# Patient Record
Sex: FEMALE | Race: WHITE | NOT HISPANIC OR LATINO | Employment: OTHER | URBAN - METROPOLITAN AREA
[De-identification: names, ages, dates, MRNs, and addresses within clinical notes are randomized per-mention and may not be internally consistent; named-entity substitution may affect disease eponyms.]

---

## 2017-01-03 ENCOUNTER — OFFICE VISIT (OUTPATIENT)
Dept: MEDICAL GROUP | Facility: PHYSICIAN GROUP | Age: 72
End: 2017-01-03
Payer: MEDICARE

## 2017-01-03 VITALS
WEIGHT: 193 LBS | RESPIRATION RATE: 16 BRPM | HEIGHT: 67 IN | SYSTOLIC BLOOD PRESSURE: 132 MMHG | HEART RATE: 88 BPM | OXYGEN SATURATION: 94 % | TEMPERATURE: 97.6 F | BODY MASS INDEX: 30.29 KG/M2 | DIASTOLIC BLOOD PRESSURE: 80 MMHG

## 2017-01-03 DIAGNOSIS — F41.9 ANXIETY: ICD-10-CM

## 2017-01-03 DIAGNOSIS — I10 ESSENTIAL HYPERTENSION: ICD-10-CM

## 2017-01-03 DIAGNOSIS — Z13.228 SCREENING FOR ENDOCRINE, METABOLIC AND IMMUNITY DISORDER: ICD-10-CM

## 2017-01-03 DIAGNOSIS — Z13.29 SCREENING FOR ENDOCRINE, METABOLIC AND IMMUNITY DISORDER: ICD-10-CM

## 2017-01-03 DIAGNOSIS — M79.89 SWELLING OF RIGHT LOWER EXTREMITY: ICD-10-CM

## 2017-01-03 DIAGNOSIS — M15.9 GENERALIZED OSTEOARTHRITIS OF MULTIPLE SITES: ICD-10-CM

## 2017-01-03 DIAGNOSIS — Z13.0 SCREENING FOR ENDOCRINE, METABOLIC AND IMMUNITY DISORDER: ICD-10-CM

## 2017-01-03 DIAGNOSIS — Z23 NEED FOR VACCINATION: ICD-10-CM

## 2017-01-03 DIAGNOSIS — Z13.1 SCREENING FOR DIABETES MELLITUS (DM): ICD-10-CM

## 2017-01-03 DIAGNOSIS — M81.0 OSTEOPOROSIS: ICD-10-CM

## 2017-01-03 DIAGNOSIS — E78.5 DYSLIPIDEMIA: ICD-10-CM

## 2017-01-03 PROCEDURE — 99214 OFFICE O/P EST MOD 30 MIN: CPT | Mod: 25 | Performed by: INTERNAL MEDICINE

## 2017-01-03 PROCEDURE — G0009 ADMIN PNEUMOCOCCAL VACCINE: HCPCS | Performed by: INTERNAL MEDICINE

## 2017-01-03 PROCEDURE — 90732 PPSV23 VACC 2 YRS+ SUBQ/IM: CPT | Performed by: INTERNAL MEDICINE

## 2017-01-03 RX ORDER — HYDROCODONE BITARTRATE AND ACETAMINOPHEN 10; 325 MG/1; MG/1
1 TABLET ORAL
Qty: 25 TAB | Refills: 0 | Status: SHIPPED | OUTPATIENT
Start: 2017-01-03 | End: 2017-06-06

## 2017-01-03 ASSESSMENT — PATIENT HEALTH QUESTIONNAIRE - PHQ9: CLINICAL INTERPRETATION OF PHQ2 SCORE: 0

## 2017-01-03 NOTE — ASSESSMENT & PLAN NOTE
Pt is on vitamin D and calcium supplementation. She is on fosamax which she will complete a 5 year course in April, 2017.

## 2017-01-03 NOTE — ASSESSMENT & PLAN NOTE
ACC ASCVD risk score 18% on labs. Pt was seen by previous PCP and it was decided to try conservative therapy first.

## 2017-01-03 NOTE — ASSESSMENT & PLAN NOTE
Pt is on metoprolol  mg daily. Her BP is at goal per JNC 8 criteria. Denies chest pain, shortness of breath, headache, blurry vision

## 2017-01-03 NOTE — PROGRESS NOTES
Subjective:   Nadege Mae is a 71 y.o. female here today for RLE swelling, HTN, osteoporoiss, DLD, anxiety, osteoarthritis    Swelling of right lower extremity  Pt has had right lower extremity swelling that has been present for the past two years. She's had multiple ultrasounds that were negative for DVT and has been treated with bactrim and lasix multiple times. She is currently on lasix. She continues to get edema of the lower extremities. She stands a lot at work. She uses compression stockings at work but not at home. She does try to elevated her legs at home occasionally.     Pt gets occasional pain in the other leg when she walks but not consistently.     Essential hypertension  Pt is on metoprolol  mg daily. Her BP is at goal per JNC 8 criteria. Denies chest pain, shortness of breath, headache, blurry vision    Osteoporosis  Pt is on vitamin D and calcium supplementation. She is on fosamax which she will complete a 5 year course in April, 2017.    Dyslipidemia  ACC ASCVD risk score 18% on labs. Pt was seen by previous PCP and it was decided to try conservative therapy first.     Anxiety  Pt takes xanax for anxiety. She takes on average one per day. Denies suicidal or homicidal ideation.     Generalized osteoarthritis of multiple sites  Pt takes norco for pain as needed. She tolerates medication well with no constipation or mental slowness.        Current medicines (including changes today)  Current Outpatient Prescriptions   Medication Sig Dispense Refill   • hydrocodone/acetaminophen (NORCO)  MG Tab Take 1 Tab by mouth 1 time daily as needed. 25 Tab 0   • alprazolam (XANAX) 0.25 MG Tab TAKE 1 TABLET BY MOUTH AT BEDTIME AS NEEDED FOR SLEEP AND ANXIETY 30 Tab 0   • alendronate (FOSAMAX) 70 MG Tab TAKE 1 TAB BY MOUTH EVERY 7 DAYS. 4 Tab 5   • metoprolol SR (TOPROL XL) 100 MG TABLET SR 24 HR TAKE 1 TABLET BY MOUTH EVERY DAY 30 Tab 5   • Cholecalciferol (VITAMIN D) 2000 UNIT Tab Take  by mouth every  "day.     • furosemide (LASIX) 40 MG TABS Take 1 Tab by mouth every day. 30 Tab 3   • potassium Chloride ER (K-TAB) 20 MEQ TBCR tablet Take 1 Tab by mouth 2 times a day. 60 Tab 2   • Omega-3 Fatty Acids (FISH OIL PO) Take  by mouth every day.     • therapeutic multivitamin-minerals (THERAGRAN-M) TABS Take 1 Tab by mouth every day.     • GARLIC PO Take  by mouth every day.       No current facility-administered medications for this visit.     She  has a past medical history of Hypertension; Dental disorder; Heart valve disease; Anxiety; Osteoporosis; Hyperlipidemia; and Generalized osteoarthritis of multiple sites (10/20/2015). She also has no past medical history of Muscle disorder, OSTEOPOROSIS, Allergy, or Diabetes.    ROS   No chest pain, no headache, no blurry vision, no constipation       Objective:     Blood pressure 132/80, pulse 88, temperature 36.4 °C (97.6 °F), resp. rate 16, height 1.702 m (5' 7\"), weight 87.544 kg (193 lb), SpO2 94 %. Body mass index is 30.22 kg/(m^2).   Physical Exam:  Constitutional: Alert & oriented, no acute distress  Eye: Conjunctiva clear, lids normal, no discharge  ENMT: Lips without lesions, normal external nose and ears  Neck: Trachea midline, no masses, no thyromegaly. No cervical or supraclavicular lymphadenopathy  Respiratory: Unlabored respiratory effort, lungs clear to auscultation, no wheezes, no ronchi  Cardiovascular: 2/6 systolic murmur  Ext: Edema in bilateral lower extremities  Skin: Warm, dry, good turgor, no rashes in visible areas  Neuro: No overt focal neurologic deficits, normal gait  Psych: Normal mood and affect      Assessment and Plan:   The following treatment plan was discussed    1. Swelling of right lower extremity  Bilateral edema seen on exam along with cardiac murmur. Will order echocardiogram for further evaluation. Previous ultrasounds have been negative for DVT.  - ECHOCARDIOGRAM COMP W/O CONT; Future    2. Essential hypertension  Well controlled on " metoprolol  mg daily. Continue current medication  - CBC WITH DIFFERENTIAL; Future    3. Osteoporosis  Continue calcium, vitamin D, and fosamax. Pt will complete 5 year course of fosamax in April of 2017  - VITAMIN D,25 HYDROXY; Future    4. Dyslipidemia  Pt counseled on diet and exercise for conservative therapy for DLD. Will recheck prior to follow up   - LIPID PROFILE; Future    5. Anxiety  Will continue xanax but given patient is co-commitment on norco will refer to psychiatry given risk of respiratory suppression when on combination of these medications. Denies suicidal ideation or homicidal ideation.   - REFERRAL TO PSYCHIATRY    6. Generalized osteoarthritis of multiple sites  Will refill norco but given patient is on xanax will refer to pain clinic as above  - REFERRAL TO PAIN CLINIC  - hydrocodone/acetaminophen (NORCO)  MG Tab; Take 1 Tab by mouth 1 time daily as needed.  Dispense: 25 Tab; Refill: 0    7. Need for vaccination  - PneumoVax PPV23 =>3yo    8. Screening for diabetes mellitus (DM)  - COMP METABOLIC PANEL; Future    9. Screening for endocrine, metabolic and immunity disorder  - TSH WITH REFLEX TO FT4; Future    Followup: Return in about 4 months (around 5/3/2017).    Please note that this dictation was created using voice recognition software. I have made every reasonable attempt to correct obvious errors, but I expect that there are errors of grammar and possibly content that I did not discover before finalizing the note.

## 2017-01-03 NOTE — ASSESSMENT & PLAN NOTE
Pt takes norco for pain as needed. She tolerates medication well with no constipation or mental slowness.

## 2017-01-03 NOTE — ASSESSMENT & PLAN NOTE
Pt has had right lower extremity swelling that has been present for the past two years. She's had multiple ultrasounds that were negative for DVT and has been treated with bactrim and lasix multiple times. She is currently on lasix. She continues to get edema of the lower extremities. She stands a lot at work. She uses compression stockings at work but not at home. She does try to elevated her legs at home occasionally.     Pt gets occasional pain in the other leg when she walks but not consistently.

## 2017-01-03 NOTE — ASSESSMENT & PLAN NOTE
Pt takes xanax for anxiety. She takes on average one per day. Denies suicidal or homicidal ideation.

## 2017-02-07 ENCOUNTER — OFFICE VISIT (OUTPATIENT)
Dept: URGENT CARE | Facility: PHYSICIAN GROUP | Age: 72
End: 2017-02-07
Payer: MEDICARE

## 2017-02-07 VITALS
SYSTOLIC BLOOD PRESSURE: 140 MMHG | RESPIRATION RATE: 16 BRPM | DIASTOLIC BLOOD PRESSURE: 86 MMHG | OXYGEN SATURATION: 93 % | TEMPERATURE: 97.8 F | HEART RATE: 100 BPM | BODY MASS INDEX: 29.12 KG/M2 | WEIGHT: 186 LBS

## 2017-02-07 DIAGNOSIS — J06.9 VIRAL URI WITH COUGH: ICD-10-CM

## 2017-02-07 DIAGNOSIS — H61.23 BILATERAL IMPACTED CERUMEN: ICD-10-CM

## 2017-02-07 PROCEDURE — G8482 FLU IMMUNIZE ORDER/ADMIN: HCPCS | Performed by: PHYSICIAN ASSISTANT

## 2017-02-07 PROCEDURE — 3014F SCREEN MAMMO DOC REV: CPT | Performed by: PHYSICIAN ASSISTANT

## 2017-02-07 PROCEDURE — G8432 DEP SCR NOT DOC, RNG: HCPCS | Performed by: PHYSICIAN ASSISTANT

## 2017-02-07 PROCEDURE — 1101F PT FALLS ASSESS-DOCD LE1/YR: CPT | Performed by: PHYSICIAN ASSISTANT

## 2017-02-07 PROCEDURE — G8420 CALC BMI NORM PARAMETERS: HCPCS | Performed by: PHYSICIAN ASSISTANT

## 2017-02-07 PROCEDURE — 3017F COLORECTAL CA SCREEN DOC REV: CPT | Mod: 1P | Performed by: PHYSICIAN ASSISTANT

## 2017-02-07 PROCEDURE — 99214 OFFICE O/P EST MOD 30 MIN: CPT | Performed by: PHYSICIAN ASSISTANT

## 2017-02-07 PROCEDURE — 4040F PNEUMOC VAC/ADMIN/RCVD: CPT | Performed by: PHYSICIAN ASSISTANT

## 2017-02-07 PROCEDURE — 1036F TOBACCO NON-USER: CPT | Performed by: PHYSICIAN ASSISTANT

## 2017-02-07 RX ORDER — CODEINE PHOSPHATE AND GUAIFENESIN 10; 100 MG/5ML; MG/5ML
5 SOLUTION ORAL EVERY 4 HOURS PRN
Qty: 100 ML | Refills: 0 | Status: SHIPPED | OUTPATIENT
Start: 2017-02-07 | End: 2017-10-25

## 2017-02-07 ASSESSMENT — ENCOUNTER SYMPTOMS
FEVER: 0
HEMOPTYSIS: 0
SHORTNESS OF BREATH: 0
CHILLS: 0
SPUTUM PRODUCTION: 1
COUGH: 1
PALPITATIONS: 0
WHEEZING: 0

## 2017-02-07 ASSESSMENT — COPD QUESTIONNAIRES: COPD: 0

## 2017-02-07 NOTE — MR AVS SNAPSHOT
Nadege Mae   2017 8:50 AM   Office Visit   MRN: [id number]    Department:  [facility]   Dept Phone:  [phone]    Description:  Female : 1945   Provider:  Pedro Haynes PA-C           Reason for Visit     Cough uri x3days. ears clogged x2days      Allergies as of 2017     No Known Allergies      You were diagnosed with     Viral URI with cough   [142927]       Bilateral impacted cerumen   [[id number]]         Vital Signs     Blood Pressure Pulse Temperature Respirations Weight Oxygen Saturation    140/86 mmHg 100 36.6 °C (97.8 °F) 16 84.369 kg (186 lb) 93%    Smoking Status                   Former Smoker           Basic Information     Date Of Birth Sex Race Ethnicity Preferred Language    1945 Female White Non- English      Your appointments     2017  8:15 AM   E[facility] with [facility]   [facility]    [address]  [place] [address]   [phone]           No prep              Problem List              ICD-10-CM Priority Class Noted - Resolved    Other (abnormal) findings on radiological examination of breast R92.8   2014 - Present    Osteoporosis M81.0   Unknown - Present    Essential hypertension I10   10/6/2015 - Present    Dyslipidemia E78.5   10/6/2015 - Present    Anxiety F41.9   10/6/2015 - Present    Generalized osteoarthritis of multiple sites M15.9   10/20/2015 - Present    Swelling of right lower extremity M79.89   1/3/2017 - Present      Health Maintenance        Date Due Completion Dates    IMM ZOSTER VACCINE 10/13/2003 ---    COLONOSCOPY 2016 (Declined)    Override on 2015: Patient Declined    MAMMOGRAM 3/29/2017 3/29/2016, 2014, 2014    BONE DENSITY 2019, 2012    IMM DTaP/Tdap/Td Vaccine (2 - Td) 2022            Current Immunizations     13-VALENT PCV PREVNAR 10/20/2015    Influenza Vaccine Adult HD 10/11/2016, 10/20/2015    Pneumococcal  polysaccharide vaccine (PPSV-23) 1/3/2017  9:32 AM    Tdap Vaccine 12/9/2012      Below and/or attached are the medications your provider expects you to take. Review all of your home medications and newly ordered medications with your provider and/or pharmacist. Follow medication instructions as directed by your provider and/or pharmacist. Please keep your medication list with you and share with your provider. Update the information when medications are discontinued, doses are changed, or new medications (including over-the-counter products) are added; and carry medication information at all times in the event of emergency situations     Allergies:  No Known Allergies          Medications  Valid as of: February 07, 2017 -  7:14 PM    Generic Name Brand Name Tablet Size Instructions for use    Alendronate Sodium (Tab) FOSAMAX 70 MG TAKE 1 TAB BY MOUTH EVERY 7 DAYS.        ALPRAZolam (Tab) XANAX 0.25 MG TAKE 1 TABLET BY MOUTH AT BEDTIME AS NEEDED FOR SLEEP AND ANXIETY        Cholecalciferol (Tab) vitamin D 2000 UNIT Take  by mouth every day.        Furosemide (Tab) LASIX 40 MG Take 1 Tab by mouth every day.        Garlic   Take  by mouth every day.        Guaifenesin-Codeine (Solution) ROBITUSSIN -10 mg/5mL Take 5 mL by mouth every four hours as needed for Cough.        Hydrocodone-Acetaminophen (Tab) NORCO  MG Take 1 Tab by mouth 1 time daily as needed.        Metoprolol Succinate (TABLET SR 24 HR) TOPROL  MG TAKE 1 TABLET BY MOUTH EVERY DAY        Multiple Vitamins-Minerals (Tab) THERAGRAN-M  Take 1 Tab by mouth every day.        Omega-3 Fatty Acids   Take  by mouth every day.        Potassium Chloride (Tab CR) K-TAB 20 MEQ Take 1 Tab by mouth 2 times a day.        .                 Medicines prescribed today were sent to:     [id number] - [place] - [address]    [address] [address]    Phone: [phone] Fax: [phone]    Open 24 Hours?: No      Medication refill  instructions:       If your prescription bottle indicates you have medication refills left, it is not necessary to call your provider’s office. Please contact your pharmacy and they will refill your medication.    If your prescription bottle indicates you do not have any refills left, you may request refills at any time through one of the following ways: The online GOkey system (except Urgent Care), by calling your provider’s office, or by asking your pharmacy to contact your provider’s office with a refill request. Medication refills are processed only during regular business hours and may not be available until the next business day. Your provider may request additional information or to have a follow-up visit with you prior to refilling your medication.   *Please Note: Medication refills are assigned a new Rx number when refilled electronically. Your pharmacy may indicate that no refills were authorized even though a new prescription for the same medication is available at the pharmacy. Please request the medicine by name with the pharmacy before contacting your provider for a refill.        Other Notes About Your Plan     Please Assess and Status Chronic Disease from Current and Prior Visits.     Query: Please Assess the following Diagnosis    1.Suggested Code 304.01 Opioid type Dependence; Continuous Use. Patient has been prescribed Norco since 01/18/11 with RX being filled near monthly; last RX filled 04/01/2015 per pharmacy Records. In your Medical Opinion would you status if this patient has Opioid type Dependence on continuous use.            GOkey Access Code: [email or web address]  Expires: 3/9/2017  7:14 PM    GOkey  A secure, online tool to manage your health information     [employer]’s GOkey® is a secure, online tool that connects you to your personalized health information from the privacy of your home -- day or night - making it very easy for you to manage your healthcare. Once the activation  process is completed, you can even access your medical information using the Post-i ned, which is available for free in the Apple Ned store or Google Play store.     Post-i provides the following levels of access (as shown below):   My Chart Features   [facility] Doctor [facility]  Specialists [facility]  Urgent  Care Non-[facility]  Primary Care  Doctor   Email your healthcare team securely and privately 24/7 X X X    Manage appointments: schedule your next appointment; view details of past/upcoming appointments X      Request prescription refills. X      View recent personal medical records, including lab and immunizations X X X X   View health record, including health history, allergies, medications X X X X   Read reports about your outpatient visits, procedures, consult and ER notes X X X X   See your discharge summary, which is a recap of your hospital and/or ER visit that includes your diagnosis, lab results, and care plan. X X       How to register for Post-i:  1. Go to  [email or web address].  2. Click on the Sign Up Now box, which takes you to the New Member Sign Up page. You will need to provide the following information:  a. Enter your Post-i Access Code exactly as it appears at the top of this page. (You will not need to use this code after you’ve completed the sign-up process. If you do not sign up before the expiration date, you must request a new code.)   b. Enter your date of birth.   c. Enter your home email address.   d. Click Submit, and follow the next screen’s instructions.  3. Create a Post-i ID. This will be your Post-i login ID and cannot be changed, so think of one that is secure and easy to remember.  4. Create a Post-i password. You can change your password at any time.  5. Enter your Password Reset Question and Answer. This can be used at a later time if you forget your password.   6. Enter your e-mail address. This allows you to receive e-mail notifications when new information  is available in "[employer].  7. Click Sign Up. You can now view your health information.    For assistance activating your "[employer] account, call [phone]

## 2017-02-07 NOTE — PROGRESS NOTES
Subjective:      Nadege Mae is a 71 y.o. female who presents with Cough            Cough  Episode onset: 3 days ago. The problem has been gradually worsening. The cough is productive of sputum. Associated symptoms include ear congestion. Pertinent negatives include no chest pain, chills, ear pain, fever, hemoptysis, shortness of breath or wheezing. Nothing aggravates the symptoms. She has tried OTC cough suppressant for the symptoms. The treatment provided no relief. There is no history of asthma or COPD.       Review of Systems   Constitutional: Positive for malaise/fatigue. Negative for fever and chills.   HENT: Positive for congestion and hearing loss. Negative for ear pain.    Respiratory: Positive for cough and sputum production. Negative for hemoptysis, shortness of breath and wheezing.    Cardiovascular: Negative for chest pain and palpitations.     All other systems reviewed and are negative.  PMH:  has a past medical history of Hypertension; Dental disorder; Heart valve disease; Anxiety; Osteoporosis; Hyperlipidemia; and Generalized osteoarthritis of multiple sites (10/20/2015). She also has no past medical history of Muscle disorder, OSTEOPOROSIS, Allergy, or Diabetes.  MEDS:   Current outpatient prescriptions:   •  alprazolam (XANAX) 0.25 MG Tab, TAKE 1 TABLET BY MOUTH AT BEDTIME AS NEEDED FOR SLEEP AND ANXIETY, Disp: 30 Tab, Rfl: 0  •  hydrocodone/acetaminophen (NORCO)  MG Tab, Take 1 Tab by mouth 1 time daily as needed., Disp: 25 Tab, Rfl: 0  •  alendronate (FOSAMAX) 70 MG Tab, TAKE 1 TAB BY MOUTH EVERY 7 DAYS., Disp: 4 Tab, Rfl: 5  •  metoprolol SR (TOPROL XL) 100 MG TABLET SR 24 HR, TAKE 1 TABLET BY MOUTH EVERY DAY, Disp: 30 Tab, Rfl: 5  •  Cholecalciferol (VITAMIN D) 2000 UNIT Tab, Take  by mouth every day., Disp: , Rfl:   •  furosemide (LASIX) 40 MG TABS, Take 1 Tab by mouth every day., Disp: 30 Tab, Rfl: 3  •  potassium Chloride ER (K-TAB) 20 MEQ TBCR tablet, Take 1 Tab by mouth 2 times a  day., Disp: 60 Tab, Rfl: 2  •  Omega-3 Fatty Acids (FISH OIL PO), Take  by mouth every day., Disp: , Rfl:   •  therapeutic multivitamin-minerals (THERAGRAN-M) TABS, Take 1 Tab by mouth every day., Disp: , Rfl:   •  GARLIC PO, Take  by mouth every day., Disp: , Rfl:   ALLERGIES: No Known Allergies  SURGHX:   Past Surgical History   Procedure Laterality Date   • Gyn surgery  1973     complete hysterectomy   • Breast biopsy  8/28/2014     Performed by Magdalena Londono M.D. at SURGERY [facility] ORS   • Abdominal hysterectomy total       w/BSO due to uterine cyst and endometriosis   • Breast biopsy Right 8/14     benign     SOCHX:  reports that she quit smoking about 10 years ago. Her smoking use included Cigarettes. She has a 12.5 pack-year smoking history. She has never used smokeless tobacco. She reports that she drinks about 3.0 oz of alcohol per week. She reports that she does not use illicit drugs.  FH: Family history was reviewed, no pertinent findings to report  Medications, Allergies, and current problem list reviewed today in Epic       Objective:     /86 mmHg  Pulse 100  Temp(Src) 36.6 °C (97.8 °F)  Resp 16  Wt 84.369 kg (186 lb)  SpO2 93%     Physical Exam   Constitutional: She is oriented to person, place, and time. She appears well-developed and well-nourished.   HENT:   Head: Normocephalic and atraumatic.   Right Ear: Hearing, tympanic membrane, external ear and ear canal normal.   Left Ear: Hearing, tympanic membrane, external ear and ear canal normal.   Mouth/Throat: Uvula is midline, oropharynx is clear and moist and mucous membranes are normal.   Neck: Normal range of motion. Neck supple.   Cardiovascular: Normal rate, regular rhythm, normal heart sounds and intact distal pulses.  Exam reveals no gallop and no friction rub.    No murmur heard.  Pulmonary/Chest: Effort normal and breath sounds normal. No accessory muscle usage. No apnea, no tachypnea and no bradypnea. No respiratory  distress. She has no decreased breath sounds. She has no wheezes. She has no rhonchi. She has no rales. She exhibits no tenderness.   Neurological: She is alert and oriented to person, place, and time.   Skin: Skin is warm and dry.   Psychiatric: She has a normal mood and affect. Her behavior is normal. Judgment and thought content normal.   Vitals reviewed.              Assessment/Plan:   Patient is a 71 year old female who presents with worsening cough for 3 days.  No PMH of COPD, asthma.  Pt reports productive cough with yellow/green/brown sputum.  There is some chest tightness and occasional SOB.  Patient has tried over the counter cough/cold products with no relief. No fever, tachycardia, or tachypnea present.  Lungs are clear to ausculation.  Ears are impacted with cerumen bilaterally.  They were irrigated and showed normal TM's.      1. Viral URI with cough  guaifenesin-codeine (CHERATUSSIN AC) Solution oral solution   2. Bilateral impacted cerumen       Differential diagnosis, natural history, supportive care, and indications for immediate follow-up discussed at length.   Follow-up with primary care provider within 4-5 days, emergency room precautions discussed.  Patient and/or family appears understanding of information.

## 2017-02-16 DIAGNOSIS — I10 ESSENTIAL HYPERTENSION: ICD-10-CM

## 2017-02-16 RX ORDER — METOPROLOL SUCCINATE 100 MG/1
100 TABLET, EXTENDED RELEASE ORAL
Qty: 30 TAB | Refills: 11 | Status: SHIPPED | OUTPATIENT
Start: 2017-02-16 | End: 2018-03-12

## 2017-02-16 NOTE — TELEPHONE ENCOUNTER
Received refill request for metoprolol.  Pt was seen in clinic recently and is taking this medication for HTN. Refill sent to pharmacy    Jorge Amador M.D.

## 2017-03-16 RX ORDER — METOPROLOL SUCCINATE 100 MG/1
TABLET, EXTENDED RELEASE ORAL
Qty: 30 TAB | Refills: 11 | Status: SHIPPED | OUTPATIENT
Start: 2017-03-16 | End: 2017-10-25

## 2017-03-16 NOTE — TELEPHONE ENCOUNTER
Received refill request for metoprolol.  Pt was seen in clinic recently and is taking this medication for hypertension. Refill sent to pharmacy    Jorge Amador M.D.

## 2017-03-23 RX ORDER — ALENDRONATE SODIUM 70 MG/1
TABLET ORAL
Qty: 4 TAB | Refills: 0 | Status: SHIPPED | OUTPATIENT
Start: 2017-03-23 | End: 2017-05-20 | Stop reason: SDUPTHER

## 2017-05-16 ENCOUNTER — HOSPITAL ENCOUNTER (OUTPATIENT)
Dept: LAB | Facility: MEDICAL CENTER | Age: 72
End: 2017-05-16
Attending: INTERNAL MEDICINE
Payer: MEDICARE

## 2017-05-16 DIAGNOSIS — I10 ESSENTIAL HYPERTENSION: ICD-10-CM

## 2017-05-16 DIAGNOSIS — Z13.0 SCREENING FOR ENDOCRINE, METABOLIC AND IMMUNITY DISORDER: ICD-10-CM

## 2017-05-16 DIAGNOSIS — E78.5 DYSLIPIDEMIA: ICD-10-CM

## 2017-05-16 DIAGNOSIS — M81.0 OSTEOPOROSIS: ICD-10-CM

## 2017-05-16 DIAGNOSIS — Z13.1 SCREENING FOR DIABETES MELLITUS (DM): ICD-10-CM

## 2017-05-16 DIAGNOSIS — Z13.228 SCREENING FOR ENDOCRINE, METABOLIC AND IMMUNITY DISORDER: ICD-10-CM

## 2017-05-16 DIAGNOSIS — Z13.29 SCREENING FOR ENDOCRINE, METABOLIC AND IMMUNITY DISORDER: ICD-10-CM

## 2017-05-16 LAB
25(OH)D3 SERPL-MCNC: 43 NG/ML (ref 30–100)
ALBUMIN SERPL BCP-MCNC: 4.4 G/DL (ref 3.2–4.9)
ALBUMIN/GLOB SERPL: 1.1 G/DL
ALP SERPL-CCNC: 71 U/L (ref 30–99)
ALT SERPL-CCNC: 26 U/L (ref 2–50)
ANION GAP SERPL CALC-SCNC: 8 MMOL/L (ref 0–11.9)
AST SERPL-CCNC: 32 U/L (ref 12–45)
BASOPHILS # BLD AUTO: 1.3 % (ref 0–1.8)
BASOPHILS # BLD: 0.08 K/UL (ref 0–0.12)
BILIRUB SERPL-MCNC: 0.6 MG/DL (ref 0.1–1.5)
BUN SERPL-MCNC: 14 MG/DL (ref 8–22)
CALCIUM SERPL-MCNC: 9.8 MG/DL (ref 8.5–10.5)
CHLORIDE SERPL-SCNC: 100 MMOL/L (ref 96–112)
CHOLEST SERPL-MCNC: 259 MG/DL (ref 100–199)
CO2 SERPL-SCNC: 28 MMOL/L (ref 20–33)
CREAT SERPL-MCNC: 0.73 MG/DL (ref 0.5–1.4)
EOSINOPHIL # BLD AUTO: 0.12 K/UL (ref 0–0.51)
EOSINOPHIL NFR BLD: 2 % (ref 0–6.9)
ERYTHROCYTE [DISTWIDTH] IN BLOOD BY AUTOMATED COUNT: 46 FL (ref 35.9–50)
GFR SERPL CREATININE-BSD FRML MDRD: >60 ML/MIN/1.73 M 2
GLOBULIN SER CALC-MCNC: 4.1 G/DL (ref 1.9–3.5)
GLUCOSE SERPL-MCNC: 101 MG/DL (ref 65–99)
HCT VFR BLD AUTO: 43.8 % (ref 37–47)
HDLC SERPL-MCNC: 85 MG/DL
HGB BLD-MCNC: 14 G/DL (ref 12–16)
IMM GRANULOCYTES # BLD AUTO: 0.02 K/UL (ref 0–0.11)
IMM GRANULOCYTES NFR BLD AUTO: 0.3 % (ref 0–0.9)
LDLC SERPL CALC-MCNC: 149 MG/DL
LYMPHOCYTES # BLD AUTO: 2.27 K/UL (ref 1–4.8)
LYMPHOCYTES NFR BLD: 37 % (ref 22–41)
MCH RBC QN AUTO: 32.5 PG (ref 27–33)
MCHC RBC AUTO-ENTMCNC: 32 G/DL (ref 33.6–35)
MCV RBC AUTO: 101.6 FL (ref 81.4–97.8)
MONOCYTES # BLD AUTO: 0.79 K/UL (ref 0–0.85)
MONOCYTES NFR BLD AUTO: 12.9 % (ref 0–13.4)
NEUTROPHILS # BLD AUTO: 2.85 K/UL (ref 2–7.15)
NEUTROPHILS NFR BLD: 46.5 % (ref 44–72)
NRBC # BLD AUTO: 0 K/UL
NRBC BLD AUTO-RTO: 0 /100 WBC
PLATELET # BLD AUTO: 229 K/UL (ref 164–446)
PMV BLD AUTO: 10.8 FL (ref 9–12.9)
POTASSIUM SERPL-SCNC: 4.8 MMOL/L (ref 3.6–5.5)
PROT SERPL-MCNC: 8.5 G/DL (ref 6–8.2)
RBC # BLD AUTO: 4.31 M/UL (ref 4.2–5.4)
SODIUM SERPL-SCNC: 136 MMOL/L (ref 135–145)
TRIGL SERPL-MCNC: 125 MG/DL (ref 0–149)
TSH SERPL DL<=0.005 MIU/L-ACNC: 1.44 UIU/ML (ref 0.3–3.7)
WBC # BLD AUTO: 6.1 K/UL (ref 4.8–10.8)

## 2017-05-16 PROCEDURE — 85025 COMPLETE CBC W/AUTO DIFF WBC: CPT

## 2017-05-16 PROCEDURE — 80061 LIPID PANEL: CPT

## 2017-05-16 PROCEDURE — 82306 VITAMIN D 25 HYDROXY: CPT

## 2017-05-16 PROCEDURE — 80053 COMPREHEN METABOLIC PANEL: CPT

## 2017-05-16 PROCEDURE — 36415 COLL VENOUS BLD VENIPUNCTURE: CPT

## 2017-05-16 PROCEDURE — 84443 ASSAY THYROID STIM HORMONE: CPT

## 2017-05-22 RX ORDER — ALENDRONATE SODIUM 70 MG/1
TABLET ORAL
Qty: 4 TAB | Refills: 11 | Status: SHIPPED | OUTPATIENT
Start: 2017-05-22 | End: 2017-10-25

## 2017-05-22 NOTE — TELEPHONE ENCOUNTER
Received refill request for alendronate.  Pt was seen in clinic recently and is taking this medication for osteoporosis. Refill sent to pharmacy    Jorge Amador M.D.

## 2017-05-22 NOTE — TELEPHONE ENCOUNTER
Was the patient seen in the last year in this department? Yes   Last appt with  10/11/16    Does patient have an active prescription for medications requested? No     Received Request Via: Pharmacy

## 2017-06-06 ENCOUNTER — OFFICE VISIT (OUTPATIENT)
Dept: MEDICAL GROUP | Facility: PHYSICIAN GROUP | Age: 72
End: 2017-06-06
Payer: MEDICARE

## 2017-06-06 VITALS
OXYGEN SATURATION: 95 % | WEIGHT: 179 LBS | SYSTOLIC BLOOD PRESSURE: 150 MMHG | HEIGHT: 67 IN | TEMPERATURE: 97.7 F | HEART RATE: 88 BPM | DIASTOLIC BLOOD PRESSURE: 90 MMHG | RESPIRATION RATE: 16 BRPM | BODY MASS INDEX: 28.09 KG/M2

## 2017-06-06 DIAGNOSIS — F41.9 ANXIETY: ICD-10-CM

## 2017-06-06 DIAGNOSIS — Z12.11 SCREENING FOR COLON CANCER: ICD-10-CM

## 2017-06-06 DIAGNOSIS — M81.0 OSTEOPOROSIS, UNSPECIFIED OSTEOPOROSIS TYPE, UNSPECIFIED PATHOLOGICAL FRACTURE PRESENCE: ICD-10-CM

## 2017-06-06 DIAGNOSIS — E78.5 DYSLIPIDEMIA: ICD-10-CM

## 2017-06-06 DIAGNOSIS — Z12.31 ENCOUNTER FOR SCREENING MAMMOGRAM FOR BREAST CANCER: ICD-10-CM

## 2017-06-06 PROCEDURE — G8419 CALC BMI OUT NRM PARAM NOF/U: HCPCS | Performed by: INTERNAL MEDICINE

## 2017-06-06 PROCEDURE — G8432 DEP SCR NOT DOC, RNG: HCPCS | Performed by: INTERNAL MEDICINE

## 2017-06-06 PROCEDURE — 1101F PT FALLS ASSESS-DOCD LE1/YR: CPT | Performed by: INTERNAL MEDICINE

## 2017-06-06 PROCEDURE — 1036F TOBACCO NON-USER: CPT | Performed by: INTERNAL MEDICINE

## 2017-06-06 PROCEDURE — 4040F PNEUMOC VAC/ADMIN/RCVD: CPT | Performed by: INTERNAL MEDICINE

## 2017-06-06 PROCEDURE — 99214 OFFICE O/P EST MOD 30 MIN: CPT | Performed by: INTERNAL MEDICINE

## 2017-06-06 PROCEDURE — 3014F SCREEN MAMMO DOC REV: CPT | Performed by: INTERNAL MEDICINE

## 2017-06-06 NOTE — MR AVS SNAPSHOT
"        Nadege Mae   2017 9:00 AM   Office Visit   MRN: [id number]    Department:  [facility]   Dept Phone:  [phone]    Description:  Female : 1945   Provider:  Jorge Amador M.D.           Reason for Visit     Follow-Up labs      Allergies as of 2017     No Known Allergies      You were diagnosed with     Anxiety   [824341]       Osteoporosis, unspecified osteoporosis type, unspecified pathological fracture presence   [7784775]       Dyslipidemia   [554337]       Screening for colon cancer   [440596]       Encounter for screening mammogram for breast cancer   [[id number]]         Vital Signs     Blood Pressure Pulse Temperature Respirations Height Weight    150/90 mmHg 88 36.5 °C (97.7 °F) 16 1.702 m (5' 7.01\") 81.194 kg (179 lb)    Body Mass Index Oxygen Saturation Smoking Status             28.03 kg/m2 95% Former Smoker         Basic Information     Date Of Birth Sex Race Ethnicity Preferred Language    1945 Female White Non- English      Your appointments     Aug 08, 2017  9:20 AM   Established Patient with Jorge Amador M.D.   [facility] ([place]    [address] [place] [id number] [phone]           You will be receiving a confirmation call a few days before your appointment from our automated call confirmation system.              Problem List              ICD-10-CM Priority Class Noted - Resolved    Other (abnormal) findings on radiological examination of breast R92.8   2014 - Present    Osteoporosis M81.0   Unknown - Present    Essential hypertension I10   10/6/2015 - Present    Dyslipidemia E78.5   10/6/2015 - Present    Anxiety F41.9   10/6/2015 - Present    Generalized osteoarthritis of multiple sites M15.9   10/20/2015 - Present    Swelling of right lower extremity M79.89   1/3/2017 - Present      Health Maintenance        Date Due Completion Dates    IMM ZOSTER VACCINE 10/13/2003 ---    COLONOSCOPY 2016 (Declined)   " Override on 6/2/2015: Patient Declined    MAMMOGRAM 3/29/2017 3/29/2016, 7/22/2014, 6/17/2014    BONE DENSITY 12/30/2019 12/30/2014, 12/11/2012    IMM DTaP/Tdap/Td Vaccine (2 - Td) 12/9/2022 12/9/2012            Current Immunizations     13-VALENT PCV PREVNAR 10/20/2015    Influenza Vaccine Adult HD 10/11/2016, 10/20/2015    Pneumococcal polysaccharide vaccine (PPSV-23) 1/3/2017  9:32 AM    Tdap Vaccine 12/9/2012      Below and/or attached are the medications your provider expects you to take. Review all of your home medications and newly ordered medications with your provider and/or pharmacist. Follow medication instructions as directed by your provider and/or pharmacist. Please keep your medication list with you and share with your provider. Update the information when medications are discontinued, doses are changed, or new medications (including over-the-counter products) are added; and carry medication information at all times in the event of emergency situations     Allergies:  No Known Allergies          Medications  Valid as of: June 06, 2017 -  9:15 AM    Generic Name Brand Name Tablet Size Instructions for use    Alendronate Sodium (Tab) FOSAMAX 70 MG TAKE 1 TAB BY MOUTH EVERY 7 DAYS.        Cholecalciferol (Tab) vitamin D 2000 UNIT Take  by mouth every day.        Furosemide (Tab) LASIX 40 MG Take 1 Tab by mouth every day.        Garlic   Take  by mouth every day.        Guaifenesin-Codeine (Solution) ROBITUSSIN -10 mg/5mL Take 5 mL by mouth every four hours as needed for Cough.        Metoprolol Succinate (TABLET SR 24 HR) TOPROL  MG Take 1 Tab by mouth every day.        Metoprolol Succinate (TABLET SR 24 HR) TOPROL  MG TAKE 1 TABLET BY MOUTH EVERY DAY        Multiple Vitamins-Minerals (Tab) THERAGRAN-M  Take 1 Tab by mouth every day.        Omega-3 Fatty Acids   Take  by mouth every day.        Potassium Chloride (Tab CR) K-TAB 20 MEQ Take 1 Tab by mouth 2 times a day.        Sertraline  HCl (Tab) ZOLOFT 50 MG Take 1 Tab by mouth every day.        .                 Medicines prescribed today were sent to:     [facility]/PHARMACY #[id number] - [place], [place] - [address]    [address] [place] [address]    Phone: [phone] Fax: [phone]    Open 24 Hours?: No      Medication refill instructions:       If your prescription bottle indicates you have medication refills left, it is not necessary to call your provider’s office. Please contact your pharmacy and they will refill your medication.    If your prescription bottle indicates you do not have any refills left, you may request refills at any time through one of the following ways: The online [employer] system (except Urgent Care), by calling your provider’s office, or by asking your pharmacy to contact your provider’s office with a refill request. Medication refills are processed only during regular business hours and may not be available until the next business day. Your provider may request additional information or to have a follow-up visit with you prior to refilling your medication.   *Please Note: Medication refills are assigned a new Rx number when refilled electronically. Your pharmacy may indicate that no refills were authorized even though a new prescription for the same medication is available at the pharmacy. Please request the medicine by name with the pharmacy before contacting your provider for a refill.        Your To Do List     Future Labs/Procedures Complete By Expires    COMP METABOLIC PANEL  As directed 6/6/2018    LIPID PROFILE  As directed 6/6/2018    [id number]  As directed 6/6/2018    OCCULT BLOOD FECES IMMUNOASSAY  As directed 6/6/2018      Other Notes About Your Plan     Please Assess and Status Chronic Disease from Current and Prior Visits.     Query: Please Assess the following Diagnosis    1.Suggested Code 304.01 Opioid type Dependence; Continuous Use. Patient has been prescribed Norco since 01/18/11 with RX being  filled near monthly; last RX filled 04/01/2015 per pharmacy Records. In your Medical Opinion would you status if this patient has Opioid type Dependence on continuous use.            MyChart Status: Patient Declined

## 2017-06-06 NOTE — ASSESSMENT & PLAN NOTE
Pt has unchanged cholesterol levels (ASCVD risk score 18% on previous visit). She does not want to start statin medication at this time. She is working on improving her diet. She has had weight loss of about 14 pounds in the past 5 months.

## 2017-06-06 NOTE — ASSESSMENT & PLAN NOTE
Pt was on xanax for anxiety. She stopped taking this medication and is doing ok off of it. She has been off for 2 months. She continues to get anxiety occasionally. She denies suicidal ideation. She has not tried a daily SSIR medication for this in the past.

## 2017-06-06 NOTE — ASSESSMENT & PLAN NOTE
Pt has now completed 5 years of Fosamax and has quit medication now. She remains on calcium and vitamin D.

## 2017-06-06 NOTE — PROGRESS NOTES
Subjective:   Nadege Mae is a 71 y.o. female here today for anxiety, DLD, osteoporosis    Anxiety  Pt was on xanax for anxiety. She stopped taking this medication and is doing ok off of it. She has been off for 2 months. She continues to get anxiety occasionally. She denies suicidal ideation. She has not tried a daily SSIR medication for this in the past.     Osteoporosis  Pt has now completed 5 years of Fosamax and has quit medication now. She remains on calcium and vitamin D.     Dyslipidemia  Pt has unchanged cholesterol levels (ASCVD risk score 18% on previous visit). She does not want to start statin medication at this time. She is working on improving her diet. She has had weight loss of about 14 pounds in the past 5 months.        Current medicines (including changes today)  Current Outpatient Prescriptions   Medication Sig Dispense Refill   • sertraline (ZOLOFT) 50 MG Tab Take 1 Tab by mouth every day. 30 Tab 2   • metoprolol SR (TOPROL XL) 100 MG TABLET SR 24 HR TAKE 1 TABLET BY MOUTH EVERY DAY 30 Tab 11   • metoprolol SR (TOPROL XL) 100 MG TABLET SR 24 HR Take 1 Tab by mouth every day. 30 Tab 11   • guaifenesin-codeine (CHERATUSSIN AC) Solution oral solution Take 5 mL by mouth every four hours as needed for Cough. 100 mL 0   • Cholecalciferol (VITAMIN D) 2000 UNIT Tab Take  by mouth every day.     • furosemide (LASIX) 40 MG TABS Take 1 Tab by mouth every day. 30 Tab 3   • potassium Chloride ER (K-TAB) 20 MEQ TBCR tablet Take 1 Tab by mouth 2 times a day. 60 Tab 2   • Omega-3 Fatty Acids (FISH OIL PO) Take  by mouth every day.     • therapeutic multivitamin-minerals (THERAGRAN-M) TABS Take 1 Tab by mouth every day.     • GARLIC PO Take  by mouth every day.     • alendronate (FOSAMAX) 70 MG Tab TAKE 1 TAB BY MOUTH EVERY 7 DAYS. 4 Tab 11     No current facility-administered medications for this visit.     She  has a past medical history of Hypertension; Dental disorder; Heart valve disease; Anxiety;  "Osteoporosis; Hyperlipidemia; and Generalized osteoarthritis of multiple sites (10/20/2015). She also has no past medical history of Muscle disorder, OSTEOPOROSIS, Allergy, or Diabetes.    ROS   No chest pain, no shortness of breath, no suicidal ideation       Objective:     Blood pressure 150/90, pulse 88, temperature 36.5 °C (97.7 °F), resp. rate 16, height 1.702 m (5' 7.01\"), weight 81.194 kg (179 lb), SpO2 95 %. Body mass index is 28.03 kg/(m^2).   Physical Exam:  Constitutional: Alert & oriented, no acute distress  Eye: Conjunctiva clear, lids normal, no discharge  ENMT: Lips without lesions, normal external nose and ears  Neck: Trachea midline, no masses, no thyromegaly. No cervical or supraclavicular lymphadenopathy  Respiratory: Unlabored respiratory effort, lungs clear to auscultation, no wheezes, no ronchi  Cardiovascular: Normal S1, S2, no murmur, no edema  Skin: Warm, dry, good turgor, no rashes in visible areas  Neuro: No overt focal neurologic deficits, normal gait  Psych: Normal mood and affect      Assessment and Plan:   The following treatment plan was discussed    1. Anxiety  No suicidal ideation. Will start patient on sertraline and follow up in 2 months to assess response.   - sertraline (ZOLOFT) 50 MG Tab; Take 1 Tab by mouth every day.  Dispense: 30 Tab; Refill: 2    2. Osteoporosis, unspecified osteoporosis type, unspecified pathological fracture presence  Pt is now off Fosamax after 5 years of treatment. Continue calcium and vitamin D supplementation  - COMP METABOLIC PANEL; Future    3. Dyslipidemia  Pt counseled about possibly starting medication but she prefers to continue weight loss through diet and exercise. Will continue conservative lifestyle treatment and recheck lab in 6 months  - LIPID PROFILE; Future    4. Screening for colon cancer  - OCCULT BLOOD FECES IMMUNOASSAY; Future    5. Encounter for screening mammogram for breast cancer  - [id number]; Future      Followup: " Return in about 2 months (around 8/6/2017).    Please note that this dictation was created using voice recognition software. I have made every reasonable attempt to correct obvious errors, but I expect that there are errors of grammar and possibly content that I did not discover before finalizing the note.

## 2017-08-07 ENCOUNTER — TELEPHONE (OUTPATIENT)
Dept: MEDICAL GROUP | Facility: PHYSICIAN GROUP | Age: 72
End: 2017-08-07

## 2017-08-07 DIAGNOSIS — F41.9 ANXIETY: ICD-10-CM

## 2017-08-07 NOTE — TELEPHONE ENCOUNTER
ESTABLISHED PATIENT PRE-VISIT PLANNING     Note: Patient will not be contacted if there is no indication to call.     1.  Reviewed notes from the last few office visits within the medical group: Yes    2.  If any orders were placed at last visit or intended to be done for this visit (i.e. 6 mos follow-up), do we have Results/Consult Notes?        •  Labs - Labs ordered, NOT completed. Patient advised to complete prior to next appointment. Pt wants to discuss with Dr. Amador       •  Imaging - Imaging ordered, NOT completed. Patient advised to complete prior to next appointment.       •  Referrals - No referrals were ordered at last office visit.    3. Is this appointment scheduled as a Hospital Follow-Up? No    4.  Immunizations were updated in Epic using WebIZ?: Epic matches WebIZ       •  Web Iz Recommendations: ZOSTAVAX (Shingles)    5.  Patient is due for the following Health Maintenance Topics:   Health Maintenance Due   Topic Date Due   • IMM ZOSTER VACCINE  10/13/2003   • COLONOSCOPY  06/02/2016   • Annual Wellness Visit  10/20/2016   • MAMMOGRAM  03/29/2017           6.  Patient was informed to arrive 15 min prior to their scheduled appointment and bring in their medication bottles.

## 2017-08-07 NOTE — TELEPHONE ENCOUNTER
Received refill request for sertraline.  Pt was seen in clinic recently and was started on medication for anxiety. She will be seen tomorrow in clinic to assess response to medication.  Refill sent to pharmacy    Jorge Amador M.D.

## 2017-08-08 ENCOUNTER — OFFICE VISIT (OUTPATIENT)
Dept: MEDICAL GROUP | Facility: PHYSICIAN GROUP | Age: 72
End: 2017-08-08
Payer: MEDICARE

## 2017-08-08 VITALS
HEIGHT: 67 IN | BODY MASS INDEX: 27.94 KG/M2 | SYSTOLIC BLOOD PRESSURE: 130 MMHG | RESPIRATION RATE: 16 BRPM | OXYGEN SATURATION: 96 % | DIASTOLIC BLOOD PRESSURE: 78 MMHG | TEMPERATURE: 98.1 F | HEART RATE: 82 BPM | WEIGHT: 178 LBS

## 2017-08-08 DIAGNOSIS — F41.9 ANXIETY: ICD-10-CM

## 2017-08-08 DIAGNOSIS — I10 ESSENTIAL HYPERTENSION: ICD-10-CM

## 2017-08-08 DIAGNOSIS — E78.5 DYSLIPIDEMIA: ICD-10-CM

## 2017-08-08 PROCEDURE — 99214 OFFICE O/P EST MOD 30 MIN: CPT | Performed by: INTERNAL MEDICINE

## 2017-08-08 RX ORDER — SERTRALINE HYDROCHLORIDE 100 MG/1
100 TABLET, FILM COATED ORAL DAILY
Qty: 30 TAB | Refills: 3 | Status: ON HOLD | OUTPATIENT
Start: 2017-08-08 | End: 2017-12-21 | Stop reason: SDUPTHER

## 2017-08-08 NOTE — ASSESSMENT & PLAN NOTE
Pt was started on sertaline 50 mg daily at last visit for anxiety.   She feels that the medication is working well. She had a lot of stressors and feels the medication helped with this quite a bit. She does not have manic symptoms such as excessive energy, dangerous activities, decreased need for sleep. She denies suicidal or homicidal ideation

## 2017-08-08 NOTE — MR AVS SNAPSHOT
"        Nadege Roddyemily   2017 9:20 AM   Office Visit   MRN: [id number]    Department:  [facility]   Dept Phone:  [phone]    Description:  Female : 1945   Provider:  Jorge Amador M.D.           Reason for Visit     Follow-Up           Allergies as of 2017     No Known Allergies      You were diagnosed with     Anxiety   [829225]       Dyslipidemia   [350126]       Essential hypertension   [0960241]         Vital Signs     Blood Pressure Pulse Temperature Respirations Height Weight    130/78 mmHg 82 36.7 °C (98.1 °F) 16 1.702 m (5' 7\") 80.74 kg (178 lb)    Body Mass Index Oxygen Saturation Smoking Status             27.87 kg/m2 96% Former Smoker         Basic Information     Date Of Birth Sex Race Ethnicity Preferred Language    1945 Female White Non- English      Your appointments     Oct 10, 2017  7:00 AM   Established Patient with Jorge Amador M.D.   [facility] ([address] [id number] [phone]           You will be receiving a confirmation call a few days before your appointment from our automated call confirmation system.              Problem List              ICD-10-CM Priority Class Noted - Resolved    Other (abnormal) findings on radiological examination of breast R92.8   2014 - Present    Osteoporosis M81.0   Unknown - Present    Essential hypertension I10   10/6/2015 - Present    Dyslipidemia E78.5   10/6/2015 - Present    Anxiety F41.9   10/6/2015 - Present    Generalized osteoarthritis of multiple sites M15.9   10/20/2015 - Present    Swelling of right lower extremity M79.89   1/3/2017 - Present      Health Maintenance        Date Due Completion Dates    COLON CANCER SCREENING ANNUAL FIT 10/13/1993 ---    IMM ZOSTER VACCINE 10/13/2003 ---    MAMMOGRAM 3/29/2017 3/29/2016, 2014, 2014    IMM INFLUENZA (1) 2017 10/11/2016, 10/20/2015, 10/6/2015 (Declined)    Override on 10/6/2015: Patient Declined    BONE " DENSITY 12/30/2019 12/30/2014, 12/11/2012    IMM DTaP/Tdap/Td Vaccine (2 - Td) 12/9/2022 12/9/2012            Current Immunizations     13-VALENT PCV PREVNAR 10/20/2015    Influenza Vaccine Adult HD 10/11/2016, 10/20/2015    Pneumococcal polysaccharide vaccine (PPSV-23) 1/3/2017  9:32 AM    Tdap Vaccine 12/9/2012      Below and/or attached are the medications your provider expects you to take. Review all of your home medications and newly ordered medications with your provider and/or pharmacist. Follow medication instructions as directed by your provider and/or pharmacist. Please keep your medication list with you and share with your provider. Update the information when medications are discontinued, doses are changed, or new medications (including over-the-counter products) are added; and carry medication information at all times in the event of emergency situations     Allergies:  No Known Allergies          Medications  Valid as of: August 08, 2017 - 10:00 AM    Generic Name Brand Name Tablet Size Instructions for use    Alendronate Sodium (Tab) FOSAMAX 70 MG TAKE 1 TAB BY MOUTH EVERY 7 DAYS.        Cholecalciferol (Tab) vitamin D 2000 UNIT Take  by mouth every day.        Furosemide (Tab) LASIX 40 MG Take 1 Tab by mouth every day.        Garlic   Take  by mouth every day.        Guaifenesin-Codeine (Solution) ROBITUSSIN -10 mg/5mL Take 5 mL by mouth every four hours as needed for Cough.        Metoprolol Succinate (TABLET SR 24 HR) TOPROL  MG Take 1 Tab by mouth every day.        Metoprolol Succinate (TABLET SR 24 HR) TOPROL  MG TAKE 1 TABLET BY MOUTH EVERY DAY        Multiple Vitamins-Minerals (Tab) THERAGRAN-M  Take 1 Tab by mouth every day.        Omega-3 Fatty Acids   Take  by mouth every day.        Potassium Chloride (Tab CR) K-TAB 20 MEQ Take 1 Tab by mouth 2 times a day.        Sertraline HCl (Tab) ZOLOFT 100 MG Take 1 Tab by mouth every day.        .                 Medicines  prescribed today were sent to:     [facility]/PHARMACY #[id number] - [place], [place] - [address]    [address] [place] [address]    Phone: [phone] Fax: [phone]    Open 24 Hours?: No      Medication refill instructions:       If your prescription bottle indicates you have medication refills left, it is not necessary to call your provider’s office. Please contact your pharmacy and they will refill your medication.    If your prescription bottle indicates you do not have any refills left, you may request refills at any time through one of the following ways: The online [employer] system (except Urgent Care), by calling your provider’s office, or by asking your pharmacy to contact your provider’s office with a refill request. Medication refills are processed only during regular business hours and may not be available until the next business day. Your provider may request additional information or to have a follow-up visit with you prior to refilling your medication.   *Please Note: Medication refills are assigned a new Rx number when refilled electronically. Your pharmacy may indicate that no refills were authorized even though a new prescription for the same medication is available at the pharmacy. Please request the medicine by name with the pharmacy before contacting your provider for a refill.        Other Notes About Your Plan     Please Assess and Status Chronic Disease from Current and Prior Visits.     Query: Please Assess the following Diagnosis    1.Suggested Code 304.01 Opioid type Dependence; Continuous Use. Patient has been prescribed Norco since 01/18/11 with RX being filled near monthly; last RX filled 04/01/2015 per pharmacy Records. In your Medical Opinion would you status if this patient has Opioid type Dependence on continuous use.            [employer] Status: Patient Declined

## 2017-08-08 NOTE — ASSESSMENT & PLAN NOTE
Pt in on metoprolol  mg daily for HTN. Pt reports compliance with medication. BP is at goal per JNC 8 critieria today.     Denies chest pain, shortness of breath, headache

## 2017-08-08 NOTE — PROGRESS NOTES
Subjective:   Nadege Mae is a 71 y.o. female here today for anxiety, DLD, HTN    Anxiety  Pt was started on sertaline 50 mg daily at last visit for anxiety.   She feels that the medication is working well. She had a lot of stressors and feels the medication helped with this quite a bit. She does not have manic symptoms such as excessive energy, dangerous activities, decreased need for sleep. She denies suicidal or homicidal ideation    Dyslipidemia  Pt was found to have DLD with ASCVD risk score of 18% seen at last visit. She did not want to start a statin medication but instead wanted to focus on lifestyle modification.     Essential hypertension  Pt in on metoprolol  mg daily for HTN. Pt reports compliance with medication. BP is at goal per JNC 8 critieria today.     Denies chest pain, shortness of breath, headache         Current medicines (including changes today)  Current Outpatient Prescriptions   Medication Sig Dispense Refill   • sertraline (ZOLOFT) 100 MG Tab Take 1 Tab by mouth every day. 30 Tab 3   • alendronate (FOSAMAX) 70 MG Tab TAKE 1 TAB BY MOUTH EVERY 7 DAYS. 4 Tab 11   • metoprolol SR (TOPROL XL) 100 MG TABLET SR 24 HR TAKE 1 TABLET BY MOUTH EVERY DAY 30 Tab 11   • metoprolol SR (TOPROL XL) 100 MG TABLET SR 24 HR Take 1 Tab by mouth every day. 30 Tab 11   • Cholecalciferol (VITAMIN D) 2000 UNIT Tab Take  by mouth every day.     • furosemide (LASIX) 40 MG TABS Take 1 Tab by mouth every day. 30 Tab 3   • potassium Chloride ER (K-TAB) 20 MEQ TBCR tablet Take 1 Tab by mouth 2 times a day. 60 Tab 2   • Omega-3 Fatty Acids (FISH OIL PO) Take  by mouth every day.     • therapeutic multivitamin-minerals (THERAGRAN-M) TABS Take 1 Tab by mouth every day.     • GARLIC PO Take  by mouth every day.     • guaifenesin-codeine (CHERATUSSIN AC) Solution oral solution Take 5 mL by mouth every four hours as needed for Cough. 100 mL 0     No current facility-administered medications for this visit.     She  has  "a past medical history of Hypertension; Dental disorder; Heart valve disease; Anxiety; Osteoporosis; Hyperlipidemia; and Generalized osteoarthritis of multiple sites (10/20/2015). She also has no past medical history of Muscle disorder, OSTEOPOROSIS, Allergy, or Diabetes.    ROS   No chest pain, no shortness of breath, no suicidal ideation     Objective:     Blood pressure 130/78, pulse 82, temperature 36.7 °C (98.1 °F), resp. rate 16, height 1.702 m (5' 7\"), weight 80.74 kg (178 lb), SpO2 96 %. Body mass index is 27.87 kg/(m^2).   Physical Exam:  Constitutional: Alert & oriented, no acute distress  Eye: Conjunctiva clear, lids normal, no discharge  Neck: Trachea midline, no masses, no thyromegaly. No cervical or supraclavicular lymphadenopathy  Respiratory: Unlabored respiratory effort, lungs clear to auscultation, no wheezes, no ronchi  Cardiovascular: Normal S1, S2, no murmur  Skin: Warm, dry, good turgor, no rashes in visible areas  Neuro: No overt focal neurologic deficits  Psych: Normal mood and affect    Assessment and Plan:   The following treatment plan was discussed    1. Anxiety  Much improved on sertraline. Pt feels that a higher dose would improve her symptoms further. Will increase dose to 100 mg daily and follow up in 2 months to assess response. Denies suicidal or homicidal ideation  - sertraline (ZOLOFT) 100 MG Tab; Take 1 Tab by mouth every day.  Dispense: 30 Tab; Refill: 3    2. Dyslipidemia  Pt will get labs done prior to follow up to further evaluate    3. Essential hypertension  Well controlled, continue metoprolol. Pt has been on this medication chronically for many years and therefore I don't want to change it despite metoprolol not being a first line agent.       Followup: Return in about 2 months (around 10/8/2017).    Please note that this dictation was created using voice recognition software. I have made every reasonable attempt to correct obvious errors, but I expect that there are " errors of grammar and possibly content that I did not discover before finalizing the note.

## 2017-08-08 NOTE — ASSESSMENT & PLAN NOTE
Pt was found to have DLD with ASCVD risk score of 18% seen at last visit. She did not want to start a statin medication but instead wanted to focus on lifestyle modification.

## 2017-09-25 ENCOUNTER — PATIENT OUTREACH (OUTPATIENT)
Dept: HEALTH INFORMATION MANAGEMENT | Facility: OTHER | Age: 72
End: 2017-09-25

## 2017-09-25 NOTE — PROGRESS NOTES
Outcome: Left Message    Please transfer to Patient Outreach Team at [phone] when patient returns call.    Web[employer] Checked & Epic Updated:  yes    [employer] Verified: yes    Attempt # 1

## 2017-10-02 NOTE — PROGRESS NOTES
Outcome: Left Message    Please transfer to Patient Outreach Team at [phone] when patient returns call.    Attempt # 2

## 2017-10-03 ENCOUNTER — HOSPITAL ENCOUNTER (OUTPATIENT)
Dept: LAB | Facility: MEDICAL CENTER | Age: 72
End: 2017-10-03
Attending: INTERNAL MEDICINE
Payer: MEDICARE

## 2017-10-03 ENCOUNTER — OFFICE VISIT (OUTPATIENT)
Dept: URGENT CARE | Facility: PHYSICIAN GROUP | Age: 72
End: 2017-10-03
Payer: MEDICARE

## 2017-10-03 VITALS
BODY MASS INDEX: 27.88 KG/M2 | TEMPERATURE: 98.6 F | HEART RATE: 86 BPM | SYSTOLIC BLOOD PRESSURE: 144 MMHG | RESPIRATION RATE: 16 BRPM | DIASTOLIC BLOOD PRESSURE: 90 MMHG | WEIGHT: 178 LBS | OXYGEN SATURATION: 95 %

## 2017-10-03 DIAGNOSIS — L03.115 CELLULITIS OF RIGHT LOWER LEG: ICD-10-CM

## 2017-10-03 DIAGNOSIS — M81.0 OSTEOPOROSIS, UNSPECIFIED OSTEOPOROSIS TYPE, UNSPECIFIED PATHOLOGICAL FRACTURE PRESENCE: ICD-10-CM

## 2017-10-03 DIAGNOSIS — M79.661 PAIN AND SWELLING OF RIGHT LOWER LEG: ICD-10-CM

## 2017-10-03 DIAGNOSIS — M79.89 PAIN AND SWELLING OF RIGHT LOWER LEG: ICD-10-CM

## 2017-10-03 DIAGNOSIS — E78.5 DYSLIPIDEMIA: ICD-10-CM

## 2017-10-03 LAB
ALBUMIN SERPL BCP-MCNC: 4 G/DL (ref 3.2–4.9)
ALBUMIN/GLOB SERPL: 1.1 G/DL
ALP SERPL-CCNC: 78 U/L (ref 30–99)
ALT SERPL-CCNC: 33 U/L (ref 2–50)
ANION GAP SERPL CALC-SCNC: 9 MMOL/L (ref 0–11.9)
AST SERPL-CCNC: 38 U/L (ref 12–45)
BILIRUB SERPL-MCNC: 0.6 MG/DL (ref 0.1–1.5)
BUN SERPL-MCNC: 15 MG/DL (ref 8–22)
CALCIUM SERPL-MCNC: 9.8 MG/DL (ref 8.5–10.5)
CHLORIDE SERPL-SCNC: 99 MMOL/L (ref 96–112)
CHOLEST SERPL-MCNC: 238 MG/DL (ref 100–199)
CO2 SERPL-SCNC: 27 MMOL/L (ref 20–33)
CREAT SERPL-MCNC: 0.71 MG/DL (ref 0.5–1.4)
GFR SERPL CREATININE-BSD FRML MDRD: >60 ML/MIN/1.73 M 2
GLOBULIN SER CALC-MCNC: 3.8 G/DL (ref 1.9–3.5)
GLUCOSE SERPL-MCNC: 109 MG/DL (ref 65–99)
HDLC SERPL-MCNC: 78 MG/DL
LDLC SERPL CALC-MCNC: 131 MG/DL
POTASSIUM SERPL-SCNC: 4.5 MMOL/L (ref 3.6–5.5)
PROT SERPL-MCNC: 7.8 G/DL (ref 6–8.2)
SODIUM SERPL-SCNC: 135 MMOL/L (ref 135–145)
TRIGL SERPL-MCNC: 146 MG/DL (ref 0–149)

## 2017-10-03 PROCEDURE — 80061 LIPID PANEL: CPT

## 2017-10-03 PROCEDURE — 99214 OFFICE O/P EST MOD 30 MIN: CPT | Performed by: NURSE PRACTITIONER

## 2017-10-03 PROCEDURE — 36415 COLL VENOUS BLD VENIPUNCTURE: CPT

## 2017-10-03 PROCEDURE — 80053 COMPREHEN METABOLIC PANEL: CPT

## 2017-10-03 RX ORDER — SULFAMETHOXAZOLE AND TRIMETHOPRIM 800; 160 MG/1; MG/1
1 TABLET ORAL 2 TIMES DAILY
Qty: 14 QUANTITY SUFFICIENT | Refills: 0 | Status: SHIPPED | OUTPATIENT
Start: 2017-10-03 | End: 2017-10-25

## 2017-10-03 RX ORDER — FUROSEMIDE 20 MG/1
20 TABLET ORAL DAILY
Qty: 7 TAB | Refills: 0 | Status: SHIPPED | OUTPATIENT
Start: 2017-10-03 | End: 2017-10-25

## 2017-10-03 NOTE — PROGRESS NOTES
Subjective:      Nadege Mae is a 71 y.o. female who presents with Leg Swelling (x1wk R leg swelling painful)            HPI New problem. 71 year old female with lower right leg erythema and swelling for several days. She has moderate pain with standing. This condition is a chronic issue with her. She has been seen previous for the same thing. History of 4 negative ultrasounds that were negative. She denies fever, chills, myalgia, chest pain or shortness of breath. She denies cough. She uses compression hose for this swelling issue and has previously history of being on lasix.  Allergies, medications and history reviewed by me today      ROS  Please see HPI       Objective:     /90   Pulse 86   Temp 37 °C (98.6 °F)   Resp 16   Wt 80.7 kg (178 lb)   SpO2 95%   BMI 27.88 kg/m²      Physical Exam   Constitutional: She is oriented to person, place, and time. She appears well-developed and well-nourished. No distress.   HENT:   Head: Normocephalic.   Right Ear: External ear normal.   Left Ear: External ear normal.   Nose: No mucosal edema or rhinorrhea.   Mouth/Throat: Oropharynx is clear and moist.   Eyes: Conjunctivae are normal. Right eye exhibits no discharge. Left eye exhibits no discharge.   Neck: Normal range of motion. Neck supple.   Cardiovascular: Normal rate, regular rhythm and normal heart sounds.    Musculoskeletal: Normal range of motion.        Right lower leg: She exhibits tenderness, swelling and edema.   2+ pitting edema to right lower leg.   Lymphadenopathy:     She has no cervical adenopathy.   Neurological: She is alert and oriented to person, place, and time.   Skin: Skin is warm and dry. There is erythema.        Psychiatric: She has a normal mood and affect. Her behavior is normal. Thought content normal.   Nursing note and vitals reviewed.              Assessment/Plan:     1. Cellulitis of right lower leg  sulfamethoxazole-trimethoprim (BACTRIM DS) 800-160 MG tablet    furosemide  (LASIX) 20 MG Tab   2. Pain and swelling of right lower leg       Trial of bactrim and lasix.   Advised on banana a day or V-8 vegetable juice for K+ supplement.  Elevation/compression.   She has follow up next week with PCP.

## 2017-10-03 NOTE — LETTER
October 3, 2017        Nadege Mae  [address] [facility] [address]        Nadege was seen in our clinic today and she needs to have a chair accessible to her at work for the next week. Thank you.    If you have any questions or concerns, please don't hesitate to call.        Sincerely,        IKER Okeefe.    Electronically Signed

## 2017-10-04 NOTE — PROGRESS NOTES
Outcome: Left Message    Please transfer to Patient Outreach Team at [phone] when patient returns call.      Attempt # 3

## 2017-10-05 NOTE — PROGRESS NOTES
Outcome: Left Message    Please transfer to Patient Outreach Team at [phone] when patient returns call.    Attempt # 4

## 2017-10-10 ENCOUNTER — OFFICE VISIT (OUTPATIENT)
Dept: MEDICAL GROUP | Facility: PHYSICIAN GROUP | Age: 72
End: 2017-10-10
Payer: MEDICARE

## 2017-10-10 VITALS
OXYGEN SATURATION: 93 % | TEMPERATURE: 97.5 F | RESPIRATION RATE: 12 BRPM | SYSTOLIC BLOOD PRESSURE: 140 MMHG | WEIGHT: 175 LBS | HEIGHT: 67 IN | HEART RATE: 77 BPM | BODY MASS INDEX: 27.47 KG/M2 | DIASTOLIC BLOOD PRESSURE: 80 MMHG

## 2017-10-10 DIAGNOSIS — L03.115 CELLULITIS OF RIGHT LOWER EXTREMITY: ICD-10-CM

## 2017-10-10 DIAGNOSIS — E78.5 DYSLIPIDEMIA: ICD-10-CM

## 2017-10-10 DIAGNOSIS — F41.9 ANXIETY: ICD-10-CM

## 2017-10-10 DIAGNOSIS — Z23 NEED FOR VACCINATION: ICD-10-CM

## 2017-10-10 PROCEDURE — 99214 OFFICE O/P EST MOD 30 MIN: CPT | Mod: 25 | Performed by: INTERNAL MEDICINE

## 2017-10-10 PROCEDURE — 90662 IIV NO PRSV INCREASED AG IM: CPT | Performed by: INTERNAL MEDICINE

## 2017-10-10 PROCEDURE — G0008 ADMIN INFLUENZA VIRUS VAC: HCPCS | Performed by: INTERNAL MEDICINE

## 2017-10-10 RX ORDER — AMOXICILLIN AND CLAVULANATE POTASSIUM 875; 125 MG/1; MG/1
1 TABLET, FILM COATED ORAL 2 TIMES DAILY
Qty: 20 TAB | Refills: 0 | Status: SHIPPED | OUTPATIENT
Start: 2017-10-10 | End: 2017-10-20

## 2017-10-10 NOTE — ASSESSMENT & PLAN NOTE
Pt was seen last week in the urgent care for cellulitis and edema. She reported that she has had multiple ultrasounds in the past with no DVT seen. She was prescribed Bactrim and lasix. She states the bactrim has not helped much. She continues to have minor pain in the area, warmth in the area. She denies discharge or drainage.

## 2017-10-10 NOTE — ASSESSMENT & PLAN NOTE
Pt has known DLD with elevated ASCVD risk score (18% at last visit). She opted to not start a medication or get further testing but rather to implement improved diet and exercise habits. Her cholesterol levels have improved mildly since last labs.

## 2017-10-10 NOTE — ASSESSMENT & PLAN NOTE
At last visit 2 months ago patient had sertraline increased from 50 mg daily to 100 mg daily. Today she reports that her anxiety is significantly improved. She denies suicidal or homicidal ideation.

## 2017-10-10 NOTE — PROGRESS NOTES
Subjective:   Nadege Mae is a 71 y.o. female here today for anxiety, cellulitis, DLD    Anxiety  At last visit 2 months ago patient had sertraline increased from 50 mg daily to 100 mg daily. Today she reports that her anxiety is significantly improved. She denies suicidal or homicidal ideation.     Cellulitis of right lower extremity  Pt was seen last week in the urgent care for cellulitis and edema. She reported that she has had multiple ultrasounds in the past with no DVT seen. She was prescribed Bactrim and lasix. She states the bactrim has not helped much. She continues to have minor pain in the area, warmth in the area. She denies discharge or drainage.     Dyslipidemia  Pt has known DLD with elevated ASCVD risk score (18% at last visit). She opted to not start a medication or get further testing but rather to implement improved diet and exercise habits. Her cholesterol levels have improved mildly since last labs.        Current medicines (including changes today)  Current Outpatient Prescriptions   Medication Sig Dispense Refill   • amoxicillin-clavulanate (AUGMENTIN) 875-125 MG Tab Take 1 Tab by mouth 2 times a day for 10 days. 20 Tab 0   • sertraline (ZOLOFT) 100 MG Tab Take 1 Tab by mouth every day. 30 Tab 3   • metoprolol SR (TOPROL XL) 100 MG TABLET SR 24 HR Take 1 Tab by mouth every day. 30 Tab 11   • Cholecalciferol (VITAMIN D) 2000 UNIT Tab Take  by mouth every day.     • Omega-3 Fatty Acids (FISH OIL PO) Take  by mouth every day.     • therapeutic multivitamin-minerals (THERAGRAN-M) TABS Take 1 Tab by mouth every day.     • GARLIC PO Take  by mouth every day.     • sulfamethoxazole-trimethoprim (BACTRIM DS) 800-160 MG tablet Take 1 Tab by mouth 2 times a day. 14 Quantity Sufficient 0   • furosemide (LASIX) 20 MG Tab Take 1 Tab by mouth every day. 7 Tab 0   • alendronate (FOSAMAX) 70 MG Tab TAKE 1 TAB BY MOUTH EVERY 7 DAYS. 4 Tab 11   • metoprolol SR (TOPROL XL) 100 MG TABLET SR 24 HR TAKE 1 TABLET BY  "MOUTH EVERY DAY 30 Tab 11   • guaifenesin-codeine (CHERATUSSIN AC) Solution oral solution Take 5 mL by mouth every four hours as needed for Cough. 100 mL 0   • furosemide (LASIX) 40 MG TABS Take 1 Tab by mouth every day. 30 Tab 3   • potassium Chloride ER (K-TAB) 20 MEQ TBCR tablet Take 1 Tab by mouth 2 times a day. 60 Tab 2     No current facility-administered medications for this visit.      She  has a past medical history of Anxiety; Dental disorder; Generalized osteoarthritis of multiple sites (10/20/2015); Heart valve disease; Hyperlipidemia; Hypertension; and Osteoporosis. She also has no past medical history of Allergy; Diabetes; Muscle disorder; or OSTEOPOROSIS.    ROS   No fevers/chills, no discharge from RLE skin area     Objective:     Blood pressure 140/80, pulse 77, temperature 36.4 °C (97.5 °F), resp. rate 12, height 1.702 m (5' 7\"), weight 79.4 kg (175 lb), SpO2 93 %. Body mass index is 27.41 kg/m².   Physical Exam:  Constitutional: Alert & oriented, no acute distress  Eye: Conjunctiva clear, lids normal, no discharge  ENMT: Lips without lesions, normal external nose and ears  Skin: + redness and warmth of RLE shin on the anterior side, no discharge, no purulence, + mild edema  Neuro: No overt focal neurologic deficits  Psych: Normal mood and affect      Assessment and Plan:   The following treatment plan was discussed    1. Anxiety  Well controlled, continue sertraline. No SI/HI    2. Cellulitis of right lower extremity  Etiology most likely strep induced, therefore will d/c Bactrim in favor of Augmentin. Pt counseled to let us know if the infection does not improve with antibiotic  - amoxicillin-clavulanate (AUGMENTIN) 875-125 MG Tab; Take 1 Tab by mouth 2 times a day for 10 days.  Dispense: 20 Tab; Refill: 0    3. Dyslipidemia  Pt treating with diet and exercise as she does not want to be on a statin. Will therefore recheck lab in 4-6 months to assess response to lifestyle modification.   - LIPID " PROFILE; Future  - COMP METABOLIC PANEL; Future      Followup: Return in about 6 months (around 4/10/2018).    Please note that this dictation was created using voice recognition software. I have made every reasonable attempt to correct obvious errors, but I expect that there are errors of grammar and possibly content that I did not discover before finalizing the note.

## 2017-10-25 ENCOUNTER — HOSPITAL ENCOUNTER (OUTPATIENT)
Dept: LAB | Facility: MEDICAL CENTER | Age: 72
End: 2017-10-25
Attending: NURSE PRACTITIONER
Payer: MEDICARE

## 2017-10-25 ENCOUNTER — OFFICE VISIT (OUTPATIENT)
Dept: MEDICAL GROUP | Facility: PHYSICIAN GROUP | Age: 72
End: 2017-10-25
Payer: MEDICARE

## 2017-10-25 VITALS
HEART RATE: 89 BPM | BODY MASS INDEX: 28.88 KG/M2 | OXYGEN SATURATION: 91 % | SYSTOLIC BLOOD PRESSURE: 110 MMHG | DIASTOLIC BLOOD PRESSURE: 72 MMHG | HEIGHT: 67 IN | WEIGHT: 184 LBS | RESPIRATION RATE: 16 BRPM | TEMPERATURE: 97.2 F

## 2017-10-25 DIAGNOSIS — L03.115 CELLULITIS OF RIGHT LOWER EXTREMITY: ICD-10-CM

## 2017-10-25 DIAGNOSIS — M79.89 SWELLING OF RIGHT LOWER EXTREMITY: ICD-10-CM

## 2017-10-25 LAB
BASOPHILS # BLD AUTO: 1.4 % (ref 0–1.8)
BASOPHILS # BLD: 0.12 K/UL (ref 0–0.12)
CRP SERPL HS-MCNC: 1.52 MG/DL (ref 0–0.75)
EOSINOPHIL # BLD AUTO: 0.1 K/UL (ref 0–0.51)
EOSINOPHIL NFR BLD: 1.2 % (ref 0–6.9)
ERYTHROCYTE [DISTWIDTH] IN BLOOD BY AUTOMATED COUNT: 47.8 FL (ref 35.9–50)
ERYTHROCYTE [SEDIMENTATION RATE] IN BLOOD BY WESTERGREN METHOD: 62 MM/HOUR (ref 0–30)
HCT VFR BLD AUTO: 39.6 % (ref 37–47)
HGB BLD-MCNC: 12.9 G/DL (ref 12–16)
IMM GRANULOCYTES # BLD AUTO: 0.04 K/UL (ref 0–0.11)
IMM GRANULOCYTES NFR BLD AUTO: 0.5 % (ref 0–0.9)
LYMPHOCYTES # BLD AUTO: 1.82 K/UL (ref 1–4.8)
LYMPHOCYTES NFR BLD: 21.7 % (ref 22–41)
MCH RBC QN AUTO: 33.4 PG (ref 27–33)
MCHC RBC AUTO-ENTMCNC: 32.6 G/DL (ref 33.6–35)
MCV RBC AUTO: 102.6 FL (ref 81.4–97.8)
MONOCYTES # BLD AUTO: 0.87 K/UL (ref 0–0.85)
MONOCYTES NFR BLD AUTO: 10.4 % (ref 0–13.4)
NEUTROPHILS # BLD AUTO: 5.44 K/UL (ref 2–7.15)
NEUTROPHILS NFR BLD: 64.8 % (ref 44–72)
NRBC # BLD AUTO: 0 K/UL
NRBC BLD AUTO-RTO: 0 /100 WBC
PLATELET # BLD AUTO: 237 K/UL (ref 164–446)
PMV BLD AUTO: 10.1 FL (ref 9–12.9)
RBC # BLD AUTO: 3.86 M/UL (ref 4.2–5.4)
WBC # BLD AUTO: 8.4 K/UL (ref 4.8–10.8)

## 2017-10-25 PROCEDURE — 85025 COMPLETE CBC W/AUTO DIFF WBC: CPT

## 2017-10-25 PROCEDURE — 99213 OFFICE O/P EST LOW 20 MIN: CPT | Performed by: NURSE PRACTITIONER

## 2017-10-25 PROCEDURE — 86140 C-REACTIVE PROTEIN: CPT

## 2017-10-25 PROCEDURE — 85652 RBC SED RATE AUTOMATED: CPT

## 2017-10-25 PROCEDURE — 36415 COLL VENOUS BLD VENIPUNCTURE: CPT

## 2017-10-25 NOTE — LETTER
October 25, 2017         Patient: Nadege Mae   YOB: 1945   Date of Visit: 10/25/2017           To Whom it May Concern:    Nadege Mae was seen in my clinic on 10/25/2017. Nadege is okay to be at work, but with restriction. I need her to be able to sit while at work to allow her to elevate leg for most of the day. She is able to walk, but should not be on her feet for >30 minutes at a time. I am working her up for cause of her symptoms, and if any restrictions change, we will notify you.     If you have any questions or concerns, please don't hesitate to call.        Sincerely,           JACLYN Buckley.  Electronically Signed

## 2017-10-25 NOTE — PROGRESS NOTES
Subjective:     Chief Complaint   Patient presents with   • Follow-Up     problem with right lower leg       HPI  Nadege Mae is a 72 y.o. female here today for ongoing RLE complaints. New problem to me. This has occurred in the past and was resolved with lasix and antibiotics. Symptoms started 3-4 months ago. It went away, and then returned this past month. Was seen in  10/3 diagnosed with cellulitis, prescribed bactrim for 10 days and furosemide. This offered no improvement. Re-evaluated with Dr. Amador and thought to be strep bacteria. He added Augmentin, which she took for 10 days. No improvement with this. It is actually worsening; the erythema is becoming higher. There is burning in RLE. It is edematous, warm and red. It goes into foot into toes up to knee. Has had multiple US during episodes like this with no blood clot. Reports rare chills. No known fevers. No fatigue or malaise. There has been no change in her baseline tingling.     Diagnoses of Swelling of right lower extremity and Cellulitis of right lower extremity were pertinent to this visit.    Allergies: Review of patient's allergies indicates no known allergies.  Current medicines (including changes today)  Current Outpatient Prescriptions   Medication Sig Dispense Refill   • doxycycline (VIBRAMYCIN) 100 MG Tab Take 1 Tab by mouth 2 times a day. 20 Tab 0   • cephALEXin (KEFLEX) 250 MG Cap Take 1 Cap by mouth 4 times a day. 40 Cap 0   • furosemide (LASIX) 20 MG Tab Take 1 Tab by mouth every day. 10 Tab 0   • potassium chloride SA (KDUR) 20 MEQ Tab CR Take 1 Tab by mouth every day. 10 Tab 0   • sertraline (ZOLOFT) 100 MG Tab Take 1 Tab by mouth every day. 30 Tab 3   • metoprolol SR (TOPROL XL) 100 MG TABLET SR 24 HR Take 1 Tab by mouth every day. 30 Tab 11   • Cholecalciferol (VITAMIN D) 2000 UNIT Tab Take  by mouth every day.     • Omega-3 Fatty Acids (FISH OIL PO) Take  by mouth every day.     • therapeutic multivitamin-minerals (THERAGRAN-M) TABS  "Take 1 Tab by mouth every day.     • GARLIC PO Take  by mouth every day.       No current facility-administered medications for this visit.        She  has a past medical history of Anxiety; Dental disorder; Generalized osteoarthritis of multiple sites (10/20/2015); Heart valve disease; Hyperlipidemia; Hypertension; and Osteoporosis. She also has no past medical history of Allergy; Diabetes; Muscle disorder; or OSTEOPOROSIS.    Health Maintenance: deferred    ROS  As stated in HPI      Objective:     Blood pressure 110/72, pulse 89, temperature 36.2 °C (97.2 °F), resp. rate 16, height 1.702 m (5' 7\"), weight 83.5 kg (184 lb), SpO2 91 %. Body mass index is 28.82 kg/m².  Physical Exam:  General: Alert, oriented, in no acute distress.  Eye contact is good, speech goal directed, affect calm  CNs grossly intact.  Gross hearing intact.  RLE: warm, erythematous rash of leg with skin taut. No demarcation or lesions. Erythema up to 2 inches distal to knee.   45 cm at largest circumference of calf  LLE: 36 cm at largest circumference of calf  DP pulses 2+ bilaterally  Gait steady.     Assessment and Plan:   Assessment/Plan:  1. Swelling of right lower extremity  Low suspicion for DVT, but cannot be ruled out. Has had this happen in past with normal US. We will obtain labs, if normal, obtain US. Start furosemide. Elevate.   - CBC WITH DIFFERENTIAL; Future  - WESTERGREN SED RATE; Future  - CRP QUANTITIVE (NON-CARDIAC); Future  - furosemide (LASIX) 20 MG Tab; Take 1 Tab by mouth every day.  Dispense: 10 Tab; Refill: 0  - potassium chloride SA (KDUR) 20 MEQ Tab CR; Take 1 Tab by mouth every day.  Dispense: 10 Tab; Refill: 0    2. Cellulitis of right lower extremity  Ongoing. Failing treatment. Check labs. If elevated, will start the following antibiotics. If not improving within 48 hours, she will go to ER.   - CBC WITH DIFFERENTIAL; Future  - WESTERGREN SED RATE; Future  - CRP QUANTITIVE (NON-CARDIAC); Future  - doxycycline " (VIBRAMYCIN) 100 MG Tab; Take 1 Tab by mouth 2 times a day.  Dispense: 20 Tab; Refill: 0  - cephALEXin (KEFLEX) 250 MG Cap; Take 1 Cap by mouth 4 times a day.  Dispense: 40 Cap; Refill: 0       Follow up:  Return in about 1 week (around 11/1/2017), or by phone and 1 week in clinic.    Educated in proper administration of medication(s) ordered today including safety, possible SE, risks, benefits, rationale and alternatives to therapy.   Supportive care, differential diagnoses, and indications for immediate follow-up discussed with patient.    Pathogenesis of diagnosis discussed including typical length and natural progression.    Instructed to return to clinic or nearest emergency department for any change in condition, further concerns, or worsening of symptoms.  Patient states understanding of the plan of care and discharge instructions.      Please note that this dictation was created using voice recognition software. I have made every reasonable attempt to correct obvious errors, but I expect that there are errors of grammar and possibly content that I did not discover before finalizing the note.    Followup: Return in about 1 week (around 11/1/2017), or by phone and 1 week in clinic. sooner should new symptoms or problems arise.

## 2017-10-26 ENCOUNTER — TELEPHONE (OUTPATIENT)
Dept: MEDICAL GROUP | Facility: PHYSICIAN GROUP | Age: 72
End: 2017-10-26

## 2017-10-26 RX ORDER — FUROSEMIDE 20 MG/1
20 TABLET ORAL DAILY
Qty: 10 TAB | Refills: 0 | Status: SHIPPED | OUTPATIENT
Start: 2017-10-26 | End: 2017-11-14 | Stop reason: SDUPTHER

## 2017-10-26 RX ORDER — POTASSIUM CHLORIDE 20 MEQ/1
20 TABLET, EXTENDED RELEASE ORAL DAILY
Qty: 10 TAB | Refills: 0 | Status: SHIPPED | OUTPATIENT
Start: 2017-10-26 | End: 2017-11-14 | Stop reason: SDUPTHER

## 2017-10-26 RX ORDER — DOXYCYCLINE HYCLATE 100 MG
100 TABLET ORAL 2 TIMES DAILY
Qty: 20 TAB | Refills: 0 | Status: SHIPPED | OUTPATIENT
Start: 2017-10-26 | End: 2017-11-14

## 2017-10-26 RX ORDER — CEPHALEXIN 250 MG/1
250 CAPSULE ORAL 4 TIMES DAILY
Qty: 40 CAP | Refills: 0 | Status: ON HOLD | OUTPATIENT
Start: 2017-10-26 | End: 2017-11-05

## 2017-10-26 NOTE — TELEPHONE ENCOUNTER
----- Message from RYANN Buckley sent at 10/26/2017 11:53 AM PDT -----  Please advise patient that blood test does show infection/inflammation likely cellulitis of leg not clot. I will call her today on my lunch between 2-3 to discuss this    RYANN Buckley

## 2017-10-31 ENCOUNTER — TELEPHONE (OUTPATIENT)
Dept: MEDICAL GROUP | Facility: PHYSICIAN GROUP | Age: 72
End: 2017-10-31

## 2017-11-01 ENCOUNTER — TELEPHONE (OUTPATIENT)
Dept: MEDICAL GROUP | Facility: PHYSICIAN GROUP | Age: 72
End: 2017-11-01

## 2017-11-01 NOTE — TELEPHONE ENCOUNTER
Finally got in touch with patient by phone. She reports that the erythema has worsened and is extended to above her knee. The antibiotics I have given her are not helping. I discussed with her that she needs to go straight to emergency department couple ultrasounds to rule out blood clot and potentially IV antibiotics if there is no clot present. Patient agrees to go to ER today.    JACLYN Buckley.

## 2017-11-01 NOTE — TELEPHONE ENCOUNTER
Called patient today twice once at 2 pm and once at 730 pm. I have been informed by her friend that the cellulitis is worsening of her right lower extremity and is now up into her upper leg. I have told her friend to contact patient and inform her to go to emergency Department. I have left to voice meals on patient's bone instructing her to return R voicemails and have given her 3 numbers to call us back. I have also instructed her that if his symptoms are worsening then she needs to go to ER.    JACLYN Buckley.

## 2017-11-03 ENCOUNTER — RESOLUTE PROFESSIONAL BILLING HOSPITAL PROF FEE (OUTPATIENT)
Dept: HOSPITALIST | Facility: MEDICAL CENTER | Age: 72
End: 2017-11-03
Payer: MEDICARE

## 2017-11-03 ENCOUNTER — HOSPITAL ENCOUNTER (INPATIENT)
Facility: MEDICAL CENTER | Age: 72
LOS: 2 days | DRG: 603 | End: 2017-11-05
Attending: EMERGENCY MEDICINE | Admitting: HOSPITALIST
Payer: MEDICARE

## 2017-11-03 DIAGNOSIS — L03.115 CELLULITIS OF RIGHT LOWER EXTREMITY: Primary | ICD-10-CM

## 2017-11-03 PROBLEM — L03.90 CELLULITIS: Status: ACTIVE | Noted: 2017-11-03

## 2017-11-03 LAB
ALBUMIN SERPL BCP-MCNC: 3.6 G/DL (ref 3.2–4.9)
ALBUMIN/GLOB SERPL: 0.9 G/DL
ALP SERPL-CCNC: 83 U/L (ref 30–99)
ALT SERPL-CCNC: 31 U/L (ref 2–50)
ANION GAP SERPL CALC-SCNC: 8 MMOL/L (ref 0–11.9)
APTT PPP: 26.4 SEC (ref 24.7–36)
AST SERPL-CCNC: 44 U/L (ref 12–45)
BASOPHILS # BLD AUTO: 1.2 % (ref 0–1.8)
BASOPHILS # BLD: 0.1 K/UL (ref 0–0.12)
BILIRUB SERPL-MCNC: 0.6 MG/DL (ref 0.1–1.5)
BNP SERPL-MCNC: 93 PG/ML (ref 0–100)
BUN SERPL-MCNC: 14 MG/DL (ref 8–22)
CALCIUM SERPL-MCNC: 10 MG/DL (ref 8.5–10.5)
CHLORIDE SERPL-SCNC: 99 MMOL/L (ref 96–112)
CO2 SERPL-SCNC: 28 MMOL/L (ref 20–33)
CREAT SERPL-MCNC: 0.86 MG/DL (ref 0.5–1.4)
CRP SERPL HS-MCNC: 3.23 MG/DL (ref 0–0.75)
EOSINOPHIL # BLD AUTO: 0.05 K/UL (ref 0–0.51)
EOSINOPHIL NFR BLD: 0.6 % (ref 0–6.9)
ERYTHROCYTE [DISTWIDTH] IN BLOOD BY AUTOMATED COUNT: 47.6 FL (ref 35.9–50)
ERYTHROCYTE [SEDIMENTATION RATE] IN BLOOD BY WESTERGREN METHOD: 70 MM/HOUR (ref 0–30)
EST. AVERAGE GLUCOSE BLD GHB EST-MCNC: 120 MG/DL
GFR SERPL CREATININE-BSD FRML MDRD: >60 ML/MIN/1.73 M 2
GLOBULIN SER CALC-MCNC: 4.1 G/DL (ref 1.9–3.5)
GLUCOSE SERPL-MCNC: 137 MG/DL (ref 65–99)
HBA1C MFR BLD: 5.8 % (ref 0–5.6)
HCT VFR BLD AUTO: 39.2 % (ref 37–47)
HGB BLD-MCNC: 13 G/DL (ref 12–16)
IMM GRANULOCYTES # BLD AUTO: 0.04 K/UL (ref 0–0.11)
IMM GRANULOCYTES NFR BLD AUTO: 0.5 % (ref 0–0.9)
INR PPP: 1.06 (ref 0.87–1.13)
LYMPHOCYTES # BLD AUTO: 1.86 K/UL (ref 1–4.8)
LYMPHOCYTES NFR BLD: 23 % (ref 22–41)
MCH RBC QN AUTO: 33.9 PG (ref 27–33)
MCHC RBC AUTO-ENTMCNC: 33.2 G/DL (ref 33.6–35)
MCV RBC AUTO: 102.1 FL (ref 81.4–97.8)
MONOCYTES # BLD AUTO: 0.86 K/UL (ref 0–0.85)
MONOCYTES NFR BLD AUTO: 10.6 % (ref 0–13.4)
NEUTROPHILS # BLD AUTO: 5.17 K/UL (ref 2–7.15)
NEUTROPHILS NFR BLD: 64.1 % (ref 44–72)
NRBC # BLD AUTO: 0 K/UL
NRBC BLD AUTO-RTO: 0 /100 WBC
PLATELET # BLD AUTO: 254 K/UL (ref 164–446)
PMV BLD AUTO: 9.7 FL (ref 9–12.9)
POTASSIUM SERPL-SCNC: 5.1 MMOL/L (ref 3.6–5.5)
PROT SERPL-MCNC: 7.7 G/DL (ref 6–8.2)
PROTHROMBIN TIME: 13.5 SEC (ref 12–14.6)
RBC # BLD AUTO: 3.84 M/UL (ref 4.2–5.4)
SODIUM SERPL-SCNC: 135 MMOL/L (ref 135–145)
TSH SERPL DL<=0.005 MIU/L-ACNC: 1.2 UIU/ML (ref 0.3–3.7)
WBC # BLD AUTO: 8.1 K/UL (ref 4.8–10.8)

## 2017-11-03 PROCEDURE — 700111 HCHG RX REV CODE 636 W/ 250 OVERRIDE (IP): Performed by: EMERGENCY MEDICINE

## 2017-11-03 PROCEDURE — 85610 PROTHROMBIN TIME: CPT

## 2017-11-03 PROCEDURE — 80053 COMPREHEN METABOLIC PANEL: CPT

## 2017-11-03 PROCEDURE — 93971 EXTREMITY STUDY: CPT | Mod: 26 | Performed by: SURGERY

## 2017-11-03 PROCEDURE — 85025 COMPLETE CBC W/AUTO DIFF WBC: CPT

## 2017-11-03 PROCEDURE — 99285 EMERGENCY DEPT VISIT HI MDM: CPT

## 2017-11-03 PROCEDURE — 86140 C-REACTIVE PROTEIN: CPT

## 2017-11-03 PROCEDURE — 83036 HEMOGLOBIN GLYCOSYLATED A1C: CPT

## 2017-11-03 PROCEDURE — 84443 ASSAY THYROID STIM HORMONE: CPT

## 2017-11-03 PROCEDURE — 700111 HCHG RX REV CODE 636 W/ 250 OVERRIDE (IP): Performed by: HOSPITALIST

## 2017-11-03 PROCEDURE — 83880 ASSAY OF NATRIURETIC PEPTIDE: CPT

## 2017-11-03 PROCEDURE — 700102 HCHG RX REV CODE 250 W/ 637 OVERRIDE(OP): Performed by: EMERGENCY MEDICINE

## 2017-11-03 PROCEDURE — 700102 HCHG RX REV CODE 250 W/ 637 OVERRIDE(OP): Performed by: HOSPITALIST

## 2017-11-03 PROCEDURE — 770006 HCHG ROOM/CARE - MED/SURG/GYN SEMI*

## 2017-11-03 PROCEDURE — 87040 BLOOD CULTURE FOR BACTERIA: CPT

## 2017-11-03 PROCEDURE — 93971 EXTREMITY STUDY: CPT

## 2017-11-03 PROCEDURE — A9270 NON-COVERED ITEM OR SERVICE: HCPCS | Performed by: EMERGENCY MEDICINE

## 2017-11-03 PROCEDURE — 700105 HCHG RX REV CODE 258

## 2017-11-03 PROCEDURE — 99223 1ST HOSP IP/OBS HIGH 75: CPT | Performed by: HOSPITALIST

## 2017-11-03 PROCEDURE — 85730 THROMBOPLASTIN TIME PARTIAL: CPT

## 2017-11-03 PROCEDURE — A9270 NON-COVERED ITEM OR SERVICE: HCPCS | Performed by: HOSPITALIST

## 2017-11-03 PROCEDURE — 96365 THER/PROPH/DIAG IV INF INIT: CPT

## 2017-11-03 PROCEDURE — 85652 RBC SED RATE AUTOMATED: CPT

## 2017-11-03 PROCEDURE — 700105 HCHG RX REV CODE 258: Performed by: HOSPITALIST

## 2017-11-03 RX ORDER — AMOXICILLIN 250 MG
2 CAPSULE ORAL 2 TIMES DAILY
Status: DISCONTINUED | OUTPATIENT
Start: 2017-11-03 | End: 2017-11-05 | Stop reason: HOSPADM

## 2017-11-03 RX ORDER — HEPARIN SODIUM 5000 [USP'U]/ML
5000 INJECTION, SOLUTION INTRAVENOUS; SUBCUTANEOUS EVERY 8 HOURS
Status: DISCONTINUED | OUTPATIENT
Start: 2017-11-03 | End: 2017-11-05 | Stop reason: HOSPADM

## 2017-11-03 RX ORDER — M-VIT,TX,IRON,MINS/CALC/FOLIC 27MG-0.4MG
1 TABLET ORAL DAILY
Status: DISCONTINUED | OUTPATIENT
Start: 2017-11-04 | End: 2017-11-05 | Stop reason: HOSPADM

## 2017-11-03 RX ORDER — AMPICILLIN AND SULBACTAM 2; 1 G/1; G/1
3 INJECTION, POWDER, FOR SOLUTION INTRAMUSCULAR; INTRAVENOUS ONCE
Status: COMPLETED | OUTPATIENT
Start: 2017-11-03 | End: 2017-11-03

## 2017-11-03 RX ORDER — SODIUM CHLORIDE 9 MG/ML
INJECTION, SOLUTION INTRAVENOUS
Status: COMPLETED
Start: 2017-11-03 | End: 2017-11-03

## 2017-11-03 RX ORDER — NAPROXEN 250 MG/1
250 TABLET ORAL 2 TIMES DAILY WITH MEALS
Status: DISCONTINUED | OUTPATIENT
Start: 2017-11-03 | End: 2017-11-05 | Stop reason: HOSPADM

## 2017-11-03 RX ORDER — METOPROLOL SUCCINATE 100 MG/1
100 TABLET, EXTENDED RELEASE ORAL DAILY
Status: DISCONTINUED | OUTPATIENT
Start: 2017-11-04 | End: 2017-11-05 | Stop reason: HOSPADM

## 2017-11-03 RX ORDER — SERTRALINE HYDROCHLORIDE 100 MG/1
100 TABLET, FILM COATED ORAL DAILY
Status: DISCONTINUED | OUTPATIENT
Start: 2017-11-04 | End: 2017-11-05 | Stop reason: HOSPADM

## 2017-11-03 RX ORDER — ACETAMINOPHEN 325 MG/1
650 TABLET ORAL ONCE
Status: COMPLETED | OUTPATIENT
Start: 2017-11-03 | End: 2017-11-03

## 2017-11-03 RX ORDER — NAPROXEN SODIUM 220 MG
220 TABLET ORAL 2 TIMES DAILY WITH MEALS
Status: ON HOLD | COMMUNITY
End: 2017-12-27

## 2017-11-03 RX ORDER — HYDRALAZINE HYDROCHLORIDE 10 MG/1
10 TABLET, FILM COATED ORAL EVERY 8 HOURS
Status: DISCONTINUED | OUTPATIENT
Start: 2017-11-03 | End: 2017-11-05 | Stop reason: HOSPADM

## 2017-11-03 RX ORDER — ONDANSETRON 4 MG/1
4 TABLET, ORALLY DISINTEGRATING ORAL EVERY 4 HOURS PRN
Status: DISCONTINUED | OUTPATIENT
Start: 2017-11-03 | End: 2017-11-05 | Stop reason: HOSPADM

## 2017-11-03 RX ORDER — POLYETHYLENE GLYCOL 3350 17 G/17G
1 POWDER, FOR SOLUTION ORAL
Status: DISCONTINUED | OUTPATIENT
Start: 2017-11-03 | End: 2017-11-05 | Stop reason: HOSPADM

## 2017-11-03 RX ORDER — BISACODYL 10 MG
10 SUPPOSITORY, RECTAL RECTAL
Status: DISCONTINUED | OUTPATIENT
Start: 2017-11-03 | End: 2017-11-05 | Stop reason: HOSPADM

## 2017-11-03 RX ORDER — FUROSEMIDE 20 MG/1
20 TABLET ORAL DAILY
Status: DISCONTINUED | OUTPATIENT
Start: 2017-11-04 | End: 2017-11-05 | Stop reason: HOSPADM

## 2017-11-03 RX ORDER — ONDANSETRON 2 MG/ML
4 INJECTION INTRAMUSCULAR; INTRAVENOUS EVERY 4 HOURS PRN
Status: DISCONTINUED | OUTPATIENT
Start: 2017-11-03 | End: 2017-11-05 | Stop reason: HOSPADM

## 2017-11-03 RX ORDER — ACETAMINOPHEN 325 MG/1
650 TABLET ORAL EVERY 6 HOURS PRN
Status: DISCONTINUED | OUTPATIENT
Start: 2017-11-03 | End: 2017-11-05 | Stop reason: HOSPADM

## 2017-11-03 RX ORDER — NAPROXEN 250 MG/1
220 TABLET ORAL 2 TIMES DAILY WITH MEALS
Status: DISCONTINUED | OUTPATIENT
Start: 2017-11-03 | End: 2017-11-03

## 2017-11-03 RX ADMIN — ACETAMINOPHEN 650 MG: 325 TABLET, FILM COATED ORAL at 14:56

## 2017-11-03 RX ADMIN — HEPARIN SODIUM 5000 UNITS: 5000 INJECTION, SOLUTION INTRAVENOUS; SUBCUTANEOUS at 21:15

## 2017-11-03 RX ADMIN — AMPICILLIN SODIUM AND SULBACTAM SODIUM 3 G: 2; 1 INJECTION, POWDER, FOR SOLUTION INTRAMUSCULAR; INTRAVENOUS at 23:56

## 2017-11-03 RX ADMIN — SODIUM CHLORIDE 100 ML: 9 INJECTION, SOLUTION INTRAVENOUS at 14:56

## 2017-11-03 RX ADMIN — NAPROXEN 250 MG: 250 TABLET ORAL at 19:48

## 2017-11-03 RX ADMIN — AMPICILLIN SODIUM AND SULBACTAM SODIUM 3 G: 2; 1 INJECTION, POWDER, FOR SOLUTION INTRAMUSCULAR; INTRAVENOUS at 14:56

## 2017-11-03 RX ADMIN — HYDRALAZINE HYDROCHLORIDE 10 MG: 10 TABLET, FILM COATED ORAL at 21:15

## 2017-11-03 ASSESSMENT — LIFESTYLE VARIABLES
DO YOU DRINK ALCOHOL: NO
EVER_SMOKED: YES
ALCOHOL_USE: NO

## 2017-11-03 ASSESSMENT — ENCOUNTER SYMPTOMS
HEARTBURN: 0
DIZZINESS: 0
HEMOPTYSIS: 0
DOUBLE VISION: 0
ORTHOPNEA: 0
MYALGIAS: 0
BRUISES/BLEEDS EASILY: 0
DEPRESSION: 0
WEIGHT LOSS: 0
PND: 0
PHOTOPHOBIA: 0
SPUTUM PRODUCTION: 0

## 2017-11-03 ASSESSMENT — PATIENT HEALTH QUESTIONNAIRE - PHQ9
1. LITTLE INTEREST OR PLEASURE IN DOING THINGS: NOT AT ALL
SUM OF ALL RESPONSES TO PHQ9 QUESTIONS 1 AND 2: 0
2. FEELING DOWN, DEPRESSED, IRRITABLE, OR HOPELESS: NOT AT ALL
SUM OF ALL RESPONSES TO PHQ QUESTIONS 1-9: 0

## 2017-11-03 ASSESSMENT — PAIN SCALES - GENERAL: PAINLEVEL_OUTOF10: 6

## 2017-11-03 NOTE — ED PROVIDER NOTES
ED Provider Note    Scribed for Tyler Lopez M.D. by Shai Coulter. 11/3/2017  12:38 PM    Primary Care Provider: Jorge Amador M.D.  Means of arrival: Walk in  History limited by: None    CHIEF COMPLAINT  Chief Complaint   Patient presents with   • Sent by MD     right leg swelling/redness     HPI  Nadege Mae is a 72 y.o. female who presents to the ED complaining of right leg swelling and redness, onset three months. Patient notes that her symptoms have worsened over the past month. She reports that she has been being treated for this with Potassium, Keflex, and Doxycycline without relief of her symptoms. Patient reports that over this past week she had severe knee swelling over the past week along with pain. She reports severe pain flexing her knee secondary to her pain. Per patient she spoke to her primary care provider who recommend that she come into the ED. She has a history of cellulitis but reports that this is not similar to this history. Associated symptoms include chills, difficulty ambulating, and intermittent leg numbness.     REVIEW OF SYSTEMS  CONSTITUTIONAL:  Denies fever, weigh loss, or weakness. Positive weight gain and chills.   EYES:  Denies photophobia or discharge.   ENT:  Denies sore throat, nose, or ear pain.  CARDIOVASCULAR:  Denies chest pain, palpitations, or swelling.  RESPIRATORY:  Denies cough, shortness of breath, difficulty breathing.  GI:  Denies abdominal pain, nausea, vomiting Positive vomiting.  MUSCULOSKELETAL: Positive leg pain, swelling, knee pain. Positive difficulty ambulating.   SKIN:  Positive white papules diffusely and skin redness. No other rash.   NEUROLOGIC: Denies headache, focal weakness. Positive intermittent leg numbness.   PSYCHIATRIC:  Denies depression.  C.     PAST MEDICAL HISTORY  Past Medical History:   Diagnosis Date   • Anxiety    • Dental disorder     full dentures   • Generalized osteoarthritis of multiple sites 10/20/2015   • Heart valve disease      pt not sure    • Hyperlipidemia    • Hypertension    • Osteoporosis      FAMILY HISTORY  Family History   Problem Relation Age of Onset   • GI Mother      colostomy   • Heart Attack Father    • Diabetes Father    • Hypertension Father    • Psychiatry Brother      bipolar disorder     SOCIAL HISTORY   reports that she quit smoking about 10 years ago. Her smoking use included Cigarettes. She has a 12.50 pack-year smoking history. She has never used smokeless tobacco. She reports that she drinks about 3.0 oz of alcohol per week . She reports that she does not use drugs.    SURGICAL HISTORY  Past Surgical History:   Procedure Laterality Date   • BREAST BIOPSY  8/28/2014    Performed by Magdalena Londono M.D. at SURGERY [facility]   • BREAST BIOPSY Right 8/14    benign   • GYN SURGERY  1973    complete hysterectomy   • ABDOMINAL HYSTERECTOMY TOTAL      w/BSO due to uterine cyst and endometriosis     CURRENT MEDICATIONS  No current facility-administered medications on file prior to encounter.      Current Outpatient Prescriptions on File Prior to Encounter   Medication Sig Dispense Refill   • doxycycline (VIBRAMYCIN) 100 MG Tab Take 1 Tab by mouth 2 times a day. 20 Tab 0   • cephALEXin (KEFLEX) 250 MG Cap Take 1 Cap by mouth 4 times a day. 40 Cap 0   • furosemide (LASIX) 20 MG Tab Take 1 Tab by mouth every day. 10 Tab 0   • potassium chloride SA (KDUR) 20 MEQ Tab CR Take 1 Tab by mouth every day. 10 Tab 0   • sertraline (ZOLOFT) 100 MG Tab Take 1 Tab by mouth every day. 30 Tab 3   • metoprolol SR (TOPROL XL) 100 MG TABLET SR 24 HR Take 1 Tab by mouth every day. 30 Tab 11   • Cholecalciferol (VITAMIN D) 2000 UNIT Tab Take  by mouth every day.     • Omega-3 Fatty Acids (FISH OIL PO) Take  by mouth every day.     • therapeutic multivitamin-minerals (THERAGRAN-M) TABS Take 1 Tab by mouth every day.     • GARLIC PO Take  by mouth every day.       ALLERGIES  No Known Allergies    PHYSICAL EXAM  VITAL SIGNS: /69    "Pulse 97   Temp 36.2 °C (97.2 °F) (Temporal)   Resp 18   Ht 1.702 m (5' 7\")   Wt 84 kg (185 lb 3 oz)   SpO2 95%   BMI 29.00 kg/m²      Constitutional: Patient is awake and alert. Cooperative. No acute respiratory distress. Well developed, Well nourished, Non-toxic appearance.  HENT: Normocephalic, Atraumatic, Bilateral external ears normal, Oropharynx pink moist with no exudates, Nose patent.  Eyes: Sclera and conjunctiva clear  Neck:  Supple no nuchal rigidity, no thyromegaly or mass. Non-tender  Lymphatic: No supraclavicular lymph nodes. Right inguinal lymph nodes noted.   Cardiovascular: Heart is regular rate and rhythm no murmur, rub or thrill.   Thorax & Lungs: Chest is symmetrical, with good breath sounds. No wheezing or crackles. No respiratory distress, No chest tenderness.   Abdomen: Soft, No tenderness no hepatosplenomegaly there is no guarding or reboun   Skin: Warm, Dry, skin is red, dry and crusting over the right leg. Which is also swollen and tender up to her knee. Back: Non tender with palpation, No CVA tenderness.   Extremities: Left leg with trace edema, right leg is markedly swollen from the ankle into the right mid thigh.   Musculoskeletal: Good range of motion to wrists, elbows, shoulders, hips,, and ankles. Able to move right knee joint without pain. Pulses 2+ radially and femorally.  Neurologic: Alert & oriented Sensory is intact to light touch to face, arms, and legs. DTRs are symmetrical in biceps brachioradialis, patella and Achilles.   Psychiatric: Normal affect, cooperative, and friendly.     .    LABS  Results for orders placed or performed during the hospital encounter of 11/03/17   CBC WITH DIFFERENTIAL   Result Value Ref Range    WBC 8.1 4.8 - 10.8 K/uL    RBC 3.84 (L) 4.20 - 5.40 M/uL    Hemoglobin 13.0 12.0 - 16.0 g/dL    Hematocrit 39.2 37.0 - 47.0 %    .1 (H) 81.4 - 97.8 fL    MCH 33.9 (H) 27.0 - 33.0 pg    MCHC 33.2 (L) 33.6 - 35.0 g/dL    RDW 47.6 35.9 - 50.0 fL    " Platelet Count 254 164 - 446 K/uL    MPV 9.7 9.0 - 12.9 fL    Neutrophils-Polys 64.10 44.00 - 72.00 %    Lymphocytes 23.00 22.00 - 41.00 %    Monocytes 10.60 0.00 - 13.40 %    Eosinophils 0.60 0.00 - 6.90 %    Basophils 1.20 0.00 - 1.80 %    Immature Granulocytes 0.50 0.00 - 0.90 %    Nucleated RBC 0.00 /100 WBC    Neutrophils (Absolute) 5.17 2.00 - 7.15 K/uL    Lymphs (Absolute) 1.86 1.00 - 4.80 K/uL    Monos (Absolute) 0.86 (H) 0.00 - 0.85 K/uL    Eos (Absolute) 0.05 0.00 - 0.51 K/uL    Baso (Absolute) 0.10 0.00 - 0.12 K/uL    Immature Granulocytes (abs) 0.04 0.00 - 0.11 K/uL    NRBC (Absolute) 0.00 K/uL   COMP METABOLIC PANEL   Result Value Ref Range    Sodium 135 135 - 145 mmol/L    Potassium 5.1 3.6 - 5.5 mmol/L    Chloride 99 96 - 112 mmol/L    Co2 28 20 - 33 mmol/L    Anion Gap 8.0 0.0 - 11.9    Glucose 137 (H) 65 - 99 mg/dL    Bun 14 8 - 22 mg/dL    Creatinine 0.86 0.50 - 1.40 mg/dL    Calcium 10.0 8.5 - 10.5 mg/dL    AST(SGOT) 44 12 - 45 U/L    ALT(SGPT) 31 2 - 50 U/L    Alkaline Phosphatase 83 30 - 99 U/L    Total Bilirubin 0.6 0.1 - 1.5 mg/dL    Albumin 3.6 3.2 - 4.9 g/dL    Total Protein 7.7 6.0 - 8.2 g/dL    Globulin 4.1 (H) 1.9 - 3.5 g/dL    A-G Ratio 0.9 g/dL   PROTHROMBIN TIME   Result Value Ref Range    PT 13.5 12.0 - 14.6 sec    INR 1.06 0.87 - 1.13   APTT   Result Value Ref Range    APTT 26.4 24.7 - 36.0 sec   BTYPE NATRIURETIC PEPTIDE   Result Value Ref Range    B Natriuretic Peptide 93 0 - 100 pg/mL   ESTIMATED GFR   Result Value Ref Range    GFR If African American >60 >60 mL/min/1.73 m 2    GFR If Non African American >60 >60 mL/min/1.73 m 2   TSH (Thyroid Stimulating Hormone)   Result Value Ref Range    TSH 1.200 0.300 - 3.700 uIU/mL   Hemoglobin A1c   Result Value Ref Range    Glycohemoglobin 5.8 (H) 0.0 - 5.6 %    Est Avg Glucose 120 mg/dL   WESTERGREN SED RATE   Result Value Ref Range    Sed Rate Westergren 70 (H) 0 - 30 mm/hour   CRP QUANTITIVE (NON-CARDIAC)   Result Value Ref Range     Stat C-Reactive Protein 3.23 (H) 0.00 - 0.75 mg/dL     All labs reviewed by me.    RADIOLOGY/PROCEDURES  LE VENOUS DUPLEX (Specify in Comments Left, Right Or Bilateral)   Final Result        The radiologist's interpretations of all radiological studies have been reviewed by me.     COURSE & MEDICAL DECISION MAKING  Pertinent Labs & Imaging studies reviewed. (See chart for details)    Differential diagnoses include but are not limited to DVT, cellulitis, chronic venous stasis changes, vascular insufficiency.     12:38 PM - Patient seen and examined at bedside. Ordered BNP, blood culture, CBC, CMP, prothrombin time, APTT, LE venous duplex, and estimated GFR.      2:01 PM - I discussed the case with Dr. Tray Brown Hospitalist. He is aware of the patient and agrees to admit him into his care.     2:51 PM - Recheck with the patient. I discussed with her the treatment plan which includes admission. She understands the plan and is agreeable.    Decision Making  The patient has had increasing swelling to her right leg for a month patient on 2 different antibiotics including doxycycline and Keflex and not getting better. She presents now to the emergency department with appears to be clinically a cellulitis. Ultrasound showed no DVT. At this time we will admit her to the hospital for IV antibiotics and further evaluation care of her right leg cellulitis.    DISPOSITION:  Patient will be admitted to Dr. Tray Nathan in guarded condition.    FINAL IMPRESSION  1. Cellulitis of right lower extremity      PLAN  1.Admission [facility]  2. Continue treatment of cellulitis of the right leg failed outpatient therapy      Shai PRINCE (Reza), am scribing for, and in the presence of, Tyler Lopez M.D.    Electronically signed by: Shai Coulter (Reza), 11/3/2017    Tyler PRINCE M.D. personally performed the services described in this documentation, as scribed by Shai Coulter in my presence,  and it is both accurate and complete.    The note accurately reflects work and decisions made by me.  Tyler Lopez  11/3/2017  7:23 PM

## 2017-11-03 NOTE — ED NOTES
Med Rec completed per patient at bedside.  Allergies reviewed  Patient started 10 day course of Doxycycline and Cephalexin on 10/26/17.   Cephalexin stopped 11/1/17 per patients MD, continued on Doxycycline.

## 2017-11-03 NOTE — ED NOTES
Pt ambulates to triage, sent to ER by MD for right leg swelling & redness x1 month.  A&ox4.  +CMS.  Redness & swelling noted.  Pt to lobby & advised to inform RN of any changes.

## 2017-11-03 NOTE — H&P
Hospital Medicine History and Physical    Date of Service  11/3/2017    Chief Complaint  Chief Complaint   Patient presents with   • Sent by MD     right leg swelling/redness       History of Presenting Illness  72 y.o. female who presented 11/3/2017 with pmh of HTN, is coming today due to redness and swelling of her right leg as per patient she is had redness for couple of weeks now had at least 4 US leg negative for DVT, she was on po ABX doxy and keflex she report some improvement but in the last couple of day redness and swelling got worse and decided to come to ER, patient denies any trauma, no insect bite, no discharge, no chills  No diaphoresis, patient denies any sob, chest pain , palpitation, N/V/D/C, no vision problems.     Primary Care Physician  Jorge Amador M.D.    Consultants  none    Code Status  Full code.    Review of Systems  Review of Systems   Constitutional: Negative for malaise/fatigue and weight loss.   HENT: Negative for hearing loss and tinnitus.    Eyes: Negative for double vision and photophobia.   Respiratory: Negative for hemoptysis and sputum production.    Cardiovascular: Negative for orthopnea and PND.   Gastrointestinal: Negative for heartburn.   Genitourinary: Negative for frequency and urgency.   Musculoskeletal: Negative for myalgias.   Skin: Negative for itching.   Neurological: Negative for dizziness.   Endo/Heme/Allergies: Does not bruise/bleed easily.   Psychiatric/Behavioral: Negative for depression.          Past Medical History  Past Medical History:   Diagnosis Date   • Generalized osteoarthritis of multiple sites 10/20/2015   • Anxiety    • Dental disorder     full dentures   • Heart valve disease     pt not sure    • Hyperlipidemia    • Hypertension    • Osteoporosis        Surgical History  Past Surgical History:   Procedure Laterality Date   • BREAST BIOPSY  8/28/2014    Performed by Magdalena Londono M.D. at SURGERY [facility]   • BREAST BIOPSY Right 8/14     benign   • GYN SURGERY  1973    complete hysterectomy   • ABDOMINAL HYSTERECTOMY TOTAL      w/BSO due to uterine cyst and endometriosis       Medications  No current facility-administered medications on file prior to encounter.      Current Outpatient Prescriptions on File Prior to Encounter   Medication Sig Dispense Refill   • doxycycline (VIBRAMYCIN) 100 MG Tab Take 1 Tab by mouth 2 times a day. 20 Tab 0   • cephALEXin (KEFLEX) 250 MG Cap Take 1 Cap by mouth 4 times a day. 40 Cap 0   • furosemide (LASIX) 20 MG Tab Take 1 Tab by mouth every day. 10 Tab 0   • potassium chloride SA (KDUR) 20 MEQ Tab CR Take 1 Tab by mouth every day. 10 Tab 0   • sertraline (ZOLOFT) 100 MG Tab Take 1 Tab by mouth every day. 30 Tab 3   • metoprolol SR (TOPROL XL) 100 MG TABLET SR 24 HR Take 1 Tab by mouth every day. 30 Tab 11   • Cholecalciferol (VITAMIN D) 2000 UNIT Tab Take 2,000 Units by mouth every day.     • Omega-3 Fatty Acids (FISH OIL PO) Take 1 Tab by mouth every day.     • therapeutic multivitamin-minerals (THERAGRAN-M) TABS Take 1 Tab by mouth every day.         Family History  Family History   Problem Relation Age of Onset   • GI Mother      colostomy   • Heart Attack Father    • Diabetes Father    • Hypertension Father    • Psychiatry Brother      bipolar disorder       Social History  Social History   Substance Use Topics   • Smoking status: Former Smoker     Packs/day: 0.50     Years: 25.00     Types: Cigarettes     Quit date: 2007   • Smokeless tobacco: Never Used   • Alcohol use 3.0 oz/week     5 Glasses of wine per week      Comment: occasional - previous heavy drinker       Allergies  No Known Allergies     Physical Exam  Laboratory   Hemodynamics  Temp (24hrs), Av.2 °C (97.2 °F), Min:36.2 °C (97.2 °F), Max:36.2 °C (97.2 °F)   Temperature: 36.2 °C (97.2 °F)  Pulse  Av.3  Min: 83  Max: 104 Heart Rate (Monitored): 85  Blood Pressure : 144/69, NIBP: (!) 172/85      Respiratory      Respiration: (!) 21,  Pulse Oximetry: 94 %             Physical Exam   Constitutional: She is oriented to person, place, and time. She appears well-developed. No distress.   HENT:   Head: Normocephalic.   Mouth/Throat: No oropharyngeal exudate.   Eyes: Conjunctivae are normal. Pupils are equal, round, and reactive to light. No scleral icterus.   Neck: Normal range of motion. Neck supple. No JVD present.   Cardiovascular: Normal rate, regular rhythm and normal heart sounds.    Pulmonary/Chest: Effort normal and breath sounds normal. No respiratory distress. She has no wheezes. She has no rales.   Abdominal: Soft. Bowel sounds are normal. She exhibits no distension. There is no tenderness. There is no rebound.   Musculoskeletal: Normal range of motion. She exhibits edema and tenderness.   No crepitus, redness involving right leg 1/3 of her thigh down to mid shin, no discharge.     Lymphadenopathy:     She has no cervical adenopathy.   Neurological: She is alert and oriented to person, place, and time. She exhibits normal muscle tone.   Skin: No erythema.   Psychiatric: She has a normal mood and affect.   Nursing note and vitals reviewed.      Recent Labs      11/03/17   1110   WBC  8.1   RBC  3.84*   HEMOGLOBIN  13.0   HEMATOCRIT  39.2   MCV  102.1*   MCH  33.9*   MCHC  33.2*   RDW  47.6   PLATELETCT  254   MPV  9.7     Recent Labs      11/03/17   1110   SODIUM  135   POTASSIUM  5.1   CHLORIDE  99   CO2  28   GLUCOSE  137*   BUN  14   CREATININE  0.86   CALCIUM  10.0     Recent Labs      11/03/17   1110   ALTSGPT  31   ASTSGOT  44   ALKPHOSPHAT  83   TBILIRUBIN  0.6   GLUCOSE  137*     Recent Labs      11/03/17   1110   APTT  26.4   INR  1.06     Recent Labs      11/03/17   1110   BNPBTYPENAT  93         No results found for: TROPONINI    Imaging  US reviewed  Xray pending.    Assessment/Plan     I anticipate this patient will require at least two midnights for appropriate medical management, necessitating inpatient  admission.    Cellulitis- (present on admission)   Assessment & Plan    Right leg cellulitis, patient is been on po abx but redness and swelling is getting worse, she is getting admitted for iv atb, after discussion with pharmacist will continue on unasyn only, f/u cultures, crp/esr Xray of knee. Consider ID consult if not improving.         Essential hypertension- (present on admission)   Assessment & Plan    On metoprolol, will add hydralazine po.             VTE prophylaxis: heparin

## 2017-11-04 ENCOUNTER — APPOINTMENT (OUTPATIENT)
Dept: RADIOLOGY | Facility: MEDICAL CENTER | Age: 72
DRG: 603 | End: 2017-11-04
Attending: HOSPITALIST
Payer: MEDICARE

## 2017-11-04 PROBLEM — R73.9 HYPERGLYCEMIA: Status: ACTIVE | Noted: 2017-11-04

## 2017-11-04 PROBLEM — D64.9 ANEMIA: Status: ACTIVE | Noted: 2017-11-04

## 2017-11-04 LAB
ANION GAP SERPL CALC-SCNC: 9 MMOL/L (ref 0–11.9)
BUN SERPL-MCNC: 15 MG/DL (ref 8–22)
CALCIUM SERPL-MCNC: 8.9 MG/DL (ref 8.5–10.5)
CHLORIDE SERPL-SCNC: 100 MMOL/L (ref 96–112)
CO2 SERPL-SCNC: 28 MMOL/L (ref 20–33)
CREAT SERPL-MCNC: 0.85 MG/DL (ref 0.5–1.4)
ERYTHROCYTE [DISTWIDTH] IN BLOOD BY AUTOMATED COUNT: 47.7 FL (ref 35.9–50)
FOLATE SERPL-MCNC: 16.6 NG/ML
GFR SERPL CREATININE-BSD FRML MDRD: >60 ML/MIN/1.73 M 2
GLUCOSE BLD-MCNC: 124 MG/DL (ref 65–99)
GLUCOSE SERPL-MCNC: 101 MG/DL (ref 65–99)
HCT VFR BLD AUTO: 35.3 % (ref 37–47)
HGB BLD-MCNC: 11.7 G/DL (ref 12–16)
MCH RBC QN AUTO: 33.9 PG (ref 27–33)
MCHC RBC AUTO-ENTMCNC: 33.1 G/DL (ref 33.6–35)
MCV RBC AUTO: 102.3 FL (ref 81.4–97.8)
MRSA DNA SPEC QL NAA+PROBE: NORMAL
PLATELET # BLD AUTO: 206 K/UL (ref 164–446)
PMV BLD AUTO: 10 FL (ref 9–12.9)
POTASSIUM SERPL-SCNC: 4 MMOL/L (ref 3.6–5.5)
RBC # BLD AUTO: 3.45 M/UL (ref 4.2–5.4)
SIGNIFICANT IND 70042: NORMAL
SITE SITE: NORMAL
SODIUM SERPL-SCNC: 137 MMOL/L (ref 135–145)
SOURCE SOURCE: NORMAL
VIT B12 SERPL-MCNC: 165 PG/ML (ref 211–911)
WBC # BLD AUTO: 5.8 K/UL (ref 4.8–10.8)

## 2017-11-04 PROCEDURE — 73562 X-RAY EXAM OF KNEE 3: CPT | Mod: RT

## 2017-11-04 PROCEDURE — 85027 COMPLETE CBC AUTOMATED: CPT

## 2017-11-04 PROCEDURE — 80048 BASIC METABOLIC PNL TOTAL CA: CPT

## 2017-11-04 PROCEDURE — 82962 GLUCOSE BLOOD TEST: CPT

## 2017-11-04 PROCEDURE — 82746 ASSAY OF FOLIC ACID SERUM: CPT

## 2017-11-04 PROCEDURE — 36415 COLL VENOUS BLD VENIPUNCTURE: CPT

## 2017-11-04 PROCEDURE — 99232 SBSQ HOSP IP/OBS MODERATE 35: CPT | Performed by: INTERNAL MEDICINE

## 2017-11-04 PROCEDURE — 770006 HCHG ROOM/CARE - MED/SURG/GYN SEMI*

## 2017-11-04 PROCEDURE — 87641 MR-STAPH DNA AMP PROBE: CPT

## 2017-11-04 PROCEDURE — 700105 HCHG RX REV CODE 258: Performed by: HOSPITALIST

## 2017-11-04 PROCEDURE — 700102 HCHG RX REV CODE 250 W/ 637 OVERRIDE(OP): Performed by: HOSPITALIST

## 2017-11-04 PROCEDURE — A9270 NON-COVERED ITEM OR SERVICE: HCPCS | Performed by: HOSPITALIST

## 2017-11-04 PROCEDURE — 700102 HCHG RX REV CODE 250 W/ 637 OVERRIDE(OP): Performed by: INTERNAL MEDICINE

## 2017-11-04 PROCEDURE — 82607 VITAMIN B-12: CPT

## 2017-11-04 PROCEDURE — 700111 HCHG RX REV CODE 636 W/ 250 OVERRIDE (IP): Performed by: HOSPITALIST

## 2017-11-04 PROCEDURE — A9270 NON-COVERED ITEM OR SERVICE: HCPCS | Performed by: INTERNAL MEDICINE

## 2017-11-04 RX ORDER — CHOLECALCIFEROL (VITAMIN D3) 125 MCG
1000 CAPSULE ORAL DAILY
Status: DISCONTINUED | OUTPATIENT
Start: 2017-11-04 | End: 2017-11-05 | Stop reason: HOSPADM

## 2017-11-04 RX ADMIN — FUROSEMIDE 20 MG: 20 TABLET ORAL at 09:38

## 2017-11-04 RX ADMIN — HYDRALAZINE HYDROCHLORIDE 10 MG: 10 TABLET, FILM COATED ORAL at 13:07

## 2017-11-04 RX ADMIN — AMPICILLIN SODIUM AND SULBACTAM SODIUM 3 G: 2; 1 INJECTION, POWDER, FOR SOLUTION INTRAMUSCULAR; INTRAVENOUS at 19:37

## 2017-11-04 RX ADMIN — MULTIPLE VITAMINS W/ MINERALS TAB 1 TABLET: TAB at 09:38

## 2017-11-04 RX ADMIN — SERTRALINE 100 MG: 100 TABLET, FILM COATED ORAL at 09:37

## 2017-11-04 RX ADMIN — HEPARIN SODIUM 5000 UNITS: 5000 INJECTION, SOLUTION INTRAVENOUS; SUBCUTANEOUS at 20:24

## 2017-11-04 RX ADMIN — NAPROXEN 250 MG: 250 TABLET ORAL at 18:07

## 2017-11-04 RX ADMIN — CYANOCOBALAMIN TAB 500 MCG 1000 MCG: 500 TAB at 18:07

## 2017-11-04 RX ADMIN — HEPARIN SODIUM 5000 UNITS: 5000 INJECTION, SOLUTION INTRAVENOUS; SUBCUTANEOUS at 13:08

## 2017-11-04 RX ADMIN — NAPROXEN 250 MG: 250 TABLET ORAL at 09:37

## 2017-11-04 RX ADMIN — HYDRALAZINE HYDROCHLORIDE 10 MG: 10 TABLET, FILM COATED ORAL at 20:24

## 2017-11-04 RX ADMIN — AMPICILLIN SODIUM AND SULBACTAM SODIUM 3 G: 2; 1 INJECTION, POWDER, FOR SOLUTION INTRAMUSCULAR; INTRAVENOUS at 13:07

## 2017-11-04 RX ADMIN — VITAMIN D, TAB 1000IU (100/BT) 2000 UNITS: 25 TAB at 09:37

## 2017-11-04 RX ADMIN — AMPICILLIN SODIUM AND SULBACTAM SODIUM 3 G: 2; 1 INJECTION, POWDER, FOR SOLUTION INTRAMUSCULAR; INTRAVENOUS at 05:48

## 2017-11-04 RX ADMIN — METOPROLOL SUCCINATE 100 MG: 100 TABLET, EXTENDED RELEASE ORAL at 09:38

## 2017-11-04 RX ADMIN — HYDRALAZINE HYDROCHLORIDE 10 MG: 10 TABLET, FILM COATED ORAL at 05:48

## 2017-11-04 RX ADMIN — HEPARIN SODIUM 5000 UNITS: 5000 INJECTION, SOLUTION INTRAVENOUS; SUBCUTANEOUS at 05:48

## 2017-11-04 ASSESSMENT — ENCOUNTER SYMPTOMS
MEMORY LOSS: 0
COUGH: 0
BACK PAIN: 0
DOUBLE VISION: 0
FLANK PAIN: 0
SHORTNESS OF BREATH: 0
HEADACHES: 0
PALPITATIONS: 0
FOCAL WEAKNESS: 0
BLURRED VISION: 0
TREMORS: 0
DIZZINESS: 0
ABDOMINAL PAIN: 0
NAUSEA: 0
FEVER: 0
SENSORY CHANGE: 0
DIAPHORESIS: 0
CHILLS: 0
WEAKNESS: 1
NERVOUS/ANXIOUS: 0
SPEECH CHANGE: 0
MYALGIAS: 1

## 2017-11-04 ASSESSMENT — PAIN SCALES - GENERAL
PAINLEVEL_OUTOF10: 0
PAINLEVEL_OUTOF10: 4
PAINLEVEL_OUTOF10: 0

## 2017-11-04 NOTE — CARE PLAN
Problem: Safety  Goal: Will remain free from injury    Intervention: Provide assistance with mobility  Pt unsteady due to pain and swelling in RLE, pt instructed to call for assistance with ambulation       Problem: Knowledge Deficit  Goal: Knowledge of disease process/condition, treatment plan, diagnostic tests, and medications will improve    Intervention: Assess knowledge level of disease process/condition, treatment plan, diagnostic tests, and medications  Pt updated on POC, all questions answered

## 2017-11-04 NOTE — PROGRESS NOTES
[facility] Hospitalist Progress Note    Date of Service: 2017    Chief Complaint  72 y.o. female admitted 11/3/2017 with h/o htn, with RLE cellulitis, failed outpatient abx. Admitted for right lower extremity cellulitis.    Interval Problem Update  Right lower extremity swelling and erythema improved  Reports minimal pain  Denies shortness of breath    Consultants/Specialty  None    Disposition  To home in 1-2 days with clinical improvement        Review of Systems   Constitutional: Negative for chills, diaphoresis, fever and malaise/fatigue.   HENT: Negative for congestion and hearing loss.    Eyes: Negative for blurred vision and double vision.   Respiratory: Negative for cough and shortness of breath.    Cardiovascular: Negative for palpitations and leg swelling.   Gastrointestinal: Negative for abdominal pain and nausea.   Genitourinary: Negative for dysuria, flank pain and urgency.   Musculoskeletal: Positive for myalgias. Negative for back pain and joint pain.   Neurological: Positive for weakness. Negative for dizziness, tremors, sensory change, speech change, focal weakness and headaches.   Psychiatric/Behavioral: Negative for memory loss. The patient is not nervous/anxious.       Physical Exam  Laboratory/Imaging   Hemodynamics  Temp (24hrs), Av.4 °C (97.5 °F), Min:36.2 °C (97.1 °F), Max:36.6 °C (97.8 °F)   Temperature: 36.6 °C (97.8 °F)  Pulse  Av.3  Min: 68  Max: 104 Heart Rate (Monitored): 80  Blood Pressure : 129/59, NIBP: 159/83      Respiratory      Respiration: 18, Pulse Oximetry: 92 %             Fluids    Intake/Output Summary (Last 24 hours) at 17 0904  Last data filed at 17 0600   Gross per 24 hour   Intake              200 ml   Output                0 ml   Net              200 ml       Nutrition  Orders Placed This Encounter   Procedures   • Diet Order     Standing Status:   Standing     Number of Occurrences:   1     Order Specific Question:   Diet:     Answer:   Regular  [1]     Physical Exam   Constitutional: She is oriented to person, place, and time. She appears well-nourished. No distress.   HENT:   Head: Normocephalic and atraumatic.   Nose: Nose normal.   Eyes: EOM are normal. Pupils are equal, round, and reactive to light.   Neck: Neck supple. No thyromegaly present.   Cardiovascular: Normal rate and intact distal pulses.    No murmur heard.  Pulmonary/Chest: Effort normal and breath sounds normal. No respiratory distress. She has no wheezes.   Abdominal: Soft. Bowel sounds are normal. She exhibits no distension. There is no tenderness.   Musculoskeletal: She exhibits no edema or tenderness.   Neurological: She is alert and oriented to person, place, and time. No cranial nerve deficit.   Skin: Skin is warm and dry. No rash noted. She is not diaphoretic. There is erythema.   Psychiatric: She has a normal mood and affect. Her behavior is normal. Judgment and thought content normal.   Nursing note and vitals reviewed.      Recent Labs      11/03/17   1110  11/04/17   0237   WBC  8.1  5.8   RBC  3.84*  3.45*   HEMOGLOBIN  13.0  11.7*   HEMATOCRIT  39.2  35.3*   MCV  102.1*  102.3*   MCH  33.9*  33.9*   MCHC  33.2*  33.1*   RDW  47.6  47.7   PLATELETCT  254  206   MPV  9.7  10.0     Recent Labs      11/03/17   1110  11/04/17   0237   SODIUM  135  137   POTASSIUM  5.1  4.0   CHLORIDE  99  100   CO2  28  28   GLUCOSE  137*  101*   BUN  14  15   CREATININE  0.86  0.85   CALCIUM  10.0  8.9     Recent Labs      11/03/17   1110   APTT  26.4   INR  1.06     Recent Labs      11/03/17   1110   BNPBTYPENAT  93              Assessment/Plan     Anemia   Assessment & Plan    Macrocytic  Will follow up B12 folate  Monitor        Hyperglycemia   Assessment & Plan    A1c 5.8  Continue to monitor  May benefit with addition of metformin, as an outpatient        Cellulitis- (present on admission)   Assessment & Plan    Right leg cellulitis,   -Failed outpatient antibiotics  - unasyn only,  -Blood  cultures- pending  - crp/esr- elevated  -Follow up Xray of knee.  - Consider ID consult if not improving.   - Lower extremity Dopplers negative  - Follow up MRSA culture from nares, currently not on vancomycin        Essential hypertension- (present on admission)   Assessment & Plan    On metoprolol, will add hydralazine po.             Reviewed items::  Labs reviewed, Medications reviewed and Radiology images reviewed  DVT prophylaxis pharmacological::  Heparin  Ulcer Prophylaxis::  Not indicated  Antibiotics:  Treating active infection/contamination beyond 24 hours perioperative coverage

## 2017-11-04 NOTE — PROGRESS NOTES
Report received. Pt to unit from ER. Assumed care, assessment complete. Pt is A & O x 4.  Pt reports pain at 5/10. Fall precautions and appropriate signs in place. Pt oriented to unit routine, call light/phone system and RN extension number provided. Pt educated regarding fall precautions. Bed alarm refused with education. Pt denies any additional needs at this time. Call light within reach.

## 2017-11-04 NOTE — ASSESSMENT & PLAN NOTE
Right leg cellulitis,   -Failed outpatient antibiotics  - unasyn only,  -Blood cultures- pending  - crp/esr- elevated  -Follow up Xray of knee.  - Consider ID consult if not improving.   - Lower extremity Dopplers negative  - Follow up MRSA culture from nares, currently not on vancomycin

## 2017-11-04 NOTE — PROGRESS NOTES
Amelia Gallardo Fall Risk Assessment:     Last Known Fall: No falls  Mobility: Dizziness/generalized weakness  Medications: Cardiovascular or central nervous system meds  Mental Status/LOC/Awareness: Awake, alert, and oriented to date, place, and person  Toileting Needs: No needs  Volume/Electrolyte Status: No problems  Communication/Sensory: Visual (Glasses)/hearing deficit  Behavior: Appropriate behavior  Amelia Gallardo Fall Risk Total: 6  Fall Risk Level: NO RISK    Universal Fall Precautions:  call light/belongings in reach, bed in low position and locked, siderails up x 2, use non-slip footwear, adequate lighting, clutter free and spill free environment, educate on level of risk, educate to call for assistance    Fall Risk Level Interventions:          Patient Specific Interventions:     Medication: review medications with patient and family  Mental Status/LOC/Awareness: not applicable  Toileting: provide frquent toileting  Volume/Electrolyte Status: not applicable  Communication/Sensory: update plan of care on whiteboard  Behavioral: not applicable  Mobility: ensure bed is locked and in lowest position

## 2017-11-04 NOTE — CARE PLAN
Problem: Safety  Goal: Will remain free from injury  Outcome: PROGRESSING AS EXPECTED  Amelia Gallardo Fall Risk Assessment:     Last Known Fall: No falls  Mobility: Dizziness/generalized weakness  Medications: Cardiovascular or central nervous system meds  Mental Status/LOC/Awareness: Awake, alert, and oriented to date, place, and person  Toileting Needs: No needs  Volume/Electrolyte Status: No problems  Communication/Sensory: Visual (Glasses)/hearing deficit  Behavior: Appropriate behavior  Amelia Gallardo Fall Risk Total: 6  Fall Risk Level: NO RISK    Universal Fall Precautions:  call light/belongings in reach, bed in low position and locked, wheelchairs and assistive devices out of sight, siderails up x 2, clutter free and spill free environment, adequate lighting, use non-slip footwear, educate on level of risk, educate to call for assistance    Fall Risk Level Interventions:          Patient Specific Interventions:     Medication: review medications with patient and family and limit combination of prn medications  Mental Status/LOC/Awareness: reinforce falls education, check on patient hourly and reinforce the use of call light  Toileting: toilet prior to giving pain medications  Volume/Electrolyte Status: ensure patient remains hydrated and monitor abnormal lab values  Communication/Sensory: update plan of care on whiteboard, ensure proper positioning when transferrng/ambulating and ensure patient has glasses/contacts and hearing aids/dentures  Behavioral: encourage patient to voice feelings, engage patient in daily activities, administer medication as ordered and instruct/reinforce fall program rationale  Mobility: schedule physical activity throughout the day and ensure bed is locked and in lowest position    Problem: Venous Thromboembolism (VTW)/Deep Vein Thrombosis (DVT) Prevention:  Goal: Patient will participate in Venous Thrombosis (VTE)/Deep Vein Thrombosis (DVT)Prevention Measures  Outcome: PROGRESSING AS  EXPECTED      Problem: Bowel/Gastric:  Goal: Normal bowel function is maintained or improved  Outcome: PROGRESSING AS EXPECTED  Voiding without difficulty. Good PO intake. Pt declines stool softeners. States abx will often give her loose stools. None reported at this time.     Problem: Pain Management  Goal: Pain level will decrease to patient's comfort goal  Outcome: PROGRESSING AS EXPECTED  No c/o pain. Pt report LE feeling much better. Declines any pain medication or pain intervention.

## 2017-11-04 NOTE — PROGRESS NOTES
Received bedside report. Assumed patient care. Patient is resting in bed at this time. No c/o pain or discomfort. No signs of distress. All needs met at this time. Call light and belongings within reach. Gait assesed, pt steady, no assistance required. Updated in POC. All questions answered. Will continue to monitor.

## 2017-11-05 VITALS
HEIGHT: 67 IN | OXYGEN SATURATION: 93 % | WEIGHT: 156.53 LBS | HEART RATE: 70 BPM | RESPIRATION RATE: 17 BRPM | BODY MASS INDEX: 24.57 KG/M2 | DIASTOLIC BLOOD PRESSURE: 77 MMHG | SYSTOLIC BLOOD PRESSURE: 158 MMHG | TEMPERATURE: 97 F

## 2017-11-05 LAB
ANION GAP SERPL CALC-SCNC: 9 MMOL/L (ref 0–11.9)
BASOPHILS # BLD AUTO: 1.3 % (ref 0–1.8)
BASOPHILS # BLD: 0.08 K/UL (ref 0–0.12)
BUN SERPL-MCNC: 15 MG/DL (ref 8–22)
CALCIUM SERPL-MCNC: 8.8 MG/DL (ref 8.5–10.5)
CHLORIDE SERPL-SCNC: 99 MMOL/L (ref 96–112)
CO2 SERPL-SCNC: 26 MMOL/L (ref 20–33)
CREAT SERPL-MCNC: 0.83 MG/DL (ref 0.5–1.4)
EOSINOPHIL # BLD AUTO: 0.19 K/UL (ref 0–0.51)
EOSINOPHIL NFR BLD: 3.2 % (ref 0–6.9)
ERYTHROCYTE [DISTWIDTH] IN BLOOD BY AUTOMATED COUNT: 46.7 FL (ref 35.9–50)
GFR SERPL CREATININE-BSD FRML MDRD: >60 ML/MIN/1.73 M 2
GLUCOSE SERPL-MCNC: 145 MG/DL (ref 65–99)
HCT VFR BLD AUTO: 36.9 % (ref 37–47)
HGB BLD-MCNC: 12.1 G/DL (ref 12–16)
IMM GRANULOCYTES # BLD AUTO: 0.03 K/UL (ref 0–0.11)
IMM GRANULOCYTES NFR BLD AUTO: 0.5 % (ref 0–0.9)
LYMPHOCYTES # BLD AUTO: 1.69 K/UL (ref 1–4.8)
LYMPHOCYTES NFR BLD: 28.3 % (ref 22–41)
MCH RBC QN AUTO: 33.2 PG (ref 27–33)
MCHC RBC AUTO-ENTMCNC: 32.8 G/DL (ref 33.6–35)
MCV RBC AUTO: 101.1 FL (ref 81.4–97.8)
MONOCYTES # BLD AUTO: 0.65 K/UL (ref 0–0.85)
MONOCYTES NFR BLD AUTO: 10.9 % (ref 0–13.4)
NEUTROPHILS # BLD AUTO: 3.33 K/UL (ref 2–7.15)
NEUTROPHILS NFR BLD: 55.8 % (ref 44–72)
NRBC # BLD AUTO: 0 K/UL
NRBC BLD AUTO-RTO: 0 /100 WBC
PLATELET # BLD AUTO: 228 K/UL (ref 164–446)
PMV BLD AUTO: 9.9 FL (ref 9–12.9)
POTASSIUM SERPL-SCNC: 3.9 MMOL/L (ref 3.6–5.5)
RBC # BLD AUTO: 3.65 M/UL (ref 4.2–5.4)
SODIUM SERPL-SCNC: 134 MMOL/L (ref 135–145)
WBC # BLD AUTO: 6 K/UL (ref 4.8–10.8)

## 2017-11-05 PROCEDURE — 85025 COMPLETE CBC W/AUTO DIFF WBC: CPT

## 2017-11-05 PROCEDURE — 99239 HOSP IP/OBS DSCHRG MGMT >30: CPT | Performed by: INTERNAL MEDICINE

## 2017-11-05 PROCEDURE — 36415 COLL VENOUS BLD VENIPUNCTURE: CPT

## 2017-11-05 PROCEDURE — 700111 HCHG RX REV CODE 636 W/ 250 OVERRIDE (IP): Performed by: HOSPITALIST

## 2017-11-05 PROCEDURE — 700105 HCHG RX REV CODE 258: Performed by: HOSPITALIST

## 2017-11-05 PROCEDURE — 700102 HCHG RX REV CODE 250 W/ 637 OVERRIDE(OP): Performed by: HOSPITALIST

## 2017-11-05 PROCEDURE — A9270 NON-COVERED ITEM OR SERVICE: HCPCS | Performed by: HOSPITALIST

## 2017-11-05 PROCEDURE — 80048 BASIC METABOLIC PNL TOTAL CA: CPT

## 2017-11-05 PROCEDURE — 700102 HCHG RX REV CODE 250 W/ 637 OVERRIDE(OP): Performed by: INTERNAL MEDICINE

## 2017-11-05 PROCEDURE — A9270 NON-COVERED ITEM OR SERVICE: HCPCS | Performed by: INTERNAL MEDICINE

## 2017-11-05 RX ORDER — CLINDAMYCIN HYDROCHLORIDE 300 MG/1
300 CAPSULE ORAL 3 TIMES DAILY
Qty: 30 CAP | Refills: 0 | Status: SHIPPED | OUTPATIENT
Start: 2017-11-05 | End: 2017-12-05 | Stop reason: SDUPTHER

## 2017-11-05 RX ORDER — DOXYCYCLINE 100 MG/1
100 CAPSULE ORAL 2 TIMES DAILY
Qty: 20 CAP | Refills: 0 | Status: SHIPPED | OUTPATIENT
Start: 2017-11-05 | End: 2017-11-05

## 2017-11-05 RX ORDER — HYDRALAZINE HYDROCHLORIDE 10 MG/1
10 TABLET, FILM COATED ORAL EVERY 8 HOURS
Qty: 90 TAB | Refills: 0 | Status: SHIPPED | OUTPATIENT
Start: 2017-11-05 | End: 2018-02-10

## 2017-11-05 RX ADMIN — HYDRALAZINE HYDROCHLORIDE 10 MG: 10 TABLET, FILM COATED ORAL at 06:29

## 2017-11-05 RX ADMIN — NAPROXEN 250 MG: 250 TABLET ORAL at 08:00

## 2017-11-05 RX ADMIN — VITAMIN D, TAB 1000IU (100/BT) 2000 UNITS: 25 TAB at 08:01

## 2017-11-05 RX ADMIN — METOPROLOL SUCCINATE 100 MG: 100 TABLET, EXTENDED RELEASE ORAL at 08:02

## 2017-11-05 RX ADMIN — SERTRALINE 100 MG: 100 TABLET, FILM COATED ORAL at 08:02

## 2017-11-05 RX ADMIN — MULTIPLE VITAMINS W/ MINERALS TAB 1 TABLET: TAB at 08:00

## 2017-11-05 RX ADMIN — CYANOCOBALAMIN TAB 500 MCG 1000 MCG: 500 TAB at 08:00

## 2017-11-05 RX ADMIN — HEPARIN SODIUM 5000 UNITS: 5000 INJECTION, SOLUTION INTRAVENOUS; SUBCUTANEOUS at 06:29

## 2017-11-05 RX ADMIN — FUROSEMIDE 20 MG: 20 TABLET ORAL at 08:01

## 2017-11-05 RX ADMIN — AMPICILLIN SODIUM AND SULBACTAM SODIUM 3 G: 2; 1 INJECTION, POWDER, FOR SOLUTION INTRAMUSCULAR; INTRAVENOUS at 06:29

## 2017-11-05 RX ADMIN — AMPICILLIN SODIUM AND SULBACTAM SODIUM 3 G: 2; 1 INJECTION, POWDER, FOR SOLUTION INTRAMUSCULAR; INTRAVENOUS at 01:02

## 2017-11-05 ASSESSMENT — PAIN SCALES - GENERAL: PAINLEVEL_OUTOF10: 0

## 2017-11-05 NOTE — PROGRESS NOTES
Received report from [facility] nurse. Assessment complete. Patient is A&Ox4, PIV patent and SL, denies pain at this time, pt on RA, up self with steady gait. Pt educated on POC and verbalized understanding. Patient is resting now. Call light within reach, bed in lowest position, treaded socks in place.

## 2017-11-05 NOTE — PROGRESS NOTES
Pt discharged home via taxi. [facility]ed. Prescriptions given. Discharge instructions given specifically involving cellulitis, patient verbalized understanding of instructions, 2nd copy of AVS signed and placed on chart.

## 2017-11-05 NOTE — CARE PLAN
Problem: Venous Thromboembolism (VTW)/Deep Vein Thrombosis (DVT) Prevention:  Goal: Patient will participate in Venous Thrombosis (VTE)/Deep Vein Thrombosis (DVT)Prevention Measures    Intervention: Assess and monitor for anticoagulation complications  Subcutaneous heparin administered per MAR.      Problem: Discharge Barriers/Planning  Goal: Patient's continuum of care needs will be met    Intervention: Involve patient and significant other/support system in setting and prioritizing goals for hospital stay and discharge  Pt to DC home after completing IV antibiotics.

## 2017-11-05 NOTE — PROGRESS NOTES
Amelia Gallardo Fall Risk Assessment:     Last Known Fall: No falls  Mobility: Dizziness/generalized weakness  Medications: Cardiovascular or central nervous system meds  Mental Status/LOC/Awareness: Awake, alert, and oriented to date, place, and person  Toileting Needs: No needs  Volume/Electrolyte Status: No problems  Communication/Sensory: Visual (Glasses)/hearing deficit  Behavior: Appropriate behavior  Amelia Gallardo Fall Risk Total: 6  Fall Risk Level: NO RISK    Universal Fall Precautions:  call light/belongings in reach, bed in low position and locked, use non-slip footwear, adequate lighting, clutter free and spill free environment, educate on level of risk, educate to call for assistance    Fall Risk Level Interventions:          Patient Specific Interventions:     Medication: review medications with patient and family  Mental Status/LOC/Awareness: check on patient hourly  Toileting: not applicable  Volume/Electrolyte Status: ensure patient remains hydrated  Communication/Sensory: update plan of care on whiteboard  Behavioral: administer medication as ordered  Mobility: ensure bed is locked and in lowest position

## 2017-11-05 NOTE — DISCHARGE INSTRUCTIONS
Discharge Instructions    Discharged to home by car with self. Discharged via wheelchair, hospital escort: Yes.  Special equipment needed: Not Applicable    Be sure to schedule a follow-up appointment with your primary care doctor or any specialists as instructed.     Discharge Plan:   Influenza Vaccine Indication: Not indicated: Previously immunized this influenza season and > 8 years of age    I understand that a diet low in cholesterol, fat, and sodium is recommended for good health. Unless I have been given specific instructions below for another diet, I accept this instruction as my diet prescription.   Other diet: regular    Special Instructions:   Cellulitis  Cellulitis is an infection of the skin and the tissue under the skin. The infected area is usually red and tender. This happens most often in the arms and lower legs.  HOME CARE   · Take your antibiotic medicine as told. Finish the medicine even if you start to feel better.  · Keep the infected arm or leg raised (elevated).  · Put a warm cloth on the area up to 4 times per day.  · Only take medicines as told by your doctor.  · Keep all doctor visits as told.  GET HELP IF:  · You see red streaks on the skin coming from the infected area.  · Your red area gets bigger or turns a dark color.  · Your bone or joint under the infected area is painful after the skin heals.  · Your infection comes back in the same area or different area.  · You have a puffy (swollen) bump in the infected area.  · You have new symptoms.  · You have a fever.  GET HELP RIGHT AWAY IF:   · You feel very sleepy.  · You throw up (vomit) or have watery poop (diarrhea).  · You feel sick and have muscle aches and pains.  MAKE SURE YOU:   · Understand these instructions.  · Will watch your condition.  · Will get help right away if you are not doing well or get worse.     This information is not intended to replace advice given to you by your health care provider. Make sure you discuss any  questions you have with your health care provider.    Antibiotic Medication  Antibiotic medicine helps fight germs. Germs cause infections. This type of medicine will not work for colds, flu, or other viral infections. Tell your doctor if you:  · Are allergic to any medicines.  · Are pregnant or are trying to get pregnant.  · Are taking other medicines.  · Have other medical problems.  HOME CARE  · Take your medicine with a glass of water or food as told by your doctor.  · Take the medicine as told. Finish them even if you start to feel better.  · Do not give your medicine to other people.  · Do not use your medicine in the future for a different infection.  · Ask your doctor about which side effects to watch for.  · Try not to miss any doses. If you miss a dose, take it as soon as possible. If it is almost time for your next dose, and your dosing schedule is:  ¨ Two doses a day, take the missed dose and the next dose 5 to 6 hours later.  ¨ Three or more doses a day, take the missed dose and the next dose 2 to 4 hours later, or double your next dose.  ¨ Then go back to your normal schedule.  GET HELP RIGHT AWAY IF:   · You get worse or do not get better within a few days.  · The medicine makes you sick.  · You develop a rash or any other side effects.  · You have questions or concerns.  MAKE SURE YOU:  · Understand these instructions.  · Will watch your condition.  · Will get help right away if you are not doing well or get worse.     This information is not intended to replace advice given to you by your health care provider. Make sure you discuss any questions you have with your health care provider.    · Is patient discharged on Warfarin / Coumadin?   No     · Is patient Post Blood Transfusion?  No    Depression / Suicide Risk    As you are discharged from this [facility] facility, it is important to learn how to keep safe from harming yourself.    Recognize the warning signs:  · Abrupt changes in personality,  positive or negative- including increase in energy   · Giving away possessions  · Change in eating patterns- significant weight changes-  positive or negative  · Change in sleeping patterns- unable to sleep or sleeping all the time   · Unwillingness or inability to communicate  · Depression  · Unusual sadness, discouragement and loneliness  · Talk of wanting to die  · Neglect of personal appearance   · Rebelliousness- reckless behavior  · Withdrawal from people/activities they love  · Confusion- inability to concentrate     If you or a loved one observes any of these behaviors or has concerns about self-harm, here's what you can do:  · Talk about it- your feelings and reasons for harming yourself  · Remove any means that you might use to hurt yourself (examples: pills, rope, extension cords, firearm)  · Get professional help from the community (Mental Health, Substance Abuse, psychological counseling)  · Do not be alone:Call your Safe Contact- someone whom you trust who will be there for you.  · Call your local CRISIS HOTLINE 532-3388 or 399-616-3078  · Call your local Children's Mobile Crisis Response Team [place] (709) 496-3072 or www.Nutmeg  · Call the toll free National Suicide Prevention Hotlines   · National Suicide Prevention Lifeline 045-227-XOUJ (5904)  · National Hope Line Network 800-SUICIDE (540-4716)

## 2017-11-05 NOTE — DISCHARGE SUMMARY
CHIEF COMPLAINT ON ADMISSION  Chief Complaint   Patient presents with   • Sent by MD     right leg swelling/redness       CODE STATUS  Full Code    HPI & HOSPITAL COURSE  This is a 72 y.o. female here with htn, with RLE cellulitis, failed outpatient abx. Admitted for right lower extremity cellulitis.  Patient has been started on vancomycin and Unasyn during this admission. Patient's cellulitis has improved. Her extremity Dopplers are negative for DVT. X-ray of the knee is negative for fracture, positive moderate osteoarthritis noted. Patient's swelling and pain has improved. She'll be discharged home to complete her course of oral antibiotics. Blood cultures remain negative.    Therefore, she is discharged in good and stable condition with close outpatient follow-up.    SPECIFIC OUTPATIENT FOLLOW-UP  Primary care provider/discharge clinic in one week  Clindamycin ×10 days    DISCHARGE PROBLEM LIST  Active Problems:    Essential hypertension POA: Yes    Cellulitis POA: Yes    Hyperglycemia POA: Unknown    Anemia POA: Unknown  Resolved Problems:    * No resolved hospital problems. *      FOLLOW UP  Future Appointments  Date Time Provider Department Center   11/7/2017 4:00 PM CARE MANAGER [facility]   11/10/2017 1:20 PM RYANN Degroot [facility]   3/13/2018 8:00 AM Jorge Amador M.D. [facility]     No follow-up provider specified.    MEDICATIONS ON DISCHARGE   Nadege Mae Medication Instructions YOSSI:[id number]    Printed on:11/05/17 1310   Medication Information                      Cholecalciferol (VITAMIN D) 2000 UNIT Tab  Take 2,000 Units by mouth every day.             clindamycin (CLEOCIN) 300 MG Cap  Take 1 Cap by mouth 3 times a day for 10 days.             cyanocobalamin (VITAMIN B12) 1000 MCG Tab  Take 1 Tab by mouth every day.             doxycycline (VIBRAMYCIN) 100 MG Tab  Take 1 Tab by mouth 2 times a day.             furosemide (LASIX) 20 MG Tab  Take 1 Tab by mouth every day.              hydrALAZINE (APRESOLINE) 10 MG Tab  Take 1 Tab by mouth every 8 hours.             metoprolol SR (TOPROL XL) 100 MG TABLET SR 24 HR  Take 1 Tab by mouth every day.             naproxen (ALEVE) 220 MG tablet  Take 220 mg by mouth 2 times a day, with meals.             Omega-3 Fatty Acids (FISH OIL PO)  Take 1 Tab by mouth every day.             potassium chloride SA (KDUR) 20 MEQ Tab CR  Take 1 Tab by mouth every day.             sertraline (ZOLOFT) 100 MG Tab  Take 1 Tab by mouth every day.             therapeutic multivitamin-minerals (THERAGRAN-M) TABS  Take 1 Tab by mouth every day.                 DIET  Orders Placed This Encounter   Procedures   • Diet Order     Standing Status:   Standing     Number of Occurrences:   1     Order Specific Question:   Diet:     Answer:   Regular [1]       ACTIVITY  As tolerated.  Weight bearing as tolerated      CONSULTATIONS  None    PROCEDURES  None    LABORATORY  Lab Results   Component Value Date/Time    SODIUM 134 (L) 11/05/2017 01:13 AM    POTASSIUM 3.9 11/05/2017 01:13 AM    CHLORIDE 99 11/05/2017 01:13 AM    CO2 26 11/05/2017 01:13 AM    GLUCOSE 145 (H) 11/05/2017 01:13 AM    BUN 15 11/05/2017 01:13 AM    CREATININE 0.83 11/05/2017 01:13 AM        Lab Results   Component Value Date/Time    WBC 6.0 11/05/2017 01:13 AM    HEMOGLOBIN 12.1 11/05/2017 01:13 AM    HEMATOCRIT 36.9 (L) 11/05/2017 01:13 AM    PLATELETCT 228 11/05/2017 01:13 AM        Total time of the discharge process exceeds 45 minutes

## 2017-11-05 NOTE — CARE PLAN
Problem: Safety  Goal: Will remain free from injury  Outcome: PROGRESSING AS EXPECTED    Intervention: Provide assistance with mobility  Bed locked in lowest position ,call bell inhand, non-skid socks in use.      Problem: Pain Management  Goal: Pain level will decrease to patient's comfort goal  Outcome: PROGRESSING AS EXPECTED    Intervention: Follow pain managment plan developed in collaboration with patient and Interdisciplinary Team  No c/o pain or discomfort at this time.

## 2017-11-07 ENCOUNTER — PATIENT OUTREACH (OUTPATIENT)
Dept: HEALTH INFORMATION MANAGEMENT | Facility: OTHER | Age: 72
End: 2017-11-07

## 2017-11-08 LAB
BACTERIA BLD CULT: NORMAL
BACTERIA BLD CULT: NORMAL
SIGNIFICANT IND 70042: NORMAL
SIGNIFICANT IND 70042: NORMAL
SITE SITE: NORMAL
SITE SITE: NORMAL
SOURCE SOURCE: NORMAL
SOURCE SOURCE: NORMAL

## 2017-11-14 ENCOUNTER — OFFICE VISIT (OUTPATIENT)
Dept: MEDICAL GROUP | Facility: CLINIC | Age: 72
End: 2017-11-14
Payer: MEDICARE

## 2017-11-14 VITALS
DIASTOLIC BLOOD PRESSURE: 76 MMHG | WEIGHT: 183.9 LBS | TEMPERATURE: 98.6 F | SYSTOLIC BLOOD PRESSURE: 128 MMHG | HEIGHT: 67 IN | HEART RATE: 72 BPM | RESPIRATION RATE: 14 BRPM | OXYGEN SATURATION: 95 % | BODY MASS INDEX: 28.87 KG/M2

## 2017-11-14 DIAGNOSIS — M79.89 SWELLING OF RIGHT LOWER EXTREMITY: ICD-10-CM

## 2017-11-14 DIAGNOSIS — Z09 HOSPITAL DISCHARGE FOLLOW-UP: ICD-10-CM

## 2017-11-14 DIAGNOSIS — R60.0 LOCALIZED EDEMA: ICD-10-CM

## 2017-11-14 DIAGNOSIS — L03.115 CELLULITIS OF RIGHT LOWER EXTREMITY: ICD-10-CM

## 2017-11-14 PROCEDURE — 99496 TRANSJ CARE MGMT HIGH F2F 7D: CPT | Performed by: NURSE PRACTITIONER

## 2017-11-14 RX ORDER — POTASSIUM CHLORIDE 20 MEQ/1
40 TABLET, EXTENDED RELEASE ORAL DAILY
Qty: 6 TAB | Refills: 0 | Status: SHIPPED | OUTPATIENT
Start: 2017-11-14 | End: 2017-11-17

## 2017-11-14 RX ORDER — FUROSEMIDE 40 MG/1
40 TABLET ORAL DAILY
Qty: 3 TAB | Refills: 0 | Status: SHIPPED | OUTPATIENT
Start: 2017-11-14 | End: 2017-11-17

## 2017-11-14 ASSESSMENT — ENCOUNTER SYMPTOMS
ABDOMINAL PAIN: 0
FEVER: 0
WEAKNESS: 0
HEADACHES: 0
SPUTUM PRODUCTION: 0
CHILLS: 0
FOCAL WEAKNESS: 0
DEPRESSION: 0
NERVOUS/ANXIOUS: 0
EYE PAIN: 0
VOMITING: 0
NAUSEA: 0
FALLS: 0
BLURRED VISION: 0
HEARTBURN: 0
DIZZINESS: 0
COUGH: 0
SHORTNESS OF BREATH: 0
PALPITATIONS: 0
MYALGIAS: 0
WHEEZING: 0

## 2017-11-14 NOTE — PROGRESS NOTES
POST DISCHARGE CALL MADE BY Roro Beauchamp.  Discharge Date:11/5/2017   Date of Outreach Call: 11/7/2017  3:57 PM  Now that you're home, how are you doing? Fair  Comment:Reports leg is sore and still slightly swollen  Do you have questions about your medications? No    Did you fill your medications? Yes    Do you have a follow-up appointment scheduled?Yes  Comment:Patient requested to reschedule discharge clinic  appointment until Tuesday. CM rescheduled appointment.    Discharging Department: [place] [id number]    Number of Attempts: 1  Current or previous attempts completed within two business days of discharge? Yes  Provided education regarding treatment plan, medication, self-management, ADLs? Yes  Has patient completed Advance Directive? If yes, advise them to bring to appointment. No  Care Manager phone number provided? Yes  Is there anything else I can help you with? No

## 2017-11-14 NOTE — PATIENT INSTRUCTIONS
If you need further assistance, or have any questions; concerns or lingering symptoms before seeing your Primary Care Provider, do not hesitate to contact us.     Please contact us at the Post-Hospital Follow Up Program at [phone].   Our offices hours are Monday-Friday 8 am-5 pm.

## 2017-11-14 NOTE — ASSESSMENT & PLAN NOTE
Since returning home, patient reports increased right leg swelling after discharge, no weeping or open wound yet. She reports pressure/tenderss from increased swelling. She denied fever, chill, no sob. She has lasix on med profile but she reports that was from previous PCP order. She has no more left.    The patient denied weakness; no difficulty taking care of self at home.    No DM. a1c 5.8.   Unilateral swelling, BNP 93

## 2017-11-14 NOTE — PROGRESS NOTES
Subjective:     Nadege Mae is a 72 y.o. female who presents for Hospital Follow-up.  Chart reviewed. Discharge summary available for review: Yes   Date of discharge 11/5/2017.  48- hour post discharge RN call completed on 11/7/2017 and documented in the medical record by  Roro Beauchamp..    HPI: Recently hospitalized for right leg swelling and redness, found to have cellulitis. Negative DVT on doppler. Discharged with clindamycin x 10 days through 11/15.    Cellulitis   Since returning home, patient reports increased right leg swelling after discharge, no weeping or open wound yet. She reports pressure/tenderss from increased swelling. She denied fever, chill, no sob. She has lasix on med profile but she reports that was from previous PCP order. She has no more left.    The patient denied weakness; no difficulty taking care of self at home.    No DM. a1c 5.8.   Unilateral swelling, BNP 93    PCP follow up appointment on 3/13/2018. Pt declined sooner appointment at this time.     Patient Active Problem List    Diagnosis Date Noted   • Hyperglycemia 11/04/2017   • Anemia 11/04/2017   • Cellulitis 11/03/2017   • Cellulitis of right lower extremity 10/10/2017   • Swelling of right lower extremity 01/03/2017   • Generalized osteoarthritis of multiple sites 10/20/2015   • Essential hypertension 10/06/2015   • Dyslipidemia 10/06/2015   • Anxiety 10/06/2015   • Osteoporosis    • Other (abnormal) findings on radiological examination of breast 08/28/2014         Allergies:   Review of patient's allergies indicates no known allergies.    Social History:  Social History   Substance Use Topics   • Smoking status: Former Smoker     Packs/day: 0.50     Years: 25.00     Types: Cigarettes     Quit date: 1/1/2007   • Smokeless tobacco: Never Used   • Alcohol use 3.0 oz/week     5 Glasses of wine per week      Comment: occasional - previous heavy drinker        ROS:  Review of Systems   Constitutional: Negative for chills, fever and  "malaise/fatigue.   HENT: Negative for hearing loss and tinnitus.    Eyes: Negative for blurred vision and pain.   Respiratory: Negative for cough, sputum production, shortness of breath and wheezing.    Cardiovascular: Positive for leg swelling. Negative for chest pain and palpitations.   Gastrointestinal: Negative for abdominal pain, heartburn, nausea and vomiting.   Genitourinary: Negative for dysuria, frequency and urgency.   Musculoskeletal: Negative for falls and myalgias.   Skin: Negative for rash.   Neurological: Negative for dizziness, focal weakness, weakness and headaches.   Psychiatric/Behavioral: Negative for depression. The patient is not nervous/anxious.         Objective:     Blood pressure 128/76, pulse 72, temperature 37 °C (98.6 °F), resp. rate 14, height 1.702 m (5' 7\"), weight 83.4 kg (183 lb 14.4 oz), SpO2 95 %, not currently breastfeeding.     Physical Exam:  Physical Exam   Constitutional: She is oriented to person, place, and time and well-developed, well-nourished, and in no distress.   HENT:   Head: Normocephalic and atraumatic.   Eyes: Conjunctivae are normal.   Neck: Neck supple. No JVD present. No thyromegaly present.   Cardiovascular: Normal rate and regular rhythm.    No murmur heard.  Pulses:       Dorsalis pedis pulses are 1+ on the right side, and 2+ on the left side.        Posterior tibial pulses are 1+ on the right side, and 2+ on the left side.   Pulmonary/Chest: Effort normal and breath sounds normal. No respiratory distress. She has no wheezes.   Abdominal: Soft. Bowel sounds are normal. She exhibits no distension. There is no tenderness.   Musculoskeletal: Normal range of motion. She exhibits edema.   Neurological: She is alert and oriented to person, place, and time.   Skin: Skin is warm. No erythema.   Nursing note and vitals reviewed.        Assessment and Plan:     1. Hospital discharge follow-up  Hospitalization and results reviewed with patient. High risk conditions " requiring teaching or care coordination were identified and addressed.The patient demonstrate understanding of admission and underlying conditions. The patient understands discharge instructions and when to seek medical attention. Medications reviewed including instructions regarding high risk medications, dosing and side effects.    The patient is able to safely adhere to ADL/IADL, treatment and medication regimen, self-manage of high-risk conditions? Yes   The patient requires physical therapy/home health/DME referral? No   The patient requires referral to care coordination/behavioral health/social work?  No   Patient requires referral for pharmacy consult? No   Advance directive/POLST on file?  No   Required counseled on advance directive?  No Pt reports she has copy at home. Will bring to PCP next time.    PCP follow up appointment on 3/13/2018. Pt declined sooner appointment at this time. Pt would like to see if swelling improved with lasix first.     2. Swelling of right lower extremity  - furosemide (LASIX) 40 MG Tab; Take 1 Tab by mouth every day for 3 days.  Dispense: 3 Tab; Refill: 0  - potassium chloride SA (KDUR) 20 MEQ Tab CR; Take 2 Tabs by mouth every day for 3 days.  Dispense: 6 Tab; Refill: 0  - US-EXTREMITY VENOUS UNILATERAL-LOWER RIGHT; Future --> if still not get better with lasix, do another doppler since the increased swelling is new after discharge.   - if open wound or weeping noted, call back, will place order to wound care and RTC if needed to check if need further abx treatment or ID referral. Now finishing up clindamycin.    3. Localized edema   - US-EXTREMITY VENOUS UNILATERAL-LOWER RIGHT; Future    4. Cellulitis of right lower extremity  See #2      Medication Reconciliation  Medication list at end of encounter:   Current Outpatient Prescriptions   Medication Sig Dispense Refill   • furosemide (LASIX) 40 MG Tab Take 1 Tab by mouth every day for 3 days. 3 Tab 0   • potassium chloride SA  (KDUR) 20 MEQ Tab CR Take 2 Tabs by mouth every day for 3 days. 6 Tab 0   • cyanocobalamin (VITAMIN B12) 1000 MCG Tab Take 1 Tab by mouth every day. 30 Tab 3   • hydrALAZINE (APRESOLINE) 10 MG Tab Take 1 Tab by mouth every 8 hours. 90 Tab 0   • clindamycin (CLEOCIN) 300 MG Cap Take 1 Cap by mouth 3 times a day for 10 days. 30 Cap 0   • sertraline (ZOLOFT) 100 MG Tab Take 1 Tab by mouth every day. 30 Tab 3   • metoprolol SR (TOPROL XL) 100 MG TABLET SR 24 HR Take 1 Tab by mouth every day. 30 Tab 11   • Cholecalciferol (VITAMIN D) 2000 UNIT Tab Take 2,000 Units by mouth every day.     • naproxen (ALEVE) 220 MG tablet Take 220 mg by mouth 2 times a day, with meals.     • Omega-3 Fatty Acids (FISH OIL PO) Take 1 Tab by mouth every day.     • therapeutic multivitamin-minerals (THERAGRAN-M) TABS Take 1 Tab by mouth every day.       No current facility-administered medications for this visit.        Primary care follow-up:  New health conditions identified during hospitalization? Yes   Labs/pathology/imaging requires future PCP follow-up?  No   Changes to medications during hospitalization or today? Yes today    Recommended followup: No Follow-up on file. with Jorge Amador M.D.   Future Appointments       Provider Department Center    3/13/2018 8:00 AM Jorge Amador M.D. [facility] [place] [facility]          Patient Instruction  Patient offered educational material on discharge diagnosis and management of symptoms/red flags. Patient instructed to keep follow-up appointments and to bring written questions and and actual medications to each office visit. Patient instructed to call PCP/specialist with any problems/questions/concerns. Patient verbalizes understanding and has no further questions at this time.    Face-to-face transitional care management services with moderate complexity medical decision making.

## 2017-12-05 ENCOUNTER — OFFICE VISIT (OUTPATIENT)
Dept: MEDICAL GROUP | Facility: PHYSICIAN GROUP | Age: 72
End: 2017-12-05
Payer: MEDICARE

## 2017-12-05 VITALS
BODY MASS INDEX: 29.82 KG/M2 | HEIGHT: 67 IN | HEART RATE: 93 BPM | SYSTOLIC BLOOD PRESSURE: 162 MMHG | WEIGHT: 190 LBS | OXYGEN SATURATION: 92 % | DIASTOLIC BLOOD PRESSURE: 82 MMHG | TEMPERATURE: 97.3 F

## 2017-12-05 DIAGNOSIS — L03.115 CELLULITIS OF RIGHT LOWER EXTREMITY: ICD-10-CM

## 2017-12-05 DIAGNOSIS — Z09 HOSPITAL DISCHARGE FOLLOW-UP: ICD-10-CM

## 2017-12-05 DIAGNOSIS — M79.89 SWELLING OF RIGHT LOWER EXTREMITY: ICD-10-CM

## 2017-12-05 PROCEDURE — 99213 OFFICE O/P EST LOW 20 MIN: CPT | Performed by: INTERNAL MEDICINE

## 2017-12-05 RX ORDER — CLINDAMYCIN HYDROCHLORIDE 300 MG/1
300 CAPSULE ORAL 3 TIMES DAILY
Qty: 30 CAP | Refills: 0 | Status: SHIPPED | OUTPATIENT
Start: 2017-12-05 | End: 2017-12-15

## 2017-12-05 ASSESSMENT — PATIENT HEALTH QUESTIONNAIRE - PHQ9: CLINICAL INTERPRETATION OF PHQ2 SCORE: 0

## 2017-12-05 ASSESSMENT — PAIN SCALES - GENERAL: PAINLEVEL: 10=SEVERE PAIN

## 2017-12-05 NOTE — PROGRESS NOTES
Subjective:   Nadege Mae is a 72 y.o. female here today for hospital discharge follow up, cellulitis    Hospital discharge follow-up  Pt was recently discharged from the hospital. She was admitted for celluliits that failed outpatient therapy. She was treated with IV antibiotics with Unasyn and Vancomycin. She was discharged home on clindamycin. At post discharge clinic patient was found to have worsening swelling of the right lower extremity is placed on a three-day course of Lasix. She had some improvement in swelling with this. She was given an order for an ultrasound to rule out DVT but has not gotten this done. She reports she had one in the hospital which was negative.     Pt reports that she continues to have pain and swelling of the area. She has noticed some seepage of one part of skin thickening but this has resolved and there has been no purulence. The skin is red and flaky. The pain is severe to the degree where the pain causes her to have poor sleep. She denies fevers but reports occasional chills. In the hospital her blood cultures were negative. She denies nausea.        Current medicines (including changes today)  Current Outpatient Prescriptions   Medication Sig Dispense Refill   • clindamycin (CLEOCIN) 300 MG Cap Take 1 Cap by mouth 3 times a day for 10 days. 30 Cap 0   • cyanocobalamin (VITAMIN B12) 1000 MCG Tab Take 1 Tab by mouth every day. 30 Tab 3   • hydrALAZINE (APRESOLINE) 10 MG Tab Take 1 Tab by mouth every 8 hours. 90 Tab 0   • naproxen (ALEVE) 220 MG tablet Take 220 mg by mouth 2 times a day, with meals.     • sertraline (ZOLOFT) 100 MG Tab Take 1 Tab by mouth every day. 30 Tab 3   • metoprolol SR (TOPROL XL) 100 MG TABLET SR 24 HR Take 1 Tab by mouth every day. 30 Tab 11   • Cholecalciferol (VITAMIN D) 2000 UNIT Tab Take 2,000 Units by mouth every day.     • Omega-3 Fatty Acids (FISH OIL PO) Take 1 Tab by mouth every day.     • therapeutic multivitamin-minerals (THERAGRAN-M) TABS Take  "1 Tab by mouth every day.       No current facility-administered medications for this visit.      She  has a past medical history of Anxiety; Dental disorder; Generalized osteoarthritis of multiple sites (10/20/2015); Heart valve disease; Hyperlipidemia; Hypertension; and Osteoporosis. She also has no past medical history of Allergy; Diabetes; Muscle disorder; or OSTEOPOROSIS.    ROS   No fevers, no nausea     Objective:     Blood pressure (!) 162/82, pulse 93, temperature 36.3 °C (97.3 °F), height 1.702 m (5' 7\"), weight 86.2 kg (190 lb), SpO2 92 %. Body mass index is 29.76 kg/m².   Physical Exam:  Constitutional: Alert & oriented, no acute distress  Eye: Conjunctiva clear, lids normal, no discharge  ENMT: Lips without lesions, normal external nose and ears  Skin: RLE with redness, warmth, skin flaking that is smaller than area of previously drawn line, there is an area of skin thickening medially, no discharge or purulence appreciated  Neuro: No overt focal neurologic deficits  Psych: Normal mood and affect      Assessment and Plan:   The following treatment plan was discussed    1. Hospital discharge follow-up  It appears the patient still has cellulitis based on clinical exam findings. We'll refill clindamycin as patient believes she had some improvement with that medication will also place referral to infectious disease given patient may need continued IV antibiotics. Patient's swelling has improved and I don't feel she needs to continue the course of Lasix but a work note was given to her so that she can elevate her legs at work to try and further alleviate this. We'll follow-up in 2 weeks to assess response and to ensure patient has been seen by infectious disease. Of note her elevated blood pressure today is likely from infection given she had better blood pressure readings at last 2 visits    2. Swelling of right lower extremity  As above    3. Cellulitis of right lower extremity  As above  - REFERRAL TO " INFECTIOUS DISEASE  - clindamycin (CLEOCIN) 300 MG Cap; Take 1 Cap by mouth 3 times a day for 10 days.  Dispense: 30 Cap; Refill: 0      Followup: Return in about 2 weeks (around 12/19/2017).    Please note that this dictation was created using voice recognition software. I have made every reasonable attempt to correct obvious errors, but I expect that there are errors of grammar and possibly content that I did not discover before finalizing the note.

## 2017-12-05 NOTE — LETTER
[facility]  [address] [address]     December 5, 2017    Patient: Nadege Mae   YOB: 1945   Date of Visit: 12/5/2017       To Whom It May Concern:    Nadege Mae was seen and treated in our department on 12/5/2017. Due to continued medical complications she would still benefit from being able to sit and elevate her leg during the work day. This will be temporary but at this time the end date for these restrictions is not yet known. Thank you for your consideration in this matter.     Sincerely,         Jorge Amador M.D.

## 2017-12-05 NOTE — ASSESSMENT & PLAN NOTE
Pt was recently discharged from the hospital. She was admitted for celluliits that failed outpatient therapy. She was treated with IV antibiotics with Unasyn and Vancomycin. She was discharged home on clindamycin. At post discharge clinic patient was found to have worsening swelling of the right lower extremity is placed on a three-day course of Lasix. She had some improvement in swelling with this. She was given an order for an ultrasound to rule out DVT but has not gotten this done. She reports she had one in the hospital which was negative.     Pt reports that she continues to have pain and swelling of the area. She has noticed some seepage of one part of skin thickening but this has resolved and there has been no purulence. The skin is red and flaky. The pain is severe to the degree where the pain causes her to have poor sleep. She denies fevers but reports occasional chills. In the hospital her blood cultures were negative. She denies nausea.

## 2017-12-07 ENCOUNTER — TELEPHONE (OUTPATIENT)
Dept: MEDICAL GROUP | Facility: PHYSICIAN GROUP | Age: 72
End: 2017-12-07

## 2017-12-07 NOTE — TELEPHONE ENCOUNTER
1. Caller Name: Nadege Mae                                         Call Back Number: [phone] (home)       Patient approves a detailed voicemail message: N\A    L/M for patient to call back to schedule a 10 day follow up and to make sure she got  The antibiotics, asked her to call back to schedule and let us know if she has started the medication.

## 2017-12-08 ENCOUNTER — TELEPHONE (OUTPATIENT)
Dept: INFECTIOUS DISEASES | Facility: MEDICAL CENTER | Age: 72
End: 2017-12-08

## 2017-12-12 ENCOUNTER — TELEPHONE (OUTPATIENT)
Dept: INFECTIOUS DISEASES | Facility: MEDICAL CENTER | Age: 72
End: 2017-12-12

## 2017-12-19 ENCOUNTER — OFFICE VISIT (OUTPATIENT)
Dept: MEDICAL GROUP | Facility: PHYSICIAN GROUP | Age: 72
End: 2017-12-19
Payer: MEDICARE

## 2017-12-19 VITALS
SYSTOLIC BLOOD PRESSURE: 110 MMHG | BODY MASS INDEX: 30.07 KG/M2 | WEIGHT: 192 LBS | HEART RATE: 88 BPM | OXYGEN SATURATION: 96 % | DIASTOLIC BLOOD PRESSURE: 64 MMHG | TEMPERATURE: 97.5 F

## 2017-12-19 DIAGNOSIS — M79.89 SWELLING OF LOWER EXTREMITY: ICD-10-CM

## 2017-12-19 DIAGNOSIS — L03.119 CELLULITIS OF LOWER EXTREMITY, UNSPECIFIED LATERALITY: ICD-10-CM

## 2017-12-19 PROBLEM — L03.115 CELLULITIS OF RIGHT LOWER EXTREMITY: Status: RESOLVED | Noted: 2017-10-10 | Resolved: 2017-12-19

## 2017-12-19 PROCEDURE — 99214 OFFICE O/P EST MOD 30 MIN: CPT | Performed by: INTERNAL MEDICINE

## 2017-12-19 RX ORDER — AMOXICILLIN AND CLAVULANATE POTASSIUM 875; 125 MG/1; MG/1
1 TABLET, FILM COATED ORAL 2 TIMES DAILY
Qty: 20 TAB | Refills: 0 | Status: SHIPPED | OUTPATIENT
Start: 2017-12-19 | End: 2017-12-20

## 2017-12-19 NOTE — PROGRESS NOTES
Subjective:   Nadege Mae is a 72 y.o. female here today for cellulitis, LLE swelling    Cellulitis  Pt was seen about 2 weeks ago for hospital discharge follow up. She was treated with IV antibiotics for cellulitis. She had improvement but at last clinic visit she was deemed to still have cellulitis. She was restarted on clindamycin and referred to infectious disease to be evaluated to see if she would need IV antibiotics.     Per review of chart patient has not scheduled appointment with ID to date.     Today patient reports that the redness has improved but it is still present. She continue to have warmth and pain in the areas. She feels that it has improved with the antibiotic. She now also has redness of the left lower extremity that started recently. She also has swelling of the left leg. She denies fevers/chills, nausea/vomiting.     She reports she has the phone number for ID and will call them today to schedule the appointment.     Swelling of lower extremity  Pt had swelling of the lower extremities after hospital discharge. She was given a 3 day course of lasix at the post hospital follow up clinic visit. At last visit her edema had improved and she was deemed to not need to continue lasix. In the hospital she had a DVT ultrasound that was negative for DVT on the right side.     Recently her LLE has had swelling and symptoms as described above. She has pain in the LLE but this may be due to cellulitis.     Denies chest pain, SOB, hemoptysis.        Current medicines (including changes today)  Current Outpatient Prescriptions   Medication Sig Dispense Refill   • amoxicillin-clavulanate (AUGMENTIN) 875-125 MG Tab Take 1 Tab by mouth 2 times a day for 10 days. 20 Tab 0   • cyanocobalamin (VITAMIN B12) 1000 MCG Tab Take 1 Tab by mouth every day. 30 Tab 3   • hydrALAZINE (APRESOLINE) 10 MG Tab Take 1 Tab by mouth every 8 hours. 90 Tab 0   • naproxen (ALEVE) 220 MG tablet Take 220 mg by mouth 2 times a day,  with meals.     • sertraline (ZOLOFT) 100 MG Tab Take 1 Tab by mouth every day. 30 Tab 3   • metoprolol SR (TOPROL XL) 100 MG TABLET SR 24 HR Take 1 Tab by mouth every day. 30 Tab 11   • Cholecalciferol (VITAMIN D) 2000 UNIT Tab Take 2,000 Units by mouth every day.     • Omega-3 Fatty Acids (FISH OIL PO) Take 1 Tab by mouth every day.     • therapeutic multivitamin-minerals (THERAGRAN-M) TABS Take 1 Tab by mouth every day.       No current facility-administered medications for this visit.      She  has a past medical history of Anxiety; Dental disorder; Generalized osteoarthritis of multiple sites (10/20/2015); Heart valve disease; Hyperlipidemia; Hypertension; and Osteoporosis. She also has no past medical history of Allergy; Diabetes; Muscle disorder; or OSTEOPOROSIS.    ROS   No chest pain, no shortness of breath, no hemoptysis     Objective:     Blood pressure 110/64, pulse 88, temperature 36.4 °C (97.5 °F), weight 87.1 kg (192 lb), SpO2 96 %. Body mass index is 30.07 kg/m².   Physical Exam:  Constitutional: Alert & oriented, no acute distress  Skin: + Redness, warmth, and TTP of bilateral lower extremities, RLE with skin thickening on the medial side.   Neuro: No overt focal neurologic deficits  Psych: Normal mood and affect    Assessment and Plan:   The following treatment plan was discussed    1. Cellulitis of lower extremity, unspecified laterality  Pt appears to still have cellulitis that is now in the bilateral lower extremities. She feels it has improved somewhat in the RLE with the clindamycin. Given patient responded well to IV Unasyn in the hospital we'll prescribe Augmentin for this. Patient counseled to call the infectious disease clinic with regards to her previous referral today to set up appointment. She states she will do this. She can follow-up in one month to assess response and ensure she has been set up with specialist  - amoxicillin-clavulanate (AUGMENTIN) 875-125 MG Tab; Take 1 Tab by mouth  2 times a day for 10 days.  Dispense: 20 Tab; Refill: 0    2. Swelling of lower extremity  4. Ultrasound of the left lower extremity given new onset edema and asymmetry between lower extremities. She has history a DVT in the past and therefore I don't want to wait on imaging.   - US-EXTREMITY VENOUS UNILATERAL-LOWER LEFT; Future    Please note that this dictation was created using voice recognition software. I have made every reasonable attempt to correct obvious errors, but I expect that there are errors of grammar and possibly content that I did not discover before finalizing the note.

## 2017-12-20 ENCOUNTER — APPOINTMENT (OUTPATIENT)
Dept: RADIOLOGY | Facility: MEDICAL CENTER | Age: 72
DRG: 481 | End: 2017-12-20
Attending: ORTHOPAEDIC SURGERY
Payer: MEDICARE

## 2017-12-20 ENCOUNTER — APPOINTMENT (OUTPATIENT)
Dept: RADIOLOGY | Facility: MEDICAL CENTER | Age: 72
DRG: 481 | End: 2017-12-20
Attending: EMERGENCY MEDICINE
Payer: MEDICARE

## 2017-12-20 ENCOUNTER — RESOLUTE PROFESSIONAL BILLING HOSPITAL PROF FEE (OUTPATIENT)
Dept: HOSPITALIST | Facility: MEDICAL CENTER | Age: 72
End: 2017-12-20
Payer: MEDICARE

## 2017-12-20 ENCOUNTER — HOSPITAL ENCOUNTER (INPATIENT)
Facility: MEDICAL CENTER | Age: 72
LOS: 7 days | DRG: 481 | End: 2017-12-27
Attending: EMERGENCY MEDICINE | Admitting: HOSPITALIST
Payer: MEDICARE

## 2017-12-20 DIAGNOSIS — S72.009A CLOSED FRACTURE OF HIP, UNSPECIFIED LATERALITY, INITIAL ENCOUNTER (HCC): ICD-10-CM
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
## 2017-12-20 DIAGNOSIS — L03.116 BILATERAL LOWER LEG CELLULITIS: ICD-10-CM

## 2017-12-20 DIAGNOSIS — S72.002A HIP FRACTURE, LEFT, CLOSED, INITIAL ENCOUNTER (HCC): ICD-10-CM

## 2017-12-20 DIAGNOSIS — L03.115 BILATERAL LOWER LEG CELLULITIS: ICD-10-CM

## 2017-12-20 PROBLEM — E53.8 B12 DEFICIENCY: Status: ACTIVE | Noted: 2017-12-20

## 2017-12-20 LAB
ANION GAP SERPL CALC-SCNC: 15 MMOL/L (ref 0–11.9)
BASOPHILS # BLD AUTO: 0.9 % (ref 0–1.8)
BASOPHILS # BLD: 0.07 K/UL (ref 0–0.12)
BUN SERPL-MCNC: 12 MG/DL (ref 8–22)
CALCIUM SERPL-MCNC: 9 MG/DL (ref 8.5–10.5)
CHLORIDE SERPL-SCNC: 99 MMOL/L (ref 96–112)
CO2 SERPL-SCNC: 21 MMOL/L (ref 20–33)
CREAT SERPL-MCNC: 0.67 MG/DL (ref 0.5–1.4)
EOSINOPHIL # BLD AUTO: 0 K/UL (ref 0–0.51)
EOSINOPHIL NFR BLD: 0 % (ref 0–6.9)
ERYTHROCYTE [DISTWIDTH] IN BLOOD BY AUTOMATED COUNT: 53.5 FL (ref 35.9–50)
GFR SERPL CREATININE-BSD FRML MDRD: >60 ML/MIN/1.73 M 2
GLUCOSE SERPL-MCNC: 120 MG/DL (ref 65–99)
HCT VFR BLD AUTO: 38.1 % (ref 37–47)
HGB BLD-MCNC: 12.3 G/DL (ref 12–16)
IMM GRANULOCYTES # BLD AUTO: 0.03 K/UL (ref 0–0.11)
IMM GRANULOCYTES NFR BLD AUTO: 0.4 % (ref 0–0.9)
INR PPP: 1.09 (ref 0.87–1.13)
LYMPHOCYTES # BLD AUTO: 0.99 K/UL (ref 1–4.8)
LYMPHOCYTES NFR BLD: 12.7 % (ref 22–41)
MCH RBC QN AUTO: 33.2 PG (ref 27–33)
MCHC RBC AUTO-ENTMCNC: 32.3 G/DL (ref 33.6–35)
MCV RBC AUTO: 102.7 FL (ref 81.4–97.8)
MONOCYTES # BLD AUTO: 1.07 K/UL (ref 0–0.85)
MONOCYTES NFR BLD AUTO: 13.8 % (ref 0–13.4)
NEUTROPHILS # BLD AUTO: 5.61 K/UL (ref 2–7.15)
NEUTROPHILS NFR BLD: 72.2 % (ref 44–72)
NRBC # BLD AUTO: 0 K/UL
NRBC BLD-RTO: 0 /100 WBC
PLATELET # BLD AUTO: 261 K/UL (ref 164–446)
PMV BLD AUTO: 9.9 FL (ref 9–12.9)
POTASSIUM SERPL-SCNC: 4.7 MMOL/L (ref 3.6–5.5)
PROTHROMBIN TIME: 13.8 SEC (ref 12–14.6)
RBC # BLD AUTO: 3.71 M/UL (ref 4.2–5.4)
SODIUM SERPL-SCNC: 135 MMOL/L (ref 135–145)
WBC # BLD AUTO: 7.8 K/UL (ref 4.8–10.8)

## 2017-12-20 PROCEDURE — C1713 ANCHOR/SCREW BN/BN,TIS/BN: HCPCS | Performed by: ORTHOPAEDIC SURGERY

## 2017-12-20 PROCEDURE — A9270 NON-COVERED ITEM OR SERVICE: HCPCS

## 2017-12-20 PROCEDURE — 160029 HCHG SURGERY MINUTES - 1ST 30 MINS LEVEL 4: Performed by: ORTHOPAEDIC SURGERY

## 2017-12-20 PROCEDURE — 160009 HCHG ANES TIME/MIN: Performed by: ORTHOPAEDIC SURGERY

## 2017-12-20 PROCEDURE — 160035 HCHG PACU - 1ST 60 MINS PHASE I: Performed by: ORTHOPAEDIC SURGERY

## 2017-12-20 PROCEDURE — 36415 COLL VENOUS BLD VENIPUNCTURE: CPT

## 2017-12-20 PROCEDURE — 96376 TX/PRO/DX INJ SAME DRUG ADON: CPT

## 2017-12-20 PROCEDURE — 700102 HCHG RX REV CODE 250 W/ 637 OVERRIDE(OP)

## 2017-12-20 PROCEDURE — 85610 PROTHROMBIN TIME: CPT

## 2017-12-20 PROCEDURE — 73552 X-RAY EXAM OF FEMUR 2/>: CPT | Mod: LT

## 2017-12-20 PROCEDURE — 160002 HCHG RECOVERY MINUTES (STAT): Performed by: ORTHOPAEDIC SURGERY

## 2017-12-20 PROCEDURE — 96374 THER/PROPH/DIAG INJ IV PUSH: CPT

## 2017-12-20 PROCEDURE — 71010 DX-CHEST-PORTABLE (1 VIEW): CPT

## 2017-12-20 PROCEDURE — 85025 COMPLETE CBC W/AUTO DIFF WBC: CPT

## 2017-12-20 PROCEDURE — C1769 GUIDE WIRE: HCPCS | Performed by: ORTHOPAEDIC SURGERY

## 2017-12-20 PROCEDURE — 700105 HCHG RX REV CODE 258: Performed by: EMERGENCY MEDICINE

## 2017-12-20 PROCEDURE — 304561 HCHG STAT O2

## 2017-12-20 PROCEDURE — 160036 HCHG PACU - EA ADDL 30 MINS PHASE I: Performed by: ORTHOPAEDIC SURGERY

## 2017-12-20 PROCEDURE — 700102 HCHG RX REV CODE 250 W/ 637 OVERRIDE(OP): Performed by: HOSPITALIST

## 2017-12-20 PROCEDURE — A9270 NON-COVERED ITEM OR SERVICE: HCPCS | Performed by: HOSPITALIST

## 2017-12-20 PROCEDURE — 500891 HCHG PACK, ORTHO MAJOR: Performed by: ORTHOPAEDIC SURGERY

## 2017-12-20 PROCEDURE — 160048 HCHG OR STATISTICAL LEVEL 1-5: Performed by: ORTHOPAEDIC SURGERY

## 2017-12-20 PROCEDURE — 700111 HCHG RX REV CODE 636 W/ 250 OVERRIDE (IP)

## 2017-12-20 PROCEDURE — 73502 X-RAY EXAM HIP UNI 2-3 VIEWS: CPT | Mod: LT

## 2017-12-20 PROCEDURE — 96375 TX/PRO/DX INJ NEW DRUG ADDON: CPT

## 2017-12-20 PROCEDURE — 501838 HCHG SUTURE GENERAL: Performed by: ORTHOPAEDIC SURGERY

## 2017-12-20 PROCEDURE — 700111 HCHG RX REV CODE 636 W/ 250 OVERRIDE (IP): Performed by: HOSPITALIST

## 2017-12-20 PROCEDURE — 80048 BASIC METABOLIC PNL TOTAL CA: CPT

## 2017-12-20 PROCEDURE — 99291 CRITICAL CARE FIRST HOUR: CPT

## 2017-12-20 PROCEDURE — 770006 HCHG ROOM/CARE - MED/SURG/GYN SEMI*

## 2017-12-20 PROCEDURE — 72170 X-RAY EXAM OF PELVIS: CPT

## 2017-12-20 PROCEDURE — 700101 HCHG RX REV CODE 250

## 2017-12-20 PROCEDURE — 700105 HCHG RX REV CODE 258: Performed by: HOSPITALIST

## 2017-12-20 PROCEDURE — 0QS706Z REPOSITION LEFT UPPER FEMUR WITH INTRAMEDULLARY INTERNAL FIXATION DEVICE, OPEN APPROACH: ICD-10-PCS | Performed by: ORTHOPAEDIC SURGERY

## 2017-12-20 PROCEDURE — 110371 HCHG SHELL REV 272: Performed by: ORTHOPAEDIC SURGERY

## 2017-12-20 PROCEDURE — 160041 HCHG SURGERY MINUTES - EA ADDL 1 MIN LEVEL 4: Performed by: ORTHOPAEDIC SURGERY

## 2017-12-20 PROCEDURE — 99223 1ST HOSP IP/OBS HIGH 75: CPT | Mod: AI | Performed by: HOSPITALIST

## 2017-12-20 PROCEDURE — 700111 HCHG RX REV CODE 636 W/ 250 OVERRIDE (IP): Performed by: EMERGENCY MEDICINE

## 2017-12-20 DEVICE — NAIL INTERTAN 11.5X18 125D: Type: IMPLANTABLE DEVICE | Status: FUNCTIONAL

## 2017-12-20 DEVICE — IMPLANTABLE DEVICE: Type: IMPLANTABLE DEVICE | Status: FUNCTIONAL

## 2017-12-20 DEVICE — SCREW TRIGEN LOW PROFILE 5.0MM X 32.5MM: Type: IMPLANTABLE DEVICE | Status: FUNCTIONAL

## 2017-12-20 RX ORDER — MORPHINE SULFATE 4 MG/ML
4 INJECTION, SOLUTION INTRAMUSCULAR; INTRAVENOUS ONCE
Status: COMPLETED | OUTPATIENT
Start: 2017-12-20 | End: 2017-12-20

## 2017-12-20 RX ORDER — LABETALOL HYDROCHLORIDE 5 MG/ML
INJECTION, SOLUTION INTRAVENOUS
Status: COMPLETED
Start: 2017-12-20 | End: 2017-12-20

## 2017-12-20 RX ORDER — AMOXICILLIN 250 MG
2 CAPSULE ORAL 2 TIMES DAILY
Status: DISCONTINUED | OUTPATIENT
Start: 2017-12-20 | End: 2017-12-27 | Stop reason: HOSPADM

## 2017-12-20 RX ORDER — LABETALOL HYDROCHLORIDE 5 MG/ML
10 INJECTION, SOLUTION INTRAVENOUS EVERY 4 HOURS PRN
Status: DISCONTINUED | OUTPATIENT
Start: 2017-12-20 | End: 2017-12-27 | Stop reason: HOSPADM

## 2017-12-20 RX ORDER — HYDROCODONE BITARTRATE AND ACETAMINOPHEN 5; 325 MG/1; MG/1
1-2 TABLET ORAL EVERY 6 HOURS PRN
Status: DISCONTINUED | OUTPATIENT
Start: 2017-12-20 | End: 2017-12-27 | Stop reason: HOSPADM

## 2017-12-20 RX ORDER — SERTRALINE HYDROCHLORIDE 100 MG/1
100 TABLET, FILM COATED ORAL DAILY
Status: DISCONTINUED | OUTPATIENT
Start: 2017-12-21 | End: 2017-12-27 | Stop reason: HOSPADM

## 2017-12-20 RX ORDER — BISACODYL 10 MG
10 SUPPOSITORY, RECTAL RECTAL
Status: DISCONTINUED | OUTPATIENT
Start: 2017-12-20 | End: 2017-12-27 | Stop reason: HOSPADM

## 2017-12-20 RX ORDER — DEXTROSE AND SODIUM CHLORIDE 5; .45 G/100ML; G/100ML
INJECTION, SOLUTION INTRAVENOUS ONCE
Status: COMPLETED | OUTPATIENT
Start: 2017-12-20 | End: 2017-12-20

## 2017-12-20 RX ORDER — ONDANSETRON 2 MG/ML
4 INJECTION INTRAMUSCULAR; INTRAVENOUS EVERY 4 HOURS PRN
Status: DISCONTINUED | OUTPATIENT
Start: 2017-12-20 | End: 2017-12-27 | Stop reason: HOSPADM

## 2017-12-20 RX ORDER — DEXAMETHASONE SODIUM PHOSPHATE 4 MG/ML
10 INJECTION, SOLUTION INTRA-ARTICULAR; INTRALESIONAL; INTRAMUSCULAR; INTRAVENOUS; SOFT TISSUE ONCE
Status: COMPLETED | OUTPATIENT
Start: 2017-12-21 | End: 2017-12-21

## 2017-12-20 RX ORDER — MORPHINE SULFATE 4 MG/ML
1-2 INJECTION, SOLUTION INTRAMUSCULAR; INTRAVENOUS EVERY 4 HOURS PRN
Status: DISCONTINUED | OUTPATIENT
Start: 2017-12-20 | End: 2017-12-27 | Stop reason: HOSPADM

## 2017-12-20 RX ORDER — ENALAPRILAT 1.25 MG/ML
1.25 INJECTION INTRAVENOUS EVERY 6 HOURS PRN
Status: DISCONTINUED | OUTPATIENT
Start: 2017-12-20 | End: 2017-12-27 | Stop reason: HOSPADM

## 2017-12-20 RX ORDER — CLINDAMYCIN HYDROCHLORIDE 300 MG/1
300 CAPSULE ORAL 3 TIMES DAILY
Status: ON HOLD | COMMUNITY
Start: 2017-12-05 | End: 2017-12-27

## 2017-12-20 RX ORDER — MEPERIDINE HYDROCHLORIDE 25 MG/ML
INJECTION INTRAMUSCULAR; INTRAVENOUS; SUBCUTANEOUS
Status: COMPLETED
Start: 2017-12-20 | End: 2017-12-20

## 2017-12-20 RX ORDER — SODIUM CHLORIDE 9 MG/ML
INJECTION, SOLUTION INTRAVENOUS CONTINUOUS
Status: DISCONTINUED | OUTPATIENT
Start: 2017-12-20 | End: 2017-12-22

## 2017-12-20 RX ORDER — HYDRALAZINE HYDROCHLORIDE 10 MG/1
10 TABLET, FILM COATED ORAL EVERY 8 HOURS
Status: DISCONTINUED | OUTPATIENT
Start: 2017-12-20 | End: 2017-12-22

## 2017-12-20 RX ORDER — ONDANSETRON 4 MG/1
4 TABLET, ORALLY DISINTEGRATING ORAL EVERY 4 HOURS PRN
Status: DISCONTINUED | OUTPATIENT
Start: 2017-12-20 | End: 2017-12-27 | Stop reason: HOSPADM

## 2017-12-20 RX ORDER — CHOLECALCIFEROL (VITAMIN D3) 125 MCG
1000 CAPSULE ORAL DAILY
Status: DISCONTINUED | OUTPATIENT
Start: 2017-12-21 | End: 2017-12-27 | Stop reason: HOSPADM

## 2017-12-20 RX ORDER — TRAMADOL HYDROCHLORIDE 50 MG/1
50 TABLET ORAL EVERY 6 HOURS PRN
Status: DISCONTINUED | OUTPATIENT
Start: 2017-12-20 | End: 2017-12-27 | Stop reason: HOSPADM

## 2017-12-20 RX ORDER — ONDANSETRON 2 MG/ML
4 INJECTION INTRAMUSCULAR; INTRAVENOUS ONCE
Status: COMPLETED | OUTPATIENT
Start: 2017-12-20 | End: 2017-12-20

## 2017-12-20 RX ORDER — POLYETHYLENE GLYCOL 3350 17 G/17G
1 POWDER, FOR SOLUTION ORAL
Status: DISCONTINUED | OUTPATIENT
Start: 2017-12-20 | End: 2017-12-27 | Stop reason: HOSPADM

## 2017-12-20 RX ORDER — ACETAMINOPHEN 325 MG/1
650 TABLET ORAL EVERY 6 HOURS PRN
Status: DISCONTINUED | OUTPATIENT
Start: 2017-12-20 | End: 2017-12-27 | Stop reason: HOSPADM

## 2017-12-20 RX ORDER — MAGNESIUM HYDROXIDE 1200 MG/15ML
LIQUID ORAL
Status: DISCONTINUED | OUTPATIENT
Start: 2017-12-20 | End: 2017-12-20 | Stop reason: HOSPADM

## 2017-12-20 RX ORDER — OXYCODONE HCL 5 MG/5 ML
SOLUTION, ORAL ORAL
Status: COMPLETED
Start: 2017-12-20 | End: 2017-12-20

## 2017-12-20 RX ORDER — METOPROLOL SUCCINATE 50 MG/1
100 TABLET, EXTENDED RELEASE ORAL
Status: DISCONTINUED | OUTPATIENT
Start: 2017-12-20 | End: 2017-12-24

## 2017-12-20 RX ADMIN — DEXTROSE AND SODIUM CHLORIDE: 5; 450 INJECTION, SOLUTION INTRAVENOUS at 14:33

## 2017-12-20 RX ADMIN — LABETALOL HYDROCHLORIDE 5 MG: 5 INJECTION, SOLUTION INTRAVENOUS at 19:24

## 2017-12-20 RX ADMIN — ONDANSETRON 4 MG: 2 INJECTION INTRAMUSCULAR; INTRAVENOUS at 12:18

## 2017-12-20 RX ADMIN — MEPERIDINE HYDROCHLORIDE 12.5 MG: 25 INJECTION INTRAMUSCULAR; INTRAVENOUS; SUBCUTANEOUS at 19:25

## 2017-12-20 RX ADMIN — MORPHINE SULFATE 4 MG: 4 INJECTION INTRAVENOUS at 14:33

## 2017-12-20 RX ADMIN — HYDRALAZINE HYDROCHLORIDE 10 MG: 10 TABLET, FILM COATED ORAL at 23:07

## 2017-12-20 RX ADMIN — OXYCODONE HYDROCHLORIDE: 5 SOLUTION ORAL at 19:00

## 2017-12-20 RX ADMIN — AMPICILLIN SODIUM AND SULBACTAM SODIUM: 100; 50 INJECTION, POWDER, FOR SOLUTION INTRAVENOUS at 16:30

## 2017-12-20 RX ADMIN — MORPHINE SULFATE 4 MG: 4 INJECTION INTRAVENOUS at 12:18

## 2017-12-20 RX ADMIN — HYDROCODONE BITARTRATE AND ACETAMINOPHEN 1 TABLET: 5; 325 TABLET ORAL at 23:07

## 2017-12-20 RX ADMIN — VANCOMYCIN HYDROCHLORIDE 2300 MG: 100 INJECTION, POWDER, LYOPHILIZED, FOR SOLUTION INTRAVENOUS at 19:01

## 2017-12-20 RX ADMIN — AMPICILLIN SODIUM AND SULBACTAM SODIUM 3 G: 2; 1 INJECTION, POWDER, FOR SOLUTION INTRAMUSCULAR; INTRAVENOUS at 17:30

## 2017-12-20 RX ADMIN — SODIUM CHLORIDE: 9 INJECTION, SOLUTION INTRAVENOUS at 20:29

## 2017-12-20 RX ADMIN — MEPERIDINE HYDROCHLORIDE 12.5 MG: 25 INJECTION INTRAMUSCULAR; INTRAVENOUS; SUBCUTANEOUS at 19:12

## 2017-12-20 RX ADMIN — STANDARDIZED SENNA CONCENTRATE AND DOCUSATE SODIUM 2 TABLET: 8.6; 5 TABLET, FILM COATED ORAL at 23:07

## 2017-12-20 ASSESSMENT — ENCOUNTER SYMPTOMS
VOMITING: 0
CHILLS: 0
HALLUCINATIONS: 0
WEAKNESS: 1
EYE DISCHARGE: 0
STRIDOR: 0
DIAPHORESIS: 0
FOCAL WEAKNESS: 1
FLANK PAIN: 0
ABDOMINAL PAIN: 0
EYE REDNESS: 0
NERVOUS/ANXIOUS: 1
SHORTNESS OF BREATH: 0
DIARRHEA: 0
MYALGIAS: 1
SPEECH CHANGE: 0
NECK PAIN: 0
TINGLING: 1
BACK PAIN: 0
COUGH: 0
WHEEZING: 0
PALPITATIONS: 0
TREMORS: 0
FEVER: 0

## 2017-12-20 ASSESSMENT — PATIENT HEALTH QUESTIONNAIRE - PHQ9
1. LITTLE INTEREST OR PLEASURE IN DOING THINGS: NOT AT ALL
SUM OF ALL RESPONSES TO PHQ QUESTIONS 1-9: 0
2. FEELING DOWN, DEPRESSED, IRRITABLE, OR HOPELESS: NOT AT ALL
1. LITTLE INTEREST OR PLEASURE IN DOING THINGS: NOT AT ALL
2. FEELING DOWN, DEPRESSED, IRRITABLE, OR HOPELESS: NOT AT ALL
SUM OF ALL RESPONSES TO PHQ9 QUESTIONS 1 AND 2: 0
SUM OF ALL RESPONSES TO PHQ9 QUESTIONS 1 AND 2: 0
SUM OF ALL RESPONSES TO PHQ QUESTIONS 1-9: 0

## 2017-12-20 ASSESSMENT — PAIN SCALES - GENERAL
PAINLEVEL_OUTOF10: ASSUMED PAIN PRESENT
PAINLEVEL_OUTOF10: 6
PAINLEVEL_OUTOF10: 4
PAINLEVEL_OUTOF10: 5
PAINLEVEL_OUTOF10: 8
PAINLEVEL_OUTOF10: 0
PAINLEVEL_OUTOF10: 0
PAINLEVEL_OUTOF10: ASSUMED PAIN PRESENT
PAINLEVEL_OUTOF10: ASSUMED PAIN PRESENT

## 2017-12-20 ASSESSMENT — LIFESTYLE VARIABLES
SUBSTANCE_ABUSE: 0
DO YOU DRINK ALCOHOL: NO

## 2017-12-20 NOTE — ED NOTES
Med rec complete per pt with family at bedside  Allergies reviewed - NKDA  Pt started a 10 day course of Clindamycin on 12/5/17. Pt reports that she still has one dose left and that she last took one yesterday morning

## 2017-12-20 NOTE — ED NOTES
SARY CRONIN from home  Chief Complaint   Patient presents with   • T-5000 GLF     slipped and fell on the stairs last night, hit her L leg, cannot move L leg     Pt said she was down for 10  Minutes before her roommate came home and found her.  EMS was called last night and helped pt get on the couch where she slept, had increased pain today and could not  her L leg, called again today.  C/o of pain in her L mid thigh, no change with palpation, +pedal pulse.  Pt has bilateral LE cellulitis and Is currently on abx for this.

## 2017-12-20 NOTE — H&P
Hospital Medicine History and Physical    Date of Service  12/20/2017    Chief Complaint  Chief Complaint   Patient presents with   • T-5000 GLF     slipped and fell on the stairs last night, hit her L leg, cannot move L leg       History of Presenting Illness  72 y.o. Female hx of lower extremity cellulitis, hypertension who presented 12/20/2017 with left hip pain after fall. Patient reportedly last night was going up her stairs when her slippers fell off and she lost balance following.  Reports of pain left leg, groin region. Reports of no nausea vomiting no chest pain or shortness of breath. She has been battling bilateral lower extremity cellulitis , right side greater then left over the past 6 weeks and reportedly had been on antibiotics since early November.  Her leg swelling has improved but has persistent redness and warmth with excoriated skin with formation of calf, shin region wound.  She reports no fevers chills. No nausea vomiting or abdominal pain.  Has had difficulty moving her left leg due to severe pain.     Primary Care Physician  Jorge Amador M.D.    Consultants  Dr. [facility]    Code Status  Code: Full code    Review of Systems  Review of Systems   Constitutional: Negative for chills, diaphoresis, fever and malaise/fatigue.   HENT: Negative for congestion.    Eyes: Negative for discharge and redness.   Respiratory: Negative for cough, shortness of breath, wheezing and stridor.    Cardiovascular: Negative for chest pain, palpitations and leg swelling.   Gastrointestinal: Negative for abdominal pain, diarrhea and vomiting.   Genitourinary: Negative for flank pain and hematuria.   Musculoskeletal: Positive for joint pain and myalgias. Negative for back pain and neck pain.   Neurological: Positive for tingling, focal weakness and weakness. Negative for tremors and speech change.   Psychiatric/Behavioral: Negative for hallucinations and substance abuse. The patient is nervous/anxious.            Past Medical History    Hypertension, vitamin D deficiency, vitamin B12 deficiency, depression, right lower extremity cellulitis, anemia    Surgical History  Past Surgical History:   Procedure Laterality Date   • BREAST BIOPSY  2014    Performed by Magdalena Londono M.D. at SURGERY [facility]   • BREAST BIOPSY Right     benign   • GYN SURGERY  1973    complete hysterectomy   • ABDOMINAL HYSTERECTOMY TOTAL      w/BSO due to uterine cyst and endometriosis       Medications  No current facility-administered medications on file prior to encounter.      Current Outpatient Prescriptions on File Prior to Encounter   Medication Sig Dispense Refill   • cyanocobalamin (VITAMIN B12) 1000 MCG Tab Take 1 Tab by mouth every day. 30 Tab 3   • hydrALAZINE (APRESOLINE) 10 MG Tab Take 1 Tab by mouth every 8 hours. 90 Tab 0   • naproxen (ALEVE) 220 MG tablet Take 220 mg by mouth 2 times a day, with meals.     • sertraline (ZOLOFT) 100 MG Tab Take 1 Tab by mouth every day. 30 Tab 3   • metoprolol SR (TOPROL XL) 100 MG TABLET SR 24 HR Take 1 Tab by mouth every day. 30 Tab 11   • Cholecalciferol (VITAMIN D) 2000 UNIT Tab Take 2,000 Units by mouth every day.     • Omega-3 Fatty Acids (FISH OIL PO) Take 1 Tab by mouth every day.     • therapeutic multivitamin-minerals (THERAGRAN-M) TABS Take 1 Tab by mouth every day.         Family History  Family History   Problem Relation Age of Onset   • GI Mother      colostomy   • Heart Attack Father    • Diabetes Father    • Hypertension Father    • Psychiatry Brother      bipolar disorder       Social History  Social History   Substance Use Topics   • Smoking status: Former Smoker     Packs/day: 0.50     Years: 25.00     Types: Cigarettes     Quit date: 2007   • Smokeless tobacco: Never Used   • Alcohol use No   She currently lives with a friend    Allergies  No Known Allergies     Physical Exam  Laboratory   Hemodynamics  Temp (24hrs), Av.5 °C (97.7 °F), Min:36.5 °C (97.7 °F),  Max:36.5 °C (97.7 °F)   Temperature: 36.5 °C (97.7 °F)  Pulse  Av.9  Min: 87  Max: 92 Heart Rate (Monitored): 90  Blood Pressure : 116/75, NIBP: 116/75      Respiratory      Respiration: 18, Pulse Oximetry: 95 %             Physical Exam   Constitutional: She is oriented to person, place, and time.   Anxious, moderate  distress due to left leg pain   HENT:   Head: Normocephalic and atraumatic.   Right Ear: External ear normal.   Left Ear: External ear normal.   Nose: Nose normal.   Eyes: EOM are normal. Right eye exhibits no discharge. Left eye exhibits no discharge. No scleral icterus.   Neck: Neck supple. No JVD present.   Cardiovascular: Normal rate and regular rhythm.    No murmur heard.  Pulmonary/Chest: Effort normal. No stridor. She has no wheezes. She has no rales.   Abdominal: Soft. Bowel sounds are normal. She exhibits no distension. There is no tenderness.   Musculoskeletal: She exhibits edema, tenderness and deformity.   Left lower extremity externally rotated, shortened. Pain with Attempt at movement   Neurological: She is alert and oriented to person, place, and time. No cranial nerve deficit.   Skin: Skin is warm and dry. She is not diaphoretic. No pallor.   Bilateral lower extremity erythema with swelling, excoriated, peeling skin with  right greater than left. There is a right medial shin to posterior calf wound with dried necrotic skin. Left medial shin small healing wound   Psychiatric: Her mood appears anxious. Her affect is labile.   Vitals reviewed.        Assessment/Plan  * Hip fracture (CMS-[facility])   Assessment & Plan    Acute Left comminuted , displaced intertrochanteric fracture.   We will keep nothing by mouth, provide IV fluids. IV morphine as needed for pain.  SCDs to lower extremities.  Dr. Peters orthopedics to plan left hip nail this afternoon.            Cellulitis- (present on admission)   Assessment & Plan    Bilateral lower extremity right shin to posterior calf necrotic wound.   Failed outpatient antibiotics.   Patient started on IV vancomycin, Unasyn, wound care with culture if can obtain sample.        Essential hypertension- (present on admission)   Assessment & Plan    Controlled.  While nothing by mouth we will provide when necessary IV hydralazine, Vasotec  Resume oral Antihypertensives post op.        B12 deficiency   Assessment & Plan    To be continued on B12 supplements        Anxiety- (present on admission)   Assessment & Plan    With history of depression.  Resume Zoloft post op.            I anticipate this patient will require at least two midnights for appropriate medical management, necessitating inpatient admission.    Prophylaxis: SCDs    Recent Labs      12/20/17   1056   WBC  7.8   RBC  3.71*   HEMOGLOBIN  12.3   HEMATOCRIT  38.1   MCV  102.7*   MCH  33.2*   MCHC  32.3*   RDW  53.5*   PLATELETCT  261   MPV  9.9     Recent Labs      12/20/17   1056   SODIUM  135   POTASSIUM  4.7   CHLORIDE  99   CO2  21   GLUCOSE  120*   BUN  12   CREATININE  0.67   CALCIUM  9.0     Recent Labs      12/20/17   1056   GLUCOSE  120*     Recent Labs      12/20/17   1056   INR  1.09             No results found for: TROPONINI    Imaging  DX-FEMUR-2+ LEFT   Final Result            Acute comminuted, impacted, significantly displaced fracture of the intertrochanteric region left femur.      DX-PELVIS-1 OR 2 VIEWS   Final Result      Comminuted left proximal femoral fracture which involves the intertrochanteric area and extends into the femoral neck. There is impaction and angulation.      DX-CHEST-PORTABLE (1 VIEW)   Final Result      Cardiomegaly.

## 2017-12-20 NOTE — ED PROVIDER NOTES
ED Provider Note    CHIEF COMPLAINT  Chief Complaint   Patient presents with   • T-5000 GLF     slipped and fell on the stairs last night, hit her L leg, cannot move L leg       HPI  Nadege Mae is a 72 y.o. female who presents complaining of Left hip pain. She states she fell on her stairs last night. Her roommate assisted her to the couch where she spent the night resting but not sleeping well. Her pain was present and constant all night, increased with movement. She was unable to move her leg today secondary to the pain. It is sharp, nonradiating, and moderate. Patient denies head trauma, loss of consciousness, neck pain, back pain. Patient denies weakness and numbness. She states she was on the floor for less than one hour at the time of the fall.      ALLERGIES  No Known Allergies    CURRENT MEDICATIONS  Home Medications     Reviewed by Mindy Cao (Pharmacy Tech) on 12/20/17 at 1526  Med List Status: Complete   Medication Last Dose Status   Cholecalciferol (VITAMIN D) 2000 UNIT Tab 12/19/2017 Active   clindamycin (CLEOCIN) 300 MG Cap 12/19/2017 Active   cyanocobalamin (VITAMIN B12) 1000 MCG Tab 12/19/2017 Active   hydrALAZINE (APRESOLINE) 10 MG Tab 12/19/2017 Active   metoprolol SR (TOPROL XL) 100 MG TABLET SR 24 HR 12/19/2017 Active   naproxen (ALEVE) 220 MG tablet >3 days Active   Omega-3 Fatty Acids (FISH OIL PO) 12/19/2017 Active   sertraline (ZOLOFT) 100 MG Tab 12/19/2017 Active   therapeutic multivitamin-minerals (THERAGRAN-M) TABS 12/19/2017 Active                PAST MEDICAL HISTORY   has a past medical history of Anxiety; Dental disorder; Generalized osteoarthritis of multiple sites (10/20/2015); Heart valve disease; Hyperlipidemia; Hypertension; and Osteoporosis.    SURGICAL HISTORY   has a past surgical history that includes gyn surgery (1973); breast biopsy (8/28/2014); abdominal hysterectomy total; and breast biopsy (Right, 8/14).    SOCIAL HISTORY  Social History     Social History  Main Topics   • Smoking status: Former Smoker     Packs/day: 0.50     Years: 25.00     Types: Cigarettes     Quit date: 1/1/2007   • Smokeless tobacco: Never Used   • Alcohol use No   • Drug use: No   • Sexual activity: Not Currently     Partners: Male       REVIEW OF SYSTEMS  See HPI for further details.  All other systems are negative except as above in HPI.    PHYSICAL EXAM  VITAL SIGNS: /64   Pulse 87   Temp 36.5 °C (97.7 °F)   Resp 16   Wt 91 kg (200 lb 9.9 oz)   SpO2 92%   BMI 31.42 kg/m²     General:  WDWN, nontoxic appearing in NAD; A+Ox3; V/S as above   Skin: warm and dry; good color; no rash  HEENT: NCAT; EOMs intact; PERRL; no scleral icterus   Neck: FROM; nontender  Cardiovascular: Regular heart rate and rhythm.  No murmurs, rubs, or gallops; pulses 2+ bilaterally radially and DP areas  Lungs: Clear to auscultation with good air movement bilaterally.  No wheezes, rhonchi, or rales.   Abdomen: BS present; soft; NTND; no rebound, guarding, or rigidity.  No organomegaly or pulsatile mass  Extremities: LIU x 3; left lower extremity is shortened and rotated; DP pulse 1+ bilaterally; bilateral lower legs are erythematous and warm  Neurologic: CNs III-XII grossly intact; speech clear; distal sensation intact; strength 5/5 UE/LEs  Psychiatric: Appropriate affect, normal mood    LABS  Results for orders placed or performed during the hospital encounter of 12/20/17   CBC WITH DIFFERENTIAL   Result Value Ref Range    WBC 7.8 4.8 - 10.8 K/uL    RBC 3.71 (L) 4.20 - 5.40 M/uL    Hemoglobin 12.3 12.0 - 16.0 g/dL    Hematocrit 38.1 37.0 - 47.0 %    .7 (H) 81.4 - 97.8 fL    MCH 33.2 (H) 27.0 - 33.0 pg    MCHC 32.3 (L) 33.6 - 35.0 g/dL    RDW 53.5 (H) 35.9 - 50.0 fL    Platelet Count 261 164 - 446 K/uL    MPV 9.9 9.0 - 12.9 fL    Neutrophils-Polys 72.20 (H) 44.00 - 72.00 %    Lymphocytes 12.70 (L) 22.00 - 41.00 %    Monocytes 13.80 (H) 0.00 - 13.40 %    Eosinophils 0.00 0.00 - 6.90 %    Basophils 0.90  0.00 - 1.80 %    Immature Granulocytes 0.40 0.00 - 0.90 %    Nucleated RBC 0.00 /100 WBC    Neutrophils (Absolute) 5.61 2.00 - 7.15 K/uL    Lymphs (Absolute) 0.99 (L) 1.00 - 4.80 K/uL    Monos (Absolute) 1.07 (H) 0.00 - 0.85 K/uL    Eos (Absolute) 0.00 0.00 - 0.51 K/uL    Baso (Absolute) 0.07 0.00 - 0.12 K/uL    Immature Granulocytes (abs) 0.03 0.00 - 0.11 K/uL    NRBC (Absolute) 0.00 K/uL   BASIC METABOLIC PANEL   Result Value Ref Range    Sodium 135 135 - 145 mmol/L    Potassium 4.7 3.6 - 5.5 mmol/L    Chloride 99 96 - 112 mmol/L    Co2 21 20 - 33 mmol/L    Glucose 120 (H) 65 - 99 mg/dL    Bun 12 8 - 22 mg/dL    Creatinine 0.67 0.50 - 1.40 mg/dL    Calcium 9.0 8.5 - 10.5 mg/dL    Anion Gap 15.0 (H) 0.0 - 11.9   PROTHROMBIN TIME   Result Value Ref Range    PT 13.8 12.0 - 14.6 sec    INR 1.09 0.87 - 1.13   ESTIMATED GFR   Result Value Ref Range    GFR If African American >60 >60 mL/min/1.73 m 2    GFR If Non African American >60 >60 mL/min/1.73 m 2         IMAGING  DX-FEMUR-2+ LEFT   Final Result            Acute comminuted, impacted, significantly displaced fracture of the intertrochanteric region left femur.      DX-PELVIS-1 OR 2 VIEWS   Final Result      Comminuted left proximal femoral fracture which involves the intertrochanteric area and extends into the femoral neck. There is impaction and angulation.      DX-CHEST-PORTABLE (1 VIEW)   Final Result      Cardiomegaly.          MEDICAL RECORD  I have reviewed patient's medical record and pertinent results are listed below.      COURSE & MEDICAL DECISION MAKING  I have reviewed any medical record information, laboratory studies and radiographic results as noted.    Nadege Mae is a 72 y.o. female who presents complaining of Left hip pain status post fall last evening at home.Patient is neurovascularly intact.    Patient denies other injuries including head trauma and neck pain.    X-ray of the left hip and chest x-ray were ordered along with morphine and  Zofran.      2:10 PM  Paging orthopedics  Paging hospitalist    2:13 PM  Dr. Delgado aware    2:16 PM  I discussed the case with Dr. Peters from orthopedics who will see the patient and take her to the OR later today if cleared by the hospitalist service.      FINAL IMPRESSION  1. Hip fracture, left, closed, initial encounter (CMS-[facility])    2. Fall, initial encounter    3. Bilateral lower leg cellulitis           Electronically signed by: Jackie Perez, 12/20/2017 11:23 AM

## 2017-12-20 NOTE — PROGRESS NOTES
Left intertrochanteric femur fracture.  Currently NPO.    Plan:  - To OR for L hip nail  - NPO  - Consult to follow  - Admit to medicine

## 2017-12-21 DIAGNOSIS — F41.9 ANXIETY: ICD-10-CM

## 2017-12-21 LAB
ANION GAP SERPL CALC-SCNC: 10 MMOL/L (ref 0–11.9)
BUN SERPL-MCNC: 13 MG/DL (ref 8–22)
CALCIUM SERPL-MCNC: 8.6 MG/DL (ref 8.5–10.5)
CHLORIDE SERPL-SCNC: 100 MMOL/L (ref 96–112)
CO2 SERPL-SCNC: 24 MMOL/L (ref 20–33)
CREAT SERPL-MCNC: 0.8 MG/DL (ref 0.5–1.4)
ERYTHROCYTE [DISTWIDTH] IN BLOOD BY AUTOMATED COUNT: 54.2 FL (ref 35.9–50)
GFR SERPL CREATININE-BSD FRML MDRD: >60 ML/MIN/1.73 M 2
GLUCOSE SERPL-MCNC: 166 MG/DL (ref 65–99)
HCT VFR BLD AUTO: 32.1 % (ref 37–47)
HGB BLD-MCNC: 10.3 G/DL (ref 12–16)
MCH RBC QN AUTO: 33.3 PG (ref 27–33)
MCHC RBC AUTO-ENTMCNC: 32.1 G/DL (ref 33.6–35)
MCV RBC AUTO: 103.9 FL (ref 81.4–97.8)
PLATELET # BLD AUTO: 186 K/UL (ref 164–446)
PMV BLD AUTO: 9.8 FL (ref 9–12.9)
POTASSIUM SERPL-SCNC: 4.6 MMOL/L (ref 3.6–5.5)
RBC # BLD AUTO: 3.09 M/UL (ref 4.2–5.4)
SODIUM SERPL-SCNC: 134 MMOL/L (ref 135–145)
WBC # BLD AUTO: 5.2 K/UL (ref 4.8–10.8)

## 2017-12-21 PROCEDURE — 99232 SBSQ HOSP IP/OBS MODERATE 35: CPT | Performed by: HOSPITALIST

## 2017-12-21 PROCEDURE — 97162 PT EVAL MOD COMPLEX 30 MIN: CPT

## 2017-12-21 PROCEDURE — 85027 COMPLETE CBC AUTOMATED: CPT

## 2017-12-21 PROCEDURE — 93971 EXTREMITY STUDY: CPT | Mod: 26 | Performed by: SURGERY

## 2017-12-21 PROCEDURE — 700105 HCHG RX REV CODE 258: Performed by: HOSPITALIST

## 2017-12-21 PROCEDURE — A9270 NON-COVERED ITEM OR SERVICE: HCPCS | Performed by: HOSPITALIST

## 2017-12-21 PROCEDURE — 93971 EXTREMITY STUDY: CPT

## 2017-12-21 PROCEDURE — 36415 COLL VENOUS BLD VENIPUNCTURE: CPT

## 2017-12-21 PROCEDURE — G8988 SELF CARE GOAL STATUS: HCPCS | Mod: CI

## 2017-12-21 PROCEDURE — 700111 HCHG RX REV CODE 636 W/ 250 OVERRIDE (IP): Performed by: ORTHOPAEDIC SURGERY

## 2017-12-21 PROCEDURE — 97166 OT EVAL MOD COMPLEX 45 MIN: CPT

## 2017-12-21 PROCEDURE — 770006 HCHG ROOM/CARE - MED/SURG/GYN SEMI*

## 2017-12-21 PROCEDURE — G8987 SELF CARE CURRENT STATUS: HCPCS | Mod: CK

## 2017-12-21 PROCEDURE — 97161 PT EVAL LOW COMPLEX 20 MIN: CPT

## 2017-12-21 PROCEDURE — G8978 MOBILITY CURRENT STATUS: HCPCS | Mod: CK

## 2017-12-21 PROCEDURE — G8979 MOBILITY GOAL STATUS: HCPCS | Mod: CI

## 2017-12-21 PROCEDURE — 94760 N-INVAS EAR/PLS OXIMETRY 1: CPT

## 2017-12-21 PROCEDURE — 700102 HCHG RX REV CODE 250 W/ 637 OVERRIDE(OP): Performed by: HOSPITALIST

## 2017-12-21 PROCEDURE — 700111 HCHG RX REV CODE 636 W/ 250 OVERRIDE (IP): Performed by: HOSPITALIST

## 2017-12-21 PROCEDURE — 80048 BASIC METABOLIC PNL TOTAL CA: CPT

## 2017-12-21 RX ORDER — SERTRALINE HYDROCHLORIDE 100 MG/1
100 TABLET, FILM COATED ORAL DAILY
Qty: 30 TAB | Refills: 5 | Status: SHIPPED | OUTPATIENT
Start: 2017-12-21 | End: 2019-03-08 | Stop reason: CLARIF

## 2017-12-21 RX ADMIN — HYDRALAZINE HYDROCHLORIDE 10 MG: 10 TABLET, FILM COATED ORAL at 21:21

## 2017-12-21 RX ADMIN — VITAMIN D, TAB 1000IU (100/BT) 2000 UNITS: 25 TAB at 07:52

## 2017-12-21 RX ADMIN — HYDRALAZINE HYDROCHLORIDE 10 MG: 10 TABLET, FILM COATED ORAL at 14:00

## 2017-12-21 RX ADMIN — METOPROLOL SUCCINATE 100 MG: 50 TABLET, EXTENDED RELEASE ORAL at 16:01

## 2017-12-21 RX ADMIN — ENOXAPARIN SODIUM 40 MG: 100 INJECTION SUBCUTANEOUS at 11:23

## 2017-12-21 RX ADMIN — CYANOCOBALAMIN TAB 500 MCG 1000 MCG: 500 TAB at 07:52

## 2017-12-21 RX ADMIN — VANCOMYCIN HYDROCHLORIDE 1400 MG: 100 INJECTION, POWDER, LYOPHILIZED, FOR SOLUTION INTRAVENOUS at 17:48

## 2017-12-21 RX ADMIN — HYDRALAZINE HYDROCHLORIDE 10 MG: 10 TABLET, FILM COATED ORAL at 06:20

## 2017-12-21 RX ADMIN — AMPICILLIN SODIUM AND SULBACTAM SODIUM: 100; 50 INJECTION, POWDER, FOR SOLUTION INTRAVENOUS at 16:03

## 2017-12-21 RX ADMIN — SODIUM CHLORIDE: 9 INJECTION, SOLUTION INTRAVENOUS at 11:24

## 2017-12-21 RX ADMIN — SERTRALINE 100 MG: 100 TABLET, FILM COATED ORAL at 07:52

## 2017-12-21 RX ADMIN — DEXAMETHASONE SODIUM PHOSPHATE 10 MG: 4 INJECTION, SOLUTION INTRAMUSCULAR; INTRAVENOUS at 06:20

## 2017-12-21 ASSESSMENT — COGNITIVE AND FUNCTIONAL STATUS - GENERAL
MOVING FROM LYING ON BACK TO SITTING ON SIDE OF FLAT BED: UNABLE
TOILETING: A LITTLE
MOVING TO AND FROM BED TO CHAIR: UNABLE
DRESSING REGULAR UPPER BODY CLOTHING: A LITTLE
WALKING IN HOSPITAL ROOM: A LITTLE
SUGGESTED CMS G CODE MODIFIER DAILY ACTIVITY: CK
TURNING FROM BACK TO SIDE WHILE IN FLAT BAD: UNABLE
EATING MEALS: A LITTLE
HELP NEEDED FOR BATHING: A LOT
MOBILITY SCORE: 11
DAILY ACTIVITIY SCORE: 16
PERSONAL GROOMING: A LITTLE
STANDING UP FROM CHAIR USING ARMS: A LITTLE
CLIMB 3 TO 5 STEPS WITH RAILING: A LOT
DRESSING REGULAR LOWER BODY CLOTHING: A LOT
SUGGESTED CMS G CODE MODIFIER MOBILITY: CL

## 2017-12-21 ASSESSMENT — COPD QUESTIONNAIRES
HAVE YOU SMOKED AT LEAST 100 CIGARETTES IN YOUR ENTIRE LIFE: NO/DON'T KNOW
COPD SCREENING SCORE: 2
DO YOU EVER COUGH UP ANY MUCUS OR PHLEGM?: NO/ONLY WITH OCCASIONAL COLDS OR INFECTIONS
DURING THE PAST 4 WEEKS HOW MUCH DID YOU FEEL SHORT OF BREATH: NONE/LITTLE OF THE TIME

## 2017-12-21 ASSESSMENT — GAIT ASSESSMENTS
DISTANCE (FEET): 17
GAIT LEVEL OF ASSIST: CONTACT GUARD ASSIST
DEVIATION: ANTALGIC;DECREASED HEEL STRIKE;DECREASED TOE OFF
ASSISTIVE DEVICE: FRONT WHEEL WALKER

## 2017-12-21 ASSESSMENT — ENCOUNTER SYMPTOMS
RESPIRATORY NEGATIVE: 1
NEUROLOGICAL NEGATIVE: 1
GASTROINTESTINAL NEGATIVE: 1
EYES NEGATIVE: 1
MUSCULOSKELETAL NEGATIVE: 1

## 2017-12-21 ASSESSMENT — PAIN SCALES - GENERAL
PAINLEVEL_OUTOF10: 4
PAINLEVEL_OUTOF10: 6

## 2017-12-21 ASSESSMENT — PATIENT HEALTH QUESTIONNAIRE - PHQ9
SUM OF ALL RESPONSES TO PHQ9 QUESTIONS 1 AND 2: 0
1. LITTLE INTEREST OR PLEASURE IN DOING THINGS: NOT AT ALL
2. FEELING DOWN, DEPRESSED, IRRITABLE, OR HOPELESS: NOT AT ALL
SUM OF ALL RESPONSES TO PHQ QUESTIONS 1-9: 0

## 2017-12-21 ASSESSMENT — ACTIVITIES OF DAILY LIVING (ADL): TOILETING: INDEPENDENT

## 2017-12-21 ASSESSMENT — LIFESTYLE VARIABLES: EVER_SMOKED: NEVER

## 2017-12-21 NOTE — PROGRESS NOTES
[facility] Hospitalist Progress Note    Date of Service: 2017    Chief Complaint  72 y.o. female admitted 2017 with hip fracture    Interval Problem Update  Pod #1 hip nailing    Denies pain to me    Afebrile    She has chronic leg edema in both legs    Left leg is swollen, red, warm      Consultants/Specialty  ortho    Disposition  pending        Review of Systems   Constitutional: Positive for malaise/fatigue.   Eyes: Negative.    Respiratory: Negative.    Cardiovascular: Positive for leg swelling.   Gastrointestinal: Negative.    Genitourinary: Negative.    Musculoskeletal: Negative.    Neurological: Negative.    All other systems reviewed and are negative.     Physical Exam  Laboratory/Imaging   Hemodynamics  Temp (24hrs), Av.7 °C (98 °F), Min:36 °C (96.8 °F), Max:37.3 °C (99.2 °F)   Temperature: 36 °C (96.8 °F)  Pulse  Av.3  Min: 73  Max: 101 Heart Rate (Monitored): 82  Blood Pressure : 145/63, NIBP: 117/80      Respiratory      Respiration: 17, Pulse Oximetry: 99 %             Fluids    Intake/Output Summary (Last 24 hours) at 17 1401  Last data filed at 17 2300   Gross per 24 hour   Intake              300 ml   Output              100 ml   Net              200 ml       Nutrition  Orders Placed This Encounter   Procedures   • DIET ORDER     Standing Status:   Standing     Number of Occurrences:   1     Order Specific Question:   Diet:     Answer:   Regular [1]     Physical Exam   Constitutional: No distress.   Neck: No JVD present. No tracheal deviation present. No thyromegaly present.   Cardiovascular: Normal rate.  Exam reveals no gallop and no friction rub.    No murmur heard.  Pulmonary/Chest: Effort normal and breath sounds normal. No respiratory distress. She has no wheezes.   Abdominal: Soft. She exhibits distension. There is no tenderness. There is no rebound and no guarding.   Musculoskeletal: She exhibits edema.   rle chronic venous stasis changes    lle is warm, red+ 2  pitting edema   Neurological: She is alert. No cranial nerve deficit.   Skin: She is not diaphoretic.       Recent Labs      12/20/17   1056  12/21/17   0209   WBC  7.8  5.2   RBC  3.71*  3.09*   HEMOGLOBIN  12.3  10.3*   HEMATOCRIT  38.1  32.1*   MCV  102.7*  103.9*   MCH  33.2*  33.3*   MCHC  32.3*  32.1*   RDW  53.5*  54.2*   PLATELETCT  261  186   MPV  9.9  9.8     Recent Labs      12/20/17   1056  12/21/17   0209   SODIUM  135  134*   POTASSIUM  4.7  4.6   CHLORIDE  99  100   CO2  21  24   GLUCOSE  120*  166*   BUN  12  13   CREATININE  0.67  0.80   CALCIUM  9.0  8.6     Recent Labs      12/20/17   1056   INR  1.09                  Assessment/Plan     * Hip fracture (CMS-[facility])   Assessment & Plan    Acute Left comminuted , displaced intertrochanteric fracture.       Pod #1 hip nailing          Cellulitis- (present on admission)   Assessment & Plan        on IV vancomycin, Unasyn,   \  Check doppler of left leg        Essential hypertension- (present on admission)   Assessment & Plan    Controlled.          B12 deficiency   Assessment & Plan    To be continued on B12 supplements        Anxiety- (present on admission)   Assessment & Plan    With history of depression.  zoloft            Crowley catheter::  No Crowley  DVT prophylaxis pharmacological::  Enoxaparin (Lovenox)      Pt and ot    Dc planning

## 2017-12-21 NOTE — THERAPY
"Physical Therapy Evaluation completed.   Bed Mobility:  Supine to Sit: Moderate Assist  Transfers: Sit to Stand: Minimal Assist  Gait: Level Of Assist: Contact Guard Assist with Front-Wheel Walker       Plan of Care: Will benefit from Physical Therapy 5 times per week  Discharge Recommendations: Equipment: No Equipment Needed. Post-acute therapy Discharge to a transitional care facility for continued skilled therapy services or Discharge to home with outpatient or home health for additional skilled therapy services.    Ms. Mae is a 73 y/o female who presents to acute s/p F with L intertrochanteric femur fracture. She is s/p intramedullary nailing and is WBAT. She presents with lower extremity weakness, impaired dynamic balance, and gross motor coordination deficits. These impairments negatively impact her ability to perform gait, transfers, bed mobility, and stairs at her prior level of function and prevent her safe discharge home. Cues for proper gait pattern and FWW use as pt tends to leave L LE behind. Pt very motivated to participate with therapies and discharge home. At this point stairs will be her largest barrier. Based on today's eval recommend post acute rehabiliation prior to discharge home, however pt may improve to a functional level adquate for home health physical therapy services to manage. She would benefit from physical therapy services during acute stay to improve her mobility and decrease risk for falls.     See \"Rehab Therapy-Acute\" Patient Summary Report for complete documentation.     "

## 2017-12-21 NOTE — PROGRESS NOTES
"   Orthopaedic PA Progress Note    Interval changes:D/W Rosemary Ferguson and Bryan, pt feels well POD 1    ROS - Patient denies any new issues. No chest pain, dyspnea, or fever.  Pain well controlled.    Blood pressure 116/57, pulse 86, temperature 36.3 °C (97.4 °F), resp. rate 17, height 1.702 m (5' 7.01\"), weight 91 kg (200 lb 9.9 oz), SpO2 96 %.    Patient seen and examined  No acute distress  Breathing non labored  RRR  Small resting tremor  Stasis dermatitis Bilat ankles, R worse than L  L hip surgical dressings clean, dry, and intact. Patient clearly fires tibialis anterior, EHL, and gastrocnemius/soleus. Sensation is intact to light touch throughout superficial peroneal, deep peroneal, tibial, saphenous, and sural nerve distributions. Strong and palpable 2+ dorsalis pedis and posterior tibial pulses with capillary refill less than 2 seconds. No lower leg tenderness or discomfort.    Recent Labs      12/20/17   1056  12/21/17   0209   WBC  7.8  5.2   RBC  3.71*  3.09*   HEMOGLOBIN  12.3  10.3*   HEMATOCRIT  38.1  32.1*   MCV  102.7*  103.9*   MCH  33.2*  33.3*   MCHC  32.3*  32.1*   RDW  53.5*  54.2*   PLATELETCT  261  186   MPV  9.9  9.8     Active Hospital Problems    Diagnosis   • Hip fracture (CMS-[facility]) [S72.009A]     Priority: High   • Cellulitis [L03.90]     Priority: High   • Essential hypertension [I10]     Priority: Medium   • B12 deficiency [E53.8]     Priority: Low   • Anxiety [F41.9]     Priority: Low     Assessment/Plan:  POD#1 S/P L hip nail  Soft bone noted at surgery - Vitamin D and Ca++ labs ordered for tomorrow AM draw  Wt bearing status - AT  PT/OT-initiated  Wound care:Med v ID consult for bilat stasis dermatitis  Drains - none  Crowley-no  Sutures/Staples out- 10-14 days post operatively  Antibiotics: Vanc/Unasyn  DVT Prophylaxis- TEDS/SCDs/Foot pumps.   Lovenox: Start 40 mgnot anticipated  Case Coordination for Discharge Planning - Disposition home vs SNF      "

## 2017-12-21 NOTE — DISCHARGE PLANNING
Care Transition Team Assessment    IHD met with pt at bedside. Pt currently lives at home with a roommate, but states that she will have her son provide transportation upon d/c. Pt does not have home O2 or HHC at this time. She states that she has a walker available at home if needed.     Information Source  Orientation : Oriented x 4  Information Given By: Patient  Informant's Name: Nadege  Who is responsible for making decisions for patient? : Patient         Elopement Risk  Legal Hold: No  Ambulatory or Self Mobile in Wheelchair: No-Not an Elopement Risk  Elopement Risk: Not at Risk for Elopement    Interdisciplinary Discharge Planning  Does Admitting Nurse Feel This Could be a Complex Discharge?: No  Lives with - Patient's Self Care Capacity: Spouse  Support Systems: Children, Friends / Neighbors  Do You Take your Prescribed Medications Regularly: Yes  Able to Return to Previous ADL's: Future Time w/Therapy  Mobility Issues: Yes  Assistance Needed: Unknown at this Time    Discharge Preparedness  What is your plan after discharge?: Uncertain - pending medical team collaboration  What are your discharge supports?: Child, Other (comment) (friend/roommate)  Prior Functional Level: Ambulatory, Drives Self, Independent with Activities of Daily Living, Independent with Medication Management  Difficulity with ADLs: None  Difficulity with IADLs: None    Functional Assesment  Prior Functional Level: Ambulatory, Drives Self, Independent with Activities of Daily Living, Independent with Medication Management    Finances  Financial Barriers to Discharge: No  Prescription Coverage: Yes ([facility] [address])    Vision / Hearing Impairment  Vision Impairment : No  Hearing Impairment : No    Values / Beliefs / Concerns  Values / Beliefs Concerns : No         Domestic Abuse  Have you ever been the victim of abuse or violence?: No  Physical Abuse or Sexual Abuse: No  Verbal Abuse or Emotional Abuse: No  Possible Abuse Reported to:: Not  Applicable    Psychological Assessment  History of Substance Abuse: None  History of Psychiatric Problems: No  Non-compliant with Treatment: No    Discharge Risks or Barriers  Discharge risks or barriers?: No    Anticipated Discharge Information  Anticipated discharge disposition: Discharge needs currently unknown  Discharge Address: unknown at this time  Discharge Contact Phone Number: [phone]

## 2017-12-21 NOTE — CONSULTS
12/20/2017    Nadege Mae is a 72 y.o. female who presents with a left displaced femoral neck fracture due to fall down a few stairs today.  The patient noted immediate pain and inability to move the affected extremity due to pain.  They were evaluated in the ER, and Orthopedics was consulted. Patient denies numbness, paresthesias, loss of consciousness or other symptoms.  She denies antecedent pain or gait aids.  She does have ongoing problems with cellulitis, for which she has been treated with antibiotics.    Past Medical History:   Diagnosis Date   • Anxiety    • Dental disorder     full dentures   • Generalized osteoarthritis of multiple sites 10/20/2015   • Heart valve disease     pt not sure    • Hyperlipidemia    • Hypertension    • Osteoporosis        Past Surgical History:   Procedure Laterality Date   • BREAST BIOPSY  8/28/2014    Performed by Magdalena Londono M.D. at SURGERY [facility]   • BREAST BIOPSY Right 8/14    benign   • GYN SURGERY  1973    complete hysterectomy   • ABDOMINAL HYSTERECTOMY TOTAL      w/BSO due to uterine cyst and endometriosis       Medications  No current facility-administered medications on file prior to encounter.      Current Outpatient Prescriptions on File Prior to Encounter   Medication Sig Dispense Refill   • cyanocobalamin (VITAMIN B12) 1000 MCG Tab Take 1 Tab by mouth every day. 30 Tab 3   • hydrALAZINE (APRESOLINE) 10 MG Tab Take 1 Tab by mouth every 8 hours. 90 Tab 0   • naproxen (ALEVE) 220 MG tablet Take 220 mg by mouth 2 times a day, with meals.     • sertraline (ZOLOFT) 100 MG Tab Take 1 Tab by mouth every day. 30 Tab 3   • metoprolol SR (TOPROL XL) 100 MG TABLET SR 24 HR Take 1 Tab by mouth every day. 30 Tab 11   • Cholecalciferol (VITAMIN D) 2000 UNIT Tab Take 2,000 Units by mouth every day.     • Omega-3 Fatty Acids (FISH OIL PO) Take 1 Tab by mouth every day.     • therapeutic multivitamin-minerals (THERAGRAN-M) TABS Take 1 Tab by mouth every day.    "      Allergies  Patient has no known allergies.    ROS  Per HPI. 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 rate 18, height 1.702 m (5' 7\"), weight 91 kg (200 lb 9.9 oz), SpO2 94 %.  General: Well Developed, Well Nourished, no acute distress  Psychiatric: Alert and oriented x3, appropriate responses to questions, pleasant mood and affect.  HEENT: Normocephalic, atraumatic  Eyes: Anicteric, PERRLA, EOMI  Neck: Supple, nontender, no masses  Chest: Symmetric expansion of the chest wall, non-tender to palpation, no distress.  Heart: RRR, palpable peripheral pulses  Abdomen: Soft, NT, ND  Skin: Intact, no open wounds  Extremities: Pain with movement of left leg  Neuro: Intact sensation in left foot, intact motors TA/GS/EHL/P  Vascular: Palpable DP on left, Capillary refill <2 seconds    Radiographs:  DX-FEMUR-2+ LEFT   Final Result            Acute comminuted, impacted, significantly displaced fracture of the intertrochanteric region left femur.      DX-PELVIS-1 OR 2 VIEWS   Final Result      Comminuted left proximal femoral fracture which " involves the intertrochanteric area and extends into the femoral neck. There is impaction and angulation.      DX-CHEST-PORTABLE (1 VIEW)   Final Result      Cardiomegaly.      DX-PORTABLE FLUORO > 1 HOUR    (Results Pending)   DX-HIP-COMPLETE - UNILATERAL 2+ LEFT    (Results Pending)       Laboratory Values  Recent Labs      12/20/17   1056   WBC  7.8   RBC  3.71*   HEMOGLOBIN  12.3   HEMATOCRIT  38.1   MCV  102.7*   MCH  33.2*   MCHC  32.3*   RDW  53.5*   PLATELETCT  261   MPV  9.9     Recent Labs      12/20/17   1056   SODIUM  135   POTASSIUM  4.7   CHLORIDE  99   CO2  21   GLUCOSE  120*   BUN  12     Recent Labs      12/20/17   1056   INR  1.09         Impression:    #1 Left intertrochanteric femur fracture    Plan:    I recommended operative treatment of her fracture by cephalomedullary nail. Risks and benefits of surgery were discussed which include, but are not limited to bleeding, infection, neurovascular damage, malunion, nonunion, symptomatic hardware, DVT, PE, MI, Stroke and death.  Benefits of surgery discussed included improved chance of union in acceptable alignment and reduction in the medical risks of hip fractures.  We also discussed therapeutic alternatives to surgery, including non-operative management, which I did not recommend.      They understand these risks and benefits and wish to proceed.      Please keep NPO pending surgery.  Non-weightbearing affected extremity pending surgery.    Please see operative note for detailed post-operative plan, including post-op weightbearing status.

## 2017-12-21 NOTE — PROGRESS NOTES
Seen & examined.  Doing well.  Pain controlled    LLE:  Dressing c/d/i  Improved appearance of cellulitis distal to knee  F/e ankle, toes  Foot w/w/p    Hct 32.1    POD#1 s/p L hip nailing    - WBAT, OOB today  - PT/OT  - Lovenox  - Continue IV antibiotics for cellulitis, recommend ID consult given new hip nail  - Check vitamin D level, replete with 50,000 IU qweekly x6 weeks if <30   - Dispo plan

## 2017-12-21 NOTE — PROGRESS NOTES
"Pharmacy Kinetics 72 y.o. female on vancomycin day # 2 2017    Currently Dose: Vancomycin 1400 mg iv q24hr (~15 mg/kg)  Received Load Dose: Yes    Indication for Treatment: SSTI/cellulitis  ID Service Following: No    Pertinent history per medical record: Admitted on 2017 for hip fracture. Noted history of cellulitis prior (noted since November). Edema improved but with erythema now. Admitted to orthopedics.    Other antibiotics: ampicillin/sulbactam 12 gm CIVI    Allergies: Patient has no known allergies.     List concerns for accumulation of vancomycin: age ~72, electrolyte derangement, immobility, BMI ~31    Pertinent cultures to date:   Results     Procedure Component Value Units Date/Time    CULTURE WOUND W/ GRAM STAIN [[id number]]     Order Status:  Sent Specimen:  Wound from Right Leg         Recent Labs      17   1056  17   0209   WBC  7.8  5.2   NEUTSPOLYS  72.20*   --      Recent Labs      17   1056  17   0209   BUN  12  13   CREATININE  0.67  0.80     Intake/Output Summary (Last 24 hours) at 17 0955  Last data filed at 17 2300   Gross per 24 hour   Intake              300 ml   Output              100 ml   Net              200 ml      Blood pressure 116/57, pulse 86, temperature 36.3 °C (97.4 °F), resp. rate 17, height 1.702 m (5' 7.01\"), weight 91 kg (200 lb 9.9 oz), SpO2 96 %. Temp (24hrs), Av.7 °C (98.1 °F), Min:36.3 °C (97.4 °F), Max:37.3 °C (99.2 °F)    Estimated Creatinine Clearance: 71.5 mL/min (by C-G formula based on SCr of 0.8 mg/dL).    A/P   1. Vancomycin dose change: not indicated   2. Next vancomycin level: 17 @1830  3. Goal trough: 12-16 mcg/mL  4. Comments: VS sable. Afebrile. WBC stable. CrCl ~72 mL/min and inaccurate due to age/body habitus/immobility. Repeat BMP 17 given increase in SCr on admission. Microbiology pending. Factors for accumulation noted. Given factors trough placed 17 to ensure continued clearance. " Pharmacy will follow and continue to adjust as appropriate.    Sincere Boudreaux, PharmD

## 2017-12-21 NOTE — TELEPHONE ENCOUNTER
Received refill request for sertraline.  Pt was seen in clinic recently and is taking this medication for anxiety. Refill sent to pharmacy    Jorge Amador M.D.

## 2017-12-21 NOTE — THERAPY
"Occupational Therapy Evaluation completed.   Functional Status:  Pt seen today for OT evaluation. Pt very pleasant, cooperative, and motivated. Pt currently requiring CGA-Ravi for sit<>stand with FWW. CGA toilet transfers with raised seat/arms. Pt needing modA to don underwear and socks. Pt ambulates with CGA and FWW. Pt very motivated to return home, but limited by weakness, fatigue, and impaired balance which impacts independence in ADLs and functional mobility.  Plan of Care: Will benefit from Occupational Therapy 5 times per week  Discharge Recommendations:  Equipment: Will Continue to Assess for Equipment Needs. Post-acute therapy undetermined, depends on progress made in next 24-48 hrs.    See \"Rehab Therapy-Acute\" Patient Summary Report for complete documentation.    "

## 2017-12-21 NOTE — PROGRESS NOTES
"S:  Seen and examined.  POD #1 s/p L hip nail.  Doing well this morning.  No new complaints, pain controlled.    O: Blood pressure 116/57, pulse 86, temperature 36.3 °C (97.4 °F), resp. rate 17, height 1.702 m (5' 7.01\"), weight 91 kg (200 lb 9.9 oz), SpO2 96 %..    Intake/Output Summary (Last 24 hours) at 12/21/17 0936  Last data filed at 12/20/17 2300   Gross per 24 hour   Intake              300 ml   Output              100 ml   Net              200 ml   .    Operative/injured extremity examined.  Compartments soft, distal light touch sensation intact, firing EHL/TA/GS/P.  Toes warm, well-perfused.  Wound dressing c/d/i.  Cellulitis improved today    Recent Labs      12/20/17   1056  12/21/17   0209   WBC  7.8  5.2   RBC  3.71*  3.09*   HEMOGLOBIN  12.3  10.3*   HEMATOCRIT  38.1  32.1*   MCV  102.7*  103.9*   MCH  33.2*  33.3*   MCHC  32.3*  32.1*   RDW  53.5*  54.2*   PLATELETCT  261  186   MPV  9.9  9.8       A/P:    POD #1 s/p L hip nail    Antibiotics: Continue prior ABX, ID consult  Activity: WBAT operative extremity.  PT today.  Diet: General  DVT: Mechanical (SCDs) + Pharmacologic (Lovenox)  Dispo: D/C planning    "

## 2017-12-21 NOTE — OP REPORT
DATE OF OPERATION: 12/20/2017     PREOPERATIVE DIAGNOSIS: left intertrochanteric femur fracture    POSTOPERATIVE DIAGNOSIS: Same    PROCEDURE PERFORMED: Surgical treatment of left intertrochanteric femur fracture with intramedullary device    SURGEON: Jorge Peters M.D.     ANESTHESIOLOGIST: Dr. Chin    ANESTHESIA: General    ASA CLASSIFICATION: III    INDICATIONS: The patient is a 72 y.o. female with a left intertrochanteric femur fracture resulting from a ground level fall.  The patient denies antecedent pain, and was found to have a normal neurovascular exam and skin envelope.  Radiographs demonstrated the intertrochanteric femur fracture.  Given these findings, the patient is an appropriately indicated candidate for surgical treatment of the intertrochanteric fracture with an intramedullary device.  I discussed the risks and benefits of the procedure, including the risks of infection, wound healing complication, neurovascular injury, persistent hip pain, malunion, non-union, malrotation, and the medical risks of anesthesia including MI, stroke, and death.  Benefits include early mobilization, improved chance of union, and reduction in the medical risks of hip fractures.  Alternatives to surgery were also discussed, including non-operative management, which I did not recommend.  The patient was in agreement with the plan to proceed, and the informed consent was signed and documented.  I met with the patient pre-operatively and marked the operative extremity with their agreement.  We proceeded to the operating room.   She understands her cellulitic changes in her leg put her at higher risk for infection.    WOUND CLASSIFICATION: Class I    SPECIMEN: None    ESTIMATED BLOOD LOSS: 75 mL    PROCEDURE:  The patient underwent anesthesia, and was positioned supine on the fracture table with all bony prominences were well padded.  Sequential compression devices were employed.  Preoperative imaging was obtained,  demonstrated partial reduction of the fracture using the table. The correct operative site was prepped and draped into a sterile field, and the surgical team scrubbed in.  A procedural pause was conducted to verify correct patient, correct extremity, presence of the surgeons initials on the operative extremity, and administration of IV antibiotics, in this case, Ancef.    A lateral thigh incision was localized and made, and we dissected through the fascia sharply.  We then reduced the fracture with the Synthes colinear reduction clamp.  The starting guide pin for the Smith and Nephew hip nail was advanced down to a start point on the greater trochanter, and its position was confirmed on fluoroscopy.  This was advanced into the femur on power.  An incision was made around the pin, and the entry reamer was then used to gain access to the femoral canal.  The guide pin was then removed.    A long ball-tip guide wire was advanced down the femur to the knee, its position confirmed on fluoroscopy.  We then measured for, and selected a short 125 degree x 11.5 mm Intertan hip nail.  We then sequentially reamed to a size 13 mm reamer, and advanced the nail over the guide pin to an appropriate depth, confirmed by fluoroscopy.    The guide for the lag screw was then advanced down to bone through a lateral thigh incision.  The starting guide pin was advanced to a central position within the femoral neck/head, confirmed by fluoroscopy.  We then drilled for and placed the anti-rotation device.  We measured our lag screw, and reamed for our lag screw.  We then placed the lag screw by hand.  The anti-rotation device was removed, and the compression screw placed.  The fracture was compressed, and the set screw was locked proximally.  All instruments were removed from the proximal femur, and final fluoroscopic images obtained.    We then placed one statically interlocked distal screw through the guide, obtaining good bicortical  purchase.    We then irrigated all wounds with normal saline, and closed in layers, with 2-0 Vicryl in the subcutaneous tissue, and staples in the skin.  Sterile dressings were applied.       The patient tolerated the procedure well. There were no apparent complications. All sponge, needle, and instrument counts were correct on two separate occasions. She was awakened, extubated, and transferred to the recovery room in satisfactory condition.     Post-Operative Plan:    1.  The patient should bear weight as tolerated on their operative extremity.  Gait aids (crutch or crutches, cane, walker) may be used as needed, and may be discontinued when no longer required.  2.  IV antibiotics - may be continued for 24 hours, but are not required.  3.  DVT prophylaxis - SCD's and Lovenox 40 mg SQ daily while inpatient.  The patient may transition to Aspirin 325 mg PO BID as an outpatient  4.  Discharge planning  ____________________________________   Jorge Peters M.D.   DD: 12/20/2017  6:16 PM

## 2017-12-21 NOTE — PROGRESS NOTES
"Pharmacy Kinetics 72 y.o. female on vancomycin day #1 2017    Received vancomycin loading dose.    Indication for Treatment: Cellulitis     Pertinent history per medical record: Admitted on 2017. hx of lower extremity cellulitis, presented with left hip pain after fall.  She has been battling bilateral lower extremity cellulitis, right side greater then left over the past 6 weeks and reportedly had been on antibiotics since early November.  Her leg swelling has improved but has persistent redness and warmth with excoriated skin with formation of calf, shin region wound.  Has had difficulty moving her left leg due to severe pain. Bilateral lower extremity right shin to posterior calf necrotic wound.     Other antibiotics: Unasyn    Allergies: Patient has no known allergies.     List concerns for renal function: age, recent surgery, obesity     Pertinent cultures to date:   17 wound cultures - needs to be collected    Recent Labs      17   1056   WBC  7.8   NEUTSPOLYS  72.20*     Recent Labs      17   1056   BUN  12   CREATININE  0.67       Intake/Output Summary (Last 24 hours) at 17 2316  Last data filed at 17 2058   Gross per 24 hour   Intake              200 ml   Output                0 ml   Net              200 ml      Blood pressure 111/64, pulse 77, temperature 36.6 °C (97.8 °F), resp. rate 18, height 1.702 m (5' 7\"), weight 91 kg (200 lb 9.9 oz), SpO2 97 %. Temp (24hrs), Av.8 °C (98.3 °F), Min:36.5 °C (97.7 °F), Max:37.3 °C (99.2 °F)      A/P   1. Vancomycin dose change: Start vancomycin 15mg/kg q24 hr (1400mg)  2. Next vancomycin level: Prior to the 3rd or 4th dose if remains on vancomycin  3. Goal trough: 12-16 mcg/ml  4. Comments: Renal appears baseline. Dosing as above per protocol. At risk of accumulation. Consider Doxycyline. Pharmacy will follow.    Kolby Solo, TraceeD, BCPS    "

## 2017-12-22 LAB
25(OH)D3 SERPL-MCNC: 41 NG/ML (ref 30–100)
ALBUMIN SERPL BCP-MCNC: 2.8 G/DL (ref 3.2–4.9)
ALBUMIN/GLOB SERPL: 0.8 G/DL
ALP SERPL-CCNC: 52 U/L (ref 30–99)
ALT SERPL-CCNC: 23 U/L (ref 2–50)
ANION GAP SERPL CALC-SCNC: 6 MMOL/L (ref 0–11.9)
AST SERPL-CCNC: 32 U/L (ref 12–45)
BASOPHILS # BLD AUTO: 0.2 % (ref 0–1.8)
BASOPHILS # BLD: 0.01 K/UL (ref 0–0.12)
BILIRUB SERPL-MCNC: 0.7 MG/DL (ref 0.1–1.5)
BUN SERPL-MCNC: 17 MG/DL (ref 8–22)
CALCIUM SERPL-MCNC: 8.4 MG/DL (ref 8.5–10.5)
CHLORIDE SERPL-SCNC: 102 MMOL/L (ref 96–112)
CO2 SERPL-SCNC: 28 MMOL/L (ref 20–33)
CREAT SERPL-MCNC: 0.71 MG/DL (ref 0.5–1.4)
EOSINOPHIL # BLD AUTO: 0 K/UL (ref 0–0.51)
EOSINOPHIL NFR BLD: 0 % (ref 0–6.9)
ERYTHROCYTE [DISTWIDTH] IN BLOOD BY AUTOMATED COUNT: 53.7 FL (ref 35.9–50)
GFR SERPL CREATININE-BSD FRML MDRD: >60 ML/MIN/1.73 M 2
GLOBULIN SER CALC-MCNC: 3.3 G/DL (ref 1.9–3.5)
GLUCOSE SERPL-MCNC: 167 MG/DL (ref 65–99)
HCT VFR BLD AUTO: 30.6 % (ref 37–47)
HGB BLD-MCNC: 9.9 G/DL (ref 12–16)
IMM GRANULOCYTES # BLD AUTO: 0.04 K/UL (ref 0–0.11)
IMM GRANULOCYTES NFR BLD AUTO: 0.7 % (ref 0–0.9)
LYMPHOCYTES # BLD AUTO: 0.89 K/UL (ref 1–4.8)
LYMPHOCYTES NFR BLD: 14.8 % (ref 22–41)
MCH RBC QN AUTO: 33.8 PG (ref 27–33)
MCHC RBC AUTO-ENTMCNC: 32.4 G/DL (ref 33.6–35)
MCV RBC AUTO: 104.4 FL (ref 81.4–97.8)
MONOCYTES # BLD AUTO: 1.05 K/UL (ref 0–0.85)
MONOCYTES NFR BLD AUTO: 17.5 % (ref 0–13.4)
NEUTROPHILS # BLD AUTO: 4.02 K/UL (ref 2–7.15)
NEUTROPHILS NFR BLD: 66.8 % (ref 44–72)
NRBC # BLD AUTO: 0 K/UL
NRBC BLD-RTO: 0 /100 WBC
PLATELET # BLD AUTO: 165 K/UL (ref 164–446)
PMV BLD AUTO: 9.9 FL (ref 9–12.9)
POTASSIUM SERPL-SCNC: 4.3 MMOL/L (ref 3.6–5.5)
PROT SERPL-MCNC: 6.1 G/DL (ref 6–8.2)
RBC # BLD AUTO: 2.93 M/UL (ref 4.2–5.4)
SODIUM SERPL-SCNC: 136 MMOL/L (ref 135–145)
VANCOMYCIN TROUGH SERPL-MCNC: 10.6 UG/ML (ref 10–20)
WBC # BLD AUTO: 6 K/UL (ref 4.8–10.8)

## 2017-12-22 PROCEDURE — 80053 COMPREHEN METABOLIC PANEL: CPT

## 2017-12-22 PROCEDURE — 700111 HCHG RX REV CODE 636 W/ 250 OVERRIDE (IP): Performed by: HOSPITALIST

## 2017-12-22 PROCEDURE — 700105 HCHG RX REV CODE 258: Performed by: HOSPITALIST

## 2017-12-22 PROCEDURE — 97110 THERAPEUTIC EXERCISES: CPT

## 2017-12-22 PROCEDURE — 700102 HCHG RX REV CODE 250 W/ 637 OVERRIDE(OP): Performed by: PHYSICIAN ASSISTANT

## 2017-12-22 PROCEDURE — 36415 COLL VENOUS BLD VENIPUNCTURE: CPT

## 2017-12-22 PROCEDURE — 700111 HCHG RX REV CODE 636 W/ 250 OVERRIDE (IP): Performed by: ORTHOPAEDIC SURGERY

## 2017-12-22 PROCEDURE — A9270 NON-COVERED ITEM OR SERVICE: HCPCS | Performed by: PHYSICIAN ASSISTANT

## 2017-12-22 PROCEDURE — 770006 HCHG ROOM/CARE - MED/SURG/GYN SEMI*

## 2017-12-22 PROCEDURE — 80202 ASSAY OF VANCOMYCIN: CPT

## 2017-12-22 PROCEDURE — A9270 NON-COVERED ITEM OR SERVICE: HCPCS | Performed by: HOSPITALIST

## 2017-12-22 PROCEDURE — 700102 HCHG RX REV CODE 250 W/ 637 OVERRIDE(OP): Performed by: HOSPITALIST

## 2017-12-22 PROCEDURE — 82306 VITAMIN D 25 HYDROXY: CPT

## 2017-12-22 PROCEDURE — 85025 COMPLETE CBC W/AUTO DIFF WBC: CPT

## 2017-12-22 PROCEDURE — 97116 GAIT TRAINING THERAPY: CPT

## 2017-12-22 PROCEDURE — 97535 SELF CARE MNGMENT TRAINING: CPT

## 2017-12-22 PROCEDURE — 97530 THERAPEUTIC ACTIVITIES: CPT

## 2017-12-22 PROCEDURE — 99233 SBSQ HOSP IP/OBS HIGH 50: CPT | Performed by: HOSPITALIST

## 2017-12-22 RX ORDER — ERGOCALCIFEROL 1.25 MG/1
50000 CAPSULE ORAL
Status: DISCONTINUED | OUTPATIENT
Start: 2017-12-22 | End: 2017-12-27 | Stop reason: HOSPADM

## 2017-12-22 RX ORDER — FUROSEMIDE 10 MG/ML
20 INJECTION INTRAMUSCULAR; INTRAVENOUS
Status: DISCONTINUED | OUTPATIENT
Start: 2017-12-22 | End: 2017-12-23

## 2017-12-22 RX ORDER — POTASSIUM CHLORIDE 20 MEQ/1
20 TABLET, EXTENDED RELEASE ORAL DAILY
Status: DISCONTINUED | OUTPATIENT
Start: 2017-12-22 | End: 2017-12-27 | Stop reason: HOSPADM

## 2017-12-22 RX ADMIN — ERGOCALCIFEROL 50000 UNITS: 1.25 CAPSULE ORAL at 16:13

## 2017-12-22 RX ADMIN — METOPROLOL SUCCINATE 100 MG: 50 TABLET, EXTENDED RELEASE ORAL at 16:13

## 2017-12-22 RX ADMIN — FUROSEMIDE 20 MG: 10 INJECTION, SOLUTION INTRAVENOUS at 13:30

## 2017-12-22 RX ADMIN — CYANOCOBALAMIN TAB 500 MCG 1000 MCG: 500 TAB at 08:42

## 2017-12-22 RX ADMIN — VITAMIN D, TAB 1000IU (100/BT) 2000 UNITS: 25 TAB at 08:42

## 2017-12-22 RX ADMIN — AMPICILLIN SODIUM AND SULBACTAM SODIUM: 100; 50 INJECTION, POWDER, FOR SOLUTION INTRAVENOUS at 16:15

## 2017-12-22 RX ADMIN — HYDRALAZINE HYDROCHLORIDE 10 MG: 10 TABLET, FILM COATED ORAL at 06:31

## 2017-12-22 RX ADMIN — SERTRALINE 100 MG: 100 TABLET, FILM COATED ORAL at 08:42

## 2017-12-22 RX ADMIN — ENOXAPARIN SODIUM 40 MG: 100 INJECTION SUBCUTANEOUS at 08:41

## 2017-12-22 RX ADMIN — HYDROCODONE BITARTRATE AND ACETAMINOPHEN 1 TABLET: 5; 325 TABLET ORAL at 19:44

## 2017-12-22 RX ADMIN — POTASSIUM CHLORIDE 20 MEQ: 1500 TABLET, EXTENDED RELEASE ORAL at 13:30

## 2017-12-22 RX ADMIN — VANCOMYCIN HYDROCHLORIDE 1400 MG: 100 INJECTION, POWDER, LYOPHILIZED, FOR SOLUTION INTRAVENOUS at 19:17

## 2017-12-22 ASSESSMENT — ENCOUNTER SYMPTOMS
MUSCULOSKELETAL NEGATIVE: 1
NEUROLOGICAL NEGATIVE: 1
EYES NEGATIVE: 1
RESPIRATORY NEGATIVE: 1
GASTROINTESTINAL NEGATIVE: 1

## 2017-12-22 ASSESSMENT — COGNITIVE AND FUNCTIONAL STATUS - GENERAL
DAILY ACTIVITIY SCORE: 21
CLIMB 3 TO 5 STEPS WITH RAILING: A LITTLE
HELP NEEDED FOR BATHING: A LITTLE
TOILETING: A LITTLE
MOVING FROM LYING ON BACK TO SITTING ON SIDE OF FLAT BED: A LITTLE
SUGGESTED CMS G CODE MODIFIER MOBILITY: CK
TURNING FROM BACK TO SIDE WHILE IN FLAT BAD: A LITTLE
SUGGESTED CMS G CODE MODIFIER DAILY ACTIVITY: CJ
MOBILITY SCORE: 18
STANDING UP FROM CHAIR USING ARMS: A LITTLE
DRESSING REGULAR LOWER BODY CLOTHING: A LITTLE
WALKING IN HOSPITAL ROOM: A LITTLE
MOVING TO AND FROM BED TO CHAIR: A LITTLE

## 2017-12-22 ASSESSMENT — GAIT ASSESSMENTS
ASSISTIVE DEVICE: FRONT WHEEL WALKER
DEVIATION: ANTALGIC;BRADYKINETIC;SHUFFLED GAIT
DISTANCE (FEET): 200
GAIT LEVEL OF ASSIST: STAND BY ASSIST

## 2017-12-22 ASSESSMENT — PAIN SCALES - GENERAL
PAINLEVEL_OUTOF10: 4
PAINLEVEL_OUTOF10: 5

## 2017-12-22 ASSESSMENT — LIFESTYLE VARIABLES: DO YOU DRINK ALCOHOL: NO

## 2017-12-22 NOTE — PROGRESS NOTES
"Pharmacy Kinetics 72 y.o. female on vancomycin day # 3 2017    Currently on Vancomycin 1400 mg iv q24hr    Indication for Treatment: cellulitis    Pertinent history per medical record: Admitted on 2017 for left hip pain after fall. Has been on antibiotics since November for bilateral lower extremity cellulitis with no improvement, including a ten day course of clindamycin. S/p fracture repair (). PMH: lower extremity cellulitis.     Other antibiotics: Unasyn 12 gm CIVI    Allergies: Patient has no known allergies.     List concerns for renal function: BMI 31 kg/m2, malnutrition/low albumin, 72 years old    Pertinent cultures to date:   Wound cultures - in process    Recent Labs      17   1056  17   0209  17   0342   WBC  7.8  5.2  6.0   NEUTSPOLYS  72.20*   --   66.80     Recent Labs      17   1056  17   0209  17   0342   BUN  12     CREATININE  0.67  0.80  0.71   ALBUMIN   --    --   2.8*     Last data filed at 17 0600   Gross per 24 hour   Intake              100 ml   Output                0 ml   Net              100 ml      Blood pressure 139/81, pulse 81, temperature 37.1 °C (98.7 °F), resp. rate 18, height 1.702 m (5' 7.01\"), weight 91 kg (200 lb 9.9 oz), SpO2 95 %. Temp (24hrs), Av.4 °C (97.6 °F), Min:36 °C (96.8 °F), Max:37.1 °C (98.7 °F)      A/P   1. Vancomycin dose change: continue 1400 mg Q24H  2. Next vancomycin level:  @1830  3. Goal trough: 12-16 mcg/mL  4. Comments: Renal function remains stable, afebrile, no leukocytosis. Concerns for accumulation mentioned above. Trough ordered for tonight, will continue to monitor for appropriate adjustment.    Isadora Rosenthal, PharmD      "

## 2017-12-22 NOTE — PROGRESS NOTES
"   Orthopaedic PA Progress Note    Interval changes:did well overnight    ROS - Patient denies any new issues. No chest pain, dyspnea, or fever.  Pain well controlled.    Blood pressure 139/81, pulse 81, temperature 37.1 °C (98.7 °F), resp. rate 18, height 1.702 m (5' 7.01\"), weight 91 kg (200 lb 9.9 oz), SpO2 95 %.    Patient seen and examined  No acute distress  Breathing non labored  RRR  Surgical dressing is clean, dry, and intact. Patient clearly fires tibialis anterior, EHL, and gastrocnemius/soleus. Sensation is intact to light touch throughout superficial peroneal, deep peroneal, tibial, saphenous, and sural nerve distributions. Strong and palpable 2+ dorsalis pedis and posterior tibial pulses with capillary refill less than 2 seconds. No lower leg tenderness or discomfort.    Recent Labs      12/20/17   1056  12/21/17   0209  12/22/17   0342   WBC  7.8  5.2  6.0   RBC  3.71*  3.09*  2.93*   HEMOGLOBIN  12.3  10.3*  9.9*   HEMATOCRIT  38.1  32.1*  30.6*   MCV  102.7*  103.9*  104.4*   MCH  33.2*  33.3*  33.8*   MCHC  32.3*  32.1*  32.4*   RDW  53.5*  54.2*  53.7*   PLATELETCT  261  186  165   MPV  9.9  9.8  9.9       Active Hospital Problems    Diagnosis   • Hip fracture ([facility]) [S72.009A]     Priority: High   • Cellulitis [L03.90]     Priority: High   • Essential hypertension [I10]     Priority: Medium   • B12 deficiency [E53.8]     Priority: Low   • Anxiety [F41.9]     Priority: Low       Assessment/Plan:  POD#2 S/P L hip nail    Soft bone noted at surgery - Albumin 2.8 Calcium 8.4   25-Hydroxy   Vitamin D 25 41 30 - 100 ng/mL     Wt bearing status - AT  PT/OT-initiated  Wound care:Med v ID consult for bilat stasis dermatitis  Drains - none  Crowley-no  Sutures/Staples out- 10-14 days post operatively  Antibiotics: Vanc/Unasyn per Med team  DVT Prophylaxis- TEDS/SCDs/Foot pumps.   Lovenox: Start 40 mgnot anticipated  Case Coordination for Discharge Planning - Disposition home vs SNF         "

## 2017-12-22 NOTE — WOUND TEAM
"[facility]  Inpatient Services  Initial Wound & Skin Care Evaluation    Admission Date:   12/20/17  Consult Date:    12/21/17  HPI, PMH, SH: Reviewed  Unit where seen by Wound Team: [id number]    WOUND CONSULT RELATED TO:  Right leg wound    SUBJECTIVE:  'I work at [employer].  I was told not to wear compression stockings\"    Self Report / Pain Level: 'slight flinching with debridement'    OBJECTIVE: Pt supine in bed, right leg wrapped in kerlex  WOUND TYPE, LOCATION, CHARACTERISTICS (Pressure ulcers: location, stage, POA or date identified)    Wound Type/Location:  Right medial leg wound, cellulitis     Periwound:    Red, flakey, brawny edema  Drainage:     Min serosanguinous  Tissue Type and %:    80% dry yellow  Wound Edges:    Flat, irregular  Odor:     none  Exposed structure(s):   none  S&S of Infection:    minimal  Measurements:   12/21/17  Length:    7.5cm   Width:     4.5cm   Depth:     ua   Tracts/undermining:         INTERVENTIONS BY WOUND TEAM: Pt in with US technician for venous study and DVT was ruled out.  Doppler done bilateral  dorsalis pedis and post tibial arteries on the right were biphasic, on the left were mono/bi phasic.  Sharp debrided right leg medial wound with tweezers and scissors to remove 3 cm2 dry yellow slough.  Cleaned area with normal saline, placed honey colloid on wound bed, covered with ABD and secured with tubi E.    Dressing types: honey colloid, abd, tubi E  Patient/family educated on rationale for plan of care_X___  Interdisciplinary Collaboration: RN, pt, [facility]    EVALUATION: Pt has leg wound related to bout of cellulitis,   Factors affecting wound healing: chronic edema  Goals:    NURSING PLAN OF CARE: (X)  Dressing changes: See Dressing Maintenance orders: X  Skin care: See Skin Care orders:   Rectal tube care: See Rectal Tube Care orders:   Other orders:    RSKIN: CURRENT (X) ORDERED (O)  Q shift Lusi:  X  Q shift pressure point assessments:  " X  Atmosair        SHORTY      Bariatric SHORTY      Bariatric foam        Heel float boots       Heels floated on pillows      Barrier wipes      Barrier Cream      Barrier paste      Sacral silicone dressing      Silicone O2 tubing      Anchorfast      Trach with Optifoam split foam       Waffle cushion      Rectal tube or BMS      Antifungal tx    Turn q 2 hours X  Up to chair    Ambulate   PT/OT     Dietician      PO     TF   TPN     PVN    NPO   # days   Other       WOUND TEAM PLAN OF CARE (X):   NPWT change 3 x week:        Dressing changes by wound team:       Follow up as needed:       Other (explain):    Anticipated discharge plans (X):  SNF:           Home Care:           Outpatient Wound Center:            Self Care:            Other:

## 2017-12-22 NOTE — THERAPY
"Physical Therapy Treatment completed.   Bed Mobility:  Supine to Sit: Contact Guard Assist  Transfers: Sit to Stand: Contact Guard Assist  Gait: Level Of Assist: Stand by Assist with Front-Wheel Walker       Plan of Care: Will benefit from Physical Therapy 5 times per week  Discharge Recommendations: Equipment: Front-Wheel Walker.     See \"Rehab Therapy-Acute\" Patient Summary Report for complete documentation.     Pt presenting w/ improved functional mobility and activity tolerance. Pt pain more under control helping pt w/ ease of functional mobility. Pt able to follow sequencing of bed mobility from a flat HOB and no HR. Pt did require a lot of exertion to move L LE. Pt given HEP to ease strain w/ mobility. Pt able to increase ambulation distances today and increase WB through L LE. Pt able to demonstrate stairs w/ one HR using a sideways step to technique that pt normally uses at home. As per initial eval, pt will benefit from HH upon DC from acute care.  "

## 2017-12-22 NOTE — PROGRESS NOTES
[facility]ist Progress Note    Date of Service: 2017    Chief Complaint  72 y.o. female admitted 2017 with hip fracture    Interval Problem Update  Pod #2 hip nailing    Afebrile    She has chronic leg edema in both legs    Left leg is swollen, red, warm    Doppler left leg negative for DVT      Consultants/Specialty  ortho    Disposition  pending        Review of Systems   Constitutional: Positive for malaise/fatigue.   Eyes: Negative.    Respiratory: Negative.    Cardiovascular: Positive for leg swelling.   Gastrointestinal: Negative.    Genitourinary: Negative.    Musculoskeletal: Negative.    Neurological: Negative.    All other systems reviewed and are negative.     Physical Exam  Laboratory/Imaging   Hemodynamics  Temp (24hrs), Av.6 °C (97.8 °F), Min:36.2 °C (97.2 °F), Max:37.1 °C (98.7 °F)   Temperature: 37.1 °C (98.7 °F)  Pulse  Av.4  Min: 73  Max: 101    Blood Pressure : 139/81      Respiratory      Respiration: 18, Pulse Oximetry: 95 %, O2 Daily Delivery Respiratory : Silicone Nasal Cannula             Fluids    Intake/Output Summary (Last 24 hours) at 17 1227  Last data filed at 17 0600   Gross per 24 hour   Intake              100 ml   Output                0 ml   Net              100 ml       Nutrition  Orders Placed This Encounter   Procedures   • DIET ORDER     Standing Status:   Standing     Number of Occurrences:   1     Order Specific Question:   Diet:     Answer:   Regular [1]     Physical Exam   Constitutional: No distress.   Neck: No JVD present. No tracheal deviation present. No thyromegaly present.   Cardiovascular: Normal rate.  Exam reveals no gallop and no friction rub.    No murmur heard.  Pulmonary/Chest: Effort normal and breath sounds normal. No respiratory distress. She has no wheezes.   Abdominal: Soft. She exhibits distension. There is no tenderness. There is no rebound and no guarding.   Musculoskeletal: She exhibits edema.   rle chronic venous  stasis changes    lle is warm, red+ 2 pitting edema   Neurological: She is alert. No cranial nerve deficit.   Skin: She is not diaphoretic.       Recent Labs      12/20/17   1056  12/21/17   0209  12/22/17   0342   WBC  7.8  5.2  6.0   RBC  3.71*  3.09*  2.93*   HEMOGLOBIN  12.3  10.3*  9.9*   HEMATOCRIT  38.1  32.1*  30.6*   MCV  102.7*  103.9*  104.4*   MCH  33.2*  33.3*  33.8*   MCHC  32.3*  32.1*  32.4*   RDW  53.5*  54.2*  53.7*   PLATELETCT  261  186  165   MPV  9.9  9.8  9.9     Recent Labs      12/20/17   1056  12/21/17   0209  12/22/17   0342   SODIUM  135  134*  136   POTASSIUM  4.7  4.6  4.3   CHLORIDE  99  100  102   CO2  21  24  28   GLUCOSE  120*  166*  167*   BUN  12  13  17   CREATININE  0.67  0.80  0.71   CALCIUM  9.0  8.6  8.4*     Recent Labs      12/20/17   1056   INR  1.09                  Assessment/Plan     * Hip fracture (CMS-HCC)- (present on admission)   Assessment & Plan    Acute Left comminuted , displaced intertrochanteric fracture.       Pod #2 hip nailing          Cellulitis- (present on admission)   Assessment & Plan        on IV vancomycin, Unasyn,     diuresis        Essential hypertension- (present on admission)   Assessment & Plan    Controlled.          B12 deficiency   Assessment & Plan    To be continued on B12 supplements        Anxiety- (present on admission)   Assessment & Plan    With history of depression.  zoloft            Crowley catheter::  No Crowley  DVT prophylaxis pharmacological::  Enoxaparin (Lovenox)      Pt and ot    Dc planning

## 2017-12-22 NOTE — THERAPY
"Occupational Therapy Treatment completed with focus on ADLs, ADL transfers and patient education.  Functional Status:  Pt seen for OT tx. Supine > sit w/ CGA for LLE to get initiated off the bed. Once EOB pt able to manage LLE and complete sit to stand to FWW w/ SBA. Pt tolerated standing at the sink for light grooming activity w/ SBA. Toilet transfer and pericare w/ SBA. Pt also given education about tub transfer. Pt pleasant and cooperative during session. Pt highly motivated to regain strength and endurance for ADLs and ADL transfers.    Plan of Care: Will benefit from Occupational Therapy 5 times per week  Discharge Recommendations:  Equipment Will Continue to Assess for Equipment Needs.     See \"Rehab Therapy-Acute\" Patient Summary Report for complete documentation.   "

## 2017-12-23 LAB
ANION GAP SERPL CALC-SCNC: 9 MMOL/L (ref 0–11.9)
BASOPHILS # BLD AUTO: 0.8 % (ref 0–1.8)
BASOPHILS # BLD: 0.05 K/UL (ref 0–0.12)
BUN SERPL-MCNC: 20 MG/DL (ref 8–22)
CALCIUM SERPL-MCNC: 8.5 MG/DL (ref 8.5–10.5)
CHLORIDE SERPL-SCNC: 102 MMOL/L (ref 96–112)
CO2 SERPL-SCNC: 27 MMOL/L (ref 20–33)
CREAT SERPL-MCNC: 0.81 MG/DL (ref 0.5–1.4)
EOSINOPHIL # BLD AUTO: 0.06 K/UL (ref 0–0.51)
EOSINOPHIL NFR BLD: 0.9 % (ref 0–6.9)
ERYTHROCYTE [DISTWIDTH] IN BLOOD BY AUTOMATED COUNT: 53.1 FL (ref 35.9–50)
GFR SERPL CREATININE-BSD FRML MDRD: >60 ML/MIN/1.73 M 2
GLUCOSE SERPL-MCNC: 106 MG/DL (ref 65–99)
HCT VFR BLD AUTO: 29 % (ref 37–47)
HGB BLD-MCNC: 9.3 G/DL (ref 12–16)
IMM GRANULOCYTES # BLD AUTO: 0.05 K/UL (ref 0–0.11)
IMM GRANULOCYTES NFR BLD AUTO: 0.8 % (ref 0–0.9)
LYMPHOCYTES # BLD AUTO: 1.38 K/UL (ref 1–4.8)
LYMPHOCYTES NFR BLD: 21.5 % (ref 22–41)
MCH RBC QN AUTO: 34.3 PG (ref 27–33)
MCHC RBC AUTO-ENTMCNC: 32.1 G/DL (ref 33.6–35)
MCV RBC AUTO: 107 FL (ref 81.4–97.8)
MONOCYTES # BLD AUTO: 0.81 K/UL (ref 0–0.85)
MONOCYTES NFR BLD AUTO: 12.6 % (ref 0–13.4)
NEUTROPHILS # BLD AUTO: 4.06 K/UL (ref 2–7.15)
NEUTROPHILS NFR BLD: 63.4 % (ref 44–72)
NRBC # BLD AUTO: 0 K/UL
NRBC BLD-RTO: 0 /100 WBC
PLATELET # BLD AUTO: 170 K/UL (ref 164–446)
PMV BLD AUTO: 10 FL (ref 9–12.9)
POTASSIUM SERPL-SCNC: 4 MMOL/L (ref 3.6–5.5)
RBC # BLD AUTO: 2.71 M/UL (ref 4.2–5.4)
SODIUM SERPL-SCNC: 138 MMOL/L (ref 135–145)
WBC # BLD AUTO: 6.4 K/UL (ref 4.8–10.8)

## 2017-12-23 PROCEDURE — 700111 HCHG RX REV CODE 636 W/ 250 OVERRIDE (IP): Performed by: HOSPITALIST

## 2017-12-23 PROCEDURE — 700111 HCHG RX REV CODE 636 W/ 250 OVERRIDE (IP): Performed by: ORTHOPAEDIC SURGERY

## 2017-12-23 PROCEDURE — 700102 HCHG RX REV CODE 250 W/ 637 OVERRIDE(OP): Performed by: HOSPITALIST

## 2017-12-23 PROCEDURE — 700105 HCHG RX REV CODE 258: Performed by: HOSPITALIST

## 2017-12-23 PROCEDURE — 770006 HCHG ROOM/CARE - MED/SURG/GYN SEMI*

## 2017-12-23 PROCEDURE — A9270 NON-COVERED ITEM OR SERVICE: HCPCS | Performed by: INTERNAL MEDICINE

## 2017-12-23 PROCEDURE — 80048 BASIC METABOLIC PNL TOTAL CA: CPT

## 2017-12-23 PROCEDURE — 97530 THERAPEUTIC ACTIVITIES: CPT

## 2017-12-23 PROCEDURE — 36415 COLL VENOUS BLD VENIPUNCTURE: CPT

## 2017-12-23 PROCEDURE — 99232 SBSQ HOSP IP/OBS MODERATE 35: CPT | Performed by: HOSPITALIST

## 2017-12-23 PROCEDURE — A9270 NON-COVERED ITEM OR SERVICE: HCPCS | Performed by: HOSPITALIST

## 2017-12-23 PROCEDURE — 700102 HCHG RX REV CODE 250 W/ 637 OVERRIDE(OP): Performed by: INTERNAL MEDICINE

## 2017-12-23 PROCEDURE — 97116 GAIT TRAINING THERAPY: CPT

## 2017-12-23 PROCEDURE — 85025 COMPLETE CBC W/AUTO DIFF WBC: CPT

## 2017-12-23 PROCEDURE — 700105 HCHG RX REV CODE 258

## 2017-12-23 RX ORDER — SODIUM CHLORIDE 9 MG/ML
INJECTION, SOLUTION INTRAVENOUS
Status: COMPLETED
Start: 2017-12-23 | End: 2017-12-23

## 2017-12-23 RX ORDER — FUROSEMIDE 10 MG/ML
40 INJECTION INTRAMUSCULAR; INTRAVENOUS
Status: DISCONTINUED | OUTPATIENT
Start: 2017-12-23 | End: 2017-12-25

## 2017-12-23 RX ORDER — DOXYCYCLINE 100 MG/1
100 TABLET ORAL EVERY 12 HOURS
Status: DISCONTINUED | OUTPATIENT
Start: 2017-12-23 | End: 2017-12-27 | Stop reason: HOSPADM

## 2017-12-23 RX ADMIN — DOXYCYCLINE 100 MG: 100 TABLET ORAL at 21:49

## 2017-12-23 RX ADMIN — HYDROCODONE BITARTRATE AND ACETAMINOPHEN 1 TABLET: 5; 325 TABLET ORAL at 06:17

## 2017-12-23 RX ADMIN — METOPROLOL SUCCINATE 100 MG: 50 TABLET, EXTENDED RELEASE ORAL at 16:46

## 2017-12-23 RX ADMIN — SERTRALINE 100 MG: 100 TABLET, FILM COATED ORAL at 09:20

## 2017-12-23 RX ADMIN — ENOXAPARIN SODIUM 40 MG: 100 INJECTION SUBCUTANEOUS at 09:20

## 2017-12-23 RX ADMIN — TRAMADOL HYDROCHLORIDE 50 MG: 50 TABLET, COATED ORAL at 09:19

## 2017-12-23 RX ADMIN — POTASSIUM CHLORIDE 20 MEQ: 1500 TABLET, EXTENDED RELEASE ORAL at 09:20

## 2017-12-23 RX ADMIN — SODIUM CHLORIDE: 9 INJECTION, SOLUTION INTRAVENOUS at 07:00

## 2017-12-23 RX ADMIN — AMPICILLIN SODIUM AND SULBACTAM SODIUM: 100; 50 INJECTION, POWDER, FOR SOLUTION INTRAVENOUS at 09:18

## 2017-12-23 RX ADMIN — FUROSEMIDE 40 MG: 10 INJECTION, SOLUTION INTRAMUSCULAR; INTRAVENOUS at 16:46

## 2017-12-23 RX ADMIN — CYANOCOBALAMIN TAB 500 MCG 1000 MCG: 500 TAB at 09:20

## 2017-12-23 RX ADMIN — FUROSEMIDE 20 MG: 10 INJECTION, SOLUTION INTRAVENOUS at 06:09

## 2017-12-23 ASSESSMENT — GAIT ASSESSMENTS
DISTANCE (FEET): 150
GAIT LEVEL OF ASSIST: STAND BY ASSIST
DEVIATION: ANTALGIC;STEP TO;BRADYKINETIC
ASSISTIVE DEVICE: FRONT WHEEL WALKER

## 2017-12-23 ASSESSMENT — COGNITIVE AND FUNCTIONAL STATUS - GENERAL
WALKING IN HOSPITAL ROOM: A LITTLE
TURNING FROM BACK TO SIDE WHILE IN FLAT BAD: A LITTLE
MOBILITY SCORE: 14
STANDING UP FROM CHAIR USING ARMS: A LITTLE
CLIMB 3 TO 5 STEPS WITH RAILING: A LITTLE
SUGGESTED CMS G CODE MODIFIER MOBILITY: CL
MOVING FROM LYING ON BACK TO SITTING ON SIDE OF FLAT BED: UNABLE
MOVING TO AND FROM BED TO CHAIR: UNABLE

## 2017-12-23 ASSESSMENT — PAIN SCALES - GENERAL
PAINLEVEL_OUTOF10: 4
PAINLEVEL_OUTOF10: 4

## 2017-12-23 ASSESSMENT — PATIENT HEALTH QUESTIONNAIRE - PHQ9
SUM OF ALL RESPONSES TO PHQ QUESTIONS 1-9: 0
SUM OF ALL RESPONSES TO PHQ9 QUESTIONS 1 AND 2: 0
1. LITTLE INTEREST OR PLEASURE IN DOING THINGS: NOT AT ALL

## 2017-12-23 ASSESSMENT — LIFESTYLE VARIABLES
DO YOU DRINK ALCOHOL: NO
DO YOU DRINK ALCOHOL: NO

## 2017-12-23 NOTE — CARE PLAN
Problem: Venous Thromboembolism (VTW)/Deep Vein Thrombosis (DVT) Prevention:  Goal: Patient will participate in Venous Thrombosis (VTE)/Deep Vein Thrombosis (DVT)Prevention Measures  Outcome: PROGRESSING AS EXPECTED  SCDs on.    Problem: Mobility  Goal: Risk for activity intolerance will decrease  Outcome: PROGRESSING AS EXPECTED  Ambulates to the bathroom with  1 assist. Walks around the room with a walker.

## 2017-12-23 NOTE — PROGRESS NOTES
"Pharmacy Kinetics 72 y.o. female on vancomycin day # 4 2017    Currently on Vancomycin 1400 mg iv q24hr (1900)    Indication for Treatment: cellulitis     Pertinent history per medical record: Admitted on 2017 for left hip pain after fall. Has been on antibiotics since November for bilateral lower extremity cellulitis with no improvement, including a ten day course of clindamycin. S/p fracture repair (). PMH: lower extremity cellulitis.      Other antibiotics: Unasyn 12 gm CIVI     Allergies: Patient has no known allergies.      List concerns for renal function: BMI 31 kg/m2, malnutrition/low albumin, 72 years old    Pertinent cultures to date:   None reporting in house.    Recent Labs      17   1056  17   0209  17   0342  17   0423   WBC  7.8  5.2  6.0  6.4   NEUTSPOLYS  72.20*   --   66.80  63.40     Recent Labs      17   1056  17   0209  17   0342  17   0423   BUN     CREATININE  0.67  0.80  0.71  0.81   ALBUMIN   --    --   2.8*   --      Recent Labs      17   1844   VANCOTROUGH  10.6     Intake/Output Summary (Last 24 hours) at 17 0912  Last data filed at 17 1650   Gross per 24 hour   Intake              300 ml   Output                0 ml   Net              300 ml      Blood pressure 154/75, pulse 87, temperature 36.8 °C (98.3 °F), resp. rate 17, height 1.702 m (5' 7.01\"), weight 91 kg (200 lb 9.9 oz), SpO2 94 %. Temp (24hrs), Av.8 °C (98.3 °F), Min:36.5 °C (97.7 °F), Max:37.1 °C (98.8 °F)      A/P   1. Vancomycin dose change: increase to 1500 mg IV q 24 hours.  Give an hour earlier.  2. Next vancomycin level: ~ 4 days, will order later  3. Goal trough: 12-16 mcg/mL  4. Comments: I/O data incomplete.  Renal ok.  Afebrile.  Trough a little low, dose increased.  No cultures reporting yet.  [place]ta.    NESTRO Rodriguez, PharmD, BCPS  "

## 2017-12-23 NOTE — PROGRESS NOTES
[facility]ist Progress Note    Date of Service: 2017    Chief Complaint  72 y.o. female admitted 2017 with hip fracture    Interval Problem Update  Pod #3 hip nailing    Afebrile    She has chronic leg edema in both legs    Left leg is swollen    Redness and warmth has improved in lle    Doppler left leg negative for DVT      Consultants/Specialty  ortho    Disposition  pending        Review of Systems   Constitutional: Positive for malaise/fatigue.   Eyes: Negative.    Respiratory: Negative.    Cardiovascular: Positive for leg swelling.   Gastrointestinal: Negative.    Genitourinary: Negative.    Musculoskeletal: Negative.    Neurological: Negative.    All other systems reviewed and are negative.     Physical Exam  Laboratory/Imaging   Hemodynamics  Temp (24hrs), Av.8 °C (98.3 °F), Min:36.5 °C (97.7 °F), Max:37.1 °C (98.8 °F)   Temperature: 36.8 °C (98.3 °F)  Pulse  Av.3  Min: 73  Max: 101    Blood Pressure : 154/75      Respiratory      Respiration: 17, Pulse Oximetry: 94 %             Fluids    Intake/Output Summary (Last 24 hours) at 17 1118  Last data filed at 17 1650   Gross per 24 hour   Intake              300 ml   Output                0 ml   Net              300 ml       Nutrition  Orders Placed This Encounter   Procedures   • DIET ORDER     Standing Status:   Standing     Number of Occurrences:   1     Order Specific Question:   Diet:     Answer:   Regular [1]     Physical Exam   Constitutional: No distress.   Neck: No JVD present. No tracheal deviation present. No thyromegaly present.   Cardiovascular: Normal rate.  Exam reveals no gallop and no friction rub.    No murmur heard.  Pulmonary/Chest: Effort normal and breath sounds normal. No respiratory distress. She has no wheezes.   Abdominal: Soft. She exhibits distension. There is no tenderness. There is no rebound and no guarding.   Musculoskeletal: She exhibits edema.   rle chronic venous stasis changes    lle is  warm, red+ 2 pitting edema   Neurological: She is alert. No cranial nerve deficit.   Skin: She is not diaphoretic.       Recent Labs      12/21/17   0209  12/22/17 0342 12/23/17 0423   WBC  5.2  6.0  6.4   RBC  3.09*  2.93*  2.71*   HEMOGLOBIN  10.3*  9.9*  9.3*   HEMATOCRIT  32.1*  30.6*  29.0*   MCV  103.9*  104.4*  107.0*   MCH  33.3*  33.8*  34.3*   MCHC  32.1*  32.4*  32.1*   RDW  54.2*  53.7*  53.1*   PLATELETCT  186  165  170   MPV  9.8  9.9  10.0     Recent Labs      12/21/17 0209 12/22/17 0342 12/23/17 0423   SODIUM  134*  136  138   POTASSIUM  4.6  4.3  4.0   CHLORIDE  100  102  102   CO2  24  28  27   GLUCOSE  166*  167*  106*   BUN  13  17  20   CREATININE  0.80  0.71  0.81   CALCIUM  8.6  8.4*  8.5                      Assessment/Plan     * Hip fracture (CMS-HCC)- (present on admission)   Assessment & Plan    Acute Left comminuted , displaced intertrochanteric fracture.       Pod #3 hip nailing          Cellulitis- (present on admission)   Assessment & Plan        on IV vancomycin, Unasyn,     diuresis        Essential hypertension- (present on admission)   Assessment & Plan    Controlled.          B12 deficiency- (present on admission)   Assessment & Plan    To be continued on B12 supplements        Anxiety- (present on admission)   Assessment & Plan    With history of depression.  zoloft            Crowley catheter::  No Crowley  DVT prophylaxis pharmacological::  Enoxaparin (Lovenox)      Pt and ot    Dc planning      Lasix increased to 40mg bid    Check am bmp

## 2017-12-23 NOTE — PROGRESS NOTES
AOx4. VS stable. Currently on 2L nasal cannula at 95%. Desats when off O2. Pain on left hip controlled medicated once with norco 1 tab. Denies nausea. Denies SOB. Dressing to left hip clean, dry, and intact. Dressing to RLE- clean, dry, and intact. Ambulates to the bathroom with 1 assist using walker. SCDs on. Sleeping.

## 2017-12-23 NOTE — PROGRESS NOTES
"S:  Seen and examined.  POD #3 s/p L hip nail.  Doing well this morning.  No new complaints, pain controlled.    O: Blood pressure 154/75, pulse 87, temperature 36.8 °C (98.3 °F), resp. rate 17, height 1.702 m (5' 7.01\"), weight 91 kg (200 lb 9.9 oz), SpO2 94 %..      Intake/Output Summary (Last 24 hours) at 12/23/17 1111  Last data filed at 12/22/17 1650   Gross per 24 hour   Intake              300 ml   Output                0 ml   Net              300 ml   .    Operative/injured extremity examined.  Compartments soft, distal light touch sensation intact, firing EHL/TA/GS/P.  Toes warm, well-perfused.  Wounds c/d/i.  Cellulitis improved today    Recent Labs      12/21/17   0209  12/22/17   0342  12/23/17   0423   WBC  5.2  6.0  6.4   RBC  3.09*  2.93*  2.71*   HEMOGLOBIN  10.3*  9.9*  9.3*   HEMATOCRIT  32.1*  30.6*  29.0*   MCV  103.9*  104.4*  107.0*   MCH  33.3*  33.8*  34.3*   MCHC  32.1*  32.4*  32.1*   RDW  54.2*  53.7*  53.1*   PLATELETCT  186  165  170   MPV  9.8  9.9  10.0       A/P:    POD #3 s/p L hip nail    Antibiotics: Per ID  Activity: WBAT operative extremity.  PT today.  Diet: General  DVT: Mechanical (SCDs) + Pharmacologic (Lovenox)  Dispo: D/C planning    "

## 2017-12-23 NOTE — CARE PLAN
Problem: Pain Management  Goal: Pain level will decrease to patient's comfort goal    Intervention: Follow pain managment plan developed in collaboration with patient and Interdisciplinary Team  Pain meds given as needed per orders.      Problem: Safety  Goal: Will remain free from injury    Intervention: Provide assistance with mobility  Call light within reach, pt calls for assistance at all times.  Bed in low position and locked, upper bedside rails, proper mobility signs placed, personal possessions within reach, treaded socks on.  Hourly rounding in place.

## 2017-12-23 NOTE — PROGRESS NOTES
Bedside report received from Krista SWANN.  Patient a/a/o sitting upright in bed with no c/o at this time.  Oxygen by NC, IV infusing as ordered, dressings intact, fall precautions in place, pt calls for assist.  POC discharge to rehab or home if possible.

## 2017-12-23 NOTE — THERAPY
"Physical Therapy Treatment completed.   Bed Mobility:  Supine to Sit: Contact Guard Assist  Transfers: Sit to Stand: Contact Guard Assist  Gait: Level Of Assist: Stand by Assist with Front-Wheel Walker       Plan of Care: Will benefit from Physical Therapy 5 times per week  Discharge Recommendations: Equipment: Will Continue to Assess for Equipment Needs. Post-acute therapy Discharge to a transitional care facility for continued skilled therapy services.     See \"Rehab Therapy-Acute\" Patient Summary Report for complete documentation.       "

## 2017-12-24 LAB
ANION GAP SERPL CALC-SCNC: 7 MMOL/L (ref 0–11.9)
BUN SERPL-MCNC: 20 MG/DL (ref 8–22)
CALCIUM SERPL-MCNC: 9.3 MG/DL (ref 8.5–10.5)
CHLORIDE SERPL-SCNC: 97 MMOL/L (ref 96–112)
CO2 SERPL-SCNC: 29 MMOL/L (ref 20–33)
CREAT SERPL-MCNC: 0.83 MG/DL (ref 0.5–1.4)
GFR SERPL CREATININE-BSD FRML MDRD: >60 ML/MIN/1.73 M 2
GLUCOSE SERPL-MCNC: 103 MG/DL (ref 65–99)
POTASSIUM SERPL-SCNC: 3.7 MMOL/L (ref 3.6–5.5)
SODIUM SERPL-SCNC: 133 MMOL/L (ref 135–145)

## 2017-12-24 PROCEDURE — 700111 HCHG RX REV CODE 636 W/ 250 OVERRIDE (IP): Performed by: HOSPITALIST

## 2017-12-24 PROCEDURE — 700102 HCHG RX REV CODE 250 W/ 637 OVERRIDE(OP): Performed by: INTERNAL MEDICINE

## 2017-12-24 PROCEDURE — A9270 NON-COVERED ITEM OR SERVICE: HCPCS | Performed by: INTERNAL MEDICINE

## 2017-12-24 PROCEDURE — 770006 HCHG ROOM/CARE - MED/SURG/GYN SEMI*

## 2017-12-24 PROCEDURE — 700102 HCHG RX REV CODE 250 W/ 637 OVERRIDE(OP): Performed by: HOSPITALIST

## 2017-12-24 PROCEDURE — 700111 HCHG RX REV CODE 636 W/ 250 OVERRIDE (IP): Performed by: ORTHOPAEDIC SURGERY

## 2017-12-24 PROCEDURE — A9270 NON-COVERED ITEM OR SERVICE: HCPCS | Performed by: HOSPITALIST

## 2017-12-24 PROCEDURE — 80048 BASIC METABOLIC PNL TOTAL CA: CPT

## 2017-12-24 PROCEDURE — 99232 SBSQ HOSP IP/OBS MODERATE 35: CPT | Performed by: HOSPITALIST

## 2017-12-24 PROCEDURE — 36415 COLL VENOUS BLD VENIPUNCTURE: CPT

## 2017-12-24 RX ADMIN — CYANOCOBALAMIN TAB 500 MCG 1000 MCG: 500 TAB at 10:50

## 2017-12-24 RX ADMIN — HYDROCODONE BITARTRATE AND ACETAMINOPHEN 1 TABLET: 5; 325 TABLET ORAL at 06:26

## 2017-12-24 RX ADMIN — FUROSEMIDE 40 MG: 10 INJECTION, SOLUTION INTRAMUSCULAR; INTRAVENOUS at 15:44

## 2017-12-24 RX ADMIN — DOXYCYCLINE 100 MG: 100 TABLET ORAL at 20:56

## 2017-12-24 RX ADMIN — POTASSIUM CHLORIDE 20 MEQ: 1500 TABLET, EXTENDED RELEASE ORAL at 10:50

## 2017-12-24 RX ADMIN — SERTRALINE 100 MG: 100 TABLET, FILM COATED ORAL at 10:49

## 2017-12-24 RX ADMIN — HYDROCODONE BITARTRATE AND ACETAMINOPHEN 1 TABLET: 5; 325 TABLET ORAL at 22:22

## 2017-12-24 RX ADMIN — FUROSEMIDE 40 MG: 10 INJECTION, SOLUTION INTRAMUSCULAR; INTRAVENOUS at 06:20

## 2017-12-24 RX ADMIN — HYDROCODONE BITARTRATE AND ACETAMINOPHEN 2 TABLET: 5; 325 TABLET ORAL at 15:44

## 2017-12-24 RX ADMIN — DOXYCYCLINE 100 MG: 100 TABLET ORAL at 10:49

## 2017-12-24 RX ADMIN — ENOXAPARIN SODIUM 40 MG: 100 INJECTION SUBCUTANEOUS at 10:49

## 2017-12-24 ASSESSMENT — PAIN SCALES - GENERAL
PAINLEVEL_OUTOF10: 9
PAINLEVEL_OUTOF10: 3
PAINLEVEL_OUTOF10: 5
PAINLEVEL_OUTOF10: 5
PAINLEVEL_OUTOF10: 7
PAINLEVEL_OUTOF10: 7

## 2017-12-24 ASSESSMENT — ENCOUNTER SYMPTOMS
NEUROLOGICAL NEGATIVE: 1
MUSCULOSKELETAL NEGATIVE: 1
RESPIRATORY NEGATIVE: 1
VOMITING: 0
NAUSEA: 0
GASTROINTESTINAL NEGATIVE: 1
EYES NEGATIVE: 1
FEVER: 0
CHILLS: 0
ABDOMINAL PAIN: 0

## 2017-12-24 ASSESSMENT — LIFESTYLE VARIABLES: DO YOU DRINK ALCOHOL: NO

## 2017-12-24 NOTE — CONSULTS
"DATE OF SERVICE:  12/23/2017    REASON FOR CONSULTATION:  Left lower extremity cellulitis.    REASON FOR CONSULTATION:  Cellulitis.    CONSULTING PHYSICIAN:  Dr. Peters.    HISTORY OF PRESENT ILLNESS:  This is a very pleasant 72-year-old white female   who was originally admitted to the hospital on 12/20/2017 after a ground level   fall.  She has a longstanding history of chronic bilateral lower extremity   swelling, stasis dermatitis with ulcerations that had been improving with   compression therapy.  She had been planning on being seen and evaluated for   more definitive treatment of her ulcerations when she slipped and fell on her   stairs, struck her left leg and broke her hip.  She has already undergone a   left intertrochanteric fracture fixation with pin placement, but since   admission they have noted increasing left lower extremity pain, swelling, and   erythema.  Ultrasound was negative.  She was started on Unasyn and infectious   disease is consulted for antibiotic recommendations and management.  Of note,   she has had the right lower extremity ulcerations for over 6 weeks and has   been on multiple antibiotics, she does not remember exactly what and those   have actually improved.  She has no associated fever, chills, nausea,   vomiting, or abdominal pain.  She does have some pain in her left hip   postoperatively.    REVIEW OF SYSTEMS:  All other systems are reviewed and are negative.    ALLERGIES:  She has no known antibiotic allergies.    PAST MEDICAL HISTORY:  Hysterectomy, hypertension, chronic bilateral lower   extremity edema with chronic stasis ulcerations.    FAMILY HISTORY:  Coronary artery disease, diabetes, hypertension, psychiatric   disorder.    SOCIAL HISTORY:  She is a former smoker, but she quit in 2007.  She does not   drink or use illicit drugs.  She states she is \"multiple animal exposures, but   did not specify.\"    PHYSICAL EXAMINATION:  GENERAL:  She is a well-nourished, " well-developed female in no acute distress,   pleasant and cooperative.  VITAL SIGNS:  She has been afebrile since admission, current temperature is   98.3, blood pressure 154/75, pulse of 87, respiratory rate 17, oxygen   saturation 94% on 1 liter nasal cannula and she weighs 91 kilos.  HEENT:  Normocephalic, atraumatic.  Pupils are equal, round, reactive to   light.  Extraocular movements are intact.  Oropharynx is clear.  NECK:  Supple.  CARDIOVASCULAR:  Regular rate and rhythm with occasional ectopic beats.  CHEST:  Clear to auscultation bilaterally, unlabored.  ABDOMEN:  Obese, soft, nontender.  EXTREMITIES:  Show bilateral lower extremity edema.  Right lower extremity   shows minimal edema with chronic stasis changes, shallow ulcerations.  Left   lower extremity has approximately 2-3+ pitting edema.  Faint erythema with   some skin wrinkling suggesting of decreased swelling.  NEUROLOGIC:  She is awake, alert, oriented.  Speech is without dysarthria.    Cranial nerves are intact.    LABORATORY DATA AND IMAGING:  Current labs, white blood cell count is 6.4, H   and H of 9.3 and 29, platelets of 170.  Sodium 138, potassium 4, chloride 102,   bicarbonate 27, glucose 106, BUN 20, creatinine 0.81.  Urinalysis was not   done.  No cultures have been done.  X-ray of her hip on admission showed   intramedullary nail with 2 screws for her intertrochanteric femur fracture.    Ultrasound showed no DVT of the left lower extremity.    ASSESSMENT AND PLAN:  A 72-year-old female with chronic stasis ulcerations   primarily affecting the right lower extremity which are currently clinically   improved and do not appear to be acutely infected.  Admitted to the hospital   after a fall with left femur fracture.  She is status post stabilization and   then developed a left lower extremity pain, swelling, and erythema with a   concern for cellulitis.  She has been started already on vancomycin and Unasyn   with significant  improvement.  She has minimal erythema at this time.  Pain   is much improved and she has decreasing edema, so we will discontinue the IV   antibiotics and switched to oral doxycycline and monitor.  If she fails to   improve or condition deteriorated, she will proceed with MRI of the left lower   extremity to look for underlying pathology and further recommendations per   culture results and clinical course.    Thank you and we will follow with you.       ____________________________________     MD GREGG TOM / LARRY    DD:  12/23/2017 15:25:32  DT:  12/23/2017 16:52:21    D#:  [id number]  Job#:  [id number]

## 2017-12-24 NOTE — PROGRESS NOTES
Currently on 1L of O2. Unable to wean pt completely off O2. Desats to 88 on room air. Incentive spirometer is being used. Able to pull up to 1500. Pain very minimal. Declines pain meds at this time. Left hip dressing clean, dry, and intact. RLE dressing clean, dry, and intact. Pt reports swelling on RLE has gotten better. Ambulates to the bathroom standby assist using front wheel walker. Voiding frequently from lasix. BM today. SCDs on. No other concerns at this time.

## 2017-12-24 NOTE — PROGRESS NOTES
Up in chair for breakfast, tolerating well.  Left hip dressing, CDI, Changed by MD this am.  RLE dressing is CDI, will be changed today with nursing.  IV patent, SL.  O2 in place via NC.  Medicated for pain per mar as needed.  Ambulates to BR with SBA and FWW.  POC discussed, pt agrees and verbalizes understanding.  Using IS frequently at bedside.  Hourly rounding in place, call light is within reach.

## 2017-12-24 NOTE — CARE PLAN
Problem: Pain Management  Goal: Pain level will decrease to patient's comfort goal  Outcome: PROGRESSING AS EXPECTED  Pain controlled with ambulation. Declines pain meds at this time.    Problem: Venous Thromboembolism (VTW)/Deep Vein Thrombosis (DVT) Prevention:  Goal: Patient will participate in Venous Thrombosis (VTE)/Deep Vein Thrombosis (DVT)Prevention Measures  Outcome: PROGRESSING AS EXPECTED  SCDs on.    Problem: Mobility  Goal: Risk for activity intolerance will decrease  Outcome: PROGRESSING AS EXPECTED  Ambulates to the bathroom frequently standby assist.

## 2017-12-24 NOTE — PROGRESS NOTES
[facility]    Date of Service: 2017    Chief Complaint  72 y.o. female admitted 2017 with hip fracture    Interval Problem Update  Pod #4 hip nailing    Afebrile    She has chronic leg edema in both legs    Left leg is swollen    Redness and warmth has improved in lle    ID MD has changd abx to po doxy    Doppler left leg negative for DVT      Consultants/Specialty  ortho    Disposition  pending        Review of Systems   Constitutional: Positive for malaise/fatigue.   Eyes: Negative.    Respiratory: Negative.    Cardiovascular: Positive for leg swelling.   Gastrointestinal: Negative.    Genitourinary: Negative.    Musculoskeletal: Negative.    Neurological: Negative.    All other systems reviewed and are negative.     Physical Exam  Laboratory/Imaging   Hemodynamics  Temp (24hrs), Av.7 °C (98.1 °F), Min:36.5 °C (97.7 °F), Max:36.8 °C (98.3 °F)   Temperature: 36.6 °C (97.9 °F)  Pulse  Av.2  Min: 73  Max: 101    Blood Pressure : 154/66      Respiratory      Respiration: 16, Pulse Oximetry: 96 %     Work Of Breathing / Effort: Mild  RUL Breath Sounds: Clear, RML Breath Sounds: Diminished, RLL Breath Sounds: Diminished, BHAVYA Breath Sounds: Clear, LLL Breath Sounds: Diminished    Fluids  No intake or output data in the 24 hours ending 17 1249    Nutrition  Orders Placed This Encounter   Procedures   • DIET ORDER     Standing Status:   Standing     Number of Occurrences:   1     Order Specific Question:   Diet:     Answer:   Regular [1]     Physical Exam   Constitutional: No distress.   Neck: No JVD present. No tracheal deviation present. No thyromegaly present.   Cardiovascular: Normal rate.  Exam reveals no gallop and no friction rub.    No murmur heard.  Pulmonary/Chest: Effort normal and breath sounds normal. No respiratory distress. She has no wheezes.   Abdominal: Soft. She exhibits distension. There is no tenderness. There is no rebound and no guarding.    Musculoskeletal: She exhibits edema.   rle chronic venous stasis changes    lle is warm, red+ 2 pitting edema   Neurological: She is alert. No cranial nerve deficit.   Skin: She is not diaphoretic.       Recent Labs      12/22/17 0342 12/23/17 0423   WBC  6.0  6.4   RBC  2.93*  2.71*   HEMOGLOBIN  9.9*  9.3*   HEMATOCRIT  30.6*  29.0*   MCV  104.4*  107.0*   MCH  33.8*  34.3*   MCHC  32.4*  32.1*   RDW  53.7*  53.1*   PLATELETCT  165  170   MPV  9.9  10.0     Recent Labs      12/22/17 0342 12/23/17 0423 12/24/17   0329   SODIUM  136  138  133*   POTASSIUM  4.3  4.0  3.7   CHLORIDE  102  102  97   CO2  28  27  29   GLUCOSE  167*  106*  103*   BUN  17  20  20   CREATININE  0.71  0.81  0.83   CALCIUM  8.4*  8.5  9.3                      Assessment/Plan     * Hip fracture (CMS-HCC)- (present on admission)   Assessment & Plan    Acute Left comminuted , displaced intertrochanteric fracture.       Pod #4 hip nailing          Cellulitis- (present on admission)   Assessment & Plan       Iv abx to po    diuresis        Essential hypertension- (present on admission)   Assessment & Plan    Controlled.          B12 deficiency- (present on admission)   Assessment & Plan    To be continued on B12 supplements        Anxiety- (present on admission)   Assessment & Plan    With history of depression.  zoloft            Crowley catheter::  No Crowley  DVT prophylaxis pharmacological::  Enoxaparin (Lovenox)      Pt and ot    Dc planning      Lasix  40mg bid    Check am bmp

## 2017-12-24 NOTE — PROGRESS NOTES
"   Orthopaedic PA Progress Note    Interval changes:ID consult read and appreciated. Now on oral Abx. Satifactory for DC to home vs SNF from Orthopaedic perspective    ROS - Patient denies any new issues. No chest pain, dyspnea, or fever.  Pain well controlled.    Blood pressure 154/66, pulse 85, temperature 36.6 °C (97.9 °F), resp. rate 16, height 1.702 m (5' 7.01\"), weight 91 kg (200 lb 9.9 oz), SpO2 96 %.    Patient seen and examined  No acute distress  Breathing non labored  RRR  Surgical dressing is clean, dry, and intact. Patient clearly fires tibialis anterior, EHL, and gastrocnemius/soleus. Sensation is intact to light touch throughout superficial peroneal, deep peroneal, tibial, saphenous, and sural nerve distributions. Strong and palpable 2+ dorsalis pedis and posterior tibial pulses with capillary refill less than 2 seconds. No lower leg tenderness or discomfort.      Recent Labs      12/22/17   0342  12/23/17   0423   WBC  6.0  6.4   RBC  2.93*  2.71*   HEMOGLOBIN  9.9*  9.3*   HEMATOCRIT  30.6*  29.0*   MCV  104.4*  107.0*   MCH  33.8*  34.3*   MCHC  32.4*  32.1*   RDW  53.7*  53.1*   PLATELETCT  165  170   MPV  9.9  10.0     Active Hospital Problems    Diagnosis   • Hip fracture (CMS-[facility]) [S72.009A]     Priority: High   • Cellulitis [L03.90]     Priority: High   • Essential hypertension [I10]     Priority: Medium   • B12 deficiency [E53.8]     Priority: Low   • Anxiety [F41.9]     Priority: Low     Assessment/Plan:  POD#4 S/P L Hip nail  Wt bearing status - AT with assist  PT/OT-initiated  Wound care:dressing change today and QOD + PRN if soaked  Drains - no  Crowley-no  Sutures/Staples out- 10-14 days post operatively  Antibiotics: oral ADOXA  DVT Prophylaxis- TEDS/SCDs/Foot pumps.   Lovenox: 40 mg daily while in hospital, then change to  PO BID at discharge x 1 month  Future Procedures - none  Case Coordination for Discharge Planning - Disposition home vs SNF per med, follow-up 10-14 days " post-op with DR Peters at [facility]

## 2017-12-25 LAB
ANION GAP SERPL CALC-SCNC: 7 MMOL/L (ref 0–11.9)
BUN SERPL-MCNC: 21 MG/DL (ref 8–22)
CALCIUM SERPL-MCNC: 9.8 MG/DL (ref 8.5–10.5)
CHLORIDE SERPL-SCNC: 97 MMOL/L (ref 96–112)
CO2 SERPL-SCNC: 29 MMOL/L (ref 20–33)
CREAT SERPL-MCNC: 0.79 MG/DL (ref 0.5–1.4)
GFR SERPL CREATININE-BSD FRML MDRD: >60 ML/MIN/1.73 M 2
GLUCOSE SERPL-MCNC: 113 MG/DL (ref 65–99)
POTASSIUM SERPL-SCNC: 3.6 MMOL/L (ref 3.6–5.5)
SODIUM SERPL-SCNC: 133 MMOL/L (ref 135–145)

## 2017-12-25 PROCEDURE — 770006 HCHG ROOM/CARE - MED/SURG/GYN SEMI*

## 2017-12-25 PROCEDURE — A9270 NON-COVERED ITEM OR SERVICE: HCPCS | Performed by: HOSPITALIST

## 2017-12-25 PROCEDURE — 700102 HCHG RX REV CODE 250 W/ 637 OVERRIDE(OP): Performed by: INTERNAL MEDICINE

## 2017-12-25 PROCEDURE — 700111 HCHG RX REV CODE 636 W/ 250 OVERRIDE (IP): Performed by: HOSPITALIST

## 2017-12-25 PROCEDURE — 99232 SBSQ HOSP IP/OBS MODERATE 35: CPT | Performed by: HOSPITALIST

## 2017-12-25 PROCEDURE — 36415 COLL VENOUS BLD VENIPUNCTURE: CPT

## 2017-12-25 PROCEDURE — 80048 BASIC METABOLIC PNL TOTAL CA: CPT

## 2017-12-25 PROCEDURE — A9270 NON-COVERED ITEM OR SERVICE: HCPCS | Performed by: INTERNAL MEDICINE

## 2017-12-25 PROCEDURE — 700102 HCHG RX REV CODE 250 W/ 637 OVERRIDE(OP): Performed by: HOSPITALIST

## 2017-12-25 PROCEDURE — 700111 HCHG RX REV CODE 636 W/ 250 OVERRIDE (IP): Performed by: ORTHOPAEDIC SURGERY

## 2017-12-25 RX ORDER — FUROSEMIDE 10 MG/ML
60 INJECTION INTRAMUSCULAR; INTRAVENOUS
Status: DISCONTINUED | OUTPATIENT
Start: 2017-12-25 | End: 2017-12-27 | Stop reason: HOSPADM

## 2017-12-25 RX ADMIN — ENOXAPARIN SODIUM 40 MG: 100 INJECTION SUBCUTANEOUS at 10:17

## 2017-12-25 RX ADMIN — FUROSEMIDE 40 MG: 10 INJECTION, SOLUTION INTRAMUSCULAR; INTRAVENOUS at 06:18

## 2017-12-25 RX ADMIN — POTASSIUM CHLORIDE 20 MEQ: 1500 TABLET, EXTENDED RELEASE ORAL at 10:16

## 2017-12-25 RX ADMIN — SERTRALINE 100 MG: 100 TABLET, FILM COATED ORAL at 10:16

## 2017-12-25 RX ADMIN — CYANOCOBALAMIN TAB 500 MCG 1000 MCG: 500 TAB at 10:15

## 2017-12-25 RX ADMIN — DOXYCYCLINE 100 MG: 100 TABLET ORAL at 10:16

## 2017-12-25 RX ADMIN — HYDROCODONE BITARTRATE AND ACETAMINOPHEN 1 TABLET: 5; 325 TABLET ORAL at 20:41

## 2017-12-25 RX ADMIN — HYDROCODONE BITARTRATE AND ACETAMINOPHEN 1 TABLET: 5; 325 TABLET ORAL at 06:17

## 2017-12-25 RX ADMIN — FUROSEMIDE 60 MG: 10 INJECTION, SOLUTION INTRAMUSCULAR; INTRAVENOUS at 17:50

## 2017-12-25 RX ADMIN — DOXYCYCLINE 100 MG: 100 TABLET ORAL at 20:34

## 2017-12-25 ASSESSMENT — ENCOUNTER SYMPTOMS
NAUSEA: 0
GASTROINTESTINAL NEGATIVE: 1
RESPIRATORY NEGATIVE: 1
MUSCULOSKELETAL NEGATIVE: 1
ABDOMINAL PAIN: 0
EYES NEGATIVE: 1
FEVER: 0
VOMITING: 0
CHILLS: 0
NEUROLOGICAL NEGATIVE: 1

## 2017-12-25 ASSESSMENT — PAIN SCALES - GENERAL
PAINLEVEL_OUTOF10: 6
PAINLEVEL_OUTOF10: 5
PAINLEVEL_OUTOF10: 5
PAINLEVEL_OUTOF10: 4
PAINLEVEL_OUTOF10: 4

## 2017-12-25 NOTE — PROGRESS NOTES
Infectious Disease Progress Note    Author: Manisha Ferrari M.D. Date & Time of service: 2017  4:10 PM    Chief Complaint:  Left lower extremity cellulitis      Interval History:  72 y.o. female admitted 2017 with hip fracture then developed cellulitis   AF WBC 6.4 leg continues to improve-denies SE abx    Labs Reviewed, Medications Reviewed, Radiology Reviewed and Wound Reviewed.    Review of Systems:  Review of Systems   Constitutional: Negative for chills and fever.   Cardiovascular: Positive for leg swelling.   Gastrointestinal: Negative for abdominal pain, nausea and vomiting.   Musculoskeletal: Positive for joint pain.   All other systems reviewed and are negative.      Hemodynamics:  Temp (24hrs), Av.7 °C (98 °F), Min:36.5 °C (97.7 °F), Max:36.8 °C (98.2 °F)  Temperature: 36.6 °C (97.9 °F)  Pulse  Av.2  Min: 73  Max: 101   Blood Pressure : 154/66       Physical Exam:  Physical Exam   Constitutional: She is oriented to person, place, and time. She appears well-developed. No distress.   HENT:   Head: Normocephalic and atraumatic.   Eyes: EOM are normal. Pupils are equal, round, and reactive to light.   Neck: Neck supple.   Cardiovascular: Normal rate.    Pulmonary/Chest: Effort normal. No respiratory distress.   Abdominal: Soft. She exhibits no distension.   Musculoskeletal: She exhibits edema.   Left hip dressed   Neurological: She is alert and oriented to person, place, and time.   Skin:   Minimal erythema LLE  Decreased edema  RLE shallow ulcerstion, stasis changes   Nursing note and vitals reviewed.      Meds:    Current Facility-Administered Medications:   •  furosemide  •  doxycycline monohydrate  •  potassium chloride SA  •  vitamin D (Ergocalciferol)  •  cyanocobalamin  •  sertraline  •  senna-docusate **AND** polyethylene glycol/lytes **AND** magnesium hydroxide **AND** bisacodyl  •  Respiratory Care per Protocol  •  enalaprilat  •  labetalol  •  ondansetron  •   ondansetron  •  acetaminophen  •  morphine injection  •  tramadol  •  hydrocodone-acetaminophen  •  enoxaparin (LOVENOX) injection  •  Pharmacy Consult Request  •  benzocaine-menthol    Labs:  Recent Labs      12/22/17 0342 12/23/17 0423   WBC  6.0  6.4   RBC  2.93*  2.71*   HEMOGLOBIN  9.9*  9.3*   HEMATOCRIT  30.6*  29.0*   MCV  104.4*  107.0*   MCH  33.8*  34.3*   RDW  53.7*  53.1*   PLATELETCT  165  170   MPV  9.9  10.0   NEUTSPOLYS  66.80  63.40   LYMPHOCYTES  14.80*  21.50*   MONOCYTES  17.50*  12.60   EOSINOPHILS  0.00  0.90   BASOPHILS  0.20  0.80     Recent Labs      12/22/17 0342 12/23/17 0423 12/24/17 0329   SODIUM  136  138  133*   POTASSIUM  4.3  4.0  3.7   CHLORIDE  102  102  97   CO2  28  27  29   GLUCOSE  167*  106*  103*   BUN  17  20 20     Recent Labs      12/22/17 0342 12/23/17 0423 12/24/17   0329   ALBUMIN  2.8*   --    --    TBILIRUBIN  0.7   --    --    ALKPHOSPHAT  52   --    --    TOTPROTEIN  6.1   --    --    ALTSGPT  23   --    --    ASTSGOT  32   --    --    CREATININE  0.71  0.81  0.83       Imaging:  Dx-chest-portable (1 View)    Result Date: 12/20/2017 12/20/2017 12:42 PM HISTORY/REASON FOR EXAM: Ground-level fall with chest pain. TECHNIQUE/EXAM DESCRIPTION AND NUMBER OF VIEWS: Single portable view of the chest. COMPARISON: None FINDINGS: There is no evidence of focal consolidation or evidence of pulmonary edema. There is no pleural effusion. The heart is enlarged.     Cardiomegaly.    Dx-pelvis-1 Or 2 Views    Result Date: 12/20/2017 12/20/2017 12:42 PM HISTORY/REASON FOR EXAM:  Ground-level fall. Fell down stairs. Left hip pain.. TECHNIQUE/EXAM DESCRIPTION AND NUMBER OF VIEWS:  1 view(s) of the pelvis. COMPARISON:  None. FINDINGS: There is a comminuted left proximal femoral intervening trochanteric fracture which extends into the femoral neck. There is impaction and angulation. Osteopenia is present.     Comminuted left proximal femoral fracture which involves  the intertrochanteric area and extends into the femoral neck. There is impaction and angulation.    Dx-portable Fluoro > 1 Hour    Result Date: 2017 5:05 PM HISTORY/REASON FOR EXAM:  Left hip fracture, ORIF. TECHNIQUE/EXAM DESCRIPTION AND NUMBER OF VIEWS:  2 views of the LEFT hip. COMPARISON: None FINDINGS: There is an intramedullary nail with 2 screws projecting through the left proximal femur stabilizing the intratrochanteric fracture. Alignment is anatomic. Fluoroscopy time of 1 minute 30 seconds  was utilized by STEVIE BARR for this procedure.     Intraoperative fluoroscopic spot images as described above.    Le Venous Duplex - Dvt ([facility] Only)    Result Date: 2017   Vascular Laboratory  CONCLUSIONS  Compared to the prior study of 11/3/17.  Normal left lower extremity superficial and deep venous examination.  NANCY SANCHEZ  Exam Date:     2017 14:04  Room #:     Inpatient  Priority:     Routine  Ht (in):             Wt (lb):  Ordering Physician:        BERNA IRIZARRY  Referring Physician:       EDIE Hooks  Sonographer:               Jermain Dahl  Study Type:                Complete Unilateral  Technical Quality:         Fair  Age:    72    Gender:     F  MRN:    [id number]  :    1945      BSA:  Indications:     Edema  CPT Codes:       32169  ICD Codes:       782.3  History:         Left lower extremity swelling with erythema.  Limitations:  PROCEDURES:  Left lower extremity venous duplex imaging.  The following venous structures were evaluated: common femoral, profunda  femoral, greater saphenous, femoral, popliteal , peroneal and posterior  tibial veins.  Serial compression, augmentation maneuvers,  color and spectral Doppler  flow evaluations were performed.  FINDINGS:  Left lower extremity:  Complete color filling and compressibility with normal venous flow dynamics  including spontaneous flow, response to augmentation maneuvers, and   respiratory phasicity.  No superficial or deep venous thrombosis.  Flow was evaluated in the contralateral common femoral vein and normal  venous flow dynamics including spontaneous flow, respiratory phasic  variation and augmentation were demonstrated.  Shailesh Maradiaga M.D.  (Electronically Signed)  Final Date:      21 December 2017                   15:53    Dx-hip-complete - Unilateral 2+ Left    Result Date: 12/20/2017 12/20/2017 5:05 PM HISTORY/REASON FOR EXAM:  Left hip fracture, ORIF. TECHNIQUE/EXAM DESCRIPTION AND NUMBER OF VIEWS:  2 views of the LEFT hip. COMPARISON: None FINDINGS: There is an intramedullary nail with 2 screws projecting through the left proximal femur stabilizing the intratrochanteric fracture. Alignment is anatomic. Fluoroscopy time of 1 minute 30 seconds  was utilized by STEVIE BARR for this procedure.     Intraoperative fluoroscopic spot images as described above.    Dx-femur-2+ Left    Result Date: 12/20/2017 12/20/2017 1:01 PM HISTORY/REASON FOR EXAM:  Pain/Deformity Following Trauma GLF down some stairs. ?Left hip pain, turned out laterally, and leg shortening. TECHNIQUE/EXAM DESCRIPTION AND NUMBER OF VIEWS:  2 views of the LEFT femur. COMPARISON: None FINDINGS: Acute comminuted significantly displaced fracture of the intertrochanteric region left femur. Diffuse osseous demineralization. Moderate osteoarthritis of the left hip joint.     Acute comminuted, impacted, significantly displaced fracture of the intertrochanteric region left femur.      Micro:  Results     Procedure Component Value Units Date/Time    CULTURE WOUND W/ GRAM STAIN [[id number]] Collected:  12/20/17 0000    Order Status:  Canceled Specimen:  Other from Right Leg           Assessment:  Active Hospital Problems    Diagnosis   • *Hip fracture (CMS-HCC) [S72.009A]   • Cellulitis [L03.90]   • Essential hypertension [I10]   • B12 deficiency [E53.8]   • Anxiety [F41.9]       Plan:  Hip fracture    Acute Left  comminuted , displaced intertrochanteric fracture  POD #4 hip nailing   No new drainage     Cellulitis, LLE    Afebrile  No leukocytosis  Continue oral doxy for another 10-14 days  SE discussed     Chronic stasis ulcers, RLE  Continue wound care  Compression as tolerated  Abx as above       Discussed with internal medicine.

## 2017-12-25 NOTE — PROGRESS NOTES
Infectious Disease Progress Note    Author: Sharon Rao M.D. Date & Time of service: 2017  1:58 PM    Chief Complaint:  Left lower extremity cellulitis      Interval History:  72 y.o. female admitted 2017 with hip fracture then developed cellulitis   AF WBC 6.4 leg continues to improve-denies SE abx  17-patient says she feels better. The erythema is much better. The swelling of the left leg persists. MAXIMUM TEMPERATURE 98.7  Labs Reviewed, Medications Reviewed, Radiology Reviewed and Wound Reviewed.    Review of Systems:  Review of Systems   Constitutional: Negative for chills and fever.   Cardiovascular: Positive for leg swelling.   Gastrointestinal: Negative for abdominal pain, nausea and vomiting.   Musculoskeletal: Positive for joint pain.   All other systems reviewed and are negative.      Hemodynamics:  Temp (24hrs), Av.5 °C (97.7 °F), Min:36.1 °C (97 °F), Max:37.1 °C (98.7 °F)  Temperature: 37.1 °C (98.7 °F)  Pulse  Av.4  Min: 73  Max: 101   Blood Pressure : 134/78       Physical Exam:  Physical Exam   Constitutional: She is oriented to person, place, and time. She appears well-developed. No distress.   HENT:   Head: Normocephalic and atraumatic.   Eyes: EOM are normal. Pupils are equal, round, and reactive to light.   Neck: Neck supple.   Cardiovascular: Normal rate.    Pulmonary/Chest: Effort normal. No respiratory distress.   Abdominal: Soft. She exhibits no distension.   Musculoskeletal: She exhibits edema.   Left hip dressed   Neurological: She is alert and oriented to person, place, and time.   Skin:   Minimal erythema LLE  Decreased edema  RLE shallow ulcerstion, stasis changes   Nursing note and vitals reviewed.      Meds:    Current Facility-Administered Medications:   •  furosemide  •  doxycycline monohydrate  •  potassium chloride SA  •  vitamin D (Ergocalciferol)  •  cyanocobalamin  •  sertraline  •  senna-docusate **AND** polyethylene glycol/lytes **AND**  magnesium hydroxide **AND** bisacodyl  •  Respiratory Care per Protocol  •  enalaprilat  •  labetalol  •  ondansetron  •  ondansetron  •  acetaminophen  •  morphine injection  •  tramadol  •  hydrocodone-acetaminophen  •  enoxaparin (LOVENOX) injection  •  Pharmacy Consult Request  •  benzocaine-menthol    Labs:  Recent Labs      12/23/17 0423   WBC  6.4   RBC  2.71*   HEMOGLOBIN  9.3*   HEMATOCRIT  29.0*   MCV  107.0*   MCH  34.3*   RDW  53.1*   PLATELETCT  170   MPV  10.0   NEUTSPOLYS  63.40   LYMPHOCYTES  21.50*   MONOCYTES  12.60   EOSINOPHILS  0.90   BASOPHILS  0.80     Recent Labs      12/23/17 0423 12/24/17 0329  12/25/17   0208   SODIUM  138  133*  133*   POTASSIUM  4.0  3.7  3.6   CHLORIDE  102  97  97   CO2  27  29  29   GLUCOSE  106*  103*  113*   BUN  20  20  21     Recent Labs      12/23/17 0423 12/24/17 0329 12/25/17   0208   CREATININE  0.81  0.83  0.79       Imaging:  Dx-chest-portable (1 View)    Result Date: 12/20/2017 12/20/2017 12:42 PM HISTORY/REASON FOR EXAM: Ground-level fall with chest pain. TECHNIQUE/EXAM DESCRIPTION AND NUMBER OF VIEWS: Single portable view of the chest. COMPARISON: None FINDINGS: There is no evidence of focal consolidation or evidence of pulmonary edema. There is no pleural effusion. The heart is enlarged.     Cardiomegaly.    Dx-pelvis-1 Or 2 Views    Result Date: 12/20/2017 12/20/2017 12:42 PM HISTORY/REASON FOR EXAM:  Ground-level fall. Fell down stairs. Left hip pain.. TECHNIQUE/EXAM DESCRIPTION AND NUMBER OF VIEWS:  1 view(s) of the pelvis. COMPARISON:  None. FINDINGS: There is a comminuted left proximal femoral intervening trochanteric fracture which extends into the femoral neck. There is impaction and angulation. Osteopenia is present.     Comminuted left proximal femoral fracture which involves the intertrochanteric area and extends into the femoral neck. There is impaction and angulation.    Dx-portable Fluoro > 1 Hour    Result Date:  2017 5:05 PM HISTORY/REASON FOR EXAM:  Left hip fracture, ORIF. TECHNIQUE/EXAM DESCRIPTION AND NUMBER OF VIEWS:  2 views of the LEFT hip. COMPARISON: None FINDINGS: There is an intramedullary nail with 2 screws projecting through the left proximal femur stabilizing the intratrochanteric fracture. Alignment is anatomic. Fluoroscopy time of 1 minute 30 seconds  was utilized by STEVIE BARR for this procedure.     Intraoperative fluoroscopic spot images as described above.    Le Venous Duplex - Dvt ([facility] Only)    Result Date: 2017   Vascular Laboratory  CONCLUSIONS  Compared to the prior study of 11/3/17.  Normal left lower extremity superficial and deep venous examination.  NANCY SANCHEZ  Exam Date:     2017 14:04  Room #:     Inpatient  Priority:     Routine  Ht (in):             Wt (lb):  Ordering Physician:        BERNA IRIZARRY  Referring Physician:       [id number] Ivory  Sonographer:               Jermain Dahl  Study Type:                Complete Unilateral  Technical Quality:         Fair  Age:    72    Gender:     F  MRN:    [id number]  :    1945      BSA:  Indications:     Edema  CPT Codes:       23272  ICD Codes:       782.3  History:         Left lower extremity swelling with erythema.  Limitations:  PROCEDURES:  Left lower extremity venous duplex imaging.  The following venous structures were evaluated: common femoral, profunda  femoral, greater saphenous, femoral, popliteal , peroneal and posterior  tibial veins.  Serial compression, augmentation maneuvers,  color and spectral Doppler  flow evaluations were performed.  FINDINGS:  Left lower extremity:  Complete color filling and compressibility with normal venous flow dynamics  including spontaneous flow, response to augmentation maneuvers, and  respiratory phasicity.  No superficial or deep venous thrombosis.  Flow was evaluated in the contralateral common femoral vein and normal  venous flow  dynamics including spontaneous flow, respiratory phasic  variation and augmentation were demonstrated.  Shailesh Maradiaga M.D.  (Electronically Signed)  Final Date:      21 December 2017                   15:53    Dx-hip-complete - Unilateral 2+ Left    Result Date: 12/20/2017 12/20/2017 5:05 PM HISTORY/REASON FOR EXAM:  Left hip fracture, ORIF. TECHNIQUE/EXAM DESCRIPTION AND NUMBER OF VIEWS:  2 views of the LEFT hip. COMPARISON: None FINDINGS: There is an intramedullary nail with 2 screws projecting through the left proximal femur stabilizing the intratrochanteric fracture. Alignment is anatomic. Fluoroscopy time of 1 minute 30 seconds  was utilized by STEVIE BARR for this procedure.     Intraoperative fluoroscopic spot images as described above.    Dx-femur-2+ Left    Result Date: 12/20/2017 12/20/2017 1:01 PM HISTORY/REASON FOR EXAM:  Pain/Deformity Following Trauma GLF down some stairs. ?Left hip pain, turned out laterally, and leg shortening. TECHNIQUE/EXAM DESCRIPTION AND NUMBER OF VIEWS:  2 views of the LEFT femur. COMPARISON: None FINDINGS: Acute comminuted significantly displaced fracture of the intertrochanteric region left femur. Diffuse osseous demineralization. Moderate osteoarthritis of the left hip joint.     Acute comminuted, impacted, significantly displaced fracture of the intertrochanteric region left femur.      Micro:  Results     Procedure Component Value Units Date/Time    CULTURE WOUND W/ GRAM STAIN [[id number]] Collected:  12/20/17 0000    Order Status:  Canceled Specimen:  Other from Right Leg           Assessment:  Active Hospital Problems    Diagnosis   • *Hip fracture (CMS-[facility]) [S72.009A]   • Cellulitis [L03.90]   • Essential hypertension [I10]   • B12 deficiency [E53.8]   • Anxiety [F41.9]       Plan:  Hip fracture    Acute Left comminuted , displaced intertrochanteric fracture  POD #4 hip nailing   No new drainage     Cellulitis, LLE    Afebrile  No leukocytosis  Continue oral  doxy for 10 days  SE discussed     Chronic stasis ulcers, RLE  Continue wound care  Compression as tolerated  Abx as above       Discussed with internal medicine.

## 2017-12-25 NOTE — PROGRESS NOTES
"   Orthopaedic PA Progress Note    Interval changes: Doing well. No overnight events. Lasix for LE edema per med service    ROS - Patient denies any new issues. No chest pain, dyspnea, or fever.  Pain well controlled.    Blood pressure 134/78, pulse 87, temperature 37.1 °C (98.7 °F), resp. rate 18, height 1.702 m (5' 7.01\"), weight 91 kg (200 lb 9.9 oz), SpO2 90 %.    Patient seen and examined  OOB in chair with breakfast  No acute distress  Breathing non labored  RRR  Surgical dressing is clean, dry, and intact. Patient clearly fires tibialis anterior, EHL, and gastrocnemius/soleus. Sensation is intact to light touch throughout superficial peroneal, deep peroneal, tibial, saphenous, and sural nerve distributions. Strong and palpable 2+ dorsalis pedis and posterior tibial pulses with capillary refill less than 2 seconds. No lower leg tenderness or discomfort.    Recent Labs      12/23/17   0423   WBC  6.4   RBC  2.71*   HEMOGLOBIN  9.3*   HEMATOCRIT  29.0*   MCV  107.0*   MCH  34.3*   MCHC  32.1*   RDW  53.1*   PLATELETCT  170   MPV  10.0       Active Hospital Problems    Diagnosis   • Hip fracture (CMS-[facility]) [S72.009A]     Priority: High   • Cellulitis [L03.90]     Priority: High   • Essential hypertension [I10]     Priority: Medium   • B12 deficiency [E53.8]     Priority: Low   • Anxiety [F41.9]     Priority: Low       Assessment/Plan:  POD#5 S/P L Hip nail  Wt bearing status - AT with assist  PT/OT-initiated  Wound care:dressing change today and QOD + PRN if soaked  Drains - no  Crowley-no  Sutures/Staples out- 10-14 days post operatively  Antibiotics: oral ADOXA  DVT Prophylaxis- TEDS/SCDs/Foot pumps.   Lovenox: 40 mg daily while in hospital, then change to  PO BID at discharge x 1 month  Future Procedures - none  Case Coordination for Discharge Planning - Disposition home vs SNF per med, follow-up 10-14 days post-op with DR Peters at [facility]         "

## 2017-12-25 NOTE — PROGRESS NOTES
DATE OF SERVICE:  12/24/2017    HISTORY:  The patient is doing well this morning.  She had no new complaints   and her pain is well controlled.  She is now postop day #4 from the left hip   nail.    PHYSICAL EXAMINATION:  She has no gross sensory, motor or vascular deficits.    Dressing is clean, dry, and intact.    ASSESSMENT AND PLAN:  Postop day 3 status post left hip nail.  Activity,   weightbearing as tolerated left lower extremity.  DVT prophylaxis, mechanical   with sequential compression devices as well as pharmacologic DVT prophylaxis   with Lovenox.  Disposition is still pending at this point.  We will discuss   with the patient, physical therapy, and the family whether or not disposition   to a skilled nursing facility versus home is most appropriate.  If there are   any questions regarding discharge, please give me a call and we can arrange   for that.       ____________________________________     MD KELLEN Eduardo / LARRY    DD:  12/24/2017 11:10:46  DT:  12/24/2017 15:44:42    D#:  [id number]  Job#:  [id number]

## 2017-12-25 NOTE — PROGRESS NOTES
[facility]ist Progress Note    Date of Service: 2017    Chief Complaint  72 y.o. female admitted 2017 with hip fracture    Interval Problem Update  Pod #5 hip nailing    Afebrile    She has chronic leg edema in both legs    Left leg is swollen    Redness and warmth has improved in lle    ID MD has changd abx to po doxy    Doppler left leg negative for DVT      Consultants/Specialty  ortho    Disposition  pending        Review of Systems   Constitutional: Positive for malaise/fatigue.   Eyes: Negative.    Respiratory: Negative.    Cardiovascular: Positive for leg swelling.   Gastrointestinal: Negative.    Genitourinary: Negative.    Musculoskeletal: Negative.    Neurological: Negative.    All other systems reviewed and are negative.     Physical Exam  Laboratory/Imaging   Hemodynamics  Temp (24hrs), Av.5 °C (97.7 °F), Min:36.1 °C (97 °F), Max:37.1 °C (98.7 °F)   Temperature: 37.1 °C (98.7 °F)  Pulse  Av.4  Min: 73  Max: 101    Blood Pressure : 134/78      Respiratory      Respiration: 18, Pulse Oximetry: 90 %     Work Of Breathing / Effort: Mild  RUL Breath Sounds: Clear, RML Breath Sounds: Diminished, RLL Breath Sounds: Diminished, BHAVYA Breath Sounds: Clear, LLL Breath Sounds: Diminished    Fluids  No intake or output data in the 24 hours ending 17 0920    Nutrition  Orders Placed This Encounter   Procedures   • DIET ORDER     Standing Status:   Standing     Number of Occurrences:   1     Order Specific Question:   Diet:     Answer:   Regular [1]     Physical Exam   Constitutional: No distress.   Neck: No JVD present. No tracheal deviation present. No thyromegaly present.   Cardiovascular: Normal rate.  Exam reveals no gallop and no friction rub.    No murmur heard.  Pulmonary/Chest: Effort normal and breath sounds normal. No respiratory distress. She has no wheezes.   Abdominal: Soft. She exhibits distension. There is no tenderness. There is no rebound and no guarding.    Musculoskeletal: She exhibits edema.   rle chronic venous stasis changes    lle is warm, red+ 2 pitting edema   Neurological: She is alert. No cranial nerve deficit.   Skin: She is not diaphoretic.       Recent Labs      12/23/17   0423   WBC  6.4   RBC  2.71*   HEMOGLOBIN  9.3*   HEMATOCRIT  29.0*   MCV  107.0*   MCH  34.3*   MCHC  32.1*   RDW  53.1*   PLATELETCT  170   MPV  10.0     Recent Labs      12/23/17   0423  12/24/17   0329  12/25/17   0208   SODIUM  138  133*  133*   POTASSIUM  4.0  3.7  3.6   CHLORIDE  102  97  97   CO2  27  29  29   GLUCOSE  106*  103*  113*   BUN  20  20  21   CREATININE  0.81  0.83  0.79   CALCIUM  8.5  9.3  9.8                      Assessment/Plan     * Hip fracture (CMS-HCC)- (present on admission)   Assessment & Plan    Acute Left comminuted , displaced intertrochanteric fracture.       Pod #4 hip nailing          Cellulitis- (present on admission)   Assessment & Plan       Iv abx to po    diuresis        Essential hypertension- (present on admission)   Assessment & Plan    Controlled.          B12 deficiency- (present on admission)   Assessment & Plan    To be continued on B12 supplements        Anxiety- (present on admission)   Assessment & Plan    With history of depression.  zoloft            Crowley catheter::  No Crowley  DVT prophylaxis pharmacological::  Enoxaparin (Lovenox)      Pt and ot    Dc planning      Lasix  IV 60mg bid    Check am bmp

## 2017-12-25 NOTE — CARE PLAN
Problem: Safety  Goal: Will remain free from falls  Fall precautions in place.    Problem: Bowel/Gastric:  Goal: Will not experience complications related to bowel motility    Intervention: Assess baseline bowel pattern  +BM today      Problem: Mobility  Goal: Risk for activity intolerance will decrease    Intervention: Assess and monitor signs of activity intolerance  Pt up to chair for meals.

## 2017-12-25 NOTE — CARE PLAN
Problem: Pain Management  Goal: Pain level will decrease to patient's comfort goal  Outcome: PROGRESSING AS EXPECTED  Pain on left hip controlled with with norco    Problem: Venous Thromboembolism (VTW)/Deep Vein Thrombosis (DVT) Prevention:  Goal: Patient will participate in Venous Thrombosis (VTE)/Deep Vein Thrombosis (DVT)Prevention Measures  Outcome: PROGRESSING SLOWER THAN EXPECTED  SCDs on. Ambulates to the bathroom.

## 2017-12-26 LAB
ANION GAP SERPL CALC-SCNC: 8 MMOL/L (ref 0–11.9)
BUN SERPL-MCNC: 24 MG/DL (ref 8–22)
CALCIUM SERPL-MCNC: 10 MG/DL (ref 8.5–10.5)
CHLORIDE SERPL-SCNC: 96 MMOL/L (ref 96–112)
CO2 SERPL-SCNC: 31 MMOL/L (ref 20–33)
CREAT SERPL-MCNC: 0.82 MG/DL (ref 0.5–1.4)
GFR SERPL CREATININE-BSD FRML MDRD: >60 ML/MIN/1.73 M 2
GLUCOSE SERPL-MCNC: 114 MG/DL (ref 65–99)
POTASSIUM SERPL-SCNC: 3.4 MMOL/L (ref 3.6–5.5)
SODIUM SERPL-SCNC: 135 MMOL/L (ref 135–145)

## 2017-12-26 PROCEDURE — A9270 NON-COVERED ITEM OR SERVICE: HCPCS | Performed by: HOSPITALIST

## 2017-12-26 PROCEDURE — A9270 NON-COVERED ITEM OR SERVICE: HCPCS | Performed by: INTERNAL MEDICINE

## 2017-12-26 PROCEDURE — 700102 HCHG RX REV CODE 250 W/ 637 OVERRIDE(OP): Performed by: HOSPITALIST

## 2017-12-26 PROCEDURE — 97535 SELF CARE MNGMENT TRAINING: CPT

## 2017-12-26 PROCEDURE — 700111 HCHG RX REV CODE 636 W/ 250 OVERRIDE (IP): Performed by: ORTHOPAEDIC SURGERY

## 2017-12-26 PROCEDURE — 80048 BASIC METABOLIC PNL TOTAL CA: CPT

## 2017-12-26 PROCEDURE — 700111 HCHG RX REV CODE 636 W/ 250 OVERRIDE (IP): Performed by: HOSPITALIST

## 2017-12-26 PROCEDURE — 97530 THERAPEUTIC ACTIVITIES: CPT

## 2017-12-26 PROCEDURE — 770006 HCHG ROOM/CARE - MED/SURG/GYN SEMI*

## 2017-12-26 PROCEDURE — 36415 COLL VENOUS BLD VENIPUNCTURE: CPT

## 2017-12-26 PROCEDURE — 700102 HCHG RX REV CODE 250 W/ 637 OVERRIDE(OP): Performed by: INTERNAL MEDICINE

## 2017-12-26 PROCEDURE — 99232 SBSQ HOSP IP/OBS MODERATE 35: CPT | Performed by: INTERNAL MEDICINE

## 2017-12-26 PROCEDURE — 97116 GAIT TRAINING THERAPY: CPT

## 2017-12-26 RX ADMIN — FUROSEMIDE 60 MG: 10 INJECTION, SOLUTION INTRAMUSCULAR; INTRAVENOUS at 20:25

## 2017-12-26 RX ADMIN — HYDROCODONE BITARTRATE AND ACETAMINOPHEN 1 TABLET: 5; 325 TABLET ORAL at 17:28

## 2017-12-26 RX ADMIN — CYANOCOBALAMIN TAB 500 MCG 1000 MCG: 500 TAB at 08:46

## 2017-12-26 RX ADMIN — SERTRALINE 100 MG: 100 TABLET, FILM COATED ORAL at 08:46

## 2017-12-26 RX ADMIN — POTASSIUM CHLORIDE 20 MEQ: 1500 TABLET, EXTENDED RELEASE ORAL at 08:45

## 2017-12-26 RX ADMIN — ENALAPRILAT 1.25 MG: 1.25 INJECTION INTRAVENOUS at 04:10

## 2017-12-26 RX ADMIN — ENOXAPARIN SODIUM 40 MG: 100 INJECTION SUBCUTANEOUS at 08:46

## 2017-12-26 RX ADMIN — DOXYCYCLINE 100 MG: 100 TABLET ORAL at 20:24

## 2017-12-26 RX ADMIN — DOXYCYCLINE 100 MG: 100 TABLET ORAL at 08:46

## 2017-12-26 RX ADMIN — FUROSEMIDE 60 MG: 10 INJECTION, SOLUTION INTRAMUSCULAR; INTRAVENOUS at 05:23

## 2017-12-26 ASSESSMENT — COGNITIVE AND FUNCTIONAL STATUS - GENERAL
HELP NEEDED FOR BATHING: A LITTLE
STANDING UP FROM CHAIR USING ARMS: A LITTLE
MOVING TO AND FROM BED TO CHAIR: A LITTLE
MOBILITY SCORE: 20
DAILY ACTIVITIY SCORE: 22
SUGGESTED CMS G CODE MODIFIER DAILY ACTIVITY: CJ
SUGGESTED CMS G CODE MODIFIER MOBILITY: CJ
WALKING IN HOSPITAL ROOM: A LITTLE
CLIMB 3 TO 5 STEPS WITH RAILING: A LITTLE
DRESSING REGULAR LOWER BODY CLOTHING: A LITTLE

## 2017-12-26 ASSESSMENT — ENCOUNTER SYMPTOMS
ABDOMINAL PAIN: 0
FEVER: 0
NAUSEA: 0
RESPIRATORY NEGATIVE: 1
CHILLS: 0
NEUROLOGICAL NEGATIVE: 1
EYES NEGATIVE: 1
GASTROINTESTINAL NEGATIVE: 1
VOMITING: 0
MUSCULOSKELETAL NEGATIVE: 1

## 2017-12-26 ASSESSMENT — GAIT ASSESSMENTS
DEVIATION: BRADYKINETIC
GAIT LEVEL OF ASSIST: SUPERVISED
ASSISTIVE DEVICE: FRONT WHEEL WALKER
DISTANCE (FEET): 150

## 2017-12-26 ASSESSMENT — PAIN SCALES - GENERAL
PAINLEVEL_OUTOF10: 4
PAINLEVEL_OUTOF10: 0

## 2017-12-26 NOTE — CARE PLAN
Problem: Pain Management  Goal: Pain level will decrease to patient's comfort goal  Outcome: PROGRESSING AS EXPECTED  Patient has been able to sleep most of shift without the use of prn pain medications and has been able to ambulate as well    Problem: Communication  Goal: The ability to communicate needs accurately and effectively will improve  Outcome: PROGRESSING AS EXPECTED  Patient uses call light appropriately to communicate needs and concerns with staff    Problem: Safety  Goal: Will remain free from injury  Outcome: PROGRESSING AS EXPECTED  Patient has remained free from further injury this shift    Problem: Mobility  Goal: Risk for activity intolerance will decrease  Outcome: PROGRESSING AS EXPECTED  Deshawn has ambulated to the bathroom 2 times this shift

## 2017-12-26 NOTE — THERAPY
"Physical Therapy Treatment completed.   Bed Mobility:  Supine to Sit: Supervised  Transfers: Sit to Stand: Supervised  Gait: Level Of Assist: Supervised with Front-Wheel Walker       Plan of Care: Will benefit from Physical Therapy 5 times per week  Discharge Recommendations: Equipment: Front-Wheel Walker.     See \"Rehab Therapy-Acute\" Patient Summary Report for complete documentation.     Pt is presenting w/ improved funtional mobility. Pt is also able to demonstrate compensatory strategies to assist w/ functional mobility when unable w/ weakness of L LE. Pt is able to WB completely on the L and sequence proper gait for increased endurance and safety w/ ambulation. Pt also demonstrating improved strength w/ stairs using one HR and step thru pattern. As per initial eval, pt will benefit from HH once DC'd from acute care.  "

## 2017-12-26 NOTE — DISCHARGE PLANNING
TCN met with patient at bedside to discuss the care teams recommendation for SNF. Patient and TCN discussed SNF vs HH as patient states therapy told her they are recommending HH. Patient requested HH Choice be sent to [facility]. Patient states she lives in a town home with a flight of stairs inside. Patient lives with a room mate who can assist her most of the time. Patient was given SNF choice form in case after DC she determines she needs a higher level of care. Patient is aware of SNF admission process and options from home. SW aware patient would prefer to DC home with HH, SW to request order. TCN to follow as needed.

## 2017-12-26 NOTE — CARE PLAN
Problem: Mobility  Goal: Risk for activity intolerance will decrease    Intervention: Assess and monitor signs of activity intolerance  Pt up to chair for meals.

## 2017-12-26 NOTE — PROGRESS NOTES
[facility]ist Progress Note    Date of Service: 2017    Chief Complaint  72 y.o. female admitted 2017 with hip fracture    Interval Problem Update  Pt seen and examined Pod #4 hip nailing. Afebrile no acute events overnight    has chronic leg edema in both legs cont on lasix   Now on PO doxy as ID rec.            Consultants/Specialty  ortho  Infection disease     Disposition  pending        Review of Systems   Constitutional: Positive for malaise/fatigue.   Eyes: Negative.    Respiratory: Negative.    Cardiovascular: Positive for leg swelling.   Gastrointestinal: Negative.    Genitourinary: Negative.    Musculoskeletal: Negative.    Neurological: Negative.    All other systems reviewed and are negative.     Physical Exam  Laboratory/Imaging   Hemodynamics  Temp (24hrs), Av.4 °C (97.6 °F), Min:36.2 °C (97.2 °F), Max:36.7 °C (98.1 °F)   Temperature:  (Q8 vitals, per RN)  Pulse  Av.4  Min: 73  Max: 111    Blood Pressure : 121/55      Respiratory      Respiration: 16, Pulse Oximetry: 95 %     Work Of Breathing / Effort: Mild  RUL Breath Sounds: Clear, RML Breath Sounds: Diminished, RLL Breath Sounds: Diminished, BHAVYA Breath Sounds: Clear, LLL Breath Sounds: Diminished    Fluids  No intake or output data in the 24 hours ending 17 1346    Nutrition  Orders Placed This Encounter   Procedures   • DIET ORDER     Standing Status:   Standing     Number of Occurrences:   1     Order Specific Question:   Diet:     Answer:   Regular [1]     Physical Exam   Constitutional: No distress.   Neck: No JVD present. No tracheal deviation present. No thyromegaly present.   Cardiovascular: Normal rate.  Exam reveals no gallop and no friction rub.    No murmur heard.  Pulmonary/Chest: Effort normal and breath sounds normal. No respiratory distress. She has no wheezes.   Abdominal: Soft. She exhibits distension. There is no tenderness. There is no rebound and no guarding.   Musculoskeletal: She exhibits edema.    rle chronic venous stasis changes    lle is warm, red+ 2 pitting edema   Neurological: She is alert. No cranial nerve deficit.   Skin: She is not diaphoretic.           Recent Labs      12/24/17   0329  12/25/17   0208  12/26/17   0035   SODIUM  133*  133*  135   POTASSIUM  3.7  3.6  3.4*   CHLORIDE  97  97  96   CO2  29  29  31   GLUCOSE  103*  113*  114*   BUN  20  21  24*   CREATININE  0.83  0.79  0.82   CALCIUM  9.3  9.8  10.0                      Assessment/Plan     * Hip fracture (CMS-HCC)- (present on admission)   Assessment & Plan    Acute Left comminuted , displaced intertrochanteric fracture.   Pod #5 hip nailing          Cellulitis- (present on admission)   Assessment & Plan     Po doxy  Diuresis increased to lasix IV 60mg q12        Essential hypertension- (present on admission)   Assessment & Plan    Controlled.          B12 deficiency- (present on admission)   Assessment & Plan    To be continued on B12 supplements        Anxiety- (present on admission)   Assessment & Plan    With history of depression.  zoloft            Crowley catheter::  No Crowley  DVT prophylaxis pharmacological::  Enoxaparin (Lovenox)

## 2017-12-26 NOTE — PROGRESS NOTES
Infectious Disease Progress Note    Author: Sharon Rao M.D. Date & Time of service: 2017  2:07 PM    Chief Complaint:  Left lower extremity cellulitis      Interval History:  72 y.o. female admitted 2017 with hip fracture then developed cellulitis   AF WBC 6.4 leg continues to improve-denies SE abx  17-patient says she feels better. The erythema is much better. The swelling of the left leg persists. MAXIMUM TEMPERATURE 98.7  17-MAXIMUM TEMPERATURE 98.1. The leg pain is better  Labs Reviewed, Medications Reviewed, Radiology Reviewed and Wound Reviewed.    Review of Systems:  Review of Systems   Constitutional: Negative for chills and fever.   Cardiovascular: Positive for leg swelling.   Gastrointestinal: Negative for abdominal pain, nausea and vomiting.   Musculoskeletal: Positive for joint pain.   All other systems reviewed and are negative.      Hemodynamics:  Temp (24hrs), Av.4 °C (97.6 °F), Min:36.2 °C (97.2 °F), Max:36.7 °C (98.1 °F)  Temperature:  (Q8 vitals, per RN)  Pulse  Av.4  Min: 73  Max: 111   Blood Pressure : 121/55       Physical Exam:  Physical Exam   Constitutional: She is oriented to person, place, and time. She appears well-developed. No distress.   HENT:   Head: Normocephalic and atraumatic.   Eyes: EOM are normal. Pupils are equal, round, and reactive to light.   Neck: Neck supple.   Cardiovascular: Normal rate.    Pulmonary/Chest: Effort normal. No respiratory distress.   Abdominal: Soft. She exhibits no distension.   Musculoskeletal: She exhibits edema.   Left hip dressed   Neurological: She is alert and oriented to person, place, and time.   Skin:   Minimal erythema LLE  Decreased edema  RLE shallow ulcerstion, stasis changes   Nursing note and vitals reviewed.      Meds:    Current Facility-Administered Medications:   •  furosemide  •  doxycycline monohydrate  •  potassium chloride SA  •  vitamin D (Ergocalciferol)  •  cyanocobalamin  •   sertraline  •  senna-docusate **AND** polyethylene glycol/lytes **AND** magnesium hydroxide **AND** bisacodyl  •  Respiratory Care per Protocol  •  enalaprilat  •  labetalol  •  ondansetron  •  ondansetron  •  acetaminophen  •  morphine injection  •  tramadol  •  hydrocodone-acetaminophen  •  enoxaparin (LOVENOX) injection  •  Pharmacy Consult Request  •  benzocaine-menthol    Labs:  No results for input(s): WBC, RBC, HEMOGLOBIN, HEMATOCRIT, MCV, MCH, RDW, PLATELETCT, MPV, NEUTSPOLYS, LYMPHOCYTES, MONOCYTES, EOSINOPHILS, BASOPHILS, RBCMORPHOLO in the last 72 hours.  Recent Labs      12/24/17   0329  12/25/17   0208  12/26/17   0035   SODIUM  133*  133*  135   POTASSIUM  3.7  3.6  3.4*   CHLORIDE  97  97  96   CO2  29  29  31   GLUCOSE  103*  113*  114*   BUN  20  21  24*     Recent Labs      12/24/17   0329  12/25/17   0208  12/26/17   0035   CREATININE  0.83  0.79  0.82       Imaging:  Dx-chest-portable (1 View)    Result Date: 12/20/2017 12/20/2017 12:42 PM HISTORY/REASON FOR EXAM: Ground-level fall with chest pain. TECHNIQUE/EXAM DESCRIPTION AND NUMBER OF VIEWS: Single portable view of the chest. COMPARISON: None FINDINGS: There is no evidence of focal consolidation or evidence of pulmonary edema. There is no pleural effusion. The heart is enlarged.     Cardiomegaly.    Dx-pelvis-1 Or 2 Views    Result Date: 12/20/2017 12/20/2017 12:42 PM HISTORY/REASON FOR EXAM:  Ground-level fall. Fell down stairs. Left hip pain.. TECHNIQUE/EXAM DESCRIPTION AND NUMBER OF VIEWS:  1 view(s) of the pelvis. COMPARISON:  None. FINDINGS: There is a comminuted left proximal femoral intervening trochanteric fracture which extends into the femoral neck. There is impaction and angulation. Osteopenia is present.     Comminuted left proximal femoral fracture which involves the intertrochanteric area and extends into the femoral neck. There is impaction and angulation.    Dx-portable Fluoro > 1 Hour    Result Date: 12/20/2017 12/20/2017  5:05 PM HISTORY/REASON FOR EXAM:  Left hip fracture, ORIF. TECHNIQUE/EXAM DESCRIPTION AND NUMBER OF VIEWS:  2 views of the LEFT hip. COMPARISON: None FINDINGS: There is an intramedullary nail with 2 screws projecting through the left proximal femur stabilizing the intratrochanteric fracture. Alignment is anatomic. Fluoroscopy time of 1 minute 30 seconds  was utilized by STEVIE BARR for this procedure.     Intraoperative fluoroscopic spot images as described above.    Le Venous Duplex - Dvt ([facility] Only)    Result Date: 2017   Vascular Laboratory  CONCLUSIONS  Compared to the prior study of 11/3/17.  Normal left lower extremity superficial and deep venous examination.  NANCY SANCHEZ  Exam Date:     2017 14:04  Room #:     Inpatient  Priority:     Routine  Ht (in):             Wt (lb):  Ordering Physician:        BERNA IRIZARRY  Referring Physician:       EDIE Hooks  Sonographer:               Jermain Dahl  Study Type:                Complete Unilateral  Technical Quality:         Fair  Age:    72    Gender:     F  MRN:    [id number]  :    1945      BSA:  Indications:     Edema  CPT Codes:       79841  ICD Codes:       782.3  History:         Left lower extremity swelling with erythema.  Limitations:  PROCEDURES:  Left lower extremity venous duplex imaging.  The following venous structures were evaluated: common femoral, profunda  femoral, greater saphenous, femoral, popliteal , peroneal and posterior  tibial veins.  Serial compression, augmentation maneuvers,  color and spectral Doppler  flow evaluations were performed.  FINDINGS:  Left lower extremity:  Complete color filling and compressibility with normal venous flow dynamics  including spontaneous flow, response to augmentation maneuvers, and  respiratory phasicity.  No superficial or deep venous thrombosis.  Flow was evaluated in the contralateral common femoral vein and normal  venous flow dynamics including  spontaneous flow, respiratory phasic  variation and augmentation were demonstrated.  Shailesh Maradiaga M.D.  (Electronically Signed)  Final Date:      21 December 2017                   15:53    Dx-hip-complete - Unilateral 2+ Left    Result Date: 12/20/2017 12/20/2017 5:05 PM HISTORY/REASON FOR EXAM:  Left hip fracture, ORIF. TECHNIQUE/EXAM DESCRIPTION AND NUMBER OF VIEWS:  2 views of the LEFT hip. COMPARISON: None FINDINGS: There is an intramedullary nail with 2 screws projecting through the left proximal femur stabilizing the intratrochanteric fracture. Alignment is anatomic. Fluoroscopy time of 1 minute 30 seconds  was utilized by STEVIE BARR for this procedure.     Intraoperative fluoroscopic spot images as described above.    Dx-femur-2+ Left    Result Date: 12/20/2017 12/20/2017 1:01 PM HISTORY/REASON FOR EXAM:  Pain/Deformity Following Trauma GLF down some stairs. ?Left hip pain, turned out laterally, and leg shortening. TECHNIQUE/EXAM DESCRIPTION AND NUMBER OF VIEWS:  2 views of the LEFT femur. COMPARISON: None FINDINGS: Acute comminuted significantly displaced fracture of the intertrochanteric region left femur. Diffuse osseous demineralization. Moderate osteoarthritis of the left hip joint.     Acute comminuted, impacted, significantly displaced fracture of the intertrochanteric region left femur.      Micro:  Results     Procedure Component Value Units Date/Time    CULTURE WOUND W/ GRAM STAIN [[id number]] Collected:  12/20/17 0000    Order Status:  Canceled Specimen:  Other from Right Leg           Assessment:  Active Hospital Problems    Diagnosis   • *Hip fracture (CMS-[facility]) [S72.009A]   • Cellulitis [L03.90]   • Essential hypertension [I10]   • B12 deficiency [E53.8]   • Anxiety [F41.9]       Plan:  Hip fracture    Acute Left comminuted , displaced intertrochanteric fracture  POD #4 hip nailing   No new drainage     Cellulitis, LLE    Afebrile  No leukocytosis  Continue oral doxy for 10 days  SE  discussed     Chronic stasis ulcers, RLE  Continue wound care  Compression as tolerated  Abx as above     ID will sign off. Please call as needed  Discussed with internal medicine.

## 2017-12-26 NOTE — DISCHARGE PLANNING
Received choice form form [facility] for HH. We will need HH order and F2F before the referral can be sent.

## 2017-12-27 ENCOUNTER — HOME HEALTH ADMISSION (OUTPATIENT)
Dept: HOME HEALTH SERVICES | Facility: HOME HEALTHCARE | Age: 72
End: 2017-12-27
Payer: MEDICARE

## 2017-12-27 VITALS
HEART RATE: 97 BPM | WEIGHT: 200.62 LBS | TEMPERATURE: 97.2 F | DIASTOLIC BLOOD PRESSURE: 50 MMHG | SYSTOLIC BLOOD PRESSURE: 125 MMHG | OXYGEN SATURATION: 92 % | HEIGHT: 67 IN | RESPIRATION RATE: 16 BRPM | BODY MASS INDEX: 31.49 KG/M2

## 2017-12-27 LAB
ANION GAP SERPL CALC-SCNC: 7 MMOL/L (ref 0–11.9)
BUN SERPL-MCNC: 26 MG/DL (ref 8–22)
CALCIUM SERPL-MCNC: 9.6 MG/DL (ref 8.5–10.5)
CHLORIDE SERPL-SCNC: 96 MMOL/L (ref 96–112)
CO2 SERPL-SCNC: 31 MMOL/L (ref 20–33)
CREAT SERPL-MCNC: 0.75 MG/DL (ref 0.5–1.4)
ERYTHROCYTE [DISTWIDTH] IN BLOOD BY AUTOMATED COUNT: 49.1 FL (ref 35.9–50)
GFR SERPL CREATININE-BSD FRML MDRD: >60 ML/MIN/1.73 M 2
GLUCOSE SERPL-MCNC: 118 MG/DL (ref 65–99)
HCT VFR BLD AUTO: 28.8 % (ref 37–47)
HGB BLD-MCNC: 9.4 G/DL (ref 12–16)
MCH RBC QN AUTO: 33 PG (ref 27–33)
MCHC RBC AUTO-ENTMCNC: 32.6 G/DL (ref 33.6–35)
MCV RBC AUTO: 101.1 FL (ref 81.4–97.8)
PLATELET # BLD AUTO: 217 K/UL (ref 164–446)
PMV BLD AUTO: 9.6 FL (ref 9–12.9)
POTASSIUM SERPL-SCNC: 3.5 MMOL/L (ref 3.6–5.5)
RBC # BLD AUTO: 2.85 M/UL (ref 4.2–5.4)
SODIUM SERPL-SCNC: 134 MMOL/L (ref 135–145)
WBC # BLD AUTO: 8.4 K/UL (ref 4.8–10.8)

## 2017-12-27 PROCEDURE — 700102 HCHG RX REV CODE 250 W/ 637 OVERRIDE(OP): Performed by: HOSPITALIST

## 2017-12-27 PROCEDURE — 36415 COLL VENOUS BLD VENIPUNCTURE: CPT

## 2017-12-27 PROCEDURE — A9270 NON-COVERED ITEM OR SERVICE: HCPCS | Performed by: HOSPITALIST

## 2017-12-27 PROCEDURE — 80048 BASIC METABOLIC PNL TOTAL CA: CPT

## 2017-12-27 PROCEDURE — 700102 HCHG RX REV CODE 250 W/ 637 OVERRIDE(OP): Performed by: INTERNAL MEDICINE

## 2017-12-27 PROCEDURE — A9270 NON-COVERED ITEM OR SERVICE: HCPCS | Performed by: INTERNAL MEDICINE

## 2017-12-27 PROCEDURE — 85027 COMPLETE CBC AUTOMATED: CPT

## 2017-12-27 PROCEDURE — 700111 HCHG RX REV CODE 636 W/ 250 OVERRIDE (IP): Performed by: HOSPITALIST

## 2017-12-27 PROCEDURE — 99239 HOSP IP/OBS DSCHRG MGMT >30: CPT | Performed by: INTERNAL MEDICINE

## 2017-12-27 PROCEDURE — 700111 HCHG RX REV CODE 636 W/ 250 OVERRIDE (IP): Performed by: ORTHOPAEDIC SURGERY

## 2017-12-27 RX ORDER — DOXYCYCLINE 100 MG/1
100 TABLET ORAL EVERY 12 HOURS
Qty: 8 TAB | Refills: 0 | Status: SHIPPED | OUTPATIENT
Start: 2017-12-27 | End: 2018-02-10

## 2017-12-27 RX ORDER — POTASSIUM CHLORIDE 20 MEQ/1
20 TABLET, EXTENDED RELEASE ORAL DAILY
Qty: 60 TAB | Refills: 11 | Status: SHIPPED | OUTPATIENT
Start: 2017-12-28 | End: 2018-01-18 | Stop reason: SDUPTHER

## 2017-12-27 RX ORDER — FUROSEMIDE 20 MG/1
20 TABLET ORAL DAILY
Qty: 30 TAB | Refills: 0 | Status: SHIPPED | OUTPATIENT
Start: 2017-12-27 | End: 2018-01-18 | Stop reason: SDUPTHER

## 2017-12-27 RX ORDER — HYDROCODONE BITARTRATE AND ACETAMINOPHEN 5; 325 MG/1; MG/1
1-2 TABLET ORAL EVERY 6 HOURS PRN
Qty: 20 TAB | Refills: 0 | Status: SHIPPED | OUTPATIENT
Start: 2017-12-27 | End: 2018-01-06

## 2017-12-27 RX ADMIN — SERTRALINE 100 MG: 100 TABLET, FILM COATED ORAL at 08:40

## 2017-12-27 RX ADMIN — CYANOCOBALAMIN TAB 500 MCG 1000 MCG: 500 TAB at 08:41

## 2017-12-27 RX ADMIN — POTASSIUM CHLORIDE 20 MEQ: 1500 TABLET, EXTENDED RELEASE ORAL at 08:41

## 2017-12-27 RX ADMIN — ENOXAPARIN SODIUM 40 MG: 100 INJECTION SUBCUTANEOUS at 08:41

## 2017-12-27 RX ADMIN — FUROSEMIDE 60 MG: 10 INJECTION, SOLUTION INTRAMUSCULAR; INTRAVENOUS at 05:13

## 2017-12-27 RX ADMIN — DOXYCYCLINE 100 MG: 100 TABLET ORAL at 08:40

## 2017-12-27 ASSESSMENT — PAIN SCALES - GENERAL: PAINLEVEL_OUTOF10: 0

## 2017-12-27 NOTE — DISCHARGE PLANNING
SW met with pt at bedside to discuss HH order and IMM. Pt was provided copy of IMM and copy to chart.     Pt signed choice for [facility]. Faxed to [facility]. Pt's PCP will change after this month to Dr. Kwon at [facility].     Pt confirmed her address is [address] [place] [address] [place] [id number].

## 2017-12-27 NOTE — CARE PLAN
Problem: Mobility  Goal: Risk for activity intolerance will decrease    Intervention: Assess and monitor signs of activity intolerance  Pt up to chair

## 2017-12-27 NOTE — FACE TO FACE
Face to Face Note  -  Durable Medical Equipment    Dylan Reid M.D. - NPI: [id number]  I certify that this patient is under my care and that they have had a durable medical equipment(DME)face to face encounter by myself that meets the physician DME face-to-face encounter requirements with this patient on:    Date of encounter:   Patient:                    MRN:                       YOB: 2019  Nadege Mae  [id number]  1945     The encounter with the patient was in whole, or in part, for the following medical condition, which is the primary reason for durable medical equipment:  Post-Op Surgery    I certify that, based on my findings, the following durable medical equipment is medically necessary:  Walkers.    HOME O2 Saturation Measurements:(Values must be present for Home Oxygen orders)         ,     ,         My Clinical findings support the need for the above equipment due to:  Abnormal Gait        ------------------------------------------------------------------------------------------------------------------    Face to Face Supporting Documentation - Home Health    The encounter with this patient was in whole or in part the primary reason for home health admission.    Date of encounter:   Patient:                    MRN:                       YOB: 2019  Nadege Mae  [id number]  1945     Home health to see patient for:  Physical Therapy evaluation and treatment and Occupational therapy evaluation and treatment    Homebound evidenced status by:  Need the aid of supportive devices such as crutches, canes, wheelchairs or walkers. Leaving home must require a considerable and taxing effort. There must exist a normal inability to leave the home.    Community Physician to provide follow up care: Jorge Amador M.D.     Optional Interventions    Wound information & treatment:    Home Infusion Therapy orders:    Line/Drain/Airway:    I certify the face to face  encounter for this home care referral meets the CMS requirements and the encounter/clinical assessment with the patient was, in whole, or in part, for the medical condition(s) listed above, which is the primary reason for home health care. Based on my clinical findings: the service(s) are medically necessary, support the need for home health care, and the homebound criteria are met.  I certify that this patient has had a face to face encounter by myself.  Dylan Reid M.D. - NPI: [id number]    *Debility, frailty and advanced age in the absence of an acute deterioration or exacerbation of a condition do not qualify a patient for home health.

## 2017-12-27 NOTE — PROGRESS NOTES
Pt A&O x 4. Declines pain. Pt on RA with no shortness of breath. RLE dressing DCI; LLE dressing DCI. OOB up in chair during NOC shift. Pt uses call light appropriately. Personal belongings and call light within reach. Discussed POC, safety, and mobility; pt has no questions at this time. Bed in lowest position with treaded socks on pt. Hourly rounding in place.

## 2017-12-27 NOTE — DISCHARGE SUMMARY
CHIEF COMPLAINT ON ADMISSION  Chief Complaint   Patient presents with   • T-5000 GLF     slipped and fell on the stairs last night, hit her L leg, cannot move L leg       CODE STATUS  Full Code    HPI & HOSPITAL COURSE  This is a 72 y.o. Female who was admitted on 122/20/17 after presenting to Er after a ground level fall and found to have acute Left comminuted , displaced intertrochanteric fracture. Ortho was consulted and pt had a surgical repair  of left intertrochanteric femur fracture with intramedullary device. Pt tolerated procedure well. She was seen by physical therapy and initially  skilled was recommended. Pt strength improved on reevaluation from physical theray home health has been recommended. Pt has been hemodynamically stable she will be discharged home today with home health       DISCHARGE PROBLEM LIST  Principal Problem:    Hip fracture (CMS-HCC) POA: Yes    Cellulitis POA: Yes    Essential hypertension POA: Yes    Anxiety POA: Yes    B12 deficiency POA: Yes        FOLLOW UP  Future Appointments  Date Time Provider Department Center   2/13/2018 11:00 AM RYANN Buckley [facility] [place] HARLAN Peters M.D.  [address] [facility] [address]  [phone]    Schedule an appointment as soon as possible for a visit        MEDICATIONS ON DISCHARGE   Nadege Mae   Home Medication Instructions YOSSI:[id number]    Printed on:12/27/17 2016   Medication Information                      aspirin EC (ECOTRIN) 325 MG Tablet Delayed Response  Take 1 Tab by mouth 2 Times a Day for 31 days.             Cholecalciferol (VITAMIN D) 2000 UNIT Tab  Take 2,000 Units by mouth every day.             cyanocobalamin (VITAMIN B12) 1000 MCG Tab  Take 1 Tab by mouth every day.             doxycycline monohydrate (ADOXA) 100 MG tablet  Take 1 Tab by mouth every 12 hours.             furosemide (LASIX) 20 MG Tab  Take 1 Tab by mouth every day.             hydrALAZINE (APRESOLINE) 10 MG Tab  Take 1 Tab by mouth every 8  hours.             hydrocodone-acetaminophen (NORCO) 5-325 MG Tab per tablet  Take 1-2 Tabs by mouth every 6 hours as needed for up to 10 days.             metoprolol SR (TOPROL XL) 100 MG TABLET SR 24 HR  Take 1 Tab by mouth every day.             Omega-3 Fatty Acids (FISH OIL PO)  Take 1 Tab by mouth every day.             potassium chloride SA (KDUR) 20 MEQ Tab CR  Take 1 Tab by mouth every day.             sertraline (ZOLOFT) 100 MG Tab  TAKE 1 TAB BY MOUTH EVERY DAY.             therapeutic multivitamin-minerals (THERAGRAN-M) TABS  Take 1 Tab by mouth every day.                 DIET  Orders Placed This Encounter   Procedures   • DIET ORDER     Standing Status:   Standing     Number of Occurrences:   1     Order Specific Question:   Diet:     Answer:   Regular [1]       ACTIVITY  As tolerated.  Weight bearing as tolerated      CONSULTATIONS  Ortho; Dr. Peters     PROCEDURES  Surgical treatment of left intertrochanteric femur fracture with intramedullary device    LABORATORY  Lab Results   Component Value Date/Time    SODIUM 134 (L) 12/27/2017 02:41 AM    POTASSIUM 3.5 (L) 12/27/2017 02:41 AM    CHLORIDE 96 12/27/2017 02:41 AM    CO2 31 12/27/2017 02:41 AM    GLUCOSE 118 (H) 12/27/2017 02:41 AM    BUN 26 (H) 12/27/2017 02:41 AM    CREATININE 0.75 12/27/2017 02:41 AM        Lab Results   Component Value Date/Time    WBC 8.4 12/27/2017 02:41 AM    HEMOGLOBIN 9.4 (L) 12/27/2017 02:41 AM    HEMATOCRIT 28.8 (L) 12/27/2017 02:41 AM    PLATELETCT 217 12/27/2017 02:41 AM        Total time of the discharge process exceeds 40 minutes

## 2017-12-27 NOTE — DISCHARGE INSTRUCTIONS
Discharge Instructions      : RIGHT LEG WOUND - remove and save stocking if it is not soiled, remove abd and peel off honey colloid.  Clean with wound cleanser or normal saline.  Cut small honey colloid adhesive in half, remove backing and place this side down on wound bed, dressing can be slightly larger then size of wound.  Use moisture cream on remainder of leg.  Place ABD over wound and hold in place with tubi  E from base of toes to below knee.      Discharged to home by car with relative. Discharged via wheelchair, hospital escort: Yes.  Special equipment needed: Walker    Be sure to schedule a follow-up appointment with your primary care doctor or any specialists as instructed.     Discharge Plan:   Influenza Vaccine Indication: Patient Refuses    I understand that a diet low in cholesterol, fat, and sodium is recommended for good health. Unless I have been given specific instructions below for another diet, I accept this instruction as my diet prescription.   Other diet: regular    Special Instructions: Discharge instructions for the Orthopedic Patient    Follow up with Primary Care Physician within 2 weeks of discharge to home, regarding:  Review of medications and diagnostic testing.  Surveillance for medical complications.  Workup and treatment of osteoporosis, if appropriate.     -Is this a Joint Replacement patient? No    -Is this patient being discharged with medication to prevent blood clots?  Yes, Aspirin Aspirin, ASA oral tablets  What is this medicine?  ASPIRIN (AS pir in) is a pain reliever. It is used to treat mild pain and fever. This medicine is also used as directed by a doctor to prevent and to treat heart attacks, to prevent strokes, and to treat arthritis or inflammation.  This medicine may be used for other purposes; ask your health care provider or pharmacist if you have questions.  COMMON BRAND NAME(S): Aspir-Low, Aspir-Amber , Aspirtab , Gilbert Advanced Aspirin, Prepmatic Aspirin Extra  Strength, Gilbert Aspirin Plus, Gilbert Aspirin, Gilbert Genuine Aspirin, Gilbert Womens Aspirin , Bufferin Extra Strength, Bufferin Low Dose, Bufferin  What should I tell my health care provider before I take this medicine?  They need to know if you have any of these conditions:  -anemia  -asthma  -bleeding problems  -child with chickenpox, the flu, or other viral infection  -diabetes  -gout  -if you frequently drink alcohol containing drinks  -kidney disease  -liver disease  -low level of vitamin K  -lupus  -smoke tobacco  -stomach ulcers or other problems  -an unusual or allergic reaction to aspirin, tartrazine dye, other medicines, dyes, or preservatives  -pregnant or trying to get pregnant  -breast-feeding  How should I use this medicine?  Take this medicine by mouth with a glass of water. Follow the directions on the package or prescription label. You can take this medicine with or without food. If it upsets your stomach, take it with food. Do not take your medicine more often than directed.  Talk to your pediatrician regarding the use of this medicine in children. While this drug may be prescribed for children as young as 12 years of age for selected conditions, precautions do apply. Children and teenagers should not use this medicine to treat chicken pox or flu symptoms unless directed by a doctor.  Patients over 65 years old may have a stronger reaction and need a smaller dose.  Overdosage: If you think you have taken too much of this medicine contact a poison control center or emergency room at once.  NOTE: This medicine is only for you. Do not share this medicine with others.  What if I miss a dose?  If you are taking this medicine on a regular schedule and miss a dose, take it as soon as you can. If it is almost time for your next dose, take only that dose. Do not take double or extra doses.  What may interact with this medicine?  Do not take this medicine with any of the following  medications:  -cidofovir  -ketorolac  -probenecid  This medicine may also interact with the following medications:  -alcohol  -alendronate  -bismuth subsalicylate  -flavocoxid  -herbal supplements like feverfew, garlic, sadiq, ginkgo biloba, horse chestnut  -medicines for diabetes or glaucoma like acetazolamide, methazolamide  -medicines for gout  -medicines that treat or prevent blood clots like enoxaparin, heparin, ticlopidine, warfarin  -other aspirin and aspirin-like medicines  -NSAIDs, medicines for pain and inflammation, like ibuprofen or naproxen  -pemetrexed  -sulfinpyrazone  -varicella live vaccine  This list may not describe all possible interactions. Give your health care provider a list of all the medicines, herbs, non-prescription drugs, or dietary supplements you use. Also tell them if you smoke, drink alcohol, or use illegal drugs. Some items may interact with your medicine.  What should I watch for while using this medicine?  If you are treating yourself for pain, tell your doctor or health care professional if the pain lasts more than 10 days, if it gets worse, or if there is a new or different kind of pain. Tell your doctor if you see redness or swelling. Also, check with your doctor if you have a fever that lasts for more than 3 days. Only take this medicine to prevent heart attacks or blood clotting if prescribed by your doctor or health care professional.  Do not take aspirin or aspirin-like medicines with this medicine. Too much aspirin can be dangerous. Always read the labels carefully.  This medicine can irritate your stomach or cause bleeding problems. Do not smoke cigarettes or drink alcohol while taking this medicine. Do not lie down for 30 minutes after taking this medicine to prevent irritation to your throat.  If you are scheduled for any medical or dental procedure, tell your healthcare provider that you are taking this medicine. You may need to stop taking this medicine before the  procedure.  What side effects may I notice from receiving this medicine?  Side effects that you should report to your doctor or health care professional as soon as possible:  -allergic reactions like skin rash, itching or hives, swelling of the face, lips, or tongue  -black, tarry stools  -bloody, coffee ground-like vomit  -breathing problems  -changes in hearing, ringing in the ears  -confusion  -general ill feeling or flu-like symptoms  -pain on swallowing  -redness, blistering, peeling or loosening of the skin, including inside the mouth or nose  -trouble passing urine or change in the amount of urine  -unusual bleeding or bruising  -unusually weak or tired  -yellowing of the eyes or skin  Side effects that usually do not require medical attention (report to your doctor or health care professional if they continue or are bothersome):  -diarrhea or constipation  -nausea, vomiting  -stomach gas, heartburn  This list may not describe all possible side effects. Call your doctor for medical advice about side effects. You may report side effects to FDA at 1-023-FDA-7232.  Where should I keep my medicine?  Keep out of the reach of children.  Store at room temperature between 15 and 30 degrees C (59 and 86 degrees F). Protect from heat and moisture. Do not use this medicine if it has a strong vinegar smell. Throw away any unused medicine after the expiration date.  NOTE: This sheet is a summary. It may not cover all possible information. If you have questions about this medicine, talk to your doctor, pharmacist, or health care provider.  © 2014, Elsevier/Gold Standard. (3/10/2009 10:44:17 AM)      · Is patient discharged on Warfarin / Coumadin?   No     · Is patient Post Blood Transfusion?  No    Depression / Suicide Risk    As you are discharged from this [facility] facility, it is important to learn how to keep safe from harming yourself.    Recognize the warning signs:  · Abrupt changes in personality, positive or  negative- including increase in energy   · Giving away possessions  · Change in eating patterns- significant weight changes-  positive or negative  · Change in sleeping patterns- unable to sleep or sleeping all the time   · Unwillingness or inability to communicate  · Depression  · Unusual sadness, discouragement and loneliness  · Talk of wanting to die  · Neglect of personal appearance   · Rebelliousness- reckless behavior  · Withdrawal from people/activities they love  · Confusion- inability to concentrate     If you or a loved one observes any of these behaviors or has concerns about self-harm, here's what you can do:  · Talk about it- your feelings and reasons for harming yourself  · Remove any means that you might use to hurt yourself (examples: pills, rope, extension cords, firearm)  · Get professional help from the community (Mental Health, Substance Abuse, psychological counseling)  · Do not be alone:Call your Safe Contact- someone whom you trust who will be there for you.  · Call your local CRISIS HOTLINE 511-2873 or 285-987-7246  · Call your local Children's Mobile Crisis Response Team [place] (069) 162-1188 or www.Flashnotes  · Call the toll free National Suicide Prevention Hotlines   · National Suicide Prevention Lifeline 960-599-LYML (0184)  · National Hope Line Network 800-SUICIDE (726-5969)

## 2017-12-27 NOTE — CARE PLAN
Problem: Communication  Goal: The ability to communicate needs accurately and effectively will improve  Outcome: PROGRESSING AS EXPECTED  Reoriented pt to environment as needed; white board updated with [facility] staff and return time. Pt notified of hourly rounding and unit routine.    Appropriate signs in place at doorway for pt. Pt allowed time to ask questions; no questions at this time.    Problem: Infection  Goal: Will remain free from infection  Outcome: PROGRESSING AS EXPECTED  Standard precautions in place. Performed hand washing before and after pt contact. Educated pt on standard precautions to help prevent spread of infection/germs.

## 2017-12-27 NOTE — PROGRESS NOTES
"S:  Seen and examined.  S/P hip nail.  Doing well this morning.  No new complaints, pain controlled.    O: Blood pressure 125/50, pulse 97, temperature 36.2 °C (97.2 °F), resp. rate 16, height 1.702 m (5' 7.01\"), weight 91 kg (200 lb 9.9 oz), SpO2 92 %..    Intake/Output Summary (Last 24 hours) at 12/27/17 1441  Last data filed at 12/26/17 1920   Gross per 24 hour   Intake               40 ml   Output                0 ml   Net               40 ml   .    Operative/injured extremity examined.  Compartments soft, distal light touch sensation intact, firing EHL/TA/GS/P.  Toes warm, well-perfused.  Wound c/d/i    Recent Labs      12/27/17   0241   WBC  8.4   RBC  2.85*   HEMOGLOBIN  9.4*   HEMATOCRIT  28.8*   MCV  101.1*   MCH  33.0   MCHC  32.6*   RDW  49.1   PLATELETCT  217   MPV  9.6       A/P:    #1 s/p L hip nail  #2 BLE celluliti    Antibiotics: None required  Activity: WBAT operative extremity.  PT today.  Diet: General  DVT: Mechanical (SCDs) + Pharmacologic (Lovenox)  Dispo: D/C planning to SNF    "

## 2017-12-27 NOTE — PROGRESS NOTES
"   Orthopaedic PA Progress Note    Interval changes:Dressing changed today, anticipating going home tomorrow    ROS: denies any new issues. No chest pain, dyspnea, or fever.  Pain well controlled.    Blood pressure 133/63, pulse 97, temperature 36.7 °C (98.1 °F), resp. rate 16, height 1.702 m (5' 7.01\"), weight 91 kg (200 lb 9.9 oz), SpO2 93 %.    Patient seen and examined  No acute distress  Breathing non labored  RRR  Surgical dressings clean, dry, and intact. Patient clearly fires tibialis anterior, EHL, and gastrocnemius/soleus. Sensation is intact to light touch throughout superficial peroneal, deep peroneal, tibial, saphenous, and sural nerve distributions. Strong and palpable 2+ dorsalis pedis and posterior tibial pulses with capillary refill less than 2 seconds. No lower leg tenderness or discomfort.    Active Hospital Problems    Diagnosis   • Hip fracture (CMS-HCC) [S72.009A]     Priority: High   • Cellulitis [L03.90]     Priority: High   • Essential hypertension [I10]     Priority: Medium   • B12 deficiency [E53.8]     Priority: Low   • Anxiety [F41.9]     Priority: Low     Assessment/Plan:  POD#6 S/P L Hip nail  Wt bearing status - AT with assist  PT/OT-initiated  Wound care:dressing change today and QOD + PRN if soaked  Drains - no  C[place]ley-no  Sutures/Staples out- 10-14 days post operatively  Antibiotics: oral ADOXA  DVT Prophylaxis- TEDS/SCDs/Foot pumps.   Lovenox: 40 mg daily while in hospital, then change to  PO BID at discharge x 1 month  Future Procedures - none  Case Coordination for Discharge Planning - Disposition home vs SNF per med, follow-up 10-14 days post-op with DR Peters at [facility]    "

## 2017-12-27 NOTE — DISCHARGE PLANNING
SW met with pt at bedside. Pt has personal walker that will be brought to room by family at time of d/c.     Pt accepted to [facility] .     Bedside nurse notified.

## 2017-12-29 ENCOUNTER — HOME CARE VISIT (OUTPATIENT)
Dept: HOME HEALTH SERVICES | Facility: HOME HEALTHCARE | Age: 72
End: 2017-12-29
Payer: MEDICARE

## 2017-12-29 ENCOUNTER — PATIENT OUTREACH (OUTPATIENT)
Dept: HEALTH INFORMATION MANAGEMENT | Facility: OTHER | Age: 72
End: 2017-12-29

## 2017-12-29 ENCOUNTER — TELEPHONE (OUTPATIENT)
Dept: HEALTH INFORMATION MANAGEMENT | Facility: OTHER | Age: 72
End: 2017-12-29

## 2017-12-29 PROCEDURE — 665001 SOC-HOME HEALTH

## 2017-12-29 PROCEDURE — G0493 RN CARE EA 15 MIN HH/HOSPICE: HCPCS

## 2017-12-29 SDOH — ECONOMIC STABILITY: HOUSING INSECURITY: UNSAFE APPLIANCES: 0

## 2017-12-29 SDOH — ECONOMIC STABILITY: HOUSING INSECURITY: UNSAFE COOKING RANGE AREA: 0

## 2017-12-29 ASSESSMENT — PATIENT HEALTH QUESTIONNAIRE - PHQ9
2. FEELING DOWN, DEPRESSED, IRRITABLE, OR HOPELESS: 00
1. LITTLE INTEREST OR PLEASURE IN DOING THINGS: 00

## 2017-12-29 ASSESSMENT — ACTIVITIES OF DAILY LIVING (ADL): HOME_HEALTH_OASIS: 01

## 2017-12-29 NOTE — TELEPHONE ENCOUNTER
Medications reviewed against discharge summary. Interaction noted between doxycycline and multivitamin. It is recommended to take MVI at least 2 hours before or 4 hours after doxycycline to minimize interaction. Patient agreeable to take MVI in the afternoon since she is taking the doxycycline twice daily. Confirmed that patient is not taking the Augmentin listed on home medication list but not on discharge summary. No other clinically significant medication issues noted.     Elidia Choi, PharmD

## 2017-12-29 NOTE — PROGRESS NOTES
Received referral from [facility] for med rec. Medications reviewed against discharge summary. Interaction noted between doxycycline and multivitamin. It is recommended to take MVI at least 2 hours before or 4 hours after doxycycline to minimize interaction. Patient agreeable to take MVI in the afternoon since she is taking the doxycycline twice daily. Confirmed that patient is not taking the Augmentin listed on home medication list but not on discharge summary. No other clinically significant medication issues noted.     Elidia Choi, PharmD

## 2017-12-31 ENCOUNTER — HOME CARE VISIT (OUTPATIENT)
Dept: HOME HEALTH SERVICES | Facility: HOME HEALTHCARE | Age: 72
End: 2017-12-31
Payer: MEDICARE

## 2017-12-31 VITALS
HEART RATE: 87 BPM | RESPIRATION RATE: 18 BRPM | DIASTOLIC BLOOD PRESSURE: 60 MMHG | TEMPERATURE: 97.8 F | SYSTOLIC BLOOD PRESSURE: 109 MMHG | OXYGEN SATURATION: 94 %

## 2017-12-31 PROCEDURE — G0162 HHC RN E&M PLAN SVS, 15 MIN: HCPCS

## 2018-01-01 ASSESSMENT — ACTIVITIES OF DAILY LIVING (ADL): OASIS_M1830: 04

## 2018-01-03 ENCOUNTER — HOME CARE VISIT (OUTPATIENT)
Dept: HOME HEALTH SERVICES | Facility: HOME HEALTHCARE | Age: 73
End: 2018-01-03
Payer: MEDICARE

## 2018-01-03 VITALS
OXYGEN SATURATION: 93 % | RESPIRATION RATE: 18 BRPM | TEMPERATURE: 98.7 F | DIASTOLIC BLOOD PRESSURE: 50 MMHG | HEART RATE: 80 BPM | SYSTOLIC BLOOD PRESSURE: 110 MMHG

## 2018-01-03 PROCEDURE — A4649 SURGICAL SUPPLIES: HCPCS

## 2018-01-03 PROCEDURE — 6650300 HCR  CLEANSER 4-IN-1

## 2018-01-03 PROCEDURE — G0299 HHS/HOSPICE OF RN EA 15 MIN: HCPCS

## 2018-01-03 PROCEDURE — A4450 NON-WATERPROOF TAPE: HCPCS

## 2018-01-03 PROCEDURE — A6455 SELF-ADHER BAND >=5"/YD: HCPCS

## 2018-01-03 PROCEDURE — A6207 CONTACT LAYER >16<= 48 SQ IN: HCPCS

## 2018-01-03 PROCEDURE — A6403 STERILE GAUZE>16 <= 48 SQ IN: HCPCS

## 2018-01-03 ASSESSMENT — ENCOUNTER SYMPTOMS
VOMITING: NO
NAUSEA: NO

## 2018-01-05 ENCOUNTER — HOME CARE VISIT (OUTPATIENT)
Dept: HOME HEALTH SERVICES | Facility: HOME HEALTHCARE | Age: 73
End: 2018-01-05
Payer: MEDICARE

## 2018-01-05 VITALS
OXYGEN SATURATION: 92 % | DIASTOLIC BLOOD PRESSURE: 62 MMHG | TEMPERATURE: 99.7 F | SYSTOLIC BLOOD PRESSURE: 118 MMHG | HEART RATE: 87 BPM

## 2018-01-05 PROCEDURE — G0300 HHS/HOSPICE OF LPN EA 15 MIN: HCPCS

## 2018-01-05 PROCEDURE — G0151 HHCP-SERV OF PT,EA 15 MIN: HCPCS

## 2018-01-06 VITALS
OXYGEN SATURATION: 92 % | SYSTOLIC BLOOD PRESSURE: 118 MMHG | DIASTOLIC BLOOD PRESSURE: 62 MMHG | HEART RATE: 87 BPM | TEMPERATURE: 99.7 F | RESPIRATION RATE: 18 BRPM

## 2018-01-06 SDOH — ECONOMIC STABILITY: HOUSING INSECURITY: UNSAFE APPLIANCES: 0

## 2018-01-06 SDOH — ECONOMIC STABILITY: HOUSING INSECURITY: UNSAFE COOKING RANGE AREA: 0

## 2018-01-06 ASSESSMENT — ACTIVITIES OF DAILY LIVING (ADL)
ORAL_CARE_ASSISTANCE: 0
HOUSEKEEPING_ASSISTANCE: 6
GROOMING_ASSISTANCE: 0
TELEPHONE_ASSISTANCE: 0
LAUNDRY_ASSISTANCE: 6
TRANSPORTATION_ASSISTANCE: 6
SHOPPING_ASSISTANCE: 6
DRESSING_UB_ASSISTANCE: 0
MEAL_PREP_ASSISTANCE: 6
EATING_ASSISTANCE: 0
TOILETING_ASSISTANCE: 0

## 2018-01-06 ASSESSMENT — ENCOUNTER SYMPTOMS: RESPIRATORY SYMPTOMS COMMENTS: PT LUNGS CLEAR IN ALL FIELDS, NO ADVANTAGEOUS SOUND NOTED.

## 2018-01-08 ENCOUNTER — HOME CARE VISIT (OUTPATIENT)
Dept: HOME HEALTH SERVICES | Facility: HOME HEALTHCARE | Age: 73
End: 2018-01-08
Payer: MEDICARE

## 2018-01-08 VITALS
RESPIRATION RATE: 18 BRPM | OXYGEN SATURATION: 94 % | WEIGHT: 189 LBS | BODY MASS INDEX: 29.66 KG/M2 | HEIGHT: 67 IN | HEART RATE: 65 BPM | DIASTOLIC BLOOD PRESSURE: 74 MMHG | SYSTOLIC BLOOD PRESSURE: 130 MMHG | TEMPERATURE: 97.9 F

## 2018-01-08 PROCEDURE — G0300 HHS/HOSPICE OF LPN EA 15 MIN: HCPCS

## 2018-01-08 ASSESSMENT — ACTIVITIES OF DAILY LIVING (ADL): HOME_HEALTH_OASIS: 02

## 2018-01-09 ENCOUNTER — HOME CARE VISIT (OUTPATIENT)
Dept: HOME HEALTH SERVICES | Facility: HOME HEALTHCARE | Age: 73
End: 2018-01-09
Payer: MEDICARE

## 2018-01-09 VITALS
DIASTOLIC BLOOD PRESSURE: 64 MMHG | OXYGEN SATURATION: 91 % | HEART RATE: 70 BPM | TEMPERATURE: 97.6 F | RESPIRATION RATE: 16 BRPM | SYSTOLIC BLOOD PRESSURE: 118 MMHG | BODY MASS INDEX: 30.67 KG/M2 | WEIGHT: 195.8 LBS

## 2018-01-09 VITALS
SYSTOLIC BLOOD PRESSURE: 120 MMHG | OXYGEN SATURATION: 98 % | HEART RATE: 82 BPM | TEMPERATURE: 98.2 F | RESPIRATION RATE: 16 BRPM | DIASTOLIC BLOOD PRESSURE: 74 MMHG

## 2018-01-09 VITALS
TEMPERATURE: 98.2 F | HEART RATE: 82 BPM | SYSTOLIC BLOOD PRESSURE: 120 MMHG | DIASTOLIC BLOOD PRESSURE: 74 MMHG | OXYGEN SATURATION: 98 % | RESPIRATION RATE: 16 BRPM

## 2018-01-09 PROCEDURE — G0152 HHCP-SERV OF OT,EA 15 MIN: HCPCS

## 2018-01-09 PROCEDURE — G0151 HHCP-SERV OF PT,EA 15 MIN: HCPCS

## 2018-01-09 SDOH — ECONOMIC STABILITY: HOUSING INSECURITY: UNSAFE COOKING RANGE AREA: 0

## 2018-01-09 SDOH — ECONOMIC STABILITY: HOUSING INSECURITY: UNSAFE APPLIANCES: 0

## 2018-01-09 ASSESSMENT — ACTIVITIES OF DAILY LIVING (ADL)
DRESSING_UB_ASSISTANCE: 0
TRANSPORTATION_ASSISTANCE: 6
TOILETING_ASSISTANCE: 0
DRESSING_LB_EQUIPMENT_USED: SOCK AID
TELEPHONE_ASSISTANCE: 0
GROOMING_ASSISTANCE: 0
EATING_ASSISTANCE: 0
TOILETING_EQUIPMENT_USED: RAISED TOILET SEAT WITH ARMRESTS
HOUSEKEEPING_ASSISTANCE: 5
LAUNDRY_ASSISTANCE: 5
SHOPPING_ASSISTANCE: 5
ORAL_CARE_ASSISTANCE: 0
BATHING_ASSISTANCE: 1
DRESSING_LB_ASSISTANCE: 0
MEAL_PREP_ASSISTANCE: 0
IADLS_COMMENTS: <!--EPICS--><!--EPICE-->

## 2018-01-09 ASSESSMENT — ENCOUNTER SYMPTOMS: RESPIRATORY SYMPTOMS COMMENTS: PT LUNGS CLEAR IN ALL FIELDS, NO ADVANTAGEOUS SOUND NOTED.

## 2018-01-10 ENCOUNTER — HOME CARE VISIT (OUTPATIENT)
Dept: HOME HEALTH SERVICES | Facility: HOME HEALTHCARE | Age: 73
End: 2018-01-10
Payer: MEDICARE

## 2018-01-10 PROCEDURE — G0300 HHS/HOSPICE OF LPN EA 15 MIN: HCPCS

## 2018-01-11 ENCOUNTER — HOME CARE VISIT (OUTPATIENT)
Dept: HOME HEALTH SERVICES | Facility: HOME HEALTHCARE | Age: 73
End: 2018-01-11
Payer: MEDICARE

## 2018-01-11 PROCEDURE — G0151 HHCP-SERV OF PT,EA 15 MIN: HCPCS

## 2018-01-11 SDOH — ECONOMIC STABILITY: HOUSING INSECURITY: UNSAFE APPLIANCES: 0

## 2018-01-11 SDOH — ECONOMIC STABILITY: HOUSING INSECURITY: UNSAFE COOKING RANGE AREA: 0

## 2018-01-11 ASSESSMENT — ENCOUNTER SYMPTOMS: RESPIRATORY SYMPTOMS COMMENTS: PT LUNGS CLEAR IN ALL FIELDS, NO ADVANTAGEOUS SOUND NOTED.

## 2018-01-12 ENCOUNTER — HOME CARE VISIT (OUTPATIENT)
Dept: HOME HEALTH SERVICES | Facility: HOME HEALTHCARE | Age: 73
End: 2018-01-12
Payer: MEDICARE

## 2018-01-12 VITALS
TEMPERATURE: 98.2 F | OXYGEN SATURATION: 96 % | DIASTOLIC BLOOD PRESSURE: 56 MMHG | HEART RATE: 68 BPM | RESPIRATION RATE: 16 BRPM | SYSTOLIC BLOOD PRESSURE: 120 MMHG

## 2018-01-12 PROCEDURE — G0300 HHS/HOSPICE OF LPN EA 15 MIN: HCPCS

## 2018-01-12 PROCEDURE — G0180 MD CERTIFICATION HHA PATIENT: HCPCS | Performed by: INTERNAL MEDICINE

## 2018-01-15 ENCOUNTER — PATIENT OUTREACH (OUTPATIENT)
Dept: HEALTH INFORMATION MANAGEMENT | Facility: OTHER | Age: 73
End: 2018-01-15

## 2018-01-15 ENCOUNTER — HOME CARE VISIT (OUTPATIENT)
Dept: HOME HEALTH SERVICES | Facility: HOME HEALTHCARE | Age: 73
End: 2018-01-15
Payer: MEDICARE

## 2018-01-15 VITALS
RESPIRATION RATE: 16 BRPM | DIASTOLIC BLOOD PRESSURE: 72 MMHG | TEMPERATURE: 98.4 F | SYSTOLIC BLOOD PRESSURE: 118 MMHG | HEART RATE: 72 BPM | OXYGEN SATURATION: 95 %

## 2018-01-15 VITALS
RESPIRATION RATE: 18 BRPM | DIASTOLIC BLOOD PRESSURE: 68 MMHG | HEART RATE: 80 BPM | OXYGEN SATURATION: 99 % | SYSTOLIC BLOOD PRESSURE: 130 MMHG | TEMPERATURE: 98.2 F

## 2018-01-15 PROCEDURE — G0299 HHS/HOSPICE OF RN EA 15 MIN: HCPCS

## 2018-01-15 SDOH — ECONOMIC STABILITY: HOUSING INSECURITY: UNSAFE COOKING RANGE AREA: 0

## 2018-01-15 SDOH — ECONOMIC STABILITY: HOUSING INSECURITY: UNSAFE APPLIANCES: 0

## 2018-01-15 ASSESSMENT — ENCOUNTER SYMPTOMS
VOMITING: PT DENIES ANY EMESIS AT THIS TIME
NAUSEA: PT DENIES ANY NAUSEA AT THIS TIME
RESPIRATORY SYMPTOMS COMMENTS: PT LUNGS CLEAR IN ALL FIELDS, NO ADVANTAGEOUS SOUND NOTED.
RESPIRATORY SYMPTOMS COMMENTS: PT IS NOT EXPERIENCING ANY RESPIRATORY DISTRESS AT THIS TIME

## 2018-01-16 ENCOUNTER — HOME CARE VISIT (OUTPATIENT)
Dept: HOME HEALTH SERVICES | Facility: HOME HEALTHCARE | Age: 73
End: 2018-01-16
Payer: MEDICARE

## 2018-01-16 VITALS
TEMPERATURE: 97.8 F | HEART RATE: 82 BPM | BODY MASS INDEX: 30.17 KG/M2 | DIASTOLIC BLOOD PRESSURE: 72 MMHG | OXYGEN SATURATION: 96 % | RESPIRATION RATE: 18 BRPM | SYSTOLIC BLOOD PRESSURE: 136 MMHG | WEIGHT: 192.6 LBS

## 2018-01-16 PROCEDURE — G0151 HHCP-SERV OF PT,EA 15 MIN: HCPCS

## 2018-01-17 ENCOUNTER — HOME CARE VISIT (OUTPATIENT)
Dept: HOME HEALTH SERVICES | Facility: HOME HEALTHCARE | Age: 73
End: 2018-01-17
Payer: MEDICARE

## 2018-01-17 PROCEDURE — G0300 HHS/HOSPICE OF LPN EA 15 MIN: HCPCS

## 2018-01-18 ENCOUNTER — HOME CARE VISIT (OUTPATIENT)
Dept: HOME HEALTH SERVICES | Facility: HOME HEALTHCARE | Age: 73
End: 2018-01-18
Payer: MEDICARE

## 2018-01-18 ENCOUNTER — OFFICE VISIT (OUTPATIENT)
Dept: MEDICAL GROUP | Facility: PHYSICIAN GROUP | Age: 73
End: 2018-01-18
Payer: MEDICARE

## 2018-01-18 VITALS
RESPIRATION RATE: 17 BRPM | DIASTOLIC BLOOD PRESSURE: 74 MMHG | HEART RATE: 81 BPM | TEMPERATURE: 98.1 F | OXYGEN SATURATION: 97 % | SYSTOLIC BLOOD PRESSURE: 130 MMHG

## 2018-01-18 VITALS
HEART RATE: 86 BPM | OXYGEN SATURATION: 93 % | BODY MASS INDEX: 30.29 KG/M2 | DIASTOLIC BLOOD PRESSURE: 72 MMHG | WEIGHT: 193 LBS | HEIGHT: 67 IN | SYSTOLIC BLOOD PRESSURE: 138 MMHG | RESPIRATION RATE: 24 BRPM | TEMPERATURE: 97.9 F

## 2018-01-18 DIAGNOSIS — M79.89 SWELLING OF LOWER EXTREMITY: ICD-10-CM

## 2018-01-18 DIAGNOSIS — Z09 HOSPITAL DISCHARGE FOLLOW-UP: ICD-10-CM

## 2018-01-18 PROCEDURE — 99214 OFFICE O/P EST MOD 30 MIN: CPT | Performed by: INTERNAL MEDICINE

## 2018-01-18 PROCEDURE — G0151 HHCP-SERV OF PT,EA 15 MIN: HCPCS

## 2018-01-18 RX ORDER — FUROSEMIDE 20 MG/1
20 TABLET ORAL DAILY
Qty: 30 TAB | Refills: 1 | Status: SHIPPED | OUTPATIENT
Start: 2018-01-18 | End: 2018-05-08

## 2018-01-18 RX ORDER — POTASSIUM CHLORIDE 20 MEQ/1
20 TABLET, EXTENDED RELEASE ORAL DAILY
Qty: 60 TAB | Refills: 1 | Status: SHIPPED | OUTPATIENT
Start: 2018-01-18 | End: 2018-05-08

## 2018-01-18 NOTE — ASSESSMENT & PLAN NOTE
"Pt recently had a fall and was found to have a intertrochanteric fracture and underwent surgical treatment of left intertrochanteric femur fracture with intramedullary device by orthopedics. She is working with PT currently and was seen by orthopedics outpatient for follow up and reportedly had a follow up x-ray that she was told was \"fine\".  Pt was seen by ID in the hospital and cellulitis was deemed to be improving and she was placed on doxycycline PO which she has now finished. She reports she continues to have edema. She had a negative LLE DVT study in the hospital and one month prior had a negative RLE DVT study. She was started on lasix and potassium in the hospital and has had improvement in symptoms. She feels she is doing well and progressing well with PT. She denies chest pain, SOB, hemoptysis. She was placed on DVT prophylaxis in the hospital and discharged home on ASA for continued DVT prophylaxis per orthopedic recommendations which she remains on.   "

## 2018-01-18 NOTE — ASSESSMENT & PLAN NOTE
Pt has chronic LE edema. She states this has improved since hospitalization with lasix and potassium. She had negative DVT studies recently on each side. She reports this has not worsened since hospitalization but has actually improved. She denies chest pain, SOB, hemoptysis.

## 2018-01-18 NOTE — PROGRESS NOTES
"Subjective:   Nadege Mae is a 72 y.o. female here today for hospital discharge follow up, LE swelling    Hospital discharge follow-up  Pt recently had a fall and was found to have a intertrochanteric fracture and underwent surgical treatment of left intertrochanteric femur fracture with intramedullary device by orthopedics. She is working with PT currently and was seen by orthopedics outpatient for follow up and reportedly had a follow up x-ray that she was told was \"fine\".  Pt was seen by ID in the hospital and cellulitis was deemed to be improving and she was placed on doxycycline PO which she has now finished. She reports she continues to have edema. She had a negative LLE DVT study in the hospital and one month prior had a negative RLE DVT study. She was started on lasix and potassium in the hospital and has had improvement in symptoms. She feels she is doing well and progressing well with PT. She denies chest pain, SOB, hemoptysis. She was placed on DVT prophylaxis in the hospital and discharged home on ASA for continued DVT prophylaxis per orthopedic recommendations which she remains on.     Swelling of lower extremity  Pt has chronic LE edema. She states this has improved since hospitalization with lasix and potassium. She had negative DVT studies recently on each side. She reports this has not worsened since hospitalization but has actually improved. She denies chest pain, SOB, hemoptysis.        Current medicines (including changes today)  Current Outpatient Prescriptions   Medication Sig Dispense Refill   • furosemide (LASIX) 20 MG Tab Take 1 Tab by mouth every day. 30 Tab 1   • potassium chloride SA (KDUR) 20 MEQ Tab CR Take 1 Tab by mouth every day. 60 Tab 1   • doxycycline monohydrate (ADOXA) 100 MG tablet Take 1 Tab by mouth every 12 hours. 8 Tab 0   • aspirin EC (ECOTRIN) 325 MG Tablet Delayed Response Take 1 Tab by mouth 2 Times a Day for 31 days. 60 Tab 0   • sertraline (ZOLOFT) 100 MG Tab TAKE 1 " "TAB BY MOUTH EVERY DAY. 30 Tab 5   • cyanocobalamin (VITAMIN B12) 1000 MCG Tab Take 1 Tab by mouth every day. 30 Tab 3   • hydrALAZINE (APRESOLINE) 10 MG Tab Take 1 Tab by mouth every 8 hours. 90 Tab 0   • metoprolol SR (TOPROL XL) 100 MG TABLET SR 24 HR Take 1 Tab by mouth every day. 30 Tab 11   • Cholecalciferol (VITAMIN D) 2000 UNIT Tab Take 2,000 Units by mouth every day.     • Omega-3 Fatty Acids (FISH OIL PO) Take 1 Tab by mouth every day.     • therapeutic multivitamin-minerals (THERAGRAN-M) TABS Take 1 Tab by mouth every day.       No current facility-administered medications for this visit.      She  has a past medical history of Anxiety; Dental disorder; Generalized osteoarthritis of multiple sites (10/20/2015); Heart valve disease; Hyperlipidemia; Hypertension; and Osteoporosis. She also has no past medical history of Allergy; Diabetes; Muscle disorder; or OSTEOPOROSIS.    ROS   No chest pain, no shortness of breath     Objective:     Blood pressure 138/72, pulse 86, temperature 36.6 °C (97.9 °F), resp. rate (!) 24, height 1.702 m (5' 7\"), weight 87.5 kg (193 lb), SpO2 93 %. Body mass index is 30.23 kg/m².   Physical Exam:  Constitutional: Alert & oriented, no acute distress  Eye: Conjunctiva clear, lids normal, no discharge  ENMT: Lips without lesions, normal external nose and ears  Respiratory: Unlabored respiratory effort, lungs clear to auscultation, no wheezes, no ronchi  Cardiovascular: Normal S1, S2, no murmur  Skin: Surgical wound is clean, dry, intact, with no redness, warmth or discharge  Neuro: Using FWW  Psych: Normal mood and affect      Assessment and Plan:   The following treatment plan was discussed    1. Hospital discharge follow-up  Patient is doing well post surgery. She is progressing well with physical therapy. She remains on DVT prophylaxis with aspirin as recommended by orthopedic surgery and will complete the 30 days for a total of 37 days of DVT prophylaxis. She continues to see " orthopedic surgery for postop follow-up. She will have close follow-up in 2 weeks at which time she will be setting up care with new PCP    2. Swelling of lower extremity  Patient had negative DVT studies bilaterally. We'll have low threshold for repeat testing given recent surgery but currently patient is doing well. We'll continue Lasix and potassium  - furosemide (LASIX) 20 MG Tab; Take 1 Tab by mouth every day.  Dispense: 30 Tab; Refill: 1  - potassium chloride SA (KDUR) 20 MEQ Tab CR; Take 1 Tab by mouth every day.  Dispense: 60 Tab; Refill: 1      Followup: Return for as scheduled.    Please note that this dictation was created using voice recognition software. I have made every reasonable attempt to correct obvious errors, but I expect that there are errors of grammar and possibly content that I did not discover before finalizing the note.

## 2018-01-19 ENCOUNTER — TELEPHONE (OUTPATIENT)
Dept: MEDICAL GROUP | Facility: PHYSICIAN GROUP | Age: 73
End: 2018-01-19

## 2018-01-19 ENCOUNTER — HOME CARE VISIT (OUTPATIENT)
Dept: HOME HEALTH SERVICES | Facility: HOME HEALTHCARE | Age: 73
End: 2018-01-19
Payer: MEDICARE

## 2018-01-19 VITALS
OXYGEN SATURATION: 98 % | RESPIRATION RATE: 16 BRPM | HEART RATE: 76 BPM | TEMPERATURE: 98.1 F | DIASTOLIC BLOOD PRESSURE: 70 MMHG | SYSTOLIC BLOOD PRESSURE: 122 MMHG

## 2018-01-19 VITALS
HEART RATE: 82 BPM | RESPIRATION RATE: 18 BRPM | SYSTOLIC BLOOD PRESSURE: 136 MMHG | TEMPERATURE: 98 F | DIASTOLIC BLOOD PRESSURE: 72 MMHG | BODY MASS INDEX: 30.07 KG/M2 | WEIGHT: 192 LBS | OXYGEN SATURATION: 97 %

## 2018-01-19 PROCEDURE — G0300 HHS/HOSPICE OF LPN EA 15 MIN: HCPCS

## 2018-01-19 SDOH — ECONOMIC STABILITY: HOUSING INSECURITY: UNSAFE COOKING RANGE AREA: 0

## 2018-01-19 SDOH — ECONOMIC STABILITY: HOUSING INSECURITY: UNSAFE APPLIANCES: 0

## 2018-01-19 ASSESSMENT — ENCOUNTER SYMPTOMS
RESPIRATORY SYMPTOMS COMMENTS: PT LUNGS CLEAR IN ALL FIELDS, NO ADVANTAGEOUS SOUND NOTED.
RESPIRATORY SYMPTOMS COMMENTS: PT LUNGS CLEAR IN ALL FIELDS, NO ADVANTAGEOUS SOUND NOTED.

## 2018-01-19 NOTE — TELEPHONE ENCOUNTER
VOICEMAIL  1. Caller Name: Home Health Nurse (unable to get name connection was broken up                      Call Back Number: [phone]    2. Message: Nurse stated that the patient is experiencing neuropathy pain in her left leg and patient is not on anything for it. Nurse was asking if you can prescribe something for her.    3. Patient approves office to leave a detailed voicemail/MyChart message: N\A

## 2018-01-21 RX ORDER — GABAPENTIN 300 MG/1
300 CAPSULE ORAL DAILY
Qty: 30 CAP | Refills: 1 | Status: SHIPPED | OUTPATIENT
Start: 2018-01-21 | End: 2018-05-08

## 2018-01-22 ENCOUNTER — HOME CARE VISIT (OUTPATIENT)
Dept: HOME HEALTH SERVICES | Facility: HOME HEALTHCARE | Age: 73
End: 2018-01-22
Payer: MEDICARE

## 2018-01-22 PROCEDURE — A6403 STERILE GAUZE>16 <= 48 SQ IN: HCPCS

## 2018-01-22 PROCEDURE — A6455 SELF-ADHER BAND >=5"/YD: HCPCS

## 2018-01-22 PROCEDURE — A6214 FOAM DRG > 48 SQ IN W/BORDER: HCPCS

## 2018-01-22 PROCEDURE — G0299 HHS/HOSPICE OF RN EA 15 MIN: HCPCS

## 2018-01-22 PROCEDURE — A6253 ABSORPT DRG > 48 SQ IN W/O B: HCPCS

## 2018-01-22 ASSESSMENT — ENCOUNTER SYMPTOMS: VOMITING: NO

## 2018-01-22 NOTE — TELEPHONE ENCOUNTER
Will start low dose gabapentin. Response to this medication can be assessed at upcoming appointment pt has with new provider next month.    Jorge Amador M.D.

## 2018-01-23 ENCOUNTER — HOME CARE VISIT (OUTPATIENT)
Dept: HOME HEALTH SERVICES | Facility: HOME HEALTHCARE | Age: 73
End: 2018-01-23
Payer: MEDICARE

## 2018-01-23 VITALS
RESPIRATION RATE: 18 BRPM | HEART RATE: 78 BPM | OXYGEN SATURATION: 96 % | SYSTOLIC BLOOD PRESSURE: 158 MMHG | TEMPERATURE: 98.7 F | DIASTOLIC BLOOD PRESSURE: 82 MMHG

## 2018-01-23 VITALS
TEMPERATURE: 98.8 F | RESPIRATION RATE: 20 BRPM | SYSTOLIC BLOOD PRESSURE: 140 MMHG | BODY MASS INDEX: 29.6 KG/M2 | WEIGHT: 189 LBS | OXYGEN SATURATION: 94 % | HEART RATE: 76 BPM | DIASTOLIC BLOOD PRESSURE: 66 MMHG

## 2018-01-23 PROCEDURE — G0151 HHCP-SERV OF PT,EA 15 MIN: HCPCS

## 2018-01-23 ASSESSMENT — ENCOUNTER SYMPTOMS: NAUSEA: NO

## 2018-01-24 ENCOUNTER — HOME CARE VISIT (OUTPATIENT)
Dept: HOME HEALTH SERVICES | Facility: HOME HEALTHCARE | Age: 73
End: 2018-01-24
Payer: MEDICARE

## 2018-01-24 ENCOUNTER — HOSPITAL ENCOUNTER (OUTPATIENT)
Facility: MEDICAL CENTER | Age: 73
End: 2018-01-24
Attending: INTERNAL MEDICINE
Payer: MEDICARE

## 2018-01-24 PROCEDURE — 87070 CULTURE OTHR SPECIMN AEROBIC: CPT

## 2018-01-24 PROCEDURE — G0300 HHS/HOSPICE OF LPN EA 15 MIN: HCPCS

## 2018-01-24 PROCEDURE — 87077 CULTURE AEROBIC IDENTIFY: CPT

## 2018-01-24 PROCEDURE — 87186 SC STD MICRODIL/AGAR DIL: CPT

## 2018-01-25 ENCOUNTER — HOME CARE VISIT (OUTPATIENT)
Dept: HOME HEALTH SERVICES | Facility: HOME HEALTHCARE | Age: 73
End: 2018-01-25
Payer: MEDICARE

## 2018-01-25 VITALS
SYSTOLIC BLOOD PRESSURE: 136 MMHG | WEIGHT: 188 LBS | DIASTOLIC BLOOD PRESSURE: 74 MMHG | TEMPERATURE: 94 F | HEART RATE: 75 BPM | BODY MASS INDEX: 29.44 KG/M2 | OXYGEN SATURATION: 98 % | RESPIRATION RATE: 16 BRPM

## 2018-01-25 PROCEDURE — G0151 HHCP-SERV OF PT,EA 15 MIN: HCPCS

## 2018-01-25 SDOH — ECONOMIC STABILITY: HOUSING INSECURITY: UNSAFE COOKING RANGE AREA: 0

## 2018-01-25 SDOH — ECONOMIC STABILITY: HOUSING INSECURITY: UNSAFE APPLIANCES: 0

## 2018-01-25 ASSESSMENT — ENCOUNTER SYMPTOMS: RESPIRATORY SYMPTOMS COMMENTS: PT LUNGS CLEAR IN ALL FIELDS, NO ADVANTAGEOUS SOUND NOTED.

## 2018-01-26 ENCOUNTER — PATIENT OUTREACH (OUTPATIENT)
Dept: HEALTH INFORMATION MANAGEMENT | Facility: OTHER | Age: 73
End: 2018-01-26

## 2018-01-26 ENCOUNTER — HOME CARE VISIT (OUTPATIENT)
Dept: HOME HEALTH SERVICES | Facility: HOME HEALTHCARE | Age: 73
End: 2018-01-26
Payer: MEDICARE

## 2018-01-26 VITALS
DIASTOLIC BLOOD PRESSURE: 72 MMHG | HEART RATE: 80 BPM | SYSTOLIC BLOOD PRESSURE: 138 MMHG | RESPIRATION RATE: 17 BRPM | TEMPERATURE: 98.7 F | OXYGEN SATURATION: 95 %

## 2018-01-26 PROCEDURE — G0299 HHS/HOSPICE OF RN EA 15 MIN: HCPCS

## 2018-01-26 SDOH — ECONOMIC STABILITY: HOUSING INSECURITY: UNSAFE COOKING RANGE AREA: 0

## 2018-01-26 SDOH — ECONOMIC STABILITY: HOUSING INSECURITY: UNSAFE APPLIANCES: 0

## 2018-01-27 LAB
BACTERIA WND AEROBE CULT: ABNORMAL
BACTERIA WND AEROBE CULT: ABNORMAL
SIGNIFICANT IND 70042: ABNORMAL
SITE SITE: ABNORMAL
SOURCE SOURCE: ABNORMAL

## 2018-01-28 VITALS
OXYGEN SATURATION: 98 % | RESPIRATION RATE: 18 BRPM | TEMPERATURE: 98 F | DIASTOLIC BLOOD PRESSURE: 82 MMHG | SYSTOLIC BLOOD PRESSURE: 118 MMHG | HEART RATE: 75 BPM

## 2018-01-28 DIAGNOSIS — T14.8XXA WOUND INFECTION: ICD-10-CM

## 2018-01-28 DIAGNOSIS — L08.9 WOUND INFECTION: ICD-10-CM

## 2018-01-28 RX ORDER — CIPROFLOXACIN 750 MG/1
750 TABLET, FILM COATED ORAL 2 TIMES DAILY
Qty: 20 TAB | Refills: 0 | Status: SHIPPED | OUTPATIENT
Start: 2018-01-28 | End: 2018-05-08

## 2018-01-28 SDOH — ECONOMIC STABILITY: HOUSING INSECURITY: UNSAFE APPLIANCES: 0

## 2018-01-28 SDOH — ECONOMIC STABILITY: HOUSING INSECURITY: UNSAFE COOKING RANGE AREA: 0

## 2018-01-28 ASSESSMENT — ENCOUNTER SYMPTOMS
RESPIRATORY SYMPTOMS COMMENTS: PT IS NOT EXPERIENCING ANY RESPIRATORY DISTRESS AT THIS TIME
NAUSEA: PT DENIES ANY NAUSEA AT THIS TIME
VOMITING: PT DENIES ANY EMESIS AT THIS TIME

## 2018-01-29 ENCOUNTER — HOME CARE VISIT (OUTPATIENT)
Dept: HOME HEALTH SERVICES | Facility: HOME HEALTHCARE | Age: 73
End: 2018-01-29
Payer: MEDICARE

## 2018-01-29 ENCOUNTER — TELEPHONE (OUTPATIENT)
Dept: MEDICAL GROUP | Facility: PHYSICIAN GROUP | Age: 73
End: 2018-01-29

## 2018-01-29 VITALS
DIASTOLIC BLOOD PRESSURE: 68 MMHG | OXYGEN SATURATION: 92 % | TEMPERATURE: 98.8 F | RESPIRATION RATE: 18 BRPM | HEART RATE: 80 BPM | SYSTOLIC BLOOD PRESSURE: 100 MMHG

## 2018-01-29 PROCEDURE — A6253 ABSORPT DRG > 48 SQ IN W/O B: HCPCS

## 2018-01-29 PROCEDURE — A6455 SELF-ADHER BAND >=5"/YD: HCPCS

## 2018-01-29 PROCEDURE — A6207 CONTACT LAYER >16<= 48 SQ IN: HCPCS

## 2018-01-29 PROCEDURE — A6214 FOAM DRG > 48 SQ IN W/BORDER: HCPCS

## 2018-01-29 PROCEDURE — G0299 HHS/HOSPICE OF RN EA 15 MIN: HCPCS

## 2018-01-29 PROCEDURE — A6403 STERILE GAUZE>16 <= 48 SQ IN: HCPCS

## 2018-01-29 PROCEDURE — A6223 GAUZE >16<=48 NO W/SAL W/O B: HCPCS

## 2018-01-29 ASSESSMENT — ENCOUNTER SYMPTOMS
VOMITING: NO
NAUSEA: NO

## 2018-01-29 NOTE — PROGRESS NOTES
Nadege Mae was an emergent admission for a hip fracture. She was admitted on 12/20 and discharged on 12/27. Home Health was started on 12/29/2017 09:00 AM. She saw Jorge Peters, on 12/27/2017 09:00 AM. She received her crutches, and she will be seeing specialist on 02/13/2018 11:00 AM Chuck Kwon. Patient Advocate spoke with Nadege several times to confirm appointments and assist with scheduling. She completed all discharge instructions.

## 2018-01-31 ENCOUNTER — HOME CARE VISIT (OUTPATIENT)
Dept: HOME HEALTH SERVICES | Facility: HOME HEALTHCARE | Age: 73
End: 2018-01-31
Payer: MEDICARE

## 2018-01-31 PROCEDURE — G0300 HHS/HOSPICE OF LPN EA 15 MIN: HCPCS

## 2018-02-01 VITALS
TEMPERATURE: 98.1 F | RESPIRATION RATE: 16 BRPM | WEIGHT: 187 LBS | HEART RATE: 73 BPM | SYSTOLIC BLOOD PRESSURE: 140 MMHG | DIASTOLIC BLOOD PRESSURE: 84 MMHG | BODY MASS INDEX: 29.29 KG/M2 | OXYGEN SATURATION: 97 %

## 2018-02-01 SDOH — ECONOMIC STABILITY: HOUSING INSECURITY: UNSAFE COOKING RANGE AREA: 0

## 2018-02-01 SDOH — ECONOMIC STABILITY: HOUSING INSECURITY: UNSAFE APPLIANCES: 0

## 2018-02-01 ASSESSMENT — ENCOUNTER SYMPTOMS: RESPIRATORY SYMPTOMS COMMENTS: PT LUNGS CLEAR IN ALL FIELDS, NO ADVANTAGEOUS SOUND NOTED.

## 2018-02-01 NOTE — TELEPHONE ENCOUNTER
Phone Number Called: [phone] (home)     Message: patient notified that medication was sent to pharmacy, patient is awaiting pharmacy phone call to notify her when its ready     Left Message for patient to call back: N\A

## 2018-02-02 ENCOUNTER — HOME CARE VISIT (OUTPATIENT)
Dept: HOME HEALTH SERVICES | Facility: HOME HEALTHCARE | Age: 73
End: 2018-02-02
Payer: MEDICARE

## 2018-02-02 VITALS
HEART RATE: 75 BPM | RESPIRATION RATE: 18 BRPM | OXYGEN SATURATION: 97 % | DIASTOLIC BLOOD PRESSURE: 80 MMHG | SYSTOLIC BLOOD PRESSURE: 140 MMHG | TEMPERATURE: 98.3 F

## 2018-02-02 PROCEDURE — G0299 HHS/HOSPICE OF RN EA 15 MIN: HCPCS

## 2018-02-02 SDOH — ECONOMIC STABILITY: HOUSING INSECURITY: UNSAFE COOKING RANGE AREA: 0

## 2018-02-02 SDOH — ECONOMIC STABILITY: HOUSING INSECURITY: UNSAFE APPLIANCES: 0

## 2018-02-02 ASSESSMENT — ENCOUNTER SYMPTOMS
RESPIRATORY SYMPTOMS COMMENTS: PT IS NOT EXPERIENCING ANY RESPIRATORY DISTRESS AT THIS TIME
VOMITING: PT DENIES ANY EMESIS AT THIS TIME
NAUSEA: PT DENIES ANY NAUSEA AT THIS TIME

## 2018-02-05 ENCOUNTER — HOME CARE VISIT (OUTPATIENT)
Dept: HOME HEALTH SERVICES | Facility: HOME HEALTHCARE | Age: 73
End: 2018-02-05
Payer: MEDICARE

## 2018-02-05 VITALS
TEMPERATURE: 97.3 F | HEART RATE: 80 BPM | OXYGEN SATURATION: 95 % | SYSTOLIC BLOOD PRESSURE: 138 MMHG | DIASTOLIC BLOOD PRESSURE: 78 MMHG | RESPIRATION RATE: 16 BRPM

## 2018-02-05 PROCEDURE — G0162 HHC RN E&M PLAN SVS, 15 MIN: HCPCS

## 2018-02-05 SDOH — ECONOMIC STABILITY: HOUSING INSECURITY: UNSAFE APPLIANCES: 0

## 2018-02-05 SDOH — ECONOMIC STABILITY: HOUSING INSECURITY: UNSAFE COOKING RANGE AREA: 0

## 2018-02-05 ASSESSMENT — ACTIVITIES OF DAILY LIVING (ADL)
HOME_HEALTH_OASIS: 00
OASIS_M1830: 02

## 2018-02-10 ENCOUNTER — APPOINTMENT (OUTPATIENT)
Dept: RADIOLOGY | Facility: MEDICAL CENTER | Age: 73
DRG: 470 | End: 2018-02-10
Attending: EMERGENCY MEDICINE
Payer: MEDICARE

## 2018-02-10 ENCOUNTER — RESOLUTE PROFESSIONAL BILLING HOSPITAL PROF FEE (OUTPATIENT)
Dept: HOSPITALIST | Facility: MEDICAL CENTER | Age: 73
End: 2018-02-10
Payer: MEDICARE

## 2018-02-10 ENCOUNTER — HOSPITAL ENCOUNTER (INPATIENT)
Facility: MEDICAL CENTER | Age: 73
LOS: 4 days | DRG: 470 | End: 2018-02-14
Attending: EMERGENCY MEDICINE | Admitting: INTERNAL MEDICINE
Payer: MEDICARE

## 2018-02-10 DIAGNOSIS — S72.009A CLOSED FRACTURE OF HIP, UNSPECIFIED LATERALITY, INITIAL ENCOUNTER (HCC): ICD-10-CM

## 2018-02-10 DIAGNOSIS — S72.002A CLOSED FRACTURE OF LEFT HIP, INITIAL ENCOUNTER (HCC): ICD-10-CM

## 2018-02-10 DIAGNOSIS — L03.90 CELLULITIS, UNSPECIFIED CELLULITIS SITE: ICD-10-CM

## 2018-02-10 PROBLEM — S81.809A OPEN LEG WOUND: Status: ACTIVE | Noted: 2018-02-10

## 2018-02-10 LAB
ALBUMIN SERPL BCP-MCNC: 3.1 G/DL (ref 3.2–4.9)
ALBUMIN/GLOB SERPL: 0.8 G/DL
ALP SERPL-CCNC: 100 U/L (ref 30–99)
ALT SERPL-CCNC: 14 U/L (ref 2–50)
ANION GAP SERPL CALC-SCNC: 9 MMOL/L (ref 0–11.9)
AST SERPL-CCNC: 29 U/L (ref 12–45)
BASOPHILS # BLD AUTO: 1.7 % (ref 0–1.8)
BASOPHILS # BLD: 0.16 K/UL (ref 0–0.12)
BILIRUB SERPL-MCNC: 0.6 MG/DL (ref 0.1–1.5)
BUN SERPL-MCNC: 10 MG/DL (ref 8–22)
CALCIUM SERPL-MCNC: 9.2 MG/DL (ref 8.5–10.5)
CHLORIDE SERPL-SCNC: 101 MMOL/L (ref 96–112)
CO2 SERPL-SCNC: 23 MMOL/L (ref 20–33)
CREAT SERPL-MCNC: 0.6 MG/DL (ref 0.5–1.4)
EOSINOPHIL # BLD AUTO: 0.23 K/UL (ref 0–0.51)
EOSINOPHIL NFR BLD: 2.5 % (ref 0–6.9)
ERYTHROCYTE [DISTWIDTH] IN BLOOD BY AUTOMATED COUNT: 56.3 FL (ref 35.9–50)
GLOBULIN SER CALC-MCNC: 4.1 G/DL (ref 1.9–3.5)
GLUCOSE SERPL-MCNC: 112 MG/DL (ref 65–99)
HCT VFR BLD AUTO: 33.8 % (ref 37–47)
HGB BLD-MCNC: 10.4 G/DL (ref 12–16)
IMM GRANULOCYTES # BLD AUTO: 0.06 K/UL (ref 0–0.11)
IMM GRANULOCYTES NFR BLD AUTO: 0.7 % (ref 0–0.9)
INR PPP: 1.28 (ref 0.87–1.13)
LYMPHOCYTES # BLD AUTO: 1.52 K/UL (ref 1–4.8)
LYMPHOCYTES NFR BLD: 16.5 % (ref 22–41)
MCH RBC QN AUTO: 28.2 PG (ref 27–33)
MCHC RBC AUTO-ENTMCNC: 30.8 G/DL (ref 33.6–35)
MCV RBC AUTO: 91.6 FL (ref 81.4–97.8)
MONOCYTES # BLD AUTO: 1.04 K/UL (ref 0–0.85)
MONOCYTES NFR BLD AUTO: 11.3 % (ref 0–13.4)
NEUTROPHILS # BLD AUTO: 6.18 K/UL (ref 2–7.15)
NEUTROPHILS NFR BLD: 67.3 % (ref 44–72)
NRBC # BLD AUTO: 0 K/UL
NRBC BLD-RTO: 0 /100 WBC
PLATELET # BLD AUTO: 323 K/UL (ref 164–446)
PMV BLD AUTO: 9.8 FL (ref 9–12.9)
POTASSIUM SERPL-SCNC: 4.3 MMOL/L (ref 3.6–5.5)
PROT SERPL-MCNC: 7.2 G/DL (ref 6–8.2)
PROTHROMBIN TIME: 15.7 SEC (ref 12–14.6)
RBC # BLD AUTO: 3.69 M/UL (ref 4.2–5.4)
SODIUM SERPL-SCNC: 133 MMOL/L (ref 135–145)
WBC # BLD AUTO: 9.2 K/UL (ref 4.8–10.8)

## 2018-02-10 PROCEDURE — 99285 EMERGENCY DEPT VISIT HI MDM: CPT

## 2018-02-10 PROCEDURE — A9270 NON-COVERED ITEM OR SERVICE: HCPCS | Performed by: PHYSICIAN ASSISTANT

## 2018-02-10 PROCEDURE — 700111 HCHG RX REV CODE 636 W/ 250 OVERRIDE (IP): Performed by: INTERNAL MEDICINE

## 2018-02-10 PROCEDURE — 700102 HCHG RX REV CODE 250 W/ 637 OVERRIDE(OP): Performed by: INTERNAL MEDICINE

## 2018-02-10 PROCEDURE — 96374 THER/PROPH/DIAG INJ IV PUSH: CPT

## 2018-02-10 PROCEDURE — 700102 HCHG RX REV CODE 250 W/ 637 OVERRIDE(OP): Performed by: PHYSICIAN ASSISTANT

## 2018-02-10 PROCEDURE — 770001 HCHG ROOM/CARE - MED/SURG/GYN PRIV*

## 2018-02-10 PROCEDURE — 700111 HCHG RX REV CODE 636 W/ 250 OVERRIDE (IP): Performed by: EMERGENCY MEDICINE

## 2018-02-10 PROCEDURE — 99223 1ST HOSP IP/OBS HIGH 75: CPT | Mod: AI | Performed by: INTERNAL MEDICINE

## 2018-02-10 PROCEDURE — 85610 PROTHROMBIN TIME: CPT

## 2018-02-10 PROCEDURE — 700105 HCHG RX REV CODE 258: Performed by: INTERNAL MEDICINE

## 2018-02-10 PROCEDURE — A9270 NON-COVERED ITEM OR SERVICE: HCPCS | Performed by: INTERNAL MEDICINE

## 2018-02-10 PROCEDURE — 80053 COMPREHEN METABOLIC PANEL: CPT

## 2018-02-10 PROCEDURE — 306481 GARMENT,FOOT IMPAD VASO: Performed by: INTERNAL MEDICINE

## 2018-02-10 PROCEDURE — 85025 COMPLETE CBC W/AUTO DIFF WBC: CPT

## 2018-02-10 PROCEDURE — 73552 X-RAY EXAM OF FEMUR 2/>: CPT | Mod: LT

## 2018-02-10 PROCEDURE — 71045 X-RAY EXAM CHEST 1 VIEW: CPT

## 2018-02-10 RX ORDER — ACETAMINOPHEN 325 MG/1
650 TABLET ORAL EVERY 6 HOURS PRN
Status: DISCONTINUED | OUTPATIENT
Start: 2018-02-10 | End: 2018-02-14 | Stop reason: HOSPADM

## 2018-02-10 RX ORDER — BISACODYL 10 MG
10 SUPPOSITORY, RECTAL RECTAL
Status: DISCONTINUED | OUTPATIENT
Start: 2018-02-10 | End: 2018-02-14 | Stop reason: HOSPADM

## 2018-02-10 RX ORDER — ONDANSETRON 4 MG/1
4 TABLET, ORALLY DISINTEGRATING ORAL EVERY 4 HOURS PRN
Status: DISCONTINUED | OUTPATIENT
Start: 2018-02-10 | End: 2018-02-14 | Stop reason: HOSPADM

## 2018-02-10 RX ORDER — SODIUM CHLORIDE 9 MG/ML
INJECTION, SOLUTION INTRAVENOUS CONTINUOUS
Status: DISCONTINUED | OUTPATIENT
Start: 2018-02-10 | End: 2018-02-13

## 2018-02-10 RX ORDER — GABAPENTIN 300 MG/1
300 CAPSULE ORAL DAILY
Status: DISCONTINUED | OUTPATIENT
Start: 2018-02-11 | End: 2018-02-14 | Stop reason: HOSPADM

## 2018-02-10 RX ORDER — SERTRALINE HYDROCHLORIDE 100 MG/1
100 TABLET, FILM COATED ORAL DAILY
Status: DISCONTINUED | OUTPATIENT
Start: 2018-02-11 | End: 2018-02-14 | Stop reason: HOSPADM

## 2018-02-10 RX ORDER — METOPROLOL SUCCINATE 50 MG/1
100 TABLET, EXTENDED RELEASE ORAL DAILY
Status: DISCONTINUED | OUTPATIENT
Start: 2018-02-11 | End: 2018-02-14 | Stop reason: HOSPADM

## 2018-02-10 RX ORDER — POLYETHYLENE GLYCOL 3350 17 G/17G
1 POWDER, FOR SOLUTION ORAL
Status: DISCONTINUED | OUTPATIENT
Start: 2018-02-10 | End: 2018-02-14 | Stop reason: HOSPADM

## 2018-02-10 RX ORDER — OXYCODONE HYDROCHLORIDE 5 MG/1
5 TABLET ORAL
Status: DISCONTINUED | OUTPATIENT
Start: 2018-02-10 | End: 2018-02-14 | Stop reason: HOSPADM

## 2018-02-10 RX ORDER — HEPARIN SODIUM 5000 [USP'U]/ML
5000 INJECTION, SOLUTION INTRAVENOUS; SUBCUTANEOUS EVERY 8 HOURS
Status: DISCONTINUED | OUTPATIENT
Start: 2018-02-10 | End: 2018-02-14 | Stop reason: HOSPADM

## 2018-02-10 RX ORDER — OXYCODONE HYDROCHLORIDE 5 MG/1
5-10 TABLET ORAL
Status: DISCONTINUED | OUTPATIENT
Start: 2018-02-10 | End: 2018-02-10

## 2018-02-10 RX ORDER — OXYCODONE HYDROCHLORIDE 10 MG/1
10 TABLET ORAL
Status: DISCONTINUED | OUTPATIENT
Start: 2018-02-10 | End: 2018-02-14 | Stop reason: HOSPADM

## 2018-02-10 RX ORDER — MORPHINE SULFATE 4 MG/ML
2 INJECTION, SOLUTION INTRAMUSCULAR; INTRAVENOUS EVERY 4 HOURS PRN
Status: DISCONTINUED | OUTPATIENT
Start: 2018-02-10 | End: 2018-02-14 | Stop reason: HOSPADM

## 2018-02-10 RX ORDER — AMOXICILLIN 250 MG
2 CAPSULE ORAL 2 TIMES DAILY
Status: DISCONTINUED | OUTPATIENT
Start: 2018-02-10 | End: 2018-02-14 | Stop reason: HOSPADM

## 2018-02-10 RX ORDER — ONDANSETRON 2 MG/ML
4 INJECTION INTRAMUSCULAR; INTRAVENOUS EVERY 4 HOURS PRN
Status: DISCONTINUED | OUTPATIENT
Start: 2018-02-10 | End: 2018-02-14 | Stop reason: HOSPADM

## 2018-02-10 RX ORDER — TRAMADOL HYDROCHLORIDE 50 MG/1
100 TABLET ORAL EVERY 6 HOURS PRN
Status: DISCONTINUED | OUTPATIENT
Start: 2018-02-10 | End: 2018-02-14 | Stop reason: HOSPADM

## 2018-02-10 RX ORDER — HYDRALAZINE HYDROCHLORIDE 10 MG/1
10 TABLET, FILM COATED ORAL EVERY 8 HOURS
Status: DISCONTINUED | OUTPATIENT
Start: 2018-02-10 | End: 2018-02-10

## 2018-02-10 RX ADMIN — ACETAMINOPHEN 650 MG: 325 TABLET, FILM COATED ORAL at 21:59

## 2018-02-10 RX ADMIN — SODIUM CHLORIDE: 9 INJECTION, SOLUTION INTRAVENOUS at 16:01

## 2018-02-10 RX ADMIN — MORPHINE SULFATE 2 MG: 4 INJECTION INTRAVENOUS at 16:01

## 2018-02-10 RX ADMIN — OXYCODONE HYDROCHLORIDE 10 MG: 10 TABLET ORAL at 22:00

## 2018-02-10 RX ADMIN — HEPARIN SODIUM 5000 UNITS: 5000 INJECTION, SOLUTION INTRAVENOUS; SUBCUTANEOUS at 22:01

## 2018-02-10 RX ADMIN — FENTANYL CITRATE 50 MCG: 50 INJECTION INTRAMUSCULAR; INTRAVENOUS at 12:05

## 2018-02-10 RX ADMIN — HEPARIN SODIUM 5000 UNITS: 5000 INJECTION, SOLUTION INTRAVENOUS; SUBCUTANEOUS at 16:01

## 2018-02-10 ASSESSMENT — ENCOUNTER SYMPTOMS
VOMITING: 0
NECK PAIN: 0
EYE REDNESS: 0
SEIZURES: 0
SHORTNESS OF BREATH: 0
STRIDOR: 0
MYALGIAS: 0
SPUTUM PRODUCTION: 0
COUGH: 0
DIARRHEA: 0
HEADACHES: 0
DIZZINESS: 0
EYE PAIN: 0
PALPITATIONS: 0
ABDOMINAL PAIN: 0
INSOMNIA: 0
BACK PAIN: 0
NAUSEA: 0
HEARTBURN: 0
NERVOUS/ANXIOUS: 0
DEPRESSION: 0
BLURRED VISION: 0
FOCAL WEAKNESS: 0
CHILLS: 0
ORTHOPNEA: 0
FEVER: 0
EYE DISCHARGE: 0
WEIGHT LOSS: 0

## 2018-02-10 ASSESSMENT — PATIENT HEALTH QUESTIONNAIRE - PHQ9
SUM OF ALL RESPONSES TO PHQ QUESTIONS 1-9: 0
2. FEELING DOWN, DEPRESSED, IRRITABLE, OR HOPELESS: NOT AT ALL
SUM OF ALL RESPONSES TO PHQ9 QUESTIONS 1 AND 2: 0
1. LITTLE INTEREST OR PLEASURE IN DOING THINGS: NOT AT ALL

## 2018-02-10 ASSESSMENT — PAIN SCALES - GENERAL
PAINLEVEL_OUTOF10: 4
PAINLEVEL_OUTOF10: 7
PAINLEVEL_OUTOF10: 0
PAINLEVEL_OUTOF10: 7
PAINLEVEL_OUTOF10: 3

## 2018-02-10 ASSESSMENT — LIFESTYLE VARIABLES
ALCOHOL_USE: NO
EVER_SMOKED: YES

## 2018-02-10 NOTE — CONSULTS
Nadege Mae is a 72 y.o. female who had a left IT fracture nailed by Dr. Peters on Dec 20th.  She had been doing well, was weightbearing and back at work.  On Wednesday she started to notice some clicking in the left hip with some worsening pain.  This morning she noticed the leg was quite short.  Has moderate pain in the left hip, worse with movement and weightbearing, and improved with rest.  No numbness or tingling.  She has had a history of bilateral lower extremity wounds and cellulitis which has been improving on oral antibiotics and with wound care.  No fevers or chills or history of surgical site infection.    Past Medical History:   Diagnosis Date   • Anxiety    • Dental disorder     full dentures   • Generalized osteoarthritis of multiple sites 10/20/2015   • Heart valve disease     pt not sure    • Hyperlipidemia    • Hypertension    • Osteoporosis        Past Surgical History:   Procedure Laterality Date   • HIP NAILING INTRAMEDULLARY Left 12/20/2017    Procedure: HIP NAILING INTRAMEDULLARY;  Surgeon: Jorge Peters M.D.;  Location: SURGERY [facility];  Service: Orthopedics   • BREAST BIOPSY  8/28/2014    Performed by Magdalena Londono M.D. at SURGERY [facility]   • BREAST BIOPSY Right 8/14    benign   • GYN SURGERY  1973    complete hysterectomy   • ABDOMINAL HYSTERECTOMY TOTAL      w/BSO due to uterine cyst and endometriosis       Medications  No current facility-administered medications on file prior to encounter.      Current Outpatient Prescriptions on File Prior to Encounter   Medication Sig Dispense Refill   • ciprofloxacin (CIPRO) 750 MG Tab Take 1 Tab by mouth 2 times a day. 20 Tab 0   • gabapentin (NEURONTIN) 300 MG Cap Take 1 Cap by mouth every day. 30 Cap 1   • furosemide (LASIX) 20 MG Tab Take 1 Tab by mouth every day. 30 Tab 1   • potassium chloride SA (KDUR) 20 MEQ Tab CR Take 1 Tab by mouth every day. 60 Tab 1   • sertraline (ZOLOFT) 100 MG Tab TAKE 1 TAB BY MOUTH EVERY  "DAY. 30 Tab 5   • metoprolol SR (TOPROL XL) 100 MG TABLET SR 24 HR Take 1 Tab by mouth every day. 30 Tab 11   • Cholecalciferol (VITAMIN D) 2000 UNIT Tab Take 2,000 Units by mouth every day.     • therapeutic multivitamin-minerals (THERAGRAN-M) TABS Take 1 Tab by mouth every day.         Allergies  Patient has no known allergies.    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 rate (!) 24, height 1.702 m (5' 7\"), weight 84.8 kg (187 lb), SpO2 97 %.  General: Well Developed, Well Nourished, laying supine   HEENT: Normocephalic, atraumatic  Eyes: Anicteric   Neck: Supple, nontender, no masses  Lungs: Non labored breathing  Heart: No cyanosis.  Extremities are warm and well perfused.   Abdomen: Soft, NT, ND  Pelvis: Stable to AP and Lateral Compression  Skin: residual wounds on both lower extremities, currently without significant cellulitis or purulence.  Hip nail wounds are well healed.  Extremities: Left hip has pain and crepitance with ROM.  Other extremities have wnl strength and ROM.  Neuro:  SILT DP/SP/Tib.  Fires AT/EHL/GSC  Vascular: " Capillary refill <2 seconds    Radiographs:  DX-FEMUR-2+ LEFT   Final Result      1.  Refracture of the superior and lateral aspect of the intertrochanteric fracture of the left femur with displacement of the 2 femoral neck screws. Superior displacement of the greater trochanteric fracture fragment.      2.  No other femoral fracture identified.      3.  No femoral head dislocation is present.      DX-CHEST-PORTABLE (1 VIEW)   Final Result      1.  No focal consolidation.      2.  Subacute appearing fractures of the posterior lateral aspects of the right 4th and 5th ribs.          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  Discussed the risks.  She is particularly at high risk of infection due to cellulitis and wounds.  -Surgery tomorrow morning  -NPO after midnight and hold anticoags  -Nasal mupirocen while inpatient  -Type and cross

## 2018-02-10 NOTE — H&P
Hospital Medicine History and Physical      Date of Service  2/10/2018    Chief Complaint  Chief Complaint   Patient presents with   • GLF   • Hip Pain       History of Presenting Illness  Tu is a 72 y.o. female PMH of left hip replacement, who presents with left hip pain since Wednesday. She stated that on Wednesdays while she was doing grocery shopping she suddenly felt a click in her left hip. Since then she has been having left hip pain for the past few day. Today she noticed that her left leg is shorter than the right right, and she has been having difficulty with walking. she decided to come to ER with she had imaging done and found to have refracture of left femur with displacement onto femur neck screws. Also superior displacement of the greater trochanter fragment. Surgery was consulted.    Primary Care Physician  Jorge Amador M.D.      Code Status  Full code    Review of Systems  Review of Systems   Constitutional: Negative for chills, fever and weight loss.   HENT: Negative for congestion and nosebleeds.    Eyes: Negative for blurred vision, pain, discharge and redness.   Respiratory: Negative for cough, sputum production, shortness of breath and stridor.    Cardiovascular: Negative for chest pain, palpitations and orthopnea.   Gastrointestinal: Negative for abdominal pain, diarrhea, heartburn, nausea and vomiting.   Genitourinary: Negative for dysuria, frequency and urgency.   Musculoskeletal: Positive for joint pain. Negative for back pain, myalgias and neck pain.   Skin: Negative for itching and rash.   Neurological: Negative for dizziness, focal weakness, seizures and headaches.   Psychiatric/Behavioral: Negative for depression. The patient is not nervous/anxious and does not have insomnia.      Please see HPI, all other systems were reviewed and are negative (AMA/CMS criteria)     Past Medical History  Past Medical History:   Diagnosis Date   • Generalized osteoarthritis of multiple sites  10/20/2015   • Anxiety    • Dental disorder     full dentures   • Heart valve disease     pt not sure    • Hyperlipidemia    • Hypertension    • Osteoporosis        Surgical History  Past Surgical History:   Procedure Laterality Date   • HIP NAILING INTRAMEDULLARY Left 12/20/2017    Procedure: HIP NAILING INTRAMEDULLARY;  Surgeon: Jorge Peters M.D.;  Location: SURGERY [facility];  Service: Orthopedics   • BREAST BIOPSY  8/28/2014    Performed by Magdalena Londono M.D. at SURGERY [facility]   • BREAST BIOPSY Right 8/14    benign   • GYN SURGERY  1973    complete hysterectomy   • ABDOMINAL HYSTERECTOMY TOTAL      w/BSO due to uterine cyst and endometriosis       Medications  No current facility-administered medications on file prior to encounter.      Current Outpatient Prescriptions on File Prior to Encounter   Medication Sig Dispense Refill   • silver sulfADIAZINE (SILVADENE) 1 % Cream Apply to a thickness of one sixteenth inch once or twice daily until resolution of wound or as directed by wound care provider 1 Each 2   • ciprofloxacin (CIPRO) 750 MG Tab Take 1 Tab by mouth 2 times a day. 20 Tab 0   • gabapentin (NEURONTIN) 300 MG Cap Take 1 Cap by mouth every day. 30 Cap 1   • furosemide (LASIX) 20 MG Tab Take 1 Tab by mouth every day. 30 Tab 1   • potassium chloride SA (KDUR) 20 MEQ Tab CR Take 1 Tab by mouth every day. 60 Tab 1   • doxycycline monohydrate (ADOXA) 100 MG tablet Take 1 Tab by mouth every 12 hours. 8 Tab 0   • sertraline (ZOLOFT) 100 MG Tab TAKE 1 TAB BY MOUTH EVERY DAY. 30 Tab 5   • cyanocobalamin (VITAMIN B12) 1000 MCG Tab Take 1 Tab by mouth every day. 30 Tab 3   • hydrALAZINE (APRESOLINE) 10 MG Tab Take 1 Tab by mouth every 8 hours. 90 Tab 0   • metoprolol SR (TOPROL XL) 100 MG TABLET SR 24 HR Take 1 Tab by mouth every day. 30 Tab 11   • Cholecalciferol (VITAMIN D) 2000 UNIT Tab Take 2,000 Units by mouth every day.     • Omega-3 Fatty Acids (FISH OIL PO) Take 1 Tab by mouth every  day.     • therapeutic multivitamin-minerals (THERAGRAN-M) TABS Take 1 Tab by mouth every day.       Family History  Family History   Problem Relation Age of Onset   • GI Mother      colostomy   • Heart Attack Father    • Diabetes Father    • Hypertension Father    • Psychiatry Brother      bipolar disorder         Social History  Social History   Substance Use Topics   • Smoking status: Former Smoker     Packs/day: 0.50     Years: 25.00     Types: Cigarettes     Quit date: 2007   • Smokeless tobacco: Never Used   • Alcohol use No       Allergies  No Known Allergies     Physical Exam  Laboratory   Hemodynamics  Temp (24hrs), Av.1 °C (97 °F), Min:36.1 °C (97 °F), Max:36.1 °C (97 °F)   Temperature: 36.1 °C (97 °F)  Pulse  Av  Min: 94  Max: 94    Blood Pressure : 124/59      Respiratory      Respiration: 16             Physical Exam   Constitutional: She is oriented to person, place, and time. No distress.   HENT:   Head: Normocephalic and atraumatic.   Mouth/Throat: Oropharynx is clear and moist.   Eyes: Conjunctivae and EOM are normal. Pupils are equal, round, and reactive to light.   Neck: Normal range of motion. Neck supple. No tracheal deviation present. No thyromegaly present.   Cardiovascular: Normal rate and regular rhythm.    No murmur heard.  Pulmonary/Chest: Effort normal and breath sounds normal. No respiratory distress. She has no wheezes.   Abdominal: Soft. Bowel sounds are normal. She exhibits no distension. There is no tenderness.   Musculoskeletal: She exhibits tenderness. She exhibits no edema.   Left hip pain  Bilateral lower leg wound   Neurological: She is alert and oriented to person, place, and time. No cranial nerve deficit.   Skin: Skin is warm and dry. She is not diaphoretic. No erythema.   Psychiatric: She has a normal mood and affect. Her behavior is normal. Thought content normal.       Recent Labs      02/10/18   1131   WBC  9.2   RBC  3.69*   HEMOGLOBIN  10.4*   HEMATOCRIT   33.8*   MCV  91.6   MCH  28.2   MCHC  30.8*   RDW  56.3*   PLATELETCT  323   MPV  9.8     Recent Labs      02/10/18   1131   SODIUM  133*   POTASSIUM  4.3   CHLORIDE  101   CO2  23   GLUCOSE  112*   BUN  10   CREATININE  0.60   CALCIUM  9.2     Recent Labs      02/10/18   1131   ALTSGPT  14   ASTSGOT  29   ALKPHOSPHAT  100*   TBILIRUBIN  0.6   GLUCOSE  112*     Recent Labs      02/10/18   1131   INR  1.28*             No results found for: TROPONINI    Imaging  DX-FEMUR-2+ LEFT   Final Result      1.  Refracture of the superior and lateral aspect of the intertrochanteric fracture of the left femur with displacement of the 2 femoral neck screws. Superior displacement of the greater trochanteric fracture fragment.      2.  No other femoral fracture identified.      3.  No femoral head dislocation is present.      DX-CHEST-PORTABLE (1 VIEW)   Final Result      1.  No focal consolidation.      2.  Subacute appearing fractures of the posterior lateral aspects of the right 4th and 5th ribs.             Assessment/Plan     I anticipate this patient will require at least two midnights for appropriate medical management, necessitating inpatient admission.    Closed left hip fracture (CMS-HCC)- (present on admission)   Assessment & Plan    Xray: refracture of left femur with displacement onto femur neck screws. Also superior displacement of the greater trochanter fragment.  NPO  Pain management  Surgery consulted  PTOT        Essential hypertension- (present on admission)   Assessment & Plan    Stable        Open leg wound- (present on admission)   Assessment & Plan    Bilaterally with left-sided worse than right-sided  History of Pseudomonas on wound culture  Currently taking ciprofloxacin and her last dose is tomorrow  Still draining greenish discharge on the left side  Wound care  Started on IV Zosyn              Prophylaxis:  sc heparin

## 2018-02-10 NOTE — ASSESSMENT & PLAN NOTE
2/12: s/p L hip arthroplast, POD #3, doing well currently  Ortho Dr. Vazquez  Pain control, no adjustments needed at this time, all cultures remain negative  Pt/ot  -transition to po ciprofloxacin today

## 2018-02-10 NOTE — ED TRIAGE NOTES
"Pt arrived via [facility] from home. Per EMS pt had GLF today landing on R side of body. Pt states chair slipped out from under her. Pt states recent surgery to L femur (12/20/2017) from GLF. States 4 days ago felt L upper leg \"clicking\" but denied pain. Pt states the next morning she woke up and was unable to ambulate d/t pain.     +shortening noted to L leg. Pt has bilateral dressing to LE's r/t cellulitis. States she was having wound care at home but states last wound care visit was last Monday. [facility] home health RN is trying to get her wound care here at [facility].     Pt denies pain at this time. A/o x4, speaking in full sentences.   "

## 2018-02-10 NOTE — ED PROVIDER NOTES
ED Provider Note    Scribed for Katt Baeza M.D. by Sincere Ellis. 2/10/2018, 11:10 AM.    Primary care provider: Jorge Amador M.D.  Means of arrival: EMS   History obtained from: Patient and .  History limited by: None    CHIEF COMPLAINT  Chief Complaint   Patient presents with   • GLF   • Hip Pain       HPI  Nadege Mae is a 72 y.o. female who presents to the Emergency Department complaining of left hip pain. On 12/20/2017 the patient received a left  Intramedullary hip nailing performed by Dr. Peters, Orthopedics. The patient was feeling fine until three days ago when she noticed a clicking sound and sensation in her left hip after she returned home from work. She was able to ambulate to her room and experienced no pain prior to falling asleep. The next morning she could not bear weight on her left lower extremity secondary to pain. Also, the patient fell from her desk chair this morning and struck her right hip on the ground. She denies any right hip pain. Patient has been treated for lower extremity cellulitis for the last year with at-home Wound Care. Patient was cleared to go back to work three days ago. She did not receive home Wound Care this week because she has been cleared for work. She is being treated with regular ciprofloxacin for her cellulitis. Patient reports associated diarrhea and nausea that began two days ago. She is normally on her feet often while at work.    REVIEW OF SYSTEMS  Pertinent positives include left hip pain, falls, diarrhea, nausea, and clicking sound and sensation. Pertinent negatives include no right hip pain. All other systems reviewed and negative.   C    PAST MEDICAL HISTORY   has a past medical history of Anxiety; Dental disorder; Generalized osteoarthritis of multiple sites (10/20/2015); Heart valve disease; Hyperlipidemia; Hypertension; and Osteoporosis.    SURGICAL HISTORY   has a past surgical history that includes gyn surgery (1973);  "breast biopsy (8/28/2014); abdominal hysterectomy total; breast biopsy (Right, 8/14); and hip nailing intramedullary (Left, 12/20/2017).    SOCIAL HISTORY  Social History   Substance Use Topics   • Smoking status: Former Smoker     Packs/day: 0.50     Years: 25.00     Types: Cigarettes     Quit date: 1/1/2007   • Smokeless tobacco: Never Used   • Alcohol use No      History   Drug Use No       FAMILY HISTORY  Family History   Problem Relation Age of Onset   • GI Mother      colostomy   • Heart Attack Father    • Diabetes Father    • Hypertension Father    • Psychiatry Brother      bipolar disorder       CURRENT MEDICATIONS  Home Medications     Reviewed by Christina Nelson, Pharmacy Intern (Pharmacy Intern) on 02/10/18 at 1307  Med List Status: Complete   Medication Last Dose Status   aspirin EC (ECOTRIN) 325 MG Tablet Delayed Response 2/10/2018 Active   Cholecalciferol (VITAMIN D) 2000 UNIT Tab 2/10/2018 Active   ciprofloxacin (CIPRO) 750 MG Tab 2/10/2018 Active   furosemide (LASIX) 20 MG Tab 2/10/2018 Active   gabapentin (NEURONTIN) 300 MG Cap 2/10/2018 Active   metoprolol SR (TOPROL XL) 100 MG TABLET SR 24 HR 2/10/2018 Active   potassium chloride SA (KDUR) 20 MEQ Tab CR 2/10/2018 Active   sertraline (ZOLOFT) 100 MG Tab 2/10/2018 Active   therapeutic multivitamin-minerals (THERAGRAN-M) TABS 2/10/2018 Active                ALLERGIES  No Known Allergies    PHYSICAL EXAM  VITAL SIGNS: /59   Pulse 94   Temp 36.1 °C (97 °F)   Resp 16   Ht 1.702 m (5' 7\")   Wt 84.8 kg (187 lb)   BMI 29.29 kg/m²     Constitutional:  Talking to family member, non-toxic appearing, able to answer questions  HENT: Nose is normal in appearance without rhinorrhea, external ears are normal,  moist mucous membranes  Eyes: Anicteric,  pupils are equal round and reactive, there is no conjunctival drainage or pallor   Neck: The trachea is midline, there is no obvious mass or meningeal signs  Cardiovascular: Equal radial pulsation, " regular rate and rhythm without murmurs gallops or rubs  Thorax & Lungs: Respiratory rate and effort are normal. There is normal chest excursion with respiration. No wheezes rhonchi or rales noted.  Abdomen: Abdomen is normal in appearance, normal bowel sounds, no pain with cough, nonpulsatile  :  No CVA tenderness to palpation  Musculoskeletal: Left lower extremity is shortened compared to her right. Chronic wrappings on bilateral lower extremities from wound care. Pain with rotation of the hip.Actively moves all 4 extremities  Skin: Visualized skin is warm, no erythema, no rash.  Neurologic:  Cranial nerves II through XII are intact there is no focal abnormality noted.  Psychiatric: Normal mood and mentation    DIAGNOSTIC STUDIES / PROCEDURES    LABS  Results for orders placed or performed during the hospital encounter of 02/10/18   CBC WITH DIFFERENTIAL   Result Value Ref Range    WBC 9.2 4.8 - 10.8 K/uL    RBC 3.69 (L) 4.20 - 5.40 M/uL    Hemoglobin 10.4 (L) 12.0 - 16.0 g/dL    Hematocrit 33.8 (L) 37.0 - 47.0 %    MCV 91.6 81.4 - 97.8 fL    MCH 28.2 27.0 - 33.0 pg    MCHC 30.8 (L) 33.6 - 35.0 g/dL    RDW 56.3 (H) 35.9 - 50.0 fL    Platelet Count 323 164 - 446 K/uL    MPV 9.8 9.0 - 12.9 fL    Neutrophils-Polys 67.30 44.00 - 72.00 %    Lymphocytes 16.50 (L) 22.00 - 41.00 %    Monocytes 11.30 0.00 - 13.40 %    Eosinophils 2.50 0.00 - 6.90 %    Basophils 1.70 0.00 - 1.80 %    Immature Granulocytes 0.70 0.00 - 0.90 %    Nucleated RBC 0.00 /100 WBC    Neutrophils (Absolute) 6.18 2.00 - 7.15 K/uL    Lymphs (Absolute) 1.52 1.00 - 4.80 K/uL    Monos (Absolute) 1.04 (H) 0.00 - 0.85 K/uL    Eos (Absolute) 0.23 0.00 - 0.51 K/uL    Baso (Absolute) 0.16 (H) 0.00 - 0.12 K/uL    Immature Granulocytes (abs) 0.06 0.00 - 0.11 K/uL    NRBC (Absolute) 0.00 K/uL   COMP METABOLIC PANEL   Result Value Ref Range    Sodium 133 (L) 135 - 145 mmol/L    Potassium 4.3 3.6 - 5.5 mmol/L    Chloride 101 96 - 112 mmol/L    Co2 23 20 - 33  mmol/L    Anion Gap 9.0 0.0 - 11.9    Glucose 112 (H) 65 - 99 mg/dL    Bun 10 8 - 22 mg/dL    Creatinine 0.60 0.50 - 1.40 mg/dL    Calcium 9.2 8.5 - 10.5 mg/dL    AST(SGOT) 29 12 - 45 U/L    ALT(SGPT) 14 2 - 50 U/L    Alkaline Phosphatase 100 (H) 30 - 99 U/L    Total Bilirubin 0.6 0.1 - 1.5 mg/dL    Albumin 3.1 (L) 3.2 - 4.9 g/dL    Total Protein 7.2 6.0 - 8.2 g/dL    Globulin 4.1 (H) 1.9 - 3.5 g/dL    A-G Ratio 0.8 g/dL   PT/INR   Result Value Ref Range    PT 15.7 (H) 12.0 - 14.6 sec    INR 1.28 (H) 0.87 - 1.13   ESTIMATED GFR   Result Value Ref Range    GFR If African American >60 >60 mL/min/1.73 m 2    GFR If Non African American >60 >60 mL/min/1.73 m 2     All labs reviewed by me.    RADIOLOGY  DX-FEMUR-2+ LEFT   Final Result      1.  Refracture of the superior and lateral aspect of the intertrochanteric fracture of the left femur with displacement of the 2 femoral neck screws. Superior displacement of the greater trochanteric fracture fragment.      2.  No other femoral fracture identified.      3.  No femoral head dislocation is present.      DX-CHEST-PORTABLE (1 VIEW)   Final Result      1.  No focal consolidation.      2.  Subacute appearing fractures of the posterior lateral aspects of the right 4th and 5th ribs.        The radiologist's interpretation of all radiological studies have been reviewed by me.    COURSE & MEDICAL DECISION MAKING  Nursing notes and vital signs were reviewed. (See chart for details)  The patient's  records were reviewed, history was obtained from the patient and her ;     The patient presents with GLF and left hip pain, and the differential diagnosis includes but is not limited to femur fracture, less likely hip fracture or dislocation. Resolving chornic cellulitis.       11:10 AM Initial orders in the Emergency Department included DX femur 2+ left, DX chest portable 1 view, estimated GFR, CBC with differential, CMP, and PT/INR.    ED testing reveals yu Vazquez from  ortho aware and Dr Bennett to admit      FINAL IMPRESSION  1. Closed fracture of left hip, initial encounter (CMS-[facility])    2. Cellulitis, unspecified cellulitis site          I, Sincere Ellis (Scribe), am scribing for, and in the presence of, Katt Baeza M.D..    Electronically signed by: Sincere Ellis (Scribe), 2/10/2018    I, Katt Baeza M.D. personally performed the services described in this documentation, as scribed by Sincere Ellis in my presence, and it is both accurate and complete.    The note accurately reflects work and decisions made by me.  Katt Baeza  2/10/2018  1:14 PM

## 2018-02-10 NOTE — ASSESSMENT & PLAN NOTE
Bilaterally with left-sided worse than right-sided  History of Pseudomonas on wound culture  -wound care, change to po ciprofloxacin today

## 2018-02-10 NOTE — ED NOTES
Med rec complete per pt at bedside  NKDA  Pt finished Doxy about a month ago   Pt is on a 10 day coarse of Cipro 750mg twice daily   Pt has one pill left

## 2018-02-11 ENCOUNTER — APPOINTMENT (OUTPATIENT)
Dept: RADIOLOGY | Facility: MEDICAL CENTER | Age: 73
DRG: 470 | End: 2018-02-11
Attending: ORTHOPAEDIC SURGERY
Payer: MEDICARE

## 2018-02-11 PROBLEM — E87.1 HYPONATREMIA: Status: ACTIVE | Noted: 2018-02-11

## 2018-02-11 LAB
ABO GROUP BLD: NORMAL
ABO GROUP BLD: NORMAL
ALBUMIN SERPL BCP-MCNC: 2.7 G/DL (ref 3.2–4.9)
ALBUMIN/GLOB SERPL: 0.7 G/DL
ALP SERPL-CCNC: 86 U/L (ref 30–99)
ALT SERPL-CCNC: 11 U/L (ref 2–50)
ANION GAP SERPL CALC-SCNC: 5 MMOL/L (ref 0–11.9)
AST SERPL-CCNC: 20 U/L (ref 12–45)
BILIRUB SERPL-MCNC: 0.5 MG/DL (ref 0.1–1.5)
BLD GP AB SCN SERPL QL: NORMAL
BUN SERPL-MCNC: 11 MG/DL (ref 8–22)
CALCIUM SERPL-MCNC: 8.9 MG/DL (ref 8.5–10.5)
CHLORIDE SERPL-SCNC: 102 MMOL/L (ref 96–112)
CO2 SERPL-SCNC: 26 MMOL/L (ref 20–33)
CREAT SERPL-MCNC: 0.72 MG/DL (ref 0.5–1.4)
ERYTHROCYTE [DISTWIDTH] IN BLOOD BY AUTOMATED COUNT: 58.6 FL (ref 35.9–50)
GLOBULIN SER CALC-MCNC: 3.7 G/DL (ref 1.9–3.5)
GLUCOSE SERPL-MCNC: 99 MG/DL (ref 65–99)
GRAM STN SPEC: NORMAL
HCT VFR BLD AUTO: 31.1 % (ref 37–47)
HGB BLD-MCNC: 9.3 G/DL (ref 12–16)
MCH RBC QN AUTO: 27.6 PG (ref 27–33)
MCHC RBC AUTO-ENTMCNC: 29.9 G/DL (ref 33.6–35)
MCV RBC AUTO: 92.3 FL (ref 81.4–97.8)
PLATELET # BLD AUTO: 284 K/UL (ref 164–446)
PMV BLD AUTO: 9.6 FL (ref 9–12.9)
POTASSIUM SERPL-SCNC: 4 MMOL/L (ref 3.6–5.5)
PROT SERPL-MCNC: 6.4 G/DL (ref 6–8.2)
RBC # BLD AUTO: 3.37 M/UL (ref 4.2–5.4)
RH BLD: NORMAL
RH BLD: NORMAL
SIGNIFICANT IND 70042: NORMAL
SITE SITE: NORMAL
SODIUM SERPL-SCNC: 133 MMOL/L (ref 135–145)
SOURCE SOURCE: NORMAL
WBC # BLD AUTO: 8.5 K/UL (ref 4.8–10.8)

## 2018-02-11 PROCEDURE — 160002 HCHG RECOVERY MINUTES (STAT): Performed by: ORTHOPAEDIC SURGERY

## 2018-02-11 PROCEDURE — 86901 BLOOD TYPING SEROLOGIC RH(D): CPT

## 2018-02-11 PROCEDURE — 302128 INFUSION PUMP: Performed by: INTERNAL MEDICINE

## 2018-02-11 PROCEDURE — 160042 HCHG SURGERY MINUTES - EA ADDL 1 MIN LEVEL 5: Performed by: ORTHOPAEDIC SURGERY

## 2018-02-11 PROCEDURE — 72170 X-RAY EXAM OF PELVIS: CPT

## 2018-02-11 PROCEDURE — 160022 HCHG BLOCK: Performed by: ORTHOPAEDIC SURGERY

## 2018-02-11 PROCEDURE — 87070 CULTURE OTHR SPECIMN AEROBIC: CPT | Mod: 91

## 2018-02-11 PROCEDURE — 87075 CULTR BACTERIA EXCEPT BLOOD: CPT | Mod: 91

## 2018-02-11 PROCEDURE — 160031 HCHG SURGERY MINUTES - 1ST 30 MINS LEVEL 5: Performed by: ORTHOPAEDIC SURGERY

## 2018-02-11 PROCEDURE — A4314 CATH W/DRAINAGE 2-WAY LATEX: HCPCS | Performed by: ORTHOPAEDIC SURGERY

## 2018-02-11 PROCEDURE — 502000 HCHG MISC OR IMPLANTS RC 0278: Performed by: ORTHOPAEDIC SURGERY

## 2018-02-11 PROCEDURE — 500367 HCHG DRAIN KIT, HEMOVAC: Performed by: ORTHOPAEDIC SURGERY

## 2018-02-11 PROCEDURE — 700102 HCHG RX REV CODE 250 W/ 637 OVERRIDE(OP): Performed by: PHYSICIAN ASSISTANT

## 2018-02-11 PROCEDURE — 160035 HCHG PACU - 1ST 60 MINS PHASE I: Performed by: ORTHOPAEDIC SURGERY

## 2018-02-11 PROCEDURE — 501838 HCHG SUTURE GENERAL: Performed by: ORTHOPAEDIC SURGERY

## 2018-02-11 PROCEDURE — 85027 COMPLETE CBC AUTOMATED: CPT

## 2018-02-11 PROCEDURE — 160048 HCHG OR STATISTICAL LEVEL 1-5: Performed by: ORTHOPAEDIC SURGERY

## 2018-02-11 PROCEDURE — A9270 NON-COVERED ITEM OR SERVICE: HCPCS | Performed by: PHYSICIAN ASSISTANT

## 2018-02-11 PROCEDURE — 0SRB02A REPLACEMENT OF LEFT HIP JOINT WITH METAL ON POLYETHYLENE SYNTHETIC SUBSTITUTE, UNCEMENTED, OPEN APPROACH: ICD-10-PCS | Performed by: ORTHOPAEDIC SURGERY

## 2018-02-11 PROCEDURE — 500002 HCHG ADHESIVE, DERMABOND: Performed by: ORTHOPAEDIC SURGERY

## 2018-02-11 PROCEDURE — 87015 SPECIMEN INFECT AGNT CONCNTJ: CPT

## 2018-02-11 PROCEDURE — 502779 HCHG SUTURE, QUILL: Performed by: ORTHOPAEDIC SURGERY

## 2018-02-11 PROCEDURE — 160009 HCHG ANES TIME/MIN: Performed by: ORTHOPAEDIC SURGERY

## 2018-02-11 PROCEDURE — 502578 HCHG PACK, TOTAL HIP: Performed by: ORTHOPAEDIC SURGERY

## 2018-02-11 PROCEDURE — 0QP704Z REMOVAL OF INTERNAL FIXATION DEVICE FROM LEFT UPPER FEMUR, OPEN APPROACH: ICD-10-PCS | Performed by: ORTHOPAEDIC SURGERY

## 2018-02-11 PROCEDURE — 86900 BLOOD TYPING SEROLOGIC ABO: CPT

## 2018-02-11 PROCEDURE — 700111 HCHG RX REV CODE 636 W/ 250 OVERRIDE (IP): Performed by: INTERNAL MEDICINE

## 2018-02-11 PROCEDURE — 36415 COLL VENOUS BLD VENIPUNCTURE: CPT

## 2018-02-11 PROCEDURE — 99233 SBSQ HOSP IP/OBS HIGH 50: CPT | Performed by: INTERNAL MEDICINE

## 2018-02-11 PROCEDURE — 700111 HCHG RX REV CODE 636 W/ 250 OVERRIDE (IP): Performed by: ORTHOPAEDIC SURGERY

## 2018-02-11 PROCEDURE — 87205 SMEAR GRAM STAIN: CPT | Mod: 91

## 2018-02-11 PROCEDURE — 80053 COMPREHEN METABOLIC PANEL: CPT

## 2018-02-11 PROCEDURE — 700101 HCHG RX REV CODE 250

## 2018-02-11 PROCEDURE — 700105 HCHG RX REV CODE 258: Performed by: ORTHOPAEDIC SURGERY

## 2018-02-11 PROCEDURE — 86850 RBC ANTIBODY SCREEN: CPT

## 2018-02-11 PROCEDURE — 700111 HCHG RX REV CODE 636 W/ 250 OVERRIDE (IP)

## 2018-02-11 PROCEDURE — 700105 HCHG RX REV CODE 258: Performed by: INTERNAL MEDICINE

## 2018-02-11 PROCEDURE — 770001 HCHG ROOM/CARE - MED/SURG/GYN PRIV*

## 2018-02-11 DEVICE — IMPLANTABLE DEVICE: Type: IMPLANTABLE DEVICE | Site: HIP | Status: FUNCTIONAL

## 2018-02-11 RX ORDER — ACETAMINOPHEN 650 MG
TABLET, EXTENDED RELEASE ORAL
Status: DISCONTINUED | OUTPATIENT
Start: 2018-02-11 | End: 2018-02-11 | Stop reason: HOSPADM

## 2018-02-11 RX ORDER — KETOROLAC TROMETHAMINE 30 MG/ML
INJECTION, SOLUTION INTRAMUSCULAR; INTRAVENOUS
Status: DISCONTINUED | OUTPATIENT
Start: 2018-02-11 | End: 2018-02-11 | Stop reason: HOSPADM

## 2018-02-11 RX ORDER — EPINEPHRINE 1 MG/ML
INJECTION INTRAMUSCULAR; INTRAVENOUS; SUBCUTANEOUS
Status: DISCONTINUED | OUTPATIENT
Start: 2018-02-11 | End: 2018-02-11 | Stop reason: HOSPADM

## 2018-02-11 RX ORDER — ROPIVACAINE HYDROCHLORIDE 5 MG/ML
INJECTION, SOLUTION EPIDURAL; INFILTRATION; PERINEURAL
Status: DISCONTINUED | OUTPATIENT
Start: 2018-02-11 | End: 2018-02-11 | Stop reason: HOSPADM

## 2018-02-11 RX ADMIN — OXYCODONE HYDROCHLORIDE 10 MG: 10 TABLET ORAL at 02:36

## 2018-02-11 RX ADMIN — SODIUM CHLORIDE: 9 INJECTION, SOLUTION INTRAVENOUS at 16:54

## 2018-02-11 RX ADMIN — OXYCODONE HYDROCHLORIDE 10 MG: 10 TABLET ORAL at 22:20

## 2018-02-11 RX ADMIN — VANCOMYCIN HYDROCHLORIDE 1300 MG: 100 INJECTION, POWDER, LYOPHILIZED, FOR SOLUTION INTRAVENOUS at 06:51

## 2018-02-11 RX ADMIN — VANCOMYCIN HYDROCHLORIDE 1000 MG: 100 INJECTION, POWDER, LYOPHILIZED, FOR SOLUTION INTRAVENOUS at 19:01

## 2018-02-11 RX ADMIN — HEPARIN SODIUM 5000 UNITS: 5000 INJECTION, SOLUTION INTRAVENOUS; SUBCUTANEOUS at 22:21

## 2018-02-11 RX ADMIN — OXYCODONE HYDROCHLORIDE 10 MG: 10 TABLET ORAL at 16:57

## 2018-02-11 RX ADMIN — HEPARIN SODIUM 5000 UNITS: 5000 INJECTION, SOLUTION INTRAVENOUS; SUBCUTANEOUS at 14:32

## 2018-02-11 ASSESSMENT — ENCOUNTER SYMPTOMS
NERVOUS/ANXIOUS: 0
DIAPHORESIS: 0
NAUSEA: 0
HEADACHES: 0
DIZZINESS: 0
MYALGIAS: 1
FLANK PAIN: 0
FEVER: 0
SENSORY CHANGE: 0
BACK PAIN: 0
DEPRESSION: 0
FOCAL WEAKNESS: 0
ABDOMINAL PAIN: 0
WEAKNESS: 1
SPEECH CHANGE: 0
SHORTNESS OF BREATH: 0
CHILLS: 0
COUGH: 0
MEMORY LOSS: 0
PALPITATIONS: 0
VOMITING: 0

## 2018-02-11 ASSESSMENT — PAIN SCALES - GENERAL
PAINLEVEL_OUTOF10: 2
PAINLEVEL_OUTOF10: 0
PAINLEVEL_OUTOF10: 6
PAINLEVEL_OUTOF10: 5
PAINLEVEL_OUTOF10: 0

## 2018-02-11 NOTE — PROGRESS NOTES
Arrived to room via gurney.  Left hip pain.  Rates level of pain at a 7/10.  BLE wounds, has been seeing  nurse for wound care.  Wound pictures obtained.  New dressings applied.  O2 in place at 1lpm via NC.  Bed pan in use for voiding.  Patient states she has been having loose stools.  IVF to be started.  POC discussed, pt agrees and verbalizes understanding.  Plan for surgery in the morning, family updated.  Admit profile completed, patient oriented to room and call light.  All questions answered at this time.  Hourly rounding in place, call light is within reach.

## 2018-02-11 NOTE — PROGRESS NOTES
[facility]    Date of Service: 2018    Chief Complaint  72 y.o. female admitted 2/10/2018 with GLF and L hip fracture s/p arthroplasty 2/10/18 with Dr. Vazquez.    Interval Problem Update  Patient up in chair  Pain control    Consultants/Specialty  Orthopedic surgery    Disposition  TBD        Review of Systems   Constitutional: Negative for chills, diaphoresis, fever and malaise/fatigue.   HENT: Negative for congestion and hearing loss.    Respiratory: Negative for cough and shortness of breath.    Cardiovascular: Negative for chest pain, palpitations and leg swelling.   Gastrointestinal: Negative for abdominal pain, nausea and vomiting.   Genitourinary: Negative for dysuria and flank pain.   Musculoskeletal: Positive for joint pain and myalgias. Negative for back pain.   Neurological: Positive for weakness. Negative for dizziness, sensory change, speech change, focal weakness and headaches.   Psychiatric/Behavioral: Negative for depression and memory loss. The patient is not nervous/anxious.       Physical Exam  Laboratory/Imaging   Hemodynamics  Temp (24hrs), Av.6 °C (97.8 °F), Min:36 °C (96.8 °F), Max:37.1 °C (98.7 °F)   Temperature: 36.3 °C (97.3 °F)  Pulse  Av.9  Min: 81  Max: 101 Heart Rate (Monitored): 89  Blood Pressure : 105/51, NIBP: 110/44      Respiratory      Respiration: 17, Pulse Oximetry: 96 %        RLL Breath Sounds: Diminished, LLL Breath Sounds: Diminished    Fluids    Intake/Output Summary (Last 24 hours) at 18 1233  Last data filed at 18 1000   Gross per 24 hour   Intake             1150 ml   Output              595 ml   Net              555 ml       Nutrition  Orders Placed This Encounter   Procedures   • DIET ORDER     Standing Status:   Standing     Number of Occurrences:   1     Order Specific Question:   Diet:     Answer:   Regular [1]     Physical Exam   Constitutional: She is oriented to person, place, and time. She appears well-nourished.  No distress.   HENT:   Head: Normocephalic and atraumatic.   Nose: Nose normal.   Eyes: EOM are normal. Pupils are equal, round, and reactive to light. Right eye exhibits no discharge. Left eye exhibits no discharge.   Neck: Neck supple. No JVD present. No thyromegaly present.   Cardiovascular: Normal rate and intact distal pulses.    No murmur heard.  Pulmonary/Chest: Breath sounds normal. No respiratory distress. She has no wheezes.   Abdominal: Soft. Bowel sounds are normal. She exhibits no distension. There is no tenderness.   Musculoskeletal: She exhibits tenderness. She exhibits no edema.   Dressing clean dry and intact   Neurological: She is alert and oriented to person, place, and time. No cranial nerve deficit.   Skin: Skin is warm and dry. No rash noted. She is not diaphoretic. No erythema.   Psychiatric: She has a normal mood and affect. Her behavior is normal. Thought content normal.   Nursing note and vitals reviewed.      Recent Labs      02/10/18   1131  02/11/18   0229   WBC  9.2  8.5   RBC  3.69*  3.37*   HEMOGLOBIN  10.4*  9.3*   HEMATOCRIT  33.8*  31.1*   MCV  91.6  92.3   MCH  28.2  27.6   MCHC  30.8*  29.9*   RDW  56.3*  58.6*   PLATELETCT  323  284   MPV  9.8  9.6     Recent Labs      02/10/18   1131  02/11/18   0229   SODIUM  133*  133*   POTASSIUM  4.3  4.0   CHLORIDE  101  102   CO2  23  26   GLUCOSE  112*  99   BUN  10  11   CREATININE  0.60  0.72   CALCIUM  9.2  8.9     Recent Labs      02/10/18   1131   INR  1.28*                  Assessment/Plan     Closed left hip fracture (CMS-[facility])- (present on admission)   Assessment & Plan    Xray: refracture of left femur with displacement onto femur neck screws. Also superior displacement of the greater trochanter fragment.  Pain management  Surgery consulted  PTOT    2/11: s/p L hip arthroplast, POD #1  Ortho Dr. Vazquez  Pain control  Pt/ot        Essential hypertension- (present on admission)   Assessment & Plan    Stable        Hyponatremia    Assessment & Plan    Mild, monitor        Open leg wound- (present on admission)   Assessment & Plan    Bilaterally with left-sided worse than right-sided  History of Pseudomonas on wound culture  Currently taking ciprofloxacin and her last dose is tomorrow  Still draining greenish discharge on the left side  Wound care  Started on IV Zosyn              Reviewed items::  Labs reviewed, Medications reviewed and Radiology images reviewed  DVT prophylaxis pharmacological::  Heparin  Ulcer Prophylaxis::  Not indicated

## 2018-02-11 NOTE — OR NURSING
Glasses on pt. Respirations are regular and easy. Denies pain at lt hip Dressing dry and intact. Hemovac patent draining.

## 2018-02-11 NOTE — CARE PLAN
Problem: Safety  Goal: Will remain free from falls    Intervention: Assess risk factors for falls  Fall risk assessment completed.       Problem: Bowel/Gastric:  Goal: Will not experience complications related to bowel motility    Intervention: Assess baseline bowel pattern  Per patient report she has been having loose stools.

## 2018-02-11 NOTE — PROGRESS NOTES
Patient feeling well, took away water from the bedside table, patient know NPO, educated and verbalized understanding

## 2018-02-11 NOTE — OP REPORT
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 Chong Vazquez MD  Assistants: CELESTE Suero  Anesthesia:  Guillermo Carpenter MD.  General  EBL:  200 mL  Drains:  None  Complications:  None    Findings: Comminuted and crumbled poor quality proximal femoral bone.    Implants:  [place] Trident II 52 shell with an MDM liner.  The stem was a 17x155 Rest Mod with a 23 +0 proximal body and a +4 inner 28mm head.     Description of Procedure:      Discussed with her and her son the rationale for surgery as well as the high risk of complications including infection (particularly given her leg wounds and history of cellulitis), instability, limp, pain, leg length inequality, and perioperative medical complication.  Following appropriate pre-operative evaluation and consent, the patient was brought to the operative room. Pre-operative anesthesia was administered. Patient was positioned supine on the operative table with appropriate padding of the extremities. Sterile prep and draping of the surgical field was completed. Pre-operative antibiotics were administered.      A lateral incision was made incorporating her prior incisions.  I dissected down to fascia and split it in line.  A posterior approach to the hip was performed.  The anatomy was very distorted due to the complete comminution of her trochanter with crumbly horrible proximal bone.  A distal incision was made to remove the distal interlock, and then the nail was removed by hand from the proximal femur with the lag screws still in it.  The femoral head was removed.  The acetabulum was exposed and the labrum excised.  I retracted making sure to protect the sciatic nerve.  I reamed to 51 mm, then impacted a 52 cup.  Place four screws with excellent bite and placed the MDM liner.  The femur was  then exposed.  The proximal femur was circumferentially comminuted, so I was basically just reaming the shaft.  Sequentially reamed up to 17mm, which had excellent bite with good cortical bone in the reamer flutes.  The real 17x155 stem was impacted and sat at the expected depth.  I trialed and selected a 23+0 proximal body, locked it down with about 20 degrees of anteversion.  Trialed with various heads and selected a +4 which gave appropriate restoration of leg lengths and tension with excellent stability including flexion to 90 and IR to 70.  The real heads were impacted.  The wound was irrigated and betadine soak performed.  The abductor with intermittent trochanteric fragments were repaired and incorporated with a posterior soft tissue repair using multiple #5 tycron sutures.  Vanco powder was left in the wound.  The fascia was closed with running quill.  A deep hemovac drain was placed.  The wound was closed in layers.  A sterile dressing was placed and she was turned over to Dr. Carpenter in stable condition without any apparent complications.     Postoperative plan:  WBAT, posterior hip precautions, DVT ppx for 4 weeks, PT/OT eval.  Followup in 2 weeks in clinic.

## 2018-02-11 NOTE — CARE PLAN
Problem: Skin Integrity  Goal: Risk for impaired skin integrity will decrease  Outcome: PROGRESSING AS EXPECTED  No breakdown

## 2018-02-11 NOTE — PROGRESS NOTES
" Subjective:      No events overnight.  Left hip pain.    Objective:  Blood pressure 117/50, pulse 81, temperature 36.8 °C (98.3 °F), resp. rate 18, height 1.702 m (5' 7\"), weight 84.8 kg (187 lb), SpO2 97 %.    Recent Labs      02/10/18   1131  02/11/18   0229   WBC  9.2  8.5   RBC  3.69*  3.37*   HEMOGLOBIN  10.4*  9.3*   HEMATOCRIT  33.8*  31.1*   MCV  91.6  92.3   MCH  28.2  27.6   MCHC  30.8*  29.9*   RDW  56.3*  58.6*   PLATELETCT  323  284   MPV  9.8  9.6     Recent Labs      02/10/18   1131  02/11/18   0229   SODIUM  133*  133*   POTASSIUM  4.3  4.0   CHLORIDE  101  102   CO2  23  26   GLUCOSE  112*  99   BUN  10  11   CREATININE  0.60  0.72   CALCIUM  9.2  8.9       No intake or output data in the 24 hours ending 02/11/18 0613    Comfortable, no distress    Assessment:      Failed fixation of left intertrochanteric femur fracture  Chronic LE wounds with recent cellulitis    Plan:      -Surgery today for conversion to YOLANDA  -Agree with continued aggressive abx until wounds are healed as she is very high risk for infection    "

## 2018-02-12 PROCEDURE — 700105 HCHG RX REV CODE 258: Performed by: INTERNAL MEDICINE

## 2018-02-12 PROCEDURE — A9270 NON-COVERED ITEM OR SERVICE: HCPCS | Performed by: PHYSICIAN ASSISTANT

## 2018-02-12 PROCEDURE — 97166 OT EVAL MOD COMPLEX 45 MIN: CPT

## 2018-02-12 PROCEDURE — G8978 MOBILITY CURRENT STATUS: HCPCS | Mod: CK

## 2018-02-12 PROCEDURE — 700102 HCHG RX REV CODE 250 W/ 637 OVERRIDE(OP): Performed by: INTERNAL MEDICINE

## 2018-02-12 PROCEDURE — 97161 PT EVAL LOW COMPLEX 20 MIN: CPT

## 2018-02-12 PROCEDURE — G8988 SELF CARE GOAL STATUS: HCPCS | Mod: CI

## 2018-02-12 PROCEDURE — 700102 HCHG RX REV CODE 250 W/ 637 OVERRIDE(OP): Performed by: PHYSICIAN ASSISTANT

## 2018-02-12 PROCEDURE — 700111 HCHG RX REV CODE 636 W/ 250 OVERRIDE (IP): Performed by: INTERNAL MEDICINE

## 2018-02-12 PROCEDURE — A9270 NON-COVERED ITEM OR SERVICE: HCPCS | Performed by: INTERNAL MEDICINE

## 2018-02-12 PROCEDURE — G8979 MOBILITY GOAL STATUS: HCPCS | Mod: CI

## 2018-02-12 PROCEDURE — G8987 SELF CARE CURRENT STATUS: HCPCS | Mod: CJ

## 2018-02-12 PROCEDURE — 770001 HCHG ROOM/CARE - MED/SURG/GYN PRIV*

## 2018-02-12 PROCEDURE — 99233 SBSQ HOSP IP/OBS HIGH 50: CPT | Performed by: INTERNAL MEDICINE

## 2018-02-12 RX ADMIN — SODIUM CHLORIDE: 9 INJECTION, SOLUTION INTRAVENOUS at 16:58

## 2018-02-12 RX ADMIN — METOPROLOL SUCCINATE 100 MG: 50 TABLET, FILM COATED, EXTENDED RELEASE ORAL at 08:02

## 2018-02-12 RX ADMIN — STANDARDIZED SENNA CONCENTRATE AND DOCUSATE SODIUM 2 TABLET: 8.6; 5 TABLET, FILM COATED ORAL at 08:02

## 2018-02-12 RX ADMIN — OXYCODONE HYDROCHLORIDE 10 MG: 10 TABLET ORAL at 11:27

## 2018-02-12 RX ADMIN — GABAPENTIN 300 MG: 300 CAPSULE ORAL at 08:02

## 2018-02-12 RX ADMIN — STANDARDIZED SENNA CONCENTRATE AND DOCUSATE SODIUM 2 TABLET: 8.6; 5 TABLET, FILM COATED ORAL at 19:56

## 2018-02-12 RX ADMIN — OXYCODONE HYDROCHLORIDE 10 MG: 10 TABLET ORAL at 02:13

## 2018-02-12 RX ADMIN — HEPARIN SODIUM 5000 UNITS: 5000 INJECTION, SOLUTION INTRAVENOUS; SUBCUTANEOUS at 07:11

## 2018-02-12 RX ADMIN — HEPARIN SODIUM 5000 UNITS: 5000 INJECTION, SOLUTION INTRAVENOUS; SUBCUTANEOUS at 21:14

## 2018-02-12 RX ADMIN — OXYCODONE HYDROCHLORIDE 10 MG: 10 TABLET ORAL at 07:11

## 2018-02-12 RX ADMIN — HEPARIN SODIUM 5000 UNITS: 5000 INJECTION, SOLUTION INTRAVENOUS; SUBCUTANEOUS at 13:21

## 2018-02-12 RX ADMIN — SERTRALINE 100 MG: 100 TABLET, FILM COATED ORAL at 08:02

## 2018-02-12 RX ADMIN — OXYCODONE HYDROCHLORIDE 10 MG: 10 TABLET ORAL at 16:57

## 2018-02-12 RX ADMIN — OXYCODONE HYDROCHLORIDE 10 MG: 10 TABLET ORAL at 21:14

## 2018-02-12 ASSESSMENT — PAIN SCALES - GENERAL
PAINLEVEL_OUTOF10: 2
PAINLEVEL_OUTOF10: 5
PAINLEVEL_OUTOF10: 5
PAINLEVEL_OUTOF10: 3
PAINLEVEL_OUTOF10: 6
PAINLEVEL_OUTOF10: 5
PAINLEVEL_OUTOF10: 3
PAINLEVEL_OUTOF10: 2
PAINLEVEL_OUTOF10: 2
PAINLEVEL_OUTOF10: 4

## 2018-02-12 ASSESSMENT — ENCOUNTER SYMPTOMS
SHORTNESS OF BREATH: 0
MEMORY LOSS: 0
COUGH: 0
PALPITATIONS: 0
MYALGIAS: 1
FEVER: 0
BACK PAIN: 0
SPEECH CHANGE: 0
SENSORY CHANGE: 0
CHILLS: 0
NAUSEA: 0
WEAKNESS: 1
FOCAL WEAKNESS: 0
FLANK PAIN: 0
DEPRESSION: 0
ABDOMINAL PAIN: 0
NERVOUS/ANXIOUS: 0

## 2018-02-12 ASSESSMENT — COGNITIVE AND FUNCTIONAL STATUS - GENERAL
HELP NEEDED FOR BATHING: A LITTLE
MOVING TO AND FROM BED TO CHAIR: A LITTLE
CLIMB 3 TO 5 STEPS WITH RAILING: A LITTLE
MOBILITY SCORE: 18
PERSONAL GROOMING: A LITTLE
SUGGESTED CMS G CODE MODIFIER DAILY ACTIVITY: CK
DAILY ACTIVITIY SCORE: 19
WALKING IN HOSPITAL ROOM: A LITTLE
DRESSING REGULAR LOWER BODY CLOTHING: A LOT
MOVING FROM LYING ON BACK TO SITTING ON SIDE OF FLAT BED: A LITTLE
TURNING FROM BACK TO SIDE WHILE IN FLAT BAD: A LITTLE
SUGGESTED CMS G CODE MODIFIER MOBILITY: CK
STANDING UP FROM CHAIR USING ARMS: A LITTLE
TOILETING: A LITTLE

## 2018-02-12 ASSESSMENT — GAIT ASSESSMENTS
ASSISTIVE DEVICE: FRONT WHEEL WALKER
DISTANCE (FEET): 15
GAIT LEVEL OF ASSIST: STAND BY ASSIST
DEVIATION: STEP TO

## 2018-02-12 ASSESSMENT — ACTIVITIES OF DAILY LIVING (ADL): TOILETING: INDEPENDENT

## 2018-02-12 NOTE — THERAPY
"71 y/o female adm for left hip pain, hx of left IT fx nonunion with IMN, s/p left posterior hip revision, WBAT LLE , posterior THP.  Acute PT to address aforemntioned problems for pt to achieve higher level of function for safe DC home.    Physical Therapy Evaluation completed.   Bed Mobility:  Supine to Sit: Minimal Assist (LLE)  Transfers: Sit to Stand: Stand by Assist  Gait: Level Of Assist: Stand by Assist with Front-Wheel Walker       Plan of Care: Will benefit from Physical Therapy 4 times per week  Discharge Recommendations: Equipment: Will Continue to Assess for Equipment Needs. Post-acute therapy Discharge to home with outpatient or home health for additional skilled therapy services.    See \"Rehab Therapy-Acute\" Patient Summary Report for complete documentation.     "

## 2018-02-12 NOTE — PROGRESS NOTES
Report received assumed pt care. Pt sat up to chair had breakfast. States pain tolerable decline interventions at this time. IV continuous abx infusing. Hemovac in place. Pt ambulated from chair to bed, 1 assist with FWW. Tolerating regular diet. Fall precaution in place, call light within reach. POC discussed, therapy and pain control. Pt agreeable to POC. Hourly rounding in place.

## 2018-02-12 NOTE — DISCHARGE PLANNING
Transitional Care Navigator:    HH order and F2F requested during rounds. Pending DC of home patient would benefit from HH services. TCN following as needed.

## 2018-02-12 NOTE — THERAPY
"Occupational Therapy Evaluation completed.   Functional Status:  SBA supine to sit.  Max A LB dressing.  CGA sit to stand and to walk in room with FWW.  Pt briefly sat in chair.  Pt educated on posterior hip precautions and required min prompts to follow.  Pt expressed concerns in her ability to care for herself at home at this time.  Plan of Care: Will benefit from Occupational Therapy 3 times per week  Discharge Recommendations:  Equipment: Will Continue to Assess for Equipment Needs. Post-acute therapy Discharge to a transitional care facility for continued skilled therapy services.    See \"Rehab Therapy-Acute\" Patient Summary Report for complete documentation.    "

## 2018-02-12 NOTE — PROGRESS NOTES
"Total Hip Progress Note     Subjective:      Doing well.  Minimal pain this morning.      Objective:    Blood pressure 114/61, pulse 84, temperature 36.5 °C (97.7 °F), resp. rate 17, height 1.702 m (5' 7\"), weight 84.8 kg (187 lb), SpO2 98 %.    Recent Labs      02/10/18   1131  02/11/18   0229   WBC  9.2  8.5   RBC  3.69*  3.37*   HEMOGLOBIN  10.4*  9.3*   HEMATOCRIT  33.8*  31.1*   MCV  91.6  92.3   MCH  28.2  27.6   MCHC  30.8*  29.9*   RDW  56.3*  58.6*   PLATELETCT  323  284   MPV  9.8  9.6     Recent Labs      02/10/18   1131  02/11/18   0229   SODIUM  133*  133*   POTASSIUM  4.3  4.0   CHLORIDE  101  102   CO2  23  26   GLUCOSE  112*  99   BUN  10  11   CREATININE  0.60  0.72   CALCIUM  9.2  8.9         Intake/Output Summary (Last 24 hours) at 02/12/18 0636  Last data filed at 02/12/18 0501   Gross per 24 hour   Intake             1150 ml   Output             2175 ml   Net            -1025 ml       Comfortable, no distress  Neurologically and vascularly intact with palpable pedal pulses bilaterally.  Dressing C/D/I    Assessment:      Doing well s/p conversion total hip arthroplasty for failed nail.  Chronic leg wounds with h/o cellulitis    Plan:      WBAT  Posterior precautions  Continue IV abx while inpatient, then transition to PO to cover potential cellulitis until leg and hip wound healed.  Very high infection risk.  Dispo planning - pending PT eval  F/U 2 weeks  "

## 2018-02-12 NOTE — CARE PLAN
Problem: Safety  Goal: Free from accidental injury  Outcome: PROGRESSING AS EXPECTED  Bed in the lowest locked position. Call light within reach. Hourly rounding in place.     Problem: Pain  Goal: Alleviation of Pain or a reduction in pain to the patient's comfort goal  Outcome: PROGRESSING AS EXPECTED  Pt receiving adequate pain relief with Oxy IR

## 2018-02-12 NOTE — DIETARY
Nutrition Services:  Pressure Ulcer noted on admit screen    Pressure Ulcer on Nutrition Admit Screen. Pt is currently on a Regular diet and per chart pt PO >50%. Ht: 170.2 cm, Wt: 84.8 kg, BMI 29.29.  Per chart note, pt with wounds to BLE; pt currently seeing  nurse for wound care.  Consult pending to wound team.  Nutrition recommendations to follow upon wound team staging, if appropriate.  RD will re-screen weekly.  Consult RD as needed.    RD available PRN.

## 2018-02-12 NOTE — PROGRESS NOTES
"Unable to mobilize pt day of surgery , per pt \"leg feels numb\" can wiggle toes . Can lift leg only slightly off bed , will try again in AM  "

## 2018-02-12 NOTE — PROGRESS NOTES
Assumed care of pt., returned from surgery. A&Ox 4 2L o2, dressing to L-hip cdi, hemovac in place, WBAT. No c/o of pain, pt. Resting comfortably in bed, all needs met at this time will continue to monitor, call light within reach.

## 2018-02-13 ENCOUNTER — APPOINTMENT (OUTPATIENT)
Dept: MEDICAL GROUP | Facility: PHYSICIAN GROUP | Age: 73
End: 2018-02-13
Payer: MEDICARE

## 2018-02-13 PROCEDURE — 700102 HCHG RX REV CODE 250 W/ 637 OVERRIDE(OP): Performed by: PHYSICIAN ASSISTANT

## 2018-02-13 PROCEDURE — 99232 SBSQ HOSP IP/OBS MODERATE 35: CPT | Performed by: INTERNAL MEDICINE

## 2018-02-13 PROCEDURE — A9270 NON-COVERED ITEM OR SERVICE: HCPCS | Performed by: PHYSICIAN ASSISTANT

## 2018-02-13 PROCEDURE — 700102 HCHG RX REV CODE 250 W/ 637 OVERRIDE(OP): Performed by: INTERNAL MEDICINE

## 2018-02-13 PROCEDURE — A9270 NON-COVERED ITEM OR SERVICE: HCPCS | Performed by: INTERNAL MEDICINE

## 2018-02-13 PROCEDURE — 302255 BARRIER CREAM MOISTURE BAZA PROTECT (ZINC) 5OZ: Performed by: INTERNAL MEDICINE

## 2018-02-13 PROCEDURE — 700111 HCHG RX REV CODE 636 W/ 250 OVERRIDE (IP): Performed by: INTERNAL MEDICINE

## 2018-02-13 PROCEDURE — 770001 HCHG ROOM/CARE - MED/SURG/GYN PRIV*

## 2018-02-13 RX ORDER — CIPROFLOXACIN 750 MG/1
750 TABLET, FILM COATED ORAL EVERY 12 HOURS
Status: DISCONTINUED | OUTPATIENT
Start: 2018-02-13 | End: 2018-02-14 | Stop reason: HOSPADM

## 2018-02-13 RX ADMIN — HEPARIN SODIUM 5000 UNITS: 5000 INJECTION, SOLUTION INTRAVENOUS; SUBCUTANEOUS at 15:35

## 2018-02-13 RX ADMIN — SERTRALINE 100 MG: 100 TABLET, FILM COATED ORAL at 08:05

## 2018-02-13 RX ADMIN — HEPARIN SODIUM 5000 UNITS: 5000 INJECTION, SOLUTION INTRAVENOUS; SUBCUTANEOUS at 05:39

## 2018-02-13 RX ADMIN — OXYCODONE HYDROCHLORIDE 10 MG: 10 TABLET ORAL at 08:05

## 2018-02-13 RX ADMIN — STANDARDIZED SENNA CONCENTRATE AND DOCUSATE SODIUM 2 TABLET: 8.6; 5 TABLET, FILM COATED ORAL at 08:05

## 2018-02-13 RX ADMIN — METOPROLOL SUCCINATE 100 MG: 50 TABLET, FILM COATED, EXTENDED RELEASE ORAL at 08:05

## 2018-02-13 RX ADMIN — OXYCODONE HYDROCHLORIDE 10 MG: 10 TABLET ORAL at 20:43

## 2018-02-13 RX ADMIN — CIPROFLOXACIN HYDROCHLORIDE 750 MG: 750 TABLET, FILM COATED ORAL at 11:52

## 2018-02-13 RX ADMIN — HEPARIN SODIUM 5000 UNITS: 5000 INJECTION, SOLUTION INTRAVENOUS; SUBCUTANEOUS at 20:43

## 2018-02-13 RX ADMIN — OXYCODONE HYDROCHLORIDE 10 MG: 10 TABLET ORAL at 15:36

## 2018-02-13 RX ADMIN — STANDARDIZED SENNA CONCENTRATE AND DOCUSATE SODIUM 2 TABLET: 8.6; 5 TABLET, FILM COATED ORAL at 20:43

## 2018-02-13 RX ADMIN — CIPROFLOXACIN HYDROCHLORIDE 750 MG: 750 TABLET, FILM COATED ORAL at 20:43

## 2018-02-13 RX ADMIN — GABAPENTIN 300 MG: 300 CAPSULE ORAL at 08:05

## 2018-02-13 ASSESSMENT — ENCOUNTER SYMPTOMS
COUGH: 0
DIZZINESS: 0
SPUTUM PRODUCTION: 0
NAUSEA: 0
SHORTNESS OF BREATH: 0
DEPRESSION: 0
WEAKNESS: 1
FEVER: 0
BACK PAIN: 0
HEMOPTYSIS: 0
SPEECH CHANGE: 0
DIARRHEA: 0
MYALGIAS: 1
CHILLS: 0
VOMITING: 0

## 2018-02-13 ASSESSMENT — PAIN SCALES - GENERAL
PAINLEVEL_OUTOF10: 4
PAINLEVEL_OUTOF10: 3
PAINLEVEL_OUTOF10: 4
PAINLEVEL_OUTOF10: 3
PAINLEVEL_OUTOF10: 5
PAINLEVEL_OUTOF10: 3
PAINLEVEL_OUTOF10: 6
PAINLEVEL_OUTOF10: 4

## 2018-02-13 NOTE — DISCHARGE PLANNING
TCN met with patient at bedside to discuss her transitional care options of SNF vs HH. Patient has been [facility] service with [facility] but due to recent surgery patient is reluctant to quickly return home.  Following discussion patient would like to DC to SNF and selected [facility]. Choice faxed to [facility]. TCN/GRACE to request order.

## 2018-02-13 NOTE — PROGRESS NOTES
Report received from SHREE Banegas and care assumed for patient at 1900. Pt A&O X 4.  Pt on room air upon initial assessment, oxygen saturation taken and resulted at 84% on room air, pt placed on 1L oxygen via NC and oxygen saturation increased to 94%, pt placed on continuous pulse ox for monitoring. VSS.  Voiding machuca catheter. CMS +. SCDs refused due to pain in BLE.   Pt calls appropriately. Pt sitting up in bed, watching tv. Plan of care discussed including pain management, mobilization, safety. Hourly rounding in place.  Call light and belongings at bedside and within reach.  Bed in lowest position.

## 2018-02-13 NOTE — CARE PLAN
Problem: Safety  Goal: Will remain free from injury  Outcome: PROGRESSING AS EXPECTED  Call light within reach, pt calls for assistance at all times. Bed in low position and locked, upper bedside rails up, proper mobility signs placed, personal possessions within reach, treaded socks on. Hourly rounding in place.          Problem: Respiratory:  Goal: Respiratory status will improve  Outcome: PROGRESSING AS EXPECTED  Pt able to perform incentive spirometry at 1000mL.  Pt placed on 1L of oxygen via NC due to oxygen saturation of 84% while on room air.

## 2018-02-13 NOTE — DISCHARGE PLANNING
[facility] received a transport form to arrange transportation to transfer the patient to [facility] On 02/14/2018. [facility] called and left a message for admissions at [facility]

## 2018-02-13 NOTE — CARE PLAN
Problem: Safety  Goal: Free from accidental injury  Outcome: PROGRESSING AS EXPECTED  Bed in the lowest locked position. Call light within reach. Hourly rounding in place.     Problem: Elimination  Goal: Regular urinary elimination  Outcome: PROGRESSING AS EXPECTED  Machuca d/c at noon awaiting post machuca d/c     Problem: Pain  Goal: Alleviation of Pain or a reduction in pain to the patient's comfort goal  Outcome: PROGRESSING AS EXPECTED  Pt receiving adequate pain relief with Oxy IR

## 2018-02-13 NOTE — FACE TO FACE
Face to Face Supporting Documentation - Home Health    The encounter with this patient was in whole or in part the primary reason for home health admission.    Date of encounter:   Patient:                    MRN:                       YOB: 2020  Nadege Mae  [id number]  1945     Home health to see patient for:  Skilled Nursing care for assessment, interventions & education, Wound Care, Physical Therapy evaluation and treatment and Occupational therapy evaluation and treatment    Skilled need for:  Surgical Aftercare left hip arthroplasty    Skilled nursing interventions to include:  Wound Care    Homebound status evidenced by:  Need the aid of supportive devices such as crutches, canes, wheelchairs or walkers or Needs the assistance of another person in order to leave the home. Leaving home requires a considerable and taxing effort. There is a normal inability to leave the home.    Community Physician to provide follow up care: Jorge Amador M.D.     Optional Interventions? No      I certify the face to face encounter for this home health care referral meets the CMS requirements and the encounter/clinical assessment with the patient was, in whole, or in part, for the medical condition(s) listed above, which is the primary reason for home health care. Based on my clinical findings: the service(s) are medically necessary, support the need for home health care, and the homebound criteria are met.  I certify that this patient has had a face to face encounter by myself.  Jamilah Burton D.O. - NPI: [id number]

## 2018-02-13 NOTE — PROGRESS NOTES
[facility] Hospitalist Progress Note    Date of Service: 2018    Chief Complaint  72 y.o. female admitted 2/10/2018 with GLF and L hip fracture s/p arthroplasty 2/10/18 with Dr. Vazquez.    Interval Problem Update  Tolerated PT/OT  Pain controlled    Consultants/Specialty  Orthopedic surgery    Disposition  To home with home health in 2-3 days with clinical improvement        Review of Systems   Constitutional: Negative for chills and fever.   HENT: Negative for congestion and hearing loss.    Respiratory: Negative for cough and shortness of breath.    Cardiovascular: Negative for chest pain, palpitations and leg swelling.   Gastrointestinal: Negative for abdominal pain and nausea.   Genitourinary: Negative for flank pain.   Musculoskeletal: Positive for joint pain and myalgias. Negative for back pain.   Neurological: Positive for weakness. Negative for sensory change, speech change and focal weakness.   Psychiatric/Behavioral: Negative for depression and memory loss. The patient is not nervous/anxious.       Physical Exam  Laboratory/Imaging   Hemodynamics  Temp (24hrs), Av.6 °C (97.8 °F), Min:35.9 °C (96.7 °F), Max:36.8 °C (98.3 °F)   Temperature: 36.8 °C (98.3 °F)  Pulse  Av.5  Min: 81  Max: 101    Blood Pressure : 110/68      Respiratory      Respiration: 17, Pulse Oximetry: 90 %        RUL Breath Sounds: Clear, RLL Breath Sounds: Diminished, BHAVYA Breath Sounds: Clear, LLL Breath Sounds: Diminished    Fluids    Intake/Output Summary (Last 24 hours) at 18 1639  Last data filed at 18 1100   Gross per 24 hour   Intake              200 ml   Output              960 ml   Net             -760 ml       Nutrition  Orders Placed This Encounter   Procedures   • DIET ORDER     Standing Status:   Standing     Number of Occurrences:   1     Order Specific Question:   Diet:     Answer:   Regular [1]     Physical Exam   Constitutional: She is oriented to person, place, and time. She appears well-nourished. No  distress.   HENT:   Head: Normocephalic and atraumatic.   Nose: Nose normal.   Mouth/Throat: No oropharyngeal exudate.   Eyes: EOM are normal. Pupils are equal, round, and reactive to light.   Neck: Neck supple. No JVD present. No thyromegaly present.   Cardiovascular: Normal rate and intact distal pulses.    Pulmonary/Chest: Breath sounds normal. No respiratory distress.   Abdominal: Soft. Bowel sounds are normal. She exhibits no distension. There is no tenderness.   Musculoskeletal: She exhibits tenderness. She exhibits no edema.   Dressing clean dry and intact   Neurological: She is alert and oriented to person, place, and time. No cranial nerve deficit. Coordination normal.   Skin: Skin is warm. She is not diaphoretic. There is erythema.   Psychiatric: She has a normal mood and affect. Her behavior is normal. Judgment and thought content normal.   Nursing note and vitals reviewed.      Recent Labs      02/10/18   1131  02/11/18   0229   WBC  9.2  8.5   RBC  3.69*  3.37*   HEMOGLOBIN  10.4*  9.3*   HEMATOCRIT  33.8*  31.1*   MCV  91.6  92.3   MCH  28.2  27.6   MCHC  30.8*  29.9*   RDW  56.3*  58.6*   PLATELETCT  323  284   MPV  9.8  9.6     Recent Labs      02/10/18   1131  02/11/18   0229   SODIUM  133*  133*   POTASSIUM  4.3  4.0   CHLORIDE  101  102   CO2  23  26   GLUCOSE  112*  99   BUN  10  11   CREATININE  0.60  0.72   CALCIUM  9.2  8.9     Recent Labs      02/10/18   1131   INR  1.28*                  Assessment/Plan     Closed left hip fracture (CMS-HCC)- (present on admission)   Assessment & Plan    Xray: refracture of left femur with displacement onto femur neck screws. Also superior displacement of the greater trochanter fragment.  Pain management  Surgery consulted  PTOT    2/12: s/p L hip arthroplast, POD #2  Ortho Dr. Vazquez  Pain control  Pt/ot  Drains are still in place  Transition to oral antibiotics on discharge home  Home health referral placed        Essential hypertension- (present on  admission)   Assessment & Plan    Stable        Hyponatremia   Assessment & Plan    Mild, monitor        Open leg wound- (present on admission)   Assessment & Plan    Bilaterally with left-sided worse than right-sided  History of Pseudomonas on wound culture  Currently taking ciprofloxacin and her last dose is tomorrow  Still draining greenish discharge on the left side  Wound care  on IV Zosyn            Quality-Core Measures   Reviewed items::  Labs reviewed, Medications reviewed and Radiology images reviewed  DVT prophylaxis pharmacological::  Heparin  Ulcer Prophylaxis::  Not indicated  Assessed for rehabilitation services:  Patient was assess for and/or received rehabilitation services during this hospitalization

## 2018-02-13 NOTE — DISCHARGE PLANNING
GRACE faxed transport form to [facility] for transfer to [place] tomorrow, 2/14.

## 2018-02-13 NOTE — PROGRESS NOTES
Report received. Assumed pt care. Pt denies pain interventions at this time. Pt refused to get up for breakfast, sitting up in bed eating tolerating well. Fall precaution in place, call light within reach. POC discussed, pt agreeable. Hourly rounding in place.

## 2018-02-13 NOTE — PROGRESS NOTES
" Subjective:      Pain controlled.  OOB with PT/OT yesterday.    Objective:  Blood pressure 123/44, pulse 78, temperature 36.8 °C (98.2 °F), resp. rate 16, height 1.702 m (5' 7\"), weight 84.8 kg (187 lb), SpO2 97 %.    Recent Labs      02/10/18   1131  02/11/18   0229   WBC  9.2  8.5   RBC  3.69*  3.37*   HEMOGLOBIN  10.4*  9.3*   HEMATOCRIT  33.8*  31.1*   MCV  91.6  92.3   MCH  28.2  27.6   MCHC  30.8*  29.9*   RDW  56.3*  58.6*   PLATELETCT  323  284   MPV  9.8  9.6     Recent Labs      02/10/18   1131 02/11/18   0229   SODIUM  133*  133*   POTASSIUM  4.3  4.0   CHLORIDE  101  102   CO2  23  26   GLUCOSE  112*  99   BUN  10  11   CREATININE  0.60  0.72   CALCIUM  9.2  8.9         Intake/Output Summary (Last 24 hours) at 02/13/18 0647  Last data filed at 02/13/18 0600   Gross per 24 hour   Intake              450 ml   Output           2162.5 ml   Net          -1712.5 ml       Comfortable, no distress  Neurologically and vascularly intact with palpable pedal pulses bilaterally.  Dressing C/D/I      Assessment:    Doing well s/p left conversion YOLANDA  B LE wounds with recent cellulitis    Plan:      WBAT  Posterior precautions  OT/PT  DVT ppx - Heparin + Sequential Compression Devices  IV antibiotics in house, then PO abx at discharge for leg wounds/cellulitis    Dispo planning  Follow-up with Dr. Vazquez' office approximately 2 weeks post-op.    "

## 2018-02-14 ENCOUNTER — PATIENT OUTREACH (OUTPATIENT)
Dept: HEALTH INFORMATION MANAGEMENT | Facility: OTHER | Age: 73
End: 2018-02-14

## 2018-02-14 VITALS
RESPIRATION RATE: 15 BRPM | DIASTOLIC BLOOD PRESSURE: 85 MMHG | OXYGEN SATURATION: 97 % | BODY MASS INDEX: 30.45 KG/M2 | HEART RATE: 85 BPM | SYSTOLIC BLOOD PRESSURE: 155 MMHG | WEIGHT: 194 LBS | TEMPERATURE: 97.3 F | HEIGHT: 67 IN

## 2018-02-14 PROBLEM — S72.002A CLOSED LEFT HIP FRACTURE (HCC): Status: RESOLVED | Noted: 2018-02-10 | Resolved: 2018-02-14

## 2018-02-14 LAB
BACTERIA TISS AEROBE CULT: NORMAL
GRAM STN SPEC: NORMAL
SIGNIFICANT IND 70042: NORMAL
SITE SITE: NORMAL
SOURCE SOURCE: NORMAL

## 2018-02-14 PROCEDURE — A9270 NON-COVERED ITEM OR SERVICE: HCPCS | Performed by: INTERNAL MEDICINE

## 2018-02-14 PROCEDURE — 700102 HCHG RX REV CODE 250 W/ 637 OVERRIDE(OP): Performed by: INTERNAL MEDICINE

## 2018-02-14 PROCEDURE — 700102 HCHG RX REV CODE 250 W/ 637 OVERRIDE(OP): Performed by: PHYSICIAN ASSISTANT

## 2018-02-14 PROCEDURE — A9270 NON-COVERED ITEM OR SERVICE: HCPCS | Performed by: PHYSICIAN ASSISTANT

## 2018-02-14 PROCEDURE — 700111 HCHG RX REV CODE 636 W/ 250 OVERRIDE (IP): Performed by: INTERNAL MEDICINE

## 2018-02-14 PROCEDURE — 99239 HOSP IP/OBS DSCHRG MGMT >30: CPT | Performed by: INTERNAL MEDICINE

## 2018-02-14 RX ORDER — OXYCODONE HYDROCHLORIDE 5 MG/1
5 TABLET ORAL
Qty: 15 TAB | Refills: 0 | Status: SHIPPED | OUTPATIENT
Start: 2018-02-14 | End: 2018-02-16

## 2018-02-14 RX ORDER — SERTRALINE HYDROCHLORIDE 100 MG/1
100 TABLET, FILM COATED ORAL DAILY
Qty: 30 TAB | Refills: 11 | Status: ON HOLD | OUTPATIENT
Start: 2018-02-15 | End: 2018-06-04

## 2018-02-14 RX ORDER — AMOXICILLIN 250 MG
1 CAPSULE ORAL DAILY
Qty: 30 TAB
Start: 2018-02-14 | End: 2018-05-08

## 2018-02-14 RX ORDER — ONDANSETRON 4 MG/1
4 TABLET, ORALLY DISINTEGRATING ORAL EVERY 4 HOURS PRN
Qty: 10 TAB | Refills: 0 | Status: SHIPPED | OUTPATIENT
Start: 2018-02-14 | End: 2018-05-08

## 2018-02-14 RX ADMIN — CIPROFLOXACIN HYDROCHLORIDE 750 MG: 750 TABLET, FILM COATED ORAL at 08:02

## 2018-02-14 RX ADMIN — OXYCODONE HYDROCHLORIDE 5 MG: 5 TABLET ORAL at 06:27

## 2018-02-14 RX ADMIN — SERTRALINE 100 MG: 100 TABLET, FILM COATED ORAL at 08:02

## 2018-02-14 RX ADMIN — OXYCODONE HYDROCHLORIDE 10 MG: 10 TABLET ORAL at 10:19

## 2018-02-14 RX ADMIN — METOPROLOL SUCCINATE 100 MG: 50 TABLET, FILM COATED, EXTENDED RELEASE ORAL at 08:02

## 2018-02-14 RX ADMIN — GABAPENTIN 300 MG: 300 CAPSULE ORAL at 08:02

## 2018-02-14 RX ADMIN — HEPARIN SODIUM 5000 UNITS: 5000 INJECTION, SOLUTION INTRAVENOUS; SUBCUTANEOUS at 06:27

## 2018-02-14 RX ADMIN — STANDARDIZED SENNA CONCENTRATE AND DOCUSATE SODIUM 2 TABLET: 8.6; 5 TABLET, FILM COATED ORAL at 08:02

## 2018-02-14 ASSESSMENT — PAIN SCALES - GENERAL
PAINLEVEL_OUTOF10: 9
PAINLEVEL_OUTOF10: 2
PAINLEVEL_OUTOF10: ASSUMED PAIN PRESENT
PAINLEVEL_OUTOF10: 3

## 2018-02-14 NOTE — WOUND TEAM
"[facility]  Inpatient Services  Initial Wound & Skin Care Evaluation    Admission Date: 02/08/2018         HPI, PMH, SH: Reviewed  Unit where seen by Wound Team: [id number]    WOUND CONSULT RELATED TO: full thickness wounds to BLE    SUBJECTIVE: \" The wound treatment helped for awhile, but they haven't completely healed    Self Report / Pain Level: states 8/10 with cleansing of wounds    OBJECTIVE: Patient lying supine in bed with adhesive foam dressing to medial aspect of each leg  WOUND TYPE, LOCATION, CHARACTERISTICS (Pressure ulcers: location, stage, POA or date identified)    Wound Type/Location: full thickness wound RLE    Periwound: intact     Drainage: scant serosanguinous      Tissue Type and %: mixture of red/pink and yellow tissue     Wound Edges: open     Odor: none      Exposed structure(s): CARLITA    S&S of Infection: none    Measurements: taken 02/14/2018     Length: 1.5 cm   Width:  0.5 cm   Depth:  CARLITA   Tracts/undermining: CARLITA      Wound Type/Location: full thickness wound LLE    Periwound: mild erythema     Drainage: scant serosanguinous      Tissue Type and %: 100% yellow    Wound Edges: attached     Odor: none      Exposed structure(s): CARLITA   S&S of Infection: none    Measurements: taken 02/14/2018    Length: 5 cm   Width:  5 cm   Depth:  CARLITA   Tracts/undermining: CARLITA      INTERVENTIONS BY WOUND TEAM: Adhesive foams removed, BLE cleansed with moist wash cloth, wounds with normal saline and gauze. Difficult to palpate pedal pulses. Doppler of pedal pulses reveal monophasic to biphasic dp, pt pulses. Wounds photographed and measured. Maricruz wound protected with no sting skin prep and sween cream. Medihoney colloid cut to fit over wound beds to BLE, then covered with adhesive foam to each leg.    Interdisciplinary Collaboration: staff RN, patient; notified Dr. Hill of need for arterial wound healing studies and TOMI outpatient as patient is transferring to Rehab post hip surgery this " admit.    EVALUATION: Per patient wounds have been chronic for over 4 months. Recommend arterial wound healing studies and TOMI outpatient due to chronicity of wound and mono/biphasic pulses on doppler. No debridement or compression at this time until diagnostics completed. Wound culture deferred due to poor tissue quality, no viable tissue to culture. Patient currently on Cipro. Medihoney colloid to absorb exudate, provide moist wound environment, and facilitate autolytic debridement    Factors affecting wound healing: chronicity  Goals: increase viable tissue with autolytic debridement    NURSING PLAN OF CARE: (X)  Dressing changes: See Dressing Maintenance orders: X  Skin care: See Skin Care orders:   Rectal tube care: See Rectal Tube Care orders:   Other orders:    RSKIN: CURRENT (X) ORDERED (O)  Q shift Luis:  X  Q shift pressure point assessments:  X  Atmosair        SHORTY      Bariatric SHORTY      Bariatric foam        Heel float boots       Heels floated on pillows  X   Barrier wipes      Barrier Cream      Barrier paste      Sacral silicone dressing      Silicone O2 tubing      Anchorfast      Trach with Optifoam split foam       Waffle cushion      Rectal tube or BMS      Antifungal tx    Turn q 2 hours X  Up to chair    Ambulate   PT/OT     Dietician      PO  X   TF   TPN     PVN    NPO   # days   Other       WOUND TEAM PLAN OF CARE (X):   NPWT change 3 x week:        Dressing changes by wound team:       Follow up as needed:  X     Other (explain):    Anticipated discharge plans (X):  SNF:           Home Care:           Outpatient Wound Center:            Self Care:            Other: Rehab

## 2018-02-14 NOTE — DISCHARGE PLANNING
[facility] received a call from Yessica at [place]. Transportation has been arranged to transfer the patient on 02/14/2018 at 1230 via [facility]. SW on floor has  Been notified via voicemail.

## 2018-02-14 NOTE — PROGRESS NOTES
[facility] Hospitalist Progress Note    Date of Service: 2018    Chief Complaint  72 y.o. female admitted 2/10/2018 with GLF and L hip fracture s/p arthroplasty 2/10/18 with Dr. Vazquez.    Interval Problem Update  L hip fracture-s/p conversion total L hip arthroplasty POD#2, pain is decently well controlled today. Cultures intra-operatively all remain NGTD. Afebrile.     Open leg wounds-she feels they are doing better today. Cultures confirm pseudomonas. Wound care consult pending.      Consultants/Specialty  Orthopedic surgery    Disposition  To home with home health, possibly tomorrow        Review of Systems   Constitutional: Negative for chills and fever.   HENT: Negative for hearing loss and tinnitus.    Respiratory: Negative for cough, hemoptysis, sputum production and shortness of breath.    Cardiovascular: Negative for chest pain and leg swelling.   Gastrointestinal: Negative for diarrhea, nausea and vomiting.   Genitourinary: Negative for dysuria and urgency.   Musculoskeletal: Positive for joint pain and myalgias. Negative for back pain.        Better control today   Skin: Negative for itching.   Neurological: Positive for weakness (slowly improving). Negative for dizziness and speech change.   Psychiatric/Behavioral: Negative for depression and suicidal ideas.   All other systems reviewed and are negative.     Physical Exam  Laboratory/Imaging   Hemodynamics  Temp (24hrs), Av.9 °C (98.4 °F), Min:36.7 °C (98 °F), Max:37.1 °C (98.8 °F)   Temperature: 37 °C (98.6 °F)  Pulse  Av.9  Min: 78  Max: 101    Blood Pressure : 108/50      Respiratory      Respiration: 16, Pulse Oximetry: 91 %        RUL Breath Sounds: Clear, RML Breath Sounds: Clear, RLL Breath Sounds: Diminished, BHAVYA Breath Sounds: Clear, LLL Breath Sounds: Diminished    Fluids    Intake/Output Summary (Last 24 hours) at 18 1703  Last data filed at 18 1200   Gross per 24 hour   Intake             1050 ml   Output            1582.5 ml   Net           -532.5 ml       Nutrition  Orders Placed This Encounter   Procedures   • DIET ORDER     Standing Status:   Standing     Number of Occurrences:   1     Order Specific Question:   Diet:     Answer:   Regular [1]     Physical Exam   Constitutional: She is oriented to person, place, and time. She appears well-nourished. No distress.   HENT:   Head: Normocephalic and atraumatic.   Nose: Nose normal.   Mouth/Throat: No oropharyngeal exudate.   Eyes: EOM are normal. Pupils are equal, round, and reactive to light. Right eye exhibits no discharge. Left eye exhibits no discharge.   Neck: Neck supple.   Cardiovascular: Normal rate and intact distal pulses.    Pulmonary/Chest: Breath sounds normal. No stridor. She has no wheezes. She has no rales.   Abdominal: Soft. Bowel sounds are normal. There is no tenderness.   Musculoskeletal: She exhibits tenderness. She exhibits no edema.   Dressing clean dry and intact, unchanged today   Neurological: She is alert and oriented to person, place, and time. No cranial nerve deficit. Coordination normal.   Skin: Skin is warm. She is not diaphoretic. There is erythema.   2 areas of superficial ulceration/wounds on the B/L pre tibial regions, just proximal to the ankles, healing well, mild pain and erythema appreciated   Psychiatric: She has a normal mood and affect. Her behavior is normal. Judgment and thought content normal.   Nursing note and vitals reviewed.      Recent Labs      02/11/18   0229   WBC  8.5   RBC  3.37*   HEMOGLOBIN  9.3*   HEMATOCRIT  31.1*   MCV  92.3   MCH  27.6   MCHC  29.9*   RDW  58.6*   PLATELETCT  284   MPV  9.6     Recent Labs      02/11/18   0229   SODIUM  133*   POTASSIUM  4.0   CHLORIDE  102   CO2  26   GLUCOSE  99   BUN  11   CREATININE  0.72   CALCIUM  8.9                      Assessment/Plan     Closed left hip fracture (CMS-HCC)- (present on admission)   Assessment & Plan    2/12: s/p L hip arthroplast, POD #3, doing well  currently  Ortho Dr. Vazquez  Pain control, no adjustments needed at this time, all cultures remain negative  Pt/ot  -transition to po ciprofloxacin today        Essential hypertension- (present on admission)   Assessment & Plan    -remains well controlled, no changes planned today        Hyponatremia- (present on admission)   Assessment & Plan    Mild, monitor        Open leg wound- (present on admission)   Assessment & Plan    Bilaterally with left-sided worse than right-sided  History of Pseudomonas on wound culture  -wound care, change to po ciprofloxacin today            Quality-Core Measures   Reviewed items::  Labs reviewed, Medications reviewed and Radiology images reviewed  DVT prophylaxis pharmacological::  Heparin  Ulcer Prophylaxis::  Not indicated  Assessed for rehabilitation services:  Patient was assess for and/or received rehabilitation services during this hospitalization

## 2018-02-14 NOTE — DISCHARGE SUMMARY
CHIEF COMPLAINT ON ADMISSION  Chief Complaint   Patient presents with   • GLF   • Hip Pain       CODE STATUS  Full Code    HPI & HOSPITAL COURSE  This is a 72 y.o. year old female here with ground level fall and hip pain. She was admitted to the orthopedic floor and surgery was consulted. Patient also has known open wounds of the legs in the bilateral pre-tibial regions and had been on outpatient ciprofloxacin given pan-sensitive PSAR from wound culture at the end of January 2018. Patient was transitioned to IV zosyn while in the hospital. Patient underwent the following procedure:  Conversion of TLH arthroplasty and removal of hardware. She had vancomycin powder placed intra operatively and all of her deep intra-operative wound cultures have remained no growth to date. She was transitioned back to ciprofloxacin and will need 5 more days of antibiotic therapy. Her pain is well controlled. I placed an outpatient wound care consult for her bilateral lower extremity open wounds and will need outpatient TOMI's and further vascular work up as this could be its etiology. She is systemically fine for now. She will need DVT prophylaxis for 10 days more or until ambulatory greater than 150 feet. She is medically cleared to go to SNF today.     Therefore, she is discharged in fair and stable condition for further post-acute management.     SPECIFIC OUTPATIENT FOLLOW-UP  -F/U with Dr Vazquez of orthopedic surgery in 1 week  -F/U with outpatient wound care clinic in 2 weeks  -F/U with her PCP in 2 weeks    DISCHARGE PROBLEM LIST  Active Problems:    Essential hypertension POA: Yes    Anemia POA: Yes    Open leg wound POA: Yes    Hyponatremia POA: Yes  Resolved Problems:    Closed left hip fracture (CMS-HCC) POA: Yes      FOLLOW UP  No future appointments.  No follow-up provider specified.    MEDICATIONS ON DISCHARGE   Nadege Mae   [place] Medication Instructions YOSSI:[id number]    Printed on:02/14/18 1038   Medication  Information                      Cholecalciferol (VITAMIN D) 2000 UNIT Tab  Take 2,000 Units by mouth every day.             ciprofloxacin (CIPRO) 750 MG Tab  Take 1 Tab by mouth 2 times a day.             furosemide (LASIX) 20 MG Tab  Take 1 Tab by mouth every day.             gabapentin (NEURONTIN) 300 MG Cap  Take 1 Cap by mouth every day.             metoprolol SR (TOPROL XL) 100 MG TABLET SR 24 HR  Take 1 Tab by mouth every day.             ondansetron (ZOFRAN ODT) 4 MG TABLET DISPERSIBLE  Take 1 Tab by mouth every four hours as needed (give PO if IV route is unavailable. May give per feeding tube.).             oxyCODONE immediate-release (ROXICODONE) 5 MG Tab  Take 1 Tab by mouth every 3 hours as needed for up to 15 doses.             potassium chloride SA (KDUR) 20 MEQ Tab CR  Take 1 Tab by mouth every day.             senna-docusate (PERICOLACE OR SENOKOT S) 8.6-50 MG Tab  Take 1 Tab by mouth every day.             sertraline (ZOLOFT) 100 MG Tab  TAKE 1 TAB BY MOUTH EVERY DAY.             sertraline (ZOLOFT) 100 MG Tab  Take 1 Tab by mouth every day.             therapeutic multivitamin-minerals (THERAGRAN-M) TABS  Take 1 Tab by mouth every day.               Lovenox 40 mg subcutaneously every day for 10 more days or until ambulatory >150 feet.    DIET  Orders Placed This Encounter   Procedures   • DIET ORDER     Standing Status:   Standing     Number of Occurrences:   1     Order Specific Question:   Diet:     Answer:   Regular [1]       ACTIVITY  As tolerated and directed by skilled nursing.  Class 1 - no symptoms of any kind, and for whom ordinary physical activity does not cause fatigue, palpitations, dyspnea, or anginal pain.    LINES, DRAINS, AND WOUNDS  This is an automated list. Peripheral IVs will be removed prior to discharge.  PIV Group Right Antecubital 20g Saline Lock (Active)   Line Secured Taped;Transparent 2/13/2018  8:00 PM   Site Condition / Description  Assessed;Patent;Dry;Intact;Scant;Sanguinous 2/13/2018  8:00 PM   Dressing Type / Description Transparent;Dry;Scant;Intact;Sanguinous;Old / Dried Drainage 2/13/2018  8:00 PM   Dressing Status Observed 2/13/2018  8:00 PM   Saline Locked Yes 2/13/2018  8:00 PM   Infiltration Grading Used by [facility] 0 2/13/2018  8:00 PM   Phlebitis Scale (Used by [facility]) 0 2/13/2018  8:00 PM   Date Primary Tubing Changed 02/10/18 2/12/2018  8:00 PM   Date Secondary Tubing Changed 02/10/18 2/10/2018  4:00 PM   NEXT Primary Tubing Change  02/17/18 2/12/2018  8:00 PM   NEXT Secondary Tubing Change  02/11/18 2/10/2018  4:00 PM       Surgical Incision  Incision Left Hip (Active)   Wound Bed Other (comment) 2/13/2018  8:00 PM   Drainage  None 2/13/2018  8:00 PM   Periwound Skin Other (Comments) 2/13/2018  8:00 PM   Daily - Wound Closure Not Assessed 2/13/2018  8:00 AM   Dressing Options Mepilex Silver 2/13/2018  8:00 PM   Dressing Status / Change Clean;Dry;Intact 2/13/2018  8:00 PM   Daily - Dressing Change Observed 2/13/2018  8:00 PM       Wound POA Other (comment) Rousseau Right Lower;Medial (Active)   Wound Bed Pink;Red;Yellow 2/14/2018 10:07 AM   Drainage  Scant;Serosanguinous 2/14/2018 10:07 AM   Periwound Skin Normal;Intact 2/14/2018 10:07 AM   Cleansing Normal Saline Irrigation 2/14/2018 10:07 AM   Dressing Options Other (Comments);Foam 2/14/2018 10:07 AM   Dressing Status / Change Changed 2/14/2018 10:07 AM   Dressing Cleansing/Solutions Not Applicable 2/14/2018 10:07 AM   Periwound Protectant Skin Protectant wipes to Periwound;Skin Moisturizer 2/14/2018 10:07 AM   Length (cm) 1.5 2/14/2018 10:07 AM   Width (cm) 0.5 2/14/2018 10:07 AM   Weekly Photo (Inpatient Only) 02/14/18 2/14/2018 10:07 AM   Dressing Change Frequency Every 48 hrs 2/14/2018 10:07 AM   Next Dressing Change  02/16/18 2/14/2018 10:07 AM       Wound POA Other (comment) Rousseau Left Lower;Medial (Active)   Wound Bed Yellow 2/14/2018 10:07 AM   Drainage   Scant;Serosanguinous 2/14/2018 10:07 AM   Periwound Skin Red;Other (Comments) 2/14/2018 10:07 AM   Cleansing Normal Saline Irrigation 2/14/2018 10:07 AM   Dressing Options Other (Comments);Foam 2/14/2018 10:07 AM   Dressing Status / Change Changed 2/14/2018 10:07 AM   Dressing Cleansing/Solutions Not Applicable 2/14/2018 10:07 AM   Periwound Protectant Skin Protectant wipes to Periwound;Skin Moisturizer 2/14/2018 10:07 AM   Length (cm) 5 2/14/2018 10:07 AM   Width (cm) 5 2/14/2018 10:07 AM   Weekly Photo (Inpatient Only) 02/14/18 2/14/2018 10:07 AM   Dressing Change Frequency Every 48 hrs 2/14/2018 10:07 AM   Next Dressing Change  02/16/18 2/14/2018 10:07 AM   Decline in Presentation, Clinician Notified Physician notified 2/14/2018 10:07 AM   Time Spent with Patient (mins) 60 2/14/2018 10:07 AM                  MENTAL STATUS ON TRANSFER  Level of Consciousness: Alert  Orientation : Oriented x 4  Speech: Speech Clear    CONSULTATIONS  -ORTHO    PROCEDURES  Pre-operative Dx: 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 Chong Vazquez MD  Assistants: CELESTE Suero  Anesthesia:  Guillermo Carpenter MD.  General  EBL:  200 mL  Drains:  None  Complications:  None    DX-PELVIS-1 OR 2 VIEWS   Final Result      Left hip arthroplasty with unchanged alignment of intertrochanteric fracture fragments      DX-FEMUR-2+ LEFT   Final Result      1.  Refracture of the superior and lateral aspect of the intertrochanteric fracture of the left femur with displacement of the 2 femoral neck screws. Superior displacement of the greater trochanteric fracture fragment.      2.  No other femoral fracture identified.      3.  No femoral head dislocation is present.      DX-CHEST-PORTABLE (1 VIEW)   Final Result      1.  No focal consolidation.      2.  Subacute appearing fractures of  the posterior lateral aspects of the right 4th and 5th ribs.            LABORATORY  Lab Results   Component Value Date/Time    SODIUM 133 (L) 02/11/2018 02:29 AM    POTASSIUM 4.0 02/11/2018 02:29 AM    CHLORIDE 102 02/11/2018 02:29 AM    CO2 26 02/11/2018 02:29 AM    GLUCOSE 99 02/11/2018 02:29 AM    BUN 11 02/11/2018 02:29 AM    CREATININE 0.72 02/11/2018 02:29 AM        Lab Results   Component Value Date/Time    WBC 8.5 02/11/2018 02:29 AM    HEMOGLOBIN 9.3 (L) 02/11/2018 02:29 AM    HEMATOCRIT 31.1 (L) 02/11/2018 02:29 AM    PLATELETCT 284 02/11/2018 02:29 AM        Total time of the discharge process exceeds 40 minutes.

## 2018-02-14 NOTE — CARE PLAN
Problem: Safety  Goal: Free from accidental injury  Outcome: PROGRESSING AS EXPECTED  Bed in the lowest locked position. Call light within reach.  Hourly rounding in place.     Problem: Risk for Impaired Mobility  Goal: Mobilization  Outcome: PROGRESSING AS EXPECTED  Pt up standby assist with FWW    Problem: Pain  Goal: Alleviation of Pain or a reduction in pain to the patient's comfort goal  Outcome: PROGRESSING AS EXPECTED  Pt receiving adequate pain relief with Oxy IR

## 2018-02-14 NOTE — CARE PLAN
Problem: Safety  Goal: Will remain free from falls    Intervention: Implement fall precautions  Bed is in the lowest position, call light and belongings are within reach, treaded socks are on, DME is in the bathroom, rails are up, and hourly rounding is in place.       Problem: Pain Management  Goal: Pain level will decrease to patient's comfort goal    Intervention: Follow pain managment plan developed in collaboration with patient and Interdisciplinary Team  Pt complains of 5/ 10 pain to the LLE. PRN oxycodone given which adequately controls her pain.

## 2018-02-14 NOTE — PROGRESS NOTES
Report received from day RN, and care assumed. Pt A&O x 4. Pt denies N/V at this time. She complains of pain to the LLE which is adequately controlled with PRN oxycodone. Dressing to the LLE and RLE are CDI. Vital signs reviewed and are WNL. IV assessed and patent, with dressing CDI. Plan if for Pt to discharge at 1230 to [facility]. POC discussed with Pt and questions answered. POC includes pain control, rest, up to chair for breakfast, and discharge to [facility] at 1230. Bed is in the lowest position, rails are up, and call light is within reach. Communication board updated and hourly rounding implemented.

## 2018-02-14 NOTE — PROGRESS NOTES
" Subjective:      Patient reports doing well. Patient denies chest pain, calf pain, shortness of breath.  Pain is currently under control. Patient is ambulating well with the use of an assistive device.    Objective:    Blood pressure 116/54, pulse 83, temperature 36.6 °C (97.8 °F), resp. rate 17, height 1.702 m (5' 7\"), weight 88 kg (194 lb 0.1 oz), SpO2 94 %.                Intake/Output Summary (Last 24 hours) at 02/14/18 0739  Last data filed at 02/13/18 1200   Gross per 24 hour   Intake              800 ml   Output              200 ml   Net              600 ml       Comfortable, no distress  Neurologically and vascularly intact with palpable pedal pulses bilaterally.  Dressing C/D/I    Assessment:      Doing well s/p conversion total hip arthroplasty.    Plan:      WBAT  Posterior precautions  OT/PT  DVT ppx - Heparin + Sequential Compression Devices  IV antibiotics in house, then PO abx at discharge for leg wounds/cellulitis     Dispo planning  Follow-up with Dr. Vazquez' office approximately 2 weeks post-op.       "

## 2018-02-14 NOTE — DISCHARGE INSTRUCTIONS
Discharge Instructions  Diet low fat, low sugar, heart healthy with 9-13 servings of fruits and vegetables   Activities as tolerated   Follow ups with PCP in 7-10 days, call for appointment   Meds per med rec sheet   No smoking, no alcohol, no caffeine   Wear seat belt in motorized vehicle   Take medications as perscribed   Keep appointments   If symptoms worsen call PCP, 911 or urgent care.    BILATERAL LOWER EXTREMITY WOUNDS: Nursing to remove old dressing, being sure to remove all honey colloid. Wash legs with no rinse foam cleanser and moist wash cloth. Cleanse wounds with normal saline and gauze. Protect yuki wound skin with no sting skin prep and sween cream, then apply medihoney colloid cut to fit over open wounds. Be sure to remove plastic backing from honey colloid and apply sticky side down to wound bed, then cover with adhesive foam. Nursing to change every 48 hours and prn drainage saturation or dislodgement.    Discharged to other by medical transportation with escort. Discharged via wheelchair, hospital escort: Yes.  Special equipment needed: Walker    Be sure to schedule a follow-up appointment with your primary care doctor or any specialists as instructed.     Discharge Plan:   Diet Plan: Discussed  Activity Level: Discussed  Confirmed Follow up Appointment:  (pt transfering to SNF)  Confirmed Symptoms Management: Discussed  Medication Reconciliation Updated: Yes  Influenza Vaccine Indication: Not indicated: Previously immunized this influenza season and > 8 years of age    I understand that a diet low in cholesterol, fat, and sodium is recommended for good health. Unless I have been given specific instructions below for another diet, I accept this instruction as my diet prescription.   Other diet: Healthy regular diet    Special Instructions: Discharge instructions for the Orthopedic Patient    Follow up with Primary Care Physician within 2 weeks of discharge to home, regarding:  Review of  medications and diagnostic testing.  Surveillance for medical complications.  Workup and treatment of osteoporosis, if appropriate.     -Is this a Joint Replacement patient? Yes   Total Joint Hip Replacement Discharge Instructions    Pain  - The goal is to slowly wean off the prescription pain medicine.  - Ice can be used for pain control.  20 minutes at a time is recommended, and never directly against your skin or incision.  - Most patients are off the pain pills by 3 weeks; others may require a low level of pain medications for many months. If your pain continues to be severe, follow up with your physician.  Infection  Deep hip joint infections that require removal of the prostheses occur in less than 0.1% of patients. Lesser infections in the skin (cellulites) are more common and much more easily treated.  - Keep the incision as clean and dry as possible.  - Always wash your hands before touching your incision.  - Skin infections tend to develop around 7-10 days after surgery, most can be treated with oral antibiotics.  - Dental Care should be delayed for 3 months after surgery, your surgeon recommends taking a dose of antibiotics 1 hour prior to any dental procedure.  After 2 years, most surgeons recommend antibiotics only before an extensive procedure.  Ask your surgeon what he recommends.  - Signs and symptoms of infection can include:  low grade fever, redness, pain, swelling and drainage from your incision.  Notify your surgeon immediately if you develop any of these symptoms.  Post op Disturbances  - Bowel habits - constipation is extremely common and is caused by a combination of anesthesia, lack of mobility and pain medicine.  Use stool softeners or laxatives if necessary. It is important not to ignore this problem, as bowel obstructions can be a serious complication after joint replacement surgery.  - Mood/Energy Level - Many patients experience a lack of energy and endurance for up to 2-3 months after  surgery.  Some may also feel down and can even become depressed.  This is likely due to the postoperative anemia, change in activity level, lack of sleep, pain medicine and just the emotional reaction to the surgery itself that is a big disruption in a person’s life.  This usually passes.  If symptoms persist, follow up with your primary physician.  - Returning to work - Your surgeon will give you more specific instructions.  Generally, if you work a sedentary job requiring little standing or walking, most patients may return within 2-6 weeks.  Manual labor jobs involving walking, lifting and standing may take 3-4 months.  Your surgeon’s office can provide a release to part-time or light duty work early on in your recovery and progress you to full duty as able.  - Driving - You can begin driving an automatic shift car in 4 to 8 weeks, provided you are no longer taking narcotic pain medication. If you have a stick-shift car and your right hip was replaced, do not begin driving until your doctor says you can.   - Avoiding falls -  throw rugs and tack down loose carpeting.  Be aware of floor hazards such as pets, small objects or uneven surfaces.   -  Airport Metal Detectors - The sensitivity of metal detectors varies and it is likely that your prosthesis will cause an alarm. Inform the  that you have an artificial joint.  Diet  - Resume your normal diet as tolerated.  - It is important to achieve a healthy nutritional status by eating a well balanced diet on a regular basis.  - Your physician may recommend that you take iron and vitamin supplements.   - Continue to drink plenty of fluids.  Shower/Bathing  - You may shower as soon as you get home from the hospital unless otherwise instructed.  - Keep your incision out of water.  To keep the incision dry when showering, cover it with a plastic bag or plastic wrap.  - Pat incision dry if it gets wet.  Don’t rub.  - Do not submerge in a bath until  staples are out and the incision is completely healed. (Approximately 6-8 weeks after surgery).  Dressing Change:  Procedure (if recommended by your physician)  - Wash hands.  - Open all dressing change materials.  - Remove old dressing and discard.  - Inspect incision for redness, increase in clear drainage, yellow/green drainage, odor and surrounding skin hot to touch.  -  ABD (large gauze) pad by one corner and lay over the incision.  Be careful not to touch the inside of the dressing that will lay over the incision.  - Secure in place as instructed (Ace wrap or tape).    Swelling/Bruising  - Swelling is normal after hip replacement and can involve the thigh, knee, calf and foot.  - Swelling can last from 3-6 months.  - Elevate your leg higher than your heart while reclining.  The first week you are home you should elevate your leg an equal amount of time, as you are active.    - Anti-inflammatory pills can be taken once you have stopped the blood thinners.  - The swelling is usually worse after you go home since you are upright for longer periods of time.  - Bruising is common and can involve the entire leg including the thigh, calf and even foot.  Bruising often does not appear until after you arrive home and it can be quite dramatic- purple, black, green.  The bruising you can see is not usually concerning and will subside without any treatment.      Blood Clot Prevention  Blood clots in the legs and the less common, but frightening, clots that travel to the lungs are a real focus of our preventative. Most patients are at standard risk for them, but those patients who are at higher risk include people who have had previous clots, a family history of clotting, smoking, diabetes, obesity, advanced age, use of estrogen and a sedentary lifestyle.    - Signs of blood clots in legs - Swelling in thigh, calf or ankle that does not go down with elevation.  Pain, heat and tenderness in calf, back of calf or  groin area.  NOTE: blood clots can occur in either leg.  - You have been receiving anticoagulant therapy (blood thinners) in the hospital and you may be instructed to continue at home depending on your risk factors.  - Your risk for developing a clot continues for up to 2-3 months after surgery.  You should avoid prolonged sitting and dehydration during that time (long air trips and car trips).  If you do take a trip during this time, please get up and move around every 1- 1.5 hours.  - If you are prescribed blood thinning medication for home, follow instructions as directed. (Handouts provided if applicable).      Activity    Once you get home, you should stay active. The key is not to overdo it! While you can expect some good days and some bad days, you should notice a gradual improvement over time you should notice a gradual improvement and a gradual increase in your endurance over the next 6 to 12 months.    - Weight Bearing - If you have undergone cemented or hybrid hip replacement, you can put some weight on the leg immediately using a cane or walker, and you should continue to use some support for 4 to 6 weeks to help the muscles recover.   - Sleeping Positions - Sleep on your back with your legs slightly apart or on your side with a regular pillow between your knees. Be sure to use the pillow for at least 6 weeks, or until your doctor says you can do without it. Sleeping on your stomach should be all right  - Sitting - For at least the first 3 months, sit only in chairs that have arms. Do not sit on low chairs, low stools, or reclining chairs. Do not cross your legs at the knees. The physical therapist will show you how to sit and stand from a chair, keeping your affected leg out in front of you. Get up and move around on a regular basis--at least once every hour.  - Walking - Walk as much as you like once your doctor gives you the go-ahead, but remember that walking is no substitute for your prescribed  exercises. Walking with a pair of trekking poles is helpful and adds as much as 40% to the exercise you get when you walk  - Therapy may be needed in some cases, to strengthen your muscles and improve your gait (walking pattern).  This decision will be made at your post-operative appointment.  Follow your therapist recommended post-operative exercises (handout provided by Therapist).  - Swimming is also recommended; you can begin as soon as the sutures have been removed and the wound is healed, approximately 6 to 8 weeks after surgery. Using a pair of training fins may make swimming a more enjoyable and effective exercise.  - Other activities - Lower impact activities are preferred.  If you have specific questions, consult your Surgeon.    - Sexual activity - Your surgeon can tell you when it should be safe to resume sexual activity.      When to Call the Doctor   Call the physician if:   - Fever over 100.5? F  - Increased pain, drainage, redness, odor or heat around the incision area  - Shaking chills  - Increased knee pain with activity and rest  - Increased pain in calf, tenderness or redness above or below the knee  - Increased swelling of calf, ankle, foot  - Sudden increased shortness of breath, sudden onset of chest pain, localized chest pain with coughing  - Incision opening  Or, if there are any questions or concerns about medications or care.       -Is this patient being discharged with medication to prevent blood clots?  No    · Is patient discharged on Warfarin / Coumadin?   No     Depression / Suicide Risk    As you are discharged from this [facility] facility, it is important to learn how to keep safe from harming yourself.    Recognize the warning signs:  · Abrupt changes in personality, positive or negative- including increase in energy   · Giving away possessions  · Change in eating patterns- significant weight changes-  positive or negative  · Change in sleeping patterns- unable to sleep or  sleeping all the time   · Unwillingness or inability to communicate  · Depression  · Unusual sadness, discouragement and loneliness  · Talk of wanting to die  · Neglect of personal appearance   · Rebelliousness- reckless behavior  · Withdrawal from people/activities they love  · Confusion- inability to concentrate     If you or a loved one observes any of these behaviors or has concerns about self-harm, here's what you can do:  · Talk about it- your feelings and reasons for harming yourself  · Remove any means that you might use to hurt yourself (examples: pills, rope, extension cords, firearm)  · Get professional help from the community (Mental Health, Substance Abuse, psychological counseling)  · Do not be alone:Call your Safe Contact- someone whom you trust who will be there for you.  · Call your local CRISIS HOTLINE 341-7386 or 591-058-3492  · Call your local Children's Mobile Crisis Response Team [place] (508) 360-2789 or www.[employer]  · Call the toll free National Suicide Prevention Hotlines   · National Suicide Prevention Lifeline 164-856-YLON (9576)  · National Hope Line Network 800-SUICIDE (782-9799)

## 2018-02-14 NOTE — PROGRESS NOTES
Pt discharge to [place] via wheelchair with [facility]. IV d/c prior to leaving. Oxycodone prescriptions given to pt, consent for opiate use signed and placed in chart. COBRA signed and placed in chart. Report given to micheal Vernon RN. Pt discharge with all personal belongings. All questions and concerns answered. No further needs noted.

## 2018-02-16 LAB
BACTERIA SPEC ANAEROBE CULT: NORMAL
SIGNIFICANT IND 70042: NORMAL
SITE SITE: NORMAL
SOURCE SOURCE: NORMAL

## 2018-02-20 NOTE — DOCUMENTATION QUERY
DOCUMENTATION QUERY     PROVIDERS: Please select “Cosign w/ note”to reply to query.     To better represent the severity of illness of your patient, please review the following information and exercise your independent professional judgment in responding to this query.      Refracture of left femur and history of osteoporosis are documented in the H&P.  The Operative Report documents left intertrochanteric femur fracture nonunion.  Based upon the clinical findings, risk factors, and treatment, can this diagnosis be further specified?     Please select one:     ·         Fracture due to Neoplastic disease  ·         Fracture due to Osteopenia  ·         Fracture due to Osteoporosis (specify: disuse, drug-induced, idiopathic, postmenopausal, postoophorectomy, postsurgical malabsorption)  ·         Fracture due to trauma (traumatic fracture)  • Nonunion of fracture due to trauma  • Nonunion of fracture due to osteoporosis  ·         Other explanation of clinical findings           The medical record reflects the following:   Clinical Findings Comminuted and crumbled poor quality proximal femoral bone.   Treatment Total hip replacement   Risk Factors History of osteoporosis, falls, anemia   Location within medical record History and Physical, Discharge summary, Op Report      Thank you,   Lindsey Brown, CCS, CCS-P  [email or web address]

## 2018-03-12 RX ORDER — METOPROLOL SUCCINATE 100 MG/1
TABLET, EXTENDED RELEASE ORAL
Qty: 90 TAB | Refills: 0 | Status: SHIPPED | OUTPATIENT
Start: 2018-03-12 | End: 2018-08-02 | Stop reason: SDUPTHER

## 2018-03-12 NOTE — TELEPHONE ENCOUNTER
Requested Prescriptions     Signed Prescriptions Disp Refills   • metoprolol SR (TOPROL XL) 100 MG TABLET SR 24 HR 90 Tab 0     Sig: TAKE 1 TABLET BY MOUTH EVERY DAY     Authorizing Provider: SANDHYA PAGAN A.P.R.N.

## 2018-03-28 ENCOUNTER — APPOINTMENT (OUTPATIENT)
Dept: WOUND CARE | Facility: MEDICAL CENTER | Age: 73
End: 2018-03-28
Attending: FAMILY MEDICINE
Payer: MEDICARE

## 2018-03-31 ENCOUNTER — APPOINTMENT (OUTPATIENT)
Dept: WOUND CARE | Facility: MEDICAL CENTER | Age: 73
End: 2018-03-31
Payer: MEDICARE

## 2018-04-13 ENCOUNTER — HOSPITAL ENCOUNTER (OUTPATIENT)
Dept: RADIOLOGY | Facility: MEDICAL CENTER | Age: 73
End: 2018-04-13
Attending: PHYSICIAN ASSISTANT
Payer: MEDICARE

## 2018-04-13 DIAGNOSIS — M25.572 LEFT ANKLE PAIN, UNSPECIFIED CHRONICITY: ICD-10-CM

## 2018-04-13 DIAGNOSIS — S82.842A CLOSED BIMALLEOLAR FRACTURE OF LEFT ANKLE, INITIAL ENCOUNTER: ICD-10-CM

## 2018-04-13 DIAGNOSIS — L03.116 CELLULITIS OF LEFT FOOT: ICD-10-CM

## 2018-04-13 PROCEDURE — 73700 CT LOWER EXTREMITY W/O DYE: CPT | Mod: LT

## 2018-04-24 ENCOUNTER — APPOINTMENT (OUTPATIENT)
Dept: WOUND CARE | Facility: MEDICAL CENTER | Age: 73
End: 2018-04-24
Attending: FAMILY MEDICINE
Payer: MEDICARE

## 2018-04-27 ENCOUNTER — APPOINTMENT (OUTPATIENT)
Dept: WOUND CARE | Facility: MEDICAL CENTER | Age: 73
End: 2018-04-27
Attending: FAMILY MEDICINE
Payer: MEDICARE

## 2018-05-01 ENCOUNTER — APPOINTMENT (OUTPATIENT)
Dept: WOUND CARE | Facility: MEDICAL CENTER | Age: 73
End: 2018-05-01
Attending: FAMILY MEDICINE
Payer: MEDICARE

## 2018-05-04 ENCOUNTER — APPOINTMENT (OUTPATIENT)
Dept: WOUND CARE | Facility: MEDICAL CENTER | Age: 73
End: 2018-05-04
Attending: FAMILY MEDICINE
Payer: MEDICARE

## 2018-05-08 ENCOUNTER — APPOINTMENT (OUTPATIENT)
Dept: WOUND CARE | Facility: MEDICAL CENTER | Age: 73
End: 2018-05-08
Attending: FAMILY MEDICINE
Payer: MEDICARE

## 2018-05-08 DIAGNOSIS — I73.9 PERIPHERAL ARTERIAL DISEASE (HCC): ICD-10-CM

## 2018-05-08 PROCEDURE — 99203 OFFICE O/P NEW LOW 30 MIN: CPT

## 2018-05-08 PROCEDURE — 99213 OFFICE O/P EST LOW 20 MIN: CPT

## 2018-05-08 RX ORDER — AMOXICILLIN 500 MG/1
500 CAPSULE ORAL 3 TIMES DAILY
COMMUNITY
End: 2018-05-31

## 2018-05-08 RX ORDER — PENTOXIFYLLINE 400 MG/1
400 TABLET, EXTENDED RELEASE ORAL DAILY
Status: ON HOLD | COMMUNITY
End: 2018-06-04

## 2018-05-08 NOTE — CERTIFICATION
"Advanced Wound Care  [facility]  [address]  [address]  [place] [address]  [phone] Fax: [phone]      Initial Evaluation  For Certification Period: 05/08/18 - 07/29/18      Referring Physician: Katt Clemons MD  Primary Physician:   Pt switching to IRON Crowley (was previously with Jorge Amador MD of same office.).  Pt to call to schedule pcp appt today.     Consulting Physicians:  Will schedule with Dr. Colin ([facility]) in 2 to 3 weeks after arterial testing completed.  Dr. Peters (ortho).       Wound(s): LLE medial  Start of Care:  05/08/18       Subjective:        HPI:  73 y/o female with wound to medial LLE which has been open \"about a year\".  During that year, she fell and broke her hip and separately her ankle, has had several surgeries, hospital stays, and a longer stay at [facility] for rehab.  She has also had several bouts of cellulitis to both LEs.  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            Pain:   Pt reports burning type pain to LLE medial wound.  Denies need for pain medications.  Denies claudication.         Past Medical History:  Past Medical History:   Diagnosis Date   • Anxiety    • Dental disorder     full dentures   • Generalized osteoarthritis of multiple sites 10/20/2015   • Heart valve disease     pt not sure    • Hyperlipidemia    • Hypertension    • Osteoporosis        Current Medications:    Current Outpatient Prescriptions:   •  amoxicillin (AMOXIL) 500 MG Cap, Take 500 mg by mouth 3 times a day., Disp: , Rfl:   •  pentoxifylline CR (TRENTAL) 400 MG CR tablet, Take 400 mg by mouth every day., Disp: , Rfl:   •  metoprolol SR (TOPROL XL) 100 MG TABLET SR 24 HR, TAKE 1 TABLET BY MOUTH EVERY DAY, Disp: 90 Tab, Rfl: 0  •  sertraline (ZOLOFT) 100 MG Tab, Take 1 Tab by mouth every day., Disp: 30 Tab, Rfl: 11  •  sertraline (ZOLOFT) 100 MG Tab, TAKE 1 TAB BY MOUTH EVERY DAY., Disp: 30 Tab, Rfl: 5  •  Cholecalciferol " (VITAMIN D) 2000 UNIT Tab, Take 2,000 Units by mouth every day., Disp: , Rfl:   •  therapeutic multivitamin-minerals (THERAGRAN-M) TABS, Take 1 Tab by mouth every day., Disp: , Rfl:     Allergies:   Patient has no known allergies.    Past Surgical History:   Past Surgical History:   Procedure Laterality Date   • HIP REVISION TOTAL Left 2/11/2018    Procedure: HIP REVISION TOTAL- femoral nail removal conversion to total hip arthoroplasty;  Surgeon: Chong Vazuqez M.D.;  Location: SURGERY [facility];  Service: Orthopedics   • HIP NAILING INTRAMEDULLARY Left 12/20/2017    Procedure: HIP NAILING INTRAMEDULLARY;  Surgeon: Jorge Peters M.D.;  Location: SURGERY [facility];  Service: Orthopedics   • BREAST BIOPSY  8/28/2014    Performed by Magdalena Londono M.D. at SURGERY 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    5/8/18   Right dp/pt multiphasic per doppler, not palpable manually Left dp/pt sluggish per doppler, not palpable manually    84    76   Brachial 170 168   TOMI 0.7 0.49       Orthotic, protective, supportive devices: FWW, L walking boot due to  ankle fx    Fall Risk Assessment (jeronimo all that apply with an X): Completed upon initial eval 5/8/18: high fall risk.              X 65 years or older                X Fall within the last 2 years, uses   X Ambulatory devices   Loss of protective sensation in feet,    Use of prostethic/orthotic, years               Presence of lower extremity/foot/toe amputation              X Taking medication that increases risk (per facility policy)       Wound Characteristics                                                    Location: LLE medial   Initial Evaluation  Date: 05/08/18   Tissue Type and %: 90% adherent yellow; 5% moist red   Periwound: Macerated, fragile   Drainage: Mod to heavy serous   Exposed structures CARLITA   Wound Edges:   Open, irregular   Odor: none   S&S of Infection:   none   Edema: 2+ pitting edema to BLEs   Sensation: intact               Measurements: LLE medial Initial Evaluation  Date: 05/08/18   Length (cm) 7.1   Width (cm) 4   Depth (cm) CARLITA   Area (cm2) 28.4 cm2   Tract/undermine CARLITA       5/8/18: LLE TOMI: 0.49.  NO DEBRIDEMENT/COMPRESSION.  Procedures:     Debridement :  None, due to low TOMI.   Cleansed with: NS/gauze                                                                       Periwound protected with: no sting skin prep, zinc paste   Primary dressing: Aquacel ag   Secondary Dressing: super absorbant dressing, secured with kerlix/hypafix.   Other: Pt's own sock and immobilizing boot (due to ankle fx)     Patient Education: POC taught to pt: Need for arterial studies due to poor arterial flow, then f/u with Dr. Colin in a few weeks.  Pt states she is not surprised and will schedule asap.  Don't apply any compression to limb taught.  S/sx infection to monitor for: redness, swelling, increased/foul drainage, foul odor, fever, n/v, malaise taught to pt who is very familiar after several bouts of BLE cellulitis over the last year.  When to contact AWC vs go to UC/ER taught.  Instructed pt  to schedule an appt with pcp asap for f/u after multiple recent hospitalizations/rehab and need for med reconcilliation.  She states Dr. Jorge Amadro doesn't work there anymore, but she will call today to schedule with IRON Johansen and establish her as new pcp.  Advised pt to keep dressing CDI, cover with bathing taught.  The wound is draining mod to heavy, so instructed pt to change if bandage very wet/soiled/falling off, otherwise leave in place.  Pt expresses very good understanding of all instructions.      Professional Collaboration: Initial eval sent to [facility] Crowley per epic.  Added to Dr. Colin's schedule 5/21/18.      Assessment:      Wound etiology: Likely mixed vascular    Wound Progress:  Initial eval    Rationale for Treatment: zinc paste to protect yuki wound, AqAg to manage bioburden, absorb exudate, and maintain moist wound environment without laterally wicking exudate therefore reducing yuki-wound maceration, super absorbant pad to absorb, kerlix/hypafix to secure.    Patient tolerance/compliance: Pt agreeable with poc and appt schedule, need for testing.    Complicating factors: PAD, likely PVD, hx smoker    Need for ongoing Advanced Wound Care services: continued skilled wound care for debridement as needed, dressing management and skilled clinical observation to prevent complications and expedite healing.    Plan:      Treatment Plan and Recommendations:  Diagnosis/ICD10: I73.9, L97.92    Procedures/CPT: Level 3 initial eval    Frequency: 2x/week      Treatment Goals: STG 2 Weeks  LTG 4 Weeks   Granulation Tissue: Unknown, pending arterial studies    Decrease Necrotic Tissue to:     Wound Phase:      Decrease Size by:     Periwound:      Decrease tracts/undermining by:     Decrease Pain:          At the time of each visit a thorough assessment of the patient is completed to assure the  appropriateness of our plan of care.  The dressings or modalities may need to be adapted   from the  original plan to address any significant changes in the wound environment.          Clinician Signature:_______________________________Date__________________      Physician Signature:______________________________Date:__________________

## 2018-05-11 ENCOUNTER — APPOINTMENT (OUTPATIENT)
Dept: WOUND CARE | Facility: MEDICAL CENTER | Age: 73
End: 2018-05-11
Attending: FAMILY MEDICINE
Payer: MEDICARE

## 2018-05-11 PROCEDURE — 99212 OFFICE O/P EST SF 10 MIN: CPT

## 2018-05-11 NOTE — WOUND TEAM
"Advanced Wound Care  [facility]  [address]  [address]  [place] [address]  [phone] Fax: [phone]      Encounter Note  For Certification Period: 05/08/18 - 07/29/18      Referring Physician: Katt Clemons MD  Primary Physician:   Pt switching to IR[facility] Crowley (was previously with Jorge Amador MD of same office.).  Pt to call to schedule pcp appt today.     Consulting Physicians:  Will schedule with Dr. Colin ([facility]) in 2 to 3 weeks after arterial testing completed.  Dr. Peters (ortho).       Wound(s): LLE medial  Start of Care:  05/08/18       Subjective:        HPI:  73 y/o female with wound to medial LLE which has been open \"about a year\".  During that year, she fell and broke her hip and separately her ankle, has had several surgeries, hospital stays, and a longer stay at [facility] for rehab.  She has also had several bouts of cellulitis to both LEs.  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              Pain:   Pt reports burning type pain to LLE medial wound.          Past Medical History:  Past Medical History:   Diagnosis Date   • Anxiety    • Dental disorder     full dentures   • Generalized osteoarthritis of multiple sites 10/20/2015   • Heart valve disease     pt not sure    • Hyperlipidemia    • Hypertension    • Osteoporosis        Current Medications:    Current Outpatient Prescriptions:   •  amoxicillin (AMOXIL) 500 MG Cap, Take 500 mg by mouth 3 times a day., Disp: , Rfl:   •  pentoxifylline CR (TRENTAL) 400 MG CR tablet, Take 400 mg by mouth every day., Disp: , Rfl:   •  metoprolol SR (TOPROL XL) 100 MG TABLET SR 24 HR, TAKE 1 TABLET BY MOUTH EVERY DAY, Disp: 90 Tab, Rfl: 0  •  sertraline (ZOLOFT) 100 MG Tab, Take 1 Tab by mouth every day., Disp: 30 Tab, Rfl: 11  •  sertraline (ZOLOFT) 100 MG Tab, TAKE 1 TAB BY MOUTH EVERY DAY., Disp: 30 Tab, Rfl: 5  •  Cholecalciferol (VITAMIN D) 2000 UNIT Tab, Take 2,000 Units by mouth every " day., Disp: , Rfl:   •  therapeutic multivitamin-minerals (THERAGRAN-M) TABS, Take 1 Tab by mouth every day., Disp: , Rfl:     Allergies:   Patient has no known allergies.    Past Surgical History:   Past Surgical History:   Procedure Laterality Date   • HIP REVISION TOTAL Left 2/11/2018    Procedure: HIP REVISION TOTAL- femoral nail removal conversion to total hip arthoroplasty;  Surgeon: Chong Vazquez M.D.;  Location: SURGERY [facility];  Service: Orthopedics   • HIP NAILING INTRAMEDULLARY Left 12/20/2017    Procedure: HIP NAILING INTRAMEDULLARY;  Surgeon: Jorge Peters M.D.;  Location: SURGERY [facility];  Service: Orthopedics   • BREAST BIOPSY  8/28/2014    Performed by Magdalena Londono M.D. 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    5/8/18   Right dp/pt multiphasic per doppler, not palpable manually Left dp/pt sluggish per doppler, not palpable manually    84    76   Brachial 170 168   TOMI 0.7 0.49       Orthotic, protective, supportive devices: FWW, L walking boot due to ankle fx    Fall Risk Assessment (jeronimo all that apply with an  X): Completed upon initial eval 5/8/18: high fall risk.              X 65 years or older                X Fall within the last 2 years, uses   X Ambulatory devices   Loss of protective sensation in feet,    Use of prostethic/orthotic, years               Presence of lower extremity/foot/toe amputation              X Taking medication that increases risk (per facility policy)       Wound Characteristics                                                    Location: LLE medial   Initial Evaluation  Date: 05/08/18 Encounter Date: 05/11/2018   Tissue Type and %: 90% adherent yellow; 5% moist red 90% adherent yellow, 10% moist red   Periwound: Macerated, fragile Moderate maceration   Drainage: Mod to heavy serous Heavy serosanguinous   Exposed structures CARLITA CARLITA   Wound Edges:   Open, irregular Open, irregular   Odor: none None   S&S of Infection:   none None   Edema: 2+ pitting edema to BLEs 2+ pitting to BLE   Sensation: intact Intact               Measurements: LLE medial Initial Evaluation  Date: 05/08/18   Length (cm) 7.1   Width (cm) 4   Depth (cm) CARLITA   Area (cm2) 28.4 cm2   Tract/undermine CARLITA       5/8/18: E TOMI: 0.49.  NO DEBRIDEMENT/COMPRESSION.    Procedures:     Debridement :  None, due to low TOMI.   Cleansed with: NS/gauze                                                                       Periwound protected with: no sting skin prep, zinc paste   Primary dressing: Aquacel ag   Secondary Dressing: ABD pad secured with kerlix/hypafix.   Other: Pt's own sock and immobilizing boot (due to ankle fx)     Patient Education: Discussed POC and rationale for dressing selection. Pt had seen her orthopedic surgeon earlier today, who thought wound looks improved. Pt is pleased with this. Reviewed s/s infection and when to present to ED. Pt verbalizes understanding to all.     05/08/2018 - POC taught to pt: Need for arterial studies due to poor arterial flow, then f/u with Dr. Colin in a few weeks.  Pt states she  is not surprised and will schedule asap.  Don't apply any compression to limb taught.  S/sx infection to monitor for: redness, swelling, increased/foul drainage, foul odor, fever, n/v, malaise taught to pt who is very familiar after several bouts of BLE cellulitis over the last year.  When to contact AWC vs go to UC/ER taught.  Instructed pt to schedule an appt with pcp asap for f/u after multiple recent hospitalizations/rehab and need for med reconcilliation.  She states Dr. Jorge Amador doesn't work there anymore, but she will call today to schedule with IRON Johansen and establish her as new pcp.  Advised pt to keep dressing CDI, cover with bathing taught.  The wound is draining mod to heavy, so instructed pt to change if bandage very wet/soiled/falling off, otherwise leave in place.  Pt expresses very good understanding of all instructions.      Professional Collaboration: None today      Assessment:      Wound etiology: Likely mixed vascular    Wound Progress:  Wound appears unchanged since previous visit.     Rationale for Treatment: zinc paste to protect yuki wound, AqAg to manage bioburden, absorb exudate, and maintain moist wound environment without laterally wicking exudate therefore reducing yuki-wound maceration, ABD pad to absorb, kerlix/hypafix to secure.    Patient tolerance/compliance: Pt agreeable with poc and appt schedule, need for testing.    Complicating factors: PAD, likely PVD, hx smoker    Need for ongoing Advanced Wound Care services: continued skilled wound care for debridement as needed, dressing management and skilled clinical observation to prevent complications and expedite healing.    Plan:      Treatment Plan and Recommendations:  Diagnosis/ICD10: I73.9, L97.92    Procedures/CPT: Level 3 initial eval    Frequency: 2x/week      Treatment Goals: STG 2 Weeks  LTG 4 Weeks   Granulation Tissue: Unknown, pending arterial studies    Decrease Necrotic Tissue to:     Wound Phase:      Decrease  Size by:     Periwound:      Decrease tracts/undermining by:     Decrease Pain:          At the time of each visit a thorough assessment of the patient is completed to assure the  appropriateness of our plan of care.  The dressings or modalities may need to be adapted   from the original plan to address any significant changes in the wound environment.          Clinician Signature:_______________________________Date__________________      Physician Signature:______________________________Date:__________________

## 2018-05-15 ENCOUNTER — APPOINTMENT (OUTPATIENT)
Dept: WOUND CARE | Facility: MEDICAL CENTER | Age: 73
End: 2018-05-15
Attending: FAMILY MEDICINE
Payer: MEDICARE

## 2018-05-18 ENCOUNTER — NON-PROVIDER VISIT (OUTPATIENT)
Dept: WOUND CARE | Facility: MEDICAL CENTER | Age: 73
End: 2018-05-18
Attending: FAMILY MEDICINE
Payer: MEDICARE

## 2018-05-18 PROCEDURE — 97602 WOUND(S) CARE NON-SELECTIVE: CPT

## 2018-05-18 NOTE — WOUND TEAM
"Advanced Wound Care  [facility]  [address]  [address]  [place] [address]  [phone] Fax: [phone]      Encounter Note  For Certification Period: 05/08/18 - 07/29/18      Referring Physician: Katt Clemons MD  Primary Physician:   Pt switching to IRON Crowley (was previously with Jorge Amador MD of same office.).  Pt to call to schedule pcp appt today.     Consulting Physicians:  Will schedule with Dr. Colin ([facility]) in 2 to 3 weeks after arterial testing completed.  Dr. Peters (ortho).       Wound(s): LLE medial  Start of Care:  05/08/18       Subjective:        HPI:  71 y/o female with wound to medial LLE which has been open \"about a year\".  During that year, she fell and broke her hip and separately her ankle, has had several surgeries, hospital stays, and a longer stay at [place] for rehab.  She has also had several bouts of cellulitis to both LEs.  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              Pain:   Pt reports burning type pain to LLE medial wound.          Past Medical History:  Past Medical History:   Diagnosis Date   • Anxiety    • Dental disorder     full dentures   • Generalized osteoarthritis of multiple sites 10/20/2015   • Heart valve disease     pt not sure    • Hyperlipidemia    • Hypertension    • Osteoporosis        Current Medications:    Current Outpatient Prescriptions:   •  amoxicillin (AMOXIL) 500 MG Cap, Take 500 mg by mouth 3 times a day., Disp: , Rfl:   •  pentoxifylline CR (TRENTAL) 400 MG CR tablet, Take 400 mg by mouth every day., Disp: , Rfl:   •  metoprolol SR (TOPROL XL) 100 MG TABLET SR 24 HR, TAKE 1 TABLET BY MOUTH EVERY DAY, Disp: 90 Tab, Rfl: 0  •  sertraline (ZOLOFT) 100 MG Tab, Take 1 Tab by mouth every day., Disp: 30 Tab, Rfl: 11  •  sertraline (ZOLOFT) 100 MG Tab, TAKE 1 TAB BY MOUTH EVERY DAY., Disp: 30 Tab, Rfl: 5  •  Cholecalciferol (VITAMIN D) 2000 UNIT Tab, Take 2,000 Units by mouth every " day., Disp: , Rfl:   •  therapeutic multivitamin-minerals (THERAGRAN-M) TABS, Take 1 Tab by mouth every day., Disp: , Rfl:     Allergies:   Patient has no known allergies.    Past Surgical History:   Past Surgical History:   Procedure Laterality Date   • HIP REVISION TOTAL Left 2/11/2018    Procedure: HIP REVISION TOTAL- femoral nail removal conversion to total hip arthoroplasty;  Surgeon: Chong Vazquez M.D.;  Location: SURGERY [facility];  Service: Orthopedics   • HIP NAILING INTRAMEDULLARY Left 12/20/2017    Procedure: HIP NAILING INTRAMEDULLARY;  Surgeon: Jorge Peters M.D.;  Location: SURGERY [facility];  Service: Orthopedics   • BREAST BIOPSY  8/28/2014    Performed by Magdalena Londono M.D. 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    5/8/18   Right dp/pt multiphasic per doppler, not palpable manually Left dp/pt sluggish per doppler, not palpable manually    84    76   Brachial 170 168   TOMI 0.7 0.49       Orthotic, protective, supportive devices: FWW, L walking boot due to ankle fx    Fall Risk Assessment (jeronimo all that apply with an  X): Completed upon initial eval 5/8/18: high fall risk.              X 65 years or older                X Fall within the last 2 years, uses   X Ambulatory devices   Loss of protective sensation in feet,    Use of prostethic/orthotic, years               Presence of lower extremity/foot/toe amputation              X Taking medication that increases risk (per facility policy)       Wound Characteristics                                                    Location: LLE medial   Initial Evaluation  Date: 05/08/18 Encounter Date: 05/18/2018   Tissue Type and %: 90% adherent yellow; 5% moist red 90% adherent yellow, 10% moist red   Periwound: Macerated, fragile Moderate maceration   Drainage: Mod to heavy serous Heavy serosanguinous   Exposed structures CARLITA CARLITA   Wound Edges:   Open, irregular Open, irregular   Odor: none None   S&S of Infection:   none None   Edema: 2+ pitting edema to BLEs 2+ pitting to BLE   Sensation: intact Intact               Measurements: LLE medial Initial Evaluation  Date: 05/08/18 Encounter Date: 05/18/2018   Length (cm) 7.1 5.7   Width (cm) 4 4   Depth (cm) CARLITA CARLITA   Area (cm2) 28.4 cm2 22.8 cm2   Tract/undermine CARLITA CARLITA       5/8/18: LLE TOMI: 0.49.  NO DEBRIDEMENT/COMPRESSION.    Procedures:     Debridement :  Non selective with NS gauze   Cleansed with: NS/gauze                                                                       Periwound protected with: Zinc barrier paste   Primary dressing: Aquacel ag   Secondary Dressing: ABD pad secured with kerlix/hypafix.   Other: Pt's own sock and immobilizing boot (due to ankle fx)     Patient Education: Discussed POC and rationale for dressing selection. Pt missed a phone call from [facility] regarding arterial studies, pt will call back to schedule. Reviewed s/s infection and when to present to ED. Pt verbalizes understanding to all.     05/08/2018 - POC taught to pt: Need for arterial studies due to poor arterial flow, then f/u with   [facility] in a few weeks.  Pt states she is not surprised and will schedule asap.  Don't apply any compression to limb taught.  S/sx infection to monitor for: redness, swelling, increased/foul drainage, foul odor, fever, n/v, malaise taught to pt who is very familiar after several bouts of BLE cellulitis over the last year.  When to contact AWC vs go to UC/ER taught.  Instructed pt to schedule an appt with pcp asap for f/u after multiple recent hospitalizations/rehab and need for med reconcilliation.  She states Dr. Jorge Amador doesn't work there anymore, but she will call today to schedule with IRON Johansen and establish her as new pcp.  Advised pt to keep dressing CDI, cover with bathing taught.  The wound is draining mod to heavy, so instructed pt to change if bandage very wet/soiled/falling off, otherwise leave in place.  Pt expresses very good understanding of all instructions.      Professional Collaboration: None today      Assessment:      Wound etiology: Likely mixed vascular    Wound Progress:  Wound measures smaller.     Rationale for Treatment: Zinc paste to protect yuki wound, AqAg to manage bioburden, absorb exudate, and maintain moist wound environment without laterally wicking exudate therefore reducing yuki-wound maceration, ABD pad to absorb, kerlix/hypafix to secure.    Patient tolerance/compliance: Pt agreeable with poc and appt schedule, need for testing.    Complicating factors: PAD, likely PVD, hx smoker    Need for ongoing Advanced Wound Care services: continued skilled wound care for debridement as needed, dressing management and skilled clinical observation to prevent complications and expedite healing.    Plan:      Treatment Plan and Recommendations:  Diagnosis/ICD10: I73.9, L97.92    Procedures/CPT: Non selective debridement - 49686    Frequency: 2x/week - 30 minutes      Treatment Goals: STG 2 Weeks  LTG 4 Weeks   Granulation Tissue: Unknown, pending arterial studies    Decrease  Necrotic Tissue to:     Wound Phase:      Decrease Size by:     Periwound:      Decrease tracts/undermining by:     Decrease Pain:          At the time of each visit a thorough assessment of the patient is completed to assure the  appropriateness of our plan of care.  The dressings or modalities may need to be adapted   from the original plan to address any significant changes in the wound environment.          Clinician Signature:_______________________________Date__________________      Physician Signature:______________________________Date:__________________

## 2018-05-21 ENCOUNTER — OFFICE VISIT (OUTPATIENT)
Dept: WOUND CARE | Facility: MEDICAL CENTER | Age: 73
End: 2018-05-21
Attending: FAMILY MEDICINE
Payer: MEDICARE

## 2018-05-21 PROCEDURE — 97597 DBRDMT OPN WND 1ST 20 CM/<: CPT

## 2018-05-21 NOTE — WOUND TEAM
"Advanced Wound Care  [facility]  [address]  [address]  [place] [address]  [phone] Fax: [phone]      Encounter Note  For Certification Period: 05/08/18 - 07/29/18      Referring Physician: Katt Clemons MD  Primary Physician:   Pt switching to IRON Crowley (was previously with Jorge Amador MD of same office.).  Pt to call to schedule pcp appt today.     Consulting Physicians:  Will schedule with Dr. Colin ([facility]) in 2 to 3 weeks after arterial testing completed.  Dr. Peters (ortho).       Wound(s): LLE medial  Start of Care:  05/08/18       Subjective:        HPI:  73 y/o female with wound to medial LLE which has been open \"about a year\".  During that year, she fell and broke her hip and separately her ankle, has had several surgeries, hospital stays, and a longer stay at [place] for rehab.  She has also had several bouts of cellulitis to both LEs.  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              Pain:   Pt reports burning type pain to LLE medial wound.          Past Medical History:  Past Medical History:   Diagnosis Date   • Anxiety    • Dental disorder     full dentures   • Generalized osteoarthritis of multiple sites 10/20/2015   • Heart valve disease     pt not sure    • Hyperlipidemia    • Hypertension    • Osteoporosis        Current Medications:    Current Outpatient Prescriptions:   •  amoxicillin (AMOXIL) 500 MG Cap, Take 500 mg by mouth 3 times a day., Disp: , Rfl:   •  pentoxifylline CR (TRENTAL) 400 MG CR tablet, Take 400 mg by mouth every day., Disp: , Rfl:   •  metoprolol SR (TOPROL XL) 100 MG TABLET SR 24 HR, TAKE 1 TABLET BY MOUTH EVERY DAY, Disp: 90 Tab, Rfl: 0  •  sertraline (ZOLOFT) 100 MG Tab, Take 1 Tab by mouth every day., Disp: 30 Tab, Rfl: 11  •  sertraline (ZOLOFT) 100 MG Tab, TAKE 1 TAB BY MOUTH EVERY DAY., Disp: 30 Tab, Rfl: 5  •  Cholecalciferol (VITAMIN D) 2000 UNIT Tab, Take 2,000 Units by mouth every " day., Disp: , Rfl:   •  therapeutic multivitamin-minerals (THERAGRAN-M) TABS, Take 1 Tab by mouth every day., Disp: , Rfl:     Allergies:   Patient has no known allergies.    Past Surgical History:   Past Surgical History:   Procedure Laterality Date   • HIP REVISION TOTAL Left 2/11/2018    Procedure: HIP REVISION TOTAL- femoral nail removal conversion to total hip arthoroplasty;  Surgeon: Chong Vazquez M.D.;  Location: SURGERY [facility];  Service: Orthopedics   • HIP NAILING INTRAMEDULLARY Left 12/20/2017    Procedure: HIP NAILING INTRAMEDULLARY;  Surgeon: Jorge Peters M.D.;  Location: SURGERY [facility];  Service: Orthopedics   • BREAST BIOPSY  8/28/2014    Performed by Magdalena Londono M.D. 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  (Update: scheduled for 5/28/18)    5/8/18   Right dp/pt multiphasic per doppler, not palpable manually Left dp/pt monophasic per doppler, not palpable manually    84    76   Brachial 170 168   TOMI 0.7 0.49       Orthotic, protective, supportive devices: FWW, L walking boot due to ankle fx    Fall Risk  Assessment (jeronimo all that apply with an X): Completed upon initial eval 5/8/18: high fall risk.       Wound Characteristics                                                    Location: LLE medial   Initial Evaluation  Date: 05/08/18 Encounter Date: 05/21/2018   Tissue Type and %: 90% adherent yellow; 5% moist red 90% adherent yellow, 10% moist red   Periwound: Macerated, fragile intact   Drainage: Mod to heavy serous Mod thick yellow/honey   Exposed structures CARLITA CARLITA   Wound Edges:   Open, irregular Open   Odor: None None   S&S of Infection:   none None   Edema: 2+ pitting edema to BLEs 1+ pitting to LLE   Sensation: intact Intact               Measurements: LLE medial Initial Evaluation  Date: 05/08/18 Encounter Date: 05/18/2018   Length (cm) 7.1 5.7   Width (cm) 4 4   Depth (cm) CARLITA CARLITA   Area (cm2) 28.4 cm2 22.8 cm2   Tract/undermine CARLITA CARLITA       5/8/18: LLE TOMI: 0.49.  NO DEBRIDEMENT/COMPRESSION.    Procedures:     Debridement :  Per Dr. Colin, very conservative sharp debridement performed using curette to remove only loosely adherent yellow slough ~4cm2.     Cleansed with: NS/gauze                                                                       Periwound protected with: skin prep, zinc barrier paste   Primary dressing: Aquacel ag   Secondary Dressing: Nonad foam secured with kerlix/hypafix.   Other: Pt's own sock and immobilizing boot (due to ankle fx)     Patient Education:  POC taught to pt by Dr. Colin.  Has arterial study scheduled for 5/28/18.  S/sx infection to monitor for taught; pt expresses very good understanding.  She was able to schedule an appt to establish a new pcp with Antonietta Donaldson MD on July 31st (first available).      05/08/2018 - POC taught to pt: Need for arterial studies due to poor arterial flow, then f/u with Dr. Colin in a few weeks.  Pt states she is not surprised and will schedule asap.  Don't apply any compression to limb taught.  S/sx infection to monitor for:  redness, swelling, increased/foul drainage, foul odor, fever, n/v, malaise taught to pt who is very familiar after several bouts of BLE cellulitis over the last year.  When to contact AWC vs go to UC/ER taught.  Instructed pt to schedule an appt with pcp asap for f/u after multiple recent hospitalizations/rehab and need for med reconcilliation.  She states Dr. Jorge Amador doesn't work there anymore, but she will call today to schedule with IRON Johansen and establish her as new pcp.  Advised pt to keep dressing CDI, cover with bathing taught.  The wound is draining mod to heavy, so instructed pt to change if bandage very wet/soiled/falling off, otherwise leave in place.  Pt expresses very good understanding of all instructions.      Professional Collaboration: Joint visit with Dr. Colin.      Assessment:      Wound etiology: Likely mixed vascular    Wound Progress:  Minimal change.      Rationale for Treatment: Zinc paste to protect yuki wound, AqAg to manage bioburden, absorb exudate, and maintain moist wound environment without laterally wicking exudate therefore reducing yuki-wound maceration, Nonad foam to absorb, kerlix/hypafix to secure.    Patient tolerance/compliance: Pt agreeable with poc and appt schedule, need for testing.    Complicating factors: PAD, likely PVD, hx smoker    Need for ongoing Advanced Wound Care services: continued skilled wound care for debridement as needed, dressing management and skilled clinical observation to prevent complications and expedite healing.    Plan:      Treatment Plan and Recommendations:  Diagnosis/ICD10: I73.9, L97.92    Procedures/CPT: cswd [id number]    Frequency: 2x/week - 30 minutes      Treatment Goals: STG 2 Weeks  LTG 4 Weeks   Granulation Tissue: Unknown, pending arterial studies    Decrease Necrotic Tissue to:     Wound Phase:      Decrease Size by:     Periwound:      Decrease tracts/undermining by:     Decrease Pain:          At the time of each visit a  thorough assessment of the patient is completed to assure the  appropriateness of our plan of care.  The dressings or modalities may need to be adapted   from the original plan to address any significant changes in the wound environment.          Clinician Signature:_______________________________Date__________________      Physician Signature:______________________________Date:__________________

## 2018-05-21 NOTE — PROGRESS NOTES
Wound Consult Note:    Milana Colin  Date & Time note created:    5/21/2018   1:18 PM     Referred by: Cordelia Jesus RN    Patient ID:   Name:             Nadege Mae   YOB: 1945  Age:                 72 y.o.  female   MRN:               [id number]                                                             Reason for Consult:      Left ankle wound    History of Present Illness:    And Tu is a 72-year-old woman who presents with a left ankle wound, present for several months now. She reports a traumatic left hip fracture, repaired with what sounds like pins and a plate. This became loose and she had to go in for a more complex repair. While she was at rehab for the 2nd repair she had a wound that she has now. It's been present for some time and is not healing. Noninvasive studies performed in cursory fashion here in the wound care clinic showing an ankle-brachial index of about 0.4 on the left and 0.7 on the right. Not much debridement has been done since her poor wound healing potential was identified.    The patient denies any symptoms of claudication, but her hip problems have been ongoing for some months. 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 She denies any rest pain in her foot but the ankle wound certainly is painful.    Review of Systems:      Constitutional: Denies fevers, denies weight changes  Eyes: Denies changes in vision, no eye pain  Ears/Nose/Throat/Mouth: Denies nasal congestion or sore throat   Cardiovascular: Denies chest pain or  palpitations   Respiratory: Denies shortness of breath , Denies cough  Gastrointestinal/Hepatic: Denies abdominal pain, nausea, vomiting, diarrhea, constipation or GI bleeding   Genitourinary: Denies dysuria or frequency  Musculoskeletal/Rheum: Denies  joint pain and swelling, mild left ankle edema, no pain with walking  Skin: no rash, long recent history of open wounds  Neurological: Denies  headache, confusion, memory loss or focal weakness/parasthesias  Psychiatric: Denies mood disorder   Endocrine: Denies thyroid problems  Heme/Oncology/Lymph Nodes: Denies enlarged lymph nodes, denies brusing or known bleeding disorder  All other systems were reviewed and are negative (AMA/CMS criteria)                Past Medical History:   Past Medical History:   Diagnosis Date   • Anxiety    • Dental disorder     full dentures   • Generalized osteoarthritis of multiple sites 10/20/2015   • Heart valve disease     pt not sure    • Hyperlipidemia    • Hypertension    • Osteoporosis        Past Surgical History:  Past Surgical History:   Procedure Laterality Date   • HIP REVISION TOTAL Left 2/11/2018    Procedure: HIP REVISION TOTAL- femoral nail removal conversion to total hip arthoroplasty;  Surgeon: Chong Vazquez M.D.;  Location: SURGERY [facility];  Service: Orthopedics   • HIP NAILING INTRAMEDULLARY Left 12/20/2017    Procedure: HIP NAILING INTRAMEDULLARY;  Surgeon: Jorge Peters M.D.;  Location: SURGERY [facility];  Service: Orthopedics   • BREAST BIOPSY  8/28/2014    Performed by Magdalena Londono M.D. at SURGERY [facility]   • BREAST BIOPSY Right 8/14    benign   • GYN SURGERY  1973    complete hysterectomy   • ABDOMINAL HYSTERECTOMY TOTAL      w/BSO due to uterine cyst and endometriosis       Current Outpatient Medications:  Current Outpatient Prescriptions   Medication Sig Dispense Refill   • amoxicillin (AMOXIL) 500 MG Cap Take 500 mg by mouth 3 times a day.     • pentoxifylline CR (TRENTAL) 400 MG CR tablet Take 400 mg by mouth every day.     • metoprolol SR (TOPROL XL) 100 MG TABLET SR 24 HR TAKE 1 TABLET BY MOUTH EVERY DAY 90 Tab 0   • sertraline (ZOLOFT) 100 MG Tab Take 1 Tab by mouth every day. 30 Tab 11   • sertraline (ZOLOFT) 100 MG Tab TAKE 1 TAB BY MOUTH EVERY DAY. 30 Tab 5   • Cholecalciferol (VITAMIN D) 2000 UNIT Tab Take 2,000 Units by mouth every day.     • therapeutic  multivitamin-minerals (THERAGRAN-M) TABS Take 1 Tab by mouth every day.       No current facility-administered medications for this visit.        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  No thyromegaly.  Eyes:  EOMI, Conjunctiva normal, No discharge.  Neck:  Normal range of motion, No cervical tenderness,  no JVD.  Cardiovascular:  Normal heart rate, Normal rhythm, No murmur.   Extremities with monophasic bilateral doppler signals, biphasic right popliteal and monophasic left popliteal.  Palpable bilateral femoral pulses.   Lungs:  Breath sounds clear to auscultation bilaterally,  no crackles, no wheezing.   Abdomen: Bowel sounds normal, Soft, No tenderness, No guarding, No rebound, No masses.   Skin: Warm, Dry, Left ankle with a bilobed wound, slough and a minimal amount of granulation tissue.   Neurologic: Alert & oriented x 3, No focal deficits noted, cranial nerves II through X are grossly intact.  Psychiatric: Affect  normal, Judgment normal, Mood normal.    Imaging/Procedures Review:  CTscan of the left ankle - April  Impression       Tri-malleolus fractures with displacement as detailed above    Likely posterior tibial tendon tear       MDM (Assessment and Plan):     Patient Active Problem List    Diagnosis Date Noted   • Hip fracture (HCC) 12/20/2017     Priority: High   • Cellulitis 11/03/2017     Priority: High   • Essential hypertension 10/06/2015     Priority: Medium   • B12 deficiency 12/20/2017     Priority: Low   • Anemia 11/04/2017     Priority: Low   • Dyslipidemia 10/06/2015     Priority: Low   • Anxiety 10/06/2015     Priority: Low   • Hyponatremia 02/11/2018   • Open leg wound 02/10/2018   • Hospital discharge follow-up 12/05/2017   • Hyperglycemia 11/04/2017   • Swelling of lower extremity 01/03/2017   • Generalized osteoarthritis of multiple sites 10/20/2015   • Osteoporosis    • Other (abnormal) findings on radiological examination of breast 08/28/2014     Imp/Plan:  I can appreciate monophasic flow in the left lower extremity from popliteal to the ankle. This is slightly different from the right lower extremity. I suspect she has a left superficial femoral artery occlusion and may need either intervention or a bypass to heal this chronic left ankle wound. I am surprise she can still be employed with this large wound but recommend her for this. Her left ankle is deformed and she needs healing of the wound before treatment because, hardware is likely to be required to straighten out her ankle. I will look for her noninvasive studies next week, after there performed on Monday. We can then discuss possible intervention.  Milana Colin M.D.

## 2018-05-22 ENCOUNTER — APPOINTMENT (OUTPATIENT)
Dept: WOUND CARE | Facility: MEDICAL CENTER | Age: 73
End: 2018-05-22
Attending: FAMILY MEDICINE
Payer: MEDICARE

## 2018-05-26 ENCOUNTER — HOSPITAL ENCOUNTER (OUTPATIENT)
Facility: MEDICAL CENTER | Age: 73
End: 2018-05-26
Attending: NURSE PRACTITIONER
Payer: MEDICARE

## 2018-05-26 ENCOUNTER — NON-PROVIDER VISIT (OUTPATIENT)
Dept: WOUND CARE | Facility: MEDICAL CENTER | Age: 73
End: 2018-05-26
Attending: FAMILY MEDICINE
Payer: MEDICARE

## 2018-05-26 DIAGNOSIS — T14.8XXA INFECTED WOUND: ICD-10-CM

## 2018-05-26 DIAGNOSIS — L08.9 INFECTED WOUND: ICD-10-CM

## 2018-05-26 DIAGNOSIS — L08.9 INFECTED WOUND: Primary | ICD-10-CM

## 2018-05-26 DIAGNOSIS — T14.8XXA INFECTED WOUND: Primary | ICD-10-CM

## 2018-05-26 PROCEDURE — 97602 WOUND(S) CARE NON-SELECTIVE: CPT

## 2018-05-26 PROCEDURE — 87205 SMEAR GRAM STAIN: CPT

## 2018-05-26 PROCEDURE — 87070 CULTURE OTHR SPECIMN AEROBIC: CPT

## 2018-05-26 PROCEDURE — 87186 SC STD MICRODIL/AGAR DIL: CPT

## 2018-05-26 PROCEDURE — 87077 CULTURE AEROBIC IDENTIFY: CPT

## 2018-05-26 NOTE — WOUND TEAM
"Advanced Wound Care  [facility]  [address]  [address]  [place] [address]  [phone] Fax: [phone]      Encounter Note  For Certification Period: 05/08/18 - 07/29/18      Referring Physician: Katt Clemons MD  Primary Physician:   Pt switching to [facility] Crowley (was previously with Jorge Amador MD of same office.).  Pt to call to schedule pcp appt today.     Consulting Physicians:  Will schedule with Dr. Colin ([facility]) in 2 to 3 weeks after arterial testing completed.  Dr. Peters (ortho).       Wound(s): LLE medial  Start of Care:  05/08/18       Subjective:        HPI:  71 y/o female with wound to medial LLE which has been open \"about a year\".  During that year, she fell and broke her hip and separately her ankle, has had several surgeries, hospital stays, and a longer stay at [facility] for rehab.  She has also had several bouts of cellulitis to both LEs.  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              Pain:   Pt reports burning type pain to LLE medial wound.          Past Medical History:  Past Medical History:   Diagnosis Date   • Anxiety    • Dental disorder     full dentures   • Generalized osteoarthritis of multiple sites 10/20/2015   • Heart valve disease     pt not sure    • Hyperlipidemia    • Hypertension    • Osteoporosis        Current Medications:    Current Outpatient Prescriptions:   •  amoxicillin (AMOXIL) 500 MG Cap, Take 500 mg by mouth 3 times a day., Disp: , Rfl:   •  pentoxifylline CR (TRENTAL) 400 MG CR tablet, Take 400 mg by mouth every day., Disp: , Rfl:   •  metoprolol SR (TOPROL XL) 100 MG TABLET SR 24 HR, TAKE 1 TABLET BY MOUTH EVERY DAY, Disp: 90 Tab, Rfl: 0  •  sertraline (ZOLOFT) 100 MG Tab, Take 1 Tab by mouth every day., Disp: 30 Tab, Rfl: 11  •  sertraline (ZOLOFT) 100 MG Tab, TAKE 1 TAB BY MOUTH EVERY DAY., Disp: 30 Tab, Rfl: 5  •  Cholecalciferol (VITAMIN D) 2000 UNIT Tab, Take 2,000 Units by mouth every " day., Disp: , Rfl:   •  therapeutic multivitamin-minerals (THERAGRAN-M) TABS, Take 1 Tab by mouth every day., Disp: , Rfl:     Allergies:   Patient has no known allergies.    Past Surgical History:   Past Surgical History:   Procedure Laterality Date   • HIP REVISION TOTAL Left 2/11/2018    Procedure: HIP REVISION TOTAL- femoral nail removal conversion to total hip arthoroplasty;  Surgeon: Chong Vazquez M.D.;  Location: SURGERY [facility];  Service: Orthopedics   • HIP NAILING INTRAMEDULLARY Left 12/20/2017    Procedure: HIP NAILING INTRAMEDULLARY;  Surgeon: Jorge Peters M.D.;  Location: SURGERY [facility];  Service: Orthopedics   • BREAST BIOPSY  8/28/2014    Performed by Magdalena Londono M.D. 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  (Update: scheduled for 5/28/18)    5/8/18   Right dp/pt multiphasic per doppler, not palpable manually Left dp/pt monophasic per doppler, not palpable manually    84    76   Brachial 170 168   TOMI 0.7 0.49       Orthotic, protective, supportive devices: FWW, L walking boot due to ankle fx    Fall Risk  Assessment (jeronimo all that apply with an X): Completed upon initial eval 5/8/18: high fall risk.       Wound Characteristics                                                    Location: LLE medial   Initial Evaluation  Date: 05/08/18 Encounter Date: 05/26/2018   Tissue Type and %: 90% adherent yellow; 5% moist red 80% adherent yellow, 20% moist red   Periwound: Macerated, fragile Intact   Drainage: Mod to heavy serous Heavy green   Exposed structures CARLITA CARLITA   Wound Edges:   Open, irregular Open   Odor: None None   S&S of Infection:   none Drainage   Edema: 2+ pitting edema to BLEs 1+ pitting to LLE   Sensation: intact Intact               Measurements: LLE medial Initial Evaluation  Date: 05/08/18 Encounter Date: 05/26/2018   Length (cm) 7.1 5.6   Width (cm) 4 4   Depth (cm) CARLITA CARLITA   Area (cm2) 28.4 cm2 22.4 cm2   Tract/undermine CARLITA CARLITA       5/8/18: LLE TOMI: 0.49.  NO DEBRIDEMENT/COMPRESSION.    Wound culture collected 05/26/2018    Procedures:     Debridement :  Non selective with NS gauze.     Cleansed with: NS/gauze                                                                       Periwound protected with: Zinc barrier paste   Primary dressing: Aquacel ag   Secondary Dressing: Nonad foam secured with kerlix/hypafix.   Other: Pt's own sock and immobilizing boot (due to ankle fx)     Patient Education:  Discussed POC with patient. Discussed rationale for wound culture. Reviewed s/s worsening infection and when to present to the ED. Pt verbalizes understanding to all.     05/21/2018 - POC taught to pt by Dr. Colin.  Has arterial study scheduled for 5/28/18.  S/sx infection to monitor for taught; pt expresses very good understanding.  She was able to schedule an appt to establish a new pcp with Antonietta Donaldson MD on July 31st (first available).      05/08/2018 - POC taught to pt: Need for arterial studies due to poor arterial flow, then f/u with Dr. Colin in a few weeks.  Pt states she is not surprised  and will schedule asap.  Don't apply any compression to limb taught.  S/sx infection to monitor for: redness, swelling, increased/foul drainage, foul odor, fever, n/v, malaise taught to pt who is very familiar after several bouts of BLE cellulitis over the last year.  When to contact [facility] vs go to UC/[facility] taught.  Instructed pt to schedule an appt with pcp asap for f/u after multiple recent hospitalizations/rehab and need for med reconcilliation.  She states Dr. Jorge Amador doesn't work there anymore, but she will call today to schedule with [facility] Johansen and establish her as new pcp.  Advised pt to keep dressing CDI, cover with bathing taught.  The wound is draining mod to heavy, so instructed pt to change if bandage very wet/soiled/falling off, otherwise leave in place.  Pt expresses very good understanding of all instructions.      Professional Collaboration: None today      Assessment:      Wound etiology: Likely mixed vascular    Wound Progress:  Wound essentially unchanged per measurement. Heavy green drainage this visit.     Rationale for Treatment: Zinc paste to protect yuki wound, AqAg to manage bioburden, absorb exudate, and maintain moist wound environment without laterally wicking exudate therefore reducing yuki-wound maceration, Nonad foam to absorb, kerlix/hypafix to secure.    Patient tolerance/compliance: Pt agreeable with poc and appt schedule, need for testing.    Complicating factors: PAD, likely PVD, hx smoker    Need for ongoing Advanced Wound Care services: continued skilled wound care for debridement as needed, dressing management and skilled clinical observation to prevent complications and expedite healing.    Plan:      Treatment Plan and Recommendations:  Diagnosis/ICD10: I73.9, L97.92    Procedures/CPT: Non selective debridement 35006    Frequency: 2x/week - 30 minutes      Treatment Goals: STG 2 Weeks  LTG 4 Weeks   Granulation Tissue: Unknown, pending arterial studies    Decrease Necrotic  Tissue to:     Wound Phase:      Decrease Size by:     Periwound:      Decrease tracts/undermining by:     Decrease Pain:          At the time of each visit a thorough assessment of the patient is completed to assure the  appropriateness of our plan of care.  The dressings or modalities may need to be adapted   from the original plan to address any significant changes in the wound environment.          Clinician Signature:_______________________________Date__________________      Physician Signature:______________________________Date:__________________

## 2018-05-27 LAB
GRAM STN SPEC: NORMAL
SIGNIFICANT IND 70042: NORMAL
SITE SITE: NORMAL
SOURCE SOURCE: NORMAL

## 2018-05-28 ENCOUNTER — HOSPITAL ENCOUNTER (OUTPATIENT)
Dept: RADIOLOGY | Facility: MEDICAL CENTER | Age: 73
End: 2018-05-28
Attending: SURGERY
Payer: MEDICARE

## 2018-05-28 DIAGNOSIS — I73.9 PERIPHERAL ARTERIAL DISEASE (HCC): ICD-10-CM

## 2018-05-28 LAB
BACTERIA WND AEROBE CULT: ABNORMAL
BACTERIA WND AEROBE CULT: ABNORMAL
GRAM STN SPEC: ABNORMAL
SIGNIFICANT IND 70042: ABNORMAL
SITE SITE: ABNORMAL
SOURCE SOURCE: ABNORMAL

## 2018-05-28 PROCEDURE — 93922 UPR/L XTREMITY ART 2 LEVELS: CPT

## 2018-05-28 PROCEDURE — 93925 LOWER EXTREMITY STUDY: CPT

## 2018-05-28 PROCEDURE — 93925 LOWER EXTREMITY STUDY: CPT | Mod: 26 | Performed by: SURGERY

## 2018-05-28 PROCEDURE — 93922 UPR/L XTREMITY ART 2 LEVELS: CPT | Mod: 26 | Performed by: SURGERY

## 2018-05-29 ENCOUNTER — NON-PROVIDER VISIT (OUTPATIENT)
Dept: WOUND CARE | Facility: MEDICAL CENTER | Age: 73
End: 2018-05-29
Attending: FAMILY MEDICINE
Payer: MEDICARE

## 2018-05-29 ENCOUNTER — HOSPITAL ENCOUNTER (OUTPATIENT)
Facility: MEDICAL CENTER | Age: 73
End: 2018-05-29
Attending: NURSE PRACTITIONER
Payer: MEDICARE

## 2018-05-29 DIAGNOSIS — L08.9 WOUND INFECTION: ICD-10-CM

## 2018-05-29 DIAGNOSIS — S81.802A OPEN WOUND OF LOWER LEG WITH COMPLICATION, LEFT, INITIAL ENCOUNTER: ICD-10-CM

## 2018-05-29 DIAGNOSIS — T14.8XXA INFECTED WOUND: ICD-10-CM

## 2018-05-29 DIAGNOSIS — T84.52XA INFECTION ASSOCIATED WITH INTERNAL LEFT HIP PROSTHESIS, INITIAL ENCOUNTER (HCC): ICD-10-CM

## 2018-05-29 DIAGNOSIS — T14.8XXA WOUND INFECTION: ICD-10-CM

## 2018-05-29 DIAGNOSIS — S81.802A OPEN WOUND, LOWER LEG, LEFT, INITIAL ENCOUNTER: ICD-10-CM

## 2018-05-29 DIAGNOSIS — L08.9 INFECTED WOUND: ICD-10-CM

## 2018-05-29 DIAGNOSIS — I73.9 PERIPHERAL ARTERIAL DISEASE (HCC): ICD-10-CM

## 2018-05-29 LAB
GRAM STN SPEC: NORMAL
SIGNIFICANT IND 70042: NORMAL
SITE SITE: NORMAL
SOURCE SOURCE: NORMAL

## 2018-05-29 PROCEDURE — 87070 CULTURE OTHR SPECIMN AEROBIC: CPT

## 2018-05-29 PROCEDURE — 87205 SMEAR GRAM STAIN: CPT

## 2018-05-29 PROCEDURE — 99203 OFFICE O/P NEW LOW 30 MIN: CPT | Performed by: NURSE PRACTITIONER

## 2018-05-29 PROCEDURE — 97602 WOUND(S) CARE NON-SELECTIVE: CPT

## 2018-05-29 PROCEDURE — 87186 SC STD MICRODIL/AGAR DIL: CPT

## 2018-05-29 PROCEDURE — 87077 CULTURE AEROBIC IDENTIFY: CPT

## 2018-05-29 RX ORDER — CIPROFLOXACIN 500 MG/1
500 TABLET, FILM COATED ORAL 2 TIMES DAILY
Qty: 20 TAB | Refills: 0 | Status: ON HOLD | OUTPATIENT
Start: 2018-05-29 | End: 2018-06-04

## 2018-05-29 ASSESSMENT — ENCOUNTER SYMPTOMS
FALLS: 0
BACK PAIN: 0
CHILLS: 0
EYES NEGATIVE: 1
NAUSEA: 0
PALPITATIONS: 0
WHEEZING: 0
WEAKNESS: 0
FEVER: 0
COUGH: 0
CLAUDICATION: 0
VOMITING: 0
DEPRESSION: 0
NERVOUS/ANXIOUS: 0
DIAPHORESIS: 0
SHORTNESS OF BREATH: 0

## 2018-05-29 NOTE — WOUND TEAM
"Advanced Wound Care  [facility]  [address]  [address]  [place] [address]  [phone] Fax: [phone]      Encounter Note  For Certification Period: 05/08/18 - 07/29/18      Referring Physician: Katt Clemons MD  Primary Physician: Pt has an appt to establish a new pcp with Antonietta Donaldson MD on July 31st (apparently first available).   Consulting Physicians:  Dr. Colin.  Dr. Vazquez (ortho).       Wound(s): LLE medial and Left lateral hip (added 5/29/18)  Start of Care:  05/08/18       Subjective:        HPI:  73 y/o female with wound to medial LLE which has been open \"about a year\".  During that year, she fell and broke her hip and separately her ankle, has had several surgeries, hospital stays, and a longer stay at [facility] for rehab.  She has also had several bouts of cellulitis to both LEs.  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              Pain:   Pt reports burning type pain to LLE medial wound mainly with palpation/cleansing.          Past Medical History:  Past Medical History:   Diagnosis Date   • Anxiety    • Dental disorder     full dentures   • Generalized osteoarthritis of multiple sites 10/20/2015   • Heart valve disease     pt not sure    • Hyperlipidemia    • Hypertension    • Osteoporosis        Current Medications:    Current Outpatient Prescriptions:   •  ciprofloxacin (CIPRO) 500 MG Tab, Take 1 Tab by mouth 2 times a day for 10 days., Disp: 20 Tab, Rfl: 0  •  amoxicillin (AMOXIL) 500 MG Cap, Take 500 mg by mouth 3 times a day., Disp: , Rfl:   •  pentoxifylline CR (TRENTAL) 400 MG CR tablet, Take 400 mg by mouth every day., Disp: , Rfl:   •  metoprolol SR (TOPROL XL) 100 MG TABLET SR 24 HR, TAKE 1 TABLET BY MOUTH EVERY DAY, Disp: 90 Tab, Rfl: 0  •  sertraline (ZOLOFT) 100 MG Tab, Take 1 Tab by mouth every day., Disp: 30 Tab, Rfl: 11  •  sertraline (ZOLOFT) 100 MG Tab, TAKE 1 TAB BY MOUTH EVERY DAY., Disp: 30 Tab, Rfl: 5  •  " Cholecalciferol (VITAMIN D) 2000 UNIT Tab, Take 2,000 Units by mouth every day., Disp: , Rfl:   •  therapeutic multivitamin-minerals (THERAGRAN-M) TABS, Take 1 Tab by mouth every day., Disp: , Rfl:     Allergies:   Patient has no known allergies.    Past Surgical History:   Past Surgical History:   Procedure Laterality Date   • HIP REVISION TOTAL Left 2/11/2018    Procedure: HIP REVISION TOTAL- femoral nail removal conversion to total hip arthoroplasty;  Surgeon: Chong Vazquez M.D.;  Location: SURGERY [facility];  Service: Orthopedics   • HIP NAILING INTRAMEDULLARY Left 12/20/2017    Procedure: HIP NAILING INTRAMEDULLARY;  Surgeon: Jorge Peters M.D.;  Location: SURGERY [facility];  Service: Orthopedics   • BREAST BIOPSY  8/28/2014    Performed by Magdalena Londono M.D. at SURGERY [facility]   • BREAST BIOPSY Right 8/14    benign   • GYN SURGERY  1973    complete hysterectomy   • ABDOMINAL HYSTERECTOMY TOTAL      w/BSO due to uterine cyst and endometriosis       Social History:    Social History     Social History   • Marital status: Single     Spouse name: N/A   • Number of children: 1   • Years of education: N/A     Occupational History   • players [employer]  [employer]     Social History Main Topics   • Smoking status: Former Smoker     Packs/day: 0.50     Years: 25.00     Types: Cigarettes     Quit date: 1/1/2007   • Smokeless tobacco: Never Used   • Alcohol use No   • Drug use: No   • Sexual activity: Not Currently     Partners: Male     Other Topics Concern   • Not on file     Social History Narrative   • No narrative on file           Objective:      Tests and Measures:  5/29/18: Left hip incision culture taken.  5/28/18: (Dr. Colin to review, as of 5/29/18).  Arterial study for wound healing result: Right TOMI 0.92, Left TOMI 0.7.    Right: Two levels of stenosis, one at the mid SFA and a second at the distal SFA.  Velocities are consistent with 50-75% stenosis.   Left:  SFA  occlusion at the origin with reconstitution of the popliteal artery above the knee. Tibial arteries are patent to the ankle.  5/26/18: Culture positive for heavy pseudomonas.  Cipro rx'd on 5/29/18.  5/8/18   Right dp/pt multiphasic per doppler, not palpable manually Left dp/pt monophasic per doppler, not palpable manually    84    76   Brachial 170 168   TOMI 0.7 0.49       Orthotic, protective, supportive devices: FWW, L walking boot due to ankle fx    Fall Risk Assessment (jeronimo all that apply with an X): Completed upon initial eval 5/8/18: high fall risk.       Wound Characteristics                                                    Location: LLE medial   Initial Evaluation  Date: 05/08/18 Encounter Date: 05/29/2018   Tissue Type and %: 90% adherent yellow; 5% moist red 100% moist yellow/green/blue   Periwound: Macerated, fragile Intact   Drainage: Mod to heavy serous Heavy green   Exposed structures CARLITA CARLITA   Wound Edges:   Open, irregular Open   Odor: None None   S&S of Infection:   None Cx from 5/26 + for heavy pseudomonas.  Abx rx'd.   Edema: 2+ pitting edema to BLEs 1+ pitting to LLE   Sensation: intact Intact               Measurements: LLE medial Initial Evaluation  Date: 05/08/18 Encounter Date: 05/26/2018   Length (cm) 7.1 5.6   Width (cm) 4 4   Depth (cm) CARLITA CARLITA   Area (cm2) 28.4 cm2 22.4 cm2   Tract/undermine CARLITA CARLITA     Wound Characteristics                                                    Location: Left lateral hip incision   Initial Evaluation  Date: 05/29/18   Tissue Type and %: Red incision line with several pinhole sized openings   Periwound: Erythema, warmth, induration, edema   Drainage: Purulent/serosang   Exposed structures CARLITA   Wound Edges:   Pinpoint holes open   Odor: None   S&S of Infection:   Erythema, warmth, induration, purulent drainage, open after previously closed   Edema: Local to wound   Sensation: intact               Measurements: Left lateral hip incision Initial  Evaluation  Date: 05/29/18   Length (cm) 8.5   Width (cm) 0.5   Depth (cm) CARLITA   Area (cm2) 4.25 cm2   Tract/undermine CARLITA       5/8/18: LLE TOMI: 0.49.  NO DEBRIDEMENT/COMPRESSION.    Procedures:     Debridement :  Non selective using washcloth to remove biofilm/drainage   Cleansed with: No rinse foam cleanser to LE, NS to hip                                                                       Periwound protected with: skin prep, zinc paste to LE   Primary dressing: Aquacel ag to both, then super absorbant pad   Secondary Dressing: LE: secured with kerlix/hypafix.  L hip: secured with hypafix   Other: Pt's own sock and immobilizing boot (due to ankle fx)     Patient Education: Culture and arterial study results relayed to pt.  Abx ordered by Naveed and need to  and start immediately relayed to pt who states she will start them today.  Potential SE's of abx reviewed.  Need to take all doses as prescribed or contact AWC if not tolerating/unable to take for any reason taught.  Relayed to pt that Dr. Colin will review arterial studies, in the meantime continue no debridement/compression.  Rationale for culture of newly reported L lateral hip wound taught to pt today.  Naveed was able to arrange an appt for pt to see Dr. Vazquez tomorrow for further eval of this wound.  S/sx advancing infection taught to pt, when to contact AWC vs go to UC/ER.  Advised pt to change LE dressing daily.  Small amt of supplies provided for now.  Plan to order additional supplies from [facility], however anticipate pt to require surgical/possible inpatient intervention for L hip wound.  Will hold off on ordering supplies for now as pt may end up in hospital.  Pt expresses understanding of all education.    Professional Collaboration: IRON Mooney for culture review/abx order and for eval of L hip wound (she also contact Dr. Vazquez).  Dr. Colin notified of arterial study results available and show occlusion.  She states she will  review.  L hip culture sent to lab via tube system.  Assessment:      Wound etiology: Likely mixed vascular LLE, infection.  L hip: infection, surgical dehiscence.    Wound Progress:  LLE: Wound essentially unchanged per measurement. Heavy green drainage this visit. + culture.  L hip: newly reported wound.  Looks infected.  Culture taken.    Rationale for Treatment: Zinc paste to protect yuki wound, AqAg to manage bioburden, absorb exudate, and maintain moist wound environment without laterally wicking exudate therefore reducing yuki-wound maceration, Super absorbant dressing to manage drainage, kerlix/hypafix to secure.    Patient tolerance/compliance: Pt agreeable with poc and appt schedule.    Complicating factors: PAD, likely PVD, hx smoker    Need for ongoing Advanced Wound Care services: continued skilled wound care for debridement as needed, dressing management and skilled clinical observation to prevent complications and expedite healing.    Plan:      Treatment Plan and Recommendations:  Diagnosis/ICD10: I73.9, L97.92    Procedures/CPT: Non selective debridement 37166    Frequency: 2x/week - 30 minutes      Treatment Goals: STG 2 Weeks  LTG 4 Weeks   Granulation Tissue: Unknown    Decrease Necrotic Tissue to:     Wound Phase:      Decrease Size by:     Periwound:      Decrease tracts/undermining by:     Decrease Pain:          At the time of each visit a thorough assessment of the patient is completed to assure the  appropriateness of our plan of care.  The dressings or modalities may need to be adapted   from the original plan to address any significant changes in the wound environment.          Clinician Signature:_______________________________Date__________________      Physician Signature:______________________________Date:__________________

## 2018-05-30 DIAGNOSIS — M79.89 SWELLING OF LOWER EXTREMITY: ICD-10-CM

## 2018-05-30 RX ORDER — FUROSEMIDE 20 MG/1
20 TABLET ORAL DAILY
Qty: 90 TAB | Refills: 0 | Status: ON HOLD | OUTPATIENT
Start: 2018-05-30 | End: 2018-06-04

## 2018-05-30 NOTE — TELEPHONE ENCOUNTER
Requested Prescriptions     Signed Prescriptions Disp Refills   • furosemide (LASIX) 20 MG Tab 90 Tab 0     Sig: Take 1 Tab by mouth every day. Patient needs to keep upcoming appointment with a new PCP for future refills.     Authorizing Provider: SANDHYA PAGAN A.P.R.N.

## 2018-05-30 NOTE — TELEPHONE ENCOUNTER
PATIENT ESTABLISHING CARE WITH SANDHYASARAH PAGAN ON 07/31/18    Was the patient seen in the last year in this department? Yes     Does patient have an active prescription for medications requested? No     Received Request Via: Pharmacy

## 2018-05-31 DIAGNOSIS — Z22.322 MRSA (METHICILLIN RESISTANT STAPH AUREUS) CULTURE POSITIVE: Primary | ICD-10-CM

## 2018-05-31 RX ORDER — SULFAMETHOXAZOLE AND TRIMETHOPRIM 800; 160 MG/1; MG/1
1 TABLET ORAL 2 TIMES DAILY
Qty: 20 TAB | Refills: 0 | Status: ON HOLD | OUTPATIENT
Start: 2018-05-31 | End: 2018-06-04

## 2018-06-04 ENCOUNTER — HOSPITAL ENCOUNTER (INPATIENT)
Facility: MEDICAL CENTER | Age: 73
LOS: 10 days | DRG: 857 | End: 2018-06-14
Attending: ORTHOPAEDIC SURGERY | Admitting: ORTHOPAEDIC SURGERY
Payer: MEDICARE

## 2018-06-04 DIAGNOSIS — T14.8XXA WOUND INFECTION: ICD-10-CM

## 2018-06-04 DIAGNOSIS — Z22.322 MRSA (METHICILLIN RESISTANT STAPH AUREUS) CULTURE POSITIVE: ICD-10-CM

## 2018-06-04 DIAGNOSIS — I73.9 PERIPHERAL ARTERY DISEASE (HCC): ICD-10-CM

## 2018-06-04 DIAGNOSIS — Z09 HOSPITAL DISCHARGE FOLLOW-UP: ICD-10-CM

## 2018-06-04 DIAGNOSIS — L08.9 WOUND INFECTION: ICD-10-CM

## 2018-06-04 DIAGNOSIS — S81.802D OPEN WOUND OF LEFT LOWER EXTREMITY, SUBSEQUENT ENCOUNTER: ICD-10-CM

## 2018-06-04 LAB
ABO GROUP BLD: NORMAL
ANION GAP SERPL CALC-SCNC: 9 MMOL/L (ref 0–11.9)
APPEARANCE FLD: NORMAL
APTT PPP: 28.1 SEC (ref 24.7–36)
BASOPHILS # BLD AUTO: 0.8 % (ref 0–1.8)
BASOPHILS # BLD: 0.06 K/UL (ref 0–0.12)
BLD GP AB SCN SERPL QL: NORMAL
BODY FLD TYPE: NORMAL
BUN SERPL-MCNC: 12 MG/DL (ref 8–22)
CALCIUM SERPL-MCNC: 9.4 MG/DL (ref 8.5–10.5)
CHLORIDE SERPL-SCNC: 100 MMOL/L (ref 96–112)
CO2 SERPL-SCNC: 26 MMOL/L (ref 20–33)
COLOR FLD: NORMAL
CREAT SERPL-MCNC: 0.67 MG/DL (ref 0.5–1.4)
CRP SERPL HS-MCNC: 6.78 MG/DL (ref 0–0.75)
EKG IMPRESSION: NORMAL
EOSINOPHIL # BLD AUTO: 0.18 K/UL (ref 0–0.51)
EOSINOPHIL NFR BLD: 2.5 % (ref 0–6.9)
EOSINOPHIL NFR FLD: 6 %
ERYTHROCYTE [DISTWIDTH] IN BLOOD BY AUTOMATED COUNT: 61.4 FL (ref 35.9–50)
ERYTHROCYTE [SEDIMENTATION RATE] IN BLOOD BY WESTERGREN METHOD: 85 MM/HOUR (ref 0–30)
EST. AVERAGE GLUCOSE BLD GHB EST-MCNC: 114 MG/DL
GLUCOSE SERPL-MCNC: 98 MG/DL (ref 65–99)
GRAM STN SPEC: NORMAL
HBA1C MFR BLD: 5.6 % (ref 0–5.6)
HCT VFR BLD AUTO: 38.3 % (ref 37–47)
HGB BLD-MCNC: 12.3 G/DL (ref 12–16)
IMM GRANULOCYTES # BLD AUTO: 0.04 K/UL (ref 0–0.11)
IMM GRANULOCYTES NFR BLD AUTO: 0.6 % (ref 0–0.9)
INR PPP: 1.13 (ref 0.87–1.13)
LYMPHOCYTES # BLD AUTO: 1.35 K/UL (ref 1–4.8)
LYMPHOCYTES NFR BLD: 19 % (ref 22–41)
LYMPHOCYTES NFR FLD: 11 %
MCH RBC QN AUTO: 28.5 PG (ref 27–33)
MCHC RBC AUTO-ENTMCNC: 32.1 G/DL (ref 33.6–35)
MCV RBC AUTO: 88.7 FL (ref 81.4–97.8)
MONOCYTES # BLD AUTO: 0.89 K/UL (ref 0–0.85)
MONOCYTES NFR BLD AUTO: 12.5 % (ref 0–13.4)
MONONUC CELLS NFR FLD: 2 %
NEUTROPHILS # BLD AUTO: 4.58 K/UL (ref 2–7.15)
NEUTROPHILS NFR BLD: 64.6 % (ref 44–72)
NEUTROPHILS NFR FLD: 81 %
NRBC # BLD AUTO: 0 K/UL
NRBC BLD-RTO: 0 /100 WBC
PLATELET # BLD AUTO: 277 K/UL (ref 164–446)
PMV BLD AUTO: 9.4 FL (ref 9–12.9)
POTASSIUM SERPL-SCNC: 4.1 MMOL/L (ref 3.6–5.5)
PROTHROMBIN TIME: 14.2 SEC (ref 12–14.6)
RBC # BLD AUTO: 4.32 M/UL (ref 4.2–5.4)
RBC # FLD: NORMAL CELLS/UL
RH BLD: NORMAL
SCCMEC + MECA PNL NOSE NAA+PROBE: POSITIVE
SCCMEC + MECA PNL NOSE NAA+PROBE: POSITIVE
SIGNIFICANT IND 70042: NORMAL
SITE SITE: NORMAL
SODIUM SERPL-SCNC: 135 MMOL/L (ref 135–145)
SOURCE SOURCE: NORMAL
WBC # BLD AUTO: 7.1 K/UL (ref 4.8–10.8)
WBC # FLD: 438 CELLS/UL

## 2018-06-04 PROCEDURE — 160028 HCHG SURGERY MINUTES - 1ST 30 MINS LEVEL 3: Performed by: ORTHOPAEDIC SURGERY

## 2018-06-04 PROCEDURE — 93005 ELECTROCARDIOGRAM TRACING: CPT | Performed by: ORTHOPAEDIC SURGERY

## 2018-06-04 PROCEDURE — 700111 HCHG RX REV CODE 636 W/ 250 OVERRIDE (IP): Performed by: ORTHOPAEDIC SURGERY

## 2018-06-04 PROCEDURE — 160039 HCHG SURGERY MINUTES - EA ADDL 1 MIN LEVEL 3: Performed by: ORTHOPAEDIC SURGERY

## 2018-06-04 PROCEDURE — 0JBM0ZZ EXCISION OF LEFT UPPER LEG SUBCUTANEOUS TISSUE AND FASCIA, OPEN APPROACH: ICD-10-PCS | Performed by: ORTHOPAEDIC SURGERY

## 2018-06-04 PROCEDURE — 700102 HCHG RX REV CODE 250 W/ 637 OVERRIDE(OP)

## 2018-06-04 PROCEDURE — 86900 BLOOD TYPING SEROLOGIC ABO: CPT

## 2018-06-04 PROCEDURE — 87075 CULTR BACTERIA EXCEPT BLOOD: CPT

## 2018-06-04 PROCEDURE — A9270 NON-COVERED ITEM OR SERVICE: HCPCS

## 2018-06-04 PROCEDURE — 87076 CULTURE ANAEROBE IDENT EACH: CPT

## 2018-06-04 PROCEDURE — 160035 HCHG PACU - 1ST 60 MINS PHASE I: Performed by: ORTHOPAEDIC SURGERY

## 2018-06-04 PROCEDURE — 700101 HCHG RX REV CODE 250

## 2018-06-04 PROCEDURE — 700101 HCHG RX REV CODE 250: Performed by: ORTHOPAEDIC SURGERY

## 2018-06-04 PROCEDURE — 85652 RBC SED RATE AUTOMATED: CPT

## 2018-06-04 PROCEDURE — 160022 HCHG BLOCK: Performed by: ORTHOPAEDIC SURGERY

## 2018-06-04 PROCEDURE — 85025 COMPLETE CBC W/AUTO DIFF WBC: CPT

## 2018-06-04 PROCEDURE — 87640 STAPH A DNA AMP PROBE: CPT

## 2018-06-04 PROCEDURE — 160009 HCHG ANES TIME/MIN: Performed by: ORTHOPAEDIC SURGERY

## 2018-06-04 PROCEDURE — 83036 HEMOGLOBIN GLYCOSYLATED A1C: CPT

## 2018-06-04 PROCEDURE — 502240 HCHG MISC OR SUPPLY RC 0272: Performed by: ORTHOPAEDIC SURGERY

## 2018-06-04 PROCEDURE — 500864 HCHG NEEDLE, SPINAL 18G: Performed by: ORTHOPAEDIC SURGERY

## 2018-06-04 PROCEDURE — 160048 HCHG OR STATISTICAL LEVEL 1-5: Performed by: ORTHOPAEDIC SURGERY

## 2018-06-04 PROCEDURE — 160002 HCHG RECOVERY MINUTES (STAT): Performed by: ORTHOPAEDIC SURGERY

## 2018-06-04 PROCEDURE — 85610 PROTHROMBIN TIME: CPT

## 2018-06-04 PROCEDURE — 87186 SC STD MICRODIL/AGAR DIL: CPT

## 2018-06-04 PROCEDURE — 86901 BLOOD TYPING SEROLOGIC RH(D): CPT

## 2018-06-04 PROCEDURE — 0S9B3ZX DRAINAGE OF LEFT HIP JOINT, PERCUTANEOUS APPROACH, DIAGNOSTIC: ICD-10-PCS | Performed by: ORTHOPAEDIC SURGERY

## 2018-06-04 PROCEDURE — 770001 HCHG ROOM/CARE - MED/SURG/GYN PRIV*

## 2018-06-04 PROCEDURE — 502578 HCHG PACK, TOTAL HIP: Performed by: ORTHOPAEDIC SURGERY

## 2018-06-04 PROCEDURE — 87205 SMEAR GRAM STAIN: CPT | Mod: 91

## 2018-06-04 PROCEDURE — 80048 BASIC METABOLIC PNL TOTAL CA: CPT

## 2018-06-04 PROCEDURE — 700102 HCHG RX REV CODE 250 W/ 637 OVERRIDE(OP): Performed by: ORTHOPAEDIC SURGERY

## 2018-06-04 PROCEDURE — 700111 HCHG RX REV CODE 636 W/ 250 OVERRIDE (IP)

## 2018-06-04 PROCEDURE — 87070 CULTURE OTHR SPECIMN AEROBIC: CPT | Mod: 91

## 2018-06-04 PROCEDURE — 87077 CULTURE AEROBIC IDENTIFY: CPT

## 2018-06-04 PROCEDURE — 87641 MR-STAPH DNA AMP PROBE: CPT

## 2018-06-04 PROCEDURE — 93010 ELECTROCARDIOGRAM REPORT: CPT | Performed by: INTERNAL MEDICINE

## 2018-06-04 PROCEDURE — 700105 HCHG RX REV CODE 258: Performed by: ORTHOPAEDIC SURGERY

## 2018-06-04 PROCEDURE — 501838 HCHG SUTURE GENERAL: Performed by: ORTHOPAEDIC SURGERY

## 2018-06-04 PROCEDURE — 160036 HCHG PACU - EA ADDL 30 MINS PHASE I: Performed by: ORTHOPAEDIC SURGERY

## 2018-06-04 PROCEDURE — 86140 C-REACTIVE PROTEIN: CPT

## 2018-06-04 PROCEDURE — 85730 THROMBOPLASTIN TIME PARTIAL: CPT

## 2018-06-04 PROCEDURE — 0SJB0ZZ INSPECTION OF LEFT HIP JOINT, OPEN APPROACH: ICD-10-PCS | Performed by: ORTHOPAEDIC SURGERY

## 2018-06-04 PROCEDURE — A9270 NON-COVERED ITEM OR SERVICE: HCPCS | Performed by: ORTHOPAEDIC SURGERY

## 2018-06-04 PROCEDURE — 89051 BODY FLUID CELL COUNT: CPT

## 2018-06-04 PROCEDURE — 36415 COLL VENOUS BLD VENIPUNCTURE: CPT

## 2018-06-04 PROCEDURE — 502779 HCHG SUTURE, QUILL: Performed by: ORTHOPAEDIC SURGERY

## 2018-06-04 PROCEDURE — 87015 SPECIMEN INFECT AGNT CONCNTJ: CPT | Mod: 91

## 2018-06-04 PROCEDURE — 86850 RBC ANTIBODY SCREEN: CPT

## 2018-06-04 RX ORDER — OXYCODONE HYDROCHLORIDE 10 MG/1
10 TABLET ORAL
Status: DISCONTINUED | OUTPATIENT
Start: 2018-06-04 | End: 2018-06-14 | Stop reason: HOSPADM

## 2018-06-04 RX ORDER — TRAMADOL HYDROCHLORIDE 50 MG/1
50 TABLET ORAL EVERY 4 HOURS PRN
Status: DISCONTINUED | OUTPATIENT
Start: 2018-06-04 | End: 2018-06-14 | Stop reason: HOSPADM

## 2018-06-04 RX ORDER — AMOXICILLIN 250 MG
1 CAPSULE ORAL
Status: DISCONTINUED | OUTPATIENT
Start: 2018-06-04 | End: 2018-06-14 | Stop reason: HOSPADM

## 2018-06-04 RX ORDER — HALOPERIDOL 5 MG/ML
1 INJECTION INTRAMUSCULAR EVERY 6 HOURS PRN
Status: DISCONTINUED | OUTPATIENT
Start: 2018-06-04 | End: 2018-06-14 | Stop reason: HOSPADM

## 2018-06-04 RX ORDER — SCOLOPAMINE TRANSDERMAL SYSTEM 1 MG/1
1 PATCH, EXTENDED RELEASE TRANSDERMAL
Status: DISCONTINUED | OUTPATIENT
Start: 2018-06-04 | End: 2018-06-14 | Stop reason: HOSPADM

## 2018-06-04 RX ORDER — METOPROLOL SUCCINATE 50 MG/1
100 TABLET, EXTENDED RELEASE ORAL
Status: DISCONTINUED | OUTPATIENT
Start: 2018-06-05 | End: 2018-06-14 | Stop reason: HOSPADM

## 2018-06-04 RX ORDER — MAGNESIUM HYDROXIDE 1200 MG/15ML
LIQUID ORAL
Status: COMPLETED | OUTPATIENT
Start: 2018-06-04 | End: 2018-06-04

## 2018-06-04 RX ORDER — SODIUM CHLORIDE, SODIUM LACTATE, POTASSIUM CHLORIDE, CALCIUM CHLORIDE 600; 310; 30; 20 MG/100ML; MG/100ML; MG/100ML; MG/100ML
INJECTION, SOLUTION INTRAVENOUS CONTINUOUS
Status: DISCONTINUED | OUTPATIENT
Start: 2018-06-04 | End: 2018-06-14 | Stop reason: HOSPADM

## 2018-06-04 RX ORDER — ONDANSETRON 2 MG/ML
4 INJECTION INTRAMUSCULAR; INTRAVENOUS EVERY 4 HOURS PRN
Status: DISCONTINUED | OUTPATIENT
Start: 2018-06-04 | End: 2018-06-08

## 2018-06-04 RX ORDER — DIPHENHYDRAMINE HYDROCHLORIDE 50 MG/ML
25 INJECTION INTRAMUSCULAR; INTRAVENOUS EVERY 6 HOURS PRN
Status: DISCONTINUED | OUTPATIENT
Start: 2018-06-04 | End: 2018-06-14 | Stop reason: HOSPADM

## 2018-06-04 RX ORDER — MORPHINE SULFATE 10 MG/ML
2-4 INJECTION, SOLUTION INTRAMUSCULAR; INTRAVENOUS
Status: DISCONTINUED | OUTPATIENT
Start: 2018-06-04 | End: 2018-06-14 | Stop reason: HOSPADM

## 2018-06-04 RX ORDER — SODIUM CHLORIDE, SODIUM LACTATE, POTASSIUM CHLORIDE, CALCIUM CHLORIDE 600; 310; 30; 20 MG/100ML; MG/100ML; MG/100ML; MG/100ML
INJECTION, SOLUTION INTRAVENOUS CONTINUOUS
Status: DISCONTINUED | OUTPATIENT
Start: 2018-06-04 | End: 2018-06-06

## 2018-06-04 RX ORDER — DEXAMETHASONE SODIUM PHOSPHATE 4 MG/ML
4 INJECTION, SOLUTION INTRA-ARTICULAR; INTRALESIONAL; INTRAMUSCULAR; INTRAVENOUS; SOFT TISSUE
Status: DISCONTINUED | OUTPATIENT
Start: 2018-06-04 | End: 2018-06-14 | Stop reason: HOSPADM

## 2018-06-04 RX ORDER — SERTRALINE HYDROCHLORIDE 100 MG/1
100 TABLET, FILM COATED ORAL DAILY
Status: DISCONTINUED | OUTPATIENT
Start: 2018-06-05 | End: 2018-06-14 | Stop reason: HOSPADM

## 2018-06-04 RX ORDER — ACETAMINOPHEN 500 MG
1000 TABLET ORAL EVERY 6 HOURS
Status: DISCONTINUED | OUTPATIENT
Start: 2018-06-04 | End: 2018-06-08

## 2018-06-04 RX ORDER — CIPROFLOXACIN 500 MG/1
500 TABLET, FILM COATED ORAL 2 TIMES DAILY
COMMUNITY
Start: 2018-05-29 | End: 2018-07-20

## 2018-06-04 RX ORDER — POLYETHYLENE GLYCOL 3350 17 G/17G
1 POWDER, FOR SOLUTION ORAL 2 TIMES DAILY PRN
Status: DISCONTINUED | OUTPATIENT
Start: 2018-06-04 | End: 2018-06-14 | Stop reason: HOSPADM

## 2018-06-04 RX ORDER — DIPHENHYDRAMINE HCL 25 MG
25 TABLET ORAL NIGHTLY PRN
Status: DISCONTINUED | OUTPATIENT
Start: 2018-06-05 | End: 2018-06-14 | Stop reason: HOSPADM

## 2018-06-04 RX ORDER — ENEMA 19; 7 G/133ML; G/133ML
1 ENEMA RECTAL
Status: DISCONTINUED | OUTPATIENT
Start: 2018-06-04 | End: 2018-06-14 | Stop reason: HOSPADM

## 2018-06-04 RX ORDER — OXYCODONE HYDROCHLORIDE 5 MG/1
5 TABLET ORAL
Status: DISCONTINUED | OUTPATIENT
Start: 2018-06-04 | End: 2018-06-14 | Stop reason: HOSPADM

## 2018-06-04 RX ORDER — LIDOCAINE HYDROCHLORIDE 10 MG/ML
0.5 INJECTION, SOLUTION INFILTRATION; PERINEURAL
Status: ACTIVE | OUTPATIENT
Start: 2018-06-04 | End: 2018-06-05

## 2018-06-04 RX ORDER — DOCUSATE SODIUM 100 MG/1
100 CAPSULE, LIQUID FILLED ORAL 2 TIMES DAILY
Status: DISCONTINUED | OUTPATIENT
Start: 2018-06-04 | End: 2018-06-14 | Stop reason: HOSPADM

## 2018-06-04 RX ORDER — BISACODYL 10 MG
10 SUPPOSITORY, RECTAL RECTAL
Status: DISCONTINUED | OUTPATIENT
Start: 2018-06-04 | End: 2018-06-14 | Stop reason: HOSPADM

## 2018-06-04 RX ORDER — AMOXICILLIN 250 MG
1 CAPSULE ORAL NIGHTLY
Status: DISCONTINUED | OUTPATIENT
Start: 2018-06-04 | End: 2018-06-14 | Stop reason: HOSPADM

## 2018-06-04 RX ADMIN — SODIUM CHLORIDE, POTASSIUM CHLORIDE, SODIUM LACTATE AND CALCIUM CHLORIDE: 600; 310; 30; 20 INJECTION, SOLUTION INTRAVENOUS at 16:53

## 2018-06-04 RX ADMIN — ACETAMINOPHEN 1000 MG: 500 TABLET, FILM COATED ORAL at 16:53

## 2018-06-04 RX ADMIN — VANCOMYCIN HYDROCHLORIDE 600 MG: 100 INJECTION, POWDER, LYOPHILIZED, FOR SOLUTION INTRAVENOUS at 15:30

## 2018-06-04 RX ADMIN — FENTANYL CITRATE 50 MCG: 50 INJECTION, SOLUTION INTRAMUSCULAR; INTRAVENOUS at 14:15

## 2018-06-04 RX ADMIN — HYDROCODONE BITARTRATE AND ACETAMINOPHEN 15 ML: 2.5; 108 SOLUTION ORAL at 14:15

## 2018-06-04 RX ADMIN — MUPIROCIN 1 APPLICATION: 20 OINTMENT TOPICAL at 21:30

## 2018-06-04 RX ADMIN — OXYCODONE HYDROCHLORIDE 10 MG: 10 TABLET ORAL at 20:38

## 2018-06-04 RX ADMIN — FENTANYL CITRATE 50 MCG: 50 INJECTION, SOLUTION INTRAMUSCULAR; INTRAVENOUS at 14:20

## 2018-06-04 RX ADMIN — PIPERACILLIN AND TAZOBACTAM 4.5 G: 4; .5 INJECTION, POWDER, LYOPHILIZED, FOR SOLUTION INTRAVENOUS; PARENTERAL at 20:38

## 2018-06-04 ASSESSMENT — COPD QUESTIONNAIRES
DURING THE PAST 4 WEEKS HOW MUCH DID YOU FEEL SHORT OF BREATH: NONE/LITTLE OF THE TIME
COPD SCREENING SCORE: 2
HAVE YOU SMOKED AT LEAST 100 CIGARETTES IN YOUR ENTIRE LIFE: NO/DON'T KNOW
DO YOU EVER COUGH UP ANY MUCUS OR PHLEGM?: NO/ONLY WITH OCCASIONAL COLDS OR INFECTIONS
IN THE PAST 12 MONTHS DO YOU DO LESS THAN YOU USED TO BECAUSE OF YOUR BREATHING PROBLEMS: DISAGREE/UNSURE

## 2018-06-04 ASSESSMENT — PAIN SCALES - GENERAL
PAINLEVEL_OUTOF10: 4
PAINLEVEL_OUTOF10: 3
PAINLEVEL_OUTOF10: 3
PAINLEVEL_OUTOF10: 2
PAINLEVEL_OUTOF10: 4
PAINLEVEL_OUTOF10: 3
PAINLEVEL_OUTOF10: 5
PAINLEVEL_OUTOF10: 3
PAINLEVEL_OUTOF10: 3
PAINLEVEL_OUTOF10: 5

## 2018-06-04 ASSESSMENT — PATIENT HEALTH QUESTIONNAIRE - PHQ9
1. LITTLE INTEREST OR PLEASURE IN DOING THINGS: NOT AT ALL
2. FEELING DOWN, DEPRESSED, IRRITABLE, OR HOPELESS: NOT AT ALL
SUM OF ALL RESPONSES TO PHQ9 QUESTIONS 1 AND 2: 0

## 2018-06-04 ASSESSMENT — LIFESTYLE VARIABLES: EVER_SMOKED: NEVER

## 2018-06-04 NOTE — PROGRESS NOTES
Received from transport.  Pt is AAO x4.  Pt reports a 3/10 L hip pain level.  Understands when next pain med is due.  VS WNL and in place per protocol.  Will place polar ice when sleeve arrives.  L PIV patent, running AB.  L hip dressing with LISSETTE in place, CDI.  L ankle dressing in place, CDI.  2 L O2 running via NC, pt usually does not wear O2.  Pt oriented to room.   POC discussed.  All needs met at this time.  Bed in low position.  Call light within reach.  Rounding in place.

## 2018-06-04 NOTE — OP REPORT
Pre-operative Dx: Left hip wound infection  Post-operative Dx: same   Procedure:  I&D of left hip wound  Surgeon:  Dr. Chong Vazquez MD  Assistants: None  Anesthesia:  Browning.  General  EBL:  100 mL  Drains:  None  Complications:  None    Findings: There was an area of induration with superficial purulence through an old suture hole.  This area of induration was sharply excised in entirety.  The wound bases at that point were pristine with no obvious sinus through the fascia.  However, given the high suspicion for infection, I did open the hip and wash out deeply as well.  Superficial, deep, and fluid cultures were sent.      Indications: She is a 72 year old female who has a history of severe chronic lower extremity wounds with frequent bouts of cellulitis and infection.  She had an intertrochanteric hip fracture several months ago treated by IMN by Dr. Peters.  That cutout and I converted it to a total hip about four months ago.  Her hip had been doing very well, but then she noted an area of induration around the left hip with an area of purulence through an old suture hole.  She has been going to wound care but continues to have complicated extremity wounds.  Vascular workup is underway, and she has been seen by Dr. Colin.  From the standpoint of the hip, I recommended I&D as she had at least a superficial infection with some concern that this could be a draining sinus.  The hip itself had been feeling good without any particular complaints otherwise.  We discussed the role of I&D with head and liner exchange if needed.  We decided no to do a 2-stage exchange, even if there were a deep infection, as it would be best to get her extremity infection and vascular situation under control first.  Discussed the risk of bleeding, infection, wound problems, neurovascular injury, instability, blood clots, and other medical problems around the time of surgery.  Discussed the potential need for two-stage revision or  richelle down the road.     Description of Procedure:      The patient was identified in the preoperative holding area and informed consent and site marking were confirmed. The patient was then brought to the operating room. Anesthesia was administered. Patient was positioned in the lateral position.  She was prepped and draped and a timeout was performed to confirm we had the correct patient, side, site, and presence of necessary personnel and equipment.  She was given Ancef and Vancomycin.    The area of interest was indurated about 4cm long.  There was no expressible drainage today.  I excised the majority of her prior wound, taking a wedge of tissue sharply using a scalpel all the way down to fascia.  This removed the indurated area in one piece.  The remaining wound walls looked pristine with no purulence or identifiable sinus tract.  Superficial cultures were taken at this point and the wound was thoroughly irrigated.  I then aspirated the hip, obtaining about 2cc of benign appearing fluid.  Given the high concern for deep infection, I opened the hip.  There was again no purulence or obvious infection.  I took a couple of deep cultures and performed a thorough irrigation at this point of the deep joint space.  I did not do a head and liner exchange as that would have required taking down the periarticular capsule and predisposing her to instability without having any clear infection, and in my discussion with her we had talked about minimizing the hip morbidity as much as possible while the treatment for her lower leg is underway.  Vancomycin powder was left in the wound and the fascia was closed with running quill suture.  The remainder of the wound was then closed with monocryl and nylon.  A sterile incisional vac was applied and she was turned over to anesthesia in stable condition without any apparent complications.    Postoperative plan:  IV abx while following cultures and will consult ID as  necessary.  Will touch base with Vascular to see if anything can be done to improve her left leg perfusion while she is admitted. TTWB in a boot on the left, DVT ppx for 4 weeks including ASA 81mg BID, PT/OT eval.  Followup in 2 weeks in clinic.

## 2018-06-04 NOTE — PROGRESS NOTES
2 RN skin check completed.  L hip dressing with LISSETTE drain observed, CDI.  L ankle dressing in place, POA. Pt states she goes to outpatient wound clinic and has been going 'for a while now.' Wound protocol followed.  Multiple scabs observed to L shin.  Old healed scabs to lower back.

## 2018-06-04 NOTE — PROGRESS NOTES
"Pharmacy Kinetics 72 y.o. female on vancomycin day # 1 2018    Currently on Vancomycin 1000 mg iv q24hr    Indication for Treatment: suspected prosthetic joint infection    Pertinent history per medical record: Admitted on 2018 for I&D of left hip.  Pt suppered hip fracture with IMN from Dr Peters in December.  She also has chronic extremity wounds with flairs of cellulitis (prior to hip fracture).   Pt was experiencing purulence through old suture wound of hip, therefore was taken to OR for I&D.  Wound appears to be more superficial, Dr Arenas did take cultures of deeper tissue, and vancomycin powder was instilled in wound.      Other antibiotics: Zosyn 4.5g IV q8h     Allergies: Patient has no known allergies.     List concerns for renal function: age    Pertinent cultures to date:   2018 Left hip wound positive for MRSA, Left leg culture positive for Pseudomonas aeruginosa    Recent Labs      18   1200   WBC  7.1   NEUTSPOLYS  64.60     Recent Labs      18   1200   BUN  12   CREATININE  0.67     No results for input(s): VANCOTROUGH, VANCOPEAK, VANCORANDOM in the last 72 hours.  Intake/Output Summary (Last 24 hours) at 18 1531  Last data filed at 18 1359   Gross per 24 hour   Intake              600 ml   Output               20 ml   Net              580 ml      Blood pressure 145/59, pulse 68, temperature 36.8 °C (98.3 °F), resp. rate 16, height 1.702 m (5' 7\"), weight 65.2 kg (143 lb 11.8 oz), SpO2 95 %. Temp (24hrs), Av °C (98.6 °F), Min:36.8 °C (98.3 °F), Max:37.2 °C (98.9 °F)      A/P   1. Vancomycin dose change: Patient received Vancomycin 1g IV intra-op at 1314, and additional 600mg X1 ordered to complete load, then to continue with Vancomycin 1g IV q24h  2. Next vancomycin level: 18 @ 1430  3. Goal trough: 12-16 mcg/ml  4. Comments: Pharmacy to continue to monitor and adjust vancomycin per protocol.    Josiane Kinsey, PharmD    "

## 2018-06-05 ENCOUNTER — APPOINTMENT (OUTPATIENT)
Dept: RADIOLOGY | Facility: MEDICAL CENTER | Age: 73
DRG: 857 | End: 2018-06-05
Attending: SURGERY
Payer: MEDICARE

## 2018-06-05 LAB
ANION GAP SERPL CALC-SCNC: 7 MMOL/L (ref 0–11.9)
BUN SERPL-MCNC: 15 MG/DL (ref 8–22)
CALCIUM SERPL-MCNC: 9 MG/DL (ref 8.5–10.5)
CHLORIDE SERPL-SCNC: 103 MMOL/L (ref 96–112)
CO2 SERPL-SCNC: 24 MMOL/L (ref 20–33)
CREAT SERPL-MCNC: 0.73 MG/DL (ref 0.5–1.4)
ERYTHROCYTE [DISTWIDTH] IN BLOOD BY AUTOMATED COUNT: 66.3 FL (ref 35.9–50)
GLUCOSE SERPL-MCNC: 134 MG/DL (ref 65–99)
HCT VFR BLD AUTO: 35.4 % (ref 37–47)
HGB BLD-MCNC: 10.6 G/DL (ref 12–16)
MCH RBC QN AUTO: 28 PG (ref 27–33)
MCHC RBC AUTO-ENTMCNC: 29.9 G/DL (ref 33.6–35)
MCV RBC AUTO: 93.7 FL (ref 81.4–97.8)
PLATELET # BLD AUTO: 234 K/UL (ref 164–446)
PMV BLD AUTO: 10.2 FL (ref 9–12.9)
POTASSIUM SERPL-SCNC: 4.6 MMOL/L (ref 3.6–5.5)
RBC # BLD AUTO: 3.78 M/UL (ref 4.2–5.4)
SODIUM SERPL-SCNC: 134 MMOL/L (ref 135–145)
WBC # BLD AUTO: 7.6 K/UL (ref 4.8–10.8)

## 2018-06-05 PROCEDURE — 700111 HCHG RX REV CODE 636 W/ 250 OVERRIDE (IP)

## 2018-06-05 PROCEDURE — 306591 TRAY SUTURE REMOVAL DISP: Performed by: ORTHOPAEDIC SURGERY

## 2018-06-05 PROCEDURE — 770021 HCHG ROOM/CARE - ISO PRIVATE

## 2018-06-05 PROCEDURE — 85027 COMPLETE CBC AUTOMATED: CPT

## 2018-06-05 PROCEDURE — 36415 COLL VENOUS BLD VENIPUNCTURE: CPT

## 2018-06-05 PROCEDURE — 97166 OT EVAL MOD COMPLEX 45 MIN: CPT

## 2018-06-05 PROCEDURE — A9270 NON-COVERED ITEM OR SERVICE: HCPCS | Performed by: ORTHOPAEDIC SURGERY

## 2018-06-05 PROCEDURE — G8987 SELF CARE CURRENT STATUS: HCPCS | Mod: CJ

## 2018-06-05 PROCEDURE — 700117 HCHG RX CONTRAST REV CODE 255: Performed by: SURGERY

## 2018-06-05 PROCEDURE — G8988 SELF CARE GOAL STATUS: HCPCS | Mod: CI

## 2018-06-05 PROCEDURE — B41DYZZ FLUOROSCOPY OF AORTA AND BILATERAL LOWER EXTREMITY ARTERIES USING OTHER CONTRAST: ICD-10-PCS | Performed by: SURGERY

## 2018-06-05 PROCEDURE — 99153 MOD SED SAME PHYS/QHP EA: CPT

## 2018-06-05 PROCEDURE — 700111 HCHG RX REV CODE 636 W/ 250 OVERRIDE (IP): Performed by: SURGERY

## 2018-06-05 PROCEDURE — 700101 HCHG RX REV CODE 250

## 2018-06-05 PROCEDURE — 36200 PLACE CATHETER IN AORTA: CPT

## 2018-06-05 PROCEDURE — 700105 HCHG RX REV CODE 258: Performed by: ORTHOPAEDIC SURGERY

## 2018-06-05 PROCEDURE — 80048 BASIC METABOLIC PNL TOTAL CA: CPT

## 2018-06-05 PROCEDURE — 93970 EXTREMITY STUDY: CPT

## 2018-06-05 PROCEDURE — 93970 EXTREMITY STUDY: CPT | Mod: 26 | Performed by: SURGERY

## 2018-06-05 PROCEDURE — 700111 HCHG RX REV CODE 636 W/ 250 OVERRIDE (IP): Performed by: ORTHOPAEDIC SURGERY

## 2018-06-05 PROCEDURE — 700102 HCHG RX REV CODE 250 W/ 637 OVERRIDE(OP): Performed by: ORTHOPAEDIC SURGERY

## 2018-06-05 PROCEDURE — 700112 HCHG RX REV CODE 229: Performed by: ORTHOPAEDIC SURGERY

## 2018-06-05 RX ORDER — SODIUM CHLORIDE 9 MG/ML
500 INJECTION, SOLUTION INTRAVENOUS
Status: ACTIVE | OUTPATIENT
Start: 2018-06-05 | End: 2018-06-05

## 2018-06-05 RX ORDER — ONDANSETRON 2 MG/ML
4 INJECTION INTRAMUSCULAR; INTRAVENOUS PRN
Status: ACTIVE | OUTPATIENT
Start: 2018-06-05 | End: 2018-06-05

## 2018-06-05 RX ORDER — IODIXANOL 270 MG/ML
100 INJECTION, SOLUTION INTRAVASCULAR ONCE
Status: COMPLETED | OUTPATIENT
Start: 2018-06-05 | End: 2018-06-05

## 2018-06-05 RX ORDER — MIDAZOLAM HYDROCHLORIDE 1 MG/ML
INJECTION INTRAMUSCULAR; INTRAVENOUS
Status: COMPLETED
Start: 2018-06-05 | End: 2018-06-05

## 2018-06-05 RX ORDER — MIDAZOLAM HYDROCHLORIDE 1 MG/ML
.5-2 INJECTION INTRAMUSCULAR; INTRAVENOUS PRN
Status: ACTIVE | OUTPATIENT
Start: 2018-06-05 | End: 2018-06-05

## 2018-06-05 RX ORDER — LIDOCAINE HYDROCHLORIDE 20 MG/ML
INJECTION, SOLUTION INFILTRATION; PERINEURAL
Status: COMPLETED
Start: 2018-06-05 | End: 2018-06-05

## 2018-06-05 RX ORDER — SODIUM CHLORIDE 9 MG/ML
INJECTION, SOLUTION INTRAVENOUS CONTINUOUS
Status: DISCONTINUED | OUTPATIENT
Start: 2018-06-05 | End: 2018-06-08

## 2018-06-05 RX ADMIN — IODIXANOL 68 ML: 270 INJECTION, SOLUTION INTRAVASCULAR at 13:15

## 2018-06-05 RX ADMIN — OXYCODONE HYDROCHLORIDE 10 MG: 10 TABLET ORAL at 21:56

## 2018-06-05 RX ADMIN — MUPIROCIN 1 DOSE: 20 OINTMENT TOPICAL at 08:24

## 2018-06-05 RX ADMIN — ACETAMINOPHEN 1000 MG: 500 TABLET, FILM COATED ORAL at 00:59

## 2018-06-05 RX ADMIN — FENTANYL CITRATE 25 MCG: 50 INJECTION, SOLUTION INTRAMUSCULAR; INTRAVENOUS at 12:46

## 2018-06-05 RX ADMIN — VANCOMYCIN HYDROCHLORIDE 1000 MG: 100 INJECTION, POWDER, LYOPHILIZED, FOR SOLUTION INTRAVENOUS at 18:42

## 2018-06-05 RX ADMIN — ACETAMINOPHEN 1000 MG: 500 TABLET, FILM COATED ORAL at 05:54

## 2018-06-05 RX ADMIN — ASPIRIN 81 MG: 81 TABLET, COATED ORAL at 01:00

## 2018-06-05 RX ADMIN — OXYCODONE HYDROCHLORIDE 10 MG: 10 TABLET ORAL at 08:24

## 2018-06-05 RX ADMIN — OXYCODONE HYDROCHLORIDE 10 MG: 10 TABLET ORAL at 18:42

## 2018-06-05 RX ADMIN — ASPIRIN 81 MG: 81 TABLET, COATED ORAL at 14:29

## 2018-06-05 RX ADMIN — MIDAZOLAM 1 MG: 1 INJECTION INTRAMUSCULAR; INTRAVENOUS at 12:47

## 2018-06-05 RX ADMIN — PIPERACILLIN AND TAZOBACTAM 4.5 G: 4; .5 INJECTION, POWDER, LYOPHILIZED, FOR SOLUTION INTRAVENOUS; PARENTERAL at 14:29

## 2018-06-05 RX ADMIN — MIDAZOLAM 1 MG: 1 INJECTION INTRAMUSCULAR; INTRAVENOUS at 12:44

## 2018-06-05 RX ADMIN — FENTANYL CITRATE 25 MCG: 50 INJECTION, SOLUTION INTRAMUSCULAR; INTRAVENOUS at 12:44

## 2018-06-05 RX ADMIN — SERTRALINE 100 MG: 100 TABLET, FILM COATED ORAL at 08:24

## 2018-06-05 RX ADMIN — PIPERACILLIN AND TAZOBACTAM 4.5 G: 4; .5 INJECTION, POWDER, LYOPHILIZED, FOR SOLUTION INTRAVENOUS; PARENTERAL at 21:56

## 2018-06-05 RX ADMIN — FENTANYL CITRATE 25 MCG: 50 INJECTION, SOLUTION INTRAMUSCULAR; INTRAVENOUS at 13:02

## 2018-06-05 RX ADMIN — FENTANYL CITRATE 25 MCG: 50 INJECTION, SOLUTION INTRAMUSCULAR; INTRAVENOUS at 13:13

## 2018-06-05 RX ADMIN — PIPERACILLIN AND TAZOBACTAM 4.5 G: 4; .5 INJECTION, POWDER, LYOPHILIZED, FOR SOLUTION INTRAVENOUS; PARENTERAL at 05:55

## 2018-06-05 RX ADMIN — METOPROLOL SUCCINATE 100 MG: 50 TABLET, EXTENDED RELEASE ORAL at 08:24

## 2018-06-05 RX ADMIN — LIDOCAINE HYDROCHLORIDE: 20 INJECTION, SOLUTION INFILTRATION; PERINEURAL at 13:00

## 2018-06-05 RX ADMIN — ACETAMINOPHEN 1000 MG: 500 TABLET, FILM COATED ORAL at 18:42

## 2018-06-05 RX ADMIN — DOCUSATE SODIUM 100 MG: 100 CAPSULE ORAL at 08:24

## 2018-06-05 ASSESSMENT — COGNITIVE AND FUNCTIONAL STATUS - GENERAL
EATING MEALS: A LITTLE
TOILETING: A LITTLE
DRESSING REGULAR LOWER BODY CLOTHING: A LOT
HELP NEEDED FOR BATHING: A LITTLE
SUGGESTED CMS G CODE MODIFIER DAILY ACTIVITY: CK
DRESSING REGULAR UPPER BODY CLOTHING: A LITTLE
DAILY ACTIVITIY SCORE: 17
PERSONAL GROOMING: A LITTLE

## 2018-06-05 ASSESSMENT — PAIN SCALES - GENERAL
PAINLEVEL_OUTOF10: 1
PAINLEVEL_OUTOF10: 6
PAINLEVEL_OUTOF10: 7
PAINLEVEL_OUTOF10: 5
PAINLEVEL_OUTOF10: 3
PAINLEVEL_OUTOF10: 5

## 2018-06-05 ASSESSMENT — ACTIVITIES OF DAILY LIVING (ADL): TOILETING: INDEPENDENT

## 2018-06-05 NOTE — OP REPORT
DATE OF SERVICE:  06/05/2018    PREOPERATIVE DIAGNOSIS:  Left superficial femoral occlusion and nonhealing   wound, left leg.    POSTOPERATIVE DIAGNOSIS:  Left superficial femoral occlusion and nonhealing   wound, left leg.    OPERATIONS:  1.  Right femoral sheath placement under ultrasound and fluoroscopic guidance.  2.  Abdominal aortogram.  3.  Pull down of catheter and bilateral lower extremity runoff arteriograms.  4.  StarClose right femoral artery entry site.    SURGEON:  Shailesh Maradiaga MD    ANESTHESIA:  Moderate conscious sedation.    MONITORING NURSE:  Lindsey Navarrete RN    SUMMARY OF FINDINGS:  The patient has a single left renal artery that has   characteristics of fibromuscular dysplasia.  Right renal artery was not   visualized.  Abdominal aorta looked normal as did the common internal and   external iliac arteries bilaterally.  Common femoral arteries were widely   patent bilaterally and bifurcated normally.  On the right side, the   superficial femoral artery is diseased through most of its course with several   significant stenoses across the adductor canal region.  The proximal   popliteal has some mild disease and at the top of the patella level, the   popliteal artery on the right is widely patent down through the knee region   into the trifurcation.  Runoff in the trifurcation was not as well seen on the   right as the left and there may be a right posterior tibial occlusion.    Left superficial femoral is occluded at its origin with a small sinus at its   origin.  It reconstitutes at the adductor canal and there is a narrowed area   in the proximal popliteal.  The top of the patellar and distally it looks of   good caliber without significant plaque.  The trifurcation and runoff is   normal on the left.  The anterior tibial and posterior tibial arteries are   dominant and the perineal is also widely patent and well formed.    The target vessels ideally would be the below-knee popliteal  arteries.  If it   is an above-knee bypass, it should be taken as distal in the upper knee area   as possible.    DESCRIPTION OF PROCEDURE:  After obtaining informed consent, the patient was   positioned supine on the angiography table.  A time-out was performed.  Her   groins were prepped with ChloraPrep and draped sterilely.  I used ultrasound   and 1% Xylocaine anesthesia to place a micropuncture needle, wire, and dilator   in the right common femoral artery.  This was followed by placement of a   5-Serbian sheath.  An Omni flush catheter, 4-Serbian, was passed over a   Glidewire to the lumbar 1 level.  Digital subtraction aortoiliac angiogram was   performed.  We used diluted contrast 50 mL of saline mixed with 100 mL of   Visipaque 270.  The catheter was pulled down to above the bifurcation and an   aortoiliac angiogram was done.  She has a left hip joint replacement and we   did a couple of obliques to get a complete look at the left common femoral   artery, which looks widely patent, as does the profunda orifice.  We did a   step run-off subsequently through the thigh regions bilaterally, knees and   calves and down to the ankles.  Findings are as described above.  I did a   45-degree imaging of the right groin entry site and it was satisfactory for a   StarClose which was placed successfully.  A sterile 2x2 gauze and Tegaderm   were used for groin dressing on the right.  The patient had less than 20 mL of   blood loss.  She tolerated the procedure well and was taken back to the lira   for observation and surgical planning.  It is anticipated she will have a left   femoral to popliteal bypass with in situ saphenous vein, which looked   satisfactory on duplex.       ____________________________________     MD KERRY Vera / LARRY    DD:  06/05/2018 13:57:37  DT:  06/05/2018 16:39:15    D#:  [id number]  Job#:  [id number]

## 2018-06-05 NOTE — PROGRESS NOTES
" Subjective:      No events. Pain controlled.  No fevers, chills, or SOB.    Objective:  Blood pressure 127/76, pulse 62, temperature 36.4 °C (97.5 °F), resp. rate 16, height 1.702 m (5' 7\"), weight 65.2 kg (143 lb 11.8 oz), SpO2 100 %, not currently breastfeeding.    Recent Labs      06/04/18   1200  06/05/18   0248   WBC  7.1  7.6   RBC  4.32  3.78*   HEMOGLOBIN  12.3  10.6*   HEMATOCRIT  38.3  35.4*   MCV  88.7  93.7   MCH  28.5  28.0   MCHC  32.1*  29.9*   RDW  61.4*  66.3*   PLATELETCT  277  234   MPV  9.4  10.2     Recent Labs      06/04/18   1200  06/05/18   0248   SODIUM  135  134*   POTASSIUM  4.1  4.6   CHLORIDE  100  103   CO2  26  24   GLUCOSE  98  134*   BUN  12  15   CREATININE  0.67  0.73   CALCIUM  9.4  9.0         Intake/Output Summary (Last 24 hours) at 06/05/18 0706  Last data filed at 06/04/18 1600   Gross per 24 hour   Intake              900 ml   Output               20 ml   Net              880 ml       Comfortable, no distress  Neurologically and vascularly intact with palpable pedal pulses bilaterally.  Dressing C/D/I    Assessment:      Doing well s/p left hip I&D  Hip infection appears to be superficial based on OR findings and hip cell count (430 WBCs).  Left leg vascular insufficiency with chronic wounds  MRSA +    Plan:      Pending angiogram and vein mapping  Cont IV abx while awaiting cultures  May consult ID based on culture results and potential need for plastic bypass material  TTWB on L leg in a boot  ASA BID + SCDs for DVT ppx  Nasal Mupirocen  "

## 2018-06-05 NOTE — PROGRESS NOTES
"Pharmacy Kinetics 72 y.o. female on vancomycin day # 2 2018    Currently on Vancomycin 1000 mg iv q24hr    Indication for Treatment: Suspected prosthetic joint infection    Pertinent history per medical record: Admitted on 2018 for I&D of left hip.  Pt suppered hip fracture with IMN from Dr Peters in December.  She also has chronic extremity wounds with flairs of cellulitis (prior to hip fracture).   Pt was experiencing purulence through old suture wound of hip, therefore was taken to OR for I&D.  Wound appears to be more superficial, Dr Vazquez did take cultures of deeper tissue, and vancomycin powder was instilled in wound.      Other antibiotics: Piperacillin/tazobactam 4.5g IV q8h    Allergies: Patient has no known allergies.     List concerns for renal function : Age    Pertinent cultures to date:   18 - L hip tissue - S. Aureus (rare growth - sensitivities pending)    18 - L hip wound - MRSA  18 - L leg wound - P. aeruginosa    Recent Labs      18   1200  18   0248   WBC  7.1  7.6   NEUTSPOLYS  64.60   --      Recent Labs      18   1200  18   0248   BUN  12  15   CREATININE  0.67  0.73     No results for input(s): VANCOTROUGH, VANCOPEAK, VANCORANDOM in the last 72 hours.  Intake/Output Summary (Last 24 hours) at 18 1536  Last data filed at 18 1600   Gross per 24 hour   Intake              300 ml   Output                0 ml   Net              300 ml      Blood pressure (!) 98/42, pulse 64, temperature 36.4 °C (97.5 °F), resp. rate 16, height 1.702 m (5' 7\"), weight 65.2 kg (143 lb 11.8 oz), SpO2 100 %, not currently breastfeeding. Temp (24hrs), Av.5 °C (97.7 °F), Min:36.3 °C (97.4 °F), Max:36.8 °C (98.2 °F)      A/P   1. Vancomycin dose change: Continue current dose - vancomycin 1000 mg q24h  2. Next vancomycin level: 18 at 1430  3. Goal trough: 16-20 mcg/mL  4. Comments: Reviewed prior vancomycin administrations -drastic difference in dose due to " prior weight of 91 kg in December.  Most recent weight updated with stand up scale.  Recommend continuing current dose and checking a vancomycin trough prior to 3rd total dose on 6/6/18.  Preliminary wound culture with S. Aureus, sensitivities pending.  Pharmacy will continue to monitor.    Margot Robin, PharmD., [facility]  PGY-2 Critical Care Resident  [phone]

## 2018-06-05 NOTE — PROGRESS NOTES
Discussed aortogram with runoff from right femoral approach to obtain details of left lower extremity arterial findings necessary for bypass planning.   Femoral pulses palpable.  Left ankle ulceration bandaged.  Feet viable.  Creatinine normal.    Explained purpose of and procedure details.  She understands and accepts.

## 2018-06-05 NOTE — WOUND TEAM
"[facility]  Inpatient Services  Initial Wound and Skin Care Evaluation    Admission Date:  06/04/2018  HPI, PMH, SH: Reviewed  Unit where seen by Wound Team: T332 Bed 2    WOUND CONSULT RELATED TO: LLE wounds    SUBJECTIVE:  \"They put honey on the wound in Rehab and that seemed to work the best\"    Self Report / Pain Level: Pain to Left lower leg medial wound with touch    OBJECTIVE: Pt lying in bed no waffle overlay or heel float boots in place. Pt has a provena wound vac to her left hip from procedure with Dr. Vazquez on 06/04/2018.    WOUND TYPE, LOCATION, CHARACTERISTICS (Pressure ulcers: location, stage, POA or date identified)    Location and type of wound: Left medial lower leg full thickness wound        Periwound:    Red  Drainage:    Minimal, Serosanginous  Tissue Type and %:   Yellow, red/pink  Wound Edges:   Attached  Odor:     NONE  Exposed structure(s):  CARLITA due to yellow slough  S&S of Infection:          NONE      Measurements: (Taken 06/05/2018)  Length:     6 cm  Width:      3.8 cm   Depth:                            0.5 cm  Tracts/Undermining:     CARLITA due to yellow slough    Location and type of wound: Left Medial ankle partial thickness wound from orthopedic boot.        Periwound:        Red  Drainage:        NONE  Tissue Type and %:       Red/pink  Wound Edges:       Attached  Odor:         NONE  Exposed structure(s):      NONE  S&S of Infection:              NONE      Measurements: (Taken 06/05/2018)  Length:         2.3 cm  Width:          1.9 cm  Depth:                                0.1 cm  Tracts/Undermining:         NONE    VASCULAR:    TOMI: Completed 05/28/2018         Right: 0.92          Left: 0.70  PULSES: Unable to palpate BLE       Right: DP and PT were both dopplerable         Left: DP and PT were both dopplerable    INTERVENTIONS BY WOUND TEAM: Wound RN in to assess wounds. Pt just returned from a procedure with Dr. Maradiaga. Pt lying in bed. LLE dressed with xeroform " (yellow), ABD pad and an ace wrap. Per pt she requested that Dr. Maradiaga wrap it while she was in the procedure. Pt states she was going to wound clinic for CSWD of wound. Wound has large amount of yellow slough. Attempted to clean with NS and dry gauze. Pt would benefit from CSWD. Bilateral LE pulses were then dopplered. Maricruz-wound prepped with no sting skin barrier, a small piece of calcium alginate was cut to fit over wound bed. Honey gel was applied to calcium alginate and was placed over wound bed. Pt also has a medial ankle wound from an ortho boot she is wearing from an ankle injury. Per pt the wound developed because the boot does not fit right and rubs her leg. Wound was cleansed with NS and dry gauze. A hydrocolloid was then cut to fit over wound bed. Both wound dressings were secured with one ABD pad and roll gauze. Hypafix tape was used to secure roll gauze. Aquacel silver and nonadhesive foams were ordered and left in pts med drawer outside room for possible use after debridement.    Interdisciplinary consultation: Patient, Staff RN    EVALUATION: Pt is an elderly woman who is well known to outpatient wound care. Pt has had the left medial shin wound for some time and was going twice a week to outpatient wound clinic for debridement and aquacel ag dressing application. This wound has yellow slough present to wound bed. Honey gel was applied over wound to assist with autolytically debriding the slough. Plan for possible debridement Thursday. Pt has a small wound to her Left medial ankle from an orthopedic boot she is wearing due to an ankle injury to lower leg. Hydrocolloid and ABD pad placed to protect area from pressure while it heals.    Factors affecting wound healing: Age, poor vascular flow  Goals: Steady decrease in wound area and depth weekly.    NURSING PLAN OF CARE ORDERS (X):    Dressing changes: See Dressing Maintenance orders: X  Skin care: See Skin Care orders: X  Rectal tube care: See Rectal  Tube Care orders:   Other orders:    RSKIN: CURRENT (X) ORDERED (O)  Q shift Luis:  X  Q shift pressure point assessments:  X  Pressure redistribution mattress  X    SHORTY      Bariatric SHORTY      Bariatric foam        Heel float boots O     Heels floated on pillows  O  Applied this visit  Barrier wipes      Barrier Cream      Barrier paste      Sacral silicone dressing      Silicone O2 tubing  X    Anchorfast      Trach with Optifoam split foam       Waffle cushion     Waffle Overlay O   Rectal tube or BMS      Antifungal tx    Turn q 2 hours     Up to chair     Ambulate X  PT/OT     Dietician     Diabetes Education   PO  X   TF   TPN     PVN    NPO   # days   Other       WOUND TEAM PLAN OF CARE (X):   NPWT change 3 x week:        Dressing changes by wound team:  X Will follow up Thursday for possible wound debridement     Follow up as needed:       Other (explain):    Anticipated discharge plans (X):  SNF:           Home Care:           Outpatient Wound Center:            Self Care:            Other:   X

## 2018-06-05 NOTE — CARE PLAN
Problem: Nutritional:  Goal: Achieve adequate nutritional intake  Patient will consume 50% of meals  Outcome: PROGRESSING AS EXPECTED  Pt consuming 50-75% of PO diet

## 2018-06-05 NOTE — PROGRESS NOTES
Aortogram and runoff angio performed by Dr Maradiaga via right femoral access. Patient continuously assessed and monitored throughout procedure; baseline ETCO2 32-35 with consistent waveform noted. Procedure site closed with Starclose; site CDI without swelling or pain and sterile dressing of tegaderm and guaze applied. Patient awake and conversant following the procedure, no complaints. Patient returned to her room in good condition and bedside handoff to SHREE Warren.

## 2018-06-05 NOTE — PROGRESS NOTES
IR Procedure RN's Note:    Abdominal aortogram with run off and possible intervention done by Dr. Maradiaga; RIGHT femoral artery access site; pt assessed upon arrival, pt A/Ox4, pt aware of the procedure, pt baseline on 2L NC O2 prior to start of case. Report given to Flor VILLELA RN, moderate sedation medication verified.     Start monitor time: 1238  Start sedation time: 1244   Case start time: 1246

## 2018-06-05 NOTE — CONSULTS
DATE OF SERVICE:  06/04/2018    REASON FOR CONSULTATION:  Left lower extremity arterial insufficiency.    HISTORY OF PRESENT ILLNESS:  The patient is a 72-year-old woman who I saw in   the wound care clinic on 05/21.  She has a punched-out wound on the left   medial ankle and noninvasive studies that show a left superficial femoral   artery occlusion.  Today, she underwent a wound exploration with cultures   taken of both the deep and superficial level.  It appears she has only a   superficial infection.  However, she has been somewhat difficult to treat as   she wants only to return to work and has refused the immediate treatment   recommended.    Noninvasive studies show a left superficial femoral artery occlusion with   reconstitution at the popliteal artery level, presumably the above-knee   popliteal artery.    PAST MEDICAL HISTORY:  1.  Osteoarthritis with recent left hip repair.  2.  Heart valve disease.  3.  Hyperlipidemia.  4.  Hypertension.  5.  Osteoporosis.    PAST SURGICAL HISTORY:  Includes:  1.  Left hip nailing with a total hip revision in December and February   respectively.  2.  Complete hysterectomy.  3.  Abdominal hysterectomy.  4.  Breast biopsy.    MEDICATIONS:  Amoxicillin 500 mg t.i.d., pentoxifylline 400 mg daily,   metoprolol 100 mg daily, Zoloft 100 mg daily, vitamin D 2000 units daily and   multivitamins.    SOCIAL HISTORY:  She is single, works at [email or web address], quit smoking in   2007.    FAMILY HISTORY:  Significant for colon cancer, heart attack, diabetes,   hypertension and bipolar disorder in a sibling.    PHYSICAL EXAMINATION:  GENERAL:  The patient is an elderly female in no apparent distress.  HEENT:  Pupils are equal, round and reactive to light.  Extraocular muscles   intact.  NECK:  Supple without thyromegaly, JVD or carotid bruits.  HEART:  Regular rate and rhythm without murmur.  EXTREMITIES:  She has a palpable right femoral pulse, and on the left, her   femoral pulse is  somewhat diminished over what would be expected.  There are   monophasic popliteal pulses with Doppler signals similar.  ABDOMEN:  Soft and benign without palpable masses or tenderness.  EXTREMITIES:  Left medial ankle shows a wound that is fairly punched out and   somewhat deep consistent with an arterial ulcer.    IMPRESSION:  The patient is a 72-year-old woman who has a medial ankle ulcer.    Given the results of a noninvasive study, I think it is reasonable to expect   that she has no evidence of aortoiliac stenosis.  However, I think an   angiogram is important to determine a target vessel to help determine if an   above or below-knee fem-pop bypass is a viable option for revascularization of   the left lower extremity, which seems at this point essential to salvage her   left leg.  Not mentioned in her history of present illness is an ankle   fracture and her treatment has been difficult with the development of this   ankle wound.    PLAN:  1.  Left leg angiogram.  2.  Left lower extremity vein mapping to determine conduit for bypass.  A   prosthetic bypass in this scenario would be very difficult with a recent at   least superficial wound infection and open wounds in the left groin.  I await   vein mapping to consider our options.       ____________________________________     MD FERMÍN Overton / LARRY    DD:  06/04/2018 22:32:57  DT:  06/04/2018 23:32:58    D#:  [id number]  Job#:  [id number]

## 2018-06-05 NOTE — CARE PLAN
Problem: Knowledge Deficit  Goal: Knowledge of disease process/condition, treatment plan, diagnostic tests, and medications will improve  POC discussed. Pt understands that she needs to urinate within 6 hrs. All questions and concerns addressed.     Problem: Mobility  Goal: Risk for activity intolerance will decrease  Pt understands she can ambulate POD #0 and encouraged to ambulate before bedtime. All questions and concerns addressed.

## 2018-06-05 NOTE — DISCHARGE PLANNING
Transitional Care Navigator:    Patient has utilized transitional care services through [facility] in the past. Patient is pending PT/OT evaluations. At this time patient is appropriate for SNF due to limited mobility, TTWB status, and on going medical needs. Please place order for SNF as appropriate for DC planning.

## 2018-06-05 NOTE — DIETARY
"Nutrition services: Day 1 of admit.  Nadege Mae is a 72 y.o. female with admitting DX of Closed displaced intertrochanteric fracture of right femur with routine healing.  Hx of osteoporosis, hypertension, hyperlipidemia, poor dentition (pt wears full dentures)    Consult received for poor Po intake, weight loss per nutrition admission screening.  Patient currently out of room for procedure (IR-extremity angiogram, L unilateral).  RD unable to address hx of 10  Lb weight loss x3 months with patient at this time.  RN states possibility that patient may be drowsy this afternoon s/p procedure.    Assessment:  Height: 170.2 cm (5' 7\")  Weight: 65.2 kg (143 lb 11.8 oz)  Body mass index is 22.51 kg/m².  Diet/Intake: Regular     Evaluation:   1. % wt loss x3 months = 6.5; insignificant, per guidelines.  In the context of chronic wounds, this is considered significant, per RD clinical judgement.   2. Labs: 134, glucose 134, glycohemoglobin 5.6   3. Meds: Colace, senna-docusate  4. Noted active orders for consult to wound team: wound to L ankle, pt is s/p I&D to L hip 6/4, as well as consult to LPS for diabetic foot ulcer.  Per discussion with RN, pt is not with known hx diabetes and does not have diabetic foot ulcer.    -Hbg A1c on 6/4 = 5.6  -blood glucose this morning 134; related to stress response?     Recommendations/Plan:   For stage I,2 wounds:  Daily vitamin therapy for wound healing: Multivitamin with minerals (Theragran-M) and 500 mg Vitamin C.  For stage 3,4 wounds: Daily vitamin therapy for wound healing: Multivitamin with minerals  (Theragran-M) daily and 500 mg Vitamin C BID for 14 days.  Encourage intake of meals and snacks - add diabetic diet provision, per MD discretion if hyperglycemia becomes a consistent issue.   Document intake of all meals and snacks  as % taken in ADL's to provide interdisciplinary communication across all shifts.   Monitor weight.  Nutrition rep will continue to see patient " for ongoing meal and snack preferences.

## 2018-06-05 NOTE — THERAPY
"Occupational Therapy Evaluation completed.   Plan of Care: Will benefit from Occupational Therapy 3 times per week  Discharge Recommendations:  Equipment: Will Continue to Assess for Equipment Needs. Post-acute therapy Discharge to home with outpatient or home health for additional skilled therapy services.    Patient with L hip history including IMN and THR 4 mos ago now presents with L superficial femoral artery occulsion necessitating I&D and postitive for MRSA. Patient is currently posterior hip precautions and TTWB LLE with boot. Patient reports mod I with ADLS, shared IADLs, including work, and mobility with FWW/4WW. Patient upon eval presents with decreased endurance, tolerance, balance, ADLs, and mobility, and adherence to precautions with functional tasks. Patient would benefit from skilled OT in this setting followed by home with assist.       See \"Rehab Therapy-Acute\" Patient Summary Report for complete documentation.    " Hip Dislocation  Introduction  Hip dislocation is when the bones in your hip joint move out of place. To treat this, your doctor must move your bones back into place (reduction). This condition is an emergency. If you think you have dislocated your hip, do not move. Get medical help right away.  Symptoms of a dislocated hip may include:  · Very bad pain in your hip area. Pain may get worse when you move or when you try to use your hip to support (bear) your weight.  · Not being able to move the hip.  · Having the leg of the dislocated hip looking shorter than the other leg.  · Inward turning of the foot on the side of the dislocated hip.  · Loss of feeling in your lower leg, foot, or ankle.  Follow these instructions at home:  If you have a splint:  · Do not put pressure on any part of the splint until it is fully hardened. This may take many hours.  · Wear the splint as told by your doctor. Remove it only as told by your doctor.  · Loosen the splint if your toes tingle, get numb, or turn cold and blue.  · Do not let your splint get wet if it is not waterproof.  ¨ Do not take baths, swim, or use a hot tub until your doctor says it is okay. Ask your doctor if you can take showers. You may only be allowed to take sponge baths for bathing.  ¨ If your splint is not waterproof, cover it with a watertight plastic bag when you take a bath or a shower.  · Keep the splint clean.  Managing pain, stiffness, and swelling  · If directed, put ice on the injured area:  ¨ Put ice in a plastic bag.  ¨ Place a towel between your skin and the bag.  ¨ Leave the ice on for 20 minutes, 2-3 times a day.  · Wear compression stockings or wraps as told by your doctor.  · Move your toes often to avoid stiffness and to lessen swelling.  Driving  · Do not drive or use heavy machinery while taking prescription pain medicine, or as told by your doctor.  · Ask your doctor when it is safe to drive if you have a splint on your  hip.  Activity  · Return to your normal activities as told by your doctor. Ask your doctor what activities are safe for you.  · If physical therapy was prescribed, do exercises as told by your doctor.  Safety  · Do not use your hip to support your body weight until your doctor says that you can. Use crutches or a walker as told by your doctor.  General instructions  · Do not use any tobacco products, such as cigarettes, chewing tobacco, and e-cigarettes. Tobacco can delay bone healing. If you need help quitting, ask your doctor.  · Take over-the-counter and prescription medicines only as told by your doctor.  · Keep all follow-up visits as told by your doctor. This is important.  How is this prevented?  · If you have trouble walking or keeping your balance, try using a cane or a walker. If you feel unstable, sit down right away.  · Exercise regularly, as told by your doctor.  · Warm up and stretch before being active.  · Cool down and stretch after being active.  Contact a doctor if:  · You have pain that gets worse.  · You have pain that does not get better with medicine.  · You have swelling in your hip area or your leg.  · You have red skin on your hip area or your leg.  · You cannot move any part of your hip or leg.  · You feel tingling in any part of your hip, leg, or foot.  Get help right away if:  · You feel like your hip has dislocated again.  · You have very bad pain in your hip or groin.  · You have numbness or weakness in your leg.  If you have symptoms of a hip dislocation, do not wait to see if the symptoms will go away. Get medical help right away. Call your local emergency services (911 in the [place] Do not drive yourself to the hospital.   This information is not intended to replace advice given to you by your health care provider. Make sure you discuss any questions you have with your health care provider.  Document Released: 08/16/2012 Document Revised: 05/25/2017 Document Reviewed: 07/19/2016  ©  2017 [employer]

## 2018-06-05 NOTE — CARE PLAN
Problem: Pain Management  Goal: Pain level will decrease to patient's comfort goal  Outcome: PROGRESSING AS EXPECTED  Pt. Taking oxy 10mg with adequate pain control.    Problem: Mobility  Goal: Risk for activity intolerance will decrease  Outcome: PROGRESSING AS EXPECTED  Pt. Up to bathroom, standby assist with FWW, TTWB to LLE with boot.    Problem: Safety  Goal: Will remain free from injury  Outcome: PROGRESSING AS EXPECTED  Treaded socks in place, bed in the lowest position, call light and belongings within reach, pt call for assistance appropriately

## 2018-06-05 NOTE — CARE PLAN
Problem: Pain Management  Goal: Pain level will decrease to patient's comfort goal  Outcome: PROGRESSING AS EXPECTED  Pain medications given per MAR. Other non-pharmacologic measures for pain utilized.    Problem: Safety  Goal: Will remain free from injury  Outcome: PROGRESSING AS EXPECTED  Provided assistance with mobility. Fall prevention measures in place. rounds ongoing.    Problem: Venous Thromboembolism (VTW)/Deep Vein Thrombosis (DVT) Prevention:  Goal: Patient will participate in Venous Thrombosis (VTE)/Deep Vein Thrombosis (DVT)Prevention Measures  Outcome: PROGRESSING AS EXPECTED  Educated on the use of SCDs and importance of mobility for DVT prevention.    Problem: Respiratory:  Goal: Respiratory status will improve  Outcome: PROGRESSING AS EXPECTED  O2 titrated to maintain saturation above 90%. Encouraged the use if IS.

## 2018-06-06 LAB
BACTERIA TISS AEROBE CULT: ABNORMAL
BACTERIA TISS AEROBE CULT: ABNORMAL
GRAM STN SPEC: ABNORMAL
SIGNIFICANT IND 70042: ABNORMAL
SITE SITE: ABNORMAL
SOURCE SOURCE: ABNORMAL

## 2018-06-06 PROCEDURE — 700105 HCHG RX REV CODE 258: Performed by: ORTHOPAEDIC SURGERY

## 2018-06-06 PROCEDURE — A9270 NON-COVERED ITEM OR SERVICE: HCPCS | Performed by: ORTHOPAEDIC SURGERY

## 2018-06-06 PROCEDURE — 700111 HCHG RX REV CODE 636 W/ 250 OVERRIDE (IP): Performed by: ORTHOPAEDIC SURGERY

## 2018-06-06 PROCEDURE — 700102 HCHG RX REV CODE 250 W/ 637 OVERRIDE(OP): Performed by: ORTHOPAEDIC SURGERY

## 2018-06-06 PROCEDURE — 700112 HCHG RX REV CODE 229: Performed by: ORTHOPAEDIC SURGERY

## 2018-06-06 PROCEDURE — 770001 HCHG ROOM/CARE - MED/SURG/GYN PRIV*

## 2018-06-06 PROCEDURE — 97162 PT EVAL MOD COMPLEX 30 MIN: CPT

## 2018-06-06 PROCEDURE — G8978 MOBILITY CURRENT STATUS: HCPCS | Mod: CJ

## 2018-06-06 PROCEDURE — G8979 MOBILITY GOAL STATUS: HCPCS | Mod: CI

## 2018-06-06 RX ADMIN — SERTRALINE 100 MG: 100 TABLET, FILM COATED ORAL at 07:54

## 2018-06-06 RX ADMIN — PIPERACILLIN AND TAZOBACTAM 4.5 G: 4; .5 INJECTION, POWDER, LYOPHILIZED, FOR SOLUTION INTRAVENOUS; PARENTERAL at 05:48

## 2018-06-06 RX ADMIN — ACETAMINOPHEN 1000 MG: 500 TABLET, FILM COATED ORAL at 11:54

## 2018-06-06 RX ADMIN — ACETAMINOPHEN 1000 MG: 500 TABLET, FILM COATED ORAL at 01:03

## 2018-06-06 RX ADMIN — OXYCODONE HYDROCHLORIDE 10 MG: 10 TABLET ORAL at 22:37

## 2018-06-06 RX ADMIN — ACETAMINOPHEN 1000 MG: 500 TABLET, FILM COATED ORAL at 05:48

## 2018-06-06 RX ADMIN — DOCUSATE SODIUM 100 MG: 100 CAPSULE ORAL at 20:29

## 2018-06-06 RX ADMIN — VANCOMYCIN HYDROCHLORIDE 1000 MG: 100 INJECTION, POWDER, LYOPHILIZED, FOR SOLUTION INTRAVENOUS at 15:05

## 2018-06-06 RX ADMIN — METOPROLOL SUCCINATE 100 MG: 50 TABLET, EXTENDED RELEASE ORAL at 07:53

## 2018-06-06 RX ADMIN — ASPIRIN 81 MG: 81 TABLET, COATED ORAL at 01:03

## 2018-06-06 RX ADMIN — OXYCODONE HYDROCHLORIDE 10 MG: 10 TABLET ORAL at 11:54

## 2018-06-06 RX ADMIN — ASPIRIN 81 MG: 81 TABLET, COATED ORAL at 15:04

## 2018-06-06 RX ADMIN — OXYCODONE HYDROCHLORIDE 10 MG: 10 TABLET ORAL at 05:49

## 2018-06-06 RX ADMIN — MUPIROCIN 1 APPLICATION: 20 OINTMENT TOPICAL at 20:29

## 2018-06-06 RX ADMIN — SENNOSIDES AND DOCUSATE SODIUM 1 TABLET: 8.6; 5 TABLET ORAL at 20:29

## 2018-06-06 ASSESSMENT — COGNITIVE AND FUNCTIONAL STATUS - GENERAL
WALKING IN HOSPITAL ROOM: A LITTLE
MOVING FROM LYING ON BACK TO SITTING ON SIDE OF FLAT BED: UNABLE
SUGGESTED CMS G CODE MODIFIER MOBILITY: CL
MOVING TO AND FROM BED TO CHAIR: UNABLE
MOBILITY SCORE: 12
STANDING UP FROM CHAIR USING ARMS: A LITTLE
TURNING FROM BACK TO SIDE WHILE IN FLAT BAD: A LITTLE
CLIMB 3 TO 5 STEPS WITH RAILING: TOTAL

## 2018-06-06 ASSESSMENT — PAIN SCALES - GENERAL
PAINLEVEL_OUTOF10: 3
PAINLEVEL_OUTOF10: 6
PAINLEVEL_OUTOF10: 3
PAINLEVEL_OUTOF10: 3
PAINLEVEL_OUTOF10: 5
PAINLEVEL_OUTOF10: 4
PAINLEVEL_OUTOF10: 3
PAINLEVEL_OUTOF10: 2

## 2018-06-06 ASSESSMENT — GAIT ASSESSMENTS
ASSISTIVE DEVICE: FRONT WHEEL WALKER
GAIT LEVEL OF ASSIST: CONTACT GUARD ASSIST
DEVIATION: STEP TO
DISTANCE (FEET): 15

## 2018-06-06 NOTE — PROGRESS NOTES
"Pharmacy Kinetics 72 y.o. female on vancomycin day # 2 2018    Currently on Vancomycin 1000 mg iv q24hr    Indication for Treatment: suspected prosthetic joint infection     Pertinent history per medical record: Admitted on 2018 for I&D of left hip. This patient suffered a hip fracture with IMN from Dr. Peters in 2017.  She also has chronic extremity wounds with flairs of cellulitis (prior to the hip fracture).   She was experiencing purulence through an old suture wound on her hip, and was therefore taken to OR for I&D. The wound appears to be more superficial, Dr Vazquez did take cultures of deeper tissue, and vancomycin powder was instilled in the wound.    Other antibiotics: none    Allergies: Patient has no known allergies.     List concerns for renal function: age 72    Pertinent cultures to date:   18 Left hip superficial tissue: MRSA   18 hip aspiration: NGTD  18 left hip deep tissue: NGTD    Recent Labs      18   1200  18   0248   WBC  7.1  7.6   NEUTSPOLYS  64.60   --      Recent Labs      18   1200  18   0248   BUN  12  15   CREATININE  0.67  0.73     No results for input(s): VANCOTROUGH, VANCOPEAK, VANCORANDOM in the last 72 hours.  Intake/Output Summary (Last 24 hours) at 18 1219  Last data filed at 18 0745   Gross per 24 hour   Intake              540 ml   Output                0 ml   Net              540 ml      Blood pressure 124/65, pulse 76, temperature 36.7 °C (98 °F), resp. rate 17, height 1.702 m (5' 7\"), weight 65.2 kg (143 lb 11.8 oz), SpO2 99 %, not currently breastfeeding. Temp (24hrs), Av.3 °C (97.4 °F), Min:35.9 °C (96.7 °F), Max:36.7 °C (98 °F)      A/P   1. Vancomycin dose change: Not indicated at this time  2. Next vancomycin level: 1430 tomorrow  3. Goal trough: 16-20 mcg/mL (r/o hardware infection)  4. Comments: I have re-timed vancomycin back to 1500 today. Level scheduled prior to the 4th total dose " tomorrow.    Julee Eddy PharmD.

## 2018-06-06 NOTE — CARE PLAN
Problem: Pain Management  Goal: Pain level will decrease to patient's comfort goal  Outcome: PROGRESSING AS EXPECTED  Patient states pain is 5/10; medicated per MAR.    Problem: Safety  Goal: Will remain free from injury  Outcome: PROGRESSING AS EXPECTED  Discussed plan of care, safety, and fall risk. Safety precautions in place. Call light in reach; patient calls appropriately. Hourly rounding. Bed locked in lowest position.    Problem: Infection  Goal: Will remain free from infection  Outcome: PROGRESSING AS EXPECTED  Provided patient education on hand hygiene and infection prevention. IV antibiotics in use. Wound vac in place; clean, dry, intact.    Problem: Venous Thromboembolism (VTW)/Deep Vein Thrombosis (DVT) Prevention:  Goal: Patient will participate in Venous Thrombosis (VTE)/Deep Vein Thrombosis (DVT)Prevention Measures  Outcome: PROGRESSING AS EXPECTED  SCDs in place.     Problem: Respiratory:  Goal: Respiratory status will improve  Outcome: PROGRESSING AS EXPECTED  Effective use of incentive spirometer pulling 1700.

## 2018-06-06 NOTE — CARE PLAN
Problem: Mobility  Goal: Risk for activity intolerance will decrease  Outcome: PROGRESSING AS EXPECTED  Patient is working with PT and is doing exercises given to her by PT. Patient acknowledges importance of getting out of bed and moving around daily.     Problem: Safety  Goal: Will remain free from injury  Outcome: PROGRESSING AS EXPECTED  Patient has call bell within reach and is using appropriately. Patient is wearing non-skid footwear at all times.

## 2018-06-06 NOTE — PROGRESS NOTES
Patient is alert and oriented. Vital signs stable on 1 liter. Patient states pain is adequately controlled; medicated per MAR. Patient denies nausea, numbness, and tingling. Patient is ambulating well with front wheel walker; toe touch weight bearing to the left leg. Voiding well. Effective use of incentive spirometer pulling 1700. SCD in place to right lower extremity. Waffle overlay in place and extra pillows for support and positioning. Left ankle is elevated with pillow; dressing is clean, dry, intact. Prevena dressing to left hip is clean, dry, intact; wound vac in place. Safety and fall precautions in place.

## 2018-06-06 NOTE — PROGRESS NOTES
Vascular    VSS afeb    Reviewed the angiogram.  Left fem-pop bypass with saphenous vein is required and best option for revascularization.  Looking at Thursday evening or possibly Friday.  Discussed with patient and she agrees.  May consider debridement of left ankle wound and NPWT as well.      Bypass planned for Friday, mid day.  Pre-op orders in.

## 2018-06-06 NOTE — THERAPY
"Physical Therapy Evaluation completed.   Bed Mobility:  Supine to Sit: Stand by Assist  Transfers: Sit to Stand: Contact Guard Assist  Gait: Level Of Assist: Contact Guard Assist with Front-Wheel Walker       Plan of Care: Will benefit from Physical Therapy 3 times per week  Discharge Recommendations: Equipment: Will Continue to Assess for Equipment Needs. Post-acute therapy Discharge to a transitional care facility for continued skilled therapy services/ SNF    See \"Rehab Therapy-Acute\" Patient Summary Report for complete documentation.     "

## 2018-06-06 NOTE — PROGRESS NOTES
" Subjective:      No events.  Tolerated angiogram and vein mapping yeseterday.  Superficial wound culture with staph.  No fevers, chills, or wound drainage.      Objective:  Blood pressure 124/65, pulse 76, temperature 36.7 °C (98 °F), resp. rate 17, height 1.702 m (5' 7\"), weight 65.2 kg (143 lb 11.8 oz), SpO2 99 %, not currently breastfeeding.    Recent Labs      06/04/18   1200  06/05/18   0248   WBC  7.1  7.6   RBC  4.32  3.78*   HEMOGLOBIN  12.3  10.6*   HEMATOCRIT  38.3  35.4*   MCV  88.7  93.7   MCH  28.5  28.0   MCHC  32.1*  29.9*   RDW  61.4*  66.3*   PLATELETCT  277  234   MPV  9.4  10.2     Recent Labs      06/04/18   1200  06/05/18   0248   SODIUM  135  134*   POTASSIUM  4.1  4.6   CHLORIDE  100  103   CO2  26  24   GLUCOSE  98  134*   BUN  12  15   CREATININE  0.67  0.73   CALCIUM  9.4  9.0         Intake/Output Summary (Last 24 hours) at 06/06/18 0701  Last data filed at 06/05/18 2010   Gross per 24 hour   Intake              240 ml   Output                0 ml   Net              240 ml       Comfortable, no distress  Neurologically and vascularly intact with palpable pedal pulses bilaterally.  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  If only superficial can switch to PO abx for home.  Pending plan from vascular surgery re timing of bypass  Activity as tolerated  Wound care for left leg wound    "

## 2018-06-07 LAB
ANION GAP SERPL CALC-SCNC: 7 MMOL/L (ref 0–11.9)
BACTERIA FLD AEROBE CULT: NORMAL
BACTERIA SPEC ANAEROBE CULT: NORMAL
BACTERIA SPEC ANAEROBE CULT: NORMAL
BACTERIA TISS AEROBE CULT: ABNORMAL
BACTERIA TISS AEROBE CULT: NORMAL
BUN SERPL-MCNC: 11 MG/DL (ref 8–22)
CALCIUM SERPL-MCNC: 9.6 MG/DL (ref 8.5–10.5)
CHLORIDE SERPL-SCNC: 101 MMOL/L (ref 96–112)
CO2 SERPL-SCNC: 26 MMOL/L (ref 20–33)
CREAT SERPL-MCNC: 0.57 MG/DL (ref 0.5–1.4)
ERYTHROCYTE [DISTWIDTH] IN BLOOD BY AUTOMATED COUNT: 60.4 FL (ref 35.9–50)
GLUCOSE SERPL-MCNC: 109 MG/DL (ref 65–99)
GRAM STN SPEC: ABNORMAL
GRAM STN SPEC: NORMAL
GRAM STN SPEC: NORMAL
HCT VFR BLD AUTO: 36.8 % (ref 37–47)
HGB BLD-MCNC: 11.7 G/DL (ref 12–16)
MCH RBC QN AUTO: 28.3 PG (ref 27–33)
MCHC RBC AUTO-ENTMCNC: 31.8 G/DL (ref 33.6–35)
MCV RBC AUTO: 89.1 FL (ref 81.4–97.8)
PLATELET # BLD AUTO: 266 K/UL (ref 164–446)
PMV BLD AUTO: 9.6 FL (ref 9–12.9)
POTASSIUM SERPL-SCNC: 4 MMOL/L (ref 3.6–5.5)
RBC # BLD AUTO: 4.13 M/UL (ref 4.2–5.4)
SIGNIFICANT IND 70042: ABNORMAL
SIGNIFICANT IND 70042: NORMAL
SITE SITE: ABNORMAL
SITE SITE: NORMAL
SODIUM SERPL-SCNC: 134 MMOL/L (ref 135–145)
SOURCE SOURCE: ABNORMAL
SOURCE SOURCE: NORMAL
VANCOMYCIN TROUGH SERPL-MCNC: 11 UG/ML (ref 10–20)
WBC # BLD AUTO: 6.3 K/UL (ref 4.8–10.8)

## 2018-06-07 PROCEDURE — 80048 BASIC METABOLIC PNL TOTAL CA: CPT

## 2018-06-07 PROCEDURE — A9270 NON-COVERED ITEM OR SERVICE: HCPCS | Performed by: ORTHOPAEDIC SURGERY

## 2018-06-07 PROCEDURE — 85027 COMPLETE CBC AUTOMATED: CPT

## 2018-06-07 PROCEDURE — 700105 HCHG RX REV CODE 258: Performed by: SURGERY

## 2018-06-07 PROCEDURE — 770021 HCHG ROOM/CARE - ISO PRIVATE

## 2018-06-07 PROCEDURE — 700102 HCHG RX REV CODE 250 W/ 637 OVERRIDE(OP): Performed by: ORTHOPAEDIC SURGERY

## 2018-06-07 PROCEDURE — 36415 COLL VENOUS BLD VENIPUNCTURE: CPT

## 2018-06-07 PROCEDURE — 80202 ASSAY OF VANCOMYCIN: CPT

## 2018-06-07 PROCEDURE — 700105 HCHG RX REV CODE 258: Performed by: ORTHOPAEDIC SURGERY

## 2018-06-07 PROCEDURE — 700111 HCHG RX REV CODE 636 W/ 250 OVERRIDE (IP): Performed by: ORTHOPAEDIC SURGERY

## 2018-06-07 RX ADMIN — VANCOMYCIN HYDROCHLORIDE 1000 MG: 100 INJECTION, POWDER, LYOPHILIZED, FOR SOLUTION INTRAVENOUS at 18:15

## 2018-06-07 RX ADMIN — SODIUM CHLORIDE: 9 INJECTION, SOLUTION INTRAVENOUS at 05:48

## 2018-06-07 RX ADMIN — OXYCODONE HYDROCHLORIDE 10 MG: 10 TABLET ORAL at 19:27

## 2018-06-07 RX ADMIN — ASPIRIN 81 MG: 81 TABLET, COATED ORAL at 01:00

## 2018-06-07 RX ADMIN — METOPROLOL SUCCINATE 100 MG: 50 TABLET, EXTENDED RELEASE ORAL at 08:46

## 2018-06-07 RX ADMIN — ACETAMINOPHEN 1000 MG: 500 TABLET, FILM COATED ORAL at 13:14

## 2018-06-07 RX ADMIN — MUPIROCIN 1 APPLICATION: 20 OINTMENT TOPICAL at 19:28

## 2018-06-07 RX ADMIN — SERTRALINE 100 MG: 100 TABLET, FILM COATED ORAL at 08:46

## 2018-06-07 RX ADMIN — ACETAMINOPHEN 1000 MG: 500 TABLET, FILM COATED ORAL at 18:25

## 2018-06-07 RX ADMIN — MUPIROCIN 1 APPLICATION: 20 OINTMENT TOPICAL at 15:00

## 2018-06-07 RX ADMIN — OXYCODONE HYDROCHLORIDE 10 MG: 10 TABLET ORAL at 05:46

## 2018-06-07 RX ADMIN — ACETAMINOPHEN 1000 MG: 500 TABLET, FILM COATED ORAL at 05:46

## 2018-06-07 RX ADMIN — ASPIRIN 81 MG: 81 TABLET, COATED ORAL at 13:14

## 2018-06-07 RX ADMIN — ACETAMINOPHEN 1000 MG: 500 TABLET, FILM COATED ORAL at 01:00

## 2018-06-07 RX ADMIN — MUPIROCIN 1 APPLICATION: 20 OINTMENT TOPICAL at 08:47

## 2018-06-07 ASSESSMENT — PAIN SCALES - GENERAL
PAINLEVEL_OUTOF10: 2
PAINLEVEL_OUTOF10: 5
PAINLEVEL_OUTOF10: 3
PAINLEVEL_OUTOF10: 2
PAINLEVEL_OUTOF10: 3

## 2018-06-07 ASSESSMENT — PATIENT HEALTH QUESTIONNAIRE - PHQ9
SUM OF ALL RESPONSES TO PHQ9 QUESTIONS 1 AND 2: 0
2. FEELING DOWN, DEPRESSED, IRRITABLE, OR HOPELESS: NOT AT ALL
1. LITTLE INTEREST OR PLEASURE IN DOING THINGS: NOT AT ALL

## 2018-06-07 NOTE — CARE PLAN
Problem: Pain Management  Goal: Pain level will decrease to patient's comfort goal  Patient medicated for pain rated at 6/10.  Patient able to rest comfortably.

## 2018-06-07 NOTE — CARE PLAN
Problem: Nutritional:  Goal: Achieve adequate nutritional intake  Patient will consume 50% of meals   Outcome: MET Date Met: 06/07/18  Regular PO diet resumed; PO >50%.

## 2018-06-07 NOTE — CARE PLAN
Problem: Safety  Goal: Will remain free from falls  Patient is compliant with use of call light and waits for staff assistance.  Frequent rounding.

## 2018-06-07 NOTE — PROGRESS NOTES
"Pharmacy Kinetics 72 y.o. female on vancomycin day # 3 2018    Currently on Vancomycin 1000 mg iv q24hr (1830)    Indication for Treatment: suspected prosthetic joint infection     Pertinent history per medical record: Admitted on 2018 for I&D of left hip. This patient suffered a hip fracture with IMN from Dr. Peters in 2017.  She also has chronic extremity wounds with flairs of cellulitis (prior to the hip fracture).   She was experiencing purulence through an old suture wound on her hip, and was therefore taken to OR for I&D. The wound appears to be more superficial, Dr Vazquez did take cultures of deeper tissue, and vancomycin powder was instilled in the wound.     Other antibiotics: none     Allergies: Patient has no known allergies.      List concerns for renal function: age 72     Pertinent cultures to date:   18 Left hip superficial tissue: MRSA   18 hip aspiration: NGTD  18 left hip deep tissue: NGTD    Recent Labs      18   0248  18   1112   WBC  7.6  6.3     Recent Labs      18   0248  18   1112   BUN  15  11   CREATININE  0.73  0.57     No results for input(s): VANCOTROUGH, VANCOPEAK, VANCORANDOM in the last 72 hours.  Intake/Output Summary (Last 24 hours) at 18 1346  Last data filed at 18 0940   Gross per 24 hour   Intake             1398 ml   Output               50 ml   Net             1348 ml      Blood pressure 116/72, pulse 70, temperature 36.5 °C (97.7 °F), resp. rate 16, height 1.702 m (5' 7\"), weight 65.2 kg (143 lb 11.8 oz), SpO2 94 %, not currently breastfeeding. Temp (24hrs), Av.8 °C (98.2 °F), Min:36.5 °C (97.7 °F), Max:37.1 °C (98.7 °F)      A/P   1. Vancomycin dose change: not indicated  2. Next vancomycin level: tonight @ 1800  3. Goal trough: 16-20 mcg/mL (hardware)  4. Comments: I/O data incomplete.  Surgery pending tomorrow afternoon.  Check trough tonight.    NESTOR Rodriguez, PharmD, BCPS  "

## 2018-06-08 ENCOUNTER — APPOINTMENT (OUTPATIENT)
Dept: RADIOLOGY | Facility: MEDICAL CENTER | Age: 73
DRG: 857 | End: 2018-06-08
Attending: ORTHOPAEDIC SURGERY
Payer: MEDICARE

## 2018-06-08 ENCOUNTER — APPOINTMENT (OUTPATIENT)
Dept: RADIOLOGY | Facility: MEDICAL CENTER | Age: 73
DRG: 857 | End: 2018-06-08
Attending: SURGERY
Payer: MEDICARE

## 2018-06-08 LAB
BACTERIA SPEC ANAEROBE CULT: ABNORMAL
BACTERIA SPEC ANAEROBE CULT: ABNORMAL
SIGNIFICANT IND 70042: ABNORMAL
SITE SITE: ABNORMAL
SOURCE SOURCE: ABNORMAL

## 2018-06-08 PROCEDURE — A9270 NON-COVERED ITEM OR SERVICE: HCPCS | Performed by: ORTHOPAEDIC SURGERY

## 2018-06-08 PROCEDURE — 160002 HCHG RECOVERY MINUTES (STAT): Performed by: SURGERY

## 2018-06-08 PROCEDURE — C1751 CATH, INF, PER/CENT/MIDLINE: HCPCS | Performed by: SURGERY

## 2018-06-08 PROCEDURE — 700112 HCHG RX REV CODE 229: Performed by: ORTHOPAEDIC SURGERY

## 2018-06-08 PROCEDURE — 73610 X-RAY EXAM OF ANKLE: CPT | Mod: LT

## 2018-06-08 PROCEDURE — 501837 HCHG SUTURE CV: Performed by: SURGERY

## 2018-06-08 PROCEDURE — 160009 HCHG ANES TIME/MIN: Performed by: SURGERY

## 2018-06-08 PROCEDURE — 770021 HCHG ROOM/CARE - ISO PRIVATE

## 2018-06-08 PROCEDURE — 03HY32Z INSERTION OF MONITORING DEVICE INTO UPPER ARTERY, PERCUTANEOUS APPROACH: ICD-10-PCS | Performed by: ANESTHESIOLOGY

## 2018-06-08 PROCEDURE — 0KBT0ZZ EXCISION OF LEFT LOWER LEG MUSCLE, OPEN APPROACH: ICD-10-PCS | Performed by: SURGERY

## 2018-06-08 PROCEDURE — 160041 HCHG SURGERY MINUTES - EA ADDL 1 MIN LEVEL 4: Performed by: SURGERY

## 2018-06-08 PROCEDURE — 700102 HCHG RX REV CODE 250 W/ 637 OVERRIDE(OP): Performed by: ORTHOPAEDIC SURGERY

## 2018-06-08 PROCEDURE — 160029 HCHG SURGERY MINUTES - 1ST 30 MINS LEVEL 4: Performed by: SURGERY

## 2018-06-08 PROCEDURE — A6550 NEG PRES WOUND THER DRSG SET: HCPCS | Performed by: SURGERY

## 2018-06-08 PROCEDURE — 700102 HCHG RX REV CODE 250 W/ 637 OVERRIDE(OP): Performed by: SURGERY

## 2018-06-08 PROCEDURE — 501488 HCHG SUCTION CANN, WOUNDVAC TRAC: Performed by: SURGERY

## 2018-06-08 PROCEDURE — 500700 HCHG HEMOCLIP, SMALL (RED): Performed by: SURGERY

## 2018-06-08 PROCEDURE — A9270 NON-COVERED ITEM OR SERVICE: HCPCS

## 2018-06-08 PROCEDURE — 160048 HCHG OR STATISTICAL LEVEL 1-5: Performed by: SURGERY

## 2018-06-08 PROCEDURE — 36620 INSERTION CATHETER ARTERY: CPT

## 2018-06-08 PROCEDURE — 700111 HCHG RX REV CODE 636 W/ 250 OVERRIDE (IP): Performed by: SURGERY

## 2018-06-08 PROCEDURE — 160035 HCHG PACU - 1ST 60 MINS PHASE I: Performed by: SURGERY

## 2018-06-08 PROCEDURE — 041L09L BYPASS LEFT FEMORAL ARTERY TO POPLITEAL ARTERY WITH AUTOLOGOUS VENOUS TISSUE, OPEN APPROACH: ICD-10-PCS | Performed by: SURGERY

## 2018-06-08 PROCEDURE — 500123 HCHG BOVIE, CONTROL W/BLADE: Performed by: SURGERY

## 2018-06-08 PROCEDURE — 700111 HCHG RX REV CODE 636 W/ 250 OVERRIDE (IP): Mod: JG

## 2018-06-08 PROCEDURE — P9045 ALBUMIN (HUMAN), 5%, 250 ML: HCPCS | Mod: JG

## 2018-06-08 PROCEDURE — C1725 CATH, TRANSLUMIN NON-LASER: HCPCS | Performed by: SURGERY

## 2018-06-08 PROCEDURE — 700111 HCHG RX REV CODE 636 W/ 250 OVERRIDE (IP)

## 2018-06-08 PROCEDURE — 160036 HCHG PACU - EA ADDL 30 MINS PHASE I: Performed by: SURGERY

## 2018-06-08 PROCEDURE — 110454 HCHG SHELL REV 250: Performed by: SURGERY

## 2018-06-08 PROCEDURE — 700102 HCHG RX REV CODE 250 W/ 637 OVERRIDE(OP)

## 2018-06-08 PROCEDURE — 501445 HCHG STAPLER, SKIN DISP: Performed by: SURGERY

## 2018-06-08 PROCEDURE — A4314 CATH W/DRAINAGE 2-WAY LATEX: HCPCS | Performed by: SURGERY

## 2018-06-08 PROCEDURE — 700101 HCHG RX REV CODE 250

## 2018-06-08 PROCEDURE — 500698 HCHG HEMOCLIP, MEDIUM: Performed by: SURGERY

## 2018-06-08 PROCEDURE — 501838 HCHG SUTURE GENERAL: Performed by: SURGERY

## 2018-06-08 PROCEDURE — B41GZZZ FLUOROSCOPY OF LEFT LOWER EXTREMITY ARTERIES: ICD-10-PCS | Performed by: SURGERY

## 2018-06-08 PROCEDURE — A9270 NON-COVERED ITEM OR SERVICE: HCPCS | Performed by: SURGERY

## 2018-06-08 RX ORDER — OXYCODONE HCL 5 MG/5 ML
SOLUTION, ORAL ORAL
Status: COMPLETED
Start: 2018-06-08 | End: 2018-06-08

## 2018-06-08 RX ORDER — ACETAMINOPHEN 325 MG/1
650 TABLET ORAL EVERY 6 HOURS
Status: DISPENSED | OUTPATIENT
Start: 2018-06-08 | End: 2018-06-13

## 2018-06-08 RX ORDER — HEPARIN SODIUM,PORCINE 1000/ML
VIAL (ML) INJECTION
Status: DISCONTINUED | OUTPATIENT
Start: 2018-06-08 | End: 2018-06-08 | Stop reason: HOSPADM

## 2018-06-08 RX ORDER — ONDANSETRON 2 MG/ML
4 INJECTION INTRAMUSCULAR; INTRAVENOUS EVERY 6 HOURS PRN
Status: DISCONTINUED | OUTPATIENT
Start: 2018-06-08 | End: 2018-06-14 | Stop reason: HOSPADM

## 2018-06-08 RX ORDER — IODIXANOL 270 MG/ML
INJECTION, SOLUTION INTRAVASCULAR
Status: DISCONTINUED | OUTPATIENT
Start: 2018-06-08 | End: 2018-06-08 | Stop reason: HOSPADM

## 2018-06-08 RX ORDER — CELECOXIB 200 MG/1
200 CAPSULE ORAL 2 TIMES DAILY WITH MEALS
Status: COMPLETED | OUTPATIENT
Start: 2018-06-08 | End: 2018-06-13

## 2018-06-08 RX ADMIN — FENTANYL CITRATE 50 MCG: 50 INJECTION, SOLUTION INTRAMUSCULAR; INTRAVENOUS at 18:43

## 2018-06-08 RX ADMIN — FENTANYL CITRATE 50 MCG: 50 INJECTION, SOLUTION INTRAMUSCULAR; INTRAVENOUS at 18:00

## 2018-06-08 RX ADMIN — DOCUSATE SODIUM 100 MG: 100 CAPSULE ORAL at 21:25

## 2018-06-08 RX ADMIN — DOCUSATE SODIUM 100 MG: 100 CAPSULE ORAL at 09:14

## 2018-06-08 RX ADMIN — FENTANYL CITRATE 50 MCG: 50 INJECTION, SOLUTION INTRAMUSCULAR; INTRAVENOUS at 18:30

## 2018-06-08 RX ADMIN — SENNOSIDES AND DOCUSATE SODIUM 1 TABLET: 8.6; 5 TABLET ORAL at 21:25

## 2018-06-08 RX ADMIN — OXYCODONE HYDROCHLORIDE 10 MG: 5 SOLUTION ORAL at 18:00

## 2018-06-08 RX ADMIN — CEFAZOLIN SODIUM 1 G: 1 INJECTION, SOLUTION INTRAVENOUS at 23:15

## 2018-06-08 RX ADMIN — METOPROLOL SUCCINATE 100 MG: 50 TABLET, EXTENDED RELEASE ORAL at 09:14

## 2018-06-08 RX ADMIN — SERTRALINE 100 MG: 100 TABLET, FILM COATED ORAL at 09:14

## 2018-06-08 RX ADMIN — OXYCODONE HYDROCHLORIDE 10 MG: 10 TABLET ORAL at 00:03

## 2018-06-08 RX ADMIN — ACETAMINOPHEN 650 MG: 325 TABLET, FILM COATED ORAL at 21:25

## 2018-06-08 RX ADMIN — CELECOXIB 200 MG: 200 CAPSULE ORAL at 21:25

## 2018-06-08 RX ADMIN — OXYCODONE HYDROCHLORIDE 10 MG: 10 TABLET ORAL at 21:25

## 2018-06-08 RX ADMIN — FENTANYL CITRATE 50 MCG: 50 INJECTION, SOLUTION INTRAMUSCULAR; INTRAVENOUS at 18:15

## 2018-06-08 RX ADMIN — OXYCODONE HYDROCHLORIDE 5 MG: 10 TABLET ORAL at 06:04

## 2018-06-08 ASSESSMENT — PAIN SCALES - GENERAL
PAINLEVEL_OUTOF10: 4
PAINLEVEL_OUTOF10: 1
PAINLEVEL_OUTOF10: 0
PAINLEVEL_OUTOF10: 6
PAINLEVEL_OUTOF10: 4
PAINLEVEL_OUTOF10: 9
PAINLEVEL_OUTOF10: 6
PAINLEVEL_OUTOF10: 4
PAINLEVEL_OUTOF10: 9
PAINLEVEL_OUTOF10: 7
PAINLEVEL_OUTOF10: 5
PAINLEVEL_OUTOF10: 4
PAINLEVEL_OUTOF10: 9
PAINLEVEL_OUTOF10: 6
PAINLEVEL_OUTOF10: 9
PAINLEVEL_OUTOF10: 5

## 2018-06-08 NOTE — PROGRESS NOTES
" Subjective:      No events.  Pain controlled.  Schedule for bypass tomorrow.    Objective:    Alert and oriented x 3  Afebrile  Blood pressure (!) 167/87, pulse 65, temperature 36 °C (96.8 °F), resp. rate 16, height 1.702 m (5' 7\"), weight 65.2 kg (143 lb 11.8 oz), SpO2 95 %, not currently breastfeeding.    Recent Labs      06/05/18   0248  06/07/18   1112   WBC  7.6  6.3   RBC  3.78*  4.13*   HEMOGLOBIN  10.6*  11.7*   HEMATOCRIT  35.4*  36.8*   MCV  93.7  89.1   MCH  28.0  28.3   MCHC  29.9*  31.8*   RDW  66.3*  60.4*   PLATELETCT  234  266   MPV  10.2  9.6     Recent Labs      06/05/18   0248  06/07/18   1112   SODIUM  134*  134*   POTASSIUM  4.6  4.0   CHLORIDE  103  101   CO2  24  26   GLUCOSE  134*  109*   BUN  15  11   CREATININE  0.73  0.57   CALCIUM  9.0  9.6         Intake/Output Summary (Last 24 hours) at 06/07/18 1906  Last data filed at 06/07/18 1330   Gross per 24 hour   Intake             1638 ml   Output                0 ml   Net             1638 ml       Comfortable, no distress  Neurologically and vascularly intact with palpable pedal pulses bilaterally.  Dressing C/D/I    Assessment:      S/P superficial hip I&D, MRSA  No evidence of deep hip infection  Left leg vascular disease    Plan:      For the hip, will continue IV Vanco while inpatient, then switch to Bactrim PO for discharge  Continue incisional vac until followup appt in 2 weeks  OR tomorrow for left leg bypass.  "

## 2018-06-08 NOTE — THERAPY
Attempted to see pt for therapy. Pt getting fem-pop bypass. Will continue to follow and re-attempt as appropriate.

## 2018-06-08 NOTE — PROGRESS NOTES
" Subjective:      No overnight events.  Pain controlled.  Afebrile    Objective:  Blood pressure 155/74, pulse 69, temperature 36.3 °C (97.4 °F), resp. rate 16, height 1.702 m (5' 7\"), weight 65.2 kg (143 lb 11.8 oz), SpO2 91 %, not currently breastfeeding.    Recent Labs      06/07/18   1112   WBC  6.3   RBC  4.13*   HEMOGLOBIN  11.7*   HEMATOCRIT  36.8*   MCV  89.1   MCH  28.3   MCHC  31.8*   RDW  60.4*   PLATELETCT  266   MPV  9.6     Recent Labs      06/07/18   1112   SODIUM  134*   POTASSIUM  4.0   CHLORIDE  101   CO2  26   GLUCOSE  109*   BUN  11   CREATININE  0.57   CALCIUM  9.6         Intake/Output Summary (Last 24 hours) at 06/08/18 0605  Last data filed at 06/07/18 1920   Gross per 24 hour   Intake              720 ml   Output                0 ml   Net              720 ml       Comfortable, no distress  Neurologically and vascularly intact with palpable pedal pulses bilaterally.  Dressing C/D/I    Assessment:      Doing well s/p hip I&D  Bypass scheduled for today    Plan:    Activity as tolerated for now  TTWB on the left leg  Cont IV Vanco while inpatient, then transition to PO Bactrim until f/u with me in 2 weeks  OR today with Dr. Colin for bypass  "

## 2018-06-08 NOTE — PROGRESS NOTES
This RN went to pull oxycodone 10 mg from med-select 1. Screen froze and RN was logged out. This RN notified med-select tech and pulled medication from med-[facility] 3. This RN administered medication at 0000.

## 2018-06-08 NOTE — CARE PLAN
Problem: Pain Management  Goal: Pain level will decrease to patient's comfort goal  Outcome: PROGRESSING AS EXPECTED  Medicated per MAR for 5/10 pain in left leg. Provided extra pillows and blankets for support and positioning. Patient verbalized understanding of education.    Problem: Venous Thromboembolism (VTW)/Deep Vein Thrombosis (DVT) Prevention:  Goal: Patient will participate in Venous Thrombosis (VTE)/Deep Vein Thrombosis (DVT)Prevention Measures  Outcome: PROGRESSING AS EXPECTED  SCDs in place. Patient ambulatory.    Problem: Bowel/Gastric:  Goal: Normal bowel function is maintained or improved  Outcome: PROGRESSING AS EXPECTED  Patient had two large bowel movements 6/7. Refused scheduled stool softeners.    Problem: Respiratory:  Goal: Respiratory status will improve  Outcome: PROGRESSING AS EXPECTED  Effective use of incentive spirometer pulling 1500.

## 2018-06-08 NOTE — PROGRESS NOTES
"Pharmacy Kinetics 72 y.o. female on vancomycin day # 5 2018    Currently on Vancomycin 1000 mg iv q24hr    Indication for Treatment: suspected joint infection    Pertinent history per medical record: Admitted on 2018 for I&D of left hip. This patient suffered a hip fracture with IMN from Dr. Peters in 2017.  She also has chronic extremity wounds with flairs of cellulitis (prior to the hip fracture).   She was experiencing purulence through an old suture wound on her hip, and was therefore taken to OR for I&D. The wound appears to be more superficial, Dr Vazquez did take cultures of deeper tissue, and vancomycin powder was instilled in the wound. Deep tissue cultures were negative, superficial cultures (+) MRSA     Other antibiotics: none     Allergies: Patient has no known allergies.      List concerns for renal function: age 72     Pertinent cultures to date:   18 Left hip superficial tissue: MRSA   18 hip aspiration: NGTD  18 left hip deep tissue: NGTD    Recent Labs      18   1112   WBC  6.3     Recent Labs      18   1112   BUN  11   CREATININE  0.57     Recent Labs      18   1757   VANCOTROUGH  11.0     Intake/Output Summary (Last 24 hours) at 18 0759  Last data filed at 18 1920   Gross per 24 hour   Intake              720 ml   Output                0 ml   Net              720 ml      Blood pressure 155/74, pulse 69, temperature 36.3 °C (97.4 °F), resp. rate 16, height 1.702 m (5' 7\"), weight 65.2 kg (143 lb 11.8 oz), SpO2 91 %, not currently breastfeeding. Temp (24hrs), Av.5 °C (97.7 °F), Min:36 °C (96.8 °F), Max:37.1 °C (98.8 °F)      A/P   1. Vancomycin dose change: yes, increase vancomycin to 1300 mg IV q24h   2. Next vancomycin level: prior to dose on  (not ordered)   3. Goal trough: ~16 mcg/mL  4. Comments: sub-therapeutic trough prior to 4th total dose. Deep tissue cultures negative with superficial cultures (+) MRSA will aim for goal " trough of ~16 mcg/mL as joint does no appear to be infected. Will increase vancomycin to ~ 20 mg/kg/dose, first dose scheduled to be given 18 hours after last dose, with trough prior to 4th 1300 mg dose. Pt has been seen by vascular surgery with plans for bypass today for non-healing left leg wound. Plan per ortho is to continue vancomycin while pt remains inpt and transition to po bactrim upon discharge.     Jess Patterson, PharmD, BCOP

## 2018-06-08 NOTE — PROGRESS NOTES
Patient seen in OR at request of Dr. Colin  Significant left ankle deformity and medial wounds  Will repeat xrays and consult [facility]

## 2018-06-08 NOTE — PROGRESS NOTES
Patient is alert and oriented. Vital signs stable on room air. Patient states pain is 5/10; medicated per MAR. Patient is NPO with sips with medications at 0000. Voiding well. Wound vac in place to left leg; dressing clean, dry, intact. SCD in place to right lower extremity. Effective use of incentive spirometer pulling 1500. Safety and fall precautions in place. Call light in reach. Treaded socks in place. Bed locked in lowest position. Hourly rounding.

## 2018-06-08 NOTE — PROGRESS NOTES
Vascular    Angioreviewed and I discussed the proposed left leg bypass with patient.  I plan left fem-popbypass and debridement of ankle ulcer.

## 2018-06-08 NOTE — PROCEDURES
ARTERIAL LINE  Start time 1300  End time 1305      Site:    Radial    Laterality:   right    Complications: None    Additional notes:  Arrow 20g.         Fernando Torrez M.D.  6/8/2018  2:36 PM

## 2018-06-09 LAB
ANION GAP SERPL CALC-SCNC: 5 MMOL/L (ref 0–11.9)
BASOPHILS # BLD AUTO: 1.2 % (ref 0–1.8)
BASOPHILS # BLD: 0.09 K/UL (ref 0–0.12)
BUN SERPL-MCNC: 8 MG/DL (ref 8–22)
CALCIUM SERPL-MCNC: 8.6 MG/DL (ref 8.5–10.5)
CHLORIDE SERPL-SCNC: 102 MMOL/L (ref 96–112)
CO2 SERPL-SCNC: 27 MMOL/L (ref 20–33)
CREAT SERPL-MCNC: 0.57 MG/DL (ref 0.5–1.4)
EOSINOPHIL # BLD AUTO: 0.2 K/UL (ref 0–0.51)
EOSINOPHIL NFR BLD: 2.7 % (ref 0–6.9)
ERYTHROCYTE [DISTWIDTH] IN BLOOD BY AUTOMATED COUNT: 61.4 FL (ref 35.9–50)
GLUCOSE SERPL-MCNC: 113 MG/DL (ref 65–99)
HCT VFR BLD AUTO: 27.4 % (ref 37–47)
HGB BLD-MCNC: 8.4 G/DL (ref 12–16)
IMM GRANULOCYTES # BLD AUTO: 0.03 K/UL (ref 0–0.11)
IMM GRANULOCYTES NFR BLD AUTO: 0.4 % (ref 0–0.9)
LYMPHOCYTES # BLD AUTO: 1.3 K/UL (ref 1–4.8)
LYMPHOCYTES NFR BLD: 17.3 % (ref 22–41)
MCH RBC QN AUTO: 27.9 PG (ref 27–33)
MCHC RBC AUTO-ENTMCNC: 30.7 G/DL (ref 33.6–35)
MCV RBC AUTO: 91 FL (ref 81.4–97.8)
MONOCYTES # BLD AUTO: 0.57 K/UL (ref 0–0.85)
MONOCYTES NFR BLD AUTO: 7.6 % (ref 0–13.4)
NEUTROPHILS # BLD AUTO: 5.34 K/UL (ref 2–7.15)
NEUTROPHILS NFR BLD: 70.8 % (ref 44–72)
NRBC # BLD AUTO: 0 K/UL
NRBC BLD-RTO: 0 /100 WBC
PLATELET # BLD AUTO: 242 K/UL (ref 164–446)
PMV BLD AUTO: 10 FL (ref 9–12.9)
POTASSIUM SERPL-SCNC: 3.9 MMOL/L (ref 3.6–5.5)
RBC # BLD AUTO: 3.01 M/UL (ref 4.2–5.4)
SODIUM SERPL-SCNC: 134 MMOL/L (ref 135–145)
WBC # BLD AUTO: 7.5 K/UL (ref 4.8–10.8)

## 2018-06-09 PROCEDURE — 85025 COMPLETE CBC W/AUTO DIFF WBC: CPT

## 2018-06-09 PROCEDURE — 700102 HCHG RX REV CODE 250 W/ 637 OVERRIDE(OP): Performed by: SURGERY

## 2018-06-09 PROCEDURE — 80048 BASIC METABOLIC PNL TOTAL CA: CPT

## 2018-06-09 PROCEDURE — A9270 NON-COVERED ITEM OR SERVICE: HCPCS | Performed by: ORTHOPAEDIC SURGERY

## 2018-06-09 PROCEDURE — 770021 HCHG ROOM/CARE - ISO PRIVATE

## 2018-06-09 PROCEDURE — 36415 COLL VENOUS BLD VENIPUNCTURE: CPT

## 2018-06-09 PROCEDURE — 700105 HCHG RX REV CODE 258: Performed by: ORTHOPAEDIC SURGERY

## 2018-06-09 PROCEDURE — 700111 HCHG RX REV CODE 636 W/ 250 OVERRIDE (IP): Performed by: ORTHOPAEDIC SURGERY

## 2018-06-09 PROCEDURE — A9270 NON-COVERED ITEM OR SERVICE: HCPCS | Performed by: SURGERY

## 2018-06-09 PROCEDURE — 700112 HCHG RX REV CODE 229: Performed by: ORTHOPAEDIC SURGERY

## 2018-06-09 PROCEDURE — 700102 HCHG RX REV CODE 250 W/ 637 OVERRIDE(OP): Performed by: ORTHOPAEDIC SURGERY

## 2018-06-09 PROCEDURE — 700111 HCHG RX REV CODE 636 W/ 250 OVERRIDE (IP): Performed by: SURGERY

## 2018-06-09 RX ADMIN — ACETAMINOPHEN 650 MG: 325 TABLET, FILM COATED ORAL at 17:44

## 2018-06-09 RX ADMIN — OXYCODONE HYDROCHLORIDE 5 MG: 10 TABLET ORAL at 12:04

## 2018-06-09 RX ADMIN — ACETAMINOPHEN 650 MG: 325 TABLET, FILM COATED ORAL at 12:03

## 2018-06-09 RX ADMIN — SODIUM CHLORIDE, POTASSIUM CHLORIDE, SODIUM LACTATE AND CALCIUM CHLORIDE: 600; 310; 30; 20 INJECTION, SOLUTION INTRAVENOUS at 04:31

## 2018-06-09 RX ADMIN — CEFAZOLIN SODIUM 1 G: 1 INJECTION, SOLUTION INTRAVENOUS at 05:35

## 2018-06-09 RX ADMIN — MUPIROCIN 0.15 G: 20 OINTMENT TOPICAL at 08:54

## 2018-06-09 RX ADMIN — CELECOXIB 200 MG: 200 CAPSULE ORAL at 17:44

## 2018-06-09 RX ADMIN — ASPIRIN 81 MG: 81 TABLET, COATED ORAL at 15:18

## 2018-06-09 RX ADMIN — ACETAMINOPHEN 650 MG: 325 TABLET, FILM COATED ORAL at 05:35

## 2018-06-09 RX ADMIN — OXYCODONE HYDROCHLORIDE 10 MG: 10 TABLET ORAL at 05:35

## 2018-06-09 RX ADMIN — CEFAZOLIN SODIUM 1 G: 1 INJECTION, SOLUTION INTRAVENOUS at 15:18

## 2018-06-09 RX ADMIN — SERTRALINE 100 MG: 100 TABLET, FILM COATED ORAL at 08:44

## 2018-06-09 RX ADMIN — MUPIROCIN 1 APPLICATION: 20 OINTMENT TOPICAL at 19:59

## 2018-06-09 RX ADMIN — SODIUM CHLORIDE, POTASSIUM CHLORIDE, SODIUM LACTATE AND CALCIUM CHLORIDE: 600; 310; 30; 20 INJECTION, SOLUTION INTRAVENOUS at 17:51

## 2018-06-09 RX ADMIN — METOPROLOL SUCCINATE 100 MG: 50 TABLET, EXTENDED RELEASE ORAL at 08:44

## 2018-06-09 RX ADMIN — CELECOXIB 200 MG: 200 CAPSULE ORAL at 08:44

## 2018-06-09 RX ADMIN — OXYCODONE HYDROCHLORIDE 5 MG: 10 TABLET ORAL at 19:56

## 2018-06-09 RX ADMIN — OXYCODONE HYDROCHLORIDE 10 MG: 10 TABLET ORAL at 01:31

## 2018-06-09 RX ADMIN — VANCOMYCIN HYDROCHLORIDE 1300 MG: 100 INJECTION, POWDER, LYOPHILIZED, FOR SOLUTION INTRAVENOUS at 01:22

## 2018-06-09 RX ADMIN — ACETAMINOPHEN 650 MG: 325 TABLET, FILM COATED ORAL at 01:28

## 2018-06-09 RX ADMIN — ASPIRIN 81 MG: 81 TABLET, COATED ORAL at 01:28

## 2018-06-09 ASSESSMENT — PAIN SCALES - GENERAL
PAINLEVEL_OUTOF10: 7
PAINLEVEL_OUTOF10: 6
PAINLEVEL_OUTOF10: 3
PAINLEVEL_OUTOF10: 1
PAINLEVEL_OUTOF10: 5

## 2018-06-09 ASSESSMENT — PATIENT HEALTH QUESTIONNAIRE - PHQ9
2. FEELING DOWN, DEPRESSED, IRRITABLE, OR HOPELESS: NOT AT ALL
SUM OF ALL RESPONSES TO PHQ9 QUESTIONS 1 AND 2: 0
1. LITTLE INTEREST OR PLEASURE IN DOING THINGS: NOT AT ALL

## 2018-06-09 NOTE — OP REPORT
DATE OF SERVICE:  06/08/2018    PREOPERATIVE DIAGNOSIS:  Left lower extremity arterial insufficiency with   ankle wounds.    POSTPROCEDURE DIAGNOSIS:  Left lower extremity arterial insufficiency with   ankle wounds.    PROCEDURE:  1.  Left fem-pop bypass with in situ saphenous vein.  2.  Debridement of left ankle wound with application of negative pressure   wound therapy dressing.    SURGEON:  Milana Colin MD    ANESTHESIOLOGIST:  Fernando Torrez MD    ANESTHESIA:  General.    ASSISTANT:  Shailesh Maradiaga MD    INDICATIONS:  The patient is a 72-year-old woman who presents with a large and   full thickness ankle wound.  Arterial studies demonstrate a superficial   femoral artery occlusion with what appears to be poor perfusion to her left   foot.  She has had multiple problems with a hip fracture and then suffered an   ankle fracture over a month ago.  She has been somewhat resistant to her care   and has been difficult to treat.  She was taken to the operating room today   for left fem-pop bypass.  I choose to go to the above knee popliteal artery as   the vein below the knee appears to be of small caliber.  Risks and benefits   were explained to her and include bleeding, infection, arterial occlusion.    She understands and agrees to proceed.    DESCRIPTION OF PROCEDURE:  Patient was taken to the operating room and placed   in supine position.  After adequate general anesthesia and placement of   monitoring lines, the left lower extremity was prepped and draped in the usual   sterile fashion with Chloraseptic prep, cloth towels and paper drapes.  A   groin incision was made and dissection taken down to the left femoral artery.    I identified the common femoral, superficial femoral and profunda femoris   arteries.  The vessels were encircled with vessel loops.  I then dissected   medially to the saphenous vein and identified the saphenofemoral junction.  I   spared large lymph nodes in the groin and did my best to  ligate whatever lymph   channels are interrupted during dissection.    The patient was given 5000 units of heparin and this was allowed to circulate.    The proximal anastomosis was completed by Dr. Maradiaga using 5-0 Prolene   suture.  Flow through the bypass was obtained and when forward flow was   allowed, a LeMaitre valvulotome was used to obtain forward flow with just 2   passes.  Flow through the graft was at least 350-400 mL per minute, indicating   excellent flow volume and lack of substantial branches.    I had dissected into the medial thigh and identified the superficial femoral   artery.  Proximal and distal control into the most distal above-knee popliteal   artery was obtained and I spared as many side branches as I could.  The   saphenous vein, at least the anatomic branch was located very posterior in the   thigh.  The vein was ligated in the distal thigh with a single Prolene tie.    It was then mobilized and as stated, the LeMaitre valvulotome was used to lyse   the valves.  I then performed a distal anastomosis again with 5-0 Prolene   suture.    We performed an angiogram which identified a large branch in the mid thigh.    This was accessed through a separate wound and the side branch was ligated   with silk ties.  A small side branch ligated with 2-0 Vicryl and a defect in   the vein repaired with 6-0 Prolene suture.  Flow through the graft was   obtained and the distal anastomosis looked great.  Three-vessel runoff through   the popliteal and tibial trifurcation was obtained.    The wounds were irrigated and all bleeding carefully controlled.  I have   concerns over the medial thigh incision as her skin is so thin.  Dr. Maradiaga   then carefully closed the groin with interrupted 3-0 Vicryl followed by   running 3-0 Vicryl and finally, 4-0 Monocryl.  The medial thigh incision was   closed with skin clips.    I then turned my attention to the ankle wound.  The initial and deepest wound   measured 6.1x4  cm.  Using a 10 blade, I performed sharp, excisional   debridement of the skin borders in most areas and the entire base of the   wound.  Muscle, subcutaneous tissue, necrotic debris and epithelium were   removed.  The entire base of the wound was debrided for a total wound area of   24.4 cm2.    I then applied a negative pressure wound dressing and had difficulty obtaining   a good seal.    Estimated blood loss was 250 mL.  She made over 600 mL of urine during the   procedure.  There were no apparent complications.  Sponge, needle and   instrument counts were correct x2.  Not previously stated, but the wounds were   covered with sterile occlusive dressings prior to beginning the ankle   debridement.       ____________________________________     MD FERMÍN Overton / LARRY    DD:  06/08/2018 18:25:30  DT:  06/08/2018 19:48:21    D#:  [id number]  Job#:  [id number]

## 2018-06-09 NOTE — OR SURGEON
Immediate Post OP Note    PreOp Diagnosis: Left lower extremity arterial occlusion    PostOp Diagnosis: Same    Procedure(s):  FEMORAL POPLITEAL BYPASS - Wound Class: Clean  IRRIGATION & DEBRIDEMENT ANKLE WOUND - Wound Class: Dirty or Infected    Surgeon(s):  Milana Colin M.D.  Assistant:  Shailesh Maradiaga M.D.    Anesthesiologist/Type of Anesthesia:  Anesthesiologist: Fernando Torrez M.D./General    Surgical Staff:  Circulator: Johnny Solano R.N.  Relief Circulator: Debbie Linares R.N.  Relief Scrub: Olga Bradley  Scrub Person: Dinah Her  Radiology Technologist: Melvin Bray; aTylor Sow    Specimens removed if any:none    Estimated Blood Loss: 250cc    Findings: Above knee popliteal artery patent.  New wound on the ankle.  Kindly investigated by ortho team    Complications: none    [id number]    6/8/2018 6:11 PM Milana Colin M.D.

## 2018-06-09 NOTE — PROGRESS NOTES
Progress Note               Author: Shailesh Maradiaga Date & Time created: 2018  9:41 AM     Interval History:  PO 1  PROCEDURE:  1.  Left fem-pop bypass with in situ saphenous vein.  2.  Debridement of left ankle wound with application of negative pressure   wound therapy dressing.     SURGEON:  Milana Colin MD    Review of Systems:  ROS  No complaints    Physical Exam:  Physical Exam   Constitutional: She is oriented to person, place, and time. She appears well-nourished.   Eyes: Conjunctivae are normal.   Cardiovascular: Normal rate and intact distal pulses.    Left foot warm, pink and good DP pulse   Pulmonary/Chest: No respiratory distress.   Abdominal: She exhibits no distension.   Musculoskeletal: She exhibits no edema.   Left ankle wound VAC.  Left lower extremity dressings dry and no swelling.   Neurological: She is alert and oriented to person, place, and time.   Skin: Skin is warm.   Ulcer left medial distal leg with wound VAC   Psychiatric: She has a normal mood and affect.       Labs:        Invalid input(s): [id number]      Recent Labs      18   1112  18   0145   SODIUM  134*  134*   POTASSIUM  4.0  3.9   CHLORIDE  101  102   CO2  26  27   BUN  11  8   CREATININE  0.57  0.57   CALCIUM  9.6  8.6     Recent Labs      18   1112  18   0145   GLUCOSE  109*  113*     Recent Labs      18   1112  18   0145   RBC  4.13*  3.01*   HEMOGLOBIN  11.7*  8.4*   HEMATOCRIT  36.8*  27.4*   PLATELETCT  266  242     Recent Labs      18   1112  18   0145   WBC  6.3  7.5   NEUTSPOLYS   --   70.80   LYMPHOCYTES   --   17.30*   MONOCYTES   --   7.60   EOSINOPHILS   --   2.70   BASOPHILS   --   1.20     Hemodynamics:  Temp (24hrs), Av.4 °C (97.6 °F), Min:36.2 °C (97.1 °F), Max:36.7 °C (98.1 °F)  Temperature: 36.4 °C (97.5 °F)  Pulse  Av.7  Min: 58  Max: 107Heart Rate (Monitored): 77  Blood Pressure : 120/58, NIBP: 135/56     Respiratory:    Respiration: 16,  Pulse Oximetry: 94 %        RUL Breath Sounds: Clear, RML Breath Sounds: Diminished, RLL Breath Sounds: Diminished, BHAVYA Breath Sounds: Clear, LLL Breath Sounds: Diminished  Fluids:    Intake/Output Summary (Last 24 hours) at 06/09/18 0941  Last data filed at 06/09/18 0300   Gross per 24 hour   Intake             3450 ml   Output             1600 ml   Net             1850 ml        GI/Nutrition:  Orders Placed This Encounter   Procedures   • DIET ORDER     Standing Status:   Standing     Number of Occurrences:   1     Order Specific Question:   Diet:     Answer:   Regular [1]     Comments:   previous     Medical Decision Making, by Problem:  There are no active hospital problems to display for this patient.      Plan:  Observe post op surgical incisions and leg ulcer.  Gradually resume walking.  Discussed with patient and she is feeling great.     Quality-Core Measures

## 2018-06-09 NOTE — WOUND TEAM
Consult received for Wound Team to begin Negative Pressure Wound Therapy (NPWT) dressing changes. Surgery and NPWT vac placement yesterday, so the first scheduled dressing change will be 06/11/2018. Consult completed.

## 2018-06-09 NOTE — PROGRESS NOTES
"Patient whitnessed fall to floor while ambulating with FWW with CNA elena; patient left leg strength gave up on her; assisted to floor ; patient deneis pain at time on floor; patient lifted to bed with 8 personal with lifting sheet with straps; patient states \"I feel fine, I feel embarrassed, I was doing so well\" patient appears frusstrated; denies pain vss; Dr Jason notified; will monitor patient for new symptoms; report to day rn   "

## 2018-06-09 NOTE — PROGRESS NOTES
" Subjective:      Had to be lowered to the ground overnight due to weakness ambulating.  Tolerated bypass well.  No complaints this AM.      Objective:  Blood pressure 145/62, pulse 62, temperature 36.4 °C (97.5 °F), resp. rate 16, height 1.702 m (5' 7\"), weight 65.2 kg (143 lb 11.8 oz), SpO2 95 %, not currently breastfeeding.    Recent Labs      06/07/18   1112  06/09/18   0145   WBC  6.3  7.5   RBC  4.13*  3.01*   HEMOGLOBIN  11.7*  8.4*   HEMATOCRIT  36.8*  27.4*   MCV  89.1  91.0   MCH  28.3  27.9   MCHC  31.8*  30.7*   RDW  60.4*  61.4*   PLATELETCT  266  242   MPV  9.6  10.0     Recent Labs      06/07/18   1112  06/09/18   0145   SODIUM  134*  134*   POTASSIUM  4.0  3.9   CHLORIDE  101  102   CO2  26  27   GLUCOSE  109*  113*   BUN  11  8   CREATININE  0.57  0.57   CALCIUM  9.6  8.6         Intake/Output Summary (Last 24 hours) at 06/09/18 0750  Last data filed at 06/09/18 0300   Gross per 24 hour   Intake             3460 ml   Output             1600 ml   Net             1860 ml       No Distress  Hip vac intact  Wound vac on the ankle wound    Assessment:      s/p left hip I&D  s/p left fem-pop bypass and ankle debridement with vac placement    Plan:      -OK to d/c anytime from ortho standpoint, pending vascular hospital plan  -Dr. Peters has already been following her for a couple months for the ankle fracture malunion.  Continue TTWB for that in a boot.  -IV vaco while inpatient, then PO Bactrim for 2 weeks for the superficial MRSA infection  -Blood thinners per Vascular    "

## 2018-06-09 NOTE — PROGRESS NOTES
"Pharmacy Kinetics 72 y.o. female on vancomycin day # 6 2018    Currently on Vancomycin 1300 mg iv q24hr    Indication for Treatment: superficial MRSA infection of left hip    Pertinent history per medical record: Admitted on 2018 for I&D of left hip. This patient suffered a hip fracture with IMN from Dr. Peters in 2017.  She also has chronic extremity wounds with flairs of cellulitis (prior to the hip fracture).   She was experiencing purulence through an old suture wound on her hip, and was therefore taken to OR for I&D. The wound appears to be more superficial, Dr Vazquez did take cultures of deeper tissue, and vancomycin powder was instilled in the wound. Deep tissue cultures were negative, superficial cultures (+) MRSA     Other antibiotics: none     Allergies: Patient has no known allergies.      List concerns for renal function: age 72     Pertinent cultures to date:   18 Left hip superficial tissue: MRSA   18 hip aspiration: NGTD  18 left hip deep tissue: NGTD    Recent Labs      18   1112  18   0145   WBC  6.3  7.5   NEUTSPOLYS   --   70.80     Recent Labs      18   1112  18   0145   BUN  11  8   CREATININE  0.57  0.57     Recent Labs      18   1757   VANCOTROUGH  11.0     Intake/Output Summary (Last 24 hours) at 18 0809  Last data filed at 18 0300   Gross per 24 hour   Intake             3460 ml   Output             1600 ml   Net             1860 ml      Blood pressure 145/62, pulse 62, temperature 36.4 °C (97.5 °F), resp. rate 16, height 1.702 m (5' 7\"), weight 65.2 kg (143 lb 11.8 oz), SpO2 95 %, not currently breastfeeding. Temp (24hrs), Av.5 °C (97.7 °F), Min:36.2 °C (97.1 °F), Max:36.8 °C (98.3 °F)      A/P   1. Vancomycin dose change: continue vancomycin 1300 mg IV q24h   2. Next vancomycin level: 18 @ 2330 (not ordered, prior to 3rd increased dose)  3. Goal trough: ~ 16 mcg/mL  4. Comments: s/p left fem-ken bypass with  " Dixie on 6/8. Increased vancomycin dose hung ~13 hours late.  Vancomycin times have been adjusted. Pt is afebrile. Stable vital signs with noted episodes of HTN. WBC's WNL. Renal indices stable with ok UOP. Deep tissue cultures negative with superficial cultures (+) MRSA will aim for goal trough of ~16 mcg/mL as joint does no appear to be infected. Plan per Ortho is to continue vancomycin while pt remains inpt and transition to po bactrim upon discharge.     Jess Patterson, PharmD, BCOP

## 2018-06-09 NOTE — OR NURSING
VSS, on room air. Tolerating clear liquids. AxOx4. 2 RNs spent 1.5 hours changing the wound vac, took down and replaced. New vac ordered after second attempt and now is working WNL at 125 low cont suction. Pt given 200 fent during drsg change, 10 of oxy. Now resting more comfortably in bed.Tried to notify son, pt didn't remember his number.

## 2018-06-10 PROCEDURE — 700111 HCHG RX REV CODE 636 W/ 250 OVERRIDE (IP): Performed by: ORTHOPAEDIC SURGERY

## 2018-06-10 PROCEDURE — A9270 NON-COVERED ITEM OR SERVICE: HCPCS | Performed by: ORTHOPAEDIC SURGERY

## 2018-06-10 PROCEDURE — 700105 HCHG RX REV CODE 258: Performed by: ORTHOPAEDIC SURGERY

## 2018-06-10 PROCEDURE — 700102 HCHG RX REV CODE 250 W/ 637 OVERRIDE(OP): Performed by: ORTHOPAEDIC SURGERY

## 2018-06-10 PROCEDURE — A9270 NON-COVERED ITEM OR SERVICE: HCPCS | Performed by: SURGERY

## 2018-06-10 PROCEDURE — 700112 HCHG RX REV CODE 229: Performed by: ORTHOPAEDIC SURGERY

## 2018-06-10 PROCEDURE — 770021 HCHG ROOM/CARE - ISO PRIVATE

## 2018-06-10 PROCEDURE — 700102 HCHG RX REV CODE 250 W/ 637 OVERRIDE(OP): Performed by: SURGERY

## 2018-06-10 RX ADMIN — SERTRALINE 100 MG: 100 TABLET, FILM COATED ORAL at 08:33

## 2018-06-10 RX ADMIN — OXYCODONE HYDROCHLORIDE 5 MG: 10 TABLET ORAL at 05:09

## 2018-06-10 RX ADMIN — SODIUM CHLORIDE, POTASSIUM CHLORIDE, SODIUM LACTATE AND CALCIUM CHLORIDE: 600; 310; 30; 20 INJECTION, SOLUTION INTRAVENOUS at 22:00

## 2018-06-10 RX ADMIN — ACETAMINOPHEN 650 MG: 325 TABLET, FILM COATED ORAL at 05:08

## 2018-06-10 RX ADMIN — VANCOMYCIN HYDROCHLORIDE 1300 MG: 100 INJECTION, POWDER, LYOPHILIZED, FOR SOLUTION INTRAVENOUS at 01:12

## 2018-06-10 RX ADMIN — ACETAMINOPHEN 650 MG: 325 TABLET, FILM COATED ORAL at 01:11

## 2018-06-10 RX ADMIN — OXYCODONE HYDROCHLORIDE 10 MG: 10 TABLET ORAL at 17:31

## 2018-06-10 RX ADMIN — METOPROLOL SUCCINATE 100 MG: 50 TABLET, EXTENDED RELEASE ORAL at 08:33

## 2018-06-10 RX ADMIN — ASPIRIN 81 MG: 81 TABLET, COATED ORAL at 01:12

## 2018-06-10 RX ADMIN — ACETAMINOPHEN 650 MG: 325 TABLET, FILM COATED ORAL at 15:09

## 2018-06-10 RX ADMIN — ACETAMINOPHEN 650 MG: 325 TABLET, FILM COATED ORAL at 23:07

## 2018-06-10 RX ADMIN — ASPIRIN 81 MG: 81 TABLET, COATED ORAL at 15:08

## 2018-06-10 RX ADMIN — CELECOXIB 200 MG: 200 CAPSULE ORAL at 07:40

## 2018-06-10 RX ADMIN — SODIUM CHLORIDE, POTASSIUM CHLORIDE, SODIUM LACTATE AND CALCIUM CHLORIDE: 600; 310; 30; 20 INJECTION, SOLUTION INTRAVENOUS at 08:31

## 2018-06-10 RX ADMIN — OXYCODONE HYDROCHLORIDE 10 MG: 10 TABLET ORAL at 23:07

## 2018-06-10 RX ADMIN — CELECOXIB 200 MG: 200 CAPSULE ORAL at 17:31

## 2018-06-10 RX ADMIN — DOCUSATE SODIUM 100 MG: 100 CAPSULE ORAL at 08:33

## 2018-06-10 RX ADMIN — OXYCODONE HYDROCHLORIDE 5 MG: 10 TABLET ORAL at 01:12

## 2018-06-10 ASSESSMENT — PAIN SCALES - GENERAL
PAINLEVEL_OUTOF10: 4
PAINLEVEL_OUTOF10: 5
PAINLEVEL_OUTOF10: 3

## 2018-06-10 ASSESSMENT — PATIENT HEALTH QUESTIONNAIRE - PHQ9
1. LITTLE INTEREST OR PLEASURE IN DOING THINGS: NOT AT ALL
SUM OF ALL RESPONSES TO PHQ9 QUESTIONS 1 AND 2: 0
2. FEELING DOWN, DEPRESSED, IRRITABLE, OR HOPELESS: NOT AT ALL

## 2018-06-10 NOTE — PROGRESS NOTES
"   Orthopaedic PA Progress Note    Interval changes:Doing well, no new problems, Prevena with minim,al output, PharmD note read and appreciated. Follow-Up: needs appointment with Dr. Vazquez at  [facility] at 10-14 days post-op for re-evaluation, staple removal and radiographs.OK for DC from Ortho standpoint    ROS - Patient denies any new issues. No chest pain, dyspnea, or fever.  Pain well controlled.    Blood pressure 143/55, pulse 83, temperature 36.3 °C (97.3 °F), resp. rate 17, height 1.702 m (5' 7\"), weight 65.2 kg (143 lb 11.8 oz), SpO2 96 %, not currently breastfeeding.    Patient seen and examined  No acute distress  Breathing non labored  RRR  L hip Prevena dressing is clean, dry, and intact. Patient clearly fires tibialis anterior, EHL, and gastrocnemius/soleus. Sensation is intact to light touch throughout superficial peroneal, deep peroneal, tibial, saphenous, and sural nerve distributions. Strong and palpable 2+ dorsalis pedis and posterior tibial pulses with capillary refill less than 2 seconds. No lower leg tenderness or discomfort.        Recent Labs      06/09/18   0145   WBC  7.5   RBC  3.01*   HEMOGLOBIN  8.4*   HEMATOCRIT  27.4*   MCV  91.0   MCH  27.9   MCHC  30.7*   RDW  61.4*   PLATELETCT  242   MPV  10.0     Assessment/Plan:  POD#6 S/P I+D L Hip, L conversion to YOLANDA 2/11/18, IMN L hip 12/20/2017  Wt bearing status - AT  PT/OT-initiated  Wound care:Prevena  Drains - no Ortho drains  [place]ley-no  Sutures/Staples out- 10-14 days post operatively  Antibiotics: Vanc - transition to Bactrim on DC home  DVT Prophylaxis- TEDS/SCDs/Foot pumps.   ECASA  Future Procedures - not anticipated this admission  Case Coordination for Discharge Planning - Disposition as above      "

## 2018-06-10 NOTE — CARE PLAN
Problem: Pain Management  Goal: Pain level will decrease to patient's comfort goal    Intervention: Follow pain managment plan developed in collaboration with patient and Interdisciplinary Team  Patient is receiving oxycodone 5mg Q3hrs PRN, and pain is staying between 5-6 where patient is satisfied with the pain control.      Problem: Respiratory:  Goal: Respiratory status will improve    Intervention: Educate and encourage incentive spirometry usage  Encouraged incentive spirometer usage every hour while awake. Patient demonstrated proper use, reaching 1500 mL, and said that she would use it while awake. I.S on bedside table for easy access.

## 2018-06-10 NOTE — CARE PLAN
Problem: Pain Management  Goal: Pain level will decrease to patient's comfort goal  Pt complains of pain to left lower extremity, alleviated with oxycodone 5mg po x1 and scheduled Tylenol this shift.

## 2018-06-10 NOTE — PROGRESS NOTES
Pt's left wound vac became disconnected when transferring with SBA to BS..  Called Preet ALONSO, who advised to call wound RN at [phone] and [phone].  Left vm with RNs on 6012 and 6010.  Awaiting call back.

## 2018-06-10 NOTE — PROGRESS NOTES
"Pharmacy Kinetics 72 y.o. female on vancomycin day # 7 6/10/2018    Currently on Vancomycin 1300 mg iv q24hr    Indication for Treatment: Superficial MRSA infection of left hip    Pertinent history per medical record: Admitted on 2018 for I&D of left hip. This patient suffered a hip fracture with IMN from Dr. Peters in 2017.  She also has chronic extremity wounds with flairs of cellulitis (prior to the hip fracture). She was experiencing purulence through an old suture wound on her hip, and was therefore taken to OR for I&D. The wound appears to be more superficial, Dr Vazquez did take cultures of deeper tissue, and vancomycin powder was instilled in the wound. Vascular surgery in consulting.    Other antibiotics: None    Allergies: Patient has no known allergies.     List concerns for renal function: low albumin, advanced age    Pertinent cultures to date:   18 Left hip deep tissue  - Peptostreptococcus  18 Left hip aspiration - NGTD  18 Left hip superficial tissue - MRSA (Vancomycin LICO=1), Diphtheroids    Recent Labs      18   1112  18   0145   WBC  6.3  7.5   NEUTSPOLYS   --   70.80     Recent Labs      18   1112  18   0145   BUN  11  8   CREATININE  0.57  0.57     Recent Labs      18   1757   VANCOTROUGH  11.0     Intake/Output Summary (Last 24 hours) at 06/10/18 0947  Last data filed at 06/10/18 0900   Gross per 24 hour   Intake              720 ml   Output             1800 ml   Net            -1080 ml      Blood pressure 122/50, pulse 75, temperature 36.4 °C (97.5 °F), resp. rate 16, height 1.702 m (5' 7\"), weight 65.2 kg (143 lb 11.8 oz), SpO2 92 %, not currently breastfeeding. Temp (24hrs), Av.6 °C (97.9 °F), Min:36.4 °C (97.5 °F), Max:36.9 °C (98.4 °F)      A/P   1. Vancomycin dose change: continue vancomycin 1300 mg IV q24h (20mg/kg)  2. Next vancomycin level:  @0030  3. Goal trough: ~16mcg/mL  4. Comments: Patient afebrile, no leukocytosis. S/p " left fem-ken bypass on 6/8.  Increased vancomycin dose was hung ~13 hours late. Vancomycin times were adjusted. Deep wound cultures showing Peptostreptococcus, and superficial wound cultures with MRSA. Will aim for trough of ~16mcg/mL. Concerns for accumulation as mentioned above. Will check trough prior to third increased dose tomorrow morning. Plan to continue IV vancomycin while patient remains inpatient, and transition to PO Bactrim at dischrage. Pharmacy will continue to monitor for dosing and de-escalation.    Isadora Rosenthal, TraceeD

## 2018-06-10 NOTE — CARE PLAN
Problem: Venous Thromboembolism (VTW)/Deep Vein Thrombosis (DVT) Prevention:  Goal: Patient will participate in Venous Thrombosis (VTE)/Deep Vein Thrombosis (DVT)Prevention Measures  Pt has +1 pedal pulses in BLE and feet are cool to touch.  No swelling or redness noted to BLE.

## 2018-06-10 NOTE — PROGRESS NOTES
PO 2  PROCEDURE:  1.  Left fem-pop bypass with in situ saphenous vein.  2.  Debridement of left ankle wound with application of negative pressure   wound therapy dressing.    She feels great today.  Is looking forward to assited walking.  Normal left DP pulse.    Plan:  Increase activity and observe wounds.

## 2018-06-11 LAB
BACTERIA SPEC ANAEROBE CULT: ABNORMAL
BACTERIA SPEC ANAEROBE CULT: ABNORMAL
SIGNIFICANT IND 70042: ABNORMAL
SITE SITE: ABNORMAL
SOURCE SOURCE: ABNORMAL
VANCOMYCIN TROUGH SERPL-MCNC: 9.7 UG/ML (ref 10–20)

## 2018-06-11 PROCEDURE — A9270 NON-COVERED ITEM OR SERVICE: HCPCS | Performed by: SURGERY

## 2018-06-11 PROCEDURE — 770021 HCHG ROOM/CARE - ISO PRIVATE

## 2018-06-11 PROCEDURE — 36415 COLL VENOUS BLD VENIPUNCTURE: CPT

## 2018-06-11 PROCEDURE — 700102 HCHG RX REV CODE 250 W/ 637 OVERRIDE(OP): Performed by: ORTHOPAEDIC SURGERY

## 2018-06-11 PROCEDURE — 700105 HCHG RX REV CODE 258: Performed by: ORTHOPAEDIC SURGERY

## 2018-06-11 PROCEDURE — 700102 HCHG RX REV CODE 250 W/ 637 OVERRIDE(OP): Performed by: SURGERY

## 2018-06-11 PROCEDURE — 700111 HCHG RX REV CODE 636 W/ 250 OVERRIDE (IP): Performed by: ORTHOPAEDIC SURGERY

## 2018-06-11 PROCEDURE — 80202 ASSAY OF VANCOMYCIN: CPT

## 2018-06-11 PROCEDURE — A9270 NON-COVERED ITEM OR SERVICE: HCPCS | Performed by: ORTHOPAEDIC SURGERY

## 2018-06-11 PROCEDURE — 97605 NEG PRS WND THER DME<=50SQCM: CPT

## 2018-06-11 RX ADMIN — ASPIRIN 81 MG: 81 TABLET, COATED ORAL at 14:45

## 2018-06-11 RX ADMIN — VANCOMYCIN HYDROCHLORIDE 1300 MG: 100 INJECTION, POWDER, LYOPHILIZED, FOR SOLUTION INTRAVENOUS at 21:10

## 2018-06-11 RX ADMIN — ACETAMINOPHEN 650 MG: 325 TABLET, FILM COATED ORAL at 17:15

## 2018-06-11 RX ADMIN — MUPIROCIN 1 APPLICATION: 20 OINTMENT TOPICAL at 14:48

## 2018-06-11 RX ADMIN — ACETAMINOPHEN 650 MG: 325 TABLET, FILM COATED ORAL at 06:11

## 2018-06-11 RX ADMIN — OXYCODONE HYDROCHLORIDE 5 MG: 10 TABLET ORAL at 20:54

## 2018-06-11 RX ADMIN — OXYCODONE HYDROCHLORIDE 10 MG: 10 TABLET ORAL at 06:11

## 2018-06-11 RX ADMIN — ACETAMINOPHEN 650 MG: 325 TABLET, FILM COATED ORAL at 23:20

## 2018-06-11 RX ADMIN — MUPIROCIN 1 APPLICATION: 20 OINTMENT TOPICAL at 21:00

## 2018-06-11 RX ADMIN — SERTRALINE 100 MG: 100 TABLET, FILM COATED ORAL at 09:38

## 2018-06-11 RX ADMIN — CELECOXIB 200 MG: 200 CAPSULE ORAL at 17:15

## 2018-06-11 RX ADMIN — OXYCODONE HYDROCHLORIDE 10 MG: 10 TABLET ORAL at 09:38

## 2018-06-11 RX ADMIN — VANCOMYCIN HYDROCHLORIDE 1300 MG: 100 INJECTION, POWDER, LYOPHILIZED, FOR SOLUTION INTRAVENOUS at 01:15

## 2018-06-11 RX ADMIN — ASPIRIN 81 MG: 81 TABLET, COATED ORAL at 01:15

## 2018-06-11 RX ADMIN — METOPROLOL SUCCINATE 100 MG: 50 TABLET, EXTENDED RELEASE ORAL at 09:38

## 2018-06-11 RX ADMIN — CELECOXIB 200 MG: 200 CAPSULE ORAL at 09:38

## 2018-06-11 RX ADMIN — ACETAMINOPHEN 650 MG: 325 TABLET, FILM COATED ORAL at 12:26

## 2018-06-11 ASSESSMENT — PAIN SCALES - GENERAL
PAINLEVEL_OUTOF10: 4
PAINLEVEL_OUTOF10: ASSUMED PAIN PRESENT
PAINLEVEL_OUTOF10: ASSUMED PAIN PRESENT
PAINLEVEL_OUTOF10: 5
PAINLEVEL_OUTOF10: 6

## 2018-06-11 NOTE — PROGRESS NOTES
"LIMB PRESERVATION SERVICE NOTE:    Subjective:      Reason for Consultation:     History of Present Illness:     Past Medical History:   Diagnosis Date   • Anxiety    • Dental disorder     full dentures   • Generalized osteoarthritis of multiple sites 10/20/2015   • Heart valve disease     pt not sure    • Hyperlipidemia    • Hypertension    • Osteoporosis        none  Closed displaced intertrochanteric fracture of right femur with routine healing,     Patient is well known to LPS and outpatient wound care services.    Patient is a 72 y.o. female with a past medical history that includes HTN, arterial insufficiency, HLD, and is admitted to [facility] for Right Hip I and D and an LPS consult has been requested for evaluation of her left medial calf ulcers. This wound started 5/8/18 with heavy drainage culturing pseudomonas.  She has been treating the wound with outpatient wound care.   Patient is not diabetic. Pt is using a walking boot for a fractured L ankle     Patient denies fevers chills, nausea, vomiting.     Pain:        Patient resting comfortably, Pain on dressing change/palpation.    Vitals  /55   Pulse 74   Temp 36.6 °C (97.8 °F)   Resp 18   Ht 1.702 m (5' 7\")   Wt 65.2 kg (143 lb 11.8 oz)   SpO2 93%   Breastfeeding? No   BMI 22.51 kg/m²       Objective:        PHYSICAL EXAMINATION:     General Appearance:  Well developed,  nourished, in no acute distress    Sensory Assessment       Patient sensation intact bilaterally with light touch 10 of 10 points        Vascular Assessment       +1 DP, unable to palpate PT bilaterally found with doppler    Orthotic, protective, supportive devices:     Offloading  Walking boot for LLE - will be resized by ortho techs    Wound Characteristics    See wound note musa    Tests and Measures:    Labs  Recent Labs      06/09/18   0145   WBC  7.5   RBC  3.01*   HEMOGLOBIN  8.4*   HEMATOCRIT  27.4*   MCV  91.0   MCH  27.9   MCHC  30.7*   RDW  61.4*   PLATELETCT  242 "   MPV  10.0     Recent Labs      06/09/18   0145   SODIUM  134*   POTASSIUM  3.9   CHLORIDE  102   CO2  27   GLUCOSE  113*   BUN  8     TOMI    R: 0.92  L: 0.70    RLE: Triphasic/triphasic   LLE: Triphasic/Monophasic      Findings   Right-   Doppler waveform of the common femoral, popliteal and tibial arteries are    high amplitude and triphasic.    Ankle-brachial index is mildly reduced.      Left.    Doppler waveform of the common femoral artery is of high amplitude and    triphasic.    Doppler doppler waveforms at the popliteal and tibial arteries are    monophasic with moderate amplitude.    Ankle-brachial index is moderately reduced.     Imaging    X-Ray:   DX-PORTABLE FLUORO > 1 HOUR   Final Result         Portable fluoroscopy utilized for 29 seconds.         DX-ANKLE 3+ VIEWS LEFT   Final Result      Subacute bimalleolar fracture dislocation.      LE VEIN MAPPING ([facility] Only)   Final Result      IR-EXTREMITY ANGIOGRAM-UNILATERAL LEFT    (Results Pending)       Infection Management  Microbiology Left Hip 6/4/18    Anaerobic Culture, Culture Res  (Abnormal)      Growth noted after further incubation, see below for   organism identification.     Anaerobic Culture, Culture Res  (Abnormal)      Peptostreptococcus sp.   Isolated from enrichment broth only, please correlate with   clinical condition.           Procedures:        Debridement :  NA     Cleansed with:     NS                                                                       Periwound protected with: benzion     Primary dressing: Wound VAC     Secondary Dressing:     Other:     Patient Education: Negative Pressure Wound Therapy (NPWT) promotes wound healing by applying a vacuum through a nonadherent foam. The suction draws out drainage from the wound and increases blood flow to the area facilitating granulation and healing.    Plan:       Treatment Plan and Recommendations:    1. Labs\Imaging: Reviewed    2. Treatment:   Continue  Wound Care by Wound Team, NPWT change M, W, F                             3. Orthotics/Prosthetics:      pt will use walking boot to offload left ankle, pt to wear shoes that do not rub on Wound sites     Anticipated discharge plans (X):   SNF:            Home Care:            Outpatient Wound Center:     X   Self Care:             Other:            TBD:    Professional Collaboration: D/W:    Patient requires skilled therapeutic intervention for debridement, product selection and application, education, wound bed preparation and assessment.

## 2018-06-11 NOTE — PROGRESS NOTES
" Subjective:      No events.  Pain controlled.  Feeling well.    Objective:  Blood pressure 148/70, pulse 80, temperature 36.6 °C (97.9 °F), resp. rate 16, height 1.702 m (5' 7\"), weight 65.2 kg (143 lb 11.8 oz), SpO2 94 %, not currently breastfeeding.    Recent Labs      06/09/18   0145   WBC  7.5   RBC  3.01*   HEMOGLOBIN  8.4*   HEMATOCRIT  27.4*   MCV  91.0   MCH  27.9   MCHC  30.7*   RDW  61.4*   PLATELETCT  242   MPV  10.0     Recent Labs      06/09/18   0145   SODIUM  134*   POTASSIUM  3.9   CHLORIDE  102   CO2  27   GLUCOSE  113*   BUN  8   CREATININE  0.57   CALCIUM  8.6         Intake/Output Summary (Last 24 hours) at 06/11/18 0639  Last data filed at 06/10/18 2015   Gross per 24 hour   Intake              840 ml   Output              700 ml   Net              140 ml       Comfortable, no distress  Dressing C/D/I    Assessment:      Doing well s/p left hip I&D  s/p Left fem-pop bypass  Left ankle fx malunion    Plan:      -OK to d/c anytime from ortho standpoint, pending vascular hospital plan  -Dr. Peters has already been following her for a couple months for the ankle fracture malunion.  Continue TTWB for that in a boot.  -IV vaco while inpatient, then PO Bactrim for 2 weeks for the superficial MRSA infection  -ASA 81mg BID  -F/U with me next week for suture removal  -F/U with wound care for ankle wound  -DC planning for ankle wound vac  "

## 2018-06-11 NOTE — THERAPY
Attempted to see pt for OT tx. Pt declined at this time d/t high levels of pain. Pt agreeable to later time. Will try again later as able.

## 2018-06-11 NOTE — WOUND TEAM
In to see pt for leaking wound VAC. Prevena drsg to L hip has trac pad dislodged. RN taught how to replace the trac pad and she preformed . Hooked up to Prevena plus machine and unable to get seal. Used Y connector and attached to Ulta machine and wound to medial L ankle. The Prevena drsg will have been in place x 1 week tomorrow. Wound team to see pt for Drsg change of medial ankle.

## 2018-06-11 NOTE — PROGRESS NOTES
"Pharmacy Kinetics 72 y.o. female on vancomycin day # 8 2018    Currently on Vancomycin 1300 mg iv q24hr    Indication for Treatment: Superficial MRSA infection of left hip    Pertinent history per medical record: Admitted on 2018 for I&D of left hip. This patient suffered a hip fracture with IMN from Dr. Peters in 2017.  She also has chronic extremity wounds with flairs of cellulitis (prior to the hip fracture). She was experiencing purulence through an old suture wound on her hip, and was therefore taken to OR for I&D. The wound appears to be more superficial, Dr Vazquez did take cultures of deeper tissue, and vancomycin powder was instilled in the wound. Vascular surgery in consulting.    Other antibiotics: none    Allergies: Patient has no known allergies.     List concerns for renal function: low albumin     Pertinent cultures to date:   18 Left hip deep tissue: Peptostreptococcus  18 Left hip aspiration: NGTD  18 Left hip superficial tissue: MRSA (Vancomycin LICO=1), Diphtheroids    Recent Labs      18   0145   WBC  7.5   NEUTSPOLYS  70.80     Recent Labs      18   0145   BUN  8   CREATININE  0.57     Recent Labs      18   0041   VANCOTROUGH  9.7*     Intake/Output Summary (Last 24 hours) at 18 1341  Last data filed at 06/10/18 2015   Gross per 24 hour   Intake              480 ml   Output                0 ml   Net              480 ml      Blood pressure 139/55, pulse 74, temperature 36.6 °C (97.8 °F), resp. rate 18, height 1.702 m (5' 7\"), weight 65.2 kg (143 lb 11.8 oz), SpO2 93 %, not currently breastfeeding. Temp (24hrs), Av.7 °C (98 °F), Min:36.6 °C (97.8 °F), Max:36.9 °C (98.4 °F)      A/P   1. Vancomycin dose change: Not indicated at this time  2. Next vancomycin level: ~3 days if the patient is still in house  3. Goal trough: ~ 16 mcg/mL  4. Comments: I moved the time of tonight's dose up to 1900 (6 hours earlier than scheduled). I believe that we " are still catching up on dosing with the 13 hour delay in the increased dose enrike hung. Patient will likely discharge home in the next several days.    Julee Eddy, TraceeD.

## 2018-06-11 NOTE — CARE PLAN
Problem: Pain Management  Goal: Pain level will decrease to patient's comfort goal  Outcome: PROGRESSING AS EXPECTED  Pain well managed with scheduled tylenol and Oxy 10 prn.     Problem: Infection  Goal: Will remain free from infection  Outcome: PROGRESSING AS EXPECTED  Pt is on IV antibiotic.     Problem: Venous Thromboembolism (VTW)/Deep Vein Thrombosis (DVT) Prevention:  Goal: Patient will participate in Venous Thrombosis (VTE)/Deep Vein Thrombosis (DVT)Prevention Measures  Outcome: PROGRESSING AS EXPECTED  Pt refusing SCDs, education provided; receiving aspirin 2 times daily.

## 2018-06-11 NOTE — PROGRESS NOTES
POD# 3 s/p left fem-pop    VSS afeb      Tough day yesterday.  Apparently malunion a chronic issue, timing of her orthopedic issues a bit of a mystery to me, apologize.     Left lower extremity surgical wounds look great.  Staples intact at distal thigh.  Palpable left DP pulse.  Medial ankle wound looks much better already and lower wound already granulating with NPWT.      Imp:  Will take over care and assist with planned discharge after vascular surgery.  Will need either home health or OP wound care for NPWT until upper ankle wound more shallow.  Not ready today, but certainly this week.      Plan:  Discharge planning   Wound care, either home health or AWC.  Antibiotics per Dr. Vazquez recommendation.  We will discharge

## 2018-06-11 NOTE — PROGRESS NOTES
Walking boot was readjusted on Pt's LLE by adding extra padding.  If any further assistance needed, please call extension [phone] or place order for Ortho Technician assistance as a communication order in [employer].

## 2018-06-11 NOTE — CARE PLAN
Problem: Mobility  Goal: Risk for activity intolerance will decrease  Pt up and out of bed x2 today.  Up with assist x1/SBA to BSC.  Activity as tolerated.

## 2018-06-11 NOTE — CARE PLAN
Problem: Pain Management  Goal: Pain level will decrease to patient's comfort goal  Pt declined pain medication when offered this morning; will continue to assess pain.  Educated pt on requesting pain medication prior to right ankle wound dressing change #1 tomorrow AM.

## 2018-06-11 NOTE — THERAPY
Attempted to see pt for therapy. Pt stating she is in a lot of pain from staple removal and requested to do stair training tomorrow morning. Will continue to follow and re-attempt in the am.

## 2018-06-12 PROCEDURE — 97530 THERAPEUTIC ACTIVITIES: CPT

## 2018-06-12 PROCEDURE — 770021 HCHG ROOM/CARE - ISO PRIVATE

## 2018-06-12 PROCEDURE — 700105 HCHG RX REV CODE 258: Performed by: SURGERY

## 2018-06-12 PROCEDURE — A9270 NON-COVERED ITEM OR SERVICE: HCPCS | Performed by: ORTHOPAEDIC SURGERY

## 2018-06-12 PROCEDURE — 700102 HCHG RX REV CODE 250 W/ 637 OVERRIDE(OP): Performed by: SURGERY

## 2018-06-12 PROCEDURE — A9270 NON-COVERED ITEM OR SERVICE: HCPCS | Performed by: SURGERY

## 2018-06-12 PROCEDURE — 700105 HCHG RX REV CODE 258: Performed by: ORTHOPAEDIC SURGERY

## 2018-06-12 PROCEDURE — 97116 GAIT TRAINING THERAPY: CPT

## 2018-06-12 PROCEDURE — 700111 HCHG RX REV CODE 636 W/ 250 OVERRIDE (IP): Performed by: ORTHOPAEDIC SURGERY

## 2018-06-12 PROCEDURE — 97535 SELF CARE MNGMENT TRAINING: CPT

## 2018-06-12 PROCEDURE — 700102 HCHG RX REV CODE 250 W/ 637 OVERRIDE(OP): Performed by: ORTHOPAEDIC SURGERY

## 2018-06-12 RX ADMIN — ACETAMINOPHEN 650 MG: 325 TABLET, FILM COATED ORAL at 10:19

## 2018-06-12 RX ADMIN — MUPIROCIN 1 APPLICATION: 20 OINTMENT TOPICAL at 17:06

## 2018-06-12 RX ADMIN — OXYCODONE HYDROCHLORIDE 5 MG: 10 TABLET ORAL at 21:03

## 2018-06-12 RX ADMIN — ACETAMINOPHEN 650 MG: 325 TABLET, FILM COATED ORAL at 05:57

## 2018-06-12 RX ADMIN — VANCOMYCIN HYDROCHLORIDE 1300 MG: 100 INJECTION, POWDER, LYOPHILIZED, FOR SOLUTION INTRAVENOUS at 20:38

## 2018-06-12 RX ADMIN — MUPIROCIN 1 APPLICATION: 20 OINTMENT TOPICAL at 10:21

## 2018-06-12 RX ADMIN — CELECOXIB 200 MG: 200 CAPSULE ORAL at 10:20

## 2018-06-12 RX ADMIN — CELECOXIB 200 MG: 200 CAPSULE ORAL at 17:05

## 2018-06-12 RX ADMIN — SERTRALINE 100 MG: 100 TABLET, FILM COATED ORAL at 10:20

## 2018-06-12 RX ADMIN — ASPIRIN 81 MG: 81 TABLET, COATED ORAL at 05:57

## 2018-06-12 RX ADMIN — SODIUM CHLORIDE, POTASSIUM CHLORIDE, SODIUM LACTATE AND CALCIUM CHLORIDE: 600; 310; 30; 20 INJECTION, SOLUTION INTRAVENOUS at 05:57

## 2018-06-12 RX ADMIN — METOPROLOL SUCCINATE 100 MG: 50 TABLET, EXTENDED RELEASE ORAL at 10:19

## 2018-06-12 RX ADMIN — OXYCODONE HYDROCHLORIDE 5 MG: 10 TABLET ORAL at 05:57

## 2018-06-12 RX ADMIN — ASPIRIN 81 MG: 81 TABLET, COATED ORAL at 17:05

## 2018-06-12 RX ADMIN — MUPIROCIN 1 DROP: 20 OINTMENT TOPICAL at 20:38

## 2018-06-12 RX ADMIN — OXYCODONE HYDROCHLORIDE 10 MG: 10 TABLET ORAL at 10:20

## 2018-06-12 RX ADMIN — SODIUM CHLORIDE, POTASSIUM CHLORIDE, SODIUM LACTATE AND CALCIUM CHLORIDE: 600; 310; 30; 20 INJECTION, SOLUTION INTRAVENOUS at 19:20

## 2018-06-12 RX ADMIN — ACETAMINOPHEN 650 MG: 325 TABLET, FILM COATED ORAL at 17:05

## 2018-06-12 ASSESSMENT — COGNITIVE AND FUNCTIONAL STATUS - GENERAL
STANDING UP FROM CHAIR USING ARMS: A LITTLE
MOBILITY SCORE: 16
SUGGESTED CMS G CODE MODIFIER MOBILITY: CK
DRESSING REGULAR LOWER BODY CLOTHING: A LOT
MOVING TO AND FROM BED TO CHAIR: A LITTLE
HELP NEEDED FOR BATHING: A LITTLE
PERSONAL GROOMING: A LITTLE
MOVING FROM LYING ON BACK TO SITTING ON SIDE OF FLAT BED: A LITTLE
TURNING FROM BACK TO SIDE WHILE IN FLAT BAD: A LITTLE
TOILETING: A LITTLE
DAILY ACTIVITIY SCORE: 19
SUGGESTED CMS G CODE MODIFIER DAILY ACTIVITY: CK
WALKING IN HOSPITAL ROOM: A LITTLE
CLIMB 3 TO 5 STEPS WITH RAILING: TOTAL

## 2018-06-12 ASSESSMENT — GAIT ASSESSMENTS
ASSISTIVE DEVICE: FRONT WHEEL WALKER
GAIT LEVEL OF ASSIST: CONTACT GUARD ASSIST
DISTANCE (FEET): 25
DEVIATION: STEP TO;BRADYKINETIC

## 2018-06-12 ASSESSMENT — PAIN SCALES - GENERAL: PAINLEVEL_OUTOF10: 5

## 2018-06-12 NOTE — THERAPY
"Physical Therapy Treatment completed.   Bed Mobility:  Supine to Sit: Stand by Assist  Transfers: Sit to Stand: Stand by Assist  Gait: Level Of Assist: Contact Guard Assist with Front-Wheel Walker       Plan of Care: Will benefit from Physical Therapy 3 times per week  Discharge Recommendations: Equipment: Will Continue to Assess for Equipment Needs.     See \"Rehab Therapy-Acute\" Patient Summary Report for complete documentation.     Pt continues to progress w/ therapy. Pt did require cues for TTWB as pt thought she could put as much weight through her toes as she wanted. After education, pt was 100% compliant w/ TTWB. Pt shows improved endurance for mobility. Pt was too fearful to perform stairs today but was willing to begin pre stair training. Pt was educated and given a visual demonstration of bumping technique. At this point, pt will benefit from post acute therapy.  "

## 2018-06-12 NOTE — PROGRESS NOTES
Shift report received from night RN, assumed care at 0715. Patient up in bed, refused to get up to chair for breakfast but verbalized understanding to get up with PT/OT and after breakfast, routinely medicated prn per MAR. AOx4, up with assist and FWW, calls appropriately, bed alarm not in use. PIV assessed and locked, midline NS at *#ml/hr and CDI. Dressing/vac CDI, O2 RA, SPO2 93%. VTE ASA, SCDs refused. Plan is Ortho cleared awaiting placement. Discussed POC for multimodal pain management, monitoring VS/labs/I&O, mobility, day time routine, comfort, and safety. Patient has call light and personal belongings within reach. Safety and fall precautions in place. Reviewed current labs, notes, medications, and orders. Hourly rounding in place with RN and CNA.

## 2018-06-12 NOTE — WOUND TEAM
[facility]  Inpatient Services  Wound and Skin Care Progress Note    Admission Date:  06/04/2018  HPI, PMH, SH: Reviewed  Unit where seen by Wound Team:   T3    WOUND CONSULT RELATED TO:  md request for npwt drsg changes per protocol    SUBJECTIVE:  Pleasant/agreeable    Self Report / Pain Level:   drsg removal painful despite pre-med    OBJECTIVE:   npwt drsg placed in surgery remained intact(y-connected to Prevena drsg at hip)    WOUND TYPE, LOCATION, CHARACTERISTICS (Pressure ulcers: location, stage, POA or date identified)    Location/type of wound:  Left medial lower leg full thickness wound        Periwound:     Intact around main wnd; small area of skin loss distally  Drainage:     Mod sanguinous  Tissue Type and %:    100% red/pink  Wound Edges:    attached  Odor:      none  Exposed structure(s):   none  S&S of Infection:           none      Measurements:   (Taken 06/05/2018)  Length:      6 cm  Width:       3.8 cm   Depth:                             0.5 cm      INTERVENTIONS BY WOUND TEAM:  Prev drsg removed -- wnd irrigated with wnd cleanser -- benzoin, drape and silver hydrofiber yuki-wnd as needed -- adaptic over the wnd bed -- 1 piece black foam to cover the wnd -- sealed with drape and cont neg pressure reapplied at 125mmhg -- small area of skin loss distally covered with a piece of silver hydrofiber and secured with drape(wnd team will change with vac changes)    Interdisciplinary consultation:   nsg; pt; Dr. Colin; Evgeny/ANN    EVALUATION:   wnd is clean with good blood supply and should do well with npwt    Factors affecting wound healing:   Chronic wnd -- pvd    Goals: Steady decrease in wound area and depth weekly -- control drainage -- increase granulation    NURSING PLAN OF CARE ORDERS (x):    Dressing changes: See Dressing Maintenance orders: x  Skin care: See Skin Care orders: x  Rectal tube care: See Rectal Tube Care orders:   Other orders:    WOUND TEAM PLAN OF CARE (x):    NPWT change 3 x week:      x  Dressing changes by wound team:    Follow up as needed:     x  Other (explain):    Anticipated discharge plans (x):  SNF:           Home Care:           Outpatient Wound Center:            Self Care:            Other:   tbd

## 2018-06-12 NOTE — PROGRESS NOTES
Pulled Oxycodone for patient but she stated Tylenol would be sufficient for her pain and wanted to wait on Oxycodone until possibly later.

## 2018-06-12 NOTE — PROGRESS NOTES
Assumed care of patient following bedside report at 1900. Patient is A&Ox4. She is on room air. She was initially up ion chair, was assisted to bedside commode with staff and then returned to bed. She c/o pain for which RN medicated. Patient has wound vac dressings to left medial shin and to left hip. She also has surgical site to upper left medial thigh stapled, SHERIF, and a site to left groin region that is surgical glue, SHERIF. Left leg swollen and elevated on pillow. 2+ pulses bilateral pedal. Plan of care and safety precautions reviewed. Call device in reach. Will continue to monitor.

## 2018-06-12 NOTE — THERAPY
"Occupational Therapy Treatment completed with focus on ADLs, ADL transfers and patient education.  Functional Status:  Pt seen for OT tx. Up w/ staff upon arrival. Pt able to tolerate amb w/ FWW while maintaining TTWB through LLE. Discussed posterior precautions and completing ADLs and ADL transfers while maintaining precautions. Discussed possible DME/AE needed for home to assist w/ ADLs and ADL transfers. CGA sit > stand and ADL transfers for balance and safety. Pt pleasant and cooperative. Pt motivated to regain strength, endurance and independence in ADLs and ADL transfers.   Plan of Care: Will benefit from Occupational Therapy 3 times per week  Discharge Recommendations:  Equipment Will Continue to Assess for Equipment Needs.     See \"Rehab Therapy-Acute\" Patient Summary Report for complete documentation.   "

## 2018-06-12 NOTE — PROGRESS NOTES
" Subjective:      Pain and mobility improving.  No complaints this morning.    Objective:  Blood pressure 134/75, pulse 71, temperature 36.4 °C (97.6 °F), resp. rate 17, height 1.702 m (5' 7\"), weight 65.2 kg (143 lb 11.8 oz), SpO2 94 %, not currently breastfeeding.              No intake or output data in the 24 hours ending 06/12/18 0706    Comfortable, no distress  Dressing C/D/I    Assessment:      Superficial MRSA hip infection s/p I&D  Deep hip cell count 440 cells.  1/3 tissue cx grew Peptostreptococcus from broth only several days later.  Aspiration had rare P acnes.  Does not meet MSIS criteria for deep hip infection.       Plan:      -TTWB on LLE in padded boot  -Cont IV Vanc while inpatient, then D/C on PO Bactrim  -Hip incisional vac does not need changed  -F/U next week for suture removal    "

## 2018-06-12 NOTE — CARE PLAN
Problem: Mobility  Goal: Risk for activity intolerance will decrease  Outcome: PROGRESSING AS EXPECTED  Patient tolerates ambulation well. Pain with activity. Planning PRN meds around activity.    Problem: Bowel/Gastric:  Goal: Normal bowel function is maintained or improved  Outcome: PROGRESSING AS EXPECTED  Patient having daily BMs without difficulty.

## 2018-06-12 NOTE — PROGRESS NOTES
"Pharmacy Kinetics 72 y.o. female on vancomycin day # 9 2018    Currently on Vancomycin 1300 mg iv q24hr    Indication for Treatment: Superficial MRSA infection of left hip     Pertinent history per medical record: Admitted on 2018 for I&D of left hip. This patient suffered a hip fracture with IMN from Dr. Peters in 2017.  She also has chronic extremity wounds with flairs of cellulitis (prior to the hip fracture). She was experiencing purulence through an old suture wound on her hip, and was therefore taken to OR for I&D. The wound appears to be more superficial, Dr Vazquez did take cultures of deeper tissue, and vancomycin powder was instilled in the wound. Vascular surgery in consulting.    Other antibiotics: none    Allergies: Patient has no known allergies.     List concerns for renal function: low albumin    Pertinent cultures to date:   18 Left hip deep tissue: Peptostreptococcus  18 Left hip aspiration: NGTD  18 Left hip superficial tissue: MRSA (Vancomycin LICO=1), Diphtheroids    No results for input(s): WBC, NEUTSPOLYS, BANDSSTABS in the last 72 hours.  No results for input(s): BUN, CREATININE, ALBUMIN in the last 72 hours.  Recent Labs      18   0041   VANCOTROUGH  9.7*   No intake or output data in the 24 hours ending 18 1423   Blood pressure 151/74, pulse 74, temperature 36.6 °C (97.8 °F), resp. rate 16, height 1.702 m (5' 7\"), weight 65.2 kg (143 lb 11.8 oz), SpO2 93 %, not currently breastfeeding. Temp (24hrs), Av.5 °C (97.7 °F), Min:36.1 °C (96.9 °F), Max:36.9 °C (98.4 °F)      A/P   1. Vancomycin dose change: not indicated at this time  2. Next vancomycin level: 2 days (not ordered)   3. Goal trough: ~ 16 mcg/mL  4. Comments: No labs today. No significant changes overnight. Discharge home soon?    Julee Eddy, PharmD.      "

## 2018-06-12 NOTE — CARE PLAN
Problem: Mobility  Goal: Risk for activity intolerance will decrease  FWW up 1 assist    Problem: Safety  Goal: Will remain free from falls  Non-skid socks on and belongings within reach. Call light within reach. Hourly rounding in place.        Problem: Bowel/Gastric:  Goal: Normal bowel function is maintained or improved  BM yesterday

## 2018-06-13 ENCOUNTER — HOME HEALTH ADMISSION (OUTPATIENT)
Dept: HOME HEALTH SERVICES | Facility: HOME HEALTHCARE | Age: 73
End: 2018-06-13
Payer: MEDICARE

## 2018-06-13 PROCEDURE — 97535 SELF CARE MNGMENT TRAINING: CPT

## 2018-06-13 PROCEDURE — 700112 HCHG RX REV CODE 229: Performed by: ORTHOPAEDIC SURGERY

## 2018-06-13 PROCEDURE — 97605 NEG PRS WND THER DME<=50SQCM: CPT

## 2018-06-13 PROCEDURE — 97530 THERAPEUTIC ACTIVITIES: CPT

## 2018-06-13 PROCEDURE — 700111 HCHG RX REV CODE 636 W/ 250 OVERRIDE (IP): Performed by: ORTHOPAEDIC SURGERY

## 2018-06-13 PROCEDURE — A9270 NON-COVERED ITEM OR SERVICE: HCPCS | Performed by: ORTHOPAEDIC SURGERY

## 2018-06-13 PROCEDURE — 700105 HCHG RX REV CODE 258: Performed by: SURGERY

## 2018-06-13 PROCEDURE — 700102 HCHG RX REV CODE 250 W/ 637 OVERRIDE(OP): Performed by: ORTHOPAEDIC SURGERY

## 2018-06-13 PROCEDURE — 770021 HCHG ROOM/CARE - ISO PRIVATE

## 2018-06-13 PROCEDURE — 700102 HCHG RX REV CODE 250 W/ 637 OVERRIDE(OP): Performed by: SURGERY

## 2018-06-13 PROCEDURE — 700105 HCHG RX REV CODE 258

## 2018-06-13 PROCEDURE — 97116 GAIT TRAINING THERAPY: CPT

## 2018-06-13 PROCEDURE — A9270 NON-COVERED ITEM OR SERVICE: HCPCS | Performed by: SURGERY

## 2018-06-13 PROCEDURE — 700105 HCHG RX REV CODE 258: Performed by: ORTHOPAEDIC SURGERY

## 2018-06-13 RX ORDER — SULFAMETHOXAZOLE AND TRIMETHOPRIM 800; 160 MG/1; MG/1
1 TABLET ORAL 2 TIMES DAILY
Qty: 20 TAB | Refills: 0 | Status: SHIPPED | OUTPATIENT
Start: 2018-06-13 | End: 2018-06-23

## 2018-06-13 RX ORDER — SODIUM CHLORIDE 9 MG/ML
INJECTION, SOLUTION INTRAVENOUS
Status: COMPLETED
Start: 2018-06-13 | End: 2018-06-13

## 2018-06-13 RX ADMIN — ASPIRIN 81 MG: 81 TABLET, COATED ORAL at 05:41

## 2018-06-13 RX ADMIN — OXYCODONE HYDROCHLORIDE 5 MG: 10 TABLET ORAL at 05:42

## 2018-06-13 RX ADMIN — MORPHINE SULFATE 2 MG: 10 INJECTION INTRAVENOUS at 10:02

## 2018-06-13 RX ADMIN — ASPIRIN 81 MG: 81 TABLET, COATED ORAL at 17:19

## 2018-06-13 RX ADMIN — SERTRALINE 100 MG: 100 TABLET, FILM COATED ORAL at 08:29

## 2018-06-13 RX ADMIN — CELECOXIB 200 MG: 200 CAPSULE ORAL at 08:29

## 2018-06-13 RX ADMIN — VANCOMYCIN HYDROCHLORIDE 1300 MG: 100 INJECTION, POWDER, LYOPHILIZED, FOR SOLUTION INTRAVENOUS at 20:07

## 2018-06-13 RX ADMIN — ACETAMINOPHEN 650 MG: 325 TABLET, FILM COATED ORAL at 05:41

## 2018-06-13 RX ADMIN — MUPIROCIN 1 APPLICATION: 20 OINTMENT TOPICAL at 17:19

## 2018-06-13 RX ADMIN — OXYCODONE HYDROCHLORIDE 5 MG: 10 TABLET ORAL at 13:26

## 2018-06-13 RX ADMIN — MUPIROCIN 1 APPLICATION: 20 OINTMENT TOPICAL at 08:32

## 2018-06-13 RX ADMIN — SODIUM CHLORIDE, POTASSIUM CHLORIDE, SODIUM LACTATE AND CALCIUM CHLORIDE: 600; 310; 30; 20 INJECTION, SOLUTION INTRAVENOUS at 13:28

## 2018-06-13 RX ADMIN — METOPROLOL SUCCINATE 100 MG: 50 TABLET, EXTENDED RELEASE ORAL at 08:29

## 2018-06-13 RX ADMIN — DOCUSATE SODIUM 100 MG: 100 CAPSULE ORAL at 08:29

## 2018-06-13 RX ADMIN — SODIUM CHLORIDE 500 ML: 9 INJECTION, SOLUTION INTRAVENOUS at 20:08

## 2018-06-13 RX ADMIN — ACETAMINOPHEN 650 MG: 325 TABLET, FILM COATED ORAL at 13:26

## 2018-06-13 ASSESSMENT — PATIENT HEALTH QUESTIONNAIRE - PHQ9
SUM OF ALL RESPONSES TO PHQ9 QUESTIONS 1 AND 2: 0
1. LITTLE INTEREST OR PLEASURE IN DOING THINGS: NOT AT ALL

## 2018-06-13 ASSESSMENT — GAIT ASSESSMENTS
ASSISTIVE DEVICE: FRONT WHEEL WALKER
DEVIATION: STEP TO;BRADYKINETIC
DISTANCE (FEET): 30
GAIT LEVEL OF ASSIST: STAND BY ASSIST

## 2018-06-13 ASSESSMENT — COGNITIVE AND FUNCTIONAL STATUS - GENERAL
MOVING FROM LYING ON BACK TO SITTING ON SIDE OF FLAT BED: A LITTLE
MOVING TO AND FROM BED TO CHAIR: A LITTLE
MOBILITY SCORE: 17
CLIMB 3 TO 5 STEPS WITH RAILING: A LOT
WALKING IN HOSPITAL ROOM: A LITTLE
TURNING FROM BACK TO SIDE WHILE IN FLAT BAD: A LITTLE
TOILETING: A LITTLE
SUGGESTED CMS G CODE MODIFIER MOBILITY: CK
SUGGESTED CMS G CODE MODIFIER DAILY ACTIVITY: CJ
DRESSING REGULAR LOWER BODY CLOTHING: A LITTLE
STANDING UP FROM CHAIR USING ARMS: A LITTLE
DAILY ACTIVITIY SCORE: 21
HELP NEEDED FOR BATHING: A LITTLE

## 2018-06-13 ASSESSMENT — PAIN SCALES - GENERAL
PAINLEVEL_OUTOF10: 5
PAINLEVEL_OUTOF10: 5
PAINLEVEL_OUTOF10: 4

## 2018-06-13 NOTE — PROGRESS NOTES
"Pharmacy Kinetics 72 y.o. female on vancomycin day # 10 2018    Currently on Vancomycin 1300 mg iv q24hr    Indication for Treatment: Superficial MRSA infection of left hip     Pertinent history per medical record: Admitted on 2018 for I&D of left hip. This patient suffered a hip fracture with IMN from Dr. Peters in 2017.  She also has chronic extremity wounds with flairs of cellulitis (prior to the hip fracture). She was experiencing purulence through an old suture wound on her hip, and was therefore taken to OR for I&D. The wound appears to be more superficial, Dr Vazquez did take cultures of deeper tissue, and vancomycin powder was instilled in the wound. Vascular surgery in consulting.     Other antibiotics: none    Allergies: Patient has no known allergies.     List concerns for renal function: low albumin    Pertinent cultures to date:   18 Left hip deep tissue: Peptostreptococcus  18 Left hip aspiration: NGTD  18 Left hip superficial tissue: MRSA (Vancomycin LICO=1), Diphtheroids    No results for input(s): WBC, NEUTSPOLYS, BANDSSTABS in the last 72 hours.  No results for input(s): BUN, CREATININE, ALBUMIN in the last 72 hours.  Recent Labs      18   0041   VANCOTROUGH  9.7*   No intake or output data in the 24 hours ending 18 0906   Blood pressure 151/67, pulse 77, temperature 36.7 °C (98 °F), resp. rate 17, height 1.702 m (5' 7\"), weight 65.2 kg (143 lb 11.8 oz), SpO2 94 %, not currently breastfeeding. Temp (24hrs), Av.6 °C (97.9 °F), Min:36.4 °C (97.6 °F), Max:36.8 °C (98.2 °F)      A/P   1. Vancomycin dose change: Not indicated at this time  2. Next vancomycin level: 1800 tomorrow  3. Goal trough: ~16 mcg/mL  4. Comments: The plan is to discharge this patient home on PO Bactrim. I am unsure at this point when that will be. I have ordered a BMP with morning labs and a vancomycin trough prior to tomorrow's dose.    Julee Eddy, PharmD.      "

## 2018-06-13 NOTE — PROGRESS NOTES
" Subjective:      No events.  Pain controlled.    Objective:  Blood pressure 138/53, pulse 79, temperature 36.4 °C (97.6 °F), resp. rate 16, height 1.702 m (5' 7\"), weight 65.2 kg (143 lb 11.8 oz), SpO2 91 %, not currently breastfeeding.              No intake or output data in the 24 hours ending 06/13/18 0711    Comfortable, no distress  Dressing C/D/I    Assessment:       Superficial MRSA hip infection s/p I&D  Deep hip cell count 440 cells.  1/3 tissue cx grew Peptostreptococcus from broth only several days later.  Aspiration had rare P acnes.  Does not meet MSIS criteria for deep hip infection.        Plan:       -TTWB on LLE in padded boot  -Cont IV Vanc while inpatient, then D/C on PO Bactrim  -Hip incisional vac does not need changed  -F/U next week for suture removal  -D/C timing per Vascular surgery     "

## 2018-06-13 NOTE — PROGRESS NOTES
POD#3 s/p LEft fem-pop bypass    Feeling good and wants to go home.     VSS afeb.     Great doppler signal at mid thigh and at PTA mid calf.     Suture lines intact.  NPWT dressings on lateral thigh and at ankle.     Imp:  Discussed with .  Filled out the wound vac paper work.  Initial woud assessment greatly improved.     Plan:  Discharge tomorrow.

## 2018-06-13 NOTE — PROGRESS NOTES
PO4    PROCEDURE:  1.  Left fem-pop bypass with in situ saphenous vein.  2.  Debridement of left ankle wound with application of negative pressure   wound therapy dressing.    Alert.  Had very short PT session.  She is not energetic.  Wound VAC left medial ankle.  Strong left DP pulse.  Incisions knee, thigh, groin all dry and not inflammed.    Imp:    Post op weakness.  Ankle wound on wound VAC.  LLE bypass patent.    Plan: Observe for strengthening and ambulation.  Watch wounds for infection.

## 2018-06-13 NOTE — DISCHARGE PLANNING
Jorge Amador is no longer with [facility]. Patient has appointment with Leena Medina on 7/31/18, but that is too far out. Patient needs to establish with a PCP within 48-72 hours from discharge.    Thank you

## 2018-06-13 NOTE — THERAPY
"Occupational Therapy Treatment completed with focus on ADLs, ADL transfers and patient education.  Functional Status:  Pt seen for OT tx. Discussed w/ pt able possible DME/AE needed for home to assist w/ ADLs and ADL transfers. Discussed different compensatory strategies for LB dressing and managing wound vac lines. Pt reports familiarity w/ using AE for LB dressing for posterior precautions. Pt able to verbalize and demonstrate understanding of AE. Pt receptive to education and educated about discussing w/ wound care team about education on home vac system upon appropriate medical d/c home. Pt pleasant and cooperative. Pt continues to get up w/ nrsg and encouraged to continue getting up w/ nrsg staff as tolerated. Pt motivated to regain strength, endurance and independence in ADLs and ADL transfers.   Plan of Care: Will benefit from Occupational Therapy 3 times per week  Discharge Recommendations:  Equipment Will Continue to Assess for Equipment Needs.    See \"Rehab Therapy-Acute\" Patient Summary Report for complete documentation.   "

## 2018-06-13 NOTE — DISCHARGE PLANNING
Anticipated Discharge Disposition: Home with home health.      Action: Pt declined by [facility] due to PCP situation. Referral expanded to [facility] and referral accepted. LSW met with pt at bedside and pt agreeable to plan.      Barriers to Discharge: None     Plan: Pt to discharge home with home wound vac and [facility].

## 2018-06-13 NOTE — PROGRESS NOTES
Spoke with PT/OT they will work with patient again today to make sure she is safe with stairs as her bedroom is up stairs and her new plan is to dc home tomorrow and not to go to SNF. Dr Colin worked with SW to determine what was needed to dc her home with wound vac.

## 2018-06-13 NOTE — CARE PLAN
Problem: Safety  Goal: Will remain free from falls    Intervention: Implement fall precautions  Bed is in the lowest position, call light and belongings are within reach, treaded socks are on,  rails are up, and hourly rounding is in place.       Problem: Skin Integrity  Goal: Risk for impaired skin integrity will decrease    Intervention: Implement precautions to protect skin integrity in collaboration with the interdisciplinary team  Pt encouraged to make frequent position changes and ambulate when possible. Waffle over is in place, and MIRELLA cream in use.

## 2018-06-13 NOTE — FACE TO FACE
Face to Face Note  -  Durable Medical Equipment    Milana Colin M.D. - NPI: [id number]  I certify that this patient is under my care and that they have had a durable medical equipment(DME)face to face encounter by myself that meets the physician DME face-to-face encounter requirements with this patient on:    Date of encounter:   Patient:                    MRN:                       YOB: 2020  Nadege Mae  [id number]  1945     The encounter with the patient was in whole, or in part, for the following medical condition, which is the primary reason for durable medical equipment:  Wound Care and Post-Op Surgery    I certify that, based on my findings, the following durable medical equipment is medically necessary:  Wound Vac.    HOME O2 Saturation Measurements:(Values must be present for Home Oxygen orders)         ,     ,         My Clinical findings support the need for the above equipment due to:  Abnormal Gait, Wound/Incision    Supporting Symptoms: Patient has a wound on the the medial ankle with a malunion of an ankle fracture.  Post-op after left fem-pop bypass     ------------------------------------------------------------------------------------------------------------------    Face to Face Supporting Documentation - Home Health    The encounter with this patient was in whole or in part the primary reason for home health admission.    Date of encounter:   Patient:                    MRN:                       YOB: 2020  Nadege Mae  [id number]  1945     Home health to see patient for:  Skilled Nursing care for assessment, interventions & education, Wound Care, Physical Therapy evaluation and treatment and Occupational therapy evaluation and treatment    Skilled need for:  Surgical Aftercare wound vac and incision check    Skilled nursing interventions to include:  Wound Care    Homebound evidenced status by:  Need the aid of  supportive devices such as crutches, canes, wheelchairs or walkers. Leaving home must require a considerable and taxing effort. There must exist a normal inability to leave the home.    Community Physician to provide follow up care: Jorge Amador M.D.     Optional Interventions    Wound information & treatment:    Home Infusion Therapy orders:    Line/Drain/Airway:    I certify the face to face encounter for this home care referral meets the CMS requirements and the encounter/clinical assessment with the patient was, in whole, or in part, for the medical condition(s) listed above, which is the primary reason for home health care. Based on my clinical findings: the service(s) are medically necessary, support the need for home health care, and the homebound criteria are met.  I certify that this patient has had a face to face encounter by myself.  Milana Colin M.D. - NPI: [id number]    *Debility, frailty and advanced age in the absence of an acute deterioration or exacerbation of a condition do not qualify a patient for home health.

## 2018-06-13 NOTE — PROGRESS NOTES
Shift report received from night RN, assumed care at 0705. Patient up in chair, routinely medicated prn per MAR. AOx4, up with assist and FWW, calls appropriately, bed alarm not in use. PIV assessed and locked, midline NS at 83ml/hr and CDI. Dressing/vac CDI, O2 RA, SPO2 94%. VTE ASA, SCDs refused. Plan is Ortho cleared awaiting placement. Discussed POC for multimodal pain management, monitoring VS/labs/I&O, mobility, day time routine, comfort, and safety. Patient has call light and personal belongings within reach. Safety and fall precautions in place. Reviewed current labs, notes, medications, and orders. Hourly rounding in place with RN and CNA.

## 2018-06-13 NOTE — DISCHARGE PLANNING
Received Choice form at [phone]  Agency/Facility Name: [facility]  Referral sent per Choice form @ [phone]

## 2018-06-13 NOTE — DISCHARGE PLANNING
ATTN: Case Management  RE: Referral for Home Health    Reason for referral denial: per notes patient referral sent to [facility]                 We would like to take this opportunity to thank you for submitting a referral for your patient to continue the journey to recovery with [facility]. We hope to facilitate continued referrals from your facility as our goal is to provide quality, skilled care to as many patients as possible.            Unfortunately, we are not able to accept your patient into our service for the reason listed above. If further clarity is needed, our Intake Coordinators are available to discuss any barriers to service.            If you have any questions or concerns regarding this or future patients’ transition to Home Health, please do not hesitate to contact us. We are open for referrals 7 days a week from 8AM to 5PM at [phone].      We look forward to collaborating with you in the future,  [facility] Team

## 2018-06-13 NOTE — DISCHARGE PLANNING
Anticipated Discharge Disposition: Home with home health.     Action: With the help of Diane from [facility], SW completed [facility] Home Wound Vac Authorization Application and then had attending physician sign document. LSW faxed over completed [facility] Home Wound Vac Authorization Application, facesheet, H&P, OP notes, and wound notes to [phone].    LSW informed that home wound vac to be delivered tomorrow morning.     LSW met with pt at bedside to obtain home health choice. Pt reported that she has used [facility] in the past and would like to continue using them in the past. Home Health choice form initialed and signed consenting for referral to be sent to [facility]. SW faxed choice form to [facility].     Barriers to Discharge: None    Plan: Pending acceptance to [facility].     PCP: Currently Jorge Amador MD However, has appt with IRON Son on July 31.   Pt's Address: [address] [place] [address]  Phone Number: [phone]

## 2018-06-14 VITALS
TEMPERATURE: 97.5 F | HEIGHT: 67 IN | WEIGHT: 143.74 LBS | BODY MASS INDEX: 22.56 KG/M2 | DIASTOLIC BLOOD PRESSURE: 60 MMHG | HEART RATE: 79 BPM | SYSTOLIC BLOOD PRESSURE: 156 MMHG | RESPIRATION RATE: 18 BRPM | OXYGEN SATURATION: 92 %

## 2018-06-14 LAB
ANION GAP SERPL CALC-SCNC: 8 MMOL/L (ref 0–11.9)
BUN SERPL-MCNC: 12 MG/DL (ref 8–22)
CALCIUM SERPL-MCNC: 9 MG/DL (ref 8.5–10.5)
CHLORIDE SERPL-SCNC: 103 MMOL/L (ref 96–112)
CO2 SERPL-SCNC: 24 MMOL/L (ref 20–33)
CREAT SERPL-MCNC: 0.6 MG/DL (ref 0.5–1.4)
GLUCOSE SERPL-MCNC: 115 MG/DL (ref 65–99)
POTASSIUM SERPL-SCNC: 4.4 MMOL/L (ref 3.6–5.5)
SODIUM SERPL-SCNC: 135 MMOL/L (ref 135–145)

## 2018-06-14 PROCEDURE — 700112 HCHG RX REV CODE 229: Performed by: ORTHOPAEDIC SURGERY

## 2018-06-14 PROCEDURE — 700111 HCHG RX REV CODE 636 W/ 250 OVERRIDE (IP): Performed by: SURGERY

## 2018-06-14 PROCEDURE — 80048 BASIC METABOLIC PNL TOTAL CA: CPT

## 2018-06-14 PROCEDURE — 700102 HCHG RX REV CODE 250 W/ 637 OVERRIDE(OP): Performed by: ORTHOPAEDIC SURGERY

## 2018-06-14 PROCEDURE — 36415 COLL VENOUS BLD VENIPUNCTURE: CPT

## 2018-06-14 PROCEDURE — A9270 NON-COVERED ITEM OR SERVICE: HCPCS | Performed by: ORTHOPAEDIC SURGERY

## 2018-06-14 RX ORDER — OXYCODONE HYDROCHLORIDE 5 MG/1
5 TABLET ORAL
Qty: 40 TAB | Refills: 0 | Status: SHIPPED | OUTPATIENT
Start: 2018-06-14 | End: 2018-06-24

## 2018-06-14 RX ADMIN — OXYCODONE HYDROCHLORIDE 5 MG: 10 TABLET ORAL at 08:38

## 2018-06-14 RX ADMIN — ONDANSETRON 4 MG: 2 INJECTION INTRAMUSCULAR; INTRAVENOUS at 08:38

## 2018-06-14 RX ADMIN — ASPIRIN 81 MG: 81 TABLET, COATED ORAL at 05:36

## 2018-06-14 RX ADMIN — METOPROLOL SUCCINATE 100 MG: 50 TABLET, EXTENDED RELEASE ORAL at 08:38

## 2018-06-14 RX ADMIN — OXYCODONE HYDROCHLORIDE 5 MG: 10 TABLET ORAL at 00:50

## 2018-06-14 RX ADMIN — SERTRALINE 100 MG: 100 TABLET, FILM COATED ORAL at 09:45

## 2018-06-14 RX ADMIN — OXYCODONE HYDROCHLORIDE 10 MG: 10 TABLET ORAL at 14:26

## 2018-06-14 RX ADMIN — ASPIRIN 81 MG: 81 TABLET, COATED ORAL at 17:00

## 2018-06-14 RX ADMIN — DOCUSATE SODIUM 100 MG: 100 CAPSULE ORAL at 08:38

## 2018-06-14 RX ADMIN — OXYCODONE HYDROCHLORIDE 10 MG: 10 TABLET ORAL at 18:33

## 2018-06-14 ASSESSMENT — PAIN SCALES - GENERAL
PAINLEVEL_OUTOF10: 7
PAINLEVEL_OUTOF10: 3
PAINLEVEL_OUTOF10: 8
PAINLEVEL_OUTOF10: 3

## 2018-06-14 ASSESSMENT — PATIENT HEALTH QUESTIONNAIRE - PHQ9
2. FEELING DOWN, DEPRESSED, IRRITABLE, OR HOPELESS: NOT AT ALL
1. LITTLE INTEREST OR PLEASURE IN DOING THINGS: NOT AT ALL
SUM OF ALL RESPONSES TO PHQ9 QUESTIONS 1 AND 2: 0

## 2018-06-14 NOTE — CARE PLAN
Problem: Pain Management  Goal: Pain level will decrease to patient's comfort goal    Intervention: Follow pain managment plan developed in collaboration with patient and Interdisciplinary Team  PO pain medications.      Problem: Mobility  Goal: Risk for activity intolerance will decrease    Intervention: Assess and monitor signs of activity intolerance  Up to chair for lunch, did well.

## 2018-06-14 NOTE — PROGRESS NOTES
Assumed care from SHREE Vazquez at 0220. Agree with assessment. Patient A&Ox4, no complaints of pain at this time. No needs voiced. LR infusing at 83 mL/hr.

## 2018-06-14 NOTE — DISCHARGE PLANNING
Anticipated Discharge Disposition: Home with wound vac and Home Health for wound care    Action: LSW confirmed with [facility] that Vac was delivered to patient. LSW confirmed with [facility] that patient is to discharge today. LSW faxed updated wound care vac dressing change to [facility]. Home Health to f/u with patient in next 48 hours for wound vac dressing change.     Barriers to Discharge: None    Plan: Wound Vac through [facility]. Wound Vac dressing changes and wound care through [facility].

## 2018-06-14 NOTE — PROGRESS NOTES
Patient up to chair for lunch and for BSC. One person assist. Wound vac patent left ankle and leg. PO pain medications. Patient calls for assistance.

## 2018-06-14 NOTE — CARE PLAN
Problem: Pain Management  Goal: Pain level will decrease to patient's comfort goal  Pain well controlled at this time per pt with PO pain medication     Problem: Knowledge Deficit  Goal: Knowledge of disease process/condition, treatment plan, diagnostic tests, and medications will improve  Education done on POC, including need to confirm HH and have a wound vac delivered prior to discharge, all questions and concerns were addressed

## 2018-06-14 NOTE — WOUND TEAM
[facility]  Inpatient Services  Wound and Skin Care Progress Note    Admission Date:  06/04/2018  HPI, PMH, SH: Reviewed  Unit where seen by Wound Team:   T3    WOUND CONSULT RELATED TO:  Scheduled npwt drsg change    SUBJECTIVE:  Pleasant/agreeable    Self Report / Pain Level:   drsg removal painful despite pre-med    OBJECTIVE:   npwt drsg placed in surgery remained intact(y-connected to Prevena drsg at hip)    WOUND TYPE, LOCATION, CHARACTERISTICS (Pressure ulcers: location, stage, POA or date identified)    Location/type of wound:  Left medial lower leg full thickness wound        Periwound:     Intact around main wnd; small area of skin loss distally plus some breakdown laterally  Drainage:     Min serosanguinous  Tissue Type and %:    100% red/pink  Wound Edges:    attached  Odor:      none  Exposed structure(s):   none  S&S of Infection:           none      Measurements: (cm)  (Taken 06/13/2018) Distal area of skin loss  Length:      5.8    3.5  Width:       4.5    3.0  Depth:                             0.4    0.2      INTERVENTIONS BY WOUND TEAM:  Prev drsgs removed -- wnd irrigated with wnd cleanser -- measurements and photograph done -- benzoin, drape and silver hydrofiber uyki-wnd as needed -- adaptic over the wnd bed -- 1 piece black foam to cover the wnd -- sealed with drape and cont neg pressure reapplied at 125mmhg -- small area of skin loss distally and skin breakdown laterally covered with silver hydrofiber and secured with adhesive foams    Interdisciplinary consultation:   nsg; pt    EVALUATION:   wnds remain clean; left lower extremity weepy and swollen today    Factors affecting wound healing:   Chronic wnd -- pvd    Goals: Steady decrease in wound area and depth weekly -- control drainage -- increase granulation    NURSING PLAN OF CARE ORDERS (x):    Dressing changes: See Dressing Maintenance orders: x  Skin care: See Skin Care orders: x  Rectal tube care: See Rectal Tube Care  orders:   Other orders:    WOUND TEAM PLAN OF CARE (x):   NPWT change 3 x week:      x  Dressing changes by wound team:    Follow up as needed:     x  Other (explain):    Anticipated discharge plans (x):  SNF:           Home Care:         x  Outpatient Wound Center:            Self Care:            Other:

## 2018-06-14 NOTE — PROGRESS NOTES
"Pharmacy Kinetics 72 y.o. female on vancomycin day # 11 2018    Currently on Vancomycin 1300 mg iv q24hr   (1900)     Indication for Treatment: Superficial MRSA infection of left hip     Pertinent history per medical record: Admitted on 2018 for I&D of left hip. This patient suffered a hip fracture with IMN from Dr. Peters in 2017.  She also has chronic extremity wounds with flairs of cellulitis (prior to the hip fracture). She was experiencing purulence through an old suture wound on her hip, and was therefore taken to OR for I&D. The wound appears to be more superficial, Dr Vazquez did take cultures of deeper tissue, and vancomycin powder was instilled in the wound. Vascular surgery in consulting.     Other antibiotics: none     Allergies: Patient has no known allergies.      List concerns for renal function: low albumin     Pertinent cultures to date:   18 Left hip deep tissue: Peptostreptococcus  18 Left hip aspiration: NGTD  18 Left hip superficial tissue: MRSA (Vancomycin LICO=1), Diphtheroids    No results for input(s): WBC, NEUTSPOLYS, BANDSSTABS in the last 72 hours.  Recent Labs      18   0147   BUN  12   CREATININE  0.60     No results for input(s): VANCOTROUGH, VANCOPEAK, VANCORANDOM in the last 72 hours.No intake or output data in the 24 hours ending 18 1624   Blood pressure 156/60, pulse 79, temperature 36.4 °C (97.5 °F), resp. rate 18, height 1.702 m (5' 7\"), weight 65.2 kg (143 lb 11.8 oz), SpO2 92 %, not currently breastfeeding. Temp (24hrs), Av.9 °C (98.5 °F), Min:36.4 °C (97.5 °F), Max:37.4 °C (99.3 °F)      A/P   1. Vancomycin dose change: none  2. Next vancomycin level: today at 1800  (ordered)  3. Goal trough: ~16 mcg/ml  4. Comments: Still unclear when pt will discharge - plan to switch to PO bactrim upon discharge. Renal function appears stable per renal indices. Continue current dose. Trough level ordered for later today to assess accumulation and " dose appropriateness. Pharmacy to [facility].     Marta Toribio, PharmD., BCPS

## 2018-06-14 NOTE — PROGRESS NOTES
Received bedside report. Assumed care. Full assessment complete. A&Ox4, pleasant., Reports mild pain, denies need for pain medication at this time. R Midline  Catheter in place, assessed, no s/s of infection/infiltration, abx running. IV Vanco administered per order, per Pharmacist next trough to be drawn 06/14/18. Wound vac in place over LLE and L hip dressing assessed, CDI, no leaks detected. L thigh surgical incision assessed, approximated with staple, no s/s of infection at this time. L groin surgical incision assessed, edematous, which is unchanged per pt. POC discussed, including need to continue IV abx and possibility of discharge tomorrow, all questions and concerns were addressed. VSS. Pt resting comfortably at this time.

## 2018-06-15 ENCOUNTER — PATIENT OUTREACH (OUTPATIENT)
Dept: HEALTH INFORMATION MANAGEMENT | Facility: OTHER | Age: 73
End: 2018-06-15

## 2018-06-15 NOTE — PROGRESS NOTES
Patient ready for discharge. All belongings with patient. Patient verbalized understanding of discharge order, Mid line IV removed per central line protocol. Site clear. Wound vac and equipment delivered for home use.

## 2018-06-15 NOTE — DISCHARGE INSTRUCTIONS
Follow-up with Dr. Vazquez as described.   Asif out in Dr. Vazquez office or in my office on Monday or Tuesday next week.   I will follow wound with [facility] through [facility] and would appreciate regular communication.    Discharge Instructions    Discharged to home by car with relative. Discharged via wheelchair, hospital escort: Yes.  Special equipment needed: Walker    Be sure to schedule a follow-up appointment with your primary care doctor or any specialists as instructed.     Discharge Plan:   Influenza Vaccine Indication: Not indicated: Previously immunized this influenza season and > 8 years of age    I understand that a diet low in cholesterol, fat, and sodium is recommended for good health. Unless I have been given specific instructions below for another diet, I accept this instruction as my diet prescription.   Other diet: As tolerated    Special Instructions: Discharge instructions for the Orthopedic Patient    Follow up with Primary Care Physician within 2 weeks of discharge to home, regarding:  Review of medications and diagnostic testing.  Surveillance for medical complications.  Workup and treatment of osteoporosis, if appropriate.     -Is this a Joint Replacement patient? Yes   Total Joint Hip Replacement Discharge Instructions    Pain  - The goal is to slowly wean off the prescription pain medicine.  - Ice can be used for pain control.  20 minutes at a time is recommended, and never directly against your skin or incision.  - Most patients are off the pain pills by 3 weeks; others may require a low level of pain medications for many months. If your pain continues to be severe, follow up with your physician.  Infection  Deep hip joint infections that require removal of the prostheses occur in less than 0.1% of patients. Lesser infections in the skin (cellulites) are more common and much more easily treated.  - Keep the incision as clean and dry as possible.  - Always wash your hands before  touching your incision.  - Skin infections tend to develop around 7-10 days after surgery, most can be treated with oral antibiotics.  - Dental Care should be delayed for 3 months after surgery, your surgeon recommends taking a dose of antibiotics 1 hour prior to any dental procedure.  After 2 years, most surgeons recommend antibiotics only before an extensive procedure.  Ask your surgeon what he recommends.  - Signs and symptoms of infection can include:  low grade fever, redness, pain, swelling and drainage from your incision.  Notify your surgeon immediately if you develop any of these symptoms.  Post op Disturbances  - Bowel habits - constipation is extremely common and is caused by a combination of anesthesia, lack of mobility and pain medicine.  Use stool softeners or laxatives if necessary. It is important not to ignore this problem, as bowel obstructions can be a serious complication after joint replacement surgery.  - Mood/Energy Level - Many patients experience a lack of energy and endurance for up to 2-3 months after surgery.  Some may also feel down and can even become depressed.  This is likely due to the postoperative anemia, change in activity level, lack of sleep, pain medicine and just the emotional reaction to the surgery itself that is a big disruption in a person’s life.  This usually passes.  If symptoms persist, follow up with your primary physician.  - Returning to work - Your surgeon will give you more specific instructions.  Generally, if you work a sedentary job requiring little standing or walking, most patients may return within 2-6 weeks.  Manual labor jobs involving walking, lifting and standing may take 3-4 months.  Your surgeon’s office can provide a release to part-time or light duty work early on in your recovery and progress you to full duty as able.  - Driving - You can begin driving an automatic shift car in 4 to 8 weeks, provided you are no longer taking narcotic pain  medication. If you have a stick-shift car and your right hip was replaced, do not begin driving until your doctor says you can.   - Avoiding falls -  throw rugs and tack down loose carpeting.  Be aware of floor hazards such as pets, small objects or uneven surfaces.   -  Airport Metal Detectors - The sensitivity of metal detectors varies and it is likely that your prosthesis will cause an alarm. Inform the  that you have an artificial joint.  Diet  - Resume your normal diet as tolerated.  - It is important to achieve a healthy nutritional status by eating a well balanced diet on a regular basis.  - Your physician may recommend that you take iron and vitamin supplements.   - Continue to drink plenty of fluids.  Shower/Bathing  - You may shower as soon as you get home from the hospital unless otherwise instructed.  - Keep your incision out of water.  To keep the incision dry when showering, cover it with a plastic bag or plastic wrap.  - Pat incision dry if it gets wet.  Don’t rub.  - Do not submerge in a bath until staples are out and the incision is completely healed. (Approximately 6-8 weeks after surgery).  Dressing Change:  Procedure (if recommended by your physician)  - Wash hands.  - Open all dressing change materials.  - Remove old dressing and discard.  - Inspect incision for redness, increase in clear drainage, yellow/green drainage, odor and surrounding skin hot to touch.  -  ABD (large gauze) pad by one corner and lay over the incision.  Be careful not to touch the inside of the dressing that will lay over the incision.  - Secure in place as instructed (Ace wrap or tape).    Swelling/Bruising  - Swelling is normal after hip replacement and can involve the thigh, knee, calf and foot.  - Swelling can last from 3-6 months.  - Elevate your leg higher than your heart while reclining.  The first week you are home you should elevate your leg an equal amount of time, as you are  active.    - Anti-inflammatory pills can be taken once you have stopped the blood thinners.  - The swelling is usually worse after you go home since you are upright for longer periods of time.  - Bruising is common and can involve the entire leg including the thigh, calf and even foot.  Bruising often does not appear until after you arrive home and it can be quite dramatic- purple, black, green.  The bruising you can see is not usually concerning and will subside without any treatment.      Blood Clot Prevention  Blood clots in the legs and the less common, but frightening, clots that travel to the lungs are a real focus of our preventative. Most patients are at standard risk for them, but those patients who are at higher risk include people who have had previous clots, a family history of clotting, smoking, diabetes, obesity, advanced age, use of estrogen and a sedentary lifestyle.    - Signs of blood clots in legs - Swelling in thigh, calf or ankle that does not go down with elevation.  Pain, heat and tenderness in calf, back of calf or groin area.  NOTE: blood clots can occur in either leg.  - You have been receiving anticoagulant therapy (blood thinners) in the hospital and you may be instructed to continue at home depending on your risk factors.  - Your risk for developing a clot continues for up to 2-3 months after surgery.  You should avoid prolonged sitting and dehydration during that time (long air trips and car trips).  If you do take a trip during this time, please get up and move around every 1- 1.5 hours.  - If you are prescribed blood thinning medication for home, follow instructions as directed. (Handouts provided if applicable).      Activity    Once you get home, you should stay active. The key is not to overdo it! While you can expect some good days and some bad days, you should notice a gradual improvement over time you should notice a gradual improvement and a gradual increase in your endurance  over the next 6 to 12 months.    - Weight Bearing - If you have undergone cemented or hybrid hip replacement, you can put some weight on the leg immediately using a cane or walker, and you should continue to use some support for 4 to 6 weeks to help the muscles recover.   - Sleeping Positions - Sleep on your back with your legs slightly apart or on your side with a regular pillow between your knees. Be sure to use the pillow for at least 6 weeks, or until your doctor says you can do without it. Sleeping on your stomach should be all right  - Sitting - For at least the first 3 months, sit only in chairs that have arms. Do not sit on low chairs, low stools, or reclining chairs. Do not cross your legs at the knees. The physical therapist will show you how to sit and stand from a chair, keeping your affected leg out in front of you. Get up and move around on a regular basis--at least once every hour.  - Walking - Walk as much as you like once your doctor gives you the go-ahead, but remember that walking is no substitute for your prescribed exercises. Walking with a pair of trekking poles is helpful and adds as much as 40% to the exercise you get when you walk  - Therapy may be needed in some cases, to strengthen your muscles and improve your gait (walking pattern).  This decision will be made at your post-operative appointment.  Follow your therapist recommended post-operative exercises (handout provided by Therapist).  - Swimming is also recommended; you can begin as soon as the sutures have been removed and the wound is healed, approximately 6 to 8 weeks after surgery. Using a pair of training fins may make swimming a more enjoyable and effective exercise.  - Other activities - Lower impact activities are preferred.  If you have specific questions, consult your Surgeon.    - Sexual activity - Your surgeon can tell you when it should be safe to resume sexual activity.      When to Call the Doctor   Call the physician  if:   - Fever over 100.5? F  - Increased pain, drainage, redness, odor or heat around the incision area  - Shaking chills  - Increased knee pain with activity and rest  - Increased pain in calf, tenderness or redness above or below the knee  - Increased swelling of calf, ankle, foot  - Sudden increased shortness of breath, sudden onset of chest pain, localized chest pain with coughing  - Incision opening  Or, if there are any questions or concerns about medications or care.       -Is this patient being discharged with medication to prevent blood clots?  Yes, Aspirin Aspirin, ASA oral tablets  What is this medicine?  ASPIRIN (AS pir in) is a pain reliever. It is used to treat mild pain and fever. This medicine is also used as directed by a doctor to prevent and to treat heart attacks, to prevent strokes, and to treat arthritis or inflammation.  This medicine may be used for other purposes; ask your health care provider or pharmacist if you have questions.  COMMON BRAND NAME(S): Aspir-Low, Aspir-Amber, Aspirtab, Gilbert Advanced Aspirin, Gilbert Aspirin, Gilbert Aspirin Extra Strength, Gilbert Aspirin Plus, Gilbert Extra Strength, Gilbert Extra Strength Plus, Gilbert Genuine Aspirin, Gilbert Womens Aspirin, Bufferin, Bufferin Extra Strength, Bufferin Low Dose  What should I tell my health care provider before I take this medicine?  They need to know if you have any of these conditions:  -anemia  -asthma  -bleeding problems  -child with chickenpox, the flu, or other viral infection  -diabetes  -gout  -if you frequently drink alcohol containing drinks  -kidney disease  -liver disease  -low level of vitamin K  -lupus  -smoke tobacco  -stomach ulcers or other problems  -an unusual or allergic reaction to aspirin, tartrazine dye, other medicines, dyes, or preservatives  -pregnant or trying to get pregnant  -breast-feeding  How should I use this medicine?  Take this medicine by mouth with a glass of water. Follow the directions on the  package or prescription label. You can take this medicine with or without food. If it upsets your stomach, take it with food. Do not take your medicine more often than directed.  Talk to your pediatrician regarding the use of this medicine in children. While this drug may be prescribed for children as young as 12 years of age for selected conditions, precautions do apply. Children and teenagers should not use this medicine to treat chicken pox or flu symptoms unless directed by a doctor.  Patients over 65 years old may have a stronger reaction and need a smaller dose.  Overdosage: If you think you have taken too much of this medicine contact a poison control center or emergency room at once.  NOTE: This medicine is only for you. Do not share this medicine with others.  What if I miss a dose?  If you are taking this medicine on a regular schedule and miss a dose, take it as soon as you can. If it is almost time for your next dose, take only that dose. Do not take double or extra doses.  What may interact with this medicine?  Do not take this medicine with any of the following medications:  -cidofovir  -ketorolac  -probenecid  This medicine may also interact with the following medications:  -alcohol  -alendronate  -bismuth subsalicylate  -flavocoxid  -herbal supplements like feverfew, garlic, sadiq, ginkgo biloba, horse chestnut  -medicines for diabetes or glaucoma like acetazolamide, methazolamide  -medicines for gout  -medicines that treat or prevent blood clots like enoxaparin, heparin, ticlopidine, warfarin  -other aspirin and aspirin-like medicines  -NSAIDs, medicines for pain and inflammation, like ibuprofen or naproxen  -pemetrexed  -sulfinpyrazone  -varicella live vaccine  This list may not describe all possible interactions. Give your health care provider a list of all the medicines, herbs, non-prescription drugs, or dietary supplements you use. Also tell them if you smoke, drink alcohol, or use illegal  drugs. Some items may interact with your medicine.  What should I watch for while using this medicine?  If you are treating yourself for pain, tell your doctor or health care professional if the pain lasts more than 10 days, if it gets worse, or if there is a new or different kind of pain. Tell your doctor if you see redness or swelling. Also, check with your doctor if you have a fever that lasts for more than 3 days. Only take this medicine to prevent heart attacks or blood clotting if prescribed by your doctor or health care professional.  Do not take aspirin or aspirin-like medicines with this medicine. Too much aspirin can be dangerous. Always read the labels carefully.  This medicine can irritate your stomach or cause bleeding problems. Do not smoke cigarettes or drink alcohol while taking this medicine. Do not lie down for 30 minutes after taking this medicine to prevent irritation to your throat.  If you are scheduled for any medical or dental procedure, tell your healthcare provider that you are taking this medicine. You may need to stop taking this medicine before the procedure.  This medicine may be used to treat migraines. If you take migraine medicines for 10 or more days a month, your migraines may get worse. Keep a diary of headache days and medicine use. Contact your healthcare professional if your migraine attacks occur more frequently.  What side effects may I notice from receiving this medicine?  Side effects that you should report to your doctor or health care professional as soon as possible:  -allergic reactions like skin rash, itching or hives, swelling of the face, lips, or tongue  -breathing problems  -changes in hearing, ringing in the ears  -confusion  -general ill feeling or flu-like symptoms  -pain on swallowing  -redness, blistering, peeling or loosening of the skin, including inside the mouth or nose  -signs and symptoms of bleeding such as bloody or black, tarry stools; red or  dark-brown urine; spitting up blood or brown material that looks like coffee grounds; red spots on the skin; unusual bruising or bleeding from the eye, gums, or nose  -trouble passing urine or change in the amount of urine  -unusually weak or tired  -yellowing of the eyes or skin  Side effects that usually do not require medical attention (report to your doctor or health care professional if they continue or are bothersome):  -diarrhea or constipation  -headache  -nausea, vomiting  -stomach gas, heartburn  This list may not describe all possible side effects. Call your doctor for medical advice about side effects. You may report side effects to FDA at 6-416-JYU-1078.  Where should I keep my medicine?  Keep out of the reach of children.  Store at room temperature between 15 and 30 degrees C (59 and 86 degrees F). Protect from heat and moisture. Do not use this medicine if it has a strong vinegar smell. Throw away any unused medicine after the expiration date.  NOTE: This sheet is a summary. It may not cover all possible information. If you have questions about this medicine, talk to your doctor, pharmacist, or health care provider.  © 2018 [employer] Standard (2014-08-19 11:30:31)      · Is patient discharged on Warfarin / Coumadin?   No     Depression / Suicide Risk    As [facility] are [facility], it is important to learn how to keep safe from harming yourself.    Recognize the warning signs:  · Abrupt changes in personality, positive or negative- including increase in energy   · Giving away possessions  · Change in eating patterns- significant weight changes-  positive or negative  · Change in sleeping patterns- unable to sleep or sleeping all the time   · Unwillingness or inability to communicate  · Depression  · Unusual sadness, discouragement and loneliness  · Talk of wanting to die  · Neglect of personal appearance   · Rebelliousness- reckless behavior  · Withdrawal from  people/activities they love  · Confusion- inability to concentrate     If you or a loved one observes any of these behaviors or has concerns about self-harm, here's what you can do:  · Talk about it- your feelings and reasons for harming yourself  · Remove any means that you might use to hurt yourself (examples: pills, rope, extension cords, firearm)  · Get professional help from the community (Mental Health, Substance Abuse, psychological counseling)  · Do not be alone:Call your Safe Contact- someone whom you trust who will be there for you.  · Call your local CRISIS HOTLINE 608-3981 or 421-956-0248  · Call your local Children's Mobile Crisis Response Team [place] (782) 828-2320 or www.[employer]  · Call the toll free National Suicide Prevention Hotlines   · National Suicide Prevention Lifeline 673-818-QRJI (4738)  · National Hope Line Network 800-SUICIDE (852-0334)

## 2018-06-15 NOTE — DISCHARGE SUMMARY
Surgical D/C Summary    Admitting Date: 6/4/2018      Discharge Date: 6/14/2018      HPI:  Patient is a 72 year old woman who presents with a malunion of the left ankle and a wound from her walking boot.  There was concern about her hip wound, resultant after two hip surgeries.  She was taken to the operating room by Dr. Vazquez, who confirmed a superficial wound infection, then placed a wound vac.  She also has peripheral arterial disease and the ankle wound would not heal because of a left SFa occlusion.  She was taken to the OR for a left fem-pop bypass and did well following surgery.  Home health has been arranged to change a wound vac on the ankle and she has follow-up with Dr. Vazquez next week.  Staples should be removed from the left medial thigh between 6/18 -6/20.     Surgical Procedures:  Procedure(s) and Anesthesia Type:     * FEMORAL POPLITEAL BYPASS - General     * IRRIGATION & DEBRIDEMENT ANKLE WOUND - General    Consults:  Will need more research    Activity:  Per ortho - walking boot and walker for support    Diet:  Orders Placed This Encounter   Procedures   • DIET ORDER     Standing Status:   Standing     Number of Occurrences:   1     Order Specific Question:   Diet:     Answer:   Regular [1]     Comments:   previous       Activity, not able to pull from order.     Medications:  Current Facility-Administered Medications   Medication Dose Route Frequency Provider Last Rate Last Dose   • vancomycin 1,300 mg in  mL IVPB  1,300 mg Intravenous Q24HR Chong Vazquez M.D.   Stopped at 06/13/18 2207   • MD ALERT... vancomycin per pharmacy protocol 1 Each  1 Each Other pharmacy to dose Chong Vazquez M.D.       • Pharmacy Consult Request ...Pain Management Review 1 Each  1 Each Other PRN Milana Colin M.D.       • ondansetron (ZOFRAN) syringe/vial injection 4 mg  4 mg Intravenous Q6HRS PRN Milana Colin M.D.   4 mg at 06/14/18 0838   • metoprolol SR (TOPROL XL) tablet 100 mg  100 mg Oral QDAY Chong  NESTOR Vazquez M.D.   100 mg at 06/14/18 0838   • sertraline (ZOLOFT) tablet 100 mg  100 mg Oral DAILY Chong Vazquez M.D.   100 mg at 06/14/18 0945   • dexamethasone (DECADRON) injection 4 mg  4 mg Intravenous Once PRN Chong Vazquez M.D.       • diphenhydrAMINE (BENADRYL) injection 25 mg  25 mg Intravenous Q6HRS PRN Chong Vazquez M.D.       • haloperidol lactate (HALDOL) injection 1 mg  1 mg Intravenous Q6HRS PRN Chong Vazquez M.D.       • scopolamine (TRANSDERM-SCOP) patch 1 Patch  1 Patch Transdermal Q72HRS PRN Chong Vazquez M.D.       • docusate sodium (COLACE) capsule 100 mg  100 mg Oral BID Chong Vazquez M.D.   100 mg at 06/14/18 0838   • senna-docusate (PERICOLACE or SENOKOT S) 8.6-50 MG per tablet 1 Tab  1 Tab Oral Nightly Chong Vazquez M.D.   1 Tab at 06/08/18 2125   • senna-docusate (PERICOLACE or SENOKOT S) 8.6-50 MG per tablet 1 Tab  1 Tab Oral Q24HRS PRN Chong Vazquez M.D.       • polyethylene glycol/lytes (MIRALAX) PACKET 1 Packet  1 Packet Oral BID PRN Chong Vazquez M.D.       • magnesium hydroxide (MILK OF MAGNESIA) suspension 30 mL  30 mL Oral QDAY PRN Chong Vazquez M.D.       • bisacodyl (DULCOLAX) suppository 10 mg  10 mg Rectal Q24HRS PRN Chong Vazquez M.D.       • fleet enema 133 mL  1 Each Rectal Once PRN Chong Vazquez M.D.       • lactated ringers infusion   Intravenous Continuous Milana Colin M.D.   Stopped at 06/14/18 0438   • aspirin EC (ECOTRIN) tablet 81 mg  81 mg Oral BID Chong Vazquez M.D.   81 mg at 06/14/18 0536   • oxyCODONE immediate release (ROXICODONE) tablet 5 mg  5 mg Oral Q3HRS PRN Chong Vazquez M.D.   5 mg at 06/14/18 0838   • oxyCODONE immediate release (ROXICODONE) tablet 10 mg  10 mg Oral Q3HRS PRN Chong Vazquez M.D.   10 mg at 06/14/18 1426   • morphine (pf) 10 mg/ml 10 MG/ML injection 2-4 mg  2-4 mg Intravenous Q HOUR PRN Chong Vazquez M.D.   2 mg at 06/13/18 1002   • diphenhydrAMINE (BENADRYL) tablet/capsule 25 mg  25 mg Oral HS PRN Chong Vazquez M.D.       • tramadol  (ULTRAM) 50 MG tablet 50 mg  50 mg Oral Q4HRS PRN Chong Vazquez M.D.       • mupirocin (BACTROBAN) 2 % ointment   Topical TID Chong Vazquez M.D.   1 Application at 06/13/18 7220       Follow-up:  With me in about 2 weeks.

## 2018-06-18 ENCOUNTER — PATIENT OUTREACH (OUTPATIENT)
Dept: HEALTH INFORMATION MANAGEMENT | Facility: OTHER | Age: 73
End: 2018-06-18

## 2018-07-05 ENCOUNTER — NON-PROVIDER VISIT (OUTPATIENT)
Dept: WOUND CARE | Facility: MEDICAL CENTER | Age: 73
End: 2018-07-05
Attending: NURSE PRACTITIONER
Payer: MEDICARE

## 2018-07-05 PROCEDURE — 97598 DBRDMT OPN WND ADDL 20CM/<: CPT

## 2018-07-05 PROCEDURE — 97597 DBRDMT OPN WND 1ST 20 CM/<: CPT

## 2018-07-05 PROCEDURE — 97605 NEG PRS WND THER DME<=50SQCM: CPT

## 2018-07-05 NOTE — CERTIFICATION
Advanced Wound Care  [facility]  [address]  [address]  [place] [address]  [phone] Fax: [phone]      Initial Evaluation  For Certification Period: 07/05/2018 - 09/25/2018  Start of Care: 07/05/2018          Referring Physician: Italia Bae PA-C  Primary Physician:    Jorge Amador MD    Consulting Physicians:         Wound(s):   Pharmacy of Choice:        Subjective:        HPI:    73 y/o female with HTN, hyperlipidemia, arterial insufficiency. Presents with L medial calf ulcer has been present for several months. Started wound care 5/8/18. L distal medial calf ulcer with erythema, edema to periwound. Heavy amount of green drainage on dressing and green tinged slough to wound bed. Wound cx positive for pseudomonas. She is wearing a walking boot for a fractured L ankle that happened recently but could not tell me exactly how long ago. She also had arterial studies completed 5/28/18 and show TOMI on RLE of 0.92 and LLE TOMI of 0.7. She has 50-75% stenosis on RLE and SFA occlusion on LLE      She fell in December 2017 and fractured left intertrochanteric femur. It was surgically treated with intramedullary device by Dr. Peters. 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 She underwent left total hip arthroplasty with removal of hardware by Dr. Vazquez on 2/11/18. She noticed her leg was wet last night and has asked for incision to be evaluated. She noticed pus coming from leg incision last night with increased erythema and edema.     Dr. Colin performed a Femoral popliteal bypass on 06/08/2018 on left LE.  Patient had wound VAC placed on left medial ankle wound.                  Pain:   4-5/10 throbbing         Past Medical History:  Past Medical History:   Diagnosis Date   • Anxiety    • Dental disorder     full dentures   • Generalized osteoarthritis of multiple sites 10/20/2015   • Heart valve disease     pt not  sure    • Hyperlipidemia    • Hypertension    • Osteoporosis        Current Medications:  Current Outpatient Prescriptions:   •  ciprofloxacin (CIPRO) 500 MG Tab, Take 500 mg by mouth 2 times a day. Pt started a 10 day course on 5/29, Disp: , Rfl:   •  metoprolol SR (TOPROL XL) 100 MG TABLET SR 24 HR, TAKE 1 TABLET BY MOUTH EVERY DAY, Disp: 90 Tab, Rfl: 0  •  sertraline (ZOLOFT) 100 MG Tab, TAKE 1 TAB BY MOUTH EVERY DAY., Disp: 30 Tab, Rfl: 5    Allergies: No Known Allergies    Past Surgical History:   Past Surgical History:   Procedure Laterality Date   • FEMORAL POPLITEAL BYPASS Left 6/8/2018    Procedure: FEMORAL POPLITEAL BYPASS;  Surgeon: Milana Colin M.D.;  Location: [facility];  Service: General   • IRRIGATION & DEBRIDEMENT GENERAL Left 6/8/2018    Procedure: IRRIGATION & DEBRIDEMENT ANKLE WOUND;  Surgeon: Milana Colin M.D.;  Location: [facility];  Service: General   • IRRIGATION & DEBRIDEMENT HIP Left 6/4/2018    Procedure: IRRIGATION & DEBRIDEMENT HIP;  Surgeon: Chong Vazquez M.D.;  Location: [facility];  Service: Orthopedics   • HIP REVISION TOTAL Left 2/11/2018    Procedure: HIP REVISION TOTAL- femoral nail removal conversion to total hip arthoroplasty;  Surgeon: Chong Vazquez M.D.;  Location: [facility];  Service: Orthopedics   • HIP NAILING INTRAMEDULLARY Left 12/20/2017    Procedure: HIP NAILING INTRAMEDULLARY;  Surgeon: Jorge Peters M.D.;  Location: [facility];  Service: Orthopedics   • BREAST BIOPSY  8/28/2014    Performed by Magdalena Londono M.D. 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  [employer]     Social History Main Topics   • Smoking status: Former Smoker     Packs/day: 0.50     Years: 25.00     Types: Cigarettes     Quit date: 1/1/2007   • Smokeless tobacco: Never Used   • Alcohol use No   • Drug use: No   • Sexual activity: Not Currently     Partners: Male     Other Topics Concern   • Not on file     Social History Narrative   • No narrative on file             Objective:      Tests and Measures:    Orthotic, protective, supportive devices: none    Fall Risk Assessment (jeronimo all that apply with an X):  Completed 07/05/2018   x65 years or older     xFall within the last 2 years, uses   xAmbulatory devices  Loss of protective sensation in feet,   Use of prostethic/orthotic, years    Presence of lower extremity/foot/toe amputation   xTaking medication that increases risk (per facility policy)    Interventions Recommended (if any of the above are selected):   xUse of Assistive Device:_____walker/cane___________________   xSupervision with ambulation:  Caregiver   xAssistance with ambulation:  Caregiver   xHome safety education:  Educational material provided         Wound Characteristics                                                    Location:  Left Medial Ankle Initial Evaluation  Date: 07/05/2018     Tissue Type and %: 60% moist pink, 40% adherent yellow   Periwound: Slight maceration, erythema   Drainage: Moderate to heavy serous   Exposed structures none   Wound Edges:   open   Odor: none   S&S of Infection:   erythema   Edema: 2+ pitting   Sensation: intact               Measurements:  Left Medial Ankle Initial Evaluation  Date: 07/05/2018  Proximal / Distal    Length (cm) 6.5 / 3.5   Width (cm) 4.5 / 2.3   Depth (cm) 0.4 / 0.1   Area (cm2) 29.25 / 8.05   Tract/undermine None        Procedures:     Debridement :  CSWD with curette and forceps to remove ~40.0cm2 of non-viable tissue and maceration   Cleansed with:  Normal saline and gauze                                                                      Periwound protected with: No sting skin prep, drape   Primary dressing: Proximal : medihoney gel under 1 black foam to base of wound, 1 black foam for bridge and tract pad secured with drape.  Distal: Medihoney gel, aquacel hydrofiber plain with silicone adhesive foam.   Secondary Dressing: Proximal: Secured all with drape, resumed NPWT at 125mmHg, no leak.     Other: Tubigrib size E with wound VAC cord running on the outside of compression stocking.     Patient Education: POC discussed with patient, instructed patient about how wound vac works, may not have any drainage in tube or cannister & it will still be working.  Explained the ultimate goal of NPWT - granulation.  Explained how to change the cannister if it does become full.  Explained that if cannister fills with blood - go to ER.  Explained how to take sponge out and replace with Normal saline moistened gauze, cover with dry gauze and tape - provided patient with a dressings in case problems occur and wound vac is not working properly for > 2 hours.Educated patient to look at suction and determine leaks around wound vac, showed patient how to patch a leak and provided patient with drape pieces to patch leaks.        Professional Collaboration: Initial evaluation sent to referring provider Italia Bae PA-C      Assessment:      Wound etiology: Mixed vascular     Wound Progress:  Initial Evaluation    Rationale for Treatment:NPWT to help expedite granulation. Medihoney gel to help autolytic debridement of wound bed, and to keep wound bed moist.      Patient tolerance/compliance: Patient tolerated treatment well and willing to comply with NPWT 3x/weekly visit for placement.    Complicating factors: Age, mixed vascularity and poor healing.    Need for ongoing Advanced Wound Care services:Patient requires skilled therapeutic wound care services for product selection, application of product, debridement,  close monitoring with clinical assessment for expedite of wound healing.         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  The dressings or modalities may need to be adapted   from the original plan to address any significant changes in the wound environment.          Clinician Signature:_______________________________Date__________________      Physician Signature:______________________________Date:__________________

## 2018-07-10 ENCOUNTER — NON-PROVIDER VISIT (OUTPATIENT)
Dept: WOUND CARE | Facility: MEDICAL CENTER | Age: 73
End: 2018-07-10
Attending: NURSE PRACTITIONER
Payer: MEDICARE

## 2018-07-10 PROCEDURE — 97598 DBRDMT OPN WND ADDL 20CM/<: CPT

## 2018-07-10 PROCEDURE — 97597 DBRDMT OPN WND 1ST 20 CM/<: CPT

## 2018-07-10 NOTE — WOUND TEAM
Advanced Wound Care  [facility]  [address]  [address]  [place] [address]  [phone] Fax: [phone]      Encounter Note  For Certification Period: 07/05/2018 - 09/25/2018  Start of Care: 07/05/2018          Referring Physician: Italia Bae PA-C  Primary Physician:    Jorge Amador MD    Consulting Physicians:         Wound(s):   Pharmacy of Choice:        Subjective:        HPI:    73 y/o female with HTN, hyperlipidemia, arterial insufficiency. Presents with L medial calf ulcer has been present for several months. Started wound care 5/8/18. L distal medial calf ulcer with erythema, edema to periwound. Heavy amount of green drainage on dressing and green tinged slough to wound bed. Wound cx positive for pseudomonas. She is wearing a walking boot for a fractured L ankle that happened recently but could not tell me exactly how long ago. She also had arterial studies completed 5/28/18 and show TOMI on RLE of 0.92 and LLE TOMI of 0.7. She has 50-75% stenosis on RLE and SFA occlusion on LLE      She fell in December 2017 and fractured left intertrochanteric femur. It was surgically treated with intramedullary device by Dr. Peters. 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 She underwent left total hip arthroplasty with removal of hardware by Dr. Vazquez on 2/11/18. She noticed her leg was wet last night and has asked for incision to be evaluated. She noticed pus coming from leg incision last night with increased erythema and edema.     Dr. Colin performed a Femoral popliteal bypass on 06/08/2018 on left LE.  Patient had wound VAC placed on left medial ankle wound.                  Pain:   4-5/10 throbbing         Past Medical History:  Past Medical History:   Diagnosis Date   • Anxiety    • Dental disorder     full dentures   • Generalized osteoarthritis of multiple sites 10/20/2015   • Heart valve disease     pt not sure     • Hyperlipidemia    • Hypertension    • Osteoporosis        Current Medications:  Current Outpatient Prescriptions:   •  ciprofloxacin (CIPRO) 500 MG Tab, Take 500 mg by mouth 2 times a day. Pt started a 10 day course on 5/29, Disp: , Rfl:   •  metoprolol SR (TOPROL XL) 100 MG TABLET SR 24 HR, TAKE 1 TABLET BY MOUTH EVERY DAY, Disp: 90 Tab, Rfl: 0  •  sertraline (ZOLOFT) 100 MG Tab, TAKE 1 TAB BY MOUTH EVERY DAY., Disp: 30 Tab, Rfl: 5    Allergies: No Known Allergies    Past Surgical History:   Past Surgical History:   Procedure Laterality Date   • FEMORAL POPLITEAL BYPASS Left 6/8/2018    Procedure: FEMORAL POPLITEAL BYPASS;  Surgeon: Milana Colin M.D.;  Location: [facility];  Service: General   • IRRIGATION & DEBRIDEMENT GENERAL Left 6/8/2018    Procedure: IRRIGATION & DEBRIDEMENT ANKLE WOUND;  Surgeon: Milana Colin M.D.;  Location: [facility];  Service: General   • IRRIGATION & DEBRIDEMENT HIP Left 6/4/2018    Procedure: IRRIGATION & DEBRIDEMENT HIP;  Surgeon: Chong Vazquez M.D.;  Location: [facility];  Service: Orthopedics   • HIP REVISION TOTAL Left 2/11/2018    Procedure: HIP REVISION TOTAL- femoral nail removal conversion to total hip arthoroplasty;  Surgeon: Chong Vazquez M.D.;  Location: [facility];  Service: Orthopedics   • HIP NAILING INTRAMEDULLARY Left 12/20/2017    Procedure: HIP NAILING INTRAMEDULLARY;  Surgeon: Jorge Peters M.D.;  Location: [facility];  Service: Orthopedics   • BREAST BIOPSY  8/28/2014    Performed by Magdalena Londono M.D. 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  [employer]     Social History Main Topics   • Smoking status: Former Smoker     Packs/day: 0.50     Years: 25.00     Types: Cigarettes     Quit date: 1/1/2007   • Smokeless tobacco: Never Used   • Alcohol use No   • Drug use: No   • Sexual activity: Not Currently     Partners: Male     Other Topics Concern   • Not on file     Social History Narrative   • No narrative on file        Objective:      Tests and Measures:    Orthotic, protective, supportive devices: none    Fall Risk Assessment (jeronimo all that apply with an X):  Completed 07/05/2018: high fall risk       Wound Characteristics                                                    Location:  Left Medial Ankle Initial Evaluation  Date: 07/05/2018   Encounter Date: 07/10/18   Tissue Type and %: 60% moist pink, 40% adherent yellow 60% moist red, 40% adherent yellow   Periwound: Slight maceration, erythema Heavy maceration   Drainage: Moderate to heavy serous Pt just changed cannister prior to her appt, so no drainage in it.  Very wet under vac drape.   Exposed structures None None   Wound Edges:   Open Open   Odor: None None   S&S of Infection:   erythema None; pt on lifelong oral abx.   Edema: 2+ pitting Nonpitting entire LE   Sensation: intact intact               Measurements:  Left Medial Ankle Initial Evaluation  Date: 07/05/2018  Proximal / Distal    Length (cm) 6.5 / 3.5   Width (cm) 4.5 / 2.3   Depth (cm) 0.4 / 0.1   Area (cm2) 29.25 / 8.05   Tract/undermine None     7/10/18: Vac held due to heavily macerated yuki-wound.   Procedures:     Debridement :  CSWD using curette to remove ~30cm2 slough from wound beds.   Cleansed with:  No rinse foam cleanser to entire LE.  NS rinse to wounds after debridement.                                                           Periwound protected with: No sting skin prep, zinc paste   Primary dressing: Aquacel ag   Secondary Dressing: Plain hydrofiber     Other: Secured with  kerlix/hypafix and pt's own sock.     Patient Education: POC and wound progress discussed with pt today.  Rationale for holding vac taught.  Plan to reapply vac at next visit in two days taught, and need to bring vac supplies for this.  S/sx infection to monitor for reviewed.  Pt is on lifelong doxycycline.  She ran out and is having difficulty refilling.  Need to coordinate with prescribing doctor/pharmacy and resume taking immediately taught.  Pt expressed understanding of instructions.        Professional Collaboration: None today    Assessment:      Wound etiology: Mixed vascular     Wound Progress:  Yuki wound heavily macerated; vac held.    Rationale for Treatment: zinc paste to protect yuki wound from moisture/drainage.  AqAg to manage bioburden, absorb exudate, and maintain moist wound environment without laterally wicking exudate therefore reducing yuki-wound maceration.  Plain hydrofiber for additional absorption.  Kerlix to absorb/pad/protect/secure.    Patient tolerance/compliance: Patient tolerated treatment well and willing to comply with NPWT 3x/weekly visit for placement.    Complicating factors: Age, mixed vascularity and poor healing.    Need for ongoing Advanced Wound Care services:Patient requires skilled therapeutic wound care services for product selection, application of product, debridement, close monitoring with clinical assessment for expedite of wound healing.         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  The dressings or modalities may need to be adapted   from the original plan to address any significant changes in the wound environment.          Clinician Signature:_______________________________Date__________________      Physician Signature:______________________________Date:__________________

## 2018-07-12 ENCOUNTER — NON-PROVIDER VISIT (OUTPATIENT)
Dept: WOUND CARE | Facility: MEDICAL CENTER | Age: 73
End: 2018-07-12
Attending: NURSE PRACTITIONER
Payer: MEDICARE

## 2018-07-12 PROCEDURE — 97597 DBRDMT OPN WND 1ST 20 CM/<: CPT

## 2018-07-12 PROCEDURE — 97598 DBRDMT OPN WND ADDL 20CM/<: CPT

## 2018-07-12 NOTE — WOUND TEAM
Advanced Wound Care  [facility]  [address]  [address]  [place] [address]  [phone] Fax: [phone]    Encounter Note  For Certification Period: 07/05/2018 - 09/25/2018  Start of Care: 07/05/2018    Referring Physician: Italia Bae PA-C  Primary Physician:    Jorge Amador MD    Consulting Physicians:         Wound(s): Left medial ankle - proximal and distal       Subjective:        HPI:  73 y/o female with HTN, hyperlipidemia, arterial insufficiency. Presents with L medial calf ulcer has been present for several months. Started wound care 5/8/18. L distal medial calf ulcer with erythema, edema to periwound. Heavy amount of green drainage on dressing and green tinged slough to wound bed. Wound cx positive for pseudomonas. She is wearing a walking boot for a fractured L ankle that happened recently but could not tell me exactly how long ago. She also had arterial studies completed 5/28/18 and show TOMI on RLE of 0.92 and LLE TOMI of 0.7. She has 50-75% stenosis on RLE and SFA occlusion on LLE      She fell in December 2017 and fractured left intertrochanteric femur. It was surgically treated with intramedullary device by Dr. Peters. 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 She underwent left total hip arthroplasty with removal of hardware by Dr. Vazquez on 2/11/18. She noticed her leg was wet last night and has asked for incision to be evaluated. She noticed pus coming from leg incision last night with increased erythema and edema.     Dr. Colin performed a Femoral popliteal bypass on 06/08/2018 on left LE.  Patient had wound VAC placed on left medial ankle wound.              Pain: 6/10 throbbing         Past Medical History:  Past Medical History:   Diagnosis Date   • Anxiety    • Dental disorder     full dentures   • Generalized osteoarthritis of multiple sites 10/20/2015   • Heart valve disease     pt not sure     • Hyperlipidemia    • Hypertension    • Osteoporosis      Current Medications:  Current Outpatient Prescriptions:   •  ciprofloxacin (CIPRO) 500 MG Tab, Take 500 mg by mouth 2 times a day. Pt started a 10 day course on 5/29, Disp: , Rfl:   •  metoprolol SR (TOPROL XL) 100 MG TABLET SR 24 HR, TAKE 1 TABLET BY MOUTH EVERY DAY, Disp: 90 Tab, Rfl: 0  •  sertraline (ZOLOFT) 100 MG Tab, TAKE 1 TAB BY MOUTH EVERY DAY., Disp: 30 Tab, Rfl: 5    Allergies: No Known Allergies     Objective:      Tests and Measures:    Orthotic, protective, supportive devices: none    Fall Risk Assessment (jeronimo all that apply with an X):  Completed 07/05/2018: high fall risk       Wound Characteristics                                                    Location:  Left Medial Ankle Initial Evaluation  Date: 07/05/2018   Encounter Date: 07/12/18   Tissue Type and %: 60% moist pink, 40% adherent yellow 70% moist red, 30% adherent yellow   Periwound: Slight maceration, erythema Resolving maceration   Drainage: Moderate to heavy serous Moderate to heavy ss   Exposed structures None None   Wound Edges:   Open Open   Odor: None None   S&S of Infection:   erythema None; pt on lifelong oral abx.   Edema: 2+ pitting Nonpitting entire LE   Sensation: intact Intact               Measurements:  Left Medial Ankle Initial Evaluation  Date: 07/05/2018  Proximal / Distal  Encounter Date:  07/12/2018  Proximal / Distal   Length (cm) 6.5 / 3.5 6.3     / 3.4   Width (cm) 4.5 / 2.3 4.2     / 2.3   Depth (cm) 0.4 / 0.1 0.3     / 0.1   Area (cm2) 29.25 / 8.05 26.46 / 7.82 cm2     Tract/undermine None None     7/10/18: Vac held due to heavily macerated yuki-wound.     Procedures:     Debridement:  CSWD using scalpel to remove ~30cm2 slough from wound beds.   Cleansed with:  No rinse foam cleanser to entire LE.  NS rinse to wounds after debridement.                                                           Periwound protected with: No sting skin prep, zinc  paste   Primary dressing: Aquacel ag   Secondary Dressing: Plain hydrofiber; ad foam to both wounds     Other: Secured with kerlix/hypafix and pt's own sock.     Patient Education: POC and wound progress discussed with pt today. Patient states that her roommate put the vac in the garage and she couldn't find it before appt but will bring in Saturday after roommate finds it. Maceration is significantly decreased today. S/sx infection to monitor for reviewed.  Pt is on lifelong doxycycline. She ran out but should be picking it up either today or tomorrow. Educated about the importance of continuing abx. Pt expressed understanding of instructions.      Professional Collaboration: None today      Assessment:      Wound etiology: Mixed vascular     Wound Progress:  Yuki wound with resolving maceration; will resume vac next appt. Patient could not find it before appt today.     Rationale for Treatment: zinc paste to protect yuki wound from moisture/drainage.  AqAg to manage bioburden, absorb exudate, and maintain moist wound environment without laterally wicking exudate therefore reducing yuki-wound maceration.  Plain hydrofiber for additional absorption.  Kerlix to absorb/pad/protect/secure.    Patient tolerance/compliance: Patient tolerated treatment well and willing to comply with NPWT 3x/weekly visit for placement.    Complicating factors: Age, mixed vascularity and poor healing.    Need for ongoing Advanced Wound Care services:Patient requires skilled therapeutic wound care services for product selection, application of product, debridement, close monitoring with clinical assessment for expedite of wound healing.         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  The dressings or modalities may need to be adapted   from the original plan to address any significant changes in the wound environment.          Clinician Signature:_______________________________Date__________________      Physician Signature:______________________________Date:__________________

## 2018-07-16 ENCOUNTER — PATIENT OUTREACH (OUTPATIENT)
Dept: HEALTH INFORMATION MANAGEMENT | Facility: OTHER | Age: 73
End: 2018-07-16

## 2018-07-16 ENCOUNTER — NON-PROVIDER VISIT (OUTPATIENT)
Dept: WOUND CARE | Facility: MEDICAL CENTER | Age: 73
End: 2018-07-16
Attending: NURSE PRACTITIONER
Payer: MEDICARE

## 2018-07-16 PROCEDURE — 97597 DBRDMT OPN WND 1ST 20 CM/<: CPT

## 2018-07-16 NOTE — WOUND TEAM
"Advanced Wound Care  [facility]  [address]  [address]  [place] [address]  [phone] Fax: [phone]    Encounter Note  For Certification Period: 07/05/2018 - 09/25/2018  Start of Care: 07/05/2018    Referring Physician: Italia Bae PA-C  Primary Physician:    Jorge Amador MD    Consulting Physicians:         Wound(s): Left medial ankle - proximal and distal       Subjective:        HPI:  73 y/o female with HTN, hyperlipidemia, arterial insufficiency. Presents with L medial calf ulcer has been present for several months. Started wound care 5/8/18. L distal medial calf ulcer with erythema, edema to periwound. Heavy amount of green drainage on dressing and green tinged slough to wound bed. Wound cx positive for pseudomonas. She is wearing a walking boot for a fractured L ankle that happened recently but could not tell me exactly how long ago. She also had arterial studies completed 5/28/18 and show TOMI on RLE of 0.92 and LLE TOMI of 0.7. She has 50-75% stenosis on RLE and SFA occlusion on LLE      She fell in December 2017 and fractured left intertrochanteric femur. It was surgically treated with intramedullary device by Dr. Peters. 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 She underwent left total hip arthroplasty with removal of hardware by Dr. Vazquez on 2/11/18. She noticed her leg was wet last night and has asked for incision to be evaluated. She noticed pus coming from leg incision last night with increased erythema and edema.     Dr. Colin performed a Femoral popliteal bypass on 06/08/2018 on left LE.  Patient had wound VAC placed on left medial ankle wound.              Pain: 7/10 Pt states, \"throbbing during certain times of the day depending on how much I walk.\"        Past Medical History:  Past Medical History:   Diagnosis Date   • Anxiety    • Dental disorder     full dentures   • Generalized " osteoarthritis of multiple sites 10/20/2015   • Heart valve disease     pt not sure    • Hyperlipidemia    • Hypertension    • Osteoporosis      Current Medications:  Current Outpatient Prescriptions:   •  ciprofloxacin (CIPRO) 500 MG Tab, Take 500 mg by mouth 2 times a day. Pt started a 10 day course on 5/29, Disp: , Rfl:   •  metoprolol SR (TOPROL XL) 100 MG TABLET SR 24 HR, TAKE 1 TABLET BY MOUTH EVERY DAY, Disp: 90 Tab, Rfl: 0  •  sertraline (ZOLOFT) 100 MG Tab, TAKE 1 TAB BY MOUTH EVERY DAY., Disp: 30 Tab, Rfl: 5    Allergies: No Known Allergies     Objective:      Tests and Measures: L foot palpable DP pulses, foot warm, hair growth absent.    Orthotic, protective, supportive devices: ortho boot from ortho surgeon    Fall Risk Assessment (jeronimo all that apply with an X):  Completed 07/05/2018: high fall risk       Wound Characteristics                                                    Location:  Left Medial Ankle Initial Evaluation  Date: 07/05/2018   Encounter Date: 07/12/18 Encounter Date: 07/16/18   Tissue Type and %: 60% moist pink, 40% adherent yellow 70% moist red, 30% adherent yellow 90% yellow adherent, 10% red viable   Periwound: Slight maceration, erythema Resolving maceration Resolving maceration   Drainage: Moderate to heavy serous Moderate to heavy ss Copious SS   Exposed structures None None none   Wound Edges:   Open Open open   Odor: None None none   S&S of Infection:   erythema None; pt on lifelong oral abx. None, lifelong ABX   Edema: 2+ pitting Nonpitting entire LE 1+   Sensation: intact Intact intact               Measurements:  Left Medial Ankle Initial Evaluation  Date: 07/05/2018  Proximal / Distal  Encounter Date:  07/12/2018  Proximal / Distal   Length (cm) 6.5 / 3.5 6.3     / 3.4   Width (cm) 4.5 / 2.3 4.2     / 2.3   Depth (cm) 0.4 / 0.1 0.3     / 0.1   Area (cm2) 29.25 / 8.05 26.46 / 7.82 cm2     Tract/undermine None None     7/10/18: Vac held due to heavily macerated yuki-wound.     7/16/18: VAC hold continued due to 90% yellow adherent tissue and resolving periwound maceration.  Pt will bring VAC next visit.     Procedures:     Debridement:  CSWD using curette to remove ~10cm2 slough from wound beds.   Cleansed with:  No rinse foam cleanser to entire LE.  NS rinse to wounds after debridement.                                                           Periwound protected with: No sting skin prep, zinc paste   Primary dressing: honey alginate    Secondary Dressing: ad foam to both wounds     Other: Secured with kerlix/hypafix, Tubi G, and pt's own sock.     Patient Education: Reviewed POC, to get boot re-evaluated by [facility] for modification if pt unable to go back to ortho promptly (pt c/o of pain when using boot to ambulate), importance of offloading, importance of keeping the secondary dressing dry and intact, nutrition for wound healing, tubigrip (compression), s/s of complications/infection, when to notify MD/go to ER.  Pt verbalized understanding to all.  Pt on  ABX with 11 refills managed by surgeon.    Professional Collaboration: None today      Assessment:      Wound etiology: Mixed vascular     Wound Progress:  Yuki wound with resolving maceration; tissue quality worse with 90% yellow adherent, if tissue quality improves, resume vac next appt or schedule more extensive debridement with provider.    Rationale for Treatment: zinc paste to protect yuki wound from moisture/drainage.  Honey alginate to facilitate autolytic debridement and maintain moist wound environment with absorptive properties.  Kerlix to absorb/pad/protect/secure.    Patient tolerance/compliance: Patient tolerated treatment well and willing to comply with NPWT 3x/weekly visit for placement.    Complicating factors: Age, mixed vascularity and poor healing.    Need for ongoing Advanced Wound Care services:Patient requires skilled therapeutic wound care services for product selection, application of product, debridement,  close monitoring with clinical assessment for expedite of wound healing.         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  The dressings or modalities may need to be adapted   from the original plan to address any significant changes in the wound environment.          Clinician Signature:_______________________________Date__________________      Physician Signature:______________________________Date:__________________

## 2018-07-18 ENCOUNTER — NON-PROVIDER VISIT (OUTPATIENT)
Dept: WOUND CARE | Facility: MEDICAL CENTER | Age: 73
End: 2018-07-18
Attending: NURSE PRACTITIONER
Payer: MEDICARE

## 2018-07-18 PROCEDURE — 97597 DBRDMT OPN WND 1ST 20 CM/<: CPT

## 2018-07-20 ENCOUNTER — OFFICE VISIT (OUTPATIENT)
Dept: WOUND CARE | Facility: MEDICAL CENTER | Age: 73
End: 2018-07-20
Attending: NURSE PRACTITIONER
Payer: MEDICARE

## 2018-07-20 ENCOUNTER — HOSPITAL ENCOUNTER (OUTPATIENT)
Facility: MEDICAL CENTER | Age: 73
End: 2018-07-20
Attending: NURSE PRACTITIONER
Payer: MEDICARE

## 2018-07-20 DIAGNOSIS — T14.8XXA WOUND INFECTION: ICD-10-CM

## 2018-07-20 DIAGNOSIS — L08.9 WOUND INFECTION: ICD-10-CM

## 2018-07-20 DIAGNOSIS — L97.909 ARTERIAL LEG ULCER (HCC): Primary | ICD-10-CM

## 2018-07-20 DIAGNOSIS — I73.9 PERIPHERAL VASCULAR DISEASE (HCC): ICD-10-CM

## 2018-07-20 DIAGNOSIS — R52 PAIN: ICD-10-CM

## 2018-07-20 PROBLEM — L97.923 NON-PRESSURE CHRONIC ULCER OF LEFT LOWER LEG WITH NECROSIS OF MUSCLE (HCC): Status: ACTIVE | Noted: 2018-07-20

## 2018-07-20 PROCEDURE — 87205 SMEAR GRAM STAIN: CPT

## 2018-07-20 PROCEDURE — 87077 CULTURE AEROBIC IDENTIFY: CPT

## 2018-07-20 PROCEDURE — 11045 DBRDMT SUBQ TISS EACH ADDL: CPT

## 2018-07-20 PROCEDURE — 11043 DBRDMT MUSC&/FSCA 1ST 20/<: CPT | Performed by: NURSE PRACTITIONER

## 2018-07-20 PROCEDURE — 87070 CULTURE OTHR SPECIMN AEROBIC: CPT

## 2018-07-20 PROCEDURE — 11042 DBRDMT SUBQ TIS 1ST 20SQCM/<: CPT

## 2018-07-20 PROCEDURE — 87186 SC STD MICRODIL/AGAR DIL: CPT

## 2018-07-20 PROCEDURE — 11046 DBRDMT MUSC&/FSCA EA ADDL: CPT | Performed by: NURSE PRACTITIONER

## 2018-07-20 PROCEDURE — 97605 NEG PRS WND THER DME<=50SQCM: CPT

## 2018-07-20 RX ORDER — DOXYCYCLINE HYCLATE 100 MG
100 TABLET ORAL 2 TIMES DAILY
COMMUNITY
End: 2018-07-31

## 2018-07-20 NOTE — PROGRESS NOTES
Sharon Mae was an emergent admission who discharged on 6/14. Patient advocate was able to assist the patient by scheduling a sooner follow up with [facility]. IHD also was able to speak with the patient on several occasions to confirm that the patient was following up with providers as well as taking her medications as directed. Patient kept her appointments with [facility] starting 6/17, [facility] 6/20, [facility] 6/27 and 6/29, and [facility] starting 7/5. Patient has a follow up scheduled with her PCP for 7/31.

## 2018-07-20 NOTE — WOUND TEAM
"Advanced Wound Care  [facility]  [address]  [address]  [place] [address]  [phone] Fax: [phone]    Encounter Note  For Certification Period: 07/05/2018 - 09/25/2018  Start of Care: 07/05/2018    Referring Physician: Italia Bae PA-C  Primary Physician: Jorge Amador MD    Consulting Physicians:         Wound(s): Left medial ankle - proximal and distal       Subjective:        HPI: 73 y/o female with HTN, hyperlipidemia, arterial insufficiency. Presents with L medial calf ulcer has been present for several months. Started wound care 5/8/18. L distal medial calf ulcer with erythema, edema to periwound. Heavy amount of green drainage on dressing and green tinged slough to wound bed. Wound cx positive for pseudomonas. She is wearing a walking boot for a fractured L ankle that happened recently but could not tell me exactly how long ago. She also had arterial studies completed 5/28/18 and show TOMI on RLE of 0.92 and LLE TOMI of 0.7. She has 50-75% stenosis on RLE and SFA occlusion on LLE      She fell in December 2017 and fractured left intertrochanteric femur. It was surgically treated with intramedullary device by Dr. Peters. 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 She underwent left total hip arthroplasty with removal of hardware by Dr. Vazquez on 2/11/18. She noticed her leg was wet last night and has asked for incision to be evaluated. She noticed pus coming from leg incision last night with increased erythema and edema.     Dr. Colin performed a Femoral popliteal bypass on 06/08/2018 on left LE.  Patient had wound VAC placed on left medial ankle wound.              Pain: 7/10 Pt states, \"throbbing during certain times of the day depending on how much I walk.\"   2% viscous lidocaine used for ~5-10 minute dwell time.      Allergies: No Known Allergies     Objective:      Tests and Measures: L foot palpable DP " pulses, foot warm, hair growth absent.    Orthotic, protective, supportive devices: ortho boot from ortho surgeon    Fall Risk Assessment (jeronimo all that apply with an X):  Completed 2018: high fall risk       Wound Characteristics                                                    Location:  Left Medial Ankle Initial Evaluation  Date: 2018   Encounter Date: 18 Encounter Date: 18   Tissue Type and %: 60% moist pink, 40% adherent yellow 70% moist red, 30% adherent yellow 100% moist pink   Periwound: Slight maceration, erythema Resolving maceration Flaky dry   Drainage: Moderate to heavy serous Moderate to heavy ss Heavy serosanguinous with serous fluid in LE   Exposed structures None None None   Wound Edges:   Open Open Open   Odor: None None None   S&S of Infection:   erythema None; pt on lifelong oral abx. None, lifelong ABX   Edema: 2+ pitting Nonpitting entire LE 3+ pitting   Sensation: intact Intact intact               Measurements:  Left Medial Ankle Initial Evaluation  Date: 2018  Proximal / Distal  Encounter Date:  2018  Proximal / Distal   Length (cm) 6.5 / 3.5 6.0       / 4.3   Width (cm) 4.5 / 2.3 5.0      / 2.5   Depth (cm) 0.4 / 0.1 0.5      / 0.1   Area (cm2) 29.25 / 8.05 30.0  / 10.75cm2     Tract/undermine None None                  Procedures:  C&S taken of wound bed per Jennifer PERDUE   Debridement:  Excisional debridement by Jennifer PERDUE, please see her note.   Cleansed with:  No rinse foam cleanser to entire LE.  NS rinse to wounds after debridement.                                                           Periwound protected with: No sting skin prep, drape   Primary dressin black foam to distal wound bed, and 1 black foam to proximal wound bed bridged to anterior of LE.  2 total black foams. Secured with drape   Secondary Dressing: Restarted NPWT at 125mmHg.      Other: 2 layer coflex compression wrap to Left LE       Patient Education: POC was  discussed by APRN and patient.  Patient to keep dressing dry and intact. Patient educated when to cut compression wrap and what s/s of infection.        Pt on  ABX with 11 refills managed by surgeon.    Professional Collaboration:Jennifer PERDUE for excisional debridement     Assessment:      Wound etiology: Mixed vascular     Wound Progress:  Excisional debridement by APRN, 100% moist pink tissue     Rationale for Treatment: zinc paste to protect yuki wound from moisture/drainage. Honey alginate to facilitate autolytic debridement and maintain moist wound environment with absorptive properties. Kerlix to absorb/pad/protect/secure.    Patient tolerance/compliance: Patient tolerated treatment well and willing to comply with NPWT 3x/weekly visit for placement.    Complicating factors: Age, mixed vascularity and poor healing.    Need for ongoing Advanced Wound Care services:Patient requires skilled therapeutic wound care services for product selection, application of product, debridement, close monitoring with clinical assessment for expedite of wound healing.     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  The dressings or modalities may need to be adapted   from the original plan to address any significant changes in the wound environment.          Clinician  Signature:_______________________________Date__________________      Physician Signature:______________________________Date:__________________

## 2018-07-21 DIAGNOSIS — L97.909 ARTERIAL LEG ULCER (HCC): ICD-10-CM

## 2018-07-21 LAB
GRAM STN SPEC: NORMAL
SIGNIFICANT IND 70042: NORMAL
SITE SITE: NORMAL
SOURCE SOURCE: NORMAL

## 2018-07-21 NOTE — PROGRESS NOTES
PROCEDURE NOTE    Patient seen in collaboration with wound care provider, Linda Main RN    HPI: 71 y/o female with HTN, hyperlipidemia, arterial insufficiency, and L hip fracture with arthroplasty in February 2018, who is being treated in the wound clinic for ulcers to her left medial ankle and left medial/distal calf.  She started treatment for these wounds in the clinic on 5/8/18.  Arterial studies were ordered and completed on 5/28/18, showing a LLE TOMI of 0.7, with a 50-75% stenosis on RLE and SFA occlusion on LLE.  She underwent a left femoral popliteal bypass, with wound debridement and VAC placement on 06/08/2018 with Dr. Colin.   Post-operatively she returned to the wound clinic to resume treatment of these wounds.   The wound beds have developed a thick layer of adherent slough, and the wound edges have epibolized.  She is seen by me today for excisional debridement of the wound beds and wound edges in an effort to accelerate wound healing.  Her pedal pulses and pulse over the bypass graft are palpable, and multiphasic by doppler.     The procedure, rationale for doing so, and the possible risks- including infection, pain and bleeding- have been discussed with the patient.  The patient  verbalized understanding and is agreeable with proceeding.  Consent signed.       DESCRIPTION OF PROCEDURE:  Excisional debridement of wounds beds and wound edges of medial LLE ulcers x 2 using local anesthesia    PMH, medications and recent labs reviewed    ANESTHESIA:  12 ml 2% lidocaine     PROCEDURE: A formal timeout was performed to confirm patient's correct name, correct site, correct procedure and correct laterality.  Skin prepped with Chlorahexidine.  Lidocaine injected subcutaneously adjacent to the open wounds.  Scalpel, curette, and forceps used to excise slough and senescent red tissue from the wound beds, and skin and SQ tissue from epibolized wound edges.  Total area debrided, ~41cm2.  Debridement carried  into the muscle layer. Bleeding controlled with manual pressure.  Wound care completed by Linda.  Patient tolerated the procedure well, though she does c/o some pain post-procedure.      Wound Characteristics                                                    Location:  Left Medial Ankle Encounter Date: 07/20/18   Tissue Type and %: 100% moist pink   Periwound: Flaky dry   Drainage: Heavy serosanguinous with serous fluid in LE   Exposed structures None   Wound Edges:   Open   Odor: None   S&S of Infection:   None, lifelong ABX   Edema: 3+ pitting   Sensation: intact               Measurements:  Left Medial Ankle Encounter Date:  07/20/2018  Proximal / Distal   Length (cm) 6.0       / 4.3   Width (cm) 5.0      / 2.5   Depth (cm) 0.5      / 0.1   Area (cm2) 30.0  / 10.75cm2     Tract/undermine None        Pre-debridement       Post-debridement         ASSESSMENT/PLAN:      ICD-10-CM   1. Arterial leg ulcer (HCC)- S/P fem-pop and debridement on 6/8.  Pedal pulses and pulse over bypass are palpable and multiphasic by doppler. Excisional debridement performed in clinic today. NPWT to accelerate wound healing.  Two layer compression to manage edema.  Pt to see Dr. Colin in wound clinic on 8/6 L97.909   2. Peripheral vascular disease (HCC)- S/P fem-pop I73.9   3. Pain- Pt c/o pain with procedure, currentloy out of pain meds.  Rx for six tablets of Oxycodone 5 mg provided today.  She is to contact her surgeon on Monday for refill of pain meds.  R52   4. Wound infection- Pt is currently on Doxycycline 100 mg BID, and per Dr. Vazquez, will be on for the rest of her life d/t orthopedic hardware.  Wound culture taken after debridement today.  T14.8XXA    L08.9

## 2018-07-25 ENCOUNTER — NON-PROVIDER VISIT (OUTPATIENT)
Dept: WOUND CARE | Facility: MEDICAL CENTER | Age: 73
End: 2018-07-25
Attending: NURSE PRACTITIONER
Payer: MEDICARE

## 2018-07-25 LAB
BACTERIA WND AEROBE CULT: ABNORMAL
GRAM STN SPEC: ABNORMAL
SIGNIFICANT IND 70042: ABNORMAL
SITE SITE: ABNORMAL
SOURCE SOURCE: ABNORMAL

## 2018-07-25 PROCEDURE — 97602 WOUND(S) CARE NON-SELECTIVE: CPT

## 2018-07-25 NOTE — WOUND TEAM
"Advanced Wound Care  [facility]  [address]  [address]  [place] [address]  [phone] Fax: [phone]    Encounter Note  For Certification Period: 07/05/2018 - 09/25/2018  Start of Care: 07/05/2018    Referring Physician: Italia Bae PA-C  Primary Physician: Jorge Amador MD    Consulting Physicians:         Wound(s): Left medial ankle - proximal and distal       Subjective:        HPI: 71 y/o female with HTN, hyperlipidemia, arterial insufficiency. Presents with L medial calf ulcer has been present for several months. Started wound care 5/8/18. L distal medial calf ulcer with erythema, edema to periwound. Heavy amount of green drainage on dressing and green tinged slough to wound bed. Wound cx positive for pseudomonas. She is wearing a walking boot for a fractured L ankle that happened recently but could not tell me exactly how long ago. She also had arterial studies completed 5/28/18 and show TOMI on RLE of 0.92 and LLE TOMI of 0.7. She has 50-75% stenosis on RLE and SFA occlusion on LLE      She fell in December 2017 and fractured left intertrochanteric femur. It was surgically treated with intramedullary device by Dr. Peters. 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 She underwent left total hip arthroplasty with removal of hardware by Dr. Vazquez on 2/11/18. She noticed her leg was wet last night and has asked for incision to be evaluated. She noticed pus coming from leg incision last night with increased erythema and edema.     Dr. Colin performed a Femoral popliteal bypass on 06/08/2018 on left LE.  Patient had wound VAC placed on left medial ankle wound.              Pain: 5/10 Pt states, \"It stings and itches quite a bit\".      Allergies: No Known Allergies     Objective:      Tests and Measures: L foot palpable DP pulses, foot warm, hair growth absent.    Orthotic, protective, supportive devices: ortho boot " from ortho surgeon    Fall Risk Assessment (jeronimo all that apply with an X):  Completed 07/05/2018: high fall risk       Wound Characteristics                                                    Location:  Left Medial Ankle Initial Evaluation  Date: 07/05/2018   Encounter Date: 07/12/18 Encounter Date: 07/25/18   Tissue Type and %: 60% moist pink, 40% adherent yellow 70% moist red, 30% adherent yellow 100% moist pink   Periwound: Slight maceration, erythema Resolving maceration Severe maceration and denudation, erythema, edema   Drainage: Moderate to heavy serous Moderate to heavy ss Heavy serosanguinous with serous fluid in LE   Exposed structures None None None   Wound Edges:   Open Open Open   Odor: None None None   S&S of Infection:   erythema None; pt on lifelong oral abx. Erythema, edema,  lifelong ABX   Edema: 2+ pitting Nonpitting entire LE 1+ pitting   Sensation: intact Intact intact               Measurements:  Left Medial Ankle Initial Evaluation  Date: 07/05/2018  Proximal / Distal  Encounter Date:  07/20/2018  Proximal / Distal   Length (cm) 6.5 / 3.5 6.0       / 4.3   Width (cm) 4.5 / 2.3 5.0      / 2.5   Depth (cm) 0.4 / 0.1 0.5      / 0.1   Area (cm2) 29.25 / 8.05 30.0  / 10.75cm2     Tract/undermine None None                      Procedures:     Debridement:  Non selective with gauze to remove biofilm   Cleansed with:  No rinse foam cleanser to entire LE.  NS rinse to wounds                                                            Periwound protected with: No sting skin prep, crusted stoma powder to macerated/denuded areas, viscopatch fanfolded   Primary dressing: VAC held due to severe yuki wound maceration/denudation. Silver hydrofiber to wound beds   Secondary Dressing: ABD's x 2 in between coflex layers      Other: 2 layer coflex compression wrap to Left LE       Patient Education: Instr pt why I held VAC today, instr POC. pt instr ss infection - erythema, edema, localized heat,  fever/chills/N+V, when to call MD/go to ER. Instr pt to bring VAC to next appt.  Pt with good understanding       Pt on  ABX with 11 refills managed by surgeon.    Professional Collaboration:Pt seen briefly by Jennifer PERDUE. I gave her report that I held VAC today     Assessment:      Wound etiology: Mixed vascular     Wound Progress:  Pt missed her last appt, and VAC leaked so today her yuki wound areas are severely macerated and denuded.    Rationale for Treatment: zinc paste/viscopatch to protect/heal yuki wound from moisture/drainage. ABD's for absorption. Coflex to manage edema    Patient tolerance/compliance: Patient tolerated treatment well and willing to comply with NPWT 3x/weekly visit for placement.    Complicating factors: Age, mixed vascularity and poor healing.    Need for ongoing Advanced Wound Care services:Patient requires skilled therapeutic wound care services for product selection, application of product, debridement, close monitoring with clinical assessment for expedite of wound healing.     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  The dressings or modalities may need to be adapted   from the original plan to address any significant changes in the wound environment.          Clinician Signature:_______________________________Date__________________      Physician  Signature:______________________________Date:__________________

## 2018-07-26 DIAGNOSIS — S81.802D OPEN WOUND OF LEFT LOWER EXTREMITY, SUBSEQUENT ENCOUNTER: ICD-10-CM

## 2018-07-26 DIAGNOSIS — L08.9 WOUND INFECTION: ICD-10-CM

## 2018-07-26 DIAGNOSIS — T14.8XXA WOUND INFECTION: ICD-10-CM

## 2018-07-26 NOTE — PROGRESS NOTES
Wound culture results reviewed.  Multiple organisms.  Pt already on doxycyline BID as suppressive therapy d/t r orthopedic hardware.  Referral to ID

## 2018-07-27 ENCOUNTER — NON-PROVIDER VISIT (OUTPATIENT)
Dept: WOUND CARE | Facility: MEDICAL CENTER | Age: 73
End: 2018-07-27
Attending: NURSE PRACTITIONER
Payer: MEDICARE

## 2018-07-27 DIAGNOSIS — I73.9 PERIPHERAL VASCULAR DISEASE (HCC): ICD-10-CM

## 2018-07-27 DIAGNOSIS — R52 PAIN: ICD-10-CM

## 2018-07-27 DIAGNOSIS — T14.8XXA WOUND INFECTION: ICD-10-CM

## 2018-07-27 DIAGNOSIS — L97.909 ARTERIAL LEG ULCER (HCC): Primary | ICD-10-CM

## 2018-07-27 DIAGNOSIS — L08.9 WOUND INFECTION: ICD-10-CM

## 2018-07-27 PROCEDURE — 11043 DBRDMT MUSC&/FSCA 1ST 20/<: CPT | Performed by: NURSE PRACTITIONER

## 2018-07-27 PROCEDURE — 11046 DBRDMT MUSC&/FSCA EA ADDL: CPT | Performed by: NURSE PRACTITIONER

## 2018-07-27 PROCEDURE — 11042 DBRDMT SUBQ TIS 1ST 20SQCM/<: CPT

## 2018-07-27 RX ORDER — LINEZOLID 600 MG/1
600 TABLET, FILM COATED ORAL 2 TIMES DAILY
Qty: 20 TAB | Refills: 0 | Status: SHIPPED | OUTPATIENT
Start: 2018-07-27 | End: 2018-08-06

## 2018-07-27 NOTE — WOUND TEAM
Advanced Wound Care  [facility]  [address]  [address]  [place] [address]  [phone] Fax: [phone]    Encounter Note  For Certification Period: 07/05/2018 - 09/25/2018  Start of Care: 07/05/2018    Referring Provider: Italia Bae PA-C  Primary Physician: Jorge Amador MD    Consulting Providers:         Wound(s): Left medial ankle - proximal and distal       Subjective:        HPI: 71 y/o female with HTN, hyperlipidemia, arterial insufficiency. Presents with L medial calf ulcer has been present for several months. Started wound care 5/8/18. L distal medial calf ulcer with erythema, edema to periwound. Heavy amount of green drainage on dressing and green tinged slough to wound bed. Wound cx positive for pseudomonas. She is wearing a walking boot for a fractured L ankle that happened recently but could not tell me exactly how long ago. She also had arterial studies completed 5/28/18 and show TOMI on RLE of 0.92 and LLE TOMI of 0.7. She has 50-75% stenosis on RLE and SFA occlusion on LLE      She fell in December 2017 and fractured left intertrochanteric femur. It was surgically treated with intramedullary device by Dr. Peters. 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 She underwent left total hip arthroplasty with removal of hardware by Dr. Vazquez on 2/11/18. She noticed her leg was wet last night and has asked for incision to be evaluated. She noticed pus coming from leg incision last night with increased erythema and edema.     Dr. Colin performed a Femoral popliteal bypass on 06/08/2018 on left LE.  Patient had wound VAC placed on left medial ankle wound.              Pain: Patient reports pain at wound site with sharp debridement.    Allergies: No Known Allergies     Objective:      Tests and Measures:   7/16/2018: L foot palpable DP pulses, foot warm, hair growth absent.    Orthotic, protective, supportive  devices: ortho boot from ortho surgeon    Fall Risk Assessment (jeronimo all that apply with an X):  Completed 07/05/2018: high fall risk       Wound Characteristics                                                    Location:  Left Medial Ankle Initial Evaluation  Date: 07/05/2018   Encounter Date:   7/27/2018   Tissue Type and %: 60% moist pink, 40% adherent yellow Proximal-100% pink moist    Middle-80% pink moist, 20% yellow adherent.    Distal-40% pink moist, 60% yellow adherent.   Periwound: Slight maceration, erythema Erythema   Drainage: Moderate to heavy serous Heavy serosanguinous   Exposed structures None None   Wound Edges:   Open Open   Odor: None None   S&S of Infection:   erythema Positive wound culture. Zyvox prescribed today.   Edema: 2+ pitting 1+ to LE   Sensation: intact Intact               Measurements:  Left Medial Ankle Initial Evaluation  Date: 07/05/2018  Proximal / Distal  Encounter Date:  7/27/2018    Proximal / Middle / Distal   Length (cm) 6.5 / 3.5 0.9 / 6.5 / 3.7   Width (cm) 4.5 / 2.3 2.2 / 5.1 / 3.5   Depth (cm) 0.4 / 0.1 0.2 / 0.5 / 0.3   Area (cm2) 29.25 / 8.05 1.98 / 33.15 / 12.95 cm2   Tract/undermine None None / None / None              Procedures:     Debridement: Sharp debridement by Jennifer PERDUE, please see her note.   Cleansed with:  No rinse foam cleanser to entire LE.  NS rinse to wounds after debridement.                                                           Periwound protected with: Zinc barrier paste.   Primary dressing: Fenestrated Acticoat-7 to all wounds.   Secondary Dressing: Plain Aquacel, ABD pads x 2, kerlix.      Other: Tubigrip E double layer.       Patient Education: Plan of care and wound progress discussed. Zyvox prescribed for positive wound culture. Wound vac hold continued today per APRN. Reviewed s/s of infection (increased redness/pain/swelling/drainage, fever, fatigue, malaise, N/V), and advised patient to call the office or go to Urgent Care/ER  if necessary. Advised patient to remove the outer tubigrip if it starts feeling too tight over the weekend. Patient verbalized understanding of instructions.    Professional Collaboration: Jennifer PERDUE for sharp debridement, antibiotic prescription, and instructions on dressings for today.    Assessment:      Wound etiology: Mixed vascular     Wound Progress: Maricruz-wound maceration resolved. Zyvox prescribed for positive wound culture.    Rationale for Treatment: Zinc barrier paste to protect skin from moisture/drainage. Acticoat-7 to reduce bioburden. Plain Aquacel and ABD pads to absorb drainage. Kerlix to secure dressings. Tubigrip E to control edema.    Patient tolerance/compliance: Patient tolerated treatment well, agrees with plan of care.    Complicating factors: Age, mixed vascularity and poor healing.    Need for ongoing Advanced Wound Care services: Patient requires skilled therapeutic wound care services for product selection, application of product, debridement, close monitoring with clinical assessment to expedite wound healing.     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  The dressings or modalities may need to be adapted   from the original plan to address any significant changes in the wound environment.

## 2018-07-27 NOTE — PROGRESS NOTES
Wound culture results discussed with VALENTIN Irene pharmacist.  Linezolid recommended, consider adding Cipro if no improvement after a few days.  Interaction with pt's Etienne noted, d/w Maria Victoria. Advised pt to watch for sxs of sweating, agitation, sensory changes and to stop taking Linezolid and call her doctor if she experiences any of these symptoms.

## 2018-07-30 ENCOUNTER — NON-PROVIDER VISIT (OUTPATIENT)
Dept: WOUND CARE | Facility: MEDICAL CENTER | Age: 73
End: 2018-07-30
Attending: NURSE PRACTITIONER
Payer: MEDICARE

## 2018-07-30 PROCEDURE — 97597 DBRDMT OPN WND 1ST 20 CM/<: CPT

## 2018-07-30 NOTE — WOUND TEAM
Advanced Wound Care  [facility]  [address]  [address]  [place] [address]  [phone] Fax: [phone]    Encounter Note  For Certification Period: 07/05/2018 - 09/25/2018  Start of Care: 07/05/2018    Referring Provider: Italia Bae PA-C  Primary Physician: Jorge Amador MD    Consulting Providers:         Wound(s): Left medial ankle - proximal and distal       Subjective:        HPI: 71 y/o female with HTN, hyperlipidemia, arterial insufficiency. Presents with L medial calf ulcer has been present for several months. Started wound care 5/8/18. L distal medial calf ulcer with erythema, edema to periwound. Heavy amount of green drainage on dressing and green tinged slough to wound bed. Wound cx positive for pseudomonas. She is wearing a walking boot for a fractured L ankle that happened recently but could not tell me exactly how long ago. She also had arterial studies completed 5/28/18 and show TOMI on RLE of 0.92 and LLE TOMI of 0.7. She has 50-75% stenosis on RLE and SFA occlusion on LLE      She fell in December 2017 and fractured left intertrochanteric femur. It was surgically treated with intramedullary device by Dr. Peters. 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 She underwent left total hip arthroplasty with removal of hardware by Dr. Vazquez on 2/11/18. She noticed her leg was wet last night and has asked for incision to be evaluated. She noticed pus coming from leg incision last night with increased erythema and edema.     Dr. Colin performed a Femoral popliteal bypass on 06/08/2018 on left LE.  Patient had wound VAC placed on left medial ankle wound.              Pain: Patient reports pain at wound site with sharp debridement.    Allergies: No Known Allergies     Objective:      Tests and Measures:   7/16/2018: L foot palpable DP pulses, foot warm, hair growth absent.    Orthotic, protective, supportive  devices: ortho boot from ortho surgeon    Fall Risk Assessment (jeronimo all that apply with an X):  Completed 07/05/2018: high fall risk       Wound Characteristics                                                    Location:  Left Medial Ankle Initial Evaluation  Date: 07/05/2018   Encounter Date:   7/30/2018   Tissue Type and %: 60% moist pink, 40% adherent yellow Proximal-100% pink moist    Middle-60% pink moist, 40% yellow adherent.    Distal-20% pink moist, 80% yellow adherent.   Periwound: Slight maceration, erythema Improving erythema   Drainage: Moderate to heavy serous Heavy ss    Exposed structures None None, CARLITA fully   Wound Edges:   Open Open   Odor: None None   S&S of Infection:   erythema Positive wound culture.  On abx.   Edema: 2+ pitting 1+ to LE   Sensation: intact Intact               Measurements:  Left Medial Ankle Initial Evaluation  Date: 07/05/2018  Proximal / Distal  Encounter Date:  7/27/2018    Proximal / Middle / Distal   Length (cm) 6.5 / 3.5 0.9 / 6.5 / 3.7   Width (cm) 4.5 / 2.3 2.2 / 5.1 / 3.5   Depth (cm) 0.4 / 0.1 0.2 / 0.5 / 0.3   Area (cm2) 29.25 / 8.05 1.98 / 33.15 / 12.95 cm2   Tract/undermine None None / None / None     Procedures:     Debridement: cswd using curette to remove ~3cm2 slough from middle and distal wounds.     Cleansed with:  No rinse foam cleanser to entire LE.  NS rinse to wounds after debridement.                                                           Periwound protected with: Zinc barrier paste.   Primary dressing: medihoney alginate   Secondary Dressing: Plain Aquacel x2, ABD pads x 2, kerlix.      Other: Tubigrip E single layer (double layer dug into skin below knee creating risk for new wound).     Patient Education: Plan of care and wound progress discussed. Pt picked up/started zyvox as prescribed.  Has not yet made ID appt (need to do so asap reinforced today).  Wound vac hold continued today as pt cannot come in three times this week due to a  scheduling conflict.  Plan to reapply on Friday as long as infection continues to improve and yuki wound skin remains intact.  Reviewed s/s of infection (increased redness/pain/swelling/drainage, fever, fatigue, malaise, N/V), and advised patient to call the office or go to Urgent Care/ER if necessary.  Patient verbalized understanding of instructions.    Professional Collaboration: None today.      Assessment:      Wound etiology: Mixed vascular     Wound Progress: Pt on zyvox.  Erythema somewhat improved.      Rationale for Treatment: Zinc barrier paste to protect skin from moisture/drainage. Medihoney alginate to absorb exudate, provide moist wound environment, and facilitate autolytic debridement.  Plain Aquacel and ABD pads to absorb drainage. Kerlix to secure dressings. Tubigrip E to control edema.    Patient tolerance/compliance: Patient tolerated treatment well, agrees with plan of care.    Complicating factors: Age, mixed vascularity and poor healing.    Need for ongoing Advanced Wound Care services: Patient requires skilled therapeutic wound care services for product selection, application of product, debridement, close monitoring with clinical assessment to expedite wound healing.     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  The dressings or modalities may need to be adapted   from the original plan to address any significant  changes in the wound environment.

## 2018-07-31 ENCOUNTER — OFFICE VISIT (OUTPATIENT)
Dept: MEDICAL GROUP | Facility: PHYSICIAN GROUP | Age: 73
End: 2018-07-31
Payer: MEDICARE

## 2018-07-31 VITALS
OXYGEN SATURATION: 96 % | HEART RATE: 80 BPM | TEMPERATURE: 98.2 F | WEIGHT: 144 LBS | HEIGHT: 67 IN | RESPIRATION RATE: 16 BRPM | SYSTOLIC BLOOD PRESSURE: 166 MMHG | BODY MASS INDEX: 22.6 KG/M2 | DIASTOLIC BLOOD PRESSURE: 80 MMHG

## 2018-07-31 DIAGNOSIS — F41.9 ANXIETY: ICD-10-CM

## 2018-07-31 DIAGNOSIS — R52 PAIN: ICD-10-CM

## 2018-07-31 DIAGNOSIS — D51.9 ANEMIA DUE TO VITAMIN B12 DEFICIENCY, UNSPECIFIED B12 DEFICIENCY TYPE: ICD-10-CM

## 2018-07-31 DIAGNOSIS — L97.923 NON-PRESSURE CHRONIC ULCER OF LEFT LOWER LEG WITH NECROSIS OF MUSCLE (HCC): ICD-10-CM

## 2018-07-31 DIAGNOSIS — I10 ESSENTIAL HYPERTENSION: ICD-10-CM

## 2018-07-31 DIAGNOSIS — S72.009A CLOSED FRACTURE OF HIP, UNSPECIFIED LATERALITY, INITIAL ENCOUNTER (HCC): ICD-10-CM

## 2018-07-31 DIAGNOSIS — L03.119 CELLULITIS OF LOWER EXTREMITY, UNSPECIFIED LATERALITY: ICD-10-CM

## 2018-07-31 DIAGNOSIS — I73.9 PERIPHERAL VASCULAR DISEASE (HCC): ICD-10-CM

## 2018-07-31 DIAGNOSIS — F10.10 ALCOHOL ABUSE: ICD-10-CM

## 2018-07-31 PROBLEM — E87.1 HYPONATREMIA: Status: RESOLVED | Noted: 2018-02-11 | Resolved: 2018-07-31

## 2018-07-31 PROCEDURE — 99214 OFFICE O/P EST MOD 30 MIN: CPT | Performed by: NURSE PRACTITIONER

## 2018-07-31 NOTE — ASSESSMENT & PLAN NOTE
Seeing wound care 3week from cellulitis.  Hyun on Zyvox 600mg orally.  Has appointment with infectious disease. They are considering IV treatments

## 2018-07-31 NOTE — ASSESSMENT & PLAN NOTE
Pain with wound care.  Not currently taking pain medication. Taking tylenol.  Recent liver function tests WNL.

## 2018-07-31 NOTE — PROGRESS NOTES
Chief Complaint   Patient presents with   • Establish Care   • Referral Needed     home health       Subjective:   Nadege Mae is a 72 y.o. 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  Hyun on Zyvox 600mg orally.  Has appointment with infectious disease. They are considering IV treatments     Peripheral vascular disease (HCC)  Dr. harris recently did fem pop bypass in June 18.    Recently seen after surgery.     Anxiety  Sertraline 100 mg daily for anxiety.  Feeling much better.     Essential hypertension  bp 166/80 today.  Taking metoprolol  mg daily.     Cellulitis  Left leg wound.  Seeing wound care.     Hip fracture (CMS-HCC) (HCC)  Dr. Vazquez/Fran at [facility] following hip fracture/surgery/ revision and I&D.     Pain  Pain with wound care.  Not currently taking pain medication. Taking tylenol.  Recent liver function tests WNL.     Alcohol abuse  Reports 5-6 glasses of wine daily.  Vitamin B12/folic acid levels due.          Current medicines (including changes today)  Current Outpatient Prescriptions   Medication Sig Dispense Refill   • linezolid (ZYVOX) 600 MG Tab Take 1 Tab by mouth 2 times a day for 10 days. 20 Tab 0   • vitamin D (CHOLECALCIFEROL) 1000 UNIT Tab Take 1,000 Units by mouth every day.     • metoprolol SR (TOPROL XL) 100 MG TABLET SR 24 HR TAKE 1 TABLET BY MOUTH EVERY DAY 90 Tab 0   • sertraline (ZOLOFT) 100 MG Tab TAKE 1 TAB BY MOUTH EVERY DAY. 30 Tab 5     No current facility-administered medications for this visit.      She  has a past medical history of Anxiety; Cellulitis and abscess of lower extremity; Dental disorder; Generalized osteoarthritis of multiple sites (10/20/2015); Heart valve disease; Hyperlipidemia; Hypertension; and Osteoporosis. She also has no past medical history of Allergy; Diabetes; Muscle disorder; or  "OSTEOPOROSIS.    ROS as stated in hpi  No chest pain, no shortness of breath, no abdominal pain       Objective:     Blood pressure (!) 166/80, pulse 80, temperature 36.8 °C (98.2 °F), resp. rate 16, height 1.702 m (5' 7\"), weight 65.3 kg (144 lb), SpO2 96 %. Body mass index is 22.55 kg/m². Weight loss of 50 pounds since January  Physical Exam:  Constitutional: Alert, no distress.  Skin: Warm, dry, good turgor,no cyanosis, no rashes in visible areas.  Eye: Equal, round and reactive, conjunctiva clear, lids normal.  Ears: No tenderness, no discharge.  External canals are without any significant edema or erythema.  .  Gross auditory acuity is intact.  Nose: symmetrical without tenderness, no discharge.  Mouth/Throat: lips without lesion.  Oropharynx clear.    Neck: Trachea midline, no masses, no obvious thyroid enlargement.. No cervical or supraclavicular lymphadenopathy. Range of motion within normal limits.  Neuro: Cranial nerves 2-12 grossly intact.  No sensory deficit.  Respiratory: Unlabored respiratory effort, lungs clear to auscultation, no wheezes, no ronchi.  Cardiovascular: Normal S1, S2, no murmur, no edema.  MSK:  Walking boot on left leg.  Wound covered by dressing.  Pedal pulse in right leg palpable.   Psych: Alert and oriented x3, normal affect and mood and judgement.        Assessment and Plan:   The following treatment plan was discussed    1. 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  Chronic, ongoing, unstable.  Has referral to infectious disease for MRSA.  Seeing wound care 3/week.  Has caregiver friend, but wants to know of other resources available. Monitor.   - REFERRAL TO COMPLEX CARE MANAGEMENT Services Requested: Care Manager to Evaluate and Recommend    2. Peripheral vascular disease (HCC)  This is a new problem to me.  Chronic, ongoing.  Unstable.  Patient recently had femoropopliteal bypass by Dr. Milana Colin in June.  She " is being followed by Dr. Colin.  Patient quit smoking in 2007.    3. Anxiety  This is a new problem to me.  Chronic.  Ongoing issue.  Taking sertraline 100 mg daily with good coverage.    4. Anemia due to vitamin B12 deficiency, unspecified B12 deficiency type  This is a new problem to me.  Chronic.  Ongoing issue.  Will order CBC vitamin B12 and folate.  Patient reports heavy alcohol intake.  Monitor and follow results.  Discussed with patient that being anemic can inhibit her ability to heal.  Return in 3 months.  - CBC WITH DIFFERENTIAL; Future  - VITAMIN B12; Future  - FOLATE; Future    5. Essential hypertension  This is a new problem to me.  Chronic.  Ongoing issue.  Blood pressure slightly elevated today.    6. Cellulitis of lower extremity, unspecified laterality  This is a new problem to me.  Chronic.  Ongoing issue.  Unstable.  Patient is being followed by wound care.  She has 3 visits a week.  She is also being referred to infectious disease.  She is currently taking Zyvox 600 mg daily.  - REFERRAL TO COMPLEX CARE MANAGEMENT Services Requested: Care Manager to Evaluate and Recommend    7. Closed fracture of hip, unspecified laterality, initial encounter ([facility])  This is a new problem to me.  Chronic.  Ongoing issue.  Being followed by Dr. Vazquez and Dr. Peters.  Requiring care at home.  Referral to complex care management.  Monitor  - REFERRAL TO COMPLEX CARE MANAGEMENT Services Requested: Care Manager to Evaluate and Recommend    8. Pain  This is a new problem to me.  Chronic.  Ongoing issue.  Taking Tylenol.  She was given oxycodone by the wound care clinic on July 20 she has refilled that and use those there were a total of 6.  We discussed chronic pain medication.  I will not be providing chronic pain medication for this patient.    9. Alcohol abuse  This is a new problem to me.  Chronic.  Ongoing issue.  Unstable.  Patient reports 5 glasses of wine daily.  Her caregiver reports 1-2 bottles of wine  daily.  Patient has recent weight loss 50 pounds in the last 6 months.  Will draw CBC vitamin B12 and folate as her last CBC showed anemia.  Monitor and follow.      Followup: Return in about 3 months (around 10/31/2018) for Multiple issues.

## 2018-08-02 RX ORDER — METOPROLOL SUCCINATE 100 MG/1
TABLET, EXTENDED RELEASE ORAL
Qty: 90 TAB | Refills: 0 | Status: SHIPPED | OUTPATIENT
Start: 2018-08-02 | End: 2018-10-28 | Stop reason: SDUPTHER

## 2018-08-03 ENCOUNTER — TELEPHONE (OUTPATIENT)
Dept: INFECTIOUS DISEASES | Facility: MEDICAL CENTER | Age: 73
End: 2018-08-03

## 2018-08-03 ENCOUNTER — PATIENT OUTREACH (OUTPATIENT)
Dept: HEALTH INFORMATION MANAGEMENT | Facility: OTHER | Age: 73
End: 2018-08-03

## 2018-08-03 ENCOUNTER — NON-PROVIDER VISIT (OUTPATIENT)
Dept: WOUND CARE | Facility: MEDICAL CENTER | Age: 73
End: 2018-08-03
Attending: PATHOLOGY
Payer: MEDICARE

## 2018-08-03 PROCEDURE — 97597 DBRDMT OPN WND 1ST 20 CM/<: CPT

## 2018-08-03 ASSESSMENT — PATIENT HEALTH QUESTIONNAIRE - PHQ9
5. POOR APPETITE OR OVEREATING: 0 - NOT AT ALL
SUM OF ALL RESPONSES TO PHQ QUESTIONS 1-9: 1
CLINICAL INTERPRETATION OF PHQ2 SCORE: 1

## 2018-08-03 NOTE — PROGRESS NOTES
Pt referred to  care coordination by PCP with report of needing additional care giver supports. Per report pt's friend is current caregiver. Outreach call to pt 2018 to introduce self, discuss role of social work care coordination, and pt's identified social needs. Assessment initiated.     Pt reported her current roommate, Milana, of 8 years has been taking care of her since she became ill. Pt currently has wound on leg and reported she has started going to the wound clinic for treatment. She indicated prior to this she was working at a [employer] and ultimately she would like to return to work. She reported her FMLA has  so she is hoping to get back to work as soon as possible. 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    Discussed what care giving services her room mate is providing for her with her ADLs. Pt reported she is independent with her ADLs indicating she is walking with a walker but is able to do so independently. She reported she is independently able to shower, dress, groom, transfer and eat. She does report currently due to the wound she is taking sponge baths but explained otherwise she feels she would independently be able to get in and out of the shower and bathe herself if needed.     Pt reported her room mate has been helping with the shopping, cooking/meal prep, housework, and laundry as she is unable to do these activities at this time. She indicated she would like her room mate to have the opportunity to be paid for these services room mate is providing pt.     LSW discussed the Homemaker Program through [employer] with pt. Explained program will cover in-home care/homemaker services at a max of 1 hour 45 mins a week for individuals needing these assistance. However explained level of care guidelines, typically program is for individuals with long-term care needs/ongoing services. Lastly discussed the income and asset criteria  for program. Pt is interested in program and indicated she would like LSW to send her additional information.     Plan:  · Pt added to SW case load and care plan initiated.

## 2018-08-03 NOTE — WOUND TEAM
Advanced Wound Care  [facility]  [address]  [address]  [place] [address]  [phone] Fax: [phone]    Encounter Note  For Certification Period: 07/05/2018 - 09/25/2018  Start of Care: 07/05/2018    Referring Provider: Italia Bae PA-C  Primary Physician: Jorge Amador MD    Consulting Providers:         Wound(s): Left medial ankle - proximal and distal       Subjective:        HPI: 73 y/o female with HTN, hyperlipidemia, arterial insufficiency. Presents with L medial calf ulcer has been present for several months. Started wound care 5/8/18. L distal medial calf ulcer with erythema, edema to periwound. Heavy amount of green drainage on dressing and green tinged slough to wound bed. Wound cx positive for pseudomonas. She is wearing a walking boot for a fractured L ankle that happened recently but could not tell me exactly how long ago. She also had arterial studies completed 5/28/18 and show TOMI on RLE of 0.92 and LLE TOMI of 0.7. She has 50-75% stenosis on RLE and SFA occlusion on LLE      She fell in December 2017 and fractured left intertrochanteric femur. It was surgically treated with intramedullary device by Dr. Peters. 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 She underwent left total hip arthroplasty with removal of hardware by Dr. Vazquez on 2/11/18. She noticed her leg was wet last night and has asked for incision to be evaluated. She noticed pus coming from leg incision last night with increased erythema and edema.     Dr. Colin performed a Femoral popliteal bypass on 06/08/2018 on left LE.  Patient had wound VAC placed on left medial ankle wound.              Pain: Patient reports pain at wound site with sharp debridement.    Allergies: No Known Allergies     Objective:      Tests and Measures:   7/16/2018: L foot palpable DP pulses, foot warm, hair growth absent.    Orthotic, protective, supportive  devices: ortho boot from ortho surgeon    Fall Risk Assessment (jeronimo all that apply with an X):  Completed 07/05/2018: high fall risk       Wound Characteristics                                                    Location:  Left Medial Ankle Initial Evaluation  Date: 07/05/2018   Encounter Date:   8/3/2018   Tissue Type and %: 60% moist pink, 40% adherent yellow Proximal resolved    Middle-40% pink moist, 60% yellow adherent.    Distal-30% pink moist, 70% yellow adherent.   Periwound: Slight maceration, erythema Erythema, dry flaky skin   Drainage: Moderate to heavy serous Heavy ss and serous   Exposed structures None None, CARLITA fully   Wound Edges:   Open Open   Odor: None None   S&S of Infection:   erythema Positive wound culture.  On abx.   Edema: 2+ pitting 1+ to LE   Sensation: intact Intact               Measurements:  Left Medial Ankle Initial Evaluation  Date: 07/05/2018  Proximal / Distal  Encounter Date:  8/3/2018     Middle / Distal   Length (cm) 6.5 / 3.5 5.5 / 3.3   Width (cm) 4.5 / 2.3  4.5 / 2.7   Depth (cm) 0.4 / 0.1  0.3 / 0.2   Area (cm2) 29.25 / 8.05 24.75 /8.91   Tract/undermine None  None / None     Procedures:     Debridement: cswd using curette to remove ~4cm2 slough from middle and distal wounds.     Cleansed with:  No rinse foam cleanser to entire LE.  NS rinse to wounds after debridement.                                                           Periwound protected with: Zinc barrier paste.   Primary dressing: medihoney alginate   Secondary Dressing: Plain Aquacel on blisters, nonadhesive foam pads on wounds. Kerlix      Other: Coban very loosely wrapped to secure.      Patient Education: Pt has new, sudden onset blisters on upper part of L lower LE. It is being treated as an allergic reaction to Acticoat Flex which was placed on wound at MD office, and which pt has never used before. Also, Cipro added to meds. Pt to see Dr. Colin on Monday and she verbalized understanding to bring VAC  and supplies to next appt.    Previous appt:  Plan of care and wound progress discussed. Pt picked up/started zyvox as prescribed.  Has not yet made ID appt (need to do so asap reinforced today).  Wound vac hold continued today as pt cannot come in three times this week due to a scheduling conflict.  Plan to reapply on Friday as long as infection continues to improve and yuki wound skin remains intact.  Reviewed s/s of infection (increased redness/pain/swelling/drainage, fever, fatigue, malaise, N/V), and advised patient to call the office or go to Urgent Care/ER if necessary.  Patient verbalized understanding of instructions.    Professional Collaboration: IRON Sifuentes to eval new blisters on leg.    Assessment:      Wound etiology: Mixed vascular     Wound Progress: Wounds smaller by measurement.     Rationale for Treatment: Zinc barrier paste to protect skin from moisture/drainage. Medihoney alginate to absorb exudate, provide moist wound environment, and facilitate autolytic debridement.  Plain Aquacel and ABD pads to absorb drainage. Kerlix to secure dressings. Tubigrip E to control edema.    Patient tolerance/compliance: Patient tolerated treatment well, agrees with plan of care.    Complicating factors: Age, mixed vascularity and poor healing.    Need for ongoing Advanced Wound Care services: Patient requires skilled therapeutic wound care services for product selection, application of product, debridement, close monitoring with clinical assessment to expedite wound healing.     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  The dressings or modalities may need to be adapted   from the original plan to address any significant changes in the wound environment.

## 2018-08-06 ENCOUNTER — TELEPHONE (OUTPATIENT)
Dept: INFECTIOUS DISEASES | Facility: MEDICAL CENTER | Age: 73
End: 2018-08-06

## 2018-08-06 ENCOUNTER — OFFICE VISIT (OUTPATIENT)
Dept: WOUND CARE | Facility: MEDICAL CENTER | Age: 73
End: 2018-08-06
Attending: PATHOLOGY
Payer: MEDICARE

## 2018-08-06 DIAGNOSIS — R23.8 BLISTERS OF MULTIPLE SITES: ICD-10-CM

## 2018-08-06 PROCEDURE — 99213 OFFICE O/P EST LOW 20 MIN: CPT

## 2018-08-06 NOTE — PROGRESS NOTES
"Progress note    Nadege is a 72-year-old woman who has had an unfortunate series of events.  She has been cleared from her left hip fracture and seen by Dr. Leon for her left ankle fracture. Unfortunately, her open wounds continued to be a problem. She underwent debridement of the ankle ulcer in my office by our physician's assistant and again on July 20 by ANALISA Singh. Today, I measure almost no difference in the size of the wound from what was seen in the operating room on the day of her surgery, June 8. This is almost 2 months ago and we've seen very little progress.    Noninvasive studies indicate what appears to be a patent bypass graft. Most recent cultures demonstrate heavy growth of both pseudomonas aeruginosa and enterococcus with light growth of MRSA. She is currently taking Zyvox. Apparently, there were plans to add ciprofloxacin to this. Her negative pressure wound therapy dressing was removed due to blisters on the upper leg. This developed after a dressing was applied in the orthopedic office. In addition, she has what appears to be a yeast infection in her inguinal folds.    The left lower leg wound measures 6 x 4.5 cm and is at least 5-6 mm deep. This is the same if not worse than it measured at her surgery. The medial ankle wound is a little more \"beefy\" but still does not have any active granulation.    Impression: We have gained little ground with NP WT dressings as an outpatient. I think to perform further debridement today would be a poor choice given lack of improvement with 2 prior aggressive debridement. I have advocated the use of a Veraflow Cleanse dressing with IV antibiotics to treat the heavy growth of enterococcus and pseudomonas. Although Zyvox should have adequate penetration, its possible her activity is limiting our ability to actively treat the wound and I think the vera flow will be a better choice.    Nadege wants to go home this evening and discuss this option with her " family.  We will discontinue the VAC for now and anticipate admission to the hospital for more agressive therapy with IV antibiotic, a non-invasive imaging study of the left leg and bypass and VeraFlow Cleanse dressing to more aggressively treat the wound.

## 2018-08-06 NOTE — WOUND TEAM
Advanced Wound Care  [facility]  [address]  [address]  [place] [address]  [phone] Fax: [phone]    Encounter Note  For Certification Period: 07/05/2018 - 09/25/2018  Start of Care: 07/05/2018    Referring Provider: Italia Bae PA-C  Primary Physician: Jorge Amador MD    Consulting Providers:         Wound(s): Left medial ankle - proximal and distal, LLE anterior blisters       Subjective:        HPI: 71 y/o female with HTN, hyperlipidemia, arterial insufficiency. Presents with L medial calf ulcer has been present for several months. Started wound care 5/8/18. L distal medial calf ulcer with erythema, edema to periwound. Heavy amount of green drainage on dressing and green tinged slough to wound bed. Wound cx positive for pseudomonas. She is wearing a walking boot for a fractured L ankle that happened recently but could not tell me exactly how long ago. She also had arterial studies completed 5/28/18 and show TOMI on RLE of 0.92 and LLE TOMI of 0.7. She has 50-75% stenosis on RLE and SFA occlusion on LLE      She fell in December 2017 and fractured left intertrochanteric femur. It was surgically treated with intramedullary device by Dr. Peters. 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 She underwent left total hip arthroplasty with removal of hardware by Dr. Vazquez on 2/11/18. She noticed her leg was wet last night and has asked for incision to be evaluated. She noticed pus coming from leg incision last night with increased erythema and edema.     Dr. Colin performed a Femoral popliteal bypass on 06/08/2018 on left LE.  Patient had wound VAC placed on left medial ankle wound.              Pain: Patient reports wound pain with cleaning.      Allergies: No Known Allergies     Objective:      Tests and Measures:   7/16/2018: L foot palpable DP pulses, foot warm, hair growth absent.    Orthotic, protective,  supportive devices: ortho boot from ortho surgeon    Fall Risk Assessment (jeronimo all that apply with an X):  Completed 07/05/2018: high fall risk       Wound Characteristics                                                    Location:  Left Medial Ankle Initial Evaluation  Date: 07/05/2018   Encounter Date:   8/6/2018   Tissue Type and %: 60% moist pink, 40% adherent yellow Proximal resolved    Middle-100% adherent yellow    Distal-70% adherent yellow, 15% dark purple, 15% red   Periwound: Slight maceration, erythema Erythema, dry flaky skin   Drainage: Moderate to heavy serous Heavy ss   Exposed structures None None, CARLITA fully   Wound Edges:   Open Open   Odor: None None   S&S of Infection:   erythema Positive wound culture.  See notes below.   Edema: 2+ pitting 1+ to LE   Sensation: intact Intact               Measurements:  Left Medial Ankle Initial Evaluation  Date: 07/05/2018  Proximal / Distal  Encounter Date:  8/3/2018     Middle / Distal   Length (cm) 6.5 / 3.5 5.5 / 3.3   Width (cm) 4.5 / 2.3  4.5 / 2.7   Depth (cm) 0.4 / 0.1  0.3 / 0.2   Area (cm2) 29.25 / 8.05 24.75 /8.91   Tract/undermine None  None / None     Wound Characteristics                                                    Location: LLE anterior blisters   Initial Evaluation  Date: 08/06/18   Tissue Type and %: Scattered partial thickness deroofed blisters over anterior and medial LE   Periwound: intact   Drainage: Heavy serous   Exposed structures none   Wound Edges:   open   Odor: none   S&S of Infection:   none   Edema: 1+ to LE   Sensation: intact               Measurements: LLE anterior blisters Initial Evaluation  Date: 08/06/18   Length (cm) 18   Width (cm) 17   Depth (cm) 0.1   Area (cm2) 306 cm2   Tract/undermine none         8/6/18: Advised pt to return vac.  Permanently discontinued.  Procedures:     Debridement: none today   Cleansed with:  No rinse foam cleanser to entire LE.   Periwound protected with: Medial ankle: Zinc barrier  paste.   Primary dressing: blisters: adaptic, aquacel ag  Medial ankle: aquacel ag   Secondary Dressing: ABD pad, kerlix      Other: Tubi E     Patient Education: Pt with worsening blisters to LLE anterior.  Wound culture 7/20 positive for several microbes.  Pt reports she took all but her last dose of zyvox (didn't take last dose due to GI upset).  Cipro mentioned in last note, but not seen on med list, pt states she is not taking.  Wounds not improving.  Infection likely not controlled.  Pt now has yeast infection to inguinal folds and abdominal fold.  Dr. Colin in room today recommending hospitalization for vera flow and IV abx.  She recommended monistat cream for the yeast infection.  Pt plans to think it over tonight.  Advised pt she can return her current vac.  Pt did schedule ID appt for 8/13/18.  She expresses understanding of instructions and frustration with her situation.    Professional Collaboration: Joint visit with Dr. Colin.    Assessment:      Wound etiology: Mixed vascular     Wound Progress: LLE anterior blisters worsening.  Poor tissue quality to medial ankle wounds.    Rationale for Treatment: Zinc barrier paste to protect skin from moisture/drainage.  Adaptic as nonstick WCL.  AqAg to manage bioburden, absorb exudate, and maintain moist wound environment without laterally wicking exudate therefore reducing yuki-wound maceration.  ABD pads to absorb drainage. Kerlix to secure dressings. Tubigrip E to control edema.    Patient tolerance/compliance: Patient tolerated treatment well    Complicating factors: Age, mixed vascularity and poor healing.    Need for ongoing Advanced Wound Care services: Patient requires skilled therapeutic wound care services for product selection, application of product, debridement, close monitoring with clinical assessment to expedite wound healing.     Plan:      Treatment Plan and Recommendations:  Diagnosis/ICD10: I70.243 (ICD-10-CM) - Atherosclerosis of  native arteries of left leg with ulceration of ankle    Procedures/CPT: level 3 established visit    Frequency: 3x/week      Treatment Goals: STG 2 Weeks  LTG 4 Weeks   Granulation Tissue: unknown unknown   Decrease Necrotic Tissue to:     Wound Phase:  proliferation Proliferation    Decrease Size by:     Periwound:  intact intact   Decrease tracts/undermining by: NA NA   Decrease Pain:  0/10 0/10       At the time of each visit a thorough assessment of the patient is completed to assure the  appropriateness of our plan of care.  The dressings or modalities may need to be adapted   from the original plan to address any significant changes in the wound environment.

## 2018-08-08 ENCOUNTER — NON-PROVIDER VISIT (OUTPATIENT)
Dept: WOUND CARE | Facility: MEDICAL CENTER | Age: 73
End: 2018-08-08
Attending: PATHOLOGY
Payer: MEDICARE

## 2018-08-08 PROCEDURE — 97602 WOUND(S) CARE NON-SELECTIVE: CPT

## 2018-08-08 NOTE — WOUND TEAM
Advanced Wound Care  [facility]  [address]  [address]  [place] [address]  [phone] Fax: [phone]    Encounter Note  For Certification Period: 07/05/2018 - 09/25/2018  Start of Care: 07/05/2018    Referring Provider: Italia Bae PA-C  Primary Physician: Jorge Amador MD    Consulting Providers:         Wound(s): Left medial ankle - proximal and distal, LLE anterior blisters       Subjective:        HPI: 73 y/o female with HTN, hyperlipidemia, arterial insufficiency. Presents with L medial calf ulcer has been present for several months. Started wound care 5/8/18. L distal medial calf ulcer with erythema, edema to periwound. Heavy amount of green drainage on dressing and green tinged slough to wound bed. Wound cx positive for pseudomonas. She is wearing a walking boot for a fractured L ankle that happened recently but could not tell me exactly how long ago. She also had arterial studies completed 5/28/18 and show TOMI on RLE of 0.92 and LLE TOMI of 0.7. She has 50-75% stenosis on RLE and SFA occlusion on LLE      She fell in December 2017 and fractured left intertrochanteric femur. It was surgically treated with intramedullary device by Dr. Peters. 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 She underwent left total hip arthroplasty with removal of hardware by Dr. Vazquez on 2/11/18. She noticed her leg was wet last night and has asked for incision to be evaluated. She noticed pus coming from leg incision last night with increased erythema and edema.     Dr. Colin performed a Femoral popliteal bypass on 06/08/2018 on left LE.  Patient had wound VAC placed on left medial ankle wound.              Pain: Patient reports mild wound pain with cleaning/non-selective debridement.      Allergies: No Known Allergies     Objective:      Tests and Measures:   7/16/2018: L foot palpable DP pulses, foot warm, hair growth  absent.    Orthotic, protective, supportive devices: ortho boot from ortho surgeon    Fall Risk Assessment (jeronimo all that apply with an X):  Completed 07/05/2018: high fall risk       Wound Characteristics                                                    Location:  Left Medial Ankle Initial Evaluation  Date: 07/05/2018   Encounter Date:   8/8/2018   Tissue Type and %: 60% moist pink, 40% adherent yellow Middle-100% adherent yellow    Distal-70% adherent yellow, 15% dark purple, 15% red   Periwound: Slight maceration, erythema Mild erythema (less than previously), dry flaky skin   Drainage: Moderate to heavy serous Mod to heavy ss/serous   Exposed structures None None, CARLITA fully   Wound Edges:   Open Open   Odor: None None   S&S of Infection:   erythema Positive wound culture.  See notes below.   Edema: 2+ pitting 1+ to LE   Sensation: intact Intact               Measurements:  Left Medial Ankle Initial Evaluation  Date: 07/05/2018  Proximal / Distal  Encounter Date:  8/3/2018     Middle / Distal   Length (cm) 6.5 / 3.5 5.5 / 3.3   Width (cm) 4.5 / 2.3  4.5 / 2.7   Depth (cm) 0.4 / 0.1  0.3 / 0.2   Area (cm2) 29.25 / 8.05 24.75 /8.91   Tract/undermine None  None / None     Wound Characteristics                                                    Location: LLE anterior blisters   Initial Evaluation  Date: 08/06/18 Encounter Date: 08/08/18   Tissue Type and %: Scattered partial thickness deroofed blisters over anterior and medial LE Scattered partial thickness deroofed blisters over anterior and medial LE   Periwound: intact Intact, edema   Drainage: Heavy serous Heavy serous   Exposed structures None None   Wound Edges:   Open Open   Odor: None None   S&S of Infection:   None None   Edema: 1+ to LE 1+ to LE   Sensation: intact intact               Measurements: LLE anterior blisters Initial Evaluation  Date: 08/06/18   Length (cm) 18   Width (cm) 17   Depth (cm) 0.1   Area (cm2) 306 cm2   Tract/undermine none      8/6/18: Advised pt to return vac.  Permanently discontinued.  Procedures:     Debridement: Non-selective using soapy washcloth to remove loosely adherent slough   Cleansed with:  No rinse foam cleanser to entire LE.   Periwound protected with: Medial ankle and blisters: Zinc barrier paste.   Primary dressing: blisters: adaptic, aquacel ag  Medial ankle: aquacel ag   Secondary Dressing: ABD pad, kerlix      Other: Tubi E     Patient Education:   8/8/18: Pt has decided to have ID appt as scheduled on 8/13/18 before deciding whether to proceed with hospitalization/vera flow.      From 8/6/18: Pt with worsening blisters to LLE anterior.  Wound culture 7/20 positive for several microbes.  Pt reports she took all but her last dose of zyvox (didn't take last dose due to GI upset).  Cipro mentioned in last note, but not seen on med list, pt states she is not taking.  Wounds not improving.  Infection likely not controlled.  Pt now has yeast infection to inguinal folds and abdominal fold.  Dr. Colin in room today recommending hospitalization for vera flow and IV abx.  She recommended monistat cream for the yeast infection.  Pt plans to think it over tonight.  Advised pt she can return her current vac.  Pt did schedule ID appt for 8/13/18.  She expresses understanding of instructions and frustration with her situation.    Professional Collaboration: Inbox sent to Manisha Ferrari MD (ID) with update in anticipation of pt's appt with her next week.      Assessment:      Wound etiology: Mixed vascular     Wound Progress: LLE anterior blisters stablized.  Poor tissue quality to medial ankle wounds.    Rationale for Treatment: Zinc barrier paste to protect skin from moisture/drainage.  Adaptic as nonstick WCL.  AqAg to manage bioburden, absorb exudate, and maintain moist wound environment without laterally wicking exudate therefore reducing yuki-wound maceration.  ABD pads to absorb drainage. Kerlix to secure dressings.  Tubigrip E to control edema.    Patient tolerance/compliance: Patient tolerated treatment well    Complicating factors: Age, mixed vascularity and poor healing.    Need for ongoing Advanced Wound Care services: Patient requires skilled therapeutic wound care services for product selection, application of product, debridement, close monitoring with clinical assessment to expedite wound healing.     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  The dressings or modalities may need to be adapted   from the original plan to address any significant changes in the wound environment.

## 2018-08-09 ENCOUNTER — PATIENT OUTREACH (OUTPATIENT)
Dept: HEALTH INFORMATION MANAGEMENT | Facility: OTHER | Age: 73
End: 2018-08-09

## 2018-08-09 NOTE — PROGRESS NOTES
PROCEDURE NOTE    Patient seen in collaboration with wound care provider, Annalee Escobar RN    HPI: 73 y/o female with HTN, hyperlipidemia, arterial insufficiency, and L hip fracture with arthroplasty in February 2018, who is being treated in the wound clinic for ulcers to her left medial ankle and left medial/distal calf.  She started treatment for these wounds in the clinic on 5/8/18.  Arterial studies were ordered and completed on 5/28/18, showing a LLE TOMI of 0.7, with a 50-75% stenosis on RLE and SFA occlusion on LLE.  She underwent a left femoral popliteal bypass, with wound debridement and VAC placement on 06/08/2018 with Dr. Colin.   Post-operatively she returned to the wound clinic to resume treatment of these wounds.   She underwent excisional debridement with local subcutaneous lidocaine on 7/20 due to build up of a thick layer of slough in the wound beds.  She presents again today with buildup of slough in the wound beds.  She actually has 3 wounds along the medial aspect of her right leg, though the most proximal of these is quite shallow.   Excisional debridement of the 2 more distal wounds is done by me again today, using only topical lidocaine.  She has not been able to tolerate the VAC due to skin irritations.       DESCRIPTION OF PROCEDURE:  Excisional debridement of wounds beds and wound edges of medial LLE ulcers x 2 using topical anesthesia    PMH, medications and recent labs reviewed    ANESTHESIA:  2% viscous lidocaine applied topically to wound bed for approximately 5 minutes prior to debridement    PROCEDURE:   Scalpel, curette, and forceps used to excise slough and senescent red tissue from the wound beds, and skin and SQ tissue from epibolized wound edges.  Total area debrided, ~45cm2.  Debridement carried into the muscle layer. Bleeding controlled with manual pressure.  Wound care completed by Annalee.  Patient tolerated the procedure well, with no complaints of pain or  discomfort.                                                     Wound Characteristics                                                    Location:  Left Medial Ankle Initial Evaluation  Date: 07/05/2018    Encounter Date:   7/27/2018   Tissue Type and %: 60% moist pink, 40% adherent yellow Proximal-100% pink moist     Middle-80% pink moist, 20% yellow adherent.     Distal-40% pink moist, 60% yellow adherent.   Periwound: Slight maceration, erythema Erythema   Drainage: Moderate to heavy serous Heavy serosanguinous   Exposed structures None None   Wound Edges:   Open Open   Odor: None None   S&S of Infection:   erythema Positive wound culture. Zyvox prescribed today.   Edema: 2+ pitting 1+ to LE   Sensation: intact Intact               Measurements:  Left Medial Ankle Initial Evaluation  Date: 07/05/2018  Proximal / Distal  Encounter Date:  7/27/2018     Proximal / Middle / Distal   Length (cm) 6.5 / 3.5 0.9 / 6.5 / 3.7   Width (cm) 4.5 / 2.3 2.2 / 5.1 / 3.5   Depth (cm) 0.4 / 0.1 0.2 / 0.5 / 0.3   Area (cm2) 29.25 / 8.05 1.98 / 33.15 / 12.95 cm2   Tract/undermine None None / None / None                           ASSESSMENT/PLAN:      ICD-10-CM   1. Arterial leg ulcer (HCC)- S/P fem-pop and debridement on 6/8.  Pedal pulses and pulse over bypass are palpable and multiphasic by doppler. Excisional debridement performed in clinic today.  Tubi D, double layer, to mange edema.  Pt unable to tolerate VAC.   Pt to see Dr. Colin in wound clinic on 8/6.  L97.909   2. Peripheral vascular disease (HCC)- S/P fem-pop I73.9   3. Pain- Pt c/o pain with procedure, currentloy out of pain meds.  Rx for six tablets of Oxycodone 5 mg provided today.  She is to contact her surgeon on Monday for refill of pain meds.  R52   4. Wound infection- Pt is currently on Doxycycline 100 mg BID, and per Dr. Vazquez, will be on for the rest of her life d/t orthopedic hardware   7/27- wound cx + for MRSA, e. Fecaelis, and pseudomonas.  Rx for Zyvox,  will add Cipro if no improvement.  Referral to ID. [id number]    L08.9

## 2018-08-09 NOTE — PROGRESS NOTES
Outcome: Left Message    Please transfer to Patient Outreach Team at [phone] when patient returns call.    WebIZ Checked & Epic Updated:  yes    [facility] Verified: yes    Attempt # 1      .

## 2018-08-10 ENCOUNTER — NON-PROVIDER VISIT (OUTPATIENT)
Dept: WOUND CARE | Facility: MEDICAL CENTER | Age: 73
End: 2018-08-10
Attending: PATHOLOGY
Payer: MEDICARE

## 2018-08-10 PROCEDURE — 97602 WOUND(S) CARE NON-SELECTIVE: CPT

## 2018-08-10 NOTE — WOUND TEAM
Advanced Wound Care  [facility]  [address]  [address]  [place] [address]  [phone] Fax: [phone]    Encounter Note  For Certification Period: 07/05/2018 - 09/25/2018  Start of Care: 07/05/2018    Referring Provider: Italia Bae PA-C  Primary Physician: Jorge Amador MD    Consulting Providers:         Wound(s): Left medial ankle - proximal and distal, LLE anterior blisters       Subjective:        HPI: 73 y/o female with HTN, hyperlipidemia, arterial insufficiency. Presents with L medial calf ulcer has been present for several months. Started wound care 5/8/18. L distal medial calf ulcer with erythema, edema to periwound. Heavy amount of green drainage on dressing and green tinged slough to wound bed. Wound cx positive for pseudomonas. She is wearing a walking boot for a fractured L ankle that happened recently but could not tell me exactly how long ago. She also had arterial studies completed 5/28/18 and show TOMI on RLE of 0.92 and LLE TOMI of 0.7. She has 50-75% stenosis on RLE and SFA occlusion on LLE      She fell in December 2017 and fractured left intertrochanteric femur. It was surgically treated with intramedullary device by Dr. Peters. 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 She underwent left total hip arthroplasty with removal of hardware by Dr. Vazquez on 2/11/18. She noticed her leg was wet last night and has asked for incision to be evaluated. She noticed pus coming from leg incision last night with increased erythema and edema.     Dr. Colin performed a Femoral popliteal bypass on 06/08/2018 on left LE.  Patient had wound VAC placed on left medial ankle wound.              Pain: Patient reports mild wound pain with palpation and cleansing.    Allergies: No Known Allergies     Objective:      Tests and Measures:   7/16/2018: L foot palpable DP pulses, foot warm, hair growth absent.    Orthotic,  protective, supportive devices: ortho boot from ortho surgeon    Fall Risk Assessment (jeronimo all that apply with an X):  Completed 07/05/2018: high fall risk       Wound Characteristics                                                    Location:  Left Medial Ankle Initial Evaluation  Date: 07/05/2018   Encounter Date:   08/10/2018   Tissue Type and %: 60% moist pink, 40% adherent yellow Middle- 5% red moist, 95% adherent yellow.    Distal-60% moist red 35% adherent yellow, 5% moist purple.   Periwound: Slight maceration, erythema Dry/flaky skin   Drainage: Moderate to heavy serous Heavy serosanguinous   Exposed structures None None visible / palpable. Unable to fully assess due to slough.   Wound Edges:   Open Open   Odor: None None   S&S of Infection:   erythema None   Edema: 2+ pitting 2+ to LE   Sensation: intact Intact               Measurements:  Left Medial Ankle Initial Evaluation  Date: 07/05/2018  Proximal / Distal  Encounter Date:  08/10/2018    Middle / Distal   Length (cm) 6.5 / 3.5 6.1 / 3.3   Width (cm) 4.5 / 2.3 4    / 2.2   Depth (cm) 0.4 / 0.1 0.6 / 0.3   Area (cm2) 29.25 / 8.05 24.4 / 7.26 cm2   Tract/undermine None  None / None                   Wound Characteristics                                                    Location:   LLE anterior blisters   Initial Evaluation  Date: 08/06/2018 Encounter Date: 08/10/2018   Tissue Type and %: Scattered partial thickness deroofed blisters over anterior and medial LE Scattered partial thickness deroofed blisters over anterior and medial LE, 100% pink moist tissue.   Periwound: intact Intact, edema   Drainage: Heavy serous Heavy serous   Exposed structures None None   Wound Edges:   Open Open   Odor: None None   S&S of Infection:   None None   Edema: 1+ to LE 2+ to LE   Sensation: intact Intact               Measurements:   LLE anterior blisters Initial Evaluation  Date: 08/06/2018   Length (cm) 18   Width (cm) 17   Depth (cm) 0.1   Area (cm2) 306 cm2    Tract/undermine none     8/6/2018: Advised pt to return vac.  Permanently discontinued.    Procedures:     Debridement: Non-selective using soapy washcloth to remove loosely adherent slough   Cleansed with:  No rinse foam cleanser to entire LE, then NS to wounds.   Periwound protected with: Zinc barrier paste to yuki-wounds. Sween cream to intact skin of LE.   Primary dressing: blisters: Adaptic, Aquacel Ag.  Medial ankle: Aquacel Ag   Secondary Dressing: ABD pads secured with kerlix.      Other: Tubigrip F     Patient Education: Plan of care and wound progress discussed with patient. She has an appointment with Infectious Disease on 8/13/2018, and she will decide whether or not to proceed with hospitalization and vera flow wound vac after that visit. Advised patient to keep dressings clean/dry/intact. She verbalized understanding of instructions.    8/8/2018: Pt has decided to have ID appt as scheduled on 8/13/18 before deciding whether to proceed with hospitalization/vera flow.      Professional Collaboration: None today.     Assessment:      Wound etiology: Mixed vascular     Wound Progress: LLE anterior deroofed blisters are stable.  Poor tissue quality to medial ankle wounds.    Rationale for Treatment: Sween cream to moisturize skin. Zinc barrier paste to protect skin from moisture/drainage.  Adaptic as nonstick wound contact layer. Aquacel Ag to manage bioburden, absorb exudate, and maintain moist wound environment without laterally wicking exudate therefore reducing yuki-wound maceration.  ABD pads to absorb drainage. Kerlix to secure dressings. Tubigrip F to control edema.    Patient tolerance/compliance: Patient tolerated treatment well, compliant with plan of care and appointments.    Complicating factors: Age, mixed vascularity and poor healing.    Need for ongoing Advanced Wound Care services: Patient requires skilled therapeutic wound care services for product selection, application of product,  debridement, close monitoring with clinical assessment to expedite wound healing.     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  The dressings or modalities may need to be adapted   from the original plan to address any significant changes in the wound environment.

## 2018-08-13 ENCOUNTER — OFFICE VISIT (OUTPATIENT)
Dept: INFECTIOUS DISEASES | Facility: MEDICAL CENTER | Age: 73
End: 2018-08-13
Payer: MEDICARE

## 2018-08-13 ENCOUNTER — NON-PROVIDER VISIT (OUTPATIENT)
Dept: WOUND CARE | Facility: MEDICAL CENTER | Age: 73
End: 2018-08-13
Attending: PATHOLOGY
Payer: MEDICARE

## 2018-08-13 VITALS
HEIGHT: 67 IN | HEART RATE: 89 BPM | TEMPERATURE: 98 F | WEIGHT: 144 LBS | DIASTOLIC BLOOD PRESSURE: 70 MMHG | BODY MASS INDEX: 22.6 KG/M2 | SYSTOLIC BLOOD PRESSURE: 120 MMHG | OXYGEN SATURATION: 93 %

## 2018-08-13 DIAGNOSIS — S81.802D OPEN WOUND OF LEFT LOWER EXTREMITY, SUBSEQUENT ENCOUNTER: Chronic | ICD-10-CM

## 2018-08-13 DIAGNOSIS — L08.9 WOUND INFECTION: Chronic | ICD-10-CM

## 2018-08-13 DIAGNOSIS — Z22.322 MRSA (METHICILLIN RESISTANT STAPH AUREUS) CULTURE POSITIVE: Chronic | ICD-10-CM

## 2018-08-13 DIAGNOSIS — A49.8 PSEUDOMONAS INFECTION: Chronic | ICD-10-CM

## 2018-08-13 DIAGNOSIS — I73.9 PERIPHERAL VASCULAR DISEASE (HCC): Chronic | ICD-10-CM

## 2018-08-13 DIAGNOSIS — T14.8XXA WOUND INFECTION: Chronic | ICD-10-CM

## 2018-08-13 PROCEDURE — 97597 DBRDMT OPN WND 1ST 20 CM/<: CPT

## 2018-08-13 PROCEDURE — 99214 OFFICE O/P EST MOD 30 MIN: CPT | Performed by: INTERNAL MEDICINE

## 2018-08-13 PROCEDURE — 97598 DBRDMT OPN WND ADDL 20CM/<: CPT

## 2018-08-13 RX ORDER — SULFAMETHOXAZOLE AND TRIMETHOPRIM 800; 160 MG/1; MG/1
1 TABLET ORAL 2 TIMES DAILY
Qty: 60 TAB | Refills: 0 | Status: SHIPPED | OUTPATIENT
Start: 2018-08-13 | End: 2018-09-17

## 2018-08-13 RX ORDER — CIPROFLOXACIN 500 MG/1
500 TABLET, FILM COATED ORAL 2 TIMES DAILY
Qty: 60 TAB | Refills: 0 | Status: SHIPPED | OUTPATIENT
Start: 2018-08-13 | End: 2018-09-17

## 2018-08-13 RX ORDER — DOXYCYCLINE 100 MG/1
100 TABLET ORAL 2 TIMES DAILY
COMMUNITY
End: 2018-08-13

## 2018-08-13 NOTE — PROGRESS NOTES
"Chief Complaint   Patient presents with   • Follow-Up     New Wound Infection       HISTORY OF PRESENT ILLNESS: Patient is a 72 y.o. female established patient who presents today for chronic LLE ulcerations  One has improved-\"I need these to heal up so I can have my ankle surgery\"-3 fxs in ankle  Wearing boot that irritates wounds  Is going to wound care-pictures reviewed slow improvement from July to August  Wound cxs are polymicrobial with resistant MRSA, entercocoous, and pansensi PSAR.  Was given Zyvox but had severe diarrhea and rash with it.  Has appt wound care today  Refused hosp  Aware no once daily options for Infusions  On chronic doxy (MRSA now resistamnt_  No fever or systemic complaints      Patient Active Problem List    Diagnosis Date Noted   • Hip fracture (HCC) 12/20/2017     Priority: High   • Cellulitis 11/03/2017     Priority: High   • Essential hypertension 10/06/2015     Priority: Medium   • B12 deficiency 12/20/2017     Priority: Low   • Anemia 11/04/2017     Priority: Low   • Dyslipidemia 10/06/2015     Priority: Low   • Anxiety 10/06/2015     Priority: Low   • Alcohol abuse 07/31/2018   • Non-pressure chronic ulcer of left lower leg with necrosis of muscle ([facility]) 07/20/2018   • Peripheral vascular disease ([facility]) 07/20/2018   • Pain 07/20/2018   • Arterial leg ulcer ([facility]) 07/20/2018   • MRSA (methicillin resistant staph aureus) culture positive 05/31/2018   • Wound infection 05/29/2018   • Open leg wound 02/10/2018   • Hospital discharge follow-up 12/05/2017   • Hyperglycemia 11/04/2017   • Swelling of lower extremity 01/03/2017   • Generalized osteoarthritis of multiple sites 10/20/2015   • Osteoporosis    • Other (abnormal) findings on radiological examination of breast 08/28/2014       Allergies:Patient has no known allergies.    Current Outpatient Prescriptions   Medication Sig Dispense Refill   • ciprofloxacin (CIPRO) 500 MG Tab Take 1 Tab by mouth 2 times a day. 60 Tab 0   • " "sulfamethoxazole-trimethoprim (BACTRIM DS) 800-160 MG tablet Take 1 Tab by mouth 2 times a day. 60 Tab 0   • metoprolol SR (TOPROL XL) 100 MG TABLET SR 24 HR TAKE 1 TABLET BY MOUTH EVERY DAY 90 Tab 0   • vitamin D (CHOLECALCIFEROL) 1000 UNIT Tab Take 1,000 Units by mouth every day.     • sertraline (ZOLOFT) 100 MG Tab TAKE 1 TAB BY MOUTH EVERY DAY. 30 Tab 5     No current facility-administered medications for this visit.        Social History   Substance Use Topics   • Smoking status: Former Smoker     Packs/day: 0.50     Years: 25.00     Types: Cigarettes     Quit date: 1/1/2007   • Smokeless tobacco: Never Used   • Alcohol use 3.0 oz/week     5 Glasses of wine per week      Comment: glass of wine per day       Family History   Problem Relation Age of Onset   • GI Mother         colostomy   • Heart Attack Father    • Diabetes Father    • Hypertension Father    • Psychiatry Brother         bipolar disorder       ROS:  Review of Systems   Constitutional: Negative for fever, chills, weight loss and malaise/fatigue.      All other systems reviewed and are negative except as in HPI.      Exam:  Blood pressure 120/70, pulse 89, temperature 36.7 °C (98 °F), height 1.702 m (5' 7\"), weight 65.3 kg (144 lb), SpO2 93 %.  General:  Elderly, well developed female in NAD. Pleasant and cooperative Uses walker  Head: NCAT, Pupils are equal round reactive to light. Extraocular movements intact. Oropharynx is clear.  Neck: Supple.  No LAD  Pulmonary: Clear to ausculation.  No rales, rhonchi, or wheezing. Unlabored  Cardiovascular: Regular rate and rhythm   Abdominal: Soft, nontender, nondistended.   Skin: No rashes.  Extremities: no clubbing, cyanosis, or edema. Multiple shallow ulceration LLE-largest 6.1 X 4X0.6 cm decreased from 6.5 X 4.5 cm 7/5; distal wound 3.3 X 2.2 X 0.3 c.3rd shallow-nearly healed  Neurologic: Alert and oriented x4. Speech is fluent without dysarthria. Cranial nerves intact. Moving all " extremities.      Assessment/Plan:  1. Open wound of left lower extremity, subsequent encounter      as below   2. Wound infection  ciprofloxacin (CIPRO) 500 MG Tab    sulfamethoxazole-trimethoprim (BACTRIM DS) 800-160 MG tablet    Slow to improve-try to treat MRSA and PSAR to assess response.  If insufficient add treat,ment for enterococcus.  Needs new boot to promote offloading   3. MRSA (methicillin resistant staph aureus) culture positive  sulfamethoxazole-trimethoprim (BACTRIM DS) 800-160 MG tablet    MRSA now resistent to doxy-trial Bactrim.  Allergy to Zyvox with rash and severe diarrhea   4. Peripheral vascular disease (HCC)      s/p bypass-improved   5. Pseudomonas infection  ciprofloxacin (CIPRO) 500 MG Tab    Add cipro BID-trial.  No once daily option for infusion        Manisha Ferrari M.D.

## 2018-08-13 NOTE — WOUND TEAM
"Advanced Wound Care  [facility]  [address]  [address]  [place] [address]  [phone] Fax: [phone]    Encounter Note  For Certification Period: 07/05/2018 - 09/25/2018  Start of Care: 07/05/2018    Referring Provider: Italia Bae PA-C  Primary Physician: Jorge Amador MD    Consulting Providers:         Wound(s): Left medial ankle - proximal and distal, LLE anterior blisters       Subjective:        HPI: 71 y/o female with HTN, hyperlipidemia, arterial insufficiency. Presents with L medial calf ulcer has been present for several months. Started wound care 5/8/18. L distal medial calf ulcer with erythema, edema to periwound. Heavy amount of green drainage on dressing and green tinged slough to wound bed. Wound cx positive for pseudomonas. She is wearing a walking boot for a fractured L ankle that happened recently but could not tell me exactly how long ago. She also had arterial studies completed 5/28/18 and show TOMI on RLE of 0.92 and LLE TOMI of 0.7. She has 50-75% stenosis on RLE and SFA occlusion on LLE      She fell in December 2017 and fractured left intertrochanteric femur. It was surgically treated with intramedullary device by Dr. Peters. 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 She underwent left total hip arthroplasty with removal of hardware by Dr. Vazquez on 2/11/18. She noticed her leg was wet last night and has asked for incision to be evaluated. She noticed pus coming from leg incision last night with increased erythema and edema.     Dr. Colin performed a Femoral popliteal bypass on 06/08/2018 on left LE.  Patient had wound VAC placed on left medial ankle wound.              Pain: Patient reports pain with debridement.  Reports ankle is \"sore\".    Allergies: No Known Allergies     Objective:      Tests and Measures:   7/16/2018: L foot palpable DP pulses, foot warm, hair growth " absent.    Orthotic, protective, supportive devices: ortho boot from ortho surgeon    Fall Risk Assessment (jeronimo all that apply with an X):  Completed 07/05/2018: high fall risk       Wound Characteristics                                                    Location:  Left Medial Ankle Initial Evaluation  Date: 07/05/2018   Encounter Date:   08/13/2018   Tissue Type and %: 60% moist pink, 40% adherent yellow Proximal- 40% red moist, 60% adherent yellow.    Distal-40% moist red 50% adherent yellow, 10% adherent purple.   Periwound: Slight maceration, erythema Intact   Drainage: Moderate to heavy serous Moderate serosanguinous   Exposed structures None CARLITA   Wound Edges:   Open Open   Odor: None None   S&S of Infection:   erythema None, however cx 7/20 +.  See ID note.  Pt to start Bactrim and Cipro 8/13/18.  30 day supply ordered.   Edema: 2+ pitting 2+ to LE   Sensation: intact Intact               Measurements:  Left Medial Ankle Initial Evaluation  Date: 07/05/2018  Proximal / Distal  Encounter Date:  08/13/2018    Proximal / Distal   Length (cm) 6.5 / 3.5 5 / 3.4   Width (cm) 4.5 / 2.3 4.3 / 2.8   Depth (cm) 0.4 / 0.1 CARLITA / CARLITA   Area (cm2) 29.25 / 8.05 21.5 / 9.52 cm2   Tract/undermine None  None / None             Wound Characteristics                                                    Location:   LLE anterior blisters   Initial Evaluation  Date: 08/06/2018 Encounter Date: 08/13/2018   Tissue Type and %: Scattered partial thickness deroofed blisters over anterior and medial LE Scattered partial thickness deroofed blisters over anterior and medial LE, 100% pink moist tissue.   Periwound: intact Intact, edema   Drainage: Heavy serous Mod serous   Exposed structures None None   Wound Edges:   Open Open   Odor: None None   S&S of Infection:   None None   Edema: 1+ to LE 2+ to LE   Sensation: intact Intact               Measurements:   LLE anterior blisters Initial Evaluation  Date: 08/06/2018 Encounter Date:  08/13/18   Length (cm) 18 14   Width (cm) 17 17   Depth (cm) 0.1 <0.1   Area (cm2) 306 cm2 238 cm2   Tract/undermine None none       Procedures:     Debridement: cswd using scalpel to remove ~50cm2 slough from wound beds   Cleansed with:  No rinse foam cleanser to entire LE, then NS to wounds.   Periwound protected with: barrier cream to yuki-wounds. Sween cream to intact skin of LE.   Primary dressing: blisters: Mepitel, Aquacel Ag.  Medial ankle: medihoney gel, plain hydrofiber x2   Secondary Dressing: ABD pads secured with kerlix.      Other: Tubigrip F     Patient Education: Plan of care and wound progress discussed with patient. She saw ID this morning and will begin Bactrim and Cipro today.  S/sx allergic reaction taught, side effects to monitor for, need to notify ID if need to stop abx for any reason taught.  Reviewed need to keep dressings clean/dry/intact. Pt verbalized understanding of instructions.    Professional Collaboration: None today.     Assessment:      Wound etiology: Mixed vascular     Wound Progress: LLE anterior deroofed blisters are stable.  Poor tissue quality to medial ankle wounds.    Rationale for Treatment: Sween cream to moisturize skin. Barrier cream to protect skin from moisture/drainage.  Mepitel as nonstick wound contact layer. Aquacel Ag to manage bioburden, absorb exudate, and maintain moist wound environment without laterally wicking exudate therefore reducing yuki-wound maceration.  Medihoney gel to provide moist wound environment, and facilitate autolytic debridement.  Plain hydrofiber to absorb.  ABD pads to absorb drainage. Kerlix to secure dressings. Tubigrip F to control edema.    Patient tolerance/compliance: Patient tolerated treatment well, compliant with plan of care and appointments.    Complicating factors: Age, mixed vascularity and poor healing.    Need for ongoing Advanced Wound Care services: Patient requires skilled therapeutic wound care services for product  selection, application of product, debridement, close monitoring with clinical assessment to expedite wound healing.     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  The dressings or modalities may need to be adapted   from the original plan to address any significant changes in the wound environment.

## 2018-08-15 ENCOUNTER — APPOINTMENT (OUTPATIENT)
Dept: WOUND CARE | Facility: MEDICAL CENTER | Age: 73
End: 2018-08-15
Attending: PATHOLOGY
Payer: MEDICARE

## 2018-08-16 ENCOUNTER — NON-PROVIDER VISIT (OUTPATIENT)
Dept: WOUND CARE | Facility: MEDICAL CENTER | Age: 73
End: 2018-08-16
Attending: PATHOLOGY
Payer: MEDICARE

## 2018-08-16 PROCEDURE — 97598 DBRDMT OPN WND ADDL 20CM/<: CPT

## 2018-08-16 PROCEDURE — 97597 DBRDMT OPN WND 1ST 20 CM/<: CPT

## 2018-08-16 NOTE — WOUND TEAM
"Advanced Wound Care  [facility]  [address]  [address]  [place] [address]  [phone] Fax: [phone]    Encounter Note  For Certification Period: 07/05/2018 - 09/25/2018  Start of Care: 07/05/2018    Referring Provider: Italia Bae PA-C  Primary Physician: Jorge Amador MD    Consulting Providers:         Wound(s): Left medial ankle - proximal and distal, LLE anterior blisters       Subjective:        HPI: 73 y/o female with HTN, hyperlipidemia, arterial insufficiency. Presents with L medial calf ulcer has been present for several months. Started wound care 5/8/18. L distal medial calf ulcer with erythema, edema to periwound. Heavy amount of green drainage on dressing and green tinged slough to wound bed. Wound cx positive for pseudomonas. She is wearing a walking boot for a fractured L ankle that happened recently but could not tell me exactly how long ago. She also had arterial studies completed 5/28/18 and show TOMI on RLE of 0.92 and LLE TOMI of 0.7. She has 50-75% stenosis on RLE and SFA occlusion on LLE      She fell in December 2017 and fractured left intertrochanteric femur. It was surgically treated with intramedullary device by Dr. Peters. 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 She underwent left total hip arthroplasty with removal of hardware by Dr. Vazquez on 2/11/18. She noticed her leg was wet last night and has asked for incision to be evaluated. She noticed pus coming from leg incision last night with increased erythema and edema.     Dr. Colin performed a Femoral popliteal bypass on 06/08/2018 on left LE.  Patient had wound VAC placed on left medial ankle wound.              Pain: Patient reports pain with debridement.  Reports ankle is \"sore\".    Allergies: No Known Allergies     Objective:      Tests and Measures:   7/16/2018: L foot palpable DP pulses, foot warm, hair growth " absent.    Orthotic, protective, supportive devices: ortho boot from ortho surgeon    Fall Risk Assessment (jeronimo all that apply with an X):  Completed 07/05/2018: high fall risk       Wound Characteristics                                                    Location:  Left Medial Ankle Initial Evaluation  Date: 07/05/2018   Encounter Date:   08/16/2018   Tissue Type and %: 60% moist pink, 40% adherent yellow Proximal- 40% red moist, 60% adherent yellow.    Distal-40% moist red 50% adherent yellow, 10% adherent dark red.   Periwound: Slight maceration, erythema Intact   Drainage: Moderate to heavy serous Moderate serosanguinous   Exposed structures None CARLITA   Wound Edges:   Open Open   Odor: None None   S&S of Infection:   erythema Patient on abx with I.D.   Edema: 2+ pitting 2+ to LE   Sensation: intact Intact               Measurements:  Left Medial Ankle Initial Evaluation  Date: 07/05/2018  Proximal / Distal  Encounter Date:  08/13/2018    Proximal / Distal   Length (cm) 6.5 / 3.5 5 / 3.4   Width (cm) 4.5 / 2.3 4.3 / 2.8   Depth (cm) 0.4 / 0.1 CARLITA / CARLITA   Area (cm2) 29.25 / 8.05 21.5 / 9.52 cm2   Tract/undermine None  None / None             Wound Characteristics                                                    Location:   LLE anterior blisters   Initial Evaluation  Date: 08/06/2018 Encounter Date: 08/16/2018   Tissue Type and %: Scattered partial thickness deroofed blisters over anterior and medial LE Scattered partial thickness deroofed blisters over anterior and medial LE, 100% pink moist tissue.   Periwound: intact Intact, edema   Drainage: Heavy serous minimal serous   Exposed structures None None   Wound Edges:   Open Open   Odor: None None   S&S of Infection:   None None   Edema: 1+ to LE 2+ to LE   Sensation: intact Intact               Measurements:   LLE anterior blisters Initial Evaluation  Date: 08/06/2018 Encounter Date: 08/13/18   Length (cm) 18 14   Width (cm) 17 17   Depth (cm) 0.1 <0.1   Area  (cm2) 306 cm2 238 cm2   Tract/undermine None none       Procedures:     Debridement: CSWD with curette and scalpel to remove ~30.0cm2 of non-viable slough and biofilm from wound bed, non-viable adherent slough and cross-barrera wound beds   Cleansed with:  No rinse foam cleanser to entire LE, then NS to wounds.   Periwound protected with: skin prep to yuki-wounds. Sween cream to intact skin of LE.   Primary dressing: blisters: Aquacel Ag.  Medial ankle: medihoney gel, plain hydrofiber x2   Secondary Dressing: ABD pads secured with kerlix.      Other: Tubigrip F     Patient Education: Plan of care and wound progress discussed with patient. Patient will continue to take abx as directed.  Patient has loss significant weight and is eating more protein to help with wound healing.    Professional Collaboration: None today.     Assessment:      Wound etiology: Mixed vascular     Wound Progress: LLE anterior deroofed blisters are stable.  Poor tissue quality to medial ankle wounds.    Rationale for Treatment: Sween cream to moisturize skin. Barrier cream to protect skin from moisture/drainage.  Mepitel as nonstick wound contact layer. Aquacel Ag to manage bioburden, absorb exudate, and maintain moist wound environment without laterally wicking exudate therefore reducing yuki-wound maceration.  Medihoney gel to provide moist wound environment, and facilitate autolytic debridement.  Plain hydrofiber to absorb.  ABD pads to absorb drainage. Kerlix to secure dressings. Tubigrip F to control edema.    Patient tolerance/compliance: Patient tolerated treatment well, compliant with plan of care and appointments.    Complicating factors: Age, mixed vascularity and poor healing.    Need for ongoing Advanced Wound Care services: Patient requires skilled therapeutic wound care services for product selection, application of product, debridement, close monitoring with clinical assessment to expedite wound healing.     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  The dressings or modalities may need to be adapted   from the original plan to address any significant changes in the wound environment.

## 2018-08-17 ENCOUNTER — APPOINTMENT (OUTPATIENT)
Dept: WOUND CARE | Facility: MEDICAL CENTER | Age: 73
End: 2018-08-17
Attending: PATHOLOGY
Payer: MEDICARE

## 2018-08-20 ENCOUNTER — NON-PROVIDER VISIT (OUTPATIENT)
Dept: WOUND CARE | Facility: MEDICAL CENTER | Age: 73
End: 2018-08-20
Attending: PATHOLOGY
Payer: MEDICARE

## 2018-08-20 PROCEDURE — 97597 DBRDMT OPN WND 1ST 20 CM/<: CPT

## 2018-08-20 PROCEDURE — 97598 DBRDMT OPN WND ADDL 20CM/<: CPT

## 2018-08-20 NOTE — WOUND TEAM
"Advanced Wound Care  [facility]  [address]  [address]  [place] [address]  [phone] Fax: [phone]    Encounter Note  For Certification Period: 07/05/2018 - 09/25/2018  Start of Care: 07/05/2018    Referring Provider: Italia Bae PA-C  Primary Physician: Jorge Amador MD    Consulting Providers:         Wound(s): Left medial ankle - proximal and distal, LLE anterior blisters       Subjective:        HPI: 73 y/o female with HTN, hyperlipidemia, arterial insufficiency. Presents with L medial calf ulcer has been present for several months. Started wound care 5/8/18. L distal medial calf ulcer with erythema, edema to periwound. Heavy amount of green drainage on dressing and green tinged slough to wound bed. Wound cx positive for pseudomonas. She is wearing a walking boot for a fractured L ankle that happened recently but could not tell me exactly how long ago. She also had arterial studies completed 5/28/18 and show TOMI on RLE of 0.92 and LLE TOMI of 0.7. She has 50-75% stenosis on RLE and SFA occlusion on LLE      She fell in December 2017 and fractured left intertrochanteric femur. It was surgically treated with intramedullary device by Dr. Peters. 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 She underwent left total hip arthroplasty with removal of hardware by Dr. Vazquze on 2/11/18. She noticed her leg was wet last night and has asked for incision to be evaluated. She noticed pus coming from leg incision last night with increased erythema and edema.     Dr. Colin performed a Femoral popliteal bypass on 06/08/2018 on left LE.  Patient had wound VAC placed on left medial ankle wound.              Pain: Patient reports pain with debridement.  Reports ankle is \"sore\". 2% viscous lidocaine used for ~5-10 minute dwell time.     Allergies: No Known Allergies     Objective:      Tests and Measures:   08/20/2018: L foot " palpable DP pulses, foot warm, hair growth absent.    Orthotic, protective, supportive devices: ortho boot from ortho surgeon    Fall Risk Assessment (jeronimo all that apply with an X):  Completed 07/05/2018: high fall risk       Wound Characteristics                                                    Location:  Left Medial Ankle Initial Evaluation  Date: 07/05/2018   Encounter Date:   08/20/2018   Tissue Type and %: 60% moist pink, 40% adherent yellow Proximal - 100% marbled red/yellow    Distal - 30% moist red 65% adherent yellow, 5% adherent dark red.   Periwound: Slight maceration, erythema Intact   Drainage: Moderate to heavy serous Moderate serosanguinous   Exposed structures None CARLITA   Wound Edges:   Open Open   Odor: None None   S&S of Infection:   erythema Patient on abx with I.D.   Edema: 2+ pitting 2+ to LE   Sensation: intact Intact               Measurements:  Left Medial Ankle Initial Evaluation  Date: 07/05/2018  Proximal / Distal  Encounter Date:  08/20/2018    Proximal / Distal   Length (cm) 6.5 / 3.5 4.7  /  3.5   Width (cm) 4.5 / 2.3 4.5  /  2.9   Depth (cm) 0.4 / 0.1 CARLITA / CARLITA   Area (cm2) 29.25 / 8.05 21.15 / 10.15 cm2   Tract/undermine None  None / None     Wound Characteristics                                                    Location:   LLE anterior blisters   Initial Evaluation  Date: 08/06/2018 Encounter Date: 08/20/2018   Tissue Type and %: Scattered partial thickness deroofed blisters over anterior and medial LE Partial thickness deroofed blisters over anterior LE, 100% pink moist tissue. All others are resolved   Periwound: intact Intact, edema   Drainage: Heavy serous Minimal serous   Exposed structures None None   Wound Edges:   Open Open   Odor: None None   S&S of Infection:   None None   Edema: 1+ to LE 2+ to LE   Sensation: intact Intact               Measurements:   LLE anterior blisters Initial Evaluation  Date: 08/06/2018 Encounter Date: 08/20/18   Length (cm) 18 0.7   Width (cm)  17 0.4   Depth (cm) 0.1 <0.1   Area (cm2) 306 cm2 0.28 cm2   Tract/undermine None None       Procedures:     Debridement: CSWD with scalpel to remove ~30.0cm2 of non-viable slough and biofilm from wound beds, non-viable adherent slough and cross-barrera wound beds   Cleansed with:  No rinse foam cleanser to entire LE, then NS to wounds.   Periwound protected with: Skin prep to yuki-wounds. Sween cream to intact skin of LE.   Primary dressing: blisters: Aquacel Ag.  Medial ankle: medihoney gel, plain hydrofiber x2   Secondary Dressing: ABD pads secured with kerlix.      Other: Tubigrip F     Patient Education: Plan of care and wound progress discussed with patient. Increased slough again today to both wound beds; the blisters are healing well. Patient will continue to take abx as directed.  Patient has loss significant weight and is eating more protein to help with wound healing.    Professional Collaboration: None today.     Assessment:      Wound etiology: Mixed vascular     Wound Progress: LLE anterior deroofed blisters are mostly resolved - one small area remains open. Poor tissue quality to medial ankle wounds.    Rationale for Treatment: Sween cream to moisturize skin. Barrier cream to protect skin from moisture/drainage. Aquacel Ag to manage bioburden, absorb exudate, and maintain moist wound environment without laterally wicking exudate therefore reducing yuki-wound maceration. Medihoney gel to provide moist wound environment, and facilitate autolytic debridement. Plain hydrofiber to absorb. ABD pads to absorb drainage. Kerlix to secure dressings. Tubigrip F to control edema.    Patient tolerance/compliance: Patient tolerated treatment well, compliant with plan of care and appointments.    Complicating factors: Age, mixed vascularity and poor healing.    Need for ongoing Advanced Wound Care services: Patient requires skilled therapeutic wound care services for product selection, application of product,  debridement, close monitoring with clinical assessment to expedite wound healing.     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  The dressings or modalities may need to be adapted   from the original plan to address any significant changes in the wound environment.

## 2018-08-22 ENCOUNTER — OFFICE VISIT (OUTPATIENT)
Dept: INFECTIOUS DISEASES | Facility: MEDICAL CENTER | Age: 73
End: 2018-08-22
Payer: MEDICARE

## 2018-08-22 VITALS
WEIGHT: 145 LBS | BODY MASS INDEX: 22.76 KG/M2 | DIASTOLIC BLOOD PRESSURE: 78 MMHG | OXYGEN SATURATION: 91 % | HEIGHT: 67 IN | HEART RATE: 87 BPM | TEMPERATURE: 97.8 F | SYSTOLIC BLOOD PRESSURE: 120 MMHG

## 2018-08-22 DIAGNOSIS — A49.8 PSEUDOMONAS INFECTION: ICD-10-CM

## 2018-08-22 DIAGNOSIS — Z22.322 MRSA (METHICILLIN RESISTANT STAPH AUREUS) CULTURE POSITIVE: ICD-10-CM

## 2018-08-22 DIAGNOSIS — T14.8XXA WOUND INFECTION: ICD-10-CM

## 2018-08-22 DIAGNOSIS — S81.802D OPEN WOUND OF LEFT LOWER EXTREMITY, SUBSEQUENT ENCOUNTER: ICD-10-CM

## 2018-08-22 DIAGNOSIS — L08.9 WOUND INFECTION: ICD-10-CM

## 2018-08-22 PROCEDURE — 99214 OFFICE O/P EST MOD 30 MIN: CPT | Performed by: INTERNAL MEDICINE

## 2018-08-22 NOTE — PROGRESS NOTES
[facility] FOLLOW-UP NOTE     Date of Service: 8/22/2018    Chief Complaint: Wound infection    History of Present Illness:     Nadege Mae is a 72 y.o. female with HTN, hyperlipidemia, and arterial insufficiency. Patient is being followed in [facility] and ID clinic for infected L medial calf ulcer that has been present for several months now. She started wound care on 5/8/18 with slow but steady improvement. She has a walking boot for a fractured L ankle that happened recently, reportedly awaiting surgery once infection clears. She also had arterial studies completed 5/28/18 which showed TOMI on RLE of 0.92 and LLE TOMI of 0.7. She had 50-75% stenosis on RLE and SFA occlusion on LLE. Dr. Colin performed a Femoral popliteal bypass on 06/08/2018 on left LE.       Back in December 2017, pt had a fall and fractured left intertrochanteric femur. It was surgically treated with intramedullary device by Dr. Peters. 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 She underwent left total hip arthroplasty with removal of hardware by Dr. Vazquez on 2/11/18.     Interval History:  Per C documentation and images, her proximal wound has improved in size while the distal wound has removed about the same. Her wound is growing multiple resistant organisms including MRSA (was on doxy but now resistant to doxy), Pseudomonas, and Enterococcus. Pt was advised by Dr. Colin to get admitted and undergo debridement with wound vac placement, however pt refused at this  Time as she is not mentally ready for it. The ID plan is to continue cipro and Bactrim to cover the MRSA and Pseudomonas, with tentative plan to add a third antibiotic to cover Enterococcus if the wound worsens. Pt is aware that if the wound worsens, she will need IV therapy. Linezolid was tried previously but pt did not tolerate it and refuses to try it again. She reports no  fevers/chills/night sweats/nausea/vomiting/diarrhea.    Review of Systems:  All other systems reviewed and are negative expect as noted in HPI    Past Medical History:   Diagnosis Date   • Anxiety    • Cellulitis and abscess of lower extremity    • Dental disorder     full dentures   • Generalized osteoarthritis of multiple sites 10/20/2015   • Heart valve disease     pt not sure    • Hyperlipidemia    • Hypertension    • Osteoporosis        Past Surgical History:   Procedure Laterality Date   • FEMORAL POPLITEAL BYPASS Left 6/8/2018    Procedure: FEMORAL POPLITEAL BYPASS;  Surgeon: Milana Colin M.D.;  Location: SURGERY [facility];  Service: General   • IRRIGATION & DEBRIDEMENT GENERAL Left 6/8/2018    Procedure: IRRIGATION & DEBRIDEMENT ANKLE WOUND;  Surgeon: Milana Colin M.D.;  Location: SURGERY [facility];  Service: General   • IRRIGATION & DEBRIDEMENT HIP Left 6/4/2018    Procedure: IRRIGATION & DEBRIDEMENT HIP;  Surgeon: Chong Vazquez M.D.;  Location: SURGERY [facility];  Service: Orthopedics   • HIP REVISION TOTAL Left 2/11/2018    Procedure: HIP REVISION TOTAL- femoral nail removal conversion to total hip arthoroplasty;  Surgeon: Chong Vazquez M.D.;  Location: SURGERY [facility];  Service: Orthopedics   • HIP NAILING INTRAMEDULLARY Left 12/20/2017    Procedure: HIP NAILING INTRAMEDULLARY;  Surgeon: Jorge Peters M.D.;  Location: SURGERY [facility];  Service: Orthopedics   • BREAST BIOPSY  8/28/2014    Performed by Magdalena [facility]o M.D. at 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[employer]  [employer]     Social History Main Topics   • Smoking status: Former Smoker     Packs/day: 0.50     Years: 25.00     Types: Cigarettes     Quit date: 1/1/2007   • Smokeless tobacco: Never Used   • Alcohol use 3.0 oz/week     5 Glasses of wine per week      Comment: glass of wine per day   • Drug use: No   • Sexual activity: Not Currently     Partners: Male     Other Topics Concern   • Not on file     Social History Narrative   • No narrative on file       No Known Allergies    Medications:  Current Outpatient Prescriptions on File Prior to Visit   Medication Sig Dispense Refill   • ciprofloxacin (CIPRO) 500 MG Tab Take 1 Tab by mouth 2 times a day. 60 Tab 0   • sulfamethoxazole-trimethoprim (BACTRIM DS) 800-160 MG tablet Take 1 Tab by mouth 2 times a day. 60 Tab 0   • metoprolol SR (TOPROL XL) 100 MG TABLET SR 24 HR TAKE 1 TABLET BY MOUTH EVERY DAY 90 Tab 0   • vitamin D (CHOLECALCIFEROL) 1000 UNIT Tab Take 1,000 Units by mouth every day.     • sertraline (ZOLOFT) 100 MG Tab TAKE 1 TAB BY MOUTH EVERY DAY. 30 Tab 5     No current facility-administered medications on file prior to visit.        Physical Exam:   Vital Signs: /78   Pulse 87   Temp 36.6 °C (97.8 °F)   Ht 1.702 m (5' 7\")   Wt 65.8 kg (145 lb)   SpO2 91%   BMI 22.71 kg/m²   Vital signs reviewed  Constitutional: Patient is oriented to person, place, and time. Thin WM. No distress  Head: Atraumatic, normocephalic  Eyes: Conjunctivae normal, EOM intact. Pupils are equal, round, and reactive to light.   Mouth/Throat: Lips without lesions, good dentition, oropharynx is clear and moist.  Neck: Neck supple. No masses/lymphadenopathy  Cardiovascular: Normal rate, regular rhythm, normal S1S2 and intact distal pulses.Grade III ESM+, no gallop, or friction rub. No pedal edema.  Pulmonary/Chest: No " respiratory distress. Unlabored respiratory effort, lungs clear to auscultation. No wheezes or rales.   Abdominal: Soft, non tender, protuberant. BS + x 4. No masses or hepatosplenomegaly.   Musculoskeletal: No joint tenderness, swelling, erythema, or restriction of motion noted. LLE with bandage in place, not removed  Neurological: Alert and oriented to person, place, and time. No gross cranial nerve deficit. No focal neural deficit noted  Skin: Skin is warm and dry. No rashes or embolic phenomena noted on exposed skin  Psychiatric: Normal mood and affect. Behavior is normal.     LABS:  WBC   Date/Time Value Ref Range Status   06/09/2018 01:45 AM 7.5 4.8 - 10.8 K/uL Final     RBC   Date/Time Value Ref Range Status   06/09/2018 01:45 AM 3.01 (L) 4.20 - 5.40 M/uL Final     Hemoglobin   Date/Time Value Ref Range Status   06/09/2018 01:45 AM 8.4 (L) 12.0 - 16.0 g/dL Final     Hematocrit   Date/Time Value Ref Range Status   06/09/2018 01:45 AM 27.4 (L) 37.0 - 47.0 % Final     MCV   Date/Time Value Ref Range Status   06/09/2018 01:45 AM 91.0 81.4 - 97.8 fL Final     MCH   Date/Time Value Ref Range Status   06/09/2018 01:45 AM 27.9 27.0 - 33.0 pg Final     MCHC   Date/Time Value Ref Range Status   06/09/2018 01:45 AM 30.7 (L) 33.6 - 35.0 g/dL Final     MPV   Date/Time Value Ref Range Status   06/09/2018 01:45 AM 10.0 9.0 - 12.9 fL Final     Sodium   Date/Time Value Ref Range Status   06/14/2018 01:47  135 - 145 mmol/L Final     Potassium   Date/Time Value Ref Range Status   06/14/2018 01:47 AM 4.4 3.6 - 5.5 mmol/L Final     Chloride   Date/Time Value Ref Range Status   06/14/2018 01:47  96 - 112 mmol/L Final     Co2   Date/Time Value Ref Range Status   06/14/2018 01:47 AM 24 20 - 33 mmol/L Final     Glucose   Date/Time Value Ref Range Status   06/14/2018 01:47  (H) 65 - 99 mg/dL Final     Bun   Date/Time Value Ref Range Status   06/14/2018 01:47 AM 12 8 - 22 mg/dL Final     Creatinine   Date/Time Value  "Ref Range Status   06/14/2018 01:47 AM 0.60 0.50 - 1.40 mg/dL Final     Alkaline Phosphatase   Date/Time Value Ref Range Status   02/11/2018 02:29 AM 86 30 - 99 U/L Final     AST(SGOT)   Date/Time Value Ref Range Status   02/11/2018 02:29 AM 20 12 - 45 U/L Final     ALT(SGPT)   Date/Time Value Ref Range Status   02/11/2018 02:29 AM 11 2 - 50 U/L Final     Total Bilirubin   Date/Time Value Ref Range Status   02/11/2018 02:29 AM 0.5 0.1 - 1.5 mg/dL Final      No results found for: CPKTOTAL     MICRO:  Blood Culture   Date Value Ref Range Status   11/03/2017 No growth after 5 days of incubation.  Final         Latest pertinent labs were reviewed    IMAGING STUDIES:  6/8 Ankle X-ray \"There are fractures of the distal fibular shaft and of the medial malleolus, both of which appear to be subacute with at least minimal callus formation. There is marked lateral subluxation of the talus. No other significant findings.\"    Assessment:   Nadege Mae is a 72 y.o. female with a history of HTN, hyperlipidemia, and arterial insufficiency. Patient is being followed in [facility] and ID clinic for infected L medial calf ulcer. Wound culture from 7/2018 is growing resistant MRSA, E. Faecalis, and Pseudomonas. Pt refused IV therapy and is currently on PO Bactrim and cipro with plan to add Enterococcus coverage if the wound does not improve or worsens. Pt was advised by Dr. Colin to get admitted and undergo debridement with wound vac placement, however pt refused at this  Time as she is not mentally ready for it. Given some improvement in the proximal wound and no worsening in the distal wound over the past month, will continue current therapy for now with close follow up. Pt agrees that if no improvement or worsening, we will need to reassess Dr. Colin's advise for debridement and wound vac placement and possible IV antibiotic therapy.    Pertinent Diagnoses:  1. Open wound of left lower extremity, subsequent encounter     2. " Wound infection  CBC WITH DIFFERENTIAL    COMP METABOLIC PANEL    WESTERGREN SED RATE    CRP QUANTITIVE (NON-CARDIAC)   3. MRSA (methicillin resistant staph aureus) culture positive  CBC WITH DIFFERENTIAL    COMP METABOLIC PANEL    WESTERGREN SED RATE    CRP QUANTITIVE (NON-CARDIAC)   4. Pseudomonas infection  CBC WITH DIFFERENTIAL    COMP METABOLIC PANEL    WESTERGREN SED RATE    CRP QUANTITIVE (NON-CARDIAC)     Plan:   Infected LLE wounds; polymicrobial as above:  - Continue PO bactrim and cipro as above. May need to add Enterococcus coverage if wound worsens or poor response  - Counseled pt that if no improvement or worsening, we will need to reassess Dr. Colin's advise for debridement and wound vac placement and possible IV antibiotic therapy  - Will check CBC w/ diff, CMP  - Will check ESR, CRP    RTC in 2 weeks    Fernando Rainey M.D.

## 2018-08-23 ENCOUNTER — NON-PROVIDER VISIT (OUTPATIENT)
Dept: WOUND CARE | Facility: MEDICAL CENTER | Age: 73
End: 2018-08-23
Attending: PATHOLOGY
Payer: MEDICARE

## 2018-08-23 PROCEDURE — 97598 DBRDMT OPN WND ADDL 20CM/<: CPT

## 2018-08-23 PROCEDURE — 97597 DBRDMT OPN WND 1ST 20 CM/<: CPT

## 2018-08-23 NOTE — WOUND TEAM
"Advanced Wound Care  [facility]  [address]  [address]  [place] [address]  [phone] Fax: [phone]    Encounter Note  For Certification Period: 07/05/2018 - 09/25/2018  Start of Care: 07/05/2018    Referring Provider: Italia Bae PA-C  Primary Physician: Jorge Amador MD    Consulting Providers:         Wound(s): Left medial ankle - proximal and distal, LLE anterior blisters       Subjective:        HPI: 71 y/o female with HTN, hyperlipidemia, arterial insufficiency. Presents with L medial calf ulcer has been present for several months. Started wound care 5/8/18. L distal medial calf ulcer with erythema, edema to periwound. Heavy amount of green drainage on dressing and green tinged slough to wound bed. Wound cx positive for pseudomonas. She is wearing a walking boot for a fractured L ankle that happened recently but could not tell me exactly how long ago. She also had arterial studies completed 5/28/18 and show TOMI on RLE of 0.92 and LLE TOMI of 0.7. She has 50-75% stenosis on RLE and SFA occlusion on LLE      She fell in December 2017 and fractured left intertrochanteric femur. It was surgically treated with intramedullary device by Dr. Peters. 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 She underwent left total hip arthroplasty with removal of hardware by Dr. Vazquez on 2/11/18. She noticed her leg was wet last night and has asked for incision to be evaluated. She noticed pus coming from leg incision last night with increased erythema and edema.     Dr. Colin performed a Femoral popliteal bypass on 06/08/2018 on left LE.  Patient had wound VAC placed on left medial ankle wound.              Pain: Patient reports pain with debridement.  Reports ankle is \"sore\". 2% viscous lidocaine used for ~5-10 minute dwell time.     Allergies: No Known Allergies     Objective:      Tests and Measures:   08/20/2018: L foot " palpable DP pulses, foot warm, hair growth absent.    Orthotic, protective, supportive devices: ortho boot from ortho surgeon    Fall Risk Assessment (jeronimo all that apply with an X):  Completed 07/05/2018: high fall risk       Wound Characteristics                                                    Location:  Left Medial Ankle Initial Evaluation  Date: 07/05/2018   Encounter Date:   08/23/2018   Tissue Type and %: 60% moist pink, 40% adherent yellow Proximal - 100% marbled red/yellow    Distal - 30% moist red 65% adherent yellow, 5% dark red.   Periwound: Slight maceration, erythema Intact   Drainage: Moderate to heavy serous Moderate serosanguinous   Exposed structures None None noted   Wound Edges:   Open Open   Odor: None None   S&S of Infection:   erythema Patient on abx with I.D.   Edema: 2+ pitting 2+ to LE   Sensation: intact Intact               Measurements:  Left Medial Ankle Initial Evaluation  Date: 07/05/2018  Proximal / Distal  Encounter Date:  08/20/2018    Proximal / Distal   Length (cm) 6.5 / 3.5 4.7  /  3.5   Width (cm) 4.5 / 2.3 4.5  /  2.9   Depth (cm) 0.4 / 0.1 CARLITA / CARLITA   Area (cm2) 29.25 / 8.05 21.15 / 10.15 cm2   Tract/undermine None  None / None     Wound Characteristics                                                    Location:   LLE anterior blisters   Initial Evaluation  Date: 08/06/2018 Encounter Date: 08/23/2018   Tissue Type and %: Scattered partial thickness deroofed blisters over anterior and medial LE Partial thickness deroofed blisters over anterior LE, 100% pink moist tissue. All others are resolved   Periwound: intact Intact, edema   Drainage: Heavy serous Minimal serous   Exposed structures None None   Wound Edges:   Open Open   Odor: None None   S&S of Infection:   None None   Edema: 1+ to LE 2+ to LE   Sensation: intact Intact               Measurements:   LLE anterior blisters Initial Evaluation  Date: 08/06/2018 Encounter Date: 08/20/18   Length (cm) 18 0.7   Width (cm) 17  0.4   Depth (cm) 0.1 <0.1   Area (cm2) 306 cm2 0.28 cm2   Tract/undermine None None       Procedures:  2% viscous lidocaine used for ~5-10 minute dwell time.    Debridement: CSWD with scalpel to remove ~30.0cm2 of non-viable slough and biofilm from wound beds   Cleansed with:  No rinse foam cleanser to entire LE, then NS to wounds.   Periwound protected with: Zinc barrier paste to yuki-wounds. Sween cream to intact skin of LE.   Primary dressing: Blisters: Aquacel Ag.  Medial ankle: medihoney gel, plain hydrofiber x2   Secondary Dressing: ABD pads secured with kerlix.      Other: Tubigrip F     Patient Education: Plan of care and wound progress discussed with patient. Pt is taking abx without issue. Reviewed s/s infection and when to present to ED. Pt is agreeable with plan of care and verbalizes understanding of all instruction.     Professional Collaboration: None today.     Assessment:      Wound etiology: Mixed vascular     Wound Progress: Increased viable tissue    Rationale for Treatment: Sween cream to moisturize skin. Barrier cream to protect skin from moisture/drainage. Aquacel Ag to manage bioburden, absorb exudate, and maintain moist wound environment without laterally wicking exudate therefore reducing yuki-wound maceration. Medihoney gel to provide moist wound environment, and facilitate autolytic debridement. Plain hydrofiber to absorb. ABD pads to absorb drainage. Kerlix to secure dressings. Tubigrip F to control edema.    Patient tolerance/compliance: Patient tolerated treatment well, compliant with plan of care and appointments.    Complicating factors: Age, mixed vascularity and poor healing.    Need for ongoing Advanced Wound Care services: Patient requires skilled therapeutic wound care services for product selection, application of product, debridement, close monitoring with clinical assessment to expedite wound healing.     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  The dressings or modalities may need to be adapted   from the original plan to address any significant changes in the wound environment.

## 2018-08-27 ENCOUNTER — NON-PROVIDER VISIT (OUTPATIENT)
Dept: WOUND CARE | Facility: MEDICAL CENTER | Age: 73
End: 2018-08-27
Attending: PATHOLOGY
Payer: MEDICARE

## 2018-08-27 PROCEDURE — 97597 DBRDMT OPN WND 1ST 20 CM/<: CPT

## 2018-08-27 PROCEDURE — 97598 DBRDMT OPN WND ADDL 20CM/<: CPT

## 2018-08-27 NOTE — WOUND TEAM
Advanced Wound Care  [facility]  [address]  [address]  [place] [address]  [phone] Fax: [phone]    Encounter Note  For Certification Period: 07/05/2018 - 09/25/2018  Start of Care: 07/05/2018    Referring Provider: Italia Bae PA-C  Primary Physician: Jorge Amador MD    Consulting Providers:         Wound(s): Left medial ankle - proximal and distal, LLE anterior blisters       Subjective:        HPI: 73 y/o female with HTN, hyperlipidemia, arterial insufficiency. Presents with L medial calf ulcer has been present for several months. Started wound care 5/8/18. L distal medial calf ulcer with erythema, edema to periwound. Heavy amount of green drainage on dressing and green tinged slough to wound bed. Wound cx positive for pseudomonas. She is wearing a walking boot for a fractured L ankle that happened recently but could not tell me exactly how long ago. She also had arterial studies completed 5/28/18 and show TOMI on RLE of 0.92 and LLE TOMI of 0.7. She has 50-75% stenosis on RLE and SFA occlusion on LLE      She fell in December 2017 and fractured left intertrochanteric femur. It was surgically treated with intramedullary device by Dr. Peters. 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 She underwent left total hip arthroplasty with removal of hardware by Dr. Vazquez on 2/11/18. She noticed her leg was wet last night and has asked for incision to be evaluated. She noticed pus coming from leg incision last night with increased erythema and edema.     Dr. Colin performed a Femoral popliteal bypass on 06/08/2018 on left LE.  Patient had wound VAC placed on left medial ankle wound.              Pain: Patient reports pain but states it is difficult to state number. Does want lido for debridement.  2% viscous lidocaine used for 10 minute dwell time.     Allergies: No Known Allergies     Objective:      Tests and  Measures: 8/27/2018 LT dp pulse palpable   08/20/2018: L foot palpable DP pulses, foot warm, hair growth absent.    Orthotic, protective, supportive devices: ortho boot from ortho surgeon    Fall Risk Assessment (jeronimo all that apply with an X):  Completed 07/05/2018: high fall risk       Wound Characteristics                                                    Location:  Left Medial Ankle Initial Evaluation  Date: 07/05/2018   Encounter Date:   08/27/2018   Tissue Type and %: 60% moist pink, 40% adherent yellow Proximal - 100% marbled red/yellow    Distal - 60% moist red 35% adherent yellow, 5% dark red.   Periwound: Slight maceration, erythema Intact   Drainage: Moderate to heavy serous Moderate serosanguinous   Exposed structures None None noted   Wound Edges:   Open Open   Odor: None None   S&S of Infection:   erythema Patient on abx with I.D.   Edema: 2+ pitting 2+ to LE   Sensation: intact Intact               Measurements:  Left Medial Ankle Initial Evaluation  Date: 07/05/2018  Proximal / Distal  Encounter Date:  08/20/2018    Proximal / Distal   Length (cm) 6.5 / 3.5 4.5  /  3.4   Width (cm) 4.5 / 2.3 4  /  2.8   Depth (cm) 0.4 / 0.1 CARLITA / CARLITA   Area (cm2) 29.25 / 8.05 18 / 9.52 cm2   Tract/undermine None  None / None             Wound Characteristics                                                    Location:   LLE anterior blisters   Initial Evaluation  Date: 08/06/2018 Encounter Date: 08/27/2018   Tissue Type and %: Scattered partial thickness deroofed blisters over anterior and medial % fragile epithelium at knee, All others are resolved   Periwound: intact Intact, edema   Drainage: Heavy serous Minimal serous   Exposed structures None None   Wound Edges:   Open Open   Odor: None None   S&S of Infection:   None None   Edema: 1+ to LE 2+ to LE   Sensation: intact Intact               Measurements:   LLE anterior blisters Initial Evaluation  Date: 08/06/2018 Encounter Date: 08/27/18   Length (cm) 18  "resolved   Width (cm) 17    Depth (cm) 0.1    Area (cm2) 306 cm2    Tract/undermine None        Procedures:  2% viscous lidocaine used for 10 minute dwell time.    Debridement: CSWD with curette to remove approx 28 cm2 of non-viable slough and biofilm from wound beds   Cleansed with:  No rinse foam cleanser to entire LE, then NS to wounds.   Periwound protected with: Skin prep, Zinc barrier paste to yuki-wounds   Primary dressing: Healing blisters to Lt knee: small Biatin to protect Medial ankle: medihoney gel, plain hydrofiber x2   Secondary Dressing: ABD pad secured with kerlix and tape      Other: Tubigrip F     Patient Education:  Instructed pt on s/s infection - chills, fever, malaise, NV, increased redness/swelling/pain/exudate - and to go to ER/Urgent Care; instructed on wound progress - smaller wounds and improved tissue quality; on importance of off loading; if tubi is too tight, remove; otherwise may wear at night for sleeping or take off; to return 2x/week for appointments. Pt verbalized understanding.  Pt continues on antibiotics without issue, states there is a third antibiotic ID wants to prescribe, but \"I can only be on two at a time.\" States that \"Dr. Colin wanted me to go into the hospital, but I asked if I could try oral antibiotics first and see if that works.\"     Professional Collaboration: None today.     Assessment:      Wound etiology: Mixed vascular     Wound Progress: Increased viable tissue, smaller per measurements.     Rationale for Treatment:  Barrier cream to protect skin from moisture/drainage. Medihoney gel to provide moist wound environment, and facilitate autolytic debridement. Plain hydrofiber to absorb. ABD pads to absorb drainage. Kerlix to secure dressings. Tubigrip F to control edema.    Patient tolerance/compliance: Patient tolerated treatment well, compliant with plan of care and appointments.    Complicating factors: Age, mixed vascularity and poor healing.    Need for " ongoing Advanced Wound Care services: Patient requires skilled therapeutic wound care services for product selection, application of product, debridement, close monitoring with clinical assessment to expedite wound healing.     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  The dressings or modalities may need to be adapted   from the original plan to address any significant changes in the wound environment.

## 2018-08-30 ENCOUNTER — NON-PROVIDER VISIT (OUTPATIENT)
Dept: WOUND CARE | Facility: MEDICAL CENTER | Age: 73
End: 2018-08-30
Attending: PATHOLOGY
Payer: MEDICARE

## 2018-08-30 PROCEDURE — 97597 DBRDMT OPN WND 1ST 20 CM/<: CPT

## 2018-08-30 PROCEDURE — 97598 DBRDMT OPN WND ADDL 20CM/<: CPT

## 2018-08-30 NOTE — WOUND TEAM
Advanced Wound Care  [facility]  [address]  [address]  [place] [address]  [phone] Fax: [phone]    Encounter Note  For Certification Period: 07/05/2018 - 09/25/2018  Start of Care: 07/05/2018    Referring Provider: Italia Bae PA-C  Primary Physician: Jorge Amador MD    Consulting Providers:         Wound(s): Left medial ankle - proximal and distal, LLE anterior blisters       Subjective:        HPI: 73 y/o female with HTN, hyperlipidemia, arterial insufficiency. Presents with L medial calf ulcer has been present for several months. Started wound care 5/8/18. L distal medial calf ulcer with erythema, edema to periwound. Heavy amount of green drainage on dressing and green tinged slough to wound bed. Wound cx positive for pseudomonas. She is wearing a walking boot for a fractured L ankle that happened recently but could not tell me exactly how long ago. She also had arterial studies completed 5/28/18 and show TOMI on RLE of 0.92 and LLE TOMI of 0.7. She has 50-75% stenosis on RLE and SFA occlusion on LLE      She fell in December 2017 and fractured left intertrochanteric femur. It was surgically treated with intramedullary device by Dr. Peters. 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 She underwent left total hip arthroplasty with removal of hardware by Dr. Vazquez on 2/11/18. She noticed her leg was wet last night and has asked for incision to be evaluated. She noticed pus coming from leg incision last night with increased erythema and edema.     Dr. Colin performed a Femoral popliteal bypass on 06/08/2018 on left LE.  Patient had wound VAC placed on left medial ankle wound.              Pain: Patient reports pain but states it is difficult to state number. Does want lido for debridement.  2% viscous lidocaine used for 10 minute dwell time.     Allergies: No Known Allergies     Objective:      Tests and  Measures: 8/27/2018 LT dp pulse palpable   08/20/2018: L foot palpable DP pulses, foot warm, hair growth absent.    Orthotic, protective, supportive devices: ortho boot from ortho surgeon    Fall Risk Assessment (jeronimo all that apply with an X):  Completed 07/05/2018: high fall risk       Wound Characteristics                                                    Location:  Left Medial Ankle Initial Evaluation  Date: 07/05/2018   Encounter Date:   08/30/2018   Tissue Type and %: 60% moist pink, 40% adherent yellow Proximal - 100% marbled red/yellow    Distal - 60% moist red 35% adherent yellow, 5% dark red.   Periwound: Slight maceration, erythema Intact   Drainage: Moderate to heavy serous Moderate to heavy serosanguinous   Exposed structures None None noted   Wound Edges:   Open Open   Odor: None None   S&S of Infection:   erythema Patient on abx with I.D.   Edema: 2+ pitting 2+ to LE   Sensation: intact Intact               Measurements:  Left Medial Ankle Initial Evaluation  Date: 07/05/2018  Proximal / Distal  Encounter Date:  08/27/2018    Proximal / Distal   Length (cm) 6.5 / 3.5 4.5  /  3.4   Width (cm) 4.5 / 2.3 4  /  2.8   Depth (cm) 0.4 / 0.1 CARLITA / CARLITA   Area (cm2) 29.25 / 8.05 18 / 9.52 cm2   Tract/undermine None  None / None             Wound Characteristics                                                    Location:   LLE anterior blisters   Initial Evaluation  Date: 08/06/2018 Encounter Date: 08/30/2018   Tissue Type and %: Scattered partial thickness deroofed blisters over anterior and medial % fragile epithelium at knee, All others are resolved   Periwound: intact Intact, edema   Drainage: Heavy serous Minimal serous   Exposed structures None None   Wound Edges:   Open Open   Odor: None None   S&S of Infection:   None None   Edema: 1+ to LE 2+ to LE   Sensation: intact Intact               Measurements:   LLE anterior blisters Initial Evaluation  Date: 08/06/2018 Encounter Date: 08/27/18   Length  "(cm) 18 resolved   Width (cm) 17    Depth (cm) 0.1    Area (cm2) 306 cm2    Tract/undermine None        Procedures:  2% viscous lidocaine used for 10 minute dwell time.    Debridement: CSWD with curette to remove approx 28 cm2 of non-viable slough and biofilm from wound beds   Cleansed with:  No rinse foam cleanser to entire LE, then NS to wounds.   Periwound protected with: Skin prep, Zinc barrier paste to yuki-wounds   Primary dressing: Healing blisters to Lt knee: small Biatin to protect Medial ankle: medihoney gel, plain hydrofiber x2   Secondary Dressing: ABD pad secured with kerlix and tape      Other: Tubigrip F     Patient Education: POC and wound progress discussed with pt. Reviewed s/s infection - chills, fever, malaise, NV, increased redness/swelling/pain/exudate - and to go to ER/Urgent Care. Pt verbalized understanding.    8/27/18: Instructed pt on s/s infection - chills, fever, malaise, NV, increased redness/swelling/pain/exudate - and to go to ER/Urgent Care; instructed on wound progress - smaller wounds and improved tissue quality; on importance of off loading; if tubi is too tight, remove; otherwise may wear at night for sleeping or take off; to return 2x/week for appointments. Pt verbalized understanding.  Pt continues on antibiotics without issue, states there is a third antibiotic ID wants to prescribe, but \"I can only be on two at a time.\" States that \"Dr. Colin wanted me to go into the hospital, but I asked if I could try oral antibiotics first and see if that works.\"     Professional Collaboration: None today.     Assessment:      Wound etiology: Mixed vascular     Wound Progress: No significant change     Rationale for Treatment:  Barrier cream to protect skin from moisture/drainage. Medihoney gel to provide moist wound environment, and facilitate autolytic debridement. Plain hydrofiber to absorb. ABD pads to absorb drainage. Kerlix to secure dressings. Tubigrip F to control " edema.    Patient tolerance/compliance: Patient tolerated treatment well, compliant with plan of care and appointments.    Complicating factors: Age, mixed vascularity and poor healing.    Need for ongoing Advanced Wound Care services: Patient requires skilled therapeutic wound care services for product selection, application of product, debridement, close monitoring with clinical assessment to expedite wound healing.     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  The dressings or modalities may need to be adapted   from the original plan to address any significant changes in the wound environment.

## 2018-08-31 ENCOUNTER — PATIENT OUTREACH (OUTPATIENT)
Dept: HEALTH INFORMATION MANAGEMENT | Facility: OTHER | Age: 73
End: 2018-08-31

## 2018-08-31 NOTE — PROGRESS NOTES
Outreach call to pt to follow up regarding  care plan and information/resource sent out to pt to determine if she has any additional questions or interested in pursuing resources sent. Pt did not answer. LSW left message requesting pt call LSW back.     Plan:  · LSW will attempt to follow up with pt at later time/date, if LSW does not hear back.

## 2018-09-06 ENCOUNTER — APPOINTMENT (OUTPATIENT)
Dept: WOUND CARE | Facility: MEDICAL CENTER | Age: 73
End: 2018-09-06
Attending: PATHOLOGY
Payer: MEDICARE

## 2018-09-07 ENCOUNTER — TELEPHONE (OUTPATIENT)
Dept: HEALTH INFORMATION MANAGEMENT | Facility: OTHER | Age: 73
End: 2018-09-07

## 2018-09-07 DIAGNOSIS — Z12.11 SCREENING FOR COLON CANCER: Primary | ICD-10-CM

## 2018-09-07 NOTE — PROGRESS NOTES
1. Attempt #:2    2. WebIZ Checked & Epic Updated: Yes  3. [facility] Verified: yes  4. Verify PCP: yes    5. Communication Preference Obtained: yes    6. Diabetes Visit Scheduling  Scheduling Status:Scheduled      7. Care Gap Scheduling (Attempt to Schedule EACH Overdue Care Gap!)    Health Maintenance Due   Topic Date Due   • IMM HEP B VACCINE (1 of 3 - Risk 3-dose series) 10/13/1962   • COLON CANCER SCREENING ANNUAL FIT  10/13/1993   • IMM ZOSTER VACCINES (1 of 2) 10/13/1993   • Annual Wellness Visit  10/20/2016   • MAMMOGRAM  03/29/2017   • IMM INFLUENZA (1) 09/01/2018        8. Patient was directed to Health and Wellness Website: yes     PT DID NOT WANT TO SCHEDULE MAMMO AT THE MOMENT-FIT TEST ORDER SENT TO PCP/ IMMUNIZATIONS SCHEDULED    9. Screened for Food Pantry Prescription? yes  10. [employer] Activation: already active  11. [employer] Ned: no  12. Virtual Visits: no  13. Opt In to Text Messages: no

## 2018-09-10 ENCOUNTER — OFFICE VISIT (OUTPATIENT)
Dept: INFECTIOUS DISEASES | Facility: MEDICAL CENTER | Age: 73
End: 2018-09-10
Payer: MEDICARE

## 2018-09-10 VITALS
BODY MASS INDEX: 22.76 KG/M2 | TEMPERATURE: 97.8 F | SYSTOLIC BLOOD PRESSURE: 124 MMHG | HEART RATE: 78 BPM | HEIGHT: 67 IN | DIASTOLIC BLOOD PRESSURE: 68 MMHG | WEIGHT: 145 LBS | OXYGEN SATURATION: 94 %

## 2018-09-10 DIAGNOSIS — R21 RASH: ICD-10-CM

## 2018-09-10 DIAGNOSIS — L97.909 ARTERIAL LEG ULCER (HCC): ICD-10-CM

## 2018-09-10 DIAGNOSIS — Z22.322 MRSA (METHICILLIN RESISTANT STAPH AUREUS) CULTURE POSITIVE: ICD-10-CM

## 2018-09-10 DIAGNOSIS — L97.923 NON-PRESSURE CHRONIC ULCER OF LEFT LOWER LEG WITH NECROSIS OF MUSCLE (HCC): ICD-10-CM

## 2018-09-10 PROCEDURE — 99213 OFFICE O/P EST LOW 20 MIN: CPT | Performed by: INTERNAL MEDICINE

## 2018-09-10 NOTE — PROGRESS NOTES
[facility] FOLLOW-UP NOTE     Date of Service: 8/22/2018    Chief Complaint: Wound infection    History of Present Illness:     Nadege Mae is a 72 y.o. female with HTN, hyperlipidemia, and arterial insufficiency. Patient is being followed in [facility] and ID clinic for infected L medial calf ulcer that has been present for several months now. She started wound care on 5/8/18 with slow but steady improvement. She has a walking boot for a fractured L ankle that happened recently, reportedly awaiting surgery once infection clears. She also had arterial studies completed 5/28/18 which showed TOMI on RLE of 0.92 and LLE TOMI of 0.7. She had 50-75% stenosis on RLE and SFA occlusion on LLE. Dr. Colin performed a Femoral popliteal bypass on 06/08/2018 on left LE.       Back in December 2017, pt had a fall and fractured left intertrochanteric femur. It was surgically treated with intramedullary device by Dr. Peters. 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 She underwent left total hip arthroplasty with removal of hardware by Dr. Vazquez on 2/11/18.     Interval History:  Per C documentation and images, her proximal wound has improved in size while the distal wound has removed about the same. Her wound is growing multiple resistant organisms including MRSA (was on doxy but now resistant to doxy), Pseudomonas, and Enterococcus. Pt was advised by Dr. Colin to get admitted and undergo debridement with wound vac placement, however pt refused at this  Time as she is not mentally ready for it. The ID plan is to continue cipro and Bactrim to cover the MRSA and Pseudomonas, with tentative plan to add a third antibiotic to cover Enterococcus if the wound worsens. Pt is aware that if the wound worsens, she will need IV therapy. Linezolid was tried previously but pt did not tolerate it and refuses to try it again. She reports no  fevers/chills/night sweats/nausea/vomiting/diarrhea.    9/10/2018 patient has come back for follow-up.  Patient did not follow up with wound care clinic last week.  She denies any fevers denies any chills denies any nausea vomiting diarrhea.  She says she is still thinking about surgery .  Does not want to go back to the hospital quite as yet.  Complains of rash all over her body which is itchy  Review of Systems:  All other systems reviewed and are negative expect as noted in HPI    Past Medical History:   Diagnosis Date   • Anxiety    • Cellulitis and abscess of lower extremity    • Dental disorder     full dentures   • Generalized osteoarthritis of multiple sites 10/20/2015   • Heart valve disease     pt not sure    • Hyperlipidemia    • Hypertension    • Osteoporosis        Past Surgical History:   Procedure Laterality Date   • FEMORAL POPLITEAL BYPASS Left 6/8/2018    Procedure: FEMORAL POPLITEAL BYPASS;  Surgeon: Milana Colin M.D.;  Location: [facility];  Service: General   • IRRIGATION & DEBRIDEMENT GENERAL Left 6/8/2018    Procedure: IRRIGATION & DEBRIDEMENT ANKLE WOUND;  Surgeon: Milana Colin M.D.;  Location: [facility];  Service: General   • IRRIGATION & DEBRIDEMENT HIP Left 6/4/2018    Procedure: IRRIGATION & DEBRIDEMENT HIP;  Surgeon: Chong Vazquez M.D.;  Location: [facility];  Service: Orthopedics   • HIP REVISION TOTAL Left 2/11/2018    Procedure: HIP REVISION TOTAL- femoral nail removal conversion to total hip arthoroplasty;  Surgeon: Chong Vazquez M.D.;  Location: [facility];  Service: Orthopedics   • HIP NAILING INTRAMEDULLARY Left 12/20/2017    Procedure: HIP NAILING INTRAMEDULLARY;  Surgeon: Jorge Peters M.D.;  Location: [facility];  Service: Orthopedics   • BREAST BIOPSY  8/28/2014    Performed by Magdalena Londono M.D. 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[employer]  [employer]     Social History Main Topics   • Smoking status: Former Smoker     Packs/day: 0.50     Years: 25.00     Types: Cigarettes     Quit date: 1/1/2007   • Smokeless tobacco: Never Used   • Alcohol use 3.0 oz/week     5 Glasses of wine per week      Comment: glass of wine per day   • Drug use: No   • Sexual activity: Not Currently     Partners: Male     Other Topics Concern   • Not on file     Social History Narrative   • No narrative on file       No Known Allergies    Medications:  Current Outpatient Prescriptions on File Prior to Visit   Medication Sig Dispense Refill   • ciprofloxacin (CIPRO) 500 MG Tab Take 1 Tab by mouth 2 times a day. 60 Tab 0   • sulfamethoxazole-trimethoprim (BACTRIM DS) 800-160 MG tablet Take 1 Tab by mouth 2 times a day. 60 Tab 0   • metoprolol SR (TOPROL XL) 100 MG TABLET SR 24 HR TAKE 1 TABLET BY MOUTH EVERY DAY 90 Tab 0   • vitamin D (CHOLECALCIFEROL) 1000 UNIT Tab Take 1,000 Units by mouth every day.     • sertraline (ZOLOFT) 100 MG Tab TAKE 1 TAB BY MOUTH EVERY DAY. 30 Tab 5     No current facility-administered medications on file prior to visit.        Physical Exam:   Vital Signs: /68   Pulse 78   Temp 36.6 °C (97.8 °F)   Ht 1.702 m (5' 7\")   Wt 65.8 kg (145 lb)   SpO2 94%   BMI 22.71 kg/m²   Vital signs reviewed  Constitutional: Patient is oriented to person, place, and time. Thin WM. No distress  Head: Atraumatic, normocephalic  Eyes: Conjunctivae normal, EOM intact. " Pupils are equal, round, and reactive to light.   Mouth/Throat: Lips without lesions, good dentition, oropharynx is clear and moist.  Neck: Neck supple. No masses/lymphadenopathy  Cardiovascular: Normal rate, regular rhythm, normal S1S2 and intact distal pulses.Grade III ESM+, no gallop, or friction rub. No pedal edema.  Pulmonary/Chest: No respiratory distress. Unlabored respiratory effort, lungs clear to auscultation. No wheezes or rales.   Abdominal: Soft, non tender, protuberant. BS + x 4. No masses or hepatosplenomegaly.   Musculoskeletal: No joint tenderness, swelling, erythema, or restriction of motion noted. LLE with bandage in place, not removed  Neurological: Alert and oriented to person, place, and time. No gross cranial nerve deficit. No focal neural deficit noted  Skin: Skin is warm and dry.  Erythematous drying rash all over her body including palms  Psychiatric: Normal mood and affect. Behavior is normal.     LABS:  WBC   Date/Time Value Ref Range Status   06/09/2018 01:45 AM 7.5 4.8 - 10.8 K/uL Final     RBC   Date/Time Value Ref Range Status   06/09/2018 01:45 AM 3.01 (L) 4.20 - 5.40 M/uL Final     Hemoglobin   Date/Time Value Ref Range Status   06/09/2018 01:45 AM 8.4 (L) 12.0 - 16.0 g/dL Final     Hematocrit   Date/Time Value Ref Range Status   06/09/2018 01:45 AM 27.4 (L) 37.0 - 47.0 % Final     MCV   Date/Time Value Ref Range Status   06/09/2018 01:45 AM 91.0 81.4 - 97.8 fL Final     MCH   Date/Time Value Ref Range Status   06/09/2018 01:45 AM 27.9 27.0 - 33.0 pg Final     MCHC   Date/Time Value Ref Range Status   06/09/2018 01:45 AM 30.7 (L) 33.6 - 35.0 g/dL Final     MPV   Date/Time Value Ref Range Status   06/09/2018 01:45 AM 10.0 9.0 - 12.9 fL Final     Sodium   Date/Time Value Ref Range Status   06/14/2018 01:47  135 - 145 mmol/L Final     Potassium   Date/Time Value Ref Range Status   06/14/2018 01:47 AM 4.4 3.6 - 5.5 mmol/L Final     Chloride   Date/Time Value Ref Range Status  "  06/14/2018 01:47  96 - 112 mmol/L Final     Co2   Date/Time Value Ref Range Status   06/14/2018 01:47 AM 24 20 - 33 mmol/L Final     Glucose   Date/Time Value Ref Range Status   06/14/2018 01:47  (H) 65 - 99 mg/dL Final     Bun   Date/Time Value Ref Range Status   06/14/2018 01:47 AM 12 8 - 22 mg/dL Final     Creatinine   Date/Time Value Ref Range Status   06/14/2018 01:47 AM 0.60 0.50 - 1.40 mg/dL Final     Alkaline Phosphatase   Date/Time Value Ref Range Status   02/11/2018 02:29 AM 86 30 - 99 U/L Final     AST(SGOT)   Date/Time Value Ref Range Status   02/11/2018 02:29 AM 20 12 - 45 U/L Final     ALT(SGPT)   Date/Time Value Ref Range Status   02/11/2018 02:29 AM 11 2 - 50 U/L Final     Total Bilirubin   Date/Time Value Ref Range Status   02/11/2018 02:29 AM 0.5 0.1 - 1.5 mg/dL Final      No results found for: CPKTOTAL     MICRO:  Blood Culture   Date Value Ref Range Status   11/03/2017 No growth after 5 days of incubation.  Final         Latest pertinent labs were reviewed    IMAGING STUDIES:  6/8 Ankle X-ray \"There are fractures of the distal fibular shaft and of the medial malleolus, both of which appear to be subacute with at least minimal callus formation. There is marked lateral subluxation of the talus. No other significant findings.\"    Assessment:     Pertinent Diagnoses:  1. 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 Arterial leg ulcer (HCC)     3. MRSA (methicillin resistant staph aureus) culture positive     4. Rash       Plan:   Patient has a new rash and likely due to Bactrim.  She was advised to discontinue the antibiotics.  She will continue follow-up with the wound care clinic.  She is going to see them tomorrow.  I looked at the wound pictures and there is no significant cellulitis.  Will hold off on antibiotics for now.  She is going to think about surgery and debridement.  We will see her back in 1 week.  -       Sharon Rao M.D.      "

## 2018-09-11 ENCOUNTER — NON-PROVIDER VISIT (OUTPATIENT)
Dept: WOUND CARE | Facility: MEDICAL CENTER | Age: 73
End: 2018-09-11
Attending: PATHOLOGY
Payer: MEDICARE

## 2018-09-11 PROCEDURE — 97597 DBRDMT OPN WND 1ST 20 CM/<: CPT

## 2018-09-11 PROCEDURE — 97598 DBRDMT OPN WND ADDL 20CM/<: CPT

## 2018-09-11 NOTE — WOUND TEAM
Advanced Wound Care  [facility]  [address]  [address]  [place] [address]  [phone] Fax: [phone]    Encounter Note  For Certification Period: 07/05/2018 - 09/25/2018  Start of Care: 07/05/2018    Referring Provider: Italia Bae PA-C  Primary Physician: Jorge Amador MD    Consulting Providers:         Wound(s): Left medial ankle - proximal and distal, LLE anterior blisters       Subjective:        HPI: 71 y/o female with HTN, hyperlipidemia, arterial insufficiency. Presents with L medial calf ulcer has been present for several months. Started wound care 5/8/18. L distal medial calf ulcer with erythema, edema to periwound. Heavy amount of green drainage on dressing and green tinged slough to wound bed. Wound cx positive for pseudomonas. She is wearing a walking boot for a fractured L ankle that happened recently but could not tell me exactly how long ago. She also had arterial studies completed 5/28/18 and show TOMI on RLE of 0.92 and LLE TOMI of 0.7. She has 50-75% stenosis on RLE and SFA occlusion on LLE      She fell in December 2017 and fractured left intertrochanteric femur. It was surgically treated with intramedullary device by Dr. Peters. 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 She underwent left total hip arthroplasty with removal of hardware by Dr. Vazquez on 2/11/18. She noticed her leg was wet last night and has asked for incision to be evaluated. She noticed pus coming from leg incision last night with increased erythema and edema.     Dr. Colin performed a Femoral popliteal bypass on 06/08/2018 on left LE.  Patient had wound VAC placed on left medial ankle wound.              Pain: Patient reports pain at wound sites with sharp debridement.    Allergies: No Known Allergies     Objective:      Tests and Measures:   09/11/2018: Left DP pulse 1+  08/20/2018: L foot palpable DP pulses, foot warm, hair  growth absent.    Orthotic, protective, supportive devices: ortho boot from ortho surgeon    Fall Risk Assessment (jeronimo all that apply with an X):  Completed 07/05/2018: high fall risk       Wound Characteristics                                                    Location:  Left Medial Ankle/LE Initial Evaluation  Date: 07/05/2018   Encounter Date:   08/30/2018 Encounter Date:  09/11/2018   Tissue Type and %: 60% moist pink, 40% adherent yellow Proximal - 100% marbled red/yellow    Distal - 60% moist red 35% adherent yellow, 5% dark red. Proximal-10% red moist tissue intermixed with 90% adherent yellow.    Distal-40% red moist tissue intermixed with 60% adherent yellow.   Periwound: Slight maceration, erythema Intact Erythema, intact.   Drainage: Moderate to heavy serous Moderate to heavy serosanguinous Heavy serosanguinous   Exposed structures None None noted None visible / palpable.   Wound Edges:   Open Open Open   Odor: None None None   S&S of Infection:   erythema Patient on abx with I.D. Erythema. No abx for now per ID.   Edema: 2+ pitting 2+ to LE 2+ to LE   Sensation: intact Intact Intact               Measurements:  Left Medial Ankle/LE Initial Evaluation  Date: 07/05/2018  Proximal / Distal  Encounter Date:  08/27/2018    Proximal / Distal Encounter Date:  09/11/2018    Proximal / Distal   Length (cm) 6.5 / 3.5 4.5  /  3.4 6.1 / 4.1   Width (cm) 4.5 / 2.3 4  /  2.8 3.9 / 3.3   Depth (cm) 0.4 / 0.1 CARLITA / CARLITA 0.4 / 0.2   Area (cm2) 29.25 / 8.05 18 / 9.52 cm2 23.79 / 13.53 cm2   Tract/undermine None  None / None None / None               Procedures: Viscous lidocaine 2% applied to wounds prior to treatment with ~10 minute dwell time.   Debridement: CSWD with scalpel to remove ~36 cm2 slough from wounds.   Cleansed with:  No rinse foam cleanser to entire LE, then NS to wounds after debridement.   Periwound protected with: Sween cream to intact skin of LE, Zinc barrier paste to yuki-wounds.   Primary dressing:  Medihoney gel, plain Aquacel x 2.   Secondary Dressing: ABD pad secured with kerlix and hypafix tape.      Other: Tubigrip F     Patient Education: Plan of care and wound progress discussed with patient. She was instructed to stop Bactrim by ID (Dr. Rao) due to rash. She will follow up with ID in one week. Reviewed s/s of infection (increased redness/pain/swelling/drainage, fever, fatigue, malaise, N/V), and advised patient to call the office or go to Urgent Care/ER if necessary. Patient verbalized understanding of instructions.    Professional Collaboration: None today.     Assessment:      Wound etiology: Mixed vascular     Wound Progress: Wounds measure larger.    Rationale for Treatment:  Zinc barrier paste to protect skin from moisture/drainage. Medihoney gel to provide moist wound environment, and facilitate autolytic debridement. Plain Aquacel and ABD pads to absorb drainage. Kerlix to secure dressings. Tubigrip F to control edema.    Patient tolerance/compliance: Patient tolerated treatment well, compliant with plan of care and appointments.    Complicating factors: Age, mixed vascularity and poor healing.    Need for ongoing Advanced Wound Care services: Patient requires skilled therapeutic wound care services for product selection, application of product, debridement, close monitoring with clinical assessment to expedite wound healing.     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  The dressings or modalities may need to be adapted   from the original plan to address any significant changes in the wound environment.

## 2018-09-13 ENCOUNTER — NON-PROVIDER VISIT (OUTPATIENT)
Dept: WOUND CARE | Facility: MEDICAL CENTER | Age: 73
End: 2018-09-13
Attending: PATHOLOGY
Payer: MEDICARE

## 2018-09-13 PROCEDURE — 97597 DBRDMT OPN WND 1ST 20 CM/<: CPT

## 2018-09-13 PROCEDURE — 97598 DBRDMT OPN WND ADDL 20CM/<: CPT

## 2018-09-13 NOTE — WOUND TEAM
Advanced Wound Care  [facility]  [address]  [address]  [place] [address]  [phone] Fax: [phone]    Encounter Note  For Certification Period: 07/05/2018 - 09/25/2018  Start of Care: 07/05/2018    Referring Provider: Italia Bae PA-C  Primary Physician: Jorge Amador MD    Consulting Providers:         Wound(s): Left medial ankle - proximal and distal, LLE anterior blisters       Subjective:        HPI: 71 y/o female with HTN, hyperlipidemia, arterial insufficiency. Presents with L medial calf ulcer has been present for several months. Started wound care 5/8/18. L distal medial calf ulcer with erythema, edema to periwound. Heavy amount of green drainage on dressing and green tinged slough to wound bed. Wound cx positive for pseudomonas. She is wearing a walking boot for a fractured L ankle that happened recently but could not tell me exactly how long ago. She also had arterial studies completed 5/28/18 and show TOMI on RLE of 0.92 and LLE TOMI of 0.7. She has 50-75% stenosis on RLE and SFA occlusion on LLE      She fell in December 2017 and fractured left intertrochanteric femur. It was surgically treated with intramedullary device by Dr. Peters. 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 She underwent left total hip arthroplasty with removal of hardware by Dr. Vazquez on 2/11/18. She noticed her leg was wet last night and has asked for incision to be evaluated. She noticed pus coming from leg incision last night with increased erythema and edema.     Dr. Colin performed a Femoral popliteal bypass on 06/08/2018 on left LE.  Patient had wound VAC placed on left medial ankle wound.              Pain: Patient reports mild pain at wound sites with sharp debridement.    Allergies: No Known Allergies     Objective:      Tests and Measures:   09/11/2018: Left DP pulse 1+  08/20/2018: L foot palpable DP pulses, foot warm,  hair growth absent.    Orthotic, protective, supportive devices: ortho boot from ortho surgeon    Fall Risk Assessment (jeronimo all that apply with an X):  Completed 07/05/2018: high fall risk       Wound Characteristics                                                    Location:  Left Medial Ankle/LE Initial Evaluation  Date: 07/05/2018   Encounter Date:  09/13/2018   Tissue Type and %: 60% moist pink, 40% adherent yellow Proximal-20% red moist tissue intermixed with 80% adherent yellow.    Distal-40% red moist tissue intermixed with 60% adherent yellow.   Periwound: Slight maceration, erythema Erythema, intact. Scattered blisters from reaction to abx   Drainage: Moderate to heavy serous Heavy serosanguinous   Exposed structures None None visible / palpable.   Wound Edges:   Open Open   Odor: None None   S&S of Infection:   erythema Erythema. No abx for now per ID.   Edema: 2+ pitting 2+ to LE   Sensation: intact Intact               Measurements:  Left Medial Ankle/LE Initial Evaluation  Date: 07/05/2018  Proximal / Distal  Encounter Date:  09/11/2018    Proximal / Distal   Length (cm) 6.5 / 3.5 6.1 / 4.1   Width (cm) 4.5 / 2.3 3.9 / 3.3   Depth (cm) 0.4 / 0.1 0.4 / 0.2   Area (cm2) 29.25 / 8.05 23.79 / 13.53 cm2   Tract/undermine None None / None         Procedures: Viscous lidocaine 2% applied to wounds prior to treatment with ~10 minute dwell time.   Debridement: CSWD with scalpel to remove ~36 cm2 slough and biofilm from wounds.   Cleansed with:  No rinse foam cleanser to entire LE, then NS to wounds after debridement.   Periwound protected with: Sween cream to intact skin of LE, Zinc barrier paste to yuki-wounds.   Primary dressing: Medihoney gel, plain Aquacel x 2.   Secondary Dressing: ABD pad secured with kerlix and hypafix tape.      Other: Tubigrip F     Patient Education: Plan of care and wound progress discussed with patient. Pt has not developed any more blisters since stopping Bactrim as instructed per  ID. Pt will follow up with ID in one week. Reviewed s/s of infection (increased redness/pain/swelling/drainage, fever, fatigue, malaise, N/V), and advised patient to call the office or go to Urgent Care/ER if necessary. Patient verbalized understanding of instructions.    Professional Collaboration: None today.     Assessment:      Wound etiology: Mixed vascular     Wound Progress: Wounds with no significant change since previous visit.    Rationale for Treatment:  Zinc barrier paste to protect skin from moisture/drainage. Medihoney gel to provide moist wound environment, and facilitate autolytic debridement. Plain Aquacel and ABD pads to absorb drainage. Kerlix to secure dressings. Tubigrip F to control edema.    Patient tolerance/compliance: Patient tolerated treatment well, compliant with plan of care and appointments.    Complicating factors: Age, mixed vascularity and poor healing.    Need for ongoing Advanced Wound Care services: Patient requires skilled therapeutic wound care services for product selection, application of product, debridement, close monitoring with clinical assessment to expedite wound healing.     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  The dressings or modalities may need to be adapted   from the original plan to address any significant changes in the wound environment.

## 2018-09-17 ENCOUNTER — OFFICE VISIT (OUTPATIENT)
Dept: INFECTIOUS DISEASES | Facility: MEDICAL CENTER | Age: 73
End: 2018-09-17
Payer: MEDICARE

## 2018-09-17 VITALS
OXYGEN SATURATION: 92 % | BODY MASS INDEX: 22.88 KG/M2 | HEIGHT: 67 IN | DIASTOLIC BLOOD PRESSURE: 80 MMHG | TEMPERATURE: 98.3 F | SYSTOLIC BLOOD PRESSURE: 142 MMHG | WEIGHT: 145.8 LBS | HEART RATE: 87 BPM

## 2018-09-17 DIAGNOSIS — S81.802D OPEN WOUND OF LEFT LOWER EXTREMITY, SUBSEQUENT ENCOUNTER: ICD-10-CM

## 2018-09-17 DIAGNOSIS — Z22.322 MRSA (METHICILLIN RESISTANT STAPH AUREUS) CULTURE POSITIVE: ICD-10-CM

## 2018-09-17 PROCEDURE — 99214 OFFICE O/P EST MOD 30 MIN: CPT | Performed by: INTERNAL MEDICINE

## 2018-09-18 ENCOUNTER — NON-PROVIDER VISIT (OUTPATIENT)
Dept: WOUND CARE | Facility: MEDICAL CENTER | Age: 73
End: 2018-09-18
Attending: PATHOLOGY
Payer: MEDICARE

## 2018-09-18 PROCEDURE — 97598 DBRDMT OPN WND ADDL 20CM/<: CPT

## 2018-09-18 PROCEDURE — 97597 DBRDMT OPN WND 1ST 20 CM/<: CPT

## 2018-09-18 NOTE — PROGRESS NOTES
[facility] FOLLOW-UP NOTE     Date of Service: 9/17/2018    Chief Complaint: Follow up of lower extremity wounds    History of Present Illness:     Nadege Mae is a 72 y.o. female with HTN, hyperlipidemia, and arterial insufficiency. Patient is being followed in [facility] and ID clinic for infected L medial calf ulcer that has been present for several months now. She started wound care on 5/8/18 with slow but steady improvement. She has a walking boot for a fractured L ankle that happened recently, reportedly awaiting surgery once infection clears. She also had arterial studies completed 5/28/18 which showed TOMI on RLE of 0.92 and LLE TOMI of 0.7. She had 50-75% stenosis on RLE and SFA occlusion on LLE. Dr. Colin performed a Femoral popliteal bypass on 06/08/2018 on left LE.       Back in December 2017, pt had a fall and fractured left intertrochanteric femur. It was surgically treated with intramedullary device by Dr. Peters. 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 She underwent left total hip arthroplasty with removal of hardware by Dr. Vazquez on 2/11/18.      Interval History:  Per [facility] documentation and images, her proximal wound has improved in size while the distal wound has removed about the same. Her wound is growing multiple resistant organisms including MRSA (was on doxy but now resistant to doxy), Pseudomonas, and Enterococcus. Pt was advised by Dr. Colin to get admitted and undergo debridement with wound vac placement, however pt refused at this  Time as she is not mentally ready for it. The ID plan is to continue cipro and Bactrim to cover the MRSA and Pseudomonas, with tentative plan to add a third antibiotic to cover Enterococcus if the wound worsens. Pt is aware that if the wound worsens, she will need IV therapy. Linezolid was tried previously but pt did not tolerate it and refuses to try it  again. She reports no fevers/chills/night sweats/nausea/vomiting/diarrhea.    She was seen again on 9/10/2018. She said she is still thinking about surgery.  Does not want to go back to the hospital quite as yet. She complained of a new rash all over her body which is itchy and with blisters, likely 2/2 the antibiotics, Bactrim most likely. All antibiotics were discontinued.    9/17 today's visit, pt reports that the rash and blisters have improved after stopping her antibiotics. She reports no worsening of the wound (in fact, reports some improvement and less discharge), no fever/chills/night sweats. She has a wound care appt. tomorrow. Reports that she wants a break from the hospital and is still not quite ready for surgery.    Review of Systems:  All other systems reviewed and are negative expect as noted in HPI    Past Medical History:   Diagnosis Date   • Anxiety    • Cellulitis and abscess of lower extremity    • Dental disorder     full dentures   • Generalized osteoarthritis of multiple sites 10/20/2015   • Heart valve disease     pt not sure    • Hyperlipidemia    • Hypertension    • Osteoporosis        Past Surgical History:   Procedure Laterality Date   • FEMORAL POPLITEAL BYPASS Left 6/8/2018    Procedure: FEMORAL POPLITEAL BYPASS;  Surgeon: Milana Colin M.D.;  Location: [facility];  Service: General   • IRRIGATION & DEBRIDEMENT GENERAL Left 6/8/2018    Procedure: IRRIGATION & DEBRIDEMENT ANKLE WOUND;  Surgeon: Milana Colin M.D.;  Location: [facility];  Service: General   • IRRIGATION & DEBRIDEMENT HIP Left 6/4/2018    Procedure: IRRIGATION & DEBRIDEMENT HIP;  Surgeon: Chong Vazquez M.D.;  Location: [facility];  Service: Orthopedics   • HIP REVISION TOTAL Left 2/11/2018    Procedure: HIP REVISION TOTAL- femoral nail removal conversion to total hip arthoroplasty;  Surgeon: Chong Vazquez M.D.;  Location: [facility];  Service: Orthopedics   • HIP  NAILING INTRAMEDULLARY Left 12/20/2017    Procedure: HIP NAILING INTRAMEDULLARY;  Surgeon: Jorge Peters M.D.;  Location: [facility]ERY [facility];  Service: Orthopedics   • BREAST BIOPSY  8/28/2014    Performed by Magdalena Londono M.D. 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[employer]  [employer]     Social History Main Topics   • Smoking status: Former Smoker     Packs/day: 0.50     Years: 25.00     Types: Cigarettes     Quit date: 1/1/2007   • Smokeless tobacco: Never Used   • Alcohol use 3.0 oz/week     5 Glasses of wine per week      Comment: glass of wine per day   • Drug use: No   • Sexual activity: Not Currently     Partners: Male     Other Topics Concern   • Not on file     Social History Narrative   • No narrative on file       No Known Allergies    Medications:  Current Outpatient Prescriptions on File Prior to Visit   Medication Sig Dispense Refill   • metoprolol SR (TOPROL XL) 100 MG TABLET SR 24 HR TAKE 1 TABLET BY MOUTH EVERY DAY 90 Tab 0   • vitamin D (CHOLECALCIFEROL) 1000 UNIT Tab Take 1,000 Units by mouth every day.     • sertraline (ZOLOFT) 100 MG Tab TAKE 1 TAB BY MOUTH EVERY DAY. 30 Tab 5   • ciprofloxacin (CIPRO) 500 MG Tab Take 1 Tab by mouth 2 times a day. 60 Tab 0   • sulfamethoxazole-trimethoprim (BACTRIM DS) 800-160 MG tablet Take 1 Tab by mouth 2 times a day. 60 Tab 0     No current facility-administered medications on file prior to visit.   "      Physical Exam:   Vital Signs: /80   Pulse 87   Temp 36.8 °C (98.3 °F)   Ht 1.702 m (5' 7\")   Wt 66.1 kg (145 lb 12.8 oz)   SpO2 92%   BMI 22.84 kg/m²   Vital signs reviewed    Constitutional: Patient is oriented to person, place, and time. Appears well-developed and well-nourished. No distress  Head: Atraumatic, normocephalic  Eyes: Conjunctivae normal, EOM intact. Pupils are equal, round, and reactive to light.   Mouth/Throat: Lips without lesions, good dentition, oropharynx is clear and moist.  Neck: Neck supple. No masses/lymphadenopathy  Cardiovascular: Normal rate, regular rhythm, normal S1S2 and intact distal pulses. No murmur, gallop, or friction rub. No pedal edema.  Pulmonary/Chest: No respiratory distress. Unlabored respiratory effort, lungs clear to auscultation. No wheezes or rales.   Abdominal: Soft, non tender. BS + x 4. No masses or hepatosplenomegaly.   Musculoskeletal: No joint tenderness, swelling, erythema, or restriction of motion noted.  Neurological: Alert and oriented to person, place, and time. No gross cranial nerve deficit. No focal neural deficit noted  Skin: Skin is warm and dry. No rashes or embolic phenomena noted on exposed skin  Psychiatric: Normal mood and affect. Behavior is normal.     LABS:  WBC   Date/Time Value Ref Range Status   06/09/2018 01:45 AM 7.5 4.8 - 10.8 K/uL Final     RBC   Date/Time Value Ref Range Status   06/09/2018 01:45 AM 3.01 (L) 4.20 - 5.40 M/uL Final     Hemoglobin   Date/Time Value Ref Range Status   06/09/2018 01:45 AM 8.4 (L) 12.0 - 16.0 g/dL Final     Hematocrit   Date/Time Value Ref Range Status   06/09/2018 01:45 AM 27.4 (L) 37.0 - 47.0 % Final     MCV   Date/Time Value Ref Range Status   06/09/2018 01:45 AM 91.0 81.4 - 97.8 fL Final     MCH   Date/Time Value Ref Range Status   06/09/2018 01:45 AM 27.9 27.0 - 33.0 pg Final     MCHC   Date/Time Value Ref Range Status   06/09/2018 01:45 AM 30.7 (L) 33.6 - 35.0 g/dL Final     MPV "   Date/Time Value Ref Range Status   06/09/2018 01:45 AM 10.0 9.0 - 12.9 fL Final     Sodium   Date/Time Value Ref Range Status   06/14/2018 01:47  135 - 145 mmol/L Final     Potassium   Date/Time Value Ref Range Status   06/14/2018 01:47 AM 4.4 3.6 - 5.5 mmol/L Final     Chloride   Date/Time Value Ref Range Status   06/14/2018 01:47  96 - 112 mmol/L Final     Co2   Date/Time Value Ref Range Status   06/14/2018 01:47 AM 24 20 - 33 mmol/L Final     Glucose   Date/Time Value Ref Range Status   06/14/2018 01:47  (H) 65 - 99 mg/dL Final     Bun   Date/Time Value Ref Range Status   06/14/2018 01:47 AM 12 8 - 22 mg/dL Final     Creatinine   Date/Time Value Ref Range Status   06/14/2018 01:47 AM 0.60 0.50 - 1.40 mg/dL Final     Alkaline Phosphatase   Date/Time Value Ref Range Status   02/11/2018 02:29 AM 86 30 - 99 U/L Final     AST(SGOT)   Date/Time Value Ref Range Status   02/11/2018 02:29 AM 20 12 - 45 U/L Final     ALT(SGPT)   Date/Time Value Ref Range Status   02/11/2018 02:29 AM 11 2 - 50 U/L Final     Total Bilirubin   Date/Time Value Ref Range Status   02/11/2018 02:29 AM 0.5 0.1 - 1.5 mg/dL Final      No results found for: CPKTOTAL     MICRO:  Blood Culture   Date Value Ref Range Status   11/03/2017 No growth after 5 days of incubation.  Final         Latest pertinent labs were reviewed    IMAGING STUDIES:  No recent imaging    Assessment:   Nadege Mae is a 72 y.o. female with a history of HTN, hyperlipidemia, and arterial insufficiency. Patient is being followed in [facility] and ID clinic for infected L medial calf ulcer. Wound culture from 7/2018 is growing resistant MRSA, E. Faecalis, and Pseudomonas. Pt refused IV therapy and was on PO Bactrim and cipro with plan to add Enterococcus coverage if the wound does not improve or worsens. Pt was advised by Dr. Colin to get admitted and undergo debridement with wound vac placement, however pt refused, stated that she is not mentally ready for  it. She unfortunately developed a rash to ?Bactrim and all her antibiotics were stopped on 9/10. Wound stable.    Pertinent Diagnoses:  1. 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 Arterial leg ulcer (HCC)      3. MRSA (methicillin resistant staph aureus) culture positive      4. Rash 2/2 antibiotic  5. Pseudomonas wound infection        Plan:   - Continue to hold antibiotics and monitor given no e/o gross cellulitis  - We are running out of oral options to treat her wound. She was urged to reconsider surgical plan with wound vac placement which would be accompanied by IV antibiotic therapy. Pt will reconsider when she sees wound care and Dr. Colin  - Monitor for systemic signs of infection    Rash likely 2/2 Bactrim:  - Off all antibiotics as above  - Rash has improved  - Added to allergy list in [facility]    RTC in 1 week for close follow up off antibiotics and for encouragement to reconsider Dr. Colin's plan      Fernando Rainey M.D.

## 2018-09-18 NOTE — WOUND TEAM
Advanced Wound Care  [facility]  [address]  [address]  [place] [address]  [phone] Fax: [phone]    Encounter Note  For Certification Period: 07/05/2018 - 09/25/2018  Start of Care: 07/05/2018    Referring Provider: Italia Bae PA-C  Primary Physician: Jorge Amador MD    Consulting Providers:         Wound(s): Left medial ankle - proximal and distal, LLE anterior blisters       Subjective:        HPI: 73 y/o female with HTN, hyperlipidemia, arterial insufficiency. Presents with L medial calf ulcer has been present for several months. Started wound care 5/8/18. L distal medial calf ulcer with erythema, edema to periwound. Heavy amount of green drainage on dressing and green tinged slough to wound bed. Wound cx positive for pseudomonas. She is wearing a walking boot for a fractured L ankle that happened recently but could not tell me exactly how long ago. She also had arterial studies completed 5/28/18 and show TOMI on RLE of 0.92 and LLE TOMI of 0.7. She has 50-75% stenosis on RLE and SFA occlusion on LLE      She fell in December 2017 and fractured left intertrochanteric femur. It was surgically treated with intramedullary device by Dr. Peters. 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 She underwent left total hip arthroplasty with removal of hardware by Dr. Vazquez on 2/11/18. She noticed her leg was wet last night and has asked for incision to be evaluated. She noticed pus coming from leg incision last night with increased erythema and edema.     Dr. Colin performed a Femoral popliteal bypass on 06/08/2018 on left LE.  Patient had wound VAC placed on left medial ankle wound.              Pain: Patient reports mild pain at wound sites with sharp debridement.    Allergies:   Allergies   Allergen Reactions   • Bactrim [Sulfamethoxazole-Trimethoprim]      Diffuse pruritic skin rash with blisters (no mucous  membrane involvement) (was also taking cipro at the time - reaction thought more likely to be 2/2 Bactrim)        Objective:      Tests and Measures:   09/11/2018: Left DP pulse 1+  08/20/2018: L foot palpable DP pulses, foot warm, hair growth absent.    Orthotic, protective, supportive devices: ortho boot from ortho surgeon    Fall Risk Assessment (jeronimo all that apply with an X):  Completed 07/05/2018: high fall risk       Wound Characteristics                                                    Location:  Left Medial Ankle/LE Initial Evaluation  Date: 07/05/2018   Encounter Date:  09/18/2018   Tissue Type and %: 60% moist pink, 40% adherent yellow Proximal-10% red moist tissue intermixed with 90% adherent yellow.    Distal-60% red moist tissue intermixed with 40% adherent yellow.   Periwound: Slight maceration, erythema Erythema, intact. Scattered blisters from reaction to abx   Drainage: Moderate to heavy serous Heavy serosanguinous   Exposed structures None None visible / palpable.   Wound Edges:   Open Open   Odor: None None   S&S of Infection:   erythema Erythema. No abx for now per ID.   Edema: 2+ pitting 2+ to LE   Sensation: intact Intact               Measurements:  Left Medial Ankle/LE Initial Evaluation  Date: 07/05/2018  Proximal / Distal  Encounter Date:  09/11/2018    Proximal / Distal   Length (cm) 6.5 / 3.5 6.1 / 4   Width (cm) 4.5 / 2.3 3.9 / 3   Depth (cm) 0.4 / 0.1 0.4 / 0.2   Area (cm2) 29.25 / 8.05 23.79 / 12 cm2   Tract/undermine None None / None             Procedures: Viscous lidocaine 2% applied to wounds prior to treatment with ~10 minute dwell time.   Debridement: CSWD with scalpel to remove ~35cm2 slough and biofilm from L medial ankle wound   Cleansed with:  No rinse foam cleanser to entire LE, then NS to wounds after debridement.   Periwound protected with: Sween cream to intact skin of LE, Zinc barrier paste to yuki-wounds.   Primary dressing: L medial ankle: Medihoney gel, calcium  alginate x2 RLE: AqAg, adhesive foams   Secondary Dressing: L medial ankle: ABD pad secured with kerlix and hypafix tape.      Other: Tubigrip F to LLE     Patient Education: Plan of care and wound progress discussed with patient. Pt has developed more blisters to RLE. Saw ID yesterday who recommended surgical I&D with Dr. Colin. Pt would like to see Dr. Colin next Monday to reassess and revisit the I&D plan. Reviewed s/s of infection (increased redness/pain/swelling/drainage, fever, fatigue, malaise, N/V), and advised patient to call the office or go to Urgent Care/ER if necessary. Patient verbalized understanding of instructions.    Professional Collaboration: PAR to add pt to Dr. Colin's schedule.    Assessment:      Wound etiology: Mixed vascular     Wound Progress: Wounds with no significant change since previous visit.    Rationale for Treatment:  Zinc barrier paste to protect skin from moisture/drainage. Medihoney gel to provide moist wound environment, and facilitate autolytic debridement. Plain Aquacel and ABD pads to absorb drainage. Kerlix to secure dressings. Tubigrip F to control edema.    Patient tolerance/compliance: Patient tolerated treatment well, compliant with plan of care and appointments.    Complicating factors: Age, mixed vascularity and poor healing.    Need for ongoing Advanced Wound Care services: Patient requires skilled therapeutic wound care services for product selection, application of product, debridement, close monitoring with clinical assessment to expedite wound healing.     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  The dressings or modalities may need to be adapted   from the original plan to address any significant changes in the wound environment.

## 2018-09-20 ENCOUNTER — NON-PROVIDER VISIT (OUTPATIENT)
Dept: WOUND CARE | Facility: MEDICAL CENTER | Age: 73
End: 2018-09-20
Attending: PATHOLOGY
Payer: MEDICARE

## 2018-09-20 DIAGNOSIS — S81.801A WOUND OF RIGHT LOWER EXTREMITY, INITIAL ENCOUNTER: Primary | ICD-10-CM

## 2018-09-20 PROCEDURE — 97597 DBRDMT OPN WND 1ST 20 CM/<: CPT

## 2018-09-20 NOTE — PATIENT INSTRUCTIONS
-Keep your wound dressing clean, dry, and intact.    -Change your dressing if it becomes soiled, soaked, or falls off.    -Monitor for signs and symptoms of infection (increased redness, pain, swelling, drainage, fever, chills, fatigue, malaise, nausea/vomiting). If you have signs and symptoms of infection, call the office or go to Urgent Care/ER.

## 2018-09-24 ENCOUNTER — OFFICE VISIT (OUTPATIENT)
Dept: INFECTIOUS DISEASES | Facility: MEDICAL CENTER | Age: 73
End: 2018-09-24
Payer: MEDICARE

## 2018-09-24 VITALS
DIASTOLIC BLOOD PRESSURE: 80 MMHG | OXYGEN SATURATION: 95 % | SYSTOLIC BLOOD PRESSURE: 138 MMHG | TEMPERATURE: 98.1 F | HEART RATE: 90 BPM | BODY MASS INDEX: 22.91 KG/M2 | HEIGHT: 67 IN | WEIGHT: 146 LBS

## 2018-09-24 DIAGNOSIS — S81.802D OPEN LEG WOUND, LEFT, SUBSEQUENT ENCOUNTER: ICD-10-CM

## 2018-09-24 DIAGNOSIS — Z22.322 MRSA (METHICILLIN RESISTANT STAPH AUREUS) CULTURE POSITIVE: ICD-10-CM

## 2018-09-24 DIAGNOSIS — L27.0 ALLERGIC DRUG RASH: ICD-10-CM

## 2018-09-24 DIAGNOSIS — L97.901 ULCER OF LOWER EXTREMITY, LIMITED TO BREAKDOWN OF SKIN, UNSPECIFIED LATERALITY (HCC): Primary | ICD-10-CM

## 2018-09-24 PROCEDURE — 99213 OFFICE O/P EST LOW 20 MIN: CPT | Performed by: INTERNAL MEDICINE

## 2018-09-24 NOTE — PROGRESS NOTES
[facility] FOLLOW-UP NOTE     Date of Service: 9/17/2018    Chief Complaint: Follow up of lower extremity wounds    History of Present Illness:     Nadege Mae is a 72 y.o. female with HTN, hyperlipidemia, and arterial insufficiency. Patient is being followed in [facility] and ID clinic for infected L medial calf ulcer that has been present for several months now. She started wound care on 5/8/18 with slow but steady improvement. She has a walking boot for a fractured L ankle that happened recently, reportedly awaiting surgery once infection clears. She also had arterial studies completed 5/28/18 which showed TOMI on RLE of 0.92 and LLE TOMI of 0.7. She had 50-75% stenosis on RLE and SFA occlusion on LLE. Dr. Colin performed a Femoral popliteal bypass on 06/08/2018 on left LE.       Back in December 2017, pt had a fall and fractured left intertrochanteric femur. It was surgically treated with intramedullary device by Dr. Peters. 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 She underwent left total hip arthroplasty with removal of hardware by Dr. Vazquez on 2/11/18.      Interval History:  Per AWC documentation and images, her proximal wound has improved in size while the distal wound has removed about the same. Her wound is growing multiple resistant organisms including MRSA (was on doxy but now resistant to doxy), Pseudomonas, and Enterococcus. Pt was advised by Dr. Colin to get admitted and undergo debridement with wound vac placement, however pt refused at this  Time as she is not mentally ready for it. The ID plan is to continue cipro and Bactrim to cover the MRSA and Pseudomonas, with tentative plan to add a third antibiotic to cover Enterococcus if the wound worsens. Pt is aware that if the wound worsens, she will need IV therapy. Linezolid was tried previously but pt did not tolerate it and refuses to try it  again. She reports no fevers/chills/night sweats/nausea/vomiting/diarrhea.    She was seen again on 9/10/2018. She said she is still thinking about surgery.  Does not want to go back to the hospital quite as yet. She complained of a new rash all over her body which is itchy and with blisters, likely 2/2 the antibiotics, Bactrim most likely. All antibiotics were discontinued.    9/17 today's visit, pt reports that the rash and blisters have improved after stopping her antibiotics. She reports no worsening of the wound (in fact, reports some improvement and less discharge), no fever/chills/night sweats. She has a wound care appt. tomorrow. Reports that she wants a break from the hospital and is still not quite ready for surgery.    Review of Systems:  All other systems reviewed and are negative expect as noted in HPI    Past Medical History:   Diagnosis Date   • Anxiety    • Cellulitis and abscess of lower extremity    • Dental disorder     full dentures   • Generalized osteoarthritis of multiple sites 10/20/2015   • Heart valve disease     pt not sure    • Hyperlipidemia    • Hypertension    • Osteoporosis        Past Surgical History:   Procedure Laterality Date   • FEMORAL POPLITEAL BYPASS Left 6/8/2018    Procedure: FEMORAL POPLITEAL BYPASS;  Surgeon: Milana Colin M.D.;  Location: [facility];  Service: General   • IRRIGATION & DEBRIDEMENT GENERAL Left 6/8/2018    Procedure: IRRIGATION & DEBRIDEMENT ANKLE WOUND;  Surgeon: Milana Colin M.D.;  Location: [facility];  Service: General   • IRRIGATION & DEBRIDEMENT HIP Left 6/4/2018    Procedure: IRRIGATION & DEBRIDEMENT HIP;  Surgeon: Chong Vazquez M.D.;  Location: [facility];  Service: Orthopedics   • HIP REVISION TOTAL Left 2/11/2018    Procedure: HIP REVISION TOTAL- femoral nail removal conversion to total hip arthoroplasty;  Surgeon: Chong Vazquez M.D.;  Location: [facility];  Service: Orthopedics   • HIP  NAILING INTRAMEDULLARY Left 12/20/2017    Procedure: HIP NAILING INTRAMEDULLARY;  Surgeon: Jorge Peters M.D.;  Location: SUR[facility]ERY [facility];  Service: Orthopedics   • BREAST BIOPSY  8/28/2014    Performed by Magdalena Londono M.D. 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  [employer]     Social History Main Topics   • Smoking status: Former Smoker     Packs/day: 0.50     Years: 25.00     Types: Cigarettes     Quit date: 1/1/2007   • Smokeless tobacco: Never Used   • Alcohol use 3.0 oz/week     5 Glasses of wine per week      Comment: glass of wine per day   • Drug use: No   • Sexual activity: Not Currently     Partners: Male     Other Topics Concern   • Not on file     Social History Narrative   • No narrative on file       Allergies   Allergen Reactions   • Bactrim [Sulfamethoxazole-Trimethoprim]      Diffuse pruritic skin rash with blisters (no mucous membrane involvement) (was also taking cipro at the time - reaction thought more likely to be 2/2 Bactrim)       Medications:  Current Outpatient Prescriptions on File Prior to Visit   Medication Sig Dispense Refill   • metoprolol SR (TOPROL XL) 100 MG TABLET SR 24 HR TAKE 1 TABLET BY MOUTH EVERY DAY 90 Tab 0   • vitamin D (CHOLECALCIFEROL) 1000 UNIT Tab Take 1,000 Units by mouth every day.     • sertraline (ZOLOFT) 100 MG Tab TAKE 1 TAB BY MOUTH EVERY DAY. 30 Tab 5     No current facility-administered  "medications on file prior to visit.        Physical Exam:   Vital Signs: /80 (BP Location: Right arm)   Pulse 90   Temp 36.7 °C (98.1 °F) (Temporal)   Ht 1.702 m (5' 7\")   Wt 66.2 kg (146 lb)   SpO2 95%   BMI 22.87 kg/m²   Vital signs reviewed    Constitutional: Patient is oriented to person, place, and time. Appears well-developed and well-nourished. No distress  Head: Atraumatic, normocephalic  Eyes: Conjunctivae normal, EOM intact. Pupils are equal, round, and reactive to light.   Mouth/Throat: Lips without lesions, good dentition, oropharynx is clear and moist.  Neck: Neck supple. No masses/lymphadenopathy  Cardiovascular: Normal rate, regular rhythm, normal S1S2 and intact distal pulses. No murmur, gallop, or friction rub. No pedal edema.  Pulmonary/Chest: No respiratory distress. Unlabored respiratory effort, lungs clear to auscultation. No wheezes or rales.   Abdominal: Soft, non tender. BS + x 4. No masses or hepatosplenomegaly.   Musculoskeletal: No joint tenderness, swelling, erythema, or restriction of motion noted.  Neurological: Alert and oriented to person, place, and time. No gross cranial nerve deficit. No focal neural deficit noted  Skin: Skin is warm and dry. No rashes or embolic phenomena noted on exposed skin  Psychiatric: Normal mood and affect. Behavior is normal.     LABS:  WBC   Date/Time Value Ref Range Status   06/09/2018 01:45 AM 7.5 4.8 - 10.8 K/uL Final     RBC   Date/Time Value Ref Range Status   06/09/2018 01:45 AM 3.01 (L) 4.20 - 5.40 M/uL Final     Hemoglobin   Date/Time Value Ref Range Status   06/09/2018 01:45 AM 8.4 (L) 12.0 - 16.0 g/dL Final     Hematocrit   Date/Time Value Ref Range Status   06/09/2018 01:45 AM 27.4 (L) 37.0 - 47.0 % Final     MCV   Date/Time Value Ref Range Status   06/09/2018 01:45 AM 91.0 81.4 - 97.8 fL Final     MCH   Date/Time Value Ref Range Status   06/09/2018 01:45 AM 27.9 27.0 - 33.0 pg Final     MCHC   Date/Time Value Ref Range Status "   06/09/2018 01:45 AM 30.7 (L) 33.6 - 35.0 g/dL Final     MPV   Date/Time Value Ref Range Status   06/09/2018 01:45 AM 10.0 9.0 - 12.9 fL Final     Sodium   Date/Time Value Ref Range Status   06/14/2018 01:47  135 - 145 mmol/L Final     Potassium   Date/Time Value Ref Range Status   06/14/2018 01:47 AM 4.4 3.6 - 5.5 mmol/L Final     Chloride   Date/Time Value Ref Range Status   06/14/2018 01:47  96 - 112 mmol/L Final     Co2   Date/Time Value Ref Range Status   06/14/2018 01:47 AM 24 20 - 33 mmol/L Final     Glucose   Date/Time Value Ref Range Status   06/14/2018 01:47  (H) 65 - 99 mg/dL Final     Bun   Date/Time Value Ref Range Status   06/14/2018 01:47 AM 12 8 - 22 mg/dL Final     Creatinine   Date/Time Value Ref Range Status   06/14/2018 01:47 AM 0.60 0.50 - 1.40 mg/dL Final     Alkaline Phosphatase   Date/Time Value Ref Range Status   02/11/2018 02:29 AM 86 30 - 99 U/L Final     AST(SGOT)   Date/Time Value Ref Range Status   02/11/2018 02:29 AM 20 12 - 45 U/L Final     ALT(SGPT)   Date/Time Value Ref Range Status   02/11/2018 02:29 AM 11 2 - 50 U/L Final     Total Bilirubin   Date/Time Value Ref Range Status   02/11/2018 02:29 AM 0.5 0.1 - 1.5 mg/dL Final      No results found for: CPKTOTAL     MICRO:  Blood Culture   Date Value Ref Range Status   11/03/2017 No growth after 5 days of incubation.  Final         Latest pertinent labs were reviewed    IMAGING STUDIES:  No recent imaging    Assessment:   Nadege Mae is a 72 y.o. female with a history of HTN, hyperlipidemia, and arterial insufficiency. Patient is being followed in AWC and ID clinic for infected L medial calf ulcer. Wound culture from 7/2018 is growing resistant MRSA, E. Faecalis, and Pseudomonas. Pt refused IV therapy and was on PO Bactrim and cipro with plan to add Enterococcus coverage if the wound does not improve or worsens. Pt was advised by Dr. Colin to get admitted and undergo debridement with wound vac placement,  however pt refused, stated that she is not mentally ready for it. She unfortunately developed a rash to ?Bactrim and all her antibiotics were stopped on 9/10. Wound stable.    Pertinent Diagnoses:  1. 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 Arterial leg ulcer (HCC)      3. MRSA (methicillin resistant staph aureus) culture positive      4. Rash 2/2 antibiotic  5. Pseudomonas wound infection        Plan:   - Continue to hold antibiotics and monitor given no e/o gross cellulitis  - We are running out of oral options to treat her wound. She was urged to reconsider surgical plan with wound vac placement which would be accompanied by IV antibiotic therapy. Pt will reconsider when she sees wound care and Dr. Colin  - Monitor for systemic signs of infection    Rash likely 2/2 Bactrim:  - Off all antibiotics as above  - Rash has improved  - Added to allergy list in [facility]    RTC in 1 week for close follow up off antibiotics and for encouragement to reconsider Dr. Colin's plan      Amber Valencia M.D.

## 2018-09-24 NOTE — PROGRESS NOTES
Infectious Disease Follow up Note      Subjective:     Chief Complaint   Patient presents with   • Follow-Up     Open wound of left lower extremity, subsequent encounter       Interval History:   Nadege Mae is a 72 y.o. female with HTN, hyperlipidemia, and arterial insufficiency. Patient is being followed in AWC and ID clinic for infected L medial calf ulcer that has been present for several months now. She started wound care on 5/8/18 with slow but steady improvement. She has a walking boot for a fractured L ankle that happened recently, reportedly awaiting surgery once infection clears. She also had arterial studies completed 5/28/18 which showed TOMI on RLE of 0.92 and LLE TOMI of 0.7. She had 50-75% stenosis on RLE and SFA occlusion on LLE. Dr. Colin performed a Femoral popliteal bypass on 06/08/2018 on left LE.       Back in December 2017, pt had a fall and fractured left intertrochanteric femur. It was surgically treated with intramedullary device by Dr. Peters. 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 She underwent left total hip arthroplasty with removal of hardware by Dr. Vazquez on 2/11/18.        Hospital records reviewed    Patient last seen on 9/17 by Dr. Rainey.  At that time she was found to have a severe rash secondary to Bactrim and thus her antibiotics were discontinued.  She also did not have any gross evidence of cellulitis.  She was supposed to follow-up with wound care in addition to Dr. Colin to consider possible wound VAC.    Patient presents today for follow-up for her bilateral lower extremity wounds.  She has follow-up in the wound care clinic tomorrow and has not yet been seen by Dr. Colin.  Her rash is slowly improving but she still has associated pruritus.  Wound care photos taken on 9/20 show right lower extremity wounds with some mild erythema and left lower extremity ulcerations with no gross  evidence of cellulitis.  Patient denies any worsening of her wounds.  No recent fevers, chills, nausea or vomiting.  Prior wound cultures obtained in July of her left lower extremity grew pseudomonas aeruginosa, Enterococcus faecalis and MRSA    Past Medical History:   Diagnosis Date   • Anxiety    • Cellulitis and abscess of lower extremity    • Dental disorder     full dentures   • Generalized osteoarthritis of multiple sites 10/20/2015   • Heart valve disease     pt not sure    • Hyperlipidemia    • Hypertension    • Osteoporosis        Past Surgical History:   Procedure Laterality Date   • FEMORAL POPLITEAL BYPASS Left 6/8/2018    Procedure: FEMORAL POPLITEAL BYPASS;  Surgeon: Milana Colin M.D.;  Location: SUR[facility]ERY [facility];  Service: General   • IRRIGATION & DEBRIDEMENT GENERAL Left 6/8/2018    Procedure: IRRIGATION & DEBRIDEMENT ANKLE WOUND;  Surgeon: Milana Colin M.D.;  Location: SUR[facility]ERY [facility];  Service: General   • IRRIGATION & DEBRIDEMENT HIP Left 6/4/2018    Procedure: IRRIGATION & DEBRIDEMENT HIP;  Surgeon: Chong Vazquez M.D.;  Location: SUR[facility]ERY [facility];  Service: Orthopedics   • HIP REVISION TOTAL Left 2/11/2018    Procedure: HIP REVISION TOTAL- femoral nail removal conversion to total hip arthoroplasty;  Surgeon: Chong Vazquez M.D.;  Location: SUR[facility]ERY [facility];  Service: Orthopedics   • HIP NAILING INTRAMEDULLARY Left 12/20/2017    Procedure: HIP NAILING INTRAMEDULLARY;  Surgeon: Jorge Peters M.D.;  Location: SUR[facility]ERY [facility];  Service: Orthopedics   • BREAST BIOPSY  8/28/2014    Performed by Magdalena Londono M.D. at [facility]   • BREAST BIOPSY Right 8/14    benign   • GYN SURGERY  1973    complete hysterectomy   • ABDOMINAL HYSTERECTOMY TOTAL      w/BSO due to uterine cyst and endometriosis       Allergies:   Allergies   Allergen Reactions   • Bactrim [Sulfamethoxazole-Trimethoprim]      Diffuse pruritic skin rash with blisters (no mucous  "membrane involvement) (was also taking cipro at the time - reaction thought more likely to be 2/2 Bactrim)         Medications:  Current Outpatient Prescriptions on File Prior to Visit   Medication Sig Dispense Refill   • metoprolol SR (TOPROL XL) 100 MG TABLET SR 24 HR TAKE 1 TABLET BY MOUTH EVERY DAY 90 Tab 0   • vitamin D (CHOLECALCIFEROL) 1000 UNIT Tab Take 1,000 Units by mouth every day.     • sertraline (ZOLOFT) 100 MG Tab TAKE 1 TAB BY MOUTH EVERY DAY. 30 Tab 5     No current facility-administered medications on file prior to visit.          ROS  As documented above in my HPI       Objective:     PE:  /80 (BP Location: Right arm)   Pulse 90   Temp 36.7 °C (98.1 °F) (Temporal)   Ht 1.702 m (5' 7\")   Wt 66.2 kg (146 lb)   SpO2 95%   BMI 22.87 kg/m²      Vital signs reviewed  Constitutional: patient is oriented to person, place, and time. Appears well-developed and well-nourished. No distress.  Ambulates with 4 wheeled walker  Eyes: Conjunctivae normal and EOM are normal. Pupils are equal, round, and reactive to light.   Mouth/Throat: Lips without lesions, good dentition, oropharynx is clear and moist.  Neck: Trachea midline. Normal range of motion. Neck supple. No masses  Cardiovascular: Normal rate, regular rhythm, normal heart sounds and intact distal pulses. No murmur, gallop, or friction rub. No edema.  Pulmonary/Chest: No respiratory distress. Unlabored respiratory effort, lungs clear to auscultation. No wheezes or rales.   Abdominal: Soft, non tender. BS + x 4. No masses or hepatosplenomegaly.   Musculoskeletal: Normal range of motion.  Right lower extremity ulcerations without any drainage or surrounding erythema.  There is significantly decreased edema from wound care photos reviewed on 9/20/18.  Left lower extremity ulcerations with no gross evidence of cellulitis.  Neurological: alert and oriented to person, place, and time. No cranial nerve deficit. Coordination normal. Moves all " extremities  Skin: Skin is warm and dry. Good turgor. No rashes visable.  Psychiatric: Normal mood and affect. Behavior is normal.     LABS:  WBC   Date/Time Value Ref Range Status   06/09/2018 01:45 AM 7.5 4.8 - 10.8 K/uL Final     RBC   Date/Time Value Ref Range Status   06/09/2018 01:45 AM 3.01 (L) 4.20 - 5.40 M/uL Final     Hemoglobin   Date/Time Value Ref Range Status   06/09/2018 01:45 AM 8.4 (L) 12.0 - 16.0 g/dL Final     Hematocrit   Date/Time Value Ref Range Status   06/09/2018 01:45 AM 27.4 (L) 37.0 - 47.0 % Final     MCV   Date/Time Value Ref Range Status   06/09/2018 01:45 AM 91.0 81.4 - 97.8 fL Final     MCH   Date/Time Value Ref Range Status   06/09/2018 01:45 AM 27.9 27.0 - 33.0 pg Final     MCHC   Date/Time Value Ref Range Status   06/09/2018 01:45 AM 30.7 (L) 33.6 - 35.0 g/dL Final     MPV   Date/Time Value Ref Range Status   06/09/2018 01:45 AM 10.0 9.0 - 12.9 fL Final        Sodium   Date/Time Value Ref Range Status   06/14/2018 01:47  135 - 145 mmol/L Final     Potassium   Date/Time Value Ref Range Status   06/14/2018 01:47 AM 4.4 3.6 - 5.5 mmol/L Final     Chloride   Date/Time Value Ref Range Status   06/14/2018 01:47  96 - 112 mmol/L Final     Co2   Date/Time Value Ref Range Status   06/14/2018 01:47 AM 24 20 - 33 mmol/L Final     Glucose   Date/Time Value Ref Range Status   06/14/2018 01:47  (H) 65 - 99 mg/dL Final     Bun   Date/Time Value Ref Range Status   06/14/2018 01:47 AM 12 8 - 22 mg/dL Final     Creatinine   Date/Time Value Ref Range Status   06/14/2018 01:47 AM 0.60 0.50 - 1.40 mg/dL Final     Alkaline Phosphatase   Date/Time Value Ref Range Status   02/11/2018 02:29 AM 86 30 - 99 U/L Final     AST(SGOT)   Date/Time Value Ref Range Status   02/11/2018 02:29 AM 20 12 - 45 U/L Final     ALT(SGPT)   Date/Time Value Ref Range Status   02/11/2018 02:29 AM 11 2 - 50 U/L Final     Total Bilirubin   Date/Time Value Ref Range Status   02/11/2018 02:29 AM 0.5 0.1 - 1.5 mg/dL  Final        No results found for: CPKTOTAL     MICRO:  Left lower extremity wound cultures on 07/20/18  Blood Culture   Date Value Ref Range Status   11/03/2017 No growth after 5 days of incubation.  Final      ENTEROCOCCUS FAECALIS     Antibiotic Sensitivity Microscan Unit Status   Ampicillin Sensitive <=2 mcg/mL Final   Daptomycin Sensitive 2 mcg/mL Final   Gent Synergy Resistant >500 mcg/mL Final   Penicillin Sensitive 8 mcg/mL Final   Strep Synergy Resistant >1000 mcg/mL Final   Vancomycin Sensitive 2 mcg/mL Final         METHICILLIN RESISTANT STAPHYLOCOCCUS AUREUS     Antibiotic Sensitivity Microscan Unit Status   Ampicillin/sulbactam Resistant <=8/4 mcg/mL Final   Clindamycin Resistant <=0.5 mcg/mL Final   Daptomycin Sensitive <=0.5 mcg/mL Final   Erythromycin Resistant >4 mcg/mL Final   Moxifloxacin Intermediate 4 mcg/mL Final   Oxacillin Resistant >2 mcg/mL Final   Penicillin Resistant >8 mcg/mL Final   Tetracycline Resistant >8 mcg/mL Final   Trimeth/Sulfa Sensitive <=0.5/9.5 mcg/mL Final   Vancomycin Sensitive 2 mcg/mL Final         PSEUDOMONAS AERUGINOSA     Antibiotic Sensitivity Microscan Unit Status   Amikacin Sensitive <=16 mcg/mL Final   Cefepime Sensitive <=8 mcg/mL Final   Ceftazidime Sensitive <=1 mcg/mL Final   Ciprofloxacin Sensitive <=1 mcg/mL Final   Gentamicin Sensitive <=4 mcg/mL Final   Imipenem Sensitive <=1 mcg/mL Final   Meropenem Sensitive <=1 mcg/mL Final   Pip/Tazobactam Sensitive <=16 mcg/mL Final   Tobramycin Sensitive <=4 mcg/mL Final            IMAGING STUDIES:  None new    Assessment/Plan:     Problem List Items Addressed This Visit     MRSA (methicillin resistant staph aureus) culture positive      Other Visit Diagnoses     Ulcer of lower extremity, limited to breakdown of skin, unspecified laterality (HCC)    -  Primary    Open leg wound, left, subsequent encounter      Allergic drug rash. 2/2 bactrim. Symptomatic treatment. Monitor.   Patient has evidence of ulcerations in  her bilateral lower extremities left greater than right.  Recent cultures in July grew MRSA, Pseudomonas and Enterococcus faecalis.  Did not tolerate Bactrim recently and developed a severe rash.  Examination of her wounds today do not reveal any gross evidence of infection or cellulitis.  She needs continued wound care and if possible wound VAC.  She is to be evaluated by the wound clinic and Dr. Colin and is scheduled tomorrow.  We will continue to hold off on any antibiotics and continue to monitor her clinically.  Patient was instructed to notify our office if she develops fevers or worsening appearance of her wounds.  Patient verbalizes understanding of the plan.       Follow up: PRN, RTC sooner if needed. FU with PCP for ongoing chronic medical conditions.     Amber Valencia M.D.

## 2018-09-25 ENCOUNTER — NON-PROVIDER VISIT (OUTPATIENT)
Dept: WOUND CARE | Facility: MEDICAL CENTER | Age: 73
End: 2018-09-25
Attending: PATHOLOGY
Payer: MEDICARE

## 2018-09-25 PROCEDURE — 97597 DBRDMT OPN WND 1ST 20 CM/<: CPT

## 2018-09-25 NOTE — PROCEDURES
CSWD using scalpel to remove approx. ~10cm2 of nonviable tissue from L medial ankle wound beds only.

## 2018-09-25 NOTE — NON-PROVIDER
Pt came in today with increased numbers of intact blisters to LLE. Pt states she has seen infection disease yesterday and was told there isn't any antibiotics pt can take due to adverse reactions. Discussed with pt what untreated infection can lead to such as sepsis and risk of death from sepsis. Pt verbalized understanding to all and states she would like to be follow up with Dr. Colin next Monday. Reviewed s/s of infection and sepsis and to go to ER right away. Pt verbalized understanding.

## 2018-09-27 ENCOUNTER — NON-PROVIDER VISIT (OUTPATIENT)
Dept: WOUND CARE | Facility: MEDICAL CENTER | Age: 73
End: 2018-09-27
Attending: PATHOLOGY
Payer: MEDICARE

## 2018-09-27 PROCEDURE — 97597 DBRDMT OPN WND 1ST 20 CM/<: CPT

## 2018-10-01 ENCOUNTER — OFFICE VISIT (OUTPATIENT)
Dept: WOUND CARE | Facility: MEDICAL CENTER | Age: 73
End: 2018-10-01
Attending: SURGERY
Payer: MEDICARE

## 2018-10-01 VITALS
OXYGEN SATURATION: 93 % | SYSTOLIC BLOOD PRESSURE: 122 MMHG | DIASTOLIC BLOOD PRESSURE: 52 MMHG | TEMPERATURE: 99 F | RESPIRATION RATE: 16 BRPM | HEART RATE: 82 BPM

## 2018-10-01 DIAGNOSIS — L97.923 NON-PRESSURE CHRONIC ULCER OF LEFT LOWER LEG WITH NECROSIS OF MUSCLE (HCC): ICD-10-CM

## 2018-10-01 DIAGNOSIS — S81.802D OPEN WOUND OF LEFT LOWER EXTREMITY, SUBSEQUENT ENCOUNTER: ICD-10-CM

## 2018-10-01 PROCEDURE — 97597 DBRDMT OPN WND 1ST 20 CM/<: CPT | Performed by: SURGERY

## 2018-10-01 PROCEDURE — 11042 DBRDMT SUBQ TIS 1ST 20SQCM/<: CPT

## 2018-10-01 NOTE — PROGRESS NOTES
Subjective:      HPI  Nadege Mae is a 72 y.o. female who presents with non-healing wounds to her left leg after left fem-pop bypass.  About 2 months ago, I recommended hospitalization for wound care and she refused.  She relates that she would like to returne to work and has a non-healing left ankle wound as well.  I have been reticent to debride the ankle as the current wound is full thickness and periosteum appears visible.     ROS  Negative; other than her open wounds and pain related to joint problems.       Objective:     /52 (BP Location: Left arm)   Pulse 82   Temp 37.2 °C (99 °F) (Temporal)   Resp 16   SpO2 93%      Physical Exam  There are two wounds on the medial aspect of the left calf, one measuring 4.2 x 4 x .4cm (16.8sq cm) and the second measuring 3.5 x 3 x .3 ( 10.5sq cm)    I performed sharp excisional debridement with a 15 blade scalpel, removing slough, necrotic debris and what minimal granulation tissue was present.            Assessment/Plan:     Cultures of the wound in July demonstrated heavy growth of Enterococcus, Pseudomonas and MRSA (light growth)    I again recommend hospitalization and the use of specialized NPWT dressings, with Cleanse choice to provide topical debridement, limit the destruction of tissue overlying the ankle and hopefully assist healing.  This in addition to IV antibiotics to treat whatever bacteria are present will hopefully assist in wound closure.

## 2018-10-02 ENCOUNTER — HOSPITAL ENCOUNTER (OUTPATIENT)
Dept: LAB | Facility: MEDICAL CENTER | Age: 73
End: 2018-10-02
Attending: INTERNAL MEDICINE
Payer: MEDICARE

## 2018-10-02 ENCOUNTER — HOSPITAL ENCOUNTER (OUTPATIENT)
Dept: LAB | Facility: MEDICAL CENTER | Age: 73
End: 2018-10-02
Attending: NURSE PRACTITIONER
Payer: MEDICARE

## 2018-10-02 DIAGNOSIS — Z22.322 MRSA (METHICILLIN RESISTANT STAPH AUREUS) CULTURE POSITIVE: ICD-10-CM

## 2018-10-02 DIAGNOSIS — L08.9 WOUND INFECTION: ICD-10-CM

## 2018-10-02 DIAGNOSIS — T14.8XXA WOUND INFECTION: ICD-10-CM

## 2018-10-02 DIAGNOSIS — A49.8 PSEUDOMONAS INFECTION: ICD-10-CM

## 2018-10-02 DIAGNOSIS — D51.9 ANEMIA DUE TO VITAMIN B12 DEFICIENCY, UNSPECIFIED B12 DEFICIENCY TYPE: ICD-10-CM

## 2018-10-02 LAB
ALBUMIN SERPL BCP-MCNC: 3.7 G/DL (ref 3.2–4.9)
ALBUMIN/GLOB SERPL: 0.8 G/DL
ALP SERPL-CCNC: 93 U/L (ref 30–99)
ALT SERPL-CCNC: 10 U/L (ref 2–50)
ANION GAP SERPL CALC-SCNC: 9 MMOL/L (ref 0–11.9)
AST SERPL-CCNC: 25 U/L (ref 12–45)
BILIRUB SERPL-MCNC: 0.2 MG/DL (ref 0.1–1.5)
BUN SERPL-MCNC: 13 MG/DL (ref 8–22)
CALCIUM SERPL-MCNC: 9.6 MG/DL (ref 8.5–10.5)
CHLORIDE SERPL-SCNC: 100 MMOL/L (ref 96–112)
CO2 SERPL-SCNC: 21 MMOL/L (ref 20–33)
CREAT SERPL-MCNC: 0.57 MG/DL (ref 0.5–1.4)
CRP SERPL HS-MCNC: 3.67 MG/DL (ref 0–0.75)
FOLATE SERPL-MCNC: 3.7 NG/ML
GLOBULIN SER CALC-MCNC: 4.8 G/DL (ref 1.9–3.5)
GLUCOSE SERPL-MCNC: 78 MG/DL (ref 65–99)
POTASSIUM SERPL-SCNC: 4.4 MMOL/L (ref 3.6–5.5)
PROT SERPL-MCNC: 8.5 G/DL (ref 6–8.2)
SODIUM SERPL-SCNC: 130 MMOL/L (ref 135–145)
VIT B12 SERPL-MCNC: 197 PG/ML (ref 211–911)

## 2018-10-02 PROCEDURE — 82607 VITAMIN B-12: CPT

## 2018-10-02 PROCEDURE — 80053 COMPREHEN METABOLIC PANEL: CPT

## 2018-10-02 PROCEDURE — 36415 COLL VENOUS BLD VENIPUNCTURE: CPT

## 2018-10-02 PROCEDURE — 82746 ASSAY OF FOLIC ACID SERUM: CPT

## 2018-10-02 PROCEDURE — 86140 C-REACTIVE PROTEIN: CPT

## 2018-10-02 PROCEDURE — 85025 COMPLETE CBC W/AUTO DIFF WBC: CPT

## 2018-10-02 NOTE — PROGRESS NOTES
Joint visit with  for wound assessment and debridement. Dixie is familiar with this patient and unhappy with wound progress, would like patient to be admitted to acute care for Wound Vac Veriflow. Dr. Colin called hospital and arranged for a direct admit on 10/8/18. Dr. Colin performed debridement to the L. Medial Proximal and Distal wounds, and also debrided many of patient's purulent bullae, see note. I called patient to inform her of the direct admit which has been arranged and patient agrees and verbalized understanding to the plan of care.

## 2018-10-03 ENCOUNTER — PATIENT OUTREACH (OUTPATIENT)
Dept: HEALTH INFORMATION MANAGEMENT | Facility: OTHER | Age: 73
End: 2018-10-03

## 2018-10-03 NOTE — PROGRESS NOTES
Follow up call to pt to follow up regarding resources and information mailed to her earlier in Aug 2018.  LSW made prior attempt to follow up with pt but was unsuccessful and has not heard back. During call pt answered she reported she did receive the resources but doesn't feel she meets criteria for services related to the homemaker program or in-home care services at this time to pursue. She reported she had lab work done had has a scheduled procedure on Monday where she will be admitted at the hospital. At this time she has not been able to return to work.     LSW discussed with pt other programs/services through Division of Welfare and Supportive Services such as the Medicare Savings Programs, SNAP, Extra Help, etc that may benefit pt. Previously these programs were not discussed as pt's plan/goal was to return to work as soon as possible. Given pt has not been able to return to work at this time and is not receiving income from her employer discussed these programs. Explained to pt if she is approved/qualified, programs can help reduce her medical expensesand provide her with food stamps to help reduce her grocery bill. Discussed she would need to update DWSS should she return to work and start receiving additional income as this could later effect her eligibility. Pt voiced understanding. She reported she would like to see how things go with her hospital stay prior to making any decisions but is willing to follow up with LSW when she returns home. Pt also open to LSW sending additional information to pt on the programs.     Plan:  · Additional resources mailed to pt.   · LSW will follow up with pt, if LSW does not hear back from pt after hospital stay.

## 2018-10-04 ENCOUNTER — NON-PROVIDER VISIT (OUTPATIENT)
Dept: WOUND CARE | Facility: MEDICAL CENTER | Age: 73
End: 2018-10-04
Attending: SURGERY
Payer: MEDICARE

## 2018-10-04 PROBLEM — E53.8 LOW FOLATE: Status: ACTIVE | Noted: 2018-10-04

## 2018-10-04 PROCEDURE — 97597 DBRDMT OPN WND 1ST 20 CM/<: CPT

## 2018-10-04 ASSESSMENT — PAIN SCALES - GENERAL: PAINLEVEL: 2=MINIMAL-SLIGHT

## 2018-10-04 NOTE — PROCEDURES
CSWD with curette to remove ~16.0cm2 of non-viable tissue and biofilm slough from both left LE wounds.

## 2018-10-08 ENCOUNTER — APPOINTMENT (OUTPATIENT)
Dept: WOUND CARE | Facility: MEDICAL CENTER | Age: 73
End: 2018-10-08
Attending: SURGERY
Payer: MEDICARE

## 2018-10-08 ENCOUNTER — HOSPITAL ENCOUNTER (OUTPATIENT)
Facility: MEDICAL CENTER | Age: 73
DRG: 594 | End: 2018-10-08
Payer: MEDICARE

## 2018-10-09 ENCOUNTER — HOSPITAL ENCOUNTER (INPATIENT)
Facility: MEDICAL CENTER | Age: 73
LOS: 16 days | DRG: 594 | End: 2018-10-25
Attending: FAMILY MEDICINE | Admitting: INTERNAL MEDICINE
Payer: MEDICARE

## 2018-10-09 DIAGNOSIS — S81.802D OPEN WOUND OF LEFT LOWER EXTREMITY, SUBSEQUENT ENCOUNTER: ICD-10-CM

## 2018-10-09 DIAGNOSIS — I73.9 PERIPHERAL VASCULAR DISEASE (HCC): ICD-10-CM

## 2018-10-09 DIAGNOSIS — E53.8 B12 DEFICIENCY: ICD-10-CM

## 2018-10-09 DIAGNOSIS — L97.909 ARTERIAL LEG ULCER (HCC): ICD-10-CM

## 2018-10-09 LAB
ANION GAP SERPL CALC-SCNC: 8 MMOL/L (ref 0–11.9)
BASOPHILS # BLD AUTO: 1.3 % (ref 0–1.8)
BASOPHILS # BLD: 0.11 K/UL (ref 0–0.12)
BUN SERPL-MCNC: 18 MG/DL (ref 8–22)
CALCIUM SERPL-MCNC: 10.1 MG/DL (ref 8.5–10.5)
CHLORIDE SERPL-SCNC: 98 MMOL/L (ref 96–112)
CO2 SERPL-SCNC: 25 MMOL/L (ref 20–33)
CREAT SERPL-MCNC: 0.73 MG/DL (ref 0.5–1.4)
EOSINOPHIL # BLD AUTO: 0.37 K/UL (ref 0–0.51)
EOSINOPHIL NFR BLD: 4.5 % (ref 0–6.9)
ERYTHROCYTE [DISTWIDTH] IN BLOOD BY AUTOMATED COUNT: 63.8 FL (ref 35.9–50)
GLUCOSE SERPL-MCNC: 108 MG/DL (ref 65–99)
HCT VFR BLD AUTO: 34.2 % (ref 37–47)
HGB BLD-MCNC: 10.8 G/DL (ref 12–16)
IMM GRANULOCYTES # BLD AUTO: 0.05 K/UL (ref 0–0.11)
IMM GRANULOCYTES NFR BLD AUTO: 0.6 % (ref 0–0.9)
LYMPHOCYTES # BLD AUTO: 1.76 K/UL (ref 1–4.8)
LYMPHOCYTES NFR BLD: 21.4 % (ref 22–41)
MCH RBC QN AUTO: 31.7 PG (ref 27–33)
MCHC RBC AUTO-ENTMCNC: 31.6 G/DL (ref 33.6–35)
MCV RBC AUTO: 100.3 FL (ref 81.4–97.8)
MONOCYTES # BLD AUTO: 0.79 K/UL (ref 0–0.85)
MONOCYTES NFR BLD AUTO: 9.6 % (ref 0–13.4)
NEUTROPHILS # BLD AUTO: 5.16 K/UL (ref 2–7.15)
NEUTROPHILS NFR BLD: 62.6 % (ref 44–72)
NRBC # BLD AUTO: 0 K/UL
NRBC BLD-RTO: 0 /100 WBC
PLATELET # BLD AUTO: 339 K/UL (ref 164–446)
PMV BLD AUTO: 9.3 FL (ref 9–12.9)
POTASSIUM SERPL-SCNC: 4.1 MMOL/L (ref 3.6–5.5)
RBC # BLD AUTO: 3.41 M/UL (ref 4.2–5.4)
SODIUM SERPL-SCNC: 131 MMOL/L (ref 135–145)
WBC # BLD AUTO: 8.2 K/UL (ref 4.8–10.8)

## 2018-10-09 PROCEDURE — 36415 COLL VENOUS BLD VENIPUNCTURE: CPT

## 2018-10-09 PROCEDURE — 80048 BASIC METABOLIC PNL TOTAL CA: CPT

## 2018-10-09 PROCEDURE — 85025 COMPLETE CBC W/AUTO DIFF WBC: CPT

## 2018-10-09 PROCEDURE — 770001 HCHG ROOM/CARE - MED/SURG/GYN PRIV*

## 2018-10-09 PROCEDURE — 99223 1ST HOSP IP/OBS HIGH 75: CPT | Performed by: INTERNAL MEDICINE

## 2018-10-09 PROCEDURE — 700111 HCHG RX REV CODE 636 W/ 250 OVERRIDE (IP): Performed by: INTERNAL MEDICINE

## 2018-10-09 RX ORDER — BISACODYL 10 MG
10 SUPPOSITORY, RECTAL RECTAL
Status: DISCONTINUED | OUTPATIENT
Start: 2018-10-09 | End: 2018-10-25 | Stop reason: HOSPADM

## 2018-10-09 RX ORDER — AMOXICILLIN 250 MG
2 CAPSULE ORAL 2 TIMES DAILY
Status: DISCONTINUED | OUTPATIENT
Start: 2018-10-09 | End: 2018-10-25 | Stop reason: HOSPADM

## 2018-10-09 RX ORDER — SERTRALINE HYDROCHLORIDE 100 MG/1
100 TABLET, FILM COATED ORAL DAILY
Status: DISCONTINUED | OUTPATIENT
Start: 2018-10-10 | End: 2018-10-25 | Stop reason: HOSPADM

## 2018-10-09 RX ORDER — HEPARIN SODIUM 5000 [USP'U]/ML
5000 INJECTION, SOLUTION INTRAVENOUS; SUBCUTANEOUS EVERY 8 HOURS
Status: DISCONTINUED | OUTPATIENT
Start: 2018-10-09 | End: 2018-10-25 | Stop reason: HOSPADM

## 2018-10-09 RX ORDER — ONDANSETRON 2 MG/ML
4 INJECTION INTRAMUSCULAR; INTRAVENOUS EVERY 4 HOURS PRN
Status: DISCONTINUED | OUTPATIENT
Start: 2018-10-09 | End: 2018-10-25 | Stop reason: HOSPADM

## 2018-10-09 RX ORDER — POLYETHYLENE GLYCOL 3350 17 G/17G
1 POWDER, FOR SOLUTION ORAL
Status: DISCONTINUED | OUTPATIENT
Start: 2018-10-09 | End: 2018-10-25 | Stop reason: HOSPADM

## 2018-10-09 RX ORDER — METOPROLOL SUCCINATE 50 MG/1
100 TABLET, EXTENDED RELEASE ORAL DAILY
Status: DISCONTINUED | OUTPATIENT
Start: 2018-10-10 | End: 2018-10-25 | Stop reason: HOSPADM

## 2018-10-09 RX ORDER — ONDANSETRON 4 MG/1
4 TABLET, ORALLY DISINTEGRATING ORAL EVERY 4 HOURS PRN
Status: DISCONTINUED | OUTPATIENT
Start: 2018-10-09 | End: 2018-10-25 | Stop reason: HOSPADM

## 2018-10-09 RX ADMIN — HEPARIN SODIUM 5000 UNITS: 5000 INJECTION, SOLUTION INTRAVENOUS; SUBCUTANEOUS at 21:45

## 2018-10-09 ASSESSMENT — ENCOUNTER SYMPTOMS
MYALGIAS: 0
BLURRED VISION: 0
SPUTUM PRODUCTION: 0
PALPITATIONS: 0
HEMOPTYSIS: 0
DEPRESSION: 0
FEVER: 0
DIZZINESS: 0
LOSS OF CONSCIOUSNESS: 0
VOMITING: 0
NAUSEA: 0
HEADACHES: 0
ABDOMINAL PAIN: 0
FOCAL WEAKNESS: 0
CHILLS: 0

## 2018-10-09 ASSESSMENT — COGNITIVE AND FUNCTIONAL STATUS - GENERAL
DAILY ACTIVITIY SCORE: 22
STANDING UP FROM CHAIR USING ARMS: A LITTLE
HELP NEEDED FOR BATHING: A LITTLE
CLIMB 3 TO 5 STEPS WITH RAILING: A LITTLE
SUGGESTED CMS G CODE MODIFIER DAILY ACTIVITY: CJ
MOVING TO AND FROM BED TO CHAIR: A LITTLE
MOVING FROM LYING ON BACK TO SITTING ON SIDE OF FLAT BED: A LITTLE
SUGGESTED CMS G CODE MODIFIER MOBILITY: CK
MOBILITY SCORE: 18
TURNING FROM BACK TO SIDE WHILE IN FLAT BAD: A LITTLE
WALKING IN HOSPITAL ROOM: A LITTLE
DRESSING REGULAR LOWER BODY CLOTHING: A LITTLE

## 2018-10-09 ASSESSMENT — PATIENT HEALTH QUESTIONNAIRE - PHQ9
1. LITTLE INTEREST OR PLEASURE IN DOING THINGS: NOT AT ALL
2. FEELING DOWN, DEPRESSED, IRRITABLE, OR HOPELESS: NOT AT ALL
2. FEELING DOWN, DEPRESSED, IRRITABLE, OR HOPELESS: NOT AT ALL
SUM OF ALL RESPONSES TO PHQ9 QUESTIONS 1 AND 2: 0
2. FEELING DOWN, DEPRESSED, IRRITABLE, OR HOPELESS: NOT AT ALL
SUM OF ALL RESPONSES TO PHQ9 QUESTIONS 1 AND 2: 0
1. LITTLE INTEREST OR PLEASURE IN DOING THINGS: NOT AT ALL
1. LITTLE INTEREST OR PLEASURE IN DOING THINGS: NOT AT ALL
SUM OF ALL RESPONSES TO PHQ9 QUESTIONS 1 AND 2: 0

## 2018-10-09 ASSESSMENT — LIFESTYLE VARIABLES
ALCOHOL_USE: NO
EVER_SMOKED: YES

## 2018-10-09 ASSESSMENT — PAIN SCALES - GENERAL
PAINLEVEL_OUTOF10: 2
PAINLEVEL_OUTOF10: 2

## 2018-10-09 NOTE — PROGRESS NOTES
Pt arrived on floor 1500. Admitting MD was paged. Admit profile complete. Vitals , height and weight obtained and charted.Pt oriented to unit routine, call light in reach , pt A&0x4.

## 2018-10-10 LAB
ANION GAP SERPL CALC-SCNC: 9 MMOL/L (ref 0–11.9)
BASOPHILS # BLD AUTO: 1.3 % (ref 0–1.8)
BASOPHILS # BLD: 0.1 K/UL (ref 0–0.12)
BUN SERPL-MCNC: 18 MG/DL (ref 8–22)
CALCIUM SERPL-MCNC: 9.6 MG/DL (ref 8.5–10.5)
CHLORIDE SERPL-SCNC: 99 MMOL/L (ref 96–112)
CO2 SERPL-SCNC: 24 MMOL/L (ref 20–33)
CREAT SERPL-MCNC: 0.69 MG/DL (ref 0.5–1.4)
EOSINOPHIL # BLD AUTO: 0.39 K/UL (ref 0–0.51)
EOSINOPHIL NFR BLD: 5.2 % (ref 0–6.9)
ERYTHROCYTE [DISTWIDTH] IN BLOOD BY AUTOMATED COUNT: 63.6 FL (ref 35.9–50)
GLUCOSE SERPL-MCNC: 107 MG/DL (ref 65–99)
HCT VFR BLD AUTO: 33 % (ref 37–47)
HGB BLD-MCNC: 10.2 G/DL (ref 12–16)
IMM GRANULOCYTES # BLD AUTO: 0.03 K/UL (ref 0–0.11)
IMM GRANULOCYTES NFR BLD AUTO: 0.4 % (ref 0–0.9)
LYMPHOCYTES # BLD AUTO: 1.9 K/UL (ref 1–4.8)
LYMPHOCYTES NFR BLD: 25.2 % (ref 22–41)
MCH RBC QN AUTO: 31.2 PG (ref 27–33)
MCHC RBC AUTO-ENTMCNC: 30.9 G/DL (ref 33.6–35)
MCV RBC AUTO: 100.9 FL (ref 81.4–97.8)
MONOCYTES # BLD AUTO: 0.8 K/UL (ref 0–0.85)
MONOCYTES NFR BLD AUTO: 10.6 % (ref 0–13.4)
NEUTROPHILS # BLD AUTO: 4.32 K/UL (ref 2–7.15)
NEUTROPHILS NFR BLD: 57.3 % (ref 44–72)
NRBC # BLD AUTO: 0 K/UL
NRBC BLD-RTO: 0 /100 WBC
PLATELET # BLD AUTO: 325 K/UL (ref 164–446)
PMV BLD AUTO: 9.4 FL (ref 9–12.9)
POTASSIUM SERPL-SCNC: 4.1 MMOL/L (ref 3.6–5.5)
RBC # BLD AUTO: 3.27 M/UL (ref 4.2–5.4)
SODIUM SERPL-SCNC: 132 MMOL/L (ref 135–145)
WBC # BLD AUTO: 7.5 K/UL (ref 4.8–10.8)

## 2018-10-10 PROCEDURE — 700102 HCHG RX REV CODE 250 W/ 637 OVERRIDE(OP): Performed by: INTERNAL MEDICINE

## 2018-10-10 PROCEDURE — 85025 COMPLETE CBC W/AUTO DIFF WBC: CPT

## 2018-10-10 PROCEDURE — A9270 NON-COVERED ITEM OR SERVICE: HCPCS | Performed by: INTERNAL MEDICINE

## 2018-10-10 PROCEDURE — 99232 SBSQ HOSP IP/OBS MODERATE 35: CPT | Performed by: INTERNAL MEDICINE

## 2018-10-10 PROCEDURE — 80048 BASIC METABOLIC PNL TOTAL CA: CPT

## 2018-10-10 PROCEDURE — 770021 HCHG ROOM/CARE - ISO PRIVATE

## 2018-10-10 PROCEDURE — 700111 HCHG RX REV CODE 636 W/ 250 OVERRIDE (IP): Performed by: INTERNAL MEDICINE

## 2018-10-10 PROCEDURE — 36415 COLL VENOUS BLD VENIPUNCTURE: CPT

## 2018-10-10 RX ADMIN — SENNOSIDES AND DOCUSATE SODIUM 2 TABLET: 8.6; 5 TABLET ORAL at 05:53

## 2018-10-10 RX ADMIN — METOPROLOL SUCCINATE 100 MG: 50 TABLET, EXTENDED RELEASE ORAL at 05:54

## 2018-10-10 RX ADMIN — SERTRALINE 100 MG: 100 TABLET, FILM COATED ORAL at 05:54

## 2018-10-10 RX ADMIN — HEPARIN SODIUM 5000 UNITS: 5000 INJECTION, SOLUTION INTRAVENOUS; SUBCUTANEOUS at 21:52

## 2018-10-10 RX ADMIN — VITAMIN D, TAB 1000IU (100/BT) 1000 UNITS: 25 TAB at 05:53

## 2018-10-10 RX ADMIN — HEPARIN SODIUM 5000 UNITS: 5000 INJECTION, SOLUTION INTRAVENOUS; SUBCUTANEOUS at 15:55

## 2018-10-10 ASSESSMENT — ENCOUNTER SYMPTOMS
BLURRED VISION: 0
DIZZINESS: 0
HEARTBURN: 0
FEVER: 0
DEPRESSION: 0
COUGH: 0

## 2018-10-10 ASSESSMENT — PAIN SCALES - GENERAL
PAINLEVEL_OUTOF10: 0

## 2018-10-10 NOTE — PROGRESS NOTES
Two RN skin check performed. Pt has weekly wound care for bilateral lower extremities. These areas are currently covered from last wound team treatment with dressing and tubi- and left in place pending wound team orders. Pt has scattered open sores/scabs/rash from blisters on bilateral Anterior thighs, bilateral upper arms, bilateral hands, and bilateral sides of rib cage to back area. Photos taken of all areas and entered in [facility]. Neck and face are CDI. Sacrum is pink but blanchable.

## 2018-10-10 NOTE — H&P
Hospital Medicine History & Physical Note    Date of Service  10/9/2018    Primary Care Physician  JACLYN Son.    Consultants  Vascular surgery    Code Status  fc/fc    Chief Complaint  Worsening leg ulcer infections    History of Presenting Illness  72 y.o. female who presented 10/9/2018 with history of recurrent arterial ulcers and severe peripheral vascular disease. Patient has been dealing with these issues for a long period of time with various course of antibiotics in the past. She is followed closely by both infectious diseases and vascular surgery and wound care clinic, 2/week. She was on bactrim and other abx's until mid-September 2018, but these were discontinued given the patient had intolerance of the medications and no clear clinical benefit was being achieved. She has had multiple MDR organisms that have grown in these wounds. She also apparently has a left ankle fracture that has not been intervened on given her ongoing multiple wound leg ulcers and infections.     Patient comes in today since Dr Colin of vascular surgery recommended she come in for possible debridement and wound vac placement. Patient was initially hesitant about doing this but is now agreeable to do it. She otherwise feels ok. Pain is currently well controlled and she ate all of her dinner.     Review of Systems  Review of Systems   Constitutional: Negative for chills and fever.   HENT: Negative for hearing loss.    Eyes: Negative for blurred vision.   Respiratory: Negative for hemoptysis and sputum production.    Cardiovascular: Positive for leg swelling. Negative for chest pain and palpitations.   Gastrointestinal: Negative for abdominal pain, nausea and vomiting.   Genitourinary: Negative for dysuria.   Musculoskeletal: Negative for myalgias.   Skin: Negative for rash.   Neurological: Negative for dizziness, focal weakness, loss of consciousness and headaches.   Psychiatric/Behavioral: Negative for depression and  suicidal ideas.   All other systems reviewed and are negative.      Past Medical History   has a past medical history of Anxiety; Cellulitis and abscess of lower extremity; Dental disorder; Generalized osteoarthritis of multiple sites (10/20/2015); Heart valve disease; Hyperlipidemia; Hypertension; and Osteoporosis. She also has no past medical history of Allergy; Diabetes; Muscle disorder; or OSTEOPOROSIS.    Surgical History   has a past surgical history that includes gyn surgery (1973); breast biopsy (8/28/2014); abdominal hysterectomy total; breast biopsy (Right, 8/14); hip nailing intramedullary (Left, 12/20/2017); hip revision total (Left, 2/11/2018); irrigation & debridement hip (Left, 6/4/2018); femoral popliteal bypass (Left, 6/8/2018); and irrigation & debridement general (Left, 6/8/2018).     Family History  family history includes Diabetes in her father; GI in her mother; Heart Attack in her father; Hypertension in her father; Psychiatry in her brother.     Social History   reports that she quit smoking about 11 years ago. Her smoking use included Cigarettes. She has a 12.50 pack-year smoking history. She has never used smokeless tobacco. She reports that she drinks about 3.0 oz of alcohol per week . She reports that she does not use drugs.    Allergies  Allergies   Allergen Reactions   • Bactrim [Sulfamethoxazole-Trimethoprim]      Diffuse pruritic skin rash with blisters (no mucous membrane involvement) (was also taking cipro at the time - reaction thought more likely to be 2/2 Bactrim)       Medications  Prior to Admission Medications   Prescriptions Last Dose Informant Patient Reported? Taking?   metoprolol SR (TOPROL XL) 100 MG TABLET SR 24 HR 10/9/2018 at 0900  No No   Sig: TAKE 1 TABLET BY MOUTH EVERY DAY   sertraline (ZOLOFT) 100 MG Tab 10/9/2018 at 0900 Patient No No   Sig: TAKE 1 TAB BY MOUTH EVERY DAY.   vitamin D (CHOLECALCIFEROL) 1000 UNIT Tab 10/9/2018 at 0900  Yes No   Sig: Take 1,000  "Units by mouth every day.      Facility-Administered Medications: None       Physical Exam  Temp:  [36.4 °C (97.6 °F)] 36.4 °C (97.6 °F)  Pulse:  [80] 80  Resp:  [16] 16  BP: (131)/(80) 131/80    Physical Exam   Constitutional: She is oriented to person, place, and time. She appears well-developed and well-nourished. No distress.   HENT:   Head: Normocephalic and atraumatic.   Mouth/Throat: No oropharyngeal exudate.   Eyes: Pupils are equal, round, and reactive to light. No scleral icterus.   Neck: Normal range of motion. Neck supple. No thyromegaly present.   Cardiovascular: Normal rate, regular rhythm, normal heart sounds and intact distal pulses.    No murmur heard.  Pulmonary/Chest: Effort normal and breath sounds normal. No respiratory distress. She has no wheezes.   Abdominal: Soft. Bowel sounds are normal. She exhibits no distension. There is no tenderness.   Musculoskeletal: Normal range of motion. She exhibits edema and tenderness.   B/L LE\"s are wrapped in thick gauze, appears dry, from the mid pre-tibial regions distally   Neurological: She is alert and oriented to person, place, and time. No cranial nerve deficit.   Skin: Skin is warm and dry. No rash noted.   Psychiatric: She has a normal mood and affect.   Nursing note and vitals reviewed.      Laboratory:  Recent Labs      10/09/18   1812   WBC  8.2   RBC  3.41*   HEMOGLOBIN  10.8*   HEMATOCRIT  34.2*   MCV  100.3*   MCH  31.7   MCHC  31.6*   RDW  63.8*   PLATELETCT  339   MPV  9.3     Recent Labs      10/09/18   1812   SODIUM  131*   POTASSIUM  4.1   CHLORIDE  98   CO2  25   GLUCOSE  108*   BUN  18   CREATININE  0.73   CALCIUM  10.1     Recent Labs      10/09/18   1812   GLUCOSE  108*                 No results for input(s): TROPONINI in the last 72 hours.    Urinalysis:    No results found     Imaging:  No orders to display         Assessment/Plan:  I anticipate this patient will require at least two midnights for appropriate medical management, " necessitating inpatient admission.    * Arterial leg ulcer (HCC)- (present on admission)   Assessment & Plan    -prolonged history of this issue with prior multiple MDR organisms and frequent abx regimens and various reactions and allergies now  -patient will be admitted with pain control  -no abx's for now given stable clinical status  -likely will need debridement and wound vac  -notify Dr Colin of vascular surgery in the AM  -likely will need consultation of ID  -consider wound culture as well        Essential hypertension- (present on admission)   Assessment & Plan    -well controlled, continue outpatient medications        Peripheral vascular disease (HCC)- (present on admission)   Assessment & Plan    -severe, prior fem-pop bypass  -see below        Osteoporosis- (present on admission)   Assessment & Plan    -VItamin D replacement        Dyslipidemia- (present on admission)   Assessment & Plan    -consider statin            VTE prophylaxis: heparin 5k units [id number]

## 2018-10-10 NOTE — ASSESSMENT & PLAN NOTE
Chronic with history of prior MDR organisms and frequent abx regimens and various reactions and allergies.  Wounds improving w reported granulation tissue formation. Evaluated by Vascular Surgery, Dr. Colin plan non operative treatment with inpatient wound care using Veriflow Wound Vac (System cannot be arranged outpatient).     Continue wound care  No recommendations for abx per ID team evaluation during this hospital stay   LPS: continued NPWT at present  Optimize nutrition  Risk factor modifications

## 2018-10-10 NOTE — ASSESSMENT & PLAN NOTE
Continue high intensity statin therapy with underlying risk factors  Maintain outpatient follow-up with PCP for ongoing monitoring and management

## 2018-10-10 NOTE — PROGRESS NOTES
[facility]    Date of Service: 10/10/2018    Chief Complaint  72 y.o. female admitted 10/9/2018 per Dr. Colin's referral for Cleanse Choice application for chronic leg ulcers.    Interval Problem Update  Feels ok; no new c/o; had non healing arterial leg ulcers for about a year; also had recent rash from oral abx's as outpatient;    Consultants/Specialty  Dr. Colin, wound care    Disposition  Home when stable        Review of Systems   Constitutional: Negative for fever.   HENT: Negative for hearing loss.    Eyes: Negative for blurred vision.   Respiratory: Negative for cough.    Cardiovascular: Negative for chest pain.   Gastrointestinal: Negative for heartburn.   Genitourinary: Negative for dysuria.   Musculoskeletal: Positive for joint pain.   Skin: Positive for rash.   Neurological: Negative for dizziness.   Psychiatric/Behavioral: Negative for depression.      Physical Exam  Laboratory/Imaging   Hemodynamics  Temp (24hrs), Av.9 °C (98.4 °F), Min:36.4 °C (97.6 °F), Max:37.1 °C (98.7 °F)   Temperature: 36.8 °C (98.3 °F)  Pulse  Av.7  Min: 68  Max: 84    Blood Pressure : 143/78      Respiratory      Respiration: 18, Pulse Oximetry: 97 %             Fluids    Intake/Output Summary (Last 24 hours) at 10/10/18 1344  Last data filed at 10/10/18 0620   Gross per 24 hour   Intake              100 ml   Output                0 ml   Net              100 ml       Nutrition  Orders Placed This Encounter   Procedures   • Diet Order Regular     Standing Status:   Standing     Number of Occurrences:   1     Order Specific Question:   Diet:     Answer:   Regular [1]     Physical Exam   Constitutional: She is oriented to person, place, and time. No distress.   HENT:   Head: Normocephalic.   Eyes: EOM are normal.   Neck: Neck supple.   Cardiovascular: Normal rate and regular rhythm.    Pulmonary/Chest: Effort normal.   Abdominal: Soft. Bowel sounds are normal. She exhibits no distension. There  is no tenderness.   Musculoskeletal:   BLE in dressings  L ankle deformity   Neurological: She is alert and oriented to person, place, and time.   Skin: Rash noted.   Psychiatric: Her behavior is normal.       Recent Labs      10/09/18   1812  10/10/18   0140   WBC  8.2  7.5   RBC  3.41*  3.27*   HEMOGLOBIN  10.8*  10.2*   HEMATOCRIT  34.2*  33.0*   MCV  100.3*  100.9*   MCH  31.7  31.2   MCHC  31.6*  30.9*   RDW  63.8*  63.6*   PLATELETCT  339  325   MPV  9.3  9.4     Recent Labs      10/09/18   1812  10/10/18   0140   SODIUM  131*  132*   POTASSIUM  4.1  4.1   CHLORIDE  98  99   CO2  25  24   GLUCOSE  108*  107*   BUN  18  18   CREATININE  0.73  0.69   CALCIUM  10.1  9.6                      Assessment/Plan     * Arterial leg ulcer (HCC)- (present on admission)   Assessment & Plan    -prolonged history of this issue with prior multiple MDR organisms and frequent abx regimens and various reactions and allergies now  -Dr. Colin following and recommended Cleanse choice for wound healing; no OR plan  Wound care to see.        Essential hypertension- (present on admission)   Assessment & Plan    -well controlled, continue outpatient medications        Peripheral vascular disease (HCC)- (present on admission)   Assessment & Plan    -severe, prior fem-pop bypass  -see below        Osteoporosis- (present on admission)   Assessment & Plan    -VItamin D replacement        Dyslipidemia- (present on admission)   Assessment & Plan    -consider statin          Quality-Core Measures

## 2018-10-10 NOTE — PROGRESS NOTES
Vascular    Please see my note from the wound clinic from last monday.  My plan of care for this patient is an attempt to obtain granulation, especially over the ankle wound.  We have performed standard debridement, but cannot obtain healing or any response to standard wound care.  I am hoping the use of Cleanse choice, if available with initiate healing.  Similar plan of care was recommended 2+ months ago, but she has resisted.  I have no plans to go to the OR with her at present.

## 2018-10-10 NOTE — CARE PLAN
Problem: Safety  Goal: Will remain free from injury  Outcome: PROGRESSING AS EXPECTED  Pt uses call button appropriately and has not had any falls this admission.

## 2018-10-10 NOTE — DIETARY
Nutrition services: Day 1 of admit.  73 yo female admitted with arterial leg ulcers.  Weight loss PTA noted on admitting screen:  Unplanned Weight Loss Greater than 10 lbs in Last 3 mos  Yes  No     Evaluation:  1. Admit bed scale weight 66.7 kg is increased 1.5 kg from June 2018 weight of 65.2 kg.    2. Pt is currently NPO for procedure this today. She was on a regular diet yesterday.  3. BMI 23.03 on admit.   4. Increased nutritional needs for healing.    Recommendations/Plan:  1. encourage intake of meals and supplements  2. document intake of all meals and supplements as % taken in ADL's to provide interdisciplinary communication across all shifts   3. monitor daily weights  4. Nutrition rep will continue to see patient for ongoing meal and snack preferences.

## 2018-10-11 PROCEDURE — 700111 HCHG RX REV CODE 636 W/ 250 OVERRIDE (IP): Performed by: INTERNAL MEDICINE

## 2018-10-11 PROCEDURE — 700102 HCHG RX REV CODE 250 W/ 637 OVERRIDE(OP): Performed by: INTERNAL MEDICINE

## 2018-10-11 PROCEDURE — A9270 NON-COVERED ITEM OR SERVICE: HCPCS | Performed by: INTERNAL MEDICINE

## 2018-10-11 PROCEDURE — 700105 HCHG RX REV CODE 258

## 2018-10-11 PROCEDURE — 97606 NEG PRS WND THER DME>50 SQCM: CPT

## 2018-10-11 PROCEDURE — 99232 SBSQ HOSP IP/OBS MODERATE 35: CPT | Performed by: INTERNAL MEDICINE

## 2018-10-11 PROCEDURE — 306263 VAC CANNISTER W/GEL 500ML: Performed by: SURGERY

## 2018-10-11 PROCEDURE — 770021 HCHG ROOM/CARE - ISO PRIVATE

## 2018-10-11 RX ORDER — ATORVASTATIN CALCIUM 10 MG/1
10 TABLET, FILM COATED ORAL EVERY EVENING
Status: DISCONTINUED | OUTPATIENT
Start: 2018-10-11 | End: 2018-10-16

## 2018-10-11 RX ORDER — SODIUM CHLORIDE 9 MG/ML
INJECTION, SOLUTION INTRAVENOUS
Status: COMPLETED
Start: 2018-10-11 | End: 2018-10-11

## 2018-10-11 RX ADMIN — SENNOSIDES AND DOCUSATE SODIUM 2 TABLET: 8.6; 5 TABLET ORAL at 05:03

## 2018-10-11 RX ADMIN — SERTRALINE 100 MG: 100 TABLET, FILM COATED ORAL at 05:03

## 2018-10-11 RX ADMIN — VITAMIN D, TAB 1000IU (100/BT) 1000 UNITS: 25 TAB at 05:03

## 2018-10-11 RX ADMIN — SODIUM CHLORIDE: 9 INJECTION, SOLUTION INTRAVENOUS at 15:30

## 2018-10-11 RX ADMIN — HEPARIN SODIUM 5000 UNITS: 5000 INJECTION, SOLUTION INTRAVENOUS; SUBCUTANEOUS at 21:22

## 2018-10-11 RX ADMIN — HEPARIN SODIUM 5000 UNITS: 5000 INJECTION, SOLUTION INTRAVENOUS; SUBCUTANEOUS at 14:45

## 2018-10-11 RX ADMIN — ATORVASTATIN CALCIUM 10 MG: 10 TABLET, FILM COATED ORAL at 17:23

## 2018-10-11 RX ADMIN — ASPIRIN 81 MG: 81 TABLET, COATED ORAL at 09:27

## 2018-10-11 RX ADMIN — METOPROLOL SUCCINATE 100 MG: 50 TABLET, EXTENDED RELEASE ORAL at 05:03

## 2018-10-11 RX ADMIN — HEPARIN SODIUM 5000 UNITS: 5000 INJECTION, SOLUTION INTRAVENOUS; SUBCUTANEOUS at 05:03

## 2018-10-11 RX ADMIN — SENNOSIDES AND DOCUSATE SODIUM 2 TABLET: 8.6; 5 TABLET ORAL at 17:23

## 2018-10-11 ASSESSMENT — ENCOUNTER SYMPTOMS
DEPRESSION: 0
DIZZINESS: 0
HEARTBURN: 0
BLURRED VISION: 0
FEVER: 0
COUGH: 0

## 2018-10-11 NOTE — CARE PLAN
Problem: Pain Management  Goal: Pain level will decrease to patient's comfort goal    Intervention: Follow pain managment plan developed in collaboration with patient and Interdisciplinary Team  Pt. Denies pain.  Continue to assess and reassess pain and administer PRN pain RX as needed.

## 2018-10-11 NOTE — PROGRESS NOTES
[facility]    Date of Service: 10/11/2018    Chief Complaint  72 y.o. female admitted 10/9/2018 per Dr. Colin's referral for Cleanse Choice application for chronic leg ulcers.    Interval Problem Update  No new c/o except mild itching of hands/arms since recent drug rash.  Consultants/Specialty  Dr. Colin, wound care    Disposition  Home when stable        Review of Systems   Constitutional: Negative for fever.   HENT: Negative for hearing loss.    Eyes: Negative for blurred vision.   Respiratory: Negative for cough.    Cardiovascular: Negative for chest pain.   Gastrointestinal: Negative for heartburn.   Genitourinary: Negative for dysuria.   Musculoskeletal: Positive for joint pain.   Skin: Positive for rash.   Neurological: Negative for dizziness.   Psychiatric/Behavioral: Negative for depression.      Physical Exam  Laboratory/Imaging   Hemodynamics  Temp (24hrs), Av °C (98.6 °F), Min:36.8 °C (98.3 °F), Max:37.3 °C (99.1 °F)   Temperature: 37.1 °C (98.7 °F)  Pulse  Av.3  Min: 68  Max: 86    Blood Pressure : 155/65      Respiratory      Respiration: 16, Pulse Oximetry: 95 %             Fluids  No intake or output data in the 24 hours ending 10/11/18 0835    Nutrition  Orders Placed This Encounter   Procedures   • Diet Order Regular     Standing Status:   Standing     Number of Occurrences:   1     Order Specific Question:   Diet:     Answer:   Regular [1]     Physical Exam   Constitutional: She is oriented to person, place, and time. No distress.   HENT:   Head: Normocephalic.   Eyes: EOM are normal.   Neck: Neck supple.   Cardiovascular: Normal rate and regular rhythm.    Pulmonary/Chest: Effort normal.   Abdominal: Soft. Bowel sounds are normal. She exhibits no distension. There is no tenderness.   Musculoskeletal:   BLE in dressings  L ankle deformity   Neurological: She is alert and oriented to person, place, and time.   Skin: Rash noted.   Psychiatric: Her behavior is  normal.       Recent Labs      10/09/18   1812  10/10/18   0140   WBC  8.2  7.5   RBC  3.41*  3.27*   HEMOGLOBIN  10.8*  10.2*   HEMATOCRIT  34.2*  33.0*   MCV  100.3*  100.9*   MCH  31.7  31.2   MCHC  31.6*  30.9*   RDW  63.8*  63.6*   PLATELETCT  339  325   MPV  9.3  9.4     Recent Labs      10/09/18   1812  10/10/18   0140   SODIUM  131*  132*   POTASSIUM  4.1  4.1   CHLORIDE  98  99   CO2  25  24   GLUCOSE  108*  107*   BUN  18  18   CREATININE  0.73  0.69   CALCIUM  10.1  9.6                      Assessment/Plan     * Arterial leg ulcer (HCC)- (present on admission)   Assessment & Plan    -prolonged history of this issue with prior multiple MDR organisms and frequent abx regimens and various reactions and allergies now  -Dr. Colin following and recommended Cleanse choice for wound healing; no OR plan  Wound care to see.        Essential hypertension- (present on admission)   Assessment & Plan    -well controlled, continue outpatient medications        Peripheral vascular disease (HCC)- (present on admission)   Assessment & Plan    -severe, prior fem-pop bypass  Stable; needs to be on ASA/statin; no obvious contraindications so will start.        Osteoporosis- (present on admission)   Assessment & Plan    -VItamin D replacement        Dyslipidemia- (present on admission)   Assessment & Plan    Start statin; f/u with pcp          Quality-Core Measures

## 2018-10-11 NOTE — PROGRESS NOTES
Dressings in place to bilateral LE's.  Wound clinic is coming today to remove dressings, assess and place new dressings per RN NOC report.  Scattered scabby areas to arms, legs, back.  Some are weepy.  Contact for MRSA. Wound Vac may be placed per NOC RN report.

## 2018-10-11 NOTE — CARE PLAN
Problem: Knowledge Deficit  Goal: Knowledge of disease process/condition, treatment plan, diagnostic tests, and medications will improve    Intervention: Assess knowledge level of disease process/condition, treatment plan, diagnostic tests, and medications  Knowledge deficits assessed.  Explain TX, meds and POC to patient and update as needed.  Pt. Aware  Of wound care plan.

## 2018-10-11 NOTE — DISCHARGE PLANNING
Anticipated Discharge Disposition: Home with outpatient wound clinic vs Home health    Action: Met with patient to discuss discharge plans, has had a wound vac in the past and had Nursery [facility] and also had vac changes at the outpatient wound clinic. States she lives with a roommate. Drives herself    Barriers to Discharge: medical clearance     Plan: Home

## 2018-10-12 PROCEDURE — 770021 HCHG ROOM/CARE - ISO PRIVATE

## 2018-10-12 PROCEDURE — A9270 NON-COVERED ITEM OR SERVICE: HCPCS | Performed by: INTERNAL MEDICINE

## 2018-10-12 PROCEDURE — 700111 HCHG RX REV CODE 636 W/ 250 OVERRIDE (IP): Performed by: INTERNAL MEDICINE

## 2018-10-12 PROCEDURE — 700102 HCHG RX REV CODE 250 W/ 637 OVERRIDE(OP): Performed by: INTERNAL MEDICINE

## 2018-10-12 PROCEDURE — 99232 SBSQ HOSP IP/OBS MODERATE 35: CPT | Performed by: INTERNAL MEDICINE

## 2018-10-12 PROCEDURE — 302118 SHAMPOO,NO RINSE: Performed by: FAMILY MEDICINE

## 2018-10-12 RX ORDER — ACETAMINOPHEN 325 MG/1
650 TABLET ORAL EVERY 4 HOURS PRN
Status: DISCONTINUED | OUTPATIENT
Start: 2018-10-12 | End: 2018-10-25 | Stop reason: HOSPADM

## 2018-10-12 RX ORDER — DIPHENHYDRAMINE HCL 25 MG
25 TABLET ORAL EVERY 6 HOURS PRN
Status: DISCONTINUED | OUTPATIENT
Start: 2018-10-12 | End: 2018-10-25 | Stop reason: HOSPADM

## 2018-10-12 RX ADMIN — ACETAMINOPHEN 650 MG: 325 TABLET, FILM COATED ORAL at 14:52

## 2018-10-12 RX ADMIN — ACETAMINOPHEN 650 MG: 325 TABLET, FILM COATED ORAL at 23:11

## 2018-10-12 RX ADMIN — SENNOSIDES AND DOCUSATE SODIUM 2 TABLET: 8.6; 5 TABLET ORAL at 05:07

## 2018-10-12 RX ADMIN — SERTRALINE 100 MG: 100 TABLET, FILM COATED ORAL at 05:07

## 2018-10-12 RX ADMIN — HEPARIN SODIUM 5000 UNITS: 5000 INJECTION, SOLUTION INTRAVENOUS; SUBCUTANEOUS at 05:07

## 2018-10-12 RX ADMIN — HEPARIN SODIUM 5000 UNITS: 5000 INJECTION, SOLUTION INTRAVENOUS; SUBCUTANEOUS at 21:10

## 2018-10-12 RX ADMIN — SENNOSIDES AND DOCUSATE SODIUM 2 TABLET: 8.6; 5 TABLET ORAL at 16:59

## 2018-10-12 RX ADMIN — ATORVASTATIN CALCIUM 10 MG: 10 TABLET, FILM COATED ORAL at 16:59

## 2018-10-12 RX ADMIN — METOPROLOL SUCCINATE 100 MG: 50 TABLET, EXTENDED RELEASE ORAL at 05:07

## 2018-10-12 RX ADMIN — HEPARIN SODIUM 5000 UNITS: 5000 INJECTION, SOLUTION INTRAVENOUS; SUBCUTANEOUS at 14:52

## 2018-10-12 RX ADMIN — VITAMIN D, TAB 1000IU (100/BT) 1000 UNITS: 25 TAB at 05:07

## 2018-10-12 RX ADMIN — ASPIRIN 81 MG: 81 TABLET, COATED ORAL at 05:07

## 2018-10-12 ASSESSMENT — PAIN SCALES - GENERAL: PAINLEVEL_OUTOF10: 0

## 2018-10-12 ASSESSMENT — PATIENT HEALTH QUESTIONNAIRE - PHQ9
SUM OF ALL RESPONSES TO PHQ9 QUESTIONS 1 AND 2: 0
SUM OF ALL RESPONSES TO PHQ9 QUESTIONS 1 AND 2: 0
2. FEELING DOWN, DEPRESSED, IRRITABLE, OR HOPELESS: NOT AT ALL
1. LITTLE INTEREST OR PLEASURE IN DOING THINGS: NOT AT ALL
2. FEELING DOWN, DEPRESSED, IRRITABLE, OR HOPELESS: NOT AT ALL
1. LITTLE INTEREST OR PLEASURE IN DOING THINGS: NOT AT ALL

## 2018-10-12 ASSESSMENT — ENCOUNTER SYMPTOMS
BLURRED VISION: 0
FEVER: 0
COUGH: 0
HEARTBURN: 0
DEPRESSION: 0
DIZZINESS: 0

## 2018-10-12 NOTE — DISCHARGE PLANNING
Paperwork for home wound vac completed and faxed to [facility] rep. In case patient will go home with wound vac.

## 2018-10-12 NOTE — PROGRESS NOTES
Wound vac intact.  MD ordered PO Benadryl PRN for itching secondary to scattered drug rash (Bactrim)  Per MD update.

## 2018-10-12 NOTE — CARE PLAN
Problem: Knowledge Deficit  Goal: Knowledge of disease process/condition, treatment plan, diagnostic tests, and medications will improve  Outcome: PROGRESSING AS EXPECTED  Continue to assess learning needs and teach as neccessary

## 2018-10-12 NOTE — WOUND TEAM
"[facility]  Inpatient Services  Initial Wound and Skin Care Evaluation    Admission Date:  10/9/2018   HPI, PMH, SH: Reviewed  Unit where seen by Wound Team: T301/00    WOUND CONSULT RELATED TO:  Cleanse choice VAC placement      SUBJECTIVE:  \"How long am I going to be here?\"      Self Report / Pain Level:  Tolerated well       OBJECTIVE:  In bed, previous dressing in place, moderate green drainage with odor, resolved after cleansing.     WOUND TYPE, LOCATION, CHARACTERISTICS (Pressure ulcers: location, stage, POA or date identified)    Location and type of wound:LLE medial leg arterial wound x 2       NPWT Pump Mode / Pressure Setting Continuous;125 mmHg   Dressing Type Medium;Gray foam;Custom foam   Number of Foam Pieces Used 5   Canister Changed Yes   VAC VeraFlo Irrigant Normal Saline   VAC VeraFlo Soak Time (mins) 3   VAC VeraFlo Instill Volume (ml) 10   VAC VeraFlo - Therapy Time (hrs) 2   VAC VeraFlo Pressure (mm/Hg) Continuous;125 mmHg     Wound Image     Site Assessment CARLITA   Maricruz-wound Assessment Dry/flaking.  Patient reports decreased swelling    Margins Attached edges   Wound Length (cm)  Distal   Proximal      4 cm  5.5 cm    Wound Width (cm)  Distal   Proximal  3 cm   4 cm   Wound Depth (cm)  Distal   Proximal    0.5 cm  0.5 cm      Wound Surface Area (cm^2) 22 cm^2   Tunneling 0 cm   Undermining 0 cm   Closure CARLITA   Drainage Amount Min/mod   Drainage Description Yellow/green   Non-staged Wound Description Full thickness   Treatments Cleansed;Irrigation   Cleansing Approved Wound Cleanser   Dressing Options Wound Vac   Dressing Cleansing/Solutions Normal Saline   Dressing Changed New   Dressing Status Clean;Dry;Intact   Dressing Change Frequency Tuesday, Thursday, Saturday   NEXT Dressing Change  10/13/18   NEXT Weekly Photo (Inpatient Only) 10/18/18   WOUND NURSE ONLY - Odor Mild   WOUND NURSE ONLY - Time Spent with Patient (mins) 60       TOMI:     None found in result     Lab " Values:    WBC:       WBC   Date/Time Value Ref Range Status   10/10/2018 01:40 AM 7.5 4.8 - 10.8 K/uL Final     AIC:      Lab Results   Component Value Date/Time    HBA1C 5.6 06/04/2018 12:00 PM         Culture:   MRSA and pseudomonas     INTERVENTIONS BY WOUND TEAM:  Previous dressing removed, cleansed with wound cleanser.  No sting skin prep and paste ring to yuki-wound.  1 piece of honeycomb foam to both wound beds, covered with thin gray foam, bridged wounds together with strip of drape under, sealed with drape.  Track pad applied and setting at 125 mmHg continuous.  Soaking for 3 minutes, with 10 mL of NS every 2 hours.  Tube  D reapplied       Dressing selection:  Cleanse choice.          Interdisciplinary consultation:  RN, patient      EVALUATION:  Patient admitted from Dr. Colin for cleanse choice VAC dressing.  Per Dr. Colin office visit note: JESSICA Mae is a 72 y.o. female who presents with non-healing wounds to her left leg after left fem-pop bypass.  About 2 months ago, I recommended hospitalization for wound care and she refused.  She relates that she would like to returne to work and has a non-healing left ankle wound as well.  I have been reticent to debride the ankle as the current wound is full thickness and periosteum appears visible.      Factors affecting wound healing:  PVD, osteoporosis       Goals:  Steady decrease in wound area and depth weekly     NURSING PLAN OF CARE ORDERS (X):    Dressing changes: See Dressing Care orders:   X  Skin care: See Skin Care orders:   Rectal tube care: See Rectal Tube Care orders:   Other orders:    RSKIN: CURRENT (X) ORDERED (O)  Q shift Luis:  X  Q shift pressure point assessments:  X  Pressure redistribution mattress      X  Waffle overlay O  SHORTY      Bariatric SHORTY      Bariatric foam        Heel float boots       Heels floated on pillows    X  Barrier wipes      Barrier Cream      Barrier paste      Sacral silicone dressing     PRN  Silicone O2 tubing      Anchorfast      Trach with Optifoam split foam       Waffle cushion      Rectal tube or BMS      Antifungal tx    Turn q 2 hours   Ensure patient is turning   Up to chair     Ambulate   PT/OT     Dietician      PO   X  TF   TPN   NPO   # days   Other       WOUND TEAM PLAN OF CARE (X):   NPWT change 3 x week:     X   Dressing changes by wound team:       Follow up as needed:       Other (explain):    Anticipated discharge plans (X):  SNF:           Home Care:           Outpatient Wound Center:         X ?   Self Care:            Other:

## 2018-10-12 NOTE — PROGRESS NOTES
[facility] Hospitalist Progress Note    Date of Service: 10/12/2018    Chief Complaint  72 y.o. female admitted 10/9/2018 per Dr. Colin's referral for Cleanse Choice application for chronic leg ulcers.    Interval Problem Update  No new c/o; had wound vac application yesterday.  Consultants/Specialty  Dr. Colin, wound care    Disposition  Home when stable        Review of Systems   Constitutional: Negative for fever.   HENT: Negative for hearing loss.    Eyes: Negative for blurred vision.   Respiratory: Negative for cough.    Cardiovascular: Negative for chest pain.   Gastrointestinal: Negative for heartburn.   Genitourinary: Negative for dysuria.   Musculoskeletal: Positive for joint pain.   Skin: Positive for rash.   Neurological: Negative for dizziness.   Psychiatric/Behavioral: Negative for depression.      Physical Exam  Laboratory/Imaging   Hemodynamics  Temp (24hrs), Av.7 °C (98 °F), Min:36.4 °C (97.5 °F), Max:37 °C (98.6 °F)   Temperature: 36.7 °C (98.1 °F)  Pulse  Av.9  Min: 68  Max: 86    Blood Pressure : 141/75      Respiratory      Respiration: 16, Pulse Oximetry: 92 %             Fluids    Intake/Output Summary (Last 24 hours) at 10/12/18 0844  Last data filed at 10/11/18 1500   Gross per 24 hour   Intake              360 ml   Output                0 ml   Net              360 ml       Nutrition  Orders Placed This Encounter   Procedures   • Diet Order Regular     Standing Status:   Standing     Number of Occurrences:   1     Order Specific Question:   Diet:     Answer:   Regular [1]     Physical Exam   Constitutional: She is oriented to person, place, and time. No distress.   HENT:   Head: Normocephalic.   Eyes: EOM are normal.   Neck: Neck supple.   Cardiovascular: Normal rate and regular rhythm.    Pulmonary/Chest: Effort normal.   Abdominal: Soft. Bowel sounds are normal. She exhibits no distension. There is no tenderness.   Musculoskeletal:   BLE in dressings  L ankle deformity  Wound vac  on LLE medial/distally   Neurological: She is alert and oriented to person, place, and time.   Skin: Rash noted.   Psychiatric: Her behavior is normal.       Recent Labs      10/09/18   1812  10/10/18   0140   WBC  8.2  7.5   RBC  3.41*  3.27*   HEMOGLOBIN  10.8*  10.2*   HEMATOCRIT  34.2*  33.0*   MCV  100.3*  100.9*   MCH  31.7  31.2   MCHC  31.6*  30.9*   RDW  63.8*  63.6*   PLATELETCT  339  325   MPV  9.3  9.4     Recent Labs      10/09/18   1812  10/10/18   0140   SODIUM  131*  132*   POTASSIUM  4.1  4.1   CHLORIDE  98  99   CO2  25  24   GLUCOSE  108*  107*   BUN  18  18   CREATININE  0.73  0.69   CALCIUM  10.1  9.6                      Assessment/Plan     * Arterial leg ulcer (HCC)- (present on admission)   Assessment & Plan    -prolonged history of this issue with prior multiple MDR organisms and frequent abx regimens and various reactions and allergies now  -Dr. Colin following and recommended Cleanse choice for wound healing; no OR plan  Wound care applied Cleanse Choice wound vac.  Wound check, probably home with outpatient care soon.        Essential hypertension- (present on admission)   Assessment & Plan    -well controlled, continue outpatient medications        Peripheral vascular disease (HCC)- (present on admission)   Assessment & Plan    -severe, prior fem-pop bypass  Stable; needs to be on ASA/statin; no obvious contraindications so will start.        Osteoporosis- (present on admission)   Assessment & Plan    -VItamin D replacement        Dyslipidemia- (present on admission)   Assessment & Plan    Start statin; f/u with pcp          Quality-Core Measures

## 2018-10-13 PROCEDURE — 700102 HCHG RX REV CODE 250 W/ 637 OVERRIDE(OP): Performed by: INTERNAL MEDICINE

## 2018-10-13 PROCEDURE — A9270 NON-COVERED ITEM OR SERVICE: HCPCS | Performed by: INTERNAL MEDICINE

## 2018-10-13 PROCEDURE — 700111 HCHG RX REV CODE 636 W/ 250 OVERRIDE (IP): Performed by: INTERNAL MEDICINE

## 2018-10-13 PROCEDURE — 99232 SBSQ HOSP IP/OBS MODERATE 35: CPT | Performed by: INTERNAL MEDICINE

## 2018-10-13 PROCEDURE — 770021 HCHG ROOM/CARE - ISO PRIVATE

## 2018-10-13 PROCEDURE — 97605 NEG PRS WND THER DME<=50SQCM: CPT

## 2018-10-13 RX ORDER — ASPIRIN 81 MG/1
81 TABLET, CHEWABLE ORAL DAILY
Status: ON HOLD | COMMUNITY
End: 2019-01-30 | Stop reason: CLARIF

## 2018-10-13 RX ADMIN — HEPARIN SODIUM 5000 UNITS: 5000 INJECTION, SOLUTION INTRAVENOUS; SUBCUTANEOUS at 05:07

## 2018-10-13 RX ADMIN — ATORVASTATIN CALCIUM 10 MG: 10 TABLET, FILM COATED ORAL at 16:58

## 2018-10-13 RX ADMIN — HEPARIN SODIUM 5000 UNITS: 5000 INJECTION, SOLUTION INTRAVENOUS; SUBCUTANEOUS at 21:22

## 2018-10-13 RX ADMIN — ACETAMINOPHEN 650 MG: 325 TABLET, FILM COATED ORAL at 05:07

## 2018-10-13 RX ADMIN — VITAMIN D, TAB 1000IU (100/BT) 1000 UNITS: 25 TAB at 05:07

## 2018-10-13 RX ADMIN — ASPIRIN 81 MG: 81 TABLET, COATED ORAL at 05:07

## 2018-10-13 RX ADMIN — ACETAMINOPHEN 650 MG: 325 TABLET, FILM COATED ORAL at 12:10

## 2018-10-13 RX ADMIN — ACETAMINOPHEN 650 MG: 325 TABLET, FILM COATED ORAL at 21:22

## 2018-10-13 RX ADMIN — METOPROLOL SUCCINATE 100 MG: 50 TABLET, EXTENDED RELEASE ORAL at 05:07

## 2018-10-13 RX ADMIN — HEPARIN SODIUM 5000 UNITS: 5000 INJECTION, SOLUTION INTRAVENOUS; SUBCUTANEOUS at 15:12

## 2018-10-13 RX ADMIN — SERTRALINE 100 MG: 100 TABLET, FILM COATED ORAL at 05:07

## 2018-10-13 ASSESSMENT — PAIN SCALES - GENERAL
PAINLEVEL_OUTOF10: 7
PAINLEVEL_OUTOF10: 2
PAINLEVEL_OUTOF10: 0
PAINLEVEL_OUTOF10: 0

## 2018-10-13 ASSESSMENT — ENCOUNTER SYMPTOMS
DIZZINESS: 0
COUGH: 0
FEVER: 0
BLURRED VISION: 0
HEARTBURN: 0
DEPRESSION: 0

## 2018-10-13 NOTE — DISCHARGE PLANNING
Anticipated Discharge Disposition: Home with [place] and Outpatient Wound Care    Action: This RN CM called outpatient wound to schedule wound vac change appointments ([phone]).  The outpatient wound clinic is closed until Monday.  For [employer] help with the delivery of pt's wound vac if needed this weekend call [phone]; however, pt will not be scheduled at the clinic for changes until Monday.    Barriers to Discharge: Outpt wound vac changes scheduled    Plan: Pending pt's medical clearance and wound vac changes schedule on business day      1053 ADDENDUM:    This Rn ARLEEN received phone call from bedside RN.  Per Marilee Bedside RN pt's [place] is at bedside and the doctor has medically cleared the pt for dc.    Pt has her wound vac at bedside and a wound vac appointment scheduled for Monday, October 15, 2018 at 0930.    No dc needs identified at this time.

## 2018-10-13 NOTE — CARE PLAN
Problem: Safety  Goal: Will remain free from injury  Outcome: MET Date Met: 10/13/18         DISPLAY PLAN FREE TEXT

## 2018-10-13 NOTE — PROGRESS NOTES
[facility] Hospitalist Progress Note    Date of Service: 10/13/2018    Chief Complaint  72 y.o. female admitted 10/9/2018 per Dr. Colin's referral for Cleanse Choice application for chronic leg ulcers.    Interval Problem Update  No new c/o;   Consultants/Specialty  Dr. Colin, wound care    Disposition  Home when stable        Review of Systems   Constitutional: Negative for fever.   HENT: Negative for hearing loss.    Eyes: Negative for blurred vision.   Respiratory: Negative for cough.    Cardiovascular: Negative for chest pain.   Gastrointestinal: Negative for heartburn.   Genitourinary: Negative for dysuria.   Musculoskeletal: Positive for joint pain.   Skin: Positive for rash.   Neurological: Negative for dizziness.   Psychiatric/Behavioral: Negative for depression.      Physical Exam  Laboratory/Imaging   Hemodynamics  Temp (24hrs), Av.3 °C (97.3 °F), Min:36.1 °C (97 °F), Max:36.4 °C (97.5 °F)   Temperature: 36.3 °C (97.3 °F)  Pulse  Av.4  Min: 68  Max: 86    Blood Pressure : 110/60      Respiratory      Respiration: 18, Pulse Oximetry: 92 %             Fluids    Intake/Output Summary (Last 24 hours) at 10/13/18 1027  Last data filed at 10/13/18 0400   Gross per 24 hour   Intake              240 ml   Output              250 ml   Net              -10 ml       Nutrition  Orders Placed This Encounter   Procedures   • Diet Order Regular     Standing Status:   Standing     Number of Occurrences:   1     Order Specific Question:   Diet:     Answer:   Regular [1]     Physical Exam   Constitutional: She is oriented to person, place, and time. No distress.   HENT:   Head: Normocephalic.   Eyes: EOM are normal.   Neck: Neck supple.   Cardiovascular: Normal rate and regular rhythm.    Pulmonary/Chest: Effort normal.   Abdominal: Soft. Bowel sounds are normal. She exhibits no distension. There is no tenderness.   Musculoskeletal:   BLE in dressings  L ankle deformity  Wound vac on LLE medial/distally    Neurological: She is alert and oriented to person, place, and time.   Skin: Rash noted.   Psychiatric: Her behavior is normal.                                Assessment/Plan     * Arterial leg ulcer (HCC)- (present on admission)   Assessment & Plan    -prolonged history of this issue with prior multiple MDR organisms and frequent abx regimens and various reactions and allergies now  -Dr. Colin following and recommended Cleanse choice for wound healing; no OR plan  Wound care applied Cleanse Choice wound vac.  Wound vac change today, probably home with outpatient care soon once equipment delivered.        Essential hypertension- (present on admission)   Assessment & Plan    -well controlled, continue outpatient medications        Peripheral vascular disease (HCC)- (present on admission)   Assessment & Plan    -severe, prior fem-pop bypass  Stable; needs to be on ASA/statin; no obvious contraindications so will start.        Osteoporosis- (present on admission)   Assessment & Plan    -VItamin D replacement        Dyslipidemia- (present on admission)   Assessment & Plan    Start statin; f/u with pcp          Quality-Core Measures

## 2018-10-13 NOTE — FACE TO FACE
Face to Face Supporting Documentation - Home Health    The encounter with this patient was in whole or in part the primary reason for home health admission.    Date of encounter:   Patient:                    MRN:                       YOB: 2020  Nadege Mae  [id number]  1945     Home health to see patient for:  Wound Care    Skilled need for:  Exacerbation of Chronic Disease State chronic leg ulcer    Skilled nursing interventions to include:  Wound Care    Homebound status evidenced by:  Needs the assistance of another person in order to leave the home. Leaving home requires a considerable and taxing effort. There is a normal inability to leave the home.    Community Physician to provide follow up care: RYANN Son     Optional Interventions? No      I certify the face to face encounter for this home health care referral meets the CMS requirements and the encounter/clinical assessment with the patient was, in whole, or in part, for the medical condition(s) listed above, which is the primary reason for home health care. Based on my clinical findings: the service(s) are medically necessary, support the need for home health care, and the homebound criteria are met.  I certify that this patient has had a face to face encounter by myself.  Bill Frye M.D. - NPI: [id number]

## 2018-10-13 NOTE — WOUND TEAM
"[facility]  Inpatient Services  Wound and Skin Care     Admission Date:  10/9/2018   HPI, PMH, SH: Reviewed  Unit where seen by Wound Team: T301/00    WOUND CONSULT RELATED TO:  Cleanse choice VAC placement      SUBJECTIVE:  \"I want these wounds to heal\"      Self Report / Pain Level:  Tolerated well       OBJECTIVE:  In bed, vera kwame dressing in place.     WOUND TYPE, LOCATION, CHARACTERISTICS (Pressure ulcers: location, stage, POA or date identified)    Location and type of wound:LLE medial leg arterial wound x 2       NPWT Pump Mode / Pressure Setting Continuous;125 mmHg   Dressing Type Medium;Gray foam;Custom foam   Number of Foam Pieces Used 5   Canister Changed No   VAC VeraFlo Irrigant Normal Saline   VAC VeraFlo Soak Time (mins) 3   VAC VeraFlo Instill Volume (ml) 10   VAC VeraFlo - Therapy Time (hrs) 2   VAC VeraFlo Pressure (mm/Hg) Continuous;125 mmHg     Wound Image     Site Assessment Beefy red with domes of part yellow part red   Maricruz-wound Assessment Dry/flaking.  Patient reports decreased swelling    Margins Attached edges   Wound Length (cm)  Distal   Proximal      4 cm  5.5 cm    Wound Width (cm)  Distal   Proximal  3 cm   4 cm   Wound Depth (cm)  Distal   Proximal    0.5 cm  0.5 cm      Wound Surface Area (cm^2) 22 cm^2   Tunneling 0 cm   Undermining 0 cm   Closure CARLITA   Drainage Amount Min/mod   Drainage Description serosanguinous   Non-staged Wound Description Full thickness   Treatments Cleansed;Irrigation   Cleansing Approved Wound Cleanser   Dressing Options Wound Vac   Dressing Cleansing/Solutions Normal Saline   Dressing Changed Changed   Dressing Status Clean;Dry;Intact   Dressing Change Frequency Tuesday, Thursday, Saturday   NEXT Dressing Change  10/16/18   NEXT Weekly Photo (Inpatient Only) 10/18/18   WOUND NURSE ONLY - Odor Mild   WOUND NURSE ONLY - Time Spent with Patient (mins) 60       TOMI:     On 5/28/18 left 0.70  Lab Values:    WBC:       WBC   Date/Time Value Ref " "Range Status   10/10/2018 01:40 AM 7.5 4.8 - 10.8 K/uL Final     AIC:      Lab Results   Component Value Date/Time    HBA1C 5.6 06/04/2018 12:00 PM         Culture:   MRSA and pseudomonas     INTERVENTIONS BY WOUND TEAM:  Previous dressing removed, cleansed with wound cleanser.  No sting skin prep and paste ring to yuki-wound.  1 piece of honeycomb foam to both wound beds, covered with thin gray foam, bridged wounds together with strip of drape under, sealed with drape.  Track pad applied and setting at 125 mmHg continuous.  Soaking for 3 minutes, with 10 mL of NS every 2 hours.  Tube  D reapplied       Dressing selection:  Cleanse choice.          Interdisciplinary consultation:  RN, patient, Kelli Nagy [employer] rep, Dr Colin    EVALUATION:  Wound bed greatly improved from last dressing change.  Patient admitted from Dr. Colin for cleanse choice VAC dressing.  Per Dr. Colin office visit note:\" HPI  Nadege Mae is a 72 y.o. female who presents with non-healing wounds to her left leg after left fem-pop bypass.  About 2 months ago, I recommended hospitalization for wound care and she refused.  She relates that she would like to returne to work and has a non-healing left ankle wound as well.  I have been reticent to debride the ankle as the current wound is full thickness and periosteum appears visible.\"        Factors affecting wound healing:  PVD, osteoporosis       Goals:  Steady decrease in wound area and depth weekly     NURSING PLAN OF CARE ORDERS (X):    Dressing changes: See Dressing Care orders:   X  Skin care: See Skin Care orders:   Rectal tube care: See Rectal Tube Care orders:   Other orders:      WOUND TEAM PLAN OF CARE (X):   NPWT change 3 x week:     X   Dressing changes by wound team:       Follow up as needed:       Other (explain):    Anticipated discharge plans (X):  SNF:           Home Care:           Outpatient Wound Center:         X ?   Self Care:            Other:           "

## 2018-10-14 PROCEDURE — 99232 SBSQ HOSP IP/OBS MODERATE 35: CPT | Performed by: INTERNAL MEDICINE

## 2018-10-14 PROCEDURE — 700111 HCHG RX REV CODE 636 W/ 250 OVERRIDE (IP): Performed by: INTERNAL MEDICINE

## 2018-10-14 PROCEDURE — 770021 HCHG ROOM/CARE - ISO PRIVATE

## 2018-10-14 PROCEDURE — A9270 NON-COVERED ITEM OR SERVICE: HCPCS | Performed by: INTERNAL MEDICINE

## 2018-10-14 PROCEDURE — 700102 HCHG RX REV CODE 250 W/ 637 OVERRIDE(OP): Performed by: INTERNAL MEDICINE

## 2018-10-14 PROCEDURE — 306263 VAC CANNISTER W/GEL 500ML: Performed by: INTERNAL MEDICINE

## 2018-10-14 RX ORDER — OXYCODONE HYDROCHLORIDE 5 MG/1
5 TABLET ORAL EVERY 4 HOURS PRN
Status: DISCONTINUED | OUTPATIENT
Start: 2018-10-14 | End: 2018-10-25 | Stop reason: HOSPADM

## 2018-10-14 RX ADMIN — HEPARIN SODIUM 5000 UNITS: 5000 INJECTION, SOLUTION INTRAVENOUS; SUBCUTANEOUS at 05:11

## 2018-10-14 RX ADMIN — ASPIRIN 81 MG: 81 TABLET, COATED ORAL at 05:11

## 2018-10-14 RX ADMIN — ATORVASTATIN CALCIUM 10 MG: 10 TABLET, FILM COATED ORAL at 17:07

## 2018-10-14 RX ADMIN — VITAMIN D, TAB 1000IU (100/BT) 1000 UNITS: 25 TAB at 05:10

## 2018-10-14 RX ADMIN — ACETAMINOPHEN 650 MG: 325 TABLET, FILM COATED ORAL at 21:08

## 2018-10-14 RX ADMIN — HEPARIN SODIUM 5000 UNITS: 5000 INJECTION, SOLUTION INTRAVENOUS; SUBCUTANEOUS at 14:56

## 2018-10-14 RX ADMIN — ACETAMINOPHEN 650 MG: 325 TABLET, FILM COATED ORAL at 05:10

## 2018-10-14 RX ADMIN — OXYCODONE HYDROCHLORIDE 5 MG: 5 TABLET ORAL at 09:04

## 2018-10-14 RX ADMIN — METOPROLOL SUCCINATE 100 MG: 50 TABLET, EXTENDED RELEASE ORAL at 05:10

## 2018-10-14 RX ADMIN — SERTRALINE 100 MG: 100 TABLET, FILM COATED ORAL at 05:11

## 2018-10-14 RX ADMIN — HEPARIN SODIUM 5000 UNITS: 5000 INJECTION, SOLUTION INTRAVENOUS; SUBCUTANEOUS at 21:07

## 2018-10-14 ASSESSMENT — PAIN SCALES - GENERAL
PAINLEVEL_OUTOF10: 5
PAINLEVEL_OUTOF10: 4
PAINLEVEL_OUTOF10: 5
PAINLEVEL_OUTOF10: 0

## 2018-10-14 ASSESSMENT — ENCOUNTER SYMPTOMS
FEVER: 0
BLURRED VISION: 0
DIZZINESS: 0
COUGH: 0
HEARTBURN: 0
DEPRESSION: 0

## 2018-10-14 NOTE — PROGRESS NOTES
Wound care    Photos of left ankle and calf wound review.  Fantastic improvement in the wound appearance with active granulation tissue, especially good at the ankle.  I discussed the benefits of this method with the patient and I think she wound benefit from a couple more dressing changes with the Veri-flow.  We will then plan for OP NPWT.

## 2018-10-14 NOTE — CARE PLAN
Problem: Safety  Goal: Will remain free from falls  Outcome: PROGRESSING AS EXPECTED  No falls this shift, patient up with 1 assist & RW, ringing appropriately for assist     Problem: Pain Management  Goal: Pain level will decrease to patient's comfort goal  Outcome: PROGRESSING AS EXPECTED  PRN tylenol as ordered with good effect

## 2018-10-14 NOTE — PROGRESS NOTES
[facility] Progress Note    Date of Service: 10/14/2018    Chief Complaint  72 y.o. female admitted 10/9/2018 per Dr. Colin's referral for Cleanse Choice application for chronic leg ulcers.    Interval Problem Update  No new c/o; doing well; eating fine  Consultants/Specialty  Dr. Colin, wound care    Disposition  Home when stable        Review of Systems   Constitutional: Negative for fever.   HENT: Negative for hearing loss.    Eyes: Negative for blurred vision.   Respiratory: Negative for cough.    Cardiovascular: Negative for chest pain.   Gastrointestinal: Negative for heartburn.   Genitourinary: Negative for dysuria.   Musculoskeletal: Positive for joint pain.   Skin: Positive for rash.   Neurological: Negative for dizziness.   Psychiatric/Behavioral: Negative for depression.      Physical Exam  Laboratory/Imaging   Hemodynamics  Temp (24hrs), Av.6 °C (97.8 °F), Min:36.4 °C (97.6 °F), Max:36.7 °C (98 °F)   Temperature: 36.4 °C (97.6 °F)  Pulse  Av.3  Min: 68  Max: 92    Blood Pressure : 142/76      Respiratory      Respiration: 16, Pulse Oximetry: 93 %             Fluids    Intake/Output Summary (Last 24 hours) at 10/14/18 1213  Last data filed at 10/14/18 0031   Gross per 24 hour   Intake                0 ml   Output              100 ml   Net             -100 ml       Nutrition  Orders Placed This Encounter   Procedures   • Diet Order Regular     Standing Status:   Standing     Number of Occurrences:   1     Order Specific Question:   Diet:     Answer:   Regular [1]     Physical Exam   Constitutional: She is oriented to person, place, and time. No distress.   HENT:   Head: Normocephalic.   Eyes: EOM are normal.   Neck: Neck supple.   Cardiovascular: Normal rate and regular rhythm.    Pulmonary/Chest: Effort normal.   Abdominal: Soft. Bowel sounds are normal. She exhibits no distension. There is no tenderness.   Musculoskeletal:   BLE in dressings  L ankle deformity  Wound vac on LLE  medial/distally   Neurological: She is alert and oriented to person, place, and time.   Skin: Rash noted.   Psychiatric: Her behavior is normal.                                Assessment/Plan     * Arterial leg ulcer (HCC)- (present on admission)   Assessment & Plan    -prolonged history of this issue with prior multiple MDR organisms and frequent abx regimens and various reactions and allergies now  -Dr. Colin following and recommended Cleanse choice for wound healing; no OR plan  Wound care applied Cleanse Choice wound vac.    Wound improving per Dr. Colin; keep here for further care and wound check.        Essential hypertension- (present on admission)   Assessment & Plan    -well controlled, continue outpatient medications        Peripheral vascular disease (HCC)- (present on admission)   Assessment & Plan    -severe, prior fem-pop bypass  Stable; needs to be on ASA/statin; no obvious contraindications so will start.        Osteoporosis- (present on admission)   Assessment & Plan    -VItamin D replacement        Dyslipidemia- (present on admission)   Assessment & Plan    Start statin; f/u with pcp          Quality-Core Measures

## 2018-10-14 NOTE — PROGRESS NOTES
Large amount of drainage from posterior left ankle noted. Area appears to be old blister. Wet to dry dressing applied, will continue to monitor.

## 2018-10-15 ENCOUNTER — APPOINTMENT (OUTPATIENT)
Dept: WOUND CARE | Facility: MEDICAL CENTER | Age: 73
End: 2018-10-15
Attending: SURGERY
Payer: MEDICARE

## 2018-10-15 PROCEDURE — 83540 ASSAY OF IRON: CPT

## 2018-10-15 PROCEDURE — 36415 COLL VENOUS BLD VENIPUNCTURE: CPT

## 2018-10-15 PROCEDURE — 83550 IRON BINDING TEST: CPT

## 2018-10-15 PROCEDURE — 99232 SBSQ HOSP IP/OBS MODERATE 35: CPT | Performed by: HOSPITALIST

## 2018-10-15 PROCEDURE — A9270 NON-COVERED ITEM OR SERVICE: HCPCS | Performed by: INTERNAL MEDICINE

## 2018-10-15 PROCEDURE — 700111 HCHG RX REV CODE 636 W/ 250 OVERRIDE (IP): Performed by: INTERNAL MEDICINE

## 2018-10-15 PROCEDURE — A9270 NON-COVERED ITEM OR SERVICE: HCPCS | Performed by: HOSPITALIST

## 2018-10-15 PROCEDURE — 700102 HCHG RX REV CODE 250 W/ 637 OVERRIDE(OP): Performed by: INTERNAL MEDICINE

## 2018-10-15 PROCEDURE — 700102 HCHG RX REV CODE 250 W/ 637 OVERRIDE(OP): Performed by: HOSPITALIST

## 2018-10-15 PROCEDURE — 82728 ASSAY OF FERRITIN: CPT

## 2018-10-15 PROCEDURE — 770021 HCHG ROOM/CARE - ISO PRIVATE

## 2018-10-15 PROCEDURE — 97605 NEG PRS WND THER DME<=50SQCM: CPT

## 2018-10-15 PROCEDURE — 84466 ASSAY OF TRANSFERRIN: CPT

## 2018-10-15 RX ORDER — FOLIC ACID 1 MG/1
1 TABLET ORAL DAILY
Status: DISCONTINUED | OUTPATIENT
Start: 2018-10-15 | End: 2018-10-25 | Stop reason: HOSPADM

## 2018-10-15 RX ORDER — ENALAPRILAT 1.25 MG/ML
1.25 INJECTION INTRAVENOUS EVERY 6 HOURS PRN
Status: DISCONTINUED | OUTPATIENT
Start: 2018-10-15 | End: 2018-10-25 | Stop reason: HOSPADM

## 2018-10-15 RX ORDER — CHOLECALCIFEROL (VITAMIN D3) 125 MCG
1000 CAPSULE ORAL DAILY
Status: DISCONTINUED | OUTPATIENT
Start: 2018-10-15 | End: 2018-10-25 | Stop reason: HOSPADM

## 2018-10-15 RX ADMIN — OXYCODONE HYDROCHLORIDE 5 MG: 5 TABLET ORAL at 12:18

## 2018-10-15 RX ADMIN — SERTRALINE 100 MG: 100 TABLET, FILM COATED ORAL at 05:09

## 2018-10-15 RX ADMIN — ACETAMINOPHEN 650 MG: 325 TABLET, FILM COATED ORAL at 05:09

## 2018-10-15 RX ADMIN — DIPHENHYDRAMINE HCL 25 MG: 25 TABLET ORAL at 03:16

## 2018-10-15 RX ADMIN — SENNOSIDES AND DOCUSATE SODIUM 2 TABLET: 8.6; 5 TABLET ORAL at 17:38

## 2018-10-15 RX ADMIN — ACETAMINOPHEN 650 MG: 325 TABLET, FILM COATED ORAL at 14:40

## 2018-10-15 RX ADMIN — HEPARIN SODIUM 5000 UNITS: 5000 INJECTION, SOLUTION INTRAVENOUS; SUBCUTANEOUS at 21:09

## 2018-10-15 RX ADMIN — ATORVASTATIN CALCIUM 10 MG: 10 TABLET, FILM COATED ORAL at 17:38

## 2018-10-15 RX ADMIN — OXYCODONE HYDROCHLORIDE 5 MG: 5 TABLET ORAL at 17:40

## 2018-10-15 RX ADMIN — VITAMIN D, TAB 1000IU (100/BT) 1000 UNITS: 25 TAB at 05:09

## 2018-10-15 RX ADMIN — ASPIRIN 81 MG: 81 TABLET, COATED ORAL at 05:09

## 2018-10-15 RX ADMIN — FOLIC ACID 1 MG: 1 TABLET ORAL at 08:58

## 2018-10-15 RX ADMIN — METOPROLOL SUCCINATE 100 MG: 50 TABLET, EXTENDED RELEASE ORAL at 05:09

## 2018-10-15 RX ADMIN — CYANOCOBALAMIN TAB 500 MCG 1000 MCG: 500 TAB at 08:58

## 2018-10-15 RX ADMIN — ACETAMINOPHEN 650 MG: 325 TABLET, FILM COATED ORAL at 21:09

## 2018-10-15 RX ADMIN — HEPARIN SODIUM 5000 UNITS: 5000 INJECTION, SOLUTION INTRAVENOUS; SUBCUTANEOUS at 05:04

## 2018-10-15 RX ADMIN — HEPARIN SODIUM 5000 UNITS: 5000 INJECTION, SOLUTION INTRAVENOUS; SUBCUTANEOUS at 14:40

## 2018-10-15 ASSESSMENT — ENCOUNTER SYMPTOMS
VOMITING: 0
HALLUCINATIONS: 0
FLANK PAIN: 0
NECK PAIN: 0
CHILLS: 0
DIARRHEA: 0
FEVER: 0
ABDOMINAL PAIN: 0
COUGH: 0
SHORTNESS OF BREATH: 0
FOCAL WEAKNESS: 0
WHEEZING: 0
SPEECH CHANGE: 0
BACK PAIN: 0
STRIDOR: 0
TREMORS: 0

## 2018-10-15 ASSESSMENT — PAIN SCALES - GENERAL
PAINLEVEL_OUTOF10: 0
PAINLEVEL_OUTOF10: 5
PAINLEVEL_OUTOF10: 4
PAINLEVEL_OUTOF10: 4
PAINLEVEL_OUTOF10: 7
PAINLEVEL_OUTOF10: 6
PAINLEVEL_OUTOF10: ASSUMED PAIN PRESENT
PAINLEVEL_OUTOF10: 1
PAINLEVEL_OUTOF10: 7
PAINLEVEL_OUTOF10: 4

## 2018-10-15 ASSESSMENT — LIFESTYLE VARIABLES: SUBSTANCE_ABUSE: 0

## 2018-10-15 NOTE — DISCHARGE PLANNING
Anticipated Discharge Disposition: Home with outpatient wound    Action: LSW called to cancel pt's wound appointment for today due to not being cleared for d/c at this time.     Barriers to Discharge: None    Plan: LSW to follow for medical clearance and wound clinic scheduling

## 2018-10-15 NOTE — ASSESSMENT & PLAN NOTE
Patient has B12 / folate deficiency  Continue with B12/folate supplementation  Continue with iron, vitamin C, multivitamin supplementation  No signs of acute bleeding  No needs for further monitoring

## 2018-10-15 NOTE — CARE PLAN
Problem: Safety  Goal: Will remain free from falls  Outcome: PROGRESSING AS EXPECTED  No falls this shift, steady on feet up with 1 assist & RW, ringing appropriately for assist     Problem: Pain Management  Goal: Pain level will decrease to patient's comfort goal  Outcome: PROGRESSING AS EXPECTED  PRN tylenol as ordered with good effect this shift

## 2018-10-15 NOTE — PROGRESS NOTES
[facility] Hospitalist Progress Note    Date of Service: 10/15/2018    Chief Complaint  73 y.o. female hx of recurrent arterial ulcers and severe peripheral vascular disease, left ankle fx treated non operatively due to extensive ongoing leg wounds admitted 10/9/2018 with worsening leg ulcer infections.     Interval Problem Update    Afebrile. Has been hypertensive. Wounds doing much better on Veriflow wound Vac. Has developed blisters on toes. Pain controlled.     Consultants/Specialty  Dr. Colin, Vascular     Disposition  Anticipate dc home when stable.         Review of Systems   Constitutional: Negative for chills and fever.   HENT: Negative for congestion.    Respiratory: Negative for cough, shortness of breath, wheezing and stridor.    Cardiovascular: Negative for chest pain and leg swelling.   Gastrointestinal: Negative for abdominal pain, diarrhea and vomiting.   Genitourinary: Negative for flank pain and hematuria.   Musculoskeletal: Negative for back pain, joint pain and neck pain.   Skin: Positive for rash (resolving with scab formation over exts and lower abd.).   Neurological: Negative for tremors, speech change and focal weakness.   Psychiatric/Behavioral: Negative for hallucinations and substance abuse.      Physical Exam  Laboratory/Imaging   Hemodynamics  Temp (24hrs), Av.6 °C (97.8 °F), Min:36.3 °C (97.4 °F), Max:36.8 °C (98.3 °F)   Temperature: 36.8 °C (98.3 °F)  Pulse  Av.5  Min: 68  Max: 92    Blood Pressure : (!) 165/80      Respiratory      Respiration: 16, Pulse Oximetry: 93 %             Fluids    Intake/Output Summary (Last 24 hours) at 10/15/18 0738  Last data filed at 10/15/18 0519   Gross per 24 hour   Intake                0 ml   Output              200 ml   Net             -200 ml       Nutrition  Orders Placed This Encounter   Procedures   • Diet Order Regular     Standing Status:   Standing     Number of Occurrences:   1     Order Specific Question:   Diet:     Answer:    Regular [1]     Physical Exam   Constitutional: She is oriented to person, place, and time. No distress.   HENT:   Head: Normocephalic and atraumatic.   Right Ear: External ear normal.   Left Ear: External ear normal.   Nose: Nose normal.   Eyes: EOM are normal. Right eye exhibits no discharge. Left eye exhibits no discharge. No scleral icterus.   Neck: Neck supple. No JVD present.   Cardiovascular: Normal rate and regular rhythm.    No murmur heard.  Pulmonary/Chest: Effort normal. No stridor. She has no wheezes. She has no rales.   Abdominal: Soft. Bowel sounds are normal. She exhibits no distension. There is no tenderness.   Musculoskeletal: She exhibits deformity (lower extremites dressed.  Wound vac in place - medial ankle region. ).   Neurological: She is alert and oriented to person, place, and time. No cranial nerve deficit.   Skin: Skin is warm and dry. Rash: resolving w numorous scabbed up lesions - upper and lower exts, lower abd.  She is not diaphoretic. No pallor.   Psychiatric: She has a normal mood and affect. Her behavior is normal.   Vitals reviewed.                               Assessment/Plan     * Arterial leg ulcer (HCC)- (present on admission)   Assessment & Plan    Chronic with prolonged history of this issue with prior multiple MDR organisms and frequent abx regimens and various reactions and allergies.  Wounds improving w reported granulation tissue formation.   Evaluated by Vascular Dr. Colin plan non operative treatment with  Inpatient wound care using Veriflow Wound Vac ( System cannot be arranged outpatient. )   Wounds improving-- continue Wound care, nutrition support.           B12 deficiency- (present on admission)   Assessment & Plan    With folate def  Start b12 and folate supplements.         Essential hypertension- (present on admission)   Assessment & Plan    Uncontrolled  Continue Toprol xl  Add prn Iv Vasotec , monitor.         Peripheral vascular disease (HCC)- (present on  admission)   Assessment & Plan    severe with hx of  fem-pop bypass  Continue with ASA/statin        Osteoporosis- (present on admission)   Assessment & Plan    VItamin D replacement        Anemia- (present on admission)   Assessment & Plan    No acute symptom of bleed. Likely chronic dz.  Check iron studies.   Replete b12, folate           Dyslipidemia- (present on admission)   Assessment & Plan    Start statin; f/u with pcp          Quality-Core Measures   Reviewed items::  Medications reviewed, Labs reviewed and Radiology images reviewed  Crowley catheter::  No Crowley  DVT prophylaxis pharmacological::  Heparin      Discussed with RN and Sw  plan of care.

## 2018-10-16 PROBLEM — D53.9 MACROCYTIC ANEMIA: Status: ACTIVE | Noted: 2017-11-04

## 2018-10-16 LAB
FERRITIN SERPL-MCNC: 62 NG/ML (ref 10–291)
IRON SATN MFR SERPL: 9 % (ref 15–55)
IRON SERPL-MCNC: 39 UG/DL (ref 40–170)
TIBC SERPL-MCNC: 444 UG/DL (ref 250–450)
TRANSFERRIN SERPL-MCNC: 319 MG/DL (ref 200–370)

## 2018-10-16 PROCEDURE — 700102 HCHG RX REV CODE 250 W/ 637 OVERRIDE(OP): Performed by: INTERNAL MEDICINE

## 2018-10-16 PROCEDURE — 770021 HCHG ROOM/CARE - ISO PRIVATE

## 2018-10-16 PROCEDURE — A9270 NON-COVERED ITEM OR SERVICE: HCPCS | Performed by: INTERNAL MEDICINE

## 2018-10-16 PROCEDURE — 700102 HCHG RX REV CODE 250 W/ 637 OVERRIDE(OP): Performed by: HOSPITALIST

## 2018-10-16 PROCEDURE — A9270 NON-COVERED ITEM OR SERVICE: HCPCS | Performed by: HOSPITALIST

## 2018-10-16 PROCEDURE — 700111 HCHG RX REV CODE 636 W/ 250 OVERRIDE (IP): Performed by: INTERNAL MEDICINE

## 2018-10-16 PROCEDURE — 99232 SBSQ HOSP IP/OBS MODERATE 35: CPT | Performed by: INTERNAL MEDICINE

## 2018-10-16 PROCEDURE — 99222 1ST HOSP IP/OBS MODERATE 55: CPT | Performed by: INTERNAL MEDICINE

## 2018-10-16 PROCEDURE — 306263 VAC CANNISTER W/GEL 500ML: Performed by: INTERNAL MEDICINE

## 2018-10-16 RX ORDER — FERROUS GLUCONATE 324(38)MG
324 TABLET ORAL 2 TIMES DAILY WITH MEALS
Status: DISCONTINUED | OUTPATIENT
Start: 2018-10-16 | End: 2018-10-25 | Stop reason: HOSPADM

## 2018-10-16 RX ORDER — LISINOPRIL 2.5 MG/1
5 TABLET ORAL
Status: DISCONTINUED | OUTPATIENT
Start: 2018-10-16 | End: 2018-10-25 | Stop reason: HOSPADM

## 2018-10-16 RX ORDER — ASCORBIC ACID 500 MG
500 TABLET ORAL 3 TIMES DAILY
Status: DISCONTINUED | OUTPATIENT
Start: 2018-10-16 | End: 2018-10-25 | Stop reason: HOSPADM

## 2018-10-16 RX ORDER — ATORVASTATIN CALCIUM 20 MG/1
40 TABLET, FILM COATED ORAL EVERY EVENING
Status: DISCONTINUED | OUTPATIENT
Start: 2018-10-16 | End: 2018-10-25 | Stop reason: HOSPADM

## 2018-10-16 RX ADMIN — HEPARIN SODIUM 5000 UNITS: 5000 INJECTION, SOLUTION INTRAVENOUS; SUBCUTANEOUS at 13:29

## 2018-10-16 RX ADMIN — FOLIC ACID 1 MG: 1 TABLET ORAL at 04:30

## 2018-10-16 RX ADMIN — DIPHENHYDRAMINE HCL 25 MG: 25 TABLET ORAL at 18:38

## 2018-10-16 RX ADMIN — VITAMIN D, TAB 1000IU (100/BT) 1000 UNITS: 25 TAB at 04:30

## 2018-10-16 RX ADMIN — DIPHENHYDRAMINE HCL 25 MG: 25 TABLET ORAL at 01:13

## 2018-10-16 RX ADMIN — ACETAMINOPHEN 650 MG: 325 TABLET, FILM COATED ORAL at 01:13

## 2018-10-16 RX ADMIN — OXYCODONE HYDROCHLORIDE AND ACETAMINOPHEN 500 MG: 500 TABLET ORAL at 16:22

## 2018-10-16 RX ADMIN — THERA TABS 1 TABLET: TAB at 16:22

## 2018-10-16 RX ADMIN — CYANOCOBALAMIN TAB 500 MCG 1000 MCG: 500 TAB at 04:30

## 2018-10-16 RX ADMIN — HEPARIN SODIUM 5000 UNITS: 5000 INJECTION, SOLUTION INTRAVENOUS; SUBCUTANEOUS at 20:51

## 2018-10-16 RX ADMIN — ATORVASTATIN CALCIUM 40 MG: 20 TABLET, FILM COATED ORAL at 16:25

## 2018-10-16 RX ADMIN — LISINOPRIL 5 MG: 2.5 TABLET ORAL at 16:20

## 2018-10-16 RX ADMIN — ASPIRIN 81 MG: 81 TABLET, COATED ORAL at 04:30

## 2018-10-16 RX ADMIN — SERTRALINE 100 MG: 100 TABLET, FILM COATED ORAL at 04:30

## 2018-10-16 RX ADMIN — METOPROLOL SUCCINATE 100 MG: 50 TABLET, EXTENDED RELEASE ORAL at 04:28

## 2018-10-16 RX ADMIN — SENNOSIDES AND DOCUSATE SODIUM 2 TABLET: 8.6; 5 TABLET ORAL at 16:32

## 2018-10-16 RX ADMIN — DIPHENHYDRAMINE HCL 25 MG: 25 TABLET ORAL at 08:38

## 2018-10-16 RX ADMIN — FERROUS GLUCONATE 324 MG: 324 TABLET ORAL at 16:20

## 2018-10-16 RX ADMIN — HEPARIN SODIUM 5000 UNITS: 5000 INJECTION, SOLUTION INTRAVENOUS; SUBCUTANEOUS at 04:30

## 2018-10-16 ASSESSMENT — ENCOUNTER SYMPTOMS
BACK PAIN: 0
WHEEZING: 0
NECK PAIN: 0
VOMITING: 0
CHILLS: 0
DIARRHEA: 0
TREMORS: 0
STRIDOR: 0
COUGH: 0
FOCAL WEAKNESS: 0
ABDOMINAL PAIN: 0
SPEECH CHANGE: 0
FEVER: 0
FLANK PAIN: 0
SHORTNESS OF BREATH: 0
HALLUCINATIONS: 0

## 2018-10-16 ASSESSMENT — PAIN SCALES - GENERAL
PAINLEVEL_OUTOF10: ASSUMED PAIN PRESENT
PAINLEVEL_OUTOF10: 4
PAINLEVEL_OUTOF10: ASSUMED PAIN PRESENT
PAINLEVEL_OUTOF10: 3
PAINLEVEL_OUTOF10: 3
PAINLEVEL_OUTOF10: 6

## 2018-10-16 ASSESSMENT — LIFESTYLE VARIABLES: SUBSTANCE_ABUSE: 0

## 2018-10-16 NOTE — WOUND TEAM
"[facility]  Inpatient Services  Wound and Skin Care     Admission Date:  10/9/2018   HPI, PMH, SH: Reviewed  Unit where seen by Wound Team: T301/00    WOUND CONSULT RELATED TO:  Scheduled npwt drsg change     SUBJECTIVE:  Pleasant/agreeable    Self Report / Pain Level:  Tolerated well with pre-med    OBJECTIVE:  Prev npwt drsg intact    WOUND TYPE, LOCATION, CHARACTERISTICS (Pressure ulcers: location, stage, POA or date identified)    Location and type of wound: Full thickness arterial wnd left medial lower leg     Periwound:      Discolored; slight maceration  Drainage:      Scant serosanguinous  Tissue Type and %:     90% pink/red -- 10% yellow  Wound Edges:     attached  Odor:       none  Exposed structure(s):   none  S&S of Infection:     None    Measurements: (cm) Taken 10/10 Proximal Distal  Length:      5.5  4.0    Width:       4.0  3.0  Depth:      0.5  0.5      INTERVENTIONS BY WOUND TEAM:  Prev drsg removed -- wnds irrigated with wnd cleanser -- benzoin, paste ring and drape yuki-wnd -- 1 piece of gray \"honey-combed\" cleanse choice foam to fill each wnd and a second piece of solid gray to bridge the 2 wnds -- sealed with drape and cont neg pressure applied at 125mmhg with instillation of 10ml normal saline for 3 min every 2 hoursm -- tubi- D reapplied       Interdisciplinary consultation:  RN, patient, Kelli GRANADOS rep    EVALUATION:  wnd beds relatively clean now; can switch to regular vera-kwame next drsg change        Factors affecting wound healing:  PVD, osteoporosis    Goals:  Steady decrease in wound area and depth weekly -- control drainage -- increase granulation    NURSING PLAN OF CARE ORDERS (x):    Dressing changes: See Dressing Care orders:   x  Skin care: See Skin Care orders: x  Rectal tube care: See Rectal Tube Care orders:   Other orders:      WOUND TEAM PLAN OF CARE (x):   NPWT change 3 x week:     x   Dressing changes by wound team:     x  Follow up as needed:     " x  Other (explain):    Anticipated discharge plans (x):  SNF:           Home Care:           Outpatient Wound Center:         x?   Self Care:            Other:

## 2018-10-16 NOTE — CARE PLAN
Problem: Bowel/Gastric:  Goal: Normal bowel function is maintained or improved  Outcome: PROGRESSING AS EXPECTED      Problem: Pain Management  Goal: Pain level will decrease to patient's comfort goal  Outcome: PROGRESSING AS EXPECTED  Pt reports pain manageable on current regiment

## 2018-10-16 NOTE — CARE PLAN
Problem: Pain Management  Goal: Pain level will decrease to patient's comfort goal  Outcome: PROGRESSING AS EXPECTED  Patient using oxycodone and tylenol for pain with good relief.

## 2018-10-16 NOTE — CARE PLAN
Problem: Infection  Goal: Will remain free from infection    Intervention: Assess signs and symptoms of infection  Pt afebrile, WBC wnl. PIV sit CDI. No new s/s of infection this shift      Problem: Respiratory:  Goal: Respiratory status will improve    Intervention: Assess and monitor pulmonary status  Oxygen saturation remains above 90% on room air. Will continue to monitor.

## 2018-10-16 NOTE — DISCHARGE PLANNING
Anticipated Discharge Disposition: Home and Outpatient wound clinic.    Action: [facility] wound measurements.  Faxed updated clinical to [phone].     Barriers to Discharge: none    Plan: [facility], it is at bedside.  Pt to return home, and obtain f/u care at Outpt wound clinic.  Currently pending schedule at wound clinic and medical clearance.

## 2018-10-16 NOTE — PROGRESS NOTES
[facility] Hospitalist Progress Note    Date of Service: 10/16/2018    Chief Complaint  73 y.o. female hx of recurrent arterial ulcers and severe peripheral vascular disease, left ankle fx treated non operatively due to extensive ongoing leg wounds admitted 10/9/2018 with worsening leg ulcer infections.     Interval Problem Update  On a very flow wound VAC  Patient seen and evaluated on rounds  New complaints  Reports her wounds are significantly better than before  Pictures reviewed  Awaiting further recommendation per vascular surgery  ID team consulted by me    Consultants/Specialty  Dr. Colin, vascular surgery    Disposition  PT/OT evaluations   Home once cleared from vascular surgery perspective        Review of Systems   Constitutional: Negative for chills and fever.   HENT: Negative for congestion.    Respiratory: Negative for cough, shortness of breath, wheezing and stridor.    Cardiovascular: Negative for chest pain and leg swelling.   Gastrointestinal: Negative for abdominal pain, diarrhea and vomiting.   Genitourinary: Negative for flank pain and hematuria.   Musculoskeletal: Negative for back pain, joint pain and neck pain.   Skin: Positive for rash (resolving with scab formation over exts and lower abd.).   Neurological: Negative for tremors, speech change and focal weakness.   Psychiatric/Behavioral: Negative for hallucinations and substance abuse.      Physical Exam  Laboratory/Imaging   Hemodynamics  Temp (24hrs), Av.6 °C (97.9 °F), Min:36.4 °C (97.6 °F), Max:36.7 °C (98.1 °F)   Temperature: 36.6 °C (97.8 °F)  Pulse  Av.7  Min: 64  Max: 92    Blood Pressure : 132/71      Respiratory      Respiration: 15, Pulse Oximetry: 94 %        RML Breath Sounds: Diminished, RLL Breath Sounds: Diminished, LLL Breath Sounds: Diminished    Fluids  No intake or output data in the 24 hours ending 10/16/18 1550    Nutrition  Orders Placed This Encounter   Procedures   • Diet Order Regular     Standing Status:    Standing     Number of Occurrences:   1     Order Specific Question:   Diet:     Answer:   Regular [1]     Physical Exam   Constitutional: She is oriented to person, place, and time. No distress.   HENT:   Head: Normocephalic and atraumatic.   Right Ear: External ear normal.   Left Ear: External ear normal.   Nose: Nose normal.   Eyes: EOM are normal. Right eye exhibits no discharge. Left eye exhibits no discharge. No scleral icterus.   Neck: Neck supple. No JVD present.   Cardiovascular: Normal rate and regular rhythm.    No murmur heard.  Pulmonary/Chest: Effort normal. No stridor. She has no wheezes. She has no rales.   Abdominal: Soft. Bowel sounds are normal. She exhibits no distension. There is no tenderness.   Musculoskeletal: She exhibits deformity (lower extremites dressed.  Wound vac in place - medial ankle region. ).   Neurological: She is alert and oriented to person, place, and time. No cranial nerve deficit.   Skin: Skin is warm and dry. Rash: resolving w numorous scabbed up lesions - upper and lower exts, lower abd.  She is not diaphoretic. No pallor.   Psychiatric: She has a normal mood and affect. Her behavior is normal.   Vitals reviewed.  Pictures reviewed  Wound vac in place                             Assessment/Plan     * Arterial leg ulcer (HCC)- (present on admission)   Assessment & Plan    Chronic with prolonged history of this issue with prior multiple MDR organisms and frequent abx regimens and various reactions and allergies.  Wounds improving w reported granulation tissue formation. Evaluated by Vascular Surgery, Dr. Colin plan non operative treatment with  Inpatient wound care using Veriflow Wound Vac (System cannot be arranged outpatient).     Continue wound care  I requested ID consultation today  VS to continue evaluating  Continue wound care  Optimize nutrition  Risk factor modifications         Peripheral vascular disease (HCC)- (present on admission)   Assessment & Plan     severe with hx of  fem-pop bypass  Continue with ASA/statin & risk factor modification        B12 deficiency- (present on admission)   Assessment & Plan    With folate def  Continue b12 and folate supplements.         Macrocytic anemia- (present on admission)   Assessment & Plan    Patient has B12 / folate deficiency  Continue with B12/folate supplementation  Continue with iron, vitamin C, multivitamin supplementation  No signs of acute bleeding  No needs for further monitoring        Dyslipidemia- (present on admission)   Assessment & Plan    Check lipid profile, high intensity statin therapy with underlying risk factors  Maintain outpatient follow-up with PCP for ongoing monitoring and management        Essential hypertension- (present on admission)   Assessment & Plan    Continue metoprolol XL   Start lisinopril         Osteoporosis- (present on admission)   Assessment & Plan    VItamin D replacement          Quality-Core Measures   Reviewed items::  Medications reviewed, Labs reviewed and Radiology images reviewed  Crowley catheter::  No Crowley  DVT prophylaxis pharmacological::  Heparin  Ulcer Prophylaxis::  Not indicated  Assessed for rehabilitation services:  Patient was assess for and/or received rehabilitation services during this hospitalization

## 2018-10-16 NOTE — CONSULTS
[facility] INFECTIOUS DISEASES INPATIENT CONSULT NOTE     Date of Service: 10/16/2018    Consult Requested By: Loida Rutledge M.D.    Reason for Consultation: Lower extremity wounds    Chief Complaint: Wounds    History of Present Illness:     Nadege Mae is a 73 y.o. female admitted 10/9/2018.  She has a past medical history of HTN, hyperlipidemia, and arterial insufficiency. Patient has been followed in [facility] and ID clinic for infected L medial calf ulcer x several months. She started wound care on 5/8/18 with slow but steady improvement. She had a fracture L ankle recently, reportedly awaiting surgery once infection clears. She also had arterial studies completed 5/28/18 which showed TOMI on RLE of 0.92 and LLE TOMI of 0.7. She had 50-75% stenosis on RLE and SFA occlusion on LLE. Dr. Colin performed a Femoral popliteal bypass on 06/08/2018 on left LE. Back in December 2017, pt had a fall and fractured left intertrochanteric femur. It was surgically treated with intramedullary device by Dr. Peters. 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 She underwent left total hip arthroplasty with removal of hardware by Dr. Vazquez on 2/11/18.      Her wounds have grown multiple resistant organisms including MRSA (was on doxy but now resistant to doxy), Pseudomonas, and Enterococcus. Pt was initially advised by Dr. Colin to get admitted and undergo debridement with wound vac placement, however pt refused this for months.  She was treated initially with long course of cipro and Bactrim (4 weeks at least) for possible surrounding cellulitis  (linezolid was tried previously but pt did not tolerate it), later developed severe rash to Bactrim.  All antibiotics were stopped on 9/17/2018.      Patient was seen again by Dr. Colin, this time agreed for inpatient wound care with wound VAC veri-flow placement, admitted on 10/9/2018.  Significant improvement  noted with inpatient wound care, with active granulation tissue on the wounds.    Patient reports no issues, no fever no white count, no nausea or vomiting or diarrhea, no purulence from wounds.  She is picking on the wounds from her previous Bactrim rash, some bleeding noted from superficial right hip wound.  She notes that the rashes have significantly improved.    Review of Systems:  All other systems reviewed and are negative expect as noted in HPI    Past Medical History:   Diagnosis Date   • Anxiety    • Cellulitis and abscess of lower extremity    • Dental disorder     full dentures   • Generalized osteoarthritis of multiple sites 10/20/2015   • Heart valve disease     pt not sure    • Hyperlipidemia    • Hypertension    • Osteoporosis        Past Surgical History:   Procedure Laterality Date   • FEMORAL POPLITEAL BYPASS Left 6/8/2018    Procedure: FEMORAL POPLITEAL BYPASS;  Surgeon: Milana Colin M.D.;  Location: [facility];  Service: General   • IRRIGATION & DEBRIDEMENT GENERAL Left 6/8/2018    Procedure: IRRIGATION & DEBRIDEMENT ANKLE WOUND;  Surgeon: Milana Colin M.D.;  Location: [facility];  Service: General   • IRRIGATION & DEBRIDEMENT HIP Left 6/4/2018    Procedure: IRRIGATION & DEBRIDEMENT HIP;  Surgeon: Chong Vazquez M.D.;  Location: [facility];  Service: Orthopedics   • HIP REVISION TOTAL Left 2/11/2018    Procedure: HIP REVISION TOTAL- femoral nail removal conversion to total hip arthoroplasty;  Surgeon: Chong Vazquez M.D.;  Location: [facility];  Service: Orthopedics   • HIP NAILING INTRAMEDULLARY Left 12/20/2017    Procedure: HIP NAILING INTRAMEDULLARY;  Surgeon: Jorge Peters M.D.;  Location: [facility];  Service: Orthopedics   • BREAST BIOPSY  8/28/2014    Performed by Magdalena Londono M.D. 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[employer]  [employer]     Social History Main Topics   • Smoking status: Former Smoker     Packs/day: 0.50     Years: 25.00     Types: Cigarettes     Quit date: 1/1/2007   • Smokeless tobacco: Never Used   • Alcohol use 3.0 oz/week     5 Glasses of wine per week      Comment: glass of wine per day   • Drug use: No   • Sexual activity: Not Currently     Partners: Male     Other Topics Concern   • Not on file     Social History Narrative   • No narrative on file       Allergies   Allergen Reactions   • Bactrim [Sulfamethoxazole-Trimethoprim]      Diffuse pruritic skin rash with blisters (no mucous membrane involvement) (was also taking cipro at the time - reaction thought more likely to be 2/2 Bactrim)       Medications:    Current Facility-Administered Medications:   •  cyanocobalamin (VITAMIN B-12) tablet 1,000 mcg, 1,000 mcg, Oral, DAILY, Tino Delgado M.D., 1,000 mcg at 10/16/18 0430  •  folic acid (FOLVITE) tablet 1 mg, 1 mg, Oral, DAILY, Tino Delgado M.D., 1 mg at 10/16/18 0430  •  enalaprilat (VASOTEC) injection 1.25 mg, 1.25 mg, Intravenous, Q6HRS PRN, Tino Delgado M.D.  •  oxyCODONE immediate-release (ROXICODONE) tablet 5 mg, 5 mg, Oral, Q4HRS PRN, Bill Frye M.D., 5 mg at 10/15/18 1740  •  diphenhydrAMINE (BENADRYL) tablet/capsule 25 mg, 25 mg, Oral, Q6HRS PRN, Bill Frye M.D., 25 mg at 10/16/18 0113  •  acetaminophen (TYLENOL) tablet 650 mg, 650 mg, Oral, Q4HRS PRN, Bill Frye M.D., 650 mg at 10/16/18 0113  •  aspirin EC (ECOTRIN) tablet 81 mg,  81 mg, Oral, DAILY, Bill Frye M.D., 81 mg at 10/16/18 0430  •  atorvastatin (LIPITOR) tablet 10 mg, 10 mg, Oral, Q EVENING, Bill Frye M.D., 10 mg at 10/15/18 1738  •  metoprolol SR (TOPROL XL) tablet 100 mg, 100 mg, Oral, DAILY, Evgeny Hill M.D., 100 mg at 10/16/18 0428  •  sertraline (ZOLOFT) tablet 100 mg, 100 mg, Oral, DAILY, Evgeny Hill M.D., 100 mg at 10/16/18 0430  •  vitamin D (cholecalciferol) tablet 1,000 Units, 1,000 Units, Oral, DAILY, Evgeny Hill M.D., 1,000 Units at 10/16/18 0430  •  senna-docusate (PERICOLACE or SENOKOT S) 8.6-50 MG per tablet 2 Tab, 2 Tab, Oral, BID, 2 Tab at 10/15/18 1738 **AND** polyethylene glycol/lytes (MIRALAX) PACKET 1 Packet, 1 Packet, Oral, QDAY PRN **AND** magnesium hydroxide (MILK OF MAGNESIA) suspension 30 mL, 30 mL, Oral, QDAY PRN **AND** bisacodyl (DULCOLAX) suppository 10 mg, 10 mg, Rectal, QDAY PRN, Evgeny Hill M.D.  •  heparin injection 5,000 Units, 5,000 Units, Subcutaneous, Q8HRS, Evgeny Hill M.D., 5,000 Units at 10/16/18 0430  •  ondansetron (ZOFRAN) syringe/vial injection 4 mg, 4 mg, Intravenous, Q4HRS PRN, Evgeny Hill M.D.  •  ondansetron (ZOFRAN ODT) dispertab 4 mg, 4 mg, Oral, Q4HRS PRN, Evgeny Hill M.D.    Physical Exam:   Vital Signs: /91   Pulse 75   Temp 36.7 °C (98.1 °F)   Resp 17   Wt 66.7 kg (147 lb 0.8 oz)   SpO2 98%   Breastfeeding? No   BMI 23.03 kg/m²   Temp  Av.6 °C (97.9 °F)  Min: 36.1 °C (97 °F)  Max: 37.3 °C (99.1 °F)  Vital signs reviewed  Constitutional: Patient is oriented to person, place, and time. Appears older than stated age, thin and frail. No distress  Head: Atraumatic, normocephalic  Eyes: Conjunctivae normal, EOM intact. Pupils are equal, round, and reactive to light.   Mouth/Throat: Lips without lesions, good dentition, oropharynx is clear and moist.  Neck: Neck supple. No masses/lymphadenopathy  Cardiovascular: Normal rate, regular rhythm, normal S1S2 and intact distal  pulses. No murmur, gallop, or friction rub. No pedal edema.  Pulmonary/Chest: No respiratory distress. Unlabored respiratory effort, lungs clear to auscultation. No wheezes or rales.   Abdominal: Soft, non tender. BS + x 4. No masses or hepatosplenomegaly.   Musculoskeletal: Left lower extremity with surgical dressing, VeraFlo wound VAC in place  Neurological: Alert and oriented to person, place, and time. No gross cranial nerve deficit. No gross focal neural deficit noted  Skin: Prior drug rash has significantly improved, multiple superficial excoriations with scabs remain on bilateral distal forearms and dorsal hand.  Patient is picking on these, right proximal thigh superficial excoriation with some bleeding noted.  Psychiatric: Normal mood and affect. Behavior is normal.     LABS:  No results for input(s): WBC, HGB in the last 72 hours.    Invalid input(s): PLATELET, ABSOLUTENEUT         No results for input(s): SODIUM, POTASSIUM, CHLORIDE, CO2, CREATININE in the last 72 hours.    Invalid input(s): UREANITROGEN, EGFR, GULCOSE     No results for input(s): ALBUMIN in the last 72 hours.    Invalid input(s): AST, ALT, ALKPHOS, BILITOT, TOTALBILIRUB, BILIRUBINTOT, BILIRUBINDIR, BILIRUBININD, ALKALINEPHOS     MICRO:  Blood Culture   Date Value Ref Range Status   11/03/2017 No growth after 5 days of incubation.  Final        Latest pertinent labs were reviewed    IMAGING STUDIES:  No new imaging to review    Hospital Course/Assessment:   Nadege Mae is a 73 y.o. female with a history of chronic lower extremity wounds and arterial insufficiency, status post multiple courses of prolonged antibiotics.  No evidence of active cellulitis, and the wound is showing significant improvement with inpatient wound care alone, without systemic antibiotic therapy.    Plan:   -Continue excellent wound care  -No further antibiotics required at this time  -Patient aware to watch for signs of infection including fever, chills,  purulence from the wound, worsening swelling or redness  -Advised patient to not pick on her hand and thigh erosions, since he is at high risk of those getting infected and developing into additional bigger wounds    Plan was discussed with the primary team    ID will sign off.  Please call with questions.    Fernando Rainey M.D.

## 2018-10-17 LAB
CHOLEST SERPL-MCNC: 130 MG/DL (ref 100–199)
HDLC SERPL-MCNC: 45 MG/DL
LDLC SERPL CALC-MCNC: 60 MG/DL
TRIGL SERPL-MCNC: 125 MG/DL (ref 0–149)

## 2018-10-17 PROCEDURE — 700102 HCHG RX REV CODE 250 W/ 637 OVERRIDE(OP): Performed by: INTERNAL MEDICINE

## 2018-10-17 PROCEDURE — 700102 HCHG RX REV CODE 250 W/ 637 OVERRIDE(OP): Performed by: HOSPITALIST

## 2018-10-17 PROCEDURE — 99232 SBSQ HOSP IP/OBS MODERATE 35: CPT | Performed by: INTERNAL MEDICINE

## 2018-10-17 PROCEDURE — G8978 MOBILITY CURRENT STATUS: HCPCS | Mod: CI

## 2018-10-17 PROCEDURE — G8989 SELF CARE D/C STATUS: HCPCS | Mod: CI

## 2018-10-17 PROCEDURE — G8979 MOBILITY GOAL STATUS: HCPCS | Mod: CI

## 2018-10-17 PROCEDURE — 36415 COLL VENOUS BLD VENIPUNCTURE: CPT

## 2018-10-17 PROCEDURE — A9270 NON-COVERED ITEM OR SERVICE: HCPCS | Performed by: INTERNAL MEDICINE

## 2018-10-17 PROCEDURE — 770021 HCHG ROOM/CARE - ISO PRIVATE

## 2018-10-17 PROCEDURE — 97605 NEG PRS WND THER DME<=50SQCM: CPT

## 2018-10-17 PROCEDURE — G8980 MOBILITY D/C STATUS: HCPCS | Mod: CI

## 2018-10-17 PROCEDURE — 97165 OT EVAL LOW COMPLEX 30 MIN: CPT

## 2018-10-17 PROCEDURE — 80061 LIPID PANEL: CPT

## 2018-10-17 PROCEDURE — 700111 HCHG RX REV CODE 636 W/ 250 OVERRIDE (IP): Performed by: INTERNAL MEDICINE

## 2018-10-17 PROCEDURE — G8988 SELF CARE GOAL STATUS: HCPCS | Mod: CI

## 2018-10-17 PROCEDURE — 97161 PT EVAL LOW COMPLEX 20 MIN: CPT

## 2018-10-17 PROCEDURE — A9270 NON-COVERED ITEM OR SERVICE: HCPCS | Performed by: HOSPITALIST

## 2018-10-17 PROCEDURE — G8987 SELF CARE CURRENT STATUS: HCPCS | Mod: CI

## 2018-10-17 RX ADMIN — ATORVASTATIN CALCIUM 40 MG: 20 TABLET, FILM COATED ORAL at 17:16

## 2018-10-17 RX ADMIN — OXYCODONE HYDROCHLORIDE AND ACETAMINOPHEN 500 MG: 500 TABLET ORAL at 17:16

## 2018-10-17 RX ADMIN — HEPARIN SODIUM 5000 UNITS: 5000 INJECTION, SOLUTION INTRAVENOUS; SUBCUTANEOUS at 14:00

## 2018-10-17 RX ADMIN — SENNOSIDES AND DOCUSATE SODIUM 2 TABLET: 8.6; 5 TABLET ORAL at 05:16

## 2018-10-17 RX ADMIN — VITAMIN D, TAB 1000IU (100/BT) 1000 UNITS: 25 TAB at 05:16

## 2018-10-17 RX ADMIN — OXYCODONE HYDROCHLORIDE AND ACETAMINOPHEN 500 MG: 500 TABLET ORAL at 12:07

## 2018-10-17 RX ADMIN — HEPARIN SODIUM 5000 UNITS: 5000 INJECTION, SOLUTION INTRAVENOUS; SUBCUTANEOUS at 20:39

## 2018-10-17 RX ADMIN — FOLIC ACID 1 MG: 1 TABLET ORAL at 05:17

## 2018-10-17 RX ADMIN — OXYCODONE HYDROCHLORIDE AND ACETAMINOPHEN 500 MG: 500 TABLET ORAL at 05:17

## 2018-10-17 RX ADMIN — CYANOCOBALAMIN TAB 500 MCG 1000 MCG: 500 TAB at 05:17

## 2018-10-17 RX ADMIN — SERTRALINE 100 MG: 100 TABLET, FILM COATED ORAL at 05:16

## 2018-10-17 RX ADMIN — SENNOSIDES AND DOCUSATE SODIUM 2 TABLET: 8.6; 5 TABLET ORAL at 17:16

## 2018-10-17 RX ADMIN — METOPROLOL SUCCINATE 100 MG: 50 TABLET, EXTENDED RELEASE ORAL at 05:16

## 2018-10-17 RX ADMIN — HEPARIN SODIUM 5000 UNITS: 5000 INJECTION, SOLUTION INTRAVENOUS; SUBCUTANEOUS at 05:17

## 2018-10-17 RX ADMIN — DIPHENHYDRAMINE HCL 25 MG: 25 TABLET ORAL at 05:16

## 2018-10-17 RX ADMIN — OXYCODONE HYDROCHLORIDE 5 MG: 5 TABLET ORAL at 12:07

## 2018-10-17 RX ADMIN — DIPHENHYDRAMINE HCL 25 MG: 25 TABLET ORAL at 20:39

## 2018-10-17 RX ADMIN — ASPIRIN 81 MG: 81 TABLET, COATED ORAL at 05:15

## 2018-10-17 RX ADMIN — ACETAMINOPHEN 650 MG: 325 TABLET, FILM COATED ORAL at 20:39

## 2018-10-17 RX ADMIN — LISINOPRIL 5 MG: 2.5 TABLET ORAL at 05:15

## 2018-10-17 RX ADMIN — THERA TABS 1 TABLET: TAB at 05:15

## 2018-10-17 RX ADMIN — FERROUS GLUCONATE 324 MG: 324 TABLET ORAL at 08:26

## 2018-10-17 RX ADMIN — FERROUS GLUCONATE 324 MG: 324 TABLET ORAL at 17:16

## 2018-10-17 ASSESSMENT — ENCOUNTER SYMPTOMS
FEVER: 0
SHORTNESS OF BREATH: 0
NECK PAIN: 0
FOCAL WEAKNESS: 0
TREMORS: 0
ABDOMINAL PAIN: 0
STRIDOR: 0
DIARRHEA: 0
CHILLS: 0
VOMITING: 0
WHEEZING: 0
COUGH: 0
SPEECH CHANGE: 0
FLANK PAIN: 0
HALLUCINATIONS: 0
BACK PAIN: 0

## 2018-10-17 ASSESSMENT — GAIT ASSESSMENTS
DISTANCE (FEET): 45
GAIT LEVEL OF ASSIST: SUPERVISED
DEVIATION: OTHER (COMMENT)
ASSISTIVE DEVICE: FRONT WHEEL WALKER

## 2018-10-17 ASSESSMENT — COGNITIVE AND FUNCTIONAL STATUS - GENERAL
MOBILITY SCORE: 21
STANDING UP FROM CHAIR USING ARMS: A LITTLE
WALKING IN HOSPITAL ROOM: A LITTLE
SUGGESTED CMS G CODE MODIFIER MOBILITY: CJ
SUGGESTED CMS G CODE MODIFIER DAILY ACTIVITY: CH
CLIMB 3 TO 5 STEPS WITH RAILING: A LITTLE
DAILY ACTIVITIY SCORE: 24

## 2018-10-17 ASSESSMENT — PAIN SCALES - GENERAL
PAINLEVEL_OUTOF10: 0
PAINLEVEL_OUTOF10: 6

## 2018-10-17 ASSESSMENT — ACTIVITIES OF DAILY LIVING (ADL): TOILETING: INDEPENDENT

## 2018-10-17 ASSESSMENT — LIFESTYLE VARIABLES: SUBSTANCE_ABUSE: 0

## 2018-10-17 NOTE — WOUND TEAM
[facility]  Inpatient Services  Wound and Skin Care     Admission Date:  10/9/2018   HPI, PMH, SH: Reviewed  Unit where seen by Wound Team: T3    WOUND CONSULT RELATED TO:  Scheduled npwt drsg change     SUBJECTIVE:  Pleasant/agreeable    Self Report / Pain Level:  Tolerated well with pre-med    OBJECTIVE:  Prev npwt drsg intact but instillation tubing inadvertently compressed    WOUND TYPE, LOCATION, CHARACTERISTICS (Pressure ulcers: location, stage, POA or date identified)    Location and type of wound: Full thickness wnd left medial lower leg     Periwound:      Discolored; slight maceration  Drainage:      Scant serosanguinous  Tissue Type and %:     95% pink/red -- 5% yellow  Wound Edges:     attached  Odor:       none  Exposed structure(s):   none  S&S of Infection:     None    Measurements: (cm) Taken 10/17 Proximal Distal  Length:      5.0  3.5    Width:       3.5  2.8  Depth:      0.2  0.2      INTERVENTIONS BY WOUND TEAM:  Prev drsg removed -- wnds irrigated with wnd cleanser -- measurements and photograph done -- benzoin, paste ring, silver hydrofiber and drape yuki-wnd as needed -- 1 piece of black vera-kwame foam to fill each wnd and a second piece to bridge the 2 wnds -- sealed with drape and cont neg pressure applied at 125mmhg with instillation of 10ml normal saline for 5 min every 2 hours -- tubi- D reapplied       Interdisciplinary consultation:  RN, patient    EVALUATION:  All measurements smaller; switched to vera-kwame black foam as wnds mostly clean now        Factors affecting wound healing:  PVD, osteoporosis    Goals:  Steady decrease in wound area and depth weekly -- control drainage -- increase granulation    NURSING PLAN OF CARE ORDERS (x):    Dressing changes: See Dressing Care orders:   x  Skin care: See Skin Care orders: x  Rectal tube care: See Rectal Tube Care orders:   Other orders:      WOUND TEAM PLAN OF CARE (x):   NPWT change 3 x week:     x   Dressing changes by  wound team:     x  Follow up as needed:     x  Other (explain):    Anticipated discharge plans (x):  SNF:           Home Care:           Outpatient Wound Center:         x?   Self Care:            Other:

## 2018-10-17 NOTE — THERAPY
"Physical Therapy Evaluation completed.   Bed Mobility:  Supine to Sit: Modified Independent  Transfers: Sit to Stand: Modified Independent  Gait: Level Of Assist: Supervised with Front-Wheel Walker       Plan of Care: Patient with no further skilled PT needs in the acute care setting at this time  Discharge Recommendations: Equipment: No Equipment Needed. Post-acute therapy Recommend outpatient or home [facility] transitional care services for continued physical therapy services.    Ms. Mae is a 74 y/o female who presents to acute secondary to arterial leg ulcer. Additionally L ankle fracture from June that has been not treated operatively due to her extensive co-morbidities. Per patient she was TTWB but Dr. Vazquez made her WBAT with boot. Mobility limited due to isolation precautions. Pt demonstrated good safety awareness regarding all mobility. She was able to perform gait, transfers, and bed mobility without physical assist. Performed one step without issue, unable to do flight of stairs due to isolation precautions. Pt able to verbalize that she uses the side-step technique on stairs. Pt declined further treatments to attempt flight of stairs as she states her mobility has not changed since admit. As she is able to perform all functional mobility without physical assist, no additional acute physical therapy needs at this time. Recommend ambulate regularly with nursing staff to maintain current level of function. Pt declined home health therapies, states she has received it all in past and is well trained.     See \"Rehab Therapy-Acute\" Patient Summary Report for complete documentation.     "

## 2018-10-17 NOTE — PROGRESS NOTES
Received report from day shift RN. Reviewed orders, notes, and lab results. Assumed care of patient at 1915. Patient is alert and oriented. Vital signs stable on room air. Discussed plan of care, pain management, safety, and fall risk. Patient denies pain. Administered benadryl for scattered scabs and itching throughout. Dressing and wound vac to left lower extremity is clean, dry, intact. CMS intact; patient denies numbness and tingling. Patient tolerating ambulation well. Voiding well. TERRI hose in place. Effective use of incentive spirometer pulling 2000. Safety and fall precautions in place, call light within reach, hourly rounding.

## 2018-10-17 NOTE — PROGRESS NOTES
Rounded w/Dr Rutledge and he mentioned that Dr Colin wanted to be at bedside w/wound change. Paged Dr Colin to notify her that wound care will be by at 1030 for drsg change r/t leak in wound vac.

## 2018-10-17 NOTE — CARE PLAN
Problem: Safety  Goal: Will remain free from falls    Intervention: Assess risk factors for falls  Discussed safety and fall risk. Safety and fall precautions in place, call light within reach, bed locked in lowest position, non-skid socks in place. Patient verbalized understanding of safety and fall risk and uses call light appropriately for assistance.      Problem: Venous Thromboembolism (VTW)/Deep Vein Thrombosis (DVT) Prevention:  Goal: Patient will participate in Venous Thrombosis (VTE)/Deep Vein Thrombosis (DVT)Prevention Measures  Outcome: PROGRESSING AS EXPECTED  Administered heparin. TERRI hose in place.     Problem: Pain Management  Goal: Pain level will decrease to patient's comfort goal    Intervention: Follow pain managment plan developed in collaboration with patient and Interdisciplinary Team  Provided patient education on pain management using pain scale. Patient denies pain, resting comfortably in bed.

## 2018-10-17 NOTE — CARE PLAN
rec'd report from SHREE Barreto at 0654 and assumed care of this pt. Pt is awake/A&O x4 w/no ss of distress noted at this time. Pt denies pain/needs/complaints. Safety measures in place, call light w/in reach.

## 2018-10-17 NOTE — THERAPY
"Occupational Therapy Evaluation completed.   Functional Status: Mod I supine > < EOB, supv LB dressing, supv toileting, supv transfers with FWW  Plan of Care: Patient with no further skilled OT needs in the acute care setting at this time  Discharge Recommendations:  Equipment: No Equipment Needed. Post-acute therapy: Anticipate that the patient will have no further occupational therapy needs after discharge from the hospital.     See \"Rehab Therapy-Acute\" Patient Summary Report for complete documentation.    73 y.o. Female with h/p OA, L hip IM nail, L hip YOLANDA, fem-pop bypass, severe PVD with recurrent arterial ulcers, recent L ankle fx, now admitted with non-healing wound to L ankle and application of wound vac. Seen for OT eval. Pt able to complete bed mobility, LB dressing, transfers with FWW, toileting, with no more than supv. Reports her roommate is available to assist as needed. Pt had not taken full shower in some time due to wounds; completes sponge baths. Recommended shower chair once cleared to bathe. Pt appears to be at most recent baseline from OT standpoint and has no further acute OT needs at this time.     "

## 2018-10-17 NOTE — PROGRESS NOTES
SHREE Cornell w/wound care called. Someone had called her to report the wound vac alarming. She said someone will be here w/in an hour to change it and ok to turn it off for an hour. Turned wound vac off at this time.

## 2018-10-18 PROCEDURE — 700102 HCHG RX REV CODE 250 W/ 637 OVERRIDE(OP): Performed by: HOSPITALIST

## 2018-10-18 PROCEDURE — A9270 NON-COVERED ITEM OR SERVICE: HCPCS | Performed by: HOSPITALIST

## 2018-10-18 PROCEDURE — 700102 HCHG RX REV CODE 250 W/ 637 OVERRIDE(OP): Performed by: INTERNAL MEDICINE

## 2018-10-18 PROCEDURE — 700105 HCHG RX REV CODE 258

## 2018-10-18 PROCEDURE — A9270 NON-COVERED ITEM OR SERVICE: HCPCS | Performed by: INTERNAL MEDICINE

## 2018-10-18 PROCEDURE — 770021 HCHG ROOM/CARE - ISO PRIVATE

## 2018-10-18 PROCEDURE — 700111 HCHG RX REV CODE 636 W/ 250 OVERRIDE (IP): Performed by: INTERNAL MEDICINE

## 2018-10-18 PROCEDURE — 99232 SBSQ HOSP IP/OBS MODERATE 35: CPT | Performed by: INTERNAL MEDICINE

## 2018-10-18 PROCEDURE — 307732 DRAIN ACCESSORY KIT,15FR CHNL: Performed by: INTERNAL MEDICINE

## 2018-10-18 RX ORDER — SODIUM CHLORIDE 9 MG/ML
INJECTION, SOLUTION INTRAVENOUS
Status: COMPLETED
Start: 2018-10-18 | End: 2018-10-18

## 2018-10-18 RX ADMIN — THERA TABS 1 TABLET: TAB at 04:35

## 2018-10-18 RX ADMIN — DIPHENHYDRAMINE HCL 25 MG: 25 TABLET ORAL at 20:19

## 2018-10-18 RX ADMIN — FERROUS GLUCONATE 324 MG: 324 TABLET ORAL at 08:14

## 2018-10-18 RX ADMIN — CYANOCOBALAMIN TAB 500 MCG 1000 MCG: 500 TAB at 04:34

## 2018-10-18 RX ADMIN — DIPHENHYDRAMINE HCL 25 MG: 25 TABLET ORAL at 04:35

## 2018-10-18 RX ADMIN — OXYCODONE HYDROCHLORIDE AND ACETAMINOPHEN 500 MG: 500 TABLET ORAL at 12:15

## 2018-10-18 RX ADMIN — FERROUS GLUCONATE 324 MG: 324 TABLET ORAL at 16:58

## 2018-10-18 RX ADMIN — LISINOPRIL 5 MG: 2.5 TABLET ORAL at 04:35

## 2018-10-18 RX ADMIN — ACETAMINOPHEN 650 MG: 325 TABLET, FILM COATED ORAL at 04:34

## 2018-10-18 RX ADMIN — ASPIRIN 81 MG: 81 TABLET, COATED ORAL at 04:35

## 2018-10-18 RX ADMIN — OXYCODONE HYDROCHLORIDE AND ACETAMINOPHEN 500 MG: 500 TABLET ORAL at 04:34

## 2018-10-18 RX ADMIN — HEPARIN SODIUM 5000 UNITS: 5000 INJECTION, SOLUTION INTRAVENOUS; SUBCUTANEOUS at 20:19

## 2018-10-18 RX ADMIN — HEPARIN SODIUM 5000 UNITS: 5000 INJECTION, SOLUTION INTRAVENOUS; SUBCUTANEOUS at 14:44

## 2018-10-18 RX ADMIN — VITAMIN D, TAB 1000IU (100/BT) 1000 UNITS: 25 TAB at 04:36

## 2018-10-18 RX ADMIN — SERTRALINE 100 MG: 100 TABLET, FILM COATED ORAL at 04:35

## 2018-10-18 RX ADMIN — OXYCODONE HYDROCHLORIDE 5 MG: 5 TABLET ORAL at 22:55

## 2018-10-18 RX ADMIN — OXYCODONE HYDROCHLORIDE AND ACETAMINOPHEN 500 MG: 500 TABLET ORAL at 16:58

## 2018-10-18 RX ADMIN — HEPARIN SODIUM 5000 UNITS: 5000 INJECTION, SOLUTION INTRAVENOUS; SUBCUTANEOUS at 04:36

## 2018-10-18 RX ADMIN — METOPROLOL SUCCINATE 100 MG: 50 TABLET, EXTENDED RELEASE ORAL at 04:36

## 2018-10-18 RX ADMIN — ACETAMINOPHEN 650 MG: 325 TABLET, FILM COATED ORAL at 20:18

## 2018-10-18 RX ADMIN — FOLIC ACID 1 MG: 1 TABLET ORAL at 04:35

## 2018-10-18 RX ADMIN — ATORVASTATIN CALCIUM 40 MG: 20 TABLET, FILM COATED ORAL at 16:58

## 2018-10-18 RX ADMIN — SODIUM CHLORIDE 1000 ML: 9 INJECTION, SOLUTION INTRAVENOUS at 21:43

## 2018-10-18 ASSESSMENT — ENCOUNTER SYMPTOMS
STRIDOR: 0
VOMITING: 0
NECK PAIN: 0
WHEEZING: 0
FLANK PAIN: 0
HALLUCINATIONS: 0
SHORTNESS OF BREATH: 0
COUGH: 0
FOCAL WEAKNESS: 0
TREMORS: 0
FEVER: 0
ABDOMINAL PAIN: 0
SPEECH CHANGE: 0
DIARRHEA: 0
BACK PAIN: 0
CHILLS: 0

## 2018-10-18 ASSESSMENT — PAIN SCALES - GENERAL
PAINLEVEL_OUTOF10: ASSUMED PAIN PRESENT
PAINLEVEL_OUTOF10: 5
PAINLEVEL_OUTOF10: 5
PAINLEVEL_OUTOF10: 2

## 2018-10-18 ASSESSMENT — LIFESTYLE VARIABLES: SUBSTANCE_ABUSE: 0

## 2018-10-18 NOTE — PROGRESS NOTES
[facility] Hospitalist Progress Note    Date of Service: 10/17/2018    Chief Complaint  73 y.o. female hx of recurrent arterial ulcers and severe peripheral vascular disease, left ankle fx treated non operatively due to extensive ongoing leg wounds admitted 10/9/2018 with worsening leg ulcer infections.     Interval Problem Update  On a veriflow wound VAC  Patient seen and evaluated on rounds  No new complaints  Reports her wounds are significantly better than before with this new therapy  VS recommending ongoing wound vac during hospital stay at this time   Appreciate ID input     Consultants/Specialty  Dr. Colin, vascular surgery    Disposition  PT/OT evaluations   Home once cleared from vascular surgery perspective  Consider LTAC evaluation         Review of Systems   Constitutional: Negative for chills and fever.   HENT: Negative for congestion.    Respiratory: Negative for cough, shortness of breath, wheezing and stridor.    Cardiovascular: Negative for chest pain and leg swelling.   Gastrointestinal: Negative for abdominal pain, diarrhea and vomiting.   Genitourinary: Negative for flank pain and hematuria.   Musculoskeletal: Negative for back pain, joint pain and neck pain.   Skin: Positive for rash (resolving with scab formation over exts and lower abd.).   Neurological: Negative for tremors, speech change and focal weakness.   Psychiatric/Behavioral: Negative for hallucinations and substance abuse.      Physical Exam  Laboratory/Imaging   Hemodynamics  Temp (24hrs), Av.8 °C (98.2 °F), Min:36.5 °C (97.7 °F), Max:36.9 °C (98.4 °F)   Temperature: 36.9 °C (98.4 °F)  Pulse  Av.6  Min: 64  Max: 92    Blood Pressure : 117/69      Respiratory      Respiration: 17, Pulse Oximetry: 92 %        RUL Breath Sounds: Clear, RML Breath Sounds: Clear;Diminished, RLL Breath Sounds: Diminished, BHAVYA Breath Sounds: Clear, LLL Breath Sounds: Diminished    Fluids    Intake/Output Summary (Last 24 hours) at 10/17/18  1710  Last data filed at 10/17/18 0830   Gross per 24 hour   Intake              480 ml   Output              150 ml   Net              330 ml       Nutrition  Orders Placed This Encounter   Procedures   • Diet Order Regular     Standing Status:   Standing     Number of Occurrences:   1     Order Specific Question:   Diet:     Answer:   Regular [1]     Physical Exam   Constitutional: She is oriented to person, place, and time. No distress.   HENT:   Head: Normocephalic and atraumatic.   Right Ear: External ear normal.   Left Ear: External ear normal.   Nose: Nose normal.   Eyes: EOM are normal. Right eye exhibits no discharge. Left eye exhibits no discharge. No scleral icterus.   Neck: Neck supple. No JVD present.   Cardiovascular: Normal rate and regular rhythm.    No murmur heard.  Pulmonary/Chest: Effort normal. No stridor. She has no wheezes. She has no rales.   Abdominal: Soft. Bowel sounds are normal. She exhibits no distension. There is no tenderness.   Musculoskeletal: She exhibits deformity (lower extremites dressed.  Wound vac in place - medial ankle region. ).   Neurological: She is alert and oriented to person, place, and time. No cranial nerve deficit.   Skin: Skin is warm and dry. Rash: resolving w numorous scabbed up lesions - upper and lower exts, lower abd.  She is not diaphoretic. No pallor.   Psychiatric: She has a normal mood and affect. Her behavior is normal.   Vitals reviewed.  Pictures reviewed  Wound vac in place, replaced today                     Recent Labs      10/17/18   0404   TRIGLYCERIDE  125   HDL  45   LDL  60          Assessment/Plan     * Arterial leg ulcer (HCC)- (present on admission)   Assessment & Plan    Chronic with prolonged history of this issue with prior multiple MDR organisms and frequent abx regimens and various reactions and allergies.  Wounds improving w reported granulation tissue formation. Evaluated by Vascular Surgery, Dr. Colin plan non operative treatment  with  Inpatient wound care using Veriflow Wound Vac (System cannot be arranged outpatient).     Continue wound care  No recommendations for abx per ID team evaluation during this hospital stay   VS to continue evaluating, recommendations for continued Veriflow wound vac at this time  Continue wound care  Optimize nutrition  Risk factor modifications         Peripheral vascular disease (HCC)- (present on admission)   Assessment & Plan    Severe with hx of  fem-pop bypass  Continue with ASA/statin & risk factor modifications        B12 deficiency- (present on admission)   Assessment & Plan    With folate def  Continue b12 and folate supplements.         Macrocytic anemia- (present on admission)   Assessment & Plan    Patient has B12 / folate deficiency  Continue with B12/folate supplementation  Continue with iron, vitamin C, multivitamin supplementation  No signs of acute bleeding  No needs for further monitoring        Dyslipidemia- (present on admission)   Assessment & Plan    Continue high intensity statin therapy with underlying risk factors  Maintain outpatient follow-up with PCP for ongoing monitoring and management        Essential hypertension- (present on admission)   Assessment & Plan    Continue metoprolol XL   Started lisinopril this hospital stay         Osteoporosis- (present on admission)   Assessment & Plan    VItamin D replacement          Quality-Core Measures   Reviewed items::  Medications reviewed, Labs reviewed and Radiology images reviewed  Crowley catheter::  No Crowley  DVT prophylaxis pharmacological::  Heparin  Ulcer Prophylaxis::  Not indicated  Assessed for rehabilitation services:  Patient was assess for and/or received rehabilitation services during this hospitalization

## 2018-10-18 NOTE — CARE PLAN
Problem: Infection  Goal: Will remain free from infection    Intervention: Assess signs and symptoms of infection  Provided patient education hand hygiene and infection prevention. Wound vac to left foot is clean, dry, intact without signs or symptoms of infection. Frequent hand hygiene by patient and RN. Encouraging use of incentive spirometer.      Problem: Pain Management  Goal: Pain level will decrease to patient's comfort goal    Intervention: Follow pain managment plan developed in collaboration with patient and Interdisciplinary Team  Provided patient education on pain management using pain scale. Patient states pain is 6/10; medicated per MAR. Patient verbalized understanding of education and communicates pain level effectively with RN.

## 2018-10-18 NOTE — PROGRESS NOTES
[facility] Hospitalist Progress Note    Date of Service: 10/18/2018    Chief Complaint  73 y.o. female hx of recurrent arterial ulcers and severe peripheral vascular disease, left ankle fx treated non operatively due to extensive ongoing leg wounds admitted 10/9/2018 with worsening leg ulcer infections.     Interval Problem Update  On a veriflow wound VAC  Patient seen and evaluated on rounds  No new complaints  Reports her wounds are significantly better than before with this new therapy  VS recommending ongoing wound vac during hospital stay at this time   Wound team continues to follow the patient     Consultants/Specialty  Dr. Colin, vascular surgery    Disposition  Home once cleared from vascular surgery / wound care perspective    Consider LTAC evaluation if ongoing wound care needs beyond tomorrow based on wound care input        Review of Systems   Constitutional: Negative for chills and fever.   HENT: Negative for congestion.    Respiratory: Negative for cough, shortness of breath, wheezing and stridor.    Cardiovascular: Negative for chest pain and leg swelling.   Gastrointestinal: Negative for abdominal pain, diarrhea and vomiting.   Genitourinary: Negative for flank pain and hematuria.   Musculoskeletal: Negative for back pain, joint pain and neck pain.   Skin: Positive for rash (resolving with scab formation over exts and lower abd.).   Neurological: Negative for tremors, speech change and focal weakness.   Psychiatric/Behavioral: Negative for hallucinations and substance abuse.      Physical Exam  Laboratory/Imaging   Hemodynamics  Temp (24hrs), Av.7 °C (98 °F), Min:36.4 °C (97.6 °F), Max:36.8 °C (98.2 °F)   Temperature: 36.8 °C (98.2 °F)  Pulse  Av.2  Min: 64  Max: 92    Blood Pressure : 123/55      Respiratory      Respiration: 16, Pulse Oximetry: 96 %        RUL Breath Sounds: Clear, RML Breath Sounds: Clear, RLL Breath Sounds: Diminished, BHAVYA Breath Sounds: Clear, LLL Breath Sounds:  Diminished    Fluids    Intake/Output Summary (Last 24 hours) at 10/18/18 1350  Last data filed at 10/18/18 0430   Gross per 24 hour   Intake              800 ml   Output                0 ml   Net              800 ml       Nutrition  Orders Placed This Encounter   Procedures   • Diet Order Regular     Standing Status:   Standing     Number of Occurrences:   1     Order Specific Question:   Diet:     Answer:   Regular [1]     Physical Exam   Constitutional: She is oriented to person, place, and time. No distress.   HENT:   Head: Normocephalic and atraumatic.   Right Ear: External ear normal.   Left Ear: External ear normal.   Nose: Nose normal.   Eyes: EOM are normal. Right eye exhibits no discharge. Left eye exhibits no discharge. No scleral icterus.   Neck: Neck supple. No JVD present.   Cardiovascular: Normal rate and regular rhythm.    No murmur heard.  Pulmonary/Chest: Effort normal. No stridor. She has no wheezes. She has no rales.   Abdominal: Soft. Bowel sounds are normal. She exhibits no distension. There is no tenderness.   Musculoskeletal: She exhibits deformity (lower extremites dressed.  Wound vac in place - medial ankle region. ).   Neurological: She is alert and oriented to person, place, and time. No cranial nerve deficit.   Skin: Skin is warm and dry. Rash: resolving w numorous scabbed up lesions - upper and lower exts, lower abd.  She is not diaphoretic. No pallor.   Psychiatric: She has a normal mood and affect. Her behavior is normal.   Vitals reviewed.                      Recent Labs      10/17/18   0404   TRIGLYCERIDE  125   HDL  45   LDL  60          Assessment/Plan     * Arterial leg ulcer (HCC)- (present on admission)   Assessment & Plan    Chronic with prolonged history of this issue with prior multiple MDR organisms and frequent abx regimens and various reactions and allergies.  Wounds improving w reported granulation tissue formation. Evaluated by Vascular Surgery, Dr. Colin plan non  operative treatment with  Inpatient wound care using Veriflow Wound Vac (System cannot be arranged outpatient).     Continue wound care  No recommendations for abx per ID team evaluation during this hospital stay   VS to continue evaluating, recommendations for continued Veriflow wound vac at this time  Continue wound care  Optimize nutrition  Risk factor modifications         Peripheral vascular disease (HCC)- (present on admission)   Assessment & Plan    Severe with hx of  fem-pop bypass  Continue with ASA/statin & risk factor modifications        B12 deficiency- (present on admission)   Assessment & Plan    With folate def  Continue b12 and folate supplements.         Macrocytic anemia- (present on admission)   Assessment & Plan    Patient has B12 / folate deficiency  Continue with B12/folate supplementation  Continue with iron, vitamin C, multivitamin supplementation  No signs of acute bleeding  No needs for further monitoring        Dyslipidemia- (present on admission)   Assessment & Plan    Continue high intensity statin therapy with underlying risk factors  Maintain outpatient follow-up with PCP for ongoing monitoring and management        Essential hypertension- (present on admission)   Assessment & Plan    Continue metoprolol XL   Started lisinopril this hospital stay         Osteoporosis- (present on admission)   Assessment & Plan    VItamin D replacement          Quality-Core Measures   Reviewed items::  Medications reviewed, Labs reviewed and Radiology images reviewed  Crowley catheter::  No Crowley  DVT prophylaxis pharmacological::  Heparin  Ulcer Prophylaxis::  Not indicated  Assessed for rehabilitation services:  Patient was assess for and/or received rehabilitation services during this hospitalization

## 2018-10-18 NOTE — DISCHARGE PLANNING
Anticipated Discharge Disposition: SNF    Action: LSW spoke w/ Preet PA and Preet received choice from pt for SNF. Pt chose 1) [facility]. [facility] notified.     Barriers to Discharge: None    Plan: Pending acceptance and m/c.

## 2018-10-18 NOTE — DISCHARGE PLANNING
Received Choice form at [phone]  Agency/Facility Name: [facility]   Referral sent per Choice form @ [id number]

## 2018-10-18 NOTE — CARE PLAN
rec'd report from SHREE Barreto at 0652 and assumed care of this pt. Pt appears to be resting well w/no ss of distress noted at this time. RR E/U. Safety measures in place, call light w/in reach.

## 2018-10-19 LAB
ANION GAP SERPL CALC-SCNC: 8 MMOL/L (ref 0–11.9)
BUN SERPL-MCNC: 28 MG/DL (ref 8–22)
CALCIUM SERPL-MCNC: 9.8 MG/DL (ref 8.5–10.5)
CHLORIDE SERPL-SCNC: 104 MMOL/L (ref 96–112)
CO2 SERPL-SCNC: 23 MMOL/L (ref 20–33)
CREAT SERPL-MCNC: 0.66 MG/DL (ref 0.5–1.4)
ERYTHROCYTE [DISTWIDTH] IN BLOOD BY AUTOMATED COUNT: 61.1 FL (ref 35.9–50)
GLUCOSE SERPL-MCNC: 109 MG/DL (ref 65–99)
HCT VFR BLD AUTO: 35.2 % (ref 37–47)
HGB BLD-MCNC: 11 G/DL (ref 12–16)
MCH RBC QN AUTO: 30.9 PG (ref 27–33)
MCHC RBC AUTO-ENTMCNC: 31.3 G/DL (ref 33.6–35)
MCV RBC AUTO: 98.9 FL (ref 81.4–97.8)
PLATELET # BLD AUTO: 341 K/UL (ref 164–446)
PMV BLD AUTO: 9.7 FL (ref 9–12.9)
POTASSIUM SERPL-SCNC: 4.4 MMOL/L (ref 3.6–5.5)
RBC # BLD AUTO: 3.56 M/UL (ref 4.2–5.4)
SODIUM SERPL-SCNC: 135 MMOL/L (ref 135–145)
WBC # BLD AUTO: 6.7 K/UL (ref 4.8–10.8)

## 2018-10-19 PROCEDURE — 99231 SBSQ HOSP IP/OBS SF/LOW 25: CPT | Performed by: INTERNAL MEDICINE

## 2018-10-19 PROCEDURE — 700102 HCHG RX REV CODE 250 W/ 637 OVERRIDE(OP): Performed by: INTERNAL MEDICINE

## 2018-10-19 PROCEDURE — 80048 BASIC METABOLIC PNL TOTAL CA: CPT

## 2018-10-19 PROCEDURE — A9270 NON-COVERED ITEM OR SERVICE: HCPCS | Performed by: INTERNAL MEDICINE

## 2018-10-19 PROCEDURE — 85027 COMPLETE CBC AUTOMATED: CPT

## 2018-10-19 PROCEDURE — 36415 COLL VENOUS BLD VENIPUNCTURE: CPT

## 2018-10-19 PROCEDURE — 700102 HCHG RX REV CODE 250 W/ 637 OVERRIDE(OP): Performed by: HOSPITALIST

## 2018-10-19 PROCEDURE — 700111 HCHG RX REV CODE 636 W/ 250 OVERRIDE (IP): Performed by: INTERNAL MEDICINE

## 2018-10-19 PROCEDURE — 302098 PASTE RING (FLAT)

## 2018-10-19 PROCEDURE — A9270 NON-COVERED ITEM OR SERVICE: HCPCS | Performed by: HOSPITALIST

## 2018-10-19 PROCEDURE — 97605 NEG PRS WND THER DME<=50SQCM: CPT

## 2018-10-19 PROCEDURE — 770021 HCHG ROOM/CARE - ISO PRIVATE

## 2018-10-19 RX ADMIN — FERROUS GLUCONATE 324 MG: 324 TABLET ORAL at 17:10

## 2018-10-19 RX ADMIN — OXYCODONE HYDROCHLORIDE 5 MG: 5 TABLET ORAL at 10:17

## 2018-10-19 RX ADMIN — LISINOPRIL 5 MG: 2.5 TABLET ORAL at 04:33

## 2018-10-19 RX ADMIN — OXYCODONE HYDROCHLORIDE AND ACETAMINOPHEN 500 MG: 500 TABLET ORAL at 04:33

## 2018-10-19 RX ADMIN — ACETAMINOPHEN 650 MG: 325 TABLET, FILM COATED ORAL at 21:11

## 2018-10-19 RX ADMIN — METOPROLOL SUCCINATE 100 MG: 50 TABLET, EXTENDED RELEASE ORAL at 04:33

## 2018-10-19 RX ADMIN — HEPARIN SODIUM 5000 UNITS: 5000 INJECTION, SOLUTION INTRAVENOUS; SUBCUTANEOUS at 21:07

## 2018-10-19 RX ADMIN — HEPARIN SODIUM 5000 UNITS: 5000 INJECTION, SOLUTION INTRAVENOUS; SUBCUTANEOUS at 04:33

## 2018-10-19 RX ADMIN — DIPHENHYDRAMINE HCL 25 MG: 25 TABLET ORAL at 21:11

## 2018-10-19 RX ADMIN — THERA TABS 1 TABLET: TAB at 04:33

## 2018-10-19 RX ADMIN — CYANOCOBALAMIN TAB 500 MCG 1000 MCG: 500 TAB at 04:33

## 2018-10-19 RX ADMIN — ATORVASTATIN CALCIUM 40 MG: 20 TABLET, FILM COATED ORAL at 17:10

## 2018-10-19 RX ADMIN — DIPHENHYDRAMINE HCL 25 MG: 25 TABLET ORAL at 04:33

## 2018-10-19 RX ADMIN — OXYCODONE HYDROCHLORIDE AND ACETAMINOPHEN 500 MG: 500 TABLET ORAL at 17:10

## 2018-10-19 RX ADMIN — SERTRALINE 100 MG: 100 TABLET, FILM COATED ORAL at 04:33

## 2018-10-19 RX ADMIN — FOLIC ACID 1 MG: 1 TABLET ORAL at 04:33

## 2018-10-19 RX ADMIN — VITAMIN D, TAB 1000IU (100/BT) 1000 UNITS: 25 TAB at 04:33

## 2018-10-19 RX ADMIN — HEPARIN SODIUM 5000 UNITS: 5000 INJECTION, SOLUTION INTRAVENOUS; SUBCUTANEOUS at 14:58

## 2018-10-19 RX ADMIN — FERROUS GLUCONATE 324 MG: 324 TABLET ORAL at 09:01

## 2018-10-19 RX ADMIN — ASPIRIN 81 MG: 81 TABLET, COATED ORAL at 04:33

## 2018-10-19 RX ADMIN — OXYCODONE HYDROCHLORIDE AND ACETAMINOPHEN 500 MG: 500 TABLET ORAL at 11:57

## 2018-10-19 RX ADMIN — ACETAMINOPHEN 650 MG: 325 TABLET, FILM COATED ORAL at 04:33

## 2018-10-19 ASSESSMENT — ENCOUNTER SYMPTOMS
DIARRHEA: 0
TREMORS: 0
HALLUCINATIONS: 0
ABDOMINAL PAIN: 0
NECK PAIN: 0
STRIDOR: 0
BACK PAIN: 0
SHORTNESS OF BREATH: 0
CHILLS: 0
WHEEZING: 0
FLANK PAIN: 0
VOMITING: 0
COUGH: 0
FEVER: 0
FOCAL WEAKNESS: 0
SPEECH CHANGE: 0

## 2018-10-19 ASSESSMENT — LIFESTYLE VARIABLES: SUBSTANCE_ABUSE: 0

## 2018-10-19 ASSESSMENT — PAIN SCALES - GENERAL
PAINLEVEL_OUTOF10: 3
PAINLEVEL_OUTOF10: 5
PAINLEVEL_OUTOF10: 0

## 2018-10-19 NOTE — PROGRESS NOTES
[facility] Hospitalist Progress Note    Date of Service: 10/19/2018    Chief Complaint  73 y.o. female hx of recurrent arterial ulcers and severe peripheral vascular disease, left ankle fx treated non operatively due to extensive ongoing leg wounds admitted 10/9/2018 with worsening leg ulcer infections.     Interval Problem Update  On a veriflow wound VAC  Patient seen and evaluated on rounds  No new complaints  Reports her wounds are significantly better than before with this new therapy  VS recommending ongoing wound vac during hospital stay at this time   Wound team continues to follow the patient   She is uncertain if she wants to go to Rehab if recommended  Awaiting wound team input    Consultants/Specialty  Dr. Colin, vascular surgery    Disposition  Home once cleared from vascular surgery / wound care perspective    Consider LTAC / SNF evaluation if ongoing wound care based on wound care input - Referrals for LTAC / SNF in place. Social workers / CM aware.         Review of Systems   Constitutional: Negative for chills and fever.   HENT: Negative for congestion.    Respiratory: Negative for cough, shortness of breath, wheezing and stridor.    Cardiovascular: Negative for chest pain and leg swelling.   Gastrointestinal: Negative for abdominal pain, diarrhea and vomiting.   Genitourinary: Negative for flank pain and hematuria.   Musculoskeletal: Negative for back pain, joint pain and neck pain.   Skin: Positive for rash (resolving with scab formation over exts and lower abd.).   Neurological: Negative for tremors, speech change and focal weakness.   Psychiatric/Behavioral: Negative for hallucinations and substance abuse.      Physical Exam  Laboratory/Imaging   Hemodynamics  Temp (24hrs), Av.7 °C (98.1 °F), Min:36.6 °C (97.9 °F), Max:37.1 °C (98.7 °F)   Temperature: 36.6 °C (97.9 °F)  Pulse  Av.2  Min: 64  Max: 92    Blood Pressure : 111/56      Respiratory      Respiration: 17, Pulse Oximetry: 97 %         RUL Breath Sounds: Clear, RML Breath Sounds: Clear, RLL Breath Sounds: Diminished, BHAVYA Breath Sounds: Clear, LLL Breath Sounds: Diminished    Fluids    Intake/Output Summary (Last 24 hours) at 10/19/18 1153  Last data filed at 10/19/18 1000   Gross per 24 hour   Intake             1340 ml   Output                0 ml   Net             1340 ml       Nutrition  Orders Placed This Encounter   Procedures   • Diet Order Regular     Standing Status:   Standing     Number of Occurrences:   1     Order Specific Question:   Diet:     Answer:   Regular [1]     Physical Exam   Constitutional: She is oriented to person, place, and time. No distress.   HENT:   Head: Normocephalic and atraumatic.   Right Ear: External ear normal.   Left Ear: External ear normal.   Nose: Nose normal.   Eyes: EOM are normal. Right eye exhibits no discharge. Left eye exhibits no discharge. No scleral icterus.   Neck: Neck supple. No JVD present.   Cardiovascular: Normal rate and regular rhythm.    No murmur heard.  Pulmonary/Chest: Effort normal. No stridor. She has no wheezes. She has no rales.   Abdominal: Soft. Bowel sounds are normal. She exhibits no distension. There is no tenderness.   Musculoskeletal: She exhibits deformity (lower extremites dressed.  Wound vac in place - medial ankle region. ).   Neurological: She is alert and oriented to person, place, and time. No cranial nerve deficit.   Skin: Skin is warm and dry. Rash: resolving w numorous scabbed up lesions - upper and lower exts, lower abd.  She is not diaphoretic. No pallor.   Psychiatric: She has a normal mood and affect. Her behavior is normal.   Vitals reviewed.      Recent Labs      10/19/18   0353   WBC  6.7   RBC  3.56*   HEMOGLOBIN  11.0*   HEMATOCRIT  35.2*   MCV  98.9*   MCH  30.9   MCHC  31.3*   RDW  61.1*   PLATELETCT  341   MPV  9.7     Recent Labs      10/19/18   0353   SODIUM  135   POTASSIUM  4.4   CHLORIDE  104   CO2  23   GLUCOSE  109*   BUN  28*   CREATININE   0.66   CALCIUM  9.8             Recent Labs      10/17/18   0404   TRIGLYCERIDE  125   HDL  45   LDL  60          Assessment/Plan     * Arterial leg ulcer (HCC)- (present on admission)   Assessment & Plan    Chronic with prolonged history of this issue with prior multiple MDR organisms and frequent abx regimens and various reactions and allergies.  Wounds improving w reported granulation tissue formation. Evaluated by Vascular Surgery, Dr. Colin plan non operative treatment with  Inpatient wound care using Veriflow Wound Vac (System cannot be arranged outpatient).     Continue wound care  No recommendations for abx per ID team evaluation during this hospital stay   VS to continue evaluating, recommendations for continued Veriflow wound vac at this time  Continue wound care  Optimize nutrition  Risk factor modifications   Consider LTAC / SNF if ongoing wound vac needs with veriflow         Peripheral vascular disease (HCC)- (present on admission)   Assessment & Plan    Severe with hx of  fem-pop bypass  Continue with ASA/statin & risk factor modifications        B12 deficiency- (present on admission)   Assessment & Plan    With folate def  Continue b12 and folate supplements.         Macrocytic anemia- (present on admission)   Assessment & Plan    Patient has B12 / folate deficiency  Continue with B12/folate supplementation  Continue with iron, vitamin C, multivitamin supplementation  No signs of acute bleeding  No needs for further monitoring        Dyslipidemia- (present on admission)   Assessment & Plan    Continue high intensity statin therapy with underlying risk factors  Maintain outpatient follow-up with PCP for ongoing monitoring and management        Essential hypertension- (present on admission)   Assessment & Plan    Continue metoprolol XL   Started lisinopril this hospital stay         Osteoporosis- (present on admission)   Assessment & Plan    VItamin D replacement          Quality-Core Measures    Reviewed items::  Medications reviewed, Labs reviewed and Radiology images reviewed  Crowley catheter::  No Crowley  DVT prophylaxis pharmacological::  Heparin  Ulcer Prophylaxis::  Not indicated  Assessed for rehabilitation services:  Patient was assess for and/or received rehabilitation services during this hospitalization

## 2018-10-19 NOTE — WOUND TEAM
"[facility]  Inpatient Services  Wound and Skin Care Progress Note     Admission Date:  10/9/2018   HPI, PMH, SH: Reviewed  Unit where seen by Wound Team: [id number]    WOUND CONSULT RELATED TO:  Scheduled npwt dressing change     SUBJECTIVE:  \" I can go to the wound clinic for dressing changes if I go home.\"    Self Report / Pain Level:  States 5/10 with oral pre-med    OBJECTIVE:  Previous npwt dressing intact     WOUND TYPE, LOCATION, CHARACTERISTICS (Pressure ulcers: location, stage, POA or date identified)    Location and type of wound: Full thickness wound left medial lower leg     Periwound:      Discolored; friable, tape tears  Drainage:      Scant serosanguinous  Tissue Type and %:     100% pink/red  Wound Edges:     open  Odor:       none  Exposed structure(s):   none  S&S of Infection:     None    Measurements: (cm) Taken 10/17 Proximal Distal  Length:      5.0  3.5    Width:       3.5  2.8  Depth:      0.2  0.2      INTERVENTIONS BY WOUND TEAM:  Previous dressing removed, all foam removed, no retained foam noted -- yuki wound skin and skin/tape tears cleansed with moist wash cloth and saline, wounds irrigated with saline also  -- benzoin, paste ring, drape, yuki wound. Mepitel, silver hydrofiber, non adhesive foam and drape to all yuki-wound skin/tape tears, secured in place with drape near wounds and with hypafix tape distal from wounds -- 1 piece of black vera-kwame foam to fill each wound and a second piece to bridge the 2 wounds  (3 pieces total) -- sealed with drape and cont neg pressure reapplied at 125 mm Hg with instillation of 10ml normal saline for 5 min every 2 hours, no leaks noted -- tubi- D left off as there are fluid filled bullae at base of toes that will rub with tubi  sock and risk to skin integrity is high.      Interdisciplinary consultation:  RN, patient    EVALUATION:  Clean, granular wound bed, plan to switch to regular black granufoam at next treatment. Patient has " a home vac that has been delivered at bedside.   Skin is very friable, tape tears covered with mepitel, Aquacel and foam for protection.     Factors affecting wound healing:  PVD, osteoporosis    Goals:  Steady decrease in wound area and depth weekly -- control drainage -- increase granulation    NURSING PLAN OF CARE ORDERS (x):    Dressing changes: See Dressing Care orders:   x  Skin care: See Skin Care orders:   Rectal tube care: See Rectal Tube Care orders:   Other orders:      WOUND TEAM PLAN OF CARE (x):   NPWT change 3 x week:     x   Dressing changes by wound team:       Follow up as needed:       Other (explain):    Anticipated discharge plans (x):  SNF:           Home Care:           Outpatient Wound Center:         x?   Self Care:            Other:

## 2018-10-19 NOTE — CARE PLAN
Problem: Bowel/Gastric:  Goal: Normal bowel function is maintained or improved    Intervention: Educate patient and significant other/support system about diet, fluid intake, medications and activity to promote bowel function  Patient had 5 bowel movements 10/18, refusing scheduled stool softeners. Encouraging fluid intake.       Problem: Pain Management  Goal: Pain level will decrease to patient's comfort goal    Intervention: Follow pain managment plan developed in collaboration with patient and Interdisciplinary Team  Patient states pain is adequately controlled with Tylenol; medicated per MAR. Patient verbalized understanding of education and communicates pain level effectively with RN.      Problem: Mobility  Goal: Risk for activity intolerance will decrease    Intervention: Assess and monitor signs of activity intolerance  Patient tolerating ambulation well with front-wheel walker. Patient calls appropriately for assistance when out of bed.

## 2018-10-20 PROCEDURE — 700102 HCHG RX REV CODE 250 W/ 637 OVERRIDE(OP): Performed by: INTERNAL MEDICINE

## 2018-10-20 PROCEDURE — 700111 HCHG RX REV CODE 636 W/ 250 OVERRIDE (IP): Performed by: INTERNAL MEDICINE

## 2018-10-20 PROCEDURE — A9270 NON-COVERED ITEM OR SERVICE: HCPCS | Performed by: INTERNAL MEDICINE

## 2018-10-20 PROCEDURE — 770021 HCHG ROOM/CARE - ISO PRIVATE

## 2018-10-20 PROCEDURE — A9270 NON-COVERED ITEM OR SERVICE: HCPCS | Performed by: HOSPITALIST

## 2018-10-20 PROCEDURE — 99231 SBSQ HOSP IP/OBS SF/LOW 25: CPT | Performed by: INTERNAL MEDICINE

## 2018-10-20 PROCEDURE — 700102 HCHG RX REV CODE 250 W/ 637 OVERRIDE(OP): Performed by: HOSPITALIST

## 2018-10-20 RX ADMIN — DIPHENHYDRAMINE HCL 25 MG: 25 TABLET ORAL at 05:42

## 2018-10-20 RX ADMIN — FERROUS GLUCONATE 324 MG: 324 TABLET ORAL at 17:04

## 2018-10-20 RX ADMIN — ACETAMINOPHEN 650 MG: 325 TABLET, FILM COATED ORAL at 05:42

## 2018-10-20 RX ADMIN — SERTRALINE 100 MG: 100 TABLET, FILM COATED ORAL at 05:41

## 2018-10-20 RX ADMIN — LISINOPRIL 5 MG: 2.5 TABLET ORAL at 05:44

## 2018-10-20 RX ADMIN — METOPROLOL SUCCINATE 100 MG: 50 TABLET, EXTENDED RELEASE ORAL at 05:41

## 2018-10-20 RX ADMIN — OXYCODONE HYDROCHLORIDE AND ACETAMINOPHEN 500 MG: 500 TABLET ORAL at 05:42

## 2018-10-20 RX ADMIN — FOLIC ACID 1 MG: 1 TABLET ORAL at 05:42

## 2018-10-20 RX ADMIN — OXYCODONE HYDROCHLORIDE 5 MG: 5 TABLET ORAL at 18:09

## 2018-10-20 RX ADMIN — FERROUS GLUCONATE 324 MG: 324 TABLET ORAL at 05:44

## 2018-10-20 RX ADMIN — HEPARIN SODIUM 5000 UNITS: 5000 INJECTION, SOLUTION INTRAVENOUS; SUBCUTANEOUS at 14:59

## 2018-10-20 RX ADMIN — DIPHENHYDRAMINE HCL 25 MG: 25 TABLET ORAL at 18:13

## 2018-10-20 RX ADMIN — ASPIRIN 81 MG: 81 TABLET, COATED ORAL at 05:44

## 2018-10-20 RX ADMIN — OXYCODONE HYDROCHLORIDE AND ACETAMINOPHEN 500 MG: 500 TABLET ORAL at 17:04

## 2018-10-20 RX ADMIN — ATORVASTATIN CALCIUM 40 MG: 20 TABLET, FILM COATED ORAL at 17:04

## 2018-10-20 RX ADMIN — THERA TABS 1 TABLET: TAB at 05:41

## 2018-10-20 RX ADMIN — HEPARIN SODIUM 5000 UNITS: 5000 INJECTION, SOLUTION INTRAVENOUS; SUBCUTANEOUS at 22:03

## 2018-10-20 RX ADMIN — OXYCODONE HYDROCHLORIDE AND ACETAMINOPHEN 500 MG: 500 TABLET ORAL at 11:30

## 2018-10-20 RX ADMIN — HEPARIN SODIUM 5000 UNITS: 5000 INJECTION, SOLUTION INTRAVENOUS; SUBCUTANEOUS at 05:44

## 2018-10-20 RX ADMIN — CYANOCOBALAMIN TAB 500 MCG 1000 MCG: 500 TAB at 05:42

## 2018-10-20 RX ADMIN — VITAMIN D, TAB 1000IU (100/BT) 1000 UNITS: 25 TAB at 05:42

## 2018-10-20 RX ADMIN — ACETAMINOPHEN 650 MG: 325 TABLET, FILM COATED ORAL at 22:03

## 2018-10-20 ASSESSMENT — ENCOUNTER SYMPTOMS
FEVER: 0
SHORTNESS OF BREATH: 0
FOCAL WEAKNESS: 0
ABDOMINAL PAIN: 0
FLANK PAIN: 0
STRIDOR: 0
HALLUCINATIONS: 0
BACK PAIN: 0
DIARRHEA: 0
WHEEZING: 0
VOMITING: 0
NECK PAIN: 0
SPEECH CHANGE: 0
CHILLS: 0
TREMORS: 0
COUGH: 0

## 2018-10-20 ASSESSMENT — PAIN SCALES - GENERAL
PAINLEVEL_OUTOF10: 3
PAINLEVEL_OUTOF10: 0
PAINLEVEL_OUTOF10: 3

## 2018-10-20 ASSESSMENT — PATIENT HEALTH QUESTIONNAIRE - PHQ9
SUM OF ALL RESPONSES TO PHQ9 QUESTIONS 1 AND 2: 0
1. LITTLE INTEREST OR PLEASURE IN DOING THINGS: NOT AT ALL
2. FEELING DOWN, DEPRESSED, IRRITABLE, OR HOPELESS: NOT AT ALL

## 2018-10-20 ASSESSMENT — LIFESTYLE VARIABLES: SUBSTANCE_ABUSE: 0

## 2018-10-20 NOTE — CARE PLAN
rec'd report from SHREE Brower at 0649 and assumed care of this pt. Pt is awake/A&O x4 w/no ss of distress noted at this time. Pt denies needs/complaints/pain. Safety measures in place, call light w/in reach.

## 2018-10-20 NOTE — CARE PLAN
Problem: Communication  Goal: The ability to communicate needs accurately and effectively will improve  Outcome: PROGRESSING AS EXPECTED  Report given bedside. Pt updated on plan of care. Pt verbalizes understanding. Communication board in use. Pt encouraged to voice needs and feelings. call light within reach. Pt calls appropriately. Hourly rounding in place.     Problem: Knowledge Deficit  Goal: Knowledge of the prescribed therapeutic regimen will improve  Outcome: PROGRESSING AS EXPECTED   Reviewed POC including safety, mobility, pain management, medication administration, DVT prophylaxis, and discharge. Call light within reach. Pt calls appropriately. Hourly rounding in place. Pt has no needs or concerns at this time.

## 2018-10-20 NOTE — PROGRESS NOTES
[facility] Hospitalist Progress Note    Date of Service: 10/20/2018    Chief Complaint  73 y.o. female hx of recurrent arterial ulcers and severe peripheral vascular disease, left ankle fx treated non operatively due to extensive ongoing leg wounds admitted 10/9/2018 with worsening leg ulcer infections.     Interval Problem Update  On a veriflow wound VAC  Patient seen and evaluated on rounds  No new complaints today   Reports her wounds are significantly better than before with this new therapy  VS recommending ongoing wound vac during hospital stay at this time   Wound team continues to follow the patient   Jess informed us from wound care yesterday, to continue wound vac until Monday   She is uncertain if she wants to go to Rehab if recommended    Consultants/Specialty  Dr. Colin, vascular surgery    Disposition  Home once cleared from vascular surgery / wound care perspective    Consider LTAC / SNF evaluation if ongoing wound care based on wound care input - Referrals for LTAC / SNF in place. Social workers / CM aware. Final recommendations per wound team.         Review of Systems   Constitutional: Negative for chills and fever.   HENT: Negative for congestion.    Respiratory: Negative for cough, shortness of breath, wheezing and stridor.    Cardiovascular: Negative for chest pain and leg swelling.   Gastrointestinal: Negative for abdominal pain, diarrhea and vomiting.   Genitourinary: Negative for flank pain and hematuria.   Musculoskeletal: Negative for back pain, joint pain and neck pain.   Skin: Positive for rash (resolving with scab formation over exts and lower abd.).   Neurological: Negative for tremors, speech change and focal weakness.   Psychiatric/Behavioral: Negative for hallucinations and substance abuse.      Physical Exam  Laboratory/Imaging   Hemodynamics  Temp (24hrs), Av.6 °C (97.8 °F), Min:36.2 °C (97.1 °F), Max:36.9 °C (98.4 °F)   Temperature: 36.9 °C (98.4 °F)  Pulse  Av.4   Min: 64  Max: 96    Blood Pressure : 135/72      Respiratory      Respiration: 14, Pulse Oximetry: 95 %        RUL Breath Sounds: Clear, RML Breath Sounds: Clear, RLL Breath Sounds: Diminished, BHAVYA Breath Sounds: Clear, LLL Breath Sounds: Diminished    Fluids    Intake/Output Summary (Last 24 hours) at 10/20/18 1029  Last data filed at 10/20/18 0800   Gross per 24 hour   Intake             1020 ml   Output                0 ml   Net             1020 ml       Nutrition  Orders Placed This Encounter   Procedures   • Diet Order Regular     Standing Status:   Standing     Number of Occurrences:   1     Order Specific Question:   Diet:     Answer:   Regular [1]     Physical Exam   Constitutional: She is oriented to person, place, and time. No distress.   HENT:   Head: Normocephalic and atraumatic.   Right Ear: External ear normal.   Left Ear: External ear normal.   Nose: Nose normal.   Eyes: EOM are normal. Right eye exhibits no discharge. Left eye exhibits no discharge. No scleral icterus.   Neck: Neck supple. No JVD present.   Cardiovascular: Normal rate and regular rhythm.    No murmur heard.  Pulmonary/Chest: Effort normal. No stridor. She has no wheezes. She has no rales.   Abdominal: Soft. Bowel sounds are normal. She exhibits no distension. There is no tenderness.   Musculoskeletal: She exhibits deformity (lower extremites dressed.  Wound vac in place - medial ankle region. ).   Neurological: She is alert and oriented to person, place, and time. No cranial nerve deficit.   Skin: Skin is warm and dry. Rash: resolving w numorous scabbed up lesions - upper and lower exts, lower abd.  She is not diaphoretic. No pallor.   Psychiatric: She has a normal mood and affect. Her behavior is normal.   Vitals reviewed.      Recent Labs      10/19/18   0353   WBC  6.7   RBC  3.56*   HEMOGLOBIN  11.0*   HEMATOCRIT  35.2*   MCV  98.9*   MCH  30.9   MCHC  31.3*   RDW  61.1*   PLATELETCT  341   MPV  9.7     Recent Labs       10/19/18   0353   SODIUM  135   POTASSIUM  4.4   CHLORIDE  104   CO2  23   GLUCOSE  109*   BUN  28*   CREATININE  0.66   CALCIUM  9.8                      Assessment/Plan     * Arterial leg ulcer (HCC)- (present on admission)   Assessment & Plan    Chronic with prolonged history of this issue with prior multiple MDR organisms and frequent abx regimens and various reactions and allergies.  Wounds improving w reported granulation tissue formation. Evaluated by Vascular Surgery, Dr. Colin plan non operative treatment with  Inpatient wound care using Veriflow Wound Vac (System cannot be arranged outpatient).     Continue wound care  No recommendations for abx per ID team evaluation during this hospital stay   VS to continue evaluating, recommendations for continued Veriflow wound vac at this time (till Monday per Jess at least)  Continue wound care  Optimize nutrition  Risk factor modifications   Consider LTAC / SNF if ongoing wound vac needs with veriflow         Peripheral vascular disease (HCC)- (present on admission)   Assessment & Plan    Severe with hx of  fem-pop bypass  Continue with ASA/statin & risk factor modifications        B12 deficiency- (present on admission)   Assessment & Plan    With folate def  Continue b12 and folate supplements.         Macrocytic anemia- (present on admission)   Assessment & Plan    Patient has B12 / folate deficiency  Continue with B12/folate supplementation  Continue with iron, vitamin C, multivitamin supplementation  No signs of acute bleeding  No needs for further monitoring        Dyslipidemia- (present on admission)   Assessment & Plan    Continue high intensity statin therapy with underlying risk factors  Maintain outpatient follow-up with PCP for ongoing monitoring and management        Essential hypertension- (present on admission)   Assessment & Plan    Continue metoprolol XL   Started lisinopril this hospital stay         Osteoporosis- (present on admission)    Assessment & Plan    VItamin D replacement          Quality-Core Measures   Reviewed items::  Medications reviewed, Labs reviewed and Radiology images reviewed  Crowley catheter::  No Crowley  DVT prophylaxis pharmacological::  Heparin  Ulcer Prophylaxis::  Not indicated  Assessed for rehabilitation services:  Patient was assess for and/or received rehabilitation services during this hospitalization

## 2018-10-21 PROCEDURE — 700102 HCHG RX REV CODE 250 W/ 637 OVERRIDE(OP): Performed by: INTERNAL MEDICINE

## 2018-10-21 PROCEDURE — 99231 SBSQ HOSP IP/OBS SF/LOW 25: CPT | Performed by: INTERNAL MEDICINE

## 2018-10-21 PROCEDURE — 700111 HCHG RX REV CODE 636 W/ 250 OVERRIDE (IP): Performed by: INTERNAL MEDICINE

## 2018-10-21 PROCEDURE — A9270 NON-COVERED ITEM OR SERVICE: HCPCS | Performed by: INTERNAL MEDICINE

## 2018-10-21 PROCEDURE — A9270 NON-COVERED ITEM OR SERVICE: HCPCS | Performed by: HOSPITALIST

## 2018-10-21 PROCEDURE — 770021 HCHG ROOM/CARE - ISO PRIVATE

## 2018-10-21 PROCEDURE — 306263 VAC CANNISTER W/GEL 500ML: Performed by: INTERNAL MEDICINE

## 2018-10-21 PROCEDURE — 700102 HCHG RX REV CODE 250 W/ 637 OVERRIDE(OP): Performed by: HOSPITALIST

## 2018-10-21 RX ADMIN — HEPARIN SODIUM 5000 UNITS: 5000 INJECTION, SOLUTION INTRAVENOUS; SUBCUTANEOUS at 21:35

## 2018-10-21 RX ADMIN — HEPARIN SODIUM 5000 UNITS: 5000 INJECTION, SOLUTION INTRAVENOUS; SUBCUTANEOUS at 04:57

## 2018-10-21 RX ADMIN — SERTRALINE 100 MG: 100 TABLET, FILM COATED ORAL at 04:54

## 2018-10-21 RX ADMIN — OXYCODONE HYDROCHLORIDE AND ACETAMINOPHEN 500 MG: 500 TABLET ORAL at 17:57

## 2018-10-21 RX ADMIN — HEPARIN SODIUM 5000 UNITS: 5000 INJECTION, SOLUTION INTRAVENOUS; SUBCUTANEOUS at 14:36

## 2018-10-21 RX ADMIN — CYANOCOBALAMIN TAB 500 MCG 1000 MCG: 500 TAB at 04:54

## 2018-10-21 RX ADMIN — METOPROLOL SUCCINATE 100 MG: 50 TABLET, EXTENDED RELEASE ORAL at 04:54

## 2018-10-21 RX ADMIN — FERROUS GLUCONATE 324 MG: 324 TABLET ORAL at 07:44

## 2018-10-21 RX ADMIN — LISINOPRIL 5 MG: 2.5 TABLET ORAL at 04:56

## 2018-10-21 RX ADMIN — FOLIC ACID 1 MG: 1 TABLET ORAL at 04:55

## 2018-10-21 RX ADMIN — OXYCODONE HYDROCHLORIDE AND ACETAMINOPHEN 500 MG: 500 TABLET ORAL at 04:55

## 2018-10-21 RX ADMIN — ACETAMINOPHEN 650 MG: 325 TABLET, FILM COATED ORAL at 04:53

## 2018-10-21 RX ADMIN — ACETAMINOPHEN 650 MG: 325 TABLET, FILM COATED ORAL at 21:35

## 2018-10-21 RX ADMIN — VITAMIN D, TAB 1000IU (100/BT) 1000 UNITS: 25 TAB at 04:55

## 2018-10-21 RX ADMIN — OXYCODONE HYDROCHLORIDE AND ACETAMINOPHEN 500 MG: 500 TABLET ORAL at 12:29

## 2018-10-21 RX ADMIN — THERA TABS 1 TABLET: TAB at 04:54

## 2018-10-21 RX ADMIN — ASPIRIN 81 MG: 81 TABLET, COATED ORAL at 04:55

## 2018-10-21 RX ADMIN — FERROUS GLUCONATE 324 MG: 324 TABLET ORAL at 17:57

## 2018-10-21 RX ADMIN — DIPHENHYDRAMINE HCL 25 MG: 25 TABLET ORAL at 21:35

## 2018-10-21 RX ADMIN — ATORVASTATIN CALCIUM 40 MG: 20 TABLET, FILM COATED ORAL at 17:57

## 2018-10-21 RX ADMIN — ACETAMINOPHEN 650 MG: 325 TABLET, FILM COATED ORAL at 12:29

## 2018-10-21 RX ADMIN — DIPHENHYDRAMINE HCL 25 MG: 25 TABLET ORAL at 04:56

## 2018-10-21 ASSESSMENT — PAIN SCALES - GENERAL
PAINLEVEL_OUTOF10: 3
PAINLEVEL_OUTOF10: 3
PAINLEVEL_OUTOF10: 5
PAINLEVEL_OUTOF10: 3
PAINLEVEL_OUTOF10: 1
PAINLEVEL_OUTOF10: 3

## 2018-10-21 ASSESSMENT — LIFESTYLE VARIABLES: SUBSTANCE_ABUSE: 0

## 2018-10-21 ASSESSMENT — ENCOUNTER SYMPTOMS
FEVER: 0
DIARRHEA: 0
ABDOMINAL PAIN: 0
HALLUCINATIONS: 0
STRIDOR: 0
CHILLS: 0
VOMITING: 0
FOCAL WEAKNESS: 0
BACK PAIN: 0
FLANK PAIN: 0
NECK PAIN: 0
WHEEZING: 0
COUGH: 0
TREMORS: 0
SHORTNESS OF BREATH: 0
SPEECH CHANGE: 0

## 2018-10-21 NOTE — CARE PLAN
Problem: Safety  Goal: Will remain free from falls  Outcome: PROGRESSING AS EXPECTED  No falls this shift. Pt uses call bell appropriately.  Up with FWW, SBA.    Problem: Venous Thromboembolism (VTW)/Deep Vein Thrombosis (DVT) Prevention:  Goal: Patient will participate in Venous Thrombosis (VTE)/Deep Vein Thrombosis (DVT)Prevention Measures  Outcome: PROGRESSING AS EXPECTED  Pt receiving heparin for VTE prophylaxis.    Problem: Pain Management  Goal: Pain level will decrease to patient's comfort goal  Outcome: PROGRESSING AS EXPECTED  Pt c/o HA, relieved with Tylenol. No other c/o pain this shift.

## 2018-10-21 NOTE — PROGRESS NOTES
[facility] Hospitalist Progress Note    Date of Service: 10/21/2018    Chief Complaint  73 y.o. female hx of recurrent arterial ulcers and severe peripheral vascular disease, left ankle fx treated non operatively due to extensive ongoing leg wounds admitted 10/9/2018 with worsening leg ulcer infections.     Interval Problem Update  On a veriflow wound VAC  Patient seen and evaluated on rounds  No new complaints today   Reports her wounds are significantly better than before with this new therapy  VS recommending ongoing wound vac during hospital stay at this time   Wound team continues to follow the patient   Jess informed us from wound care yesterday, to continue wound vac until Monday   She is uncertain if she wants to go to Rehab if recommended  No changes compared to yesterday     Consultants/Specialty  Dr. Colin, vascular surgery    Disposition  Home once cleared from vascular surgery / wound care perspective    Consider LTAC / SNF evaluation if ongoing wound care based on wound care input - Referrals for LTAC / SNF in place. Social workers / CM aware. Final recommendations per wound team.         Review of Systems   Constitutional: Negative for chills and fever.   HENT: Negative for congestion.    Respiratory: Negative for cough, shortness of breath, wheezing and stridor.    Cardiovascular: Negative for chest pain and leg swelling.   Gastrointestinal: Negative for abdominal pain, diarrhea and vomiting.   Genitourinary: Negative for flank pain and hematuria.   Musculoskeletal: Negative for back pain, joint pain and neck pain.   Skin: Positive for rash (resolving with scab formation over exts and lower abd.).   Neurological: Negative for tremors, speech change and focal weakness.   Psychiatric/Behavioral: Negative for hallucinations and substance abuse.      Physical Exam  Laboratory/Imaging   Hemodynamics  Temp (24hrs), Av.6 °C (97.8 °F), Min:36 °C (96.8 °F), Max:37 °C (98.6 °F)   Temperature: 36 °C  (96.8 °F)  Pulse  Av.1  Min: 64  Max: 96    Blood Pressure : 116/53      Respiratory      Respiration: 15, Pulse Oximetry: 96 %        RUL Breath Sounds: Clear, RML Breath Sounds: Clear, RLL Breath Sounds: Diminished, BHAVYA Breath Sounds: Clear, LLL Breath Sounds: Diminished    Fluids    Intake/Output Summary (Last 24 hours) at 10/21/18 1244  Last data filed at 10/21/18 0900   Gross per 24 hour   Intake             1440 ml   Output               50 ml   Net             1390 ml       Nutrition  Orders Placed This Encounter   Procedures   • Diet Order Regular     Standing Status:   Standing     Number of Occurrences:   1     Order Specific Question:   Diet:     Answer:   Regular [1]     Physical Exam   Constitutional: She is oriented to person, place, and time. No distress.   HENT:   Head: Normocephalic and atraumatic.   Right Ear: External ear normal.   Left Ear: External ear normal.   Nose: Nose normal.   Eyes: EOM are normal. Right eye exhibits no discharge. Left eye exhibits no discharge. No scleral icterus.   Neck: Neck supple. No JVD present.   Cardiovascular: Normal rate and regular rhythm.    No murmur heard.  Pulmonary/Chest: Effort normal. No stridor. She has no wheezes. She has no rales.   Abdominal: Soft. Bowel sounds are normal. She exhibits no distension. There is no tenderness.   Musculoskeletal: She exhibits deformity (lower extremites dressed.  Wound vac in place - medial ankle region. ).   Neurological: She is alert and oriented to person, place, and time. No cranial nerve deficit.   Skin: Skin is warm and dry. Rash: resolving w numorous scabbed up lesions - upper and lower exts, lower abd.  She is not diaphoretic. No pallor.   Psychiatric: She has a normal mood and affect. Her behavior is normal.   Vitals reviewed.      Recent Labs      10/19/18   0353   WBC  6.7   RBC  3.56*   HEMOGLOBIN  11.0*   HEMATOCRIT  35.2*   MCV  98.9*   MCH  30.9   MCHC  31.3*   RDW  61.1*   PLATELETCT  341   MPV  9.7      Recent Labs      10/19/18   0353   SODIUM  135   POTASSIUM  4.4   CHLORIDE  104   CO2  23   GLUCOSE  109*   BUN  28*   CREATININE  0.66   CALCIUM  9.8                      Assessment/Plan     * Arterial leg ulcer (HCC)- (present on admission)   Assessment & Plan    Chronic with prolonged history of this issue with prior multiple MDR organisms and frequent abx regimens and various reactions and allergies.  Wounds improving w reported granulation tissue formation. Evaluated by Vascular Surgery, Dr. Colin plan non operative treatment with  Inpatient wound care using Veriflow Wound Vac (System cannot be arranged outpatient).     Continue wound care  No recommendations for abx per ID team evaluation during this hospital stay   VS to continue evaluating, recommendations for continued Veriflow wound vac at this time (till Monday per Jess at least)  Continue wound care  Optimize nutrition  Risk factor modifications   Consider LTAC / SNF if ongoing wound vac needs with veriflow         Peripheral vascular disease (HCC)- (present on admission)   Assessment & Plan    Severe with hx of  fem-pop bypass  Continue with ASA/statin & risk factor modifications        B12 deficiency- (present on admission)   Assessment & Plan    With folate def  Continue b12 and folate supplements.         Macrocytic anemia- (present on admission)   Assessment & Plan    Patient has B12 / folate deficiency  Continue with B12/folate supplementation  Continue with iron, vitamin C, multivitamin supplementation  No signs of acute bleeding  No needs for further monitoring        Dyslipidemia- (present on admission)   Assessment & Plan    Continue high intensity statin therapy with underlying risk factors  Maintain outpatient follow-up with PCP for ongoing monitoring and management        Essential hypertension- (present on admission)   Assessment & Plan    Continue metoprolol XL   Started lisinopril this hospital stay         Osteoporosis-  (present on admission)   Assessment & Plan    VItamin D replacement          Quality-Core Measures   Reviewed items::  Medications reviewed, Labs reviewed and Radiology images reviewed  Crowley catheter::  No Crowley  DVT prophylaxis pharmacological::  Heparin  Ulcer Prophylaxis::  Not indicated  Assessed for rehabilitation services:  Patient was assess for and/or received rehabilitation services during this hospitalization

## 2018-10-22 ENCOUNTER — APPOINTMENT (OUTPATIENT)
Dept: WOUND CARE | Facility: MEDICAL CENTER | Age: 73
End: 2018-10-22
Attending: SURGERY
Payer: MEDICARE

## 2018-10-22 PROCEDURE — 700102 HCHG RX REV CODE 250 W/ 637 OVERRIDE(OP): Performed by: INTERNAL MEDICINE

## 2018-10-22 PROCEDURE — 700102 HCHG RX REV CODE 250 W/ 637 OVERRIDE(OP): Performed by: HOSPITALIST

## 2018-10-22 PROCEDURE — A9270 NON-COVERED ITEM OR SERVICE: HCPCS | Performed by: HOSPITALIST

## 2018-10-22 PROCEDURE — 99231 SBSQ HOSP IP/OBS SF/LOW 25: CPT | Performed by: INTERNAL MEDICINE

## 2018-10-22 PROCEDURE — A9270 NON-COVERED ITEM OR SERVICE: HCPCS | Performed by: INTERNAL MEDICINE

## 2018-10-22 PROCEDURE — 97605 NEG PRS WND THER DME<=50SQCM: CPT

## 2018-10-22 PROCEDURE — 770021 HCHG ROOM/CARE - ISO PRIVATE

## 2018-10-22 PROCEDURE — 700111 HCHG RX REV CODE 636 W/ 250 OVERRIDE (IP): Performed by: INTERNAL MEDICINE

## 2018-10-22 RX ADMIN — ASPIRIN 81 MG: 81 TABLET, COATED ORAL at 05:14

## 2018-10-22 RX ADMIN — OXYCODONE HYDROCHLORIDE 5 MG: 5 TABLET ORAL at 09:54

## 2018-10-22 RX ADMIN — ACETAMINOPHEN 650 MG: 325 TABLET, FILM COATED ORAL at 22:43

## 2018-10-22 RX ADMIN — FERROUS GLUCONATE 324 MG: 324 TABLET ORAL at 16:17

## 2018-10-22 RX ADMIN — HEPARIN SODIUM 5000 UNITS: 5000 INJECTION, SOLUTION INTRAVENOUS; SUBCUTANEOUS at 05:17

## 2018-10-22 RX ADMIN — DIPHENHYDRAMINE HCL 25 MG: 25 TABLET ORAL at 16:33

## 2018-10-22 RX ADMIN — LISINOPRIL 5 MG: 2.5 TABLET ORAL at 05:14

## 2018-10-22 RX ADMIN — OXYCODONE HYDROCHLORIDE AND ACETAMINOPHEN 500 MG: 500 TABLET ORAL at 12:38

## 2018-10-22 RX ADMIN — FERROUS GLUCONATE 324 MG: 324 TABLET ORAL at 07:40

## 2018-10-22 RX ADMIN — HEPARIN SODIUM 5000 UNITS: 5000 INJECTION, SOLUTION INTRAVENOUS; SUBCUTANEOUS at 22:43

## 2018-10-22 RX ADMIN — ACETAMINOPHEN 650 MG: 325 TABLET, FILM COATED ORAL at 16:18

## 2018-10-22 RX ADMIN — SERTRALINE 100 MG: 100 TABLET, FILM COATED ORAL at 05:14

## 2018-10-22 RX ADMIN — METOPROLOL SUCCINATE 100 MG: 50 TABLET, EXTENDED RELEASE ORAL at 05:14

## 2018-10-22 RX ADMIN — FOLIC ACID 1 MG: 1 TABLET ORAL at 05:14

## 2018-10-22 RX ADMIN — THERA TABS 1 TABLET: TAB at 05:14

## 2018-10-22 RX ADMIN — SENNOSIDES AND DOCUSATE SODIUM 2 TABLET: 8.6; 5 TABLET ORAL at 16:17

## 2018-10-22 RX ADMIN — ATORVASTATIN CALCIUM 40 MG: 20 TABLET, FILM COATED ORAL at 16:17

## 2018-10-22 RX ADMIN — OXYCODONE HYDROCHLORIDE AND ACETAMINOPHEN 500 MG: 500 TABLET ORAL at 16:17

## 2018-10-22 RX ADMIN — DIPHENHYDRAMINE HCL 25 MG: 25 TABLET ORAL at 22:43

## 2018-10-22 RX ADMIN — HEPARIN SODIUM 5000 UNITS: 5000 INJECTION, SOLUTION INTRAVENOUS; SUBCUTANEOUS at 12:38

## 2018-10-22 RX ADMIN — CYANOCOBALAMIN TAB 500 MCG 1000 MCG: 500 TAB at 05:14

## 2018-10-22 RX ADMIN — ACETAMINOPHEN 650 MG: 325 TABLET, FILM COATED ORAL at 05:19

## 2018-10-22 RX ADMIN — VITAMIN D, TAB 1000IU (100/BT) 1000 UNITS: 25 TAB at 05:14

## 2018-10-22 RX ADMIN — DIPHENHYDRAMINE HCL 25 MG: 25 TABLET ORAL at 05:14

## 2018-10-22 RX ADMIN — OXYCODONE HYDROCHLORIDE AND ACETAMINOPHEN 500 MG: 500 TABLET ORAL at 05:14

## 2018-10-22 ASSESSMENT — PAIN SCALES - GENERAL
PAINLEVEL_OUTOF10: 3

## 2018-10-22 ASSESSMENT — ENCOUNTER SYMPTOMS
SPEECH CHANGE: 0
CHILLS: 0
FOCAL WEAKNESS: 0
FEVER: 0
DIARRHEA: 0
WHEEZING: 0
SHORTNESS OF BREATH: 0
ABDOMINAL PAIN: 0
HALLUCINATIONS: 0
BACK PAIN: 0
FLANK PAIN: 0
COUGH: 0
VOMITING: 0
STRIDOR: 0
TREMORS: 0
NECK PAIN: 0

## 2018-10-22 ASSESSMENT — LIFESTYLE VARIABLES: SUBSTANCE_ABUSE: 0

## 2018-10-22 NOTE — PROGRESS NOTES
[facility] Hospitalist Progress Note    Date of Service: 10/22/2018    Chief Complaint  73 y.o. female hx of recurrent arterial ulcers and severe peripheral vascular disease, left ankle fx treated non operatively due to extensive ongoing leg wounds admitted 10/9/2018 with worsening leg ulcer. Predominantly LLE, presumed arterial insuffiencey related. Seen by Dr Colin during the hospital stay with recommendations for ongoing wound care. Seen by ID and no needs for Abx therapy determined. She is on a veriflow wound vac which cannot be arranged at home. Patient refusing to go to SNF / LTAC for wound care. She is anticipated to discharge home with [facility] once cleared by wound team who is continuing to follow the patient.     Interval Problem Update  On a veriflow wound VAC  Patient seen and evaluated on rounds  No new complaints today   Reports her wounds are significantly better than before with this new therapy  VS recommending ongoing wound vac during hospital stay at this time   Wound team continues to follow the patient   Wound team to re evaluate the patient today  Some leakage from the wound vac at this time     Consultants/Specialty  Dr. Colin, vascular surgery  Wound care     Disposition  Home once cleared from vascular surgery / wound care perspective      Review of Systems   Constitutional: Negative for chills and fever.   HENT: Negative for congestion.    Respiratory: Negative for cough, shortness of breath, wheezing and stridor.    Cardiovascular: Negative for chest pain and leg swelling.   Gastrointestinal: Negative for abdominal pain, diarrhea and vomiting.   Genitourinary: Negative for flank pain and hematuria.   Musculoskeletal: Negative for back pain, joint pain and neck pain.   Skin: Positive for rash (resolving with scab formation over exts and lower abd.).   Neurological: Negative for tremors, speech change and focal weakness.   Psychiatric/Behavioral: Negative for hallucinations and substance abuse.       Physical Exam  Laboratory/Imaging   Hemodynamics  Temp (24hrs), Av.2 °C (97.2 °F), Min:36.1 °C (97 °F), Max:36.3 °C (97.4 °F)   Temperature: 36.3 °C (97.4 °F)  Pulse  Av  Min: 64  Max: 96    Blood Pressure : 131/68      Respiratory      Respiration: 17, Pulse Oximetry: 93 %        RUL Breath Sounds: Clear, RML Breath Sounds: Clear, RLL Breath Sounds: Diminished, BHAVYA Breath Sounds: Clear, LLL Breath Sounds: Diminished    Fluids    Intake/Output Summary (Last 24 hours) at 10/22/18 1051  Last data filed at 10/22/18 0522   Gross per 24 hour   Intake              720 ml   Output                0 ml   Net              720 ml       Nutrition  Orders Placed This Encounter   Procedures   • Diet Order Regular     Standing Status:   Standing     Number of Occurrences:   1     Order Specific Question:   Diet:     Answer:   Regular [1]     Physical Exam   Constitutional: She is oriented to person, place, and time. No distress.   HENT:   Head: Normocephalic and atraumatic.   Right Ear: External ear normal.   Left Ear: External ear normal.   Nose: Nose normal.   Eyes: EOM are normal. Right eye exhibits no discharge. Left eye exhibits no discharge. No scleral icterus.   Neck: Neck supple. No JVD present.   Cardiovascular: Normal rate and regular rhythm.    No murmur heard.  Pulmonary/Chest: Effort normal. No stridor. She has no wheezes. She has no rales.   Abdominal: Soft. Bowel sounds are normal. She exhibits no distension. There is no tenderness.   Musculoskeletal: She exhibits deformity (lower extremites dressed.  Wound vac in place - medial ankle region. ).   Neurological: She is alert and oriented to person, place, and time. No cranial nerve deficit.   Skin: Skin is warm and dry. Rash: resolving w numorous scabbed up lesions - upper and lower exts, lower abd.  She is not diaphoretic. No pallor.   Psychiatric: She has a normal mood and affect. Her behavior is normal.   Vitals reviewed.                                Assessment/Plan     * Arterial leg ulcer (HCC)- (present on admission)   Assessment & Plan    Chronic with prolonged history of this issue with prior multiple MDR organisms and frequent abx regimens and various reactions and allergies.  Wounds improving w reported granulation tissue formation. Evaluated by Vascular Surgery, Dr. Colin plan non operative treatment with  Inpatient wound care using Veriflow Wound Vac (System cannot be arranged outpatient).     Continue wound care  No recommendations for abx per ID team evaluation during this hospital stay   VS to continue evaluating, recommendations for continued Veriflow wound vac at this time (till Monday per Jess at least)  Continue wound care  Optimize nutrition  Risk factor modifications   Consider LTAC / SNF if ongoing wound vac needs with veriflow         Peripheral vascular disease (HCC)- (present on admission)   Assessment & Plan    Severe with hx of  fem-pop bypass  Continue with ASA/statin & risk factor modifications        B12 deficiency- (present on admission)   Assessment & Plan    With folate def  Continue b12 and folate supplements.         Macrocytic anemia- (present on admission)   Assessment & Plan    Patient has B12 / folate deficiency  Continue with B12/folate supplementation  Continue with iron, vitamin C, multivitamin supplementation  No signs of acute bleeding  No needs for further monitoring        Dyslipidemia- (present on admission)   Assessment & Plan    Continue high intensity statin therapy with underlying risk factors  Maintain outpatient follow-up with PCP for ongoing monitoring and management        Essential hypertension- (present on admission)   Assessment & Plan    Continue metoprolol XL   Started lisinopril this hospital stay         Osteoporosis- (present on admission)   Assessment & Plan    VItamin D replacement          Quality-Core Measures   Reviewed items::  Medications reviewed, Labs reviewed and Radiology images  reviewed  Crowley catheter::  No Crowley  DVT prophylaxis pharmacological::  Heparin  Ulcer Prophylaxis::  Not indicated  Assessed for rehabilitation services:  Patient was assess for and/or received rehabilitation services during this hospitalization

## 2018-10-22 NOTE — CARE PLAN
Problem: Safety  Goal: Will remain free from injury  Outcome: PROGRESSING AS EXPECTED  Pt educated on need to call before getting out of bed. Pt verbalizes understanding. Treaded socks on pt. Bed locked. DME in bathroom. Room safety check. Bed in lowest position. Pt calls for help appropriately on call light. Call light with in reach. Hourly rounding in place.

## 2018-10-22 NOTE — CARE PLAN
Problem: Venous Thromboembolism (VTW)/Deep Vein Thrombosis (DVT) Prevention:  Goal: Patient will participate in Venous Thrombosis (VTE)/Deep Vein Thrombosis (DVT)Prevention Measures  Outcome: PROGRESSING SLOWER THAN EXPECTED  SCD to RLE refused. Pt receiving unfractionated heparin for DVT prophylaxis.

## 2018-10-22 NOTE — WOUND TEAM
[facility]  Inpatient Services  Wound and Skin Care     Admission Date:  10/9/2018   HPI, PMH, SH: Reviewed  Unit where seen by Wound Team: T3    WOUND CONSULT RELATED TO:  Scheduled npwt drsg change     SUBJECTIVE:  Pleasant/agreeable    Self Report / Pain Level:  Yuki-wnd drsg removal painful    OBJECTIVE:  Prev npwt drsg leaking    WOUND TYPE, LOCATION, CHARACTERISTICS (Pressure ulcers: location, stage, POA or date identified)    Location and type of wound: Full thickness wnd left medial lower leg     Periwound:      Mult lower leg blisters remain; generally very fragile  Drainage:      Scant serous  Tissue Type and %:     95% pink/red -- 5% yellow  Wound Edges:     attached  Odor:       none  Exposed structure(s):   none  S&S of Infection:     None    Measurements: (cm) Taken 10/17 Proximal Distal  Length:      5.0  3.5    Width:       3.5  2.8  Depth:      0.2  0.2      INTERVENTIONS BY WOUND TEAM:  Prev drsgs removed -- wnds irrigated with wnd cleanser ---- distal wnd: benzoin and drape yuki-wnd; 1 piece black foam to cover; sealed with drape and cont neg pressure applied at 125mmhg ---- proximal wnd: silver hydrofiber to cover; secured by the sacral mepilex used to protect the lower leg yuki-wnd ---- yuki-wnd: sacral mepilex and mepilex lite used to cover the partial thickness wnds over the calf/shin; folded 4X4's btwn the toes; loosely wrapped roll gauze over the lower leg    Interdisciplinary consultation:  RN, patient, Dr. Colin    EVALUATION:  Distal wnd looks great, 100% red/granular; proximal is pale with a small amount of yellow; lower leg yuki-wnd remains in rough shape with multiple blisters still and very fragile > discussed with Dr. Colin     Factors affecting wound healing:  PVD, osteoporosis    Goals:  Steady decrease in wound area and depth weekly -- control drainage -- increase granulation -- intact yuki-wnd    NURSING PLAN OF CARE ORDERS (x):    Dressing changes: See  Dressing Care orders:   x  Skin care: See Skin Care orders: x  Rectal tube care: See Rectal Tube Care orders:   Other orders:      WOUND TEAM PLAN OF CARE (x):   NPWT change 3 x week:     x   Dressing changes by wound team:     x  Follow up as needed:     x  Other (explain):    Anticipated discharge plans (x):  SNF:           Home Care:           Outpatient Wound Center:         x   Self Care:            Other:

## 2018-10-23 PROBLEM — L97.922 LEG ULCER, LEFT, WITH FAT LAYER EXPOSED (HCC): Status: ACTIVE | Noted: 2018-07-20

## 2018-10-23 PROCEDURE — 700111 HCHG RX REV CODE 636 W/ 250 OVERRIDE (IP): Performed by: INTERNAL MEDICINE

## 2018-10-23 PROCEDURE — 770021 HCHG ROOM/CARE - ISO PRIVATE

## 2018-10-23 PROCEDURE — 700102 HCHG RX REV CODE 250 W/ 637 OVERRIDE(OP): Performed by: INTERNAL MEDICINE

## 2018-10-23 PROCEDURE — A9270 NON-COVERED ITEM OR SERVICE: HCPCS | Performed by: NURSE PRACTITIONER

## 2018-10-23 PROCEDURE — A9270 NON-COVERED ITEM OR SERVICE: HCPCS | Performed by: INTERNAL MEDICINE

## 2018-10-23 PROCEDURE — 99232 SBSQ HOSP IP/OBS MODERATE 35: CPT | Performed by: HOSPITALIST

## 2018-10-23 PROCEDURE — 700102 HCHG RX REV CODE 250 W/ 637 OVERRIDE(OP): Performed by: NURSE PRACTITIONER

## 2018-10-23 PROCEDURE — 700102 HCHG RX REV CODE 250 W/ 637 OVERRIDE(OP): Performed by: HOSPITALIST

## 2018-10-23 PROCEDURE — A9270 NON-COVERED ITEM OR SERVICE: HCPCS | Performed by: HOSPITALIST

## 2018-10-23 RX ORDER — FEXOFENADINE HCL 60 MG/1
180 TABLET, FILM COATED ORAL EVERY 12 HOURS
Status: DISCONTINUED | OUTPATIENT
Start: 2018-10-23 | End: 2018-10-25 | Stop reason: HOSPADM

## 2018-10-23 RX ORDER — LIDOCAINE HYDROCHLORIDE AND EPINEPHRINE 10; 10 MG/ML; UG/ML
5 INJECTION, SOLUTION INFILTRATION; PERINEURAL ONCE
Status: DISPENSED | OUTPATIENT
Start: 2018-10-23 | End: 2018-10-24

## 2018-10-23 RX ORDER — TRIAMCINOLONE ACETONIDE 1 MG/G
CREAM TOPICAL 3 TIMES DAILY
Status: DISCONTINUED | OUTPATIENT
Start: 2018-10-23 | End: 2018-10-25 | Stop reason: HOSPADM

## 2018-10-23 RX ADMIN — METOPROLOL SUCCINATE 100 MG: 50 TABLET, EXTENDED RELEASE ORAL at 05:57

## 2018-10-23 RX ADMIN — TRIAMCINOLONE ACETONIDE: 1 CREAM TOPICAL at 15:49

## 2018-10-23 RX ADMIN — OXYCODONE HYDROCHLORIDE AND ACETAMINOPHEN 500 MG: 500 TABLET ORAL at 17:40

## 2018-10-23 RX ADMIN — OXYCODONE HYDROCHLORIDE 5 MG: 5 TABLET ORAL at 17:56

## 2018-10-23 RX ADMIN — ATORVASTATIN CALCIUM 40 MG: 20 TABLET, FILM COATED ORAL at 17:40

## 2018-10-23 RX ADMIN — VITAMIN D, TAB 1000IU (100/BT) 1000 UNITS: 25 TAB at 05:57

## 2018-10-23 RX ADMIN — SERTRALINE 100 MG: 100 TABLET, FILM COATED ORAL at 05:57

## 2018-10-23 RX ADMIN — DIPHENHYDRAMINE HCL 25 MG: 25 TABLET ORAL at 09:32

## 2018-10-23 RX ADMIN — LISINOPRIL 5 MG: 2.5 TABLET ORAL at 05:57

## 2018-10-23 RX ADMIN — HEPARIN SODIUM 5000 UNITS: 5000 INJECTION, SOLUTION INTRAVENOUS; SUBCUTANEOUS at 05:57

## 2018-10-23 RX ADMIN — HEPARIN SODIUM 5000 UNITS: 5000 INJECTION, SOLUTION INTRAVENOUS; SUBCUTANEOUS at 22:07

## 2018-10-23 RX ADMIN — FERROUS GLUCONATE 324 MG: 324 TABLET ORAL at 17:40

## 2018-10-23 RX ADMIN — THERA TABS 1 TABLET: TAB at 05:57

## 2018-10-23 RX ADMIN — HEPARIN SODIUM 5000 UNITS: 5000 INJECTION, SOLUTION INTRAVENOUS; SUBCUTANEOUS at 15:49

## 2018-10-23 RX ADMIN — ASPIRIN 81 MG: 81 TABLET, COATED ORAL at 05:57

## 2018-10-23 RX ADMIN — TRIAMCINOLONE ACETONIDE: 1 CREAM TOPICAL at 22:07

## 2018-10-23 RX ADMIN — FEXOFENADINE HCL 180 MG: 60 TABLET, FILM COATED ORAL at 17:39

## 2018-10-23 RX ADMIN — OXYCODONE HYDROCHLORIDE AND ACETAMINOPHEN 500 MG: 500 TABLET ORAL at 12:30

## 2018-10-23 RX ADMIN — CYANOCOBALAMIN TAB 500 MCG 1000 MCG: 500 TAB at 05:57

## 2018-10-23 RX ADMIN — FOLIC ACID 1 MG: 1 TABLET ORAL at 05:57

## 2018-10-23 RX ADMIN — OXYCODONE HYDROCHLORIDE AND ACETAMINOPHEN 500 MG: 500 TABLET ORAL at 05:57

## 2018-10-23 RX ADMIN — FERROUS GLUCONATE 324 MG: 324 TABLET ORAL at 09:32

## 2018-10-23 RX ADMIN — OXYCODONE HYDROCHLORIDE 5 MG: 5 TABLET ORAL at 22:08

## 2018-10-23 ASSESSMENT — ENCOUNTER SYMPTOMS
ABDOMINAL PAIN: 0
COUGH: 0
FEVER: 0
BACK PAIN: 0
CHILLS: 0
DIARRHEA: 0
WHEEZING: 0
SHORTNESS OF BREATH: 0
FOCAL WEAKNESS: 0
STRIDOR: 0
VOMITING: 0
TREMORS: 0
NECK PAIN: 0
FLANK PAIN: 0
SPEECH CHANGE: 0
HALLUCINATIONS: 0

## 2018-10-23 ASSESSMENT — PAIN SCALES - GENERAL: PAINLEVEL_OUTOF10: 7

## 2018-10-23 ASSESSMENT — LIFESTYLE VARIABLES: SUBSTANCE_ABUSE: 0

## 2018-10-23 NOTE — PROGRESS NOTES
"Vascular    Following wounds in the chart.  Very difficult to get to the dressing changes.  The \"rash\" and skin changes are somewhat baffling and make the NPWT dressing changes difficult. This seems to have gone on longer (weeks) than an allergic reaction would be expected to.      ? Steroids?  Anything else we can try?    "

## 2018-10-23 NOTE — PROGRESS NOTES
Shift report received from night RN, assumed care at 0715. Patient up in bed, routinely medicated prn per MAR. AOx4, up with standby assist vs self, will assess, calls appropriately, bed alarm not in use. PIV assessed and intact. Dressing/vac dressing CDI, O2 RA, SPO2 95%. VTE heparin. Plan is outpt wound care vs HH vs SNF.     Discussed POC for multimodal pain management, monitoring VS/labs/I&O, mobility, day time routine, comfort, and safety. Patient has call light and personal belongings within reach. Safety and fall precautions in place. Reviewed current labs, notes, medications, and orders. Hourly rounding in place with RN and CNA.

## 2018-10-23 NOTE — PROGRESS NOTES
[facility] Hospitalist Progress Note    Date of Service: 10/23/2018    Chief Complaint  73 y.o. female hx of recurrent ulcer, left ankle fx treated non operatively due to extensive ongoing leg wounds, admitted 10/9/2018 with worsening LLE ulcer. Seen by Dr Colin during the hospital stay with recommendations for ongoing wound care. ID has signed off. She is on a veriflow wound vac which cannot be arranged at home. Patient refusing to go to SNF / LTAC for wound care. She is anticipated to discharge home with wound care when medically cleared.    Interval Problem Update  LLE leg ulcer -- NPWT per Vas Sx and wound team; tapering therapy  Diffuse dermatitis w bullae formation -- bullous pemphigoid vs dermatitis - attempting to obtain skin biopsy supplies    Consultants/Specialty  Dr. Colin, vascular surgery  Wound care   ID - signed off    Disposition  Home when set w OP WC and cleared by wound team      Review of Systems   Constitutional: Negative for chills and fever.   HENT: Negative for congestion.    Respiratory: Negative for cough, shortness of breath, wheezing and stridor.    Cardiovascular: Negative for chest pain and leg swelling.   Gastrointestinal: Negative for abdominal pain, diarrhea and vomiting.   Genitourinary: Negative for flank pain and hematuria.   Musculoskeletal: Negative for back pain, joint pain and neck pain.   Skin: Positive for rash (resolving with scab formation over exts and lower abd.).   Neurological: Negative for tremors, speech change and focal weakness.   Psychiatric/Behavioral: Negative for hallucinations and substance abuse.   All other systems reviewed and are negative.     Physical Exam  Laboratory/Imaging   Hemodynamics  Temp (24hrs), Av.7 °C (98.1 °F), Min:36.2 °C (97.2 °F), Max:37.2 °C (99 °F)   Temperature: 36.6 °C (97.8 °F)  Pulse  Av.2  Min: 64  Max: 96    Blood Pressure : 120/58      Respiratory      Respiration: 16, Pulse Oximetry: 94 %        RUL Breath Sounds:  Clear, RML Breath Sounds: Clear, RLL Breath Sounds: Diminished, BHAVYA Breath Sounds: Clear, LLL Breath Sounds: Diminished    Fluids    Intake/Output Summary (Last 24 hours) at 10/23/18 1346  Last data filed at 10/23/18 1300   Gross per 24 hour   Intake              240 ml   Output                0 ml   Net              240 ml       Nutrition  Orders Placed This Encounter   Procedures   • Diet Order Regular     Standing Status:   Standing     Number of Occurrences:   1     Order Specific Question:   Diet:     Answer:   Regular [1]     Physical Exam   Constitutional: She is oriented to person, place, and time. She appears well-developed and well-nourished. No distress.   HENT:   Head: Normocephalic and atraumatic.   Eyes: Pupils are equal, round, and reactive to light. EOM are normal.   Neck: Neck supple.   Cardiovascular: Normal rate, regular rhythm and normal heart sounds.    Pulmonary/Chest: Effort normal and breath sounds normal. No respiratory distress. She has no wheezes. She has no rales.   Abdominal: Soft. She exhibits no distension. There is no tenderness.   Neurological: She is alert and oriented to person, place, and time. No cranial nerve deficit.   Skin: Skin is warm and dry. Rash noted. Rash is maculopapular and urticarial. No erythema.                                Assessment/Plan     * Leg ulcer, left, with fat layer exposed (HCC)- (present on admission)   Assessment & Plan    Chronic with history of prior MDR organisms and frequent abx regimens and various reactions and allergies.  Wounds improving w reported granulation tissue formation. Evaluated by Vascular Surgery, Dr. Colin plan non operative treatment with inpatient wound care using Veriflow Wound Vac (System cannot be arranged outpatient).     Continue wound care  No recommendations for abx per ID team evaluation during this hospital stay   LPS: continued NPWT at present  Optimize nutrition  Risk factor modifications           Peripheral  vascular disease (HCC)- (present on admission)   Assessment & Plan    Severe with hx of  fem-pop bypass  Continue with ASA/statin & risk factor modifications        B12 deficiency- (present on admission)   Assessment & Plan    With folate def  Continue b12 and folate supplements.         Macrocytic anemia- (present on admission)   Assessment & Plan    Patient has B12 / folate deficiency  Continue with B12/folate supplementation  Continue with iron, vitamin C, multivitamin supplementation  No signs of acute bleeding  No needs for further monitoring        Dyslipidemia- (present on admission)   Assessment & Plan    Continue high intensity statin therapy with underlying risk factors  Maintain outpatient follow-up with PCP for ongoing monitoring and management        Essential hypertension- (present on admission)   Assessment & Plan    Continue metoprolol XL   Started lisinopril this hospital stay         Osteoporosis- (present on admission)   Assessment & Plan    VItamin D replacement          Quality-Core Measures   Reviewed items::  Medications reviewed, Labs reviewed and Radiology images reviewed  Crowley catheter::  No Crowley  DVT prophylaxis pharmacological::  Heparin  Ulcer Prophylaxis::  Not indicated  Assessed for rehabilitation services:  Patient was assess for and/or received rehabilitation services during this hospitalization

## 2018-10-24 ENCOUNTER — PATIENT OUTREACH (OUTPATIENT)
Dept: HEALTH INFORMATION MANAGEMENT | Facility: OTHER | Age: 73
End: 2018-10-24

## 2018-10-24 ENCOUNTER — APPOINTMENT (OUTPATIENT)
Dept: WOUND CARE | Facility: MEDICAL CENTER | Age: 73
End: 2018-10-24
Attending: SURGERY
Payer: MEDICARE

## 2018-10-24 PROCEDURE — 770021 HCHG ROOM/CARE - ISO PRIVATE

## 2018-10-24 PROCEDURE — 700102 HCHG RX REV CODE 250 W/ 637 OVERRIDE(OP): Performed by: NURSE PRACTITIONER

## 2018-10-24 PROCEDURE — 97605 NEG PRS WND THER DME<=50SQCM: CPT

## 2018-10-24 PROCEDURE — A9270 NON-COVERED ITEM OR SERVICE: HCPCS | Performed by: INTERNAL MEDICINE

## 2018-10-24 PROCEDURE — 700102 HCHG RX REV CODE 250 W/ 637 OVERRIDE(OP): Performed by: INTERNAL MEDICINE

## 2018-10-24 PROCEDURE — A9270 NON-COVERED ITEM OR SERVICE: HCPCS | Performed by: NURSE PRACTITIONER

## 2018-10-24 PROCEDURE — A9270 NON-COVERED ITEM OR SERVICE: HCPCS | Performed by: HOSPITALIST

## 2018-10-24 PROCEDURE — 700111 HCHG RX REV CODE 636 W/ 250 OVERRIDE (IP): Performed by: INTERNAL MEDICINE

## 2018-10-24 PROCEDURE — 700102 HCHG RX REV CODE 250 W/ 637 OVERRIDE(OP): Performed by: HOSPITALIST

## 2018-10-24 PROCEDURE — 99232 SBSQ HOSP IP/OBS MODERATE 35: CPT | Performed by: HOSPITALIST

## 2018-10-24 RX ADMIN — TRIAMCINOLONE ACETONIDE: 1 CREAM TOPICAL at 12:00

## 2018-10-24 RX ADMIN — HEPARIN SODIUM 5000 UNITS: 5000 INJECTION, SOLUTION INTRAVENOUS; SUBCUTANEOUS at 05:25

## 2018-10-24 RX ADMIN — VITAMIN D, TAB 1000IU (100/BT) 1000 UNITS: 25 TAB at 05:27

## 2018-10-24 RX ADMIN — OXYCODONE HYDROCHLORIDE AND ACETAMINOPHEN 500 MG: 500 TABLET ORAL at 05:26

## 2018-10-24 RX ADMIN — LISINOPRIL 5 MG: 2.5 TABLET ORAL at 05:26

## 2018-10-24 RX ADMIN — ASPIRIN 81 MG: 81 TABLET, COATED ORAL at 05:27

## 2018-10-24 RX ADMIN — THERA TABS 1 TABLET: TAB at 05:26

## 2018-10-24 RX ADMIN — OXYCODONE HYDROCHLORIDE AND ACETAMINOPHEN 500 MG: 500 TABLET ORAL at 12:00

## 2018-10-24 RX ADMIN — HEPARIN SODIUM 5000 UNITS: 5000 INJECTION, SOLUTION INTRAVENOUS; SUBCUTANEOUS at 14:00

## 2018-10-24 RX ADMIN — OXYCODONE HYDROCHLORIDE AND ACETAMINOPHEN 500 MG: 500 TABLET ORAL at 17:43

## 2018-10-24 RX ADMIN — FERROUS GLUCONATE 324 MG: 324 TABLET ORAL at 17:43

## 2018-10-24 RX ADMIN — OXYCODONE HYDROCHLORIDE 5 MG: 5 TABLET ORAL at 21:17

## 2018-10-24 RX ADMIN — FOLIC ACID 1 MG: 1 TABLET ORAL at 05:27

## 2018-10-24 RX ADMIN — TRIAMCINOLONE ACETONIDE: 1 CREAM TOPICAL at 18:00

## 2018-10-24 RX ADMIN — ATORVASTATIN CALCIUM 40 MG: 20 TABLET, FILM COATED ORAL at 17:43

## 2018-10-24 RX ADMIN — OXYCODONE HYDROCHLORIDE 5 MG: 5 TABLET ORAL at 12:39

## 2018-10-24 RX ADMIN — HEPARIN SODIUM 5000 UNITS: 5000 INJECTION, SOLUTION INTRAVENOUS; SUBCUTANEOUS at 21:17

## 2018-10-24 RX ADMIN — METOPROLOL SUCCINATE 100 MG: 50 TABLET, EXTENDED RELEASE ORAL at 05:25

## 2018-10-24 RX ADMIN — SERTRALINE 100 MG: 100 TABLET, FILM COATED ORAL at 05:26

## 2018-10-24 RX ADMIN — OXYCODONE HYDROCHLORIDE 5 MG: 5 TABLET ORAL at 08:20

## 2018-10-24 RX ADMIN — FERROUS GLUCONATE 324 MG: 324 TABLET ORAL at 08:16

## 2018-10-24 RX ADMIN — CYANOCOBALAMIN TAB 500 MCG 1000 MCG: 500 TAB at 05:26

## 2018-10-24 RX ADMIN — FEXOFENADINE HCL 180 MG: 60 TABLET, FILM COATED ORAL at 05:26

## 2018-10-24 RX ADMIN — TRIAMCINOLONE ACETONIDE: 1 CREAM TOPICAL at 05:27

## 2018-10-24 RX ADMIN — FEXOFENADINE HCL 180 MG: 60 TABLET, FILM COATED ORAL at 17:45

## 2018-10-24 ASSESSMENT — ENCOUNTER SYMPTOMS
NECK PAIN: 0
VOMITING: 0
SPEECH CHANGE: 0
FLANK PAIN: 0
STRIDOR: 0
FOCAL WEAKNESS: 0
COUGH: 0
FEVER: 0
DIARRHEA: 0
TREMORS: 0
HALLUCINATIONS: 0
SHORTNESS OF BREATH: 0
CHILLS: 0
BACK PAIN: 0
ABDOMINAL PAIN: 0
WHEEZING: 0

## 2018-10-24 ASSESSMENT — PAIN SCALES - GENERAL
PAINLEVEL_OUTOF10: 6
PAINLEVEL_OUTOF10: 7
PAINLEVEL_OUTOF10: 1
PAINLEVEL_OUTOF10: ASSUMED PAIN PRESENT
PAINLEVEL_OUTOF10: 4

## 2018-10-24 ASSESSMENT — LIFESTYLE VARIABLES: SUBSTANCE_ABUSE: 0

## 2018-10-24 NOTE — DISCHARGE PLANNING
Anticipated Discharge Disposition: Home with wound vac and [facility] outpatient wound care for dressing changes.     Action: Met with patient wants to have the outpatient wound center do her wound vac changes, she has been current with them for dressing changes and also had a previous wound vac.  Her next appointment is 10/26 @ 3:30pm    Barriers to Discharge: medical clearance.     Plan: Home with outpatient wound vac.

## 2018-10-24 NOTE — PROGRESS NOTES
Received bedside report from nightshift RN.  Pt awake, a/o x 4, denies pain at this time.  Pt in bed with wheels locked, bed in lowest position, wound vac to pt's right ankle intact.  Call light at pt's side.  Hourly rounding progressing.

## 2018-10-24 NOTE — PROGRESS NOTES
[facility] Hospitalist Progress Note    Date of Service: 10/24/2018    Chief Complaint  73 y.o. female hx of recurrent ulcer, sub-acute left ankle fx treated non operatively due to extensive ongoing leg wounds, admitted 10/9/2018 with worsening LLE ulcer. Seen by Dr Colin during the hospital stay with recommendations for ongoing wound care. ID has signed off. She is on a veriflow wound vac which cannot be arranged at home. Patient refusing to go to SNF / LTAC for wound care. She is anticipated to discharge home with wound care when medically cleared.    Interval Problem Update  LLE leg ulcer -- NPWT per Vas Sx and wound team; tapering therapy. Dressing changed 10/24 and plans for wound care set up. Dry negative pressure ambulatory device ready.  Diffuse dermatitis w bullae formation -- bullous pemphigoid vs dermatitis - needs OP biopsy and dermatology referral.    Consultants/Specialty  Dr. Colin, vascular surgery  Wound care   ID - signed off    Disposition  Home when set w OP WC and cleared by wound team -- ready for DC, but doesn't have ride until 10/25.      Review of Systems   Constitutional: Negative for chills and fever.   HENT: Negative for congestion.    Respiratory: Negative for cough, shortness of breath, wheezing and stridor.    Cardiovascular: Negative for chest pain and leg swelling.   Gastrointestinal: Negative for abdominal pain, diarrhea and vomiting.   Genitourinary: Negative for flank pain and hematuria.   Musculoskeletal: Negative for back pain, joint pain and neck pain.   Skin: Positive for rash (resolving with scab formation over exts and lower abd.).   Neurological: Negative for tremors, speech change and focal weakness.   Psychiatric/Behavioral: Negative for hallucinations and substance abuse.   All other systems reviewed and are negative.     Physical Exam  Laboratory/Imaging   Hemodynamics  Temp (24hrs), Av.6 °C (97.9 °F), Min:36.4 °C (97.6 °F), Max:36.9 °C (98.4 °F)   Temperature:  36.4 °C (97.6 °F)  Pulse  Av.5  Min: 64  Max: 98    Blood Pressure : 116/68      Respiratory      Respiration: 16, Pulse Oximetry: 95 %             Fluids    Intake/Output Summary (Last 24 hours) at 10/24/18 1359  Last data filed at 10/23/18 1700   Gross per 24 hour   Intake              120 ml   Output                0 ml   Net              120 ml       Nutrition  Orders Placed This Encounter   Procedures   • Diet Order Regular     Standing Status:   Standing     Number of Occurrences:   1     Order Specific Question:   Diet:     Answer:   Regular [1]     Physical Exam   Constitutional: She is oriented to person, place, and time. She appears well-developed and well-nourished. No distress.   HENT:   Head: Normocephalic and atraumatic.   Eyes: Pupils are equal, round, and reactive to light. EOM are normal.   Neck: Neck supple.   Cardiovascular: Normal rate, regular rhythm and normal heart sounds.    Pulmonary/Chest: Effort normal and breath sounds normal. No respiratory distress. She has no wheezes. She has no rales.   Abdominal: Soft. She exhibits no distension. There is no tenderness.   Neurological: She is alert and oriented to person, place, and time. No cranial nerve deficit.   Skin: Skin is warm and dry. Rash noted. Rash is maculopapular and urticarial. No erythema.   Wound beds to LLE CDI -- assessed during dressing changes     No changes to PE except as noted above                             Assessment/Plan     * Leg ulcer, left, with fat layer exposed (HCC)- (present on admission)   Assessment & Plan    Chronic with history of prior MDR organisms and frequent abx regimens and various reactions and allergies.  Wounds improving w reported granulation tissue formation. Evaluated by Vascular Surgery, Dr. Colin plan non operative treatment with inpatient wound care using Veriflow Wound Vac (System cannot be arranged outpatient).     Continue wound care  No recommendations for abx per ID team evaluation  during this hospital stay   LPS: continued NPWT at present  Optimize nutrition  Risk factor modifications           Peripheral vascular disease (HCC)- (present on admission)   Assessment & Plan    Severe with hx of  fem-pop bypass  Continue with ASA/statin & risk factor modifications        B12 deficiency- (present on admission)   Assessment & Plan    With folate def  Continue b12 and folate supplements.         Macrocytic anemia- (present on admission)   Assessment & Plan    Patient has B12 / folate deficiency  Continue with B12/folate supplementation  Continue with iron, vitamin C, multivitamin supplementation  No signs of acute bleeding  No needs for further monitoring        Dyslipidemia- (present on admission)   Assessment & Plan    Continue high intensity statin therapy with underlying risk factors  Maintain outpatient follow-up with PCP for ongoing monitoring and management        Essential hypertension- (present on admission)   Assessment & Plan    Continue metoprolol XL   Started lisinopril this hospital stay         Osteoporosis- (present on admission)   Assessment & Plan    VItamin D replacement          Quality-Core Measures   Reviewed items::  Medications reviewed, Labs reviewed and Radiology images reviewed  Crowley catheter::  No Crowley  DVT prophylaxis pharmacological::  Heparin  Ulcer Prophylaxis::  Not indicated  Assessed for rehabilitation services:  Patient was assess for and/or received rehabilitation services during this hospitalization

## 2018-10-24 NOTE — WOUND TEAM
[facility]  Inpatient Services  Wound and Skin Care     Admission Date:  10/9/2018   HPI, PMH, SH: Reviewed  Unit where seen by Wound Team: T3    WOUND CONSULT RELATED TO:  Scheduled npwt drsg change     SUBJECTIVE:  Pleasant/agreeable    Self Report / Pain Level:  Yuki-wnd drsg removal painful but better than previously    OBJECTIVE:  Prev npwt drsg intact    WOUND TYPE, LOCATION, CHARACTERISTICS (Pressure ulcers: location, stage, POA or date identified)    Location and type of wound: Full thickness wnd left medial lower leg     Periwound:      Mult lower leg blisters remain; generally very fragile  Drainage:      Scant serous  Tissue Type and %:     95% pink/red -- 5% yellow(proximal wnd)  Wound Edges:     attached  Odor:       none  Exposed structure(s):   none  S&S of Infection:     None    Measurements: (cm) Taken 10/24 Proximal Distal  Length:      4.2  2.7    Width:       2.3  2.5  Depth:      0.2  0.1      INTERVENTIONS BY WOUND TEAM:  Prev drsgs removed -- wnds irrigated with wnd cleanser -- measurements and photograph done ---- distal wnd: benzoin and drape yuki-wnd; arron over the wnd bed; 1 piece black foam to cover; sealed with drape and cont neg pressure applied at 125mmhg ---- proximal wnd: arron to cover; secured by the sacral mepilex used to protect the lower leg yuki-wnd ---- yuki-wnd: sacral mepilex and mepilex lite used to cover the partial thickness wnds over the calf/shin; folded 4X4's btwn the toes(also on the right foot); loosely wrapped roll gauze over the lower leg    Interdisciplinary consultation:  RN, patient, LPS(Naveed)    EVALUATION:  Measurements both wnds smaller; will try arron over the wnd beds -- yuki-wnd better with use of the silicone drsgs but new large blister on the lateral side of the left foot -- should be able to reapply the npwt drsg to the proximal wnd as the yuki-wnd improves    Factors affecting wound healing:  PVD, osteoporosis    Goals:  Steady  decrease in wound area and depth weekly -- control drainage -- increase granulation -- intact yuki-wnd    NURSING PLAN OF CARE ORDERS (x):    Dressing changes: See Dressing Care orders:   x  Skin care: See Skin Care orders: x  Rectal tube care: See Rectal Tube Care orders:   Other orders:      WOUND TEAM PLAN OF CARE (x):   NPWT change 3 x week:     x   Dressing changes by wound team:     x  Follow up as needed:     x  Other (explain):    Anticipated discharge plans (x):  SNF:           Home Care:           Outpatient Wound Center:         x   Self Care:            Other:

## 2018-10-25 VITALS
BODY MASS INDEX: 22.65 KG/M2 | SYSTOLIC BLOOD PRESSURE: 95 MMHG | DIASTOLIC BLOOD PRESSURE: 53 MMHG | OXYGEN SATURATION: 92 % | RESPIRATION RATE: 15 BRPM | TEMPERATURE: 97.2 F | HEART RATE: 74 BPM | WEIGHT: 144.62 LBS

## 2018-10-25 PROCEDURE — 700111 HCHG RX REV CODE 636 W/ 250 OVERRIDE (IP): Performed by: INTERNAL MEDICINE

## 2018-10-25 PROCEDURE — 700102 HCHG RX REV CODE 250 W/ 637 OVERRIDE(OP): Performed by: NURSE PRACTITIONER

## 2018-10-25 PROCEDURE — 700102 HCHG RX REV CODE 250 W/ 637 OVERRIDE(OP): Performed by: INTERNAL MEDICINE

## 2018-10-25 PROCEDURE — A9270 NON-COVERED ITEM OR SERVICE: HCPCS | Performed by: INTERNAL MEDICINE

## 2018-10-25 PROCEDURE — A9270 NON-COVERED ITEM OR SERVICE: HCPCS | Performed by: HOSPITALIST

## 2018-10-25 PROCEDURE — A9270 NON-COVERED ITEM OR SERVICE: HCPCS | Performed by: NURSE PRACTITIONER

## 2018-10-25 PROCEDURE — 99239 HOSP IP/OBS DSCHRG MGMT >30: CPT | Performed by: HOSPITALIST

## 2018-10-25 PROCEDURE — 700102 HCHG RX REV CODE 250 W/ 637 OVERRIDE(OP): Performed by: HOSPITALIST

## 2018-10-25 RX ORDER — ATORVASTATIN CALCIUM 40 MG/1
40 TABLET, FILM COATED ORAL EVERY EVENING
Qty: 30 TAB | Refills: 0 | Status: ON HOLD | OUTPATIENT
Start: 2018-10-25 | End: 2019-01-30 | Stop reason: CLARIF

## 2018-10-25 RX ORDER — TRIAMCINOLONE ACETONIDE 1 MG/G
CREAM TOPICAL
Qty: 120 G | Refills: 0 | Status: SHIPPED | OUTPATIENT
Start: 2018-10-25 | End: 2019-03-14 | Stop reason: CLARIF

## 2018-10-25 RX ORDER — FOLIC ACID 1 MG/1
1 TABLET ORAL DAILY
Qty: 30 TAB | Refills: 0 | Status: ON HOLD | OUTPATIENT
Start: 2018-10-26 | End: 2019-01-30 | Stop reason: CLARIF

## 2018-10-25 RX ORDER — LISINOPRIL 5 MG/1
5 TABLET ORAL DAILY
Qty: 30 TAB | Refills: 0 | Status: ON HOLD | OUTPATIENT
Start: 2018-10-26 | End: 2019-01-30 | Stop reason: CLARIF

## 2018-10-25 RX ORDER — FEXOFENADINE HCL 180 MG/1
180 TABLET ORAL EVERY 12 HOURS
Qty: 60 TAB | Refills: 0 | Status: ON HOLD | OUTPATIENT
Start: 2018-10-25 | End: 2019-01-30 | Stop reason: CLARIF

## 2018-10-25 RX ORDER — OXYCODONE HYDROCHLORIDE AND ACETAMINOPHEN 5; 325 MG/1; MG/1
1 TABLET ORAL EVERY 6 HOURS PRN
Qty: 12 TAB | Refills: 0 | Status: SHIPPED | OUTPATIENT
Start: 2018-10-25 | End: 2018-10-28

## 2018-10-25 RX ADMIN — METOPROLOL SUCCINATE 100 MG: 50 TABLET, EXTENDED RELEASE ORAL at 04:58

## 2018-10-25 RX ADMIN — TRIAMCINOLONE ACETONIDE: 1 CREAM TOPICAL at 05:02

## 2018-10-25 RX ADMIN — SERTRALINE 100 MG: 100 TABLET, FILM COATED ORAL at 04:58

## 2018-10-25 RX ADMIN — THERA TABS 1 TABLET: TAB at 04:58

## 2018-10-25 RX ADMIN — VITAMIN D, TAB 1000IU (100/BT) 1000 UNITS: 25 TAB at 04:58

## 2018-10-25 RX ADMIN — OXYCODONE HYDROCHLORIDE AND ACETAMINOPHEN 500 MG: 500 TABLET ORAL at 04:57

## 2018-10-25 RX ADMIN — FERROUS GLUCONATE 324 MG: 324 TABLET ORAL at 08:49

## 2018-10-25 RX ADMIN — CYANOCOBALAMIN TAB 500 MCG 1000 MCG: 500 TAB at 04:57

## 2018-10-25 RX ADMIN — FOLIC ACID 1 MG: 1 TABLET ORAL at 04:57

## 2018-10-25 RX ADMIN — OXYCODONE HYDROCHLORIDE 5 MG: 5 TABLET ORAL at 04:58

## 2018-10-25 RX ADMIN — HEPARIN SODIUM 5000 UNITS: 5000 INJECTION, SOLUTION INTRAVENOUS; SUBCUTANEOUS at 04:58

## 2018-10-25 RX ADMIN — FEXOFENADINE HCL 180 MG: 60 TABLET, FILM COATED ORAL at 04:57

## 2018-10-25 RX ADMIN — TRIAMCINOLONE ACETONIDE: 1 CREAM TOPICAL at 12:35

## 2018-10-25 RX ADMIN — ASPIRIN 81 MG: 81 TABLET, COATED ORAL at 04:58

## 2018-10-25 RX ADMIN — LISINOPRIL 5 MG: 2.5 TABLET ORAL at 04:58

## 2018-10-25 ASSESSMENT — PAIN SCALES - GENERAL
PAINLEVEL_OUTOF10: 7
PAINLEVEL_OUTOF10: 4
PAINLEVEL_OUTOF10: ASSUMED PAIN PRESENT
PAINLEVEL_OUTOF10: ASSUMED PAIN PRESENT
PAINLEVEL_OUTOF10: 0

## 2018-10-25 NOTE — DISCHARGE INSTRUCTIONS
Discharge Instructions    Discharged to home by car with friend. Discharged via wheelchair, hospital escort: Yes.  Special equipment needed: Walker    Be sure to schedule a follow-up appointment with your primary care doctor or any specialists as instructed.     Discharge Plan:   Influenza Vaccine Indication: Patient Refuses (pt scheduled with PCP for Flu and PNA vaccines on Oct 31)    I understand that a diet low in cholesterol, fat, and sodium is recommended for good health. Unless I have been given specific instructions below for another diet, I accept this instruction as my diet prescription.   Other diet: Regular diet as tolerated       Venous Ulcer  Introduction  A venous ulcer is a shallow sore on your lower leg. It is caused by poor circulation in your veins. Venous ulcer is the most common type of lower leg ulcer. You may have venous ulcers on one leg or on both legs. This condition most often develops around your ankles. This type of ulcer may last for a long time (chronic ulcer) or it may return often (recurrent ulcer).  Follow these instructions at home:  Wound care  · Follow instructions from your doctor about:  ¨ How to take care of your wound.  ¨ When and how you should change your bandage (dressing).  ¨ When you should remove your bandage. If your bandage is dry and gets stuck to your leg when you try to remove it, moisten or wet the bandage with saline solution or water. This helps you to remove it without harming your skin or wound.  · Check your wound every day for signs of infection. Have a caregiver do this for you if you are not able to do it yourself. Watch for:  ¨ More redness, swelling, or pain.  ¨ More fluid or blood.  ¨ Pus, warmth, or a bad smell.  Medicines  · Take over-the-counter and prescription medicines only as told by your doctor.  · If you were prescribed an antibiotic medicine, take it or apply it as told by your doctor. Do not stop taking or using the antibiotic even if your  condition improves.  Activity  · Do not stand or sit in one position for a long period of time. Rest with your legs raised during the day. If possible, keep your legs above your heart for 30 minutes, 3-4 times a day, or as told by your doctor.  · Do not sit with your legs crossed.  · Walk often to increase the blood flow in your legs. Ask your doctor what level of activity is safe for you.  · If you are taking a long ride in a car or plane, take a break to walk around at least once every two hours, or as told by your doctor. Ask your doctor if you should take aspirin before long trips.  General instructions  · Wear elastic stockings, compression stockings, or support hose as told by your doctor. This is very important.  · Raise the foot of your bed as told by your doctor.  · Do not smoke.  · Keep all follow-up visits as told by your doctor. This is important.  Contact a doctor if:  · You have a fever.  · Your ulcer is getting larger or is not healing.  · Your pain gets worse.  · You have more redness or swelling around your ulcer.  · You have more fluid, blood, or pus coming from your ulcer after it has been cleaned by you or your doctor.  · You have warmth or a bad smell coming from your ulcer.  This information is not intended to replace advice given to you by your health care provider. Make sure you discuss any questions you have with your health care provider.  Document Released: 01/25/2006 Document Revised: 05/25/2017 Document Reviewed: 04/27/2016  © 2017 [employer]    Vacuum-Assisted Closure Therapy  Vacuum-assisted closure (VAC) therapy uses a device that removes fluid and germs from wounds to help them heal. It is used on wounds that cannot be closed with stitches. They often heal slowly. Vacuum-assisted therapy helps the wound stay clean and healthy while the open wound slowly grows back together.  Vacuum-assisted closure therapy uses a bandage (dressing) that is made of foam. It is put inside the wound.  Then, a drape is placed over the wound. This drape sticks to your skin to keep air out, and to protect the wound. A tube is hooked up to a small pump and is attached to the drape. The pump sucks out the fluid and germs. Vacuum-assisted closure therapy can also help reduce the bad smell that comes from the wound.  HOW DOES IT WORK?   The vacuum pump pulls fluid through the foam dressing. The dressing may wrinkle during this process. The fluid goes into the tube and away from the wound. The fluid then goes into a container. The fluid in the container must be replaced if it is full or at least once a week, even if the container is not full. The pulling from the pump helps to close the wound and bring better circulation to the wound area.  The foam dressing covers and protects the wound. It helps your wound heal faster.   HOW DOES IT FEEL?   · You might feel a little pulling when the pump is on.  · You might also feel a mild vibrating sensation.    · You might feel some discomfort when the dressing is taken off.  CAN I MOVE AROUND WITH VACUUM-ASSISTED CLOSURE THERAPY?  Yes, it has a backup battery which is used when the machine is not plugged in, as long as the battery is working, you can move freely.  WHAT ARE SOME THINGS I MUST KNOW?  · Do not turn off the pump yourself, unless instructed to do so by your healthcare provider, such as for bathing.  · Do not take off the dressing yourself, unless instructed to do so by your caregiver.  · You can wash or shower with the dressing. However, do not take the pump into the shower. Make sure the wound dressing is protected and covered with plastic. The wound area must stay dry.  · Do not turn off the pump for more than 2 hours. If the pump is off for more than 2 hours, your nurse must change your dressing.  · Check frequently that the machine is on, that the machine indicates the therapy is on, and that all clamps are open.  THE ALARM IS SOUNDING! WHAT SHOULD I DO?   · Stay  calm.  · Do not turn off the pump or do anything with the dressing.  · Call your clinic or caregiver right away if the alarm goes off and you cannot fix the problem. Some reasons the alarm might go off include:  ¨ The fluid collection container is full.  ¨ The battery is low.  ¨ The dressing has a leak.  · Explain to your caregiver what is happening. Follow the instructions you receive.  WHEN SHOULD I CALL FOR HELP?   · You have severe pain.  · You have difficulty breathing.  · You have bleeding that will not stop.  · Your wound smells bad.  · You have redness, swelling, or fluid leaking from your wound.  · Your alarm goes off and you do not know what to do.  · You have a fever.  · Your wound itches severely.  · Your dressing changes are often painful or bleeding often occurs.  · You have diarrhea.  · You have a sore throat.  · You have a rash around the dressing or anywhere else on your body.  · You feel nauseous.  · You feel dizzy or weak.  · The VAC machine has been off for more than 2 hours.  HOW DO I GET READY TO GO HOME WITH A PUMP?   A trained caregiver will talk to you and answer your questions about your vacuum-assisted closure therapy before you go home. He or she will explain what to expect. A caregiver will come to your home to apply the pump and care for your wound.  The at-home caregiver will be available for questions and will come back for the scheduled dressing changes, usually every 48-72 hours (or more often for severely infected wounds). Your at-home caregiver will also come if you are having an unexpected problem. If you have questions or do not know what to do when you go home, talk to your healthcare provider.  This information is not intended to replace advice given to you by your health care provider. Make sure you discuss any questions you have with your health care provider.  Document Released: 11/30/2009 Document Revised: 08/20/2014 Document Reviewed: 09/22/2016  [employer]  Patient Education © 2017 [employer]    · Is patient discharged on Warfarin / Coumadin?   No     Vacuum-Assisted Closure Therapy Home Guide  Vacuum-assisted closure therapy (VAC therapy) is a device that helps wounds heal. It is used on wounds that cannot be closed with stitches. They often heal slowly. VAC therapy helps the wound stay clean and healthy while its edges slowly grow back together.  VAC therapy uses a bandage (dressing) that is made of foam. It is put inside the wound. Then, a drape is placed over the wound. This drape sticks to your skin (adhesive) to keep air out. A tube is hooked up to a small pump and is attached to the drape. The pump sucks fluid and germs from the wound. It can also decrease any bad smell that comes from the wound.  RISKS AND COMPLICATIONS  VAC therapy is usually safe to use at home. Your skin may get sore from the adhesive drape. That is the most common problem. However, more serious problems can develop, such as:   · Bleeding. This can happen if the dressing in the wound comes into contact with blood vessels. A little bleeding may occur when the dressing is being changed. This is normal now and then. Major bleeding can happen if a large blood vessel breaks. This is more likely if you are taking blood-thinning medicine. Emergency surgery may be needed.  · Infection. This can happen if the dressing has an air leak that is not repaired within a couple of hours.  · Dehydration. This can happen if the pump sucks out too much body fluid.  DRESSING CHANGES  Your dressing will have to be changed. Sometimes this is needed once a day. Other times, a dressing change must be done 3 times a week. How often you change your dressing will depend on what your wound is like. A trained caregiver will most likely change the dressing. However, a family member or friend may be trained to change the dressing. Below are steps to change a dressing in order to prevent an infection. The steps apply to  you or the person that changes your dressing.  · Wash your hands with soap and water before and after each dressing change.  · Wear gloves and protective clothing. This may include eye protection.  · Do not allow anyone to change your dressing if they have an infection or a skin condition. Even a small cut can be a problem.  To change the dressing:   · Turn off the pump.  · Take off the adhesive drape.  · Disconnect the tube from the dressing.  · Take out the dressing that is inside the wound. If the dressing sticks, use a germ-free (sterile), saltwater solution to wet the dressing. This helps it come out more easily. If it hurts when the dressing is changed, take pain medicine 30 minutes before the dressing change.  · Cleanse the wound with normal saline or sterile water.  · Apply a skin barrier film to the skin that will be covered with the drape. This will protect the skin.  · Put a new dressing into the wound.  · Apply a new drape and tube.  · Replace the container in the pump that collects fluid if it is full. Do this at least once per week.  · Turn the pump back on.  · Your doctor will decide what setting of suction is best. Do not change the settings on the machine without talking to your nurse or doctor.  HOME CARE INSTRUCTIONS   · The VAC pump has an alarm. It goes off if there are any problems such as a leak.  ¨ Ask your caregiver what to do if the alarm goes off.  ¨ Call your caregiver right away if the alarm goes off and you cannot fix the problem.  · Do not turn off the pump for more than 2 hours.  · Check your wound carefully at each dressing change for signs of infection. Watch for redness, swelling, or any fluid leaking from the wound. If you develop an infection:  ¨ You may have to stop VAC therapy.  ¨ The wound will need to be cleaned and washed out.  ¨ You will have to take antibiotic medicine.  · Ask your caregiver what activities you should or should not do while you are getting VAC therapy.  This will depend on your particular wound.  · Ask if it is okay to turn off the pump so you can take a shower. If it is okay, make sure the wound is covered with plastic. The wound area must stay dry.  · Drink enough fluids to keep your urine clear or pale yellow.  · Eat foods that contain a lot of protein. Examples are meat, poultry, seafood, eggs, nuts, beans, and peas. Protein can help your wound heal.  SEEK MEDICAL CARE IF:  · Your wound itches or hurts.  · Dressing changes are often painful or bleeding often occurs.  · You have a headache.  · You have diarrhea.  · You have a sore throat.  · You have a rash.  · You feel nauseous.  · You feel dizzy or weak.  SEEK IMMEDIATE MEDICAL CARE IF:   · You have very bad pain.  · You have bleeding that will not stop.  · Your wound smells bad.  · You have redness, swelling, or fluid leaking from your wound.  · Your alarm goes off and you do not know what to do.  · You have a fever.  This information is not intended to replace advice given to you by your health care provider. Make sure you discuss any questions you have with your health care provider.  Document Released: 03/11/2013 Document Reviewed: 09/22/2016  [employer] Patient Education © 2017 [employer]    Depression / Suicide Risk    As you are discharged from this [facility] facility, it is important to learn how to keep safe from harming yourself.    Recognize the warning signs:  · Abrupt changes in personality, positive or negative- including increase in energy   · Giving away possessions  · Change in eating patterns- significant weight changes-  positive or negative  · Change in sleeping patterns- unable to sleep or sleeping all the time   · Unwillingness or inability to communicate  · Depression  · Unusual sadness, discouragement and loneliness  · Talk of wanting to die  · Neglect of personal appearance   · Rebelliousness- reckless behavior  · Withdrawal from people/activities they  love  · Confusion- inability to concentrate     If you or a loved one observes any of these behaviors or has concerns about self-harm, here's what you can do:  · Talk about it- your feelings and reasons for harming yourself  · Remove any means that you might use to hurt yourself (examples: pills, rope, extension cords, firearm)  · Get professional help from the community (Mental Health, Substance Abuse, psychological counseling)  · Do not be alone:Call your Safe Contact- someone whom you trust who will be there for you.  · Call your local CRISIS HOTLINE 374-9280 or 453-049-6123  · Call your local Children's Mobile Crisis Response Team [place] (483) 597-1338 or www.Meniga  · Call the toll free National Suicide Prevention Hotlines   · National Suicide Prevention Lifeline 925-129-SBUL (0577)  · National Hope Line Network 800-SUICIDE (573-9092)

## 2018-10-25 NOTE — CARE PLAN
Problem: Bowel/Gastric:  Goal: Normal bowel function is maintained or improved  Outcome: PROGRESSING AS EXPECTED  Bm regular and normal     Problem: Pain Management  Goal: Pain level will decrease to patient's comfort goal  Outcome: PROGRESSING AS EXPECTED  Pt reports pain manageable on current regiment

## 2018-10-25 NOTE — DISCHARGE SUMMARY
Discharge Summary    CHIEF COMPLAINT ON ADMISSION  No chief complaint on file.      Reason for Admission  arterial leg ulcers     Admission Date  10/9/2018    CODE STATUS  Full Code    HPI & HOSPITAL COURSE  This is a 73 y.o. female here with chronic, nonhealing ulcer of the distal left lower extremity despite prior antibiotics and revascularization surgery.  Please see the dictated history of present illness 10/9/2018 for complete details.  In short, the patient is followed by Dr. Milana Colin who was previously performed a left femoral-popliteal bypass surgery due to long-standing atherosclerosis.  She has had an ulcer colonized with multiple organisms--some of which are resistant-- that has been nonhealing and thus presented to the emergency room for further wound therapy and/or antibiotics if warranted.    Infectious disease was consulted and concluded that there was no active cellulitis.  With improvement in the ulcer following wound therapy no antibiotics were indicated, and infectious disease signed off.  Upon admission the patient was evaluated by wound therapy who instituted negative pressure wound therapy with irrigation.  Due to prolonged need for wound care referrals were made to both skilled nursing facilities and long-term acute care.  However the patient declined transfer to these organizations.    She was found to be anemic and deficient in both B12 and folate.  She has been given daily replacement along with multivitamin and vitamin D.    The patient is noted to have a diffuse excoriations and bullae formation about her extremities and trunk.  She describes these as markedly itchy.  There are maculopapular eruptions with a fine scale.  Possible etiologies include diffuse dermatitis, physical urticaria, or bullous pemphigoid.  She would likely benefit from skin biopsy and dermatology referral as an outpatient, and I have discussed this extensively with her.  While an inpatient she was given maximum  doses of fexofenadine as well as topical Kenalog with some degree of symptom reduction.  I have refrain from oral corticosteroids due to potential risks of infection and osteoporosis, which she says she has been treated for in the past.    The patient has been reevaluated by Dr. Colin as well as the wound care team and she has been successfully treated with a negative pressure wound therapy.  She will be transition to outpatient wound care with a negative pressure dressing and ambulatory device.  She is counseled on the need to follow-up with her PCP for nutritional guidance, dermatology referral and/or biopsy.  She has been evaluated by PT and OT and is able to walk on a chronic nonhealing fracture of the distal left extremity managed nonoperatively with a walking boot and walker.  Therapy evaluation revealed she is without further inpatient needs and may benefit from referral to an outpatient PT program.     Therefore, she is discharged in fair and stable condition to home with close outpatient follow-up.    Discharge Date  10/25/2018    FOLLOW UP ITEMS POST DISCHARGE  None    DISCHARGE DIAGNOSES  Principal Problem:    Leg ulcer, left, with fat layer exposed (HCC) POA: Yes  Active Problems:    Osteoporosis POA: Yes    Essential hypertension POA: Yes    Dyslipidemia POA: Yes    Macrocytic anemia POA: Yes    B12 deficiency POA: Yes    Peripheral vascular disease (HCC) POA: Yes  Resolved Problems:    * No resolved hospital problems. *      FOLLOW UP  Future Appointments  Date Time Provider Department Center   10/26/2018 3:30 PM VANITA Schultz [address] [place] [address]   10/29/2018 8:00 AM VANITA Fall [address] [place] [address]   10/31/2018 7:30 AM VANITA Tucker [address] [place] [address]   10/31/2018 10:00 AM RYANN Son   11/5/2018 7:30 AM VANITA Tucker [address] [place] [address]   11/7/2018 7:30 AM VANITA Tucker [address] [place] [address]   11/12/2018 7:30 AM VANITA Tucker [address] [place] [address]   11/14/2018 8:00 AM  VANITA Tucker [address]   11/16/2018 4:00 PM VANITA Schultz [address]   11/19/2018 8:00 AM VANITA Tucker [address]   11/21/2018 8:30 AM VANITA Tucker [address]   11/23/2018 8:30 AM VANITA Thomas [address]   11/26/2018 8:00 AM VANITA Tucker [address]     No follow-up provider specified.    MEDICATIONS ON DISCHARGE     Medication List      START taking these medications      Instructions   atorvastatin 40 MG Tabs  Commonly known as:  LIPITOR   Take 1 Tab by mouth every evening.  Dose:  40 mg     cyanocobalamin 1000 MCG Tabs  Commonly known as:  VITAMIN B12   Take 1 Tab by mouth every day.  Dose:  1000 mcg     fexofenadine 180 MG tablet  Commonly known as:  ALLEGRA   Take 1 Tab by mouth every 12 hours.  Dose:  180 mg     folic acid 1 MG Tabs  Commonly known as:  FOLVITE   Take 1 Tab by mouth every day.  Dose:  1 mg     lisinopril 5 MG Tabs  Commonly known as:  PRINIVIL   Take 1 Tab by mouth every day.  Dose:  5 mg     multivitamin Tabs   Take 1 Tab by mouth every day.  Dose:  1 Tab     oxyCODONE-acetaminophen 5-325 MG Tabs  Commonly known as:  PERCOCET   Take 1 Tab by mouth every 6 hours as needed for Severe Pain for up to 3 days.  Dose:  1 Tab     triamcinolone acetonide 0.1 % Crea  Commonly known as:  KENALOG   Apply to rash BID PRN        CONTINUE taking these medications      Instructions   aspirin 81 MG Chew chewable tablet  Commonly known as:  ASA   Take 81 mg by mouth every day.  Dose:  81 mg     metoprolol  MG Tb24  Commonly known as:  TOPROL XL   TAKE 1 TABLET BY MOUTH EVERY DAY     sertraline 100 MG Tabs  Commonly known as:  ZOLOFT   TAKE 1 TAB BY MOUTH EVERY DAY.  Dose:  100 mg     vitamin D 1000 UNIT Tabs  Commonly known as:  cholecalciferol   Take 1,000 Units by mouth every day.  Dose:  1000 Units            Allergies  Allergies   Allergen Reactions   • Bactrim [Sulfamethoxazole-Trimethoprim]      Diffuse pruritic skin rash with blisters (no  mucous membrane involvement) (was also taking cipro at the time - reaction thought more likely to be 2/2 Bactrim)       DIET  Orders Placed This Encounter   Procedures   • Diet Order Regular     Standing Status:   Standing     Number of Occurrences:   1     Order Specific Question:   Diet:     Answer:   Regular [1]       ACTIVITY  As tolerated.  Weight bearing as tolerated    CONSULTATIONS  Vascular surgery  Infectious disease  Wound care    PROCEDURES  None    LABORATORY  Lab Results   Component Value Date    SODIUM 135 10/19/2018    POTASSIUM 4.4 10/19/2018    CHLORIDE 104 10/19/2018    CO2 23 10/19/2018    GLUCOSE 109 (H) 10/19/2018    BUN 28 (H) 10/19/2018    CREATININE 0.66 10/19/2018        Lab Results   Component Value Date    WBC 6.7 10/19/2018    HEMOGLOBIN 11.0 (L) 10/19/2018    HEMATOCRIT 35.2 (L) 10/19/2018    PLATELETCT 341 10/19/2018        Total time of the discharge process exceeds 35 minutes.

## 2018-10-26 ENCOUNTER — NON-PROVIDER VISIT (OUTPATIENT)
Dept: WOUND CARE | Facility: MEDICAL CENTER | Age: 73
End: 2018-10-26
Attending: NURSE PRACTITIONER
Payer: MEDICARE

## 2018-10-26 ENCOUNTER — PATIENT OUTREACH (OUTPATIENT)
Dept: HEALTH INFORMATION MANAGEMENT | Facility: OTHER | Age: 73
End: 2018-10-26

## 2018-10-26 PROCEDURE — 97605 NEG PRS WND THER DME<=50SQCM: CPT

## 2018-10-26 PROCEDURE — 99211 OFF/OP EST MAY X REQ PHY/QHP: CPT

## 2018-10-26 PROCEDURE — 99201 HCHG LEVEL I NEW PATIENT: CPT

## 2018-10-26 NOTE — PROGRESS NOTES
IHD patient advocate spoke with patient for a hospital follow-up. Patient reports she is doing well, and will be attending out-patient wound care starting today. Patient recalled receiving resources via mail from ALEXIA Mantilla. Patient said she is still looking them over.  Advocate introduced IHD services and provided patient with contact information for advocate and ALEXIA Mantilla.

## 2018-10-26 NOTE — CERTIFICATION
Advanced Wound Care  [facility]  [address]  [address]  [place] [address]  [phone] Fax: [phone]      Initial Evaluation  For Certification Period: 10/26/2018 - 02/16/2018  Start of Care: 10/26/2018          Referring Physician: Zak HAUSER  Primary Physician: IRON Son  Consulting Physicians:         Wound(s): Bilateral LEs  Pharmacy of Choice: [facility] [address]       Subjective:        HPI: 73 year old female well known at the [facility].  Patient was recently admitted in [facility] for 15 days for IV abx and veraflow therapy for her wounds.   HPI from Zak HAUSER  HPI & HOSPITAL COURSE  This is a 73 y.o. female here with chronic, nonhealing ulcer of the distal left lower extremity despite prior antibiotics and revascularization surgery.  Please see the dictated history of present illness 10/9/2018 for complete details.  In short, the patient is followed by Dr. Milana Colin who was previously performed a left femoral-popliteal bypass surgery due to long-standing atherosclerosis.  She has had an ulcer colonized with multiple organisms--some of which are resistant-- that has been nonhealing and thus presented to the emergency room for further wound therapy and/or antibiotics if warranted.     Infectious disease was consulted and concluded that there was no active cellulitis.  With improvement in the ulcer following wound therapy no antibiotics were indicated, and infectious disease signed off.  Upon admission the patient was evaluated by wound therapy who instituted negative pressure wound therapy with irrigation.  Due to prolonged need for wound care referrals were made to both skilled nursing facilities and long-term acute care.  However the patient declined transfer to these organizations.     She was found to be anemic and deficient in both B12 and folate.  She has been given daily replacement along with multivitamin and vitamin D.     The patient is noted to have a diffuse  excoriations and bullae formation about her extremities and trunk.  She describes these as markedly itchy.  There are maculopapular eruptions with a fine scale.  Possible etiologies include diffuse dermatitis, physical urticaria, or bullous pemphigoid.  She would likely benefit from skin biopsy and dermatology referral as an outpatient, and I have discussed this extensively with her.  While an inpatient she was given maximum doses of fexofenadine as well as topical Kenalog with some degree of symptom reduction.  I have refrain from oral corticosteroids due to potential risks of infection and osteoporosis, which she says she has been treated for in the past.     The patient has been reevaluated by Dr. Colin as well as the wound care team and she has been successfully treated with a negative pressure wound therapy.  She will be transition to outpatient wound care with a negative pressure dressing and ambulatory device.  She is counseled on the need to follow-up with her PCP for nutritional guidance, dermatology referral and/or biopsy.  She has been evaluated by PT and OT and is able to walk on a chronic nonhealing fracture of the distal left extremity managed nonoperatively with a walking boot and walker.  Therapy evaluation revealed she is without further inpatient needs and may benefit from referral to an outpatient PT program.  Therefore, she is discharged in fair and stable condition to home with close outpatient follow-up.    Pain:  10/10 with touch    Past Medical History:  Past Medical History:   Diagnosis Date   • Anxiety    • Cellulitis and abscess of lower extremity    • Dental disorder     full dentures   • Generalized osteoarthritis of multiple sites 10/20/2015   • Heart valve disease     pt not sure    • Hyperlipidemia    • Hypertension    • Osteoporosis        Current Medications:  Current Outpatient Prescriptions:   •  fexofenadine (ALLEGRA) 180 MG tablet, Take 1 Tab by mouth every 12 hours., Disp:  60 Tab, Rfl: 0  •  atorvastatin (LIPITOR) 40 MG Tab, Take 1 Tab by mouth every evening., Disp: 30 Tab, Rfl: 0  •  lisinopril (PRINIVIL) 5 MG Tab, Take 1 Tab by mouth every day., Disp: 30 Tab, Rfl: 0  •  triamcinolone acetonide (KENALOG) 0.1 % Cream, Apply to rash BID PRN, Disp: 120 g, Rfl: 0  •  cyanocobalamin (VITAMIN B12) 1000 MCG Tab, Take 1 Tab by mouth every day., Disp: 30 Tab, Rfl: 0  •  folic acid (FOLVITE) 1 MG Tab, Take 1 Tab by mouth every day., Disp: 30 Tab, Rfl: 0  •  multivitamin (THERAGRAN) Tab, Take 1 Tab by mouth every day., Disp: 30 Tab, Rfl: 0  •  oxyCODONE-acetaminophen (PERCOCET) 5-325 MG Tab, Take 1 Tab by mouth every 6 hours as needed for Severe Pain for up to 3 days., Disp: 12 Tab, Rfl: 0  •  aspirin (ASA) 81 MG Chew Tab chewable tablet, Take 81 mg by mouth every day., Disp: , Rfl:   •  metoprolol SR (TOPROL XL) 100 MG TABLET SR 24 HR, TAKE 1 TABLET BY MOUTH EVERY DAY, Disp: 90 Tab, Rfl: 0  •  vitamin D (CHOLECALCIFEROL) 1000 UNIT Tab, Take 1,000 Units by mouth every day., Disp: , Rfl:   •  sertraline (ZOLOFT) 100 MG Tab, TAKE 1 TAB BY MOUTH EVERY DAY., Disp: 30 Tab, Rfl: 5    Allergies:   Allergies   Allergen Reactions   • Bactrim [Sulfamethoxazole-Trimethoprim]      Diffuse pruritic skin rash with blisters (no mucous membrane involvement) (was also taking cipro at the time - reaction thought more likely to be 2/2 Bactrim)       Past Surgical History:   Past Surgical History:   Procedure Laterality Date   • FEMORAL POPLITEAL BYPASS Left 6/8/2018    Procedure: FEMORAL POPLITEAL BYPASS;  Surgeon: Milana Colin M.D.;  Location: SURGERY [facility];  Service: General   • IRRIGATION & DEBRIDEMENT GENERAL Left 6/8/2018    Procedure: IRRIGATION & DEBRIDEMENT ANKLE WOUND;  Surgeon: Milana Colin M.D.;  Location: SURGERY [facility];  Service: General   • IRRIGATION & DEBRIDEMENT HIP Left 6/4/2018    Procedure: IRRIGATION & DEBRIDEMENT HIP;  Surgeon: Chong Vazquez M.D.;  Location:  SURGERY [facility];  Service: Orthopedics   • HIP REVISION TOTAL Left 2/11/2018    Procedure: HIP REVISION TOTAL- femoral nail removal conversion to total hip arthoroplasty;  Surgeon: Chong Vazquez M.D.;  Location: SURGERY [facility];  Service: Orthopedics   • HIP NAILING INTRAMEDULLARY Left 12/20/2017    Procedure: HIP NAILING INTRAMEDULLARY;  Surgeon: Jorge Peters M.D.;  Location: SURGERY [facility];  Service: Orthopedics   • BREAST BIOPSY  8/28/2014    Performed by Magdalena Londono M.D. 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[employer]  [employer]     Social History Main Topics   • Smoking status: Former Smoker     Packs/day: 0.50     Years: 25.00     Types: Cigarettes     Quit date: 1/1/2007   • Smokeless tobacco: Never Used   • Alcohol use 3.0 oz/week     5 Glasses of wine per week      Comment: glass of wine per day   • Drug use: No   • Sexual activity: Not Currently     Partners: Male     Other Topics Concern   • Not on file     Social History Narrative   • No narrative on file             Objective:      Tests and Measures:               RIGHT     Waveform            Systolic BPs (mmHg)                              140           Brachial   Triphasic                                Common Femoral   Triphasic                  109           Posterior Tibial   Triphasic                  130           Dorsalis Pedis                                            Peroneal                              0.92          TOMI                                            TBI                          LEFT   Waveform        Systolic BPs (mmHg)                               142           Brachial   Triphasic                                Common Femoral   Monophasic                 88            Posterior Tibial   Monophasic                 100           Dorsalis Pedis                                            Peroneal                              0.70          TOMI                                            TBI       Findings   Right-   Doppler waveform of the common femoral, popliteal and tibial arteries are    high amplitude and triphasic.    Ankle-brachial index is mildly reduced.      Left.    Doppler waveform of the common femoral artery is of high amplitude and    triphasic.    Doppler doppler waveforms at the popliteal and tibial arteries are    monophasic with moderate amplitude.    Ankle-brachial index is moderately reduced.     Orthotic, protective, supportive devices: Walker    Fall Risk Assessment (jeronimo all that apply with an X):  Completed 10/26/2018   x65 years or older     xFall within the last 2 years, uses   xAmbulatory devices  Loss of protective sensation in feet,   Use of prostethic/orthotic, years    Presence of lower extremity/foot/toe amputation   xTaking medication that increases risk (per facility policy)    Interventions Recommended (if any of the above are selected):   xUse of Assistive Device:_______Walker_________________   Supervision with ambulation:  Caregiver   Assistance with ambulation:  Caregiver   xHome safety education:  Educational material provided       Procedures:     Debridement : Non-selective blunt debridement with NS moisten gauze and cotton tip applicator to all wounds   Cleansed with:  Normal saline and gauze                                                                     Periwound protected with: Skin prep and drape to left medial malleolus, moisture barrier cream to rest of yuki-wound   Primary dressin black foam on wound bed, bridged and tract pad to left medial malleolus, Aquacel Ag to rest of  wounds   Secondary Dressing: Drape sealed, restarted NPWT 125mmHg continuously to left medial malleolus / mepilex and soft roll to bilateral LEs   Other:      Patient Education: Wound VAC at 125mmHg continuous black foam. Dressing will be changed every MWF at the wound clinic or with your home health nurse.  If you are having issues with your wound VAC, please consider patching leaks, changing the canister, or calling [phone] for troubleshooting. If the wound VAC has been off or un-operational for over 2 hours, call wound care center to inform them and remove all dressings including black foam and replace with normal saline damp gauze.     -Keep your wound dressing clean, dry, and intact.    -Change your dressing if it becomes soiled, soaked, or falls off.    Should you experience any significant changes in your wound(s), such as infection (redness, swelling, localized heat, increased pain, fever > 101 F, chills) or have any questions regarding your home care instructions, please contact the wound center at [phone]. If after hours, contact your primary care physician or go to the hospital emergency room.   Keep dressing clean, dry and cover when bathing. Only change dressing if it's over saturated, soiled or falls off.       Professional Collaboration: Initial evaluation sent to patient's PCP, IRON Son      Assessment:      Wound etiology: vascular, trauma    Wound Progress:  Initial Evaluation    Rationale for Treatment: NPWT to help granulation of wound.  Aquacel Ag to manage bioburden, absorb exudate, and maintain moist wound environment without laterally wicking exudate therefore reducing yuki-wound maceration.      Patient tolerance/compliance: Patient tolerated well with visit and complying with 3x/weekly     Complicating factors: Age, chronic open wounds, chronic infection.    Need for ongoing Advanced Wound Care services:Patient requires skilled therapeutic wound care services for  product selection, application of product, debridement, close monitoring with clinical assessment for expedite of wound healing.         Plan:      Treatment Plan and Recommendations:  Diagnosis/ICD10: S81.802D (ICD-10-CM) - Open wound of left lower extremity, subsequent encounter    Procedures/CPT: NPWT   Non-selective     Frequency: 3x/weekly    At the time of each visit a thorough assessment of the patient is completed to assure the  appropriateness of our plan of care.  The dressings or modalities may need to be adapted   from the original plan to address any significant changes in the wound environment.          Clinician Signature:_______________________________Date__________________      Physician Signature:______________________________Date:__________________

## 2018-10-26 NOTE — PROCEDURES
Skin prep and drape to yuki-wound of left medial malleolus.  1 black foam to wound bed, bridge to dorsal of foot and tract pad.  Drape to seal.  Restarted NPWT 125mmHg continuously.  No leaks noted.

## 2018-10-27 NOTE — PATIENT INSTRUCTIONS
Wound VAC at 125mmHg continuous black foam. Dressing will be changed every MWF at the wound clinic or with your home health nurse.  If you are having issues with your wound VAC, please consider patching leaks, changing the canister, or calling [phone] for troubleshooting. If the wound VAC has been off or un-operational for over 2 hours, call wound care center to inform them and remove all dressings including black foam and replace with normal saline damp gauze.     Should you experience any significant changes in your wound(s), such as infection (redness, swelling, localized heat, increased pain, fever > 101 F, chills) or have any questions regarding your home care instructions, please contact the wound center at [phone]. If after hours, contact your primary care physician or go to the hospital emergency room.   Keep dressing clean, dry and cover when bathing. Only change dressing if it's over saturated, soiled or falls off.

## 2018-10-28 RX ORDER — METOPROLOL SUCCINATE 100 MG/1
TABLET, EXTENDED RELEASE ORAL
Qty: 90 TAB | Refills: 0 | Status: ON HOLD | OUTPATIENT
Start: 2018-10-28 | End: 2019-02-03 | Stop reason: SDUPTHER

## 2018-10-29 ENCOUNTER — NON-PROVIDER VISIT (OUTPATIENT)
Dept: WOUND CARE | Facility: MEDICAL CENTER | Age: 73
End: 2018-10-29
Attending: NURSE PRACTITIONER
Payer: MEDICARE

## 2018-10-29 PROCEDURE — 97605 NEG PRS WND THER DME<=50SQCM: CPT

## 2018-10-29 NOTE — PATIENT INSTRUCTIONS
Should you experience any significant changes in your wound(s) such as infection (redness, swelling, localized heat, increased pain, fever >101 F, chills) or have any questions regarding your home care instructions, please contact the wound center [phone]. If after hours, contact your primary care physician or go the hospital emergency room.  Keep dressing clean and dry and cover while bathing. Only change dressing if over saturated, soiled or its falling off.

## 2018-10-29 NOTE — PROCEDURES
Non-selective debridement using NS moistened gauze to gently cleanse left and right lower legs. While cleansing non-viable chunks of skin covering de-roofed blisters removed. Moderate pain to wound beds per patient.

## 2018-10-29 NOTE — WOUND TEAM
Patient's main wound to the Left Medial Ankle is improving however the skin to her LLE appears to continue to blister and slough off with moderate erythema throughout. Patient states that she will be getting a biopsy this week per one of her providers, she is not quite sure. I discussed patient with Dr. Colin after the visit today and did request per PAR for patient to be scheduled with [facility] for Follow-up and eval at the next available time. Discussed with patient that she needs to remove the wound vac and black sponge entirely if that machine stops working and she agreed. Also discussed that if patient develops any severe pain or swelling in the LLE especially to remove the wrap, although there was no compression applied when wrapped with soft roll, I explained that this left has diminished blood flow already and need for vigilance. Patient agrees.

## 2018-10-31 ENCOUNTER — NON-PROVIDER VISIT (OUTPATIENT)
Dept: WOUND CARE | Facility: MEDICAL CENTER | Age: 73
End: 2018-10-31
Attending: NURSE PRACTITIONER
Payer: MEDICARE

## 2018-10-31 ENCOUNTER — OFFICE VISIT (OUTPATIENT)
Dept: MEDICAL GROUP | Facility: PHYSICIAN GROUP | Age: 73
End: 2018-10-31
Payer: MEDICARE

## 2018-10-31 VITALS
WEIGHT: 149 LBS | SYSTOLIC BLOOD PRESSURE: 126 MMHG | DIASTOLIC BLOOD PRESSURE: 56 MMHG | HEIGHT: 67 IN | TEMPERATURE: 98.2 F | HEART RATE: 99 BPM | OXYGEN SATURATION: 97 % | BODY MASS INDEX: 23.39 KG/M2 | RESPIRATION RATE: 14 BRPM

## 2018-10-31 DIAGNOSIS — Z09 HOSPITAL DISCHARGE FOLLOW-UP: ICD-10-CM

## 2018-10-31 PROCEDURE — 97598 DBRDMT OPN WND ADDL 20CM/<: CPT

## 2018-10-31 PROCEDURE — 97597 DBRDMT OPN WND 1ST 20 CM/<: CPT

## 2018-10-31 PROCEDURE — 99213 OFFICE O/P EST LOW 20 MIN: CPT | Performed by: NURSE PRACTITIONER

## 2018-10-31 NOTE — PROGRESS NOTES
Chief Complaint   Patient presents with   • Follow-Up   • Wound Check   • Anemia   • Anxiety   • Hypertension       Subjective:   Nadege Mae is a 73 y.o. female here today for hospital follow up    Hospital discharge follow-up  Patient discharged from hospital on 10/25 after spending 15 days for complicated wound care with MRSA culture.  Wound care 3 times/week.  She was treated in the hospital with extensive wound care and discharged to outpatient wound care 3 days/week.    This is a 73 y.o. female here with chronic, nonhealing ulcer of the distal left lower extremity despite prior antibiotics and revascularization surgery.  Please see the dictated history of present illness 10/9/2018 for complete details.  In short, the patient is followed by Dr. Milana Colin who was previously performed a left femoral-popliteal bypass surgery due to long-standing atherosclerosis.  She has had an ulcer colonized with multiple organisms--some of which are resistant-- that has been nonhealing and thus presented to the emergency room for further wound therapy and/or antibiotics if warranted.     Infectious disease was consulted and concluded that there was no active cellulitis.  With improvement in the ulcer following wound therapy no antibiotics were indicated, and infectious disease signed off.  Upon admission the patient was evaluated by wound therapy who instituted negative pressure wound therapy with irrigation.  Due to prolonged need for wound care referrals were made to both skilled nursing facilities and long-term acute care.  However the patient declined transfer to these organizations.     She was found to be anemic and deficient in both B12 and folate.  She has been given daily replacement along with multivitamin and vitamin D.     The patient is noted to have a diffuse excoriations and bullae formation about her extremities and trunk.  She describes these as markedly itchy.  There are maculopapular eruptions  with a fine scale.  Possible etiologies include diffuse dermatitis, physical urticaria, or bullous pemphigoid.  She would likely benefit from skin biopsy and dermatology referral as an outpatient, and I have discussed this extensively with her.  While an inpatient she was given maximum doses of fexofenadine as well as topical Kenalog with some degree of symptom reduction.  I have refrain from oral corticosteroids due to potential risks of infection and osteoporosis, which she says she has been treated for in the past.     The patient has been reevaluated by Dr. Colin as well as the wound care team and she has been successfully treated with a negative pressure wound therapy.  She will be transition to outpatient wound care with a negative pressure dressing and ambulatory device.  She is counseled on the need to follow-up with her PCP for nutritional guidance, dermatology referral and/or biopsy.  She has been evaluated by PT and OT and is able to walk on a chronic nonhealing fracture of the distal left extremity managed nonoperatively with a walking boot and walker.  Therapy evaluation revealed she is without further inpatient needs and may benefit from referral to an outpatient PT program.  Therefore, she is discharged in fair and stable condition to home with close outpatient follow-up.     Discharge Date  10/25/2018     FOLLOW UP ITEMS POST DISCHARGE  None     DISCHARGE DIAGNOSES  Principal Problem:    Leg ulcer, left, with fat layer exposed (HCC) POA: Yes  Active Problems:    Osteoporosis POA: Yes    Essential hypertension POA: Yes    Dyslipidemia POA: Yes    Macrocytic anemia POA: Yes    B12 deficiency POA: Yes    Peripheral vascular disease (HCC) POA: Yes  Resolved Problems:    * No resolved hospital problems. *        FOLLOW UP  Future Appointments  Date Time Provider Department Center   10/26/2018 3:30 PM VANITA Schultz [address]   10/29/2018 8:00 AM VANITA Fall [address]    10/31/2018 7:30 AM VANITA Tucker [facility].   10/31/2018 10:00 AM RYANN [facility]   11/5/2018 7:30 AM VANITA Tucker [facility].   11/7/2018 7:30 AM VANITA Tucker [address] [place] [address]   11/12/2018 7:30 AM VANITA Tucker [facility].   11/14/2018 8:00 AM VANITA Tucker [facility].   11/16/2018 4:00 PM VANITA Schultz [address] [place] [address]   11/19/2018 8:00 AM VANITA Tucker [facility].   11/21/2018 8:30 AM VANITA Tucker [facility].   11/23/2018 8:30 AM VANITA Thomas [facility].   11/26/2018 8:00 AM VANITA Tucker [facility].      No follow-up provider specified.     MEDICATIONS ON DISCHARGE            Medication List            START taking these medications      Instructions   atorvastatin 40 MG Tabs  Commonly known as:  LIPITOR    Take 1 Tab by mouth every evening.  Dose:  40 mg      cyanocobalamin 1000 MCG Tabs  Commonly known as:  VITAMIN B12    Take 1 Tab by mouth every day.  Dose:  1000 mcg      fexofenadine 180 MG tablet  Commonly known as:  ALLEGRA    Take 1 Tab by mouth every 12 hours.  Dose:  180 mg      folic acid 1 MG Tabs  Commonly known as:  FOLVITE    Take 1 Tab by mouth every day.  Dose:  1 mg      lisinopril 5 MG Tabs  Commonly known as:  PRINIVIL    Take 1 Tab by mouth every day.  Dose:  5 mg      multivitamin Tabs    Take 1 Tab by mouth every day.  Dose:  1 Tab      oxyCODONE-acetaminophen 5-325 MG Tabs  Commonly known as:  PERCOCET    Take 1 Tab by mouth every 6 hours as needed for Severe Pain for up to 3 days.  Dose:  1 Tab      triamcinolone acetonide 0.1 % Crea  Commonly known as:  KENALOG    Apply to rash BID PRN                  CONTINUE taking these medications      Instructions   aspirin 81 MG Chew chewable tablet  Commonly known as:  ASA    Take 81 mg by mouth every day.  Dose:  81 mg      metoprolol  MG Tb24  Commonly known as:  TOPROL XL    TAKE 1 TABLET BY MOUTH EVERY DAY      sertraline 100 MG Tabs  Commonly  known as:  ZOLOFT    TAKE 1 TAB BY MOUTH EVERY DAY.  Dose:  100 mg      vitamin D 1000 UNIT Tabs  Commonly known as:  cholecalciferol    Take 1,000 Units by mouth every day.  Dose:  1000 Units                Allergies        Allergies   Allergen Reactions   • Bactrim [Sulfamethoxazole-Trimethoprim]         Diffuse pruritic skin rash with blisters (no mucous membrane involvement) (was also taking cipro at the time - reaction thought more likely to be 2/2 Bactrim)         DIET        Orders Placed This Encounter   Procedures   • Diet Order Regular       Standing Status:   Standing       Number of Occurrences:   1       Order Specific Question:   Diet:       Answer:   Regular [1]         ACTIVITY  As tolerated.  Weight bearing as tolerated     CONSULTATIONS  Vascular surgery  Infectious disease  Wound care     PROCEDURES  None     LABORATORY        Lab Results   Component Value Date     SODIUM 135 10/19/2018     POTASSIUM 4.4 10/19/2018     CHLORIDE 104 10/19/2018     CO2 23 10/19/2018     GLUCOSE 109 (H) 10/19/2018     BUN 28 (H) 10/19/2018     CREATININE 0.66 10/19/2018               Lab Results   Component Value Date     WBC 6.7 10/19/2018     HEMOGLOBIN 11.0 (L) 10/19/2018     HEMATOCRIT 35.2 (L) 10/19/2018     PLATELETCT 341 10/19/2018         Total time of the discharge process exceeds 35 minutes.      Other Notes   All notes       H&P from Evgeny Hill M.D. (Hospital Medicine)       Consults from Fernando Rainey M.D. (Infectious Disease)   Additional Orders and Documentation         Results             Meds             Orders             Flowsheets         Encounter Info:    History,    Allergies,    Patient Education,    Care Plan,    Detailed Report       Communications         SNF sent to [facility]  Sent 10/18/2018 by Luda Monroe       *Disregard Referral Notice sent to [facility])  Sent 10/18/2018 by Luda Monroe       Summary of care  document sent to Milana Colin M.D.  Sent 10/25/2018 by Tino Delgado M.D.   Media     Discharge Instruction - Scan on 10/29/2018 1:53 PMDischarge Instruction - Scan on 10/29/2018 1:53 PM    Correspondence - Scan on 10/29/2018 1:53 PMCorrespondence - Scan on 10/29/2018 1:53 PM    Consent Form - Controlled Substance - Scan on 10/29/2018 1:53 PMConsent Form - Controlled Substance - Scan on 10/29/2018 1:53 PM    Orders - Scan on 10/29/2018 1:53 PMOrders - Scan on 10/29/2018 1:53 PM    Consent Form - Scan on 10/29/2018 1:53 PMConsent Form - Scan on 10/29/2018 1:53 PM    IM from Medicare - Scan on 10/23/2018 11:09 PMIM from Medicare - Scan on 10/23/2018 11:09 PM    Clinical Picture - Scan on 10/11/2018 : left medial lower leg/ankleClinical Picture - Scan on 10/11/2018 : left medial lower leg/ankle    IM from Medicare - Scan on 10/9/2018IM from Medicare - Scan on 10/9/2018    Clinical Picture - Scan on 10/9/2018 : Bilat thighClinical Picture - Scan on 10/9/2018 : Bilat thigh    Clinical Picture - Scan on 10/9/2018 : bilat thighsClinical Picture - Scan on 10/9/2018 : bilat thighs    Clinical Picture - Scan on 10/9/2018 : R lateral hip/thighClinical Picture - Scan on 10/9/2018 : R lateral hip/thigh    Clinical Picture - Scan on 10/9/2018 : abdomenClinical Picture - Scan on 10/9/2018 : abdomen    Clinical Picture - Scan on 10/9/2018 : L side of torsoClinical Picture - Scan on 10/9/2018 : L side of torso    Clinical Picture - Scan on 10/9/2018 : backClinical Picture - Scan on 10/9/2018 : back    Clinical Picture - Scan on 10/9/2018 : R side of TorsoClinical Picture - Scan on 10/9/2018 : R side of Torso    Clinical Picture - Scan on 10/9/2018 : L upper extremityClinical Picture - Scan on 10/9/2018 : L upper extremity    Clinical Picture - Scan on 10/9/2018 : R upper extremityClinical Picture - Scan on 10/9/2018 : R upper extremity    Clinical Picture - Scan on 10/9/2018 : handsClinical Picture - Scan on 10/9/2018 : hands     Clinical Picture - Scan on 10/9/2018 : left medial ankleClinical Picture - Scan on 10/9/2018 : left medial ankle    Clinical Picture - Scan on 10/9/2018 : Left Medial Lower LegClinical Picture - Scan on 10/9/2018 : Left Medial Lower Leg    Clinical Picture - Scan on 10/9/2018 : Left Lower LegClinical Picture - Scan on 10/9/2018 : Left Lower Leg    Hospital Problem List        Osteoporosis      Essential hypertension      Dyslipidemia      Macrocytic anemia      B12 deficiency      Peripheral vascular disease (HCC)       Leg ulcer, left, with fat layer exposed (HCC)   Care Timeline     10/09   Admitted 1438   10/25   Discharged 1420   Discharge         Discharged to home/self care (01)             AVS      1 Safe Discharge (Printed 10/25/2018)   2 Controlled Substance Use Informed Consent (Printed 10/25/2018)   3 After Visit Summary (Printed 10/25/2018)   4 Active Medication List (Printed 10/25/2018)             Follow-Ups      1 Schedule an appointment with Milana Colin M.D. (Surgery) in 1 week (11/1/2018); call for follow up needs   2 Follow up with RYANN Son (Family Medicine)   3   Medication List at Discharge         Aspirin 81 mg Oral DAILY      Atorvastatin Calcium 40 mg Oral EVERY EVENING       Cholecalciferol 1,000 Units Oral DAILY      Cyanocobalamin 1,000 mcg Oral DAILY      Fexofenadine HCl 180 mg Oral EVERY 12 HOURS      Folic Acid 1 mg Oral DAILY      Lisinopril 5 mg Oral DAILY      Metoprolol Succinate 100 MG TAKE 1 TABLET BY MOUTH EVERY DAY      Multiple Vitamin 1 Tab Oral DAILY      Oxycodone-Acetaminophen 5-325 MG 1 Tab Oral EVERY 6 HOURS PRN      Sertraline HCl 100 mg Oral DAILY      Triamcinolone Acetonide 0.1 % Apply to rash BID PRN   All hospital records have been reviewed by me today and summarized as above.               Current medicines (including changes today)  Current Outpatient Prescriptions   Medication Sig Dispense Refill   • metoprolol SR (TOPROL XL) 100 MG  "TABLET SR 24 HR TAKE 1 TABLET BY MOUTH EVERY DAY 90 Tab 0   • fexofenadine (ALLEGRA) 180 MG tablet Take 1 Tab by mouth every 12 hours. 60 Tab 0   • atorvastatin (LIPITOR) 40 MG Tab Take 1 Tab by mouth every evening. 30 Tab 0   • lisinopril (PRINIVIL) 5 MG Tab Take 1 Tab by mouth every day. 30 Tab 0   • triamcinolone acetonide (KENALOG) 0.1 % Cream Apply to rash BID  g 0   • cyanocobalamin (VITAMIN B12) 1000 MCG Tab Take 1 Tab by mouth every day. 30 Tab 0   • folic acid (FOLVITE) 1 MG Tab Take 1 Tab by mouth every day. 30 Tab 0   • multivitamin (THERAGRAN) Tab Take 1 Tab by mouth every day. 30 Tab 0   • aspirin (ASA) 81 MG Chew Tab chewable tablet Take 81 mg by mouth every day.     • vitamin D (CHOLECALCIFEROL) 1000 UNIT Tab Take 1,000 Units by mouth every day.     • sertraline (ZOLOFT) 100 MG Tab TAKE 1 TAB BY MOUTH EVERY DAY. 30 Tab 5     No current facility-administered medications for this visit.      She  has a past medical history of Anxiety; Cellulitis and abscess of lower extremity; Dental disorder; Generalized osteoarthritis of multiple sites (10/20/2015); Heart valve disease; Hyperlipidemia; Hypertension; and Osteoporosis. She also has no past medical history of Allergy; Diabetes; Muscle disorder; or OSTEOPOROSIS.    ROS as stated in hpi  No chest pain, no shortness of breath, no abdominal pain       Objective:     Blood pressure 126/56, pulse 99, temperature 36.8 °C (98.2 °F), temperature source Temporal, resp. rate 14, height 1.702 m (5' 7\"), weight 67.6 kg (149 lb), SpO2 97 %, not currently breastfeeding. Body mass index is 23.34 kg/m². stable.   Physical Exam:  Constitutional: Alert, no distress.  Skin: Warm, dry, good turgor,no cyanosis, multiple healing blisters noted throughout arms/chest/legs.  Thought to be from Septra  Eye: Equal, round and reactive, conjunctiva clear, lids normal.  Ears: No tenderness, no discharge.  External canals are without any significant edema or erythema. .  Gross " auditory acuity is intact.  Nose: symmetrical without tenderness, no discharge.  Mouth/Throat: lips without lesion.  Oropharynx clear.    Neck: Trachea midline, no masses, no obvious thyroid enlargement... Range of motion within normal limits.  Neuro: Cranial nerves 2-12 grossly intact.  No sensory deficit.  Respiratory: Unlabored respiratory effort, lungs clear to auscultation, no wheezes, no ronchi.  Cardiovascular: Normal S1, S2, loud systolic murmur noted at base. no edema.  MSK:  Left leg boot on ankle fracture, leg wound vac in place.     Psych: Alert and oriented x3, normal affect and mood and judgement.        Assessment and Plan:   The following treatment plan was discussed    1. Hospital discharge follow-up  Patient here for hospital follow up for non-healing leg wound with + MRSA culture. She has recently had surgery for hip fracture and a fem-fem bypass.  She also currently has an ankle fracture that has not been surgically stabilized due to the infection.  She is in a boot.  Follow up appointment with vascular surgeon on Monday, 11/5.  Wound care 3 days/week.  Taking folic acid and B12 for macrocytic anemia.  Discussed focusing on veggies in her diet.  Red flag warnings reviewed regarding sepsis.  Continue care of blisters with triamcinolone cream.  Has ortho follow up mid November with Dr. Peters.  Patient to return to me in 3 months.  Monitor and follow.  I advised patient to wait until next week for flu immunization as she is still quite tired from her hospitalization.     Total 40 minutes face-to-face time spent with patient, with greater than 50% of the total time discussing patient's issues and symptoms as listed above in assessment and plan, as well as managing coordination of care for future evaluation and treatment and reviewing hospital notes.         Followup: Return in about 3 months (around 1/31/2019) for Multiple issues.         Educated in proper administration of medication(s) ordered  today including safety, possible SE, risks, benefits, rationale and alternatives to therapy.     Please note that this dictation was created using voice recognition software. I have made every reasonable attempt to correct obvious errors, but I expect that there are errors of grammar and possibly content that I did not discover before finalizing the note.

## 2018-10-31 NOTE — ASSESSMENT & PLAN NOTE
Patient discharged from hospital on 10/25 after spending 15 days for complicated wound care with MRSA culture.  Wound care 3 times/week.  She was treated in the hospital with extensive wound care and discharged to outpatient wound care 3 days/week.    This is a 73 y.o. female here with chronic, nonhealing ulcer of the distal left lower extremity despite prior antibiotics and revascularization surgery.  Please see the dictated history of present illness 10/9/2018 for complete details.  In short, the patient is followed by Dr. Milana Colin who was previously performed a left femoral-popliteal bypass surgery due to long-standing atherosclerosis.  She has had an ulcer colonized with multiple organisms--some of which are resistant-- that has been nonhealing and thus presented to the emergency room for further wound therapy and/or antibiotics if warranted.     Infectious disease was consulted and concluded that there was no active cellulitis.  With improvement in the ulcer following wound therapy no antibiotics were indicated, and infectious disease signed off.  Upon admission the patient was evaluated by wound therapy who instituted negative pressure wound therapy with irrigation.  Due to prolonged need for wound care referrals were made to both skilled nursing facilities and long-term acute care.  However the patient declined transfer to these organizations.     She was found to be anemic and deficient in both B12 and folate.  She has been given daily replacement along with multivitamin and vitamin D.     The patient is noted to have a diffuse excoriations and bullae formation about her extremities and trunk.  She describes these as markedly itchy.  There are maculopapular eruptions with a fine scale.  Possible etiologies include diffuse dermatitis, physical urticaria, or bullous pemphigoid.  She would likely benefit from skin biopsy and dermatology referral as an outpatient, and I have discussed this extensively with  her.  While an inpatient she was given maximum doses of fexofenadine as well as topical Kenalog with some degree of symptom reduction.  I have refrain from oral corticosteroids due to potential risks of infection and osteoporosis, which she says she has been treated for in the past.     The patient has been reevaluated by Dr. Colin as well as the wound care team and she has been successfully treated with a negative pressure wound therapy.  She will be transition to outpatient wound care with a negative pressure dressing and ambulatory device.  She is counseled on the need to follow-up with her PCP for nutritional guidance, dermatology referral and/or biopsy.  She has been evaluated by PT and OT and is able to walk on a chronic nonhealing fracture of the distal left extremity managed nonoperatively with a walking boot and walker.  Therapy evaluation revealed she is without further inpatient needs and may benefit from referral to an outpatient PT program.  Therefore, she is discharged in fair and stable condition to home with close outpatient follow-up.     Discharge Date  10/25/2018     FOLLOW UP ITEMS POST DISCHARGE  None     DISCHARGE DIAGNOSES  Principal Problem:    Leg ulcer, left, with fat layer exposed (HCC) POA: Yes  Active Problems:    Osteoporosis POA: Yes    Essential hypertension POA: Yes    Dyslipidemia POA: Yes    Macrocytic anemia POA: Yes    B12 deficiency POA: Yes    Peripheral vascular disease (HCC) POA: Yes  Resolved Problems:    * No resolved hospital problems. *        FOLLOW UP  Future Appointments  Date Time Provider Department Center   10/26/2018 3:30 PM VANITA Schultz [address] [place] [address]   10/29/2018 8:00 AM VANITA Fall [address] [place] [address]   10/31/2018 7:30 AM VANITA Tucker [address] [place] [address]   10/31/2018 10:00 AM RYANN Son   11/5/2018 7:30 AM VANITA Tucker [address] [place] [address]   11/7/2018 7:30 AM VANITA Tucker [address] [place] [address]   11/12/2018 7:30 AM Cordelia  VANITA Carrera [address] .   11/14/2018 8:00 AM VANITA Tucker [address] .   11/16/2018 4:00 PM VANITA Schultz [address] .   11/19/2018 8:00 AM VANITA Tucker [address]   11/21/2018 8:30 AM VANITA Tucker [address] .   11/23/2018 8:30 AM VANITA Thomas [address] .   11/26/2018 8:00 AM VANITA Tucker [facility].      No follow-up provider specified.     MEDICATIONS ON DISCHARGE            Medication List            START taking these medications      Instructions   atorvastatin 40 MG Tabs  Commonly known as:  LIPITOR    Take 1 Tab by mouth every evening.  Dose:  40 mg      cyanocobalamin 1000 MCG Tabs  Commonly known as:  VITAMIN B12    Take 1 Tab by mouth every day.  Dose:  1000 mcg      fexofenadine 180 MG tablet  Commonly known as:  ALLEGRA    Take 1 Tab by mouth every 12 hours.  Dose:  180 mg      folic acid 1 MG Tabs  Commonly known as:  FOLVITE    Take 1 Tab by mouth every day.  Dose:  1 mg      lisinopril 5 MG Tabs  Commonly known as:  PRINIVIL    Take 1 Tab by mouth every day.  Dose:  5 mg      multivitamin Tabs    Take 1 Tab by mouth every day.  Dose:  1 Tab      oxyCODONE-acetaminophen 5-325 MG Tabs  Commonly known as:  PERCOCET    Take 1 Tab by mouth every 6 hours as needed for Severe Pain for up to 3 days.  Dose:  1 Tab      triamcinolone acetonide 0.1 % Crea  Commonly known as:  KENALOG    Apply to rash BID PRN              CONTINUE taking these medications      Instructions   aspirin 81 MG Chew chewable tablet  Commonly known as:  ASA    Take 81 mg by mouth every day.  Dose:  81 mg      metoprolol  MG Tb24  Commonly known as:  TOPROL XL    TAKE 1 TABLET BY MOUTH EVERY DAY      sertraline 100 MG Tabs  Commonly known as:  ZOLOFT    TAKE 1 TAB BY MOUTH EVERY DAY.  Dose:  100 mg      vitamin D 1000 UNIT Tabs  Commonly known as:  cholecalciferol    Take 1,000 Units by mouth every day.  Dose:  1000 Units                Allergies        Allergies   Allergen  Reactions   • Bactrim [Sulfamethoxazole-Trimethoprim]         Diffuse pruritic skin rash with blisters (no mucous membrane involvement) (was also taking cipro at the time - reaction thought more likely to be 2/2 Bactrim)         DIET        Orders Placed This Encounter   Procedures   • Diet Order Regular       Standing Status:   Standing       Number of Occurrences:   1       Order Specific Question:   Diet:       Answer:   Regular [1]         ACTIVITY  As tolerated.  Weight bearing as tolerated     CONSULTATIONS  Vascular surgery  Infectious disease  Wound care     PROCEDURES  None     LABORATORY  Lab Results   Component Value Date     SODIUM 135 10/19/2018     POTASSIUM 4.4 10/19/2018     CHLORIDE 104 10/19/2018     CO2 23 10/19/2018     GLUCOSE 109 (H) 10/19/2018     BUN 28 (H) 10/19/2018     CREATININE 0.66 10/19/2018               Lab Results   Component Value Date     WBC 6.7 10/19/2018     HEMOGLOBIN 11.0 (L) 10/19/2018     HEMATOCRIT 35.2 (L) 10/19/2018     PLATELETCT 341 10/19/2018         Total time of the discharge process exceeds 35 minutes.      Other Notes   All notes       H&P from Evgeny Hill M.D. (Hospital Medicine)       Consults from Fernando Rainey M.D. (Infectious Disease)   Additional Orders and Documentation         Results             Meds             Orders             Flowsheets         Encounter Info:    History,    Allergies,    Patient Education,    Care Plan,    Detailed Report       Communications         SNF sent to [facility] ([facility])  Sent 10/18/2018 by Luda Monroe       *Disregard Referral Notice sent to [facility] ([facility])  Sent 10/18/2018 by Luda Monroe       Summary of care document sent to Milana Colin M.D.  Sent 10/25/2018 by Tino Delgado M.D.   Media     Discharge Instruction - Scan on 10/29/2018 1:53 PMDischarge Instruction - Scan on 10/29/2018 1:53 PM    Correspondence - Scan on 10/29/2018 1:53 PMCorrespondence - Scan  on 10/29/2018 1:53 PM    Consent Form - Controlled Substance - Scan on 10/29/2018 1:53 PMConsent Form - Controlled Substance - Scan on 10/29/2018 1:53 PM    Orders - Scan on 10/29/2018 1:53 PMOrders - Scan on 10/29/2018 1:53 PM    Consent Form - Scan on 10/29/2018 1:53 PMConsent Form - Scan on 10/29/2018 1:53 PM    IM from Medicare - Scan on 10/23/2018 11:09 PMIM from Medicare - Scan on 10/23/2018 11:09 PM    Clinical Picture - Scan on 10/11/2018 : left medial lower leg/ankleClinical Picture - Scan on 10/11/2018 : left medial lower leg/ankle    IM from Medicare - Scan on 10/9/2018IM from Medicare - Scan on 10/9/2018    Clinical Picture - Scan on 10/9/2018 : Bilat thighClinical Picture - Scan on 10/9/2018 : Bilat thigh    Clinical Picture - Scan on 10/9/2018 : bilat thighsClinical Picture - Scan on 10/9/2018 : bilat thighs    Clinical Picture - Scan on 10/9/2018 : R lateral hip/thighClinical Picture - Scan on 10/9/2018 : R lateral hip/thigh    Clinical Picture - Scan on 10/9/2018 : abdomenClinical Picture - Scan on 10/9/2018 : abdomen    Clinical Picture - Scan on 10/9/2018 : L side of torsoClinical Picture - Scan on 10/9/2018 : L side of torso    Clinical Picture - Scan on 10/9/2018 : backClinical Picture - Scan on 10/9/2018 : back    Clinical Picture - Scan on 10/9/2018 : R side of TorsoClinical Picture - Scan on 10/9/2018 : R side of Torso    Clinical Picture - Scan on 10/9/2018 : L upper extremityClinical Picture - Scan on 10/9/2018 : L upper extremity    Clinical Picture - Scan on 10/9/2018 : R upper extremityClinical Picture - Scan on 10/9/2018 : R upper extremity    Clinical Picture - Scan on 10/9/2018 : handsClinical Picture - Scan on 10/9/2018 : hands    Clinical Picture - Scan on 10/9/2018 : left medial ankleClinical Picture - Scan on 10/9/2018 : left medial ankle    Clinical Picture - Scan on 10/9/2018 : Left Medial Lower LegClinical Picture - Scan on 10/9/2018 : Left Medial Lower Leg    Clinical Picture -  Scan on 10/9/2018 : Left Lower LegClinical Picture - Scan on 10/9/2018 : Left Lower Leg    Hospital Problem List        Osteoporosis      Essential hypertension      Dyslipidemia      Macrocytic anemia      B12 deficiency      Peripheral vascular disease (HCC)       Leg ulcer, left, with fat layer exposed (HCC)   Care Timeline     10/09   Admitted 1438   10/25   Discharged 1420   Discharge         Discharged to home/self care (01)             AVS      1 Safe Discharge (Printed 10/25/2018)   2 Controlled Substance Use Informed Consent (Printed 10/25/2018)   3 After Visit Summary (Printed 10/25/2018)   4 Active Medication List (Printed 10/25/2018)             Follow-Ups      1 Schedule an appointment with Milana Colin M.D. (Surgery) in 1 week (11/1/2018); call for follow up needs   2 Follow up with RYANN Son (Family Medicine)   3   Medication List at Discharge         Aspirin 81 mg Oral DAILY      Atorvastatin Calcium 40 mg Oral EVERY EVENING       Cholecalciferol 1,000 Units Oral DAILY      Cyanocobalamin 1,000 mcg Oral DAILY      Fexofenadine HCl 180 mg Oral EVERY 12 HOURS      Folic Acid 1 mg Oral DAILY      Lisinopril 5 mg Oral DAILY      Metoprolol Succinate 100 MG TAKE 1 TABLET BY MOUTH EVERY DAY      Multiple Vitamin 1 Tab Oral DAILY      Oxycodone-Acetaminophen 5-325 MG 1 Tab Oral EVERY 6 HOURS PRN      Sertraline HCl 100 mg Oral DAILY      Triamcinolone Acetonide 0.1 % Apply to rash BID PRN   All hospital records have been reviewed by me today and summarized as above.

## 2018-10-31 NOTE — DOCUMENTATION QUERY
DOCUMENTATION QUERY    PROVIDERS: Please select “Cosign w/ note”to reply to query.    To better represent the severity of illness of your patient, please review the following information and exercise your independent professional judgment in responding to this query.     Patient with osteoporosis is documented as having a chronic nonhealing ankle ulcer which cannot be surgically treated until resolution of nonhealing ulcers.    Based on clinical findings, risk factors and treatment, can this fracture be further clarified as    1. Related to osteoporosis  2. Traumatic and unrelated to osteoporosis  3. Other explanation of clinical presentation (please document)  4. Unable to determine    The medical record reflects the following:   Clinical Findings  chronic nonhealing ankle fracture   osteoporosis      Treatment  walking boot, walker, outpatient referral for PT   Risk Factors     Location within medical record  History and Physical, Progress Notes and Discharge Summary     Thank you,   Marilee Alvarado

## 2018-10-31 NOTE — PATIENT INSTRUCTIONS
Wound VAC at 125mmHg continuous with black foam. Dressing will be changed every MWF at the wound clinic.  If you are having issues with your wound VAC, please consider patching leaks, changing the canister, or calling [phone] for troubleshooting. If the wound VAC has been off or un-operational for over 2 hours, call wound care center to inform them and remove all dressings including black foam and replace with normal saline damp gauze.     Should you experience any significant changes in your wound(s), such as infection (redness, swelling, localized heat, increased pain, fever > 101 F, chills) or have any questions regarding your home care instructions, please contact the wound center at [phone]. If after hours, contact your primary care physician or go to the hospital emergency room.   Keep dressing clean, dry and cover when bathing. Only change dressing if it's over saturated, soiled or falls off.

## 2018-10-31 NOTE — PROCEDURES
cswd using scalpel to gently remove thick layer of slough from wound beds on LLE circumferentially, approximately 40cm2.  Forceps used to remove dry skin crusts/scabs from toes on both feet.  Pt tolerated well.

## 2018-11-02 ENCOUNTER — NON-PROVIDER VISIT (OUTPATIENT)
Dept: WOUND CARE | Facility: MEDICAL CENTER | Age: 73
End: 2018-11-02
Attending: SURGERY
Payer: MEDICARE

## 2018-11-02 PROCEDURE — 97605 NEG PRS WND THER DME<=50SQCM: CPT

## 2018-11-02 NOTE — PROCEDURES
Wound Care Procedures 11/2/2018:  Nonselective debridement with soapy washcloth then NS & gauze to remove biofilm from all wounds.  Wound vac applied to left medial/distal wound using one black foam.

## 2018-11-02 NOTE — PATIENT INSTRUCTIONS
-You have a wound vac at 125 mmHg continuous with black foam. The dressing will be changed every Monday, Wednesday, and Friday at the wound clinic or with your home health nurse.    -If you are having issues with your wound vac, please consider patching leaks, changing the canister, or calling [phone] for troubleshooting. If the wound vac has been off or non-operational for over 2 hours, call wound care center to inform them and remove all dressings including black foam and replace with gauze moistened with normal saline.     -Keep your other dressings clean, dry and cover when bathing. Only change dressing if it's over saturated, soiled or falls off.     -Should you experience any significant changes in your wound(s), such as infection (redness, swelling, localized heat, increased pain, fever > 101 F, chills) or have any questions regarding your home care instructions, please contact the wound center at [phone]. If after hours, contact your primary care physician or go to the hospital emergency room.

## 2018-11-05 ENCOUNTER — PATIENT OUTREACH (OUTPATIENT)
Dept: HEALTH INFORMATION MANAGEMENT | Facility: OTHER | Age: 73
End: 2018-11-05

## 2018-11-05 ENCOUNTER — APPOINTMENT (OUTPATIENT)
Dept: WOUND CARE | Facility: MEDICAL CENTER | Age: 73
End: 2018-11-05
Attending: NURSE PRACTITIONER
Payer: MEDICARE

## 2018-11-05 ENCOUNTER — OFFICE VISIT (OUTPATIENT)
Dept: WOUND CARE | Facility: MEDICAL CENTER | Age: 73
End: 2018-11-05
Attending: SURGERY
Payer: MEDICARE

## 2018-11-05 VITALS
TEMPERATURE: 99.5 F | HEART RATE: 83 BPM | OXYGEN SATURATION: 95 % | SYSTOLIC BLOOD PRESSURE: 138 MMHG | DIASTOLIC BLOOD PRESSURE: 66 MMHG

## 2018-11-05 DIAGNOSIS — S81.802D OPEN WOUND OF LEFT LOWER EXTREMITY, SUBSEQUENT ENCOUNTER: ICD-10-CM

## 2018-11-05 PROCEDURE — 97597 DBRDMT OPN WND 1ST 20 CM/<: CPT | Performed by: SURGERY

## 2018-11-05 PROCEDURE — 11042 DBRDMT SUBQ TIS 1ST 20SQCM/<: CPT

## 2018-11-05 RX ORDER — GLUCOSA SU 2KCL/CHONDROITIN SU 500-400 MG
1 CAPSULE ORAL
Refills: 0 | COMMUNITY
Start: 2018-10-25

## 2018-11-05 NOTE — PATIENT INSTRUCTIONS
Wound VAC at 150mmHg continuous with 3 foam. Dressing will be changed every MWF at the wound clinic.  If you are having issues with your wound VAC, please consider patching leaks, changing the canister, or calling [phone] for troubleshooting. If the wound VAC has been off or un-operational for over 2 hours, call wound care center to inform them and remove all dressings including black foam and replace with normal saline damp gauze.     Should you experience any significant changes in your wound(s), such as infection (redness, swelling, localized heat, increased pain, fever > 101 F, chills) or have any questions regarding your home care instructions, please contact the wound center at [phone]. If after hours, contact your primary care physician or go to the hospital emergency room.   Keep dressing clean, dry and cover when bathing. Only change dressing if it's over saturated, soiled or falls off.   Do not scratch scab sites over body. Referral for dermatology has been made.

## 2018-11-05 NOTE — PROGRESS NOTES
Outreach call to pt to follow up and determine how she would like to proceed with care plan/interventions. Pt did not answer. LSW left VM requesting call back.    Plan:  · LSW will attempt to reach pt at later time/date, if LSW does not hear back.

## 2018-11-05 NOTE — PROCEDURES
Non-selective debridement to all wounds with soapy wet washcloth to remove thin non-viable slough layer. Applied topical lidocaine to left lower leg wounds & medial ankle wound. Dr. Colin for sharp debridement. RN applied NPWT to left medial ankle & left medial lower leg wounds using 3 pieces of black foam & bridge dressing; tubigrip applied per Dr. Colin.

## 2018-11-05 NOTE — PROCEDURES
Procedure Note:    Pre-operative Diagnosis: Left ankle and low calf open wound    Post-Operative Diagnosis:  Same    Procedure:  Debridement of left medial ankle and low calf wounds    History: Nadege Mae is a 73-year-old woman who presents with open wounds to the left ankle and lower calf.  She has a long-standing history that began with a hip fracture.  She eventually developed an ankle fracture and a low calf wound.  An overlying pressure dressing on her ankle fracture caused an open wound.  She underwent a left leg bypass and we have been stymied to provide wound healing to the left lower extremity.  She was admitted the beginning of October after recommendations to be admitted 2 months prior.  She had dramatic improvement in the quality of her wound bed while in the hospital maintained on  Vera flow.  She has been transition to the outpatient environment and her ankle wound is not improving.  The medial calf wound does appear to have shrunk in size.    In addition to her wounds, she is developed a systemic rash.  No one has been able to clearly identify the cause of this blistering rash.  Originally, it was thought to be related to Bactrim but she has had no sulfur agents in some time.  She continues to form blisters on both legs and, even her torso and arms do at certain extent.  Originally the rash or blister formation was much worse on her left leg but today, she presents with superficial eschars on both legs, this has severely limited our use of negative pressure wound dressings.    The medial calf wound measures 5.0 x 2.2 cm.  It is approximately 3 mm deep.  Using a 15 blade, I performed sharp, excisional debridement to the wound, removing the entire base of the wound and the slough from the base of the wound.  The granulation tissue was not quite as good a quality as it was while she was hospitalized, but the entire wound appears smaller.  (11cm sq cm)    The medial ankle wound measures 3.5 x 4.5 cm and  this appears absolutely stalled.  I again performed sharp, excisional debridement over the entire base removing slough and fibrinous debris from the base of the wound.  The wound itself is superficial and has nearly no depth.  I used gauze to remove the debris and a negative pressure wound dressing will be applied.  (15.75 sq cm)    Summary: Nacho wound is very difficult.  I would consider some sort of biologic dressing but, the movement of the medial ankle, and disruption of the wound is difficult.  And believes that the use of her boot aggravates her negative pressure dressing and today, we are going to ask her to abandon the use of this.  We have provided her with a booty to protect her sock on her way home.  She may need another hospitalization with transfer to [facility] to completely heal the wound.  She has an appointment with her orthopedic surgeon the end of the month.  I collaborated with Babs Sharpe RN to form a different track pad placement to avoid pressure directly on the medial ankle which may allow better use of the negative pressure wound dressing.  We also discussed the use of 2 separate sponges with bridging in between so that we can continue negative pressure wound therapy to the low calf wound as well as the ankle.

## 2018-11-06 NOTE — DOCUMENTATION QUERY
DOCUMENTATION QUERY    PROVIDERS: Please select “Cosign w/ note”to reply to query.    To better represent the severity of illness of your patient, please review the following information and exercise your independent professional judgment in responding to this query.     To better represent the severity of illness of your patient, please review the following information and exercise your independent professional judgment in responding to this query.      Patient with osteoporosis is documented as having a chronic nonhealing ankle fracture which cannot be surgically treated until resolution of nonhealing ulcers.     Based on clinical findings, risk factors and treatment, can this fracture be further clarified as     1. Related to osteoporosis  2. Traumatic and unrelated to osteoporosis  3. Other explanation of clinical presentation (please document)  4. Unable to determine     The medical record reflects the following:   Clinical Findings  chronic nonhealing ankle fracture   osteoporosis      Treatment  walking boot, walker, outpatient referral for PT   Risk Factors     Location within medical record  History and Physical, Progress Notes and Discharge Summary          Thank you,   Marilee Alvarado

## 2018-11-07 ENCOUNTER — NON-PROVIDER VISIT (OUTPATIENT)
Dept: WOUND CARE | Facility: MEDICAL CENTER | Age: 73
End: 2018-11-07
Attending: NURSE PRACTITIONER
Payer: MEDICARE

## 2018-11-07 PROCEDURE — 97598 DBRDMT OPN WND ADDL 20CM/<: CPT

## 2018-11-07 PROCEDURE — 97597 DBRDMT OPN WND 1ST 20 CM/<: CPT

## 2018-11-07 NOTE — PROCEDURES
cswd using scalpel to remove ~50cm2 slough mainly from left leg medial distal and proximal wounds and LLE circumferential wounds.  ~5cm2 removed from RLE circumferential wounds.  2% topical lidocaine applied prior to debridement with ~10 minute dwell time.  Pt reports manageable pain with debridement.

## 2018-11-07 NOTE — PATIENT INSTRUCTIONS
Wound VAC at 125mmHg continuous with black foam. Dressing will be changed every MWF at the wound clinic.  If you are having issues with your wound VAC, please consider patching leaks, changing the canister, or calling [phone] for troubleshooting. If the wound VAC has been off or un-operational for over 2 hours, call wound care center to inform them and remove all dressings including black foam and replace with normal saline damp gauze.     Should you experience any significant changes in your wound(s), such as infection (redness, swelling, localized heat, increased pain, fever > 101 F, chills) or have any questions regarding your home care instructions, please contact the wound center at [phone]. If after hours, contact your primary care physician or go to the hospital emergency room.   Keep dressing clean, dry and cover when bathing. Only change dressing if it's over saturated, soiled or falls off.     Avoid prolonged standing or sitting without elevating your legs.  - Apply tubigrip to your legs ending 2 fingers below back of knee without wrinkles.

## 2018-11-09 ENCOUNTER — NON-PROVIDER VISIT (OUTPATIENT)
Dept: WOUND CARE | Facility: MEDICAL CENTER | Age: 73
End: 2018-11-09
Attending: SURGERY
Payer: MEDICARE

## 2018-11-09 PROCEDURE — 97598 DBRDMT OPN WND ADDL 20CM/<: CPT

## 2018-11-09 PROCEDURE — 97597 DBRDMT OPN WND 1ST 20 CM/<: CPT

## 2018-11-09 NOTE — PATIENT INSTRUCTIONS
-You have a wound vac at 150 mmHg continuous with black foam. The dressing will be changed every Monday, Wednesday, and Friday at the wound clinic.    -If you are having issues with your wound vac, please consider patching leaks, changing the canister, or calling [phone] for troubleshooting. If the wound vac has been off or non-operational for over 2 hours, call wound care center to inform them and remove all dressings including black foam and replace with gauze moistened with normal saline.     -Should you experience any significant changes in your wound(s), such as infection (redness, swelling, localized heat, increased pain, fever > 101 F, chills) or have any questions regarding your home care instructions, please contact the wound center at [phone]. If after hours, contact your primary care physician or go to the hospital emergency room.

## 2018-11-09 NOTE — PROCEDURES
Wound Care Procedures 11/9/2018:  Viscous lidocaine applied to wounds prior to debridement with ~10 minute dwell time.    CSWD with scalpel to remove ~30 cm2 slough from all wounds.    Wound vac applied to left medial ankle proximal and left medial ankle distal wounds.

## 2018-11-12 ENCOUNTER — NON-PROVIDER VISIT (OUTPATIENT)
Dept: WOUND CARE | Facility: MEDICAL CENTER | Age: 73
End: 2018-11-12
Attending: NURSE PRACTITIONER
Payer: MEDICARE

## 2018-11-12 PROCEDURE — 97597 DBRDMT OPN WND 1ST 20 CM/<: CPT

## 2018-11-12 NOTE — PATIENT INSTRUCTIONS
Wound VAC at 125mmHg continuous. Dressing will be changed every MWF at the wound clinic.  If you are having issues with your wound VAC, please consider patching leaks, changing the canister, or calling [phone] for troubleshooting. If the wound VAC has been off or un-operational for over 2 hours, call wound care center to inform them and remove all dressings including black foam and replace with normal saline damp gauze.       Should you experience any significant changes in your wound(s), such as infection (redness, swelling, localized heat, increased pain, fever > 101 F, chills) or have any questions regarding your home care instructions, please contact the wound center at [phone]. If after hours, contact your primary care physician or go to the hospital emergency room.   Keep your dressing clean, dry and cover while bathing. Only change dressing if it becomes over saturated, soiled or falls off.

## 2018-11-12 NOTE — PROCEDURES
Lido gel 2% dwell time 10 minutes. CSWD approx 18 cm2 of necrotic yellow tissue, biofiom to wound bed.

## 2018-11-14 ENCOUNTER — NON-PROVIDER VISIT (OUTPATIENT)
Dept: WOUND CARE | Facility: MEDICAL CENTER | Age: 73
End: 2018-11-14
Attending: NURSE PRACTITIONER
Payer: MEDICARE

## 2018-11-14 PROCEDURE — 97597 DBRDMT OPN WND 1ST 20 CM/<: CPT

## 2018-11-14 NOTE — PATIENT INSTRUCTIONS
-Keep your wound dressing clean, dry, and intact.    -Wound vac may not have any drainage in tube or cannister & it will still be working.   Change cannister if it does become full by pressing tab on side of machine to remove canister and snap on new one. Full canister can be thrown in the trash. If cannister fills with bright red blood - go to ER. Dressing will be changed every MWF at the wound clini.  If you are having issues with your wound VAC, please consider patching leaks, changing the canister, or calling [phone] for troubleshooting. If the wound VAC has been off or un-operational for over 2 hours, call wound care center to inform them and remove all dressings including black foam and replace with normal saline damp gauze.     -Should you experience any significant changes in your wound(s), such as infection (redness, swelling, localized heat, increased pain, fever > 101 F, chills) or have any questions regarding your home care instructions, please contact the wound center at [phone]. If after hours, contact your primary care physician or go to the hospital emergency room.

## 2018-11-15 ENCOUNTER — TELEPHONE (OUTPATIENT)
Dept: HEALTH INFORMATION MANAGEMENT | Facility: OTHER | Age: 73
End: 2018-11-15

## 2018-11-15 ENCOUNTER — PATIENT OUTREACH (OUTPATIENT)
Dept: HEALTH INFORMATION MANAGEMENT | Facility: OTHER | Age: 73
End: 2018-11-15

## 2018-11-15 NOTE — PROGRESS NOTES
[facility] patient advocate attempted to reach out to patient on her cell phone, and the call was forwarded to [facility]. Advocate reached out to patient's roommate to have advocate's number passed along. Milana answered the phone and said she was sitting next to patient and passed the phone over. Advocate asked how patient has been doing, and patient reported she is doing well and was just waiting for her leg to heal so she can go back to work. Patient said she will see a dermatologist on 12/5/18. Advocate inquired about resources LSW has sent over. Patient reported she makes too much money in order to qualify. Patient said the only one that she might qualify for is the RX assistance. Patient stated she doesn't want to pursue it at this time though. Advocate confirmed patient has appropriate numbers to call for assistance, and encouraged a call anytime.

## 2018-11-16 ENCOUNTER — NON-PROVIDER VISIT (OUTPATIENT)
Dept: WOUND CARE | Facility: MEDICAL CENTER | Age: 73
End: 2018-11-16
Attending: NURSE PRACTITIONER
Payer: MEDICARE

## 2018-11-16 PROCEDURE — 97598 DBRDMT OPN WND ADDL 20CM/<: CPT

## 2018-11-16 PROCEDURE — 97597 DBRDMT OPN WND 1ST 20 CM/<: CPT

## 2018-11-16 NOTE — PROCEDURES
Wound Care Procedures 11/16/2018:  CSWD with scalpel to remove ~32 cm2 slough from LLE wounds.    Wound vac applied to left medial ankle proximal and left medial ankle distal wounds.

## 2018-11-19 ENCOUNTER — NON-PROVIDER VISIT (OUTPATIENT)
Dept: WOUND CARE | Facility: MEDICAL CENTER | Age: 73
End: 2018-11-19
Attending: NURSE PRACTITIONER
Payer: MEDICARE

## 2018-11-19 PROCEDURE — 97597 DBRDMT OPN WND 1ST 20 CM/<: CPT

## 2018-11-19 PROCEDURE — 97598 DBRDMT OPN WND ADDL 20CM/<: CPT

## 2018-11-19 NOTE — PATIENT INSTRUCTIONS
Wound VAC at 125mmHg continuous with black foam. Dressing will be changed every MWF at the wound clinic.  If you are having issues with your wound VAC, please consider patching leaks, changing the canister, or calling [phone] for troubleshooting. If the wound VAC has been off or un-operational for over 2 hours, call wound care center to inform them and remove all dressings including black foam and replace with normal saline damp gauze.     Should you experience any significant changes in your wound(s), such as infection (redness, swelling, localized heat, increased pain, fever > 101 F, chills) or have any questions regarding your home care instructions, please contact the wound center at [phone]. If after hours, contact your primary care physician or go to the hospital emergency room.   Keep dressing clean, dry and cover when bathing. Only change dressing if it's over saturated, soiled or falls off.

## 2018-11-19 NOTE — PROCEDURES
cswd using scalpel to remove ~35cm2 slough from all wound beds.  2% topical lidocaine applied prior to debridement with 10 minute dwell time.  Pt reported some pain with debridement which she stated was manageable.

## 2018-11-21 ENCOUNTER — PATIENT OUTREACH (OUTPATIENT)
Dept: HEALTH INFORMATION MANAGEMENT | Facility: OTHER | Age: 73
End: 2018-11-21

## 2018-11-21 ENCOUNTER — NON-PROVIDER VISIT (OUTPATIENT)
Dept: WOUND CARE | Facility: MEDICAL CENTER | Age: 73
End: 2018-11-21
Attending: NURSE PRACTITIONER
Payer: MEDICARE

## 2018-11-21 PROCEDURE — 97598 DBRDMT OPN WND ADDL 20CM/<: CPT

## 2018-11-21 PROCEDURE — 97597 DBRDMT OPN WND 1ST 20 CM/<: CPT

## 2018-11-21 NOTE — PROGRESS NOTES
"IHD patient advocate reached out to patient to check in before the holiday break.Patient reported she was \"Good\" and her leg was \"looking better.\" Patient confirmed wound care has been going well and stated she does not need anything before the weekend. Advocate encouraged patient to reach out with any needs.  "

## 2018-11-21 NOTE — PATIENT INSTRUCTIONS
Wound VAC at 125mmHg continuous with black foam. Dressing will be changed every MWF at the wound clinic.  If you are having issues with your wound VAC, please consider patching leaks, changing the canister, or calling [phone] for troubleshooting. If the wound VAC has been off or un-operational for over 2 hours, call wound care center to inform them and remove all dressings including black foam and replace with normal saline damp gauze.     Should you experience any significant changes in your wound(s), such as infection (redness, swelling, localized heat, increased pain, fever > 101 F, chills) or have any questions regarding your home care instructions, please contact the wound center at [phone]. If after hours, contact your primary care physician or go to the hospital emergency room.   Keep dressing clean, dry and cover when bathing. Only change dressing if it's over saturated, soiled or falls off.

## 2018-11-21 NOTE — PROCEDURES
cswd using scalpel to remove ~40cm2 slough from wound beds.  2% topical lidocaine applied prior to procedure with ~10 minute dwell time.  Pt tolerated procedure well, although did report some pain.

## 2018-11-23 ENCOUNTER — NON-PROVIDER VISIT (OUTPATIENT)
Dept: WOUND CARE | Facility: MEDICAL CENTER | Age: 73
End: 2018-11-23
Attending: NURSE PRACTITIONER
Payer: MEDICARE

## 2018-11-23 PROCEDURE — 97605 NEG PRS WND THER DME<=50SQCM: CPT

## 2018-11-23 NOTE — PATIENT INSTRUCTIONS
Wound VAC at 125mmHg continuous or intermittent with 1 foam. Dressing will be changed every MWF at the wound clinic or with your home health nurse.  If you are having issues with your wound VAC, please consider patching leaks, changing the canister, or calling [phone] for troubleshooting. If the wound VAC has been off or un-operational for over 2 hours, call wound care center to inform them and remove all dressings including black foam and replace with normal saline damp gauze.     Should you experience any significant changes in your wound(s), such as infection (redness, swelling, localized heat, increased pain, fever > 101 F, chills) or have any questions regarding your home care instructions, please contact the wound center at [phone]. If after hours, contact your primary care physician or go to the hospital emergency room.   Keep dressing clean, dry and cover when bathing. Only change dressing if it's over saturated, soiled or falls off.

## 2018-11-24 ENCOUNTER — PATIENT OUTREACH (OUTPATIENT)
Dept: HEALTH INFORMATION MANAGEMENT | Facility: OTHER | Age: 73
End: 2018-11-24

## 2018-11-26 ENCOUNTER — NON-PROVIDER VISIT (OUTPATIENT)
Dept: WOUND CARE | Facility: MEDICAL CENTER | Age: 73
End: 2018-11-26
Attending: NURSE PRACTITIONER
Payer: MEDICARE

## 2018-11-26 PROCEDURE — 97597 DBRDMT OPN WND 1ST 20 CM/<: CPT

## 2018-11-26 NOTE — PATIENT INSTRUCTIONS
Should you experience any significant changes in your wound(s) such as infection (redness, swelling, localized heat, increased pain, fever >101 F, chills) or have any questions regarding your home care instructions, please contact the wound center [phone]. If after hours, contact your primary care physician or go the hospital emergency room.  Keep dressing clean and dry and cover while bathing. Only change dressing if over saturated, soiled or its falling off.

## 2018-11-26 NOTE — PROCEDURES
Topical Lidocaine 2% applied to L. Medial Ankle Proximal and Distal wounds for 10 minutes. CSWD using scalpel to gently remove yellow adherent tissue covering granulation, 20 cm2 removed from both wounds. Patient tolerated with minimal pain. Non-selective debridement to all other wounds.

## 2018-11-28 ENCOUNTER — NON-PROVIDER VISIT (OUTPATIENT)
Dept: WOUND CARE | Facility: MEDICAL CENTER | Age: 73
End: 2018-11-28
Attending: SURGERY
Payer: MEDICARE

## 2018-11-28 PROCEDURE — 97597 DBRDMT OPN WND 1ST 20 CM/<: CPT

## 2018-11-29 ENCOUNTER — PATIENT OUTREACH (OUTPATIENT)
Dept: HEALTH INFORMATION MANAGEMENT | Facility: OTHER | Age: 73
End: 2018-11-29

## 2018-11-30 ENCOUNTER — PATIENT OUTREACH (OUTPATIENT)
Dept: HEALTH INFORMATION MANAGEMENT | Facility: OTHER | Age: 73
End: 2018-11-30

## 2018-11-30 ENCOUNTER — NON-PROVIDER VISIT (OUTPATIENT)
Dept: WOUND CARE | Facility: MEDICAL CENTER | Age: 73
End: 2018-11-30
Attending: SURGERY
Payer: MEDICARE

## 2018-11-30 PROCEDURE — 97597 DBRDMT OPN WND 1ST 20 CM/<: CPT

## 2018-11-30 PROCEDURE — 97598 DBRDMT OPN WND ADDL 20CM/<: CPT

## 2018-11-30 NOTE — PROCEDURES
CSWD using curette to remove approx. ~35cm2 of nonviable tissue from L medial ankle proximal and distal wound beds.

## 2018-11-30 NOTE — PATIENT INSTRUCTIONS
Wound VAC at 125mmHg continuous with 3 black foam. Dressing will be changed every MWF at the wound clinic or with your home health nurse.  If you are having issues with your wound VAC, please consider patching leaks, changing the canister, or calling [phone] for troubleshooting. If the wound VAC has been off or un-operational for over 2 hours, call wound care center to inform them and remove all dressings including black foam and replace with normal saline damp gauze.     Keep dressing clean and dry and cover while bathing. Only change dressing if over saturated, soiled or its falling off.     Should you experience any significant changes in your wound(s) such as infection (redness, swelling, localized heat, increased pain, fever >101 F, chills) or have any questions regarding your home care instructions, please contact the wound center [phone]. If after hours, contact your primary care physician or go the hospital emergency room.

## 2018-12-01 NOTE — PROGRESS NOTES
Outreach call to pt to follow up, pt reported she is doing well and her goal is to still return to work. Discussed in the meantime if she would like to apply for [employer] programs to help with her expenses while she reminds on her SSA income only. Pt reported she does not feel she would qualify for programs due to her assets but is interested in resources for food and other financial programs to help with her medical bill with [facility]. Discussed that pt may be eligible for [facility] [employer] as well as SNAP benefits and/or [employer]. Pt is interested in pursing and would like to follow up in-person.     Plan:  · Scheduled home visit with pt for 12/05/2018 at 1:30 pm to review resources/programs. Pt instructed on supporting documentation to have for looking into options.

## 2018-12-01 NOTE — PROGRESS NOTES
Nadege Mae was admitted to [facility] on 10/9/18 for a left leg ulcer. Patient discharged home on 10/25/18. IHD patient advocate was able to successfully engage with patient post-discharge and throughout the case. Per discharge orders, patient was instructed to see her PCP. Patient saw her PCP on 10/31/18. Patient followed-up with Dr. Colin (general vascular surgery) on 11/5/18, and started attending out-patient wound care on 10/26/18. Patient is currently being followed by Annalee HALE at the [facility]. PPS screening was not conducted secondary to low LACE score upon hospital discharge.

## 2018-12-03 ENCOUNTER — NON-PROVIDER VISIT (OUTPATIENT)
Dept: WOUND CARE | Facility: MEDICAL CENTER | Age: 73
End: 2018-12-03
Attending: NURSE PRACTITIONER
Payer: MEDICARE

## 2018-12-03 PROCEDURE — 97597 DBRDMT OPN WND 1ST 20 CM/<: CPT | Performed by: SURGERY

## 2018-12-03 PROCEDURE — 97597 DBRDMT OPN WND 1ST 20 CM/<: CPT

## 2018-12-03 NOTE — PROCEDURES
CSWD with scalpel to remove <20 cm2 non viable tissue from left ankle wounds only. Non selective with NS and gauze to all other wounds.

## 2018-12-03 NOTE — PATIENT INSTRUCTIONS
Should you experience any significant changes in your wound(s) such as infection (redness, swelling, localized heat, increased pain, fever >101 F, chills) or have any questions regarding your home care instructions, please contact the wound center [phone]. If after hours, contact your primary care physician or go the hospital emergency room.  Keep dressing clean and dry and cover while bathing. Only change dressing if over saturated, soiled or its falling off.

## 2018-12-05 ENCOUNTER — PATIENT OUTREACH (OUTPATIENT)
Dept: HEALTH INFORMATION MANAGEMENT | Facility: OTHER | Age: 73
End: 2018-12-05

## 2018-12-05 ENCOUNTER — NON-PROVIDER VISIT (OUTPATIENT)
Dept: WOUND CARE | Facility: MEDICAL CENTER | Age: 73
End: 2018-12-05
Attending: NURSE PRACTITIONER
Payer: MEDICARE

## 2018-12-05 PROCEDURE — 97597 DBRDMT OPN WND 1ST 20 CM/<: CPT

## 2018-12-05 NOTE — PROCEDURES
CSWD with scalpel to remove <20 cm2 non viable tissue from left ankle wounds,  Non selective with NS and gauze to all other wounds. NPWT VAC applied to ankle wounds with one piece of black sponge to each wound, bridged tracpad to proximal LE laterally, pump started at neg 125mmhg continuous, no leaks noted.

## 2018-12-05 NOTE — PROGRESS NOTES
Incoming call from pt reporting she would like to reschedule appointment this afternoon in related to the snow. She reported she would follow back up with MSW to reschedule. She explained she was currently on her way home and would need to look over her calender. MSW voiced understanding and reported MSW would mail out application related to programs she wanted to apply for her review. Pt agreeable.     Shortly after disconnecting phone with pt, MSW received a phone call from individual reporting to be pt's room mate and reported she found MSW card/contact information in previous resources MSW had mailed to pt. Individual reported she is would like to inquire about services for pt as she has been taking care of pt for the last year. MSW informed individual that MSW could not disclose any medical information or discuss concerns with her without consent from pt. Individual voiced understanding and reported she would have pt call MSW back.     Plan:  · MSW will follow up with pt, if MSW does not hear back at later time/date. MSW will discuss with pt if she would like MSW to follow up with her room mate and disclose information.

## 2018-12-05 NOTE — PATIENT INSTRUCTIONS
Should you experience any significant changes in your wound(s) such as infection (redness, swelling, localized heat, increased pain, fever >101 F, chills) or have any questions regarding your home care instructions, please contact the wound center [phone]. If after hours, contact your primary care physician or go the hospital emergency room.  Keep dressing clean and dry and cover while bathing. Only change dressing if over saturated, soiled or its falling off.     Reviewed [employer] VAC, mechanisms of action, troubleshooting,how to patch a leak, how to change cannister,  remove dressing and apply moist gauze dressing if suction is lost and dressing fails and cannot be patched. Pt has good understanding.

## 2018-12-07 ENCOUNTER — NON-PROVIDER VISIT (OUTPATIENT)
Dept: WOUND CARE | Facility: MEDICAL CENTER | Age: 73
End: 2018-12-07
Attending: NURSE PRACTITIONER
Payer: MEDICARE

## 2018-12-07 PROCEDURE — 97598 DBRDMT OPN WND ADDL 20CM/<: CPT

## 2018-12-07 PROCEDURE — 11042 DBRDMT SUBQ TIS 1ST 20SQCM/<: CPT

## 2018-12-07 PROCEDURE — 97597 DBRDMT OPN WND 1ST 20 CM/<: CPT

## 2018-12-07 NOTE — PATIENT INSTRUCTIONS
-You have a wound vac at 125 mmHg continuous with black foam. The dressing will be changed every Monday, Wednesday, and Friday at the wound clinic.    -If you are having issues with your wound vac, please consider patching leaks, changing the canister, or calling [phone] for troubleshooting. If the wound vac has been off or non-operational for over 2 hours, call wound care center to inform them and remove all dressings including black foam and replace with gauze moistened with normal saline.     -Should you experience any significant changes in your wound(s), such as infection (redness, swelling, localized heat, increased pain, fever > 101 F, chills) or have any questions regarding your home care instructions, please contact the wound center at [phone]. If after hours, contact your primary care physician or go to the hospital emergency room.

## 2018-12-07 NOTE — PROCEDURES
Wound Care Procedures 12/7/2018:  CSWD with scalpel to remove ~30 cm2 slough from left medial ankle wounds.  Wound vac applied to left medial ankle wounds using two pieces of black foam.

## 2018-12-10 ENCOUNTER — OFFICE VISIT (OUTPATIENT)
Dept: WOUND CARE | Facility: MEDICAL CENTER | Age: 73
End: 2018-12-10
Attending: NURSE PRACTITIONER
Payer: MEDICARE

## 2018-12-10 VITALS
HEART RATE: 85 BPM | OXYGEN SATURATION: 89 % | DIASTOLIC BLOOD PRESSURE: 69 MMHG | TEMPERATURE: 97.4 F | SYSTOLIC BLOOD PRESSURE: 155 MMHG

## 2018-12-10 PROCEDURE — 97597 DBRDMT OPN WND 1ST 20 CM/<: CPT

## 2018-12-10 NOTE — PATIENT INSTRUCTIONS
Should you experience any significant changes in your wound(s) such as infection (redness, swelling, localized heat, increased pain, fever >101 F, chills) or have any questions regarding your home care instructions, please contact the wound center [phone]. If after hours, contact your primary care physician or go the hospital emergency room.  Keep dressing clean and dry and cover while bathing. Only change dressing if over saturated, soiled or its falling off.   Vac to re-apply on Wednesday.

## 2018-12-10 NOTE — PROCEDURES
CSWD with curette post lidocaine application of ~15 cm2 to both ankle wounds to remove non-viable slough.

## 2018-12-10 NOTE — PROGRESS NOTES
"Dr. Colin discussed possible skin grafting with patient to cover medial ankle wounds. She plans to consider and notify Dixie if she wants to be added to OP surgery schedule for a Friday with two weeks notice. Vac break to allow for antimicrobial in contact with wound beds due to odor, plan to reapply NPWT 12/12/18. Patient find Viscopaste patches and wrap good for skin, as adhesive bandaids were causing trauma & irritation. Patient denies to picking scabs and wound sites all over her body, however does \"rub\" areas as they itch; noticed patient picking at her hands during treatment. Educated patient not to pick sites and apply lotion, which she states she has not been doing; she is following up with dermatologist soon.  "

## 2018-12-11 ENCOUNTER — APPOINTMENT (RX ONLY)
Dept: URBAN - METROPOLITAN AREA CLINIC 22 | Facility: CLINIC | Age: 73
Setting detail: DERMATOLOGY
End: 2018-12-11

## 2018-12-11 DIAGNOSIS — L30.9 DERMATITIS, UNSPECIFIED: ICD-10-CM

## 2018-12-11 DIAGNOSIS — Z71.89 OTHER SPECIFIED COUNSELING: ICD-10-CM

## 2018-12-11 DIAGNOSIS — L85.3 XEROSIS CUTIS: ICD-10-CM

## 2018-12-11 DIAGNOSIS — L13.9 BULLOUS DISORDER, UNSPECIFIED: ICD-10-CM

## 2018-12-11 PROBLEM — I10 ESSENTIAL (PRIMARY) HYPERTENSION: Status: ACTIVE | Noted: 2018-12-11

## 2018-12-11 PROCEDURE — 11101: CPT

## 2018-12-11 PROCEDURE — ? BIOPSY BY PUNCH METHOD

## 2018-12-11 PROCEDURE — ? ADDITIONAL NOTES

## 2018-12-11 PROCEDURE — ? PRESCRIPTION

## 2018-12-11 PROCEDURE — 11100: CPT

## 2018-12-11 PROCEDURE — ? TREATMENT REGIMEN

## 2018-12-11 PROCEDURE — 99203 OFFICE O/P NEW LOW 30 MIN: CPT | Mod: 25

## 2018-12-11 PROCEDURE — ? ORDER TESTS

## 2018-12-11 PROCEDURE — ? BIOPSY BY SHAVE METHOD

## 2018-12-11 PROCEDURE — ? COUNSELING

## 2018-12-11 PROCEDURE — ? REFERRAL CORRESPONDENCE

## 2018-12-11 RX ORDER — CLOBETASOL PROPIONATE 0.5 MG/G
CREAM TOPICAL
Qty: 6 | Refills: 0 | Status: ERX | COMMUNITY
Start: 2018-12-11

## 2018-12-11 RX ADMIN — CLOBETASOL PROPIONATE 1: 0.5 CREAM TOPICAL at 00:00

## 2018-12-11 ASSESSMENT — LOCATION ZONE DERM: LOCATION ZONE: TRUNK

## 2018-12-11 ASSESSMENT — LOCATION DETAILED DESCRIPTION DERM
LOCATION DETAILED: INFERIOR THORACIC SPINE
LOCATION DETAILED: RIGHT LATERAL UPPER BACK
LOCATION DETAILED: LEFT RIB CAGE

## 2018-12-11 ASSESSMENT — LOCATION SIMPLE DESCRIPTION DERM
LOCATION SIMPLE: UPPER BACK
LOCATION SIMPLE: RIGHT UPPER BACK
LOCATION SIMPLE: ABDOMEN

## 2018-12-11 NOTE — PROCEDURE: BIOPSY BY PUNCH METHOD
Dressing: bandage
Consent: Written consent was obtained and risks were reviewed including but not limited to scarring, infection, bleeding, scabbing, incomplete removal, nerve damage and allergy to anesthesia.
Biopsy Type: DIF
Notification Instructions: Patient will be notified of biopsy results. However, patient instructed to call the office if not contacted within 2 weeks.
Lab Facility: 
Home Suture Removal Text: Patient was provided a home suture removal kit and will remove their sutures at home.  If they have any questions or difficulties they will call the office.
Lab: [id number]
Was A Bandage Applied: Yes
Hemostasis: None
Render Post-Care Instructions In Note?: no
Size Of Lesion In Cm (Optional): 0
Anesthesia Type: 1% lidocaine with 1:100,000 epinephrine
Path Notes (To The Dermatopathologist): No Charge.  Lesion not her primary concern.
Billing Type: Third-Party Bill
Suture Removal: 14 days
Detail Level: Detailed
Epidermal Sutures: 4-0 PGA
Punch Size In Mm: 5
Wound Care: Vaseline
Post-Care Instructions: I reviewed with the patient in detail post-care instructions. Patient is to keep the biopsy site dry overnight, and then apply bacitracin twice daily until healed. Patient may apply hydrogen peroxide soaks to remove any crusting.
Anesthesia Volume In Cc: 1

## 2018-12-11 NOTE — PROCEDURE: ORDER TESTS
Lab Facility: [id number]
Billing Type: Third-Party Bill
Bill For Surgical Tray: no
Expected Date Of Service: 12/11/2018
Performing Laboratory: -[id number]

## 2018-12-11 NOTE — PROCEDURE: BIOPSY BY SHAVE METHOD
X Size Of Lesion In Cm: 0
Depth Of Biopsy: dermis
Render Post-Care Instructions In Note?: yes
Billing Type: Third-Party Bill
Biopsy Type: H and E
Bill 90859 For Specimen Handling/Conveyance To Laboratory?: no
Electrodesiccation Text: The wound bed was treated with electrodesiccation after the biopsy was performed.
Electrodesiccation And Curettage Text: The wound bed was treated with electrodesiccation and curettage after the biopsy was performed.
Anesthesia Volume In Cc: 1
Type Of Destruction Used: Curettage
Post-Care Instructions: I reviewed with the patient in detail post-care instructions. Patient is to keep the biopsy site dry overnight, and then apply bacitracin twice daily until healed. Patient may apply hydrogen peroxide soaks to remove any crusting.
Dressing: bandage
Silver Nitrate Text: The wound bed was treated with silver nitrate after the biopsy was performed.
Hemostasis: Drysol
Curettage Text: The wound bed was treated with curettage after the biopsy was performed.
Detail Level: Detailed
Biopsy Method: Personna blade
Notification Instructions: Patient will be notified of biopsy results. However, patient instructed to call the office if not contacted within 2 weeks.
Consent: Written consent was obtained and risks were reviewed including but not limited to scarring, infection, bleeding, scabbing, incomplete removal, nerve damage and allergy to anesthesia.
Lab: [id number]
Lab Facility: 
Wound Care: Vaseline
Cryotherapy Text: The wound bed was treated with cryotherapy after the biopsy was performed.
Anesthesia Type: 1% lidocaine with 1:100,000 epinephrine

## 2018-12-12 ENCOUNTER — NON-PROVIDER VISIT (OUTPATIENT)
Dept: WOUND CARE | Facility: MEDICAL CENTER | Age: 73
End: 2018-12-12
Attending: NURSE PRACTITIONER
Payer: MEDICARE

## 2018-12-12 PROCEDURE — 97605 NEG PRS WND THER DME<=50SQCM: CPT

## 2018-12-12 NOTE — PATIENT INSTRUCTIONS
Wound VAC at 125mmHg continuous with black foam. Dressing will be changed every MWF at the wound clinic.  If you are having issues with your wound VAC, please consider patching leaks, changing the canister, or calling [phone] for troubleshooting. If the wound VAC has been off or un-operational for over 2 hours, call wound care center to inform them and remove all dressings including black foam and replace with normal saline damp gauze.     Should you experience any significant changes in your wound(s), such as infection (redness, swelling, localized heat, increased pain, fever > 101 F, chills) or have any questions regarding your home care instructions, please contact the wound center at [phone]. If after hours, contact your primary care physician or go to the hospital emergency room.   Keep dressing clean, dry and cover when bathing. Only change dressing if it's over saturated, soiled or falls off.

## 2018-12-13 ENCOUNTER — PATIENT OUTREACH (OUTPATIENT)
Dept: HEALTH INFORMATION MANAGEMENT | Facility: OTHER | Age: 73
End: 2018-12-13

## 2018-12-13 NOTE — PROGRESS NOTES
Incoming call from pt reporting she need to reschedule her home-visit appt set for today at 10:00 am. She reported her room mate wasn't feeling well. Appt was rescheduled for next Thrusday 12/20/18 at 11:00 am.    Plan:  · MSW will plan to meet pt during home-visit on 12/20/18 at 11:00 am

## 2018-12-14 ENCOUNTER — NON-PROVIDER VISIT (OUTPATIENT)
Dept: WOUND CARE | Facility: MEDICAL CENTER | Age: 73
End: 2018-12-14
Attending: NURSE PRACTITIONER
Payer: MEDICARE

## 2018-12-14 PROCEDURE — 97605 NEG PRS WND THER DME<=50SQCM: CPT

## 2018-12-14 NOTE — PROCEDURES
Non selective debridement with NS and gauze to all wounds to remove loose non viable tissue. NPWT VAC applied to ankle wounds with one piece of black sponge to each wound, bridged tracpad to proximal LE laterally, pump started at neg 125mmhg continuous, no leaks noted

## 2018-12-14 NOTE — PATIENT INSTRUCTIONS
Should you experience any significant changes in your wound(s) such as infection (redness, swelling, localized heat, increased pain, fever >101 F, chills) or have any questions regarding your home care instructions, please contact the wound center [phone]. If after hours, contact your primary care physician or go the hospital emergency room.  Keep dressing clean and dry and cover while bathing. Only change dressing if over saturated, soiled or its falling off.

## 2018-12-17 ENCOUNTER — NON-PROVIDER VISIT (OUTPATIENT)
Dept: WOUND CARE | Facility: MEDICAL CENTER | Age: 73
End: 2018-12-17
Attending: NURSE PRACTITIONER
Payer: MEDICARE

## 2018-12-17 PROCEDURE — 97597 DBRDMT OPN WND 1ST 20 CM/<: CPT

## 2018-12-17 NOTE — PATIENT INSTRUCTIONS
Should you experience any significant changes in your wound(s) such as infection (redness, swelling, localized heat, increased pain, fever >101 F, chills) or have any questions regarding your home care instructions, please contact the wound center [phone]. If after hours, contact your primary care physician or go the hospital emergency room.  Keep dressing clean and dry and cover while bathing. Only change dressing if over saturated, soiled or its falling off.

## 2018-12-17 NOTE — PROCEDURES
CSWD with scalpel to remove <20 cm2 non viable tissue from medial ankle proximal and distal wounds only. 2% viscous lidocaine for ~5-10 minutes prior to sharp debridement.

## 2018-12-19 ENCOUNTER — NON-PROVIDER VISIT (OUTPATIENT)
Dept: WOUND CARE | Facility: MEDICAL CENTER | Age: 73
End: 2018-12-19
Attending: NURSE PRACTITIONER
Payer: MEDICARE

## 2018-12-19 PROCEDURE — 97605 NEG PRS WND THER DME<=50SQCM: CPT

## 2018-12-20 ENCOUNTER — PATIENT OUTREACH (OUTPATIENT)
Dept: HEALTH INFORMATION MANAGEMENT | Facility: OTHER | Age: 73
End: 2018-12-20

## 2018-12-20 NOTE — PROGRESS NOTES
This MA accompanied GRACE Dickinson to a  with this patient today. Left office at approximately 11:00am. Traveled to pt’s residence. Upon arrival to patient’s home, pt permitted entry. GRACE Dickinson is working with pt on [facility] [employer] Program and [employer] application. Pt provided paperwork (bank statements, social security benefits and medical bills) relevant to applying for these programs. Traveled back to office. This MA made copies of pt’s paperwork and mailed originals back to pt’s home address. GRACE Dickinson informed.

## 2018-12-20 NOTE — PATIENT INSTRUCTIONS
Should you experience any significant changes in your wound(s) such as infection (redness, swelling, localized heat, increased pain, fever >101 F, chills) or have any questions regarding your home care instructions, please contact the wound center [phone]. If after hours, contact your primary care physician or go the hospital emergency room.  Keep dressing clean and dry and cover while bathing. Only change dressing if over saturated, soiled or its falling off.

## 2018-12-20 NOTE — PROGRESS NOTES
MSW met with pt in home during home-visit with [facility]/MA, (OUSMANE Marquez). Pt permitted entry to home. MSW reviewed community resources with pt and her room mate, Milana.     Milana reported she significantly helps pt with her day to day activities including getting in and out of the shower, mobility specifically up the stairs, and with keeping the house clean, yard work complete, and preparing pt's meals. Room mate is inquiring about services in which she could be paid for the care she gives pt. MSW had previously discussed this with pt and pt gives consent to review with her room mate. MSW reviewed the two programs in the community offered by [facility] related to in-home care giving services and eligibility. Although it is possible pt would meet level of care needs to qualify her assets are over the limit for both programs as pt reported her savings was over $35,000. Discussed with both pt and her room mate that pt would be responsible for paying for her care either through a private care agency or negotiating payment to her room mate for services and could apply for [facility] once her assets are under $10,000 or [facility] once assets are under $2000.  Pt voiced understanding. Her room mate voiced frustration explaining without the extra income she will have to go find a job and is worried about who will care for pt if she has to obtain a job.  MSW provided empathic listening and encouraged pt and her room mate to discuss care services moving forward.     During home visit, MSW discussed financial resource programs with pt including [employer] [facility] and SNAP benefits. Discussed due to pt's assets she may not qualify for programs but suggested she apply to determine. Pt agreeable with plan. Application for both were initiated with pt in home but was unable to be completed due to needing copies of supporting documents and pt will need to gather additional documents (4 months bank statements for her savings  account, recent copy of power bill, recent copy of water bill). Pt provided paperwork (bank statements, social security benefits and medical bills) relevant to applying for these programs. MA will make copies of current supporting documents and will mail back originals to pt.     Plan:  · Pt to gather additional supporting documentation and notify MSW once obtained to finalize applications.

## 2018-12-21 ENCOUNTER — NON-PROVIDER VISIT (OUTPATIENT)
Dept: WOUND CARE | Facility: MEDICAL CENTER | Age: 73
End: 2018-12-21
Attending: NURSE PRACTITIONER
Payer: MEDICARE

## 2018-12-21 ENCOUNTER — RX ONLY (OUTPATIENT)
Age: 73
Setting detail: RX ONLY
End: 2018-12-21

## 2018-12-21 ENCOUNTER — APPOINTMENT (RX ONLY)
Dept: URBAN - METROPOLITAN AREA CLINIC 22 | Facility: CLINIC | Age: 73
Setting detail: DERMATOLOGY
End: 2018-12-21

## 2018-12-21 DIAGNOSIS — L12.0 BULLOUS PEMPHIGOID: ICD-10-CM

## 2018-12-21 PROCEDURE — 97597 DBRDMT OPN WND 1ST 20 CM/<: CPT

## 2018-12-21 PROCEDURE — ? ORDER TESTS

## 2018-12-21 PROCEDURE — ? ADDITIONAL NOTES

## 2018-12-21 RX ORDER — DOXYCYCLINE HYCLATE 100 MG/1
1 CAPSULE, GELATIN COATED ORAL BID
Qty: 60 | Refills: 2 | Status: ERX | COMMUNITY
Start: 2018-12-21

## 2018-12-21 RX ORDER — NIACINAMIDE 500 MG
1 TABLET ORAL TID
Qty: 90 | Refills: 2 | Status: ERX | COMMUNITY
Start: 2018-12-21

## 2018-12-21 RX ORDER — PREDNISONE 20 MG/1
2 TABLET ORAL QD
Qty: 60 | Refills: 1 | Status: ERX | COMMUNITY
Start: 2018-12-21

## 2018-12-21 NOTE — PROCEDURE: ORDER TESTS
----- Message from Lucila Crews PA-C sent at 12/20/2017 10:54 AM CST -----  Please let them know that his xray did NOT show enlarged adenoids so he only needs to be scheduled for tubes.  
Educated pt grandmother on xray results reviewed by RAJANI Miranda. Verbalized understanding to information received.  
Performing Laboratory: [id number]
Expected Date Of Service: 12/21/2018
Bill For Surgical Tray: no
Lab Facility: [id number]
Billing Type: Third-Party Bill

## 2018-12-21 NOTE — PROCEDURE: ADDITIONAL NOTES
Additional Notes: Discussed with Dr. Ailin Zhang,and Dr Miranda office.  Patient is cleared to start Prednisone 40mg, doxy 100mg bid, niacin 500mg tid.  Prescriptions sent to pharmacy.\\n\\nPatient is set to get labs done this next week.  We will add Hep B on to the lab order.
Detail Level: Simple

## 2018-12-22 NOTE — PROCEDURES
2% Viscous lidocaine applied to wound and periwound 10 minutes dwell time.   CSWD using curette to remove approx. ~20cm2 of nonviable tissue from both wound beds.

## 2018-12-22 NOTE — PATIENT INSTRUCTIONS
Keep dressing clean and dry and cover while bathing. Only change dressing if over saturated, soiled or its falling off.     Should you experience any significant changes in your wound(s) such as infection (redness, swelling, localized heat, increased pain, fever >101 F, chills) or have any questions regarding your home care instructions, please contact the wound center [phone]. If after hours, contact your primary care physician or go the hospital emergency room.

## 2018-12-24 ENCOUNTER — NON-PROVIDER VISIT (OUTPATIENT)
Dept: WOUND CARE | Facility: MEDICAL CENTER | Age: 73
End: 2018-12-24
Attending: NURSE PRACTITIONER
Payer: MEDICARE

## 2018-12-24 PROCEDURE — 97597 DBRDMT OPN WND 1ST 20 CM/<: CPT

## 2018-12-24 NOTE — PROCEDURES
cswd using scalpel to remove ~15cm2 slough from left medial prox and dist ankle wounds.  2% topical lidocaine applied prior to debridement with ~10 minute dwell time.  Pt tolerated procedure well and reported minimal pain.

## 2018-12-24 NOTE — PATIENT INSTRUCTIONS
Wound VAC at 125mmHg continuous with black foam. Dressing will be changed every MWF at the wound clinic.  If you are having issues with your wound VAC, please consider patching leaks, changing the canister, or calling [phone] for troubleshooting. If the wound VAC has been off or un-operational for over 2 hours, call wound care center to inform them and remove all dressings including black foam and replace with normal saline damp gauze.     Should you experience any significant changes in your wound(s), such as infection (redness, swelling, localized heat, increased pain, fever > 101 F, chills) or have any questions regarding your home care instructions, please contact the wound center at [phone]. If after hours, contact your primary care physician or go to the hospital emergency room.   Keep dressing clean, dry and cover when bathing. Only change dressing if it's over saturated, soiled or falls off.

## 2018-12-26 ENCOUNTER — NON-PROVIDER VISIT (OUTPATIENT)
Dept: WOUND CARE | Facility: MEDICAL CENTER | Age: 73
End: 2018-12-26
Attending: NURSE PRACTITIONER
Payer: MEDICARE

## 2018-12-26 PROCEDURE — 97597 DBRDMT OPN WND 1ST 20 CM/<: CPT

## 2018-12-26 NOTE — PATIENT INSTRUCTIONS
Should you experience any significant changes in your wound(s) such as infection (redness, swelling, localized heat, increased pain, fever >101 F, chills) or have any questions regarding your home care instructions, please contact the wound center [phone]. If after hours, contact your primary care physician or go the hospital emergency room.  Keep dressing clean and dry and cover while bathing. Only change dressing if over saturated, soiled or its falling off.

## 2018-12-26 NOTE — PROCEDURES
CSWD using scalpel to remove 4cm2 yellow non-viable tissue from both wound beds. Topical lidocaine 2% applied to wounds with 10 minute dwell time prior to debridement. Patient tolerated well with minimal pain, no bleeding.

## 2018-12-28 ENCOUNTER — NON-PROVIDER VISIT (OUTPATIENT)
Dept: WOUND CARE | Facility: MEDICAL CENTER | Age: 73
End: 2018-12-28
Attending: NURSE PRACTITIONER
Payer: MEDICARE

## 2018-12-28 PROCEDURE — 97597 DBRDMT OPN WND 1ST 20 CM/<: CPT

## 2018-12-28 PROCEDURE — 97598 DBRDMT OPN WND ADDL 20CM/<: CPT

## 2018-12-28 NOTE — PROCEDURES
Wound Care Procedures 12/28/2018:  Viscous lidocaine applied to wounds prior to debridement with ~5 minute dwell time.  CSWD with scalpel to remove ~31 cm2 slough from wounds.  Wound vac reapplied to LLE wounds using one piece of black foam. NPWT restarted at 125 mmHg continuous, no leaks noted.

## 2018-12-28 NOTE — PATIENT INSTRUCTIONS
-You have a wound vac at 125 mmHg continuous with black foam. The dressing will be changed every Monday, Wednesday, and Friday at the wound clinic.    -If you are having issues with your wound vac, please consider patching leaks, changing the canister, or calling [phone] for troubleshooting. If the wound vac has been off or non-operational for over 2 hours, call wound care center to inform them and remove all dressings including black foam and replace with gauze moistened with normal saline.     -Should you experience any significant changes in your wound(s), such as infection (redness, swelling, localized heat, increased pain, fever > 101 F, chills) or have any questions regarding your home care instructions, please contact the wound center at [phone]. If after hours, contact your primary care physician or go to the hospital emergency room.

## 2018-12-31 ENCOUNTER — NON-PROVIDER VISIT (OUTPATIENT)
Dept: WOUND CARE | Facility: MEDICAL CENTER | Age: 73
End: 2018-12-31
Attending: NURSE PRACTITIONER
Payer: MEDICARE

## 2018-12-31 PROCEDURE — 97597 DBRDMT OPN WND 1ST 20 CM/<: CPT

## 2018-12-31 RX ORDER — NIACIN 500 MG
500 TABLET ORAL 3 TIMES DAILY
COMMUNITY
End: 2019-04-03

## 2018-12-31 RX ORDER — PREDNISONE 20 MG/1
20 TABLET ORAL DAILY
COMMUNITY
End: 2019-03-08 | Stop reason: CLARIF

## 2018-12-31 RX ORDER — DOXYCYCLINE HYCLATE 100 MG
100 TABLET ORAL 2 TIMES DAILY
Status: ON HOLD | COMMUNITY
Start: 2019-01-28 | End: 2019-02-05

## 2018-12-31 NOTE — PROGRESS NOTES
Patient has 3 open blisters to her left lower leg all are ~0.9x1x0.1. Viscopatches placed to all open blisters; roll gauze and then tubigrip

## 2018-12-31 NOTE — PROCEDURES
CSWD with scalpel to remove ~20 cm2 non viable tissue from ankle wounds only. 2% viscous lidocaine prior to sharp debridement.

## 2018-12-31 NOTE — PATIENT INSTRUCTIONS
Should you experience any significant changes in your wound(s) such as infection (redness, swelling, localized heat, increased pain, fever >101 F, chills) or have any questions regarding your home care instructions, please contact the wound center [phone]. If after hours, contact your primary care physician or go the hospital emergency room.  Keep dressing clean and dry and cover while bathing. Only change dressing if over saturated, soiled or its falling off.

## 2019-01-02 ENCOUNTER — NON-PROVIDER VISIT (OUTPATIENT)
Dept: WOUND CARE | Facility: MEDICAL CENTER | Age: 74
End: 2019-01-02
Attending: NURSE PRACTITIONER
Payer: MEDICARE

## 2019-01-02 PROCEDURE — 97597 DBRDMT OPN WND 1ST 20 CM/<: CPT

## 2019-01-02 NOTE — PATIENT INSTRUCTIONS
Should you experience any significant changes in your wound(s) such as infection (redness, swelling, localized heat, increased pain, fever >101 F, chills) or have any questions regarding your home care instructions, please contact the wound center [phone]. If after hours, contact your primary care physician or go the hospital emergency room.  Keep dressing clean and dry and cover while bathing. Only change dressing if over saturated, soiled or its falling off.

## 2019-01-02 NOTE — PROCEDURES
CSWD with scalpel to main wound beds. ~20 cm2 removed. Non selective with NS and gauze to satellite blisters that are nearly resolved - Viscopatches to blisters, roll gauze and then Tubi E   home

## 2019-01-04 ENCOUNTER — NON-PROVIDER VISIT (OUTPATIENT)
Dept: WOUND CARE | Facility: MEDICAL CENTER | Age: 74
End: 2019-01-04
Attending: NURSE PRACTITIONER
Payer: MEDICARE

## 2019-01-04 PROCEDURE — 97598 DBRDMT OPN WND ADDL 20CM/<: CPT

## 2019-01-04 PROCEDURE — 97597 DBRDMT OPN WND 1ST 20 CM/<: CPT

## 2019-01-04 NOTE — PATIENT INSTRUCTIONS
-You have a wound vac at 125 mmHg continuous with black foam. The dressing will be changed every Monday, Wednesday, and Friday at the wound clinic.    -If you are having issues with your wound vac, please consider patching leaks, changing the canister, or calling [phone] for troubleshooting. If the wound vac has been off or non-operational for over 2 hours, call wound care center to inform them and remove all dressings including black foam and replace with gauze moistened with normal saline.     -Should you experience any significant changes in your wound(s), such as infection (redness, swelling, localized heat, increased pain, fever > 101 F, chills) or have any questions regarding your home care instructions, please contact the wound center at [phone]. If after hours, contact your primary care physician or go to the hospital emergency room.

## 2019-01-04 NOTE — PROCEDURES
Wound Care Procedures 1/4/2019:  Viscous lidocaine applied to wounds prior to debridement with ~5 minute dwell time.  CSWD with scalpel to remove ~25 cm2 slough from left medial LE wounds.  Wound vac applied using once piece of black foam. NPWT restarted at 125 mmHg continuous, no leaks noted.

## 2019-01-07 ENCOUNTER — OFFICE VISIT (OUTPATIENT)
Dept: WOUND CARE | Facility: MEDICAL CENTER | Age: 74
End: 2019-01-07
Attending: NURSE PRACTITIONER
Payer: MEDICARE

## 2019-01-07 PROCEDURE — 11045 DBRDMT SUBQ TISS EACH ADDL: CPT

## 2019-01-07 PROCEDURE — 11042 DBRDMT SUBQ TIS 1ST 20SQCM/<: CPT

## 2019-01-07 NOTE — PATIENT INSTRUCTIONS
Wound VAC at 125mmHg continuous with black foam. Dressing will be changed every MWF at the wound clinic.  If you are having issues with your wound VAC, please consider patching leaks, changing the canister, or calling [phone] for troubleshooting. If the wound VAC has been off or un-operational for over 2 hours, call wound care center to inform them and remove all dressings including black foam and replace with normal saline damp gauze.     Should you experience any significant changes in your wound(s), such as infection (redness, swelling, localized heat, increased pain, fever > 101 F, chills) or have any questions regarding your home care instructions, please contact the wound center at [phone]. If after hours, contact your primary care physician or go to the hospital emergency room.   Keep dressing clean, dry and cover when bathing. Only change dressing if it's over saturated, soiled or falls off.

## 2019-01-08 NOTE — PROGRESS NOTES
SUBJECTIVE:      Patient ID: Anastasia Champion is a 18 y.o. female.    Chief Complaint: Sore Throat    Patient here today complaining of a bad sore throat for the past 2 days. Hurts to swallow. Was treated over a week ago for an URI which improved without antibiotics. Last night she decided to take the prescription she had at home for Augmentin but the sore throat is not any better.      Sore Throat    This is a new problem. The current episode started in the past 7 days. The problem has been gradually worsening. Neither side of throat is experiencing more pain than the other. There has been no fever. The pain is moderate. Associated symptoms include ear pain. Pertinent negatives include no abdominal pain, congestion, coughing, ear discharge, stridor, trouble swallowing or vomiting.       Past Surgical History:   Procedure Laterality Date    ADENOIDECTOMY      TONSILLECTOMY      tubes in ears       Family History   Problem Relation Age of Onset    Hypertension Mother     Kidney disease Mother     Asthma Mother     Hypertension Maternal Grandfather     Kidney disease Maternal Grandfather       Social History     Social History    Marital status: Single     Spouse name: N/A    Number of children: N/A    Years of education: N/A     Occupational History    student      Social History Main Topics    Smoking status: Never Smoker    Smokeless tobacco: Never Used    Alcohol use No    Drug use: No    Sexual activity: Yes     Partners: Male     Birth control/ protection: OCP      Comment: last PAP 06/2017     Other Topics Concern    None     Social History Narrative    None     Current Outpatient Prescriptions   Medication Sig Dispense Refill    amoxicillin-clavulanate 875-125mg (AUGMENTIN) 875-125 mg per tablet Take 1 tablet by mouth 2 (two) times daily. 20 tablet 0    BLISOVI FE 1/20, 28, 1 mg-20 mcg (21)/75 mg (7) per tablet Take 1 tablet by mouth once daily.  12    ibuprofen (ADVIL,MOTRIN)  Provider Encounter- Full Thickness wound      Patient seen in collaboration with wound care clinician, Cordelia Jesus RN     HISTORY OF PRESENT ILLNESS        START OF CARE IN CLINIC: 10/26/2018    REFERRING PROVIDER: IRON Son     WOUND- Full thickness wound   LOCATION: Medial left calf and medial ankle   WOUND HISTORY: Patient has a long wound history with a hip and ankle fracture.  Developed a wound over the medial ankle, a prominent malleolus and the wound likely developed from pressure.    PREVIOUS TREATMENT: Debridement NPWT, hospitalization with IV abx and Veriflow, which really worked   PERTINENT PMH: Recent diagnosis of Bullous Pemphigous - steroids for 2 months and antibiotics. Peripheral vascular disease with leftfem-pop bypass   [id number]  Plain films left ankle  There are fractures of the distal fibular shaft and of the medial malleolus, both of which appear to be subacute with at least minimal callus formation. There is marked lateral subluxation of the talus. No other significant findings.   VASCULAR STUDIES:Arterial duplex shows pa;tent left fem-pop without stenosis     LAST  WOUND CULTURE:  DATE : 18 Heavy growth of pseudomonas, Enterococcus and light growth MRSA.             DIABETES:  No     TOBACCO USE: no    PAST MEDICAL HISTORY:   Past Medical History:   Diagnosis Date   • Anxiety    • Cellulitis and abscess of lower extremity    • Dental disorder     full dentures   • Generalized osteoarthritis of multiple sites 10/20/2015   • Heart valve disease     pt not sure    • Hyperlipidemia    • Hypertension    • Osteoporosis        PAST SURGICAL HISTORY:   Past Surgical History:   Procedure Laterality Date   • FEMORAL POPLITEAL BYPASS Left 2018    Procedure: FEMORAL POPLITEAL BYPASS;  Surgeon: Milana Colin M.D.;  Location: SURGERY [facility];  Service: General   • IRRIGATION & DEBRIDEMENT GENERAL Left 2018    Procedure: IRRIGATION & DEBRIDEMENT ANKLE WOUND;   "800 MG tablet Take 1 tablet (800 mg total) by mouth 3 (three) times daily. 30 tablet 0     Current Facility-Administered Medications   Medication Dose Route Frequency Provider Last Rate Last Dose    dexamethasone injection 8 mg  8 mg Intramuscular 1 time in Clinic/HOD Tucker Lopez NP         Review of patient's allergies indicates:  No Known Allergies   Past Medical History:   Diagnosis Date    Nephrolithiasis 5/10/2018    Polycystic kidney     adult onset     Past Surgical History:   Procedure Laterality Date    ADENOIDECTOMY      TONSILLECTOMY      tubes in ears         Review of Systems   Constitutional: Negative for appetite change, chills, diaphoresis and unexpected weight change.   HENT: Positive for ear pain and sore throat. Negative for congestion, ear discharge, hearing loss, postnasal drip, sinus pain, trouble swallowing and voice change.    Eyes: Negative for photophobia and pain.   Respiratory: Negative for cough, chest tightness and stridor.    Cardiovascular: Negative for chest pain.   Gastrointestinal: Negative for abdominal pain, blood in stool and vomiting.   Endocrine: Negative for cold intolerance and heat intolerance.   Genitourinary: Negative for difficulty urinating and flank pain.   Musculoskeletal: Negative for joint swelling and neck stiffness.   Skin: Negative for pallor.   Neurological: Negative for speech difficulty.   Hematological: Does not bruise/bleed easily.   Psychiatric/Behavioral: Negative for confusion.      OBJECTIVE:      Vitals:    05/23/18 1459   BP: 120/60   Pulse: 97   Temp: 99.9 °F (37.7 °C)   SpO2: 98%   Weight: 52.6 kg (116 lb)   Height: 5' 5" (1.651 m)     Physical Exam   Constitutional: She is oriented to person, place, and time. She appears well-developed and well-nourished.   HENT:   Head: Atraumatic.   Right Ear: Tympanic membrane normal.   Left Ear: Tympanic membrane normal.   Nose: Nose normal.   Mouth/Throat: Posterior oropharyngeal erythema present. No " Surgeon: Milana Colin M.D.;  Location: SURGERY [facility];  Service: General   • IRRIGATION & DEBRIDEMENT HIP Left 6/4/2018    Procedure: IRRIGATION & DEBRIDEMENT HIP;  Surgeon: Chong Vazquez M.D.;  Location: SURGERY [facility];  Service: Orthopedics   • HIP REVISION TOTAL Left 2/11/2018    Procedure: HIP REVISION TOTAL- femoral nail removal conversion to total hip arthoroplasty;  Surgeon: Chong Vazquez M.D.;  Location: SURGERY [facility];  Service: Orthopedics   • HIP NAILING INTRAMEDULLARY Left 12/20/2017    Procedure: HIP NAILING INTRAMEDULLARY;  Surgeon: Jorge Peters M.D.;  Location: SURGERY [facility];  Service: Orthopedics   • BREAST BIOPSY  8/28/2014    Performed by Magdalena Londono M.D. at [facility]   • BREAST BIOPSY Right 8/14    benign   • GYN SURGERY  1973    complete hysterectomy   • ABDOMINAL HYSTERECTOMY TOTAL      w/BSO due to uterine cyst and endometriosis   • ATHROPLASTY      hip        MEDICATIONS:   Current Outpatient Prescriptions   Medication   • niacin 500 MG Tab   • doxycycline (VIBRAMYCIN) 100 MG Tab   • predniSONE (DELTASONE) 20 MG Tab   • Multiple Vitamins-Minerals (THERAPEUTIC-M/LUTEIN) Tab   • metoprolol SR (TOPROL XL) 100 MG TABLET SR 24 HR   • fexofenadine (ALLEGRA) 180 MG tablet   • atorvastatin (LIPITOR) 40 MG Tab   • lisinopril (PRINIVIL) 5 MG Tab   • triamcinolone acetonide (KENALOG) 0.1 % Cream   • cyanocobalamin (VITAMIN B12) 1000 MCG Tab   • folic acid (FOLVITE) 1 MG Tab   • multivitamin (THERAGRAN) Tab   • aspirin (ASA) 81 MG Chew Tab chewable tablet   • vitamin D (CHOLECALCIFEROL) 1000 UNIT Tab   • sertraline (ZOLOFT) 100 MG Tab     No current facility-administered medications for this visit.        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  [employer]     Social History Main Topics   • Smoking status: Former Smoker     Packs/day: 0.50     Years: 25.00     Types: Cigarettes     Quit date: 1/1/2007   • Smokeless tobacco: Never Used   • Alcohol use 3.0 oz/week     5 Glasses of wine per week      Comment: glass of wine per day   • Drug use: No   • Sexual activity: Not Currently     Partners: Male     Other Topics Concern   • Not on file     Social History Narrative   • No narrative on file       FAMILY HISTORY:   Family History   Problem Relation Age of Onset   • GI Mother         colostomy   • Heart Attack Father    • Diabetes Father    • Hypertension Father    • Psychiatry Brother         bipolar disorder        REVIEW OF SYSTEMS:   Review of Systems   All other systems reviewed and are negative.    Negative except for itching which is dramatically better    PHYSICAL EXAMINATION:   Physical Exam   Constitutional: She is well-developed, well-nourished, and in no distress. No distress.   HENT:   Head: Normocephalic and atraumatic.   Eyes: Pupils are equal, round, and reactive to light.   Neck: Normal range of motion. Neck supple.   Cardiovascular: Normal rate, regular rhythm and intact distal pulses.    Pulmonary/Chest: Effort normal. No respiratory distress.   Abdominal: Soft. She exhibits no distension.   Musculoskeletal: Normal range of motion. She exhibits no edema.   Skin:   Open wounds as seen in photos        Wound Assessment- Refer to wound flowsheet      Pre-debridement Photo            PROCEDURE  -2% viscous lidocaine applied topically to wound bed for approximately 5 minutes prior to debridement.  4m curette used to debride wound bed.  Excisional debridement was performed to remove devitalized tissue until  tonsillar exudate.   Eyes: Conjunctivae are normal.   Neck: Neck supple.   Cardiovascular: Normal rate, regular rhythm, normal heart sounds and intact distal pulses.    Pulmonary/Chest: Effort normal and breath sounds normal.   Abdominal: Soft. Bowel sounds are normal. She exhibits no distension. There is no tenderness.   Musculoskeletal: Normal range of motion.   Lymphadenopathy:     She has cervical adenopathy.        Left cervical: Superficial cervical adenopathy present.   Neurological: She is alert and oriented to person, place, and time.   Skin: Skin is warm and dry.   Psychiatric: She has a normal mood and affect.   Nursing note and vitals reviewed.     Assessment:       1. Pharyngitis, unspecified etiology    2. Mononucleosis        Plan:       Pharyngitis, unspecified etiology  -     POCT rapid strep A                           Neg  -     POCT Infectious mononucleosis antibody                                  Pos  Mononucleosis  -     dexamethasone injection 8 mg; Inject 2 mLs (8 mg total) into the muscle one time.  -     ibuprofen (ADVIL,MOTRIN) 800 MG tablet; Take 1 tablet (800 mg total) by mouth 3 (three) times daily.  Dispense: 30 tablet; Refill: 0      Symptomatic treatment  Vitamin C, hydrate and rest    No Follow-up on file.      5/23/2018 HARISH Judd, FNP       healthy, bleeding tissue was visualized.   Entire surface of wound, 16.3cm2, debrided  Tissue debrided into the subcutaneous layer.  Wound care completed by Cordelia with re-application of NPWT    Post-debridement Photo            Measurements (post-debridement):      DATE: 1/7/2019   Length (cm) 4.8 cm   Width (cm) 3.4 cm   Depth (cm) 0 cm   Area (cm2) 16.3   Tract/undermine none     Wound #2  Meidal, distal calf.      Pre-debridement photos        PROCEDURE  -2% viscous lidocaine applied topically to wound bed for approximately 5 minutes prior to debridement.  4m curette used to debride wound bed.  Excisional debridement was performed to remove devitalized tissue until healthy, bleeding tissue was visualized.   Entire surface of wound, 14cm2, debrided  Tissue debrided into the subcutaneous layer.  Wound care completed by Cordelia with re-application of NPWT      Measurements (post-debridement):      DATE: 1/7/2019   Length (cm) 4. cm   Width (cm) 3.5 cm   Depth (cm) .2 cm   Area (cm2) 14   Tract/undermine none     Post debridement photos          PATIENT EDUCATION  -Advised to go to ER for any increased redness, swelling, drainage or odor, or if patient develops fever, chills, nausea or vomiting.  -Importance of adequate nutrition for wound healing  -Increase protein intake (unless contraindicated by renal status)    ASSESSMENT AND PLAN:   The upper calf wound has been healing, albeit slowly.  With steroids as definitive treatment for Bullous pemphigoid, a STSG for the medial ankle wound is a poor option.  I think we should add a biologic agent and will advise Dorothea to obtain approval for Cytal, to be used in conjunction with NPWT dressing.      Please note that this dictation was created using voice recognition software. I have worked with technical experts from [facility] to optimize the interface.  I have made every reasonable attempt to correct obvious errors, but there may be errors of grammar and possibly  content that I did not discover before finalizing the note.

## 2019-01-09 ENCOUNTER — NON-PROVIDER VISIT (OUTPATIENT)
Dept: WOUND CARE | Facility: MEDICAL CENTER | Age: 74
End: 2019-01-09
Attending: NURSE PRACTITIONER
Payer: MEDICARE

## 2019-01-09 DIAGNOSIS — S90.822A BLISTER OF LEFT FOOT, INITIAL ENCOUNTER: ICD-10-CM

## 2019-01-09 PROCEDURE — 97597 DBRDMT OPN WND 1ST 20 CM/<: CPT

## 2019-01-10 ENCOUNTER — HOSPITAL ENCOUNTER (OUTPATIENT)
Dept: LAB | Facility: MEDICAL CENTER | Age: 74
End: 2019-01-10
Attending: PHYSICIAN ASSISTANT
Payer: MEDICARE

## 2019-01-10 LAB
ALBUMIN SERPL BCP-MCNC: 4.2 G/DL (ref 3.2–4.9)
ALBUMIN/GLOB SERPL: 0.9 G/DL
ALP SERPL-CCNC: 87 U/L (ref 30–99)
ALT SERPL-CCNC: 21 U/L (ref 2–50)
ANION GAP SERPL CALC-SCNC: 11 MMOL/L (ref 0–11.9)
AST SERPL-CCNC: 28 U/L (ref 12–45)
BASOPHILS # BLD AUTO: 0.4 % (ref 0–1.8)
BASOPHILS # BLD: 0.05 K/UL (ref 0–0.12)
BILIRUB SERPL-MCNC: 0.6 MG/DL (ref 0.1–1.5)
BUN SERPL-MCNC: 24 MG/DL (ref 8–22)
CALCIUM SERPL-MCNC: 10.1 MG/DL (ref 8.5–10.5)
CHLORIDE SERPL-SCNC: 96 MMOL/L (ref 96–112)
CO2 SERPL-SCNC: 27 MMOL/L (ref 20–33)
CREAT SERPL-MCNC: 0.86 MG/DL (ref 0.5–1.4)
EOSINOPHIL # BLD AUTO: 0 K/UL (ref 0–0.51)
EOSINOPHIL NFR BLD: 0 % (ref 0–6.9)
ERYTHROCYTE [DISTWIDTH] IN BLOOD BY AUTOMATED COUNT: 63.6 FL (ref 35.9–50)
GLOBULIN SER CALC-MCNC: 4.8 G/DL (ref 1.9–3.5)
GLUCOSE SERPL-MCNC: 134 MG/DL (ref 65–99)
HBV CORE AB SERPL QL IA: NEGATIVE
HBV CORE IGM SER QL: NEGATIVE
HBV SURFACE AG SER QL: NEGATIVE
HCT VFR BLD AUTO: 44.6 % (ref 37–47)
HGB BLD-MCNC: 13.7 G/DL (ref 12–16)
IMM GRANULOCYTES # BLD AUTO: 0.11 K/UL (ref 0–0.11)
IMM GRANULOCYTES NFR BLD AUTO: 1 % (ref 0–0.9)
LYMPHOCYTES # BLD AUTO: 0.84 K/UL (ref 1–4.8)
LYMPHOCYTES NFR BLD: 7.5 % (ref 22–41)
MCH RBC QN AUTO: 30.2 PG (ref 27–33)
MCHC RBC AUTO-ENTMCNC: 30.7 G/DL (ref 33.6–35)
MCV RBC AUTO: 98.5 FL (ref 81.4–97.8)
MONOCYTES # BLD AUTO: 0.18 K/UL (ref 0–0.85)
MONOCYTES NFR BLD AUTO: 1.6 % (ref 0–13.4)
NEUTROPHILS # BLD AUTO: 10.01 K/UL (ref 2–7.15)
NEUTROPHILS NFR BLD: 89.5 % (ref 44–72)
NRBC # BLD AUTO: 0 K/UL
NRBC BLD-RTO: 0 /100 WBC
PLATELET # BLD AUTO: 363 K/UL (ref 164–446)
PMV BLD AUTO: 9.8 FL (ref 9–12.9)
POTASSIUM SERPL-SCNC: 5.1 MMOL/L (ref 3.6–5.5)
PROT SERPL-MCNC: 9 G/DL (ref 6–8.2)
RBC # BLD AUTO: 4.53 M/UL (ref 4.2–5.4)
SODIUM SERPL-SCNC: 134 MMOL/L (ref 135–145)
WBC # BLD AUTO: 11.2 K/UL (ref 4.8–10.8)

## 2019-01-10 PROCEDURE — 86704 HEP B CORE ANTIBODY TOTAL: CPT

## 2019-01-10 PROCEDURE — 86705 HEP B CORE ANTIBODY IGM: CPT

## 2019-01-10 PROCEDURE — 86480 TB TEST CELL IMMUN MEASURE: CPT

## 2019-01-10 PROCEDURE — 87340 HEPATITIS B SURFACE AG IA: CPT

## 2019-01-10 PROCEDURE — 83516 IMMUNOASSAY NONANTIBODY: CPT | Mod: 91

## 2019-01-10 PROCEDURE — 85025 COMPLETE CBC W/AUTO DIFF WBC: CPT

## 2019-01-10 PROCEDURE — 36415 COLL VENOUS BLD VENIPUNCTURE: CPT

## 2019-01-10 PROCEDURE — 80053 COMPREHEN METABOLIC PANEL: CPT

## 2019-01-11 ENCOUNTER — NON-PROVIDER VISIT (OUTPATIENT)
Dept: WOUND CARE | Facility: MEDICAL CENTER | Age: 74
End: 2019-01-11
Attending: NURSE PRACTITIONER
Payer: MEDICARE

## 2019-01-11 PROCEDURE — 97597 DBRDMT OPN WND 1ST 20 CM/<: CPT

## 2019-01-11 NOTE — PATIENT INSTRUCTIONS
Wound VAC at 125mmHg continuous with 1 black foam. Dressing will be changed every MWF at the wound clinic or with your home health nurse.  If you are having issues with your wound VAC, please consider patching leaks, changing the canister, or calling [phone] for troubleshooting. If the wound VAC has been off or un-operational for over 2 hours, call wound care center to inform them and remove all dressings including black foam and replace with normal saline damp gauze.     Keep dressing clean and dry and cover while bathing. Only change dressing if over saturated, soiled or its falling off.     Should you experience any significant changes in your wound(s) such as infection (redness, swelling, localized heat, increased pain, fever >101 F, chills) or have any questions regarding your home care instructions, please contact the wound center [phone]. If after hours, contact your primary care physician or go the hospital emergency room.

## 2019-01-12 LAB
GAMMA INTERFERON BACKGROUND BLD IA-ACNC: 0.02 IU/ML
M TB IFN-G BLD-IMP: ABNORMAL
M TB IFN-G CD4+ BCKGRND COR BLD-ACNC: -0.01 IU/ML
MITOGEN IGNF BCKGRD COR BLD-ACNC: 0.15 IU/ML
QFT TB2 - NIL TBQ2: 0 IU/ML

## 2019-01-14 ENCOUNTER — PATIENT OUTREACH (OUTPATIENT)
Dept: HEALTH INFORMATION MANAGEMENT | Facility: OTHER | Age: 74
End: 2019-01-14

## 2019-01-14 ENCOUNTER — NON-PROVIDER VISIT (OUTPATIENT)
Dept: WOUND CARE | Facility: MEDICAL CENTER | Age: 74
End: 2019-01-14
Attending: NURSE PRACTITIONER
Payer: MEDICARE

## 2019-01-14 PROCEDURE — 97597 DBRDMT OPN WND 1ST 20 CM/<: CPT

## 2019-01-14 NOTE — PROGRESS NOTES
Outreach call to pt to determine if she has obtained to rest of her supporting documentation to complete her [employer] and [employer] applications. Pt reported she has not but will plan on gathering over the next week. Instructed pt to notify MSW once information is obtained. Pt voiced agreement.     Plan:  MSW will continue to follow up and assist pt as needed with  care plan.

## 2019-01-14 NOTE — PATIENT INSTRUCTIONS
Should you experience any significant changes in your wound(s) such as infection (redness, swelling, localized heat, increased pain, fever >101 F, chills) or have any questions regarding your home care instructions, please contact the wound center [phone]. If after hours, contact your primary care physician or go the hospital emergency room.  Keep dressing clean and dry and cover while bathing. Only change dressing if over saturated, soiled or its falling off.

## 2019-01-15 LAB
TEST NAME 95000: NORMAL
TEST NAME 95000: NORMAL

## 2019-01-16 ENCOUNTER — NON-PROVIDER VISIT (OUTPATIENT)
Dept: WOUND CARE | Facility: MEDICAL CENTER | Age: 74
End: 2019-01-16
Attending: NURSE PRACTITIONER
Payer: MEDICARE

## 2019-01-16 PROCEDURE — 97597 DBRDMT OPN WND 1ST 20 CM/<: CPT

## 2019-01-16 NOTE — PROCEDURES
2% viscous Lidocaine ge dwell time ~10 minutes prior to CSWD.    CSWD with curette to remove ~10 cm2 of non viable tissue from both wound beds.     NPWT <50 cm2 applied.

## 2019-01-17 ENCOUNTER — APPOINTMENT (RX ONLY)
Dept: URBAN - METROPOLITAN AREA CLINIC 22 | Facility: CLINIC | Age: 74
Setting detail: DERMATOLOGY
End: 2019-01-17

## 2019-01-17 DIAGNOSIS — L12.0 BULLOUS PEMPHIGOID: ICD-10-CM | Status: IMPROVED

## 2019-01-17 DIAGNOSIS — L81.0 POSTINFLAMMATORY HYPERPIGMENTATION: ICD-10-CM

## 2019-01-17 PROCEDURE — ? PRESCRIPTION

## 2019-01-17 PROCEDURE — ? REFERRAL CORRESPONDENCE

## 2019-01-17 PROCEDURE — ? TREATMENT REGIMEN

## 2019-01-17 PROCEDURE — ? MEDICATION COUNSELING

## 2019-01-17 PROCEDURE — ? PHOTO-DOCUMENTATION

## 2019-01-17 PROCEDURE — 99214 OFFICE O/P EST MOD 30 MIN: CPT

## 2019-01-17 PROCEDURE — ? ADDITIONAL NOTES

## 2019-01-17 PROCEDURE — ? COUNSELING

## 2019-01-17 RX ORDER — MYCOPHENOLATE MOFETIL 500 MG/1
1 TABLET, FILM COATED ORAL BID
Qty: 120 | Refills: 5 | Status: ERX | COMMUNITY
Start: 2019-01-17

## 2019-01-17 RX ORDER — NIACINAMIDE 500 MG
TABLET ORAL TID
Qty: 90 | Refills: 5 | Status: ERX

## 2019-01-17 RX ORDER — PREDNISONE 10 MG/1
3 TABLET ORAL
Qty: 90 | Refills: 0 | Status: ERX | COMMUNITY
Start: 2019-01-17

## 2019-01-17 RX ORDER — DOXYCYCLINE HYCLATE 100 MG/1
CAPSULE, GELATIN COATED ORAL BID
Qty: 60 | Refills: 5 | Status: ERX

## 2019-01-17 RX ADMIN — MYCOPHENOLATE MOFETIL 2: 500 TABLET, FILM COATED ORAL at 00:00

## 2019-01-17 RX ADMIN — PREDNISONE 4: 10 TABLET ORAL at 00:00

## 2019-01-17 ASSESSMENT — BSA RASH: BSA RASH: 75

## 2019-01-17 ASSESSMENT — LOCATION DETAILED DESCRIPTION DERM
LOCATION DETAILED: RIGHT MEDIAL UPPER BACK
LOCATION DETAILED: RIGHT ANTERIOR DISTAL UPPER ARM
LOCATION DETAILED: LEFT PROXIMAL DORSAL FOREARM
LOCATION DETAILED: LEFT ANTERIOR DISTAL UPPER ARM
LOCATION DETAILED: LEFT ANTERIOR DISTAL THIGH
LOCATION DETAILED: RIGHT INFERIOR UPPER BACK
LOCATION DETAILED: RIGHT PROXIMAL DORSAL FOREARM
LOCATION DETAILED: LEFT INFRAMAMMARY CREASE (INNER QUADRANT)
LOCATION DETAILED: RIGHT ANTERIOR PROXIMAL THIGH

## 2019-01-17 ASSESSMENT — LOCATION SIMPLE DESCRIPTION DERM
LOCATION SIMPLE: RIGHT THIGH
LOCATION SIMPLE: LEFT FOREARM
LOCATION SIMPLE: RIGHT FOREARM
LOCATION SIMPLE: LEFT THIGH
LOCATION SIMPLE: LEFT BREAST
LOCATION SIMPLE: RIGHT UPPER BACK
LOCATION SIMPLE: RIGHT UPPER ARM
LOCATION SIMPLE: LEFT UPPER ARM

## 2019-01-17 ASSESSMENT — LOCATION ZONE DERM
LOCATION ZONE: TRUNK
LOCATION ZONE: LEG
LOCATION ZONE: ARM

## 2019-01-17 NOTE — PROCEDURE: TREATMENT REGIMEN
Detail Level: Zone
Modify Regimen: Reduce prednisone to 30mg daily, add CellCept 500mg bid for 2 weeks, the increase to 1000mg bid

## 2019-01-17 NOTE — PROCEDURE: MEDICATION COUNSELING
Cellcept Pregnancy And Lactation Text: This medication is Pregnancy Category D and isn't considered safe during pregnancy. It is unknown if this medication is excreted in breast milk.
Benzoyl Peroxide Counseling: Patient counseled that medicine may cause skin irritation and bleach clothing.  In the event of skin irritation, the patient was advised to reduce the amount of the drug applied or use it less frequently.   The patient verbalized understanding of the proper use and possible adverse effects of benzoyl peroxide.  All of the patient's questions and concerns were addressed.
Dapsone Counseling: I discussed with the patient the risks of dapsone including but not limited to hemolytic anemia, agranulocytosis, rashes, methemoglobinemia, kidney failure, peripheral neuropathy, headaches, GI upset, and liver toxicity.  Patients who start dapsone require monitoring including baseline LFTs and weekly CBCs for the first month, then every month thereafter.  The patient verbalized understanding of the proper use and possible adverse effects of dapsone.  All of the patient's questions and concerns were addressed.
Quinolones Pregnancy And Lactation Text: This medication is Pregnancy Category C and it isn't know if it is safe during pregnancy. It is also excreted in breast milk.
Xeljanz Counseling: I discussed with the patient the risks of Xeljanz therapy including increased risk of infection, liver issues, headache, diarrhea, or cold symptoms. Live vaccines should be avoided. They were instructed to call if they have any problems.
Doxepin Counseling:  Patient advised that the medication is sedating and not to drive a car after taking this medication. Patient informed of potential adverse effects including but not limited to dry mouth, urinary retention, and blurry vision.  The patient verbalized understanding of the proper use and possible adverse effects of doxepin.  All of the patient's questions and concerns were addressed.
Picato Pregnancy And Lactation Text: This medication is Pregnancy Category C. It is unknown if this medication is excreted in breast milk.
Bactrim Pregnancy And Lactation Text: This medication is Pregnancy Category D and is known to cause fetal risk.  It is also excreted in breast milk.
Topical Clindamycin Pregnancy And Lactation Text: This medication is Pregnancy Category B and is considered safe during pregnancy. It is unknown if it is excreted in breast milk.
Rifampin Counseling: I discussed with the patient the risks of rifampin including but not limited to liver damage, kidney damage, red-orange body fluids, nausea/vomiting and severe allergy.
Xelsyz Pregnancy And Lactation Text: This medication is Pregnancy Category D and is not considered safe during pregnancy.  The risk during breast feeding is also uncertain.
Nsaids Counseling: NSAID Counseling: I discussed with the patient that NSAIDs should be taken with food. Prolonged use of NSAIDs can result in the development of stomach ulcers.  Patient advised to stop taking NSAIDs if abdominal pain occurs.  The patient verbalized understanding of the proper use and possible adverse effects of NSAIDs.  All of the patient's questions and concerns were addressed.
Acitretin Counseling:  I discussed with the patient the risks of acitretin including but not limited to hair loss, dry lips/skin/eyes, liver damage, hyperlipidemia, depression/suicidal ideation, photosensitivity.  Serious rare side effects can include but are not limited to pancreatitis, pseudotumor cerebri, bony changes, clot formation/stroke/heart attack.  Patient understands that alcohol is contraindicated since it can result in liver toxicity and significantly prolong the elimination of the drug by many years.
Birth Control Pills Pregnancy And Lactation Text: This medication should be avoided if pregnant and for the first 30 days post-partum.
Protopic Counseling: Patient may experience a mild burning sensation during topical application. Protopic is not approved in children less than 2 years of age. There have been case reports of hematologic and skin malignancies in patients using topical calcineurin inhibitors although causality is questionable.
Doxepin Pregnancy And Lactation Text: This medication is Pregnancy Category C and it isn't known if it is safe during pregnancy. It is also excreted in breast milk and breast feeding isn't recommended.
Siliq Pregnancy And Lactation Text: The risk during pregnancy and breastfeeding is uncertain with this medication.
Eucrisa Counseling: Patient may experience a mild burning sensation during topical application. Eucrisa is not approved in children less than 2 years of age.
Itraconazole Counseling:  I discussed with the patient the risks of itraconazole including but not limited to liver damage, nausea/vomiting, neuropathy, and severe allergy.  The patient understands that this medication is best absorbed when taken with acidic beverages such as non-diet cola or ginger ale.  The patient understands that monitoring is required including baseline LFTs and repeat LFTs at intervals.  The patient understands that they are to contact us or the primary physician if concerning signs are noted.
Humira Counseling:  I discussed with the patient the risks of adalimumab including but not limited to myelosuppression, immunosuppression, autoimmune hepatitis, demyelinating diseases, lymphoma, and serious infections.  The patient understands that monitoring is required including a PPD at baseline and must alert us or the primary physician if symptoms of infection or other concerning signs are noted.
Dapsone Pregnancy And Lactation Text: This medication is Pregnancy Category C and is not considered safe during pregnancy or breast feeding.
Cephalexin Counseling: I counseled the patient regarding use of cephalexin as an antibiotic for prophylactic and/or therapeutic purposes. Cephalexin (commonly prescribed under brand name Keflex) is a cephalosporin antibiotic which is active against numerous classes of bacteria, including most skin bacteria. Side effects may include nausea, diarrhea, gastrointestinal upset, rash, hives, yeast infections, and in rare cases, hepatitis, kidney disease, seizures, fever, confusion, neurologic symptoms, and others. Patients with severe allergies to penicillin medications are cautioned that there is about a 10% incidence of cross-reactivity with cephalosporins. When possible, patients with penicillin allergies should use alternatives to cephalosporins for antibiotic therapy.
Simponi Counseling:  I discussed with the patient the risks of golimumab including but not limited to myelosuppression, immunosuppression, autoimmune hepatitis, demyelinating diseases, lymphoma, and serious infections.  The patient understands that monitoring is required including a PPD at baseline and must alert us or the primary physician if symptoms of infection or other concerning signs are noted.
Cyclophosphamide Counseling:  I discussed with the patient the risks of cyclophosphamide including but not limited to hair loss, hormonal abnormalities, decreased fertility, abdominal pain, diarrhea, nausea and vomiting, bone marrow suppression and infection. The patient understands that monitoring is required while taking this medication.
Benzoyl Peroxide Pregnancy And Lactation Text: This medication is Pregnancy Category C. It is unknown if benzoyl peroxide is excreted in breast milk.
Spironolactone Counseling: Patient advised regarding risks of diarrhea, abdominal pain, hyperkalemia, birth defects (for female patients), liver toxicity and renal toxicity. The patient may need blood work to monitor liver and kidney function and potassium levels while on therapy. The patient verbalized understanding of the proper use and possible adverse effects of spironolactone.  All of the patient's questions and concerns were addressed.
Hydroxyzine Counseling: Patient advised that the medication is sedating and not to drive a car after taking this medication.  Patient informed of potential adverse effects including but not limited to dry mouth, urinary retention, and blurry vision.  The patient verbalized understanding of the proper use and possible adverse effects of hydroxyzine.  All of the patient's questions and concerns were addressed.
Include Pregnancy/Lactation Warning?: No
Acitretin Pregnancy And Lactation Text: This medication is Pregnancy Category X and should not be given to women who are pregnant or may become pregnant in the future. This medication is excreted in breast milk.
Protopic Pregnancy And Lactation Text: This medication is Pregnancy Category C. It is unknown if this medication is excreted in breast milk when applied topically.
Nsaids Pregnancy And Lactation Text: These medications are considered safe up to 30 weeks gestation. It is excreted in breast milk.
Cyclophosphamide Pregnancy And Lactation Text: This medication is Pregnancy Category D and it isn't considered safe during pregnancy. This medication is excreted in breast milk.
Humira Pregnancy And Lactation Text: This medication is Pregnancy Category B and is considered safe during pregnancy. It is unknown if this medication is excreted in breast milk.
Cephalexin Pregnancy And Lactation Text: This medication is Pregnancy Category B and considered safe during pregnancy.  It is also excreted in breast milk but can be used safely for shorter doses.
Eucrisa Pregnancy And Lactation Text: This medication has not been assigned a Pregnancy Risk Category but animal studies failed to show danger with the topical medication. It is unknown if the medication is excreted in breast milk.
Xolair Counseling:  Patient informed of potential adverse effects including but not limited to fever, muscle aches, rash and allergic reactions.  The patient verbalized understanding of the proper use and possible adverse effects of Xolair.  All of the patient's questions and concerns were addressed.
Rifampin Pregnancy And Lactation Text: This medication is Pregnancy Category C and it isn't know if it is safe during pregnancy. It is also excreted in breast milk and should not be used if you are breast feeding.
Topical Sulfur Applications Counseling: Topical Sulfur Counseling: Patient counseled that this medication may cause skin irritation or allergic reactions.  In the event of skin irritation, the patient was advised to reduce the amount of the drug applied or use it less frequently.   The patient verbalized understanding of the proper use and possible adverse effects of topical sulfur application.  All of the patient's questions and concerns were addressed.
Hydroquinone Counseling:  Patient advised that medication may result in skin irritation, lightening (hypopigmentation), dryness, and burning.  In the event of skin irritation, the patient was advised to reduce the amount of the drug applied or use it less frequently.  Rarely, spots that are treated with hydroquinone can become darker (pseudoochronosis).  Should this occur, patient instructed to stop medication and call the office. The patient verbalized understanding of the proper use and possible adverse effects of hydroquinone.  All of the patient's questions and concerns were addressed.
Ilumya Counseling: I discussed with the patient the risks of tildrakizumab including but not limited to immunosuppression, malignancy, posterior leukoencephalopathy syndrome, and serious infections.  The patient understands that monitoring is required including a PPD at baseline and must alert us or the primary physician if symptoms of infection or other concerning signs are noted.
Ketoconazole Counseling:   Patient counseled regarding improving absorption with orange juice.  Adverse effects include but are not limited to breast enlargement, headache, diarrhea, nausea, upset stomach, liver function test abnormalities, taste disturbance, and stomach pain.  There is a rare possibility of liver failure that can occur when taking ketoconazole. The patient understands that monitoring of LFTs may be required, especially at baseline. The patient verbalized understanding of the proper use and possible adverse effects of ketoconazole.  All of the patient's questions and concerns were addressed.
Spironolactone Pregnancy And Lactation Text: This medication can cause feminization of the male fetus and should be avoided during pregnancy. The active metabolite is also found in breast milk.
Topical Sulfur Applications Pregnancy And Lactation Text: This medication is Pregnancy Category C and has an unknown safety profile during pregnancy. It is unknown if this topical medication is excreted in breast milk.
Xolair Pregnancy And Lactation Text: This medication is Pregnancy Category B and is considered safe during pregnancy. This medication is excreted in breast milk.
Erivedge Counseling- I discussed with the patient the risks of Erivedge including but not limited to nausea, vomiting, diarrhea, constipation, weight loss, changes in the sense of taste, decreased appetite, muscle spasms, and hair loss.  The patient verbalized understanding of the proper use and possible adverse effects of Erivedge.  All of the patient's questions and concerns were addressed.
Carac Counseling:  I discussed with the patient the risks of Carac including but not limited to erythema, scaling, itching, weeping, crusting, and pain.
Cimzia Counseling:  I discussed with the patient the risks of Cimzia including but not limited to immunosuppression, allergic reactions and infections.  The patient understands that monitoring is required including a PPD at baseline and must alert us or the primary physician if symptoms of infection or other concerning signs are noted.
Odomzo Counseling- I discussed with the patient the risks of Odomzo including but not limited to nausea, vomiting, diarrhea, constipation, weight loss, changes in the sense of taste, decreased appetite, muscle spasms, and hair loss.  The patient verbalized understanding of the proper use and possible adverse effects of Odomzo.  All of the patient's questions and concerns were addressed.
Cyclosporine Counseling:  I discussed with the patient the risks of cyclosporine including but not limited to hypertension, gingival hyperplasia,myelosuppression, immunosuppression, liver damage, kidney damage, neurotoxicity, lymphoma, and serious infections. The patient understands that monitoring is required including baseline blood pressure, CBC, CMP, lipid panel and uric acid, and then 1-2 times monthly CMP and blood pressure.
Cimzia Pregnancy And Lactation Text: This medication crosses the placenta but can be considered safe in certain situations. Cimzia may be excreted in breast milk.
Carac Pregnancy And Lactation Text: This medication is Pregnancy Category X and contraindicated in pregnancy and in women who may become pregnant. It is unknown if this medication is excreted in breast milk.
Tetracycline Counseling: Patient counseled regarding possible photosensitivity and increased risk for sunburn.  Patient instructed to avoid sunlight, if possible.  When exposed to sunlight, patients should wear protective clothing, sunglasses, and sunscreen.  The patient was instructed to call the office immediately if the following severe adverse effects occur:  hearing changes, easy bruising/bleeding, severe headache, or vision changes.  The patient verbalized understanding of the proper use and possible adverse effects of tetracycline.  All of the patient's questions and concerns were addressed. Patient understands to avoid pregnancy while on therapy due to potential birth defects.
Bexarotene Counseling:  I discussed with the patient the risks of bexarotene including but not limited to hair loss, dry lips/skin/eyes, liver abnormalities, hyperlipidemia, pancreatitis, depression/suicidal ideation, photosensitivity, drug rash/allergic reactions, hypothyroidism, anemia, leukopenia, infection, cataracts, and teratogenicity.  Patient understands that they will need regular blood tests to check lipid profile, liver function tests, white blood cell count, thyroid function tests and pregnancy test if applicable.
Erivedge Pregnancy And Lactation Text: This medication is Pregnancy Category X and is absolutely contraindicated during pregnancy. It is unknown if it is excreted in breast milk.
Hydroxyzine Pregnancy And Lactation Text: This medication is not safe during pregnancy and should not be taken. It is also excreted in breast milk and breast feeding isn't recommended.
Solaraze Counseling:  I discussed with the patient the risks of Solaraze including but not limited to erythema, scaling, itching, weeping, crusting, and pain.
Clindamycin Counseling: I counseled the patient regarding use of clindamycin as an antibiotic for prophylactic and/or therapeutic purposes. Clindamycin is active against numerous classes of bacteria, including skin bacteria. Side effects may include nausea, diarrhea, gastrointestinal upset, rash, hives, yeast infections, and in rare cases, colitis.
Tetracycline Pregnancy And Lactation Text: This medication is Pregnancy Category D and not consider safe during pregnancy. It is also excreted in breast milk.
Wartpeel Counseling:  I discussed with the patient the risks of Wartpeel including but not limited to erythema, scaling, itching, weeping, crusting, and pain.
Bexarotene Pregnancy And Lactation Text: This medication is Pregnancy Category X and should not be given to women who are pregnant or may become pregnant. This medication should not be used if you are breast feeding.
Solaraze Pregnancy And Lactation Text: This medication is Pregnancy Category B and is considered safe. There is some data to suggest avoiding during the third trimester. It is unknown if this medication is excreted in breast milk.
SSKI Counseling:  I discussed with the patient the risks of SSKI including but not limited to thyroid abnormalities, metallic taste, GI upset, fever, headache, acne, arthralgias, paraesthesias, lymphadenopathy, easy bleeding, arrhythmias, and allergic reaction.
Stelara Counseling:  I discussed with the patient the risks of ustekinumab including but not limited to immunosuppression, malignancy, posterior leukoencephalopathy syndrome, and serious infections.  The patient understands that monitoring is required including a PPD at baseline and must alert us or the primary physician if symptoms of infection or other concerning signs are noted.
Arava Counseling:  Patient counseled regarding adverse effects of Arava including but not limited to nausea, vomiting, abnormalities in liver function tests. Patients may develop mouth sores, rash, diarrhea, and abnormalities in blood counts. The patient understands that monitoring is required including LFTs and blood counts.  There is a rare possibility of scarring of the liver and lung problems that can occur when taking methotrexate. Persistent nausea, loss of appetite, pale stools, dark urine, cough, and shortness of breath should be reported immediately. Patient advised to discontinue Arava treatment and consult with a physician prior to attempting conception. The patient will have to undergo a treatment to eliminate Arava from the body prior to conception.
Ketoconazole Pregnancy And Lactation Text: This medication is Pregnancy Category C and it isn't know if it is safe during pregnancy. It is also excreted in breast milk and breast feeding isn't recommended.
Gabapentin Counseling: I discussed with the patient the risks of gabapentin including but not limited to dizziness, somnolence, fatigue and ataxia.
Metronidazole Counseling:  I discussed with the patient the risks of metronidazole including but not limited to seizures, nausea/vomiting, a metallic taste in the mouth, nausea/vomiting and severe allergy.
Clindamycin Pregnancy And Lactation Text: This medication can be used in pregnancy if certain situations. Clindamycin is also present in breast milk.
Cyclosporine Pregnancy And Lactation Text: This medication is Pregnancy Category C and it isn't know if it is safe during pregnancy. This medication is excreted in breast milk.
5-Fu Counseling: 5-Fluorouracil Counseling:  I discussed with the patient the risks of 5-fluorouracil including but not limited to erythema, scaling, itching, weeping, crusting, and pain.
Cosentyx Counseling:  I discussed with the patient the risks of Cosentyx including but not limited to worsening of Crohn's disease, immunosuppression, allergic reactions and infections.  The patient understands that monitoring is required including a PPD at baseline and must alert us or the primary physician if symptoms of infection or other concerning signs are noted.
Sski Pregnancy And Lactation Text: This medication is Pregnancy Category D and isn't considered safe during pregnancy. It is excreted in breast milk.
Albendazole Counseling:  I discussed with the patient the risks of albendazole including but not limited to cytopenia, kidney damage, nausea/vomiting and severe allergy.  The patient understands that this medication is being used in an off-label manner.
Isotretinoin Counseling: Patient should get monthly blood tests, not donate blood, not drive at night if vision affected, not share medication, and not undergo elective surgery for 6 months after tx completed. Side effects reviewed, pt to contact office should one occur.
Otezla Counseling: The side effects of Otezla were discussed with the patient, including but not limited to worsening or new depression, weight loss, diarrhea, nausea, upper respiratory tract infection, and headache. Patient instructed to call the office should any adverse effect occur.  The patient verbalized understanding of the proper use and possible adverse effects of Otezla.  All the patient's questions and concerns were addressed.
Methotrexate Counseling:  Patient counseled regarding adverse effects of methotrexate including but not limited to nausea, vomiting, abnormalities in liver function tests. Patients may develop mouth sores, rash, diarrhea, and abnormalities in blood counts. The patient understands that monitoring is required including LFT's and blood counts.  There is a rare possibility of scarring of the liver and lung problems that can occur when taking methotrexate. Persistent nausea, loss of appetite, pale stools, dark urine, cough, and shortness of breath should be reported immediately. Patient advised to discontinue methotrexate treatment at least three months before attempting to become pregnant.  I discussed the need for folate supplements while taking methotrexate.  These supplements can decrease side effects during methotrexate treatment. The patient verbalized understanding of the proper use and possible adverse effects of methotrexate.  All of the patient's questions and concerns were addressed.
Terbinafine Counseling: Patient counseling regarding adverse effects of terbinafine including but not limited to headache, diarrhea, rash, upset stomach, liver function test abnormalities, itching, taste/smell disturbance, nausea, abdominal pain, and flatulence.  There is a rare possibility of liver failure that can occur when taking terbinafine.  The patient understands that a baseline LFT and kidney function test may be required. The patient verbalized understanding of the proper use and possible adverse effects of terbinafine.  All of the patient's questions and concerns were addressed.
Infliximab Counseling:  I discussed with the patient the risks of infliximab including but not limited to myelosuppression, immunosuppression, autoimmune hepatitis, demyelinating diseases, lymphoma, and serious infections.  The patient understands that monitoring is required including a PPD at baseline and must alert us or the primary physician if symptoms of infection or other concerning signs are noted.
Doxycycline Counseling:  Patient counseled regarding possible photosensitivity and increased risk for sunburn.  Patient instructed to avoid sunlight, if possible.  When exposed to sunlight, patients should wear protective clothing, sunglasses, and sunscreen.  The patient was instructed to call the office immediately if the following severe adverse effects occur:  hearing changes, easy bruising/bleeding, severe headache, or vision changes.  The patient verbalized understanding of the proper use and possible adverse effects of doxycycline.  All of the patient's questions and concerns were addressed.
Imiquimod Counseling:  I discussed with the patient the risks of imiquimod including but not limited to erythema, scaling, itching, weeping, crusting, and pain.  Patient understands that the inflammatory response to imiquimod is variable from person to person and was educated regarded proper titration schedule.  If flu-like symptoms develop, patient knows to discontinue the medication and contact us.
Thalidomide Counseling: I discussed with the patient the risks of thalidomide including but not limited to birth defects, anxiety, weakness, chest pain, dizziness, cough and severe allergy.
Terbinafine Pregnancy And Lactation Text: This medication is Pregnancy Category B and is considered safe during pregnancy. It is also excreted in breast milk and breast feeding isn't recommended.
Gabapentin Pregnancy And Lactation Text: This medication is Pregnancy Category C and isn't considered safe during pregnancy. It is excreted in breast milk.
Zyclara Counseling:  I discussed with the patient the risks of imiquimod including but not limited to erythema, scaling, itching, weeping, crusting, and pain.  Patient understands that the inflammatory response to imiquimod is variable from person to person and was educated regarded proper titration schedule.  If flu-like symptoms develop, patient knows to discontinue the medication and contact us.
Fluconazole Counseling:  Patient counseled regarding adverse effects of fluconazole including but not limited to headache, diarrhea, nausea, upset stomach, liver function test abnormalities, taste disturbance, and stomach pain.  There is a rare possibility of liver failure that can occur when taking fluconazole.  The patient understands that monitoring of LFTs and kidney function test may be required, especially at baseline. The patient verbalized understanding of the proper use and possible adverse effects of fluconazole.  All of the patient's questions and concerns were addressed.
Metronidazole Pregnancy And Lactation Text: This medication is Pregnancy Category B and considered safe during pregnancy.  It is also excreted in breast milk.
Topical Retinoid counseling:  Patient advised to apply a pea-sized amount only at bedtime and wait 30 minutes after washing their face before applying.  If too drying, patient may add a non-comedogenic moisturizer. The patient verbalized understanding of the proper use and possible adverse effects of retinoids.  All of the patient's questions and concerns were addressed.
Taltz Counseling: I discussed with the patient the risks of ixekizumab including but not limited to immunosuppression, serious infections, worsening of inflammatory bowel disease and drug reactions.  The patient understands that monitoring is required including a PPD at baseline and must alert us or the primary physician if symptoms of infection or other concerning signs are noted.
Doxycycline Pregnancy And Lactation Text: This medication is Pregnancy Category D and not consider safe during pregnancy. It is also excreted in breast milk but is considered safe for shorter treatment courses.
Methotrexate Pregnancy And Lactation Text: This medication is Pregnancy Category X and is known to cause fetal harm. This medication is excreted in breast milk.
Otezla Pregnancy And Lactation Text: This medication is Pregnancy Category C and it isn't known if it is safe during pregnancy. It is unknown if it is excreted in breast milk.
Dupixent Counseling: I discussed with the patient the risks of dupilumab including but not limited to eye infection and irritation, cold sores, injection site reactions, worsening of asthma, allergic reactions and increased risk of parasitic infection.  Live vaccines should be avoided while taking dupilumab. Dupilumab will also interact with certain medications such as warfarin and cyclosporine. The patient understands that monitoring is required and they must alert us or the primary physician if symptoms of infection or other concerning signs are noted.
Clofazimine Counseling:  I discussed with the patient the risks of clofazimine including but not limited to skin and eye pigmentation, liver damage, nausea/vomiting, gastrointestinal bleeding and allergy.
Drysol Counseling:  I discussed with the patient the risks of drysol/aluminum chloride including but not limited to skin rash, itching, irritation, burning.
Isotretinoin Pregnancy And Lactation Text: This medication is Pregnancy Category X and is considered extremely dangerous during pregnancy. It is unknown if it is excreted in breast milk.
Glycopyrrolate Counseling:  I discussed with the patient the risks of glycopyrrolate including but not limited to skin rash, drowsiness, dry mouth, difficulty urinating, and blurred vision.
Albendazole Pregnancy And Lactation Text: This medication is Pregnancy Category C and it isn't known if it is safe during pregnancy. It is also excreted in breast milk.
Azathioprine Counseling:  I discussed with the patient the risks of azathioprine including but not limited to myelosuppression, immunosuppression, hepatotoxicity, lymphoma, and infections.  The patient understands that monitoring is required including baseline LFTs, Creatinine, possible TPMP genotyping and weekly CBCs for the first month and then every 2 weeks thereafter.  The patient verbalized understanding of the proper use and possible adverse effects of azathioprine.  All of the patient's questions and concerns were addressed.
Ivermectin Counseling:  Patient instructed to take medication on an empty stomach with a full glass of water.  Patient informed of potential adverse effects including but not limited to nausea, diarrhea, dizziness, itching, and swelling of the extremities or lymph nodes.  The patient verbalized understanding of the proper use and possible adverse effects of ivermectin.  All of the patient's questions and concerns were addressed.
High Dose Vitamin A Counseling: Side effects reviewed, pt to contact office should one occur.
Minocycline Counseling: Patient advised regarding possible photosensitivity and discoloration of the teeth, skin, lips, tongue and gums.  Patient instructed to avoid sunlight, if possible.  When exposed to sunlight, patients should wear protective clothing, sunglasses, and sunscreen.  The patient was instructed to call the office immediately if the following severe adverse effects occur:  hearing changes, easy bruising/bleeding, severe headache, or vision changes.  The patient verbalized understanding of the proper use and possible adverse effects of minocycline.  All of the patient's questions and concerns were addressed.
Azithromycin Counseling:  I discussed with the patient the risks of azithromycin including but not limited to GI upset, allergic reaction, drug rash, diarrhea, and yeast infections.
Minoxidil Counseling: Minoxidil is a topical medication which can increase blood flow where it is applied. It is uncertain how this medication increases hair growth. Side effects are uncommon and include stinging and allergic reactions.
Tazorac Counseling:  Patient advised that medication is irritating and drying.  Patient may need to apply sparingly and wash off after an hour before eventually leaving it on overnight.  The patient verbalized understanding of the proper use and possible adverse effects of tazorac.  All of the patient's questions and concerns were addressed.
Tremfya Counseling: I discussed with the patient the risks of guselkumab including but not limited to immunosuppression, serious infections, worsening of inflammatory bowel disease and drug reactions.  The patient understands that monitoring is required including a PPD at baseline and must alert us or the primary physician if symptoms of infection or other concerning signs are noted.
Glycopyrrolate Pregnancy And Lactation Text: This medication is Pregnancy Category B and is considered safe during pregnancy. It is unknown if it is excreted breast milk.
Oxybutynin Counseling:  I discussed with the patient the risks of oxybutynin including but not limited to skin rash, drowsiness, dry mouth, difficulty urinating, and blurred vision.
Cimetidine Counseling:  I discussed with the patient the risks of Cimetidine including but not limited to gynecomastia, headache, diarrhea, nausea, drowsiness, arrhythmias, pancreatitis, skin rashes, psychosis, bone marrow suppression and kidney toxicity.
Prednisone Counseling:  I discussed with the patient the risks of prolonged use of prednisone including but not limited to weight gain, insomnia, osteoporosis, mood changes, diabetes, susceptibility to infection, glaucoma and high blood pressure.  In cases where prednisone use is prolonged, patients should be monitored with blood pressure checks, serum glucose levels and an eye exam.  Additionally, the patient may need to be placed on GI prophylaxis, PCP prophylaxis, and calcium and vitamin D supplementation and/or a bisphosphonate.  The patient verbalized understanding of the proper use and the possible adverse effects of prednisone.  All of the patient's questions and concerns were addressed.
Erythromycin Counseling:  I discussed with the patient the risks of erythromycin including but not limited to GI upset, allergic reaction, drug rash, diarrhea, increase in liver enzymes, and yeast infections.
Rituxan Counseling:  I discussed with the patient the risks of Rituxan infusions. Side effects can include infusion reactions, severe drug rashes including mucocutaneous reactions, reactivation of latent hepatitis and other infections and rarely progressive multifocal leukoencephalopathy.  All of the patient's questions and concerns were addressed.
Drysol Pregnancy And Lactation Text: This medication is considered safe during pregnancy and breast feeding.
Dupixent Pregnancy And Lactation Text: This medication likely crosses the placenta but the risk for the fetus is uncertain. This medication is excreted in breast milk.
Valtrex Counseling: I discussed with the patient the risks of valacyclovir including but not limited to kidney damage, nausea, vomiting and severe allergy.  The patient understands that if the infection seems to be worsening or is not improving, they are to call.
High Dose Vitamin A Pregnancy And Lactation Text: High dose vitamin A therapy is contraindicated during pregnancy and breast feeding.
Azithromycin Pregnancy And Lactation Text: This medication is considered safe during pregnancy and is also secreted in breast milk.
Erythromycin Pregnancy And Lactation Text: This medication is Pregnancy Category B and is considered safe during pregnancy. It is also excreted in breast milk.
Rituxan Pregnancy And Lactation Text: This medication is Pregnancy Category C and it isn't know if it is safe during pregnancy. It is unknown if this medication is excreted in breast milk but similar antibodies are known to be excreted.
Griseofulvin Counseling:  I discussed with the patient the risks of griseofulvin including but not limited to photosensitivity, cytopenia, liver damage, nausea/vomiting and severe allergy.  The patient understands that this medication is best absorbed when taken with a fatty meal (e.g., ice cream or french fries).
Colchicine Counseling:  Patient counseled regarding adverse effects including but not limited to stomach upset (nausea, vomiting, stomach pain, or diarrhea).  Patient instructed to limit alcohol consumption while taking this medication.  Colchicine may reduce blood counts especially with prolonged use.  The patient understands that monitoring of kidney function and blood counts may be required, especially at baseline. The patient verbalized understanding of the proper use and possible adverse effects of colchicine.  All of the patient's questions and concerns were addressed.
Picato Counseling:  I discussed with the patient the risks of Picato including but not limited to erythema, scaling, itching, weeping, crusting, and pain.
Valtrex Pregnancy And Lactation Text: this medication is Pregnancy Category B and is considered safe during pregnancy. This medication is not directly found in breast milk but it's metabolite acyclovir is present.
Hydroxychloroquine Counseling:  I discussed with the patient that a baseline ophthalmologic exam is needed at the start of therapy and every year thereafter while on therapy. A CBC may also be warranted for monitoring.  The side effects of this medication were discussed with the patient, including but not limited to agranulocytosis, aplastic anemia, seizures, rashes, retinopathy, and liver toxicity. Patient instructed to call the office should any adverse effect occur.  The patient verbalized understanding of the proper use and possible adverse effects of Plaquenil.  All the patient's questions and concerns were addressed.
Cellcept Counseling:  I discussed with the patient the risks of mycophenolate mofetil including but not limited to infection/immunosuppression, GI upset, hypokalemia, hypercholesterolemia, bone marrow suppression, lymphoproliferative disorders, malignancy, GI ulceration/bleed/perforation, colitis, interstitial lung disease, kidney failure, progressive multifocal leukoencephalopathy, and birth defects.  The patient understands that monitoring is required including a baseline creatinine and regular CBC testing. In addition, patient must alert us immediately if symptoms of infection or other concerning signs are noted.
Griseofulvin Pregnancy And Lactation Text: This medication is Pregnancy Category X and is known to cause serious birth defects. It is unknown if this medication is excreted in breast milk but breast feeding should be avoided.
Enbrel Counseling:  I discussed with the patient the risks of etanercept including but not limited to myelosuppression, immunosuppression, autoimmune hepatitis, demyelinating diseases, lymphoma, and infections.  The patient understands that monitoring is required including a PPD at baseline and must alert us or the primary physician if symptoms of infection or other concerning signs are noted.
Bactrim Counseling:  I discussed with the patient the risks of sulfa antibiotics including but not limited to GI upset, allergic reaction, drug rash, diarrhea, dizziness, photosensitivity, and yeast infections.  Rarely, more serious reactions can occur including but not limited to aplastic anemia, agranulocytosis, methemoglobinemia, blood dyscrasias, liver or kidney failure, lung infiltrates or desquamative/blistering drug rashes.
Elidel Counseling: Patient may experience a mild burning sensation during topical application. Elidel is not approved in children less than 2 years of age. There have been case reports of hematologic and skin malignancies in patients using topical calcineurin inhibitors although causality is questionable.
Quinolones Counseling:  I discussed with the patient the risks of fluoroquinolones including but not limited to GI upset, allergic reaction, drug rash, diarrhea, dizziness, photosensitivity, yeast infections, liver function test abnormalities, tendonitis/tendon rupture.
Tazorac Pregnancy And Lactation Text: This medication is not safe during pregnancy. It is unknown if this medication is excreted in breast milk.
Birth Control Pills Counseling: Birth Control Pill Counseling: I discussed with the patient the potential side effects of OCPs including but not limited to increased risk of stroke, heart attack, thrombophlebitis, deep venous thrombosis, hepatic adenomas, breast changes, GI upset, headaches, and depression.  The patient verbalized understanding of the proper use and possible adverse effects of OCPs. All of the patient's questions and concerns were addressed.
Detail Level: Zone
Topical Clindamycin Counseling: Patient counseled that this medication may cause skin irritation or allergic reactions.  In the event of skin irritation, the patient was advised to reduce the amount of the drug applied or use it less frequently.   The patient verbalized understanding of the proper use and possible adverse effects of clindamycin.  All of the patient's questions and concerns were addressed.
Siliq Counseling:  I discussed with the patient the risks of Siliq including but not limited to new or worsening depression, suicidal thoughts and behavior, immunosuppression, malignancy, posterior leukoencephalopathy syndrome, and serious infections.  The patient understands that monitoring is required including a PPD at baseline and must alert us or the primary physician if symptoms of infection or other concerning signs are noted. There is also a special program designed to monitor depression which is required with Siliq.
Hydroxychloroquine Pregnancy And Lactation Text: This medication has been shown to cause fetal harm but it isn't assigned a Pregnancy Risk Category. There are small amounts excreted in breast milk.

## 2019-01-17 NOTE — PROCEDURE: ADDITIONAL NOTES
Additional Notes: Consulted with Dr Bermudez.  We reviewed pathology, labs, history.  She recommended starting Cellcept and start titration of Prednisone.
Detail Level: Generalized

## 2019-01-18 ENCOUNTER — NON-PROVIDER VISIT (OUTPATIENT)
Dept: WOUND CARE | Facility: MEDICAL CENTER | Age: 74
End: 2019-01-18
Attending: NURSE PRACTITIONER
Payer: MEDICARE

## 2019-01-18 PROCEDURE — 97597 DBRDMT OPN WND 1ST 20 CM/<: CPT

## 2019-01-18 NOTE — PROCEDURES
2% viscous Lidocaine ge dwell time ~10 minutes prior to CSWD.     CSWD with curette to remove ~15 cm2 of non viable tissue from both wound beds.      NPWT <50 cm2 applied.

## 2019-01-21 ENCOUNTER — NON-PROVIDER VISIT (OUTPATIENT)
Dept: WOUND CARE | Facility: MEDICAL CENTER | Age: 74
End: 2019-01-21
Attending: NURSE PRACTITIONER
Payer: MEDICARE

## 2019-01-21 ENCOUNTER — PATIENT OUTREACH (OUTPATIENT)
Dept: HEALTH INFORMATION MANAGEMENT | Facility: OTHER | Age: 74
End: 2019-01-21

## 2019-01-21 PROCEDURE — 97597 DBRDMT OPN WND 1ST 20 CM/<: CPT

## 2019-01-21 NOTE — PROCEDURES
Lidocaine applied to proximal wound with dwell time of 10 minutes prior to sharp debridement using scalpel to remove 5cm2 of adherent yellow non-viable tissue. Patient tolerated well, reports only minimal pain today. NPWT applied to both wounds.   ------------  Per Dixie CHRISTINE to try topical Vitamin A to wound bed, limited evidence of effectiveness per [facility]OC[facility]. We can try to apply to one wound and hold vac to that wound. Patient may benefit from a vac hold since she has had limited progress in the last few weeks. Recommended Vitamin A dosage is 25,000-100,000 IU daily (Obtain vitamin A capsule, prick end of capsule with needle, and squeeze onto viable wound bed. Follow with appropriate dressing.)

## 2019-01-21 NOTE — PATIENT INSTRUCTIONS
Should you experience any significant changes in your wound(s) such as infection (redness, swelling, localized heat, increased pain, fever >101 F, chills) or have any questions regarding your home care instructions, please contact the wound center [phone]. If after hours, contact your primary care physician or go the hospital emergency room.  Keep dressing clean and dry and cover while bathing. Only change dressing if over saturated, soiled or its falling off.

## 2019-01-22 NOTE — PROGRESS NOTES
Outreach call to pt to follow up and determine if she has obtained the last of her supporting documentation to complete applications for [employer] and [employer] benefits. Pt reported she hasn't had the chance to get to her bank due to the weather but reported she does plan on going this week. MSW encouraged pt to follow up with MSW once information is obtained. Pt voiced agreement.     Plan:  · MSW will continue to follow up and assist pt with  care plan.

## 2019-01-23 ENCOUNTER — OFFICE VISIT (OUTPATIENT)
Dept: WOUND CARE | Facility: MEDICAL CENTER | Age: 74
End: 2019-01-23
Attending: NURSE PRACTITIONER
Payer: MEDICARE

## 2019-01-23 VITALS
SYSTOLIC BLOOD PRESSURE: 125 MMHG | RESPIRATION RATE: 18 BRPM | OXYGEN SATURATION: 97 % | DIASTOLIC BLOOD PRESSURE: 61 MMHG | HEART RATE: 88 BPM | TEMPERATURE: 97.6 F

## 2019-01-23 DIAGNOSIS — I73.9 PERIPHERAL VASCULAR DISEASE (HCC): ICD-10-CM

## 2019-01-23 DIAGNOSIS — L10.9 BULLOUS PEMPHIGUS: ICD-10-CM

## 2019-01-23 DIAGNOSIS — L97.922 LEG ULCER, LEFT, WITH FAT LAYER EXPOSED (HCC): Primary | ICD-10-CM

## 2019-01-23 PROCEDURE — 11045 DBRDMT SUBQ TISS EACH ADDL: CPT

## 2019-01-23 PROCEDURE — 11042 DBRDMT SUBQ TIS 1ST 20SQCM/<: CPT | Performed by: NURSE PRACTITIONER

## 2019-01-23 PROCEDURE — 11045 DBRDMT SUBQ TISS EACH ADDL: CPT | Performed by: NURSE PRACTITIONER

## 2019-01-23 PROCEDURE — 11042 DBRDMT SUBQ TIS 1ST 20SQCM/<: CPT

## 2019-01-23 RX ORDER — MYCOPHENOLATE MOFETIL 500 MG/1
500 TABLET ORAL 2 TIMES DAILY
COMMUNITY
Start: 2019-01-23 | End: 2019-02-06

## 2019-01-23 RX ORDER — MYCOPHENOLATE MOFETIL 500 MG/1
500 TABLET ORAL DAILY
COMMUNITY
Start: 2019-02-07 | End: 2019-02-06 | Stop reason: CLARIF

## 2019-01-23 NOTE — PATIENT INSTRUCTIONS
-You have a wound vac at 125 mmHg continuous with black foam. The dressing will be changed every Monday, Wednesday, and Friday at the wound clinic.    -If you are having issues with your wound vac, please consider patching leaks, changing the canister, or calling [phone] for troubleshooting. If the wound vac has been off or non-operational for over 2 hours, call wound care center to inform them and remove all dressings including black foam and replace with gauze moistened with normal saline.     -Should you experience any significant changes in your wound(s), such as infection (redness, swelling, localized heat, increased pain, fever > 101 F, chills) or have any questions regarding your home care instructions, please contact the wound center at [phone]. If after hours, contact your primary care physician or go to the hospital emergency room.

## 2019-01-24 NOTE — PROGRESS NOTES
Provider Encounter- Full Thickness wound      HISTORY OF PRESENT ILLNESS        START OF CARE IN CLINIC: 10/26/2018    REFERRING PROVIDER: IRON Son     WOUND- Full thickness wound   LOCATION: Medial left calf and medial ankle   WOUND HISTORY: Patient has a long wound history with a hip and ankle fracture.  Developed a wound over the medial ankle, a prominent malleolus and the wound likely developed from pressure.    PREVIOUS TREATMENT: Debridement NPWT, hospitalization with IV abx and Veriflow, which really worked   PERTINENT PMH: Recent diagnosis of Bullous Pemphigous - steroids for 2 months and antibiotics. Peripheral vascular disease with leftfem-pop bypass   [id number]  Plain films left ankle  There are fractures of the distal fibular shaft and of the medial malleolus, both of which appear to be subacute with at least minimal callus formation. There is marked lateral subluxation of the talus. No other significant findings.   VASCULAR STUDIES:Arterial duplex shows patent left fem-pop without stenosis     LAST  WOUND CULTURE:  DATE : 18 Heavy growth of pseudomonas, Enterococcus and light growth MRSA             DIABETES:  No  TOBACCO USE: no    : Patient presents today for assessment debridement of her wounds.  Authorization for Cytal to these wounds as adjunctive therapy to VAC has been requested from her insurance provider.  I do not see that this is yet been approved, and thus was not applied today.  She has been prescribed a new medication by her dermatologist, mycophenolate, for treatment of her bullous pemphigus.  She is still on prednisone, not sure if she is supposed to continue this, will contact her dermatologist.      PAST MEDICAL HISTORY:   Past Medical History:   Diagnosis Date   • Anxiety    • Cellulitis and abscess of lower extremity    • Dental disorder     full dentures   • Generalized osteoarthritis of multiple sites 10/20/2015   • Heart valve disease     pt not sure     • Hyperlipidemia    • Hypertension    • Osteoporosis        PAST SURGICAL HISTORY:   Past Surgical History:   Procedure Laterality Date   • FEMORAL POPLITEAL BYPASS Left 6/8/2018    Procedure: FEMORAL POPLITEAL BYPASS;  Surgeon: Milana Colin M.D.;  Location: SURGERY [facility];  Service: General   • IRRIGATION & DEBRIDEMENT GENERAL Left 6/8/2018    Procedure: IRRIGATION & DEBRIDEMENT ANKLE WOUND;  Surgeon: Milana Colin M.D.;  Location: SURGERY [facility];  Service: General   • IRRIGATION & DEBRIDEMENT HIP Left 6/4/2018    Procedure: IRRIGATION & DEBRIDEMENT HIP;  Surgeon: Chong Vazquez M.D.;  Location: SURGERY [facility];  Service: Orthopedics   • HIP REVISION TOTAL Left 2/11/2018    Procedure: HIP REVISION TOTAL- femoral nail removal conversion to total hip arthoroplasty;  Surgeon: Chong Vazquez M.D.;  Location: SURGERY [facility];  Service: Orthopedics   • HIP NAILING INTRAMEDULLARY Left 12/20/2017    Procedure: HIP NAILING INTRAMEDULLARY;  Surgeon: Jorge Peters M.D.;  Location: SURGERY [facility];  Service: Orthopedics   • BREAST BIOPSY  8/28/2014    Performed by Magdalena Londono M.D. at [facility]   • BREAST BIOPSY Right 8/14    benign   • GYN SURGERY  1973    complete hysterectomy   • ABDOMINAL HYSTERECTOMY TOTAL      w/BSO due to uterine cyst and endometriosis   • ATHROPLASTY      hip        MEDICATIONS:   Current Outpatient Prescriptions   Medication   • mycophenolate (CELLCEPT) 500 MG tablet   • [START ON 2/7/2019] mycophenolate (CELLCEPT) 500 MG tablet   • niacin 500 MG Tab   • doxycycline (VIBRAMYCIN) 100 MG Tab   • predniSONE (DELTASONE) 20 MG Tab   • Multiple Vitamins-Minerals (THERAPEUTIC-M/LUTEIN) Tab   • metoprolol SR (TOPROL XL) 100 MG TABLET SR 24 HR   • fexofenadine (ALLEGRA) 180 MG tablet   • atorvastatin (LIPITOR) 40 MG Tab   • lisinopril (PRINIVIL) 5 MG Tab   • triamcinolone acetonide (KENALOG) 0.1 % Cream   • cyanocobalamin (VITAMIN B12)  1000 MCG Tab   • folic acid (FOLVITE) 1 MG Tab   • multivitamin (THERAGRAN) Tab   • aspirin (ASA) 81 MG Chew Tab chewable tablet   • vitamin D (CHOLECALCIFEROL) 1000 UNIT Tab   • sertraline (ZOLOFT) 100 MG Tab     No current facility-administered medications for this visit.        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  [employer]     Social History Main Topics   • Smoking status: Former Smoker     Packs/day: 0.50     Years: 25.00     Types: Cigarettes     Quit date: 1/1/2007   • Smokeless tobacco: Never Used   • Alcohol use 3.0 oz/week     5 Glasses of wine per week      Comment: glass of wine per day   • Drug use: No   • Sexual activity: Not Currently     Partners: Male     Other Topics Concern   • Not on file     Social History Narrative   • No narrative on file       FAMILY HISTORY:   Family History   Problem Relation Age of Onset   • GI Mother         colostomy   • Heart Attack Father    • Diabetes Father    • Hypertension Father    • Psychiatry Brother         bipolar disorder        REVIEW OF SYSTEMS:   Review of Systems   All other systems reviewed and are negative.    Negative except for itching which is dramatically better    PHYSICAL EXAMINATION:   Physical Exam   Constitutional: She is well-developed, well-nourished, and in no distress. No distress.   HENT:   Head: Normocephalic and atraumatic.   Eyes: Pupils are equal, round, and reactive to light.   Neck: Normal range of motion. Neck supple.   Cardiovascular: Normal rate, regular rhythm and intact distal pulses.     Pulmonary/Chest: Effort normal. No respiratory distress.   Abdominal: Soft. She exhibits no distension.   Musculoskeletal: Normal range of motion. She exhibits no edema.   Skin:   Open wounds as seen in photos        Wound Assessment- Refer to wound flowsheet      Left medial ankle, pre-debridement Photo          PROCEDURE  -2% viscous lidocaine applied topically to wound bed for approximately 5 minutes prior to debridement.  4m curette used to debride wound bed.  Excisional debridement was performed to remove devitalized tissue until healthy, bleeding tissue was visualized.   Entire surface of wound, 14.1 cm2, debrided  Tissue debrided into the subcutaneous layer.  Wound care completed by Annalee Escobar RN- re-application of NPWT    Left medial ankle, post-debridement Photo          Measurements (post-debridement): Left medial ankle     DATE: 1/23/2019   Length (cm)  4.7   Width (cm)  3.1   Depth (cm)  0.2   Area (cm2)  14.1   Tract/undermine none     Wound #2  Meidal, distal calf.      Left medial/distal calf, pre-debridement photo            PROCEDURE  -2% viscous lidocaine applied topically to wound bed for approximately 5 minutes prior to debridement.  4m curette used to debride wound bed.  Excisional debridement was performed to remove devitalized tissue until healthy, bleeding tissue was visualized.   Entire surface of wound, 13.26 cm², debrided  Tissue debrided into the subcutaneous layer.  Wound care completed by Annalee Escobar RN - re-application of NPWT      Measurements (post-debridement): Left medial distal calf      DATE: 1/23/2019   Length (cm)  3.9   Width (cm)  3.4   Depth (cm)  0.3   Area (cm2)  13.26   Tract/undermine none     Post debridement photos              PATIENT EDUCATION  -Advised to go to ER for any increased redness, swelling, drainage or odor, or if patient develops fever, chills, nausea or vomiting.  -Importance of adequate nutrition for wound healing  -Increase protein  intake (unless contraindicated by renal status)    ASSESSMENT AND PLAN:  1. Leg ulcer, left, with fat layer exposed (HCC)  Comments: Full-thickness ulcers x2 to left medial lower leg, probably due to pressure.  Complicated by PVD.    1/23: Both wounds have decreased slightly in area this week.  Excisional debridement of wounds today in clinic, medically necessary to promote wound healing.  Authorization for Cytal has not yet been received from patient's insurance provider.  We will follow-up on this.  Continue with VAC, add Cytal as adjunctive modality as soon is approved by insurance.  Patient to return to clinic 3 times per week for assessment, debridement, and VAC dressing changes.  1 of these visits to be with provider for excisional debridement.    2. Peripheral vascular disease (HCC)  Comments: Per Dr. Colin, vascular,Arterial duplex shows patent left fem-pop without stenosis  1/23: Left DP pulse palpable, foot warm    3. Bullous pemphigus  Comments: Diagnosed by dermatology.  Dermatology managing    1/23: Patient has been was prescribed CellCept by her dermatologist, has not yet started these.  She is going to check with him to find out if she needs to stop taking the prednisone.             Please note that this dictation was created using voice recognition software. I have worked with technical experts from [employer] to optimize the interface.  I have made every reasonable attempt to correct obvious errors, but there may be errors of grammar and possibly content that I did not discover before finalizing the note.

## 2019-01-25 ENCOUNTER — NON-PROVIDER VISIT (OUTPATIENT)
Dept: WOUND CARE | Facility: MEDICAL CENTER | Age: 74
End: 2019-01-25
Attending: NURSE PRACTITIONER
Payer: MEDICARE

## 2019-01-25 PROCEDURE — 97598 DBRDMT OPN WND ADDL 20CM/<: CPT

## 2019-01-25 PROCEDURE — 97597 DBRDMT OPN WND 1ST 20 CM/<: CPT

## 2019-01-25 NOTE — PATIENT INSTRUCTIONS
-You have a wound vac at 125 mmHg continuous with black foam. The dressing will be changed every Monday, Wednesday, and Friday at the wound clinic.    -If you are having issues with your wound vac, please consider patching leaks, changing the canister, or calling [phone] for troubleshooting. If the wound vac has been off or non-operational for over 2 hours, call wound care center to inform them and remove all dressings including black foam and replace with gauze moistened with normal saline.     -Should you experience any significant changes in your wound(s), such as infection (redness, swelling, localized heat, increased pain, fever > 101 F, chills) or have any questions regarding your home care instructions, please contact the wound center at [phone]. If after hours, contact your primary care physician or go to the hospital emergency room.

## 2019-01-25 NOTE — PROCEDURES
Wound Care Procedures 1/25/2019:  Viscous lidocaine applied to wounds prior to debridement with ~5 minute dwell time.  CSWD with scalpel to remove ~26 cm2 slough from LLE wounds.  Wound vac reapplied using one piece of black foam.  NPWT restarted at 125 mmHg continuous, no leaks noted.

## 2019-01-28 ENCOUNTER — NON-PROVIDER VISIT (OUTPATIENT)
Dept: WOUND CARE | Facility: MEDICAL CENTER | Age: 74
End: 2019-01-28
Attending: NURSE PRACTITIONER
Payer: MEDICARE

## 2019-01-28 DIAGNOSIS — I73.9 PERIPHERAL VASCULAR DISEASE (HCC): ICD-10-CM

## 2019-01-28 DIAGNOSIS — B35.1 ONYCHOMYCOSIS: ICD-10-CM

## 2019-01-28 PROCEDURE — 97597 DBRDMT OPN WND 1ST 20 CM/<: CPT

## 2019-01-28 NOTE — PROCEDURES
CSWD with scalpel to remove non-viable biofilm layer over wound beds of ~3cm2 at left lower medial leg wounds after application of topical lidocaine gel; NPWT reapplied post cleaning.

## 2019-01-28 NOTE — PATIENT INSTRUCTIONS
Wound VAC at 125mmHg continuous or intermittent with 1 foam. Dressing will be changed every MWF at the wound clinic or with your home health nurse.  If you are having issues with your wound VAC, please consider patching leaks, changing the canister, or calling [phone] for troubleshooting. If the wound VAC has been off or un-operational for over 2 hours, call wound care center to inform them and remove all dressings including black foam and replace with normal saline damp gauze.     Should you experience any significant changes in your wound(s), such as infection (redness, swelling, localized heat, increased pain, fever > 101 F, chills) or have any questions regarding your home care instructions, please contact the wound center at [phone]. If after hours, contact your primary care physician or go to the hospital emergency room.   Keep dressing clean, dry and cover when bathing. Only change dressing if it's over saturated, soiled or falls off.

## 2019-01-30 ENCOUNTER — HOSPITAL ENCOUNTER (INPATIENT)
Facility: MEDICAL CENTER | Age: 74
LOS: 6 days | DRG: 603 | End: 2019-02-05
Attending: HOSPITALIST | Admitting: HOSPITALIST
Payer: MEDICARE

## 2019-01-30 ENCOUNTER — TELEPHONE (OUTPATIENT)
Dept: WOUND CARE | Facility: MEDICAL CENTER | Age: 74
End: 2019-01-30

## 2019-01-30 ENCOUNTER — HOSPITAL ENCOUNTER (OUTPATIENT)
Facility: MEDICAL CENTER | Age: 74
End: 2019-01-30
Attending: NURSE PRACTITIONER
Payer: MEDICARE

## 2019-01-30 ENCOUNTER — OFFICE VISIT (OUTPATIENT)
Dept: WOUND CARE | Facility: MEDICAL CENTER | Age: 74
End: 2019-01-30
Attending: NURSE PRACTITIONER
Payer: MEDICARE

## 2019-01-30 ENCOUNTER — APPOINTMENT (OUTPATIENT)
Dept: MEDICAL GROUP | Facility: PHYSICIAN GROUP | Age: 74
End: 2019-01-30
Payer: MEDICARE

## 2019-01-30 VITALS
DIASTOLIC BLOOD PRESSURE: 82 MMHG | SYSTOLIC BLOOD PRESSURE: 150 MMHG | TEMPERATURE: 97.7 F | RESPIRATION RATE: 16 BRPM | HEART RATE: 85 BPM | OXYGEN SATURATION: 94 %

## 2019-01-30 DIAGNOSIS — L08.9 INFECTED WOUND: ICD-10-CM

## 2019-01-30 DIAGNOSIS — I73.9 PERIPHERAL VASCULAR DISEASE (HCC): ICD-10-CM

## 2019-01-30 DIAGNOSIS — T14.8XXA INFECTED WOUND: ICD-10-CM

## 2019-01-30 DIAGNOSIS — L10.9 BULLOUS PEMPHIGUS: ICD-10-CM

## 2019-01-30 DIAGNOSIS — L97.922 LEG ULCER, LEFT, WITH FAT LAYER EXPOSED (HCC): Primary | ICD-10-CM

## 2019-01-30 PROBLEM — L03.116 CELLULITIS OF LEFT LOWER EXTREMITY: Status: ACTIVE | Noted: 2019-01-30

## 2019-01-30 PROBLEM — E87.1 HYPONATREMIA: Status: ACTIVE | Noted: 2019-01-30

## 2019-01-30 LAB
ANION GAP SERPL CALC-SCNC: 11 MMOL/L (ref 0–11.9)
BUN SERPL-MCNC: 15 MG/DL (ref 8–22)
CALCIUM SERPL-MCNC: 9 MG/DL (ref 8.4–10.2)
CHLORIDE SERPL-SCNC: 94 MMOL/L (ref 96–112)
CO2 SERPL-SCNC: 25 MMOL/L (ref 20–33)
CREAT SERPL-MCNC: 0.74 MG/DL (ref 0.5–1.4)
GLUCOSE SERPL-MCNC: 100 MG/DL (ref 65–99)
GRAM STN SPEC: NORMAL
MAGNESIUM SERPL-MCNC: 1.7 MG/DL (ref 1.5–2.5)
POTASSIUM SERPL-SCNC: 4.2 MMOL/L (ref 3.6–5.5)
SIGNIFICANT IND 70042: NORMAL
SITE SITE: NORMAL
SODIUM SERPL-SCNC: 130 MMOL/L (ref 135–145)
SOURCE SOURCE: NORMAL

## 2019-01-30 PROCEDURE — 83735 ASSAY OF MAGNESIUM: CPT

## 2019-01-30 PROCEDURE — 700102 HCHG RX REV CODE 250 W/ 637 OVERRIDE(OP): Performed by: HOSPITALIST

## 2019-01-30 PROCEDURE — 87077 CULTURE AEROBIC IDENTIFY: CPT

## 2019-01-30 PROCEDURE — 11042 DBRDMT SUBQ TIS 1ST 20SQCM/<: CPT

## 2019-01-30 PROCEDURE — 87205 SMEAR GRAM STAIN: CPT

## 2019-01-30 PROCEDURE — 11045 DBRDMT SUBQ TISS EACH ADDL: CPT

## 2019-01-30 PROCEDURE — 87070 CULTURE OTHR SPECIMN AEROBIC: CPT

## 2019-01-30 PROCEDURE — 87186 SC STD MICRODIL/AGAR DIL: CPT

## 2019-01-30 PROCEDURE — 11042 DBRDMT SUBQ TIS 1ST 20SQCM/<: CPT | Performed by: NURSE PRACTITIONER

## 2019-01-30 PROCEDURE — 11045 DBRDMT SUBQ TISS EACH ADDL: CPT | Performed by: NURSE PRACTITIONER

## 2019-01-30 PROCEDURE — 80048 BASIC METABOLIC PNL TOTAL CA: CPT

## 2019-01-30 PROCEDURE — 99223 1ST HOSP IP/OBS HIGH 75: CPT | Mod: AI | Performed by: HOSPITALIST

## 2019-01-30 PROCEDURE — 770006 HCHG ROOM/CARE - MED/SURG/GYN SEMI*

## 2019-01-30 PROCEDURE — A9270 NON-COVERED ITEM OR SERVICE: HCPCS | Performed by: HOSPITALIST

## 2019-01-30 PROCEDURE — 700111 HCHG RX REV CODE 636 W/ 250 OVERRIDE (IP): Performed by: HOSPITALIST

## 2019-01-30 PROCEDURE — 700105 HCHG RX REV CODE 258: Performed by: HOSPITALIST

## 2019-01-30 RX ORDER — ACETAMINOPHEN 325 MG/1
650 TABLET ORAL EVERY 6 HOURS PRN
Status: DISCONTINUED | OUTPATIENT
Start: 2019-01-30 | End: 2019-02-05 | Stop reason: HOSPADM

## 2019-01-30 RX ORDER — POLYETHYLENE GLYCOL 3350 17 G/17G
1 POWDER, FOR SOLUTION ORAL
Status: DISCONTINUED | OUTPATIENT
Start: 2019-01-30 | End: 2019-02-05 | Stop reason: HOSPADM

## 2019-01-30 RX ORDER — DOXYCYCLINE HYCLATE 100 MG/1
100 CAPSULE ORAL 2 TIMES DAILY
Refills: 2 | Status: ON HOLD | COMMUNITY
Start: 2019-01-24 | End: 2019-01-30

## 2019-01-30 RX ORDER — BISACODYL 10 MG
10 SUPPOSITORY, RECTAL RECTAL
Status: DISCONTINUED | OUTPATIENT
Start: 2019-01-30 | End: 2019-02-05 | Stop reason: HOSPADM

## 2019-01-30 RX ORDER — MYCOPHENOLATE MOFETIL 250 MG/1
500 CAPSULE ORAL 2 TIMES DAILY
Status: DISCONTINUED | OUTPATIENT
Start: 2019-01-30 | End: 2019-02-05 | Stop reason: HOSPADM

## 2019-01-30 RX ORDER — AMOXICILLIN 250 MG
2 CAPSULE ORAL 2 TIMES DAILY
Status: DISCONTINUED | OUTPATIENT
Start: 2019-01-30 | End: 2019-02-05 | Stop reason: HOSPADM

## 2019-01-30 RX ORDER — METOPROLOL SUCCINATE 100 MG/1
100 TABLET, EXTENDED RELEASE ORAL DAILY
Status: DISCONTINUED | OUTPATIENT
Start: 2019-01-31 | End: 2019-02-05 | Stop reason: HOSPADM

## 2019-01-30 RX ORDER — LANOLIN ALCOHOL/MO/W.PET/CERES
1000 CREAM (GRAM) TOPICAL DAILY
COMMUNITY
End: 2019-04-26

## 2019-01-30 RX ORDER — NIACIN 500 MG
500 TABLET ORAL 3 TIMES DAILY
Status: DISCONTINUED | OUTPATIENT
Start: 2019-01-30 | End: 2019-02-05 | Stop reason: HOSPADM

## 2019-01-30 RX ORDER — SODIUM HYPOCHLORITE 1.25 MG/ML
SOLUTION TOPICAL 2 TIMES DAILY
Status: DISCONTINUED | OUTPATIENT
Start: 2019-01-31 | End: 2019-02-05 | Stop reason: HOSPADM

## 2019-01-30 RX ADMIN — Medication 500 MG: at 21:14

## 2019-01-30 RX ADMIN — ENOXAPARIN SODIUM 40 MG: 100 INJECTION SUBCUTANEOUS at 18:09

## 2019-01-30 RX ADMIN — VANCOMYCIN HYDROCHLORIDE 1800 MG: 500 INJECTION, POWDER, LYOPHILIZED, FOR SOLUTION INTRAVENOUS at 18:41

## 2019-01-30 RX ADMIN — MYCOPHENOLATE MOFETIL 500 MG: 250 CAPSULE ORAL at 21:14

## 2019-01-30 RX ADMIN — PIPERACILLIN SODIUM AND TAZOBACTAM SODIUM 3.38 G: 3; .375 INJECTION, POWDER, LYOPHILIZED, FOR SOLUTION INTRAVENOUS at 21:16

## 2019-01-30 RX ADMIN — PIPERACILLIN AND TAZOBACTAM 3.38 G: 3; .375 INJECTION, POWDER, LYOPHILIZED, FOR SOLUTION INTRAVENOUS; PARENTERAL at 18:05

## 2019-01-30 ASSESSMENT — ENCOUNTER SYMPTOMS
EYE PAIN: 0
HEMOPTYSIS: 0
WEAKNESS: 1
BACK PAIN: 0
ORTHOPNEA: 0
CLAUDICATION: 0
COUGH: 0
MYALGIAS: 0
FEVER: 0
SORE THROAT: 0
COUGH: 0
DOUBLE VISION: 0
SHORTNESS OF BREATH: 0
FEVER: 0
CLAUDICATION: 0
DIZZINESS: 0
CONSTIPATION: 0
DIZZINESS: 0
SHORTNESS OF BREATH: 0
NECK PAIN: 0
STRIDOR: 0
NAUSEA: 0
MEMORY LOSS: 0
SPUTUM PRODUCTION: 0
NAUSEA: 0
BLOOD IN STOOL: 0
DEPRESSION: 0
SPEECH CHANGE: 0
HEARTBURN: 0
VOMITING: 0
TREMORS: 0
TINGLING: 0
DIARRHEA: 0
CHILLS: 0
PALPITATIONS: 0
VOMITING: 0
NERVOUS/ANXIOUS: 0
PHOTOPHOBIA: 0
PND: 0
CONSTIPATION: 0
HEADACHES: 0
BLURRED VISION: 0
SENSORY CHANGE: 0
CHILLS: 0

## 2019-01-30 ASSESSMENT — LIFESTYLE VARIABLES
ON A TYPICAL DAY WHEN YOU DRINK ALCOHOL HOW MANY DRINKS DO YOU HAVE: 1
TOTAL SCORE: 0
ALCOHOL_USE: YES
EVER FELT BAD OR GUILTY ABOUT YOUR DRINKING: NO
HAVE PEOPLE ANNOYED YOU BY CRITICIZING YOUR DRINKING: NO
AVERAGE NUMBER OF DAYS PER WEEK YOU HAVE A DRINK CONTAINING ALCOHOL: 3
TOTAL SCORE: 0
EVER HAD A DRINK FIRST THING IN THE MORNING TO STEADY YOUR NERVES TO GET RID OF A HANGOVER: NO
TOTAL SCORE: 0
CONSUMPTION TOTAL: NEGATIVE
HAVE YOU EVER FELT YOU SHOULD CUT DOWN ON YOUR DRINKING: NO
HOW MANY TIMES IN THE PAST YEAR HAVE YOU HAD 5 OR MORE DRINKS IN A DAY: 0

## 2019-01-30 ASSESSMENT — COPD QUESTIONNAIRES
COPD SCREENING SCORE: 2
IN THE PAST 12 MONTHS DO YOU DO LESS THAN YOU USED TO BECAUSE OF YOUR BREATHING PROBLEMS: DISAGREE/UNSURE
HAVE YOU SMOKED AT LEAST 100 CIGARETTES IN YOUR ENTIRE LIFE: NO/DON'T KNOW
DURING THE PAST 4 WEEKS HOW MUCH DID YOU FEEL SHORT OF BREATH: NONE/LITTLE OF THE TIME
DO YOU EVER COUGH UP ANY MUCUS OR PHLEGM?: NO/ONLY WITH OCCASIONAL COLDS OR INFECTIONS

## 2019-01-30 NOTE — PROGRESS NOTES
Wound culture collected 01/30/2019. Pt currently taking Doxycycline. However, presented with cellulitis like redness to LLE.

## 2019-01-30 NOTE — PROGRESS NOTES
Direct admit from [facility], coming from home, referred by IRON Goodwin. For Lower extremity cellulitis. Admitting MD is Dr. Mcnulty. ADT order signed and held, to be released upon patient's arrival on the unit. Page admit hospitalist on call for orders when patient arrives.

## 2019-01-30 NOTE — PATIENT INSTRUCTIONS
Wound VAC at 125mmHg continuous or intermittent with black foam. Dressing will be changed every MWF at the wound clinic.  If you are having issues with your wound VAC, please consider patching leaks, changing the canister, or calling [phone] for troubleshooting. If the wound VAC has been off or un-operational for over 2 hours, call wound care center to inform them and remove all dressings including black foam and replace with normal saline damp gauze.     Should you experience any significant changes in your wound(s), such as infection (redness, swelling, localized heat, increased pain, fever > 101 F, chills) or have any questions regarding your home care instructions, please contact the wound center at [phone]. If after hours, contact your primary care physician or go to the hospital emergency room.   Keep dressing clean, dry and cover when bathing. Only change dressing if it's over saturated, soiled or falls off.

## 2019-01-30 NOTE — PROGRESS NOTES
Provider Encounter- Full Thickness wound      HISTORY OF PRESENT ILLNESS        START OF CARE IN CLINIC: 10/26/2018    REFERRING PROVIDER: IRON Son     WOUND- Full thickness wound   LOCATION: Medial left calf and medial ankle   WOUND HISTORY: Patient has a long wound history with a hip and ankle fracture.  Developed a wound over the medial ankle, a prominent malleolus and the wound likely developed from pressure.    PREVIOUS TREATMENT: Debridement NPWT, hospitalization with IV abx and Veriflow, which really worked   PERTINENT PMH: Recent diagnosis of Bullous Pemphigous - steroids for 2 months and antibiotics. Peripheral vascular disease with leftfem-pop bypass   [id number]  Plain films left ankle  There are fractures of the distal fibular shaft and of the medial malleolus, both of which appear to be subacute with at least minimal callus formation. There is marked lateral subluxation of the talus. No other significant findings.   VASCULAR STUDIES:Arterial duplex shows patent left fem-pop without stenosis     LAST  WOUND CULTURE:  DATE : 18 Heavy growth of pseudomonas, Enterococcus and light growth MRSA             DIABETES:  No  TOBACCO USE: no    : Patient presents today for assessment debridement of her wounds.  Authorization for Cytal to these wounds as adjunctive therapy to VAC has been requested from her insurance provider.  I do not see that this is yet been approved, and thus was not applied today.  She has been prescribed a new medication by her dermatologist, mycophenolate, for treatment of her bullous pemphigus.  She is still on prednisone, not sure if she is supposed to continue this, will contact her dermatologist.    : Patient presents today with increased erythema and edema to her left lower leg.  Left lower leg ulcers x2 have deteriorated significantly since last assessment.  She denies F/C/N/V.  VSS. Recommended that patient go to the hospital.  After some discussion,  "she does agree, however needs to go home to, \"take care of a few things first\".  Will arrange for direct admission later today.  Dr. Colin informed.    She has stopped taking her prednisone, and is now taking CellCept as prescribed by her dermatologist for bullous pemphigoid.    PAST MEDICAL HISTORY:   Past Medical History:   Diagnosis Date   • Anxiety    • Cellulitis and abscess of lower extremity    • Dental disorder     full dentures   • Generalized osteoarthritis of multiple sites 10/20/2015   • Heart valve disease     pt not sure    • Hyperlipidemia    • Hypertension    • Osteoporosis        PAST SURGICAL HISTORY:   Past Surgical History:   Procedure Laterality Date   • FEMORAL POPLITEAL BYPASS Left 6/8/2018    Procedure: FEMORAL POPLITEAL BYPASS;  Surgeon: Milana Colin M.D.;  Location: [facility];  Service: General   • IRRIGATION & DEBRIDEMENT GENERAL Left 6/8/2018    Procedure: IRRIGATION & DEBRIDEMENT ANKLE WOUND;  Surgeon: Milana Colin M.D.;  Location: [facility];  Service: General   • IRRIGATION & DEBRIDEMENT HIP Left 6/4/2018    Procedure: IRRIGATION & DEBRIDEMENT HIP;  Surgeon: Chong Vazquez M.D.;  Location: [facility];  Service: Orthopedics   • HIP REVISION TOTAL Left 2/11/2018    Procedure: HIP REVISION TOTAL- femoral nail removal conversion to total hip arthoroplasty;  Surgeon: Chong Vazquez M.D.;  Location: [facility];  Service: Orthopedics   • HIP NAILING INTRAMEDULLARY Left 12/20/2017    Procedure: HIP NAILING INTRAMEDULLARY;  Surgeon: Jorge Peters M.D.;  Location: [facility];  Service: Orthopedics   • BREAST BIOPSY  8/28/2014    Performed by Magdalena Londono M.D. at [facility]   • BREAST BIOPSY Right 8/14    benign   • GYN SURGERY  1973    complete hysterectomy   • ABDOMINAL HYSTERECTOMY TOTAL      w/BSO due to uterine cyst and endometriosis   • ATHROPLASTY      hip        MEDICATIONS:   Current " Outpatient Prescriptions   Medication   • mycophenolate (CELLCEPT) 500 MG tablet   • [START ON 2/7/2019] mycophenolate (CELLCEPT) 500 MG tablet   • niacin 500 MG Tab   • doxycycline (VIBRAMYCIN) 100 MG Tab   • predniSONE (DELTASONE) 20 MG Tab   • Multiple Vitamins-Minerals (THERAPEUTIC-M/LUTEIN) Tab   • metoprolol SR (TOPROL XL) 100 MG TABLET SR 24 HR   • fexofenadine (ALLEGRA) 180 MG tablet   • atorvastatin (LIPITOR) 40 MG Tab   • lisinopril (PRINIVIL) 5 MG Tab   • triamcinolone acetonide (KENALOG) 0.1 % Cream   • cyanocobalamin (VITAMIN B12) 1000 MCG Tab   • folic acid (FOLVITE) 1 MG Tab   • multivitamin (THERAGRAN) Tab   • aspirin (ASA) 81 MG Chew Tab chewable tablet   • vitamin D (CHOLECALCIFEROL) 1000 UNIT Tab   • sertraline (ZOLOFT) 100 MG Tab     No current facility-administered medications for this visit.        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  [employer]     Social History Main Topics   • Smoking status: Former Smoker     Packs/day: 0.50     Years: 25.00     Types: Cigarettes     Quit date: 1/1/2007   • Smokeless tobacco: Never Used   • Alcohol use 3.0 oz/week     5 Glasses of wine per week      Comment: glass of wine per day   • Drug use: No   • Sexual activity: Not Currently     Partners: Male     Other Topics Concern   • Not on file     Social History Narrative   • No narrative on file       FAMILY HISTORY:   Family History   Problem Relation Age of Onset   • GI Mother         colostomy   • Heart Attack Father    • Diabetes Father    • Hypertension Father    •  Psychiatry Brother         bipolar disorder        REVIEW OF SYSTEMS:   Review of Systems   Constitutional: Negative for chills and fever.   Respiratory: Negative for cough and shortness of breath.    Cardiovascular: Positive for leg swelling. Negative for chest pain and claudication.   Gastrointestinal: Negative for constipation, diarrhea, nausea and vomiting.   Neurological: Negative for dizziness.   All other systems reviewed and are negative.        PHYSICAL EXAMINATION:   /82   Pulse 85   Temp 36.5 °C (97.7 °F) (Temporal)   Resp 16   SpO2 94%   Physical Exam   Constitutional: She is oriented to person, place, and time and well-developed, well-nourished, and in no distress. No distress.   HENT:   Head: Normocephalic and atraumatic.   Eyes: Pupils are equal, round, and reactive to light.   Neck: Normal range of motion. Neck supple.   Cardiovascular: Normal rate, regular rhythm and intact distal pulses.    Pulmonary/Chest: Effort normal. No respiratory distress.   Abdominal: Soft. She exhibits no distension.   Musculoskeletal: Normal range of motion. She exhibits edema.   Neurological: She is alert and oriented to person, place, and time.   Skin: There is erythema.   Full-thickness ulcers to left medial lower leg and left medial ankle  Increased periwound erythema noted today.  Erythema from just below knee to toes.  Refer to wound flowsheet and photos   Psychiatric: Mood, memory, affect and judgment normal.        Wound Assessment- Refer to wound flowsheet    Left medial ankle, pre-debridement          PROCEDURE  -2% viscous lidocaine applied topically to wound bed for approximately 5 minutes prior to debridement.  4m curette used to debride wound bed.  Excisional debridement was performed to remove devitalized tissue until healthy, bleeding tissue was visualized.   Entire surface of wound, 15.36 cm2, debrided, into the subcutaneous tissue layer.   -Bleeding controlled with manual pressure   -Wound  culture collected, wound care completed by Lisseth Ghosh RN -quarter strength Dakin's moistened Kerlix.      Left medial lower ankle, post-debridement Photo            Measurements (post-debridement): Left medial ankle     DATE: 1/30/2019   Length (cm)  4.8   Width (cm)  3.2   Depth (cm)  0.2   Area (cm2)  15.36   Tract/undermine none     Wound #2  Meidal, distal calf.      Left medial/distal calf, pre-debridement photo      PROCEDURE  -2% viscous lidocaine applied topically to wound bed for approximately 5 minutes prior to debridement.  4m curette used to debride wound bed.  Excisional debridement was performed to remove devitalized tissue until healthy, bleeding tissue was visualized.   Entire surface of wound, 14.8 cm², debrided  Tissue debrided into the subcutaneous layer.    -Bleeding controlled with manual pressure   -wound care completed by  Lisseth Ghosh RN -quarter strength Dakin's moistened Kerlix.      Measurements (post-debridement): Left medial distal calf      DATE: 1/30/2019   Length (cm)  4.0   Width (cm)  3.7   Depth (cm)  0.5   Area (cm2)  14.8   Tract/undermine none     Left medial/distal calf post debridement          PATIENT EDUCATION  -Advised to go to ER for any increased redness, swelling, drainage or odor, or if patient develops fever, chills, nausea or vomiting.  -Importance of adequate nutrition for wound healing  -Increase protein intake (unless contraindicated by renal status)    ASSESSMENT AND PLAN:  1. Leg ulcer, left, with fat layer exposed (HCC)  Comments: Full-thickness ulcers x2 to left medial lower leg, probably due to pressure.  Complicated by PVD.    1/30: Patient was to have first application of Cytal today.  However she presents with increased erythema and edema of left lower leg, and significant deterioration of both wounds.  Both wounds to debrided in clinic today, wound culture collected from medial ankle wound.  Patient advised to go to the hospital.  She agrees  to do so but only later today.  Will arrange for direct admission.  Dr. Colin has been advised.    2. Peripheral vascular disease (HCC)  Comments: Per Dr. Colin, vascular,Arterial duplex shows patent left fem-pop without stenosis  1/30: Left DP pulse palpable, foot warm    3. Bullous pemphigus  Comments: Diagnosed by dermatology.  Dermatology managing    1/30: Patient has been was prescribed CellCept by her dermatologist, and started this last week.  She is stopped taking her prednisone.      4.  Infected wound  1/30: See above.  Wound culture collected after debridement of wounds.  Patient to be directly admitted to hospital.    Please note that this dictation was created using voice recognition software. I have worked with technical experts from [employer] to optimize the interface.  I have made every reasonable attempt to correct obvious errors, but there may be errors of grammar and possibly content that I did not discover before finalizing the note.

## 2019-01-31 ENCOUNTER — APPOINTMENT (OUTPATIENT)
Dept: RADIOLOGY | Facility: MEDICAL CENTER | Age: 74
DRG: 603 | End: 2019-01-31
Attending: HOSPITALIST
Payer: MEDICARE

## 2019-01-31 LAB
ALBUMIN SERPL BCP-MCNC: 2.8 G/DL (ref 3.2–4.9)
ALBUMIN/GLOB SERPL: 0.9 G/DL
ALP SERPL-CCNC: 68 U/L (ref 30–99)
ALT SERPL-CCNC: 22 U/L (ref 2–50)
ANION GAP SERPL CALC-SCNC: 11 MMOL/L (ref 0–11.9)
AST SERPL-CCNC: 21 U/L (ref 12–45)
BASOPHILS # BLD AUTO: 0.6 % (ref 0–1.8)
BASOPHILS # BLD: 0.04 K/UL (ref 0–0.12)
BILIRUB SERPL-MCNC: 1.2 MG/DL (ref 0.1–1.5)
BUN SERPL-MCNC: 16 MG/DL (ref 8–22)
CALCIUM SERPL-MCNC: 8.8 MG/DL (ref 8.4–10.2)
CHLORIDE SERPL-SCNC: 99 MMOL/L (ref 96–112)
CO2 SERPL-SCNC: 24 MMOL/L (ref 20–33)
CREAT SERPL-MCNC: 0.79 MG/DL (ref 0.5–1.4)
EOSINOPHIL # BLD AUTO: 0.06 K/UL (ref 0–0.51)
EOSINOPHIL NFR BLD: 0.8 % (ref 0–6.9)
ERYTHROCYTE [DISTWIDTH] IN BLOOD BY AUTOMATED COUNT: 56.7 FL (ref 35.9–50)
GLOBULIN SER CALC-MCNC: 3.1 G/DL (ref 1.9–3.5)
GLUCOSE SERPL-MCNC: 109 MG/DL (ref 65–99)
HCT VFR BLD AUTO: 36.2 % (ref 37–47)
HGB BLD-MCNC: 11.5 G/DL (ref 12–16)
IMM GRANULOCYTES # BLD AUTO: 0.06 K/UL (ref 0–0.11)
IMM GRANULOCYTES NFR BLD AUTO: 0.8 % (ref 0–0.9)
LYMPHOCYTES # BLD AUTO: 1.33 K/UL (ref 1–4.8)
LYMPHOCYTES NFR BLD: 18.8 % (ref 22–41)
MCH RBC QN AUTO: 30.9 PG (ref 27–33)
MCHC RBC AUTO-ENTMCNC: 31.8 G/DL (ref 33.6–35)
MCV RBC AUTO: 97.3 FL (ref 81.4–97.8)
MONOCYTES # BLD AUTO: 0.71 K/UL (ref 0–0.85)
MONOCYTES NFR BLD AUTO: 10 % (ref 0–13.4)
NEUTROPHILS # BLD AUTO: 4.88 K/UL (ref 2–7.15)
NEUTROPHILS NFR BLD: 69 % (ref 44–72)
NRBC # BLD AUTO: 0 K/UL
NRBC BLD-RTO: 0 /100 WBC
PLATELET # BLD AUTO: 203 K/UL (ref 164–446)
PMV BLD AUTO: 9.3 FL (ref 9–12.9)
POTASSIUM SERPL-SCNC: 3.7 MMOL/L (ref 3.6–5.5)
PROT SERPL-MCNC: 5.9 G/DL (ref 6–8.2)
RBC # BLD AUTO: 3.72 M/UL (ref 4.2–5.4)
SODIUM SERPL-SCNC: 134 MMOL/L (ref 135–145)
WBC # BLD AUTO: 7.1 K/UL (ref 4.8–10.8)

## 2019-01-31 PROCEDURE — 700102 HCHG RX REV CODE 250 W/ 637 OVERRIDE(OP): Performed by: HOSPITALIST

## 2019-01-31 PROCEDURE — 700111 HCHG RX REV CODE 636 W/ 250 OVERRIDE (IP): Performed by: HOSPITALIST

## 2019-01-31 PROCEDURE — 770006 HCHG ROOM/CARE - MED/SURG/GYN SEMI*

## 2019-01-31 PROCEDURE — 99233 SBSQ HOSP IP/OBS HIGH 50: CPT | Performed by: INTERNAL MEDICINE

## 2019-01-31 PROCEDURE — 700105 HCHG RX REV CODE 258: Performed by: HOSPITALIST

## 2019-01-31 PROCEDURE — 700101 HCHG RX REV CODE 250: Performed by: HOSPITALIST

## 2019-01-31 PROCEDURE — 80053 COMPREHEN METABOLIC PANEL: CPT

## 2019-01-31 PROCEDURE — A9270 NON-COVERED ITEM OR SERVICE: HCPCS | Performed by: HOSPITALIST

## 2019-01-31 PROCEDURE — 93971 EXTREMITY STUDY: CPT | Mod: LT

## 2019-01-31 PROCEDURE — 85025 COMPLETE CBC W/AUTO DIFF WBC: CPT

## 2019-01-31 PROCEDURE — 99223 1ST HOSP IP/OBS HIGH 75: CPT | Performed by: INTERNAL MEDICINE

## 2019-01-31 RX ADMIN — ENOXAPARIN SODIUM 40 MG: 100 INJECTION SUBCUTANEOUS at 05:14

## 2019-01-31 RX ADMIN — PIPERACILLIN SODIUM AND TAZOBACTAM SODIUM 3.38 G: 3; .375 INJECTION, POWDER, LYOPHILIZED, FOR SOLUTION INTRAVENOUS at 12:25

## 2019-01-31 RX ADMIN — MYCOPHENOLATE MOFETIL 500 MG: 250 CAPSULE ORAL at 18:17

## 2019-01-31 RX ADMIN — PIPERACILLIN SODIUM AND TAZOBACTAM SODIUM 3.38 G: 3; .375 INJECTION, POWDER, LYOPHILIZED, FOR SOLUTION INTRAVENOUS at 05:15

## 2019-01-31 RX ADMIN — Medication 500 MG: at 18:17

## 2019-01-31 RX ADMIN — PIPERACILLIN SODIUM AND TAZOBACTAM SODIUM 3.38 G: 3; .375 INJECTION, POWDER, LYOPHILIZED, FOR SOLUTION INTRAVENOUS at 20:52

## 2019-01-31 RX ADMIN — SERTRALINE HYDROCHLORIDE 100 MG: 50 TABLET ORAL at 05:15

## 2019-01-31 RX ADMIN — VANCOMYCIN HYDROCHLORIDE 1300 MG: 500 INJECTION, POWDER, LYOPHILIZED, FOR SOLUTION INTRAVENOUS at 18:19

## 2019-01-31 RX ADMIN — SODIUM HYPOCHLORITE 10 ML: 1.25 SOLUTION TOPICAL at 06:38

## 2019-01-31 RX ADMIN — SODIUM HYPOCHLORITE 1 ML: 1.25 SOLUTION TOPICAL at 18:17

## 2019-01-31 RX ADMIN — MYCOPHENOLATE MOFETIL 500 MG: 250 CAPSULE ORAL at 05:15

## 2019-01-31 RX ADMIN — Medication 500 MG: at 12:06

## 2019-01-31 RX ADMIN — ACETAMINOPHEN 650 MG: 325 TABLET, FILM COATED ORAL at 20:51

## 2019-01-31 RX ADMIN — METOPROLOL SUCCINATE 100 MG: 100 TABLET, EXTENDED RELEASE ORAL at 05:15

## 2019-01-31 RX ADMIN — Medication 500 MG: at 05:15

## 2019-01-31 ASSESSMENT — ENCOUNTER SYMPTOMS
MYALGIAS: 0
VOMITING: 0
SORE THROAT: 0
DIARRHEA: 0
SHORTNESS OF BREATH: 0
PALPITATIONS: 0
ABDOMINAL PAIN: 0
BACK PAIN: 0
HALLUCINATIONS: 0
FOCAL WEAKNESS: 0
NAUSEA: 0
HEADACHES: 0
BLOOD IN STOOL: 0
COUGH: 0
CHILLS: 0
DEPRESSION: 0
FEVER: 0
WEAKNESS: 0
DIZZINESS: 0
HEARTBURN: 0

## 2019-01-31 ASSESSMENT — COGNITIVE AND FUNCTIONAL STATUS - GENERAL
DRESSING REGULAR LOWER BODY CLOTHING: A LITTLE
HELP NEEDED FOR BATHING: A LITTLE
MOVING TO AND FROM BED TO CHAIR: A LITTLE
DAILY ACTIVITIY SCORE: 20
MOBILITY SCORE: 19
CLIMB 3 TO 5 STEPS WITH RAILING: A LITTLE
DRESSING REGULAR UPPER BODY CLOTHING: A LITTLE
WALKING IN HOSPITAL ROOM: A LITTLE
MOVING FROM LYING ON BACK TO SITTING ON SIDE OF FLAT BED: A LITTLE
SUGGESTED CMS G CODE MODIFIER DAILY ACTIVITY: CJ
SUGGESTED CMS G CODE MODIFIER MOBILITY: CK
TOILETING: A LITTLE
STANDING UP FROM CHAIR USING ARMS: A LITTLE

## 2019-01-31 NOTE — CARE PLAN
Problem: Safety  Goal: Will remain free from injury  Outcome: PROGRESSING AS EXPECTED  Bed in locked and low position, call light and belongings within reach, pt calls for assistance, side rails up x2    Problem: Infection  Goal: Will remain free from infection  Outcome: PROGRESSING AS EXPECTED  No new s/s of infection, pt is afebrile, IV ABX hung. Standard precaution in place. Hand washing preformed

## 2019-01-31 NOTE — WOUND TEAM
"Order received for wound care LLE and ankle. Pt is a direct admit from wound clinic. Talked with IRON Snyder about pt. She would like to continue \"Dakins for a few days.\" Drsg was applied @ clinic and pictures were taken as well. Discussed with SHREE Zhang that new pictures need to be taken tomorrow with drsg change. Orders in EPIC. Wound team to follow up in a few days to re-eval and change drsg care as needed.   "

## 2019-01-31 NOTE — CONSULTS
"DATE OF SERVICE:  01/31/2019    INFECTIOUS DISEASE CONSULTATION    REASON FOR CONSULTATION:  Left lower extremity cellulitis and chronic arterial   ulcerations.    CONSULTING PHYSICIAN:  Zuri Ko DO    HISTORY OF PRESENT ILLNESS:  This is a very pleasant 73-year-old white female   who is known to the ID service from prior hospitalizations for the same.  She   has a longstanding history of peripheral vascular disease and is status post   femoral popliteal bypass and has had months of chronic ulcerations primarily   affecting the left lower extremity.  She is followed regularly by Dr. Colin   and wound clinic.  Pictures were reviewed in media and wounds have been   persistent without much improvement over the last several months.  She was   seen in wound clinic on Monday when she was noted to have a slight erythema of   the left lower extremity.  By Wednesday, it progressed significantly, so she   was sent to the hospital for further evaluation and management.  She has been   started on vancomycin and Zosyn due to her prior history of pseudomonal and   MRSA and enterococcal infections of her lower extremity ulcerations.  She   denies any associated fever, chills, nausea, vomiting or diarrhea.  Since   admission, she feels like the erythema and swelling has decreased already.  Infectious Diseases consulted for antibiotic recommendation and management    REVIEW OF SYSTEMS:  All other systems are reviewed and are negative, except   for a chronic rash.    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    SOCIAL HISTORY:  She is " a former smoker, but she quit over a decade ago.    Drinks socially, does not use illicit drugs.    PHYSICAL EXAMINATION:  GENERAL:  She is a well-nourished, well-developed female, in no acute   distress, pleasant, cooperative.  VITAL SIGNS:  She has been afebrile since admission with a T-max of 99.6,   current temperature 98.2, blood pressure 140/55, pulse 78, respiratory rate   16, oxygen saturation 92% on room air.  She weighs 71 kilos.  HEENT:  Head is normocephalic, atraumatic.  Pupils are equal, round, and   reactive to light.  Extraocular movements intact.  Oropharynx is clear.  NECK:  Supple.  CARDIOVASCULAR:  Irregular rate and rhythm with a systolic murmur.  CHEST:  Grossly clear to auscultation bilaterally, unlabored.  ABDOMEN:  Soft, nontender.  EXTREMITIES:  Show no cyanosis or clubbing.  She does have edema of the left   lower extremity that appears resolving.  She has erythema extending from the   foot to just inferior to the knee.  Her lower extremity ulcerations are   currently dressed.  Most recent measurements of her ulcerations on the left   medial ankle measuring approximately 4x3.5x0.5 cm with some fibrinous slough   at the base and a small amount of granulation tissue, sharply demarcated   borders.  Left medial ankle proximal wound to the left medial ankle distal   wound measures 4.7x3.2x0.2 cm.  Some granulation is noted as well as slough.  NEUROLOGIC:  She is awake, alert, oriented.  Speech is fluent without   dysarthria.  Cranial nerves were intact.    LABORATORY DATA:  Current labs show white blood cell count of 7.1, H and H of   11.5 and 36, platelets of 203.  Sodium 134, potassium 3.7, chloride 99,   bicarbonate 24, glucose 109, BUN 16, creatinine 0.79, AST 21, ALT 22, total   bilirubin 1.2, albumin of 2.8.  She had a QuantiFERON gold on 01/10/2019 that   was equivocal.  Hepatitis testing on 01/10/2019 was normal for hepatitis B.    Most recent wound culture was done on 07/20/2018, which  grew the pseudomonas   aeruginosa, Enterococcus faecalis, and MRSA.  Current wound cultures are   pending.  Gram stain shows few gram-positive cocci and few gram-positive rods.    Her last vascular study on 01/31/2019 was limited on the venous evaluation   due to her ulcerations, but there was some flow.    ASSESSMENT AND PLAN:  A 73-year-old female with known history of peripheral   vascular disease, prior bypass surgery with chronic ulcerations, particularly   affecting her left lower extremity, admitted with worsening cellulitis of the   left lower extremity.  The ulceration pictures were reviewed and do not appear   significantly worse from previous; however, are the likely source of her   cellulitis.  She does have multiple pathogens identified in the past, so I do   agree with continuing her on the vancomycin and Zosyn pending current culture   results.  She is to be seen and evaluated by vascular surgery.  Hopefully,   there are other treatment options available.  If there is insufficient blood   flow to that extremity, antibiotics will not be efficacious.  Her leg   ulcerations are full thickness to the muscle.  In addition to that, her recent   infection is likely exacerbated by having to be on immunosuppression for   bullous pemphigus.  She is on CellCept.  This will impair her wound healing   and suppress her inflammatory response.  Further recommendations per culture   results and clinical course.  Discussed with internal medicine.    Thank you and we will follow with you.       ____________________________________     MD GREGG TOM / LARRY    DD:  01/31/2019 09:51:20  DT:  01/31/2019 10:22:08    D#:  [id number]  Job#:  [id number]

## 2019-01-31 NOTE — ASSESSMENT & PLAN NOTE
Recurrent.  Prior cultures from wounds (7/2018) showed Pseudomonas as well as enterococcus and MRSA  Cultures from 1/30 growing Pseudomonas (Sensitive to cipro)  -Continue Doxy (follow renal function and vanc trough)  -Continue Zosyn (likely will DC on cipro if improvement continues)  -Needs 2 weeks total --> through 2/13/19  Ultimately blood flow issues make abx treatment difficult / poor prognosis without BKA

## 2019-01-31 NOTE — PROGRESS NOTES
Wound dressing changed according to orders with Dakin's solution. Patient tolerated procedure well. Pictures taken, to be uploaded into epic.

## 2019-01-31 NOTE — ASSESSMENT & PLAN NOTE
History of PAD with femoral popliteal bypass in the past  Distal +1DPP pulses.  High risk for recurrent ulcers  Continue niacin

## 2019-01-31 NOTE — PROGRESS NOTES
0715- NOC RN and primary RN reviewed skin and went over dressing change.   0800- Pt up to the restroom with RN with front wheel walker.  1000- Pt declines needs at this time. Call light within reach.   1215- Pt sat on edge of bed. RN encouraged pt to sit in chair. Pt states that she would like to stay in bed.  1400- Pt up to the restroom with CNA. Call light within reach.

## 2019-01-31 NOTE — ASSESSMENT & PLAN NOTE
With superimposed cellulitis,  Wound care performed debridement, repeat cultures taken and are growing Pseudomonas  ID consulting  High risk for BKA

## 2019-01-31 NOTE — PROGRESS NOTES
Aldairin's solution ordered for patient unavailable at this time, pharmacy to mix. JORDAN Gustafson made aware and will alert pharmacy to make solution ASAP this morning, will alert this RN when ready.

## 2019-01-31 NOTE — PROGRESS NOTES
· 2 RN skin check complete with SHREE Zhang.  · Devices in place N/A.  · Skin assessed under devices N/A.  · Confirmed pressure ulcers found on N/A.  · The following interventions in place Patient turns self, wound care involved, patient ambulatory with SBA and FWW    Generalized rash noted on pt (resolving per her, after supposed flare up PTA), LLE wounds noted (pictures taken, wound actively involved, patient undergoing outpatient care for wounds)

## 2019-01-31 NOTE — PROGRESS NOTES
Bedside report received from Leatha SWANN. Assumed care. POC discussed. Pt resting comfortably in bed. Safety precautions in place.

## 2019-01-31 NOTE — PROGRESS NOTES
"Pharmacy Kinetics 73 y.o. female on vancomycin day # 2  2019    Currently on Vancomycin 1300 mg iv q24hr    Indication for Treatment: Lower extremity cellulitis    Pertinent history per medical record: Admitted on 2019 for increased erythema and edema of LLE.  Patient was a direct admit from wound care.  Patient had previously been on prednisone, but was recently started on Cellcept for bullous pemphigoid.   She started oral doxycycline on 19.  WCx 18 grew pseudomonas, Enterococcus and MRSA.  Infectious disease consulted.    Other antibiotics: Zosyn    Allergies: Bactrim [sulfamethoxazole-trimethoprim] and Meropenem     List concerns for renal function: elderly, combination of vancomycin and Zosyn    Pertinent cultures to date:   Pending        Recent Labs      19   0414   WBC  7.1   NEUTSPOLYS  69.00     Recent Labs      19   1603  19   0414   BUN  15  16   CREATININE  0.74  0.79   ALBUMIN   --   2.8*       Intake/Output Summary (Last 24 hours) at 19 1038  Last data filed at 19 0900   Gross per 24 hour   Intake              620 ml   Output                0 ml   Net              620 ml      Blood pressure 140/55, pulse 78, temperature 36.8 °C (98.2 °F), temperature source Oral, resp. rate 16, height 1.702 m (5' 7\"), weight 71.5 kg (157 lb 10.1 oz), SpO2 92 %, not currently breastfeeding. Temp (24hrs), Av.1 °C (98.8 °F), Min:36.6 °C (97.8 °F), Max:37.6 °C (99.6 °F)      A/P   1. Vancomycin dose: Vancomycin 1300 mg every 24 hours.  2. Next vancomycin level: 19 at 16:30.  3. Goal trough: 12-16 mcg/ml  4. Comments: Continue to monitor for clinical improvement and/or toxicity.  Await culture results.  Evaluate vancomycin trough level 19.    Abiodun Mcdonald, PharmD BCPS      "

## 2019-01-31 NOTE — CARE PLAN
Problem: Safety  Goal: Will remain free from injury  Outcome: PROGRESSING AS EXPECTED  Hourly rounding, Call light within reach, Fall protocol and Safety precautions in place. Pt verbalized understanding.    Problem: Knowledge Deficit  Goal: Knowledge of disease process/condition, treatment plan, diagnostic tests, and medications will improve  Outcome: PROGRESSING AS EXPECTED  Encourage patient and family to ask questions and be involved in plan of care. Provide education on treatment plan, diagnostic testing, and medications; have patient and family verbalize understanding.

## 2019-01-31 NOTE — PROGRESS NOTES
Pt up to unit via wheelchair as a direct admit. Admit profile complete. New IV started. Pt given food and new orders given. Pt oriented to room and son is at bedside. Call light within reach.

## 2019-01-31 NOTE — CARE PLAN
Problem: Infection  Goal: Will remain free from infection  Outcome: PROGRESSING AS EXPECTED  Ensure adequate hand washing before and after pt care. Pt verbalized understanding.     Problem: Knowledge Deficit  Goal: Knowledge of disease process/condition, treatment plan, diagnostic tests, and medications will improve  Outcome: PROGRESSING AS EXPECTED  Encourage patient and family to ask questions and be involved in plan of care. Provide education on treatment plan, diagnostic testing, and medications; have patient and family verbalize understanding.

## 2019-01-31 NOTE — PROGRESS NOTES
"Pharmacy Kinetics 73 y.o. female on vancomycin day # 1 2019    Currently on Vancomycin 1800 mg iv x 1     Indication for Treatment: Lower extremity cellulitis    Pertinent history per medical record: Admitted on 2019 for increased erythema and edema of LLE.  Patient was a direct admit from wound care.  Patient had previously been on prednisone, but was recently started on Cellcept for bullous pemphigoid.   She started oral doxycycline on 19.  WCx 18 grew pseudomonas, Enterococcus and MRSA.    Other antibiotics: Zosyn    Allergies: Bactrim [sulfamethoxazole-trimethoprim] and Meropenem     List concerns for renal function: elderly, combination of vancomycin and Zosyn    Pertinent cultures to date:    WCx few GPC, GPR      No results for input(s): WBC, NEUTSPOLYS, BANDSSTABS in the last 72 hours.  Recent Labs      19   1603   BUN  15   CREATININE  0.74     No results for input(s): VANCOTROUGH, VANCOPEAK, VANCORANDOM in the last 72 hours.No intake or output data in the 24 hours ending 19 1907   Blood pressure 148/59, pulse 98, temperature 36.6 °C (97.8 °F), temperature source Oral, resp. rate 18, height 1.702 m (5' 7\"), weight 72 kg (158 lb 11.7 oz), not currently breastfeeding. Temp (24hrs), Av.6 °C (97.8 °F), Min:36.6 °C (97.8 °F), Max:36.6 °C (97.8 °F)      A/P   1. Vancomycin dose change: 1300 mg IV q24h starting 19 at 1700  2. Next vancomycin level: 19 at 1630  3. Goal trough: 12-16 mcg/ml    Fransisco Singleton, PharmD    "

## 2019-01-31 NOTE — PROGRESS NOTES
Assessment completed, pt A&Ox4, no complaints of pain. All due meds given as ordered. No other concerns at this time.

## 2019-01-31 NOTE — PROGRESS NOTES
Beside report received from ashleigh SWANN. Safety precautions in place. Call light within reach. Pt resting in bed. Pictures from wound uploaded by jenni SWANN.

## 2019-01-31 NOTE — H&P
Hospital Medicine History & Physical Note    Date of Service  1/30/2019    Primary Care Physician  RYANN Son    Consultants  ID     Code Status  Full    Chief Complaint  Left lower extremity swelling, redness    History of Presenting Illness  73 y.o. female with a significant past medical history of peripheral vascular disease history of femoral popliteal bypass in her extremities , history of recurrent cellulitis in her lower extremity , dyslipidemia, has been battling with recurrent arterial chronic ulcers in her lower extremities , hence patient has been following with the wound clinic religiously over the past year.  Thus today 1/30/2019, patient was transferred by the wound care APRN because of concern of nonhealing ulcers but in addition to active cellulitis noted with bright redness, and swelling in her left lower extremities.    Patient nevertheless denies have any fevers or chills chest pain shortness of breath or nausea vomiting.  Patient has been admitted for IV antibiotic therapy and wound care.    Review of Systems  Review of Systems   Constitutional: Positive for malaise/fatigue. Negative for chills and fever.   HENT: Negative for congestion, hearing loss, sore throat and tinnitus.    Eyes: Negative for blurred vision, double vision, photophobia and pain.   Respiratory: Negative for cough, hemoptysis, sputum production, shortness of breath and stridor.    Cardiovascular: Negative for chest pain, palpitations, orthopnea, claudication and PND.   Gastrointestinal: Negative for blood in stool, constipation, heartburn, melena, nausea and vomiting.   Genitourinary: Negative for dysuria, frequency and urgency.   Musculoskeletal: Positive for joint pain. Negative for back pain, myalgias and neck pain.   Skin: Positive for rash.   Neurological: Positive for weakness. Negative for dizziness, tingling, tremors, sensory change, speech change and headaches.   Psychiatric/Behavioral: Negative for  depression, memory loss and suicidal ideas. The patient is not nervous/anxious.        Past Medical History   has a past medical history of Anxiety; Cellulitis and abscess of lower extremity; Dental disorder; Generalized osteoarthritis of multiple sites (10/20/2015); Heart valve disease; Hyperlipidemia; Hypertension; and Osteoporosis. She also has no past medical history of Allergy; Diabetes; Muscle disorder; or OSTEOPOROSIS.    Surgical History   has a past surgical history that includes gyn surgery (1973); breast biopsy (8/28/2014); abdominal hysterectomy total; breast biopsy (Right, 8/14); hip nailing intramedullary (Left, 12/20/2017); hip revision total (Left, 2/11/2018); irrigation & debridement hip (Left, 6/4/2018); femoral popliteal bypass (Left, 6/8/2018); irrigation & debridement general (Left, 6/8/2018); and athroplasty.     Family History  family history includes Diabetes in her father; GI in her mother; Heart Attack in her father; Hypertension in her father; Psychiatry in her brother.     Social History   reports that she quit smoking about 12 years ago. Her smoking use included Cigarettes. She has a 12.50 pack-year smoking history. She has never used smokeless tobacco. She reports that she drinks about 3.0 oz of alcohol per week . She reports that she does not use drugs.    Allergies  Allergies   Allergen Reactions   • Bactrim [Sulfamethoxazole-Trimethoprim] Rash     Diffuse pruritic skin rash with blisters (no mucous membrane involvement) (was also taking cipro at the time - reaction thought more likely to be 2/2 Bactrim)   • Meropenem Unspecified     Pt can not remember reaction         Medications  Prior to Admission Medications   Prescriptions Last Dose Informant Patient Reported? Taking?   Cyanocobalamin (VITAMIN B-12) 1000 MCG Tab >1week at unk Patient Yes Yes   Sig: Take 1,000 mcg by mouth every day.   Multiple Vitamins-Minerals (THERAPEUTIC-M/LUTEIN) Tab 1/30/2019 at am Patient Yes No   Sig:  Take 1 Tab by mouth every day.   doxycycline (VIBRAMYCIN) 100 MG Tab 1/30/2019 at am Patient Yes No   Sig: Take 100 mg by mouth 2 times a day. 30 day course   metoprolol SR (TOPROL XL) 100 MG TABLET SR 24 HR 1/30/2019 at am Patient No No   Sig: TAKE 1 TABLET BY MOUTH EVERY DAY   mycophenolate (CELLCEPT) 500 MG tablet 1/30/2019 at am Patient Yes No   Sig: Take 500 mg by mouth 2 times a day. For two weeks (1/23/2019-2/6/2019) then increase to 1000 mg bid   mycophenolate (CELLCEPT) 500 MG tablet not started at not started Patient Yes No   Sig: Take 500 mg by mouth every day. Starting 2/7/2019   niacin 500 MG Tab 1/30/2019 at am Patient Yes No   Sig: Take 500 mg by mouth 3 times a day.   predniSONE (DELTASONE) 20 MG Tab 1/25/2019 at finished Patient Yes No   Sig: Take 20 mg by mouth every day.   sertraline (ZOLOFT) 100 MG Tab 1/30/2019 at am Patient No No   Sig: TAKE 1 TAB BY MOUTH EVERY DAY.   triamcinolone acetonide (KENALOG) 0.1 % Cream 1/30/2019 at am Patient No No   Sig: Apply to rash BID PRN   vitamin D (CHOLECALCIFEROL) 1000 UNIT Tab 1/30/2019 at am Patient Yes No   Sig: Take 1,000 Units by mouth every day.      Facility-Administered Medications: None       Physical Exam  Temp:  [36.6 °C (97.8 °F)-37.6 °C (99.6 °F)] 37.6 °C (99.6 °F)  Pulse:  [94-98] 94  Resp:  [16-18] 16  BP: (131-148)/(50-59) 131/50  SpO2:  [96 %] 96 %    Physical Exam   Constitutional: She is oriented to person, place, and time. She appears well-developed and well-nourished. No distress.   HENT:   Head: Normocephalic and atraumatic.   Mouth/Throat: No oropharyngeal exudate.   Eyes: Pupils are equal, round, and reactive to light. Conjunctivae are normal. Right eye exhibits no discharge. No scleral icterus.   Neck: Neck supple. No JVD present. No thyromegaly present.   Cardiovascular: Normal rate and intact distal pulses.    No murmur heard.  Pulmonary/Chest: Effort normal and breath sounds normal. No stridor. No respiratory distress. She has no  wheezes. She has no rales.   Abdominal: Soft. Bowel sounds are normal. She exhibits no distension. There is no tenderness. There is no rebound.   Musculoskeletal: Normal range of motion. She exhibits edema (Left lower extremity +3 pitting edema).   Neurological: She is alert and oriented to person, place, and time. No cranial nerve deficit.   Skin: Skin is warm. She is not diaphoretic. There is erythema.   Erythema from shin circumferential to the calf all the way to her foot, noted debrided ulceration   Psychiatric: She has a normal mood and affect. Her behavior is normal. Thought content normal.   Nursing note and vitals reviewed.      Laboratory:      Recent Labs      01/30/19   1603   SODIUM  130*   POTASSIUM  4.2   CHLORIDE  94*   CO2  25   GLUCOSE  100*   BUN  15   CREATININE  0.74   CALCIUM  9.0     Recent Labs      01/30/19   1603   GLUCOSE  100*                 No results for input(s): TROPONINI in the last 72 hours.    Urinalysis:    No results found     Imaging:  No orders to display         Assessment/Plan:  I anticipate this patient will require at least two midnights for appropriate medical management, necessitating inpatient admission.    * Cellulitis of left lower extremity   Assessment & Plan    Recurrent.  Prior cultures from wounds showed Pseudomonas as well as enterococcus and MRSA  IV Vancomycin and Zosyn started  Wound was cultured at wound care today. Please follow.   Consultation of ID recommended for antibiotic selection and duration  Wound care ordered     Leg ulcer, left, with fat layer exposed (HCC)- (present on admission)   Assessment & Plan    As above with superimposed cellulitis,  Wound care did perform debridement today, repeat cultures taken     Hyponatremia   Assessment & Plan    Infection/dehydration related.  IV fluids gentle, recheck bmp in the am     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)   Assessment & Plan    Resume mycophenolate      Peripheral vascular disease (HCC)-  (present on admission)   Assessment & Plan    History of PAD with femoral popliteal bypass in the past  Distal +1DPP pulses.     Essential hypertension- (present on admission)   Assessment & Plan    Controlled  Resume metoprolol 100mg XL         VTE prophylaxis: Lovenox

## 2019-01-31 NOTE — ASSESSMENT & PLAN NOTE
Mycophenolate   Added topical benadryl and topical steroid with significant improvement or arms but abdomen worse  Add PO prednisone

## 2019-02-01 ENCOUNTER — APPOINTMENT (OUTPATIENT)
Dept: WOUND CARE | Facility: MEDICAL CENTER | Age: 74
End: 2019-02-01
Attending: NURSE PRACTITIONER
Payer: MEDICARE

## 2019-02-01 LAB
ALBUMIN SERPL BCP-MCNC: 2.7 G/DL (ref 3.2–4.9)
BACTERIA WND AEROBE CULT: ABNORMAL
BACTERIA WND AEROBE CULT: ABNORMAL
BASOPHILS # BLD AUTO: 0.5 % (ref 0–1.8)
BASOPHILS # BLD: 0.03 K/UL (ref 0–0.12)
BUN SERPL-MCNC: 13 MG/DL (ref 8–22)
CALCIUM SERPL-MCNC: 8.4 MG/DL (ref 8.4–10.2)
CHLORIDE SERPL-SCNC: 104 MMOL/L (ref 96–112)
CO2 SERPL-SCNC: 24 MMOL/L (ref 20–33)
CREAT SERPL-MCNC: 0.76 MG/DL (ref 0.5–1.4)
EOSINOPHIL # BLD AUTO: 0.09 K/UL (ref 0–0.51)
EOSINOPHIL NFR BLD: 1.6 % (ref 0–6.9)
ERYTHROCYTE [DISTWIDTH] IN BLOOD BY AUTOMATED COUNT: 58.4 FL (ref 35.9–50)
GLUCOSE SERPL-MCNC: 106 MG/DL (ref 65–99)
GRAM STN SPEC: ABNORMAL
HCT VFR BLD AUTO: 36 % (ref 37–47)
HGB BLD-MCNC: 11 G/DL (ref 12–16)
IMM GRANULOCYTES # BLD AUTO: 0.04 K/UL (ref 0–0.11)
IMM GRANULOCYTES NFR BLD AUTO: 0.7 % (ref 0–0.9)
LYMPHOCYTES # BLD AUTO: 1.12 K/UL (ref 1–4.8)
LYMPHOCYTES NFR BLD: 19.8 % (ref 22–41)
MCH RBC QN AUTO: 30.9 PG (ref 27–33)
MCHC RBC AUTO-ENTMCNC: 30.6 G/DL (ref 33.6–35)
MCV RBC AUTO: 101.1 FL (ref 81.4–97.8)
MONOCYTES # BLD AUTO: 0.53 K/UL (ref 0–0.85)
MONOCYTES NFR BLD AUTO: 9.4 % (ref 0–13.4)
NEUTROPHILS # BLD AUTO: 3.84 K/UL (ref 2–7.15)
NEUTROPHILS NFR BLD: 68 % (ref 44–72)
NRBC # BLD AUTO: 0 K/UL
NRBC BLD-RTO: 0 /100 WBC
PHOSPHATE SERPL-MCNC: 3 MG/DL (ref 2.5–4.5)
PLATELET # BLD AUTO: 183 K/UL (ref 164–446)
PMV BLD AUTO: 9.2 FL (ref 9–12.9)
POTASSIUM SERPL-SCNC: 3.9 MMOL/L (ref 3.6–5.5)
RBC # BLD AUTO: 3.56 M/UL (ref 4.2–5.4)
SIGNIFICANT IND 70042: ABNORMAL
SITE SITE: ABNORMAL
SODIUM SERPL-SCNC: 135 MMOL/L (ref 135–145)
SOURCE SOURCE: ABNORMAL
VANCOMYCIN TROUGH SERPL-MCNC: 9.3 UG/ML (ref 10–20)
WBC # BLD AUTO: 5.7 K/UL (ref 4.8–10.8)

## 2019-02-01 PROCEDURE — 700105 HCHG RX REV CODE 258: Performed by: HOSPITALIST

## 2019-02-01 PROCEDURE — 85025 COMPLETE CBC W/AUTO DIFF WBC: CPT

## 2019-02-01 PROCEDURE — 80202 ASSAY OF VANCOMYCIN: CPT

## 2019-02-01 PROCEDURE — 99233 SBSQ HOSP IP/OBS HIGH 50: CPT | Performed by: INTERNAL MEDICINE

## 2019-02-01 PROCEDURE — 700102 HCHG RX REV CODE 250 W/ 637 OVERRIDE(OP): Performed by: HOSPITALIST

## 2019-02-01 PROCEDURE — A9270 NON-COVERED ITEM OR SERVICE: HCPCS | Performed by: HOSPITALIST

## 2019-02-01 PROCEDURE — 700111 HCHG RX REV CODE 636 W/ 250 OVERRIDE (IP): Performed by: HOSPITALIST

## 2019-02-01 PROCEDURE — 700111 HCHG RX REV CODE 636 W/ 250 OVERRIDE (IP): Performed by: INTERNAL MEDICINE

## 2019-02-01 PROCEDURE — 770006 HCHG ROOM/CARE - MED/SURG/GYN SEMI*

## 2019-02-01 PROCEDURE — 700105 HCHG RX REV CODE 258: Performed by: INTERNAL MEDICINE

## 2019-02-01 PROCEDURE — 80069 RENAL FUNCTION PANEL: CPT

## 2019-02-01 RX ADMIN — SODIUM HYPOCHLORITE 473 ML: 1.25 SOLUTION TOPICAL at 05:33

## 2019-02-01 RX ADMIN — Medication 500 MG: at 05:30

## 2019-02-01 RX ADMIN — ACETAMINOPHEN 650 MG: 325 TABLET, FILM COATED ORAL at 05:35

## 2019-02-01 RX ADMIN — ACETAMINOPHEN 650 MG: 325 TABLET, FILM COATED ORAL at 21:35

## 2019-02-01 RX ADMIN — PIPERACILLIN SODIUM AND TAZOBACTAM SODIUM 3.38 G: 3; .375 INJECTION, POWDER, LYOPHILIZED, FOR SOLUTION INTRAVENOUS at 05:30

## 2019-02-01 RX ADMIN — METOPROLOL SUCCINATE 100 MG: 100 TABLET, EXTENDED RELEASE ORAL at 05:29

## 2019-02-01 RX ADMIN — PIPERACILLIN SODIUM AND TAZOBACTAM SODIUM 3.38 G: 3; .375 INJECTION, POWDER, LYOPHILIZED, FOR SOLUTION INTRAVENOUS at 12:46

## 2019-02-01 RX ADMIN — SERTRALINE HYDROCHLORIDE 100 MG: 50 TABLET ORAL at 05:29

## 2019-02-01 RX ADMIN — MYCOPHENOLATE MOFETIL 500 MG: 250 CAPSULE ORAL at 05:30

## 2019-02-01 RX ADMIN — VANCOMYCIN HYDROCHLORIDE 1300 MG: 10 INJECTION, POWDER, LYOPHILIZED, FOR SOLUTION INTRAVENOUS at 18:29

## 2019-02-01 RX ADMIN — MYCOPHENOLATE MOFETIL 500 MG: 250 CAPSULE ORAL at 18:28

## 2019-02-01 RX ADMIN — PIPERACILLIN SODIUM AND TAZOBACTAM SODIUM 3.38 G: 3; .375 INJECTION, POWDER, LYOPHILIZED, FOR SOLUTION INTRAVENOUS at 21:35

## 2019-02-01 RX ADMIN — ENOXAPARIN SODIUM 40 MG: 100 INJECTION SUBCUTANEOUS at 05:30

## 2019-02-01 RX ADMIN — SODIUM HYPOCHLORITE 1 ML: 1.25 SOLUTION TOPICAL at 18:34

## 2019-02-01 RX ADMIN — Medication 500 MG: at 12:46

## 2019-02-01 RX ADMIN — Medication 500 MG: at 18:28

## 2019-02-01 ASSESSMENT — ENCOUNTER SYMPTOMS
DIARRHEA: 1
BACK PAIN: 0
VOMITING: 0
MYALGIAS: 1
COUGH: 0
NAUSEA: 0
WEIGHT LOSS: 0
FEVER: 0
MYALGIAS: 0
FOCAL WEAKNESS: 0
CHILLS: 0
HEADACHES: 0
DIARRHEA: 0
HALLUCINATIONS: 0
HEARTBURN: 0
PALPITATIONS: 0
BLOOD IN STOOL: 0
DIZZINESS: 0
DEPRESSION: 0
SHORTNESS OF BREATH: 0
ABDOMINAL PAIN: 0
SORE THROAT: 0
WEAKNESS: 0

## 2019-02-01 NOTE — PROGRESS NOTES
Hospital Medicine Daily Progress Note    Date of Service  1/31/2019    Chief Complaint  73 y.o. female admitted 1/30/2019 with leg pain, [facility]    hx of PAD s/p fem-pop bypass, multiple recurrent ulcers and cellulitis, presented with recurrent cellulitis      Interval Problem Update  1/31: Discussed w ID, tolerating vanc/zosyn, cultures pending.  Pain is controlled.      Consultants/Specialty  Dr. Saleh -ID    Code Status  Full    Disposition  F/U Wound cultures, resume outpatient wound care    Review of Systems  Review of Systems   Constitutional: Negative for chills, fever and malaise/fatigue.   HENT: Negative for sore throat.    Respiratory: Negative for cough and shortness of breath.    Cardiovascular: Negative for chest pain and palpitations.   Gastrointestinal: Negative for abdominal pain, blood in stool, diarrhea, heartburn, nausea and vomiting.   Genitourinary: Negative for dysuria and frequency.   Musculoskeletal: Negative for back pain and myalgias.   Skin: Positive for rash.        Ulcer LLE   Neurological: Negative for dizziness, focal weakness, weakness and headaches.   Psychiatric/Behavioral: Negative for depression and hallucinations.   All other systems reviewed and are negative.       Physical Exam  Temp:  [36.5 °C (97.7 °F)-37.5 °C (99.5 °F)] 36.7 °C (98 °F)  Pulse:  [78-94] 94  Resp:  [16-18] 18  BP: (116-140)/(48-57) 122/53  SpO2:  [92 %-95 %] 95 %    Physical Exam   Constitutional: She appears well-developed and well-nourished. No distress.   HENT:   Head: Normocephalic.   Mouth/Throat: Oropharyngeal exudate present.   Neck: Normal range of motion. No JVD present. No thyromegaly present.   Cardiovascular: Normal rate and regular rhythm.    No murmur heard.  Pulmonary/Chest: Effort normal. No respiratory distress. She has no wheezes.   Abdominal: She exhibits no distension. There is no tenderness.   Musculoskeletal: Normal range of motion. She exhibits no edema or tenderness.    Neurological: She is alert. No cranial nerve deficit. Coordination normal.   Skin: Skin is warm. Rash noted. There is erythema.   LLE   Psychiatric: Her behavior is normal.   Strange affect       Fluids    Intake/Output Summary (Last 24 hours) at 01/31/19 2237  Last data filed at 01/31/19 0900   Gross per 24 hour   Intake              120 ml   Output                0 ml   Net              120 ml       Laboratory  Recent Labs      01/31/19   0414   WBC  7.1   RBC  3.72*   HEMOGLOBIN  11.5*   HEMATOCRIT  36.2*   MCV  97.3   MCH  30.9   MCHC  31.8*   RDW  56.7*   PLATELETCT  203   MPV  9.3     Recent Labs      01/30/19   1603  01/31/19   0414   SODIUM  130*  134*   POTASSIUM  4.2  3.7   CHLORIDE  94*  99   CO2  25  24   GLUCOSE  100*  109*   BUN  15  16   CREATININE  0.74  0.79   CALCIUM  9.0  8.8                   Imaging  US-EXTREMITY VENOUS LOWER UNILAT LEFT   Final Result           Assessment/Plan  * Cellulitis of left lower extremity   Assessment & Plan    Recurrent.  Prior cultures from wounds showed Pseudomonas as well as enterococcus and MRSA  IV Vancomycin and Zosyn  F/U Cultures (wound care 1/30)   Consulted ID  Wound care ordered  Ultimately blood flow issues makes abx treatment difficult / poor prognosis without BKA     Leg ulcer, left, with fat layer exposed (HCC)- (present on admission)   Assessment & Plan    With superimposed cellulitis,  Wound care performed debridement, repeat cultures taken and are pending  ID consulting  High risk for BKA     Hyponatremia   Assessment & Plan    Infection/dehydration related.  IV fluids gentle, recheck bmp in the am     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)   Assessment & Plan    Resume mycophenolate      Peripheral vascular disease (HCC)- (present on admission)   Assessment & Plan    History of PAD with femoral popliteal bypass in the past  Distal +1DPP pulses.     Essential hypertension- (present on admission)   Assessment & Plan    Controlled  Metoprolol  100mg XL          VTE prophylaxis: Lovenox

## 2019-02-01 NOTE — PROGRESS NOTES
Infectious Disease Progress Note    Author: Amber Valencia M.D. Date & Time of service: 2019  9:11 AM    Chief Complaint:  Follow-up on left lower extremity cellulitis/ulcerations    Interval History:   afebrile WBC 5.7 patient states her left leg was just redressed this morning, she has ongoing pain and erythema, patient also complaining of diarrhea  Labs Reviewed, Medications Reviewed, Radiology Reviewed and Wound Reviewed.    Review of Systems:  Review of Systems   Constitutional: Negative for chills, fever and weight loss.   Respiratory: Negative for cough and shortness of breath.    Cardiovascular: Positive for leg swelling.   Gastrointestinal: Positive for diarrhea. Negative for nausea and vomiting.   Musculoskeletal: Positive for myalgias.   Skin: Positive for rash.   Neurological: Negative for dizziness and headaches.       Hemodynamics:  Temp (24hrs), Av.6 °C (97.8 °F), Min:36.3 °C (97.3 °F), Max:36.8 °C (98.3 °F)  Temperature: 36.3 °C (97.3 °F)  Pulse  Av.3  Min: 66  Max: 98   Blood Pressure : 146/61       Physical Exam:  Physical Exam   Constitutional: She is oriented to person, place, and time. She appears well-developed.   HENT:   Head: Normocephalic and atraumatic.   Eyes: Pupils are equal, round, and reactive to light. EOM are normal.   Neck: Neck supple.   Cardiovascular: Normal rate.    Murmur heard.  Irregular   Pulmonary/Chest: Effort normal. She has no wheezes. She has no rales.   Abdominal: Soft. There is no tenderness.   Musculoskeletal: She exhibits edema and tenderness.   Left lower extremity dressed.  There is extending erythema from the dressing and swelling.  Left lower extremity is warm to palpate   Neurological: She is alert and oriented to person, place, and time.   Skin: There is erythema.       Meds:    Current Facility-Administered Medications:   •  senna-docusate **AND** polyethylene glycol/lytes **AND** magnesium hydroxide **AND** bisacodyl  •  enoxaparin  •   acetaminophen  •  MD Alert...Vancomycin per Pharmacy  •  [COMPLETED] piperacillin-tazobactam **AND** piperacillin-tazobactam  •  dakins 0.125% (1/4 strength)  •  vancomycin  •  metoprolol SR  •  mycophenolate  •  niacin  •  sertraline    Labs:  Recent Labs      19   0414  19   0438   WBC  7.1  5.7   RBC  3.72*  3.56*   HEMOGLOBIN  11.5*  11.0*   HEMATOCRIT  36.2*  36.0*   MCV  97.3  101.1*   MCH  30.9  30.9   RDW  56.7*  58.4*   PLATELETCT  203  183   MPV  9.3  9.2   NEUTSPOLYS  69.00  68.00   LYMPHOCYTES  18.80*  19.80*   MONOCYTES  10.00  9.40   EOSINOPHILS  0.80  1.60   BASOPHILS  0.60  0.50     Recent Labs      19   1603  19   0414  19   0438   SODIUM  130*  134*  135   POTASSIUM  4.2  3.7  3.9   CHLORIDE  94*  99  104   CO2  25  24  24   GLUCOSE  100*  109*  106*   BUN  15  16  13     Recent Labs      19   1603  19   0414  19   0438   ALBUMIN   --   2.8*  2.7*   TBILIRUBIN   --   1.2   --    ALKPHOSPHAT   --   68   --    TOTPROTEIN   --   5.9*   --    ALTSGPT   --   22   --    ASTSGOT   --   21   --    CREATININE  0.74  0.79  0.76       Imaging:  Us-extremity Venous Lower Unilat Left    Result Date: 2019   Vascular Laboratory  CONCLUSIONS  No DVT in the visualized veins of the LEFT lower extremity.  NANCY SANCHEZ  Exam Date:     2019 08:51  Room #:     Inpatient  Priority:     Routine  Ht (in):             Wt (lb):  Ordering Physician:        MIR GREGG  Referring Physician:       MARYSOL Sheldon  Sonographer:               Santos Che RVT  Study Type:                Complete Unilateral  Technical Quality:         Adequate  Age:    73    Gender:     F  MRN:    [id number]  :    1945      BSA:  Indications:     Localized swelling, mass and lump, left lower limb  CPT Codes:       32617  ICD Codes:         History:         Left calf cellulitis with history of PVD and surgical removal                    of greater saphenous vein  Limitations:     Imaging not performed on low calf due to dressing and                   underlying ulcer  PROCEDURES:  Left lower extremity venous duplex imaging.  The following venous structures were evaluated: common femoral, profunda  femoral, greater saphenous, femoral, popliteal , peroneal and posterior  tibial veins.  Serial compression, augmentation maneuvers,  color and spectral Doppler  flow evaluations were performed.  FINDINGS:  Left lower extremity -.  Complete color filling and compressibility with normal venous flow dynamics  including spontaneous flow, response to augmentation maneuvers, and  respiratory phasicity.  The peroneal and posterior tibial veins are difficult to assess for  compressibility in the proximal calf, but flow response to augmentation is  demonstrated.  Imaging not performed on low calf due to dressing and underlying ulcer,  cannot rule out the possibility of thrombus at this level.  Flow was evaluated in the contralateral common femoral vein and normal  venous flow dynamics including spontaneous flow, respiratory phasic  variation and augmentation were demonstrated.  Sathya Pichardo MD  (Electronically Signed)  Final Date:      31 January 2019                   10:50      Micro:  Results     Procedure Component Value Units Date/Time    CULTURE WOUND W/ GRAM STAIN [[id number]]     Order Status:  No result Specimen:  Wound from Left Leg           Assessment:  Active Hospital Problems    Diagnosis   • *Cellulitis of left lower extremity [L03.116]   • Leg ulcer, left, with fat layer exposed ([facility]) [L97.922]   • Peripheral vascular disease ([facility]) [I73.9]   • Bullous pemphigus [L10.9]       Plan:  Cellulitis of left lower extremity  Low-grade temp resolved  No leukocytosis  Wound gram stain +GPC, GPR, culture 1/30 - [facility]  Prior cultures in July 2018+ MRSA, Enterococcus faecalis, pseudomonas aeruginosa  Doppler ultrasound-negative for DVT  Continue vancomycin  and Zosyn for now pending further cultures  Monitor renal function and Vanco trough  Wound care    Bullous pemphigus  On CellCept    Peripheral vascular disease  Status post fem-pop bypass 06/08/18    Discussed with internal medicine.

## 2019-02-01 NOTE — DISCHARGE PLANNING
Care Transition Team Assessment    Information Source  Orientation : Oriented x 4  Information Given By: Patient  Informant's Name: Nadege  Who is responsible for making decisions for patient? : Patient    Readmission Evaluation  Is this a readmission?: No    Elopement Risk  Legal Hold: No  Ambulatory or Self Mobile in Wheelchair: Yes  Disoriented: No  Psychiatric Symptoms: None  History of Wandering: No  Elopement this Admit: No  Vocalizing Wanting to Leave: No  Displays Behaviors, Body Language Wanting to Leave: No-Not at Risk for Elopement  Elopement Risk: Not at Risk for Elopement    Interdisciplinary Discharge Planning  Does Admitting Nurse Feel This Could be a Complex Discharge?: No  Primary Care Physician: JACLYN MEJIA  Lives with - Patient's Self Care Capacity:  (roommate)  Patient or legal guardian wants to designate a caregiver (see row info): No  Support Systems: Children  Housing / Facility: [address]  Do You Take your Prescribed Medications Regularly: Yes  Able to Return to Previous ADL's: Yes  Mobility Issues: Yes  Prior Services: Home-Independent  Assistance Needed: No  Durable Medical Equipment: Walker    Discharge Preparedness  Difficulity with ADLs:  (pt sponge bathes, uses FWW to ambulate, able to manage meds)  Difficulity with IADLs:  (son supports and provides transport)         Finances  Prescription Coverage: Yes    Vision / Hearing Impairment  Vision Impairment : Yes  Right Eye Vision: Impaired, Wears Glasses  Left Eye Vision: Impaired, Wears Glasses  Hearing Impairment : No    Values / Beliefs / Concerns  Values / Beliefs Concerns : No    Advance Directive  Advance Directive?:  (none on file)    Domestic Abuse  Have you ever been the victim of abuse or violence?: No  Physical Abuse or Sexual Abuse: No  Verbal Abuse or Emotional Abuse: No  Possible Abuse Reported to:: Not Applicable              Anticipated Discharge Information  Anticipated discharge disposition: Discharge needs  currently unknown

## 2019-02-01 NOTE — PROGRESS NOTES
Beside report received from Leatha SWANN. Safety precautions in place. Call light within reach. Pt resting in bed.

## 2019-02-02 LAB
ALBUMIN SERPL BCP-MCNC: 2.5 G/DL (ref 3.2–4.9)
BUN SERPL-MCNC: 10 MG/DL (ref 8–22)
CALCIUM SERPL-MCNC: 8.6 MG/DL (ref 8.4–10.2)
CHLORIDE SERPL-SCNC: 103 MMOL/L (ref 96–112)
CO2 SERPL-SCNC: 24 MMOL/L (ref 20–33)
CREAT SERPL-MCNC: 0.68 MG/DL (ref 0.5–1.4)
GLUCOSE SERPL-MCNC: 111 MG/DL (ref 65–99)
PHOSPHATE SERPL-MCNC: 2.9 MG/DL (ref 2.5–4.5)
POTASSIUM SERPL-SCNC: 3.7 MMOL/L (ref 3.6–5.5)
SODIUM SERPL-SCNC: 135 MMOL/L (ref 135–145)

## 2019-02-02 PROCEDURE — A9270 NON-COVERED ITEM OR SERVICE: HCPCS | Performed by: INTERNAL MEDICINE

## 2019-02-02 PROCEDURE — 700111 HCHG RX REV CODE 636 W/ 250 OVERRIDE (IP): Performed by: HOSPITALIST

## 2019-02-02 PROCEDURE — 700102 HCHG RX REV CODE 250 W/ 637 OVERRIDE(OP): Performed by: HOSPITALIST

## 2019-02-02 PROCEDURE — 99232 SBSQ HOSP IP/OBS MODERATE 35: CPT | Performed by: INTERNAL MEDICINE

## 2019-02-02 PROCEDURE — 700102 HCHG RX REV CODE 250 W/ 637 OVERRIDE(OP): Performed by: INTERNAL MEDICINE

## 2019-02-02 PROCEDURE — 80069 RENAL FUNCTION PANEL: CPT

## 2019-02-02 PROCEDURE — A9270 NON-COVERED ITEM OR SERVICE: HCPCS | Performed by: HOSPITALIST

## 2019-02-02 PROCEDURE — 700105 HCHG RX REV CODE 258: Performed by: HOSPITALIST

## 2019-02-02 PROCEDURE — 770006 HCHG ROOM/CARE - MED/SURG/GYN SEMI*

## 2019-02-02 RX ORDER — DOXYCYCLINE 100 MG/1
100 TABLET ORAL EVERY 12 HOURS
Status: DISCONTINUED | OUTPATIENT
Start: 2019-02-02 | End: 2019-02-05 | Stop reason: HOSPADM

## 2019-02-02 RX ORDER — TRIAMCINOLONE ACETONIDE 0.25 MG/G
CREAM TOPICAL 2 TIMES DAILY
Status: DISCONTINUED | OUTPATIENT
Start: 2019-02-02 | End: 2019-02-05 | Stop reason: HOSPADM

## 2019-02-02 RX ADMIN — METOPROLOL SUCCINATE 100 MG: 100 TABLET, EXTENDED RELEASE ORAL at 05:11

## 2019-02-02 RX ADMIN — Medication 500 MG: at 22:15

## 2019-02-02 RX ADMIN — DOXYCYCLINE 100 MG: 100 TABLET, FILM COATED ORAL at 22:15

## 2019-02-02 RX ADMIN — MYCOPHENOLATE MOFETIL 500 MG: 250 CAPSULE ORAL at 18:25

## 2019-02-02 RX ADMIN — SERTRALINE HYDROCHLORIDE 100 MG: 50 TABLET ORAL at 05:11

## 2019-02-02 RX ADMIN — TRIAMCINOLONE ACETONIDE: 0.25 CREAM TOPICAL at 13:31

## 2019-02-02 RX ADMIN — Medication 500 MG: at 05:11

## 2019-02-02 RX ADMIN — MYCOPHENOLATE MOFETIL 500 MG: 250 CAPSULE ORAL at 05:11

## 2019-02-02 RX ADMIN — SODIUM HYPOCHLORITE 473 ML: 1.25 SOLUTION TOPICAL at 22:21

## 2019-02-02 RX ADMIN — Medication: at 22:15

## 2019-02-02 RX ADMIN — PIPERACILLIN SODIUM AND TAZOBACTAM SODIUM 3.38 G: 3; .375 INJECTION, POWDER, LYOPHILIZED, FOR SOLUTION INTRAVENOUS at 05:10

## 2019-02-02 RX ADMIN — TRIAMCINOLONE ACETONIDE: 0.25 CREAM TOPICAL at 22:14

## 2019-02-02 RX ADMIN — PIPERACILLIN SODIUM AND TAZOBACTAM SODIUM 3.38 G: 3; .375 INJECTION, POWDER, LYOPHILIZED, FOR SOLUTION INTRAVENOUS at 13:40

## 2019-02-02 RX ADMIN — ENOXAPARIN SODIUM 40 MG: 100 INJECTION SUBCUTANEOUS at 05:10

## 2019-02-02 RX ADMIN — DOXYCYCLINE 100 MG: 100 TABLET, FILM COATED ORAL at 10:58

## 2019-02-02 RX ADMIN — SODIUM HYPOCHLORITE 473 ML: 1.25 SOLUTION TOPICAL at 06:57

## 2019-02-02 RX ADMIN — PIPERACILLIN SODIUM AND TAZOBACTAM SODIUM 3.38 G: 3; .375 INJECTION, POWDER, LYOPHILIZED, FOR SOLUTION INTRAVENOUS at 22:15

## 2019-02-02 RX ADMIN — Medication 500 MG: at 13:40

## 2019-02-02 RX ADMIN — Medication: at 15:31

## 2019-02-02 ASSESSMENT — ENCOUNTER SYMPTOMS
DEPRESSION: 0
WEIGHT LOSS: 0
CHILLS: 0
PALPITATIONS: 0
COUGH: 0
VOMITING: 0
MYALGIAS: 1
SHORTNESS OF BREATH: 0
FEVER: 0
ABDOMINAL PAIN: 0
DIZZINESS: 0
BACK PAIN: 0
HEADACHES: 0
NAUSEA: 0
MYALGIAS: 0
HALLUCINATIONS: 0
SORE THROAT: 0
HEARTBURN: 0
DIARRHEA: 1
WEAKNESS: 0
DIARRHEA: 0
BLOOD IN STOOL: 0
FOCAL WEAKNESS: 0

## 2019-02-02 NOTE — CARE PLAN
Problem: Safety  Goal: Will remain free from injury  Outcome: PROGRESSING AS EXPECTED  Non-skid socks in use. Call light in reach. Pt instructed to call for help. Hourly rounding complete. Bed locked and in low position. Pt verbalized understanding of safety care plan.      Problem: Knowledge Deficit  Goal: Knowledge of disease process/condition, treatment plan, diagnostic tests, and medications will improve  Outcome: PROGRESSING AS EXPECTED  Pt knowledge level assessed. Pt educated on disease process/condition, POC, diagnostic tests, and medications. Pt verbalized understanding of care plan.

## 2019-02-02 NOTE — PROGRESS NOTES
Dressing change performed. Bedside report given to Varsha SWANN. Call light within reach. Safety precautions in place.

## 2019-02-02 NOTE — CARE PLAN
Problem: Safety  Goal: Will remain free from falls  Outcome: PROGRESSING AS EXPECTED  Reinforced fall risk precautions. Bed alarm on, Non-skid socks on feet, call light in reach. 1 person assist to the bathroom with walker    Problem: Infection  Goal: Will remain free from infection  Outcome: PROGRESSING AS EXPECTED  Good hand and oral hygiene promoted. Afebrile, WBCs WNL. Standard precaution in place. Perform hand washing. Administer ABX as prescribed.    Problem: Bowel/Gastric:  Goal: Normal bowel function is maintained or improved  Outcome: PROGRESSING AS EXPECTED  BM x1 today    Problem: Skin Integrity  Goal: Risk for impaired skin integrity will decrease  Outcome: PROGRESSING SLOWER THAN EXPECTED  Steroid cream ordered and applied to skin rash on arms, thighs, abdomen and back. Encouraged to turn herself in bed frequently. OOB to bathroom.  Mepilex applied to sacrum due to redness. Wound care saw patient this AM, wound dressing changed and orders for dressings placed.

## 2019-02-02 NOTE — PROGRESS NOTES
Assessment complete. Pt denies pain or discomfort. PIV infusing and flushes well. Safety precautions in place. Call light in reach.

## 2019-02-02 NOTE — PROGRESS NOTES
Dressing change completed. Report given to Aissatou SWANN. POC discussed. Pt resting comfortably in bed. Safety precautions in place.

## 2019-02-02 NOTE — PROGRESS NOTES
"Pharmacy Kinetics 73 y.o. female on vancomycin day # 3 2019    Currently on Vancomycin 1300 mg iv q24hr    Indication for Treatment: Lower extremity cellulitis     Pertinent history per medical record: Admitted on 2019 for increased erythema and edema of LLE.  Patient was a direct admit from wound care.  Patient had previously been on prednisone, but was recently started on Cellcept for bullous pemphigoid.   She started oral doxycycline on 19.  WCx 18 grew pseudomonas, Enterococcus and MRSA.  Infectious disease physician consulted.     Other antibiotics: Zosyn     Allergies: Bactrim [sulfamethoxazole-trimethoprim] and Meropenem      List concerns for renal function: elderly, combination of vancomycin and Zosyn    Pertinent cultures to date:   19 Left Leg wound x 2: few gm + cocci, Pseudomonas aeruginosa    MRSA nares swab if pneumonia is a concern (ordered/positive/negative/n-a): NA    Recent Labs      19   0414  19   0438   WBC  7.1  5.7   NEUTSPOLYS  69.00  68.00     Recent Labs      19   1603  19   0414  19   0438   BUN  15  16  13   CREATININE  0.74  0.79  0.76   ALBUMIN   --   2.8*  2.7*     Recent Labs      19   1626   VANCOTROUGH  9.3*     Intake/Output Summary (Last 24 hours) at 19 1807  Last data filed at 19 1600   Gross per 24 hour   Intake              680 ml   Output                0 ml   Net              680 ml      Blood pressure 144/48, pulse 81, temperature 36.4 °C (97.6 °F), temperature source Oral, resp. rate 16, height 1.702 m (5' 7\"), weight 71.5 kg (157 lb 10.1 oz), SpO2 94 %, not currently breastfeeding. Temp (24hrs), Av.4 °C (97.6 °F), Min:36.3 °C (97.3 °F), Max:36.7 °C (98 °F)      A/P   1. Vancomycin dose change: 1300 mg IV Q18H  2. Next vancomycin level: 2/3/19 0600  3. Goal trough: 12 - 16 mcg/ml  4. Comments: Plan to increase vancomycin to Q18H dosing due to sub-therapeutic serum trough.  Will monitor daily and " adjust dose as needed per protocol.    GHULAM EasleyD

## 2019-02-02 NOTE — PROGRESS NOTES
Hospital Medicine Daily Progress Note    Date of Service  2/1/2019    Chief Complaint  73 y.o. female admitted 1/30/2019 with leg pain, [facility] Course    hx of PAD s/p fem-pop bypass, multiple recurrent ulcers and cellulitis, presented with recurrent cellulitis      Interval Problem Update  1/31: Discussed w ID, tolerating vanc/zosyn, cultures pending.  Pain is controlled.    2/1: Left leg feels slightly better, prelim cultures with Pseudomonas.    Consultants/Specialty  Dr. Saleh -ID    Code Status  Full    Disposition  F/U Wound cultures, resume outpatient wound care    Review of Systems  Review of Systems   Constitutional: Negative for chills, fever and malaise/fatigue.   HENT: Negative for sore throat.    Respiratory: Negative for cough and shortness of breath.    Cardiovascular: Negative for chest pain and palpitations.   Gastrointestinal: Negative for abdominal pain, blood in stool, diarrhea, heartburn, nausea and vomiting.   Genitourinary: Negative for dysuria and frequency.   Musculoskeletal: Negative for back pain and myalgias.   Skin: Positive for rash (Hand blisters slightly worse today).        Ulcer LLE, feels better today   Neurological: Negative for dizziness, focal weakness, weakness and headaches.   Psychiatric/Behavioral: Negative for depression and hallucinations.   All other systems reviewed and are negative.       Physical Exam  Temp:  [36.3 °C (97.3 °F)-36.7 °C (98 °F)] 36.4 °C (97.6 °F)  Pulse:  [66-94] 81  Resp:  [16-18] 16  BP: (122-146)/(48-66) 144/48  SpO2:  [94 %-95 %] 94 %    Physical Exam   Constitutional: She appears well-developed and well-nourished. No distress.   HENT:   Head: Normocephalic.   Mouth/Throat: No oropharyngeal exudate.   Neck: Normal range of motion. No JVD present. No thyromegaly present.   Cardiovascular: Normal rate and regular rhythm.    No murmur heard.  Pulmonary/Chest: Effort normal. She has no wheezes. She has no rales.   Abdominal: She exhibits no  distension. There is no tenderness.   Musculoskeletal: Normal range of motion. She exhibits deformity (Charcot). She exhibits no edema.   Neurological: She is alert. No cranial nerve deficit. Coordination normal.   Skin: Skin is warm. Rash noted. There is erythema.   LLE   Psychiatric: Her behavior is normal.   Strange affect       Fluids    Intake/Output Summary (Last 24 hours) at 02/01/19 1823  Last data filed at 02/01/19 1600   Gross per 24 hour   Intake              680 ml   Output                0 ml   Net              680 ml       Laboratory  Recent Labs      01/31/19   0414  02/01/19   0438   WBC  7.1  5.7   RBC  3.72*  3.56*   HEMOGLOBIN  11.5*  11.0*   HEMATOCRIT  36.2*  36.0*   MCV  97.3  101.1*   MCH  30.9  30.9   MCHC  31.8*  30.6*   RDW  56.7*  58.4*   PLATELETCT  203  183   MPV  9.3  9.2     Recent Labs      01/30/19   1603  01/31/19   0414  02/01/19   0438   SODIUM  130*  134*  135   POTASSIUM  4.2  3.7  3.9   CHLORIDE  94*  99  104   CO2  25  24  24   GLUCOSE  100*  109*  106*   BUN  15  16  13   CREATININE  0.74  0.79  0.76   CALCIUM  9.0  8.8  8.4                   Imaging  US-EXTREMITY VENOUS LOWER UNILAT LEFT   Final Result           Assessment/Plan  * Cellulitis of left lower extremity   Assessment & Plan    Recurrent.  Prior cultures from wounds showed Pseudomonas as well as enterococcus and MRSA  Cultures from 1/30 growing Pseudomonas  IV Vancomycin and Zosyn, for now until final cultures return  Consulted ID  Wound care ordered  Ultimately blood flow issues make abx treatment difficult / poor prognosis without BKA     Leg ulcer, left, with fat layer exposed (HCC)- (present on admission)   Assessment & Plan    With superimposed cellulitis,  Wound care performed debridement, repeat cultures taken and are growing Pseudomonas  ID consulting  High risk for BKA     Hyponatremia   Assessment & Plan    Infection/dehydration related.  IV fluids gentle, recheck bmp in the am     Bullous pemphigus-  (present on admission)   Assessment & Plan    Resume mycophenolate   Consider steroids     Peripheral vascular disease (HCC)- (present on admission)   Assessment & Plan    History of PAD with femoral popliteal bypass in the past  Distal +1DPP pulses.  High risk for recurrent ulcers  Continue niacin     Essential hypertension- (present on admission)   Assessment & Plan    Controlled  Metoprolol 100mg XL          VTE prophylaxis: Lovenox

## 2019-02-02 NOTE — WOUND TEAM
[facility]  Inpatient Services  Initial Wound and Skin Care Evaluation    Admission Date:  1/30/2019   HPI, PMH, SH: Reviewed  Unit where seen by Wound Team: 3313/01    WOUND CONSULT RELATED TO:  Chronic LE ulcers     SUBJECTIVE:  Reports that she has had wounds for long time and has cellulitis periodically      Self Report / Pain Level:  None reported       OBJECTIVE:      WOUND TYPE, LOCATION, CHARACTERISTICS (Pressure ulcers: location, stage, POA or date identified)        Wound 01/30/19 Full Thickness Wound Leg;Ankle L medial ankle and LE (Active)   Wound Image   2/2/2019   Site Assessment Pink 70% yellow 30%    Maricruz-wound Assessment Red    Margins Defined edges    Wound Length (cm) 4 cm distal, proximal 4.0 Measured 2/2/2019   Wound Width (cm) 3.2 cm                       3.0    Wound Depth (cm) 0.1 cm                       0.3    Wound Surface Area (cm^2) 12.8 cm^2    Closure None    Drainage Amount Scant    Drainage Description Serous    Non-staged Wound Description Full thickness    Treatments Cleansed    Cleansing Normal Saline Irrigation    Periwound Protectant Moisture Barrier    Dressing Options Moist Gauze;Nonadherent Contact Layer;Fluffed Dried Gauze Roll    Dressing Cleansing/Solutions 1/4 Strength Dakin's Solution    Dressing Changed Changed    Dressing Change Frequency Every Shift    NEXT Dressing Change  02/02/19    NEXT Weekly Photo (Inpatient Only) 02/04/19    WOUND NURSE ONLY - Odor None    WOUND NURSE ONLY - Pulses Not palpable      Wound 02/02/19 Partial Thickness Wound Ankle anterior ankle fluid blister (Active)   Site Assessment Intact    Maricruz-wound Assessment Red    Margins Attached edges    Wound Length (cm) 1 cm 2/2/2019   Wound Width (cm) 1 cm    Wound Surface Area (cm^2) 1 cm^2    Drainage Amount None    Non-staged Wound Description Partial thickness    Treatments Cleansed    Cleansing Normal Saline Irrigation    Dressing Options Nonadherent Contact Layer;Fluffed Dried  Gauze Roll    Dressing Changed Changed    Dressing Change Frequency Every Shift    NEXT Weekly Photo (Inpatient Only) 02/04/19    WOUND NURSE ONLY - Odor None    WOUND NURSE ONLY - Pulses Not palpable            Vascular:    Dorsal Pedal pulses:  Unable to palpate  Posterior tib pulses:  Unable to palpate    TOMI:     5/28/18 L 0.7    Lab Values:    WBC:       WBC   Date/Time Value Ref Range Status   02/01/2019 04:38 AM 5.7 4.8 - 10.8 K/uL Final     AIC:      Lab Results   Component Value Date/Time    HBA1C 5.6 06/04/2018 12:00 PM         Culture:   Completed 1/30/19    INTERVENTIONS BY WOUND TEAM:  Removed dressing. Cleaned wound with NS, used dry 4x4 to clean surface. Moistened small pieces of kerlix gauze with 1/2 strength Dakins, and placed in wound beds . Covered with Telfa, and Telfa over anterior fluid blister, secured with roll gauze. Updated wound care orders.Measured and photographed wounds.    Dressing selection:  Above          Interdisciplinary consultation:  RN, patient      EVALUATION:  Patient sent by wound center.   Patient states she has had punch biopsy in past due to the chronic rash. Recently started blisters on foot. Present has a fluid filled blister to anterior ankle. Yellow to wound bed has diminished in comparison to wound photo taken 1/30/19 at wound clinic. Plan to continue Dakin's and reassess by wound team. Recommend changing to Hydrfera Blue foam when patient ready to go home, since she will need to make new outpatient appointments, which may cause delay.    Factors affecting wound healing:  PVD, hx fem pop bypass, recurrent cellulitis, skin rash, hx former smoker  Goals:  Steady decrease in wound area and depth weekly     NURSING PLAN OF CARE ORDERS (X):    Dressing changes: See Dressing Care orders:   X  Skin care: See Skin Care orders:   Rectal tube care: See Rectal Tube Care orders:   Other orders:    RSKIN: CURRENT (X) ORDERED (O)  Q shift Luis:  X  Q shift pressure point  assessments:  X  Pressure redistribution mattress   X     Waffle overlay  SHORTY      Bariatric SHORTY      Bariatric foam        Heel float boots       Heels floated on pillows      Barrier wipes      Barrier Cream      Barrier paste      Sacral silicone dressing      Silicone O2 tubing      Anchorfast      Trach with Optifoam split foam       Waffle cushion      Rectal tube or BMS      Antifungal tx    Turn q 2 hours  independent   Up to chair     Ambulate assist  PT/OT     Dietician      PO X    TF   TPN   NPO   # days   Other       WOUND TEAM PLAN OF CARE (X):   NPWT change 3 x week:        Dressing changes by wound team:       Follow up as needed:   2 days    Other (explain):    Anticipated discharge plans (X):  SNF:           Home Care:           Outpatient Wound Center: X           Self Care:            Other:

## 2019-02-02 NOTE — PROGRESS NOTES
Report received from SHREE Maddox. Patient resting comfortably in bed. Declines any needs at this time. Call light in reach. Bed alarm on.

## 2019-02-02 NOTE — PROGRESS NOTES
Infectious Disease Progress Note    Author: Amber Valencia M.D. Date & Time of service: 2019  8:58 AM    Chief Complaint:  Follow-up on left lower extremity cellulitis/ulcerations    Interval History:  2 afebrile WBC 5.7 patient states her left leg was just redressed this morning, she has ongoing pain and erythema, patient also complaining of diarrhea  2/2 afebrile CBC not done patient concerned about new small white blister formation on both hands secondary to her skin condition, denies any left lower extremity pain, dressings were changed this morning, continues to have significant erythema on her leg  Labs Reviewed, Medications Reviewed, Radiology Reviewed and Wound Reviewed.    Review of Systems:  Review of Systems   Constitutional: Negative for chills, fever and weight loss.   Respiratory: Negative for cough and shortness of breath.    Cardiovascular: Positive for leg swelling.   Gastrointestinal: Positive for diarrhea. Negative for nausea and vomiting.   Musculoskeletal: Positive for myalgias.   Skin: Positive for itching and rash.   Neurological: Negative for dizziness and headaches.   All other systems reviewed and are negative.      Hemodynamics:  Temp (24hrs), Av.5 °C (97.7 °F), Min:36.3 °C (97.3 °F), Max:36.8 °C (98.2 °F)  Temperature: 36.3 °C (97.3 °F)  Pulse  Av.4  Min: 66  Max: 98   Blood Pressure : 132/65       Physical Exam:  Physical Exam   Constitutional: She is oriented to person, place, and time. She appears well-developed.   HENT:   Head: Normocephalic and atraumatic.   Eyes: Pupils are equal, round, and reactive to light. EOM are normal.   Neck: Neck supple.   Cardiovascular: Normal rate.    Murmur heard.  Irregular   Pulmonary/Chest: Effort normal. She has no wheezes. She has no rales.   Abdominal: Soft. There is no tenderness.   Musculoskeletal: She exhibits edema and tenderness.   Left lower extremity dressed.  There is extending erythema from the dressing and swelling.   Left lower extremity is warm to palpate   Neurological: She is alert and oriented to person, place, and time.   Skin: There is erythema.   Scattered erythematous lesions       Meds:    Current Facility-Administered Medications:   •  vancomycin  •  senna-docusate **AND** polyethylene glycol/lytes **AND** magnesium hydroxide **AND** bisacodyl  •  enoxaparin  •  acetaminophen  •  MD Alert...Vancomycin per Pharmacy  •  [COMPLETED] piperacillin-tazobactam **AND** piperacillin-tazobactam  •  dakins 0.125% (1/4 strength)  •  metoprolol SR  •  mycophenolate  •  niacin  •  sertraline    Labs:  Recent Labs      01/31/19 0414 02/01/19   0438   WBC  7.1  5.7   RBC  3.72*  3.56*   HEMOGLOBIN  11.5*  11.0*   HEMATOCRIT  36.2*  36.0*   MCV  97.3  101.1*   MCH  30.9  30.9   RDW  56.7*  58.4*   PLATELETCT  203  183   MPV  9.3  9.2   NEUTSPOLYS  69.00  68.00   LYMPHOCYTES  18.80*  19.80*   MONOCYTES  10.00  9.40   EOSINOPHILS  0.80  1.60   BASOPHILS  0.60  0.50     Recent Labs      01/31/19 0414  02/01/19   0438  02/02/19   0408   SODIUM  134*  135  135   POTASSIUM  3.7  3.9  3.7   CHLORIDE  99  104  103   CO2  24  24  24   GLUCOSE  109*  106*  111*   BUN  16  13  10     Recent Labs      01/31/19 0414 02/01/19 0438  02/02/19   0408   ALBUMIN  2.8*  2.7*  2.5*   TBILIRUBIN  1.2   --    --    ALKPHOSPHAT  68   --    --    TOTPROTEIN  5.9*   --    --    ALTSGPT  22   --    --    ASTSGOT  21   --    --    CREATININE  0.79  0.76  0.68       Imaging:  Us-extremity Venous Lower Unilat Left    Result Date: 1/31/2019   Vascular Laboratory  CONCLUSIONS  No DVT in the visualized veins of the LEFT lower extremity.  NANCY SANCHEZ  Exam Date:     01/31/2019 08:51  Room #:     Inpatient  Priority:     Routine  Ht (in):             Wt (lb):  Ordering Physician:        MIR GREGG  Referring Physician:       [id number]  Sonographer:               Santos Che RVT  Study Type:                 Complete Unilateral  Technical Quality:         Adequate  Age:    73    Gender:     F  MRN:    [id number]  :    1945      BSA:  Indications:     Localized swelling, mass and lump, left lower limb  CPT Codes:       69527  ICD Codes:         History:         Left calf cellulitis with history of PVD and surgical removal                   of greater saphenous vein  Limitations:     Imaging not performed on low calf due to dressing and                   underlying ulcer  PROCEDURES:  Left lower extremity venous duplex imaging.  The following venous structures were evaluated: common femoral, profunda  femoral, greater saphenous, femoral, popliteal , peroneal and posterior  tibial veins.  Serial compression, augmentation maneuvers,  color and spectral Doppler  flow evaluations were performed.  FINDINGS:  Left lower extremity -.  Complete color filling and compressibility with normal venous flow dynamics  including spontaneous flow, response to augmentation maneuvers, and  respiratory phasicity.  The peroneal and posterior tibial veins are difficult to assess for  compressibility in the proximal calf, but flow response to augmentation is  demonstrated.  Imaging not performed on low calf due to dressing and underlying ulcer,  cannot rule out the possibility of thrombus at this level.  Flow was evaluated in the contralateral common femoral vein and normal  venous flow dynamics including spontaneous flow, respiratory phasic  variation and augmentation were demonstrated.  Sathya Pichardo MD  (Electronically Signed)  Final Date:      2019                   10:50      Micro:  Results     Procedure Component Value Units Date/Time    CULTURE WOUND W/ GRAM STAIN [[id number]]     Order Status:  No result Specimen:  Wound from Left Leg           Assessment:  Active Hospital Problems    Diagnosis   • *Cellulitis of left lower extremity [L03.116]   • Leg ulcer, left, with fat layer exposed ([facility]) [L97.922]   •  Peripheral vascular disease (HCC) [I73.9]   • Bullous pemphigus [L10.9]       Plan:  Cellulitis of left lower extremity  Low-grade temp resolved  No leukocytosis  Wound gram stain +GPC, GPR, culture 1/30 - PSAR (S-cipro)  Prior cultures in July 2018+ MRSA, Enterococcus faecalis, pseudomonas aeruginosa  Doppler ultrasound-negative for DVT  DC vancomycin   Transition to Doxy  Continue Zosyn  Wound care  Anticipate a 2-week course of antibiotics total  Estimated stop date 2/13/2019  May be able to change to p.o. antibiotics to complete antibiotic course pending clinical improvement    Bullous pemphigus  On CellCept    Peripheral vascular disease  Status post fem-pop bypass 06/08/18    Discussed with internal medicine.

## 2019-02-02 NOTE — PROGRESS NOTES
Report received from Leatha SWANN. POC discussed. Pt resting comfortably in bed. Safety precautions in place.

## 2019-02-03 PROBLEM — E87.6 HYPOKALEMIA: Status: ACTIVE | Noted: 2019-02-03

## 2019-02-03 LAB
ALBUMIN SERPL BCP-MCNC: 2.5 G/DL (ref 3.2–4.9)
BASOPHILS # BLD AUTO: 0.2 % (ref 0–1.8)
BASOPHILS # BLD: 0.01 K/UL (ref 0–0.12)
BUN SERPL-MCNC: 8 MG/DL (ref 8–22)
CALCIUM SERPL-MCNC: 8.5 MG/DL (ref 8.4–10.2)
CHLORIDE SERPL-SCNC: 104 MMOL/L (ref 96–112)
CO2 SERPL-SCNC: 21 MMOL/L (ref 20–33)
CREAT SERPL-MCNC: 0.77 MG/DL (ref 0.5–1.4)
EOSINOPHIL # BLD AUTO: 0.09 K/UL (ref 0–0.51)
EOSINOPHIL NFR BLD: 1.5 % (ref 0–6.9)
ERYTHROCYTE [DISTWIDTH] IN BLOOD BY AUTOMATED COUNT: 53.3 FL (ref 35.9–50)
GLUCOSE SERPL-MCNC: 124 MG/DL (ref 65–99)
HCT VFR BLD AUTO: 35.2 % (ref 37–47)
HGB BLD-MCNC: 11.1 G/DL (ref 12–16)
IMM GRANULOCYTES # BLD AUTO: 0.06 K/UL (ref 0–0.11)
IMM GRANULOCYTES NFR BLD AUTO: 1 % (ref 0–0.9)
LYMPHOCYTES # BLD AUTO: 1.04 K/UL (ref 1–4.8)
LYMPHOCYTES NFR BLD: 17.7 % (ref 22–41)
MCH RBC QN AUTO: 30.8 PG (ref 27–33)
MCHC RBC AUTO-ENTMCNC: 31.5 G/DL (ref 33.6–35)
MCV RBC AUTO: 97.8 FL (ref 81.4–97.8)
MONOCYTES # BLD AUTO: 0.65 K/UL (ref 0–0.85)
MONOCYTES NFR BLD AUTO: 11 % (ref 0–13.4)
NEUTROPHILS # BLD AUTO: 4.04 K/UL (ref 2–7.15)
NEUTROPHILS NFR BLD: 68.6 % (ref 44–72)
NRBC # BLD AUTO: 0 K/UL
NRBC BLD-RTO: 0 /100 WBC
PHOSPHATE SERPL-MCNC: 3.3 MG/DL (ref 2.5–4.5)
PLATELET # BLD AUTO: 204 K/UL (ref 164–446)
PMV BLD AUTO: 9.4 FL (ref 9–12.9)
POTASSIUM SERPL-SCNC: 3.5 MMOL/L (ref 3.6–5.5)
RBC # BLD AUTO: 3.6 M/UL (ref 4.2–5.4)
SODIUM SERPL-SCNC: 134 MMOL/L (ref 135–145)
WBC # BLD AUTO: 5.9 K/UL (ref 4.8–10.8)

## 2019-02-03 PROCEDURE — 700102 HCHG RX REV CODE 250 W/ 637 OVERRIDE(OP): Performed by: INTERNAL MEDICINE

## 2019-02-03 PROCEDURE — 99232 SBSQ HOSP IP/OBS MODERATE 35: CPT | Performed by: INTERNAL MEDICINE

## 2019-02-03 PROCEDURE — 700111 HCHG RX REV CODE 636 W/ 250 OVERRIDE (IP): Performed by: HOSPITALIST

## 2019-02-03 PROCEDURE — A9270 NON-COVERED ITEM OR SERVICE: HCPCS | Performed by: INTERNAL MEDICINE

## 2019-02-03 PROCEDURE — 700105 HCHG RX REV CODE 258: Performed by: HOSPITALIST

## 2019-02-03 PROCEDURE — 770006 HCHG ROOM/CARE - MED/SURG/GYN SEMI*

## 2019-02-03 PROCEDURE — 85025 COMPLETE CBC W/AUTO DIFF WBC: CPT

## 2019-02-03 PROCEDURE — 700102 HCHG RX REV CODE 250 W/ 637 OVERRIDE(OP): Performed by: HOSPITALIST

## 2019-02-03 PROCEDURE — A9270 NON-COVERED ITEM OR SERVICE: HCPCS | Performed by: HOSPITALIST

## 2019-02-03 PROCEDURE — 80069 RENAL FUNCTION PANEL: CPT

## 2019-02-03 RX ORDER — POTASSIUM CHLORIDE 20 MEQ/1
40 TABLET, EXTENDED RELEASE ORAL ONCE
Status: COMPLETED | OUTPATIENT
Start: 2019-02-03 | End: 2019-02-03

## 2019-02-03 RX ADMIN — MYCOPHENOLATE MOFETIL 500 MG: 250 CAPSULE ORAL at 04:47

## 2019-02-03 RX ADMIN — Medication 500 MG: at 18:45

## 2019-02-03 RX ADMIN — DOXYCYCLINE 100 MG: 100 TABLET, FILM COATED ORAL at 04:47

## 2019-02-03 RX ADMIN — Medication: at 19:33

## 2019-02-03 RX ADMIN — SERTRALINE HYDROCHLORIDE 100 MG: 50 TABLET ORAL at 04:46

## 2019-02-03 RX ADMIN — Medication 500 MG: at 14:14

## 2019-02-03 RX ADMIN — DOXYCYCLINE 100 MG: 100 TABLET, FILM COATED ORAL at 18:45

## 2019-02-03 RX ADMIN — SODIUM HYPOCHLORITE 30 ML: 1.25 SOLUTION TOPICAL at 18:46

## 2019-02-03 RX ADMIN — SODIUM HYPOCHLORITE 1 ML: 1.25 SOLUTION TOPICAL at 04:49

## 2019-02-03 RX ADMIN — PIPERACILLIN SODIUM AND TAZOBACTAM SODIUM 3.38 G: 3; .375 INJECTION, POWDER, LYOPHILIZED, FOR SOLUTION INTRAVENOUS at 20:40

## 2019-02-03 RX ADMIN — Medication: at 04:50

## 2019-02-03 RX ADMIN — POTASSIUM CHLORIDE 40 MEQ: 1500 TABLET, EXTENDED RELEASE ORAL at 10:25

## 2019-02-03 RX ADMIN — PIPERACILLIN SODIUM AND TAZOBACTAM SODIUM 3.38 G: 3; .375 INJECTION, POWDER, LYOPHILIZED, FOR SOLUTION INTRAVENOUS at 14:17

## 2019-02-03 RX ADMIN — METOPROLOL SUCCINATE 100 MG: 100 TABLET, EXTENDED RELEASE ORAL at 04:48

## 2019-02-03 RX ADMIN — PIPERACILLIN SODIUM AND TAZOBACTAM SODIUM 3.38 G: 3; .375 INJECTION, POWDER, LYOPHILIZED, FOR SOLUTION INTRAVENOUS at 04:48

## 2019-02-03 RX ADMIN — TRIAMCINOLONE ACETONIDE: 0.25 CREAM TOPICAL at 18:45

## 2019-02-03 RX ADMIN — ENOXAPARIN SODIUM 40 MG: 100 INJECTION SUBCUTANEOUS at 04:46

## 2019-02-03 RX ADMIN — TRIAMCINOLONE ACETONIDE: 0.25 CREAM TOPICAL at 04:50

## 2019-02-03 RX ADMIN — Medication 500 MG: at 04:48

## 2019-02-03 RX ADMIN — MYCOPHENOLATE MOFETIL 500 MG: 250 CAPSULE ORAL at 18:45

## 2019-02-03 ASSESSMENT — ENCOUNTER SYMPTOMS
WEAKNESS: 0
HEARTBURN: 0
SHORTNESS OF BREATH: 0
BLOOD IN STOOL: 0
WEIGHT LOSS: 0
FOCAL WEAKNESS: 0
DIZZINESS: 0
HEADACHES: 0
DEPRESSION: 0
PALPITATIONS: 0
DIARRHEA: 0
MYALGIAS: 1
HALLUCINATIONS: 0
CHILLS: 0
SORE THROAT: 0
DIARRHEA: 1
BACK PAIN: 0
COUGH: 0
MYALGIAS: 0
ABDOMINAL PAIN: 0
VOMITING: 0
FEVER: 0
NAUSEA: 0

## 2019-02-03 NOTE — PROGRESS NOTES
Hospital Medicine Daily Progress Note    Date of Service  2/2/2019    Chief Complaint  73 y.o. female admitted 1/30/2019 with leg pain, [facility] Course    hx of PAD s/p fem-pop bypass, multiple recurrent ulcers and cellulitis, presented with recurrent cellulitis      Interval Problem Update  1/31: Discussed w ID, tolerating vanc/zosyn, cultures pending.  Pain is controlled.    2/1: Left leg feels slightly better, prelim cultures with Pseudomonas.  2/2: Topical Benadryl and steroids added, DC vancomycin, add Doxy, continue Zosyn.  Very slow clinical improvement, likely related to vascular disease    Consultants/Specialty  Dr. Saleh -ID    Code Status  Full    Disposition  F/U Wound cultures, resume outpatient wound care    Review of Systems  Review of Systems   Constitutional: Negative for chills, fever and malaise/fatigue.   HENT: Negative for sore throat.    Respiratory: Negative for cough and shortness of breath.    Cardiovascular: Negative for chest pain and palpitations.   Gastrointestinal: Negative for abdominal pain, blood in stool, diarrhea, heartburn, nausea and vomiting.   Genitourinary: Negative for dysuria and frequency.   Musculoskeletal: Negative for back pain and myalgias.   Skin: Positive for itching and rash (Hand blisters slightly worse today).        Ulcer LLE, feels better today, still very red   Neurological: Negative for dizziness, focal weakness, weakness and headaches.   Psychiatric/Behavioral: Negative for depression and hallucinations.   All other systems reviewed and are negative.       Physical Exam  Temp:  [36.3 °C (97.3 °F)-36.8 °C (98.2 °F)] 36.3 °C (97.3 °F)  Pulse:  [76-98] 76  Resp:  [16] 16  BP: (132-177)/(50-65) 132/65  SpO2:  [91 %-95 %] 95 %    Physical Exam   Constitutional: She appears well-developed and well-nourished. No distress.   HENT:   Head: Normocephalic.   Neck: Normal range of motion. No JVD present. No tracheal deviation present.   Cardiovascular: Normal  rate and regular rhythm.    No murmur heard.  Pulmonary/Chest: Effort normal. She has no rales. She exhibits no tenderness.   Abdominal: She exhibits no distension. There is no tenderness.   Musculoskeletal: Normal range of motion. She exhibits deformity (Charcot). She exhibits no edema.   Neurological: She is alert. No cranial nerve deficit. Coordination normal.   Skin: Skin is warm. Rash noted. There is erythema.   LLE, diffuse and severe erythema   Psychiatric: Her behavior is normal.   Strange affect       Fluids    Intake/Output Summary (Last 24 hours) at 02/02/19 1654  Last data filed at 02/02/19 0915   Gross per 24 hour   Intake              340 ml   Output                0 ml   Net              340 ml       Laboratory  Recent Labs      01/31/19 0414 02/01/19   0438   WBC  7.1  5.7   RBC  3.72*  3.56*   HEMOGLOBIN  11.5*  11.0*   HEMATOCRIT  36.2*  36.0*   MCV  97.3  101.1*   MCH  30.9  30.9   MCHC  31.8*  30.6*   RDW  56.7*  58.4*   PLATELETCT  203  183   MPV  9.3  9.2     Recent Labs      01/31/19 0414  02/01/19 0438  02/02/19   0408   SODIUM  134*  135  135   POTASSIUM  3.7  3.9  3.7   CHLORIDE  99  104  103   CO2  24  24  24   GLUCOSE  109*  106*  111*   BUN  16  13  10   CREATININE  0.79  0.76  0.68   CALCIUM  8.8  8.4  8.6                   Imaging  US-EXTREMITY VENOUS LOWER UNILAT LEFT   Final Result           Assessment/Plan  * Cellulitis of left lower extremity   Assessment & Plan    Recurrent.  Prior cultures from wounds (7/2018) showed Pseudomonas as well as enterococcus and MRSA  Cultures from 1/30 growing Pseudomonas (Sensitive to cipro)  -DC Vanc --> Doxy (follow renal function and vanc trough)  -Continue Zosyn  -Needs 2 weeks total --> through 2/13/19  -Continue IV for now until she shows significant clinical improvement  Ultimately blood flow issues make abx treatment difficult / poor prognosis without BKA     Leg ulcer, left, with fat layer exposed (HCC)- (present on admission)    Assessment & Plan    With superimposed cellulitis,  Wound care performed debridement, repeat cultures taken and are growing Pseudomonas  ID consulting  High risk for BKA     Hyponatremia   Assessment & Plan    Infection/dehydration related.  IV fluids gentle, recheck bmp in the am     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)   Assessment & Plan    She is still having worsening despite mycophenolate   Add topical benadryl and topical steroid  Consider systemic steroids (but would like to avoid further immunosuppression)     Peripheral vascular disease (HCC)- (present on admission)   Assessment & Plan    History of PAD with femoral popliteal bypass in the past  Distal +1DPP pulses.  High risk for recurrent ulcers  Continue niacin     Essential hypertension- (present on admission)   Assessment & Plan    Controlled  Metoprolol 100mg XL          VTE prophylaxis: Lovenox

## 2019-02-03 NOTE — PROGRESS NOTES
Infectious Disease Progress Note    Author: Amber Valencia M.D. Date & Time of service: 2/3/2019  9:03 AM    Chief Complaint:  Follow-up on left lower extremity cellulitis/ulcerations    Interval History:  2/ afebrile WBC 5.7 patient states her left leg was just redressed this morning, she has ongoing pain and erythema, patient also complaining of diarrhea  2/2 afebrile CBC not done patient concerned about new small white blister formation on both hands secondary to her skin condition, denies any left lower extremity pain, dressings were changed this morning, continues to have significant erythema on her leg  2/3 afebrile WBC 5.9 patient slept well overnight, dressings were changed this morning, she noted some decreased swelling in her left leg and foot    Labs Reviewed, Medications Reviewed, Radiology Reviewed and Wound Reviewed.    Review of Systems:  Review of Systems   Constitutional: Negative for chills, fever and weight loss.   Respiratory: Negative for cough and shortness of breath.    Cardiovascular: Positive for leg swelling.        Decreased   Gastrointestinal: Positive for diarrhea. Negative for abdominal pain, nausea and vomiting.   Musculoskeletal: Positive for myalgias.   Skin: Positive for itching and rash.   Neurological: Negative for dizziness and headaches.   All other systems reviewed and are negative.      Hemodynamics:  Temp (24hrs), Av.6 °C (97.8 °F), Min:36.4 °C (97.6 °F), Max:36.7 °C (98 °F)  Temperature: 36.6 °C (97.9 °F)  Pulse  Av.4  Min: 66  Max: 100   Blood Pressure : 142/60       Physical Exam:  Physical Exam   Constitutional: She is oriented to person, place, and time. She appears well-developed.   HENT:   Head: Normocephalic and atraumatic.   Eyes: Pupils are equal, round, and reactive to light. EOM are normal.   Neck: Neck supple.   Cardiovascular: Normal rate.    Murmur heard.  Irregular   Pulmonary/Chest: Effort normal. She has no wheezes. She has no rales.    Abdominal: Soft. There is no tenderness.   Musculoskeletal: She exhibits edema and tenderness.   Left lower extremity dressed.  There is extending erythema from the dressing and swelling down to the dorsum of the foot.  Left lower extremity is warm to palpation   Neurological: She is alert and oriented to person, place, and time.   Skin: There is erythema.   Scattered erythematous lesions       Meds:    Current Facility-Administered Medications:   •  doxycycline monohydrate  •  diphenhydrAMINE-ZnAcetate  •  triamcinolone acetonide  •  senna-docusate **AND** polyethylene glycol/lytes **AND** magnesium hydroxide **AND** bisacodyl  •  enoxaparin  •  acetaminophen  •  [COMPLETED] piperacillin-tazobactam **AND** piperacillin-tazobactam  •  dakins 0.125% (1/4 strength)  •  metoprolol SR  •  mycophenolate  •  niacin  •  sertraline    Labs:  Recent Labs      02/01/19 0438  02/03/19   0426   WBC  5.7  5.9   RBC  3.56*  3.60*   HEMOGLOBIN  11.0*  11.1*   HEMATOCRIT  36.0*  35.2*   MCV  101.1*  97.8   MCH  30.9  30.8   RDW  58.4*  53.3*   PLATELETCT  183  204   MPV  9.2  9.4   NEUTSPOLYS  68.00  68.60   LYMPHOCYTES  19.80*  17.70*   MONOCYTES  9.40  11.00   EOSINOPHILS  1.60  1.50   BASOPHILS  0.50  0.20     Recent Labs      02/01/19   0438  02/02/19   0408  02/03/19   0426   SODIUM  135  135  134*   POTASSIUM  3.9  3.7  3.5*   CHLORIDE  104  103  104   CO2  24  24  21   GLUCOSE  106*  111*  124*   BUN  13  10  8     Recent Labs      02/01/19   0438  02/02/19   0408  02/03/19   0426   ALBUMIN  2.7*  2.5*  2.5*   CREATININE  0.76  0.68  0.77       Imaging:  None new to review    Us-extremity Venous Lower Unilat Left    Result Date: 1/31/2019   Vascular Laboratory  CONCLUSIONS  No DVT in the visualized veins of the LEFT lower extremity.  NANCY SANCHEZ  Exam Date:     01/31/2019 08:51  Room #:     Inpatient  Priority:     Routine  Ht (in):             Wt (lb):  Ordering Physician:        MIR GREGG                              KENTON MARIA  Referring Physician:       [id number] Patrick  Sonographer:               Santos Che RVT  Study Type:                Complete Unilateral  Technical Quality:         Adequate  Age:    73    Gender:     F  MRN:    [id number]  :    1945      BSA:  Indications:     Localized swelling, mass and lump, left lower limb  CPT Codes:       11853  ICD Codes:         History:         Left calf cellulitis with history of PVD and surgical removal                   of greater saphenous vein  Limitations:     Imaging not performed on low calf due to dressing and                   underlying ulcer  PROCEDURES:  Left lower extremity venous duplex imaging.  The following venous structures were evaluated: common femoral, profunda  femoral, greater saphenous, femoral, popliteal , peroneal and posterior  tibial veins.  Serial compression, augmentation maneuvers,  color and spectral Doppler  flow evaluations were performed.  FINDINGS:  Left lower extremity -.  Complete color filling and compressibility with normal venous flow dynamics  including spontaneous flow, response to augmentation maneuvers, and  respiratory phasicity.  The peroneal and posterior tibial veins are difficult to assess for  compressibility in the proximal calf, but flow response to augmentation is  demonstrated.  Imaging not performed on low calf due to dressing and underlying ulcer,  cannot rule out the possibility of thrombus at this level.  Flow was evaluated in the contralateral common femoral vein and normal  venous flow dynamics including spontaneous flow, respiratory phasic  variation and augmentation were demonstrated.  Sathya Pichardo MD  (Electronically Signed)  Final Date:      2019                   10:50      Micro:  Results     Procedure Component Value Units Date/Time    CULTURE WOUND W/ GRAM STAIN [[id number]]     Order Status:  No result Specimen:  Wound from Left Leg           Assessment:  Active Hospital  Problems    Diagnosis   • *Cellulitis of left lower extremity [L03.116]   • Leg ulcer, left, with fat layer exposed ([facility]) [L97.922]   • Peripheral vascular disease ([facility]) [I73.9]   • Bullous pemphigus [L10.9]       Plan:  Cellulitis of left lower extremity  Low-grade temp resolved  No leukocytosis  Wound gram stain +GPC, GPR, culture 1/30 - PSAR (S-cipro)  Prior cultures in July 2018+ MRSA, Enterococcus faecalis, pseudomonas aeruginosa  Doppler ultrasound-negative for DVT  Continue Zosyn and doxy  Wound care  Anticipate a 2-week course of antibiotics total  Estimated stop date 2/13/2019  May be able to change to p.o. antibiotics to complete antibiotic course pending clinical improvement  Please obtain wound care photos at next dressing change-nursing communication order placed    Bullous pemphigus  On CellCept    Peripheral vascular disease  Status post fem-pop bypass 06/08/18    Discussed with internal medicine.

## 2019-02-03 NOTE — PROGRESS NOTES
Report received from SHREE Coleman. Patient resting comfortably in bed. Declines any needs at this time. Call light in reach. Bed alarm on.

## 2019-02-04 LAB
ALBUMIN SERPL BCP-MCNC: 2.7 G/DL (ref 3.2–4.9)
BUN SERPL-MCNC: 10 MG/DL (ref 8–22)
CALCIUM SERPL-MCNC: 8.6 MG/DL (ref 8.4–10.2)
CHLORIDE SERPL-SCNC: 104 MMOL/L (ref 96–112)
CO2 SERPL-SCNC: 21 MMOL/L (ref 20–33)
CREAT SERPL-MCNC: 0.66 MG/DL (ref 0.5–1.4)
GLUCOSE SERPL-MCNC: 125 MG/DL (ref 65–99)
PHOSPHATE SERPL-MCNC: 3.2 MG/DL (ref 2.5–4.5)
POTASSIUM SERPL-SCNC: 3.9 MMOL/L (ref 3.6–5.5)
SODIUM SERPL-SCNC: 132 MMOL/L (ref 135–145)

## 2019-02-04 PROCEDURE — 99232 SBSQ HOSP IP/OBS MODERATE 35: CPT | Performed by: INTERNAL MEDICINE

## 2019-02-04 PROCEDURE — 700111 HCHG RX REV CODE 636 W/ 250 OVERRIDE (IP): Performed by: HOSPITALIST

## 2019-02-04 PROCEDURE — 700111 HCHG RX REV CODE 636 W/ 250 OVERRIDE (IP): Performed by: INTERNAL MEDICINE

## 2019-02-04 PROCEDURE — A9270 NON-COVERED ITEM OR SERVICE: HCPCS | Performed by: HOSPITALIST

## 2019-02-04 PROCEDURE — 700105 HCHG RX REV CODE 258: Performed by: HOSPITALIST

## 2019-02-04 PROCEDURE — A9270 NON-COVERED ITEM OR SERVICE: HCPCS | Performed by: INTERNAL MEDICINE

## 2019-02-04 PROCEDURE — 700102 HCHG RX REV CODE 250 W/ 637 OVERRIDE(OP): Performed by: INTERNAL MEDICINE

## 2019-02-04 PROCEDURE — 80069 RENAL FUNCTION PANEL: CPT

## 2019-02-04 PROCEDURE — 700102 HCHG RX REV CODE 250 W/ 637 OVERRIDE(OP): Performed by: HOSPITALIST

## 2019-02-04 PROCEDURE — 770006 HCHG ROOM/CARE - MED/SURG/GYN SEMI*

## 2019-02-04 RX ORDER — MICONAZOLE NITRATE 20 MG/G
CREAM TOPICAL 2 TIMES DAILY
Status: DISCONTINUED | OUTPATIENT
Start: 2019-02-04 | End: 2019-02-05 | Stop reason: HOSPADM

## 2019-02-04 RX ORDER — METOPROLOL SUCCINATE 100 MG/1
TABLET, EXTENDED RELEASE ORAL
Qty: 90 TAB | Refills: 0 | Status: SHIPPED | OUTPATIENT
Start: 2019-02-04 | End: 2019-03-08 | Stop reason: CLARIF

## 2019-02-04 RX ORDER — PREDNISONE 20 MG/1
40 TABLET ORAL DAILY
Status: DISCONTINUED | OUTPATIENT
Start: 2019-02-04 | End: 2019-02-05 | Stop reason: HOSPADM

## 2019-02-04 RX ORDER — SODIUM CHLORIDE 9 MG/ML
INJECTION, SOLUTION INTRAVENOUS
Status: ACTIVE
Start: 2019-02-04 | End: 2019-02-05

## 2019-02-04 RX ADMIN — ACETAMINOPHEN 650 MG: 325 TABLET, FILM COATED ORAL at 09:03

## 2019-02-04 RX ADMIN — Medication 500 MG: at 11:32

## 2019-02-04 RX ADMIN — PIPERACILLIN SODIUM AND TAZOBACTAM SODIUM 3.38 G: 3; .375 INJECTION, POWDER, LYOPHILIZED, FOR SOLUTION INTRAVENOUS at 05:10

## 2019-02-04 RX ADMIN — PIPERACILLIN SODIUM AND TAZOBACTAM SODIUM 3.38 G: 3; .375 INJECTION, POWDER, LYOPHILIZED, FOR SOLUTION INTRAVENOUS at 20:50

## 2019-02-04 RX ADMIN — SODIUM HYPOCHLORITE 473 ML: 1.25 SOLUTION TOPICAL at 05:11

## 2019-02-04 RX ADMIN — Medication 500 MG: at 18:35

## 2019-02-04 RX ADMIN — STANDARDIZED SENNA CONCENTRATE AND DOCUSATE SODIUM 2 TABLET: 8.6; 5 TABLET, FILM COATED ORAL at 05:09

## 2019-02-04 RX ADMIN — MICONAZOLE NITRATE: 20 CREAM TOPICAL at 18:33

## 2019-02-04 RX ADMIN — SODIUM HYPOCHLORITE 450 ML: 1.25 SOLUTION TOPICAL at 22:17

## 2019-02-04 RX ADMIN — ENOXAPARIN SODIUM 40 MG: 100 INJECTION SUBCUTANEOUS at 05:10

## 2019-02-04 RX ADMIN — MYCOPHENOLATE MOFETIL 500 MG: 250 CAPSULE ORAL at 18:33

## 2019-02-04 RX ADMIN — METOPROLOL SUCCINATE 100 MG: 100 TABLET, EXTENDED RELEASE ORAL at 05:09

## 2019-02-04 RX ADMIN — PREDNISONE 40 MG: 20 TABLET ORAL at 11:32

## 2019-02-04 RX ADMIN — MYCOPHENOLATE MOFETIL 500 MG: 250 CAPSULE ORAL at 05:09

## 2019-02-04 RX ADMIN — Medication: at 06:00

## 2019-02-04 RX ADMIN — ACETAMINOPHEN 650 MG: 325 TABLET, FILM COATED ORAL at 20:49

## 2019-02-04 RX ADMIN — Medication 500 MG: at 05:09

## 2019-02-04 RX ADMIN — Medication: at 20:49

## 2019-02-04 RX ADMIN — SERTRALINE HYDROCHLORIDE 100 MG: 50 TABLET ORAL at 05:09

## 2019-02-04 RX ADMIN — MICONAZOLE NITRATE: 20 CREAM TOPICAL at 11:32

## 2019-02-04 RX ADMIN — TRIAMCINOLONE ACETONIDE: 0.25 CREAM TOPICAL at 18:33

## 2019-02-04 RX ADMIN — DOXYCYCLINE 100 MG: 100 TABLET, FILM COATED ORAL at 05:09

## 2019-02-04 RX ADMIN — TRIAMCINOLONE ACETONIDE: 0.25 CREAM TOPICAL at 05:11

## 2019-02-04 RX ADMIN — PIPERACILLIN SODIUM AND TAZOBACTAM SODIUM 3.38 G: 3; .375 INJECTION, POWDER, LYOPHILIZED, FOR SOLUTION INTRAVENOUS at 14:12

## 2019-02-04 RX ADMIN — DOXYCYCLINE 100 MG: 100 TABLET, FILM COATED ORAL at 18:33

## 2019-02-04 ASSESSMENT — ENCOUNTER SYMPTOMS
CHILLS: 0
MYALGIAS: 0
HEADACHES: 0
SHORTNESS OF BREATH: 0
WEIGHT LOSS: 0
HALLUCINATIONS: 0
SORE THROAT: 0
FEVER: 0
DIARRHEA: 1
DIARRHEA: 0
MYALGIAS: 1
BACK PAIN: 0
ABDOMINAL PAIN: 0
DEPRESSION: 0
COUGH: 0
VOMITING: 0
PALPITATIONS: 0
BLOOD IN STOOL: 0
HEARTBURN: 0
WEAKNESS: 0
NAUSEA: 0
DIZZINESS: 0
FOCAL WEAKNESS: 0

## 2019-02-04 NOTE — PROGRESS NOTES
Hospital Medicine Daily Progress Note    Date of Service  2/4/2019    Chief Complaint  73 y.o. female admitted 1/30/2019 with leg pain, [facility] Course    hx of PAD s/p fem-pop bypass, multiple recurrent ulcers and cellulitis, presented with recurrent cellulitis      Interval Problem Update  1/31: Discussed w ID, tolerating vanc/zosyn, cultures pending.  Pain is controlled.    2/1: Left leg feels slightly better, prelim cultures with Pseudomonas.  2/2: Topical Benadryl and steroids added, DC vancomycin, add Doxy, continue Zosyn.  Very slow clinical improvement, likely related to vascular disease  2/3: Improved erythema of extremity, rash improved  2/4: Abdominal rash is much worse, prednisone added.  Leg is improving, tolerating abx.  Discussed with ID    Consultants/Specialty  Dr. Valencia -ID    Code Status  Full    Disposition  Resume outpatient wound care    Review of Systems  Review of Systems   Constitutional: Negative for chills, fever and malaise/fatigue.   HENT: Negative for sore throat.    Respiratory: Negative for cough and shortness of breath.    Cardiovascular: Negative for chest pain and palpitations.   Gastrointestinal: Negative for abdominal pain, blood in stool, diarrhea, heartburn, nausea and vomiting.   Genitourinary: Negative for dysuria and frequency.   Musculoskeletal: Negative for back pain and myalgias.   Skin: Positive for itching and rash.        Ulcer LLE, improved   Neurological: Negative for dizziness, focal weakness, weakness and headaches.   Psychiatric/Behavioral: Negative for depression and hallucinations.   All other systems reviewed and are negative.       Physical Exam  Temp:  [36.3 °C (97.4 °F)-36.6 °C (97.9 °F)] 36.5 °C (97.7 °F)  Pulse:  [65-93] 65  Resp:  [17-18] 17  BP: (120-143)/(47-60) 134/47  SpO2:  [92 %-96 %] 96 %    Physical Exam   Constitutional: She appears well-developed and well-nourished. No distress.   HENT:   Head: Normocephalic.   Neck: Normal range of  motion. No JVD present. No tracheal deviation present.   Cardiovascular: Normal rate and regular rhythm.    No murmur heard.  Pulmonary/Chest: Effort normal. She has no rales. She exhibits no tenderness.   Abdominal: She exhibits no distension. There is no tenderness.   Musculoskeletal: Normal range of motion. She exhibits deformity (Charcot). She exhibits no edema.   Neurological: She is alert. No cranial nerve deficit. Coordination normal.   Skin: Skin is warm. Rash (Bright red plaques on abdomen, bilateral UEs.) noted. There is erythema.   LLE, diffuse and severe erythema improved   Psychiatric: Her behavior is normal.       Fluids    Intake/Output Summary (Last 24 hours) at 02/04/19 1300  Last data filed at 02/04/19 0842   Gross per 24 hour   Intake              320 ml   Output                0 ml   Net              320 ml       Laboratory  Recent Labs      02/03/19   0426   WBC  5.9   RBC  3.60*   HEMOGLOBIN  11.1*   HEMATOCRIT  35.2*   MCV  97.8   MCH  30.8   MCHC  31.5*   RDW  53.3*   PLATELETCT  204   MPV  9.4     Recent Labs      02/02/19   0408  02/03/19   0426  02/04/19   0247   SODIUM  135  134*  132*   POTASSIUM  3.7  3.5*  3.9   CHLORIDE  103  104  104   CO2  24  21  21   GLUCOSE  111*  124*  125*   BUN  10  8  10   CREATININE  0.68  0.77  0.66   CALCIUM  8.6  8.5  8.6                   Imaging  US-EXTREMITY VENOUS LOWER UNILAT LEFT   Final Result           Assessment/Plan  * Cellulitis of left lower extremity   Assessment & Plan    Recurrent.  Prior cultures from wounds (7/2018) showed Pseudomonas as well as enterococcus and MRSA  Cultures from 1/30 growing Pseudomonas (Sensitive to cipro)  -Continue Doxy (follow renal function and vanc trough)  -Continue Zosyn (likely will DC on cipro if improvement continues)  -Needs 2 weeks total --> through 2/13/19  Ultimately blood flow issues make abx treatment difficult / poor prognosis without BKA     Leg ulcer, left, with fat layer exposed (HCC)- (present on  admission)   Assessment & Plan    With superimposed cellulitis,  Wound care performed debridement, repeat cultures taken and are growing Pseudomonas  ID consulting  High risk for BKA     Hypokalemia   Assessment & Plan    Replace PO and repeat in AM     Hyponatremia   Assessment & Plan    Infection/dehydration related.  IV fluids gentle, recheck bmp in the am     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)   Assessment & Plan    Mycophenolate   Added topical benadryl and topical steroid with significant improvement or arms but abdomen worse  Add PO prednisone     Peripheral vascular disease (HCC)- (present on admission)   Assessment & Plan    History of PAD with femoral popliteal bypass in the past  Distal +1DPP pulses.  High risk for recurrent ulcers  Continue niacin     Essential hypertension- (present on admission)   Assessment & Plan    Controlled  Metoprolol 100mg XL          VTE prophylaxis: Lovenox

## 2019-02-04 NOTE — PROGRESS NOTES
[facility] Medicine Daily Progress Note    Date of Service  2/3/2019    Chief Complaint  73 y.o. female admitted 1/30/2019 with leg pain, [facility] Course    hx of PAD s/p fem-pop bypass, multiple recurrent ulcers and cellulitis, presented with recurrent cellulitis      Interval Problem Update  1/31: Discussed w ID, tolerating vanc/zosyn, cultures pending.  Pain is controlled.    2/1: Left leg feels slightly better, prelim cultures with Pseudomonas.  2/2: Topical Benadryl and steroids added, DC vancomycin, add Doxy, continue Zosyn.  Very slow clinical improvement, likely related to vascular disease  2/3: Improved erythema of extremity, rash improved    Consultants/Specialty  Dr. Saleh -ID    Code Status  Full    Disposition  F/U Wound cultures, resume outpatient wound care    Review of Systems  Review of Systems   Constitutional: Negative for chills, fever and malaise/fatigue.   HENT: Negative for sore throat.    Respiratory: Negative for cough and shortness of breath.    Cardiovascular: Negative for chest pain and palpitations.   Gastrointestinal: Negative for abdominal pain, blood in stool, diarrhea, heartburn, nausea and vomiting.   Genitourinary: Negative for dysuria and frequency.   Musculoskeletal: Negative for back pain and myalgias.   Skin: Positive for itching and rash.        Ulcer LLE, improved   Neurological: Negative for dizziness, focal weakness, weakness and headaches.   Psychiatric/Behavioral: Negative for depression and hallucinations.   All other systems reviewed and are negative.       Physical Exam  Temp:  [36.4 °C (97.6 °F)-36.7 °C (98 °F)] 36.4 °C (97.6 °F)  Pulse:  [] 73  Resp:  [18] 18  BP: (120-143)/(43-60) 120/60  SpO2:  [91 %-95 %] 95 %    Physical Exam   Constitutional: She appears well-developed and well-nourished. No distress.   HENT:   Head: Normocephalic.   Neck: Normal range of motion. No JVD present. No tracheal deviation present.   Cardiovascular: Normal rate and  regular rhythm.    No murmur heard.  Pulmonary/Chest: Effort normal. She has no rales. She exhibits no tenderness.   Abdominal: She exhibits no distension. There is no tenderness.   Musculoskeletal: Normal range of motion. She exhibits deformity (Charcot). She exhibits no edema.   Neurological: She is alert. No cranial nerve deficit. Coordination normal.   Skin: Skin is warm. Rash noted. There is erythema.   LLE, diffuse and severe erythema improved   Psychiatric: Her behavior is normal.       Fluids    Intake/Output Summary (Last 24 hours) at 02/03/19 1611  Last data filed at 02/03/19 1245   Gross per 24 hour   Intake              640 ml   Output                0 ml   Net              640 ml       Laboratory  Recent Labs      02/01/19   0438  02/03/19   0426   WBC  5.7  5.9   RBC  3.56*  3.60*   HEMOGLOBIN  11.0*  11.1*   HEMATOCRIT  36.0*  35.2*   MCV  101.1*  97.8   MCH  30.9  30.8   MCHC  30.6*  31.5*   RDW  58.4*  53.3*   PLATELETCT  183  204   MPV  9.2  9.4     Recent Labs      02/01/19   0438  02/02/19   0408  02/03/19   0426   SODIUM  135  135  134*   POTASSIUM  3.9  3.7  3.5*   CHLORIDE  104  103  104   CO2  24  24  21   GLUCOSE  106*  111*  124*   BUN  13  10  8   CREATININE  0.76  0.68  0.77   CALCIUM  8.4  8.6  8.5                   Imaging  US-EXTREMITY VENOUS LOWER UNILAT LEFT   Final Result           Assessment/Plan  * Cellulitis of left lower extremity   Assessment & Plan    Recurrent.  Prior cultures from wounds (7/2018) showed Pseudomonas as well as enterococcus and MRSA  Cultures from 1/30 growing Pseudomonas (Sensitive to cipro)  -Continue Doxy (follow renal function and vanc trough)  -Continue Zosyn  -Needs 2 weeks total --> through 2/13/19  -Continue IV for now until she shows significant clinical improvement  Ultimately blood flow issues make abx treatment difficult / poor prognosis without BKA     Leg ulcer, left, with fat layer exposed (HCC)- (present on admission)   Assessment & Plan     With superimposed cellulitis,  Wound care performed debridement, repeat cultures taken and are growing Pseudomonas  ID consulting  High risk for BKA     Hypokalemia   Assessment & Plan    Replace PO and repeat in AM     Hyponatremia   Assessment & Plan    Infection/dehydration related.  IV fluids gentle, recheck bmp in the am     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)   Assessment & Plan    Mycophenolate   Added topical benadryl and topical steroid with significant improvement     Peripheral vascular disease (HCC)- (present on admission)   Assessment & Plan    History of PAD with femoral popliteal bypass in the past  Distal +1DPP pulses.  High risk for recurrent ulcers  Continue niacin     Essential hypertension- (present on admission)   Assessment & Plan    Controlled  Metoprolol 100mg XL          VTE prophylaxis: Lovenox

## 2019-02-04 NOTE — WOUND TEAM
"[facility]  Inpatient Services  Wound and Skin Care Progress Note    Admission Date:  1/30/2019   HPI, PMH, SH: Reviewed  Unit where seen by Wound Team: 3313/01    WOUND TEAM FOLLOW UP: LLE wounds    SUBJECTIVE:   \"I've been doing this for years.\"    Self Report / Pain Level: some pain with cleaning      OBJECTIVE:  WOUND TYPE, LOCATION, CHARACTERISTICS (Pressure ulcers: location, stage, POA or date identified)  Moisture Associated Skin Damage Sacrum;Buttock;Perineum;Thigh;Groin  Periwound: rash and satellite lesions    Drainage:  none     Tissue Type and %:   100% red   Wound Edges:  Diffuse attached    Odor:   None   Exposed structure(s):  No   Signs and Symptoms of Infection: satellite lesions    Measurements:   Length (cm): 30  Width (cm): 17    Wound 01/30/19 Full Thickness Wound Leg;Ankle L medial ankle and LE (Active)      2/4/2019 10:00 AM   Site Assessment Red    Maricruz-wound Assessment Hyperpigmented    Margins Defined edges;Attached edges    Wound Length (cm) 4 cm 2/4/2019 10:00 AM   Wound Width (cm) 3.2 cm 2/4/2019 10:00 AM   Wound Depth (cm) 0.2 cm 2/4/2019 10:00 AM   Wound Surface Area (cm^2) 12.8 cm^2 2/4/2019 10:00 AM   Closure None    Drainage Amount Scant    Drainage Description Serosanguineous    Non-staged Wound Description Full thickness    Treatments Cleansed;Site care    Cleansing Approved Wound Cleanser    Periwound Protectant Barrier Paste    Dressing Options Moist Gauze;Nonadherent Contact Layer;Absorbent Abdominal Pad;Roll Gauze    Dressing Cleansing/Solutions 1/4 Strength Dakin's Solution    Dressing Changed Changed    Dressing Status Intact    Dressing Change Frequency Every Shift    NEXT Dressing Change  02/04/19    NEXT Weekly Photo (Inpatient Only) 02/11/19    Odor None     Pulses Not palpable     Exposed Structures None     Tissue Type and Percentage 100% red      Vascular:  Dorsal Pedal pulses:  Not palpable  Posterior tib pulses:  Not palpable    TOMI: "  5/28/18     RIGHT     Waveform            Systolic BPs (mmHg)                              140           Brachial   Triphasic                                Common Femoral   Triphasic                  109           Posterior Tibial   Triphasic                  130           Dorsalis Pedis                                            Peroneal                              0.92          TOMI                                            TBI                          LEFT   Waveform        Systolic BPs (mmHg)                              142           Brachial   Triphasic                                Common Femoral   Monophasic                 88            Posterior Tibial   Monophasic                 100           Dorsalis Pedis                                            Peroneal                              0.70          TOMI                                            TBI     6/8/18 Pt had Left fem-pop bypass  Lab Values:    WBC:       WBC   Date/Time Value Ref Range Status   02/03/2019 04:26 AM 5.9 4.8 - 10.8 K/uL Final     AIC:      Lab Results   Component Value Date/Time    HBA1C 5.6 06/04/2018 12:00 PM          Culture:   Done 1/30/19    INTERVENTIONS BY WOUND TEAM: removed drsg, cleaned wounds with NS, dried. Some serous fluid removed with cleaning. Barrier paste to yuki-wound. !/4 strength Dakins moistened roll gauze over wound beds, applied telfa and then abd pad. Secured with roll gauze.    RN had some concern with sacral area so pt turned to R side. Removed mepilex, cleaned small amount of stool from area. Assessed skin. Ordered antifungal barrier cream and RN got waffle overlay since pt starting to have some pain with sitting as well as prevention.     Leg;Ankle L medial ankle and LE-Dressing Options: 1/4 strength Dakins Moist Gauze, Nonadherent Contact Layer, Absorbent Abdominal Pad, Roll Gauze      Interdisciplinary consultation:   With Nursing;With Patient     EVALUATION:  Wound beds have improved, yuki-wound  skin with rash, clear fluid filled blister present anterior ankle. Satellite lesions around perineal area and ischia, antifungal ordered.     Factors affecting wound healing:  PVD, bullous pemphigus, HTN  Goals:  Slow decrease in wound area and depth weekly    NURSING PLAN OF CARE:    Dressing changes: Continue previous Dressing Maintenance orders:    x    See new Dressing Maintenance orders:       Skin care: See Skin Care orders:        Rectal tube care: See Rectal Tube Care orders:      Other orders:           WOUND TEAM PLAN OF CARE (X):   NPWT change 3 x week:        Dressing changes:       Follow up as needed:  x     Other:

## 2019-02-04 NOTE — PROGRESS NOTES
Report received from SHREE Mcclendon. Patient resting comfortably in bed. Declines any needs at this time. Call light in reach. Bed alarm on.

## 2019-02-05 VITALS
DIASTOLIC BLOOD PRESSURE: 67 MMHG | HEIGHT: 67 IN | TEMPERATURE: 98.2 F | BODY MASS INDEX: 24.57 KG/M2 | HEART RATE: 81 BPM | WEIGHT: 156.53 LBS | SYSTOLIC BLOOD PRESSURE: 144 MMHG | OXYGEN SATURATION: 92 % | RESPIRATION RATE: 18 BRPM

## 2019-02-05 LAB
ALBUMIN SERPL BCP-MCNC: 2.6 G/DL (ref 3.2–4.9)
BASOPHILS # BLD AUTO: 0.3 % (ref 0–1.8)
BASOPHILS # BLD: 0.02 K/UL (ref 0–0.12)
BUN SERPL-MCNC: 9 MG/DL (ref 8–22)
CALCIUM SERPL-MCNC: 8.9 MG/DL (ref 8.4–10.2)
CHLORIDE SERPL-SCNC: 103 MMOL/L (ref 96–112)
CO2 SERPL-SCNC: 24 MMOL/L (ref 20–33)
CREAT SERPL-MCNC: 0.72 MG/DL (ref 0.5–1.4)
EOSINOPHIL # BLD AUTO: 0.12 K/UL (ref 0–0.51)
EOSINOPHIL NFR BLD: 1.7 % (ref 0–6.9)
ERYTHROCYTE [DISTWIDTH] IN BLOOD BY AUTOMATED COUNT: 51.8 FL (ref 35.9–50)
GLUCOSE SERPL-MCNC: 127 MG/DL (ref 65–99)
HCT VFR BLD AUTO: 35.6 % (ref 37–47)
HGB BLD-MCNC: 11.4 G/DL (ref 12–16)
IMM GRANULOCYTES # BLD AUTO: 0.05 K/UL (ref 0–0.11)
IMM GRANULOCYTES NFR BLD AUTO: 0.7 % (ref 0–0.9)
LYMPHOCYTES # BLD AUTO: 0.97 K/UL (ref 1–4.8)
LYMPHOCYTES NFR BLD: 13.8 % (ref 22–41)
MCH RBC QN AUTO: 31.1 PG (ref 27–33)
MCHC RBC AUTO-ENTMCNC: 32 G/DL (ref 33.6–35)
MCV RBC AUTO: 97 FL (ref 81.4–97.8)
MONOCYTES # BLD AUTO: 0.7 K/UL (ref 0–0.85)
MONOCYTES NFR BLD AUTO: 9.9 % (ref 0–13.4)
NEUTROPHILS # BLD AUTO: 5.19 K/UL (ref 2–7.15)
NEUTROPHILS NFR BLD: 73.6 % (ref 44–72)
NRBC # BLD AUTO: 0 K/UL
NRBC BLD-RTO: 0 /100 WBC
PHOSPHATE SERPL-MCNC: 2.9 MG/DL (ref 2.5–4.5)
PLATELET # BLD AUTO: 238 K/UL (ref 164–446)
PMV BLD AUTO: 9.6 FL (ref 9–12.9)
POTASSIUM SERPL-SCNC: 4 MMOL/L (ref 3.6–5.5)
RBC # BLD AUTO: 3.67 M/UL (ref 4.2–5.4)
SODIUM SERPL-SCNC: 134 MMOL/L (ref 135–145)
WBC # BLD AUTO: 7.1 K/UL (ref 4.8–10.8)

## 2019-02-05 PROCEDURE — 700102 HCHG RX REV CODE 250 W/ 637 OVERRIDE(OP): Performed by: INTERNAL MEDICINE

## 2019-02-05 PROCEDURE — 700111 HCHG RX REV CODE 636 W/ 250 OVERRIDE (IP): Performed by: INTERNAL MEDICINE

## 2019-02-05 PROCEDURE — A9270 NON-COVERED ITEM OR SERVICE: HCPCS | Performed by: INTERNAL MEDICINE

## 2019-02-05 PROCEDURE — 99232 SBSQ HOSP IP/OBS MODERATE 35: CPT | Performed by: INTERNAL MEDICINE

## 2019-02-05 PROCEDURE — 80069 RENAL FUNCTION PANEL: CPT

## 2019-02-05 PROCEDURE — 99239 HOSP IP/OBS DSCHRG MGMT >30: CPT | Performed by: HOSPITALIST

## 2019-02-05 PROCEDURE — 85025 COMPLETE CBC W/AUTO DIFF WBC: CPT

## 2019-02-05 PROCEDURE — 700111 HCHG RX REV CODE 636 W/ 250 OVERRIDE (IP): Performed by: HOSPITALIST

## 2019-02-05 PROCEDURE — 700102 HCHG RX REV CODE 250 W/ 637 OVERRIDE(OP): Performed by: HOSPITALIST

## 2019-02-05 PROCEDURE — A9270 NON-COVERED ITEM OR SERVICE: HCPCS | Performed by: HOSPITALIST

## 2019-02-05 PROCEDURE — 700105 HCHG RX REV CODE 258: Performed by: HOSPITALIST

## 2019-02-05 RX ORDER — DOXYCYCLINE 100 MG/1
100 TABLET ORAL EVERY 12 HOURS
Qty: 16 TAB | Refills: 0 | Status: SHIPPED | OUTPATIENT
Start: 2019-02-05 | End: 2019-02-13

## 2019-02-05 RX ORDER — PREDNISONE 20 MG/1
40 TABLET ORAL DAILY
Qty: 10 TAB | Refills: 0 | Status: SHIPPED | OUTPATIENT
Start: 2019-02-05 | End: 2019-02-10

## 2019-02-05 RX ORDER — LINEZOLID 600 MG/1
600 TABLET, FILM COATED ORAL 2 TIMES DAILY
Qty: 16 TAB | Refills: 0 | Status: SHIPPED | OUTPATIENT
Start: 2019-02-05 | End: 2019-02-05

## 2019-02-05 RX ORDER — CIPROFLOXACIN 500 MG/1
500 TABLET, FILM COATED ORAL 2 TIMES DAILY
Qty: 16 TAB | Refills: 0 | Status: SHIPPED | OUTPATIENT
Start: 2019-02-05 | End: 2019-02-13

## 2019-02-05 RX ADMIN — PIPERACILLIN SODIUM AND TAZOBACTAM SODIUM 3.38 G: 3; .375 INJECTION, POWDER, LYOPHILIZED, FOR SOLUTION INTRAVENOUS at 12:01

## 2019-02-05 RX ADMIN — PIPERACILLIN SODIUM AND TAZOBACTAM SODIUM 3.38 G: 3; .375 INJECTION, POWDER, LYOPHILIZED, FOR SOLUTION INTRAVENOUS at 05:10

## 2019-02-05 RX ADMIN — DOXYCYCLINE 100 MG: 100 TABLET, FILM COATED ORAL at 05:12

## 2019-02-05 RX ADMIN — TRIAMCINOLONE ACETONIDE: 0.25 CREAM TOPICAL at 09:43

## 2019-02-05 RX ADMIN — MICONAZOLE NITRATE: 20 CREAM TOPICAL at 05:13

## 2019-02-05 RX ADMIN — METOPROLOL SUCCINATE 100 MG: 100 TABLET, EXTENDED RELEASE ORAL at 05:13

## 2019-02-05 RX ADMIN — Medication 500 MG: at 12:01

## 2019-02-05 RX ADMIN — SODIUM HYPOCHLORITE 473 ML: 1.25 SOLUTION TOPICAL at 09:43

## 2019-02-05 RX ADMIN — PREDNISONE 40 MG: 20 TABLET ORAL at 05:12

## 2019-02-05 RX ADMIN — MYCOPHENOLATE MOFETIL 500 MG: 250 CAPSULE ORAL at 05:12

## 2019-02-05 RX ADMIN — ACETAMINOPHEN 650 MG: 325 TABLET, FILM COATED ORAL at 09:59

## 2019-02-05 RX ADMIN — ENOXAPARIN SODIUM 40 MG: 100 INJECTION SUBCUTANEOUS at 05:12

## 2019-02-05 RX ADMIN — Medication 500 MG: at 05:13

## 2019-02-05 RX ADMIN — Medication: at 05:18

## 2019-02-05 RX ADMIN — SERTRALINE HYDROCHLORIDE 100 MG: 50 TABLET ORAL at 05:12

## 2019-02-05 ASSESSMENT — ENCOUNTER SYMPTOMS
DIZZINESS: 0
COUGH: 0
FEVER: 0
WEIGHT LOSS: 0
DIARRHEA: 0
ABDOMINAL PAIN: 0
VOMITING: 0
HEADACHES: 0
SHORTNESS OF BREATH: 0
NAUSEA: 0
MYALGIAS: 1
CHILLS: 0

## 2019-02-05 NOTE — CARE PLAN
Problem: Safety  Goal: Will remain free from injury  Outcome: PROGRESSING AS EXPECTED  Reinforced fall risk precautions. Bed alarm on, Non-skid socks on feet, call light in reach. 1 person assist to the bathroom with walker    Problem: Infection  Goal: Will remain free from infection  Outcome: PROGRESSING AS EXPECTED  Good hand and oral hygiene promoted. Afebrile, WBCs WNL . Standard precaution in place. Perform hand washing. Administered ABX as prescribed.    Problem: Bowel/Gastric:  Goal: Normal bowel function is maintained or improved  Outcome: PROGRESSING AS EXPECTED  BM x1 today    Problem: Skin Integrity  Goal: Risk for impaired skin integrity will decrease  Outcome: PROGRESSING AS EXPECTED  Wound care in to see patient today. Wounds changed BID. Vicente antifungal applied to sacrum excoriation. Waffle mattress applied. Patient turns self in bed. Encouraged good PO intake and out of bed mobility throughout the day.

## 2019-02-05 NOTE — PROGRESS NOTES
Infectious Disease Progress Note    Author: Amber Valencia M.D. Date & Time of service: 2019  1:06 PM    Chief Complaint:  Follow-up on left lower extremity cellulitis/ulcerations    Interval History:  2/ afebrile WBC 5.7 patient states her left leg was just redressed this morning, she has ongoing pain and erythema, patient also complaining of diarrhea  2/2 afebrile CBC not done patient concerned about new small white blister formation on both hands secondary to her skin condition, denies any left lower extremity pain, dressings were changed this morning, continues to have significant erythema on her leg  2/3 afebrile WBC 5.9 patient slept well overnight, dressings were changed this morning, she noted some decreased swelling in her left leg and foot  / afebrile no CBC patient complaining of diffuse pruritus secondary to her skin condition and is more concerned about that than her infection on her left leg, was started on steroids per primary team yesterday, wounds were redressed today with improvement in the wound bed per notes   afebrile WBC 7.1 patient states she is going home today, EKG order was discontinued  Labs Reviewed, Medications Reviewed, Radiology Reviewed and Wound Reviewed.    Review of Systems:  Review of Systems   Constitutional: Negative for chills, fever and weight loss.   Respiratory: Negative for cough and shortness of breath.    Cardiovascular: Positive for leg swelling.        Decreased   Gastrointestinal: Negative for abdominal pain, diarrhea, nausea and vomiting.   Musculoskeletal: Positive for myalgias.   Skin: Positive for itching and rash.   Neurological: Negative for dizziness and headaches.   All other systems reviewed and are negative.      Hemodynamics:  Temp (24hrs), Av.7 °C (98 °F), Min:36.3 °C (97.4 °F), Max:36.9 °C (98.5 °F)  Temperature: 36.3 °C (97.4 °F)  Pulse  Av.8  Min: 65  Max: 100   Blood Pressure : 153/74       Physical Exam:  Physical Exam    Constitutional: She is oriented to person, place, and time. She appears well-developed.   HENT:   Head: Normocephalic and atraumatic.   Eyes: Pupils are equal, round, and reactive to light. EOM are normal.   Neck: Neck supple.   Cardiovascular: Normal rate.    Murmur heard.  Irregular   Pulmonary/Chest: Effort normal. She has no wheezes. She has no rales.   Abdominal: Soft. There is no tenderness.   Musculoskeletal: She exhibits edema and tenderness.   Left lower extremity dressed.  Wound care photos reviewed.  2 ulcerations with clean wound beds.  Surrounding erythema down to dorsum of foot   Neurological: She is alert and oriented to person, place, and time.   Skin: Rash noted. There is erythema.   Scattered erythematous lesions on bilateral upper and lower extremities and abdomen       Meds:    Current Facility-Administered Medications:   •  diphenhydrAMINE-ZnAcetate  •  miconazole 2%-zinc oxide  •  predniSONE  •  doxycycline monohydrate  •  diphenhydrAMINE-ZnAcetate  •  triamcinolone acetonide  •  senna-docusate **AND** polyethylene glycol/lytes **AND** magnesium hydroxide **AND** bisacodyl  •  enoxaparin  •  acetaminophen  •  [COMPLETED] piperacillin-tazobactam **AND** piperacillin-tazobactam  •  dakins 0.125% (1/4 strength)  •  metoprolol SR  •  mycophenolate  •  niacin  •  sertraline    Labs:  Recent Labs      02/03/19 0426 02/05/19 0446   WBC  5.9  7.1   RBC  3.60*  3.67*   HEMOGLOBIN  11.1*  11.4*   HEMATOCRIT  35.2*  35.6*   MCV  97.8  97.0   MCH  30.8  31.1   RDW  53.3*  51.8*   PLATELETCT  204  238   MPV  9.4  9.6   NEUTSPOLYS  68.60  73.60*   LYMPHOCYTES  17.70*  13.80*   MONOCYTES  11.00  9.90   EOSINOPHILS  1.50  1.70   BASOPHILS  0.20  0.30     Recent Labs      02/03/19 0426 02/04/19 0247  02/05/19 0446   SODIUM  134*  132*  134*   POTASSIUM  3.5*  3.9  4.0   CHLORIDE  104  104  103   CO2  21  21  24   GLUCOSE  124*  125*  127*   BUN  8  10  9     Recent Labs      02/03/19 0426   19   0247  19   0446   ALBUMIN  2.5*  2.7*  2.6*   CREATININE  0.77  0.66  0.72       Imaging:  None new to review    Us-extremity Venous Lower Unilat Left    Result Date: 2019   Vascular Laboratory  CONCLUSIONS  No DVT in the visualized veins of the LEFT lower extremity.  NANCY SANCHEZ  Exam Date:     2019 08:51  Room #:     Inpatient  Priority:     Routine  Ht (in):             Wt (lb):  Ordering Physician:        MIR GREGG  Referring Physician:       MARYSOL Sheldon  Sonographer:               Santos Che RVT  Study Type:                Complete Unilateral  Technical Quality:         Adequate  Age:    73    Gender:     F  MRN:    [id number]  :    1945      BSA:  Indications:     Localized swelling, mass and lump, left lower limb  CPT Codes:       84179  ICD Codes:         History:         Left calf cellulitis with history of PVD and surgical removal                   of greater saphenous vein  Limitations:     Imaging not performed on low calf due to dressing and                   underlying ulcer  PROCEDURES:  Left lower extremity venous duplex imaging.  The following venous structures were evaluated: common femoral, profunda  femoral, greater saphenous, femoral, popliteal , peroneal and posterior  tibial veins.  Serial compression, augmentation maneuvers,  color and spectral Doppler  flow evaluations were performed.  FINDINGS:  Left lower extremity -.  Complete color filling and compressibility with normal venous flow dynamics  including spontaneous flow, response to augmentation maneuvers, and  respiratory phasicity.  The peroneal and posterior tibial veins are difficult to assess for  compressibility in the proximal calf, but flow response to augmentation is  demonstrated.  Imaging not performed on low calf due to dressing and underlying ulcer,  cannot rule out the possibility of thrombus at this level.  Flow was evaluated in the  contralateral common femoral vein and normal  venous flow dynamics including spontaneous flow, respiratory phasic  variation and augmentation were demonstrated.  Sathya Pichardo MD  (Electronically Signed)  Final Date:      31 January 2019                   10:50      Micro:  Results     Procedure Component Value Units Date/Time    CULTURE WOUND W/ GRAM STAIN [[id number]]     Order Status:  No result Specimen:  Wound from Left Leg           Assessment:  Active Hospital Problems    Diagnosis   • *Cellulitis of left lower extremity [L03.116]   • Leg ulcer, left, with fat layer exposed ([facility]) [L97.922]   • Peripheral vascular disease ([facility]) [I73.9]   • Bullous pemphigus [L10.9]       Plan:  Cellulitis of left lower extremity  Low-grade temp resolved  No leukocytosis  Wound gram stain +GPC, GPR, culture 1/30 - PSAR (S-cipro)  Prior cultures in July 2018+ MRSA, Enterococcus faecalis, pseudomonas aeruginosa  Doppler ultrasound-negative for DVT  Continue Zosyn and doxy  Wound care -wound bed improving  Anticipate a 2-week course of antibiotics total  Estimated stop date 2/13/2019  Transition to linezolid and p.o. Cipro at discharge if QTC appropriate  Check an EKG to evaluate QTc interval as last EKG showed a QTC of 478 in June 2018-reordered today  If QTc interval remains prolonged, patient will need to be on IV antibiotics to treat Pseudomonas    Bullous pemphigus  On CellCept  Started on prednisone on 2/3    Peripheral vascular disease  Status post fem-pop bypass 06/08/18    Disposition: If QTc interval normal and patient can obtain p.o. linezolid as an outpatient, okay to DC patient from ID standpoint    Follow-up in ID clinic    Discussed with internal medicine RN

## 2019-02-05 NOTE — CARE PLAN
Problem: Venous Thromboembolism (VTW)/Deep Vein Thrombosis (DVT) Prevention:  Goal: Patient will participate in Venous Thrombosis (VTE)/Deep Vein Thrombosis (DVT)Prevention Measures  Outcome: PROGRESSING AS EXPECTED   02/05/19 0800   OTHER   Risk Assessment Score 1   VTE RISK Moderate   Pharmacologic Prophylaxis Used LMWH: Enoxaparin(Lovenox)   Ambulates as able with FWW.    Problem: Pain Management  Goal: Pain level will decrease to patient's comfort goal  Outcome: PROGRESSING AS EXPECTED   02/05/19 0800   OTHER   Pain Rating Scale (NPRS) 3   Non Verbal Scale  Calm       Understands pain scale, use of distraction and rest. Meds available per MAR PRN.

## 2019-02-05 NOTE — PROGRESS NOTES
Report received from noc RN. Pt denies needs at this time. Safety precautions in place. Call light within reach.

## 2019-02-05 NOTE — DISCHARGE SUMMARY
Discharge Summary    CHIEF COMPLAINT ON ADMISSION  No chief complaint on file.      Reason for Admission  Lower extremity cellulitis     Admission Date  1/30/2019    CODE STATUS  Full Code    HPI & HOSPITAL COURSE  This is a 73 y.o. female here with left lower extremity swelling and redness. She was found to have cellulitis and was admitted for IV antibiotics. She has a known history of peripheral vascular disease and chronic arterial ulcerations.  She was seen by ID here, and her cultures were positive for pseudomonas. She has a history of this. She improved through her hospital course and will complete a course of antibiotics at home. She will follow up with her PCP and vascular surgery.        Therefore, she is discharged in good and stable condition to home with close outpatient follow-up.    The patient met 2-midnight criteria for an inpatient stay at the time of discharge.    Discharge Date  2/5/2019    FOLLOW UP ITEMS POST DISCHARGE  none    DISCHARGE DIAGNOSES  Principal Problem:    Cellulitis of left lower extremity POA: Unknown  Active Problems:    Leg ulcer, left, with fat layer exposed (HCC) POA: Yes    Essential hypertension POA: Yes    Peripheral vascular disease (HCC) POA: Yes    Bullous pemphigus POA: Yes    Hyponatremia POA: Unknown    Hypokalemia POA: Unknown  Resolved Problems:    * No resolved hospital problems. *      FOLLOW UP  Future Appointments  Date Time Provider Department Center   2/8/2019 9:00 AM VANITA Fern[facility]dez [facility] [address]   2/11/2019 8:30 AM VANITA Acevedo [address]   2/12/2019 3:20 PM RYANN Son [facility]   2/13/2019 9:30 AM VANITA Lozano [address]   2/15/2019 8:30 AM VANITA Chavarria [address]   2/18/2019 9:30 AM VANITA Fall [address]   2/20/2019 8:30 AM VANITA Lozano [address]   2/22/2019 10:30 AM VANITA Schultz [address]   2/25/2019 8:00 AM VANITA Thomas [address]   2/27/2019 10:00 AM  Babs Sharpe R.N. [facility] [address]   3/1/2019 10:30 AM VANITA Beach [address]     RYANN Son  [address] [address]  [phone]            MEDICATIONS ON DISCHARGE     Medication List      START taking these medications      Instructions   ciprofloxacin 500 MG Tabs  Commonly known as:  CIPRO   Take 1 Tab by mouth 2 times a day for 8 days.  Dose:  500 mg     doxycycline monohydrate 100 MG tablet  Commonly known as:  ADOXA   Take 1 Tab by mouth every 12 hours for 8 days.  Dose:  100 mg        CHANGE how you take these medications      Instructions   * predniSONE 20 MG Tabs  What changed:  Another medication with the same name was added. Make sure you understand how and when to take each.  Commonly known as:  DELTASONE   Take 20 mg by mouth every day.  Dose:  20 mg     * predniSONE 20 MG Tabs  What changed:  You were already taking a medication with the same name, and this prescription was added. Make sure you understand how and when to take each.  Commonly known as:  DELTASONE   Take 2 Tabs by mouth every day for 5 days.  Dose:  40 mg        * This list has 2 medication(s) that are the same as other medications prescribed for you. Read the directions carefully, and ask your doctor or other care provider to review them with you.            CONTINUE taking these medications      Instructions   metoprolol  MG Tb24  Commonly known as:  TOPROL XL   TAKE 1 TABLET BY MOUTH EVERY DAY     * mycophenolate 500 MG tablet  Commonly known as:  CELLCEPT   Take 500 mg by mouth 2 times a day. For two weeks (1/23/2019-2/6/2019) then increase to 1000 mg bid  Dose:  500 mg     * mycophenolate 500 MG tablet  Start taking on:  2/7/2019  Commonly known as:  CELLCEPT   Take 500 mg by mouth every day. Starting 2/7/2019  Dose:  500 mg     niacin 500 MG Tabs   Take 500 mg by mouth 3 times a day.  Dose:  500 mg     sertraline 100 MG Tabs  Commonly known as:  ZOLOFT   TAKE 1 TAB BY MOUTH EVERY  DAY.  Dose:  100 mg     THERAPEUTIC-M/LUTEIN Tabs   Take 1 Tab by mouth every day.  Dose:  1 Tab     triamcinolone acetonide 0.1 % Crea  Commonly known as:  KENALOG   Apply to rash BID PRN     vitamin B-12 1000 MCG Tabs   Take 1,000 mcg by mouth every day.  Dose:  1000 mcg     vitamin D 1000 UNIT Tabs  Commonly known as:  cholecalciferol   Take 1,000 Units by mouth every day.  Dose:  1000 Units        * This list has 2 medication(s) that are the same as other medications prescribed for you. Read the directions carefully, and ask your doctor or other care provider to review them with you.            STOP taking these medications    doxycycline 100 MG Tabs  Commonly known as:  VIBRAMYCIN            Allergies  Allergies   Allergen Reactions   • Bactrim [Sulfamethoxazole-Trimethoprim] Rash     Diffuse pruritic skin rash with blisters (no mucous membrane involvement) (was also taking cipro at the time - reaction thought more likely to be 2/2 Bactrim)   • Meropenem Unspecified     Pt can not remember reaction         DIET  Orders Placed This Encounter   Procedures   • Diet Order Cardiac, 2 Gram Sodium     Standing Status:   Standing     Number of Occurrences:   1     Order Specific Question:   Diet:     Answer:   Cardiac [6]     Order Specific Question:   Diet:     Answer:   2 Gram Sodium [7]   • Discontinue Diet Tray     Standing Status:   Standing     Number of Occurrences:   1       ACTIVITY  As tolerated.  Weight bearing as tolerated    CONSULTATIONS  Manisha Ferrari M.D.- ID    PROCEDURES  none    LABORATORY  Lab Results   Component Value Date    SODIUM 134 (L) 02/05/2019    POTASSIUM 4.0 02/05/2019    CHLORIDE 103 02/05/2019    CO2 24 02/05/2019    GLUCOSE 127 (H) 02/05/2019    BUN 9 02/05/2019    CREATININE 0.72 02/05/2019        Lab Results   Component Value Date    WBC 7.1 02/05/2019    HEMOGLOBIN 11.4 (L) 02/05/2019    HEMATOCRIT 35.6 (L) 02/05/2019    PLATELETCT 238 02/05/2019        Total time of the  discharge process exceeds 35 minutes.

## 2019-02-05 NOTE — PROGRESS NOTES
AOx4. Denies numbness/tingling. No N/V. Pain described as burning after wound care. Medicated per MAR for discomfort. Wound cleaned and dressing changed, tolerated well. Trace edema in LLE. Redness/rash to trunk, BUE. Hot to touch. Creams applied per MAR. Updated on POC. No other needs at this time. Calls appropriately.

## 2019-02-06 ENCOUNTER — APPOINTMENT (OUTPATIENT)
Dept: WOUND CARE | Facility: MEDICAL CENTER | Age: 74
End: 2019-02-06
Attending: NURSE PRACTITIONER
Payer: MEDICARE

## 2019-02-06 ENCOUNTER — PATIENT OUTREACH (OUTPATIENT)
Dept: HEALTH INFORMATION MANAGEMENT | Facility: OTHER | Age: 74
End: 2019-02-06

## 2019-02-06 NOTE — PROGRESS NOTES
Received incoming call from pt reporting she recently discharged from the [facility]. She reported she is doing okay and voiced knowledge of her follow up visits with wound clinic. Pt reported she gathered the rest of her supporting documentation and was ready to finish her applications. Discussed completing home-visit with pt tomorrow to finalize. Pt agreeable.    Plan:  · MSW will meet with pt tomorrow 02/07/2019 around noon.

## 2019-02-06 NOTE — PROGRESS NOTES
[facility] patient advocate followed up with patient for a hospital follow-up. Patient reports she is doing okay, and feels the swelling in her legs is going down. Patient confirmed she is back to preforming her own ADL's and feels comfortable driving herself to her upcoming appointments. Patient stated [facility], and is supposed to have a home visit scheduled for 2/7/19. Patient denied any additional needs at this time. Advocate will follow-up with patient again next week.

## 2019-02-06 NOTE — DISCHARGE INSTRUCTIONS
Discharge Instructions    Discharged to home by car with relative. Discharged via wheelchair, hospital escort: Yes.  Special equipment needed: Not Applicable    Be sure to schedule a follow-up appointment with your primary care doctor or any specialists as instructed.     Discharge Plan:   Influenza Vaccine Indication: Patient Refuses    I understand that a diet low in cholesterol, fat, and sodium is recommended for good health. Unless I have been given specific instructions below for another diet, I accept this instruction as my diet prescription.   Other diet: Cardiac low sodium (2 gram)    Special Instructions: None    · Is patient discharged on Warfarin / Coumadin?   No     Wound Care:    Left leg and ankle: Clean wound with normal saline syrine, dry with 4x4. Moisture barrier to outer wound as needed (if there is a layer present, no need to add more). Cut small piece of kerlix roll gauze, moisten well with 1/4 strength Dakin's, wring out if necessary. Place moist gauze only into wound beds, bunched if necessary so it does not overlap on to skin. Cover with roll gauze, and secure. If dressing becomes saturated with drainage, may use ABD pad over gauze aby, then wrap.       Ciprofloxacin tablets  What is this medicine?  CIPROFLOXACIN (sip rosalia FLOX a sin) is a quinolone antibiotic. It is used to treat certain kinds of bacterial infections. It will not work for colds, flu, or other viral infections.  This medicine may be used for other purposes; ask your health care provider or pharmacist if you have questions.  COMMON BRAND NAME(S): Cipro  What should I tell my health care provider before I take this medicine?  They need to know if you have any of these conditions:  -bone problems  -history of low levels of potassium in the blood  -joint problems  -irregular heartbeat  -kidney disease  -myasthenia gravis  -seizures  -tendon problems  -tingling of the fingers or toes, or other nerve disorder  -an unusual or  allergic reaction to ciprofloxacin, other antibiotics or medicines, foods, dyes, or preservatives  -pregnant or trying to get pregnant  -breast-feeding  How should I use this medicine?  Take this medicine by mouth with a glass of water. Follow the directions on the prescription label. Take your medicine at regular intervals. Do not take your medicine more often than directed. Take all of your medicine as directed even if you think your are better. Do not skip doses or stop your medicine early.  You can take this medicine with food or on an empty stomach. It can be taken with a meal that contains dairy or calcium, but do not take it alone with a dairy product, like milk or yogurt or calcium-fortified juice.  A special MedGuide will be given to you by the pharmacist with each prescription and refill. Be sure to read this information carefully each time.  Talk to your pediatrician regarding the use of this medicine in children. Special care may be needed.  Overdosage: If you think you have taken too much of this medicine contact a poison control center or emergency room at once.  NOTE: This medicine is only for you. Do not share this medicine with others.  What if I miss a dose?  If you miss a dose, take it as soon as you can. If it is almost time for your next dose, take only that dose. Do not take double or extra doses.  What may interact with this medicine?  Do not take this medicine with any of the following medications:  Zoloft - resume after last dose of Ciprofloxacin  -cisapride  -dofetilide  -dronedarone  -flibanserin  -lomitapide  -pimozide  -thioridazine  -tizanidine  -ziprasidone  This medicine may also interact with the following medications:  -antacids  -birth control pills  -caffeine  -certain medicines for diabetes, like glipizide or glyburide  -certain medicines that treat or prevent blood clots like warfarin  -clozapine  -cyclosporine  -didanosine (ddI) buffered tablets or  powder  -duloxetine  -lanthanum carbonate  -lidocaine  -methotrexate  -multivitamins  -NSAIDS, medicines for pain and inflammation, like ibuprofen or naproxen  -olanzapine  -omeprazole  -other medicines that prolong the QT interval (cause an abnormal heart rhythm)  -phenytoin  -probenecid  -ropinirole  -sevelamer  -sildenafil  -sucralfate  -theophylline  -zolpidem  This list may not describe all possible interactions. Give your health care provider a list of all the medicines, herbs, non-prescription drugs, or dietary supplements you use. Also tell them if you smoke, drink alcohol, or use illegal drugs. Some items may interact with your medicine.  What should I watch for while using this medicine?  Tell your doctor or health care professional if your symptoms do not improve.  Do not treat diarrhea with over the counter products. Contact your doctor if you have diarrhea that lasts more than 2 days or if it is severe and watery.  You may get drowsy or dizzy. Do not drive, use machinery, or do anything that needs mental alertness until you know how this medicine affects you. Do not stand or sit up quickly, especially if you are an older patient. This reduces the risk of dizzy or fainting spells.  This medicine can make you more sensitive to the sun. Keep out of the sun. If you cannot avoid being in the sun, wear protective clothing and use sunscreen. Do not use sun lamps or tanning beds/booths.  Avoid antacids, aluminum, calcium, iron, magnesium, and zinc products for 6 hours before and 2 hours after taking a dose of this medicine.  What side effects may I notice from receiving this medicine?  Side effects that you should report to your doctor or health care professional as soon as possible:  -allergic reactions like skin rash or hives, swelling of the face, lips, or tongue  -anxious  -confusion  -depressed mood  -diarrhea  -fast, irregular heartbeat  -hallucination, loss of contact with reality  -joint, muscle, or  tendon pain or swelling  -pain, tingling, numbness in the hands or feet  -suicidal thoughts or other mood changes  -sunburn  -unusually weak or tired  Side effects that usually do not require medical attention (report to your doctor or health care professional if they continue or are bothersome):  -dry mouth  -headache  -nausea  -trouble sleeping  This list may not describe all possible side effects. Call your doctor for medical advice about side effects. You may report side effects to FDA at 0-795-FDA-0057.  Where should I keep my medicine?  Keep out of the reach of children.  Store at room temperature below 30 degrees C (86 degrees F). Keep container tightly closed. Throw away any unused medicine after the expiration date.  NOTE: This sheet is a summary. It may not cover all possible information. If you have questions about this medicine, talk to your doctor, pharmacist, or health care provider.  © 2018 Elsevier/Gold Standard (2017-07-28 14:42:02)    Doxycycline tablets or capsules  What is this medicine?  DOXYCYCLINE (dox cherie TAVON prince) is a tetracycline antibiotic. It kills certain bacteria or stops their growth. It is used to treat many kinds of infections, like dental, skin, respiratory, and urinary tract infections. It also treats acne, Lyme disease, malaria, and certain sexually transmitted infections.  This medicine may be used for other purposes; ask your health care provider or pharmacist if you have questions.  COMMON BRAND NAME(S): Acticlate, Adoxa, Adoxa CK, Adoxa Cullen, Adoxa TT, Alodox, Avidoxy, Doxal, Mondoxyne NL, Monodox, Morgidox 1x, Morgidox 1x Kit, Morgidox 2x, Morgidox 2x Kit, NutriDox, Ocudox, TARGADOX, Vibra-Tabs, Vibramycin  What should I tell my health care provider before I take this medicine?  They need to know if you have any of these conditions:  -liver disease  -long exposure to sunlight like working outdoors  -stomach problems like colitis  -an unusual or allergic reaction to  doxycycline, tetracycline antibiotics, other medicines, foods, dyes, or preservatives  -pregnant or trying to get pregnant  -breast-feeding  How should I use this medicine?  Take this medicine by mouth with a full glass of water. Follow the directions on the prescription label. It is best to take this medicine without food, but if it upsets your stomach take it with food. Take your medicine at regular intervals. Do not take your medicine more often than directed. Take all of your medicine as directed even if you think you are better. Do not skip doses or stop your medicine early.  Talk to your pediatrician regarding the use of this medicine in children. While this drug may be prescribed for selected conditions, precautions do apply.  Overdosage: If you think you have taken too much of this medicine contact a poison control center or emergency room at once.  NOTE: This medicine is only for you. Do not share this medicine with others.  What if I miss a dose?  If you miss a dose, take it as soon as you can. If it is almost time for your next dose, take only that dose. Do not take double or extra doses.  What may interact with this medicine?  -antacids  -barbiturates  -birth control pills  -bismuth subsalicylate  -carbamazepine  -methoxyflurane  -other antibiotics  -phenytoin  -vitamins that contain iron  -warfarin  This list may not describe all possible interactions. Give your health care provider a list of all the medicines, herbs, non-prescription drugs, or dietary supplements you use. Also tell them if you smoke, drink alcohol, or use illegal drugs. Some items may interact with your medicine.  What should I watch for while using this medicine?  Tell your doctor or health care professional if your symptoms do not improve.  Do not treat diarrhea with over the counter products. Contact your doctor if you have diarrhea that lasts more than 2 days or if it is severe and watery.  Do not take this medicine just before  going to bed. It may not dissolve properly when you lay down and can cause pain in your throat. Drink plenty of fluids while taking this medicine to also help reduce irritation in your throat.  This medicine can make you more sensitive to the sun. Keep out of the sun. If you cannot avoid being in the sun, wear protective clothing and use sunscreen. Do not use sun lamps or tanning beds/booths.  Birth control pills may not work properly while you are taking this medicine. Talk to your doctor about using an extra method of birth control.  If you are being treated for a sexually transmitted infection, avoid sexual contact until you have finished your treatment. Your sexual partner may also need treatment.  Avoid antacids, aluminum, calcium, magnesium, and iron products for 4 hours before and 2 hours after taking a dose of this medicine.  If you are using this medicine to prevent malaria, you should still protect yourself from contact with mosquitos. Stay in screened-in areas, use mosquito nets, keep your body covered, and use an insect repellent.  What side effects may I notice from receiving this medicine?  Side effects that you should report to your doctor or health care professional as soon as possible:  -allergic reactions like skin rash, itching or hives, swelling of the face, lips, or tongue  -difficulty breathing  -fever  -itching in the rectal or genital area  -pain on swallowing  -redness, blistering, peeling or loosening of the skin, including inside the mouth  -severe stomach pain or cramps  -unusual bleeding or bruising  -unusually weak or tired  -yellowing of the eyes or skin  Side effects that usually do not require medical attention (report to your doctor or health care professional if they continue or are bothersome):  -diarrhea  -loss of appetite  -nausea, vomiting  This list may not describe all possible side effects. Call your doctor for medical advice about side effects. You may report side effects  to FDA at 2-032-FDA-8192.  Where should I keep my medicine?  Keep out of the reach of children.  Store at room temperature, below 30 degrees C (86 degrees F). Protect from light. Keep container tightly closed. Throw away any unused medicine after the expiration date. Taking this medicine after the expiration date can make you seriously ill.  NOTE: This sheet is a summary. It may not cover all possible information. If you have questions about this medicine, talk to your doctor, pharmacist, or health care provider.  © 2018 Elsevier/Gold Standard (2017-01-18 17:11:22)      Prednisone tablets  What is this medicine?  PREDNISONE (PRED ni sone) is a corticosteroid. It is commonly used to treat inflammation of the skin, joints, lungs, and other organs. Common conditions treated include asthma, allergies, and arthritis. It is also used for other conditions, such as blood disorders and diseases of the adrenal glands.  This medicine may be used for other purposes; ask your health care provider or pharmacist if you have questions.  COMMON BRAND NAME(S): Deltasone, Predone, Sterapred, Sterapred DS  What should I tell my health care provider before I take this medicine?  They need to know if you have any of these conditions:  -Cushing's syndrome  -diabetes  -glaucoma  -heart disease  -high blood pressure  -infection (especially a virus infection such as chickenpox, cold sores, or herpes)  -kidney disease  -liver disease  -mental illness  -myasthenia gravis  -osteoporosis  -seizures  -stomach or intestine problems  -thyroid disease  -an unusual or allergic reaction to lactose, prednisone, other medicines, foods, dyes, or preservatives  -pregnant or trying to get pregnant  -breast-feeding  How should I use this medicine?  Take this medicine by mouth with a glass of water. Follow the directions on the prescription label. Take this medicine with food. If you are taking this medicine once a day, take it in the morning. Do not take  more medicine than you are told to take. Do not suddenly stop taking your medicine because you may develop a severe reaction. Your doctor will tell you how much medicine to take. If your doctor wants you to stop the medicine, the dose may be slowly lowered over time to avoid any side effects.  Talk to your pediatrician regarding the use of this medicine in children. Special care may be needed.  Overdosage: If you think you have taken too much of this medicine contact a poison control center or emergency room at once.  NOTE: This medicine is only for you. Do not share this medicine with others.  What if I miss a dose?  If you miss a dose, take it as soon as you can. If it is almost time for your next dose, talk to your doctor or health care professional. You may need to miss a dose or take an extra dose. Do not take double or extra doses without advice.  What may interact with this medicine?  Do not take this medicine with any of the following medications:  -metyrapone  -mifepristone  This medicine may also interact with the following medications:  -aminoglutethimide  -amphotericin B  -aspirin and aspirin-like medicines  -barbiturates  -certain medicines for diabetes, like glipizide or glyburide  -cholestyramine  -cholinesterase inhibitors  -cyclosporine  -digoxin  -diuretics  -ephedrine  -female hormones, like estrogens and birth control pills  -isoniazid  -ketoconazole  -NSAIDS, medicines for pain and inflammation, like ibuprofen or naproxen  -phenytoin  -rifampin  -toxoids  -vaccines  -warfarin  This list may not describe all possible interactions. Give your health care provider a list of all the medicines, herbs, non-prescription drugs, or dietary supplements you use. Also tell them if you smoke, drink alcohol, or use illegal drugs. Some items may interact with your medicine.  What should I watch for while using this medicine?  Visit your doctor or health care professional for regular checks on your progress. If  you are taking this medicine over a prolonged period, carry an identification card with your name and address, the type and dose of your medicine, and your doctor's name and address.  This medicine may increase your risk of getting an infection. Tell your doctor or health care professional if you are around anyone with measles or chickenpox, or if you develop sores or blisters that do not heal properly.  If you are going to have surgery, tell your doctor or health care professional that you have taken this medicine within the last twelve months.  Ask your doctor or health care professional about your diet. You may need to lower the amount of salt you eat.  This medicine may affect blood sugar levels. If you have diabetes, check with your doctor or health care professional before you change your diet or the dose of your diabetic medicine.  What side effects may I notice from receiving this medicine?  Side effects that you should report to your doctor or health care professional as soon as possible:  -allergic reactions like skin rash, itching or hives, swelling of the face, lips, or tongue  -changes in emotions or moods  -changes in vision  -depressed mood  -eye pain  -fever or chills, cough, sore throat, pain or difficulty passing urine  -increased thirst  -swelling of ankles, feet  Side effects that usually do not require medical attention (report to your doctor or health care professional if they continue or are bothersome):  -confusion, excitement, restlessness  -headache  -nausea, vomiting  -skin problems, acne, thin and shiny skin  -trouble sleeping  -weight gain  This list may not describe all possible side effects. Call your doctor for medical advice about side effects. You may report side effects to FDA at 0-851-FDA-0339.  Where should I keep my medicine?  Keep out of the reach of children.  Store at room temperature between 15 and 30 degrees C (59 and 86 degrees F). Protect from light. Keep container  tightly closed. Throw away any unused medicine after the expiration date.  NOTE: This sheet is a summary. It may not cover all possible information. If you have questions about this medicine, talk to your doctor, pharmacist, or health care provider.  © 2018 Elsevier/Gold Standard (2012-08-02 10:57:14)        Depression / Suicide Risk    As you are discharged from this [facility] facility, it is important to learn how to keep safe from harming yourself.    Recognize the warning signs:  · Abrupt changes in personality, positive or negative- including increase in energy   · Giving away possessions  · Change in eating patterns- significant weight changes-  positive or negative  · Change in sleeping patterns- unable to sleep or sleeping all the time   · Unwillingness or inability to communicate  · Depression  · Unusual sadness, discouragement and loneliness  · Talk of wanting to die  · Neglect of personal appearance   · Rebelliousness- reckless behavior  · Withdrawal from people/activities they love  · Confusion- inability to concentrate     If you or a loved one observes any of these behaviors or has concerns about self-harm, here's what you can do:  · Talk about it- your feelings and reasons for harming yourself  · Remove any means that you might use to hurt yourself (examples: pills, rope, extension cords, firearm)  · Get professional help from the community (Mental Health, Substance Abuse, psychological counseling)  · Do not be alone:Call your Safe Contact- someone whom you trust who will be there for you.  · Call your local CRISIS HOTLINE 385-2850 or 371-156-2183  · Call your local Children's Mobile Crisis Response Team [place] (466) 748-8833 or www.PlayyOn  · Call the toll free National Suicide Prevention Hotlines   · National Suicide Prevention Lifeline 705-738-UTZG (7224)  · National Hope Line Network 800-SUICIDE (190-2350)

## 2019-02-06 NOTE — PROGRESS NOTES
Pt discharged to home with son. Discharge instructions provided to pt. Pt verbalizes understanding. Pt states all questions have been answered. Signed copy in chart. Prescriptions sent to [facility] on [address]. Pt states that all personal belongings are in possession. Pt off unit via wheelchair, escorted by WILLIAMS Sharma and son.

## 2019-02-06 NOTE — PROGRESS NOTES
Outbound call to [facility] for post discharge medication review. Patient was able to  her antibiotics and is tolerating. She was advised to separate ciprofloxacin and doxycycline from her multivitamin as absorption could be affected. Patient states she is currently holding MVI.     Patient states she is not sure of mycophenolate dosing. She states Dr. Haley prescribed the medication to take 1 tablet BID for 2 weeks then increase to 2 tablets BID. Discharge instructions had stated starting 2/7/19 to take 1 tablet daily. Outbound call to Dr. Garza to clarify. Per Dr. Garza, patient is advised to follow instructions from her prescribing doctor. Outbound call to Dr. Haley's office. Per Indy, patient was prescribed mycophenolate 500 mg 1 tablet BID for 2 weeks, then increase to 2 tablets BID on 1/17/19. Notified patient to take as prescribed on her bottle. Patient verbalized understanding and she will begin taking mycophenolate 500 mg - 2 tablets BID. Patient is completing 5 day course of prednisone 40 mg and will resume daily dose of 20 mg once completed.     Discussed interaction with prednisone and ciprofloxacin. Counseled patient to notify provider if she develops tendon-related pain or swelling or develops weakness or inability to use one of her joints. Educated patient to rest and refrain from exercise if any tendon-related symptoms occur. Patient verbalized understanding.     See completed medication review pharmacy follow-up [employer] hyperlink for additional documentation.     Elidia Choi, TraceeD

## 2019-02-07 ENCOUNTER — TELEPHONE (OUTPATIENT)
Dept: INFECTIOUS DISEASES | Facility: MEDICAL CENTER | Age: 74
End: 2019-02-07

## 2019-02-07 ENCOUNTER — PATIENT OUTREACH (OUTPATIENT)
Dept: HEALTH INFORMATION MANAGEMENT | Facility: OTHER | Age: 74
End: 2019-02-07

## 2019-02-08 ENCOUNTER — NON-PROVIDER VISIT (OUTPATIENT)
Dept: WOUND CARE | Facility: MEDICAL CENTER | Age: 74
End: 2019-02-08
Attending: NURSE PRACTITIONER
Payer: MEDICARE

## 2019-02-08 PROCEDURE — 97598 DBRDMT OPN WND ADDL 20CM/<: CPT

## 2019-02-08 PROCEDURE — 97597 DBRDMT OPN WND 1ST 20 CM/<: CPT

## 2019-02-08 NOTE — PROGRESS NOTES
Home visit completed with pt today to  additional supporting documentation for pt's [employer] application and bank statements. Pt had checking statement from Nov 2018 to Dec 2018 but did not have her savings bank statements or most recent checking bank statement for Dec 2018 to Jan 2019. Pt reported she will attempt to obtain these documents when able and follow up with MSW. Discussed potentially meeting pt at PCP visit on 02/12/2019 to review further if pt is unable to obtain by then. Pt agreeable.     Plan:  MSW will tentatively plan on meeting pt at next PCP appt on 02/12/19

## 2019-02-08 NOTE — PATIENT INSTRUCTIONS
-You have a wound vac at 125 mmHg continuous with black foam. The dressing will be changed every Monday, Wednesday, and Friday at the wound clinic.    -If you are having issues with your wound vac, please consider patching leaks, changing the canister, or calling [phone] for troubleshooting. If the wound vac has been off or non-operational for over 2 hours, call wound care center to inform them and remove all dressings including black foam and replace with gauze moistened with normal saline.     -Should you experience any significant changes in your wound(s), such as infection (redness, swelling, localized heat, increased pain, fever > 101 F, chills) or have any questions regarding your home care instructions, please contact the wound center at [phone]. If after hours, contact your primary care physician or go to the hospital emergency room.

## 2019-02-08 NOTE — PROCEDURES
Wound Care Procedures 2/8/2019:  Viscous lidocaine 2% applied to wounds prior to debridement with ~5 minute dwell time.  CSWD with scalpel to remove ~28 cm2 slough from wound.  Wound vac reapplied using one piece of black foam.  NPWT restarted at 125 mmHg continuous, no leaks noted.

## 2019-02-11 ENCOUNTER — NON-PROVIDER VISIT (OUTPATIENT)
Dept: WOUND CARE | Facility: MEDICAL CENTER | Age: 74
End: 2019-02-11
Attending: NURSE PRACTITIONER
Payer: MEDICARE

## 2019-02-11 PROCEDURE — 97597 DBRDMT OPN WND 1ST 20 CM/<: CPT

## 2019-02-11 NOTE — PROCEDURES
CSWD using curette to remove 8cm2 of biofilm and loose non viable tissue.  Pt tolerated well and no s/s of complications noted.

## 2019-02-11 NOTE — PATIENT INSTRUCTIONS
Reviewed POC, importance of keeping the secondary dressing dry and intact, nutrition for wound healing, tubigrip, s/s of complications/infection, when to notify MD/go to ER.  Pt verbalized understanding to all.

## 2019-02-12 ENCOUNTER — OFFICE VISIT (OUTPATIENT)
Dept: MEDICAL GROUP | Facility: PHYSICIAN GROUP | Age: 74
End: 2019-02-12
Payer: MEDICARE

## 2019-02-12 ENCOUNTER — PATIENT OUTREACH (OUTPATIENT)
Dept: HEALTH INFORMATION MANAGEMENT | Facility: OTHER | Age: 74
End: 2019-02-12

## 2019-02-12 VITALS
BODY MASS INDEX: 24.33 KG/M2 | WEIGHT: 155 LBS | DIASTOLIC BLOOD PRESSURE: 70 MMHG | OXYGEN SATURATION: 96 % | TEMPERATURE: 98.6 F | SYSTOLIC BLOOD PRESSURE: 130 MMHG | HEART RATE: 95 BPM | HEIGHT: 67 IN

## 2019-02-12 DIAGNOSIS — I73.9 PERIPHERAL VASCULAR DISEASE (HCC): ICD-10-CM

## 2019-02-12 DIAGNOSIS — D53.9 MACROCYTIC ANEMIA: ICD-10-CM

## 2019-02-12 DIAGNOSIS — I10 ESSENTIAL HYPERTENSION: ICD-10-CM

## 2019-02-12 DIAGNOSIS — F33.9 MAJOR DEPRESSION, RECURRENT, CHRONIC (HCC): ICD-10-CM

## 2019-02-12 DIAGNOSIS — L97.922 LEG ULCER, LEFT, WITH FAT LAYER EXPOSED (HCC): ICD-10-CM

## 2019-02-12 DIAGNOSIS — L97.923 NON-PRESSURE CHRONIC ULCER OF LEFT LOWER LEG WITH NECROSIS OF MUSCLE (HCC): ICD-10-CM

## 2019-02-12 DIAGNOSIS — A49.8 PSEUDOMONAS INFECTION: ICD-10-CM

## 2019-02-12 DIAGNOSIS — Z09 HOSPITAL DISCHARGE FOLLOW-UP: ICD-10-CM

## 2019-02-12 PROBLEM — L03.90 CELLULITIS: Status: RESOLVED | Noted: 2017-11-03 | Resolved: 2019-02-12

## 2019-02-12 PROBLEM — Z87.81 HISTORY OF HIP FRACTURE: Status: ACTIVE | Noted: 2017-12-20

## 2019-02-12 PROBLEM — M79.89 SWELLING OF LOWER EXTREMITY: Status: RESOLVED | Noted: 2017-01-03 | Resolved: 2019-02-12

## 2019-02-12 PROCEDURE — 8041 PR SCP AHA: Performed by: NURSE PRACTITIONER

## 2019-02-12 PROCEDURE — 99214 OFFICE O/P EST MOD 30 MIN: CPT | Performed by: NURSE PRACTITIONER

## 2019-02-12 NOTE — ASSESSMENT & PLAN NOTE
Recently discontinued zoloft due to cipro antibiotic. Infectious disease stopped the zoloft.  She has an upcoming appointment on 2/20/19.

## 2019-02-12 NOTE — ASSESSMENT & PLAN NOTE
Discharge Summary     CHIEF COMPLAINT ON ADMISSION  No chief complaint on file.        Reason for Admission  Lower extremity cellulitis      Admission Date  1/30/2019     CODE STATUS  Full Code     HPI & HOSPITAL COURSE  This is a 73 y.o. female here with left lower extremity swelling and redness. She was found to have cellulitis and was admitted for IV antibiotics. She has a known history of peripheral vascular disease and chronic arterial ulcerations.  She was seen by ID here, and her cultures were positive for pseudomonas. She has a history of this. She improved through her hospital course and will complete a course of antibiotics at home. She will follow up with her PCP and vascular surgery.      Therefore, she is discharged in good and stable condition to home with close outpatient follow-up.     The patient met 2-midnight criteria for an inpatient stay at the time of discharge.     Discharge Date  2/5/2019     FOLLOW UP ITEMS POST DISCHARGE  none     DISCHARGE DIAGNOSES  Principal Problem:    Cellulitis of left lower extremity POA: Unknown  Active Problems:    Leg ulcer, left, with fat layer exposed (HCC) POA: Yes    Essential hypertension POA: Yes    Peripheral vascular disease (HCC) POA: Yes    Bullous pemphigus POA: Yes    Hyponatremia POA: Unknown    Hypokalemia POA: Unknown  Resolved Problems:    * No resolved hospital problems. *        FOLLOW UP  Future Appointments  Date Time Provider Department Center   2/8/2019 9:00 AM VANITA Fernández [address]   2/11/2019 8:30 AM VANITA Acevedo [address]   2/12/2019 3:20 PM R[facility]   2/13/2019 9:30 AM VANITA Lozano [address]   2/15/2019 8:30 AM VANITA Chavarria [address]   2/18/2019 9:30 AM VANITA Fall [address]   2/20/2019 8:30 AM VANITA Lozano [address]   2/22/2019 10:30 AM VANITA Schultz [address]   2/25/2019 8:00 AM VANITA Thomas [address]   2/27/2019  10:00 AM Babs Sharpe R.N. [facility] [address]   3/1/2019 10:30 AM VANITA [address]      RYANN Son  [address] [address]  [phone]                 MEDICATIONS ON DISCHARGE          Medication List           START taking these medications      Instructions   ciprofloxacin 500 MG Tabs  Commonly known as:  CIPRO    Take 1 Tab by mouth 2 times a day for 8 days.  Dose:  500 mg      doxycycline monohydrate 100 MG tablet  Commonly known as:  ADOXA    Take 1 Tab by mouth every 12 hours for 8 days.  Dose:  100 mg                  CHANGE how you take these medications      Instructions   * predniSONE 20 MG Tabs  What changed:  Another medication with the same name was added. Make sure you understand how and when to take each.  Commonly known as:  DELTASONE    Take 20 mg by mouth every day.  Dose:  20 mg      * predniSONE 20 MG Tabs  What changed:  You were already taking a medication with the same name, and this prescription was added. Make sure you understand how and when to take each.  Commonly known as:  DELTASONE    Take 2 Tabs by mouth every day for 5 days.  Dose:  40 mg              * This list has 2 medication(s) that are the same as other medications prescribed for you. Read the directions carefully, and ask your doctor or other care provider to review them with you.                      CONTINUE taking these medications      Instructions   metoprolol  MG Tb24  Commonly known as:  TOPROL XL    TAKE 1 TABLET BY MOUTH EVERY DAY      * mycophenolate 500 MG tablet  Commonly known as:  CELLCEPT    Take 500 mg by mouth 2 times a day. For two weeks (1/23/2019-2/6/2019) then increase to 1000 mg bid  Dose:  500 mg      * mycophenolate 500 MG tablet  Start taking on:  2/7/2019  Commonly known as:  CELLCEPT    Take 500 mg by mouth every day. Starting 2/7/2019  Dose:  500 mg      niacin 500 MG Tabs    Take 500 mg by mouth 3 times a day.  Dose:  500 mg      sertraline 100 MG  Tabs  Commonly known as:  ZOLOFT    TAKE 1 TAB BY MOUTH EVERY DAY.  Dose:  100 mg      THERAPEUTIC-M/LUTEIN Tabs    Take 1 Tab by mouth every day.  Dose:  1 Tab      triamcinolone acetonide 0.1 % Crea  Commonly known as:  KENALOG    Apply to rash BID PRN      vitamin B-12 1000 MCG Tabs    Take 1,000 mcg by mouth every day.  Dose:  1000 mcg      vitamin D 1000 UNIT Tabs  Commonly known as:  cholecalciferol    Take 1,000 Units by mouth every day.  Dose:  1000 Units          * This list has 2 medication(s) that are the same as other medications prescribed for you. Read the directions carefully, and ask your doctor or other care provider to review them with you.              STOP taking these medications    doxycycline 100 MG Tabs  Commonly known as:  VIBRAMYCIN                Allergies        Allergies   Allergen Reactions   • Bactrim [Sulfamethoxazole-Trimethoprim] Rash       Diffuse pruritic skin rash with blisters (no mucous membrane involvement) (was also taking cipro at the time - reaction thought more likely to be 2/2 Bactrim)   • Meropenem Unspecified       Pt can not remember reaction            DIET        Orders Placed This Encounter   Procedures   • Diet Order Cardiac, 2 Gram Sodium       Standing Status:   Standing       Number of Occurrences:   1       Order Specific Question:   Diet:       Answer:   Cardiac [6]       Order Specific Question:   Diet:       Answer:   2 Gram Sodium [7]   • Discontinue Diet Tray       Standing Status:   Standing       Number of Occurrences:   1         ACTIVITY  As tolerated.  Weight bearing as tolerated     CONSULTATIONS  Manisha Ferrari M.D.- ID     PROCEDURES  none     LABORATORY        Lab Results   Component Value Date     SODIUM 134 (L) 02/05/2019     POTASSIUM 4.0 02/05/2019     CHLORIDE 103 02/05/2019     CO2 24 02/05/2019     GLUCOSE 127 (H) 02/05/2019     BUN 9 02/05/2019     CREATININE 0.72 02/05/2019               Lab Results   Component Value Date     WBC 7.1  02/05/2019     HEMOGLOBIN 11.4 (L) 02/05/2019     HEMATOCRIT 35.6 (L) 02/05/2019     PLATELETCT 238 02/05/2019         Total time of the discharge process exceeds 35 minutes.      Other Notes   All notes       H&P from Giovani Mcnulty M.D. (Hospital Medicine)       Consults from Manisha Ferrari M.D. (Infectious Disease)   Additional Orders and Documentation         Results   Imaging         Meds             Orders             Flowsheets         Encounter Info:    History,    Allergies,    Patient Education,    Care Plan,    Detailed Report       Media     IM from Medicare - Scan on 2/12/2019 8:30 AMIM from Medicare - Scan on 2/12/2019 8:30 AM    IM from Medicare - Scan on 2/12/2019 8:30 AMIM from Medicare - Scan on 2/12/2019 8:30 AM    IM from Medicare - Scan on 2/8/2019 10:27 AMIM from Medicare - Scan on 2/8/2019 10:27 AM    Discharge Instruction - Scan on 2/8/2019 10:27 AMDischarge Instruction - Scan on 2/8/2019 10:27 AM    Correspondence - Scan on 2/8/2019 10:27 AMCorrespondence - Scan on 2/8/2019 10:27 AM    Clinical Picture - Scan on 2/4/2019 : Medial LLE and ankleClinical Picture - Scan on 2/4/2019 : Medial LLE and ankle    Clinical Picture - Scan on 2/2/2019 : L medial ankle and LEClinical Picture - Scan on 2/2/2019 : L medial ankle and LE    Clinical Picture - Scan on 1/31/2019 : [id number]]Clinical Picture - Scan on 1/31/2019 : [id number]]    IM from Medicare - Scan on 1/30/2019IM from Medicare - Scan on 1/30/2019    Clinical Picture - Scan on 1/30/2019 : L legClinical Picture - Scan on 1/30/2019 : L leg    Clinical Picture - Scan on 1/30/2019 : L Leg cellulitisClinical Picture - Scan on 1/30/2019 : L Leg cellulitis    Hospital Problem List        Essential hypertension      Peripheral vascular disease (HCC)      Leg ulcer, left, with fat layer exposed (HCC)      Bullous pemphigus       Cellulitis of left lower extremity      Hyponatremia      Hypokalemia   Care Timeline     01/30   Admitted 1501    02/05   Discharged 1652   Discharge         Discharged to home/self care (01)             AVS      1 Safe Discharge (Printed 2/5/2019)   2 After Visit Summary (Printed 2/5/2019)   3 Active Medication List (Printed 2/5/2019)             Follow-Ups: Follow up with RYANN Son (Family Medicine)   Medication List at Discharge         Cholecalciferol 1,000 Units Oral DAILY      Ciprofloxacin HCl 500 mg Oral 2 TIMES DAILY       Cyanocobalamin 1,000 mcg Oral DAILY      Doxycycline Monohydrate 100 mg Oral EVERY 12 HOURS      Metoprolol Succinate 100 MG TAKE 1 TABLET BY MOUTH EVERY DAY       Multiple Vitamins-Minerals 1 Tab Oral EVERY DAY      Mycophenolate Mofetil          500 mg Oral 2 TIMES DAILY, For two weeks (1/23/2019-2/6/2019) then increase to 1000 mg bid       500 mg Oral DAILY, Starting 2/7/2019       Niacin 500 mg Oral 3 TIMES DAILY      PredniSONE         20 mg Oral DAILY      40 mg Oral DAILY      Sertraline HCl 100 mg Oral DAILY      Triamcinolone Acetonide 0.1 % Apply to rash BID PRN     Patient here today for hospital follow up for cellulitis and infected wound.  Followed by infectious disease, wound care, dermatology.  Taking prednisone for the dermatological condition, cipro and doxy for the wound infection.    No pain reported in leg.  Wears boot when out and about, but not at home.  Elevating at home, currently draining/wound vac at home and at night.  Has follow up appointments on the 20th with infectious disease.

## 2019-02-12 NOTE — ASSESSMENT & PLAN NOTE
Severe wound/recurrent.  Wound care is following.  N=MWF,  Debriding the wound.  Reports that the wound is not getting worse.   High risk for BKA per notes.  Here for hospital follow up.

## 2019-02-12 NOTE — ASSESSMENT & PLAN NOTE
Recently hospitalized for cellulitis/wound infection growing pseudomonoas.  Seeing wound care for debridement and dressing.

## 2019-02-12 NOTE — ASSESSMENT & PLAN NOTE
Recurrent wound infection with + pseudomonas.  Followed currently by wound care.  Currently on antibiotics.

## 2019-02-13 ENCOUNTER — NON-PROVIDER VISIT (OUTPATIENT)
Dept: WOUND CARE | Facility: MEDICAL CENTER | Age: 74
End: 2019-02-13
Attending: NURSE PRACTITIONER
Payer: MEDICARE

## 2019-02-13 PROCEDURE — 97597 DBRDMT OPN WND 1ST 20 CM/<: CPT

## 2019-02-13 NOTE — PROGRESS NOTES
MSW met with pt at [facility] at her PCP appt. Pt bright in final supporting documentation for her [employer] application and [employer] application. Application finalized with pt and pt notified that they will be submitted. Pt voiced understanding. Discussed next steps regarding programs and if pt has any questions to follow up with MSW. Pt voiced agreement.     Plan:  · MSW will continue to follow up and assist pt as needed regarding  care plan/interventions.

## 2019-02-13 NOTE — PATIENT INSTRUCTIONS
Should you experience any significant changes in your wound(s) such as infection (redness, swelling, localized heat, increased pain, fever >101 F, chills) or have any questions regarding your home care instructions, please contact the wound center [phone]. If after hours, contact your primary care physician or go the hospital emergency room.  Keep dressing clean and dry and cover while bathing. Only change dressing if over saturated, soiled or its falling off.

## 2019-02-14 ENCOUNTER — TELEPHONE (OUTPATIENT)
Dept: MEDICAL GROUP | Facility: PHYSICIAN GROUP | Age: 74
End: 2019-02-14

## 2019-02-14 ENCOUNTER — APPOINTMENT (RX ONLY)
Dept: URBAN - METROPOLITAN AREA CLINIC 22 | Facility: CLINIC | Age: 74
Setting detail: DERMATOLOGY
End: 2019-02-14

## 2019-02-14 DIAGNOSIS — L81.0 POSTINFLAMMATORY HYPERPIGMENTATION: ICD-10-CM

## 2019-02-14 DIAGNOSIS — K13.0 DISEASES OF LIPS: ICD-10-CM

## 2019-02-14 DIAGNOSIS — L12.0 BULLOUS PEMPHIGOID: ICD-10-CM

## 2019-02-14 PROCEDURE — ? COUNSELING

## 2019-02-14 PROCEDURE — 99214 OFFICE O/P EST MOD 30 MIN: CPT

## 2019-02-14 PROCEDURE — ? ADDITIONAL NOTES

## 2019-02-14 PROCEDURE — ? MEDICATION COUNSELING

## 2019-02-14 PROCEDURE — ? TREATMENT REGIMEN

## 2019-02-14 ASSESSMENT — LOCATION DETAILED DESCRIPTION DERM
LOCATION DETAILED: RIGHT INFERIOR UPPER BACK
LOCATION DETAILED: LEFT PROXIMAL DORSAL FOREARM
LOCATION DETAILED: RIGHT SUPERIOR VERMILION BORDER
LOCATION DETAILED: RIGHT PROXIMAL DORSAL FOREARM
LOCATION DETAILED: EPIGASTRIC SKIN
LOCATION DETAILED: LEFT MEDIAL BUCCAL CHEEK
LOCATION DETAILED: LEFT INFRAMAMMARY CREASE (INNER QUADRANT)

## 2019-02-14 ASSESSMENT — LOCATION SIMPLE DESCRIPTION DERM
LOCATION SIMPLE: RIGHT FOREARM
LOCATION SIMPLE: LEFT BREAST
LOCATION SIMPLE: LEFT CHEEK
LOCATION SIMPLE: RIGHT UPPER LIP
LOCATION SIMPLE: RIGHT UPPER BACK
LOCATION SIMPLE: ABDOMEN
LOCATION SIMPLE: LEFT FOREARM

## 2019-02-14 ASSESSMENT — LOCATION ZONE DERM
LOCATION ZONE: ARM
LOCATION ZONE: FACE
LOCATION ZONE: TRUNK
LOCATION ZONE: LIP

## 2019-02-14 NOTE — PROCEDURE: MEDICATION COUNSELING
Azithromycin Counseling:  I discussed with the patient the risks of azithromycin including but not limited to GI upset, allergic reaction, drug rash, diarrhea, and yeast infections.
Hydroxychloroquine Counseling:  I discussed with the patient that a baseline ophthalmologic exam is needed at the start of therapy and every year thereafter while on therapy. A CBC may also be warranted for monitoring.  The side effects of this medication were discussed with the patient, including but not limited to agranulocytosis, aplastic anemia, seizures, rashes, retinopathy, and liver toxicity. Patient instructed to call the office should any adverse effect occur.  The patient verbalized understanding of the proper use and possible adverse effects of Plaquenil.  All the patient's questions and concerns were addressed.
Hydroquinone Pregnancy And Lactation Text: This medication has not been assigned a Pregnancy Risk Category but animal studies failed to show danger with the topical medication. It is unknown if the medication is excreted in breast milk.
Nsaids Pregnancy And Lactation Text: These medications are considered safe up to 30 weeks gestation. It is excreted in breast milk.
Doxepin Counseling:  Patient advised that the medication is sedating and not to drive a car after taking this medication. Patient informed of potential adverse effects including but not limited to dry mouth, urinary retention, and blurry vision.  The patient verbalized understanding of the proper use and possible adverse effects of doxepin.  All of the patient's questions and concerns were addressed.
Spironolactone Counseling: Patient advised regarding risks of diarrhea, abdominal pain, hyperkalemia, birth defects (for female patients), liver toxicity and renal toxicity. The patient may need blood work to monitor liver and kidney function and potassium levels while on therapy. The patient verbalized understanding of the proper use and possible adverse effects of spironolactone.  All of the patient's questions and concerns were addressed.
Tetracycline Counseling: Patient counseled regarding possible photosensitivity and increased risk for sunburn.  Patient instructed to avoid sunlight, if possible.  When exposed to sunlight, patients should wear protective clothing, sunglasses, and sunscreen.  The patient was instructed to call the office immediately if the following severe adverse effects occur:  hearing changes, easy bruising/bleeding, severe headache, or vision changes.  The patient verbalized understanding of the proper use and possible adverse effects of tetracycline.  All of the patient's questions and concerns were addressed. Patient understands to avoid pregnancy while on therapy due to potential birth defects.
Cimzia Counseling:  I discussed with the patient the risks of Cimzia including but not limited to immunosuppression, allergic reactions and infections.  The patient understands that monitoring is required including a PPD at baseline and must alert us or the primary physician if symptoms of infection or other concerning signs are noted.
Wartpeel Pregnancy And Lactation Text: This medication is Pregnancy Category X and contraindicated in pregnancy and in women who may become pregnant. It is unknown if this medication is excreted in breast milk.
Taltz Counseling: I discussed with the patient the risks of ixekizumab including but not limited to immunosuppression, serious infections, worsening of inflammatory bowel disease and drug reactions.  The patient understands that monitoring is required including a PPD at baseline and must alert us or the primary physician if symptoms of infection or other concerning signs are noted.
5-Fu Counseling: 5-Fluorouracil Counseling:  I discussed with the patient the risks of 5-fluorouracil including but not limited to erythema, scaling, itching, weeping, crusting, and pain.
Drysol Pregnancy And Lactation Text: This medication is considered safe during pregnancy and breast feeding.
Albendazole Counseling:  I discussed with the patient the risks of albendazole including but not limited to cytopenia, kidney damage, nausea/vomiting and severe allergy.  The patient understands that this medication is being used in an off-label manner.
Tremfya Pregnancy And Lactation Text: The risk during pregnancy and breastfeeding is uncertain with this medication.
Doxepin Pregnancy And Lactation Text: This medication is Pregnancy Category C and it isn't known if it is safe during pregnancy. It is also excreted in breast milk and breast feeding isn't recommended.
Oxybutynin Counseling:  I discussed with the patient the risks of oxybutynin including but not limited to skin rash, drowsiness, dry mouth, difficulty urinating, and blurred vision.
Rifampin Counseling: I discussed with the patient the risks of rifampin including but not limited to liver damage, kidney damage, red-orange body fluids, nausea/vomiting and severe allergy.
Clofazimine Counseling:  I discussed with the patient the risks of clofazimine including but not limited to skin and eye pigmentation, liver damage, nausea/vomiting, gastrointestinal bleeding and allergy.
Xolair Counseling:  Patient informed of potential adverse effects including but not limited to fever, muscle aches, rash and allergic reactions.  The patient verbalized understanding of the proper use and possible adverse effects of Xolair.  All of the patient's questions and concerns were addressed.
Odomzo Pregnancy And Lactation Text: This medication is Pregnancy Category X and is absolutely contraindicated during pregnancy. It is unknown if it is excreted in breast milk.
Terbinafine Counseling: Patient counseling regarding adverse effects of terbinafine including but not limited to headache, diarrhea, rash, upset stomach, liver function test abnormalities, itching, taste/smell disturbance, nausea, abdominal pain, and flatulence.  There is a rare possibility of liver failure that can occur when taking terbinafine.  The patient understands that a baseline LFT and kidney function test may be required. The patient verbalized understanding of the proper use and possible adverse effects of terbinafine.  All of the patient's questions and concerns were addressed.
Use Enhanced Medication Counseling?: No
Fluconazole Counseling:  Patient counseled regarding adverse effects of fluconazole including but not limited to headache, diarrhea, nausea, upset stomach, liver function test abnormalities, taste disturbance, and stomach pain.  There is a rare possibility of liver failure that can occur when taking fluconazole.  The patient understands that monitoring of LFTs and kidney function test may be required, especially at baseline. The patient verbalized understanding of the proper use and possible adverse effects of fluconazole.  All of the patient's questions and concerns were addressed.
Azathioprine Counseling:  I discussed with the patient the risks of azathioprine including but not limited to myelosuppression, immunosuppression, hepatotoxicity, lymphoma, and infections.  The patient understands that monitoring is required including baseline LFTs, Creatinine, possible TPMP genotyping and weekly CBCs for the first month and then every 2 weeks thereafter.  The patient verbalized understanding of the proper use and possible adverse effects of azathioprine.  All of the patient's questions and concerns were addressed.
Dapsone Pregnancy And Lactation Text: This medication is Pregnancy Category C and is not considered safe during pregnancy or breast feeding.
Odomzo Counseling- I discussed with the patient the risks of Odomzo including but not limited to nausea, vomiting, diarrhea, constipation, weight loss, changes in the sense of taste, decreased appetite, muscle spasms, and hair loss.  The patient verbalized understanding of the proper use and possible adverse effects of Odomzo.  All of the patient's questions and concerns were addressed.
Spironolactone Pregnancy And Lactation Text: This medication can cause feminization of the male fetus and should be avoided during pregnancy. The active metabolite is also found in breast milk.
Solaraze Pregnancy And Lactation Text: This medication is Pregnancy Category B and is considered safe. There is some data to suggest avoiding during the third trimester. It is unknown if this medication is excreted in breast milk.
Erythromycin Pregnancy And Lactation Text: This medication is Pregnancy Category B and is considered safe during pregnancy. It is also excreted in breast milk.
Topical Clindamycin Counseling: Patient counseled that this medication may cause skin irritation or allergic reactions.  In the event of skin irritation, the patient was advised to reduce the amount of the drug applied or use it less frequently.   The patient verbalized understanding of the proper use and possible adverse effects of clindamycin.  All of the patient's questions and concerns were addressed.
Colchicine Counseling:  Patient counseled regarding adverse effects including but not limited to stomach upset (nausea, vomiting, stomach pain, or diarrhea).  Patient instructed to limit alcohol consumption while taking this medication.  Colchicine may reduce blood counts especially with prolonged use.  The patient understands that monitoring of kidney function and blood counts may be required, especially at baseline. The patient verbalized understanding of the proper use and possible adverse effects of colchicine.  All of the patient's questions and concerns were addressed.
Quinolones Counseling:  I discussed with the patient the risks of fluoroquinolones including but not limited to GI upset, allergic reaction, drug rash, diarrhea, dizziness, photosensitivity, yeast infections, liver function test abnormalities, tendonitis/tendon rupture.
Cyclophosphamide Pregnancy And Lactation Text: This medication is Pregnancy Category D and it isn't considered safe during pregnancy. This medication is excreted in breast milk.
Stelara Pregnancy And Lactation Text: This medication is Pregnancy Category B and is considered safe during pregnancy. It is unknown if this medication is excreted in breast milk.
Infliximab Counseling:  I discussed with the patient the risks of infliximab including but not limited to myelosuppression, immunosuppression, autoimmune hepatitis, demyelinating diseases, lymphoma, and serious infections.  The patient understands that monitoring is required including a PPD at baseline and must alert us or the primary physician if symptoms of infection or other concerning signs are noted.
Stelara Counseling:  I discussed with the patient the risks of ustekinumab including but not limited to immunosuppression, malignancy, posterior leukoencephalopathy syndrome, and serious infections.  The patient understands that monitoring is required including a PPD at baseline and must alert us or the primary physician if symptoms of infection or other concerning signs are noted.
Cellcept Pregnancy And Lactation Text: This medication is Pregnancy Category D and isn't considered safe during pregnancy. It is unknown if this medication is excreted in breast milk.
Hydroxyzine Counseling: Patient advised that the medication is sedating and not to drive a car after taking this medication.  Patient informed of potential adverse effects including but not limited to dry mouth, urinary retention, and blurry vision.  The patient verbalized understanding of the proper use and possible adverse effects of hydroxyzine.  All of the patient's questions and concerns were addressed.
Bexarotene Counseling:  I discussed with the patient the risks of bexarotene including but not limited to hair loss, dry lips/skin/eyes, liver abnormalities, hyperlipidemia, pancreatitis, depression/suicidal ideation, photosensitivity, drug rash/allergic reactions, hypothyroidism, anemia, leukopenia, infection, cataracts, and teratogenicity.  Patient understands that they will need regular blood tests to check lipid profile, liver function tests, white blood cell count, thyroid function tests and pregnancy test if applicable.
Clofazimine Pregnancy And Lactation Text: This medication is Pregnancy Category C and isn't considered safe during pregnancy. It is excreted in breast milk.
Cellcept Counseling:  I discussed with the patient the risks of mycophenolate mofetil including but not limited to infection/immunosuppression, GI upset, hypokalemia, hypercholesterolemia, bone marrow suppression, lymphoproliferative disorders, malignancy, GI ulceration/bleed/perforation, colitis, interstitial lung disease, kidney failure, progressive multifocal leukoencephalopathy, and birth defects.  The patient understands that monitoring is required including a baseline creatinine and regular CBC testing. In addition, patient must alert us immediately if symptoms of infection or other concerning signs are noted.
Minoxidil Counseling: Minoxidil is a topical medication which can increase blood flow where it is applied. It is uncertain how this medication increases hair growth. Side effects are uncommon and include stinging and allergic reactions.
Isotretinoin Counseling: Patient should get monthly blood tests, not donate blood, not drive at night if vision affected, not share medication, and not undergo elective surgery for 6 months after tx completed. Side effects reviewed, pt to contact office should one occur.
Rifampin Pregnancy And Lactation Text: This medication is Pregnancy Category C and it isn't know if it is safe during pregnancy. It is also excreted in breast milk and should not be used if you are breast feeding.
Imiquimod Pregnancy And Lactation Text: This medication is Pregnancy Category C. It is unknown if this medication is excreted in breast milk.
Xelsyz Pregnancy And Lactation Text: This medication is Pregnancy Category D and is not considered safe during pregnancy.  The risk during breast feeding is also uncertain.
Tetracycline Pregnancy And Lactation Text: This medication is Pregnancy Category D and not consider safe during pregnancy. It is also excreted in breast milk.
Drysol Counseling:  I discussed with the patient the risks of drysol/aluminum chloride including but not limited to skin rash, itching, irritation, burning.
Dupixent Pregnancy And Lactation Text: This medication likely crosses the placenta but the risk for the fetus is uncertain. This medication is excreted in breast milk.
Minocycline Counseling: Patient advised regarding possible photosensitivity and discoloration of the teeth, skin, lips, tongue and gums.  Patient instructed to avoid sunlight, if possible.  When exposed to sunlight, patients should wear protective clothing, sunglasses, and sunscreen.  The patient was instructed to call the office immediately if the following severe adverse effects occur:  hearing changes, easy bruising/bleeding, severe headache, or vision changes.  The patient verbalized understanding of the proper use and possible adverse effects of minocycline.  All of the patient's questions and concerns were addressed.
Tazorac Counseling:  Patient advised that medication is irritating and drying.  Patient may need to apply sparingly and wash off after an hour before eventually leaving it on overnight.  The patient verbalized understanding of the proper use and possible adverse effects of tazorac.  All of the patient's questions and concerns were addressed.
SSKI Counseling:  I discussed with the patient the risks of SSKI including but not limited to thyroid abnormalities, metallic taste, GI upset, fever, headache, acne, arthralgias, paraesthesias, lymphadenopathy, easy bleeding, arrhythmias, and allergic reaction.
Topical Sulfur Applications Pregnancy And Lactation Text: This medication is Pregnancy Category C and has an unknown safety profile during pregnancy. It is unknown if this topical medication is excreted in breast milk.
Griseofulvin Pregnancy And Lactation Text: This medication is Pregnancy Category X and is known to cause serious birth defects. It is unknown if this medication is excreted in breast milk but breast feeding should be avoided.
Tazorac Pregnancy And Lactation Text: This medication is not safe during pregnancy. It is unknown if this medication is excreted in breast milk.
Doxycycline Pregnancy And Lactation Text: This medication is Pregnancy Category D and not consider safe during pregnancy. It is also excreted in breast milk but is considered safe for shorter treatment courses.
Benzoyl Peroxide Counseling: Patient counseled that medicine may cause skin irritation and bleach clothing.  In the event of skin irritation, the patient was advised to reduce the amount of the drug applied or use it less frequently.   The patient verbalized understanding of the proper use and possible adverse effects of benzoyl peroxide.  All of the patient's questions and concerns were addressed.
Fluconazole Pregnancy And Lactation Text: This medication is Pregnancy Category C and it isn't know if it is safe during pregnancy. It is also excreted in breast milk.
Arava Counseling:  Patient counseled regarding adverse effects of Arava including but not limited to nausea, vomiting, abnormalities in liver function tests. Patients may develop mouth sores, rash, diarrhea, and abnormalities in blood counts. The patient understands that monitoring is required including LFTs and blood counts.  There is a rare possibility of scarring of the liver and lung problems that can occur when taking methotrexate. Persistent nausea, loss of appetite, pale stools, dark urine, cough, and shortness of breath should be reported immediately. Patient advised to discontinue Arava treatment and consult with a physician prior to attempting conception. The patient will have to undergo a treatment to eliminate Arava from the body prior to conception.
Rituxan Pregnancy And Lactation Text: This medication is Pregnancy Category C and it isn't know if it is safe during pregnancy. It is unknown if this medication is excreted in breast milk but similar antibodies are known to be excreted.
Cimetidine Counseling:  I discussed with the patient the risks of Cimetidine including but not limited to gynecomastia, headache, diarrhea, nausea, drowsiness, arrhythmias, pancreatitis, skin rashes, psychosis, bone marrow suppression and kidney toxicity.
Cephalexin Counseling: I counseled the patient regarding use of cephalexin as an antibiotic for prophylactic and/or therapeutic purposes. Cephalexin (commonly prescribed under brand name Keflex) is a cephalosporin antibiotic which is active against numerous classes of bacteria, including most skin bacteria. Side effects may include nausea, diarrhea, gastrointestinal upset, rash, hives, yeast infections, and in rare cases, hepatitis, kidney disease, seizures, fever, confusion, neurologic symptoms, and others. Patients with severe allergies to penicillin medications are cautioned that there is about a 10% incidence of cross-reactivity with cephalosporins. When possible, patients with penicillin allergies should use alternatives to cephalosporins for antibiotic therapy.
Valtrex Counseling: I discussed with the patient the risks of valacyclovir including but not limited to kidney damage, nausea, vomiting and severe allergy.  The patient understands that if the infection seems to be worsening or is not improving, they are to call.
Wartpeel Counseling:  I discussed with the patient the risks of Wartpeel including but not limited to erythema, scaling, itching, weeping, crusting, and pain.
Gabapentin Counseling: I discussed with the patient the risks of gabapentin including but not limited to dizziness, somnolence, fatigue and ataxia.
Nsaids Counseling: NSAID Counseling: I discussed with the patient that NSAIDs should be taken with food. Prolonged use of NSAIDs can result in the development of stomach ulcers.  Patient advised to stop taking NSAIDs if abdominal pain occurs.  The patient verbalized understanding of the proper use and possible adverse effects of NSAIDs.  All of the patient's questions and concerns were addressed.
Topical Sulfur Applications Counseling: Topical Sulfur Counseling: Patient counseled that this medication may cause skin irritation or allergic reactions.  In the event of skin irritation, the patient was advised to reduce the amount of the drug applied or use it less frequently.   The patient verbalized understanding of the proper use and possible adverse effects of topical sulfur application.  All of the patient's questions and concerns were addressed.
Isotretinoin Pregnancy And Lactation Text: This medication is Pregnancy Category X and is considered extremely dangerous during pregnancy. It is unknown if it is excreted in breast milk.
Prednisone Pregnancy And Lactation Text: This medication is Pregnancy Category C and it isn't know if it is safe during pregnancy. This medication is excreted in breast milk.
Doxycycline Counseling:  Patient counseled regarding possible photosensitivity and increased risk for sunburn.  Patient instructed to avoid sunlight, if possible.  When exposed to sunlight, patients should wear protective clothing, sunglasses, and sunscreen.  The patient was instructed to call the office immediately if the following severe adverse effects occur:  hearing changes, easy bruising/bleeding, severe headache, or vision changes.  The patient verbalized understanding of the proper use and possible adverse effects of doxycycline.  All of the patient's questions and concerns were addressed.
Thalidomide Counseling: I discussed with the patient the risks of thalidomide including but not limited to birth defects, anxiety, weakness, chest pain, dizziness, cough and severe allergy.
Eucrisa Counseling: Patient may experience a mild burning sensation during topical application. Eucrisa is not approved in children less than 2 years of age.
Xolair Pregnancy And Lactation Text: This medication is Pregnancy Category B and is considered safe during pregnancy. This medication is excreted in breast milk.
Tremfya Counseling: I discussed with the patient the risks of guselkumab including but not limited to immunosuppression, serious infections, worsening of inflammatory bowel disease and drug reactions.  The patient understands that monitoring is required including a PPD at baseline and must alert us or the primary physician if symptoms of infection or other concerning signs are noted.
Metronidazole Counseling:  I discussed with the patient the risks of metronidazole including but not limited to seizures, nausea/vomiting, a metallic taste in the mouth, nausea/vomiting and severe allergy.
Dapsone Counseling: I discussed with the patient the risks of dapsone including but not limited to hemolytic anemia, agranulocytosis, rashes, methemoglobinemia, kidney failure, peripheral neuropathy, headaches, GI upset, and liver toxicity.  Patients who start dapsone require monitoring including baseline LFTs and weekly CBCs for the first month, then every month thereafter.  The patient verbalized understanding of the proper use and possible adverse effects of dapsone.  All of the patient's questions and concerns were addressed.
Picato Counseling:  I discussed with the patient the risks of Picato including but not limited to erythema, scaling, itching, weeping, crusting, and pain.
Enbrel Counseling:  I discussed with the patient the risks of etanercept including but not limited to myelosuppression, immunosuppression, autoimmune hepatitis, demyelinating diseases, lymphoma, and infections.  The patient understands that monitoring is required including a PPD at baseline and must alert us or the primary physician if symptoms of infection or other concerning signs are noted.
Ketoconazole Pregnancy And Lactation Text: This medication is Pregnancy Category C and it isn't know if it is safe during pregnancy. It is also excreted in breast milk and breast feeding isn't recommended.
Cyclosporine Counseling:  I discussed with the patient the risks of cyclosporine including but not limited to hypertension, gingival hyperplasia,myelosuppression, immunosuppression, liver damage, kidney damage, neurotoxicity, lymphoma, and serious infections. The patient understands that monitoring is required including baseline blood pressure, CBC, CMP, lipid panel and uric acid, and then 1-2 times monthly CMP and blood pressure.
Simponi Counseling:  I discussed with the patient the risks of golimumab including but not limited to myelosuppression, immunosuppression, autoimmune hepatitis, demyelinating diseases, lymphoma, and serious infections.  The patient understands that monitoring is required including a PPD at baseline and must alert us or the primary physician if symptoms of infection or other concerning signs are noted.
Hydroquinone Counseling:  Patient advised that medication may result in skin irritation, lightening (hypopigmentation), dryness, and burning.  In the event of skin irritation, the patient was advised to reduce the amount of the drug applied or use it less frequently.  Rarely, spots that are treated with hydroquinone can become darker (pseudoochronosis).  Should this occur, patient instructed to stop medication and call the office. The patient verbalized understanding of the proper use and possible adverse effects of hydroquinone.  All of the patient's questions and concerns were addressed.
Methotrexate Pregnancy And Lactation Text: This medication is Pregnancy Category X and is known to cause fetal harm. This medication is excreted in breast milk.
Prednisone Counseling:  I discussed with the patient the risks of prolonged use of prednisone including but not limited to weight gain, insomnia, osteoporosis, mood changes, diabetes, susceptibility to infection, glaucoma and high blood pressure.  In cases where prednisone use is prolonged, patients should be monitored with blood pressure checks, serum glucose levels and an eye exam.  Additionally, the patient may need to be placed on GI prophylaxis, PCP prophylaxis, and calcium and vitamin D supplementation and/or a bisphosphonate.  The patient verbalized understanding of the proper use and the possible adverse effects of prednisone.  All of the patient's questions and concerns were addressed.
Bactrim Pregnancy And Lactation Text: This medication is Pregnancy Category D and is known to cause fetal risk.  It is also excreted in breast milk.
Acitretin Counseling:  I discussed with the patient the risks of acitretin including but not limited to hair loss, dry lips/skin/eyes, liver damage, hyperlipidemia, depression/suicidal ideation, photosensitivity.  Serious rare side effects can include but are not limited to pancreatitis, pseudotumor cerebri, bony changes, clot formation/stroke/heart attack.  Patient understands that alcohol is contraindicated since it can result in liver toxicity and significantly prolong the elimination of the drug by many years.
Hydroxyzine Pregnancy And Lactation Text: This medication is not safe during pregnancy and should not be taken. It is also excreted in breast milk and breast feeding isn't recommended.
Imiquimod Counseling:  I discussed with the patient the risks of imiquimod including but not limited to erythema, scaling, itching, weeping, crusting, and pain.  Patient understands that the inflammatory response to imiquimod is variable from person to person and was educated regarded proper titration schedule.  If flu-like symptoms develop, patient knows to discontinue the medication and contact us.
Ivermectin Pregnancy And Lactation Text: This medication is Pregnancy Category C and it isn't known if it is safe during pregnancy. It is also excreted in breast milk.
Erivedge Counseling- I discussed with the patient the risks of Erivedge including but not limited to nausea, vomiting, diarrhea, constipation, weight loss, changes in the sense of taste, decreased appetite, muscle spasms, and hair loss.  The patient verbalized understanding of the proper use and possible adverse effects of Erivedge.  All of the patient's questions and concerns were addressed.
Glycopyrrolate Counseling:  I discussed with the patient the risks of glycopyrrolate including but not limited to skin rash, drowsiness, dry mouth, difficulty urinating, and blurred vision.
Itraconazole Counseling:  I discussed with the patient the risks of itraconazole including but not limited to liver damage, nausea/vomiting, neuropathy, and severe allergy.  The patient understands that this medication is best absorbed when taken with acidic beverages such as non-diet cola or ginger ale.  The patient understands that monitoring is required including baseline LFTs and repeat LFTs at intervals.  The patient understands that they are to contact us or the primary physician if concerning signs are noted.
Terbinafine Pregnancy And Lactation Text: This medication is Pregnancy Category B and is considered safe during pregnancy. It is also excreted in breast milk and breast feeding isn't recommended.
Siliq Counseling:  I discussed with the patient the risks of Siliq including but not limited to new or worsening depression, suicidal thoughts and behavior, immunosuppression, malignancy, posterior leukoencephalopathy syndrome, and serious infections.  The patient understands that monitoring is required including a PPD at baseline and must alert us or the primary physician if symptoms of infection or other concerning signs are noted. There is also a special program designed to monitor depression which is required with Siliq.
Birth Control Pills Counseling: Birth Control Pill Counseling: I discussed with the patient the potential side effects of OCPs including but not limited to increased risk of stroke, heart attack, thrombophlebitis, deep venous thrombosis, hepatic adenomas, breast changes, GI upset, headaches, and depression.  The patient verbalized understanding of the proper use and possible adverse effects of OCPs. All of the patient's questions and concerns were addressed.
Acitretin Pregnancy And Lactation Text: This medication is Pregnancy Category X and should not be given to women who are pregnant or may become pregnant in the future. This medication is excreted in breast milk.
Cyclophosphamide Counseling:  I discussed with the patient the risks of cyclophosphamide including but not limited to hair loss, hormonal abnormalities, decreased fertility, abdominal pain, diarrhea, nausea and vomiting, bone marrow suppression and infection. The patient understands that monitoring is required while taking this medication.
Valtrex Pregnancy And Lactation Text: this medication is Pregnancy Category B and is considered safe during pregnancy. This medication is not directly found in breast milk but it's metabolite acyclovir is present.
Cimzia Pregnancy And Lactation Text: This medication crosses the placenta but can be considered safe in certain situations. Cimzia may be excreted in breast milk.
Otezla Counseling: The side effects of Otezla were discussed with the patient, including but not limited to worsening or new depression, weight loss, diarrhea, nausea, upper respiratory tract infection, and headache. Patient instructed to call the office should any adverse effect occur.  The patient verbalized understanding of the proper use and possible adverse effects of Otezla.  All the patient's questions and concerns were addressed.
Bactrim Counseling:  I discussed with the patient the risks of sulfa antibiotics including but not limited to GI upset, allergic reaction, drug rash, diarrhea, dizziness, photosensitivity, and yeast infections.  Rarely, more serious reactions can occur including but not limited to aplastic anemia, agranulocytosis, methemoglobinemia, blood dyscrasias, liver or kidney failure, lung infiltrates or desquamative/blistering drug rashes.
High Dose Vitamin A Pregnancy And Lactation Text: High dose vitamin A therapy is contraindicated during pregnancy and breast feeding.
Cosentyx Counseling:  I discussed with the patient the risks of Cosentyx including but not limited to worsening of Crohn's disease, immunosuppression, allergic reactions and infections.  The patient understands that monitoring is required including a PPD at baseline and must alert us or the primary physician if symptoms of infection or other concerning signs are noted.
Cephalexin Pregnancy And Lactation Text: This medication is Pregnancy Category B and considered safe during pregnancy.  It is also excreted in breast milk but can be used safely for shorter doses.
Clindamycin Pregnancy And Lactation Text: This medication can be used in pregnancy if certain situations. Clindamycin is also present in breast milk.
Hydroxychloroquine Pregnancy And Lactation Text: This medication has been shown to cause fetal harm but it isn't assigned a Pregnancy Risk Category. There are small amounts excreted in breast milk.
Solaraze Counseling:  I discussed with the patient the risks of Solaraze including but not limited to erythema, scaling, itching, weeping, crusting, and pain.
Metronidazole Pregnancy And Lactation Text: This medication is Pregnancy Category B and considered safe during pregnancy.  It is also excreted in breast milk.
Erythromycin Counseling:  I discussed with the patient the risks of erythromycin including but not limited to GI upset, allergic reaction, drug rash, diarrhea, increase in liver enzymes, and yeast infections.
Otezla Pregnancy And Lactation Text: This medication is Pregnancy Category C and it isn't known if it is safe during pregnancy. It is unknown if it is excreted in breast milk.
Humira Counseling:  I discussed with the patient the risks of adalimumab including but not limited to myelosuppression, immunosuppression, autoimmune hepatitis, demyelinating diseases, lymphoma, and serious infections.  The patient understands that monitoring is required including a PPD at baseline and must alert us or the primary physician if symptoms of infection or other concerning signs are noted.
Elidel Counseling: Patient may experience a mild burning sensation during topical application. Elidel is not approved in children less than 2 years of age. There have been case reports of hematologic and skin malignancies in patients using topical calcineurin inhibitors although causality is questionable.
Ilumya Counseling: I discussed with the patient the risks of tildrakizumab including but not limited to immunosuppression, malignancy, posterior leukoencephalopathy syndrome, and serious infections.  The patient understands that monitoring is required including a PPD at baseline and must alert us or the primary physician if symptoms of infection or other concerning signs are noted.
Birth Control Pills Pregnancy And Lactation Text: This medication should be avoided if pregnant and for the first 30 days post-partum.
Topical Clindamycin Pregnancy And Lactation Text: This medication is Pregnancy Category B and is considered safe during pregnancy. It is unknown if it is excreted in breast milk.
Protopic Pregnancy And Lactation Text: This medication is Pregnancy Category C. It is unknown if this medication is excreted in breast milk when applied topically.
Ivermectin Counseling:  Patient instructed to take medication on an empty stomach with a full glass of water.  Patient informed of potential adverse effects including but not limited to nausea, diarrhea, dizziness, itching, and swelling of the extremities or lymph nodes.  The patient verbalized understanding of the proper use and possible adverse effects of ivermectin.  All of the patient's questions and concerns were addressed.
Glycopyrrolate Pregnancy And Lactation Text: This medication is Pregnancy Category B and is considered safe during pregnancy. It is unknown if it is excreted breast milk.
Clindamycin Counseling: I counseled the patient regarding use of clindamycin as an antibiotic for prophylactic and/or therapeutic purposes. Clindamycin is active against numerous classes of bacteria, including skin bacteria. Side effects may include nausea, diarrhea, gastrointestinal upset, rash, hives, yeast infections, and in rare cases, colitis.
Xeljanz Counseling: I discussed with the patient the risks of Xeljanz therapy including increased risk of infection, liver issues, headache, diarrhea, or cold symptoms. Live vaccines should be avoided. They were instructed to call if they have any problems.
Dupixent Counseling: I discussed with the patient the risks of dupilumab including but not limited to eye infection and irritation, cold sores, injection site reactions, worsening of asthma, allergic reactions and increased risk of parasitic infection.  Live vaccines should be avoided while taking dupilumab. Dupilumab will also interact with certain medications such as warfarin and cyclosporine. The patient understands that monitoring is required and they must alert us or the primary physician if symptoms of infection or other concerning signs are noted.
Detail Level: Zone
Rituxan Counseling:  I discussed with the patient the risks of Rituxan infusions. Side effects can include infusion reactions, severe drug rashes including mucocutaneous reactions, reactivation of latent hepatitis and other infections and rarely progressive multifocal leukoencephalopathy.  All of the patient's questions and concerns were addressed.
Benzoyl Peroxide Pregnancy And Lactation Text: This medication is Pregnancy Category C. It is unknown if benzoyl peroxide is excreted in breast milk.
Carac Counseling:  I discussed with the patient the risks of Carac including but not limited to erythema, scaling, itching, weeping, crusting, and pain.
Methotrexate Counseling:  Patient counseled regarding adverse effects of methotrexate including but not limited to nausea, vomiting, abnormalities in liver function tests. Patients may develop mouth sores, rash, diarrhea, and abnormalities in blood counts. The patient understands that monitoring is required including LFT's and blood counts.  There is a rare possibility of scarring of the liver and lung problems that can occur when taking methotrexate. Persistent nausea, loss of appetite, pale stools, dark urine, cough, and shortness of breath should be reported immediately. Patient advised to discontinue methotrexate treatment at least three months before attempting to become pregnant.  I discussed the need for folate supplements while taking methotrexate.  These supplements can decrease side effects during methotrexate treatment. The patient verbalized understanding of the proper use and possible adverse effects of methotrexate.  All of the patient's questions and concerns were addressed.
Sski Pregnancy And Lactation Text: This medication is Pregnancy Category D and isn't considered safe during pregnancy. It is excreted in breast milk.
Azithromycin Pregnancy And Lactation Text: This medication is considered safe during pregnancy and is also secreted in breast milk.
High Dose Vitamin A Counseling: Side effects reviewed, pt to contact office should one occur.
Griseofulvin Counseling:  I discussed with the patient the risks of griseofulvin including but not limited to photosensitivity, cytopenia, liver damage, nausea/vomiting and severe allergy.  The patient understands that this medication is best absorbed when taken with a fatty meal (e.g., ice cream or french fries).
Zyclara Counseling:  I discussed with the patient the risks of imiquimod including but not limited to erythema, scaling, itching, weeping, crusting, and pain.  Patient understands that the inflammatory response to imiquimod is variable from person to person and was educated regarded proper titration schedule.  If flu-like symptoms develop, patient knows to discontinue the medication and contact us.
Protopic Counseling: Patient may experience a mild burning sensation during topical application. Protopic is not approved in children less than 2 years of age. There have been case reports of hematologic and skin malignancies in patients using topical calcineurin inhibitors although causality is questionable.
Topical Retinoid counseling:  Patient advised to apply a pea-sized amount only at bedtime and wait 30 minutes after washing their face before applying.  If too drying, patient may add a non-comedogenic moisturizer. The patient verbalized understanding of the proper use and possible adverse effects of retinoids.  All of the patient's questions and concerns were addressed.
Ketoconazole Counseling:   Patient counseled regarding improving absorption with orange juice.  Adverse effects include but are not limited to breast enlargement, headache, diarrhea, nausea, upset stomach, liver function test abnormalities, taste disturbance, and stomach pain.  There is a rare possibility of liver failure that can occur when taking ketoconazole. The patient understands that monitoring of LFTs may be required, especially at baseline. The patient verbalized understanding of the proper use and possible adverse effects of ketoconazole.  All of the patient's questions and concerns were addressed.
Bexarotene Pregnancy And Lactation Text: This medication is Pregnancy Category X and should not be given to women who are pregnant or may become pregnant. This medication should not be used if you are breast feeding.

## 2019-02-14 NOTE — PROCEDURE: ADDITIONAL NOTES
Additional Notes: Consulted with Dr Bermudez. She recommended continuing the Cellcept, Niacinamide, and Doxycycline.   Continue Prednisone at 40mg daily x 2 weeks , then re-evaluate.
Detail Level: Generalized
Additional Notes: Sussy was in the hospital for 6 days for cellulitis.  Her infectious disease doctor increased her prednisone from 30mg to 40mg as she developed some blisters. She does have a follow up with infectious disease next week .

## 2019-02-14 NOTE — TELEPHONE ENCOUNTER
VOICEMAIL  1. Caller Name: Nadege Mae                      Call Back Number: [phone] (home)     2. Message: Pt called and stated she has been cleared to start her Zoloft in 2 days just wanted to keep you informed    3. Patient approves office to leave a detailed voicemail/MyChart message: N\A

## 2019-02-15 ENCOUNTER — NON-PROVIDER VISIT (OUTPATIENT)
Dept: WOUND CARE | Facility: MEDICAL CENTER | Age: 74
End: 2019-02-15
Attending: NURSE PRACTITIONER
Payer: MEDICARE

## 2019-02-15 DIAGNOSIS — S81.802A WOUND OF LEFT LEG, INITIAL ENCOUNTER: ICD-10-CM

## 2019-02-15 PROCEDURE — 97597 DBRDMT OPN WND 1ST 20 CM/<: CPT

## 2019-02-15 NOTE — PROCEDURES
2% Viscous lidocaine applied to wound and periwound 10 minutes dwell time.  CSWD using curette to remove approx. ~8cm2 of nonviable tissue from left medial LE wound bed.    Nonselective with NS and gauze to all other wounds

## 2019-02-15 NOTE — PATIENT INSTRUCTIONS
Keep dressing clean and dry and cover while bathing. Only change dressing if over saturated, soiled or its falling off.     Should you experience any significant changes in your wound(s) such as infection (redness, swelling, localized heat, increased pain, fever >101 F, chills) or have any questions regarding your home care instructions, please contact the wound center [phone]. If after hours, contact your primary care physician or go the hospital emergency room.

## 2019-02-18 ENCOUNTER — NON-PROVIDER VISIT (OUTPATIENT)
Dept: WOUND CARE | Facility: MEDICAL CENTER | Age: 74
End: 2019-02-18
Attending: NURSE PRACTITIONER
Payer: MEDICARE

## 2019-02-18 PROCEDURE — 97605 NEG PRS WND THER DME<=50SQCM: CPT

## 2019-02-18 NOTE — PATIENT INSTRUCTIONS
Should you experience any significant changes in your wound(s) such as infection (redness, swelling, localized heat, increased pain, fever >101 F, chills) or have any questions regarding your home care instructions, please contact the wound center [phone]. If after hours, contact your primary care physician or go the hospital emergency room.  Keep dressing clean and dry and cover while bathing. Only change dressing if over saturated, soiled or its falling off.

## 2019-02-18 NOTE — PROCEDURES
NPWT applied to LLE Medial wounds. Patient tolerated well, no debridement needed today. Patient has follow-up with Derm and ID this month.

## 2019-02-20 ENCOUNTER — NON-PROVIDER VISIT (OUTPATIENT)
Dept: WOUND CARE | Facility: MEDICAL CENTER | Age: 74
End: 2019-02-20
Attending: NURSE PRACTITIONER
Payer: MEDICARE

## 2019-02-20 ENCOUNTER — OFFICE VISIT (OUTPATIENT)
Dept: INFECTIOUS DISEASES | Facility: MEDICAL CENTER | Age: 74
End: 2019-02-20
Payer: MEDICARE

## 2019-02-20 VITALS
SYSTOLIC BLOOD PRESSURE: 120 MMHG | BODY MASS INDEX: 23.39 KG/M2 | HEART RATE: 107 BPM | TEMPERATURE: 97.1 F | DIASTOLIC BLOOD PRESSURE: 60 MMHG | OXYGEN SATURATION: 95 % | HEIGHT: 67 IN | WEIGHT: 149 LBS

## 2019-02-20 DIAGNOSIS — A49.9 POLYMICROBIAL BACTERIAL INFECTION: ICD-10-CM

## 2019-02-20 DIAGNOSIS — I73.9 PERIPHERAL VASCULAR DISEASE (HCC): ICD-10-CM

## 2019-02-20 DIAGNOSIS — L10.9 BULLOUS PEMPHIGUS: ICD-10-CM

## 2019-02-20 DIAGNOSIS — L03.116 CELLULITIS OF LEFT LOWER EXTREMITY: ICD-10-CM

## 2019-02-20 DIAGNOSIS — A49.8 PSEUDOMONAS INFECTION: ICD-10-CM

## 2019-02-20 PROCEDURE — 97605 NEG PRS WND THER DME<=50SQCM: CPT

## 2019-02-20 PROCEDURE — 99213 OFFICE O/P EST LOW 20 MIN: CPT | Performed by: NURSE PRACTITIONER

## 2019-02-20 NOTE — PROGRESS NOTES
"[facility]fect[facility] Disease [facility]    Subjective:     Chief Complaint   Patient presents with   • Hospital Follow-up     left lower extremity cellulitis/ulcerations     This is my first time meeting Ms. Mae.     Interval History: 73-year-old white female with a history of chronic arterial ulcerations of the left lower extremity, valvular heart disease, hyperlipidemia, hypertension, and chronic rash that was recently biopsied and diagnosed, but the patient does not recall   the exact diagnosis, but she was given a cream to \"dry it up\" and it does bother her anymore (given Cellcept as well) and Bullous pemphigus. Known to the ID service from prior hospitalizations for the same.  She has a longstanding history of peripheral vascular disease and is status post femoral popliteal bypass and has had months of chronic ulcerations primarily affecting the left lower extremity.  She is followed regularly by Dr. Colin and wound clinic.  Wounds have been persistent without much improvement over the last several months.  She was seen in wound clinic on the Monday PTA when she was noted to have a slight erythema of the left lower extremity.  By Wednesday, it progressed significantly, so she was sent to the hospital for further evaluation and management.  Hospitalized from 1/30-2/5/19.  Wound culture on 1/30+ Pseudomonas.  Previous wound culture from July 2000 18+ MRSA, E faecalis and pseudomonas.  Ultrasound of LLE negative for DVT.  Patient discharged home on p.o. Zyvox and ciprofloxacin through 2/13/19.      Hospital records reviewed    Today, 2/20/2019: Patient reports feeling well and tolerated the p.o. Zyvox and ciprofloxacin with normal adverse effect, which she finished on 2/13.  States her biggest issue while on the antibiotics was dealing with her depression, she has now resumed taking her Zoloft since completing the Zyvox.  She also noted that she had some diarrhea and nausea, both of which have resolved since finishing " "the antibiotics.  She still has some shortness of breath with activity; however, this is fairly baseline for her and resolves at rest.  Denies feeling generally ill, fevers/chills, general malaise, headache, vomiting, abdominal pain or chest pain.  She is being seen at the wound care clinic 3 times a week, noting that there is scant yellow drainage, no odor and a wound VAC is being applied.  She denies there being any pain to her lower extremities.  Says she has generalized \"old age\" pain.  She was seen by dermatology last week, she has been continued on her prednisone.    ROS  As documented above in my HPI.    Past Medical History:   Diagnosis Date   • Anxiety    • Cellulitis and abscess of lower extremity    • Dental disorder     full dentures   • Generalized osteoarthritis of multiple sites 10/20/2015   • Heart valve disease     pt not sure    • Hyperlipidemia    • Hypertension    • Osteoporosis        Social History   Substance Use Topics   • Smoking status: Former Smoker     Packs/day: 0.50     Years: 25.00     Types: Cigarettes     Quit date: 1/1/2007   • Smokeless tobacco: Never Used   • Alcohol use 3.0 oz/week     5 Glasses of wine per week      Comment: glass of wine per day       Allergies: Bactrim [sulfamethoxazole-trimethoprim] and Meropenem    Pt's medication and problem list reviewed.     Objective:     PE:  /60   Pulse (!) 107   Temp 36.2 °C (97.1 °F) (Temporal)   Ht 1.702 m (5' 7\")   Wt 67.6 kg (149 lb)   SpO2 95%   Breastfeeding? No   BMI 23.34 kg/m²     Vital signs reviewed    Constitutional: Appears well-developed and well-nourished. No acute distress.  Speech fluent.  Elderly.  Eyes: Conjunctivae normal and EOM are normal. Pupils are equal, round, and reactive to light.   Neck: Trachea midline. Normal range of motion. Neck supple.   No JVD.  Cardiovascular: Tachycardic, regular rhythm, normal heart sounds. No murmur, gallop, or friction rub.  +1 LLE edema.  Respiratory: No " respiratory distress, unlabored respiratory effort.  Lungs clear to auscultation bilaterally. No wheezes or rales.   Abdomen: Soft, non tender, non-distended. BS + x 4. No masses or hepatosplenomegaly.   Musculoskeletal: Steady gait, FWW for ambulatory assistance.  Limited range of motion to LLE due to walking boot.    Dressings in place, pictures from 2/15 reviewed:  LLE-multiple diffuse shallow wounds throughout LLE of varying sizes, wound beds pink some with granulated tissue, no maceration, diffuse erythema to LLE.  Left medial ankle-4.5 x 3 x 0.2 cm, wound bed pink, no maceration, trace erythema to surrounding tissue.  Left medial lower extremity-3.5 x 3.5 x 0.4 cm, wound bed pink with granulated tissue, no maceration, trace erythema to surrounding tissue.  Skin: Warm and dry.  No visible rashes or lesions.  Neurological: No cranial nerve deficit. Coordination normal.  Psychiatric: Alert and oriented to person, place, and time. Normal mood, calm affect.  Normal behavior and judgment.     Assessment and Plan:   The following treatment plan was discussed with patient at length:    1. Cellulitis of left lower extremity      Finished p.o. Zyvox and ciprofloxacin on 2/13 without issue.  No issues since completing antibiotics.  No need for additional antibiotics.  Monitor for s/sx of worsening off abx: increased redness, pain, swelling, drainage, breakdown of wound site, fevers, chills, general malaise, etc.  Notify ID or go to ER should these s/sx occur.   2. Pseudomonas infection      As above.   3. History of polymicrobial bacterial infection:  MRSA, E faecalis      As above.   4. Bullous pemphigus      On Prednisone, being managed by Dermatology.  Places pt at risk for recurrent infection.    5. Peripheral vascular disease (HCC)      May hinder wound healing and resolution of infection.     Follow up: PRN, RTC sooner if needed. FU with PCP for ongoing chronic medical conditions.     Cordelia Thomas,  A.P.R.N.       Please note that this dictation was created using voice recognition software. I have  worked with technical experts from [facility] to optimize the interface.  I have made every reasonable attempt to correct obvious errors, but there may be errors of grammar and possibly content that I did not discover before finalizing the note.

## 2019-02-20 NOTE — PATIENT INSTRUCTIONS
Should you experience any significant changes in your wound(s) such as infection (redness, swelling, localized heat, increased pain, fever >101 F, chills) or have any questions regarding your home care instructions, please contact the wound center [phone]. If after hours, contact your primary care physician or go the hospital emergency room.  Keep dressing clean and dry and cover while bathing. Only change dressing if over saturated, soiled or its falling off.

## 2019-02-22 ENCOUNTER — NON-PROVIDER VISIT (OUTPATIENT)
Dept: WOUND CARE | Facility: MEDICAL CENTER | Age: 74
End: 2019-02-22
Attending: NURSE PRACTITIONER
Payer: MEDICARE

## 2019-02-22 ENCOUNTER — PATIENT OUTREACH (OUTPATIENT)
Dept: HEALTH INFORMATION MANAGEMENT | Facility: OTHER | Age: 74
End: 2019-02-22

## 2019-02-22 PROCEDURE — 97605 NEG PRS WND THER DME<=50SQCM: CPT

## 2019-02-22 NOTE — PROGRESS NOTES
[facility] patient advocate reached out to touch bases with patient. Patient reports she just got home from wound care and was told she needs to follow-up with  about her vascular issues. Patient said that her wounds have not gotten any worse, and infectious diseases has signed off for now. Patient stated that she will see dermatology next week and is hopeful they will release her as well. Patient reports she will be talking to  about the possibility of ever returning to work. [facility] will follow-up again next week.

## 2019-02-22 NOTE — PROGRESS NOTES
Outreach call placed to [employer] to follow up on [employer] application submitted. MSW left  requesting follow up call.    Plan:  · MSW will continue to follow up and assist pt with  care plan.

## 2019-02-23 NOTE — PATIENT INSTRUCTIONS
Reviewed POC, importance of keeping the secondary dressing dry and intact, nutrition for wound healing, s/s of complications/infection, tubigrip, when to notify MD/go to ER.  Pt verbalized understanding to all.

## 2019-02-23 NOTE — PROCEDURES
Non-selective debridement using NS and gauze to remove biofilm.  NPWT applied to medial wounds.  No leaks noted.  125mmHg continuous.  Pt to be scheduled for follow up with Dr. Colin.  PAR will set this up within the next 2 weeks.  Pt is also followed by ID (last visit 2 days ago).

## 2019-02-25 ENCOUNTER — NON-PROVIDER VISIT (OUTPATIENT)
Dept: WOUND CARE | Facility: MEDICAL CENTER | Age: 74
End: 2019-02-25
Attending: NURSE PRACTITIONER
Payer: MEDICARE

## 2019-02-25 PROCEDURE — 97605 NEG PRS WND THER DME<=50SQCM: CPT

## 2019-02-25 NOTE — PROCEDURES
NPWT <50 cm resumed at 125 mmHg with no leaks noted. Non selective with NS and gauze to all other wounds.

## 2019-02-25 NOTE — PATIENT INSTRUCTIONS
Should you experience any significant changes in your wound(s) such as infection (redness, swelling, localized heat, increased pain, fever >101 F, chills) or have any questions regarding your home care instructions, please contact the wound center [phone]. If after hours, contact your primary care physician or go the hospital emergency room.  Keep dressing clean and dry and cover while bathing. Only change dressing if over saturated, soiled or its falling off.

## 2019-02-27 ENCOUNTER — NON-PROVIDER VISIT (OUTPATIENT)
Dept: WOUND CARE | Facility: MEDICAL CENTER | Age: 74
End: 2019-02-27
Attending: NURSE PRACTITIONER
Payer: MEDICARE

## 2019-02-27 PROCEDURE — 11720 DEBRIDE NAIL 1-5: CPT

## 2019-02-27 PROCEDURE — 97605 NEG PRS WND THER DME<=50SQCM: CPT

## 2019-02-27 PROCEDURE — 11719 TRIM NAIL(S) ANY NUMBER: CPT | Mod: XS

## 2019-02-27 NOTE — PATIENT INSTRUCTIONS
Should you experience any significant changes in your wound(s) such as infection (redness, swelling, localized heat, increased pain, fever >101 F, chills) or have any questions regarding your home care instructions, please contact the wound center [phone]. If after hours, contact your primary care physician or go the hospital emergency room.  Keep dressing clean and dry and cover while bathing. Only change dressing if over saturated, soiled or its falling off. Please schedule for your foot & nail care appointment.

## 2019-02-27 NOTE — PROCEDURES
Restarted NPWT to left lower leg & medial ankle wounds and sealed at 125 mmHg continuous with no leaks noted.  Patient's left foot was washed with foam cleanser and wash cloth, then dried. In between toes rubbed with gauze to remove debris. Cuticle and nailbed margins were established and debris removed from around and beneath toes with a curette of 2nd, 3rd & 4th toe nails of left foot; these nails were trimmed with clippers as preventative care to avoid trauma the length of them could possibly cause.

## 2019-02-28 ENCOUNTER — APPOINTMENT (RX ONLY)
Dept: URBAN - METROPOLITAN AREA CLINIC 22 | Facility: CLINIC | Age: 74
Setting detail: DERMATOLOGY
End: 2019-02-28

## 2019-02-28 ENCOUNTER — PATIENT OUTREACH (OUTPATIENT)
Dept: HEALTH INFORMATION MANAGEMENT | Facility: OTHER | Age: 74
End: 2019-02-28

## 2019-02-28 DIAGNOSIS — L85.3 XEROSIS CUTIS: ICD-10-CM

## 2019-02-28 DIAGNOSIS — K13.0 DISEASES OF LIPS: ICD-10-CM | Status: RESOLVED

## 2019-02-28 DIAGNOSIS — L12.0 BULLOUS PEMPHIGOID: ICD-10-CM | Status: STABLE

## 2019-02-28 DIAGNOSIS — L81.0 POSTINFLAMMATORY HYPERPIGMENTATION: ICD-10-CM

## 2019-02-28 PROCEDURE — 99214 OFFICE O/P EST MOD 30 MIN: CPT

## 2019-02-28 PROCEDURE — ? PRESCRIPTION

## 2019-02-28 PROCEDURE — ? ADDITIONAL NOTES

## 2019-02-28 PROCEDURE — ? TREATMENT REGIMEN

## 2019-02-28 PROCEDURE — ? MEDICATION COUNSELING

## 2019-02-28 PROCEDURE — ? COUNSELING

## 2019-02-28 RX ORDER — MYCOPHENOLATE MOFETIL 500 MG/1
1 TABLET, FILM COATED ORAL BID
Qty: 120 | Refills: 5 | Status: ERX

## 2019-02-28 RX ORDER — PREDNISONE 10 MG/1
1 TABLET ORAL QID
Qty: 120 | Refills: 1 | Status: ERX

## 2019-02-28 RX ORDER — NIACINAMIDE 500 MG
1 TABLET ORAL TID
Qty: 90 | Refills: 5 | Status: ERX

## 2019-02-28 RX ORDER — DOXYCYCLINE HYCLATE 100 MG/1
1 CAPSULE, GELATIN COATED ORAL BID
Qty: 60 | Refills: 5 | Status: ERX

## 2019-02-28 ASSESSMENT — LOCATION ZONE DERM
LOCATION ZONE: LIP
LOCATION ZONE: TRUNK
LOCATION ZONE: ARM
LOCATION ZONE: FACE

## 2019-02-28 ASSESSMENT — LOCATION SIMPLE DESCRIPTION DERM
LOCATION SIMPLE: RIGHT UPPER BACK
LOCATION SIMPLE: RIGHT FOREARM
LOCATION SIMPLE: LEFT BREAST
LOCATION SIMPLE: ABDOMEN
LOCATION SIMPLE: LEFT FOREARM
LOCATION SIMPLE: LEFT CHEEK
LOCATION SIMPLE: RIGHT UPPER LIP

## 2019-02-28 ASSESSMENT — LOCATION DETAILED DESCRIPTION DERM
LOCATION DETAILED: RIGHT SUPERIOR VERMILION BORDER
LOCATION DETAILED: RIGHT INFERIOR UPPER BACK
LOCATION DETAILED: EPIGASTRIC SKIN
LOCATION DETAILED: LEFT MEDIAL BUCCAL CHEEK
LOCATION DETAILED: RIGHT PROXIMAL DORSAL FOREARM
LOCATION DETAILED: LEFT PROXIMAL DORSAL FOREARM
LOCATION DETAILED: LEFT INFRAMAMMARY CREASE (INNER QUADRANT)

## 2019-02-28 ASSESSMENT — BSA RASH: BSA RASH: 75

## 2019-02-28 NOTE — PROCEDURE: MEDICATION COUNSELING
Isotretinoin Counseling: Patient should get monthly blood tests, not donate blood, not drive at night if vision affected, not share medication, and not undergo elective surgery for 6 months after tx completed. Side effects reviewed, pt to contact office should one occur.
Valtrex Counseling: I discussed with the patient the risks of valacyclovir including but not limited to kidney damage, nausea, vomiting and severe allergy.  The patient understands that if the infection seems to be worsening or is not improving, they are to call.
Siliq Counseling:  I discussed with the patient the risks of Siliq including but not limited to new or worsening depression, suicidal thoughts and behavior, immunosuppression, malignancy, posterior leukoencephalopathy syndrome, and serious infections.  The patient understands that monitoring is required including a PPD at baseline and must alert us or the primary physician if symptoms of infection or other concerning signs are noted. There is also a special program designed to monitor depression which is required with Siliq.
Methotrexate Counseling:  Patient counseled regarding adverse effects of methotrexate including but not limited to nausea, vomiting, abnormalities in liver function tests. Patients may develop mouth sores, rash, diarrhea, and abnormalities in blood counts. The patient understands that monitoring is required including LFT's and blood counts.  There is a rare possibility of scarring of the liver and lung problems that can occur when taking methotrexate. Persistent nausea, loss of appetite, pale stools, dark urine, cough, and shortness of breath should be reported immediately. Patient advised to discontinue methotrexate treatment at least three months before attempting to become pregnant.  I discussed the need for folate supplements while taking methotrexate.  These supplements can decrease side effects during methotrexate treatment. The patient verbalized understanding of the proper use and possible adverse effects of methotrexate.  All of the patient's questions and concerns were addressed.
Itraconazole Pregnancy And Lactation Text: This medication is Pregnancy Category C and it isn't know if it is safe during pregnancy. It is also excreted in breast milk.
Erythromycin Counseling:  I discussed with the patient the risks of erythromycin including but not limited to GI upset, allergic reaction, drug rash, diarrhea, increase in liver enzymes, and yeast infections.
Hydroxyzine Counseling: Patient advised that the medication is sedating and not to drive a car after taking this medication.  Patient informed of potential adverse effects including but not limited to dry mouth, urinary retention, and blurry vision.  The patient verbalized understanding of the proper use and possible adverse effects of hydroxyzine.  All of the patient's questions and concerns were addressed.
Solaraze Pregnancy And Lactation Text: This medication is Pregnancy Category B and is considered safe. There is some data to suggest avoiding during the third trimester. It is unknown if this medication is excreted in breast milk.
Elidel Counseling: Patient may experience a mild burning sensation during topical application. Elidel is not approved in children less than 2 years of age. There have been case reports of hematologic and skin malignancies in patients using topical calcineurin inhibitors although causality is questionable.
Cyclophosphamide Pregnancy And Lactation Text: This medication is Pregnancy Category D and it isn't considered safe during pregnancy. This medication is excreted in breast milk.
Clofazimine Counseling:  I discussed with the patient the risks of clofazimine including but not limited to skin and eye pigmentation, liver damage, nausea/vomiting, gastrointestinal bleeding and allergy.
Prednisone Pregnancy And Lactation Text: This medication is Pregnancy Category C and it isn't know if it is safe during pregnancy. This medication is excreted in breast milk.
Humira Pregnancy And Lactation Text: This medication is Pregnancy Category B and is considered safe during pregnancy. It is unknown if this medication is excreted in breast milk.
Cimzia Pregnancy And Lactation Text: This medication crosses the placenta but can be considered safe in certain situations. Cimzia may be excreted in breast milk.
Zyclara Pregnancy And Lactation Text: This medication is Pregnancy Category C. It is unknown if this medication is excreted in breast milk.
Albendazole Counseling:  I discussed with the patient the risks of albendazole including but not limited to cytopenia, kidney damage, nausea/vomiting and severe allergy.  The patient understands that this medication is being used in an off-label manner.
Cosentyx Counseling:  I discussed with the patient the risks of Cosentyx including but not limited to worsening of Crohn's disease, immunosuppression, allergic reactions and infections.  The patient understands that monitoring is required including a PPD at baseline and must alert us or the primary physician if symptoms of infection or other concerning signs are noted.
Cimzia Counseling:  I discussed with the patient the risks of Cimzia including but not limited to immunosuppression, allergic reactions and infections.  The patient understands that monitoring is required including a PPD at baseline and must alert us or the primary physician if symptoms of infection or other concerning signs are noted.
Azithromycin Pregnancy And Lactation Text: This medication is considered safe during pregnancy and is also secreted in breast milk.
Fluconazole Counseling:  Patient counseled regarding adverse effects of fluconazole including but not limited to headache, diarrhea, nausea, upset stomach, liver function test abnormalities, taste disturbance, and stomach pain.  There is a rare possibility of liver failure that can occur when taking fluconazole.  The patient understands that monitoring of LFTs and kidney function test may be required, especially at baseline. The patient verbalized understanding of the proper use and possible adverse effects of fluconazole.  All of the patient's questions and concerns were addressed.
Ilumya Pregnancy And Lactation Text: The risk during pregnancy and breastfeeding is uncertain with this medication.
Protopic Pregnancy And Lactation Text: This medication is Pregnancy Category C. It is unknown if this medication is excreted in breast milk when applied topically.
Protopic Counseling: Patient may experience a mild burning sensation during topical application. Protopic is not approved in children less than 2 years of age. There have been case reports of hematologic and skin malignancies in patients using topical calcineurin inhibitors although causality is questionable.
Simponi Counseling:  I discussed with the patient the risks of golimumab including but not limited to myelosuppression, immunosuppression, autoimmune hepatitis, demyelinating diseases, lymphoma, and serious infections.  The patient understands that monitoring is required including a PPD at baseline and must alert us or the primary physician if symptoms of infection or other concerning signs are noted.
Bactrim Pregnancy And Lactation Text: This medication is Pregnancy Category D and is known to cause fetal risk.  It is also excreted in breast milk.
Azathioprine Pregnancy And Lactation Text: This medication is Pregnancy Category D and isn't considered safe during pregnancy. It is unknown if this medication is excreted in breast milk.
Griseofulvin Pregnancy And Lactation Text: This medication is Pregnancy Category X and is known to cause serious birth defects. It is unknown if this medication is excreted in breast milk but breast feeding should be avoided.
Cimetidine Counseling:  I discussed with the patient the risks of Cimetidine including but not limited to gynecomastia, headache, diarrhea, nausea, drowsiness, arrhythmias, pancreatitis, skin rashes, psychosis, bone marrow suppression and kidney toxicity.
Tetracycline Pregnancy And Lactation Text: This medication is Pregnancy Category D and not consider safe during pregnancy. It is also excreted in breast milk.
Hydroxychloroquine Counseling:  I discussed with the patient that a baseline ophthalmologic exam is needed at the start of therapy and every year thereafter while on therapy. A CBC may also be warranted for monitoring.  The side effects of this medication were discussed with the patient, including but not limited to agranulocytosis, aplastic anemia, seizures, rashes, retinopathy, and liver toxicity. Patient instructed to call the office should any adverse effect occur.  The patient verbalized understanding of the proper use and possible adverse effects of Plaquenil.  All the patient's questions and concerns were addressed.
Glycopyrrolate Counseling:  I discussed with the patient the risks of glycopyrrolate including but not limited to skin rash, drowsiness, dry mouth, difficulty urinating, and blurred vision.
Hydroxyzine Pregnancy And Lactation Text: This medication is not safe during pregnancy and should not be taken. It is also excreted in breast milk and breast feeding isn't recommended.
Bexarotene Counseling:  I discussed with the patient the risks of bexarotene including but not limited to hair loss, dry lips/skin/eyes, liver abnormalities, hyperlipidemia, pancreatitis, depression/suicidal ideation, photosensitivity, drug rash/allergic reactions, hypothyroidism, anemia, leukopenia, infection, cataracts, and teratogenicity.  Patient understands that they will need regular blood tests to check lipid profile, liver function tests, white blood cell count, thyroid function tests and pregnancy test if applicable.
Otezla Pregnancy And Lactation Text: This medication is Pregnancy Category C and it isn't known if it is safe during pregnancy. It is unknown if it is excreted in breast milk.
Gabapentin Pregnancy And Lactation Text: This medication is Pregnancy Category C and isn't considered safe during pregnancy. It is excreted in breast milk.
Xolair Pregnancy And Lactation Text: This medication is Pregnancy Category B and is considered safe during pregnancy. This medication is excreted in breast milk.
Picato Counseling:  I discussed with the patient the risks of Picato including but not limited to erythema, scaling, itching, weeping, crusting, and pain.
Otezla Counseling: The side effects of Otezla were discussed with the patient, including but not limited to worsening or new depression, weight loss, diarrhea, nausea, upper respiratory tract infection, and headache. Patient instructed to call the office should any adverse effect occur.  The patient verbalized understanding of the proper use and possible adverse effects of Otezla.  All the patient's questions and concerns were addressed.
Rifampin Pregnancy And Lactation Text: This medication is Pregnancy Category C and it isn't know if it is safe during pregnancy. It is also excreted in breast milk and should not be used if you are breast feeding.
Birth Control Pills Counseling: Birth Control Pill Counseling: I discussed with the patient the potential side effects of OCPs including but not limited to increased risk of stroke, heart attack, thrombophlebitis, deep venous thrombosis, hepatic adenomas, breast changes, GI upset, headaches, and depression.  The patient verbalized understanding of the proper use and possible adverse effects of OCPs. All of the patient's questions and concerns were addressed.
Sski Pregnancy And Lactation Text: This medication is Pregnancy Category D and isn't considered safe during pregnancy. It is excreted in breast milk.
Cyclophosphamide Counseling:  I discussed with the patient the risks of cyclophosphamide including but not limited to hair loss, hormonal abnormalities, decreased fertility, abdominal pain, diarrhea, nausea and vomiting, bone marrow suppression and infection. The patient understands that monitoring is required while taking this medication.
Gabapentin Counseling: I discussed with the patient the risks of gabapentin including but not limited to dizziness, somnolence, fatigue and ataxia.
Xolair Counseling:  Patient informed of potential adverse effects including but not limited to fever, muscle aches, rash and allergic reactions.  The patient verbalized understanding of the proper use and possible adverse effects of Xolair.  All of the patient's questions and concerns were addressed.
Rituxan Counseling:  I discussed with the patient the risks of Rituxan infusions. Side effects can include infusion reactions, severe drug rashes including mucocutaneous reactions, reactivation of latent hepatitis and other infections and rarely progressive multifocal leukoencephalopathy.  All of the patient's questions and concerns were addressed.
Topical Retinoid counseling:  Patient advised to apply a pea-sized amount only at bedtime and wait 30 minutes after washing their face before applying.  If too drying, patient may add a non-comedogenic moisturizer. The patient verbalized understanding of the proper use and possible adverse effects of retinoids.  All of the patient's questions and concerns were addressed.
Erythromycin Pregnancy And Lactation Text: This medication is Pregnancy Category B and is considered safe during pregnancy. It is also excreted in breast milk.
Wartpeel Pregnancy And Lactation Text: This medication is Pregnancy Category X and contraindicated in pregnancy and in women who may become pregnant. It is unknown if this medication is excreted in breast milk.
Ivermectin Counseling:  Patient instructed to take medication on an empty stomach with a full glass of water.  Patient informed of potential adverse effects including but not limited to nausea, diarrhea, dizziness, itching, and swelling of the extremities or lymph nodes.  The patient verbalized understanding of the proper use and possible adverse effects of ivermectin.  All of the patient's questions and concerns were addressed.
Ilumya Counseling: I discussed with the patient the risks of tildrakizumab including but not limited to immunosuppression, malignancy, posterior leukoencephalopathy syndrome, and serious infections.  The patient understands that monitoring is required including a PPD at baseline and must alert us or the primary physician if symptoms of infection or other concerning signs are noted.
Cephalexin Pregnancy And Lactation Text: This medication is Pregnancy Category B and considered safe during pregnancy.  It is also excreted in breast milk but can be used safely for shorter doses.
High Dose Vitamin A Counseling: Side effects reviewed, pt to contact office should one occur.
Azathioprine Counseling:  I discussed with the patient the risks of azathioprine including but not limited to myelosuppression, immunosuppression, hepatotoxicity, lymphoma, and infections.  The patient understands that monitoring is required including baseline LFTs, Creatinine, possible TPMP genotyping and weekly CBCs for the first month and then every 2 weeks thereafter.  The patient verbalized understanding of the proper use and possible adverse effects of azathioprine.  All of the patient's questions and concerns were addressed.
Tazorac Counseling:  Patient advised that medication is irritating and drying.  Patient may need to apply sparingly and wash off after an hour before eventually leaving it on overnight.  The patient verbalized understanding of the proper use and possible adverse effects of tazorac.  All of the patient's questions and concerns were addressed.
Tazorac Pregnancy And Lactation Text: This medication is not safe during pregnancy. It is unknown if this medication is excreted in breast milk.
Xeljanz Counseling: I discussed with the patient the risks of Xeljanz therapy including increased risk of infection, liver issues, headache, diarrhea, or cold symptoms. Live vaccines should be avoided. They were instructed to call if they have any problems.
Imiquimod Counseling:  I discussed with the patient the risks of imiquimod including but not limited to erythema, scaling, itching, weeping, crusting, and pain.  Patient understands that the inflammatory response to imiquimod is variable from person to person and was educated regarded proper titration schedule.  If flu-like symptoms develop, patient knows to discontinue the medication and contact us.
Hydroquinone Pregnancy And Lactation Text: This medication has not been assigned a Pregnancy Risk Category but animal studies failed to show danger with the topical medication. It is unknown if the medication is excreted in breast milk.
Hydroxychloroquine Pregnancy And Lactation Text: This medication has been shown to cause fetal harm but it isn't assigned a Pregnancy Risk Category. There are small amounts excreted in breast milk.
Odomzo Pregnancy And Lactation Text: This medication is Pregnancy Category X and is absolutely contraindicated during pregnancy. It is unknown if it is excreted in breast milk.
Doxycycline Counseling:  Patient counseled regarding possible photosensitivity and increased risk for sunburn.  Patient instructed to avoid sunlight, if possible.  When exposed to sunlight, patients should wear protective clothing, sunglasses, and sunscreen.  The patient was instructed to call the office immediately if the following severe adverse effects occur:  hearing changes, easy bruising/bleeding, severe headache, or vision changes.  The patient verbalized understanding of the proper use and possible adverse effects of doxycycline.  All of the patient's questions and concerns were addressed.
Itraconazole Counseling:  I discussed with the patient the risks of itraconazole including but not limited to liver damage, nausea/vomiting, neuropathy, and severe allergy.  The patient understands that this medication is best absorbed when taken with acidic beverages such as non-diet cola or ginger ale.  The patient understands that monitoring is required including baseline LFTs and repeat LFTs at intervals.  The patient understands that they are to contact us or the primary physician if concerning signs are noted.
Nsaids Counseling: NSAID Counseling: I discussed with the patient that NSAIDs should be taken with food. Prolonged use of NSAIDs can result in the development of stomach ulcers.  Patient advised to stop taking NSAIDs if abdominal pain occurs.  The patient verbalized understanding of the proper use and possible adverse effects of NSAIDs.  All of the patient's questions and concerns were addressed.
Doxepin Pregnancy And Lactation Text: This medication is Pregnancy Category C and it isn't known if it is safe during pregnancy. It is also excreted in breast milk and breast feeding isn't recommended.
Infliximab Counseling:  I discussed with the patient the risks of infliximab including but not limited to myelosuppression, immunosuppression, autoimmune hepatitis, demyelinating diseases, lymphoma, and serious infections.  The patient understands that monitoring is required including a PPD at baseline and must alert us or the primary physician if symptoms of infection or other concerning signs are noted.
Spironolactone Counseling: Patient advised regarding risks of diarrhea, abdominal pain, hyperkalemia, birth defects (for female patients), liver toxicity and renal toxicity. The patient may need blood work to monitor liver and kidney function and potassium levels while on therapy. The patient verbalized understanding of the proper use and possible adverse effects of spironolactone.  All of the patient's questions and concerns were addressed.
Enbrel Counseling:  I discussed with the patient the risks of etanercept including but not limited to myelosuppression, immunosuppression, autoimmune hepatitis, demyelinating diseases, lymphoma, and infections.  The patient understands that monitoring is required including a PPD at baseline and must alert us or the primary physician if symptoms of infection or other concerning signs are noted.
Ketoconazole Counseling:   Patient counseled regarding improving absorption with orange juice.  Adverse effects include but are not limited to breast enlargement, headache, diarrhea, nausea, upset stomach, liver function test abnormalities, taste disturbance, and stomach pain.  There is a rare possibility of liver failure that can occur when taking ketoconazole. The patient understands that monitoring of LFTs may be required, especially at baseline. The patient verbalized understanding of the proper use and possible adverse effects of ketoconazole.  All of the patient's questions and concerns were addressed.
Tremfya Counseling: I discussed with the patient the risks of guselkumab including but not limited to immunosuppression, serious infections, worsening of inflammatory bowel disease and drug reactions.  The patient understands that monitoring is required including a PPD at baseline and must alert us or the primary physician if symptoms of infection or other concerning signs are noted.
Topical Clindamycin Counseling: Patient counseled that this medication may cause skin irritation or allergic reactions.  In the event of skin irritation, the patient was advised to reduce the amount of the drug applied or use it less frequently.   The patient verbalized understanding of the proper use and possible adverse effects of clindamycin.  All of the patient's questions and concerns were addressed.
Dapsone Pregnancy And Lactation Text: This medication is Pregnancy Category C and is not considered safe during pregnancy or breast feeding.
Arava Counseling:  Patient counseled regarding adverse effects of Arava including but not limited to nausea, vomiting, abnormalities in liver function tests. Patients may develop mouth sores, rash, diarrhea, and abnormalities in blood counts. The patient understands that monitoring is required including LFTs and blood counts.  There is a rare possibility of scarring of the liver and lung problems that can occur when taking methotrexate. Persistent nausea, loss of appetite, pale stools, dark urine, cough, and shortness of breath should be reported immediately. Patient advised to discontinue Arava treatment and consult with a physician prior to attempting conception. The patient will have to undergo a treatment to eliminate Arava from the body prior to conception.
Taltz Counseling: I discussed with the patient the risks of ixekizumab including but not limited to immunosuppression, serious infections, worsening of inflammatory bowel disease and drug reactions.  The patient understands that monitoring is required including a PPD at baseline and must alert us or the primary physician if symptoms of infection or other concerning signs are noted.
Griseofulvin Counseling:  I discussed with the patient the risks of griseofulvin including but not limited to photosensitivity, cytopenia, liver damage, nausea/vomiting and severe allergy.  The patient understands that this medication is best absorbed when taken with a fatty meal (e.g., ice cream or french fries).
Benzoyl Peroxide Pregnancy And Lactation Text: This medication is Pregnancy Category C. It is unknown if benzoyl peroxide is excreted in breast milk.
Acitretin Counseling:  I discussed with the patient the risks of acitretin including but not limited to hair loss, dry lips/skin/eyes, liver damage, hyperlipidemia, depression/suicidal ideation, photosensitivity.  Serious rare side effects can include but are not limited to pancreatitis, pseudotumor cerebri, bony changes, clot formation/stroke/heart attack.  Patient understands that alcohol is contraindicated since it can result in liver toxicity and significantly prolong the elimination of the drug by many years.
Oxybutynin Pregnancy And Lactation Text: This medication is Pregnancy Category B and is considered safe during pregnancy. It is unknown if it is excreted in breast milk.
Hydroquinone Counseling:  Patient advised that medication may result in skin irritation, lightening (hypopigmentation), dryness, and burning.  In the event of skin irritation, the patient was advised to reduce the amount of the drug applied or use it less frequently.  Rarely, spots that are treated with hydroquinone can become darker (pseudoochronosis).  Should this occur, patient instructed to stop medication and call the office. The patient verbalized understanding of the proper use and possible adverse effects of hydroquinone.  All of the patient's questions and concerns were addressed.
Birth Control Pills Pregnancy And Lactation Text: This medication should be avoided if pregnant and for the first 30 days post-partum.
SSKI Counseling:  I discussed with the patient the risks of SSKI including but not limited to thyroid abnormalities, metallic taste, GI upset, fever, headache, acne, arthralgias, paraesthesias, lymphadenopathy, easy bleeding, arrhythmias, and allergic reaction.
Doxycycline Pregnancy And Lactation Text: This medication is Pregnancy Category D and not consider safe during pregnancy. It is also excreted in breast milk but is considered safe for shorter treatment courses.
Drysol Counseling:  I discussed with the patient the risks of drysol/aluminum chloride including but not limited to skin rash, itching, irritation, burning.
Terbinafine Counseling: Patient counseling regarding adverse effects of terbinafine including but not limited to headache, diarrhea, rash, upset stomach, liver function test abnormalities, itching, taste/smell disturbance, nausea, abdominal pain, and flatulence.  There is a rare possibility of liver failure that can occur when taking terbinafine.  The patient understands that a baseline LFT and kidney function test may be required. The patient verbalized understanding of the proper use and possible adverse effects of terbinafine.  All of the patient's questions and concerns were addressed.
Nsaids Pregnancy And Lactation Text: These medications are considered safe up to 30 weeks gestation. It is excreted in breast milk.
Stelara Counseling:  I discussed with the patient the risks of ustekinumab including but not limited to immunosuppression, malignancy, posterior leukoencephalopathy syndrome, and serious infections.  The patient understands that monitoring is required including a PPD at baseline and must alert us or the primary physician if symptoms of infection or other concerning signs are noted.
Cellcept Counseling:  I discussed with the patient the risks of mycophenolate mofetil including but not limited to infection/immunosuppression, GI upset, hypokalemia, hypercholesterolemia, bone marrow suppression, lymphoproliferative disorders, malignancy, GI ulceration/bleed/perforation, colitis, interstitial lung disease, kidney failure, progressive multifocal leukoencephalopathy, and birth defects.  The patient understands that monitoring is required including a baseline creatinine and regular CBC testing. In addition, patient must alert us immediately if symptoms of infection or other concerning signs are noted.
Cephalexin Counseling: I counseled the patient regarding use of cephalexin as an antibiotic for prophylactic and/or therapeutic purposes. Cephalexin (commonly prescribed under brand name Keflex) is a cephalosporin antibiotic which is active against numerous classes of bacteria, including most skin bacteria. Side effects may include nausea, diarrhea, gastrointestinal upset, rash, hives, yeast infections, and in rare cases, hepatitis, kidney disease, seizures, fever, confusion, neurologic symptoms, and others. Patients with severe allergies to penicillin medications are cautioned that there is about a 10% incidence of cross-reactivity with cephalosporins. When possible, patients with penicillin allergies should use alternatives to cephalosporins for antibiotic therapy.
Humira Counseling:  I discussed with the patient the risks of adalimumab including but not limited to myelosuppression, immunosuppression, autoimmune hepatitis, demyelinating diseases, lymphoma, and serious infections.  The patient understands that monitoring is required including a PPD at baseline and must alert us or the primary physician if symptoms of infection or other concerning signs are noted.
Solaraze Counseling:  I discussed with the patient the risks of Solaraze including but not limited to erythema, scaling, itching, weeping, crusting, and pain.
Ketoconazole Pregnancy And Lactation Text: This medication is Pregnancy Category C and it isn't know if it is safe during pregnancy. It is also excreted in breast milk and breast feeding isn't recommended.
Odomzo Counseling- I discussed with the patient the risks of Odomzo including but not limited to nausea, vomiting, diarrhea, constipation, weight loss, changes in the sense of taste, decreased appetite, muscle spasms, and hair loss.  The patient verbalized understanding of the proper use and possible adverse effects of Odomzo.  All of the patient's questions and concerns were addressed.
Metronidazole Counseling:  I discussed with the patient the risks of metronidazole including but not limited to seizures, nausea/vomiting, a metallic taste in the mouth, nausea/vomiting and severe allergy.
Colchicine Counseling:  Patient counseled regarding adverse effects including but not limited to stomach upset (nausea, vomiting, stomach pain, or diarrhea).  Patient instructed to limit alcohol consumption while taking this medication.  Colchicine may reduce blood counts especially with prolonged use.  The patient understands that monitoring of kidney function and blood counts may be required, especially at baseline. The patient verbalized understanding of the proper use and possible adverse effects of colchicine.  All of the patient's questions and concerns were addressed.
Topical Sulfur Applications Counseling: Topical Sulfur Counseling: Patient counseled that this medication may cause skin irritation or allergic reactions.  In the event of skin irritation, the patient was advised to reduce the amount of the drug applied or use it less frequently.   The patient verbalized understanding of the proper use and possible adverse effects of topical sulfur application.  All of the patient's questions and concerns were addressed.
Oxybutynin Counseling:  I discussed with the patient the risks of oxybutynin including but not limited to skin rash, drowsiness, dry mouth, difficulty urinating, and blurred vision.
Tetracycline Counseling: Patient counseled regarding possible photosensitivity and increased risk for sunburn.  Patient instructed to avoid sunlight, if possible.  When exposed to sunlight, patients should wear protective clothing, sunglasses, and sunscreen.  The patient was instructed to call the office immediately if the following severe adverse effects occur:  hearing changes, easy bruising/bleeding, severe headache, or vision changes.  The patient verbalized understanding of the proper use and possible adverse effects of tetracycline.  All of the patient's questions and concerns were addressed. Patient understands to avoid pregnancy while on therapy due to potential birth defects.
Thalidomide Counseling: I discussed with the patient the risks of thalidomide including but not limited to birth defects, anxiety, weakness, chest pain, dizziness, cough and severe allergy.
Carac Counseling:  I discussed with the patient the risks of Carac including but not limited to erythema, scaling, itching, weeping, crusting, and pain.
Bexarotene Pregnancy And Lactation Text: This medication is Pregnancy Category X and should not be given to women who are pregnant or may become pregnant. This medication should not be used if you are breast feeding.
5-Fu Counseling: 5-Fluorouracil Counseling:  I discussed with the patient the risks of 5-fluorouracil including but not limited to erythema, scaling, itching, weeping, crusting, and pain.
Glycopyrrolate Pregnancy And Lactation Text: This medication is Pregnancy Category B and is considered safe during pregnancy. It is unknown if it is excreted breast milk.
Rituxan Pregnancy And Lactation Text: This medication is Pregnancy Category C and it isn't know if it is safe during pregnancy. It is unknown if this medication is excreted in breast milk but similar antibodies are known to be excreted.
Bactrim Counseling:  I discussed with the patient the risks of sulfa antibiotics including but not limited to GI upset, allergic reaction, drug rash, diarrhea, dizziness, photosensitivity, and yeast infections.  Rarely, more serious reactions can occur including but not limited to aplastic anemia, agranulocytosis, methemoglobinemia, blood dyscrasias, liver or kidney failure, lung infiltrates or desquamative/blistering drug rashes.
Benzoyl Peroxide Counseling: Patient counseled that medicine may cause skin irritation and bleach clothing.  In the event of skin irritation, the patient was advised to reduce the amount of the drug applied or use it less frequently.   The patient verbalized understanding of the proper use and possible adverse effects of benzoyl peroxide.  All of the patient's questions and concerns were addressed.
Dupixent Pregnancy And Lactation Text: This medication likely crosses the placenta but the risk for the fetus is uncertain. This medication is excreted in breast milk.
Dupixent Counseling: I discussed with the patient the risks of dupilumab including but not limited to eye infection and irritation, cold sores, injection site reactions, worsening of asthma, allergic reactions and increased risk of parasitic infection.  Live vaccines should be avoided while taking dupilumab. Dupilumab will also interact with certain medications such as warfarin and cyclosporine. The patient understands that monitoring is required and they must alert us or the primary physician if symptoms of infection or other concerning signs are noted.
Doxepin Counseling:  Patient advised that the medication is sedating and not to drive a car after taking this medication. Patient informed of potential adverse effects including but not limited to dry mouth, urinary retention, and blurry vision.  The patient verbalized understanding of the proper use and possible adverse effects of doxepin.  All of the patient's questions and concerns were addressed.
Ivermectin Pregnancy And Lactation Text: This medication is Pregnancy Category C and it isn't known if it is safe during pregnancy. It is also excreted in breast milk.
Methotrexate Pregnancy And Lactation Text: This medication is Pregnancy Category X and is known to cause fetal harm. This medication is excreted in breast milk.
Metronidazole Pregnancy And Lactation Text: This medication is Pregnancy Category B and considered safe during pregnancy.  It is also excreted in breast milk.
Use Enhanced Medication Counseling?: No
Rifampin Counseling: I discussed with the patient the risks of rifampin including but not limited to liver damage, kidney damage, red-orange body fluids, nausea/vomiting and severe allergy.
Isotretinoin Pregnancy And Lactation Text: This medication is Pregnancy Category X and is considered extremely dangerous during pregnancy. It is unknown if it is excreted in breast milk.
Valtrex Pregnancy And Lactation Text: this medication is Pregnancy Category B and is considered safe during pregnancy. This medication is not directly found in breast milk but it's metabolite acyclovir is present.
Acitretin Pregnancy And Lactation Text: This medication is Pregnancy Category X and should not be given to women who are pregnant or may become pregnant in the future. This medication is excreted in breast milk.
Eucrisa Counseling: Patient may experience a mild burning sensation during topical application. Eucrisa is not approved in children less than 2 years of age.
Minoxidil Counseling: Minoxidil is a topical medication which can increase blood flow where it is applied. It is uncertain how this medication increases hair growth. Side effects are uncommon and include stinging and allergic reactions.
Terbinafine Pregnancy And Lactation Text: This medication is Pregnancy Category B and is considered safe during pregnancy. It is also excreted in breast milk and breast feeding isn't recommended.
Wartpeel Counseling:  I discussed with the patient the risks of Wartpeel including but not limited to erythema, scaling, itching, weeping, crusting, and pain.
Drysol Pregnancy And Lactation Text: This medication is considered safe during pregnancy and breast feeding.
Dapsone Counseling: I discussed with the patient the risks of dapsone including but not limited to hemolytic anemia, agranulocytosis, rashes, methemoglobinemia, kidney failure, peripheral neuropathy, headaches, GI upset, and liver toxicity.  Patients who start dapsone require monitoring including baseline LFTs and weekly CBCs for the first month, then every month thereafter.  The patient verbalized understanding of the proper use and possible adverse effects of dapsone.  All of the patient's questions and concerns were addressed.
Quinolones Counseling:  I discussed with the patient the risks of fluoroquinolones including but not limited to GI upset, allergic reaction, drug rash, diarrhea, dizziness, photosensitivity, yeast infections, liver function test abnormalities, tendonitis/tendon rupture.
Cyclosporine Counseling:  I discussed with the patient the risks of cyclosporine including but not limited to hypertension, gingival hyperplasia,myelosuppression, immunosuppression, liver damage, kidney damage, neurotoxicity, lymphoma, and serious infections. The patient understands that monitoring is required including baseline blood pressure, CBC, CMP, lipid panel and uric acid, and then 1-2 times monthly CMP and blood pressure.
Spironolactone Pregnancy And Lactation Text: This medication can cause feminization of the male fetus and should be avoided during pregnancy. The active metabolite is also found in breast milk.
High Dose Vitamin A Pregnancy And Lactation Text: High dose vitamin A therapy is contraindicated during pregnancy and breast feeding.
Zyclara Counseling:  I discussed with the patient the risks of imiquimod including but not limited to erythema, scaling, itching, weeping, crusting, and pain.  Patient understands that the inflammatory response to imiquimod is variable from person to person and was educated regarded proper titration schedule.  If flu-like symptoms develop, patient knows to discontinue the medication and contact us.
Minocycline Counseling: Patient advised regarding possible photosensitivity and discoloration of the teeth, skin, lips, tongue and gums.  Patient instructed to avoid sunlight, if possible.  When exposed to sunlight, patients should wear protective clothing, sunglasses, and sunscreen.  The patient was instructed to call the office immediately if the following severe adverse effects occur:  hearing changes, easy bruising/bleeding, severe headache, or vision changes.  The patient verbalized understanding of the proper use and possible adverse effects of minocycline.  All of the patient's questions and concerns were addressed.
Detail Level: Zone
Clindamycin Counseling: I counseled the patient regarding use of clindamycin as an antibiotic for prophylactic and/or therapeutic purposes. Clindamycin is active against numerous classes of bacteria, including skin bacteria. Side effects may include nausea, diarrhea, gastrointestinal upset, rash, hives, yeast infections, and in rare cases, colitis.
Prednisone Counseling:  I discussed with the patient the risks of prolonged use of prednisone including but not limited to weight gain, insomnia, osteoporosis, mood changes, diabetes, susceptibility to infection, glaucoma and high blood pressure.  In cases where prednisone use is prolonged, patients should be monitored with blood pressure checks, serum glucose levels and an eye exam.  Additionally, the patient may need to be placed on GI prophylaxis, PCP prophylaxis, and calcium and vitamin D supplementation and/or a bisphosphonate.  The patient verbalized understanding of the proper use and the possible adverse effects of prednisone.  All of the patient's questions and concerns were addressed.
Topical Sulfur Applications Pregnancy And Lactation Text: This medication is Pregnancy Category C and has an unknown safety profile during pregnancy. It is unknown if this topical medication is excreted in breast milk.
Azithromycin Counseling:  I discussed with the patient the risks of azithromycin including but not limited to GI upset, allergic reaction, drug rash, diarrhea, and yeast infections.
Clindamycin Pregnancy And Lactation Text: This medication can be used in pregnancy if certain situations. Clindamycin is also present in breast milk.
Xelsyz Pregnancy And Lactation Text: This medication is Pregnancy Category D and is not considered safe during pregnancy.  The risk during breast feeding is also uncertain.
Erivedge Counseling- I discussed with the patient the risks of Erivedge including but not limited to nausea, vomiting, diarrhea, constipation, weight loss, changes in the sense of taste, decreased appetite, muscle spasms, and hair loss.  The patient verbalized understanding of the proper use and possible adverse effects of Erivedge.  All of the patient's questions and concerns were addressed.

## 2019-03-01 ENCOUNTER — NON-PROVIDER VISIT (OUTPATIENT)
Dept: WOUND CARE | Facility: MEDICAL CENTER | Age: 74
End: 2019-03-01
Attending: NURSE PRACTITIONER
Payer: MEDICARE

## 2019-03-01 ENCOUNTER — PATIENT OUTREACH (OUTPATIENT)
Dept: HEALTH INFORMATION MANAGEMENT | Facility: OTHER | Age: 74
End: 2019-03-01

## 2019-03-01 PROCEDURE — 97597 DBRDMT OPN WND 1ST 20 CM/<: CPT

## 2019-03-01 NOTE — PROGRESS NOTES
Received follow up VM from [employer] reporting pt's application for he [employer] program was received and is being processed. Due to pt currently being on payment plan and having had sent in payments it was reported pt does not need to turn in copayment with application. Representative in VM indicated the processing time is 6-8 weeks.     Outreach call to pt to follow up to discuss information above and determine if pt has heard from [facility] regarding [employer] application that was submitted. MSW received pt's VM and message was left requesting call back.     Plan:  MSW will continue to follow up and assist pt with  care plan as needed.

## 2019-03-01 NOTE — PATIENT INSTRUCTIONS
Keep dressing clean and dry and cover while bathing. Only change dressing if over saturated, soiled or its falling off.     Should you experience any significant changes in your wound(s) such as infection (redness, swelling, localized heat, increased pain, fever >101 F, chills) or have any questions regarding your home care instructions, please contact the wound center [phone]. If after hours, contact your primary care physician or go the hospital emergency room.

## 2019-03-01 NOTE — PROGRESS NOTES
[facility] patient advocate reached out to touch bases with patient. Patient reported she saw her dermatologist today and wont need to return for another 6 weeks. Patient stated that see will follow-up with  this coming Monday. Patient had some questions about her food stamps and stated that she wanted to go over that with her LSW Annalee. Advocate left a message with LSW to request she reach out to patient. Advocate will follow-up with patient again next week.

## 2019-03-01 NOTE — PROCEDURES
2% Viscous lidocaine applied to wound and periwound 10 minutes dwell time.  CSWD using curette to remove approx. ~7cm2 of nonviable tissue from Left meidal LE wound bed.

## 2019-03-04 ENCOUNTER — OFFICE VISIT (OUTPATIENT)
Dept: WOUND CARE | Facility: MEDICAL CENTER | Age: 74
End: 2019-03-04
Attending: NURSE PRACTITIONER
Payer: MEDICARE

## 2019-03-04 VITALS
OXYGEN SATURATION: 97 % | DIASTOLIC BLOOD PRESSURE: 71 MMHG | TEMPERATURE: 96.8 F | HEART RATE: 99 BPM | RESPIRATION RATE: 20 BRPM | SYSTOLIC BLOOD PRESSURE: 119 MMHG

## 2019-03-04 DIAGNOSIS — L97.922 LEG ULCER, LEFT, WITH FAT LAYER EXPOSED (HCC): ICD-10-CM

## 2019-03-04 PROCEDURE — 11042 DBRDMT SUBQ TIS 1ST 20SQCM/<: CPT

## 2019-03-04 PROCEDURE — 11045 DBRDMT SUBQ TISS EACH ADDL: CPT | Performed by: SURGERY

## 2019-03-04 ASSESSMENT — ENCOUNTER SYMPTOMS
DIZZINESS: 0
VOMITING: 0
CHILLS: 0
CONSTIPATION: 0
NAUSEA: 0
SHORTNESS OF BREATH: 0
COUGH: 0
FEVER: 0
WEAKNESS: 1
CLAUDICATION: 0
DIARRHEA: 0

## 2019-03-04 NOTE — PATIENT INSTRUCTIONS
Keep dressing clean and dry and cover while bathing. Only change dressing if over saturated, soiled or its falling off.     Should you experience any significant changes in your wound(s) such as infection (redness, swelling, localized heat, increased pain, fever >101 F, chills) or have any questions regarding your home care instructions, please contact the wound center [phone]. If after hours, contact your primary care physician or go the hospital emergency room.

## 2019-03-05 NOTE — PROGRESS NOTES
Provider Encounter- Full Thickness wound      HISTORY OF PRESENT ILLNESS        START OF CARE IN CLINIC: 10/26/2018    REFERRING PROVIDER: IRON Son     WOUND- Full thickness wound   LOCATION: Medial left calf and medial ankle   WOUND HISTORY: Patient has a long wound history with a hip and ankle fracture.  Developed a wound over the medial ankle, a prominent malleolus and the wound likely developed from pressure.    PREVIOUS TREATMENT: Debridement NPWT, hospitalization with IV abx and Veriflow, which really worked   PERTINENT PMH: Recent diagnosis of Bullous Pemphigous - steroids for 2 months and antibiotics. Peripheral vascular disease with leftfem-pop bypass   [id number]  Plain films left ankle  There are fractures of the distal fibular shaft and of the medial malleolus, both of which appear to be subacute with at least minimal callus formation. There is marked lateral subluxation of the talus. No other significant findings.   VASCULAR STUDIES:Arterial duplex shows patent left fem-pop without stenosis     LAST  WOUND CULTURE:  DATE : 2019 Heavy growth of pseudomonas, Enterococcus and light growth MRSA             DIABETES:  No  TOBACCO USE: no    3/4/2018 - Hospitalized for 6 dayfor IV abx     She has stopped taking her prednisone, and is now taking CellCept as prescribed by her dermatologist for bullous pemphigoid.    PAST MEDICAL HISTORY:   Past Medical History:   Diagnosis Date   • Anxiety    • Cellulitis and abscess of lower extremity    • Dental disorder     full dentures   • Generalized osteoarthritis of multiple sites 10/20/2015   • Heart valve disease     pt not sure    • Hyperlipidemia    • Hypertension    • Osteoporosis        PAST SURGICAL HISTORY:   Past Surgical History:   Procedure Laterality Date   • FEMORAL POPLITEAL BYPASS Left 2018    Procedure: FEMORAL POPLITEAL BYPASS;  Surgeon: Milana Colin M.D.;  Location: SURGERY [facility];  Service: General   •  IRRIGATION & DEBRIDEMENT GENERAL Left 6/8/2018    Procedure: IRRIGATION & DEBRIDEMENT ANKLE WOUND;  Surgeon: Milana Colin M.D.;  Location: SURGERY [facility];  Service: General   • IRRIGATION & DEBRIDEMENT HIP Left 6/4/2018    Procedure: IRRIGATION & DEBRIDEMENT HIP;  Surgeon: Chong Vazquez M.D.;  Location: SURGERY [facility];  Service: Orthopedics   • HIP REVISION TOTAL Left 2/11/2018    Procedure: HIP REVISION TOTAL- femoral nail removal conversion to total hip arthoroplasty;  Surgeon: Chong Vazquez M.D.;  Location: SURGERY [facility];  Service: Orthopedics   • HIP NAILING INTRAMEDULLARY Left 12/20/2017    Procedure: HIP NAILING INTRAMEDULLARY;  Surgeon: Jorge Peters M.D.;  Location: SURGERY [facility];  Service: Orthopedics   • BREAST BIOPSY  8/28/2014    Performed by Magdalena Londono M.D. at [facility]   • BREAST BIOPSY Right 8/14    benign   • GYN SURGERY  1973    complete hysterectomy   • ABDOMINAL HYSTERECTOMY TOTAL      w/BSO due to uterine cyst and endometriosis   • ATHROPLASTY      hip        MEDICATIONS:   Current Outpatient Prescriptions   Medication   • metoprolol SR (TOPROL XL) 100 MG TABLET SR 24 HR   • Cyanocobalamin (VITAMIN B-12) 1000 MCG Tab   • niacin 500 MG Tab   • predniSONE (DELTASONE) 20 MG Tab   • Multiple Vitamins-Minerals (THERAPEUTIC-M/LUTEIN) Tab   • triamcinolone acetonide (KENALOG) 0.1 % Cream   • vitamin D (CHOLECALCIFEROL) 1000 UNIT Tab   • sertraline (ZOLOFT) 100 MG Tab     No current facility-administered medications for this visit.    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  [employer]     Social History Main Topics   • Smoking status: Former Smoker     Packs/day: 0.50     Years: 25.00     Types: Cigarettes     Quit date: 1/1/2007   • Smokeless tobacco: Never Used   • Alcohol use 3.0 oz/week     5 Glasses of wine per week      Comment: glass of wine per day   • Drug use: No   • Sexual activity: Not Currently     Partners: Male     Other Topics Concern   • Not on file     Social History Narrative   • No narrative on file       FAMILY HISTORY:   Family History   Problem Relation Age of Onset   • GI Mother         colostomy   • Heart Attack Father    • Diabetes Father    • Hypertension Father    • Psychiatry Brother         bipolar disorder        REVIEW OF SYSTEMS:   Review of Systems   Constitutional: Positive for malaise/fatigue. Negative for chills and fever.   Respiratory: Negative for cough and shortness of breath.    Cardiovascular: Positive for leg swelling. Negative for chest pain and claudication.   Gastrointestinal: Negative for constipation, diarrhea, nausea and vomiting.   Neurological: Positive for weakness. Negative for dizziness.   All other systems reviewed and are negative.        PHYSICAL EXAMINATION:   /71   Pulse 99   Temp 36 °C (96.8 °F)   Resp 20   SpO2 97%   Physical Exam   Constitutional: She is oriented to person, place, and time and well-developed, well-nourished, and in no distress. No distress.   HENT:   Head: Normocephalic and atraumatic.   Eyes: Pupils are equal, round, and reactive to light.   Neck: Normal range of motion. Neck supple. JVD present.   Cardiovascular: Normal rate, regular rhythm and intact distal pulses.    Audible graft pulse over the medial thigh   Pulmonary/Chest: Effort normal. No respiratory distress.   Abdominal: Soft. She exhibits no distension.    Musculoskeletal: Normal range of motion. She exhibits edema.   Worse   Neurological: She is alert and oriented to person, place, and time.   Skin: There is erythema.   New blisters on both legs.     Psychiatric: Mood, memory, affect and judgment normal.        Wound Assessment- Refer to wound flowsheet    Left medial ankle, pre-debridement      PROCEDURE  -2% viscous lidocaine applied topically to wound bed for approximately 5 minutes prior to debridement.  4m curette used to debride wound bed.  Excisional debridement was performed to remove devitalized tissue until healthy, bleeding tissue was visualized.   Entire surface of wound, 15.36 cm2, debrided, into the subcutaneous tissue layer, taking a thin rim of epithelium.   -Bleeding controlled with manual pressure       Left medial lower ankle, post-debridement Photo          Measurements (post-debridement): Left medial ankle     DATE: 3/4/19   Length (cm)  4.2   Width (cm)  2.9   Depth (cm)  0.2   Area (cm2)  12.18   Tract/undermine none     Wound #2  Medal, distal calf.      Left medial/distal calf, pre-debridement photo        PROCEDURE  -2% viscous lidocaine applied topically to wound bed for approximately 5 minutes prior to debridement.  4m curette used to debride wound bed.  Excisional debridement was performed to remove devitalized tissue until healthy, bleeding tissue was visualized.   Entire surface of wound, 14.8 cm², debrided  Tissue debrided into the subcutaneous layer, taking a full rim of epithelium.    -Bleeding controlled with manual pressure   -wound care completed by  , RN - Vac break with Aquacel Ag.      Measurements (post-debridement): Left medial distal calf      DATE: 3/4/2019   Length (cm)  3.1   Width (cm)  3.0   Depth (cm)  0.2   Area (cm2)  9.3   Tract/undermine none     Left medial/distal calf post debridement              PATIENT EDUCATION  -Advised to go to ER for any increased redness, swelling, drainage or odor, or if patient  develops fever, chills, nausea or vomiting.  -Importance of adequate nutrition for wound healing  -Increase protein intake (unless contraindicated by renal status)    ASSESSMENT AND PLAN:  1. Leg ulcer, left, with fat layer exposed (HCC)  Comments: Full-thickness ulcers x2 to left medial lower leg, probably due to pressure.  Complicated by PVD.    3/4 - Recent hospitalization for infection.  Today, her legs look increasingly swollen and she has mild distal cyanosis.  She has new blisters to both legs a large, partial thickness area to the lateral calf    2. Peripheral vascular disease (HCC)  Comments: Per Dr. Colin, vascular,Arterial duplex shows patent left fem-pop without stenosis  3/4/19 - audible graft pulse to the medial left thigh with ?biphasic DP and PT    3. Bullous pemphigus  Comments: Diagnosed by dermatology.  Dermatology managing    1/30: Patient has been was prescribed CellCept by her dermatologist, and started this last week.  She is stopped taking her prednisone.      4.  Infected wound  Periwound is macerated and a vac break was instituted today.  Language123    Please note that this dictation was created using voice recognition software. I have worked with technical experts from [employer] to optimize the interface.  I have made every reasonable attempt to correct obvious errors, but there may be errors of grammar and possibly content that I did not discover before finalizing the note.

## 2019-03-06 ENCOUNTER — PATIENT OUTREACH (OUTPATIENT)
Dept: HEALTH INFORMATION MANAGEMENT | Facility: OTHER | Age: 74
End: 2019-03-06

## 2019-03-06 ENCOUNTER — APPOINTMENT (OUTPATIENT)
Dept: WOUND CARE | Facility: MEDICAL CENTER | Age: 74
End: 2019-03-06
Attending: NURSE PRACTITIONER
Payer: MEDICARE

## 2019-03-06 NOTE — PROGRESS NOTES
IHD patient advocate was able to schedule patient an appointment with PA Amy Schoening for 3/8/19 @11:50am at [facility] [place], so patient can get her swollen leg checked out.

## 2019-03-07 ENCOUNTER — PATIENT OUTREACH (OUTPATIENT)
Dept: HEALTH INFORMATION MANAGEMENT | Facility: OTHER | Age: 74
End: 2019-03-07

## 2019-03-08 ENCOUNTER — NON-PROVIDER VISIT (OUTPATIENT)
Dept: WOUND CARE | Facility: MEDICAL CENTER | Age: 74
End: 2019-03-08
Attending: NURSE PRACTITIONER
Payer: MEDICARE

## 2019-03-08 ENCOUNTER — HOSPITAL ENCOUNTER (INPATIENT)
Facility: MEDICAL CENTER | Age: 74
LOS: 3 days | DRG: 593 | End: 2019-03-11
Attending: EMERGENCY MEDICINE | Admitting: INTERNAL MEDICINE
Payer: MEDICARE

## 2019-03-08 ENCOUNTER — APPOINTMENT (OUTPATIENT)
Dept: RADIOLOGY | Facility: MEDICAL CENTER | Age: 74
DRG: 593 | End: 2019-03-08
Attending: EMERGENCY MEDICINE
Payer: MEDICARE

## 2019-03-08 VITALS
SYSTOLIC BLOOD PRESSURE: 157 MMHG | DIASTOLIC BLOOD PRESSURE: 79 MMHG | OXYGEN SATURATION: 98 % | RESPIRATION RATE: 20 BRPM | HEART RATE: 93 BPM | TEMPERATURE: 97.1 F

## 2019-03-08 DIAGNOSIS — T14.8XXA WOUND INFECTION: ICD-10-CM

## 2019-03-08 DIAGNOSIS — L08.9 WOUND INFECTION: ICD-10-CM

## 2019-03-08 LAB
ANION GAP SERPL CALC-SCNC: 12 MMOL/L (ref 0–11.9)
ANISOCYTOSIS BLD QL SMEAR: ABNORMAL
BASOPHILS # BLD AUTO: 0.4 % (ref 0–1.8)
BASOPHILS # BLD: 0.05 K/UL (ref 0–0.12)
BUN SERPL-MCNC: 11 MG/DL (ref 8–22)
CALCIUM SERPL-MCNC: 9.4 MG/DL (ref 8.5–10.5)
CHLORIDE SERPL-SCNC: 96 MMOL/L (ref 96–112)
CO2 SERPL-SCNC: 24 MMOL/L (ref 20–33)
COMMENT 1642: NORMAL
CREAT SERPL-MCNC: 0.73 MG/DL (ref 0.5–1.4)
EOSINOPHIL # BLD AUTO: 0.02 K/UL (ref 0–0.51)
EOSINOPHIL NFR BLD: 0.2 % (ref 0–6.9)
ERYTHROCYTE [DISTWIDTH] IN BLOOD BY AUTOMATED COUNT: 54.6 FL (ref 35.9–50)
GLUCOSE SERPL-MCNC: 108 MG/DL (ref 65–99)
HCT VFR BLD AUTO: 34.4 % (ref 37–47)
HGB BLD-MCNC: 10.7 G/DL (ref 12–16)
IMM GRANULOCYTES # BLD AUTO: 0.26 K/UL (ref 0–0.11)
IMM GRANULOCYTES NFR BLD AUTO: 2.3 % (ref 0–0.9)
LYMPHOCYTES # BLD AUTO: 1.95 K/UL (ref 1–4.8)
LYMPHOCYTES NFR BLD: 17.3 % (ref 22–41)
MACROCYTES BLD QL SMEAR: ABNORMAL
MCH RBC QN AUTO: 31.1 PG (ref 27–33)
MCHC RBC AUTO-ENTMCNC: 31.1 G/DL (ref 33.6–35)
MCV RBC AUTO: 100 FL (ref 81.4–97.8)
MONOCYTES # BLD AUTO: 1.07 K/UL (ref 0–0.85)
MONOCYTES NFR BLD AUTO: 9.5 % (ref 0–13.4)
MORPHOLOGY BLD-IMP: NORMAL
NEUTROPHILS # BLD AUTO: 7.89 K/UL (ref 2–7.15)
NEUTROPHILS NFR BLD: 70.3 % (ref 44–72)
NRBC # BLD AUTO: 0 K/UL
NRBC BLD-RTO: 0 /100 WBC
PLATELET # BLD AUTO: 354 K/UL (ref 164–446)
PLATELET BLD QL SMEAR: NORMAL
PMV BLD AUTO: 8.7 FL (ref 9–12.9)
POTASSIUM SERPL-SCNC: 4.2 MMOL/L (ref 3.6–5.5)
RBC # BLD AUTO: 3.44 M/UL (ref 4.2–5.4)
RBC BLD AUTO: PRESENT
SODIUM SERPL-SCNC: 132 MMOL/L (ref 135–145)
WBC # BLD AUTO: 11.2 K/UL (ref 4.8–10.8)

## 2019-03-08 PROCEDURE — 96365 THER/PROPH/DIAG IV INF INIT: CPT

## 2019-03-08 PROCEDURE — 700105 HCHG RX REV CODE 258: Performed by: EMERGENCY MEDICINE

## 2019-03-08 PROCEDURE — 96368 THER/DIAG CONCURRENT INF: CPT

## 2019-03-08 PROCEDURE — 87040 BLOOD CULTURE FOR BACTERIA: CPT

## 2019-03-08 PROCEDURE — 770006 HCHG ROOM/CARE - MED/SURG/GYN SEMI*

## 2019-03-08 PROCEDURE — 99223 1ST HOSP IP/OBS HIGH 75: CPT | Performed by: INTERNAL MEDICINE

## 2019-03-08 PROCEDURE — 99285 EMERGENCY DEPT VISIT HI MDM: CPT

## 2019-03-08 PROCEDURE — 700111 HCHG RX REV CODE 636 W/ 250 OVERRIDE (IP): Performed by: EMERGENCY MEDICINE

## 2019-03-08 PROCEDURE — 99211 OFF/OP EST MAY X REQ PHY/QHP: CPT

## 2019-03-08 PROCEDURE — 80048 BASIC METABOLIC PNL TOTAL CA: CPT

## 2019-03-08 PROCEDURE — 85025 COMPLETE CBC W/AUTO DIFF WBC: CPT

## 2019-03-08 PROCEDURE — 93971 EXTREMITY STUDY: CPT | Mod: RT

## 2019-03-08 PROCEDURE — 700105 HCHG RX REV CODE 258: Performed by: INTERNAL MEDICINE

## 2019-03-08 RX ORDER — SODIUM CHLORIDE 9 MG/ML
INJECTION, SOLUTION INTRAVENOUS CONTINUOUS
Status: DISCONTINUED | OUTPATIENT
Start: 2019-03-08 | End: 2019-03-08

## 2019-03-08 RX ORDER — DOXYCYCLINE HYCLATE 100 MG
100 TABLET ORAL EVERY 12 HOURS
Status: ON HOLD | COMMUNITY
Start: 2019-02-05 | End: 2019-03-11

## 2019-03-08 RX ORDER — ACETAMINOPHEN 325 MG/1
650 TABLET ORAL EVERY 6 HOURS PRN
Status: DISCONTINUED | OUTPATIENT
Start: 2019-03-08 | End: 2019-03-11 | Stop reason: HOSPADM

## 2019-03-08 RX ORDER — SODIUM CHLORIDE 9 MG/ML
INJECTION, SOLUTION INTRAVENOUS CONTINUOUS
Status: DISCONTINUED | OUTPATIENT
Start: 2019-03-08 | End: 2019-03-11 | Stop reason: HOSPADM

## 2019-03-08 RX ORDER — CIPROFLOXACIN 500 MG/1
500 TABLET, FILM COATED ORAL 2 TIMES DAILY
Status: ON HOLD | COMMUNITY
Start: 2019-02-05 | End: 2019-03-11

## 2019-03-08 RX ORDER — SODIUM CHLORIDE 9 MG/ML
500 INJECTION, SOLUTION INTRAVENOUS
Status: DISCONTINUED | OUTPATIENT
Start: 2019-03-08 | End: 2019-03-11 | Stop reason: HOSPADM

## 2019-03-08 RX ORDER — AMOXICILLIN 250 MG
2 CAPSULE ORAL 2 TIMES DAILY
Status: DISCONTINUED | OUTPATIENT
Start: 2019-03-08 | End: 2019-03-11 | Stop reason: HOSPADM

## 2019-03-08 RX ORDER — POLYETHYLENE GLYCOL 3350 17 G/17G
1 POWDER, FOR SOLUTION ORAL
Status: DISCONTINUED | OUTPATIENT
Start: 2019-03-08 | End: 2019-03-11 | Stop reason: HOSPADM

## 2019-03-08 RX ORDER — METOPROLOL SUCCINATE 100 MG/1
100 TABLET, EXTENDED RELEASE ORAL DAILY
COMMUNITY
End: 2019-05-02

## 2019-03-08 RX ORDER — BISACODYL 10 MG
10 SUPPOSITORY, RECTAL RECTAL
Status: DISCONTINUED | OUTPATIENT
Start: 2019-03-08 | End: 2019-03-11 | Stop reason: HOSPADM

## 2019-03-08 RX ORDER — SERTRALINE HYDROCHLORIDE 100 MG/1
100 TABLET, FILM COATED ORAL DAILY
COMMUNITY
End: 2019-04-03 | Stop reason: SDUPTHER

## 2019-03-08 RX ORDER — PREDNISONE 10 MG/1
10 MG TABLET ORAL QID
Qty: 120 | Refills: 1 | Status: ERX

## 2019-03-08 RX ORDER — ACETAMINOPHEN 500 MG
1000 TABLET ORAL ONCE
Status: COMPLETED | OUTPATIENT
Start: 2019-03-09 | End: 2019-03-09

## 2019-03-08 RX ORDER — HEPARIN SODIUM 5000 [USP'U]/ML
5000 INJECTION, SOLUTION INTRAVENOUS; SUBCUTANEOUS EVERY 8 HOURS
Status: DISCONTINUED | OUTPATIENT
Start: 2019-03-09 | End: 2019-03-11 | Stop reason: HOSPADM

## 2019-03-08 RX ORDER — PREDNISONE 10 MG/1
40 TABLET ORAL DAILY
Status: ON HOLD | COMMUNITY
Start: 2019-02-05 | End: 2019-03-18

## 2019-03-08 RX ADMIN — CEFEPIME 2 G: 2 INJECTION, POWDER, FOR SOLUTION INTRAVENOUS at 18:40

## 2019-03-08 RX ADMIN — SODIUM CHLORIDE: 9 INJECTION, SOLUTION INTRAVENOUS at 23:47

## 2019-03-08 RX ADMIN — VANCOMYCIN HYDROCHLORIDE 1800 MG: 100 INJECTION, POWDER, LYOPHILIZED, FOR SOLUTION INTRAVENOUS at 19:03

## 2019-03-08 ASSESSMENT — ENCOUNTER SYMPTOMS
NAUSEA: 0
FEVER: 1
VOMITING: 0
CHILLS: 1
COUGH: 0

## 2019-03-08 NOTE — WOUND TEAM
"Pt presented to wound clinic stating that she did not feel well, but wanted wound care to treat wound prior to presenting to hospital. Pt states, \"I want my wounds looked at, then I will have my son take me to the ER.\" When asked why she didn't just go to the ED directly, pt states, \"I dont want to leave my car here.\" Pt's BLE with erythema and 3+ pitting edema. Pt states that RLE is extremely tender to touch and now has multiple blisters. Pt states that this started approximately yesterday. Recommended present to the ED as soon as possible, but patient states that she wants to go home first to get things in order before she goes to ED. Pt lives with a room mate who she states can keep an eye on her while she waits for her son. Instructed patient that if she starts to feel worse, feels dizzy or light headed to call the ambulance for immediate assistance. Pt verbalized understanding to all instruction, however, insists that she will wait for her son to take her to the ED.   "

## 2019-03-08 NOTE — PROGRESS NOTES
"Nadege \"Jen\" Tu was admitted to [facility] on 1/30/19 for lower extremity cellulitis. Patient discharged home on 2/5/19. IHD patient advocate was able to successfully engage with patient post-discharge and throughout the case. Per discharge orders, patient was instructed to see her PCP and vascular surgeon, Dr. Colin. Patient saw her PCP on 2/12/19, and Dr. Colin on 3/4/19. In addition, patient had numerous follow-up's with her dermatologist at [facility]. Patient advocate assisted patient with getting a sooner follow-up with her dermatologist when patient was originally booked out six weeks for her next appointment. Patient resumed wound care at [facility] on 2/8/19. Patient is followed by ALEXIA Mantilla at [facility]. Lace score 51.   "

## 2019-03-09 PROBLEM — L03.115 CELLULITIS OF RIGHT LOWER EXTREMITY: Status: ACTIVE | Noted: 2019-01-30

## 2019-03-09 LAB
ANION GAP SERPL CALC-SCNC: 9 MMOL/L (ref 0–11.9)
BASOPHILS # BLD AUTO: 0.4 % (ref 0–1.8)
BASOPHILS # BLD: 0.04 K/UL (ref 0–0.12)
BUN SERPL-MCNC: 11 MG/DL (ref 8–22)
CALCIUM SERPL-MCNC: 8.3 MG/DL (ref 8.5–10.5)
CHLORIDE SERPL-SCNC: 100 MMOL/L (ref 96–112)
CO2 SERPL-SCNC: 21 MMOL/L (ref 20–33)
CREAT SERPL-MCNC: 0.63 MG/DL (ref 0.5–1.4)
EOSINOPHIL # BLD AUTO: 0.04 K/UL (ref 0–0.51)
EOSINOPHIL NFR BLD: 0.4 % (ref 0–6.9)
ERYTHROCYTE [DISTWIDTH] IN BLOOD BY AUTOMATED COUNT: 55.6 FL (ref 35.9–50)
FOLATE SERPL-MCNC: 14.7 NG/ML
GLUCOSE SERPL-MCNC: 103 MG/DL (ref 65–99)
HCT VFR BLD AUTO: 29.3 % (ref 37–47)
HGB BLD-MCNC: 9.2 G/DL (ref 12–16)
IMM GRANULOCYTES # BLD AUTO: 0.25 K/UL (ref 0–0.11)
IMM GRANULOCYTES NFR BLD AUTO: 2.6 % (ref 0–0.9)
IRON SATN MFR SERPL: 4 % (ref 15–55)
IRON SERPL-MCNC: 11 UG/DL (ref 40–170)
LACTATE BLD-SCNC: 1.1 MMOL/L (ref 0.5–2)
LYMPHOCYTES # BLD AUTO: 1.72 K/UL (ref 1–4.8)
LYMPHOCYTES NFR BLD: 17.8 % (ref 22–41)
MCH RBC QN AUTO: 31.4 PG (ref 27–33)
MCHC RBC AUTO-ENTMCNC: 31.4 G/DL (ref 33.6–35)
MCV RBC AUTO: 100 FL (ref 81.4–97.8)
MONOCYTES # BLD AUTO: 0.88 K/UL (ref 0–0.85)
MONOCYTES NFR BLD AUTO: 9.1 % (ref 0–13.4)
NEUTROPHILS # BLD AUTO: 6.73 K/UL (ref 2–7.15)
NEUTROPHILS NFR BLD: 69.7 % (ref 44–72)
NRBC # BLD AUTO: 0.02 K/UL
NRBC BLD-RTO: 0.2 /100 WBC
PLATELET # BLD AUTO: 279 K/UL (ref 164–446)
PMV BLD AUTO: 9 FL (ref 9–12.9)
POTASSIUM SERPL-SCNC: 4.2 MMOL/L (ref 3.6–5.5)
RBC # BLD AUTO: 2.93 M/UL (ref 4.2–5.4)
SODIUM SERPL-SCNC: 130 MMOL/L (ref 135–145)
TIBC SERPL-MCNC: 288 UG/DL (ref 250–450)
VIT B12 SERPL-MCNC: 372 PG/ML (ref 211–911)
WBC # BLD AUTO: 9.7 K/UL (ref 4.8–10.8)

## 2019-03-09 PROCEDURE — 36415 COLL VENOUS BLD VENIPUNCTURE: CPT

## 2019-03-09 PROCEDURE — 700105 HCHG RX REV CODE 258: Performed by: INTERNAL MEDICINE

## 2019-03-09 PROCEDURE — 700111 HCHG RX REV CODE 636 W/ 250 OVERRIDE (IP): Performed by: INTERNAL MEDICINE

## 2019-03-09 PROCEDURE — 85025 COMPLETE CBC W/AUTO DIFF WBC: CPT

## 2019-03-09 PROCEDURE — 80048 BASIC METABOLIC PNL TOTAL CA: CPT

## 2019-03-09 PROCEDURE — 83605 ASSAY OF LACTIC ACID: CPT

## 2019-03-09 PROCEDURE — 82607 VITAMIN B-12: CPT

## 2019-03-09 PROCEDURE — 770006 HCHG ROOM/CARE - MED/SURG/GYN SEMI*

## 2019-03-09 PROCEDURE — 700102 HCHG RX REV CODE 250 W/ 637 OVERRIDE(OP): Performed by: HOSPITALIST

## 2019-03-09 PROCEDURE — A9270 NON-COVERED ITEM OR SERVICE: HCPCS | Performed by: INTERNAL MEDICINE

## 2019-03-09 PROCEDURE — A9270 NON-COVERED ITEM OR SERVICE: HCPCS | Performed by: HOSPITALIST

## 2019-03-09 PROCEDURE — 83550 IRON BINDING TEST: CPT

## 2019-03-09 PROCEDURE — 82746 ASSAY OF FOLIC ACID SERUM: CPT

## 2019-03-09 PROCEDURE — 99233 SBSQ HOSP IP/OBS HIGH 50: CPT | Performed by: INTERNAL MEDICINE

## 2019-03-09 PROCEDURE — 83540 ASSAY OF IRON: CPT

## 2019-03-09 PROCEDURE — 700102 HCHG RX REV CODE 250 W/ 637 OVERRIDE(OP): Performed by: INTERNAL MEDICINE

## 2019-03-09 RX ORDER — SERTRALINE HYDROCHLORIDE 100 MG/1
100 TABLET, FILM COATED ORAL DAILY
Status: DISCONTINUED | OUTPATIENT
Start: 2019-03-09 | End: 2019-03-11 | Stop reason: HOSPADM

## 2019-03-09 RX ORDER — PREDNISONE 10 MG/1
10 TABLET ORAL DAILY
Status: DISCONTINUED | OUTPATIENT
Start: 2019-03-09 | End: 2019-03-11 | Stop reason: HOSPADM

## 2019-03-09 RX ORDER — METOPROLOL SUCCINATE 100 MG/1
100 TABLET, EXTENDED RELEASE ORAL DAILY
Status: DISCONTINUED | OUTPATIENT
Start: 2019-03-09 | End: 2019-03-11 | Stop reason: HOSPADM

## 2019-03-09 RX ADMIN — VANCOMYCIN HYDROCHLORIDE 1100 MG: 100 INJECTION, POWDER, LYOPHILIZED, FOR SOLUTION INTRAVENOUS at 18:35

## 2019-03-09 RX ADMIN — HEPARIN SODIUM 5000 UNITS: 5000 INJECTION, SOLUTION INTRAVENOUS; SUBCUTANEOUS at 12:17

## 2019-03-09 RX ADMIN — VITAMIN D, TAB 1000IU (100/BT) 1000 UNITS: 25 TAB at 06:17

## 2019-03-09 RX ADMIN — HEPARIN SODIUM 5000 UNITS: 5000 INJECTION, SOLUTION INTRAVENOUS; SUBCUTANEOUS at 21:32

## 2019-03-09 RX ADMIN — ACETAMINOPHEN 1000 MG: 500 TABLET ORAL at 00:15

## 2019-03-09 RX ADMIN — CEFEPIME 2 G: 2 INJECTION, POWDER, FOR SOLUTION INTRAVENOUS at 18:12

## 2019-03-09 RX ADMIN — HEPARIN SODIUM 5000 UNITS: 5000 INJECTION, SOLUTION INTRAVENOUS; SUBCUTANEOUS at 06:17

## 2019-03-09 RX ADMIN — ACETAMINOPHEN 650 MG: 325 TABLET, FILM COATED ORAL at 21:32

## 2019-03-09 RX ADMIN — SODIUM CHLORIDE: 9 INJECTION, SOLUTION INTRAVENOUS at 09:11

## 2019-03-09 RX ADMIN — SODIUM CHLORIDE: 9 INJECTION, SOLUTION INTRAVENOUS at 18:42

## 2019-03-09 RX ADMIN — PREDNISONE 10 MG: 10 TABLET ORAL at 06:17

## 2019-03-09 RX ADMIN — CEFEPIME 2 G: 2 INJECTION, POWDER, FOR SOLUTION INTRAVENOUS at 06:16

## 2019-03-09 RX ADMIN — METOPROLOL SUCCINATE 100 MG: 100 TABLET, EXTENDED RELEASE ORAL at 06:17

## 2019-03-09 RX ADMIN — SODIUM CHLORIDE: 9 INJECTION, SOLUTION INTRAVENOUS at 18:37

## 2019-03-09 RX ADMIN — SERTRALINE HYDROCHLORIDE 100 MG: 100 TABLET ORAL at 06:17

## 2019-03-09 ASSESSMENT — ENCOUNTER SYMPTOMS
VOMITING: 0
MYALGIAS: 1
HALLUCINATIONS: 0
DOUBLE VISION: 0
SORE THROAT: 0
BLOOD IN STOOL: 0
PHOTOPHOBIA: 0
TINGLING: 0
FEVER: 0
SPEECH CHANGE: 0
CHILLS: 1
SENSORY CHANGE: 0
BACK PAIN: 0
DIZZINESS: 0
SHORTNESS OF BREATH: 0
ABDOMINAL PAIN: 0
FOCAL WEAKNESS: 0
TREMORS: 0
FLANK PAIN: 0
BLURRED VISION: 0
FEVER: 1
MYALGIAS: 0
EYE PAIN: 0
DIAPHORESIS: 0
DIARRHEA: 0
HEADACHES: 0
NECK PAIN: 0
SEIZURES: 0
WHEEZING: 0
WEIGHT LOSS: 0
COUGH: 0
PALPITATIONS: 0
CHILLS: 0
ORTHOPNEA: 0
BRUISES/BLEEDS EASILY: 0
NAUSEA: 0
CONSTIPATION: 0
SPUTUM PRODUCTION: 0

## 2019-03-09 ASSESSMENT — COGNITIVE AND FUNCTIONAL STATUS - GENERAL
CLIMB 3 TO 5 STEPS WITH RAILING: A LITTLE
SUGGESTED CMS G CODE MODIFIER DAILY ACTIVITY: CH
DAILY ACTIVITIY SCORE: 24
MOBILITY SCORE: 23
SUGGESTED CMS G CODE MODIFIER MOBILITY: CI

## 2019-03-09 ASSESSMENT — COPD QUESTIONNAIRES
DURING THE PAST 4 WEEKS HOW MUCH DID YOU FEEL SHORT OF BREATH: NONE/LITTLE OF THE TIME
IN THE PAST 12 MONTHS DO YOU DO LESS THAN YOU USED TO BECAUSE OF YOUR BREATHING PROBLEMS: DISAGREE/UNSURE
COPD SCREENING SCORE: 4
DO YOU EVER COUGH UP ANY MUCUS OR PHLEGM?: NO/ONLY WITH OCCASIONAL COLDS OR INFECTIONS
HAVE YOU SMOKED AT LEAST 100 CIGARETTES IN YOUR ENTIRE LIFE: YES
DURING THE PAST 4 WEEKS HOW MUCH DID YOU FEEL SHORT OF BREATH: NONE/LITTLE OF THE TIME
DO YOU EVER COUGH UP ANY MUCUS OR PHLEGM?: NO/ONLY WITH OCCASIONAL COLDS OR INFECTIONS
COPD SCREENING SCORE: 2
HAVE YOU SMOKED AT LEAST 100 CIGARETTES IN YOUR ENTIRE LIFE: NO/DON'T KNOW

## 2019-03-09 ASSESSMENT — LIFESTYLE VARIABLES
EVER_SMOKED: YES
EVER_SMOKED: YES
SUBSTANCE_ABUSE: 0
ALCOHOL_USE: NO

## 2019-03-09 NOTE — PROGRESS NOTES
"Pharmacy Kinetics 73 y.o. female on vancomycin day # 1 3/9/2019    Currently on Vancomycin 1800 mg iv load, given 3/8@1900    Indication for Treatment: SSTI    Pertinent history per medical record:   72 yo female with PMH of heart valve disease, DLP, HTN admitted on 3/8/2019 for cellulitis & PVD after developing worsening redness that rapidly moved up her right leg.  Pt reports that she is being treated for chronic ulcers 2/2 chronic venous stasis, and chronic cellulitis.    Other antibiotics:   - Cefepime 2 g iv q12h    Allergies: Bactrim [sulfamethoxazole-trimethoprim] and Meropenem     List concerns for renal function:   - Leukocytosis, tachycardia  - Age    Pertinent cultures to date:   -  L Leg WND: (+) PsA    MRSA nares swab if pneumonia is a concern (ordered/positive/negative/n-a): n/a    Recent Labs      19   1745   WBC  11.2*   NEUTSPOLYS  70.30     Recent Labs      19   1745   BUN  11   CREATININE  0.73     No results for input(s): VANCOTROUGH, VANCOPEAK, VANCORANDOM in the last 72 hours.No intake or output data in the 24 hours ending 19 0203   Blood pressure 137/70, pulse (!) 103, temperature 36.8 °C (98.2 °F), temperature source Temporal, resp. rate 16, height 1.702 m (5' 7\"), weight 73.4 kg (161 lb 13.1 oz), SpO2 93 %, not currently breastfeeding. Temp (24hrs), Av.3 °C (97.4 °F), Min:35.9 °C (96.6 °F), Max:36.8 °C (98.2 °F)      A/P   1. Vancomycin dose change: 1100 mg iv q24h, start 3/9@1900  2. Next vancomycin level: Prior to the 3rd dose (not ordered)  3. Goal trough: 12 to 16 mcg/mL  4. Comments: Limited risk for renal accumulation, so maintenance dose to start at 15 mg/kg q24h.  Trough to be assessed prior to the 3rd dose.  Pharmacy to follow and make dose adjustments as appropriate.      Josesito Mckee, PharmD      "

## 2019-03-09 NOTE — ED NOTES
Pt back to YL 65 ambulating from triage with RN and family, pt consists of R leg swelling being seen at the wound clinic for cellulitis (hot 2+ pitting, painful to touch). Pt on gurney awaiting ERP.

## 2019-03-09 NOTE — ASSESSMENT & PLAN NOTE
She has been receiving prednisone therapy.  Decrease dose of prednisone in setting of acute infection.  Continue to monitor  She does not have any acute complaints.  She needs outpatient follow-up.

## 2019-03-09 NOTE — PROGRESS NOTES
Called Izzy, Wound Care RN, in regards to patient's BLE dressings done with outpatient wound care. Per RN wound care to see patient today to determine plan of care with wound dressings, etc.

## 2019-03-09 NOTE — ASSESSMENT & PLAN NOTE
Resolved  It could be hypovolemic hyponatremia.  Continue IV fluid  Continue to monitor with BMP

## 2019-03-09 NOTE — ED NOTES
Report called to SHREE Moise. Transport at bedside. Pt to be transported to [place] [id number] in stable condition.

## 2019-03-09 NOTE — ED NOTES
Assumed care of pt at this time. Pt is A&Ox3, denies needs. ABX infusing. Awaiting bed, pt updated on POC. ABCs intact.

## 2019-03-09 NOTE — ASSESSMENT & PLAN NOTE
Her lower extremity swelling and erythema is chronic in nature.  She has not show signs of acute infection and cellulitis.  Infectious disease evaluated her and recommended that she does not an antibiotic.  Blood cultures are negative to date.  She needs to follow-up with Dr. Colin as outpatient.  I requested PT/OT evaluation for discharge planning.

## 2019-03-09 NOTE — H&P
Hospital Medicine History & Physical Note    Date of Service  3/8/2019    Primary Care Physician  RYANN Son    Consultants  None    Code Status  Full code    Chief Complaint  Right lower extremity swelling and pain    History of Presenting Illness  73 y.o. female with a past medical history of hyperlipidemia, hypertension, chronic venous stasis who presented 3/8/2019 with right lower extremity pain and swelling for the past 3 days.  Patient has had cellulitis of her lower extremities multiple times.  She developed redness swelling and pain of her right lower extremity which has been progressively worsening.  She reports subjective fevers and chills.  She denies any chest pain, nausea, shortness of breath, abdominal pain, dysuria or diarrhea.  She was diagnosed with bullous pemphigus by dermatology and was started on prednisone 40 mg daily.  Her last dose was yesterday.    Review of Systems  Review of Systems   Constitutional: Positive for chills and fever. Negative for diaphoresis.   HENT: Negative for hearing loss and sore throat.    Eyes: Negative for blurred vision.   Respiratory: Negative for cough, sputum production, shortness of breath and wheezing.    Cardiovascular: Positive for leg swelling. Negative for chest pain and palpitations.   Gastrointestinal: Negative for abdominal pain, blood in stool, diarrhea, nausea and vomiting.   Genitourinary: Negative for dysuria, flank pain and urgency.   Musculoskeletal: Negative for back pain, joint pain, myalgias and neck pain.   Skin: Negative for rash.   Neurological: Negative for dizziness, focal weakness, seizures and headaches.   Endo/Heme/Allergies: Does not bruise/bleed easily.   Psychiatric/Behavioral: Negative for suicidal ideas.   All other systems reviewed and are negative.      Past Medical History   has a past medical history of Anxiety; Cellulitis and abscess of lower extremity; Dental disorder; Generalized osteoarthritis of multiple sites  (10/20/2015); Heart valve disease; Hyperlipidemia; Hypertension; and Osteoporosis. She also has no past medical history of Allergy; Diabetes; Muscle disorder; or OSTEOPOROSIS.    Surgical History   has a past surgical history that includes gyn surgery (1973); breast biopsy (8/28/2014); abdominal hysterectomy total; breast biopsy (Right, 8/14); hip nailing intramedullary (Left, 12/20/2017); hip revision total (Left, 2/11/2018); irrigation & debridement hip (Left, 6/4/2018); femoral popliteal bypass (Left, 6/8/2018); irrigation & debridement general (Left, 6/8/2018); and athroplasty.     Family History  family history includes Diabetes in her father; GI in her mother; Heart Attack in her father; Hypertension in her father; Psychiatry in her brother.     Social History   reports that she quit smoking about 12 years ago. Her smoking use included Cigarettes. She has a 12.50 pack-year smoking history. She has never used smokeless tobacco. She reports that she drinks about 3.0 oz of alcohol per week . She reports that she does not use drugs.    Allergies  Allergies   Allergen Reactions   • Bactrim [Sulfamethoxazole-Trimethoprim] Rash     Diffuse pruritic skin rash with blisters (no mucous membrane involvement) (was also taking cipro at the time - reaction thought more likely to be 2/2 Bactrim)   • Meropenem Unspecified     Pt can not remember reaction         Medications  Prior to Admission Medications   Prescriptions Last Dose Informant Patient Reported? Taking?   Cyanocobalamin (VITAMIN B-12) 1000 MCG Tab 3/8/2019 at AM Patient Yes No   Sig: Take 1,000 mcg by mouth every day.   Multiple Vitamins-Minerals (THERAPEUTIC-M/LUTEIN) Tab 3/8/2019 at AM Patient Yes No   Sig: Take 1 Tab by mouth every day.   ciprofloxacin (CIPRO) 500 MG Tab 2/12/2019 at END Patient Yes Yes   Sig: Take 500 mg by mouth 2 times a day. 8-day course Starting 02/05/19 Ending 02/12/19   doxycycline (VIBRAMYCIN) 100 MG Tab 2/12/2019 at END Patient Yes  Yes   Sig: Take 100 mg by mouth every 12 hours. 8-day course Starting 02/05/19 Due to End 02/12/19   metoprolol SR (TOPROL XL) 100 MG TABLET SR 24 HR 3/8/2019 at AM Patient Yes Yes   Sig: Take 100 mg by mouth every day.   niacin 500 MG Tab 3/8/2019 at AM Patient Yes No   Sig: Take 500 mg by mouth 3 times a day.   predniSONE (DELTASONE) 20 MG Tab 2/5/2019 at END Patient Yes Yes   Sig: Take 40 mg by mouth every day. 5-day course Starting 02/05/19 Due to End 02/09/19.   sertraline (ZOLOFT) 100 MG Tab 3/8/2019 at AM Patient Yes Yes   Sig: Take 100 mg by mouth every day.   triamcinolone acetonide (KENALOG) 0.1 % Cream <week at OUT Patient No No   Sig: Apply to rash BID PRN   vitamin D (CHOLECALCIFEROL) 1000 UNIT Tab 3/8/2019 at AM Patient Yes No   Sig: Take 1,000 Units by mouth every day.      Facility-Administered Medications: None       Physical Exam  Temp:  [35.9 °C (96.6 °F)-36.8 °C (98.2 °F)] 36.8 °C (98.2 °F)  Pulse:  [] 103  Resp:  [14-16] 16  BP: (100-137)/(50-70) 137/70  SpO2:  [92 %-96 %] 93 %    Physical Exam   Constitutional: She is oriented to person, place, and time. She appears well-developed and well-nourished. No distress.   Obese   HENT:   Head: Normocephalic and atraumatic.   Mouth/Throat: Oropharynx is clear and moist.   Eyes: Pupils are equal, round, and reactive to light. Conjunctivae are normal.   Neck: Neck supple. No thyromegaly present.   Cardiovascular: Normal rate, regular rhythm and normal heart sounds.    Pulmonary/Chest: Effort normal and breath sounds normal. No respiratory distress. She has no wheezes. She has no rales.   Abdominal: Soft. Bowel sounds are normal. She exhibits no distension. There is no tenderness. There is no rebound.   Musculoskeletal: Normal range of motion. She exhibits edema and tenderness.   Neurological: She is alert and oriented to person, place, and time. No cranial nerve deficit. Coordination normal.   Skin: Skin is warm and dry.   Erythema, warmth and  swelling of right lower extremity below the knee   Psychiatric: She has a normal mood and affect. Her behavior is normal.   Nursing note and vitals reviewed.      Laboratory:  Recent Labs      03/08/19 1745 03/09/19 0217   WBC  11.2*  9.7   RBC  3.44*  2.93*   HEMOGLOBIN  10.7*  9.2*   HEMATOCRIT  34.4*  29.3*   MCV  100.0*  100.0*   MCH  31.1  31.4   MCHC  31.1*  31.4*   RDW  54.6*  55.6*   PLATELETCT  354  279   MPV  8.7*  9.0     Recent Labs      03/08/19 1745 03/09/19 0217   SODIUM  132*  130*   POTASSIUM  4.2  4.2   CHLORIDE  96  100   CO2  24  21   GLUCOSE  108*  103*   BUN  11  11   CREATININE  0.73  0.63   CALCIUM  9.4  8.3*     Recent Labs      03/08/19 1745 03/09/19 0217   GLUCOSE  108*  103*                 No results for input(s): TROPONINI in the last 72 hours.    Urinalysis:    No results found     Imaging:  US-EXTREMITY VENOUS LOWER UNILAT RIGHT   Final Result      Right lower extremity:   Enlarged lymph nodes with internal vascularity at the groin.     No evidence of superficial or deep venous thrombosis.       Assessment/Plan:  I anticipate this patient will require at least two midnights for appropriate medical management, necessitating inpatient admission.    Cellulitis of right lower extremity- (present on admission)   Assessment & Plan    Started on IV vancomycin and IV cefepime, monitor for vancomycin toxicity and follow trough levels  Wound care       Hyponatremia- (present on admission)   Assessment & Plan    IV fluid hydration with normal saline  Monitor BMP and assess response       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)   Assessment & Plan    On chronic prednisone therapy.  Decrease to 10 mg daily in the setting of active cellulitis     Macrocytic anemia- (present on admission)   Assessment & Plan    Patient denies any bleeding or melena  Check iron studies, folate, B12   Monitor CBC, transfuse for hemoglobin less than 7       Essential hypertension- (present on  admission)   Assessment & Plan    Continue metoprolol         VTE prophylaxis: heparin

## 2019-03-09 NOTE — ED TRIAGE NOTES
Nadege Mae  Chief Complaint   Patient presents with   • Leg Swelling     (R) LE, with increasing redness since Monday.         Pt ambulatory to triage with above complaint.  VSS,  Pt with hx of cellulitis to (B) LE,  Under wound care for (L) LE,  Increasing redness and swelling to (R) LE since Monday,  Denies fevers.   Pt returned to lobby, educated on triage process, and to inform staff of any changes or concerns.

## 2019-03-09 NOTE — PROGRESS NOTES
Hospital Medicine Daily Progress Note    Date of Service  3/9/2019    Chief Complaint  73 y.o. female admitted 3/8/2019 with right lower extremity swelling and pain    Hospital Course    *73-year-old female with past medical history of hypertension, hyperlipidemia and chronic mental status presented to the hospital on March 8, 2019 with right lower extremity pain and swelling for 3 days.  She has prior history of cellulitis on lower extremities.  She was started on vancomycin and cefepime*      Interval Problem Update  I evaluated this patient at bedside.  She expressed that her lower extremity pain has slightly improved.  Continue vancomycin and cefepime  Blood cultures are pending  I discussed plan of care with her and answered all her questions.    Consultants/Specialty  None    Code Status  Full    Disposition  To be decided    Review of Systems  Review of Systems   Constitutional: Negative for chills, fever and weight loss.   HENT: Negative for hearing loss and tinnitus.    Eyes: Negative for blurred vision, double vision, photophobia and pain.   Respiratory: Negative for cough, sputum production and shortness of breath.    Cardiovascular: Negative for chest pain, palpitations, orthopnea and leg swelling.   Gastrointestinal: Negative for abdominal pain, constipation, diarrhea, nausea and vomiting.   Genitourinary: Negative for dysuria, frequency and urgency.   Musculoskeletal: Positive for myalgias. Negative for back pain, joint pain and neck pain.   Skin: Positive for rash.   Neurological: Negative for dizziness, tingling, tremors, sensory change, speech change, focal weakness and headaches.   Psychiatric/Behavioral: Negative for hallucinations and substance abuse.   All other systems reviewed and are negative.       Physical Exam  Temp:  [35.9 °C (96.6 °F)-36.8 °C (98.2 °F)] 36.3 °C (97.3 °F)  Pulse:  [] 85  Resp:  [14-18] 18  BP: (100-143)/(50-70) 143/69  SpO2:  [92 %-96 %] 96 %    Physical Exam    Constitutional: She is oriented to person, place, and time. No distress.   HENT:   Head: Normocephalic and atraumatic.   Eyes: Pupils are equal, round, and reactive to light. Right eye exhibits no discharge. Left eye exhibits no discharge.   Neck: Normal range of motion. Neck supple.   Cardiovascular: Normal rate, regular rhythm and normal heart sounds.  Exam reveals no friction rub.    No murmur heard.  Pulmonary/Chest: Effort normal and breath sounds normal. No respiratory distress. She has no wheezes. She has no rales.   Abdominal: Soft. Bowel sounds are normal. She exhibits no distension. There is no tenderness. There is no rebound.   Musculoskeletal: Normal range of motion. She exhibits no edema or tenderness.   Neurological: She is alert and oriented to person, place, and time. No cranial nerve deficit.   Skin: Skin is warm and dry. No rash noted. She is not diaphoretic. There is erythema (Right lower extremity below-knee).   Psychiatric: She has a normal mood and affect. Her behavior is normal.       Fluids    Intake/Output Summary (Last 24 hours) at 03/09/19 0749  Last data filed at 03/09/19 0418   Gross per 24 hour   Intake           421.67 ml   Output                0 ml   Net           421.67 ml       Laboratory  Recent Labs      03/08/19   1745  03/09/19 0217   WBC  11.2*  9.7   RBC  3.44*  2.93*   HEMOGLOBIN  10.7*  9.2*   HEMATOCRIT  34.4*  29.3*   MCV  100.0*  100.0*   MCH  31.1  31.4   MCHC  31.1*  31.4*   RDW  54.6*  55.6*   PLATELETCT  354  279   MPV  8.7*  9.0     Recent Labs      03/08/19   1745  03/09/19 0217   SODIUM  132*  130*   POTASSIUM  4.2  4.2   CHLORIDE  96  100   CO2  24  21   GLUCOSE  108*  103*   BUN  11  11   CREATININE  0.73  0.63   CALCIUM  9.4  8.3*                   Imaging  US-EXTREMITY VENOUS LOWER UNILAT RIGHT   Final Result           Assessment/Plan  Cellulitis of right lower extremity- (present on admission)   Assessment & Plan    She found to have cellulitis of right  lower extremity.  Continue IV vancomycin and cefepime  Continue vancomycin and monitor toxicity and follows vancomycin trough levels and dose adjustment as per trough levels.  I ordered wound culture  Blood cultures are negative to date.  Wound care consult.         Hyponatremia- (present on admission)   Assessment & Plan    She found to have hyponatremia.  It could be hypovolemic hyponatremia.  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  Decrease dose of prednisone in setting of acute infection.  Continue to monitor       Macrocytic anemia- (present on admission)   Assessment & Plan    She does not have any signs of active bleeding.  Ordered iron studies, folate, B12   Continue monitor H&H and transfuse if below 7.       Essential hypertension- (present on admission)   Assessment & Plan    Continue outpatient metoprolol          VTE prophylaxis: Heparin

## 2019-03-09 NOTE — WOUND TEAM
[facility]  Inpatient Services  Initial Wound and Skin Care Evaluation    Admission Date:  3/8/2019   HPI, PMH, SH: Reviewed  Unit where seen by Wound Team: [id number]    WOUND CONSULT RELATED TO:  BLE's    SUBJECTIVE:  Went to wound center yesterday, before going to ER. Reports the RLE swelling has gone down.    Self Report / Pain Level:  No reports       OBJECTIVE:      WOUND TYPE, LOCATION, CHARACTERISTICS (Pressure ulcers: location, stage, POA or date identified)    Wound 03/09/19 Partial Thickness Wound Leg R anterior Lower leg (Active)   Wound Image   3/9/2019   Site Assessment Pink    Maricruz-wound Assessment Pink;Dry    Margins Attached edges    Wound Length (cm) 0.5 cm distal 0.5 Measured 3/9/2019   Wound Width (cm) 0.5 cm distal 3.0    Wound Depth (cm) 0.1 cm    Wound Surface Area (cm^2) 0.25 cm^2    Drainage Amount Scant    Drainage Description Serous    Non-staged Wound Description Partial thickness    Cleansing Approved Wound Cleanser    Dressing Options Hydrofiber Silver;Absorbent Abdominal Pad;Fluffed Dried Gauze Roll    Dressing Changed Changed    Dressing Change Frequency Every 48 hrs    NEXT Dressing Change  03/11/19    NEXT Weekly Photo (Inpatient Only) 03/13/19    WOUND NURSE ONLY - Odor None    WOUND NURSE ONLY - Pulses 2+    WOUND NURSE ONLY - Exposed Structures None    WOUND NURSE ONLY - Tissue Type and Percentage pink 100        Wound 03/09/19 Venous Ulcer Leg L medial and lateral lower leg (Active)   Wound Image    3/9/2019  Medial prox 3.5x3.0x0.6  Distal 4.2x 3.0  Lateral 3.0x6.0   Site Assessment Pink;Yellow    Maricruz-wound Assessment Red;Dry    Margins Attached edges    Drainage Amount Small    Drainage Description Serous    Non-staged Wound Description Full thickness    Treatments Cleansed    Cleansing Approved Wound Cleanser    Dressing Options Hydrofiber Silver;Absorbent Abdominal Pad;Fluffed Dried Gauze Roll    Dressing Changed Changed    Dressing Change Frequency Every 48  hrs    NEXT Dressing Change  03/11/19    NEXT Weekly Photo (Inpatient Only) 03/13/19    WOUND NURSE ONLY - Odor None    WOUND NURSE ONLY - Pulses 2+    WOUND NURSE ONLY - Exposed Structures None    WOUND NURSE ONLY - Tissue Type and Percentage pink 60 yellow 40         Vascular:    Dorsal Pedal pulses:  2+  Posterior tib pulses:  2+    TOMI:     5/28/19 L 0.7 R .92    Lab Values:    WBC:       WBC   Date/Time Value Ref Range Status   03/09/2019 02:17 AM 9.7 4.8 - 10.8 K/uL Final     AIC:      Lab Results   Component Value Date/Time    HBA1C 5.6 06/04/2018 12:00 PM         Culture:   Completed 1/30/19    INTERVENTIONS BY WOUND TEAM:  Removed dressing placed in wound center. Cleaned wounds with wound cleanser. Applied moisture lotion to intact skin. Applied Aquacel Ag to each wound bed, covered with ABD pads, then secured with gauze wrap.    Dressing selection:  Above       Interdisciplinary consultation:  RN, patient    EVALUATION:     Patient reports that she had arterial studies completed and has good flow. Had VAC, but was stopped on 3/4/19. Developed blisters to RLE and became painful, so she went to ER.    Factors affecting wound healing: pemphigus,  Chronic venous ulcers, cellulitis  Goals:  Steady decrease in wound area and depth weekly     NURSING PLAN OF CARE ORDERS (X):    Dressing changes: See Dressing Care orders:   X  Skin care: See Skin Care orders:   Rectal tube care: See Rectal Tube Care orders:   Other orders:    RSKIN: CURRENT (X) ORDERED (O)  Q shift Luis:  X  Q shift pressure point assessments:  X  Pressure redistribution mattress  X      Waffle overlay  SHORTY      Bariatric SHORTY      Bariatric foam        Heel float boots       Heels floated on pillows      Barrier wipes      Barrier Cream      Barrier paste      Sacral silicone dressing      Silicone O2 tubing      Anchorfast      Trach with Optifoam split foam       Waffle cushion      Rectal tube or BMS      Antifungal tx    Turn q 2 hours    independent  Up to chair     Ambulate   PT/OT     Dietician      PO   X  TF   TPN   NPO   # days   Other       WOUND TEAM PLAN OF CARE (X):   NPWT change 3 x week:        Dressing changes by wound team:       Follow up as needed:   X    Other (explain):    Anticipated discharge plans (X):  SNF:           Home Care:           Outpatient Wound Center:     X       Self Care:            Other:

## 2019-03-09 NOTE — ED NOTES
Med Rec Updated and Complete per Pt at bedside  Allergies Reviewed    Pt completed 8-day course of Doxycycline 100 every 12 hours and  Ciprofloxacin 500mg twice daily from 02/05/19 Due to End 02/12/19.    Pt reports also completing a 5-day course of Prednisone 20mg x2 from 02/05/19 Ending 02/09/19..    Pt reports she is no longer taking Mycophenolate at this time and that she might have another course of Steroids waiting for her at the pharmacy.

## 2019-03-09 NOTE — CARE PLAN
Problem: Safety  Goal: Will remain free from falls  Patient is a low fall risk. Patient educated to call for assistance. Call light left within reach of patient.      Problem: Infection  Goal: Will remain free from infection    Intervention: Implement standard precautions and perform hand washing before and after patient contact  Hand hygiene performed prior to and after entering pt room. Gloves worn during patient interactions.

## 2019-03-09 NOTE — PROGRESS NOTES
Patient arrived onto unit with patient transport and ambulated from [facility] to bed with FWW. 2 RN skin check complete.  - RLE cellulitis; red; swollen; flaky; foot dressing applied  -LLE currently has a dressing applied on the foot; patient saw [facility] earlier this AM and states the wrapping is from them; she states that there are two wounds to her L foot  - LLE edema and redness  - scattered scabbing skin wounds on the patient's back; all open to air  - rest of skin intact

## 2019-03-09 NOTE — PROGRESS NOTES
Pharmacy Kinetics Addendum:    73 y.o. female on vancomycin day # 1 3/9/2019    Currently on Vancomycin 1100 mg iv q24hr (1900)    Indication for Treatment: SSTI    Recent Labs      03/08/19   1745  03/09/19   0217   BUN  11  11   CREATININE  0.73  0.63     No results for input(s): VANCOTROUGH, VANCOPEAK, VANCORANDOM in the last 72 hours.    A/P   1. Vancomycin dose change: continue current plan  2. Next vancomycin level: 3/10 at 1800 (ordered; prior to 3rd dose)  3. Goal trough: 12-16 mcg/mL  4. Comments: Previous SSTI history of Pseudomonas, Enterococcus and MRSA within past year along with multiple readmissions makes empiric cefepime and vancomycin appropriate.  Cultures negative to do, but no wound culture obtained yet.  Trough ordered.  Monitor culture results.    Rodolfo Cuevas, PharmD

## 2019-03-09 NOTE — DISCHARGE PLANNING
Care Transition Team Assessment  Pt states that upon discharge, a family member will pick her up.    Information Source  Orientation : Oriented x 4  Information Given By: Patient  Informant's Name:  (Jen Mae)  Who is responsible for making decisions for patient? : Patient    Readmission Evaluation  Is this a readmission?: No    Elopement Risk  Legal Hold: No  Ambulatory or Self Mobile in Wheelchair: No-Not an Elopement Risk  Elopement Risk: Not at Risk for Elopement    Interdisciplinary Discharge Planning  Does Admitting Nurse Feel This Could be a Complex Discharge?: No  Primary Care Physician:  (Leena Medina APR)  Lives with - Patient's Self Care Capacity: Unrelated Adult  Support Systems: Family Member(s), Friends / Neighbors  Housing / Facility: [address]  Do You Take your Prescribed Medications Regularly: Yes (Pharmacy: [facility] on [address])  Able to Return to Previous ADL's: Yes  Mobility Issues: Yes  Patient Expects to be Discharged to::  (Home)  Assistance Needed: No  Durable Medical Equipment: Walker    Discharge Preparedness  What is your plan after discharge?: Home with help  What are your discharge supports?: Child  Prior Functional Level: Uses Walker  Difficulity with ADLs: None  Difficulity with IADLs: None    Functional Assesment  Prior Functional Level: Uses Walker    Finances  Financial Barriers to Discharge: No  Prescription Coverage: Yes    Vision / Hearing Impairment  Vision Impairment : No  Hearing Impairment : No         Advance Directive  Advance Directive?: None  Advance Directive offered?: AD Booklet given    Domestic Abuse  Have you ever been the victim of abuse or violence?: No    Psychological Assessment  History of Substance Abuse: Alcohol  Date Last Used - Alcohol:  (Quit using Alcohol 7 years ago.)  History of Psychiatric Problems: Yes (Depression)  Newly Diagnosed Illness: No    Discharge Risks or Barriers  Discharge risks or barriers?: No    Anticipated Discharge  Information  Anticipated discharge disposition: Home  Discharge Address:  ([address], [place]  Discharge Contact Phone Number:  (Dg Alvarado  (son)  [phone])

## 2019-03-09 NOTE — ED PROVIDER NOTES
ED Provider Note    CHIEF COMPLAINT  Chief Complaint   Patient presents with   • Leg Swelling     (R) LE, with increasing redness since Monday.         HPI  Nadege Mae is a 73 y.o. female who presents with chief complaint of right lower extremity pain and swelling.  Patient reports long-standing history of chronic venous stasis, she has had cellulitis of her lower extremity multiple times however this is usually on her left.  She is being treated for chronic ulcers.  She reports that over the past 3 days she is developed worsening redness that has rapidly moved up her right leg.  She reports associated mild pain.  She reports subjective fever and chills.  She denies any nausea or vomiting.  She denies any chest pain or shortness of breath.  Patient denies any abdominal pain or new weakness or numbness.    REVIEW OF SYSTEMS  Review of Systems   Constitutional: Positive for chills, fever and malaise/fatigue.   Respiratory: Negative for cough.    Cardiovascular: Negative for chest pain.   Gastrointestinal: Negative for nausea and vomiting.       See HPI for further details. All other systems are negative.     PAST MEDICAL HISTORY   has a past medical history of Anxiety; Cellulitis and abscess of lower extremity; Dental disorder; Generalized osteoarthritis of multiple sites (10/20/2015); Heart valve disease; Hyperlipidemia; Hypertension; and Osteoporosis.    SOCIAL HISTORY  Social History     Social History Main Topics   • Smoking status: Former Smoker     Packs/day: 0.50     Years: 25.00     Types: Cigarettes     Quit date: 1/1/2007   • Smokeless tobacco: Never Used   • Alcohol use 3.0 oz/week     5 Glasses of wine per week      Comment: glass of wine per day   • Drug use: No   • Sexual activity: Not Currently     Partners: Male       SURGICAL HISTORY   has a past surgical history that includes gyn surgery (1973); breast biopsy (8/28/2014); abdominal hysterectomy total; breast biopsy (Right, 8/14); hip  nailing intramedullary (Left, 12/20/2017); hip revision total (Left, 2/11/2018); irrigation & debridement hip (Left, 6/4/2018); femoral popliteal bypass (Left, 6/8/2018); irrigation & debridement general (Left, 6/8/2018); and athroplasty.    CURRENT MEDICATIONS  Home Medications     Reviewed by Luda Zarate R.N. (Registered Nurse) on 03/08/19 at 1627  Med List Status: Partial   Medication Last Dose Status   Cyanocobalamin (VITAMIN B-12) 1000 MCG Tab  Active   metoprolol SR (TOPROL XL) 100 MG TABLET SR 24 HR  Active   Multiple Vitamins-Minerals (THERAPEUTIC-M/LUTEIN) Tab  Active   niacin 500 MG Tab  Active   predniSONE (DELTASONE) 20 MG Tab  Active   sertraline (ZOLOFT) 100 MG Tab  Active   triamcinolone acetonide (KENALOG) 0.1 % Cream  Active   vitamin D (CHOLECALCIFEROL) 1000 UNIT Tab  Active                ALLERGIES  Allergies   Allergen Reactions   • Bactrim [Sulfamethoxazole-Trimethoprim] Rash     Diffuse pruritic skin rash with blisters (no mucous membrane involvement) (was also taking cipro at the time - reaction thought more likely to be 2/2 Bactrim)   • Meropenem Unspecified     Pt can not remember reaction         PHYSICAL EXAM  Physical Exam   Constitutional: She is oriented to person, place, and time. She appears well-developed and well-nourished.   HENT:   Head: Normocephalic and atraumatic.   Eyes: Conjunctivae are normal.   Neck: Normal range of motion. Neck supple.   Cardiovascular: Normal rate and regular rhythm.    Pulmonary/Chest: Effort normal and breath sounds normal.   Abdominal: Soft. Bowel sounds are normal. She exhibits no distension. There is no tenderness. There is no rebound.   Musculoskeletal:   Right lower extremity with erythema extending to around 3 cm below the knee, the joint is spared.  Full range of motion of both ankle and knee without any major pain evoked.  Erythema with mild underlying induration.  No focal areas of fluctuance.  Scattered grade 1 ulcerations which are  dressed with Xeroform.  Distal cap refill is mildly delayed but pulses are palpable.   Neurological: She is alert and oriented to person, place, and time.   Skin: Skin is warm and dry. No rash noted.   Psychiatric: She has a normal mood and affect. Her behavior is normal.         DIAGNOSTIC STUDIES / PROCEDURES      LABS  Results for orders placed or performed during the hospital encounter of 03/08/19   CBC WITH DIFFERENTIAL   Result Value Ref Range    WBC 11.2 (H) 4.8 - 10.8 K/uL    RBC 3.44 (L) 4.20 - 5.40 M/uL    Hemoglobin 10.7 (L) 12.0 - 16.0 g/dL    Hematocrit 34.4 (L) 37.0 - 47.0 %    .0 (H) 81.4 - 97.8 fL    MCH 31.1 27.0 - 33.0 pg    MCHC 31.1 (L) 33.6 - 35.0 g/dL    RDW 54.6 (H) 35.9 - 50.0 fL    Platelet Count 354 164 - 446 K/uL    MPV 8.7 (L) 9.0 - 12.9 fL    Neutrophils-Polys 70.30 44.00 - 72.00 %    Lymphocytes 17.30 (L) 22.00 - 41.00 %    Monocytes 9.50 0.00 - 13.40 %    Eosinophils 0.20 0.00 - 6.90 %    Basophils 0.40 0.00 - 1.80 %    Immature Granulocytes 2.30 (H) 0.00 - 0.90 %    Nucleated RBC 0.00 /100 WBC    Neutrophils (Absolute) 7.89 (H) 2.00 - 7.15 K/uL    Lymphs (Absolute) 1.95 1.00 - 4.80 K/uL    Monos (Absolute) 1.07 (H) 0.00 - 0.85 K/uL    Eos (Absolute) 0.02 0.00 - 0.51 K/uL    Baso (Absolute) 0.05 0.00 - 0.12 K/uL    Immature Granulocytes (abs) 0.26 (H) 0.00 - 0.11 K/uL    NRBC (Absolute) 0.00 K/uL    Anisocytosis 1+     Macrocytosis 1+    Basic Metabolic Panel   Result Value Ref Range    Sodium 132 (L) 135 - 145 mmol/L    Potassium 4.2 3.6 - 5.5 mmol/L    Chloride 96 96 - 112 mmol/L    Co2 24 20 - 33 mmol/L    Glucose 108 (H) 65 - 99 mg/dL    Bun 11 8 - 22 mg/dL    Creatinine 0.73 0.50 - 1.40 mg/dL    Calcium 9.4 8.5 - 10.5 mg/dL    Anion Gap 12.0 (H) 0.0 - 11.9   ESTIMATED GFR   Result Value Ref Range    GFR If African American >60 >60 mL/min/1.73 m 2    GFR If Non African American >60 >60 mL/min/1.73 m 2   PERIPHERAL SMEAR REVIEW   Result Value Ref Range    Peripheral Smear  Review see below    PLATELET ESTIMATE   Result Value Ref Range    Plt Estimation Normal    MORPHOLOGY   Result Value Ref Range    RBC Morphology Present    DIFFERENTIAL COMMENT   Result Value Ref Range    Comments-Diff see below          RADIOLOGY  US-EXTREMITY VENOUS LOWER UNILAT RIGHT   Final Result              COURSE & MEDICAL DECISION MAKING  Pertinent Labs & Imaging studies reviewed. (See chart for details)  Patient here with symptoms consistent with cellulitis, questionable complicated ulcer.  Given the associated unilateral swelling will check ultrasound for possible DVT though I believe this is unlikely.  Patient symptoms are likely from chronic venous stasis and poor vascular flow otherwise.  She has palpable pulses in her tissues are warm well perfused.  Patient will need IV antibiotics extent of her cellulitis and observation in the emergency department to ensure that she is improved.  I discussed the case with hospitalist who agrees.  Basic labs are normal.  Ultrasound results are pending, they will be followed up by hospitalist.      FINAL IMPRESSION  1.  Lower externally cellulitis      Electronically signed by: Williams Rock, 3/8/2019 5:15 PM

## 2019-03-09 NOTE — ASSESSMENT & PLAN NOTE
She does not have any signs of active bleeding.  Ordered iron studies, folate, B12 and they came back within normal limits.  Continue monitor H&H and transfuse if below 7.

## 2019-03-10 LAB
ANION GAP SERPL CALC-SCNC: 8 MMOL/L (ref 0–11.9)
BUN SERPL-MCNC: 11 MG/DL (ref 8–22)
CALCIUM SERPL-MCNC: 8.5 MG/DL (ref 8.5–10.5)
CHLORIDE SERPL-SCNC: 104 MMOL/L (ref 96–112)
CO2 SERPL-SCNC: 23 MMOL/L (ref 20–33)
CREAT SERPL-MCNC: 0.59 MG/DL (ref 0.5–1.4)
ERYTHROCYTE [DISTWIDTH] IN BLOOD BY AUTOMATED COUNT: 55.8 FL (ref 35.9–50)
GLUCOSE SERPL-MCNC: 108 MG/DL (ref 65–99)
HCT VFR BLD AUTO: 30.6 % (ref 37–47)
HGB BLD-MCNC: 9.5 G/DL (ref 12–16)
MCH RBC QN AUTO: 31.3 PG (ref 27–33)
MCHC RBC AUTO-ENTMCNC: 31 G/DL (ref 33.6–35)
MCV RBC AUTO: 100.7 FL (ref 81.4–97.8)
PLATELET # BLD AUTO: 269 K/UL (ref 164–446)
PMV BLD AUTO: 9.1 FL (ref 9–12.9)
POTASSIUM SERPL-SCNC: 3.8 MMOL/L (ref 3.6–5.5)
RBC # BLD AUTO: 3.04 M/UL (ref 4.2–5.4)
SODIUM SERPL-SCNC: 135 MMOL/L (ref 135–145)
WBC # BLD AUTO: 7.1 K/UL (ref 4.8–10.8)

## 2019-03-10 PROCEDURE — 700102 HCHG RX REV CODE 250 W/ 637 OVERRIDE(OP): Performed by: INTERNAL MEDICINE

## 2019-03-10 PROCEDURE — 770006 HCHG ROOM/CARE - MED/SURG/GYN SEMI*

## 2019-03-10 PROCEDURE — 99232 SBSQ HOSP IP/OBS MODERATE 35: CPT | Performed by: INTERNAL MEDICINE

## 2019-03-10 PROCEDURE — 36415 COLL VENOUS BLD VENIPUNCTURE: CPT

## 2019-03-10 PROCEDURE — A9270 NON-COVERED ITEM OR SERVICE: HCPCS | Performed by: HOSPITALIST

## 2019-03-10 PROCEDURE — 85027 COMPLETE CBC AUTOMATED: CPT

## 2019-03-10 PROCEDURE — 700105 HCHG RX REV CODE 258: Performed by: INTERNAL MEDICINE

## 2019-03-10 PROCEDURE — 80048 BASIC METABOLIC PNL TOTAL CA: CPT

## 2019-03-10 PROCEDURE — 700102 HCHG RX REV CODE 250 W/ 637 OVERRIDE(OP): Performed by: HOSPITALIST

## 2019-03-10 PROCEDURE — A9270 NON-COVERED ITEM OR SERVICE: HCPCS | Performed by: INTERNAL MEDICINE

## 2019-03-10 PROCEDURE — 700111 HCHG RX REV CODE 636 W/ 250 OVERRIDE (IP): Performed by: INTERNAL MEDICINE

## 2019-03-10 PROCEDURE — 99223 1ST HOSP IP/OBS HIGH 75: CPT | Performed by: INTERNAL MEDICINE

## 2019-03-10 RX ADMIN — SERTRALINE HYDROCHLORIDE 100 MG: 100 TABLET ORAL at 06:04

## 2019-03-10 RX ADMIN — HEPARIN SODIUM 5000 UNITS: 5000 INJECTION, SOLUTION INTRAVENOUS; SUBCUTANEOUS at 21:07

## 2019-03-10 RX ADMIN — VITAMIN D, TAB 1000IU (100/BT) 1000 UNITS: 25 TAB at 06:05

## 2019-03-10 RX ADMIN — CEFEPIME 2 G: 2 INJECTION, POWDER, FOR SOLUTION INTRAVENOUS at 06:04

## 2019-03-10 RX ADMIN — ACETAMINOPHEN 650 MG: 325 TABLET, FILM COATED ORAL at 21:07

## 2019-03-10 RX ADMIN — HEPARIN SODIUM 5000 UNITS: 5000 INJECTION, SOLUTION INTRAVENOUS; SUBCUTANEOUS at 06:04

## 2019-03-10 RX ADMIN — PREDNISONE 10 MG: 10 TABLET ORAL at 06:04

## 2019-03-10 RX ADMIN — METOPROLOL SUCCINATE 100 MG: 100 TABLET, EXTENDED RELEASE ORAL at 06:04

## 2019-03-10 RX ADMIN — SENNOSIDES AND DOCUSATE SODIUM 2 TABLET: 8.6; 5 TABLET ORAL at 06:04

## 2019-03-10 RX ADMIN — HEPARIN SODIUM 5000 UNITS: 5000 INJECTION, SOLUTION INTRAVENOUS; SUBCUTANEOUS at 14:54

## 2019-03-10 ASSESSMENT — ENCOUNTER SYMPTOMS
BACK PAIN: 0
HALLUCINATIONS: 0
BLURRED VISION: 0
TINGLING: 0
DIARRHEA: 0
SPUTUM PRODUCTION: 0
FEVER: 0
VOMITING: 0
PALPITATIONS: 0
SENSORY CHANGE: 0
DOUBLE VISION: 0
NECK PAIN: 0
MYALGIAS: 1
COUGH: 0
SHORTNESS OF BREATH: 0
HEADACHES: 0
ABDOMINAL PAIN: 0
WEIGHT LOSS: 0
CONSTIPATION: 0
DIZZINESS: 0
EYE PAIN: 0
TREMORS: 0
SPEECH CHANGE: 0
FOCAL WEAKNESS: 0
NAUSEA: 0
CHILLS: 0
PHOTOPHOBIA: 0
ORTHOPNEA: 0

## 2019-03-10 ASSESSMENT — LIFESTYLE VARIABLES: SUBSTANCE_ABUSE: 0

## 2019-03-10 NOTE — PROGRESS NOTES
Hospital Medicine Daily Progress Note    Date of Service  3/10/2019    Chief Complaint  73 y.o. female admitted 3/8/2019 with right lower extremity swelling and pain    Hospital Course    *73-year-old female with past medical history of hypertension, hyperlipidemia and chronic mental status presented to the hospital on March 8, 2019 with right lower extremity pain and swelling for 3 days.  She has prior history of cellulitis on lower extremities.  She was started on vancomycin and cefepime*      Interval Problem Update    I evaluated this patient at bedside.  I discussed case with infectious disease Dr. Valencia and discussed plan of care with her and I requested consult.  Blood cultures are negative to date.  I discussed plan of care with her and answered all her questions.    Consultants/Specialty  Infectious disease    Code Status  Full    Disposition  To be decided    Review of Systems  Review of Systems   Constitutional: Negative for chills, fever and weight loss.   HENT: Negative for hearing loss and tinnitus.    Eyes: Negative for blurred vision, double vision, photophobia and pain.   Respiratory: Negative for cough, sputum production and shortness of breath.    Cardiovascular: Negative for chest pain, palpitations, orthopnea and leg swelling.   Gastrointestinal: Negative for abdominal pain, constipation, diarrhea, nausea and vomiting.   Genitourinary: Negative for dysuria, frequency and urgency.   Musculoskeletal: Positive for myalgias (Pain improving). Negative for back pain, joint pain and neck pain.   Skin: Positive for rash.   Neurological: Negative for dizziness, tingling, tremors, sensory change, speech change, focal weakness and headaches.   Psychiatric/Behavioral: Negative for hallucinations and substance abuse.   All other systems reviewed and are negative.       Physical Exam  Temp:  [36.1 °C (96.9 °F)-36.3 °C (97.4 °F)] 36.1 °C (96.9 °F)  Pulse:  [78-87] 78  Resp:  [16-20] 16  BP: (140-167)/(74-84)  167/79  SpO2:  [94 %-97 %] 94 %    Physical Exam   Constitutional: She is oriented to person, place, and time. No distress.   HENT:   Head: Normocephalic and atraumatic.   Eyes: Pupils are equal, round, and reactive to light. Right eye exhibits no discharge. Left eye exhibits no discharge.   Neck: Normal range of motion. Neck supple.   Cardiovascular: Normal rate, regular rhythm and normal heart sounds.  Exam reveals no friction rub.    No murmur heard.  Pulmonary/Chest: Effort normal and breath sounds normal. No respiratory distress. She has no wheezes. She has no rales.   Abdominal: Soft. Bowel sounds are normal. She exhibits no distension. There is no tenderness. There is no rebound.   Musculoskeletal: Normal range of motion. She exhibits no edema or tenderness.   Neurological: She is alert and oriented to person, place, and time. No cranial nerve deficit.   Skin: Skin is warm and dry. No rash noted. She is not diaphoretic. There is erythema (Right lower extremity below-knee (less erythema now)).   Psychiatric: She has a normal mood and affect. Her behavior is normal.       Fluids    Intake/Output Summary (Last 24 hours) at 03/10/19 0840  Last data filed at 03/10/19 0800   Gross per 24 hour   Intake             3510 ml   Output                0 ml   Net             3510 ml       Laboratory  Recent Labs      03/08/19   1745  03/09/19   0217  03/10/19   0511   WBC  11.2*  9.7  7.1   RBC  3.44*  2.93*  3.04*   HEMOGLOBIN  10.7*  9.2*  9.5*   HEMATOCRIT  34.4*  29.3*  30.6*   MCV  100.0*  100.0*  100.7*   MCH  31.1  31.4  31.3   MCHC  31.1*  31.4*  31.0*   RDW  54.6*  55.6*  55.8*   PLATELETCT  354  279  269   MPV  8.7*  9.0  9.1     Recent Labs      03/08/19   1745  03/09/19   0217  03/10/19   0511   SODIUM  132*  130*  135   POTASSIUM  4.2  4.2  3.8   CHLORIDE  96  100  104   CO2  24  21  23   GLUCOSE  108*  103*  108*   BUN  11  11  11   CREATININE  0.73  0.63  0.59   CALCIUM  9.4  8.3*  8.5                    Imaging  US-EXTREMITY VENOUS LOWER UNILAT RIGHT   Final Result           Assessment/Plan  Cellulitis of right lower extremity- (present on admission)   Assessment & Plan    She found to have possible cellulitis of right lower extremity.  I requested consult with infectious disease and discussed plan of care with Dr. Valencia.  I ordered wound culture  Blood cultures are negative to date.  Wound care consult.         Hyponatremia- (present on admission)   Assessment & Plan    Hyponatremia has improved.  It could be hypovolemic hyponatremia.  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  Decrease dose of prednisone in setting of acute infection.  Continue to monitor  No acute complaints.     Macrocytic anemia- (present on admission)   Assessment & Plan    She does not have any signs of active bleeding.  Ordered iron studies, folate, B12 and they came back within normal limits.  Continue monitor H&H and transfuse if below 7.       Essential hypertension- (present on admission)   Assessment & Plan    Continue outpatient metoprolol        I discussed plan of care with RN at the bedside.  VTE prophylaxis: Heparin

## 2019-03-10 NOTE — CARE PLAN
Problem: Safety  Goal: Will remain free from injury  Outcome: PROGRESSING AS EXPECTED  Safety precautions in place. Bed in locked/low position. 2 side rails up. Treaded socks. Call light in reach, calls appropriately. Hourly rounding practiced.    Problem: Venous Thromboembolism (VTW)/Deep Vein Thrombosis (DVT) Prevention:  Goal: Patient will participate in Venous Thrombosis (VTE)/Deep Vein Thrombosis (DVT)Prevention Measures  Outcome: PROGRESSING AS EXPECTED  Refused SCDs at this time. SQ Heparin administered per MAR

## 2019-03-10 NOTE — PROGRESS NOTES
"/78   Pulse 87   Temp 36.2 °C (97.2 °F) (Temporal)   Resp 17   Ht 1.702 m (5' 7\")   Wt 73.4 kg (161 lb 13.1 oz)   SpO2 95%   Breastfeeding? No   BMI 25.34 kg/m²     Patient is A&Ox4.   Reports pain to BLE, medicated per MAR  LIU, CMS intact, denies numbness and tingling.   On RA, denies SOB or chest pain.   Mobilizes with SBA, calls appropriately, her gait is steady.   Normoactive BS x 4. Tolerating diet. Denies N&V.   + flatus, + BM. Pt is voiding.   Dressing to BLE c/d/i. Due to be changed 3/11  PIV running IVF  Updated on POC. Belongings and call light within reach. All needs met at this time.   "

## 2019-03-10 NOTE — PROGRESS NOTES
Pt sitting up in bed. No new needs at this time. Respirations even and unlabored. Call light in reach. Will continue to monitor.

## 2019-03-10 NOTE — CONSULTS
INFECTIOUS DISEASES INPATIENT CONSULT NOTE     Date of Service: 3/10/2019    Consult Requested By: Leeann Chun M.D.    Reason for Consultation: Bilateral lower extremity ulcerations    History of Present Illness:   Nadege Mae is a 73 y.o. elderly woman well-known to the ID service with a history of hyperlipidemia, hypertension, peripheral vascular disease status post femoropopliteal bypass on 06/08/18, bullous pemphigus on prednisone and chronic bilateral lower extremity ulcerations which has been complicated by cellulitis in the past admitted 3/8/2019 for increasing right lower extremity edema and pain.  Patient was recently hospitalized in February for left lower extremity cellulitis.  Prior wound cultures in January grew pseudomonas aeruginosa which was sensitive to Cipro.  Prior cultures in July also grew MRSA with a vancomycin LICO of 2 Enterococcus faecalis and pseudomonas aeruginosa.  Doppler ultrasound at that time was negative for DVT.  She was discharged on a 2-week course of linezolid and Cipro.  Patient states she completed her course of antibiotics.  He continues to follow-up in the wound clinic.  Patient was seen by Dr. Colin on Monday and then subsequently developed increasing right lower extremity swelling and increasing pain.  She was supposed to go to her dermatologist on Thursday however due to persistent right lower extremity pain and swelling, she presented to the ED for further evaluation and management.  She noted some slight erythema but no drainage from her wounds.  She has not been on any antibiotics since she completed her course in February.  Patient denies any associated fevers, chills, nausea, abdominal pain but endorses diarrhea at this time secondary to IV antibiotics.  No chest pain, cough or shortness of breath.  In regards to her bullous pemphigus, she continues to be on prednisone.  On presentation, she was afebrile with a mild leukocytosis of 11.2.  Given  prior wound cultures, she was started on IV cefepime and vancomycin.  Since starting antibiotics, the patient has noted significant improvement with decreased swelling and pain.  Blood cultures on admission are negative to date.  Wound cultures were also obtained and are currently pending.  Infectious disease service consulted for further antibiotic recommendations and management.    Review Of Systems:  All other ROS were reviewed and are negative except as noted above in the HPI.    PMH:   Past Medical History:   Diagnosis Date   • Anxiety    • Cellulitis and abscess of lower extremity    • Dental disorder     full dentures   • Generalized osteoarthritis of multiple sites 10/20/2015   • Heart valve disease     pt not sure    • Hyperlipidemia    • Hypertension    • Osteoporosis    Peripheral vascular disease  Bullous pemphigus    PSH:  Past Surgical History:   Procedure Laterality Date   • FEMORAL POPLITEAL BYPASS Left 6/8/2018    Procedure: FEMORAL POPLITEAL BYPASS;  Surgeon: Milana Colin M.D.;  Location: [facility];  Service: General   • IRRIGATION & DEBRIDEMENT GENERAL Left 6/8/2018    Procedure: IRRIGATION & DEBRIDEMENT ANKLE WOUND;  Surgeon: Milana Colin M.D.;  Location: [facility];  Service: General   • IRRIGATION & DEBRIDEMENT HIP Left 6/4/2018    Procedure: IRRIGATION & DEBRIDEMENT HIP;  Surgeon: Chong Vazquez M.D.;  Location: [facility];  Service: Orthopedics   • HIP REVISION TOTAL Left 2/11/2018    Procedure: HIP REVISION TOTAL- femoral nail removal conversion to total hip arthoroplasty;  Surgeon: Chong Vazquez M.D.;  Location: [facility];  Service: Orthopedics   • HIP NAILING INTRAMEDULLARY Left 12/20/2017    Procedure: HIP NAILING INTRAMEDULLARY;  Surgeon: Jorge Peters M.D.;  Location: [facility];  Service: Orthopedics   • BREAST BIOPSY  8/28/2014    Performed by Magdalena Londono M.D. at [facility]   • BREAST  BIOPSY Right 8/14    benign   • GYN SURGERY  1973    complete hysterectomy   • ABDOMINAL HYSTERECTOMY TOTAL      w/BSO due to uterine cyst and endometriosis   • ATHROPLASTY      hip       FAMILY HX:  Family History   Problem Relation Age of Onset   • GI Mother         colostomy   • Heart Attack Father    • Diabetes Father    • Hypertension Father    • Psychiatry Brother         bipolar disorder       SOCIAL HX:  Social History     Social History   • Marital status: Single     Spouse name: N/A   • Number of children: 1   • Years of education: N/A     Occupational History   • 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[employer]  [employer]     Social History Main Topics   • Smoking status: Former Smoker     Packs/day: 0.50     Years: 25.00     Types: Cigarettes     Quit date: 1/1/2007   • Smokeless tobacco: Never Used   • Alcohol use 3.0 oz/week     5 Glasses of wine per week      Comment: glass of wine per day   • Drug use: No   • Sexual activity: Not Currently     Partners: Male     Other Topics Concern   • Not on file     Social History Narrative   • No narrative on file     History   Smoking Status   • Former Smoker   • Packs/day: 0.50   • Years: 25.00   • Types: Cigarettes   • Quit date: 1/1/2007   Smokeless Tobacco   • Never Used     History   Alcohol Use   • 3.0 oz/week   • 5 Glasses of wine per week     Comment: glass of wine per day       Allergies/Intolerances:  Allergies   Allergen Reactions   • Bactrim [Sulfamethoxazole-Trimethoprim] Rash     Diffuse pruritic skin rash with blisters (no mucous membrane involvement) (was also taking cipro at the time - reaction thought more likely to be 2/2 Bactrim)   • Meropenem Unspecified     Pt can not remember reaction         History reviewed with the patient    Other Current Medications:    Current Facility-Administered Medications:   •  piperacillin-tazobactam (ZOSYN) 3.375 g in  mL IVPB, 3.375 g, Intravenous, Once **AND** piperacillin-tazobactam (ZOSYN) 3.375 g in  mL IVPB,  "3.375 g, Intravenous, Q8HRS, Amber Valencia M.D.  •  predniSONE (DELTASONE) tablet 10 mg, 10 mg, Oral, DAILY, Gilmer Garrett M.D., 10 mg at 03/10/19 0604  •  metoprolol SR (TOPROL XL) tablet 100 mg, 100 mg, Oral, DAILY, Gilmer Garrett M.D., 100 mg at 03/10/19 0604  •  sertraline (ZOLOFT) tablet 100 mg, 100 mg, Oral, DAILY, Gilmer Garrett M.D., 100 mg at 03/10/19 0604  •  vitamin D (cholecalciferol) tablet 1,000 Units, 1,000 Units, Oral, DAILY, Gilmer Garrett M.D., 1,000 Units at 03/10/19 0605  •  senna-docusate (PERICOLACE or SENOKOT S) 8.6-50 MG per tablet 2 Tab, 2 Tab, Oral, BID, 2 Tab at 03/10/19 0604 **AND** polyethylene glycol/lytes (MIRALAX) PACKET 1 Packet, 1 Packet, Oral, QDAY PRN **AND** magnesium hydroxide (MILK OF MAGNESIA) suspension 30 mL, 30 mL, Oral, QDAY PRN **AND** bisacodyl (DULCOLAX) suppository 10 mg, 10 mg, Rectal, QDAY PRN, Gilmer Garrett M.D.  •  NS (BOLUS) infusion 500 mL, 500 mL, Intravenous, Once PRN, Gilmer Garrett M.D.  •  MD Alert...Vancomycin per Pharmacy, 1 Each, Other, PHARMACY TO DOSE, Gilmer Garrett M.D.  •  Respiratory Care per Protocol, , Nebulization, Continuous RT, Gilmer Garrett M.D.  •  NS infusion, , Intravenous, Continuous, Gilmer Garrett M.D., Last Rate: 100 mL/hr at 19 1842  •  heparin injection 5,000 Units, 5,000 Units, Subcutaneous, Q8HRS, Gilmer Garrett M.D., 5,000 Units at 03/10/19 0604  •  vancomycin 1,100 mg in  mL IVPB, 15 mg/kg, Intravenous, Q24HR, Gilmer Garrett M.D., Stopped at 19  •  acetaminophen (TYLENOL) tablet 650 mg, 650 mg, Oral, Q6HRS PRN, Amelia Ferguson M.D., 650 mg at 19  [unfilled]    Most Recent Vital Signs:  BP (!) 167/79 Comment: rn notifed  Pulse 78   Temp 36.1 °C (96.9 °F) (Temporal)   Resp 16   Ht 1.702 m (5' 7\")   Wt 73.4 kg (161 lb 13.1 oz)   SpO2 94%   Breastfeeding? No   BMI 25.34 kg/m²   Temp  Av.3 °C (97.4 °F)  Min: 35.9 °C (96.6 °F)  Max: 36.8 °C (98.2 °F)    Physical Exam:  General: Elderly, " well-appearing, no acute distress, nontoxic  HEENT: sclera anicteric, PERRL, EOMI, MMM, no oral lesions  Neck: supple, no lymphadenopathy  Chest: CTAB, no r/r/w, normal work of breathing.  Cardiac: RRR, normal S1 S2, no m/r/g   Abdomen: + bowel sounds, soft, non-tender, non-distended, no HSM  Extremities: Bilateral lower extremities with ischemic ulcerations.  Wound beds are clean without any drainage.  Skin has chronic skin changes but no evidence of acute infection or evidence of cellulitis.  Left foot deformity.  Skin: See above  Neuro: Alert and oriented times 3, non-focal exam, speech fluent, moves all extremities  Psych: Normal mood and behavior, pleasant    Pertinent Lab Results:  Recent Labs      03/08/19   1745  03/09/19   0217  03/10/19   0511   WBC  11.2*  9.7  7.1      Recent Labs      03/08/19   1745  03/09/19   0217  03/10/19   0511   HEMOGLOBIN  10.7*  9.2*  9.5*   HEMATOCRIT  34.4*  29.3*  30.6*   MCV  100.0*  100.0*  100.7*   MCH  31.1  31.4  31.3   MACROCYTOSIS  1+   --    --    ANISOCYTOSIS  1+   --    --    PLATELETCT  354  279  269         Recent Labs      03/08/19   1745  03/09/19   0217  03/10/19   0511   SODIUM  132*  130*  135   POTASSIUM  4.2  4.2  3.8   CHLORIDE  96  100  104   CO2  24  21  23   CREATININE  0.73  0.63  0.59        No results for input(s): ALBUMIN in the last 72 hours.    Invalid input(s): AST, ALT, ALKPHOS, BILITOT, TOTALBILIRUB, BILIRUBINTOT, BILIRUBINDIR, BILIRUBININD, ALKALINEPHOS     Pertinent Micro:  Results     Procedure Component Value Units Date/Time    CULTURE WOUND W/ GRAM STAIN [[id number]]     Order Status:  No result Specimen:  Wound from Right Leg     BLOOD CULTURE x2 [[id number]] Collected:  03/08/19 1802    Order Status:  Completed Specimen:  Blood from Peripheral Updated:  03/09/19 0806     Significant Indicator NEG     Source BLD     Site PERIPHERAL     Blood Culture No Growth    Note: Blood cultures are incubated for 5 days and  are monitored  "continuously.Positive blood cultures  are called to the RN and reported as soon as  they are identified.      Narrative:       Per Hospital Policy: Only change Specimen Src: to \"Line\" if  specified by physician order.    BLOOD CULTURE x2 [[id number]] Collected:  19 1745    Order Status:  Completed Specimen:  Blood from Peripheral Updated:  19 0806     Significant Indicator NEG     Source BLD     Site PERIPHERAL     Blood Culture No Growth    Note: Blood cultures are incubated for 5 days and  are monitored continuously.Positive blood cultures  are called to the RN and reported as soon as  they are identified.      Narrative:       Per Hospital Policy: Only change Specimen Src: to \"Line\" if  specified by physician order.        Blood Culture   Date Value Ref Range Status   2019   Preliminary    No Growth    Note: Blood cultures are incubated for 5 days and  are monitored continuously.Positive blood cultures  are called to the RN and reported as soon as  they are identified.          Studies:  Us-extremity Venous Lower Unilat Right    Result Date: 3/8/2019   Vascular Laboratory  CONCLUSIONS  Right lower extremity:  Enlarged lymph nodes with internal vascularity at the groin.  No evidence of superficial or deep venous thrombosis.  NANCY SANCHEZ  Exam Date:     2019 17:59  Room #:     Inpatient  Priority:     Stat  Ht (in):             Wt (lb):  Ordering Physician:        TYRELL ARAYA  Referring Physician:       MARSHAL Leo  Sonographer:               Jermain Dahl RVT  Study Type:                Complete Unilateral  Technical Quality:         Adequate  Age:    73    Gender:     F  MRN:    [id number]  :    1945      BSA:  Indications:     Swelling of Limb  CPT Codes:       24640  ICD Codes:       729.81  History:         Right lower extremity cellulitis with worsening swelling  Limitations:  PROCEDURES:  Right lower extremity venous duplex imaging.  The following venous structures " were evaluated: common femoral, profunda  femoral, greater saphenous, femoral, popliteal , peroneal and posterior  tibial veins.  Serial compression, augmentation maneuvers,  color and spectral Doppler  flow evaluations were performed.  FINDINGS:  Right lower extremity:  Enlarged lymph nodes with internal vascularity at the groin.  Complete color filling and compressibility with normal venous flow dynamics  including spontaneous flow, response to augmentation maneuvers, and  respiratory phasicity.  No evidence of superficial or deep venous thrombosis.  Flow was evaluated in the contralateral common femoral vein and normal  venous flow dynamics including spontaneous flow, respiratory phasic  variation and augmentation were demonstrated.  Fredi Friend  (Electronically Signed)  Final Date:      08 March 2019                   18:33      IMPRESSION:   1.  Nonhealing lower extremity chronic ulcerations    2.  Right lower extremity pain   3.  Peripheral vascular disease      PLAN:   Nadege Mae is a 73 y.o. woman well-known to the ID service with a history of hypertension, hyperlipidemia, peripheral vascular disease status post femoropopliteal bypass in June 2018, bilateral lower extremity nonhealing ulcerations followed by Dr. Colin and the outpatient wound clinic with recent hospitalization in February for cellulitis admitted for increasing right lower extremity pain and swelling.  It appears her swelling and pain have improved.  She is currently on vancomycin and cefepime however she does not have any evidence of acute cellulitis or soft tissue infection.  Will discontinue vancomycin and cefepime to avoid risk of nephrotoxicity as antibiotics are not benign.  She needs continued wound care and follow-up in the outpatient wound clinic.  Wound culture has been obtained and will likely go bacteria but this does not require any treatment at this time unless she has evidence of infection, which at this time she  does not.      Plan of care discussed with WILEY Chun M.D..  ID signing off.    Amber Valencia M.D.      Please note that this dictation was created using voice recognition software. I have worked with technical experts from [employer] to optimize the interface.  I have made every reasonable attempt to correct obvious errors, but there may be errors of grammar and possibly content that I did not discover before finalizing the note.

## 2019-03-11 ENCOUNTER — PATIENT OUTREACH (OUTPATIENT)
Dept: HEALTH INFORMATION MANAGEMENT | Facility: OTHER | Age: 74
End: 2019-03-11

## 2019-03-11 VITALS
WEIGHT: 161.82 LBS | OXYGEN SATURATION: 93 % | HEART RATE: 83 BPM | BODY MASS INDEX: 25.4 KG/M2 | DIASTOLIC BLOOD PRESSURE: 89 MMHG | HEIGHT: 67 IN | RESPIRATION RATE: 18 BRPM | SYSTOLIC BLOOD PRESSURE: 160 MMHG | TEMPERATURE: 98.5 F

## 2019-03-11 PROBLEM — L03.115 CELLULITIS OF RIGHT LOWER EXTREMITY: Status: RESOLVED | Noted: 2019-01-30 | Resolved: 2019-03-11

## 2019-03-11 PROBLEM — E87.1 HYPONATREMIA: Status: RESOLVED | Noted: 2019-01-30 | Resolved: 2019-03-11

## 2019-03-11 PROCEDURE — 700111 HCHG RX REV CODE 636 W/ 250 OVERRIDE (IP): Performed by: INTERNAL MEDICINE

## 2019-03-11 PROCEDURE — 99239 HOSP IP/OBS DSCHRG MGMT >30: CPT | Performed by: INTERNAL MEDICINE

## 2019-03-11 PROCEDURE — 700102 HCHG RX REV CODE 250 W/ 637 OVERRIDE(OP): Performed by: HOSPITALIST

## 2019-03-11 PROCEDURE — 97165 OT EVAL LOW COMPLEX 30 MIN: CPT

## 2019-03-11 PROCEDURE — 97161 PT EVAL LOW COMPLEX 20 MIN: CPT

## 2019-03-11 PROCEDURE — A9270 NON-COVERED ITEM OR SERVICE: HCPCS | Performed by: HOSPITALIST

## 2019-03-11 PROCEDURE — 700102 HCHG RX REV CODE 250 W/ 637 OVERRIDE(OP): Performed by: INTERNAL MEDICINE

## 2019-03-11 PROCEDURE — A9270 NON-COVERED ITEM OR SERVICE: HCPCS | Performed by: INTERNAL MEDICINE

## 2019-03-11 RX ADMIN — HEPARIN SODIUM 5000 UNITS: 5000 INJECTION, SOLUTION INTRAVENOUS; SUBCUTANEOUS at 05:00

## 2019-03-11 RX ADMIN — PREDNISONE 10 MG: 10 TABLET ORAL at 05:01

## 2019-03-11 RX ADMIN — SERTRALINE HYDROCHLORIDE 100 MG: 100 TABLET ORAL at 05:01

## 2019-03-11 RX ADMIN — VITAMIN D, TAB 1000IU (100/BT) 1000 UNITS: 25 TAB at 05:01

## 2019-03-11 RX ADMIN — HEPARIN SODIUM 5000 UNITS: 5000 INJECTION, SOLUTION INTRAVENOUS; SUBCUTANEOUS at 14:29

## 2019-03-11 RX ADMIN — ACETAMINOPHEN 650 MG: 325 TABLET, FILM COATED ORAL at 05:02

## 2019-03-11 RX ADMIN — METOPROLOL SUCCINATE 100 MG: 100 TABLET, EXTENDED RELEASE ORAL at 05:01

## 2019-03-11 ASSESSMENT — COGNITIVE AND FUNCTIONAL STATUS - GENERAL
DAILY ACTIVITIY SCORE: 23
MOBILITY SCORE: 24
HELP NEEDED FOR BATHING: A LITTLE
SUGGESTED CMS G CODE MODIFIER MOBILITY: CH
SUGGESTED CMS G CODE MODIFIER DAILY ACTIVITY: CI

## 2019-03-11 ASSESSMENT — GAIT ASSESSMENTS
ASSISTIVE DEVICE: FRONT WHEEL WALKER
DISTANCE (FEET): 150
GAIT LEVEL OF ASSIST: MODIFIED INDEPENDENT

## 2019-03-11 ASSESSMENT — ACTIVITIES OF DAILY LIVING (ADL): TOILETING: INDEPENDENT

## 2019-03-11 NOTE — THERAPY
"Occupational Therapy Evaluation completed.   Functional Status:  SPV level BADLs in this setting  Plan of Care: Patient with no further skilled OT needs in the acute care setting at this time  Discharge Recommendations:  Equipment: No Equipment Needed. Post-acute therapy Currently anticipate no further skilled therapy needs once patient is discharged from the inpatient setting.    See \"Rehab Therapy-Acute\" Patient Summary Report for complete documentation.      Pt is a 72 y/o female who presents to acute 2/2 B LE ulcerations and cellulitis. Pt reports that she lives in 2 story home w/ roommate and has an independent PLOF. Pt demo'd toileting, grooming, and LB dressing at SPV level in this setting. Appears to be at functional baseline. No further Acute OT needs noted.   "

## 2019-03-11 NOTE — PROGRESS NOTES
Assumed care of patient at 0700. Patient is alert and oriented, respirations are unlabored and regular, patient sitting up in bed at this time. Patient denies pain, states intermittent burning to BLE. Dressing to BLE to be changed tonight. Wound care contacted because patient would like wound team to see patient today. Wound team is unable to see patient and states there is not a need to see her today. Nursing is completing dressing changes. +bowel sounds, +gas, voiding without difficulty. Bed Iowest position, call light within reach, patient states no needs at this time.

## 2019-03-11 NOTE — DISCHARGE PLANNING
Anticipated Discharge Disposition: Home with Outpatient WC    Action: This RN CM spoke with Dorothea at [facility] .  The pt has an appointment on Wed. 3/13/19 at 0930.  PT/OT has also cleared the pt for discharge.    Barriers to Discharge: none    Plan: No dc needs identified at this time.

## 2019-03-11 NOTE — DISCHARGE INSTRUCTIONS
Discharge Instructions    Discharged to home by car with relative. Discharged via wheelchair, hospital escort: Refused.  Special equipment needed: Not Applicable    Be sure to schedule a follow-up appointment with your primary care doctor or any specialists as instructed.     Discharge Plan:   Diet Plan: Discussed  Activity Level: Discussed  Confirmed Follow up Appointment: Patient to Call and Schedule Appointment  Confirmed Symptoms Management: Discussed  Medication Reconciliation Updated: Yes  Influenza Vaccine Indication: Patient Refuses    I understand that a diet low in cholesterol, fat, and sodium is recommended for good health. Unless I have been given specific instructions below for another diet, I accept this instruction as my diet prescription.   Other diet: regular    Special Instructions: None    · Is patient discharged on Warfarin / Coumadin?   No     Depression / Suicide Risk    As you are discharged from this [facility] facility, it is important to learn how to keep safe from harming yourself.    Recognize the warning signs:  · Abrupt changes in personality, positive or negative- including increase in energy   · Giving away possessions  · Change in eating patterns- significant weight changes-  positive or negative  · Change in sleeping patterns- unable to sleep or sleeping all the time   · Unwillingness or inability to communicate  · Depression  · Unusual sadness, discouragement and loneliness  · Talk of wanting to die  · Neglect of personal appearance   · Rebelliousness- reckless behavior  · Withdrawal from people/activities they love  · Confusion- inability to concentrate     If you or a loved one observes any of these behaviors or has concerns about self-harm, here's what you can do:  · Talk about it- your feelings and reasons for harming yourself  · Remove any means that you might use to hurt yourself (examples: pills, rope, extension cords, firearm)  · Get professional help from the community  (Mental Health, Substance Abuse, psychological counseling)  · Do not be alone:Call your Safe Contact- someone whom you trust who will be there for you.  · Call your local CRISIS HOTLINE 260-9430 or 638-935-6052  · Call your local Children's Mobile Crisis Response Team [place] (987) 577-9487 or www.[place] Mobile  · Call the toll free National Suicide Prevention Hotlines   · National Suicide Prevention Lifeline 053-997-RGBP (0785)  · National Hope Line Network 800-SUICIDE (489-3373)

## 2019-03-11 NOTE — THERAPY
"Physical Therapy Evaluation completed.   Bed Mobility:  Supine to Sit: Modified Independent  Transfers: Sit to Stand: Supervised  Gait: Level Of Assist: Modified Independent with Front-Wheel Walker       Plan of Care: Patient with no further skilled PT needs in the acute care setting at this time and Patient demonstrates safety with mobility in this environment at this time.   Discharge Recommendations: Equipment: No Equipment Needed. Post-acute therapy Currently anticipate no further skilled therapy needs once patient is discharged from the inpatient setting.    See \"Rehab Therapy-Acute\" Patient Summary Report for complete documentation.       Pt is a 72 y/o female presenting to acute setting with recurrent BLE cellulitis. Pt able to demonstrate good safety, balance and activity tolerance at this time and has no further acute PT needs. Pt appears safe to DC home from a PT perspective.   "

## 2019-03-11 NOTE — PROGRESS NOTES
Discharging Patient home per physician order.  Discharged with son.  Demonstrated understanding of discharge instructions, follow up appointments, home medications, prescriptions, home care for surgical wound, and nursing care instructions for wound care.  Ambulating without assistance, voiding without difficulty, pain well controlled, tolerating oral medications, oxygen saturation greater than 90% , tolerating diet.   Educational handouts wound care given and discussed.  Verbalized understanding of discharge instructions and educational handouts.  All questions answered.  Belongings with patient at time of discharge.

## 2019-03-11 NOTE — PROGRESS NOTES
"/70   Pulse 84   Temp 36.7 °C (98 °F) (Temporal)   Resp 16   Ht 1.702 m (5' 7\")   Wt 73.4 kg (161 lb 13.1 oz)   SpO2 96%   Breastfeeding? No   BMI 25.34 kg/m²     Patient is A&Ox4.   Reports burning sensation to BLE, medicated per CAT LIU, CMS intact, denies numbness and tingling.   Mobilizes with SBA and fww, calls for assistance.   On RA, denies SOB or chest pain.   Normoactive BS x 4. Tolerating diet. Denies N&V.   + flatus, + BM. Pt is voiding.   Healed scars from blisters noted all over her body.   Dressing to BLE c/d/i   PIV SL   Updated on POC. Belongings and call light within reach. All needs met at this time.     "

## 2019-03-11 NOTE — CARE PLAN
Problem: Pain Management  Goal: Pain level will decrease to patient's comfort goal  Outcome: PROGRESSING AS EXPECTED  Pain managed with Tylenol PRN.     Problem: Safety  Goal: Will remain free from injury  Outcome: PROGRESSING AS EXPECTED  Safety precautions in place. Bed in locked/low position. 2 side rails up. Treaded socks. Call light in reach, calls appropriately. Hourly rounding practiced.

## 2019-03-11 NOTE — DISCHARGE SUMMARY
Discharge Summary    CHIEF COMPLAINT ON ADMISSION  Chief Complaint   Patient presents with   • Leg Swelling     (R) LE, with increasing redness since Monday.         Reason for Admission  Leg Swelling     Admission Date  3/8/2019    CODE STATUS  Full Code    HPI & HOSPITAL COURSE     73-year-old female with past medical history of hypertension, hyperlipidemia and chronic mental status presented to the hospital on March 8, 2019 with right lower extremity pain and swelling for 3 days.  She has prior history of cellulitis on lower extremities.  She was started on vancomycin and cefepime.  She did not show significant signs of infection on my exam on next day I requested consult with infectious disease and Dr. Valencia evaluated her and recommended that she does not need antibiotic.  Wound care has been following her as outpatient and I requested  to arrange wound care for her.  Physical therapy evaluated her and recommended that she does not need any further needs from physical therapy and she can be discharged home.  On the day of discharge the lower extreme swelling has been decreased significantly and erythema has improved.  Today she feels ready to be discharge home.  I recommended her close outpatient follow-up.    Therefore, she is discharged in fair and stable condition to home with close outpatient follow-up.    The patient met 2-midnight criteria for an inpatient stay at the time of discharge.    Discharge Date  03/11/19     FOLLOW UP ITEMS POST DISCHARGE  Wound care  Primary care provider    DISCHARGE DIAGNOSES  Active Problems:    Bullous pemphigus POA: Yes    Essential hypertension POA: Yes    Macrocytic anemia POA: Yes  Resolved Problems:    Cellulitis of right lower extremity POA: Yes    Hyponatremia POA: Yes      FOLLOW UP  Future Appointments  Date Time Provider Department Center   3/13/2019 9:30 AM Leonor Siegel R.N. [address]   3/14/2019 3:20 PM RYANN Son [facility]    3/15/2019 9:30 AM Scar Jesus R.N. [facility]   3/18/2019 9:30 AM Leonor Siegel R.N. [facility] [address]   3/20/2019 8:00 AM Tae Santana R.N. [facility] [address]   3/20/2019 9:30 AM Leonor Siegel R.N. [facility] [address]   3/22/2019 9:30 AM Lisseth Ghosh R.N. [facility] [address]   3/25/2019 9:30 AM Leonor Siegel R.N. [facility] [address]   3/27/2019 9:30 AM Leonor Siegel R.N. [facility] [address]   3/29/2019 8:30 AM GHULAM ThomasNNadia [facility] [address]   4/1/2019 10:30 AM GHULAM ThomasNNadia [facility]   4/3/2019 10:30 AM GHULAM ThomasNNadia [facility]   4/5/2019 10:30 AM SIOMARA Thomas.N. [facility] [address]   6/12/2019 11:00 AM RYANN Son [facility] [place]     RYANN Son  [address]  [facility] [address]  [phone]    Schedule an appointment as soon as possible for a visit in 1 week        MEDICATIONS ON DISCHARGE     Medication List      CONTINUE taking these medications      Instructions   metoprolol  MG Tb24  Commonly known as:  TOPROL XL   Take 100 mg by mouth every day.  Dose:  100 mg     niacin 500 MG Tabs   Take 500 mg by mouth 3 times a day.  Dose:  500 mg     predniSONE 20 MG Tabs  Commonly known as:  DELTASONE   Take 40 mg by mouth every day. 5-day course Starting 02/05/19 Due to End 02/09/19.  Dose:  40 mg     sertraline 100 MG Tabs  Commonly known as:  ZOLOFT   Take 100 mg by mouth every day.  Dose:  100 mg     THERAPEUTIC-M/LUTEIN Tabs   Take 1 Tab by mouth every day.  Dose:  1 Tab     triamcinolone acetonide 0.1 % Crea  Commonly known as:  KENALOG   Apply to rash BID PRN     vitamin B-12 1000 MCG Tabs   Take 1,000 mcg by mouth every day.  Dose:  1000 mcg     vitamin D 1000 UNIT Tabs  Commonly known as:  cholecalciferol   Take 1,000 Units by mouth every day.  Dose:  1000 Units        STOP taking these medications    ciprofloxacin 500 MG Tabs  Commonly known as:  CIPRO     doxycycline 100 MG Tabs  Commonly known as:  VIBRAMYCIN            Allergies  Allergies   Allergen Reactions   •  Bactrim [Sulfamethoxazole-Trimethoprim] Rash     Diffuse pruritic skin rash with blisters (no mucous membrane involvement) (was also taking cipro at the time - reaction thought more likely to be 2/2 Bactrim)   • Meropenem Unspecified     Pt can not remember reaction         DIET  Orders Placed This Encounter   Procedures   • Diet Order Cardiac, 2 Gram Sodium     Standing Status:   Standing     Number of Occurrences:   1     Order Specific Question:   Diet:     Answer:   Cardiac [6]     Order Specific Question:   Diet:     Answer:   2 Gram Sodium [7]       ACTIVITY  As tolerated.  Weight bearing as tolerated    CONSULTATIONS  Infectious disease    PROCEDURES  None    LABORATORY  Lab Results   Component Value Date    SODIUM 135 03/10/2019    POTASSIUM 3.8 03/10/2019    CHLORIDE 104 03/10/2019    CO2 23 03/10/2019    GLUCOSE 108 (H) 03/10/2019    BUN 11 03/10/2019    CREATININE 0.59 03/10/2019        Lab Results   Component Value Date    WBC 7.1 03/10/2019    HEMOGLOBIN 9.5 (L) 03/10/2019    HEMATOCRIT 30.6 (L) 03/10/2019    PLATELETCT 269 03/10/2019        Total time of the discharge process exceeds 38 minutes.

## 2019-03-12 ENCOUNTER — PATIENT OUTREACH (OUTPATIENT)
Dept: HEALTH INFORMATION MANAGEMENT | Facility: OTHER | Age: 74
End: 2019-03-12

## 2019-03-12 NOTE — PROGRESS NOTES
Outbound call to patient for post discharge medication review. Left VM with my contact information and request for return call. Reviewed medications. No clinically significant interactions noted.     Elidia Choi, PharmD

## 2019-03-12 NOTE — PROGRESS NOTES
2nd outreach call to pt to follow up to discuss information regarding her [employer] application and determine if pt has heard from [facility] regarding [employer] application that was submitted. MSW received pt's VM and message was left requesting call back.      Plan:  · MSW will attempt to reach pt at later time/date, if MSW does not hear back from pt.

## 2019-03-13 ENCOUNTER — NON-PROVIDER VISIT (OUTPATIENT)
Dept: WOUND CARE | Facility: MEDICAL CENTER | Age: 74
End: 2019-03-13
Attending: NURSE PRACTITIONER
Payer: MEDICARE

## 2019-03-13 PROCEDURE — 97597 DBRDMT OPN WND 1ST 20 CM/<: CPT

## 2019-03-13 PROCEDURE — 99211 OFF/OP EST MAY X REQ PHY/QHP: CPT

## 2019-03-13 NOTE — CERTIFICATION
Advanced Wound Care  [facility]  [address]  [address]  [place] [address]  [phone] Fax: [phone]    Initial Evaluation  For Certification Period: 03/13/2019 - 06/03/2019  Start of Care: 03/13/2019    Referring Physician: Leeann Chun [facility] Hospitalist - sent certification to PCP  Primary Physician: Leena PERDUE              Wound(s): Right dorsomedial foot; right anterior superior and inferior leg; right posteromedial leg; left posterior LE cluster; left medial LE; left lateral distal LE; left medial ankle       Subjective:        HPI:               WOUND HISTORY: Patient has a long wound history with a hip and ankle fracture.  Developed a wound over the medial ankle, a prominent malleolus and the wound likely developed from pressure.               PREVIOUS TREATMENT: Debridement, NPWT, hospitalization with IV abx and Veriflow, which really worked              PERTINENT PMH: Recent diagnosis of Bullous Pemphigous - steroids for 2 months and antibiotics. Peripheral vascular disease with leftfem-pop bypass     3/13/19: Patient was referred back to the wound clinic after discharge from the hospital for cellulitis on 3/12/19. Presents today to wound clinic with anxiety, shortness of breath, and overall not feeling well. VS taken 97.3 F, 93, 22, 158/85, 98% on RA. At the end of appt, I retook her temp as she was shaking like she had the chills and her temp had gone up to 98.8 F. I advised patient to go to the ER as soon as possible and patient asked that I do her wound care first and then she would go  some stuff from home before going to the ER.                Pain:  5/10 pain          Past Medical History:  Past Medical History:   Diagnosis Date   • Anxiety    • Cellulitis and abscess of lower extremity    • Dental disorder     full dentures   • Generalized osteoarthritis of multiple sites 10/20/2015   • Heart valve disease     pt not sure    • Hyperlipidemia    •  Hypertension    • Osteoporosis      Current Medications:  Current Outpatient Prescriptions:   •  sertraline (ZOLOFT) 100 MG Tab, Take 100 mg by mouth every day., Disp: , Rfl:   •  metoprolol SR (TOPROL XL) 100 MG TABLET SR 24 HR, Take 100 mg by mouth every day., Disp: , Rfl:   •  predniSONE (DELTASONE) 20 MG Tab, Take 40 mg by mouth every day. 5-day course Starting 02/05/19 Due to End 02/09/19., Disp: , Rfl:   •  Cyanocobalamin (VITAMIN B-12) 1000 MCG Tab, Take 1,000 mcg by mouth every day., Disp: , Rfl:   •  niacin 500 MG Tab, Take 500 mg by mouth 3 times a day., Disp: , Rfl:   •  Multiple Vitamins-Minerals (THERAPEUTIC-M/LUTEIN) Tab, Take 1 Tab by mouth every day., Disp: , Rfl: 0  •  triamcinolone acetonide (KENALOG) 0.1 % Cream, Apply to rash BID PRN, Disp: 120 g, Rfl: 0  •  vitamin D (CHOLECALCIFEROL) 1000 UNIT Tab, Take 1,000 Units by mouth every day., Disp: , Rfl:     Allergies:   Allergies   Allergen Reactions   • Bactrim [Sulfamethoxazole-Trimethoprim] Rash     Diffuse pruritic skin rash with blisters (no mucous membrane involvement) (was also taking cipro at the time - reaction thought more likely to be 2/2 Bactrim)   • Meropenem Unspecified     Pt can not remember reaction       Past Surgical History:   Past Surgical History:   Procedure Laterality Date   • FEMORAL POPLITEAL BYPASS Left 6/8/2018    Procedure: FEMORAL POPLITEAL BYPASS;  Surgeon: Milana Colin M.D.;  Location: SURGERY [facility];  Service: General   • IRRIGATION & DEBRIDEMENT GENERAL Left 6/8/2018    Procedure: IRRIGATION & DEBRIDEMENT ANKLE WOUND;  Surgeon: Milana Colin M.D.;  Location: SURGERY [facility];  Service: General   • IRRIGATION & DEBRIDEMENT HIP Left 6/4/2018    Procedure: IRRIGATION & DEBRIDEMENT HIP;  Surgeon: Chong Vazquez M.D.;  Location: SURGERY [facility];  Service: Orthopedics   • HIP REVISION TOTAL Left 2/11/2018    Procedure: HIP REVISION TOTAL- femoral nail removal conversion to total hip  arthoroplasty;  Surgeon: Chong Vazquez M.D.;  Location: SURGERY [facility];  Service: Orthopedics   • HIP NAILING INTRAMEDULLARY Left 12/20/2017    Procedure: HIP NAILING INTRAMEDULLARY;  Surgeon: Jorge Peters M.D.;  Location: SURGERY [facility];  Service: Orthopedics   • BREAST BIOPSY  8/28/2014    Performed by Magdalena Londono M.D. at SURGERY 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  [employer]     Social History Main Topics   • Smoking status: Former Smoker     Packs/day: 0.50     Years: 25.00     Types: Cigarettes     Quit date: 1/1/2007   • Smokeless tobacco: Never Used   • Alcohol use 3.0 oz/week     5 Glasses of wine per week      Comment: glass of wine per day   • Drug use: No   • Sexual activity: Not Currently     Partners: Male     Other Topics Concern   • Not on file     Social History Narrative   • No narrative on file      Objective:      Tests and Measures:  03/14/2019: Monophasic DP, PT and biphasic popliteal on doppler  VASCULAR STUDIES:Arterial duplex shows patent left fem-pop without stenosis - per Dr. Colin note    Orthotic, protective, supportive devices: none    Fall Risk Assessment (jeronimo all that apply with an X):  Completed 03/14/2019 patient is a fall risk    x65 years or older     xFall within the last 2 years, uses   xAmbulatory devices  xLoss of protective sensation in feet,   xUse of prostethic/orthotic, years    Presence of lower extremity/foot/toe amputation   xTaking medication that increases risk (per facility policy)    Interventions Recommended (if any of the above are selected):   Use  of Assistive Device: Patient ambulates with walker   Supervision with ambulation:  Caregiver   Assistance with ambulation:  Caregiver   Home safety education:  Educational material provided    Patient Education: Patient well versed in when to present to the ER for s/s of infection, she is aware of how to change dressings if needed.      Professional Collaboration: Initial evaluation sent to PCP as referring physician is [facility] Hospitalist      Assessment:      Wound etiology: Venous     Wound Progress:  Initial Evaluation    Rationale for Treatment: AqAg to manage bioburden, absorb exudate, and maintain moist wound environment without laterally wicking exudate therefore reducing yuki-wound maceration.    Patient tolerance/compliance: Patient tolerated debridement well     Complicating factors: Age, limited mobility, infection, chronicity of wound     Need for ongoing Advanced Wound Care services:Patient requires skilled therapeutic wound care services for product selection, application of product, debridement, close monitoring with clinical assessment for expedite of wound healing.     Plan:      Treatment Plan and Recommendations:  Diagnosis/ICD10: T14.8XXA; L08.9 Wound infection    Procedures/CPT: 45607    Frequency: 3x week    Treatment Goals: STG 2 Weeks  LTG 4 Weeks   Granulation Tissue: 100% 100%   Decrease Necrotic Tissue to: 0% 0%   Wound Phase:  Proliferative Proliferative   Decrease Size by: 10% 25%   Periwound:  Intact Intact   Decrease tracts/undermining by: 0% 0%   Decrease Pain:  3/10 0/10     At the time of each visit a thorough assessment of the patient is completed to assure the  appropriateness of our plan of care.  The dressings or modalities may need to be adapted   from the original plan to address any significant changes in the wound environment.    Clinician Signature:_______________________________Date__________________      Physician  Signature:______________________________Date:__________________

## 2019-03-14 ENCOUNTER — APPOINTMENT (OUTPATIENT)
Dept: RADIOLOGY | Facility: MEDICAL CENTER | Age: 74
DRG: 603 | End: 2019-03-14
Attending: EMERGENCY MEDICINE
Payer: MEDICARE

## 2019-03-14 ENCOUNTER — HOSPITAL ENCOUNTER (INPATIENT)
Facility: MEDICAL CENTER | Age: 74
LOS: 4 days | DRG: 603 | End: 2019-03-18
Attending: EMERGENCY MEDICINE | Admitting: HOSPITALIST
Payer: MEDICARE

## 2019-03-14 DIAGNOSIS — L03.115 CELLULITIS OF RIGHT LOWER EXTREMITY: ICD-10-CM

## 2019-03-14 DIAGNOSIS — L97.922 LEG ULCER, LEFT, WITH FAT LAYER EXPOSED (HCC): ICD-10-CM

## 2019-03-14 DIAGNOSIS — L10.9 BULLOUS PEMPHIGUS: ICD-10-CM

## 2019-03-14 DIAGNOSIS — L03.116 CELLULITIS OF LEFT LOWER EXTREMITY: ICD-10-CM

## 2019-03-14 PROBLEM — L03.90 CELLULITIS: Status: ACTIVE | Noted: 2019-03-14

## 2019-03-14 LAB
ALBUMIN SERPL BCP-MCNC: 3.4 G/DL (ref 3.2–4.9)
ALBUMIN/GLOB SERPL: 0.8 G/DL
ALP SERPL-CCNC: 71 U/L (ref 30–99)
ALT SERPL-CCNC: 15 U/L (ref 2–50)
ANION GAP SERPL CALC-SCNC: 13 MMOL/L (ref 0–11.9)
APPEARANCE UR: CLEAR
AST SERPL-CCNC: 22 U/L (ref 12–45)
BACTERIA #/AREA URNS HPF: ABNORMAL /HPF
BASOPHILS # BLD AUTO: 0.4 % (ref 0–1.8)
BASOPHILS # BLD: 0.05 K/UL (ref 0–0.12)
BILIRUB SERPL-MCNC: 0.3 MG/DL (ref 0.1–1.5)
BILIRUB UR QL STRIP.AUTO: NEGATIVE
BNP SERPL-MCNC: 403 PG/ML (ref 0–100)
BUN SERPL-MCNC: 8 MG/DL (ref 8–22)
CALCIUM SERPL-MCNC: 9.1 MG/DL (ref 8.5–10.5)
CHLORIDE SERPL-SCNC: 100 MMOL/L (ref 96–112)
CO2 SERPL-SCNC: 20 MMOL/L (ref 20–33)
COLOR UR: YELLOW
CREAT SERPL-MCNC: 0.61 MG/DL (ref 0.5–1.4)
CRP SERPL HS-MCNC: 14.81 MG/DL (ref 0–0.75)
EOSINOPHIL # BLD AUTO: 0.04 K/UL (ref 0–0.51)
EOSINOPHIL NFR BLD: 0.3 % (ref 0–6.9)
EPI CELLS #/AREA URNS HPF: NEGATIVE /HPF
ERYTHROCYTE [DISTWIDTH] IN BLOOD BY AUTOMATED COUNT: 54.6 FL (ref 35.9–50)
ERYTHROCYTE [SEDIMENTATION RATE] IN BLOOD BY WESTERGREN METHOD: 80 MM/HOUR (ref 0–30)
GLOBULIN SER CALC-MCNC: 4.2 G/DL (ref 1.9–3.5)
GLUCOSE SERPL-MCNC: 101 MG/DL (ref 65–99)
GLUCOSE UR STRIP.AUTO-MCNC: NEGATIVE MG/DL
HCT VFR BLD AUTO: 32.3 % (ref 37–47)
HGB BLD-MCNC: 10 G/DL (ref 12–16)
HYALINE CASTS #/AREA URNS LPF: ABNORMAL /LPF
IMM GRANULOCYTES # BLD AUTO: 0.2 K/UL (ref 0–0.11)
IMM GRANULOCYTES NFR BLD AUTO: 1.5 % (ref 0–0.9)
KETONES UR STRIP.AUTO-MCNC: ABNORMAL MG/DL
LACTATE BLD-SCNC: 3.7 MMOL/L (ref 0.5–2)
LEUKOCYTE ESTERASE UR QL STRIP.AUTO: NEGATIVE
LYMPHOCYTES # BLD AUTO: 0.61 K/UL (ref 1–4.8)
LYMPHOCYTES NFR BLD: 4.5 % (ref 22–41)
MCH RBC QN AUTO: 31 PG (ref 27–33)
MCHC RBC AUTO-ENTMCNC: 31 G/DL (ref 33.6–35)
MCV RBC AUTO: 100 FL (ref 81.4–97.8)
MICRO URNS: ABNORMAL
MONOCYTES # BLD AUTO: 0.34 K/UL (ref 0–0.85)
MONOCYTES NFR BLD AUTO: 2.5 % (ref 0–13.4)
NEUTROPHILS # BLD AUTO: 12.26 K/UL (ref 2–7.15)
NEUTROPHILS NFR BLD: 90.8 % (ref 44–72)
NITRITE UR QL STRIP.AUTO: NEGATIVE
NRBC # BLD AUTO: 0 K/UL
NRBC BLD-RTO: 0 /100 WBC
PH UR STRIP.AUTO: 5 [PH]
PLATELET # BLD AUTO: 372 K/UL (ref 164–446)
PMV BLD AUTO: 8.9 FL (ref 9–12.9)
POTASSIUM SERPL-SCNC: 4.4 MMOL/L (ref 3.6–5.5)
PROT SERPL-MCNC: 7.6 G/DL (ref 6–8.2)
PROT UR QL STRIP: NEGATIVE MG/DL
RBC # BLD AUTO: 3.23 M/UL (ref 4.2–5.4)
RBC # URNS HPF: ABNORMAL /HPF
RBC UR QL AUTO: ABNORMAL
SODIUM SERPL-SCNC: 133 MMOL/L (ref 135–145)
SP GR UR STRIP.AUTO: 1.01
UROBILINOGEN UR STRIP.AUTO-MCNC: 0.2 MG/DL
WBC # BLD AUTO: 13.5 K/UL (ref 4.8–10.8)
WBC #/AREA URNS HPF: ABNORMAL /HPF

## 2019-03-14 PROCEDURE — A9270 NON-COVERED ITEM OR SERVICE: HCPCS | Performed by: HOSPITALIST

## 2019-03-14 PROCEDURE — 99223 1ST HOSP IP/OBS HIGH 75: CPT | Performed by: INTERNAL MEDICINE

## 2019-03-14 PROCEDURE — 80053 COMPREHEN METABOLIC PANEL: CPT

## 2019-03-14 PROCEDURE — 87077 CULTURE AEROBIC IDENTIFY: CPT | Mod: 91

## 2019-03-14 PROCEDURE — 87086 URINE CULTURE/COLONY COUNT: CPT

## 2019-03-14 PROCEDURE — 700111 HCHG RX REV CODE 636 W/ 250 OVERRIDE (IP): Performed by: EMERGENCY MEDICINE

## 2019-03-14 PROCEDURE — 81001 URINALYSIS AUTO W/SCOPE: CPT

## 2019-03-14 PROCEDURE — 700105 HCHG RX REV CODE 258: Performed by: EMERGENCY MEDICINE

## 2019-03-14 PROCEDURE — 96368 THER/DIAG CONCURRENT INF: CPT

## 2019-03-14 PROCEDURE — 99223 1ST HOSP IP/OBS HIGH 75: CPT | Performed by: HOSPITALIST

## 2019-03-14 PROCEDURE — 83880 ASSAY OF NATRIURETIC PEPTIDE: CPT

## 2019-03-14 PROCEDURE — 770006 HCHG ROOM/CARE - MED/SURG/GYN SEMI*

## 2019-03-14 PROCEDURE — 85652 RBC SED RATE AUTOMATED: CPT

## 2019-03-14 PROCEDURE — 85025 COMPLETE CBC W/AUTO DIFF WBC: CPT

## 2019-03-14 PROCEDURE — 87186 SC STD MICRODIL/AGAR DIL: CPT | Mod: 91

## 2019-03-14 PROCEDURE — 83605 ASSAY OF LACTIC ACID: CPT

## 2019-03-14 PROCEDURE — 700102 HCHG RX REV CODE 250 W/ 637 OVERRIDE(OP): Performed by: HOSPITALIST

## 2019-03-14 PROCEDURE — 71045 X-RAY EXAM CHEST 1 VIEW: CPT

## 2019-03-14 PROCEDURE — 87040 BLOOD CULTURE FOR BACTERIA: CPT

## 2019-03-14 PROCEDURE — 700111 HCHG RX REV CODE 636 W/ 250 OVERRIDE (IP): Performed by: HOSPITALIST

## 2019-03-14 PROCEDURE — 96365 THER/PROPH/DIAG IV INF INIT: CPT

## 2019-03-14 PROCEDURE — 700105 HCHG RX REV CODE 258: Performed by: HOSPITALIST

## 2019-03-14 PROCEDURE — 86140 C-REACTIVE PROTEIN: CPT

## 2019-03-14 PROCEDURE — 99285 EMERGENCY DEPT VISIT HI MDM: CPT

## 2019-03-14 PROCEDURE — 96366 THER/PROPH/DIAG IV INF ADDON: CPT

## 2019-03-14 RX ORDER — ONDANSETRON 4 MG/1
4 TABLET, ORALLY DISINTEGRATING ORAL EVERY 4 HOURS PRN
Status: DISCONTINUED | OUTPATIENT
Start: 2019-03-14 | End: 2019-03-18 | Stop reason: HOSPADM

## 2019-03-14 RX ORDER — ONDANSETRON 2 MG/ML
4 INJECTION INTRAMUSCULAR; INTRAVENOUS EVERY 4 HOURS PRN
Status: DISCONTINUED | OUTPATIENT
Start: 2019-03-14 | End: 2019-03-18 | Stop reason: HOSPADM

## 2019-03-14 RX ORDER — OXYCODONE HYDROCHLORIDE 5 MG/1
5-10 TABLET ORAL EVERY 4 HOURS PRN
Status: DISCONTINUED | OUTPATIENT
Start: 2019-03-14 | End: 2019-03-18 | Stop reason: HOSPADM

## 2019-03-14 RX ORDER — SERTRALINE HYDROCHLORIDE 100 MG/1
100 TABLET, FILM COATED ORAL DAILY
Status: DISCONTINUED | OUTPATIENT
Start: 2019-03-15 | End: 2019-03-18 | Stop reason: HOSPADM

## 2019-03-14 RX ORDER — BISACODYL 10 MG
10 SUPPOSITORY, RECTAL RECTAL
Status: DISCONTINUED | OUTPATIENT
Start: 2019-03-14 | End: 2019-03-18 | Stop reason: HOSPADM

## 2019-03-14 RX ORDER — AMOXICILLIN 250 MG
2 CAPSULE ORAL 2 TIMES DAILY
Status: DISCONTINUED | OUTPATIENT
Start: 2019-03-14 | End: 2019-03-18 | Stop reason: HOSPADM

## 2019-03-14 RX ORDER — ACETAMINOPHEN 325 MG/1
650 TABLET ORAL EVERY 6 HOURS PRN
Status: DISCONTINUED | OUTPATIENT
Start: 2019-03-14 | End: 2019-03-18 | Stop reason: HOSPADM

## 2019-03-14 RX ORDER — METOPROLOL SUCCINATE 100 MG/1
100 TABLET, EXTENDED RELEASE ORAL DAILY
Status: DISCONTINUED | OUTPATIENT
Start: 2019-03-15 | End: 2019-03-18 | Stop reason: HOSPADM

## 2019-03-14 RX ORDER — PREDNISONE 20 MG/1
40 TABLET ORAL DAILY
Status: DISCONTINUED | OUTPATIENT
Start: 2019-03-15 | End: 2019-03-18 | Stop reason: HOSPADM

## 2019-03-14 RX ORDER — POLYETHYLENE GLYCOL 3350 17 G/17G
1 POWDER, FOR SOLUTION ORAL
Status: DISCONTINUED | OUTPATIENT
Start: 2019-03-14 | End: 2019-03-18 | Stop reason: HOSPADM

## 2019-03-14 RX ADMIN — VANCOMYCIN HYDROCHLORIDE 1900 MG: 100 INJECTION, POWDER, LYOPHILIZED, FOR SOLUTION INTRAVENOUS at 11:30

## 2019-03-14 RX ADMIN — CEFEPIME 2 G: 2 INJECTION, POWDER, FOR SOLUTION INTRAVENOUS at 20:21

## 2019-03-14 RX ADMIN — CEFEPIME 2 G: 2 INJECTION, POWDER, FOR SOLUTION INTRAVENOUS at 11:25

## 2019-03-14 RX ADMIN — ACETAMINOPHEN 650 MG: 325 TABLET, FILM COATED ORAL at 22:10

## 2019-03-14 ASSESSMENT — COGNITIVE AND FUNCTIONAL STATUS - GENERAL
DRESSING REGULAR LOWER BODY CLOTHING: A LITTLE
STANDING UP FROM CHAIR USING ARMS: A LITTLE
SUGGESTED CMS G CODE MODIFIER MOBILITY: CJ
DAILY ACTIVITIY SCORE: 21
CLIMB 3 TO 5 STEPS WITH RAILING: A LITTLE
TOILETING: A LITTLE
SUGGESTED CMS G CODE MODIFIER DAILY ACTIVITY: CJ
HELP NEEDED FOR BATHING: A LITTLE
WALKING IN HOSPITAL ROOM: A LITTLE
MOBILITY SCORE: 21

## 2019-03-14 ASSESSMENT — ENCOUNTER SYMPTOMS
COUGH: 0
DIARRHEA: 1
NERVOUS/ANXIOUS: 0
FLANK PAIN: 0
FEVER: 0
MYALGIAS: 1
NAUSEA: 0
ABDOMINAL PAIN: 0
DEPRESSION: 0
CHILLS: 0
SHORTNESS OF BREATH: 0
HEADACHES: 0
VOMITING: 0
SPUTUM PRODUCTION: 0
BLURRED VISION: 0

## 2019-03-14 ASSESSMENT — COPD QUESTIONNAIRES
HAVE YOU SMOKED AT LEAST 100 CIGARETTES IN YOUR ENTIRE LIFE: NO/DON'T KNOW
DO YOU EVER COUGH UP ANY MUCUS OR PHLEGM?: NO/ONLY WITH OCCASIONAL COLDS OR INFECTIONS
IN THE PAST 12 MONTHS DO YOU DO LESS THAN YOU USED TO BECAUSE OF YOUR BREATHING PROBLEMS: DISAGREE/UNSURE
COPD SCREENING SCORE: 2
DURING THE PAST 4 WEEKS HOW MUCH DID YOU FEEL SHORT OF BREATH: NONE/LITTLE OF THE TIME

## 2019-03-14 ASSESSMENT — LIFESTYLE VARIABLES: EVER_SMOKED: YES

## 2019-03-14 NOTE — ED TRIAGE NOTES
"Pt with chronic wound to LLE and was at wound clinic yesterday. Pt discharged from hospital on 3/12 for RLE swelling. Pt told she has cellulitis on RLE, pt now c/o increased swelling to RLE \"again\".   "

## 2019-03-14 NOTE — ED NOTES
Redness and pitting edema to bilateral lower legs. Pt states she sees wound clinic and has for some time about the left lower leg wounds/swelling. The swelling/redness to right leg is new. Pt also has blistered wounds all over body, which she says she is currently under the treatment of a dermatologist for.

## 2019-03-14 NOTE — ED NOTES
Pt ambulated to restroom, provided urine sample that was sent to lab. Pt making phone calls to family to let them know she is being admitted.

## 2019-03-14 NOTE — DISCHARGE PLANNING
Care Transition Team Assessment      Patient does not currently have an advanced directive, working at completing them at home.  Patient uses a walker on occasion but tries not to use it on a day to day basis.  Patient states this is her 14th admission in seven months (?).  Has wound care done at the Wound Center.  Support is her friend Milana Nicholson (no number listed).  Son, Dg Alvarado ([phone]).  Either one of these persons will be her ride home upon discharge.  Scripts may be filled at the [facility] [address] [facility].      Discharge needs anticipated at this time:  1.  Better discharge plan of wound care considering repeat admissions for the same issue.  2.  Better follow up with PCP.  3.  Possible home health for times between wound care clinic visits.        Information Source  Orientation : Oriented x 4  Information Given By: Patient  Informant's Name: Nadege Mae  Who is responsible for making decisions for patient? : Patient    Interdisciplinary Discharge Planning  Does Admitting Nurse Feel This Could be a Complex Discharge?: Yes  Primary Care Physician:  (ANALISA Son)  Lives with - Patient's Self Care Capacity: Friends  Support Systems: Children, Spouse / Significant Other  Housing / Facility: [address]  Do You Take your Prescribed Medications Regularly: Yes  Able to Return to Previous ADL's: Future Time w/Therapy  Mobility Issues: Yes  Prior Services: Other (Comments) (Wound Care)  Patient Expects to be Discharged to:: Home  Assistance Needed: Unknown at this Time  Durable Medical Equipment: Walker    Discharge Preparedness  What is your plan after discharge?: Home with help  What are your discharge supports?: Child, Other (comment) (Friend)  Prior Functional Level: Uses Walker  Difficulity with ADLs: None  Difficulity with IADLs: None    Functional Assesment  Prior Functional Level: Uses Walker    Finances  Prescription Coverage: Yes    Advance Directive  Advance Directive?: None  Advance  Directive offered?: AD Booklet refused    Domestic Abuse  Have you ever been the victim of abuse or violence?: No    Discharge Risks or Barriers  Discharge risks or barriers?: No    Anticipated Discharge Information  Anticipated discharge disposition: Home  Discharge Address:  ([address] [place] [address])  Discharge Contact Phone Number:  ([phone])

## 2019-03-14 NOTE — PATIENT INSTRUCTIONS
Should you experience any significant changes in your wound(s) such as infection (redness, swelling, localized heat, increased pain, fever >101 F, chills) or have any questions regarding your home care instructions, please contact the wound center [phone]. If after hours, contact your primary care physician or go the hospital emergency room.  Keep dressing clean and dry and cover while bathing. Only change dressing if over saturated, soiled or its falling off.

## 2019-03-14 NOTE — ED PROVIDER NOTES
ED Provider Note    Scribed for Vincenzo Helms M.D. by Matheus Means. 3/14/2019  10:40 AM    Primary care provider: RYANN Son  Means of arrival: Walk-in  History obtained from: Patient  History limited by: None     CHIEF COMPLAINT  Chief Complaint   Patient presents with   • Leg Swelling       HPI  Nadege Mae is a 73 y.o. female who presents to the Emergency Department for gradually worsened right lower extremity swelling onset 2 days ago. The patient is followed by Dr. Valencia (Infectious Disease) and Wound Care for chronic wound to the left lower extremity and last saw them yesterday. She began developing right lower extremity swelling several days ago and was discharged from the hospital on 3/12 for symptoms. She reports developing minimal right lower extremity swelling again 2 days ago that has gradually worsened with redness and increased pain. There are no alleviating or exacerbating factors. She developed chills yesterday, but denies any fever. She has no history of CHF.     REVIEW OF SYSTEMS  Pertinent positives include right lower extremity swelling, redness, and pain and chills. Pertinent negatives include no fever.  All other systems reviewed and negative.    PAST MEDICAL HISTORY   has a past medical history of Anxiety; Cellulitis and abscess of lower extremity; Dental disorder; Generalized osteoarthritis of multiple sites (10/20/2015); Heart valve disease; Hyperlipidemia; Hypertension; and Osteoporosis.    SURGICAL HISTORY   has a past surgical history that includes gyn surgery (1973); breast biopsy (8/28/2014); abdominal hysterectomy total; breast biopsy (Right, 8/14); hip nailing intramedullary (Left, 12/20/2017); hip revision total (Left, 2/11/2018); irrigation & debridement hip (Left, 6/4/2018); femoral popliteal bypass (Left, 6/8/2018); irrigation & debridement general (Left, 6/8/2018); and athroplasty.    SOCIAL HISTORY  Social History   Substance Use Topics   • Smoking  "status: Former Smoker     Packs/day: 0.50     Years: 25.00     Types: Cigarettes     Quit date: 1/1/2007   • Smokeless tobacco: Never Used   • Alcohol use 3.0 oz/week     5 Glasses of wine per week      Comment: glass of wine per day      History   Drug Use No       FAMILY HISTORY  Family History   Problem Relation Age of Onset   • GI Mother         colostomy   • Heart Attack Father    • Diabetes Father    • Hypertension Father    • Psychiatry Brother         bipolar disorder       CURRENT MEDICATIONS  No current facility-administered medications for this encounter.     ALLERGIES  Allergies   Allergen Reactions   • Bactrim [Sulfamethoxazole-Trimethoprim] Rash     Diffuse pruritic skin rash with blisters (no mucous membrane involvement) (was also taking cipro at the time - reaction thought more likely to be 2/2 Bactrim)   • Meropenem Unspecified     Pt can not remember reaction         PHYSICAL EXAM  VITAL SIGNS: /90   Pulse 93   Temp 36.7 °C (98 °F) (Temporal)   Resp 16   Ht 1.702 m (5' 7\")   Wt 74.8 kg (164 lb 14.5 oz)   SpO2 93%   BMI 25.83 kg/m²     Constitutional: Well developed, Well nourished, mild distress, Non-toxic appearance.   HENT: Normocephalic, Atraumatic, Bilateral external ears normal, Oropharynx moist, No oral exudates.   Eyes: PERRLA, EOMI, Conjunctiva normal, No discharge.   Neck: No tenderness, Supple, No stridor.   Lymphatic: No lymphadenopathy noted.   Cardiovascular: Normal heart rate, Normal rhythm.   Thorax & Lungs: Clear to auscultation bilaterally, No respiratory distress, No wheezing, No crackles.   Abdomen: Soft, No tenderness, No masses, No pulsatile masses.   Skin: Warm, Dry.   Extremities: Bilatera pitting edema to gluteal posteriorly from knees circumferentially distally. Right leg with light erythema from proximal tibia/fibula distally, small ulcers on the distal tibia/fibula with slight tenderness to palpation, left leg has multiple bandages from previous ulcers. "   Musculoskeletal: Intact distal pulses  Neurologic: Awake, alert. Moves all extremities spontaneously.  Psychiatric: Affect normal, Judgment normal, Mood normal.     LABS  Labs Reviewed   CBC WITH DIFFERENTIAL - Abnormal; Notable for the following:        Result Value    WBC 13.5 (*)     RBC 3.23 (*)     Hemoglobin 10.0 (*)     Hematocrit 32.3 (*)     .0 (*)     MCHC 31.0 (*)     RDW 54.6 (*)     MPV 8.9 (*)     Neutrophils-Polys 90.80 (*)     Lymphocytes 4.50 (*)     Immature Granulocytes 1.50 (*)     Neutrophils (Absolute) 12.26 (*)     Lymphs (Absolute) 0.61 (*)     Immature Granulocytes (abs) 0.20 (*)     All other components within normal limits   COMP METABOLIC PANEL - Abnormal; Notable for the following:     Sodium 133 (*)     Anion Gap 13.0 (*)     Glucose 101 (*)     Globulin 4.2 (*)     All other components within normal limits   URINALYSIS - Abnormal; Notable for the following:     Ketones Trace (*)     Occult Blood Moderate (*)     All other components within normal limits    Narrative:     Indication for culture:->Emergency Room Patient   LACTIC ACID - Abnormal; Notable for the following:     Lactic Acid 3.7 (*)     All other components within normal limits   BTYPE NATRIURETIC PEPTIDE - Abnormal; Notable for the following:     B Natriuretic Peptide 403 (*)     All other components within normal limits   WESTERGREN SED RATE - Abnormal; Notable for the following:     Sed Rate Westergren 80 (*)     All other components within normal limits   CRP QUANTITIVE (NON-CARDIAC) - Abnormal; Notable for the following:     Stat C-Reactive Protein 14.81 (*)     All other components within normal limits   URINE MICROSCOPIC (W/UA) - Abnormal; Notable for the following:     RBC 2-5 (*)     Bacteria Few (*)     All other components within normal limits    Narrative:     Indication for culture:->Emergency Room Patient   URINE CULTURE(NEW)    Narrative:     Indication for culture:->Emergency Room Patient   BLOOD  "CULTURE    Narrative:     Per Hospital Policy: Only change Specimen Src: to \"Line\" if  specified by physician order.   BLOOD CULTURE    Narrative:     Per Hospital Policy: Only change Specimen Src: to \"Line\" if  specified by physician order.   ESTIMATED GFR   LACTIC ACID   All labs reviewed by me.    RADIOLOGY  DX-CHEST-PORTABLE (1 VIEW)   Final Result      1.  Hyperinflation. No focal consolidation or pleural effusions.   2.  Stable cardiomegaly.      The radiologist's interpretation of all radiological studies have been reviewed by me.    COURSE & MEDICAL DECISION MAKING  Pertinent Labs & Imaging studies reviewed. (See chart for details)    I reviewed the patient's medical records which showed the patient was discharged 3 days ago for right right lower leg cellulitis. She was seen by Dr. Valencia (Infectious Disease) and told she did not need antibiotics. She saw Wound Care yesterday. She was previously admitted in February 2019 for cellulitis.     10:40 AM - Patient seen and examined at bedside. Ordered DX-chest, lactic acid, westergren sed rate, CRP quant, CBC, CMP, urinalysis, urine culture, blood culture x2, and BNP to evaluate her symptoms. The differential diagnoses include but are not limited to: cellulitis vs pitting edema, chronic venous stasis. Informed the patient that she will likely be admitted for further medical treatment and care, which she understands and agrees with.     11:01 AM I spoke with ED Pharmacy to help treat the patient with antibiotics. They recommended treatment with Cefepime 2g and Vancomycin 1.9g.     1:04 PM Reviewed the patient's lab and imaging results as shown above.     1:06 PM I discussed the patient's case and the above findings with Dr. King (Hospitalist) who agreed to admit the patient.      Decision Making:  Patient with increasing redness pain swelling of her right lower leg, history of cell give the patient IV antibiotics, discussed the case with Dr. King, who got " infectious disease involved.  They recommended continuing antibiotics.    DISPOSITION:  Patient will be admitted to Dr. King in guarded condition.     FINAL IMPRESSION  1. Cellulitis of right lower extremity         IMatheus (Scribe), am scribing for, and in the presence of, Vincenzo Helms M.D..    Electronically signed by: Matheus Means (Scribe), 3/14/2019    IVincenzo M.D. personally performed the services described in this documentation, as scribed by Matheus Means in my presence, and it is both accurate and complete. C.     The note accurately reflects work and decisions made by me.  Vincenzo Helms  3/14/2019  4:55 PM

## 2019-03-14 NOTE — CONSULTS
Consults   INFECTIOUS DISEASES INPATIENT CONSULT NOTE     Date of Service: 3/14/2019    Consult Requested By: Vincenzo Helms M.D.    Reason for Consultation: Cellulitis, nonhealing wounds    History of Present Illness:   Nadege Mae is a 73 y.o.  admitted 3/14/2019. Pt has a past medical history of hyperlipidemia, hypertension and peripheral vascular disease status post femoral-popliteal bypass 6/8/18.  She also has bullous pemphigus on chronic prednisone.  She has chronic bilateral lower extremity ulcerations but been complicated intermittently by cellulitis.  She has had frequent recent hospitalizations, admitted 3/8/19 due to 3 increasing lower extremity edema and pain.  She was seen by ID on 3/10 as she had marked improvement and no appearance of ongoing infection her antibiotics were stopped.  She was discharged on 3/12. She returns ED complaining of increased right lower extremity swelling, as well as redness and increased pain.  She states that redness and pain as well as increasing swelling was acute and happened overnight. No relieving or exacerbating factors.  She states that she just recently started prednisone 40 mill grams daily for her dermatologic disease.     Hospital course: She is afebrile and vitals within normal limits.  Leukocytosis to 13.5.  CRP of 14.8. Chest x-ray with hyperinflation, no focal consolidation or pleural effusions.  Stable cardiomegaly.  She was started on vancomycin and cefepime.    Review Of Systems:  Review of Systems   Constitutional: Negative for chills, fever and malaise/fatigue.   HENT: Negative for hearing loss.    Eyes: Negative for blurred vision.   Respiratory: Negative for cough, sputum production and shortness of breath.    Cardiovascular: Negative for chest pain.   Gastrointestinal: Positive for diarrhea. Negative for abdominal pain, nausea and vomiting.   Genitourinary: Negative for dysuria, flank pain, frequency and hematuria.   Musculoskeletal:  Positive for joint pain and myalgias.   Skin: Positive for rash.   Neurological: Negative for headaches.   Psychiatric/Behavioral: Negative for depression. The patient is not nervous/anxious.          PMH:   Past Medical History:   Diagnosis Date   • Anxiety    • Cellulitis and abscess of lower extremity    • Dental disorder     full dentures   • Generalized osteoarthritis of multiple sites 10/20/2015   • Heart valve disease     pt not sure    • Hyperlipidemia    • Hypertension    • Osteoporosis        PSH:  Past Surgical History:   Procedure Laterality Date   • FEMORAL POPLITEAL BYPASS Left 6/8/2018    Procedure: FEMORAL POPLITEAL BYPASS;  Surgeon: Milana Colin M.D.;  Location: SURGERY [facility];  Service: General   • IRRIGATION & DEBRIDEMENT GENERAL Left 6/8/2018    Procedure: IRRIGATION & DEBRIDEMENT ANKLE WOUND;  Surgeon: Milana Colin M.D.;  Location: [facility];  Service: General   • IRRIGATION & DEBRIDEMENT HIP Left 6/4/2018    Procedure: IRRIGATION & DEBRIDEMENT HIP;  Surgeon: Chong Vazquez M.D.;  Location: SURGERY [facility];  Service: Orthopedics   • HIP REVISION TOTAL Left 2/11/2018    Procedure: HIP REVISION TOTAL- femoral nail removal conversion to total hip arthoroplasty;  Surgeon: Chong Vazquez M.D.;  Location: SURGERY [facility];  Service: Orthopedics   • HIP NAILING INTRAMEDULLARY Left 12/20/2017    Procedure: HIP NAILING INTRAMEDULLARY;  Surgeon: Jorge Peters M.D.;  Location: SURGERY [facility];  Service: Orthopedics   • BREAST BIOPSY  8/28/2014    Performed by Magdalena Londono M.D. at [facility]   • BREAST BIOPSY Right 8/14    benign   • GYN SURGERY  1973    complete hysterectomy   • ABDOMINAL HYSTERECTOMY TOTAL      w/BSO due to uterine cyst and endometriosis   • ATHROPLASTY      hip       FAMILY HX:  Family History   Problem Relation Age of Onset   • GI Mother         colostomy   • Heart Attack Father    • Diabetes Father    •  Hypertension Father    • Psychiatry Brother         bipolar disorder       SOCIAL HX:  Social History     Social History   • Marital status: Single     Spouse name: N/A   • Number of children: 1   • Years of education: N/A     Occupational History   • 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[employer]  [employer]     Social History Main Topics   • Smoking status: Former Smoker     Packs/day: 0.50     Years: 25.00     Types: Cigarettes     Quit date: 1/1/2007   • Smokeless tobacco: Never Used   • Alcohol use 3.0 oz/week     5 Glasses of wine per week      Comment: glass of wine per day   • Drug use: No   • Sexual activity: Not Currently     Partners: Male     Other Topics Concern   • Not on file     Social History Narrative   • No narrative on file     History   Smoking Status   • Former Smoker   • Packs/day: 0.50   • Years: 25.00   • Types: Cigarettes   • Quit date: 1/1/2007   Smokeless Tobacco   • Never Used     History   Alcohol Use   • 3.0 oz/week   • 5 Glasses of wine per week     Comment: glass of wine per day       Allergies/Intolerances:  Allergies   Allergen Reactions   • Bactrim [Sulfamethoxazole-Trimethoprim] Rash     Diffuse pruritic skin rash with blisters (no mucous membrane involvement) (was also taking cipro at the time - reaction thought more likely to be 2/2 Bactrim)   • Meropenem Unspecified     Pt can not remember reaction         History reviewed with the patient     Other Current Medications:  No current facility-administered medications for this encounter.     Current Outpatient Prescriptions:   •  sertraline (ZOLOFT) 100 MG Tab, Take 100 mg by mouth every day., Disp: , Rfl:   •  metoprolol SR (TOPROL XL) 100 MG TABLET SR 24 HR, Take 100 mg by mouth every day., Disp: , Rfl:   •  predniSONE (DELTASONE) 10 MG Tab, Take 40 mg by mouth every day., Disp: , Rfl:   •  Cyanocobalamin (VITAMIN B-12) 1000 MCG Tab, Take 1,000 mcg by mouth every day., Disp: , Rfl:   •  niacin 500 MG Tab, Take 500 mg by mouth 3 times a day.,  "Disp: , Rfl:   •  Multiple Vitamins-Minerals (THERAPEUTIC-M/LUTEIN) Tab, Take 1 Tab by mouth every day., Disp: , Rfl: 0  •  vitamin D (CHOLECALCIFEROL) 1000 UNIT Tab, Take 1,000 Units by mouth every day., Disp: , Rfl:   [unfilled]    Most Recent Vital Signs:  /83   Pulse 93   Temp 36.7 °C (98 °F) (Temporal)   Resp 16   Ht 1.702 m (5' 7\")   Wt 74.8 kg (164 lb 14.5 oz)   SpO2 94%   BMI 25.83 kg/m²   Temp  Av.7 °C (98 °F)  Min: 36.7 °C (98 °F)  Max: 36.7 °C (98 °F)    Physical Exam:  Physical Exam   Constitutional: She is oriented to person, place, and time and well-developed, well-nourished, and in no distress.   HENT:   Head: Normocephalic and atraumatic.   Eyes: Pupils are equal, round, and reactive to light. Conjunctivae and EOM are normal.   Cardiovascular: Normal rate, regular rhythm and normal heart sounds.    Pulmonary/Chest: Effort normal and breath sounds normal.   Abdominal: Soft. Bowel sounds are normal. She exhibits no distension. There is no tenderness. There is no rebound and no guarding.   Musculoskeletal:   Right leg with erythema, edema, tenderness to palpation and warmth.  Left leg with erythema, mild edema, chronic ulcers near the ankle and anterior leg.   Neurological: She is alert and oriented to person, place, and time.   Skin: Rash noted. There is erythema.   Multiple maculopapular lesions, diffuse, in various stages   Psychiatric: Affect and judgment normal.         Pertinent Lab Results:  Recent Labs      19   1105   WBC  13.5*      Recent Labs      19   1105   HEMOGLOBIN  10.0*   HEMATOCRIT  32.3*   MCV  100.0*   MCH  31.0   PLATELETCT  372         Recent Labs      19   1105   SODIUM  133*   POTASSIUM  4.4   CHLORIDE  100   CO2  20   CREATININE  0.61        Recent Labs      19   1105   ALBUMIN  3.4        Pertinent Micro:  Results     Procedure Component Value Units Date/Time    URINALYSIS [[id number]]  (Abnormal) Collected:  19 1318    " "Order Status:  Completed Specimen:  Urine Updated:  03/14/19 1329     Color Yellow     Character Clear     Specific Gravity 1.012     Ph 5.0     Glucose Negative mg/dL      Ketones Trace (A) mg/dL      Protein Negative mg/dL      Bilirubin Negative     Urobilinogen, Urine 0.2     Nitrite Negative     Leukocyte Esterase Negative     Occult Blood Moderate (A)     Micro Urine Req Microscopic    Narrative:       Indication for culture:->Emergency Room Patient    URINE CULTURE(NEW) [[id number]] Collected:  03/14/19 1318    Order Status:  Completed Specimen:  Urine Updated:  03/14/19 1325    Narrative:       Indication for culture:->Emergency Room Patient    BLOOD CULTURE [[id number]] Collected:  03/14/19 1105    Order Status:  Completed Specimen:  Blood from Peripheral Updated:  03/14/19 1131    Narrative:       Per Hospital Policy: Only change Specimen Src: to \"Line\" if  specified by physician order.    BLOOD CULTURE [[id number]] Collected:  03/14/19 1100    Order Status:  Completed Specimen:  Blood from Peripheral Updated:  03/14/19 1131    Narrative:       Per Hospital Policy: Only change Specimen Src: to \"Line\" if  specified by physician order.        Blood Culture   Date Value Ref Range Status   03/08/2019 No growth after 5 days of incubation.  Final        Studies:  Dx-chest-portable (1 View)    Result Date: 3/14/2019  3/14/2019 11:35 AM HISTORY/REASON FOR EXAM:  Sepsis. TECHNIQUE/EXAM DESCRIPTION AND NUMBER OF VIEWS: Single portable view of the chest. COMPARISON: 2/10/2018 FINDINGS: LUNGS: Slight hyperinflation. No focal consolidation. No effusions. PNEUMOTHORAX: None. LINES AND TUBES: None. MEDIASTINUM: Stable cardiomegaly. Atherosclerosis. MUSCULOSKELETAL STRUCTURES: No acute fracture. Old right-sided rib fractures.     1.  Hyperinflation. No focal consolidation or pleural effusions. 2.  Stable cardiomegaly.    Us-extremity Venous Lower Unilat Right    Result Date: 3/8/2019   Vascular Laboratory  CONCLUSIONS  Right " lower extremity:  Enlarged lymph nodes with internal vascularity at the groin.  No evidence of superficial or deep venous thrombosis.  NACNY SANCHEZ  Exam Date:     2019 17:59  Room #:     Inpatient  Priority:     Stat  Ht (in):             Wt (lb):  Ordering Physician:        TYRELL ARAYA  Referring Physician:       [id number] Berman  Sonographer:               Jermain Dahl RVT  Study Type:                Complete Unilateral  Technical Quality:         Adequate  Age:    73    Gender:     F  MRN:    [id number]  :    1945      BSA:  Indications:     Swelling of Limb  CPT Codes:       53478  ICD Codes:       729.81  History:         Right lower extremity cellulitis with worsening swelling  Limitations:  PROCEDURES:  Right lower extremity venous duplex imaging.  The following venous structures were evaluated: common femoral, profunda  femoral, greater saphenous, femoral, popliteal , peroneal and posterior  tibial veins.  Serial compression, augmentation maneuvers,  color and spectral Doppler  flow evaluations were performed.  FINDINGS:  Right lower extremity:  Enlarged lymph nodes with internal vascularity at the groin.  Complete color filling and compressibility with normal venous flow dynamics  including spontaneous flow, response to augmentation maneuvers, and  respiratory phasicity.  No evidence of superficial or deep venous thrombosis.  Flow was evaluated in the contralateral common femoral vein and normal  venous flow dynamics including spontaneous flow, respiratory phasic  variation and augmentation were demonstrated.  Fredi Friend  (Electronically Signed)  Final Date:      2019                   18:33      ASSESSMENT/PLAN:     istory of Present Illness:   Nancy Sanchez is a 73 y.o.  admitted 3/14/2019. Pt has a past medical history of hyperlipidemia, hypertension and peripheral vascular disease status post femoral-popliteal bypass 18.  She also has bullous pemphigus.  She has  chronic bilateral lower extremity ulcerations but been complicated intermittently by cellulitis.  She has had frequent recent hospitalizations, admitted 3/8/19 due to 3 increasing lower extremity edema and pain.  She was seen by ID on 3/10 as she had marked improvement and no appearance of ongoing infection her antibiotics were stopped.  She was discharged on 3/12. She returns ED complaining of increased right lower extremity swelling, as well as redness and increased pain.  She states that redness and pain as well as increasing swelling was acute and happened overnight. No relieving or exacerbating factors.  She states that she just recently started prednisone 40 mill grams daily for her dermatologic disease.     Hospital course: She is afebrile and vitals within normal limits.  Leukocytosis to 13.5.  CRP of 14.8. Chest x-ray with hyperinflation, no focal consolidation or pleural effusions.  Stable cardiomegaly.  She was started on vancomycin and cefepime.    Cellulitis   -Acute worsening overnight, she reports the edema erythema pain in the leg had resolved then returned    Leukocytosis- 13.5  -This may be secondary to her prednisone rather than infection    Chronic lower extremity wounds  - history of infection with MRSA, Enterococcus faecalis and PSAR  -Previous treatment with linezolid and ciprofloxacin  -She is seen by wound care    Bullous pemphigus, seen by dermatology  -New prescription for prednisone 40 mg daily just started 1 day ago    Immunocompromise, on prednisone    Antibiotic allergies -Bactrim reported as prick skin rash, meropenem unspecified    --- Continue vancomycin and cefepime based on prior culture results-we will give short course and if improving can likely transition to ciprofloxacin and Augmentin  --- Follow labs CBC  --- Follow blood culture  --- Continue with wound care        Plan of care discussed with IM. Will continue to follow when she transitions to the floor.     Zuri VILLELA  HAMILTON More.

## 2019-03-14 NOTE — ED NOTES
Med Rec completed per patient  Allergies reviewed  No ORAL antibiotics in last 30 days    Patient states that she is is currently taking 40 mg of Prednisone a day, till her doctor tells her to stop

## 2019-03-15 ENCOUNTER — APPOINTMENT (OUTPATIENT)
Dept: WOUND CARE | Facility: MEDICAL CENTER | Age: 74
End: 2019-03-15
Attending: NURSE PRACTITIONER
Payer: MEDICARE

## 2019-03-15 PROCEDURE — 99233 SBSQ HOSP IP/OBS HIGH 50: CPT | Performed by: INTERNAL MEDICINE

## 2019-03-15 PROCEDURE — 99232 SBSQ HOSP IP/OBS MODERATE 35: CPT | Performed by: INTERNAL MEDICINE

## 2019-03-15 PROCEDURE — 700111 HCHG RX REV CODE 636 W/ 250 OVERRIDE (IP): Performed by: HOSPITALIST

## 2019-03-15 PROCEDURE — 700105 HCHG RX REV CODE 258: Performed by: HOSPITALIST

## 2019-03-15 PROCEDURE — 700102 HCHG RX REV CODE 250 W/ 637 OVERRIDE(OP): Performed by: HOSPITALIST

## 2019-03-15 PROCEDURE — A9270 NON-COVERED ITEM OR SERVICE: HCPCS | Performed by: HOSPITALIST

## 2019-03-15 PROCEDURE — 770006 HCHG ROOM/CARE - MED/SURG/GYN SEMI*

## 2019-03-15 RX ADMIN — SERTRALINE HYDROCHLORIDE 100 MG: 100 TABLET ORAL at 05:10

## 2019-03-15 RX ADMIN — CEFEPIME 2 G: 2 INJECTION, POWDER, FOR SOLUTION INTRAVENOUS at 17:34

## 2019-03-15 RX ADMIN — ACETAMINOPHEN 650 MG: 325 TABLET, FILM COATED ORAL at 21:03

## 2019-03-15 RX ADMIN — CEFEPIME 2 G: 2 INJECTION, POWDER, FOR SOLUTION INTRAVENOUS at 05:09

## 2019-03-15 RX ADMIN — ENOXAPARIN SODIUM 40 MG: 100 INJECTION SUBCUTANEOUS at 05:12

## 2019-03-15 RX ADMIN — VANCOMYCIN HYDROCHLORIDE 1500 MG: 100 INJECTION, POWDER, LYOPHILIZED, FOR SOLUTION INTRAVENOUS at 12:09

## 2019-03-15 RX ADMIN — METOPROLOL SUCCINATE 100 MG: 100 TABLET, EXTENDED RELEASE ORAL at 05:10

## 2019-03-15 RX ADMIN — PREDNISONE 40 MG: 20 TABLET ORAL at 05:09

## 2019-03-15 RX ADMIN — ACETAMINOPHEN 650 MG: 325 TABLET, FILM COATED ORAL at 08:08

## 2019-03-15 ASSESSMENT — ENCOUNTER SYMPTOMS
VOMITING: 0
NAUSEA: 0
FEVER: 0
MYALGIAS: 1
SENSORY CHANGE: 0
ABDOMINAL PAIN: 0
MYALGIAS: 0
CHILLS: 0

## 2019-03-15 NOTE — CARE PLAN
Problem: Infection  Goal: Will remain free from infection  Outcome: PROGRESSING AS EXPECTED  S/s of infection, redness, swelling, pain, WBC high. Pt is on IV abx, no s/s of complication, CTM    Problem: Bowel/Gastric:  Goal: Normal bowel function is maintained or improved  Outcome: PROGRESSING AS EXPECTED  BMx1

## 2019-03-15 NOTE — ASSESSMENT & PLAN NOTE
She is followed by dermatologist outpatient for this and is currently on 40 mg prednisone daily which will be continued  3/16 Add H1 blocker

## 2019-03-15 NOTE — PROGRESS NOTES
"Pharmacy Kinetics 73 y.o. female on vancomycin day # 1 3/15/2019    Currently on Vancomycin 1900 mg iv loading dose followed by Vancomycin 1500 mg q24h    Indication for Treatment: Cellulitis     Pertinent history per medical record: Admitted on 3/14/2019 for cellulitis.  Patient presents with recurrent leg pain, swelling, and redness.  She was seen by wound care and infectious disease.  ID recommended continuing vancomycin and cefepime on admission.      Other antibiotics: Cefepime 2g q12h    Allergies: Bactrim [sulfamethoxazole-trimethoprim] and Meropenem     List concerns for renal function: Age    Pertinent cultures to date:     Hx of MRSA    Hx of pseudomonas     MRSA nares swab if pneumonia is a concern (ordered/positive/negative/n-a): N/A    Recent Labs      19   1105   WBC  13.5*   NEUTSPOLYS  90.80*     Recent Labs      19   1105   BUN  8   CREATININE  0.61   ALBUMIN  3.4     No results for input(s): VANCOTROUGH, VANCOPEAK, VANCORANDOM in the last 72 hours.  Intake/Output Summary (Last 24 hours) at 03/15/19 0119  Last data filed at 19 1430   Gross per 24 hour   Intake            600.1 ml   Output                0 ml   Net            600.1 ml      Blood pressure 107/58, pulse 96, temperature 36.7 °C (98 °F), temperature source Temporal, resp. rate 18, height 1.702 m (5' 7\"), weight 74.8 kg (164 lb 14.5 oz), SpO2 94 %, not currently breastfeeding. Temp (24hrs), Av.8 °C (98.2 °F), Min:36.7 °C (98 °F), Max:36.9 °C (98.5 °F)      A/P   1. Vancomycin dose change: New start  2. Next vancomycin level: Prior to 4th dose (not ordered)  3. Goal trough: 12-16 mcg/mL  4. Comments: Patient has tolerated vancomycin well in the past.  On previous admissions, the patient had low vancomycin levels on 15 mg/kg q24h.  Will start vancomycin 20 mg/kg q24h and recommend getting a level prior to the 4th dose.  Renal function is at baseline.  Pharmacy will continue to follow.      Alejandro Colon, " PharmD

## 2019-03-15 NOTE — PROGRESS NOTES
Hospital Medicine Daily Progress Note    Date of Service  3/15/2019    Chief Complaint  73 y.o. female admitted 3/14/2019 with left lower extremity recurrent cellulitis and ulcer development stemming from bullous pemphigoid and immunocompromised status.    Hospital Course    Patient admitted for worsening erythema and wound formation in the distal left lower extremity in the setting of history of bullous pemphigoid on 40 mg of prednisone daily, and chronic immunodeficiency.  She was hospitalized for the same 1 month ago as well as last October.  She is status post left femoral-popliteal bypass surgery with Dr. Milana Colin.  She has history of polymicrobial infections and thus infectious disease consultation was obtained this admission.      Interval Problem Update  Left lower extremity cellulitis--clinically appears improved  Left lower extremity ulcer development-- wound care  History of MRSA, enterococcus, Pseudomonas and treated with linezolid and ciprofloxacin in the past  Blood cultures no growth to date - remains on Vanco and Cefepime  Bullous pemphigoid --- on suppressive therapy    Consultants/Specialty  ID  Wound care    Code Status  Full    Disposition  TBD    Review of Systems  Review of Systems   Constitutional: Negative for chills and fever.   Gastrointestinal: Negative for abdominal pain, nausea and vomiting.   Genitourinary: Negative for dysuria and urgency.   Musculoskeletal: Negative for myalgias.   Neurological: Negative for sensory change.   All other systems reviewed and are negative.       Physical Exam  Temp:  [36.1 °C (97 °F)-36.9 °C (98.5 °F)] 36.1 °C (97 °F)  Pulse:  [78-96] 78  Resp:  [15-18] 15  BP: (107-156)/(58-88) 150/78  SpO2:  [94 %-99 %] 96 %    Physical Exam   Constitutional: She is oriented to person, place, and time. She appears well-developed and well-nourished. No distress.   HENT:   Head: Normocephalic and atraumatic.   Eyes: Pupils are equal, round, and reactive to light.  EOM are normal.   Neck: Neck supple.   Cardiovascular: Normal rate, regular rhythm and normal heart sounds.    Pulmonary/Chest: Effort normal and breath sounds normal. No respiratory distress. She has no wheezes. She has no rales.   Abdominal: Soft. She exhibits no distension. There is no tenderness.   Neurological: She is alert and oriented to person, place, and time. No cranial nerve deficit.   Skin: Skin is warm and dry.        Bilateral lower extremity erythema, left more than right; Several centimeter ulcer development left medial tibia       Fluids    Intake/Output Summary (Last 24 hours) at 03/15/19 1458  Last data filed at 03/15/19 1100   Gross per 24 hour   Intake              360 ml   Output                0 ml   Net              360 ml       Laboratory  Recent Labs      03/14/19   1105   WBC  13.5*   RBC  3.23*   HEMOGLOBIN  10.0*   HEMATOCRIT  32.3*   MCV  100.0*   MCH  31.0   MCHC  31.0*   RDW  54.6*   PLATELETCT  372   MPV  8.9*     Recent Labs      03/14/19   1105   SODIUM  133*   POTASSIUM  4.4   CHLORIDE  100   CO2  20   GLUCOSE  101*   BUN  8   CREATININE  0.61   CALCIUM  9.1         Recent Labs      03/14/19   1105   BNPBTYPENAT  403*           Imaging  DX-CHEST-PORTABLE (1 VIEW)   Final Result      1.  Hyperinflation. No focal consolidation or pleural effusions.   2.  Stable cardiomegaly.           Assessment/Plan  * Cellulitis- (present on admission)   Assessment & Plan    This is recurrent cellulitis with increased redness, swelling, and pain  IV vancomycin and cefepime is been ordered based on prior culture results  ID following  Her ESR and CRP are markedly elevated she has a leukocytosis with white blood cell count of 13.5     Leg ulcer, left, with fat layer exposed (HCC)- (present on admission)   Assessment & Plan    Wound care consulted     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)   Assessment & Plan    She is followed by dermatologist outpatient for this and is currently on 40 mg  prednisone daily which will be continued     Peripheral vascular disease (HCC)- (present on admission)   Assessment & Plan    She is followed by Dr. Colin, vascular surgery  She had a femoral popliteal bypass in June 2018     Essential hypertension- (present on admission)   Assessment & Plan    Continue metoprolol with holding parameters          VTE prophylaxis: Enox

## 2019-03-15 NOTE — PROGRESS NOTES
Infectious Disease Progress Note    Author: Manisha Ferrari M.D. Date & Time of service: 3/15/2019  2:27 PM    Chief Complaint:  FU cellulitis    Interval History:  73 y.o. WF admitted 3/14/2019 with peripheral vascular disease, status post femoral-popliteal bypass, and bullous pemphigus for cellulitis  3/15 AF somnolent today No new complaints since yesterday-decreased erythema BLE     Labs Reviewed, Medications Reviewed and Wound Reviewed.    Review of Systems:  Review of Systems   Constitutional: Negative for chills and fever.   Musculoskeletal: Positive for myalgias.   All other systems reviewed and are negative.      Hemodynamics:  Temp (24hrs), Av.6 °C (97.8 °F), Min:36.1 °C (97 °F), Max:36.9 °C (98.5 °F)  Temperature: 36.1 °C (97 °F)  Pulse  Av.5  Min: 65  Max: 103Heart Rate (Monitored): 89  Blood Pressure : 150/78, NIBP: 104/53       Physical Exam:  Physical Exam   Constitutional: No distress.   Eyes: Right eye exhibits no discharge. Left eye exhibits no discharge.   Neck: Neck supple. No JVD present.   Cardiovascular: Normal rate.    Pulmonary/Chest: Effort normal. No stridor. No respiratory distress.   Abdominal: Soft. She exhibits no distension.   Musculoskeletal: She exhibits edema.   Decreased erythema as compared to admission pictures RLE  LLE dressed   Neurological:   somnolent   Skin: There is erythema.   Multiple ulcerative lesions   Nursing note and vitals reviewed.      Meds:    Current Facility-Administered Medications:   •  vancomycin  •  metoprolol SR  •  predniSONE  •  sertraline  •  senna-docusate **AND** polyethylene glycol/lytes **AND** magnesium hydroxide **AND** bisacodyl  •  enoxaparin  •  acetaminophen  •  ondansetron  •  ondansetron  •  oxyCODONE immediate-release  •  MD Alert...Vancomycin per Pharmacy  •  cefepime    Labs:  Recent Labs      19   1105   WBC  13.5*   RBC  3.23*   HEMOGLOBIN  10.0*   HEMATOCRIT  32.3*   MCV  100.0*   MCH  31.0   RDW  54.6*   PLATELETCT   372   MPV  8.9*   NEUTSPOLYS  90.80*   LYMPHOCYTES  4.50*   MONOCYTES  2.50   EOSINOPHILS  0.30   BASOPHILS  0.40     Recent Labs      19   1105   SODIUM  133*   POTASSIUM  4.4   CHLORIDE  100   CO2  20   GLUCOSE  101*   BUN  8     Recent Labs      19   1105   ALBUMIN  3.4   TBILIRUBIN  0.3   ALKPHOSPHAT  71   TOTPROTEIN  7.6   ALTSGPT  15   ASTSGOT  22   CREATININE  0.61       Imaging:  Dx-chest-portable (1 View)    Result Date: 3/14/2019  3/14/2019 11:35 AM HISTORY/REASON FOR EXAM:  Sepsis. TECHNIQUE/EXAM DESCRIPTION AND NUMBER OF VIEWS: Single portable view of the chest. COMPARISON: 2/10/2018 FINDINGS: LUNGS: Slight hyperinflation. No focal consolidation. No effusions. PNEUMOTHORAX: None. LINES AND TUBES: None. MEDIASTINUM: Stable cardiomegaly. Atherosclerosis. MUSCULOSKELETAL STRUCTURES: No acute fracture. Old right-sided rib fractures.     1.  Hyperinflation. No focal consolidation or pleural effusions. 2.  Stable cardiomegaly.    Us-extremity Venous Lower Unilat Right    Result Date: 3/8/2019   Vascular Laboratory  CONCLUSIONS  Right lower extremity:  Enlarged lymph nodes with internal vascularity at the groin.  No evidence of superficial or deep venous thrombosis.  NANCY SANCHEZ  Exam Date:     2019 17:59  Room #:     Inpatient  Priority:     Stat  Ht (in):             Wt (lb):  Ordering Physician:        TYRELL ARAYA  Referring Physician:       [id number] Berman  Sonographer:               Jermain Dahl RVT  Study Type:                Complete Unilateral  Technical Quality:         Adequate  Age:    73    Gender:     F  MRN:    [id number]  :    1945      BSA:  Indications:     Swelling of Limb  CPT Codes:       91086  ICD Codes:       729.81  History:         Right lower extremity cellulitis with worsening swelling  Limitations:  PROCEDURES:  Right lower extremity venous duplex imaging.  The following venous structures were evaluated: common femoral, profunda  femoral, greater  "saphenous, femoral, popliteal , peroneal and posterior  tibial veins.  Serial compression, augmentation maneuvers,  color and spectral Doppler  flow evaluations were performed.  FINDINGS:  Right lower extremity:  Enlarged lymph nodes with internal vascularity at the groin.  Complete color filling and compressibility with normal venous flow dynamics  including spontaneous flow, response to augmentation maneuvers, and  respiratory phasicity.  No evidence of superficial or deep venous thrombosis.  Flow was evaluated in the contralateral common femoral vein and normal  venous flow dynamics including spontaneous flow, respiratory phasic  variation and augmentation were demonstrated.  Fredi Friend  (Electronically Signed)  Final Date:      08 March 2019                   18:33      Micro:  Results     Procedure Component Value Units Date/Time    BLOOD CULTURE [[id number]] Collected:  03/14/19 1100    Order Status:  Completed Specimen:  Blood from Peripheral Updated:  03/15/19 0820     Significant Indicator NEG     Source BLD     Site PERIPHERAL     Blood Culture No Growth    Note: Blood cultures are incubated for 5 days and  are monitored continuously.Positive blood cultures  are called to the RN and reported as soon as  they are identified.      Narrative:       Per Hospital Policy: Only change Specimen Src: to \"Line\" if  specified by physician order.    BLOOD CULTURE [[id number]] Collected:  03/14/19 1105    Order Status:  Completed Specimen:  Blood from Peripheral Updated:  03/15/19 0820     Significant Indicator NEG     Source BLD     Site PERIPHERAL     Blood Culture No Growth    Note: Blood cultures are incubated for 5 days and  are monitored continuously.Positive blood cultures  are called to the RN and reported as soon as  they are identified.      Narrative:       Per Hospital Policy: Only change Specimen Src: to \"Line\" if  specified by physician order.    URINALYSIS [[id number]]  (Abnormal) Collected:  03/14/19 1318 "    Order Status:  Completed Specimen:  Urine Updated:  03/14/19 1329     Color Yellow     Character Clear     Specific Gravity 1.012     Ph 5.0     Glucose Negative mg/dL      Ketones Trace (A) mg/dL      Protein Negative mg/dL      Bilirubin Negative     Urobilinogen, Urine 0.2     Nitrite Negative     Leukocyte Esterase Negative     Occult Blood Moderate (A)     Micro Urine Req Microscopic    Narrative:       Indication for culture:->Emergency Room Patient    URINE CULTURE(NEW) [[id number]] Collected:  03/14/19 1318    Order Status:  Completed Specimen:  Urine Updated:  03/14/19 1325    Narrative:       Indication for culture:->Emergency Room Patient          Assessment:  Active Hospital Problems    Diagnosis   • *Cellulitis [L03.90]   • Leg ulcer, left, with fat layer exposed ([facility]) [L97.922]   • Bullous pemphigus [L10.9]       Plan:  Cellulitis, improved  Afebrile  No leukocytosis  Continue vanco and cefepime for now  Wound care  Monitor renal function while on vancomycin     Leukocytosis  Multifactorial incl recent steroids and infection  Check CBC     Chronic lower extremity ulcers, fat layer, chronic  History of infection with MRSA, Enterococcus faecalis and PSAR  Prior treatment with linezolid and ciprofloxacin  Wound care and abx as above     Bullous pemphigus, seen by dermatology  On prednisone 40 mg daily just started 1 day PTA  Continue  Cause of above     Immunocompromised, on prednisone  Blood cxs neg so far  Abx as above    Discussed with internal medicine.

## 2019-03-15 NOTE — ASSESSMENT & PLAN NOTE
This is recurrent cellulitis with increased redness, swelling, and pain  IV vancomycin and cefepime is been ordered based on prior culture results  ID following  Her ESR and CRP are markedly elevated she has a leukocytosis with white blood cell count of 13.5

## 2019-03-15 NOTE — PROGRESS NOTES
"Pharmacy Kinetics 73 y.o. female on vancomycin day # 2 3/15/2019    Currently on Vancomycin 1500 mg iv q24hr (12)    Indication for Treatment: right leg cellulitis    Pertinent history per medical record: Admitted on 3/14/2019 for right lower extremity swelling for the past two days before presentation. The patient is followed by Dr. Valencia from ID and wound care. Patient had previous discharge on 3/12. PMH includes PVD with post femoral-popliteal bypass in 2018, bullous pemphigus, and chronic bilateral lower extremity ulcerations which have been complicated by cellulitis. ID has been consulted.    Other antibiotics:   Cefepime 2g iv q12hrs    Allergies: Bactrim [sulfamethoxazole-trimethoprim] and Meropenem     List concerns for renal function: CKD, age    Pertinent cultures to date:   19 - Left leg wound - PSA  3/8/19 - Blood - Neg  3/14/19 - Blood - Neg    MRSA nares swab if pneumonia is a concern (ordered/positive/negative/n-a): Na    Recent Labs      19   1105   WBC  13.5*   NEUTSPOLYS  90.80*     Recent Labs      19   1105   BUN  8   CREATININE  0.61   ALBUMIN  3.4     Intake/Output Summary (Last 24 hours) at 03/15/19 1123  Last data filed at 19 1430   Gross per 24 hour   Intake            600.1 ml   Output                0 ml   Net            600.1 ml      Blood pressure 150/78, pulse 78, temperature 36.1 °C (97 °F), temperature source Temporal, resp. rate 15, height 1.702 m (5' 7\"), weight 74.8 kg (164 lb 14.5 oz), SpO2 96 %, not currently breastfeeding. Temp (24hrs), Av.6 °C (97.8 °F), Min:36.1 °C (97 °F), Max:36.9 °C (98.5 °F)      A/P   1. Vancomycin dose change: No change  2. Next vancomycin level: 3/16 @11  3. Goal trough: 12-16mcg/mL  4. Comments: Urine output unknown and renal function stable. Patient has tolerated vancomycin in the past. Pharmacy will continue to monitor.    Tracee TurnerD    "

## 2019-03-15 NOTE — PROGRESS NOTES
· 2 RN skin check complete with SHREE Askew.  · Devices in place glasses, PIV.  · Skin assessed under devices yes.  · Confirmed pressure ulcers found on R lower leg.  · New potential pressure ulcers noted on n/a. Wound consult placed and wound reported. Note  · Redness,blanching under breasts  · Redness, swelling bilat LEs  · Bruising, red spots, scabs scattering all over the body  · Red, blanchable coccyx, excoriation on buttocks  · The following interventions in place clean wounds with NS, encouraging pt turn frequently*

## 2019-03-15 NOTE — PROGRESS NOTES
Received report and assumed pt care at 1900 change of shift.  Pt is A&Ox4 on RA.  Pt refuses bed alarm; therefore, pt was instructed to call for assistance for one person assist before ambulating with front wheel walker.  During wound cleansing and dressing application, pt stated wound cleansing felt soothing.  Pt wearing non skid treaded socks, environment uncluttered, bed in lowest and locked position, bilat upper bed rails up and call light and personal items within reach.  Pt room next to nurses' station.

## 2019-03-15 NOTE — DISCHARGE PLANNING
Transitional Care Navigator:    Per chart review patient has been identified as a candidate for HH based on her medical history, readmission history and LACE+ of 61 indicating a high risk of readmission. Please consider a referral to HH prior to a DC of home.

## 2019-03-15 NOTE — CARE PLAN
Problem: Safety  Goal: Will remain free from falls  Continue to evaluate pt's ability to safely ambulate due to BLE edema and discomfort.  Pt agrees to report increase in discomfort and/or changes ambulation sensation.     Problem: Mobility  Goal: Risk for activity intolerance will decrease  Encourage pt to regularly ambulate with one person assistance.  Pt agrees to request assistance by using call light.

## 2019-03-15 NOTE — PROGRESS NOTES
Assumed care of pt at shift change, discussed POC. Pt A&Ox4. Denies pain, numbness or tingling. Pt pleasant and cooperative. Refused bed alarm despite fall risk education, treaded socks on, call light within reach, bed in low position.

## 2019-03-15 NOTE — H&P
Hospital Medicine History & Physical Note    Date of Service  3/14/2019    Primary Care Physician  JACLYN Son.    Consultants  Infectious disease    Code Status  full    Chief Complaint  Right leg pain and swelling    History of Presenting Illness  73 y.o. female who presented 3/14/2019 with recurrent right leg pain, swelling, and redness. Ms. Mae has a past medical history of peripheral arterial disease as well as recent diagnosis of bullous pemphigus on chronic prednisone therapy by outpatient dermatology it was most recently noted 3/8/2019 through 3/11/2019 with lower extremity cellulitis.  He was treated with IV vancomycin and cefepime was followed by infectious disease.  She states she was seen at wound care yesterday.  She states that her right leg today has markedly increased redness, swelling, and pain.  In the emergency room, she is found to have a white blood cell count is quite elevated at 13.5 whereas 4 days ago her white blood cell count was normal at 7.1.  Also the emergency room, her ESR is high at 80 with a CRP high at 14.8 that is difficult to interpret in setting of bullous pemphigus as well as chronic prednisone use.  She will be initiated on IV antibiotics and admitted for further workup and resuscitation.    Review of Systems  Review of Systems   Constitutional: Negative for chills and fever.   Respiratory: Negative for shortness of breath.    Cardiovascular: Negative for chest pain.   Skin: Positive for itching and rash.   All other systems reviewed and are negative.      Past Medical History   has a past medical history of Anxiety; Cellulitis and abscess of lower extremity; Dental disorder; Generalized osteoarthritis of multiple sites (10/20/2015); Heart valve disease; Hyperlipidemia; Hypertension; and Osteoporosis. She also has no past medical history of Allergy; Diabetes; Muscle disorder; or OSTEOPOROSIS.    Surgical History   has a past surgical history that includes gyn  surgery (1973); breast biopsy (8/28/2014); abdominal hysterectomy total; breast biopsy (Right, 8/14); hip nailing intramedullary (Left, 12/20/2017); hip revision total (Left, 2/11/2018); irrigation & debridement hip (Left, 6/4/2018); femoral popliteal bypass (Left, 6/8/2018); irrigation & debridement general (Left, 6/8/2018); and athroplasty.     Family History  family history includes Diabetes in her father; GI in her mother; Heart Attack in her father; Hypertension in her father; Psychiatry in her brother.     Social History   reports that she quit smoking about 12 years ago. Her smoking use included Cigarettes. She has a 12.50 pack-year smoking history. She has never used smokeless tobacco. She reports that she drinks about 3.0 oz of alcohol per week . She reports that she does not use drugs.    Allergies  Allergies   Allergen Reactions   • Bactrim [Sulfamethoxazole-Trimethoprim] Rash     Diffuse pruritic skin rash with blisters (no mucous membrane involvement) (was also taking cipro at the time - reaction thought more likely to be 2/2 Bactrim)   • Meropenem Unspecified     Pt can not remember reaction         Medications  Prior to Admission Medications   Prescriptions Last Dose Informant Patient Reported? Taking?   Cyanocobalamin (VITAMIN B-12) 1000 MCG Tab FEW DAYS AGO at [facility] Patient Yes No   Sig: Take 1,000 mcg by mouth every day.   Multiple Vitamins-Minerals (THERAPEUTIC-M/LUTEIN) Tab FEW DAYS AGO at [facility] Patient Yes No   Sig: Take 1 Tab by mouth every day.   metoprolol SR (TOPROL XL) 100 MG TABLET SR 24 HR 3/14/2019 at AM Patient Yes No   Sig: Take 100 mg by mouth every day.   niacin 500 MG Tab 3/14/2019 at AM Patient Yes No   Sig: Take 500 mg by mouth 3 times a day.   predniSONE (DELTASONE) 10 MG Tab 3/14/2019 at AM Patient Yes No   Sig: Take 40 mg by mouth every day.   sertraline (ZOLOFT) 100 MG Tab 3/14/2019 at AM Patient Yes No   Sig: Take 100 mg by mouth every day.   vitamin D (CHOLECALCIFEROL) 1000 UNIT  Tab FEW DAYS AGO at [facility] Patient Yes No   Sig: Take 1,000 Units by mouth every day.      Facility-Administered Medications: None       Physical Exam  Temp:  [36.7 °C (98 °F)-36.9 °C (98.5 °F)] 36.9 °C (98.5 °F)  Pulse:  [84-98] 90  Resp:  [16] 16  BP: (102-138)/(64-90) 127/64  SpO2:  [93 %-97 %] 95 %    Physical Exam   Constitutional: She is oriented to person, place, and time. No distress.   HENT:   Head: Atraumatic.   Neck: Normal range of motion. Neck supple.   Cardiovascular: Normal rate and regular rhythm.    No murmur heard.  Pulmonary/Chest: Effort normal and breath sounds normal.   Abdominal: Soft. She exhibits no distension. There is no tenderness.   Musculoskeletal: She exhibits edema.   She is 2+ edema bilaterally  He has an ulcer on the left medial malleolus distal lower extremity ulcer as well  Right shin is red and warm to touch with diffuse tenderness though no crepitus and no evidence of pus   Neurological: She is alert and oriented to person, place, and time.   Skin: She is not diaphoretic.   His multiple scabs of varying degrees of healing on her thighs abdomen, chest and back   Psychiatric: She has a normal mood and affect. Her behavior is normal.   Nursing note and vitals reviewed.      Laboratory:  Recent Labs      03/14/19   1105   WBC  13.5*   RBC  3.23*   HEMOGLOBIN  10.0*   HEMATOCRIT  32.3*   MCV  100.0*   MCH  31.0   MCHC  31.0*   RDW  54.6*   PLATELETCT  372   MPV  8.9*     Recent Labs      03/14/19   1105   SODIUM  133*   POTASSIUM  4.4   CHLORIDE  100   CO2  20   GLUCOSE  101*   BUN  8   CREATININE  0.61   CALCIUM  9.1     Recent Labs      03/14/19   1105   ALTSGPT  15   ASTSGOT  22   ALKPHOSPHAT  71   TBILIRUBIN  0.3   GLUCOSE  101*         Recent Labs      03/14/19   1105   BNPBTYPENAT  403*         No results for input(s): TROPONINI in the last 72 hours.    Urinalysis:    Recent Labs      03/14/19   1318   SPECGRAVITY  1.012   GLUCOSEUR  Negative   KETONES  Trace*   NITRITE  Negative    LEUKESTERAS  Negative   WBCURINE  0-2   RBCURINE  2-5*   BACTERIA  Few*   EPITHELCELL  Negative        Imaging:  DX-CHEST-PORTABLE (1 VIEW)   Final Result      1.  Hyperinflation. No focal consolidation or pleural effusions.   2.  Stable cardiomegaly.            Assessment/Plan:  I anticipate this patient will require at least two midnights for appropriate medical management, necessitating inpatient admission.    * Cellulitis- (present on admission)   Assessment & Plan    This is recurrent cellulitis with increased redness, swelling, and pain  IV vancomycin and cefepime is been ordered based on prior culture results  I have contacted Dr. More, infectious disease for consultation  Her ESR and CRP are markedly elevated she has a leukocytosis with white blood cell count of 13.5     Leg ulcer, left, with fat layer exposed (HCC)- (present on admission)   Assessment & Plan    Wound care consulted     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)   Assessment & Plan    She is followed by dermatologist outpatient for this and is currently on 40 mg prednisone daily which will be continued     Peripheral vascular disease (HCC)- (present on admission)   Assessment & Plan    She is followed by Dr. Colin, vascular surgery  She had a femoral popliteal bypass in June 2018     Essential hypertension- (present on admission)   Assessment & Plan    Continue metoprolol with holding parameters         VTE prophylaxis: lovenox

## 2019-03-16 LAB
ANION GAP SERPL CALC-SCNC: 5 MMOL/L (ref 0–11.9)
BUN SERPL-MCNC: 15 MG/DL (ref 8–22)
CALCIUM SERPL-MCNC: 9 MG/DL (ref 8.5–10.5)
CHLORIDE SERPL-SCNC: 105 MMOL/L (ref 96–112)
CO2 SERPL-SCNC: 23 MMOL/L (ref 20–33)
CREAT SERPL-MCNC: 0.79 MG/DL (ref 0.5–1.4)
ERYTHROCYTE [DISTWIDTH] IN BLOOD BY AUTOMATED COUNT: 57.1 FL (ref 35.9–50)
GLUCOSE SERPL-MCNC: 106 MG/DL (ref 65–99)
HCT VFR BLD AUTO: 29.7 % (ref 37–47)
HGB BLD-MCNC: 8.8 G/DL (ref 12–16)
MAGNESIUM SERPL-MCNC: 1.8 MG/DL (ref 1.5–2.5)
MCH RBC QN AUTO: 30.3 PG (ref 27–33)
MCHC RBC AUTO-ENTMCNC: 29.6 G/DL (ref 33.6–35)
MCV RBC AUTO: 102.4 FL (ref 81.4–97.8)
PLATELET # BLD AUTO: 327 K/UL (ref 164–446)
PMV BLD AUTO: 9.1 FL (ref 9–12.9)
POTASSIUM SERPL-SCNC: 4.2 MMOL/L (ref 3.6–5.5)
RBC # BLD AUTO: 2.9 M/UL (ref 4.2–5.4)
SODIUM SERPL-SCNC: 133 MMOL/L (ref 135–145)
VANCOMYCIN TROUGH SERPL-MCNC: 16.3 UG/ML (ref 10–20)
WBC # BLD AUTO: 8 K/UL (ref 4.8–10.8)

## 2019-03-16 PROCEDURE — 80202 ASSAY OF VANCOMYCIN: CPT

## 2019-03-16 PROCEDURE — 700102 HCHG RX REV CODE 250 W/ 637 OVERRIDE(OP): Performed by: INTERNAL MEDICINE

## 2019-03-16 PROCEDURE — 770006 HCHG ROOM/CARE - MED/SURG/GYN SEMI*

## 2019-03-16 PROCEDURE — 80048 BASIC METABOLIC PNL TOTAL CA: CPT

## 2019-03-16 PROCEDURE — A9270 NON-COVERED ITEM OR SERVICE: HCPCS | Performed by: INTERNAL MEDICINE

## 2019-03-16 PROCEDURE — 36415 COLL VENOUS BLD VENIPUNCTURE: CPT

## 2019-03-16 PROCEDURE — 700111 HCHG RX REV CODE 636 W/ 250 OVERRIDE (IP): Performed by: HOSPITALIST

## 2019-03-16 PROCEDURE — 97161 PT EVAL LOW COMPLEX 20 MIN: CPT

## 2019-03-16 PROCEDURE — A9270 NON-COVERED ITEM OR SERVICE: HCPCS | Performed by: HOSPITALIST

## 2019-03-16 PROCEDURE — 83735 ASSAY OF MAGNESIUM: CPT

## 2019-03-16 PROCEDURE — 99232 SBSQ HOSP IP/OBS MODERATE 35: CPT | Performed by: INTERNAL MEDICINE

## 2019-03-16 PROCEDURE — 700102 HCHG RX REV CODE 250 W/ 637 OVERRIDE(OP): Performed by: HOSPITALIST

## 2019-03-16 PROCEDURE — 85027 COMPLETE CBC AUTOMATED: CPT

## 2019-03-16 PROCEDURE — 700105 HCHG RX REV CODE 258: Performed by: HOSPITALIST

## 2019-03-16 RX ORDER — DIPHENHYDRAMINE HCL 25 MG
25 TABLET ORAL EVERY 6 HOURS PRN
Status: DISCONTINUED | OUTPATIENT
Start: 2019-03-16 | End: 2019-03-18 | Stop reason: HOSPADM

## 2019-03-16 RX ORDER — CIPROFLOXACIN 500 MG/1
500 TABLET, FILM COATED ORAL EVERY 12 HOURS
Status: DISCONTINUED | OUTPATIENT
Start: 2019-03-16 | End: 2019-03-18 | Stop reason: HOSPADM

## 2019-03-16 RX ORDER — LINEZOLID 600 MG/1
600 TABLET, FILM COATED ORAL EVERY 12 HOURS
Status: DISCONTINUED | OUTPATIENT
Start: 2019-03-16 | End: 2019-03-18 | Stop reason: HOSPADM

## 2019-03-16 RX ADMIN — VANCOMYCIN HYDROCHLORIDE 1500 MG: 100 INJECTION, POWDER, LYOPHILIZED, FOR SOLUTION INTRAVENOUS at 11:34

## 2019-03-16 RX ADMIN — CIPROFLOXACIN HYDROCHLORIDE 500 MG: 500 TABLET, FILM COATED ORAL at 17:57

## 2019-03-16 RX ADMIN — CEFEPIME 2 G: 2 INJECTION, POWDER, FOR SOLUTION INTRAVENOUS at 05:17

## 2019-03-16 RX ADMIN — ENOXAPARIN SODIUM 40 MG: 100 INJECTION SUBCUTANEOUS at 05:17

## 2019-03-16 RX ADMIN — METOPROLOL SUCCINATE 100 MG: 100 TABLET, EXTENDED RELEASE ORAL at 05:15

## 2019-03-16 RX ADMIN — Medication: at 17:58

## 2019-03-16 RX ADMIN — LINEZOLID 600 MG: 600 TABLET, FILM COATED ORAL at 17:57

## 2019-03-16 RX ADMIN — ACETAMINOPHEN 650 MG: 325 TABLET, FILM COATED ORAL at 16:29

## 2019-03-16 RX ADMIN — ACETAMINOPHEN 650 MG: 325 TABLET, FILM COATED ORAL at 23:14

## 2019-03-16 RX ADMIN — Medication: at 11:34

## 2019-03-16 RX ADMIN — SERTRALINE HYDROCHLORIDE 100 MG: 100 TABLET ORAL at 05:17

## 2019-03-16 RX ADMIN — PREDNISONE 40 MG: 20 TABLET ORAL at 05:16

## 2019-03-16 ASSESSMENT — ENCOUNTER SYMPTOMS
NAUSEA: 0
VOMITING: 0
MYALGIAS: 0
COUGH: 0
CHILLS: 0
SPUTUM PRODUCTION: 0
FEVER: 0
MYALGIAS: 1
HEMOPTYSIS: 0
ABDOMINAL PAIN: 0
SENSORY CHANGE: 0

## 2019-03-16 NOTE — CARE PLAN
Problem: Bowel/Gastric:  Goal: Normal bowel function is maintained or improved  Pt reports BM's are normal and with normal frequency and refuses stool softener medication.

## 2019-03-16 NOTE — CARE PLAN
Problem: Communication  Goal: The ability to communicate needs accurately and effectively will improve  Engage pt in conversation during dressing change to put pt at ease and for developing trusting interactions.  Pt states that dressing change cleansing is soothing and pt laughed and conversed throughout dressing change.

## 2019-03-16 NOTE — PROGRESS NOTES
[facility] Medicine Daily Progress Note    Date of Service  3/16/2019    Chief Complaint  73 y.o. female admitted 3/14/2019 with left lower extremity recurrent cellulitis and ulcer development stemming from bullous pemphigoid and immunocompromised status.    Hospital Course    Patient admitted for worsening erythema and wound formation in the distal left lower extremity in the setting of history of bullous pemphigoid on 40 mg of prednisone daily, and chronic immunodeficiency.  She was hospitalized for the same 1 month ago as well as last October.  She is status post left femoral-popliteal bypass surgery with Dr. Milana Colin.  She has history of polymicrobial infections and thus infectious disease consultation was obtained this admission.      Interval Problem Update  LLE cellulitis--clinically appears improved  LLE ulcer-- wound care  Macrocytic anemia - 8.8/29, .4  Na 133  Mag 1.8  Urine cx w Grp D Enterococcus  Bullous pemphigoid --- on suppressive therapy  A/O x 4, taking PO well; cooperative/appropriate    Consultants/Specialty  ID  Wound care    Code Status  Full    Disposition  TBD    Review of Systems  Review of Systems   Constitutional: Negative for chills and fever.   Respiratory: Negative for cough, hemoptysis and sputum production.    Gastrointestinal: Negative for abdominal pain, nausea and vomiting.   Genitourinary: Negative for dysuria and urgency.   Musculoskeletal: Negative for myalgias.   Skin: Positive for itching and rash.   Neurological: Negative for sensory change.   All other systems reviewed and are negative.       Physical Exam  Temp:  [36.3 °C (97.4 °F)-36.6 °C (97.9 °F)] 36.3 °C (97.4 °F)  Pulse:  [72-86] 72  Resp:  [14-18] 18  BP: (145-161)/(68-89) 161/84  SpO2:  [94 %-98 %] 96 %    Physical Exam   Constitutional: She is oriented to person, place, and time. She appears well-developed and well-nourished. No distress.   HENT:   Head: Normocephalic and atraumatic.   Eyes: Pupils are  equal, round, and reactive to light. EOM are normal.   Neck: Neck supple.   Cardiovascular: Normal rate and regular rhythm.  Exam reveals no S3 and no S4.    Murmur heard.   Systolic murmur is present   Pulmonary/Chest: Effort normal and breath sounds normal. No respiratory distress. She has no wheezes. She has no rales.   Abdominal: Soft. She exhibits no distension. There is no tenderness.   Neurological: She is alert and oriented to person, place, and time. No cranial nerve deficit.   Skin: Skin is warm and dry.        Bilateral lower extremity erythema, left more than right; Several centimeter ulcer development left medial tibia       Fluids    Intake/Output Summary (Last 24 hours) at 03/16/19 0902  Last data filed at 03/15/19 1734   Gross per 24 hour   Intake              560 ml   Output                0 ml   Net              560 ml       Laboratory  Recent Labs      03/14/19   1105  03/16/19   0338   WBC  13.5*  8.0   RBC  3.23*  2.90*   HEMOGLOBIN  10.0*  8.8*   HEMATOCRIT  32.3*  29.7*   MCV  100.0*  102.4*   MCH  31.0  30.3   MCHC  31.0*  29.6*   RDW  54.6*  57.1*   PLATELETCT  372  327   MPV  8.9*  9.1     Recent Labs      03/14/19   1105  03/16/19   0338   SODIUM  133*  133*   POTASSIUM  4.4  4.2   CHLORIDE  100  105   CO2  20  23   GLUCOSE  101*  106*   BUN  8  15   CREATININE  0.61  0.79   CALCIUM  9.1  9.0         Recent Labs      03/14/19   1105   BNPBTYPENAT  403*           Imaging  DX-CHEST-PORTABLE (1 VIEW)   Final Result      1.  Hyperinflation. No focal consolidation or pleural effusions.   2.  Stable cardiomegaly.           Assessment/Plan  * Cellulitis- (present on admission)   Assessment & Plan    This is recurrent cellulitis with increased redness, swelling, and pain  IV vancomycin and cefepime is been ordered based on prior culture results  ID following  Her ESR and CRP are markedly elevated she has a leukocytosis with white blood cell count of 13.5     Leg ulcer, left, with fat layer exposed  (HCC)- (present on admission)   Assessment & Plan    Wound care consulted     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)   Assessment & Plan    She is followed by dermatologist outpatient for this and is currently on 40 mg prednisone daily which will be continued  3/16 Add H1 blocker     Peripheral vascular disease (HCC)- (present on admission)   Assessment & Plan    She is followed by Dr. Colin, vascular surgery  She had a femoral popliteal bypass in June 2018     Macrocytic anemia- (present on admission)   Assessment & Plan    Folate, B12 checked prior admission and OK  Underlying ETOH abuse; suspect multifactorial immunosuppression  Thiamine       Essential hypertension- (present on admission)   Assessment & Plan    Continue metoprolol with holding parameters          VTE prophylaxis: Enox

## 2019-03-16 NOTE — ASSESSMENT & PLAN NOTE
Folate, B12 checked prior admission and OK  Underlying ETOH abuse; suspect multifactorial immunosuppression  Thiamine

## 2019-03-16 NOTE — PROGRESS NOTES
Received report and assumed pt care at 1900 change of shift.  Pt is A&Ox4 on RA.  Pt instructed to call for assistance for one person assist before ambulating with front wheel walker; pt uses call light appropriately and will also ask for assistance when staff are in room.  Pt wearing non skid treaded socks, environment uncluttered, bed in lowest and locked position, bilat upper bed rails up and call light and personal items within reach.  Pt room next to nurses' station.

## 2019-03-16 NOTE — PROGRESS NOTES
Infectious Disease Progress Note    Author: Manisha Ferrari M.D. Date & Time of service: 3/16/2019  1:56 PM    Chief Complaint:  FU cellulitis    Interval History:  73 y.o. WF admitted 3/14/2019 with peripheral vascular disease, status post femoral-popliteal bypass, and bullous pemphigus for cellulitis  3/15 AF somnolent today No new complaints since yesterday-decreased erythema BLE   3/16 AF WBC 8 feels pretty good-pleased with decrease erythema of legs-thinks may need to go to wound care to get LLE debrided next week    Labs Reviewed, Medications Reviewed and Wound Reviewed.    Review of Systems:  Review of Systems   Constitutional: Negative for chills and fever.   Respiratory: Negative for cough.    Cardiovascular: Negative for chest pain.   Musculoskeletal: Positive for myalgias.   All other systems reviewed and are negative.      Hemodynamics:  Temp (24hrs), Av.5 °C (97.7 °F), Min:36.3 °C (97.4 °F), Max:36.6 °C (97.9 °F)  Temperature: 36.3 °C (97.4 °F)  Pulse  Av  Min: 65  Max: 103   Blood Pressure : (!) 161/84       Physical Exam:  Physical Exam   Constitutional: She is oriented to person, place, and time. No distress.   HENT:   Mouth/Throat: No oropharyngeal exudate.   Eyes: Pupils are equal, round, and reactive to light. EOM are normal. Right eye exhibits no discharge. Left eye exhibits no discharge. No scleral icterus.   Neck: Neck supple. No JVD present.   Cardiovascular: Normal rate.    Pulmonary/Chest: Effort normal. No stridor. No respiratory distress.   Abdominal: Soft. She exhibits no distension. There is no tenderness.   Musculoskeletal: She exhibits edema.   Decreased edema  LLE dressed   Neurological: She is alert and oriented to person, place, and time.   Skin: There is erythema.   Multiple ulcerative lesions  Decreased erythema   Nursing note and vitals reviewed.      Meds:    Current Facility-Administered Medications:   •  diphenhydrAMINE-ZnAcetate  •  diphenhydrAMINE  •   albuterol  •  linezolid  •  ciprofloxacin  •  metoprolol SR  •  predniSONE  •  sertraline  •  senna-docusate **AND** polyethylene glycol/lytes **AND** magnesium hydroxide **AND** bisacodyl  •  enoxaparin  •  acetaminophen  •  ondansetron  •  ondansetron  •  oxyCODONE immediate-release  •  MD Alert...Vancomycin per Pharmacy    Labs:  Recent Labs      03/14/19   1105  03/16/19   0338   WBC  13.5*  8.0   RBC  3.23*  2.90*   HEMOGLOBIN  10.0*  8.8*   HEMATOCRIT  32.3*  29.7*   MCV  100.0*  102.4*   MCH  31.0  30.3   RDW  54.6*  57.1*   PLATELETCT  372  327   MPV  8.9*  9.1   NEUTSPOLYS  90.80*   --    LYMPHOCYTES  4.50*   --    MONOCYTES  2.50   --    EOSINOPHILS  0.30   --    BASOPHILS  0.40   --      Recent Labs      03/14/19   1105  03/16/19   0338   SODIUM  133*  133*   POTASSIUM  4.4  4.2   CHLORIDE  100  105   CO2  20  23   GLUCOSE  101*  106*   BUN  8  15     Recent Labs      03/14/19   1105  03/16/19   0338   ALBUMIN  3.4   --    TBILIRUBIN  0.3   --    ALKPHOSPHAT  71   --    TOTPROTEIN  7.6   --    ALTSGPT  15   --    ASTSGOT  22   --    CREATININE  0.61  0.79       Imaging:  Dx-chest-portable (1 View)    Result Date: 3/14/2019  3/14/2019 11:35 AM HISTORY/REASON FOR EXAM:  Sepsis. TECHNIQUE/EXAM DESCRIPTION AND NUMBER OF VIEWS: Single portable view of the chest. COMPARISON: 2/10/2018 FINDINGS: LUNGS: Slight hyperinflation. No focal consolidation. No effusions. PNEUMOTHORAX: None. LINES AND TUBES: None. MEDIASTINUM: Stable cardiomegaly. Atherosclerosis. MUSCULOSKELETAL STRUCTURES: No acute fracture. Old right-sided rib fractures.     1.  Hyperinflation. No focal consolidation or pleural effusions. 2.  Stable cardiomegaly.    Us-extremity Venous Lower Unilat Right    Result Date: 3/8/2019   Vascular Laboratory  CONCLUSIONS  Right lower extremity:  Enlarged lymph nodes with internal vascularity at the groin.  No evidence of superficial or deep venous thrombosis.  NANCY SANCHEZ  Exam Date:     03/08/2019 17:59   Room #:     Inpatient  Priority:     Stat  Ht (in):             Wt (lb):  Ordering Physician:        TYRELL ARAYA  Referring Physician:       MARSHAL Leo  Sonographer:               Jermain Dahl RVT  Study Type:                Complete Unilateral  Technical Quality:         Adequate  Age:    73    Gender:     F  MRN:    [id number]  :    1945      BSA:  Indications:     Swelling of Limb  CPT Codes:       66448  ICD Codes:       729.81  History:         Right lower extremity cellulitis with worsening swelling  Limitations:  PROCEDURES:  Right lower extremity venous duplex imaging.  The following venous structures were evaluated: common femoral, profunda  femoral, greater saphenous, femoral, popliteal , peroneal and posterior  tibial veins.  Serial compression, augmentation maneuvers,  color and spectral Doppler  flow evaluations were performed.  FINDINGS:  Right lower extremity:  Enlarged lymph nodes with internal vascularity at the groin.  Complete color filling and compressibility with normal venous flow dynamics  including spontaneous flow, response to augmentation maneuvers, and  respiratory phasicity.  No evidence of superficial or deep venous thrombosis.  Flow was evaluated in the contralateral common femoral vein and normal  venous flow dynamics including spontaneous flow, respiratory phasic  variation and augmentation were demonstrated.  Fredi Friend  (Electronically Signed)  Final Date:      2019                   18:33      Micro:  Results     Procedure Component Value Units Date/Time    URINE CULTURE(NEW) [[id number]]  (Abnormal) Collected:  19 1318    Order Status:  Completed Specimen:  Urine Updated:  03/15/19 3330     Significant Indicator POS (POS)     Source UR     Site -     Urine Culture - (A)      Group D Enterococcus species  10-50,000 cfu/mL   (A)    Narrative:       Indication for culture:->Emergency Room Patient    BLOOD CULTURE [[id number]] Collected:  19 1100  "   Order Status:  Completed Specimen:  Blood from Peripheral Updated:  03/15/19 0820     Significant Indicator NEG     Source BLD     Site PERIPHERAL     Blood Culture No Growth    Note: Blood cultures are incubated for 5 days and  are monitored continuously.Positive blood cultures  are called to the RN and reported as soon as  they are identified.      Narrative:       Per Hospital Policy: Only change Specimen Src: to \"Line\" if  specified by physician order.    BLOOD CULTURE [[id number]] Collected:  03/14/19 1105    Order Status:  Completed Specimen:  Blood from Peripheral Updated:  03/15/19 0820     Significant Indicator NEG     Source BLD     Site PERIPHERAL     Blood Culture No Growth    Note: Blood cultures are incubated for 5 days and  are monitored continuously.Positive blood cultures  are called to the RN and reported as soon as  they are identified.      Narrative:       Per Hospital Policy: Only change Specimen Src: to \"Line\" if  specified by physician order.    URINALYSIS [[id number]]  (Abnormal) Collected:  03/14/19 1318    Order Status:  Completed Specimen:  Urine Updated:  03/14/19 1329     Color Yellow     Character Clear     Specific Gravity 1.012     Ph 5.0     Glucose Negative mg/dL      Ketones Trace (A) mg/dL      Protein Negative mg/dL      Bilirubin Negative     Urobilinogen, Urine 0.2     Nitrite Negative     Leukocyte Esterase Negative     Occult Blood Moderate (A)     Micro Urine Req Microscopic    Narrative:       Indication for culture:->Emergency Room Patient          Assessment:  Active Hospital Problems    Diagnosis   • *Cellulitis [L03.90]   • Leg ulcer, left, with fat layer exposed ([facility]) [L97.922]   • Bullous pemphigus [L10.9]       Plan:  Cellulitis, improved  Afebrile  No leukocytosis  DC vanco and cefepime   Start Zyvox (has tolerated previously even though on sertraline) and cipro  Continue for 10 more days  FUwound care next week     Leukocytosis  Multifactorial incl recent " steroids and infection  Monitor     Chronic lower extremity ulcers, fat layer, chronic  History of infection with MRSA, Enterococcus faecalis and PSAR  Prior treatment with linezolid and ciprofloxacin  Wound care and abx as above     Bullous pemphigus, seen by dermatology  On prednisone 40 mg daily just started 1 day PTA  Cause of above     Immunocompromised, on prednisone  Blood cxs neg so far  Abx as above    Discussed with internal medicine.Dr Hill

## 2019-03-16 NOTE — WOUND TEAM
"[facility]  Inpatient Services  Initial Wound and Skin Care Evaluation    Admission Date:  3/14/2019   HPI, PMH, SH: Reviewed  Unit where seen by Wound Team: [id number]    HPE: Pt presented to ED for worsenting left LE erythema.  Long history of leg wounds.  Femoral popliteal bypass with Dr Colin 6/2018.  Pt now treated for bullous pemphigus with steroids by a dermatologist.      WOUND CONSULT RELATED TO:  Left leg wounds    SUBJECTIVE:  \"The redness is improving\"      Self Report / Pain Level:  Minimal with cleansing of the wound       OBJECTIVE:  Pt in bed, just had a bed bath.  Able to move in bed on her own.    WOUND TYPE, LOCATION, CHARACTERISTICS (Pressure ulcers: location, stage, POA or date identified)        Wound 07/05/18 Leg Left Medial leg  (Active)   Site Assessment Yellow biofilm 3/16/2019  5:00 PM   Maricruz-wound Assessment Edema;Pink;Induration 3/16/2019  5:00 PM   Margins Defined edges 3/16/2019  5:00 PM   Wound Length (cm) 4 cm 3/16/2019  5:00 PM   Wound Width (cm) 2.5 cm 3/16/2019  5:00 PM   Wound Surface Area (cm^2) 10 cm^2 3/16/2019  5:00 PM   Tunneling 0 cm 3/16/2019  5:00 PM   Undermining 0 cm 3/16/2019  5:00 PM   Closure Secondary intention 3/16/2019  5:00 PM   Drainage Amount Scant 3/16/2019  5:00 PM   Drainage Description Serosanguineous 3/16/2019  5:00 PM   Non-staged Wound Description Full thickness 3/16/2019  5:00 PM   Treatments Site care;Cleansed 3/16/2019  5:00 PM   Cleansing Normal Saline Irrigation 3/16/2019  5:00 PM   Dressing Options Honey Gel;Adhesive Bandage;Tubigrip 3/16/2019  5:00 PM   Dressing Changed Changed 3/16/2019  5:00 PM   Dressing Status Clean;Dry;Intact 3/16/2019  8:00 AM   Dressing Change Frequency Every 48 hrs 3/16/2019  5:00 PM   NEXT Dressing Change  03/18/19 3/16/2019  5:00 PM   NEXT Weekly Photo (Inpatient Only) 03/23/19 3/16/2019  5:00 PM   WOUND NURSE ONLY - Odor None 3/16/2019  5:00 PM   WOUND NURSE ONLY - Pulses Left;1+;DP;PT 3/16/2019  5:00 PM "   WOUND NURSE ONLY - Exposed Structures None 3/16/2019  5:00 PM   WOUND NURSE ONLY - Tissue Type and Percentage 80 red, 20 black 3/16/2019  5:00 PM   WOUND NURSE ONLY - Time Spent with Patient (mins) 60 3/16/2019  5:00 PM           Wound 01/30/19 Full Thickness Wound Ankle L medial ankle (Active)   Wound Image   2/4/2019 10:00 AM   Site Assessment Red;Black 3/16/2019  5:00 PM   Maricruz-wound Assessment Edema;Pink;Induration 3/16/2019  5:00 PM   Margins Defined edges 3/16/2019  5:00 PM   Wound Length (cm) 5 cm 3/16/2019  5:00 PM   Wound Width (cm) 3 cm 3/16/2019  5:00 PM   Wound Depth (cm) 0.2 cm 2/4/2019 10:00 AM   Wound Surface Area (cm^2) 15 cm^2 3/16/2019  5:00 PM   Tunneling 0 cm 3/16/2019  5:00 PM   Undermining 0 cm 3/16/2019  5:00 PM   Closure Secondary intention 3/16/2019  5:00 PM   Drainage Amount Scant 3/16/2019  5:00 PM   Drainage Description Serosanguineous 3/16/2019  5:00 PM   Non-staged Wound Description Full thickness 3/16/2019  5:00 PM   Treatments Site care;Cleansed 3/16/2019  5:00 PM   Cleansing Normal Saline Irrigation 3/16/2019  5:00 PM   Periwound Protectant Skin Protectant wipes to Periwound 3/16/2019  5:00 PM   Dressing Options Honey Gel;Adhesive Foam;Tubigrip 3/16/2019  5:00 PM   Dressing Cleansing/Solutions 1/4 Strength Dakin's Solution 3/16/2019  8:00 AM   Dressing Changed Changed 3/16/2019  5:00 PM   Dressing Status Clean;Dry;Intact 3/16/2019  8:00 AM   Dressing Change Frequency Every 48 hrs 3/16/2019  5:00 PM   NEXT Dressing Change  03/18/19 3/16/2019  5:00 PM   NEXT Weekly Photo (Inpatient Only) 03/23/19 3/16/2019  5:00 PM   WOUND NURSE ONLY - Odor None 3/16/2019  5:00 PM   WOUND NURSE ONLY - Pulses Not palpable 2/4/2019 10:00 AM   WOUND NURSE ONLY - Exposed Structures None 3/16/2019  5:00 PM   WOUND NURSE ONLY - Tissue Type and Percentage 90 red, 10 black 3/16/2019  5:00 PM   WOUND NURSE ONLY - Time Spent with Patient (mins) 60 2/4/2019 10:00 AM       Wound 02/15/19 Left lateral distal LE  (Active)   Site Assessment Black;Dry 3/16/2019  5:00 PM   Yuki-wound Assessment Edema;Purple 3/16/2019  5:00 PM   Margins Undefined edges 3/16/2019  5:00 PM   Wound Length (cm) 2.5 cm 3/16/2019  5:00 PM   Wound Width (cm) 3 cm 3/16/2019  5:00 PM   Wound Surface Area (cm^2) 7.5 cm^2 3/16/2019  5:00 PM   Tunneling 0 cm 3/16/2019  5:00 PM   Undermining 0 cm 3/16/2019  5:00 PM   Closure Secondary intention 3/16/2019  5:00 PM   Drainage Amount None 3/16/2019  5:00 PM   Treatments Site care;Cleansed 3/16/2019  5:00 PM   Cleansing Normal Saline Irrigation 3/16/2019  5:00 PM   Periwound Protectant Skin Protectant wipes to Periwound 3/16/2019  5:00 PM   Dressing Options Adhesive Bandage 3/16/2019  5:00 PM   Dressing Changed Changed 3/16/2019  5:00 PM   Dressing Change Frequency Every 48 hrs 3/16/2019  5:00 PM   NEXT Dressing Change  03/18/19 3/16/2019  5:00 PM   NEXT Weekly Photo (Inpatient Only) 03/23/19 3/16/2019  5:00 PM   WOUND NURSE ONLY - Odor None 3/16/2019  5:00 PM   WOUND NURSE ONLY - Pulses Left;1+;DP;PT 3/16/2019  5:00 PM   WOUND NURSE ONLY - Exposed Structures None 3/16/2019  5:00 PM   WOUND NURSE ONLY - Tissue Type and Percentage black 3/16/2019  5:00 PM   WOUND NURSE ONLY - Time Spent with Patient (mins) 60 3/16/2019  5:00 PM       Vascular:  3/16/19 - left  Dorsal Pedal pulses:  1+ multiphasic  Posterior tib pulses:  1+ multiphasic    TOMI:     nt    Lab Values:    WBC:       WBC   Date/Time Value Ref Range Status   03/16/2019 03:38 AM 8.0 4.8 - 10.8 K/uL Final     AIC:      Lab Results   Component Value Date/Time    HBA1C 5.6 06/04/2018 12:00 PM         Culture:   Completed     INTERVENTIONS BY WOUND TEAM:  Dressings removed - dressings dry.  Cleansed wounds with normal saline.  Sharp debrided 2 cm2 black tissue from medial left ankle wound with tweezers.  Cleansed wounds again with saline.  Applied sking prep yuki wound, honey gel to wound beds of medial ankle and leg wounds.  Covered all wound with adhesive  foams.  Applied moisturizing lotion to bilateral legs.   Applied tubi E from base of toes to 2 fingers below knee crease.      Dressing selection:  Honey gel     Interdisciplinary consultation:  RN, patient     EVALUATION:  Pt with chronic wounds left leg.  Biofilm present on medial wounds - honey gel will assist with breaking up film.  Lateral leg wounds dry, will keep dry at this point.  Pt's last TOMI was on 5/2018 and pt had a bypass procedure in 6/2018.      Factors affecting wound healing:  PVD, steroid, anemia, bullous pemphigoid  Goals:  Steady decrease in wound area and depth weekly     NURSING PLAN OF CARE ORDERS (X):    Dressing changes: See Dressing Care orders:     Skin care: See Skin Care orders:   Rectal tube care: See Rectal Tube Care orders:   Other orders:    RSKIN: CURRENT (X) ORDERED (O)  Q shift Luis:  X  Q shift pressure point assessments:  X  Pressure redistribution mattress        Waffle overlay  SHORTY      Bariatric SHORTY      Bariatric foam        Heel float boots       Heels floated on pillows      Barrier wipes      Barrier Cream      Barrier paste      Sacral silicone dressing      Silicone O2 tubing      Anchorfast      Trach with Optifoam split foam       Waffle cushion      Rectal tube or BMS      Antifungal tx    Turn q 2 hours     Up to chair     Ambulate   PT/OT     Dietician      PO     TF   TPN   NPO   # days   Other       WOUND TEAM PLAN OF CARE (X):   NPWT change 3 x week:        Dressing changes by wound team:       Follow up as needed:     X  Other (explain):    Anticipated discharge plans (X):  SNF:           Home Care:           Outpatient Wound Center:       X pt will require ongoing wound care upon discharge     Self Care:            Other:

## 2019-03-16 NOTE — PROGRESS NOTES
"Pharmacy Kinetics 73 y.o. female on vancomycin day # 3  3/16/2019    Currently Dose: Vancomycin 1500 mg iv q24hr (~20 mg/kg)  Received Load Dose: Yes    Indication for Treatment: SSTI  ID Service Following: Yes    Pertinent history per medical record: Admitted on 3/14/2019 for SSTI of left lower extremity. Noted recurrent cellulitis. Also noted prednisone use and multiple admissions for same chief complaint.    Other antibiotics: cefepime 2 gm iv q12h    Allergies: Bactrim [sulfamethoxazole-trimethoprim] and Meropenem     List concerns for accumulation of vancomycin: age 73, electrolyte derangement, baseline CKD    Pertinent cultures to date:   Results     Procedure Component Value Units Date/Time    URINE CULTURE(NEW) [[id number]]  (Abnormal) Collected:  03/14/19 1318    Order Status:  Completed Specimen:  Urine Updated:  03/15/19 1750     Significant Indicator POS (POS)     Source UR     Site -     Urine Culture - (A)      Group D Enterococcus species  10-50,000 cfu/mL   (A)    Narrative:       Indication for culture:->Emergency Room Patient    BLOOD CULTURE [[id number]] Collected:  03/14/19 1100    Order Status:  Completed Specimen:  Blood from Peripheral Updated:  03/15/19 0820     Significant Indicator NEG     Source BLD     Site PERIPHERAL     Blood Culture No Growth    Note: Blood cultures are incubated for 5 days and  are monitored continuously.Positive blood cultures  are called to the RN and reported as soon as  they are identified.      Narrative:       Per Hospital Policy: Only change Specimen Src: to \"Line\" if  specified by physician order.    BLOOD CULTURE [[id number]] Collected:  03/14/19 1105    Order Status:  Completed Specimen:  Blood from Peripheral Updated:  03/15/19 0820     Significant Indicator NEG     Source BLD     Site PERIPHERAL     Blood Culture No Growth    Note: Blood cultures are incubated for 5 days and  are monitored continuously.Positive blood cultures  are called to the RN and " "reported as soon as  they are identified.      Narrative:       Per Hospital Policy: Only change Specimen Src: to \"Line\" if  specified by physician order.    URINALYSIS [[id number]]  (Abnormal) Collected:  19 1318    Order Status:  Completed Specimen:  Urine Updated:  19 1329     Color Yellow     Character Clear     Specific Gravity 1.012     Ph 5.0     Glucose Negative mg/dL      Ketones Trace (A) mg/dL      Protein Negative mg/dL      Bilirubin Negative     Urobilinogen, Urine 0.2     Nitrite Negative     Leukocyte Esterase Negative     Occult Blood Moderate (A)     Micro Urine Req Microscopic    Narrative:       Indication for culture:->Emergency Room Patient        MRSA nares swab if pneumonia is a concern (ordered/positive/negative/n-a): n/a    Recent Labs      19   1105  19   0338   WBC  13.5*  8.0   NEUTSPOLYS  90.80*   --      Recent Labs      19   1105  19   0338   BUN  8  15   CREATININE  0.61  0.79   ALBUMIN  3.4   --       3/16/2019 11:03   Vancomycin Trough 16.3     Intake/Output Summary (Last 24 hours) at 19 0728  Last data filed at 03/15/19 1734   Gross per 24 hour   Intake              560 ml   Output                0 ml   Net              560 ml      Blood pressure 156/89, pulse 81, temperature 36.4 °C (97.6 °F), temperature source Temporal, resp. rate 18, height 1.702 m (5' 7.01\"), weight 74.8 kg (164 lb 14.5 oz), SpO2 94 %, not currently breastfeeding. Temp (24hrs), Av.4 °C (97.6 °F), Min:36.1 °C (97 °F), Max:36.6 °C (97.9 °F)    Estimated Creatinine Clearance: 65 mL/min (by C-G formula based on SCr of 0.79 mg/dL).    A/P   1. Vancomycin dose change: 1200 iv q24h (~16 mg/kg)   2. Next vancomycin level: 3/19/19 @1100 (ordered)  3. Goal trough: 12-16 mcg/mL  4. Comments: VS stable. Afebrile. WBC improved. CrCl ~65 mL/min (SCr worsened slightly). Microbiology pending. Factors for accumulation noted. Trough noted and slightly above goal range. Will dose " adjust via linear kinetics for goal trough of ~13.5 mcg/mL. Repeat BMP with AM labs. Trough in place in ~2-3 days to assess clearance. ID consulted. Pharmacy will continue to follow.    Tracee BeasleyD

## 2019-03-17 PROBLEM — Z22.39 VRE (VANCOMYCIN RESISTANT ENTEROCOCCUS) CULTURE POSITIVE: Status: ACTIVE | Noted: 2019-03-17

## 2019-03-17 LAB
ANION GAP SERPL CALC-SCNC: 6 MMOL/L (ref 0–11.9)
BACTERIA UR CULT: ABNORMAL
BUN SERPL-MCNC: 14 MG/DL (ref 8–22)
CALCIUM SERPL-MCNC: 9.3 MG/DL (ref 8.5–10.5)
CHLORIDE SERPL-SCNC: 104 MMOL/L (ref 96–112)
CO2 SERPL-SCNC: 25 MMOL/L (ref 20–33)
CREAT SERPL-MCNC: 0.71 MG/DL (ref 0.5–1.4)
GLUCOSE SERPL-MCNC: 98 MG/DL (ref 65–99)
POTASSIUM SERPL-SCNC: 4.1 MMOL/L (ref 3.6–5.5)
SIGNIFICANT IND 70042: ABNORMAL
SITE SITE: ABNORMAL
SODIUM SERPL-SCNC: 135 MMOL/L (ref 135–145)
SOURCE SOURCE: ABNORMAL

## 2019-03-17 PROCEDURE — 99233 SBSQ HOSP IP/OBS HIGH 50: CPT | Performed by: INTERNAL MEDICINE

## 2019-03-17 PROCEDURE — 36415 COLL VENOUS BLD VENIPUNCTURE: CPT

## 2019-03-17 PROCEDURE — 700102 HCHG RX REV CODE 250 W/ 637 OVERRIDE(OP): Performed by: HOSPITALIST

## 2019-03-17 PROCEDURE — A9270 NON-COVERED ITEM OR SERVICE: HCPCS | Performed by: INTERNAL MEDICINE

## 2019-03-17 PROCEDURE — A9270 NON-COVERED ITEM OR SERVICE: HCPCS | Performed by: NURSE PRACTITIONER

## 2019-03-17 PROCEDURE — 770001 HCHG ROOM/CARE - MED/SURG/GYN PRIV*

## 2019-03-17 PROCEDURE — 700102 HCHG RX REV CODE 250 W/ 637 OVERRIDE(OP): Performed by: INTERNAL MEDICINE

## 2019-03-17 PROCEDURE — 700111 HCHG RX REV CODE 636 W/ 250 OVERRIDE (IP): Performed by: HOSPITALIST

## 2019-03-17 PROCEDURE — 80048 BASIC METABOLIC PNL TOTAL CA: CPT

## 2019-03-17 PROCEDURE — A9270 NON-COVERED ITEM OR SERVICE: HCPCS | Performed by: HOSPITALIST

## 2019-03-17 PROCEDURE — 700102 HCHG RX REV CODE 250 W/ 637 OVERRIDE(OP): Performed by: NURSE PRACTITIONER

## 2019-03-17 PROCEDURE — 99232 SBSQ HOSP IP/OBS MODERATE 35: CPT | Performed by: INTERNAL MEDICINE

## 2019-03-17 PROCEDURE — 84425 ASSAY OF VITAMIN B-1: CPT

## 2019-03-17 RX ORDER — LISINOPRIL 10 MG/1
10 TABLET ORAL
Status: DISCONTINUED | OUTPATIENT
Start: 2019-03-17 | End: 2019-03-18 | Stop reason: HOSPADM

## 2019-03-17 RX ADMIN — CIPROFLOXACIN HYDROCHLORIDE 500 MG: 500 TABLET, FILM COATED ORAL at 18:09

## 2019-03-17 RX ADMIN — LINEZOLID 600 MG: 600 TABLET, FILM COATED ORAL at 18:09

## 2019-03-17 RX ADMIN — OXYCODONE HYDROCHLORIDE 5 MG: 5 TABLET ORAL at 16:15

## 2019-03-17 RX ADMIN — LISINOPRIL 10 MG: 10 TABLET ORAL at 09:47

## 2019-03-17 RX ADMIN — CIPROFLOXACIN HYDROCHLORIDE 500 MG: 500 TABLET, FILM COATED ORAL at 04:37

## 2019-03-17 RX ADMIN — SERTRALINE HYDROCHLORIDE 100 MG: 100 TABLET ORAL at 04:38

## 2019-03-17 RX ADMIN — ENOXAPARIN SODIUM 40 MG: 100 INJECTION SUBCUTANEOUS at 04:39

## 2019-03-17 RX ADMIN — PREDNISONE 40 MG: 20 TABLET ORAL at 04:38

## 2019-03-17 RX ADMIN — METOPROLOL SUCCINATE 100 MG: 100 TABLET, EXTENDED RELEASE ORAL at 04:38

## 2019-03-17 RX ADMIN — LINEZOLID 600 MG: 600 TABLET, FILM COATED ORAL at 04:37

## 2019-03-17 RX ADMIN — Medication: at 09:53

## 2019-03-17 ASSESSMENT — ENCOUNTER SYMPTOMS
NAUSEA: 0
SENSORY CHANGE: 0
ABDOMINAL PAIN: 0
COUGH: 0
VOMITING: 0
CHILLS: 0
SPUTUM PRODUCTION: 0
MYALGIAS: 1
HEMOPTYSIS: 0
MYALGIAS: 0
FEVER: 0

## 2019-03-17 NOTE — CARE PLAN
Problem: Bowel/Gastric:  Goal: Will not experience complications related to bowel motility  Pt regularly refuses stool softener medication.  Monitor bowel movement frequency, consistency and signs and symptoms of bowel function.

## 2019-03-17 NOTE — CARE PLAN
Problem: Safety  Goal: Will remain free from injury  Advised pt to be extra careful to not bump feet and shins into fixed items in room when ambulating to prevent breaks in fragile edematous bilateral lower legs/feet.  Pt stated that she will be careful and usually is as she does not want any more wounds.

## 2019-03-17 NOTE — PROGRESS NOTES
Received report and assumed pt care at 1900 change of shift.  Pt is A&Ox4 on RA.  Pt utilizes front wheel walker for ambulating.  Pt wearing BLE home compression stockings at time of change of shift bed side report.  Later in the evening pt stated she took off compression stockings as they were uncomfortable and making her legs itch.  Pt wearing non skid treaded socks, environment uncluttered, bed in lowest and locked position, bilat upper bed rails up and call light and personal items within reach.  Pt room next to nurses' station.

## 2019-03-17 NOTE — PROGRESS NOTES
"Infectious Disease Progress Note    Author: Manisha Ferrari M.D. Date & Time of service: 3/17/2019  12:25 PM    Chief Complaint:  FU cellulitis    Interval History:  73 y.o. WF admitted 3/14/2019 with peripheral vascular disease, status post femoral-popliteal bypass, and bullous pemphigus for cellulitis  3/15 AF somnolent today No new complaints since yesterday-decreased erythema BLE   3/16 AF WBC 8 feels pretty good-pleased with decrease erythema of legs-thinks may need to go to wound care to get LLE debrided next week  3/17 AF c/o elevated BP this am with SBP in the 190s-states she \"drank too much Sprite\"-had wound care yesterday Denies SE abx. Pain controlled. No new sxs    Labs Reviewed, Medications Reviewed and Wound Reviewed.    Review of Systems:  Review of Systems   Constitutional: Negative for chills and fever.   Respiratory: Negative for cough.    Cardiovascular: Negative for chest pain.   Gastrointestinal: Negative for abdominal pain and vomiting.   Genitourinary: Negative for dysuria, frequency and urgency.   Musculoskeletal: Positive for myalgias.   Skin: Negative for rash.   All other systems reviewed and are negative.      Hemodynamics:  Temp (24hrs), Av.7 °C (98 °F), Min:36.4 °C (97.5 °F), Max:36.9 °C (98.4 °F)  Temperature: 36.6 °C (97.9 °F)  Pulse  Av.6  Min: 65  Max: 103   Blood Pressure : (!) 182/91 (RN Notified)       Physical Exam:  Physical Exam   Constitutional: She is oriented to person, place, and time. No distress.   HENT:   Mouth/Throat: No oropharyngeal exudate.   Eyes: Pupils are equal, round, and reactive to light. EOM are normal. Right eye exhibits no discharge. Left eye exhibits no discharge. No scleral icterus.   Neck: Neck supple. No JVD present.   Cardiovascular: Normal rate.    Pulmonary/Chest: Effort normal. No stridor. No respiratory distress.   Abdominal: Soft. She exhibits no distension. There is no tenderness.   Musculoskeletal: She exhibits edema.   Decreased " edema  LLE dressed   Neurological: She is alert and oriented to person, place, and time.   Skin: There is erythema.   Multiple ulcerative lesions: 5 X 3 X0.2 cm  4 X3.5 cm and 2.5 X 3 cm  No undermining or tunneling  Decreased erythema   Nursing note and vitals reviewed.      Meds:    Current Facility-Administered Medications:   •  lisinopril  •  diphenhydrAMINE-ZnAcetate  •  diphenhydrAMINE  •  linezolid  •  ciprofloxacin  •  metoprolol SR  •  predniSONE  •  sertraline  •  senna-docusate **AND** polyethylene glycol/lytes **AND** magnesium hydroxide **AND** bisacodyl  •  enoxaparin  •  acetaminophen  •  ondansetron  •  ondansetron  •  oxyCODONE immediate-release    Labs:  Recent Labs      03/16/19 0338   WBC  8.0   RBC  2.90*   HEMOGLOBIN  8.8*   HEMATOCRIT  29.7*   MCV  102.4*   MCH  30.3   RDW  57.1*   PLATELETCT  327   MPV  9.1     Recent Labs      03/16/19 0338  03/17/19   0341   SODIUM  133*  135   POTASSIUM  4.2  4.1   CHLORIDE  105  104   CO2  23  25   GLUCOSE  106*  98   BUN  15  14     Recent Labs      03/16/19   0338  03/17/19   0341   CREATININE  0.79  0.71       Imaging:  Dx-chest-portable (1 View)    Result Date: 3/14/2019  3/14/2019 11:35 AM HISTORY/REASON FOR EXAM:  Sepsis. TECHNIQUE/EXAM DESCRIPTION AND NUMBER OF VIEWS: Single portable view of the chest. COMPARISON: 2/10/2018 FINDINGS: LUNGS: Slight hyperinflation. No focal consolidation. No effusions. PNEUMOTHORAX: None. LINES AND TUBES: None. MEDIASTINUM: Stable cardiomegaly. Atherosclerosis. MUSCULOSKELETAL STRUCTURES: No acute fracture. Old right-sided rib fractures.     1.  Hyperinflation. No focal consolidation or pleural effusions. 2.  Stable cardiomegaly.    Us-extremity Venous Lower Unilat Right    Result Date: 3/8/2019   Vascular Laboratory  CONCLUSIONS  Right lower extremity:  Enlarged lymph nodes with internal vascularity at the groin.  No evidence of superficial or deep venous thrombosis.  NANCY SANCHEZ  Exam Date:     03/08/2019  17:59  Room #:     Inpatient  Priority:     Stat  Ht (in):             Wt (lb):  Ordering Physician:        TYRELL ARAYA  Referring Physician:       [id number] Cervantes  Sonographer:               Jermain Dahl RVT  Study Type:                Complete Unilateral  Technical Quality:         Adequate  Age:    73    Gender:     F  MRN:    [id number]  :    [id number]      BSA:  Indications:     Swelling of Limb  CPT Codes:       39199  ICD Codes:       729.81  History:         Right lower extremity cellulitis with worsening swelling  Limitations:  PROCEDURES:  Right lower extremity venous duplex imaging.  The following venous structures were evaluated: common femoral, profunda  femoral, greater saphenous, femoral, popliteal , peroneal and posterior  tibial veins.  Serial compression, augmentation maneuvers,  color and spectral Doppler  flow evaluations were performed.  FINDINGS:  Right lower extremity:  Enlarged lymph nodes with internal vascularity at the groin.  Complete color filling and compressibility with normal venous flow dynamics  including spontaneous flow, response to augmentation maneuvers, and  respiratory phasicity.  No evidence of superficial or deep venous thrombosis.  Flow was evaluated in the contralateral common femoral vein and normal  venous flow dynamics including spontaneous flow, respiratory phasic  variation and augmentation were demonstrated.  Fredi Friend  (Electronically Signed)  Final Date:      2019                   18:33      Micro:  Results     Procedure Component Value Units Date/Time    URINE CULTURE(NEW) [[id number]]  (Abnormal)  (Susceptibility) Collected:  19 1318    Order Status:  Completed Specimen:  Urine Updated:  19 1119     Significant Indicator POS (POS)     Source UR     Site -     Urine Culture - (A)      Enterococcus faecalis  10-50,000 cfu/mL   (A)      Enterococcus faecium  10-50,000 cfu/mL  VANCOMYCIN RESISTANT ENTEROCOCCI(VRE)   (A)    Narrative:     "   CALL  Nava  61 tel. [phone],  CALLED  61 tel. [phone] 03/17/2019, 11:17, RB PERF. RESULTS CALLED  TO:[id number],RN + faxed to [facility]  Indication for culture:->Emergency Room Patient    Culture & Susceptibility     ENTEROCOCCUS FAECALIS     Antibiotic Sensitivity Microscan Unit Status    Ampicillin Sensitive <=2 mcg/mL Final    Method: LICO    Daptomycin Sensitive 1 mcg/mL Final    Method: LICO    Nitrofurantoin Sensitive <=32 mcg/mL Final    Method: LICO    Penicillin Sensitive 8 mcg/mL Final    Method: LICO    Tetracycline Resistant >8 mcg/mL Final    Method: LICO              ENTEROCOCCUS FAECIUM     Antibiotic Sensitivity Microscan Unit Status    Ampicillin Resistant >8 mcg/mL Final    Method: LICO    Linezolid Sensitive 2 mcg/mL Final    Method: LICO    Nitrofurantoin Sensitive <=32 mcg/mL Final    Method: LICO    Penicillin Resistant >8 mcg/mL Final    Method: LICO    Tetracycline Resistant >8 mcg/mL Final    Method: LICO                       BLOOD CULTURE [[id number]] Collected:  03/14/19 1100    Order Status:  Completed Specimen:  Blood from Peripheral Updated:  03/15/19 0820     Significant Indicator NEG     Source BLD     Site PERIPHERAL     Blood Culture No Growth    Note: Blood cultures are incubated for 5 days and  are monitored continuously.Positive blood cultures  are called to the RN and reported as soon as  they are identified.      Narrative:       Per Hospital Policy: Only change Specimen Src: to \"Line\" if  specified by physician order.    BLOOD CULTURE [[id number]] Collected:  03/14/19 1105    Order Status:  Completed Specimen:  Blood from Peripheral Updated:  03/15/19 0820     Significant Indicator NEG     Source BLD     Site PERIPHERAL     Blood Culture No Growth    Note: Blood cultures are incubated for 5 days and  are monitored continuously.Positive blood cultures  are called to the RN and reported as soon as  they are identified.      Narrative:       Per Hospital Policy: Only change Specimen Src: " "to \"Line\" if  specified by physician order.    URINALYSIS [[id number]]  (Abnormal) Collected:  03/14/19 1318    Order Status:  Completed Specimen:  Urine Updated:  03/14/19 1329     Color Yellow     Character Clear     Specific Gravity 1.012     Ph 5.0     Glucose Negative mg/dL      Ketones Trace (A) mg/dL      Protein Negative mg/dL      Bilirubin Negative     Urobilinogen, Urine 0.2     Nitrite Negative     Leukocyte Esterase Negative     Occult Blood Moderate (A)     Micro Urine Req Microscopic    Narrative:       Indication for culture:->Emergency Room Patient          Assessment:  Active Hospital Problems    Diagnosis   • *Cellulitis [L03.90]   • Leg ulcer, left, with fat layer exposed (HCC) [L97.922]   • Bullous pemphigus [L10.9]       Plan:  Cellulitis, improved  Afebrile  No leukocytosis  Continue Zyvox (has tolerated previously even though on sertraline) and cipro  Continue for 10 more days  FU wound care next week  Stop date 3/25/2019     Leukocytosis  Multifactorial incl recent steroids and infection  Monitor     Bacteruria  Ucx +VRE  On Zyvox already  No additional treatment recommended    Chronic lower extremity ulcers, fat layer, chronic  History of infection with MRSA, Enterococcus faecalis and PSAR  Prior treatment with linezolid and ciprofloxacin  Wound care aprreciated-honey added  Abx as above     Bullous pemphigus, seen by dermatology  On prednisone 40 mg daily just started 1 day PTA  Cause of above     Immunocompromised, on prednisone  Blood cxs neg so far  Abx as above    DW IM Dr Hill  I have performed a physical exam and reviewed and updated ROS as of today.  In review of yesterday's note dated  3/16/2019, there are no changes except as documented above.          "

## 2019-03-17 NOTE — PROGRESS NOTES
[facility] Medicine Daily Progress Note    Date of Service  3/17/2019    Chief Complaint  73 y.o. female admitted 3/14/2019 with left lower extremity recurrent cellulitis and ulcer development stemming from bullous pemphigoid and immunocompromised status.    Hospital Course    Patient admitted for worsening erythema and wound formation in the distal left lower extremity in the setting of history of bullous pemphigoid on 40 mg of prednisone daily, and chronic immunosuppresion.  She was hospitalized for the same 1 month ago as well as last October.  She is status post left femoral-popliteal bypass surgery with Dr. Milana Colin.  She has history of polymicrobial infections and thus infectious disease consultation was obtained this admission. 3/17 Antibiotics changed to Zyvox and Cipro x 10 more days. OP Wound Clinic order placed. She was already established can can resume care.      Interval Problem Update  LLE cellulitis--clinically appears improved  LLE ulcer-- wound care  Bullous pemphigoid --- on suppressive therapy  A/O x 4, taking PO well; cooperative/appropriate  Urine c w VRE - on linezolid    Consultants/Specialty  ID  Wound care    Code Status  Full    Disposition  Home w HH - face to face complete and HH ordered    Review of Systems  Review of Systems   Constitutional: Negative for chills and fever.   Respiratory: Negative for cough, hemoptysis and sputum production.    Gastrointestinal: Negative for abdominal pain, nausea and vomiting.   Genitourinary: Negative for dysuria and urgency.   Musculoskeletal: Negative for myalgias.   Skin: Positive for rash. Negative for itching.   Neurological: Negative for sensory change.   All other systems reviewed and are negative.       Physical Exam  Temp:  [36.3 °C (97.4 °F)-36.9 °C (98.4 °F)] 36.4 °C (97.5 °F)  Pulse:  [65-93] 65  Resp:  [16-18] 18  BP: (142-165)/(60-87) 165/87  SpO2:  [94 %-96 %] 95 %    Physical Exam   Constitutional: She is oriented to person, place,  and time. She appears well-developed and well-nourished. No distress.   HENT:   Head: Normocephalic and atraumatic.   Eyes: Pupils are equal, round, and reactive to light. EOM are normal.   Neck: Neck supple.   Cardiovascular: Normal rate and regular rhythm.  Exam reveals no S3 and no S4.    Murmur heard.   Systolic murmur is present   Pulmonary/Chest: Effort normal and breath sounds normal. No respiratory distress. She has no wheezes. She has no rales.   Abdominal: Soft. She exhibits no distension. There is no tenderness.   Neurological: She is alert and oriented to person, place, and time. No cranial nerve deficit.   Skin: Skin is warm and dry.        Bilateral lower extremity erythema, left more than right; Several centimeter ulcer development left medial tibia       Fluids    Intake/Output Summary (Last 24 hours) at 03/17/19 0750  Last data filed at 03/16/19 1800   Gross per 24 hour   Intake             1210 ml   Output                0 ml   Net             1210 ml       Laboratory  Recent Labs      03/14/19   1105  03/16/19   0338   WBC  13.5*  8.0   RBC  3.23*  2.90*   HEMOGLOBIN  10.0*  8.8*   HEMATOCRIT  32.3*  29.7*   MCV  100.0*  102.4*   MCH  31.0  30.3   MCHC  31.0*  29.6*   RDW  54.6*  57.1*   PLATELETCT  372  327   MPV  8.9*  9.1     Recent Labs      03/14/19   1105  03/16/19   0338  03/17/19   0341   SODIUM  133*  133*  135   POTASSIUM  4.4  4.2  4.1   CHLORIDE  100  105  104   CO2  20  23  25   GLUCOSE  101*  106*  98   BUN  8  15  14   CREATININE  0.61  0.79  0.71   CALCIUM  9.1  9.0  9.3         Recent Labs      03/14/19   1105   BNPBTYPENAT  403*           Imaging  DX-CHEST-PORTABLE (1 VIEW)   Final Result      1.  Hyperinflation. No focal consolidation or pleural effusions.   2.  Stable cardiomegaly.           Assessment/Plan  * Cellulitis- (present on admission)   Assessment & Plan    This is recurrent cellulitis with increased redness, swelling, and pain  IV vancomycin and cefepime is been  ordered based on prior culture results  ID following  Her ESR and CRP are markedly elevated she has a leukocytosis with white blood cell count of 13.5     Leg ulcer, left, with fat layer exposed (HCC)- (present on admission)   Assessment & Plan    Wound care consulted     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)   Assessment & Plan    She is followed by dermatologist outpatient for this and is currently on 40 mg prednisone daily which will be continued  3/16 Add H1 blocker     VRE (vancomycin resistant enterococcus) culture positive   Assessment & Plan    Urine culture drawn in ER  + VRE  On appropriate Abx     Peripheral vascular disease (HCC)- (present on admission)   Assessment & Plan    She is followed by Dr. Colin, vascular surgery  She had a femoral popliteal bypass in June 2018     Macrocytic anemia- (present on admission)   Assessment & Plan    Folate, B12 checked prior admission and OK  Underlying ETOH abuse; suspect multifactorial immunosuppression  Thiamine       Essential hypertension- (present on admission)   Assessment & Plan    Continue metoprolol with holding parameters          VTE prophylaxis: Enox

## 2019-03-17 NOTE — FACE TO FACE
Face to Face Supporting Documentation - Home Health    The encounter with this patient was in whole or in part the primary reason for home health admission.    Date of encounter:   Patient:                    MRN:                       YOB: 2021  Nadege Mae  [id number]  1945     Home health to see patient for:  Skilled Nursing care for assessment, interventions & education, Physical Therapy evaluation and treatment and Occupational therapy evaluation and treatment    Skilled need for:  Exacerbation of Chronic Disease State Bullous pemphigoid/ulcers/ankle fx and altered gait    Skilled nursing interventions to include:  Wound Care    Homebound status evidenced by:  Need the aid of supportive devices such as crutches, canes, wheelchairs or walkers or Needs the assistance of another person in order to leave the home. Leaving home requires a considerable and taxing effort. There is a normal inability to leave the home.    Community Physician to provide follow up care: RYANN Son     Optional Interventions? No      I certify the face to face encounter for this home health care referral meets the CMS requirements and the encounter/clinical assessment with the patient was, in whole, or in part, for the medical condition(s) listed above, which is the primary reason for home health care. Based on my clinical findings: the service(s) are medically necessary, support the need for home health care, and the homebound criteria are met.  I certify that this patient has had a face to face encounter by myself.  HEBER Dumas - NPI: [id number]

## 2019-03-18 ENCOUNTER — APPOINTMENT (OUTPATIENT)
Dept: WOUND CARE | Facility: MEDICAL CENTER | Age: 74
End: 2019-03-18
Attending: NURSE PRACTITIONER
Payer: MEDICARE

## 2019-03-18 VITALS
TEMPERATURE: 97.7 F | OXYGEN SATURATION: 92 % | HEART RATE: 71 BPM | SYSTOLIC BLOOD PRESSURE: 149 MMHG | WEIGHT: 163.36 LBS | BODY MASS INDEX: 25.64 KG/M2 | RESPIRATION RATE: 18 BRPM | HEIGHT: 67 IN | DIASTOLIC BLOOD PRESSURE: 64 MMHG

## 2019-03-18 PROCEDURE — A9270 NON-COVERED ITEM OR SERVICE: HCPCS | Performed by: NURSE PRACTITIONER

## 2019-03-18 PROCEDURE — 700102 HCHG RX REV CODE 250 W/ 637 OVERRIDE(OP): Performed by: NURSE PRACTITIONER

## 2019-03-18 PROCEDURE — 700102 HCHG RX REV CODE 250 W/ 637 OVERRIDE(OP): Performed by: INTERNAL MEDICINE

## 2019-03-18 PROCEDURE — A9270 NON-COVERED ITEM OR SERVICE: HCPCS | Performed by: HOSPITALIST

## 2019-03-18 PROCEDURE — 99239 HOSP IP/OBS DSCHRG MGMT >30: CPT | Performed by: INTERNAL MEDICINE

## 2019-03-18 PROCEDURE — 700102 HCHG RX REV CODE 250 W/ 637 OVERRIDE(OP): Performed by: HOSPITALIST

## 2019-03-18 PROCEDURE — 700111 HCHG RX REV CODE 636 W/ 250 OVERRIDE (IP): Performed by: HOSPITALIST

## 2019-03-18 PROCEDURE — 99232 SBSQ HOSP IP/OBS MODERATE 35: CPT | Performed by: INTERNAL MEDICINE

## 2019-03-18 PROCEDURE — A9270 NON-COVERED ITEM OR SERVICE: HCPCS | Performed by: INTERNAL MEDICINE

## 2019-03-18 RX ORDER — PREDNISONE 10 MG/1
20 TABLET ORAL DAILY
Qty: 30 TAB | Refills: 0
Start: 2019-03-18 | End: 2019-04-19 | Stop reason: CLARIF

## 2019-03-18 RX ORDER — LISINOPRIL 10 MG/1
10 TABLET ORAL
Status: DISCONTINUED | OUTPATIENT
Start: 2019-03-18 | End: 2019-03-18

## 2019-03-18 RX ORDER — LISINOPRIL 10 MG/1
10 TABLET ORAL DAILY
Qty: 30 TAB | Refills: 1 | Status: SHIPPED | OUTPATIENT
Start: 2019-03-19 | End: 2019-04-01

## 2019-03-18 RX ORDER — LINEZOLID 600 MG/1
600 TABLET, FILM COATED ORAL EVERY 12 HOURS
Qty: 15 TAB | Refills: 0 | Status: SHIPPED | OUTPATIENT
Start: 2019-03-18 | End: 2019-04-01

## 2019-03-18 RX ORDER — CIPROFLOXACIN 500 MG/1
500 TABLET, FILM COATED ORAL EVERY 12 HOURS
Qty: 15 TAB | Refills: 0 | Status: SHIPPED | OUTPATIENT
Start: 2019-03-18 | End: 2019-04-01

## 2019-03-18 RX ADMIN — SERTRALINE HYDROCHLORIDE 100 MG: 100 TABLET ORAL at 04:09

## 2019-03-18 RX ADMIN — LISINOPRIL 10 MG: 10 TABLET ORAL at 04:10

## 2019-03-18 RX ADMIN — METOPROLOL SUCCINATE 100 MG: 100 TABLET, EXTENDED RELEASE ORAL at 04:09

## 2019-03-18 RX ADMIN — ENOXAPARIN SODIUM 40 MG: 100 INJECTION SUBCUTANEOUS at 04:09

## 2019-03-18 RX ADMIN — PREDNISONE 40 MG: 20 TABLET ORAL at 04:09

## 2019-03-18 RX ADMIN — LINEZOLID 600 MG: 600 TABLET, FILM COATED ORAL at 04:10

## 2019-03-18 RX ADMIN — Medication: at 04:10

## 2019-03-18 RX ADMIN — CIPROFLOXACIN HYDROCHLORIDE 500 MG: 500 TABLET, FILM COATED ORAL at 04:09

## 2019-03-18 ASSESSMENT — ENCOUNTER SYMPTOMS
ABDOMINAL PAIN: 0
COUGH: 0
MYALGIAS: 1
VOMITING: 0
CHILLS: 0
FEVER: 0

## 2019-03-18 NOTE — DISCHARGE INSTRUCTIONS
Discharge Instructions    Discharged to home by car with friend. Discharged via wheelchair, hospital escort: Yes.  Special equipment needed: Not Applicable    Be sure to schedule a follow-up appointment with your primary care doctor or any specialists as instructed.     Discharge Plan:   Diet Plan: Discussed  Activity Level: Discussed  Smoking Cessation Offered: Patient Refused  Confirmed Follow up Appointment: Appointment Scheduled  Confirmed Symptoms Management: Discussed  Medication Reconciliation Updated: Yes  Influenza Vaccine Indication: Patient Refuses    I understand that a diet low in cholesterol, fat, and sodium is recommended for good health. Unless I have been given specific instructions below for another diet, I accept this instruction as my diet prescription.   Other diet: Regular    Special Instructions: None    · Is patient discharged on Warfarin / Coumadin?   No     Depression / Suicide Risk    As you are discharged from this [facility] facility, it is important to learn how to keep safe from harming yourself.    Recognize the warning signs:  · Abrupt changes in personality, positive or negative- including increase in energy   · Giving away possessions  · Change in eating patterns- significant weight changes-  positive or negative  · Change in sleeping patterns- unable to sleep or sleeping all the time   · Unwillingness or inability to communicate  · Depression  · Unusual sadness, discouragement and loneliness  · Talk of wanting to die  · Neglect of personal appearance   · Rebelliousness- reckless behavior  · Withdrawal from people/activities they love  · Confusion- inability to concentrate     If you or a loved one observes any of these behaviors or has concerns about self-harm, here's what you can do:  · Talk about it- your feelings and reasons for harming yourself  · Remove any means that you might use to hurt yourself (examples: pills, rope, extension cords, firearm)  · Get professional help  from the community (Mental Health, Substance Abuse, psychological counseling)  · Do not be alone:Call your Safe Contact- someone whom you trust who will be there for you.  · Call your local CRISIS HOTLINE 245-4931 or 319-794-4701  · Call your local Children's Mobile Crisis Response Team [place] (215) 537-0516 or www.GoWar  · Call the toll free National Suicide Prevention Hotlines   · National Suicide Prevention Lifeline 919-457-YAAQ (4473)  · National Hope Line Network 800-SUICIDE (778-3745)

## 2019-03-18 NOTE — PROGRESS NOTES
"Patient AAOx4, pleasant, uses FW walker to ambulate but appropriate to go without strip alarm as patient uses call light appropriately.  Initially frustrated after finding out about new contact precautions for VRE in urine, later in shift stated she was glad she didn't discharge because it wouldn't be treated.  Agreeable to move rooms to accommodate unit, \"This one has a better view anyhow.\"  "

## 2019-03-18 NOTE — PROGRESS NOTES
"Infectious Disease Progress Note    Author: Manisha Ferrari M.D. Date & Time of service: 3/18/2019  12:39 PM    Chief Complaint:  FU cellulitis    Interval History:  73 y.o. WF admitted 3/14/2019 with peripheral vascular disease, status post femoral-popliteal bypass, and bullous pemphigus for cellulitis  3/15 AF somnolent today No new complaints since yesterday-decreased erythema BLE   3/16 AF WBC 8 feels pretty good-pleased with decrease erythema of legs-thinks may need to go to wound care to get LLE debrided next week  3/17 AF c/o elevated BP this am with SBP in the 190s-states she \"drank too much Sprite\"-had wound care yesterday Denies SE abx. Pain controlled. No new sxs  3/18 AF no labs feels well-asymptomatic from HTN. Pain controlled-denies SE abx No new complaints from yesterday  Labs Reviewed, Medications Reviewed and Wound Reviewed.    Review of Systems:  Review of Systems   Constitutional: Negative for chills and fever.   Respiratory: Negative for cough.    Cardiovascular: Negative for chest pain.   Gastrointestinal: Negative for abdominal pain and vomiting.   Genitourinary: Negative for dysuria, frequency and urgency.   Musculoskeletal: Positive for myalgias.   Skin: Negative for rash.   All other systems reviewed and are negative.      Hemodynamics:  Temp (24hrs), Av.2 °C (97.1 °F), Min:35.9 °C (96.6 °F), Max:36.5 °C (97.7 °F)  Temperature: 36.5 °C (97.7 °F)  Pulse  Av.9  Min: 65  Max: 103   Blood Pressure : 149/64       Physical Exam:  Physical Exam   Constitutional: She is oriented to person, place, and time. No distress.   HENT:   Mouth/Throat: No oropharyngeal exudate.   Eyes: Pupils are equal, round, and reactive to light. EOM are normal. Right eye exhibits no discharge. Left eye exhibits no discharge. No scleral icterus.   Neck: Neck supple. No JVD present.   Cardiovascular: Normal rate.    Pulmonary/Chest: Effort normal. No stridor. No respiratory distress.   Abdominal: Soft. She " exhibits no distension. There is no tenderness.   Musculoskeletal: She exhibits edema and deformity.   Decreased edema  LLE dressed +deformity   Neurological: She is alert and oriented to person, place, and time. No cranial nerve deficit.   Skin: There is erythema.   Multiple ulcerative lesions: 5 X 3 X0.2 cm  4 X3.5 cm and 2.5 X 3 cm  No undermining or tunneling  Decreased erythema   Nursing note and vitals reviewed.      Meds:    Current Facility-Administered Medications:   •  lisinopril  •  diphenhydrAMINE-ZnAcetate  •  diphenhydrAMINE  •  linezolid  •  ciprofloxacin  •  metoprolol SR  •  predniSONE  •  sertraline  •  senna-docusate **AND** polyethylene glycol/lytes **AND** magnesium hydroxide **AND** bisacodyl  •  enoxaparin  •  acetaminophen  •  ondansetron  •  ondansetron  •  oxyCODONE immediate-release    Labs:  Recent Labs      03/16/19   0338   WBC  8.0   RBC  2.90*   HEMOGLOBIN  8.8*   HEMATOCRIT  29.7*   MCV  102.4*   MCH  30.3   RDW  57.1*   PLATELETCT  327   MPV  9.1     Recent Labs      03/16/19   0338  03/17/19   0341   SODIUM  133*  135   POTASSIUM  4.2  4.1   CHLORIDE  105  104   CO2  23  25   GLUCOSE  106*  98   BUN  15  14     Recent Labs      03/16/19   0338  03/17/19   0341   CREATININE  0.79  0.71       Imaging:  Dx-chest-portable (1 View)    Result Date: 3/14/2019  3/14/2019 11:35 AM HISTORY/REASON FOR EXAM:  Sepsis. TECHNIQUE/EXAM DESCRIPTION AND NUMBER OF VIEWS: Single portable view of the chest. COMPARISON: 2/10/2018 FINDINGS: LUNGS: Slight hyperinflation. No focal consolidation. No effusions. PNEUMOTHORAX: None. LINES AND TUBES: None. MEDIASTINUM: Stable cardiomegaly. Atherosclerosis. MUSCULOSKELETAL STRUCTURES: No acute fracture. Old right-sided rib fractures.     1.  Hyperinflation. No focal consolidation or pleural effusions. 2.  Stable cardiomegaly.    Us-extremity Venous Lower Unilat Right    Result Date: 3/8/2019   Vascular Laboratory  CONCLUSIONS  Right lower extremity:  Enlarged  lymph nodes with internal vascularity at the groin.  No evidence of superficial or deep venous thrombosis.  NANCY SANCHEZ  Exam Date:     2019 17:59  Room #:     Inpatient  Priority:     Stat  Ht (in):             Wt (lb):  Ordering Physician:        TYRELL ARAYA  Referring Physician:       [id number] Berman  Sonographer:               Jermain Dahl RVT  Study Type:                Complete Unilateral  Technical Quality:         Adequate  Age:    73    Gender:     F  MRN:    [id number]  :    1945      BSA:  Indications:     Swelling of Limb  CPT Codes:       89051  ICD Codes:       729.81  History:         Right lower extremity cellulitis with worsening swelling  Limitations:  PROCEDURES:  Right lower extremity venous duplex imaging.  The following venous structures were evaluated: common femoral, profunda  femoral, greater saphenous, femoral, popliteal , peroneal and posterior  tibial veins.  Serial compression, augmentation maneuvers,  color and spectral Doppler  flow evaluations were performed.  FINDINGS:  Right lower extremity:  Enlarged lymph nodes with internal vascularity at the groin.  Complete color filling and compressibility with normal venous flow dynamics  including spontaneous flow, response to augmentation maneuvers, and  respiratory phasicity.  No evidence of superficial or deep venous thrombosis.  Flow was evaluated in the contralateral common femoral vein and normal  venous flow dynamics including spontaneous flow, respiratory phasic  variation and augmentation were demonstrated.  Fredi Friend  (Electronically Signed)  Final Date:      2019                   18:33      Micro:  Results     Procedure Component Value Units Date/Time    URINE CULTURE(NEW) [[id number]]  (Abnormal)  (Susceptibility) Collected:  19 1318    Order Status:  Completed Specimen:  Urine Updated:  19 1119     Significant Indicator POS (POS)     Source UR     Site -     Urine Culture - (A)       "Enterococcus faecalis  10-50,000 cfu/mL   (A)      Enterococcus faecium  10-50,000 cfu/mL  VANCOMYCIN RESISTANT ENTEROCOCCI(VRE)   (A)    Narrative:       CALL  Nava  61 tel. [phone],  CALLED  61 tel. [phone] 03/17/2019, 11:17, RB PERF. RESULTS CALLED  TO:[id number],RN + faxed to inf ctl  Indication for culture:->Emergency Room Patient    Culture & Susceptibility     ENTEROCOCCUS FAECALIS     Antibiotic Sensitivity Microscan Unit Status    Ampicillin Sensitive <=2 mcg/mL Final    Method: LICO    Daptomycin Sensitive 1 mcg/mL Final    Method: LICO    Nitrofurantoin Sensitive <=32 mcg/mL Final    Method: LICO    Penicillin Sensitive 8 mcg/mL Final    Method: LICO    Tetracycline Resistant >8 mcg/mL Final    Method: LICO              ENTEROCOCCUS FAECIUM     Antibiotic Sensitivity Microscan Unit Status    Ampicillin Resistant >8 mcg/mL Final    Method: LICO    Linezolid Sensitive 2 mcg/mL Final    Method: LICO    Nitrofurantoin Sensitive <=32 mcg/mL Final    Method: LICO    Penicillin Resistant >8 mcg/mL Final    Method: LICO    Tetracycline Resistant >8 mcg/mL Final    Method: LICO                       BLOOD CULTURE [[id number]] Collected:  03/14/19 1100    Order Status:  Completed Specimen:  Blood from Peripheral Updated:  03/15/19 0820     Significant Indicator NEG     Source BLD     Site PERIPHERAL     Blood Culture No Growth    Note: Blood cultures are incubated for 5 days and  are monitored continuously.Positive blood cultures  are called to the RN and reported as soon as  they are identified.      Narrative:       Per Hospital Policy: Only change Specimen Src: to \"Line\" if  specified by physician order.    BLOOD CULTURE [[id number]] Collected:  03/14/19 1105    Order Status:  Completed Specimen:  Blood from Peripheral Updated:  03/15/19 0820     Significant Indicator NEG     Source BLD     Site PERIPHERAL     Blood Culture No Growth    Note: Blood cultures are incubated for 5 days and  are monitored continuously.Positive " "blood cultures  are called to the RN and reported as soon as  they are identified.      Narrative:       Per Hospital Policy: Only change Specimen Src: to \"Line\" if  specified by physician order.    URINALYSIS [[id number]]  (Abnormal) Collected:  03/14/19 1318    Order Status:  Completed Specimen:  Urine Updated:  03/14/19 1329     Color Yellow     Character Clear     Specific Gravity 1.012     Ph 5.0     Glucose Negative mg/dL      Ketones Trace (A) mg/dL      Protein Negative mg/dL      Bilirubin Negative     Urobilinogen, Urine 0.2     Nitrite Negative     Leukocyte Esterase Negative     Occult Blood Moderate (A)     Micro Urine Req Microscopic    Narrative:       Indication for culture:->Emergency Room Patient          Assessment:  Active Hospital Problems    Diagnosis   • *Cellulitis [L03.90]   • Leg ulcer, left, with fat layer exposed (HCC) [L97.922]   • Bullous pemphigus [L10.9]       Plan:  Cellulitis, on treatment  Afebrile  No leukocytosis  Continue Zyvox (has tolerated previously even though on sertraline) and cipro  Continue for 10 more days  FU wound care next week  Stop date 3/25/2019     Leukocytosis  Multifactorial incl recent steroids and infection  Monitor-check CBC     Bacteruria  Ucx +VRE  Continue Zyvox as above  No additional treatment recommended    Chronic lower extremity ulcers, fat layer, chronic  History of infection with MRSA, Enterococcus faecalis and PSAR  Prior treatment with linezolid and ciprofloxacin  Wound care aprreciated-honey added  Abx as above     Bullous pemphigus, seen by dermatology  On prednisone 40 mg daily just started 1 day PTA  Cause of above     Immunocompromised, on prednisone  Blood cxs neg so far  Abx as above    I have performed a physical exam and reviewed and updated ROS as of today.  In review of yesterday's note dated  3/17/2019, there are no changes except as documented above.                  "

## 2019-03-18 NOTE — DISCHARGE SUMMARY
Discharge Summary    CHIEF COMPLAINT ON ADMISSION  Chief Complaint   Patient presents with   • Leg Swelling       Reason for Admission  Wound Infection     Admission Date  3/14/2019    CODE STATUS  Full Code    HPI & HOSPITAL COURSE  This is a 73 y.o. female here with left lower extremity cellulitis and wound formation in the setting of chronic immunosuppression with daily prednisone intake and a history of bullous pemphigoid.  Please see the dictated history of present illness 3/14/2019 for complete details. She was hospitalized for the same 1 month ago as well as last October.  She is status post left femoral-popliteal bypass surgery with Dr. Milana Colin.  She has history of polymicrobial infections and thus infectious disease consultation was obtained this admission.     Patient was initially managed on a combination of vancomycin and cefepime, later switched to ciprofloxacin and linezolid.  Antibiotic management was arranged through infectious disease and based on previous results and clinical scenario.  She has remote history of infection with MRSA, Enterococcus faecalis and Pseudomonas aeruginosa. Urine culture drawn in the emergency room ultimately produced Enterococcus faecalis resistant to vancomycin.  However this can be covered by the outpatient Zyvox.    On subsequent evaluation, the patient has a nonfocal neurologic and cardiopulmonary exam beyond a grade 2 systolic murmur which the patient reports she has had since she was a child.  White count is normal.  She has mild microcytic anemia that has been stable throughout her hospital stay.  Blood cultures from her previous admission March 8 have been no growth to date and blood cultures from 314 remain no growth to date.  Vital signs at the time of discharge are temperature of 36.5, pulse 71, respiratory rate of 18, pulse oximetry of 92% on room air, with a blood pressure of 149/64.  Patient does have elevated inflammatory markers including ESR and  CRP, but this is difficult to interpret in the setting of bullous pemphigoid.  At discharge I will decrease her outpatient prednisone from 40 mg daily to 20 mg.  She has outpatient follow-up with dermatology later this month as well as her PCP.  She can discuss further therapies for her bullous pemphigoid if appropriate.    I have arranged outpatient home health for the patient as well as continued care through the wound clinic.  Patient is currently denying home health intervention.  She does have history of a chronic left ankle fracture with obvious deformity and uses a boot for ambulation.  Wound care has been meticulously modifying the boot to decrease pressure on her lower extremity to decrease the risk of further wound formation.  I also encouraged her to follow-up with her outpatient orthopedist to see if there is any further surgical intervention when she heals to decrease her chances of further wound development.    Therefore, she is discharged in good and stable condition to home with close outpatient follow-up.    Discharge Date  3/18/2019    FOLLOW UP ITEMS POST DISCHARGE  PCP  Dermatology  Orthopedics    DISCHARGE DIAGNOSES  Principal Problem:    Cellulitis POA: Yes  Active Problems:    Leg ulcer, left, with fat layer exposed (HCC) POA: Yes    Bullous pemphigus POA: Yes    Essential hypertension POA: Yes    Macrocytic anemia POA: Yes    Peripheral vascular disease (HCC) POA: Yes    VRE (vancomycin resistant enterococcus) culture positive POA: Yes  Resolved Problems:    * No resolved hospital problems. *      FOLLOW UP  Future Appointments  Date Time Provider Department Center   3/20/2019 8:00 AM VANITA Chavarria [address]   3/20/2019 9:30 AM VANITA Lozano [address]   3/22/2019 9:30 AM VANITA Moralez [address]   3/25/2019 9:30 AM VANITA Lozano [address]   3/27/2019 9:30 AM VANITA Lozano [address]   3/29/2019 8:30 AM VANITA Thomas [address]    4/1/2019 10:30 AM VANITA Thomas [address]   4/3/2019 10:30 AM VANITA Thomas [address]   4/5/2019 10:30 AM VANITA Thomas [address]   6/12/2019 11:00 AM RYANN Son     No follow-up provider specified.    MEDICATIONS ON DISCHARGE     Medication List      START taking these medications      Instructions   ciprofloxacin 500 MG Tabs  Commonly known as:  CIPRO   Take 1 Tab by mouth every 12 hours.  Dose:  500 mg     linezolid 600 MG Tabs  Commonly known as:  ZYVOX   Take 1 Tab by mouth every 12 hours.  Dose:  600 mg     lisinopril 10 MG Tabs  Start taking on:  3/19/2019  Commonly known as:  PRINIVIL   Take 1 Tab by mouth every day.  Dose:  10 mg        CHANGE how you take these medications      Instructions   predniSONE 10 MG Tabs  What changed:  how much to take  Commonly known as:  DELTASONE   Take 2 Tabs by mouth every day.  Dose:  20 mg        CONTINUE taking these medications      Instructions   metoprolol  MG Tb24  Commonly known as:  TOPROL XL   Take 100 mg by mouth every day.  Dose:  100 mg     niacin 500 MG Tabs   Take 500 mg by mouth 3 times a day.  Dose:  500 mg     sertraline 100 MG Tabs  Commonly known as:  ZOLOFT   Take 100 mg by mouth every day.  Dose:  100 mg     THERAPEUTIC-M/LUTEIN Tabs   Take 1 Tab by mouth every day.  Dose:  1 Tab     vitamin B-12 1000 MCG Tabs   Take 1,000 mcg by mouth every day.  Dose:  1000 mcg     vitamin D 1000 UNIT Tabs  Commonly known as:  cholecalciferol   Take 1,000 Units by mouth every day.  Dose:  1000 Units            Allergies  Allergies   Allergen Reactions   • Bactrim [Sulfamethoxazole-Trimethoprim] Rash     Diffuse pruritic skin rash with blisters (no mucous membrane involvement) (was also taking cipro at the time - reaction thought more likely to be 2/2 Bactrim)   • Meropenem Unspecified     Pt can not remember reaction         DIET  Orders Placed This Encounter   Procedures   • Diet Order Regular     Standing  Status:   Standing     Number of Occurrences:   1     Order Specific Question:   Diet:     Answer:   Regular [1]       ACTIVITY  As tolerated.  Weight bearing as tolerated    CONSULTATIONS  Infectious disease    PROCEDURES  None    LABORATORY  Lab Results   Component Value Date    SODIUM 135 03/17/2019    POTASSIUM 4.1 03/17/2019    CHLORIDE 104 03/17/2019    CO2 25 03/17/2019    GLUCOSE 98 03/17/2019    BUN 14 03/17/2019    CREATININE 0.71 03/17/2019        Lab Results   Component Value Date    WBC 8.0 03/16/2019    HEMOGLOBIN 8.8 (L) 03/16/2019    HEMATOCRIT 29.7 (L) 03/16/2019    PLATELETCT 327 03/16/2019        Total time of the discharge process exceeds 36 minutes.

## 2019-03-19 ENCOUNTER — PATIENT OUTREACH (OUTPATIENT)
Dept: HEALTH INFORMATION MANAGEMENT | Facility: OTHER | Age: 74
End: 2019-03-19

## 2019-03-19 NOTE — PROGRESS NOTES
[facility] patient advocate reached out to patient for a hospital discharge follow-up. Patient reported her leg is a little swollen today but expressed that she will not be going back to the ER to get evaluated. Patient stated she wants to give her anti-biotics time to work and said that she has been in the hospital too much lately.     Advocate notified patient's dermatologist of patient's leg swelling and patient has a wound care appointment scheduled for tomorrow morning at [facility].

## 2019-03-19 NOTE — PROGRESS NOTES
SCP post discharge med rec completed. Patient denies any side effects, barriers to accessing medications, or trouble with adherence.     Interaction noted between sertraline and linezolid with potential to cause serotonin syndrome. Discussed symptoms to recognize and advised patient to notify PCP or wound care clinic if symptoms present.     Patient reports diarrhea. Recommend probiotics. Advised not to use antidiarrhea medication after course of antibiotics and to report to healthcare provider if symptoms persist or worsen.    Interaction noted with Cipro and MVI. Patient advised to separate doses by at least 4 hours. Patient agrees.

## 2019-03-20 ENCOUNTER — NON-PROVIDER VISIT (OUTPATIENT)
Dept: WOUND CARE | Facility: MEDICAL CENTER | Age: 74
End: 2019-03-20
Attending: NURSE PRACTITIONER
Payer: MEDICARE

## 2019-03-20 ENCOUNTER — TELEPHONE (OUTPATIENT)
Dept: INFECTIOUS DISEASES | Facility: MEDICAL CENTER | Age: 74
End: 2019-03-20

## 2019-03-20 PROCEDURE — 11721 DEBRIDE NAIL 6 OR MORE: CPT | Mod: XU

## 2019-03-20 PROCEDURE — 97597 DBRDMT OPN WND 1ST 20 CM/<: CPT

## 2019-03-20 NOTE — TELEPHONE ENCOUNTER
Called and spoke with pt. She is taking both Antibiotics Cipro and Zyvox just has mild to medium diarrhea. She wants to complete the antibiotic treatment and see how her legs heal. She is seeing wound care regularly. If they get worse off the medication she will call and schedule an appointment with infectious disease at that time. DUSTIN

## 2019-03-20 NOTE — CERTIFICATION
[facility]   [address]   [address]   [place] [address]   [phone] Fax: [phone]      Foot and Nail Assessment   Initial Evaluation 2019 - 2019      Referring Physician: Zak Rivera  Primary Physician:  Leena PERDUE  Consulting Physicians: Jennifer Katz  Start of Care: 2019       Subjective:        HPI: From DC Summary by Zak PERDUE on 2019 - HPI & HOSPITAL COURSE  This is a 73 y.o. female here with left lower extremity cellulitis and wound formation in the setting of chronic immunosuppression with daily prednisone intake and a history of bullous pemphigoid.  Please see the dictated history of present illness 3/14/2019 for complete details. She was hospitalized for the same 1 month ago as well as last October.  She is status post left femoral-popliteal bypass surgery with Dr. Milana Colin.  She has history of polymicrobial infections and thus infectious disease consultation was obtained this admission.     From Dr. Colin's last note here at [facility] on 2019 -  HISTORY OF PRESENT ILLNESS                              START OF CARE IN CLINIC: 10/26/2018               REFERRING PROVIDER: IRON Son                 WOUND- Full thickness wound              LOCATION: Medial left calf and medial ankle              WOUND HISTORY: Patient has a long wound history with a hip and ankle fracture.  Developed a wound over the medial ankle, a prominent malleolus and the wound likely developed from pressure.               PREVIOUS TREATMENT: Debridement NPWT, hospitalization with IV abx and Veriflow, which really worked              PERTINENT PMH: Recent diagnosis of Bullous Pemphigous - steroids for 2 months and antibiotics. Peripheral vascular disease with leftfem-pop bypass              IMAGIN2018  Plain films left ankle  There are fractures of the distal fibular shaft and of the medial malleolus, both of which  appear to be subacute with at least minimal callus formation. There is marked lateral subluxation of the talus. No other significant findings.              VASCULAR STUDIES:Arterial duplex shows patent left fem-pop without stenosis                           LAST  WOUND CULTURE:  DATE : 1/30/2019 Heavy growth of pseudomonas, Enterococcus and light growth MRSA                 History of foot ulceration(s): Yes__x__  No_____        Location:_____________________________________________________    History of foot surgery: Yes____  No__x__    Describe:_____________________________________________________      Employed: Y ___ N _x__ Job description: ___retired_____________________________   Current Residence: ____Lives with room mate Milana_____________________________      Pain: LLE burning 5/10 presently, tolerable per pt    Past Surgical Hx:   Vascular - DATE OF SERVICE:  06/08/2018   PREOPERATIVE DIAGNOSIS:  Left lower extremity arterial insufficiency with   ankle wounds.   POSTPROCEDURE DIAGNOSIS:  Left lower extremity arterial insufficiency with   ankle wounds.   PROCEDURE:  1.  Left fem-pop bypass with in situ saphenous vein.  2.  Debridement of left ankle wound with application of negative pressure   wound therapy dressing.   SURGEON:  Milana Colin MD    Past Surgical History:   Procedure Laterality Date   • FEMORAL POPLITEAL BYPASS Left 6/8/2018    Procedure: FEMORAL POPLITEAL BYPASS;  Surgeon: Milana Colin M.D.;  Location: SURGERY [facility];  Service: General   • IRRIGATION & DEBRIDEMENT GENERAL Left 6/8/2018    Procedure: IRRIGATION & DEBRIDEMENT ANKLE WOUND;  Surgeon: Milana Colin M.D.;  Location: [facility];  Service: General   • IRRIGATION & DEBRIDEMENT HIP Left 6/4/2018    Procedure: IRRIGATION & DEBRIDEMENT HIP;  Surgeon: Chong Vazquez M.D.;  Location: SURGERY [facility];  Service: Orthopedics   • HIP REVISION TOTAL Left 2/11/2018    Procedure: HIP REVISION TOTAL-  femoral nail removal conversion to total hip arthoroplasty;  Surgeon: Chong Vazquez M.D.;  Location: SURGERY [facility];  Service: Orthopedics   • HIP NAILING INTRAMEDULLARY Left 12/20/2017    Procedure: HIP NAILING INTRAMEDULLARY;  Surgeon: Jorge Peters M.D.;  Location: SURGERY [facility];  Service: Orthopedics   • BREAST BIOPSY  8/28/2014    Performed by Magdalena Londono M.D. at SURGERY [facility]   • BREAST BIOPSY Right 8/14    benign   • GYN SURGERY  1973    complete hysterectomy   • ABDOMINAL HYSTERECTOMY TOTAL      w/BSO due to uterine cyst and endometriosis   • ATHROPLASTY      hip       Past Medical History:   Past Medical History:   Diagnosis Date   • Anxiety    • Cellulitis and abscess of lower extremity    • Dental disorder     full dentures   • Generalized osteoarthritis of multiple sites 10/20/2015   • Heart valve disease     pt not sure    • Hyperlipidemia    • Hypertension    • Osteoporosis        Current Medications:   Current Outpatient Prescriptions:   •  ciprofloxacin (CIPRO) 500 MG Tab, Take 1 Tab by mouth every 12 hours., Disp: 15 Tab, Rfl: 0  •  linezolid (ZYVOX) 600 MG Tab, Take 1 Tab by mouth every 12 hours., Disp: 15 Tab, Rfl: 0  •  lisinopril (PRINIVIL) 10 MG Tab, Take 1 Tab by mouth every day., Disp: 30 Tab, Rfl: 1  •  predniSONE (DELTASONE) 10 MG Tab, Take 2 Tabs by mouth every day., Disp: 30 Tab, Rfl: 0  •  sertraline (ZOLOFT) 100 MG Tab, Take 100 mg by mouth every day., Disp: , Rfl:   •  metoprolol SR (TOPROL XL) 100 MG TABLET SR 24 HR, Take 100 mg by mouth every day., Disp: , Rfl:   •  Cyanocobalamin (VITAMIN B-12) 1000 MCG Tab, Take 1,000 mcg by mouth every day., Disp: , Rfl:   •  niacin 500 MG Tab, Take 500 mg by mouth 3 times a day., Disp: , Rfl:   •  Multiple Vitamins-Minerals (THERAPEUTIC-M/LUTEIN) Tab, Take 1 Tab by mouth every day., Disp: , Rfl: 0  •  vitamin D (CHOLECALCIFEROL) 1000 UNIT Tab, Take 1,000 Units by mouth every day., Disp: , Rfl:       Objective:     Tests and Measures:    HgbA1C: n/a pt not diabetic     5.07 Monofilament testing (10 pt):  Right_____10/10 sites sensate________________               Left______10/10 sites sensate_________________        Vascular - Doppler exam today shows biphasic waveforms to paramjit dp/pt arteries.   R L    2+ 1+ DP pulse   Not palapable Not palpable PT pulse   3 sec  >3sec Capillary refill < 3 sec       Deformities  R L    4th 2,3,4,5 Hammer/claw toe     Hallux Valgus     Prominent Metatarsal Heads     Charcot Foot     Drop Foot     Rocker Bottom     Prior amputation:     Other:valgus deformity of L foot, hx of         Clinical appearance/skin  Dryness___mod, with peeling, exfoliating skin to BLE__________ Swelling__none____________ Redness__mod erythema to BLE___________Temperature__cool BLE___________  Callus____none noted___________________________________  Ulceration_____LLE - see today's wound eval notes___________________________________     Clinical appearance/toenails  Onychomycosis____all 10_________________________  Ingrown toenails_____________________________  Deformities___Valgus deformity of L foot - pt wears an ortho boot for stability______________________________  Other______________________________________      Orthotic, protective, supportive devices: ortho boot LLE. Pt mobilizes with walker      Procedures:  Pt's feet were washed with soapy water then dried. Debris removed from between toes with gauze. Cuticle and nailbed margins were established and debris removed from around and beneath toenails with a curette. Dystrophic, onychomycotic nails were trimmed with clippers, then smoothed and finished with dremel. Tea tree oil applied to nailbeds and cuticles. Feet paramjit were moisturized except for between toes. No nicks or cuts noted.          Patient Education: pt instr s/s infection, when to call MD/go to ER. Instr to moisturize feet and BLE daily with water based moisturizer, except between toes.Instr daily  foot checks, report any blisters/open areas and medical attention promptly. Pt instr to make an appt again for 3 months for foot care. Pt with good understanding. Pt was instr re s/s seratonin syndrome, when to call TONYA. She was instr to share these ss with her roommate Milana, and make Milana aware of when she should call tonya. Pt says she understands this.       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           Clinician Signature:_______________________________Date__________________   Physician Signature:______________________________Date:__________________

## 2019-03-21 LAB — VIT B1 BLD-MCNC: 90 NMOL/L (ref 70–180)

## 2019-03-21 NOTE — CERTIFICATION
Advanced Wound Care  [facility]  [address]  [address]  [place] [address]  [phone] Fax: [phone]    Initial Evaluation  For Certification Period: 03/13/2019 - 06/03/2019  Start of Care: 03/13/2019    Referring Physician:  Zak Rivera  Primary Physician: Leena PERDUE              Wound(s): Right dorsomedial foot; right anterior superior and inferior leg; right posteromedial leg; left posterior LE cluster; left medial LE; left lateral distal LE; left medial ankle       Subjective:        HPI: HPI: From DC Summary by Zak PERDUE on 03/18/2019 - HPI & HOSPITAL COURSE  This is a 73 y.o. female here with left lower extremity cellulitis and wound formation in the setting of chronic immunosuppression with daily prednisone intake and a history of bullous pemphigoid.  Please see the dictated history of present illness 3/14/2019 for complete details. She was hospitalized for the same 1 month ago as well as last October.  She is status post left femoral-popliteal bypass surgery with Dr. Milana Colin.  She has history of polymicrobial infections and thus infectious disease consultation was obtained this admission.      From Dr. Colin's last note here at [facility] on 03/04/2019 -  HISTORY OF PRESENT ILLNESS                   START OF CARE IN CLINIC: 10/26/2018               REFERRING PROVIDER: IRON Son                 WOUND- Full thickness wound              LOCATION: Medial left calf and medial ankle              WOUND HISTORY: Patient has a long wound history with a hip and ankle fracture.  Developed a wound over the medial ankle, a prominent malleolus and the wound likely developed from pressure.               PREVIOUS TREATMENT: Debridement NPWT, hospitalization with IV abx and Veriflow, which really worked              PERTINENT PMH: Recent diagnosis of Bullous Pemphigous - steroids for 2 months and antibiotics. Peripheral vascular disease with leftfem-pop  bypass              IMAGIN2018  Plain films left ankle  There are fractures of the distal fibular shaft and of the medial malleolus, both of which appear to be subacute with at least minimal callus formation. There is marked lateral subluxation of the talus. No other significant findings.              VASCULAR STUDIES:Arterial duplex shows patent left fem-pop without stenosis                           LAST  WOUND CULTURE:  DATE : 2019 Heavy growth of pseudomonas, Enterococcus and light growth MRSA                       Pain:  5/10 pain          Past Medical History:  Past Medical History:   Diagnosis Date   • Anxiety    • Cellulitis and abscess of lower extremity    • Dental disorder     full dentures   • Generalized osteoarthritis of multiple sites 10/20/2015   • Heart valve disease     pt not sure    • Hyperlipidemia    • Hypertension    • Osteoporosis      Current Medications:  Current Outpatient Prescriptions:   •  ciprofloxacin (CIPRO) 500 MG Tab, Take 1 Tab by mouth every 12 hours., Disp: 15 Tab, Rfl: 0  •  linezolid (ZYVOX) 600 MG Tab, Take 1 Tab by mouth every 12 hours., Disp: 15 Tab, Rfl: 0  •  lisinopril (PRINIVIL) 10 MG Tab, Take 1 Tab by mouth every day., Disp: 30 Tab, Rfl: 1  •  predniSONE (DELTASONE) 10 MG Tab, Take 2 Tabs by mouth every day., Disp: 30 Tab, Rfl: 0  •  sertraline (ZOLOFT) 100 MG Tab, Take 100 mg by mouth every day., Disp: , Rfl:   •  metoprolol SR (TOPROL XL) 100 MG TABLET SR 24 HR, Take 100 mg by mouth every day., Disp: , Rfl:   •  Cyanocobalamin (VITAMIN B-12) 1000 MCG Tab, Take 1,000 mcg by mouth every day., Disp: , Rfl:   •  niacin 500 MG Tab, Take 500 mg by mouth 3 times a day., Disp: , Rfl:   •  Multiple Vitamins-Minerals (THERAPEUTIC-M/LUTEIN) Tab, Take 1 Tab by mouth every day., Disp: , Rfl: 0  •  vitamin D (CHOLECALCIFEROL) 1000 UNIT Tab, Take 1,000 Units by mouth every day., Disp: , Rfl:     Allergies:   Allergies   Allergen Reactions   • Bactrim  [Sulfamethoxazole-Trimethoprim] Rash     Diffuse pruritic skin rash with blisters (no mucous membrane involvement) (was also taking cipro at the time - reaction thought more likely to be 2/2 Bactrim)   • Meropenem Unspecified     Pt can not remember reaction       Past Surgical History:   Past Surgical History:   Procedure Laterality Date   • FEMORAL POPLITEAL BYPASS Left 6/8/2018    Procedure: FEMORAL POPLITEAL BYPASS;  Surgeon: Milana Colin M.D.;  Location: SURGERY [facility];  Service: General   • IRRIGATION & DEBRIDEMENT GENERAL Left 6/8/2018    Procedure: IRRIGATION & DEBRIDEMENT ANKLE WOUND;  Surgeon: Milana Colin M.D.;  Location: SURGERY [facility];  Service: General   • IRRIGATION & DEBRIDEMENT HIP Left 6/4/2018    Procedure: IRRIGATION & DEBRIDEMENT HIP;  Surgeon: Chong Vazquez M.D.;  Location: SURGERY [facility];  Service: Orthopedics   • HIP REVISION TOTAL Left 2/11/2018    Procedure: HIP REVISION TOTAL- femoral nail removal conversion to total hip arthoroplasty;  Surgeon: Chong Vazquez M.D.;  Location: SURGERY [facility];  Service: Orthopedics   • HIP NAILING INTRAMEDULLARY Left 12/20/2017    Procedure: HIP NAILING INTRAMEDULLARY;  Surgeon: Jorge Peters M.D.;  Location: SURGERY [facility];  Service: Orthopedics   • BREAST BIOPSY  8/28/2014    Performed by Magdalena Londono M.D. at [facility]   • BREAST BIOPSY Right 8/14    benign   • GYN SURGERY  1973    complete hysterectomy   • ABDOMINAL HYSTERECTOMY TOTAL      w/BSO due to uterine cyst and endometriosis   • ATHROPLASTY      hip     Social History:   Social History     Social History   • Marital status: Single     Spouse name: N/A   • Number of children: 1   • Years of education: N/A     Occupational History   • players [employer]  [employer]     Social History Main Topics   • Smoking status: Former Smoker     Packs/day: 0.50     Years: 25.00     Types: Cigarettes     Quit date: 1/1/2007    • Smokeless tobacco: Never Used   • Alcohol use 3.0 oz/week     5 Glasses of wine per week      Comment: glass of wine per day   • Drug use: No   • Sexual activity: Not Currently     Partners: Male     Other Topics Concern   • Not on file     Social History Narrative   • No narrative on file      Objective:      Tests and Measures:  03/20/2019: Biphasic DP, PT BLE with doppler. Graft to L medial thigh sounds brisk and patent with doppler.   VASCULAR STUDIES:Arterial duplex shows patent left fem-pop without stenosis - per Dr. Colin note    Orthotic, protective, supportive devices: none    Fall Risk Assessment (jeronimo all that apply with an X):  Completed 03/14/2019 patient is a fall risk    x65 years or older     xFall within the last 2 years, uses   xAmbulatory devices  xLoss of protective sensation in feet,   xUse of prostethic/orthotic, years    Presence of lower extremity/foot/toe amputation   xTaking medication that increases risk (per facility policy)    Interventions Recommended (if any of the above are selected):   Use of Assistive Device: Patient ambulates with walker   Supervision with ambulation:  Caregiver   Assistance with ambulation:  Caregiver   Home safety education:  Educational material provided                Negative Pressure Wound Therapy Leg Left;Proximal;Other (Comment) (Active)   NPWT Pump Mode / Pressure Setting Other (Comment) 3/4/2019  2:30 PM   Dressing Type Black foam 3/1/2019 10:30 AM   Number of Foam Pieces Used 2 3/1/2019 10:30 AM   Canister Changed Other (Comment) 3/1/2019 10:30 AM     Wound 07/05/18 Leg Left Medial Malleolus (Active)   Wound Image   3/22/2019 10:00 AM   Site Assessment Red;Yellow 3/22/2019 10:00 AM   Maricruz-wound Assessment Edema 3/22/2019 10:00 AM   Margins Attached edges 3/22/2019 10:00 AM   Wound Length (cm) 4.3 cm 3/20/2019  6:00 PM   Wound Width (cm) 2.8 cm 3/20/2019  6:00 PM   Wound Depth (cm) 0.3 cm 3/20/2019  6:00 PM   Wound Surface Area (cm^2) 12.04 cm^2  3/20/2019  6:00 PM   Post Wound Length (cm) 4.8 cm 3/22/2019 10:00 AM    Post Wound Width (cm) 2.9 cm 3/22/2019 10:00 AM   Post Wound Depth (cm) 0.3 cm 3/22/2019 10:00 AM   Post Wound Surface Area (cm^2) 13.92 cm^2 3/22/2019 10:00 AM   Tunneling 0 cm 3/20/2019  6:00 PM   Undermining 0 cm 3/20/2019  6:00 PM   Closure Secondary intention 3/20/2019  6:00 PM   Drainage Amount Large 3/22/2019 10:00 AM   Drainage Description Serosanguineous 3/22/2019 10:00 AM   Non-staged Wound Description Full thickness 3/22/2019 10:00 AM   Treatments Cleansed;Pharmaceutical agent;Other (Comment) 3/22/2019 10:00 AM   Cleansing Normal Saline Irrigation 3/22/2019 10:00 AM   Periwound Protectant Barrier Paste 3/22/2019 10:00 AM   Dressing Options Hydrofiber Silver;Nonadhesive Foam;Unna Boot 3/22/2019 10:00 AM   Dressing Cleansing/Solutions Normal Saline 3/20/2019  6:00 PM   Dressing Changed Changed 3/22/2019 10:00 AM   Dressing Status Clean;Dry;Intact 3/20/2019  6:00 PM   Dressing Change Frequency Monday, Wednesday, Friday 3/22/2019 10:00 AM   NEXT Dressing Change  02/27/19 2/25/2019  8:00 AM   NEXT Weekly Photo (Inpatient Only) 12/17/18 12/10/2018  2:00 PM   WOUND NURSE ONLY - Odor None 3/22/2019 10:00 AM   WOUND NURSE ONLY - Pulses Other (Comments) 3/4/2019  2:30 PM   WOUND NURSE ONLY - Exposed Structures None 3/22/2019 10:00 AM   WOUND NURSE ONLY - Tissue Type and Percentage 70% red moist, 30% yellow adherent post-debridement. 3/22/2019 10:00 AM       Wound 10/11/18 Venous Ulcer Leg (Active)       Wound 10/13/18 Ankle weeping, blister like area  (Active)       Wound 11/07/18 Left posteromedial LE (Active)   Wound Image   3/22/2019 10:00 AM   Site Assessment Red;Yellow 3/22/2019 10:00 AM   Maricruz-wound Assessment Edema 3/22/2019 10:00 AM   Margins Attached edges 3/22/2019 10:00 AM   Wound Length (cm) 3 cm 3/20/2019  6:00 PM   Wound Width (cm) 3.2 cm 3/20/2019  6:00 PM   Wound Depth (cm) 0.4 cm 3/20/2019  6:00 PM   Wound Surface Area (cm^2)  9.6 cm^2 3/20/2019  6:00 PM   Post Wound Length (cm) 3.3 cm 3/22/2019 10:00 AM    Post Wound Width (cm) 3.1 cm 3/22/2019 10:00 AM   Post Wound Depth (cm) 0.4 cm 3/22/2019 10:00 AM   Post Wound Surface Area (cm^2) 10.23 cm^2 3/22/2019 10:00 AM   Tunneling 0 cm 3/20/2019  6:00 PM   Undermining 0 cm 3/20/2019  6:00 PM   Closure Secondary intention 3/20/2019  6:00 PM   Drainage Amount Large 3/22/2019 10:00 AM   Drainage Description Serosanguineous 3/22/2019 10:00 AM   Non-staged Wound Description Full thickness 3/22/2019 10:00 AM   Treatments Cleansed;Pharmaceutical agent;Other (Comment) 3/22/2019 10:00 AM   Cleansing Normal Saline Irrigation 3/22/2019 10:00 AM   Periwound Protectant Barrier Paste 3/22/2019 10:00 AM   Dressing Options Hydrofiber Silver;Nonadhesive Foam;Unna Boot 3/22/2019 10:00 AM   Dressing Cleansing/Solutions Normal Saline 3/22/2019 10:00 AM   Dressing Changed Changed 3/22/2019 10:00 AM   Dressing Status Clean;Dry;Intact 3/20/2019  6:00 PM   Dressing Change Frequency Monday, Wednesday, Friday 3/22/2019 10:00 AM   NEXT Dressing Change  02/27/19 2/25/2019  8:00 AM   NEXT Weekly Photo (Inpatient Only) 12/17/18 12/10/2018  2:00 PM   WOUND NURSE ONLY - Odor None 3/22/2019 10:00 AM   WOUND NURSE ONLY - Pulses Other (Comments) 3/4/2019  2:30 PM   WOUND NURSE ONLY - Exposed Structures None 3/22/2019 10:00 AM   WOUND NURSE ONLY - Tissue Type and Percentage 30% red moist, 70% yellow adherent post-debridement. 3/22/2019 10:00 AM       Wound 01/30/19 Full Thickness Wound Ankle L medial ankle (Active)       Wound 02/15/19 Left lateral distal LE (Active)   Wound Image   3/13/2019  9:30 AM   Site Assessment Pink 3/22/2019 10:00 AM   Maricruz-wound Assessment Edema;Other (Comment) 3/22/2019 10:00 AM   Margins Attached edges 3/22/2019 10:00 AM   Wound Length (cm) 3.4 cm 3/4/2019  2:30 PM   Wound Width (cm) 7.5 cm 3/4/2019  2:30 PM   Wound Depth (cm) 0.1 cm 3/4/2019  2:30 PM   Wound Surface Area (cm^2) 25.5 cm^2 3/4/2019   2:30 PM   Post Wound Length (cm) 2.3 cm 3/13/2019  9:30 AM    Post Wound Width (cm) 3 cm 3/13/2019  9:30 AM   Post Wound Depth (cm) 0.1 cm 3/13/2019  9:30 AM   Post Wound Surface Area (cm^2) 6.9 cm^2 3/13/2019  9:30 AM   Tunneling 0 cm 3/13/2019  9:30 AM   Undermining 0 cm 3/13/2019  9:30 AM   Drainage Amount Small 3/22/2019 10:00 AM   Drainage Description Serosanguineous 3/22/2019 10:00 AM   Non-staged Wound Description Partial thickness 3/22/2019 10:00 AM   Treatments Cleansed 3/22/2019 10:00 AM   Cleansing Normal Saline Irrigation 3/22/2019 10:00 AM   Periwound Protectant Barrier Paste 3/22/2019 10:00 AM   Dressing Options Hydrofiber Silver;Unna Boot 3/22/2019 10:00 AM   Dressing Cleansing/Solutions Not Applicable 3/13/2019  9:30 AM   Dressing Changed Changed 3/22/2019 10:00 AM   Dressing Status Clean;Dry;Intact 3/13/2019  9:30 AM   Dressing Change Frequency Monday, Wednesday, Friday 3/22/2019 10:00 AM   NEXT Dressing Change  02/22/19 2/20/2019  8:30 AM   WOUND NURSE ONLY - Odor None 3/22/2019 10:00 AM   WOUND NURSE ONLY - Pulses Other (Comments) 3/4/2019  2:30 PM   WOUND NURSE ONLY - Exposed Structures None 3/22/2019 10:00 AM   WOUND NURSE ONLY - Tissue Type and Percentage 100% pink moist tissue. 3/22/2019 10:00 AM       Wound 03/13/19 Right posteromedial leg (Active)   Wound Image   3/13/2019  9:30 AM   Site Assessment Pink 3/22/2019 10:00 AM   Maricruz-wound Assessment Edema 3/22/2019 10:00 AM   Margins Attached edges 3/22/2019 10:00 AM   Post Wound Length (cm) 0.3 cm 3/13/2019  9:30 AM    Post Wound Width (cm) 0.3 cm 3/13/2019  9:30 AM   Post Wound Depth (cm) 0.1 cm 3/13/2019  9:30 AM   Post Wound Surface Area (cm^2) 0.09 cm^2 3/13/2019  9:30 AM   Tunneling 0 cm 3/13/2019  9:30 AM   Undermining 0 cm 3/13/2019  9:30 AM   Drainage Amount Small 3/22/2019 10:00 AM   Drainage Description Serosanguineous 3/22/2019 10:00 AM   Non-staged Wound Description Partial thickness 3/22/2019 10:00 AM   Treatments Cleansed  3/22/2019 10:00 AM   Cleansing Approved Wound Cleanser 3/22/2019 10:00 AM   Periwound Protectant Barrier Paste 3/22/2019 10:00 AM   Dressing Options Hydrofiber Silver;Unna Boot 3/22/2019 10:00 AM   Dressing Changed Changed 3/22/2019 10:00 AM   Dressing Status Clean;Dry;Intact 3/13/2019  9:30 AM   Dressing Change Frequency Monday, Wednesday, Friday 3/22/2019 10:00 AM   WOUND NURSE ONLY - Odor None 3/22/2019 10:00 AM   WOUND NURSE ONLY - Exposed Structures None 3/22/2019 10:00 AM   WOUND NURSE ONLY - Tissue Type and Percentage 100% pink moist tissue. 3/22/2019 10:00 AM       Wound 03/20/19 Skin Tear Leg L posteromedial proximally skin tear (Active)   Site Assessment Red 3/20/2019  6:00 PM   Maricruz-wound Assessment Clean;Dry;Intact 3/20/2019  6:00 PM   Margins Attached edges 3/20/2019  6:00 PM   Wound Length (cm) 1.5 cm 3/20/2019  6:00 PM   Wound Width (cm) 1.4 cm 3/20/2019  6:00 PM   Wound Depth (cm) 0.1 cm 3/20/2019  6:00 PM   Wound Surface Area (cm^2) 2.1 cm^2 3/20/2019  6:00 PM   Tunneling 0 cm 3/20/2019  6:00 PM   Undermining 0 cm 3/20/2019  6:00 PM   Closure Secondary intention 3/20/2019  6:00 PM   Drainage Amount Small 3/20/2019  6:00 PM   Drainage Description Serosanguineous 3/20/2019  6:00 PM   Non-staged Wound Description Partial thickness 3/20/2019  6:00 PM   Treatments Cleansed 3/20/2019  6:00 PM   Cleansing Approved Wound Cleanser 3/20/2019  6:00 PM   Periwound Protectant Barrier Paste;Skin Protectant wipes to Periwound 3/20/2019  6:00 PM   Dressing Options Unna Boot 3/20/2019  6:00 PM   Dressing Cleansing/Solutions Normal Saline 3/20/2019  6:00 PM   Dressing Changed Changed 3/20/2019  6:00 PM   Dressing Status Clean;Dry;Intact 3/20/2019  6:00 PM   WOUND NURSE ONLY - Odor None 3/20/2019  6:00 PM   WOUND NURSE ONLY - Exposed Structures None 3/20/2019  6:00 PM   WOUND NURSE ONLY - Tissue Type and Percentage 100% red viable tissue 3/20/2019  6:00 PM       Wound 03/20/19 Venous Ulcer Leg L anterior LE CVI ulcer  (Active)   Site Assessment Red 3/20/2019  6:00 PM   Yuki-wound Assessment Clean;Dry;Intact;Callused;Yellow-brown;Hyperpigmented 3/20/2019  6:00 PM   Margins Undefined edges 3/20/2019  6:00 PM   Wound Length (cm) 2.5 cm 3/20/2019  6:00 PM   Wound Width (cm) 3.4 cm 3/20/2019  6:00 PM   Wound Depth (cm) 0.1 cm 3/20/2019  6:00 PM   Wound Surface Area (cm^2) 8.5 cm^2 3/20/2019  6:00 PM   Tunneling 0 cm 3/20/2019  6:00 PM   Undermining 0 cm 3/20/2019  6:00 PM   Closure Secondary intention 3/20/2019  6:00 PM   Drainage Amount Small 3/20/2019  6:00 PM   Drainage Description Serosanguineous 3/20/2019  6:00 PM   Non-staged Wound Description Partial thickness 3/20/2019  6:00 PM   Treatments Cleansed 3/20/2019  6:00 PM   Cleansing Approved Wound Cleanser 3/20/2019  6:00 PM   Periwound Protectant Barrier Paste;Skin Protectant wipes to Periwound 3/20/2019  6:00 PM   Dressing Options Unna Boot 3/20/2019  6:00 PM   Dressing Cleansing/Solutions Normal Saline 3/20/2019  6:00 PM   Dressing Changed New 3/20/2019  6:00 PM   Dressing Status Clean;Dry;Intact 3/20/2019  6:00 PM   WOUND NURSE ONLY - Odor None 3/20/2019  6:00 PM   WOUND NURSE ONLY - Exposed Structures None 3/20/2019  6:00 PM   WOUND NURSE ONLY - Tissue Type and Percentage 100% red viable tissue 3/20/2019  6:00 PM            Patient Education: Patient well versed in when to present to the ER for s/s of infection, she is aware of how to change dressings if needed.      Professional Collaboration: Initial evaluation sent to PCP as referring physician is [facility]. I called and spoke with Dr. Dixie HILL to clarify vascular status - ok to debride and compress per MD      Assessment:      Wound etiology: Venous     Wound Progress:  Initial Evaluation    Rationale for Treatment: AqAg to manage bioburden, absorb exudate, and maintain moist wound environment without laterally wicking exudate therefore reducing yuki-wound maceration.    Patient tolerance/compliance: Patient  tolerated debridement well     Complicating factors: Age, limited mobility, infection, chronicity of wound     Need for ongoing Advanced Wound Care services:Patient requires skilled therapeutic wound care services for product selection, application of product, debridement, close monitoring with clinical assessment for expedite of wound healing.     Plan:      Treatment Plan and Recommendations:  Diagnosis/ICD10: T14.8XXA; L08.9 Wound infection    Procedures/CPT: 69004    Frequency: 3x week    Treatment Goals: STG 2 Weeks  LTG 4 Weeks   Granulation Tissue: 100% 100%   Decrease Necrotic Tissue to: 0% 0%   Wound Phase:  Proliferative Proliferative   Decrease Size by: 10% 25%   Periwound:  Intact Intact   Decrease tracts/undermining by: 0% 0%   Decrease Pain:  3/10 0/10     At the time of each visit a thorough assessment of the patient is completed to assure the  appropriateness of our plan of care.  The dressings or modalities may need to be adapted   from the original plan to address any significant changes in the wound environment.    Clinician Signature:_______________________________Date__________________      Physician Signature:______________________________Date:__________________

## 2019-03-21 NOTE — PATIENT INSTRUCTIONS
pt instr s/s infection, when to call MD/go to ER. Instr to moisturize feet and BLE daily with water based moisturizer, except between toes.Instr daily foot checks, report any blisters/open areas and medical attention promptly. Pt instr to make an appt again for 3 months for foot care. Pt with good understanding.

## 2019-03-21 NOTE — PATIENT INSTRUCTIONS
Should you experience any significant changes in your wound(s) such as infection (redness, swelling, localized heat, increased pain, fever >101 F, chills) or have any questions regarding your home care instructions, please contact the wound center [phone]. If after hours, contact your primary care physician or go the hospital emergency room.  Keep dressing clean and dry and cover while bathing. Only change dressing if over saturated, soiled or its falling off.

## 2019-03-22 ENCOUNTER — NON-PROVIDER VISIT (OUTPATIENT)
Dept: WOUND CARE | Facility: MEDICAL CENTER | Age: 74
End: 2019-03-22
Attending: NURSE PRACTITIONER
Payer: MEDICARE

## 2019-03-22 PROCEDURE — 97597 DBRDMT OPN WND 1ST 20 CM/<: CPT

## 2019-03-22 PROCEDURE — 97598 DBRDMT OPN WND ADDL 20CM/<: CPT

## 2019-03-22 NOTE — PATIENT INSTRUCTIONS
-Keep dressings clean, dry and covered while bathing. Only change dressings if they become over saturated, soiled or fall off.     -Avoid prolonged standing or sitting without elevating your legs.    -Remove your compression garments if you have severe pain, severe swelling, numbness, color change, or temperature change in your toes. If you need to remove your compression garments, do so by unrolling them. Do not cut the compression garments off, this is to prevent cutting yourself on accident.    -Should you experience any significant changes in your wound(s), such as infection (redness, swelling, localized heat, increased pain, fever > 101 F, chills) or have any questions regarding your home care instructions, please contact the wound center at [phone]. If after hours, contact your primary care physician or go to the hospital emergency room.

## 2019-03-22 NOTE — WOUND TEAM
Wound Vac order faxed to [facility] at fax #[phone].                  Wound 07/05/18 Leg Left Medial Malleolus (Active)   Wound Image   3/22/2019 10:00 AM   Site Assessment Red;Yellow 3/22/2019 10:00 AM   Maricruz-wound Assessment Edema 3/22/2019 10:00 AM   Margins Attached edges 3/22/2019 10:00 AM   Wound Length (cm) 4.2 cm 3/4/2019  2:30 PM   Wound Width (cm) 2.8 cm 3/4/2019  2:30 PM   Wound Depth (cm) 0.1 cm 3/4/2019  2:30 PM   Wound Surface Area (cm^2) 11.76 cm^2 3/4/2019  2:30 PM   Post Wound Length (cm) 4.8 cm 3/22/2019 10:00 AM    Post Wound Width (cm) 2.9 cm 3/22/2019 10:00 AM   Post Wound Depth (cm) 0.3 cm 3/22/2019 10:00 AM   Post Wound Surface Area (cm^2) 13.92 cm^2 3/22/2019 10:00 AM   Tunneling 0 cm 3/13/2019  9:30 AM   Undermining 0 cm 3/13/2019  9:30 AM   Closure Secondary intention 3/8/2019 10:10 AM   Drainage Amount Large 3/22/2019 10:00 AM   Drainage Description Serosanguineous 3/22/2019 10:00 AM   Non-staged Wound Description Full thickness 3/22/2019 10:00 AM   Treatments Cleansed;Pharmaceutical agent;Other (Comment) 3/22/2019 10:00 AM   Cleansing Normal Saline Irrigation 3/22/2019 10:00 AM   Periwound Protectant Barrier Paste 3/22/2019 10:00 AM   Dressing Options Hydrofiber Silver;Nonadhesive Foam;Unna Boot 3/22/2019 10:00 AM   Dressing Cleansing/Solutions Normal Saline 3/20/2019  6:00 PM   Dressing Changed Changed 3/22/2019 10:00 AM   Dressing Status Clean;Dry;Intact 3/20/2019  6:00 PM   Dressing Change Frequency Monday, Wednesday, Friday 3/22/2019 10:00 AM   NEXT Dressing Change  02/27/19 2/25/2019  8:00 AM   NEXT Weekly Photo (Inpatient Only) 12/17/18 12/10/2018  2:00 PM   WOUND NURSE ONLY - Odor None 3/22/2019 10:00 AM   WOUND NURSE ONLY - Pulses Other (Comments) 3/4/2019  2:30 PM   WOUND NURSE ONLY - Exposed Structures None 3/22/2019 10:00 AM   WOUND NURSE ONLY - Tissue Type and Percentage 70% red moist, 30% yellow adherent post-debridement. 3/22/2019 10:00 AM       Wound 11/07/18 Left Medial LE  (Active)   Wound Image   3/22/2019 10:00 AM   Site Assessment Red;Yellow 3/22/2019 10:00 AM   Maricruz-wound Assessment Edema 3/22/2019 10:00 AM   Margins Attached edges 3/22/2019 10:00 AM   Wound Length (cm) 3 cm 3/4/2019  2:30 PM   Wound Width (cm) 3.5 cm 3/4/2019  2:30 PM   Wound Depth (cm) 0.2 cm 3/4/2019  2:30 PM   Wound Surface Area (cm^2) 10.5 cm^2 3/4/2019  2:30 PM   Post Wound Length (cm) 3.3 cm 3/22/2019 10:00 AM    Post Wound Width (cm) 3.1 cm 3/22/2019 10:00 AM   Post Wound Depth (cm) 0.4 cm 3/22/2019 10:00 AM   Post Wound Surface Area (cm^2) 10.23 cm^2 3/22/2019 10:00 AM   Tunneling 0 cm 3/13/2019  9:30 AM   Undermining 0 cm 3/13/2019  9:30 AM   Closure Secondary intention 3/8/2019 10:10 AM   Drainage Amount Large 3/22/2019 10:00 AM   Drainage Description Serosanguineous 3/22/2019 10:00 AM   Non-staged Wound Description Full thickness 3/22/2019 10:00 AM   Treatments Cleansed;Pharmaceutical agent;Other (Comment) 3/22/2019 10:00 AM   Cleansing Normal Saline Irrigation 3/22/2019 10:00 AM   Periwound Protectant Barrier Paste 3/22/2019 10:00 AM   Dressing Options Hydrofiber Silver;Nonadhesive Foam;Unna Boot 3/22/2019 10:00 AM   Dressing Cleansing/Solutions Normal Saline 3/22/2019 10:00 AM   Dressing Changed Changed 3/22/2019 10:00 AM   Dressing Status Clean;Dry;Intact 3/20/2019  6:00 PM   Dressing Change Frequency Monday, Wednesday, Friday 3/22/2019 10:00 AM   NEXT Dressing Change  02/27/19 2/25/2019  8:00 AM   NEXT Weekly Photo (Inpatient Only) 12/17/18 12/10/2018  2:00 PM   WOUND NURSE ONLY - Odor None 3/22/2019 10:00 AM   WOUND NURSE ONLY - Pulses Other (Comments) 3/4/2019  2:30 PM   WOUND NURSE ONLY - Exposed Structures None 3/22/2019 10:00 AM   WOUND NURSE ONLY - Tissue Type and Percentage 30% red moist, 70% yellow adherent post-debridement. 3/22/2019 10:00 AM

## 2019-03-22 NOTE — PROCEDURES
Wound Care Procedures 3/22/2019:  Viscous lidocaine applied to left medial malleolus and left posteromedial LE wounds prior to treatment with ~5 minute dwell time.  CSWD with scalpel to remove ~23 cm2 slough from left medial malleolus and left posteromedial LE wounds.

## 2019-03-25 ENCOUNTER — NON-PROVIDER VISIT (OUTPATIENT)
Dept: WOUND CARE | Facility: MEDICAL CENTER | Age: 74
End: 2019-03-25
Attending: NURSE PRACTITIONER
Payer: MEDICARE

## 2019-03-25 PROCEDURE — 97597 DBRDMT OPN WND 1ST 20 CM/<: CPT

## 2019-03-25 NOTE — PATIENT INSTRUCTIONS
Should you experience any significant changes in your wound(s) such as infection (redness, swelling, localized heat, increased pain, fever >101 F, chills) or have any questions regarding your home care instructions, please contact the wound center [phone]. If after hours, contact your primary care physician or go the hospital emergency room.  Keep dressing clean and dry and cover while bathing. Only change dressing if over saturated, soiled or its falling off.

## 2019-03-25 NOTE — PROCEDURES
2% viscous lidocaine to left medial wound and posteromedial wound. CSWD with scalpel to remove ~20 cm2 non viable tissue from wound bed.

## 2019-03-27 ENCOUNTER — PATIENT OUTREACH (OUTPATIENT)
Dept: HEALTH INFORMATION MANAGEMENT | Facility: OTHER | Age: 74
End: 2019-03-27

## 2019-03-27 ENCOUNTER — NON-PROVIDER VISIT (OUTPATIENT)
Dept: WOUND CARE | Facility: MEDICAL CENTER | Age: 74
End: 2019-03-27
Attending: NURSE PRACTITIONER
Payer: MEDICARE

## 2019-03-27 PROCEDURE — 97602 WOUND(S) CARE NON-SELECTIVE: CPT

## 2019-03-27 NOTE — PATIENT INSTRUCTIONS
Should you experience any significant changes in your wound(s) such as infection (redness, swelling, localized heat, increased pain, fever >101 F, chills) or have any questions regarding your home care instructions, please contact the wound center [phone]. If after hours, contact your primary care physician or go the hospital emergency room.  Keep dressing clean and dry and cover while bathing. Only change dressing if over saturated, soiled or its falling off.

## 2019-03-27 NOTE — PROCEDURES
Non selective with NS and gauze to remove non viable tissue from all wound beds. No rinse foam cleanser to both legs to remove peeling/flaking skin.

## 2019-03-27 NOTE — PROGRESS NOTES
[facility] patient advocate reached out and spoke with patient. Patient stated wound care was helping her with an insurance discrepancy regarding her wound vac. Advocate offered assistance as well but patient stated she would reach out if she needed it. Patient reports she is doing well other wise and will go in to see  in her office on 4/16/19.

## 2019-03-28 ENCOUNTER — PATIENT OUTREACH (OUTPATIENT)
Dept: MEDICAL GROUP | Facility: MEDICAL CENTER | Age: 74
End: 2019-03-28

## 2019-03-28 NOTE — PROGRESS NOTES
3rd outreach call to pt to follow up to discuss information regarding her [employer] application and determine if pt has heard from [facility] regarding SNAP application that were submitted. MSW received VM and left message. Shortly after VM being left, pt called MSW back.     Per pt's report she received a letter from [facility] and an EBT card. She believes she was approved SNAP benefits but isn't sure how to read the letter. Pt has wound clinic appt tomorrow at 8:30 am. Discussed pt bring information with her to appt and MSW will review at this time. Pt voiced agreement. Further discussed if pt had obtained any information from the [employer] program. Pt declined but she reported her monthly bill is typically $85 but when she called in it was $55. Pt curious if this was related to the hardship application. Informed pt that MSW was not aware but discussed conference calling the hardship program next week to determine status if pt has not heard by that time. Pt voiced agreement.     Plan:  · MSW will meet with pt tomorrow 03/29/19 at wound clinic to review [facility] letter discussing SNAP benefits.

## 2019-03-29 ENCOUNTER — NON-PROVIDER VISIT (OUTPATIENT)
Dept: WOUND CARE | Facility: MEDICAL CENTER | Age: 74
End: 2019-03-29
Attending: NURSE PRACTITIONER
Payer: MEDICARE

## 2019-03-29 ENCOUNTER — PATIENT OUTREACH (OUTPATIENT)
Dept: MEDICAL GROUP | Facility: MEDICAL CENTER | Age: 74
End: 2019-03-29

## 2019-03-29 PROCEDURE — 97602 WOUND(S) CARE NON-SELECTIVE: CPT

## 2019-03-29 NOTE — NON-PROVIDER
Spoke to Krista Melvin RN at [facility] regarding pt's VAC order status. Pt can resume her old VAC on Monday 4/1/19. Instructed pt to bring her VAC next Monday with her supplies. Also ordered more foams and cannister for pt via [employer]. Pt verbalized understanding and agreeable to POC.

## 2019-03-29 NOTE — PROGRESS NOTES
MSW met with pt at [facility] to review over SNAP paperwork. Pt was approved SNAP benefits Feb 2019 to July 2019. Her monthly benefit amount is $44/month with $26 prorated amount for Feb 2019.  Discussed program with pt and encouraged pt to active her EBT card to start using her monthly benefit amount. Further discussed next steps with completing recertification paperwork when benefit expires. Pt voiced understanding. Lastly discussed and reminded pt to notify MSW next week if she has not heard from the [facility] program. Discussed conference calling program/department together to get update if she hasn't heard. Pt agreeable with plan.     Plan:  · Pt to active her EBT card and start utilizing her SNAP benefits.   · Pt to notify MSW if she does not hear from [facility] program/department by next week to follow up on status.

## 2019-03-29 NOTE — PATIENT INSTRUCTIONS
Keep dressing clean and dry and cover while bathing. Only change dressing if over saturated, soiled or its falling off.     Should you experience any significant changes in your wound(s) such as infection (redness, swelling, localized heat, increased pain, fever >101 F, chills) or have any questions regarding your home care instructions, please contact the wound center [phone]. If after hours, contact your primary care physician or go the hospital emergency room.

## 2019-04-01 ENCOUNTER — PATIENT OUTREACH (OUTPATIENT)
Dept: HEALTH INFORMATION MANAGEMENT | Facility: OTHER | Age: 74
End: 2019-04-01

## 2019-04-01 ENCOUNTER — OFFICE VISIT (OUTPATIENT)
Dept: WOUND CARE | Facility: MEDICAL CENTER | Age: 74
End: 2019-04-01
Attending: NURSE PRACTITIONER
Payer: MEDICARE

## 2019-04-01 VITALS
HEART RATE: 95 BPM | SYSTOLIC BLOOD PRESSURE: 148 MMHG | TEMPERATURE: 98.1 F | OXYGEN SATURATION: 93 % | DIASTOLIC BLOOD PRESSURE: 78 MMHG | RESPIRATION RATE: 20 BRPM

## 2019-04-01 DIAGNOSIS — L08.9 WOUND INFECTION: ICD-10-CM

## 2019-04-01 DIAGNOSIS — L10.9 BULLOUS PEMPHIGUS: ICD-10-CM

## 2019-04-01 DIAGNOSIS — I73.9 PERIPHERAL VASCULAR DISEASE (HCC): ICD-10-CM

## 2019-04-01 DIAGNOSIS — T14.8XXA WOUND INFECTION: ICD-10-CM

## 2019-04-01 DIAGNOSIS — L97.922 LEG ULCER, LEFT, WITH FAT LAYER EXPOSED (HCC): Primary | ICD-10-CM

## 2019-04-01 PROCEDURE — 11042 DBRDMT SUBQ TIS 1ST 20SQCM/<: CPT

## 2019-04-01 PROCEDURE — 11042 DBRDMT SUBQ TIS 1ST 20SQCM/<: CPT | Performed by: NURSE PRACTITIONER

## 2019-04-01 RX ORDER — LISINOPRIL 10 MG/1
10 TABLET ORAL DAILY
COMMUNITY
End: 2019-04-03

## 2019-04-01 ASSESSMENT — PAIN SCALES - GENERAL: PAINLEVEL: 5=MODERATE PAIN

## 2019-04-01 ASSESSMENT — ENCOUNTER SYMPTOMS
CONSTIPATION: 0
DIZZINESS: 0
CHILLS: 0
SHORTNESS OF BREATH: 0
FEVER: 0
DIARRHEA: 0
NAUSEA: 0
CLAUDICATION: 0
VOMITING: 0
WEAKNESS: 1
COUGH: 0

## 2019-04-01 NOTE — PROGRESS NOTES
[facility] patient advocate reached out to patient and asked if everything with her wound vac had been cleared up. Patient confirmed yes, and stated no further issues. Advocate also encouraged patient to make an appointment with her dermatologist, due to not having anything currently scheduled. Patient stated she would make an appointment, and confirmed she will reach out to advocate if she needs help.

## 2019-04-01 NOTE — PROGRESS NOTES
"Provider Encounter- Full Thickness wound      HISTORY OF PRESENT ILLNESS        START OF CARE IN CLINIC: 10/26/2018    REFERRING PROVIDER: IRON Son     WOUND- Full thickness wound   LOCATION: Medial left calf and medial ankle   WOUND HISTORY: Patient has a long wound history with a hip and ankle fracture.  Developed a wound over the medial ankle, a prominent malleolus and the wound likely developed from pressure.    PREVIOUS TREATMENT: Debridement NPWT, hospitalization with IV abx and Veriflow, which really worked   PERTINENT PMH: Recent diagnosis of Bullous Pemphigous - steroids for 2 months and antibiotics. Peripheral vascular disease with left fem-pop bypass   [id number]  Plain films left ankle  There are fractures of the distal fibular shaft and of the medial malleolus, both of which appear to be subacute with at least minimal callus formation. There is marked lateral subluxation of the talus. No other significant findings.   VASCULAR STUDIES:Arterial duplex shows patent left fem-pop without stenosis     LAST  WOUND CULTURE:  DATE : 2019 Heavy growth of pseudomonas, Enterococcus and light growth MRSA             DIABETES:  No  TOBACCO USE: no    3/4/2018 - Hospitalized for 6 dayfor IV abx     She has stopped taking her prednisone, and is now taking CellCept as prescribed by her dermatologist for bullous pemphigoid.      : Patient was hospitalized from 3/8 until 3/18 for right lower extremity infection.  She was treated with IV antibiotics, managed by infectious diseases.  She was discharged on p.o. ciprofloxacin and linezolid, and return to the wound clinic on 3/20.  She presents today, \"feeling much better\".  The wound VAC is resumed to the larger of her lower extremity ulcers.  The other 2 wounds, to the anterior and posterior proximal leg are nearly resolved.      PAST MEDICAL HISTORY:   Past Medical History:   Diagnosis Date   • Anxiety    • Cellulitis and abscess of lower " extremity    • Dental disorder     full dentures   • Generalized osteoarthritis of multiple sites 10/20/2015   • Heart valve disease     pt not sure    • Hyperlipidemia    • Hypertension    • Osteoporosis        PAST SURGICAL HISTORY:   Past Surgical History:   Procedure Laterality Date   • FEMORAL POPLITEAL BYPASS Left 6/8/2018    Procedure: FEMORAL POPLITEAL BYPASS;  Surgeon: Milana Colin M.D.;  Location: SURGERY [facility];  Service: General   • IRRIGATION & DEBRIDEMENT GENERAL Left 6/8/2018    Procedure: IRRIGATION & DEBRIDEMENT ANKLE WOUND;  Surgeon: Milana Colin M.D.;  Location: SURGERY [facility];  Service: General   • IRRIGATION & DEBRIDEMENT HIP Left 6/4/2018    Procedure: IRRIGATION & DEBRIDEMENT HIP;  Surgeon: Chong Vazquez M.D.;  Location: SURGERY [facility];  Service: Orthopedics   • HIP REVISION TOTAL Left 2/11/2018    Procedure: HIP REVISION TOTAL- femoral nail removal conversion to total hip arthoroplasty;  Surgeon: Chong Vazquez M.D.;  Location: SURGERY [facility];  Service: Orthopedics   • HIP NAILING INTRAMEDULLARY Left 12/20/2017    Procedure: HIP NAILING INTRAMEDULLARY;  Surgeon: Jorge Peters M.D.;  Location: SURGERY [facility];  Service: Orthopedics   • BREAST BIOPSY  8/28/2014    Performed by Magdalena Londono M.D. at [facility]   • BREAST BIOPSY Right 8/14    benign   • GYN SURGERY  1973    complete hysterectomy   • ABDOMINAL HYSTERECTOMY TOTAL      w/BSO due to uterine cyst and endometriosis   • ATHROPLASTY      hip        MEDICATIONS:   Current Outpatient Prescriptions   Medication   • ciprofloxacin (CIPRO) 500 MG Tab   • linezolid (ZYVOX) 600 MG Tab   • lisinopril (PRINIVIL) 10 MG Tab   • predniSONE (DELTASONE) 10 MG Tab   • sertraline (ZOLOFT) 100 MG Tab   • metoprolol SR (TOPROL XL) 100 MG TABLET SR 24 HR   • Cyanocobalamin (VITAMIN B-12) 1000 MCG Tab   • niacin 500 MG Tab   • Multiple Vitamins-Minerals (THERAPEUTIC-M/LUTEIN) Tab   •  vitamin D (CHOLECALCIFEROL) 1000 UNIT Tab     No current facility-administered medications for this visit.    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  [employer]     Social History Main Topics   • Smoking status: Former Smoker     Packs/day: 0.50     Years: 25.00     Types: Cigarettes     Quit date: 1/1/2007   • Smokeless tobacco: Never Used   • Alcohol use 3.0 oz/week     5 Glasses of wine per week      Comment: glass of wine per day   • Drug use: No   • Sexual activity: Not Currently     Partners: Male     Other Topics Concern   • Not on file     Social History Narrative   • No narrative on file       FAMILY HISTORY:   Family History   Problem Relation Age of Onset   • GI Mother         colostomy   • Heart Attack Father    • Diabetes Father    • Hypertension Father    • Psychiatry Brother         bipolar disorder        REVIEW OF SYSTEMS:   Review of Systems   Constitutional: Positive for malaise/fatigue. Negative for chills and fever.   Respiratory: Negative for cough and shortness of breath.    Cardiovascular: Positive for leg swelling. Negative for chest pain and claudication.   Gastrointestinal: Negative for constipation, diarrhea, nausea and vomiting.   Neurological: Positive for weakness. Negative for dizziness.   All other systems reviewed and are negative.        PHYSICAL EXAMINATION:   /78   Pulse 95   Temp 36.7 °C (98.1 °F)   Resp 20   SpO2 93%   Physical Exam    Constitutional: She is oriented to person, place, and time and well-developed, well-nourished, and in no distress. No distress.   HENT:   Head: Normocephalic and atraumatic.   Eyes: Pupils are equal, round, and reactive to light.   Neck: Normal range of motion. Neck supple. JVD present.   Cardiovascular: Normal rate, regular rhythm and intact distal pulses.    Audible graft pulse over the medial thigh   Pulmonary/Chest: Effort normal. No respiratory distress.   Abdominal: Soft. She exhibits no distension.   Musculoskeletal: Normal range of motion. She exhibits edema.   Worse   Neurological: She is alert and oriented to person, place, and time.   Skin: There is erythema.   New blisters on both legs.     Psychiatric: Mood, memory, affect and judgment normal.        Wound Assessment- Refer to wound flowsheet     Negative Pressure Wound Therapy Leg Left;Proximal;Other (Comment) (Active)   NPWT Pump Mode / Pressure Setting Continuous;125 mmHg 4/1/2019 10:30 AM   Dressing Type Black foam 4/1/2019 10:30 AM   Number of Foam Pieces Used 4 4/1/2019 10:30 AM   Canister Changed Yes 4/1/2019 10:30 AM     Wound 07/05/18 Leg Left Medial Malleolus (Active)   Wound Image   4/1/2019 10:30 AM   Site Assessment Red;Pink;Yellow 4/1/2019 10:30 AM   Maricruz-wound Assessment Blanchable erythema;Edema 4/1/2019 10:30 AM   Margins Attached edges 4/1/2019 10:30 AM   Wound Length (cm) 4.5 cm 4/1/2019 10:30 AM   Wound Width (cm) 2.8 cm 4/1/2019 10:30 AM   Wound Depth (cm) 0.1 cm 4/1/2019 10:30 AM   Wound Surface Area (cm^2) 12.6 cm^2 4/1/2019 10:30 AM   Post Wound Length (cm) 4.5 cm 4/1/2019 10:30 AM    Post Wound Width (cm) 2.9 cm 4/1/2019 10:30 AM   Post Wound Depth (cm) 0.1 cm 4/1/2019 10:30 AM   Post Wound Surface Area (cm^2) 13.05 cm^2 4/1/2019 10:30 AM   Tunneling 0 cm 3/27/2019  9:00 AM   Undermining 0 cm 3/27/2019  9:00 AM   Closure Secondary intention 4/1/2019 10:30 AM   Drainage Amount Large 4/1/2019 10:30 AM   Drainage Description  Serosanguineous 4/1/2019 10:30 AM   Non-staged Wound Description Full thickness 4/1/2019 10:30 AM   Treatments Cleansed;Pharmaceutical agent;Other (Comment) 4/1/2019 10:30 AM   Cleansing Normal Saline Irrigation 4/1/2019 10:30 AM   Periwound Protectant Skin Protectant wipes to Periwound;Drape 4/1/2019 10:30 AM   Dressing Options Wound Vac;Unna Boot 4/1/2019 10:30 AM   Dressing Cleansing/Solutions Normal Saline 3/27/2019  9:00 AM   Dressing Changed Changed 3/27/2019  9:00 AM   Dressing Status Clean;Dry;Intact 3/27/2019  9:00 AM   Dressing Change Frequency Monday, Wednesday, Friday 3/27/2019  9:00 AM   NEXT Dressing Change  03/29/19 3/27/2019  9:00 AM   NEXT Weekly Photo (Inpatient Only) 12/17/18 12/10/2018  2:00 PM   WOUND NURSE ONLY - Odor None 4/1/2019 10:30 AM   WOUND NURSE ONLY - Pulses Other (Comments) 3/4/2019  2:30 PM   WOUND NURSE ONLY - Exposed Structures None 4/1/2019 10:30 AM   WOUND NURSE ONLY - Tissue Type and Percentage Pre: 90% pink/red, 10% yellow 4/1/2019 10:30 AM       Wound 10/11/18 Venous Ulcer Leg (Active)       Wound 10/13/18 Ankle weeping, blister like area  (Active)       Wound 11/07/18 Left posteromedial LE (Active)   Wound Image   4/1/2019 10:30 AM   Site Assessment Pink;Yellow 4/1/2019 10:30 AM   Maricruz-wound Assessment Edema;Blanchable erythema 4/1/2019 10:30 AM   Margins Attached edges 4/1/2019 10:30 AM   Wound Length (cm) 3 cm 4/1/2019 10:30 AM   Wound Width (cm) 2.5 cm 4/1/2019 10:30 AM   Wound Depth (cm) 0.3 cm 4/1/2019 10:30 AM   Wound Surface Area (cm^2) 7.5 cm^2 4/1/2019 10:30 AM   Post Wound Length (cm) 3 cm 4/1/2019 10:30 AM    Post Wound Width (cm) 2.6 cm 4/1/2019 10:30 AM   Post Wound Depth (cm) 0.3 cm 4/1/2019 10:30 AM   Post Wound Surface Area (cm^2) 7.8 cm^2 4/1/2019 10:30 AM   Tunneling 0 cm 3/27/2019  9:00 AM   Undermining 0 cm 3/27/2019  9:00 AM   Closure Secondary intention 4/1/2019 10:30 AM   Drainage Amount Large 4/1/2019 10:30 AM   Drainage Description Serosanguineous  4/1/2019 10:30 AM   Non-staged Wound Description Full thickness 4/1/2019 10:30 AM   Treatments Cleansed;Pharmaceutical agent;Other (Comment) 4/1/2019 10:30 AM   Cleansing Normal Saline Irrigation 4/1/2019 10:30 AM   Periwound Protectant Skin Protectant wipes to Periwound;Drape 4/1/2019 10:30 AM   Dressing Options Wound Vac;Unna Boot 4/1/2019 10:30 AM   Dressing Cleansing/Solutions Normal Saline 3/27/2019  9:00 AM   Dressing Changed Changed 3/27/2019  9:00 AM   Dressing Status Clean;Dry;Intact 3/27/2019  9:00 AM   Dressing Change Frequency Monday, Wednesday, Friday 3/27/2019  9:00 AM   NEXT Dressing Change  03/29/19 3/27/2019  9:00 AM   NEXT Weekly Photo (Inpatient Only) 12/17/18 12/10/2018  2:00 PM   WOUND NURSE ONLY - Odor None 4/1/2019 10:30 AM   WOUND NURSE ONLY - Pulses Other (Comments) 3/4/2019  2:30 PM   WOUND NURSE ONLY - Exposed Structures None 4/1/2019 10:30 AM   WOUND NURSE ONLY - Tissue Type and Percentage Pre: 80% pink, 20% yellow 4/1/2019 10:30 AM       Wound 01/30/19 Full Thickness Wound Ankle L medial ankle (Active)       Wound 03/20/19 Skin Tear Leg L posteromedial proximally skin tear (Active)   Wound Image   4/1/2019 10:30 AM   Site Assessment Pink 4/1/2019 10:30 AM   Maricruz-wound Assessment Intact;Edema 4/1/2019 10:30 AM   Margins Attached edges 4/1/2019 10:30 AM   Wound Length (cm) 1.2 cm 4/1/2019 10:30 AM   Wound Width (cm) 0.4 cm 4/1/2019 10:30 AM   Wound Depth (cm) 0.1 cm 4/1/2019 10:30 AM   Wound Surface Area (cm^2) 0.48 cm^2 4/1/2019 10:30 AM   Post Wound Length (cm) 2 cm 3/27/2019  9:00 AM    Post Wound Width (cm) 0.8 cm 3/27/2019  9:00 AM   Post Wound Depth (cm) 0.1 cm 3/27/2019  9:00 AM   Post Wound Surface Area (cm^2) 1.6 cm^2 3/27/2019  9:00 AM   Tunneling 0 cm 3/27/2019  9:00 AM   Undermining 0 cm 3/27/2019  9:00 AM   Closure Secondary intention 3/27/2019  9:00 AM   Drainage Amount Small 4/1/2019 10:30 AM   Drainage Description Serosanguineous 4/1/2019 10:30 AM   Non-staged Wound  Description Partial thickness 4/1/2019 10:30 AM   Treatments Cleansed 4/1/2019 10:30 AM   Cleansing Normal Saline Irrigation 4/1/2019 10:30 AM   Periwound Protectant Not Applicable 4/1/2019 10:30 AM   Dressing Options [place]aa Boot 4/1/2019 10:30 AM   Dressing Cleansing/Solutions Normal Saline 3/27/2019  9:00 AM   Dressing Changed Changed 3/27/2019  9:00 AM   Dressing Status Clean;Dry;Intact 3/27/2019  9:00 AM   Dressing Change Frequency Monday, Wednesday, Friday 3/27/2019  9:00 AM   NEXT Dressing Change  03/29/19 3/27/2019  9:00 AM   WOUND NURSE ONLY - Odor None 4/1/2019 10:30 AM   WOUND NURSE ONLY - Exposed Structures None 4/1/2019 10:30 AM   WOUND NURSE ONLY - Tissue Type and Percentage 100% moist pink 4/1/2019 10:30 AM       Wound 03/20/19 Venous Ulcer Leg L anterior LE CVI ulcer (Active)   Wound Image   4/1/2019 10:30 AM   Site Assessment Pink 4/1/2019 10:30 AM   Maricruz-wound Assessment Intact;Edema 4/1/2019 10:30 AM   Margins Attached edges 4/1/2019 10:30 AM   Wound Length (cm) 0.6 cm 4/1/2019 10:30 AM   Wound Width (cm) 0.5 cm 4/1/2019 10:30 AM   Wound Depth (cm) 0.1 cm 4/1/2019 10:30 AM   Wound Surface Area (cm^2) 0.3 cm^2 4/1/2019 10:30 AM   Post Wound Length (cm) 2 cm 3/27/2019  9:00 AM    Post Wound Width (cm) 0.8 cm 3/27/2019  9:00 AM   Post Wound Depth (cm) 0 cm 3/27/2019  9:00 AM   Post Wound Surface Area (cm^2) 1.6 cm^2 3/27/2019  9:00 AM   Tunneling 0 cm 3/27/2019  9:00 AM   Undermining 0 cm 3/27/2019  9:00 AM   Closure Secondary intention 3/27/2019  9:00 AM   Drainage Amount Small 4/1/2019 10:30 AM   Drainage Description Serosanguineous 4/1/2019 10:30 AM   Non-staged Wound Description Partial thickness 4/1/2019 10:30 AM   Treatments Cleansed 4/1/2019 10:30 AM   Cleansing Normal Saline Irrigation 4/1/2019 10:30 AM   Periwound Protectant Not Applicable 4/1/2019 10:30 AM   Dressing Options Unna Boot 4/1/2019 10:30 AM   Dressing Cleansing/Solutions Normal Saline 3/27/2019  9:00 AM   Dressing Changed Changed  3/27/2019  9:00 AM   Dressing Status Clean;Dry;Intact 3/27/2019  9:00 AM   Dressing Change Frequency Monday, Wednesday, Friday 3/27/2019  9:00 AM   NEXT Dressing Change  03/29/19 3/27/2019  9:00 AM   WOUND NURSE ONLY - Odor None 4/1/2019 10:30 AM   WOUND NURSE ONLY - Exposed Structures None 4/1/2019 10:30 AM   WOUND NURSE ONLY - Tissue Type and Percentage 100% pink 4/1/2019 10:30 AM     Left posterior medial proximal wound, no debridement today        Anterior left lower extremity wound no debridement today          Left medial ankle, pre-debridement        Left medial/distal calf, pre-debridement photo           PROCEDURE NOTES AND POST DEBRIDEMENT PHOTOS    PROCEDURE: Excisional debridement of left lower extremity ulcers x2  -2% viscous lidocaine applied topically to wound beds for approximately 5 minutes prior to debridement.  4m curette used to debride wound bed.  Excisional debridement was performed to remove devitalized tissue until healthy, bleeding tissue was visualized.   Entire surface of wound, 20.85 cm², debrided  Tissue debrided into the subcutaneous layer.    -Bleeding controlled with manual pressure   -wound care completed by   Scar Jesus RN, RN - Vac break with Aquacel Ag.      Post debridement photos were not taken today          PATIENT EDUCATION  -Advised to go to ER for any increased redness, swelling, drainage or odor, or if patient develops fever, chills, nausea or vomiting.  -Importance of adequate nutrition for wound healing  -Increase protein intake (unless contraindicated by renal status)    ASSESSMENT AND PLAN:  1. Leg ulcer, left, with fat layer exposed (HCC)  Comments: Full-thickness ulcers x2 to left medial lower leg, probably due to pressure.  Complicated by PVD.    4/1- Recent hospitalization for infection.  She is completed her antibiotics.  Edema and cellulitis much improved.  -Excisional debridement of wounds in clinic today, medically necessary to promote wound healing  -VAC  restarted to the full-thickness wound  -Unna's boot to manage edema  -Patient to return to clinic 3 times per week for assessment, debridement, and VAC dressing changes    2. Peripheral vascular disease (HCC)  Comments: Per Dr. Colin, vascular,Arterial duplex shows patent left fem-pop without stenosis  4/1: Palpable DP pulse, foot warm.    3. Bullous pemphigus  Comments: Diagnosed by dermatology.  Dermatology managing    4/1: She is on prednisone 10 mg 4 times per day.  Complicating factor, impaired wound healing.     4.  Infected wound    4/1: Patient has completed her antibiotics.  Cellulitis and swelling have improved significantly    Please note that this dictation was created using voice recognition software. I have worked with technical experts from [email or web address] to optimize the interface.  I have made every reasonable attempt to correct obvious errors, but there may be errors of grammar and possibly content that I did not discover before finalizing the note.

## 2019-04-01 NOTE — PATIENT INSTRUCTIONS
Wound VAC at 125mmHg continuous with black foam. Dressing will be changed every MWF at the wound clinic or with your home health nurse.  If you are having issues with your wound VAC, please consider patching leaks, changing the canister, or calling [phone] for troubleshooting. If the wound VAC has been off or un-operational for over 2 hours, call wound care center to inform them and remove all dressings including black foam and replace with normal saline damp gauze.     Keep dressing clean and dry and cover while bathing. Only change dressing if over saturated, soiled or its falling off.     Should you experience any significant changes in your wound(s) such as infection (redness, swelling, localized heat, increased pain, fever >101 F, chills) or have any questions regarding your home care instructions, please contact the wound center [phone]. If after hours, contact your primary care physician or go the hospital emergency room.

## 2019-04-03 ENCOUNTER — OFFICE VISIT (OUTPATIENT)
Dept: WOUND CARE | Facility: MEDICAL CENTER | Age: 74
End: 2019-04-03
Attending: NURSE PRACTITIONER
Payer: MEDICARE

## 2019-04-03 VITALS
TEMPERATURE: 97.5 F | RESPIRATION RATE: 18 BRPM | SYSTOLIC BLOOD PRESSURE: 157 MMHG | DIASTOLIC BLOOD PRESSURE: 84 MMHG | OXYGEN SATURATION: 92 % | HEART RATE: 98 BPM

## 2019-04-03 DIAGNOSIS — I73.9 PERIPHERAL VASCULAR DISEASE (HCC): ICD-10-CM

## 2019-04-03 DIAGNOSIS — T14.8XXA WOUND INFECTION: ICD-10-CM

## 2019-04-03 DIAGNOSIS — L10.9 BULLOUS PEMPHIGUS: ICD-10-CM

## 2019-04-03 DIAGNOSIS — L97.922 LEG ULCER, LEFT, WITH FAT LAYER EXPOSED (HCC): Primary | ICD-10-CM

## 2019-04-03 DIAGNOSIS — L08.9 WOUND INFECTION: ICD-10-CM

## 2019-04-03 PROCEDURE — 11042 DBRDMT SUBQ TIS 1ST 20SQCM/<: CPT | Performed by: NURSE PRACTITIONER

## 2019-04-03 PROCEDURE — 11042 DBRDMT SUBQ TIS 1ST 20SQCM/<: CPT

## 2019-04-03 RX ORDER — SERTRALINE HYDROCHLORIDE 100 MG/1
100 TABLET, FILM COATED ORAL DAILY
Qty: 30 TAB | Refills: 0 | OUTPATIENT
Start: 2019-04-03

## 2019-04-03 RX ORDER — NIACINAMIDE 500 MG
500 TABLET ORAL 3 TIMES DAILY
Refills: 2 | COMMUNITY
Start: 2019-03-06 | End: 2019-04-19 | Stop reason: CLARIF

## 2019-04-03 ASSESSMENT — ENCOUNTER SYMPTOMS
DIZZINESS: 0
SHORTNESS OF BREATH: 0
WEAKNESS: 1
FEVER: 0
CONSTIPATION: 0
CHILLS: 0
CLAUDICATION: 0
COUGH: 0
NAUSEA: 0
VOMITING: 0
DIARRHEA: 0

## 2019-04-03 NOTE — PROGRESS NOTES
"Provider Encounter- Full Thickness wound      HISTORY OF PRESENT ILLNESS        START OF CARE IN CLINIC: 10/26/2018    REFERRING PROVIDER: IRON Son     WOUND- Full thickness wound   LOCATION: Medial left calf and medial ankle   WOUND HISTORY: Patient has a long wound history with a hip and ankle fracture.  Developed a wound over the medial ankle, a prominent malleolus and the wound likely developed from pressure.    PREVIOUS TREATMENT: Debridement NPWT, hospitalization with IV abx and Veriflow, which really worked   PERTINENT PMH: Recent diagnosis of Bullous Pemphigous - steroids for 2 months and antibiotics. Peripheral vascular disease with left fem-pop bypass   [id number]  Plain films left ankle  There are fractures of the distal fibular shaft and of the medial malleolus, both of which appear to be subacute with at least minimal callus formation. There is marked lateral subluxation of the talus. No other significant findings.   VASCULAR STUDIES:Arterial duplex shows patent left fem-pop without stenosis     LAST  WOUND CULTURE:  DATE : 2019 Heavy growth of pseudomonas, Enterococcus and light growth MRSA             DIABETES:  No  TOBACCO USE: no    3/4/2018 - Hospitalized for 6 dayfor IV abx     She has stopped taking her prednisone, and is now taking CellCept as prescribed by her dermatologist for bullous pemphigoid.      : Patient was hospitalized from 3/8 until 3/18 for right lower extremity infection.  She was treated with IV antibiotics, managed by infectious diseases.  She was discharged on p.o. ciprofloxacin and linezolid, and returned to the wound clinic on 3/20.  She presents today, \"feeling much better\".  The wound VAC is resumed to the larger of her lower extremity ulcers.  The other 2 wounds, to the anterior and posterior proximal leg are nearly resolved.    4/3: Patient returns to clinic today for assessment debridement.  She has completed her antibiotics.  VAC therapy " being used on her leg ulcers. The other 2 wounds, to the anterior and posterior proximal leg are now resolved.    PAST MEDICAL HISTORY:   Past Medical History:   Diagnosis Date   • Anxiety    • Cellulitis and abscess of lower extremity    • Dental disorder     full dentures   • Generalized osteoarthritis of multiple sites 10/20/2015   • Heart valve disease     pt not sure    • Hyperlipidemia    • Hypertension    • Osteoporosis        PAST SURGICAL HISTORY:   Past Surgical History:   Procedure Laterality Date   • FEMORAL POPLITEAL BYPASS Left 6/8/2018    Procedure: FEMORAL POPLITEAL BYPASS;  Surgeon: Milana Colin M.D.;  Location: SURGERY [facility];  Service: General   • IRRIGATION & DEBRIDEMENT GENERAL Left 6/8/2018    Procedure: IRRIGATION & DEBRIDEMENT ANKLE WOUND;  Surgeon: Milana Colin M.D.;  Location: SURGERY [facility];  Service: General   • IRRIGATION & DEBRIDEMENT HIP Left 6/4/2018    Procedure: IRRIGATION & DEBRIDEMENT HIP;  Surgeon: Chong Vazquez M.D.;  Location: SURGERY [facility];  Service: Orthopedics   • HIP REVISION TOTAL Left 2/11/2018    Procedure: HIP REVISION TOTAL- femoral nail removal conversion to total hip arthoroplasty;  Surgeon: Chong Vazquez M.D.;  Location: SURGERY [facility];  Service: Orthopedics   • HIP NAILING INTRAMEDULLARY Left 12/20/2017    Procedure: HIP NAILING INTRAMEDULLARY;  Surgeon: Jorge Peters M.D.;  Location: SURGERY [facility];  Service: Orthopedics   • BREAST BIOPSY  8/28/2014    Performed by Magdalena Londono M.D. at [facility]   • BREAST BIOPSY Right 8/14    benign   • GYN SURGERY  1973    complete hysterectomy   • ABDOMINAL HYSTERECTOMY TOTAL      w/BSO due to uterine cyst and endometriosis   • ATHROPLASTY      hip        MEDICATIONS:   Current Outpatient Prescriptions   Medication   • niacinamide 500 MG tablet   • predniSONE (DELTASONE) 10 MG Tab   • sertraline (ZOLOFT) 100 MG Tab   • metoprolol SR (TOPROL XL) 100 MG  TABLET SR 24 HR   • Cyanocobalamin (VITAMIN B-12) 1000 MCG Tab   • Multiple Vitamins-Minerals (THERAPEUTIC-M/LUTEIN) Tab   • vitamin D (CHOLECALCIFEROL) 1000 UNIT Tab     No current facility-administered medications for this visit.    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  [employer]     Social History Main Topics   • Smoking status: Former Smoker     Packs/day: 0.50     Years: 25.00     Types: Cigarettes     Quit date: 1/1/2007   • Smokeless tobacco: Never Used   • Alcohol use 3.0 oz/week     5 Glasses of wine per week      Comment: glass of wine per day   • Drug use: No   • Sexual activity: Not Currently     Partners: Male     Other Topics Concern   • Not on file     Social History Narrative   • No narrative on file       FAMILY HISTORY:   Family History   Problem Relation Age of Onset   • GI Mother         colostomy   • Heart Attack Father    • Diabetes Father    • Hypertension Father    • Psychiatry Brother         bipolar disorder        REVIEW OF SYSTEMS:   Review of Systems   Constitutional: Positive for malaise/fatigue. Negative for chills and fever.   Respiratory: Negative for cough and shortness of breath.    Cardiovascular: Positive for leg swelling. Negative for chest pain and claudication.   Gastrointestinal: Negative for constipation, diarrhea, nausea and vomiting.   Neurological: Positive for weakness. Negative for dizziness.   All other systems reviewed and are  negative.        PHYSICAL EXAMINATION:   /84   Pulse 98   Temp 36.4 °C (97.5 °F) (Temporal)   Resp 18   SpO2 92%   Physical Exam   Constitutional: She is oriented to person, place, and time and well-developed, well-nourished, and in no distress. No distress.   HENT:   Head: Normocephalic and atraumatic.   Eyes: Pupils are equal, round, and reactive to light.   Neck: Normal range of motion. Neck supple. JVD present.   Cardiovascular: Normal rate, regular rhythm and intact distal pulses.    Audible graft pulse over the medial thigh   Pulmonary/Chest: Effort normal. No respiratory distress.   Abdominal: Soft. She exhibits no distension.   Musculoskeletal: Normal range of motion. She exhibits edema.   Worse   Neurological: She is alert and oriented to person, place, and time.   Skin: There is erythema.   New blisters on both legs.     Psychiatric: Mood, memory, affect and judgment normal.        Wound Assessment   Negative Pressure Wound Therapy Leg Left;Proximal;Other (Comment) (Active)   NPWT Pump Mode / Pressure Setting Continuous;125 mmHg 4/3/2019  1:15 PM   Dressing Type Black foam 4/3/2019  1:15 PM   Number of Foam Pieces Used 1 4/3/2019  1:15 PM   Canister Changed No 4/3/2019  1:15 PM     Wound 07/05/18 Leg Left Medial Malleolus (Active)   Wound Image    4/3/2019  1:15 PM   Site Assessment Red;Yellow 4/3/2019  1:15 PM   Maricruz-wound Assessment Edema 4/3/2019  1:15 PM   Margins Attached edges 4/3/2019  1:15 PM   Wound Length (cm) 4.5 cm 4/3/2019  1:15 PM   Wound Width (cm) 2.5 cm 4/3/2019  1:15 PM   Wound Depth (cm) 0.1 cm 4/3/2019  1:15 PM   Wound Surface Area (cm^2) 11.25 cm^2 4/3/2019  1:15 PM   Post Wound Length (cm) 4.5 cm 4/3/2019  1:15 PM    Post Wound Width (cm) 2.6 cm 4/3/2019  1:15 PM   Post Wound Depth (cm) 0.3 cm 4/3/2019  1:15 PM   Post Wound Surface Area (cm^2) 11.7 cm^2 4/3/2019  1:15 PM   Tunneling 0 cm 3/27/2019  9:00 AM   Undermining 0 cm 3/27/2019  9:00 AM   Closure Secondary intention  4/3/2019  1:15 PM   Drainage Amount Moderate 4/3/2019  1:15 PM   Drainage Description Serosanguineous 4/3/2019  1:15 PM   Non-staged Wound Description Full thickness 4/3/2019  1:15 PM   Treatments Cleansed;Pharmaceutical agent;Other (Comment) 4/3/2019  1:15 PM   Cleansing Normal Saline Irrigation 4/3/2019  1:15 PM   Periwound Protectant Skin Protectant wipes to Periwound;Drape 4/3/2019  1:15 PM   Dressing Options Wound Vac;Unna Boot 4/3/2019  1:15 PM   Dressing Cleansing/Solutions Not Applicable 4/3/2019  1:15 PM   Dressing Changed Changed 4/3/2019  1:15 PM   Dressing Status Clean;Dry;Intact 4/3/2019  1:15 PM   Dressing Change Frequency Monday, Wednesday, Friday 4/3/2019  1:15 PM   NEXT Dressing Change  03/29/19 3/27/2019  9:00 AM   NEXT Weekly Photo (Inpatient Only) 12/17/18 12/10/2018  2:00 PM   WOUND NURSE ONLY - Odor None 4/3/2019  1:15 PM   WOUND NURSE ONLY - Pulses Other (Comments) 3/4/2019  2:30 PM   WOUND NURSE ONLY - Exposed Structures None 4/3/2019  1:15 PM   WOUND NURSE ONLY - Tissue Type and Percentage 90% red, 10% yellow pre debridement 4/3/2019  1:15 PM       Wound 10/11/18 Venous Ulcer Leg (Active)       Wound 10/13/18 Ankle weeping, blister like area  (Active)       Wound 11/07/18 Left posteromedial LE (Active)   Wound Image    4/3/2019  1:15 PM   Site Assessment Pink;Yellow 4/3/2019  1:15 PM   Maricruz-wound Assessment Edema 4/3/2019  1:15 PM   Margins Attached edges 4/3/2019  1:15 PM   Wound Length (cm) 3 cm 4/3/2019  1:15 PM   Wound Width (cm) 2.5 cm 4/3/2019  1:15 PM   Wound Depth (cm) 0.3 cm 4/3/2019  1:15 PM   Wound Surface Area (cm^2) 7.5 cm^2 4/3/2019  1:15 PM   Post Wound Length (cm) 3.1 cm 4/3/2019  1:15 PM    Post Wound Width (cm) 2.5 cm 4/3/2019  1:15 PM   Post Wound Depth (cm) 0.3 cm 4/3/2019  1:15 PM   Post Wound Surface Area (cm^2) 7.75 cm^2 4/3/2019  1:15 PM   Tunneling 0 cm 3/27/2019  9:00 AM   Undermining 0 cm 3/27/2019  9:00 AM   Closure Secondary intention 4/3/2019  1:15 PM   Drainage  Amount Moderate 4/3/2019  1:15 PM   Drainage Description Serosanguineous 4/3/2019  1:15 PM   Non-staged Wound Description Full thickness 4/3/2019  1:15 PM   Treatments Cleansed;Pharmaceutical agent;Other (Comment) 4/3/2019  1:15 PM   Cleansing Normal Saline Irrigation 4/3/2019  1:15 PM   Periwound Protectant Skin Protectant wipes to Periwound;Drape 4/3/2019  1:15 PM   Dressing Options Wound Vac;Unna Boot 4/3/2019  1:15 PM   Dressing Cleansing/Solutions Not Applicable 4/3/2019  1:15 PM   Dressing Changed Changed 4/3/2019  1:15 PM   Dressing Status Clean;Dry;Intact 4/3/2019  1:15 PM   Dressing Change Frequency Monday, Wednesday, Friday 4/3/2019  1:15 PM   NEXT Dressing Change  03/29/19 3/27/2019  9:00 AM   NEXT Weekly Photo (Inpatient Only) 12/17/18 12/10/2018  2:00 PM   WOUND NURSE ONLY - Odor None 4/3/2019  1:15 PM   WOUND NURSE ONLY - Pulses Other (Comments) 3/4/2019  2:30 PM   WOUND NURSE ONLY - Exposed Structures None 4/3/2019  1:15 PM   WOUND NURSE ONLY - Tissue Type and Percentage 80% pink, 20% yellow pre debridement 4/3/2019  1:15 PM       Wound 01/30/19 Full Thickness Wound Ankle L medial ankle (Active)        Left medial ankle wound, pre-debridement          Left posteromedial lower extremity wound, pre-debridement              Left anterior lower extremity wound, resolved      Right posteromedial lower extremity wound, resolved                  PROCEDURE NOTES AND POST-DEBRIDEMENT PHOTOS    PROCEDURE: Excisional debridement of left lower extremity ulcers x2  -2% viscous lidocaine applied topically to wound beds for approximately 5 minutes prior to debridement.  4m curette used to debride wound beds.  Excisional debridement was performed to remove devitalized tissue until healthy, bleeding tissue was visualized.   Entire surface of wounds, 19.45 cm², debrided  Tissue debrided into the subcutaneous layer.  -Bleeding controlled with manual pressure   -wound care completed by   Kelechi Urbina  [email or web address], negative pressure wound therapy at 125 mmHg              Left medial lower ankle, post-debridement       Left posteromedial lower extremity wound, post debridement              PATIENT EDUCATION  -Advised to go to ER for any increased redness, swelling, drainage or odor, or if patient develops fever, chills, nausea or vomiting.  -Importance of adequate nutrition for wound healing  -Increase protein intake (unless contraindicated by renal status)    ASSESSMENT AND PLAN:  1. Leg ulcer, left, with fat layer exposed (HCC)  Comments: Full-thickness ulcers x2 to left medial lower leg, probably due to pressure.  Complicated by PVD.    4/3-wound area slightly decreased.  Increased granulation noted wound beds  -Excisional debridement of wounds in clinic today, medically necessary to promote wound healing  -VAC restarted to the full-thickness wound  -Unna's boot to manage edema  -Patient to return to clinic 3 times per week for assessment, debridement, and VAC dressing changes    2. Peripheral vascular disease (HCC)  Comments: Per Dr. Coiln, vascular,Arterial duplex shows patent left fem-pop without stenosis  4/3: Palpable DP pulse, foot warm.    3. Bullous pemphigus  Comments: Diagnosed by dermatology.  Dermatology managing    4/3: She is on prednisone 10 mg 4 times per day.  Complicating factor, impaired wound healing.     4.  Infected wound    4/3: Patient has completed her antibiotics.  Cellulitis and swelling have improved significantly    Please note that this dictation was created using voice recognition software. I have worked with technical experts from [employer] to optimize the interface.  I have made every reasonable attempt to correct obvious errors, but there may be errors of grammar and possibly content that I did not discover before finalizing the note.

## 2019-04-03 NOTE — PATIENT INSTRUCTIONS
-Keep dressings clean, dry and covered while bathing. Only change dressings if they become over saturated, soiled or fall off.     -Avoid prolonged standing or sitting without elevating your legs.    -Remove your compression garments if you have severe pain, severe swelling, numbness, color change, or temperature change in your toes. If you need to remove your compression garments, do so by unrolling them. Do not cut the compression garments off, this is to prevent cutting yourself on accident.    -Should you experience any significant changes in your wound(s), such as infection (redness, swelling, localized heat, increased pain, fever > 101 F, chills) or have any questions regarding your home care instructions, please contact the wound center at [phone]. If after hours, contact your primary care physician or go to the hospital emergency room.

## 2019-04-03 NOTE — TELEPHONE ENCOUNTER
Requested Prescriptions     Refused Prescriptions Disp Refills   • sertraline (ZOLOFT) 100 MG Tab [Pharmacy Med Name: SERTRALINE  MG TABLET] 30 Tab 0     Sig: TAKE 1 TAB BY MOUTH EVERY DAY.     Refused By: SANDHYA MEDINA     Reason for Refusal: Refill not appropriate.     Patient was taken off this medication by infectious disease.  Please check with patient to see if they said it was ok to restart?  Thanks  Sandhya Medina, [email or web address]

## 2019-04-04 ENCOUNTER — PATIENT OUTREACH (OUTPATIENT)
Dept: HEALTH INFORMATION MANAGEMENT | Facility: OTHER | Age: 74
End: 2019-04-04

## 2019-04-04 RX ORDER — SERTRALINE HYDROCHLORIDE 100 MG/1
100 TABLET, FILM COATED ORAL DAILY
Qty: 90 TAB | Refills: 3 | Status: SHIPPED | OUTPATIENT
Start: 2019-04-04 | End: 2019-09-02

## 2019-04-04 NOTE — TELEPHONE ENCOUNTER
Requested Prescriptions     Signed Prescriptions Disp Refills   • sertraline (ZOLOFT) 100 MG Tab 90 Tab 3     Sig: Take 1 Tab by mouth every day.     Authorizing Provider: SANDHYA PAGAN     Refused Prescriptions Disp Refills   • sertraline (ZOLOFT) 100 MG Tab [Pharmacy Med Name: SERTRALINE  MG TABLET] 30 Tab 0     Sig: TAKE 1 TAB BY MOUTH EVERY DAY.     Refused By: SANDHYA PAGAN     Reason for Refusal: Refill not appropriate.     RYANN Son

## 2019-04-04 NOTE — TELEPHONE ENCOUNTER
Phone Number Called: [phone] (home)       Message: Patient states she is back on this medication, please refill.     Left Message for patient to call back: N\A

## 2019-04-04 NOTE — PROGRESS NOTES
"Received incoming call from pt reporting she has not received any follow up regarding her [employer] Application. Discussed conference calling over to department to check on the status of her application. Pt agreeable; however, in process line was disconnected. MSW attempted to call back back 2x but line indicated the \"party was not accepting calls at this time\". Believed there maybe some current phone issues.    Plan:   · MSW will follow up with pt at later time/date, if MSW does not hear back from pt   "

## 2019-04-05 ENCOUNTER — OFFICE VISIT (OUTPATIENT)
Dept: WOUND CARE | Facility: MEDICAL CENTER | Age: 74
End: 2019-04-05
Attending: NURSE PRACTITIONER
Payer: MEDICARE

## 2019-04-05 VITALS
HEART RATE: 80 BPM | SYSTOLIC BLOOD PRESSURE: 124 MMHG | DIASTOLIC BLOOD PRESSURE: 66 MMHG | OXYGEN SATURATION: 90 % | RESPIRATION RATE: 18 BRPM | TEMPERATURE: 97.5 F

## 2019-04-05 DIAGNOSIS — I73.9 PERIPHERAL VASCULAR DISEASE (HCC): ICD-10-CM

## 2019-04-05 DIAGNOSIS — T14.8XXA WOUND INFECTION: ICD-10-CM

## 2019-04-05 DIAGNOSIS — L97.922 LEG ULCER, LEFT, WITH FAT LAYER EXPOSED (HCC): ICD-10-CM

## 2019-04-05 DIAGNOSIS — L08.9 WOUND INFECTION: ICD-10-CM

## 2019-04-05 DIAGNOSIS — L10.9 BULLOUS PEMPHIGUS: ICD-10-CM

## 2019-04-05 PROCEDURE — 11042 DBRDMT SUBQ TIS 1ST 20SQCM/<: CPT | Performed by: NURSE PRACTITIONER

## 2019-04-05 PROCEDURE — 11042 DBRDMT SUBQ TIS 1ST 20SQCM/<: CPT

## 2019-04-05 ASSESSMENT — ENCOUNTER SYMPTOMS
COUGH: 0
CONSTIPATION: 0
DIZZINESS: 0
VOMITING: 0
SHORTNESS OF BREATH: 0
DIARRHEA: 0
CLAUDICATION: 0
FEVER: 0
WEAKNESS: 0
PSYCHIATRIC NEGATIVE: 1
NAUSEA: 0
CHILLS: 0

## 2019-04-05 NOTE — PROGRESS NOTES
"Provider Encounter- Full Thickness wound      HISTORY OF PRESENT ILLNESS        START OF CARE IN CLINIC: 10/26/2018    REFERRING PROVIDER: IRON Son     WOUND- Full thickness wound   LOCATION: Medial left calf and medial ankle   WOUND HISTORY: Patient has a long wound history with a hip and ankle fracture.  Developed a wound over the medial ankle, a prominent malleolus and the wound likely developed from pressure.    PREVIOUS TREATMENT: Debridement NPWT, hospitalization with IV abx and Veriflow, which really worked   PERTINENT PMH: Recent diagnosis of Bullous Pemphigous - steroids for 2 months and antibiotics. Peripheral vascular disease with left fem-pop bypass   [id number]  Plain films left ankle  There are fractures of the distal fibular shaft and of the medial malleolus, both of which appear to be subacute with at least minimal callus formation. There is marked lateral subluxation of the talus. No other significant findings.   VASCULAR STUDIES:Arterial duplex shows patent left fem-pop without stenosis     LAST  WOUND CULTURE:  DATE : 2019 Heavy growth of pseudomonas, Enterococcus and light growth MRSA             DIABETES:  No  TOBACCO USE: no    3/4/2018 - Hospitalized for 6 dayfor IV abx     She has stopped taking her prednisone, and is now taking CellCept as prescribed by her dermatologist for bullous pemphigoid.      : Patient was hospitalized from 3/8 until 3/18 for right lower extremity infection.  She was treated with IV antibiotics, managed by infectious diseases.  She was discharged on p.o. ciprofloxacin and linezolid, and returned to the wound clinic on 3/20.  She presents today, \"feeling much better\".  The wound VAC is resumed to the larger of her lower extremity ulcers.  The other 2 wounds, to the anterior and posterior proximal leg are nearly resolved.    4/3: Patient returns to clinic today for assessment debridement.  She has completed her antibiotics.  VAC therapy " being used on her leg ulcers. The other 2 wounds, to the anterior and posterior proximal leg are now resolved.    4/5: Patient returns to clinic today for assessment, debridement, VAC change. unnas boot has helped to resolve dermatitis to foot and lower leg.  Edema to LLE is also improved.  Patient wears offloading boot only to appointments otherwise she is not wearing the boot due to pressure to the ankle.  She has been evaluated by Dr. Leon in the past regarding ankle deformity and she reports that surgeon recommended the ulcers heal prior to any sort of ankle surgery.    PAST MEDICAL HISTORY:   Past Medical History:   Diagnosis Date   • Anxiety    • Cellulitis and abscess of lower extremity    • Dental disorder     full dentures   • Generalized osteoarthritis of multiple sites 10/20/2015   • Heart valve disease     pt not sure    • Hyperlipidemia    • Hypertension    • Osteoporosis        PAST SURGICAL HISTORY:   Past Surgical History:   Procedure Laterality Date   • FEMORAL POPLITEAL BYPASS Left 6/8/2018    Procedure: FEMORAL POPLITEAL BYPASS;  Surgeon: Milana Colin M.D.;  Location: [facility];  Service: General   • IRRIGATION & DEBRIDEMENT GENERAL Left 6/8/2018    Procedure: IRRIGATION & DEBRIDEMENT ANKLE WOUND;  Surgeon: Milana Colin M.D.;  Location: [facility];  Service: General   • IRRIGATION & DEBRIDEMENT HIP Left 6/4/2018    Procedure: IRRIGATION & DEBRIDEMENT HIP;  Surgeon: Chong Vazquez M.D.;  Location: [facility];  Service: Orthopedics   • HIP REVISION TOTAL Left 2/11/2018    Procedure: HIP REVISION TOTAL- femoral nail removal conversion to total hip arthoroplasty;  Surgeon: Chong Vazquez M.D.;  Location: [facility];  Service: Orthopedics   • HIP NAILING INTRAMEDULLARY Left 12/20/2017    Procedure: HIP NAILING INTRAMEDULLARY;  Surgeon: Jorge Peters M.D.;  Location: SURGERY [facility];  Service: Orthopedics   • BREAST BIOPSY   8/28/2014    Performed by Magdalena Londono M.D. at SURGERY [facility]   • BREAST BIOPSY Right 8/14    benign   • GYN SURGERY  1973    complete hysterectomy   • ABDOMINAL HYSTERECTOMY TOTAL      w/BSO due to uterine cyst and endometriosis   • ATHROPLASTY      hip        MEDICATIONS:   Current Outpatient Prescriptions   Medication   • sertraline (ZOLOFT) 100 MG Tab   • niacinamide 500 MG tablet   • predniSONE (DELTASONE) 10 MG Tab   • metoprolol SR (TOPROL XL) 100 MG TABLET SR 24 HR   • Cyanocobalamin (VITAMIN B-12) 1000 MCG Tab   • Multiple Vitamins-Minerals (THERAPEUTIC-M/LUTEIN) Tab   • vitamin D (CHOLECALCIFEROL) 1000 UNIT Tab     No current facility-administered medications for this visit.    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  [employer]     Social History Main Topics   • Smoking status: Former Smoker     Packs/day: 0.50     Years: 25.00     Types: Cigarettes     Quit date: 1/1/2007   • Smokeless tobacco: Never Used   • Alcohol use 3.0 oz/week     5 Glasses of wine per week      Comment: glass of wine per day   • Drug use: No   • Sexual activity: Not Currently     Partners: Male     Other Topics Concern   • Not on file     Social History Narrative   • No narrative on file       FAMILY HISTORY:   Family History   Problem Relation Age of Onset   • GI Mother         colostomy   • Heart Attack Father    • Diabetes Father    • Hypertension Father    • Psychiatry Brother          bipolar disorder        REVIEW OF SYSTEMS:   Review of Systems   Constitutional: Negative for chills, fever and malaise/fatigue.   Respiratory: Negative for cough and shortness of breath.    Cardiovascular: Positive for leg swelling. Negative for chest pain and claudication.   Gastrointestinal: Negative for constipation, diarrhea, nausea and vomiting.   Musculoskeletal:        Uses walker   Skin: Negative for itching and rash.   Neurological: Negative for dizziness and weakness.   Psychiatric/Behavioral: Negative.    All other systems reviewed and are negative.        PHYSICAL EXAMINATION:   /66   Pulse 80   Temp 36.4 °C (97.5 °F) (Temporal)   Resp 18   SpO2 90%   Physical Exam   Constitutional: She is oriented to person, place, and time and well-developed, well-nourished, and in no distress. No distress.   HENT:   Head: Normocephalic and atraumatic.   Eyes: Pupils are equal, round, and reactive to light.   Neck: Normal range of motion. Neck supple.   Cardiovascular: Normal rate, regular rhythm and intact distal pulses.    Audible graft pulse over the medial thigh  Palpable AT to left foot   Pulmonary/Chest: Effort normal. No respiratory distress.   Abdominal: Soft. She exhibits no distension.   Musculoskeletal: Normal range of motion. She exhibits edema.   Improved with compression therapy   Neurological: She is alert and oriented to person, place, and time.   Skin: Skin is warm and dry. No rash noted. No erythema.   Bullae resolved to left lower leg and foot    Psychiatric: Mood, memory, affect and judgment normal.       Left medial ankle wound, pre-debridement              Left posteromedial lower extremity wound, pre-debridement                  PROCEDURE NOTES AND POST-DEBRIDEMENT PHOTOS    PROCEDURE: Excisional debridement of left lower extremity ulcers x2  -2% viscous lidocaine applied topically to wound beds for approximately 10 minutes prior to debridement.  4m curette used to debride wound  edges.  Excisional debridement was performed to remove devitalized tissue until healthy, bleeding tissue was visualized.   Entire surface of wounds, 3 cm², debrided  Tissue debrided into the subcutaneous layer.  -Bleeding controlled with manual pressure   -wound care completed by   Lisseth Ghosh RN-black granufoam, negative pressure wound therapy at 125 mmHg, 2 layer compression wrap                                                                                                                 Left medial lower ankle, post-debridement           Left posteromedial lower extremity wound, post debridement                  PATIENT EDUCATION  -Advised to go to ER for any increased redness, swelling, drainage or odor, or if patient develops fever, chills, nausea or vomiting.  -Importance of adequate nutrition for wound healing  -Increase protein intake (unless contraindicated by renal status)    ASSESSMENT AND PLAN:  1. Leg ulcer, left, with fat layer exposed (HCC)  Comments: Full-thickness ulcers x2 to left medial lower leg, probably due to pressure.  Complicated by PVD.    4/5-closed wound edges to both wounds.  Excised wound edges.  Granulation noted to wound beds  -Excisional debridement of wounds in clinic today, medically necessary to promote wound healing  -Continue VAC to both lower leg ulcers.  -Dermatitis has resolved.  Patient to continue with compression.  Discontinue Unna's boot and apply 2 layer compression wrap for continuous compression.  Reevaluate on Monday at next appointment.   -Patient to return to clinic 3 times per week for assessment, debridement, and VAC dressing changes  -Consider split thickness skin graft to ankle ulcer and left lower leg ulcer for wound resolution.  Ankle ulcer is flush with skin  -Patient to offload ulcers while in bed and in chair with a prevlon boot.  Examples shown to patient and states she will order online as this is not covered by DME.    2. Peripheral vascular  disease (HCC)  Comments: Per Dr. Colin, vascular,Arterial duplex shows patent left fem-pop without stenosis  4/5: Palpable AT pulse, faintly palpable PT, foot warm.  Patient has appointment with Dr. Colin on 4/8/19 at wound clinic.  Patient also reports she received a phone call from Dr. Colin's office regarding a possible angiogram.  Patient will clarify with Dr. Colin on Monday.    3. Bullous pemphigus  Comments: Diagnosed by dermatology.  Dermatology managing    4/5: She is on prednisone 10 mg 4 times per day.  Complicating factor, impaired wound healing.     4.  Infected wound    4/5: Antibiotics completed.  Cellulitis and swelling have improved significantly    Please note that this dictation was created using voice recognition software. I have worked with technical experts from [employer] to optimize the interface.  I have made every reasonable attempt to correct obvious errors, but there may be errors of grammar and possibly content that I did not discover before finalizing the note.

## 2019-04-05 NOTE — PATIENT INSTRUCTIONS
-Keep dressings clean, dry and covered while bathing. Only change dressings if they become over saturated, soiled or fall off.     -Avoid prolonged standing or sitting without elevating your legs.    -Remove your compression garments if you have severe pain, severe swelling, numbness, color change, or temperature change in your toes. If you need to remove your compression garments, do so by unrolling them. Do not cut the compression garments off, this is to prevent cutting yourself on accident.    -Wound VAC at 125mmHg continuous or intermittent with black foam. Dressing will be changed every MWF at the wound clinic.   If you are having issues with your wound VAC, please consider patching leaks, changing the canister, or calling [phone] for troubleshooting. If the wound VAC has been off or un-operational for over 2 hours, call wound care center to inform them and remove all dressings including black foam and replace with normal saline damp gauze.      -Should you experience any significant changes in your wound(s), such as infection (redness, swelling, localized heat, increased pain, fever > 101 F, chills) or have any questions regarding your home care instructions, please contact the wound center at [phone]. If after hours, contact your primary care physician or go to the hospital emergency room.

## 2019-04-08 ENCOUNTER — PATIENT OUTREACH (OUTPATIENT)
Dept: HEALTH INFORMATION MANAGEMENT | Facility: OTHER | Age: 74
End: 2019-04-08

## 2019-04-08 ENCOUNTER — OFFICE VISIT (OUTPATIENT)
Dept: WOUND CARE | Facility: MEDICAL CENTER | Age: 74
End: 2019-04-08
Attending: NURSE PRACTITIONER
Payer: MEDICARE

## 2019-04-08 VITALS
SYSTOLIC BLOOD PRESSURE: 127 MMHG | OXYGEN SATURATION: 91 % | TEMPERATURE: 96.7 F | DIASTOLIC BLOOD PRESSURE: 69 MMHG | RESPIRATION RATE: 20 BRPM | HEART RATE: 97 BPM

## 2019-04-08 DIAGNOSIS — L97.922 LEG ULCER, LEFT, WITH FAT LAYER EXPOSED (HCC): ICD-10-CM

## 2019-04-08 PROCEDURE — 11042 DBRDMT SUBQ TIS 1ST 20SQCM/<: CPT

## 2019-04-08 ASSESSMENT — PAIN SCALES - GENERAL: PAINLEVEL: 5=MODERATE PAIN

## 2019-04-08 NOTE — PATIENT INSTRUCTIONS
Wound VAC at 125mmHg continuous or intermittent with 4 foam. Dressing will be changed every MWF at the wound clinic or with your home health nurse.  If you are having issues with your wound VAC, please consider patching leaks, changing the canister, or calling [phone] for troubleshooting. If the wound VAC has been off or un-operational for over 2 hours, call wound care center to inform them and remove all dressings including black foam and replace with normal saline damp gauze.   Avoid prolonged standing or sitting without elevating your legs.  - Multilayer compression wrap to left leg. Do not get wet and keep on for the week. Only remove if temperature or sensation changes.   If compression needs to be removed, un-wrap it do not cut it off.     Should you experience any significant changes in your wound(s), such as infection (redness, swelling, localized heat, increased pain, fever > 101 F, chills) or have any questions regarding your home care instructions, please contact the wound center at [phone]. If after hours, contact your primary care physician or go to the hospital emergency room.   Keep dressing clean, dry and cover when bathing. Only change dressing if it's over saturated, soiled or falls off.

## 2019-04-09 NOTE — PROCEDURES
PROCEDURE NOTE    DATE OF SERVICE: 4/8/2019    Patient seen in collaboration with wound care provider, Babs Sharpe RN    HPI:  WOUND HISTORY: Patient has a long wound history with a hip and ankle fracture.  Developed a wound over the medial ankle, a prominent malleolus and the wound likely developed from pressure    The procedure, rationale for doing so, and the possible risks- including infection, pain and bleeding- have been discussed with the patient.  The patient  verbalized understanding and is agreeable with proceeding.  Consent signed.       DESCRIPTION OF PROCEDURE:  Excisional debridement left ankle and medial malleolus    ANESTHESIA:  topical lidocaine     PROCEDURE: A 4 mm curette was used to debride the entire wound bed of both wounds with excisional technique.  I removed senescent granulation tissue and a thin rim of epithelium only into the subcutaneous tissue.  The larger more inferior wound measures 12.6sq cm and was debrided down to healthy oozing tissue only into the subcutaneous layer..    The more superior wound measures 6.24 cm² and, similarly was debrided down to healthy oozing subcutaneous tissue.  Here as well I used a 4 mm curette to debride    Plan: Negative pressure wound therapy dressing was applied with standard technique and drape.  And this periwound skin looks the best I have seen at.  She has no rash, blisters or other problems.  She continues to take prednisone 10 mg 4 times a day and, under the circumstances I think it is best we stay away from a skin graft although at this point, it is tempting because of her good wound bed.                                                Negative Pressure Wound Therapy Leg Left;Proximal;Other (Comment) (Active)   NPWT Pump Mode / Pressure Setting Continuous;125 mmHg 4/8/2019  2:45 PM   Dressing Type Black foam 4/8/2019  2:45 PM   Number of Foam Pieces Used 4 4/8/2019  2:45 PM   Canister Changed Yes 4/8/2019  2:45 PM     Wound 07/05/18 Leg Left  Medial Malleolus (Active)   Wound Image    4/8/2019  2:45 PM   Site Assessment Red;Yellow 4/8/2019  2:45 PM   Maricruz-wound Assessment Maceration 4/8/2019  2:45 PM   Margins Attached edges 4/8/2019  2:45 PM   Wound Length (cm) 4.2 cm 4/8/2019  2:45 PM   Wound Width (cm) 3 cm 4/8/2019  2:45 PM   Wound Depth (cm) 0.1 cm 4/8/2019  2:45 PM   Wound Surface Area (cm^2) 12.6 cm^2 4/8/2019  2:45 PM   Post Wound Length (cm) 4.4 cm 4/8/2019  2:45 PM    Post Wound Width (cm) 3 cm 4/8/2019  2:45 PM   Post Wound Depth (cm) 0.1 cm 4/8/2019  2:45 PM   Post Wound Surface Area (cm^2) 13.2 cm^2 4/8/2019  2:45 PM   Tunneling 0 cm 4/8/2019  2:45 PM   Undermining 0 cm 4/8/2019  2:45 PM   Closure Secondary intention 4/8/2019  2:45 PM   Drainage Amount Small 4/8/2019  2:45 PM   Drainage Description Serosanguineous 4/8/2019  2:45 PM   Non-staged Wound Description Full thickness 4/8/2019  2:45 PM   Treatments Cleansed 4/8/2019  2:45 PM   Cleansing Normal Saline Irrigation 4/8/2019  2:45 PM   Periwound Protectant Drape 4/8/2019  2:45 PM   Dressing Options Wound Vac;Compression Wrap Two Layer 4/8/2019  2:45 PM   Dressing Cleansing/Solutions Not Applicable 4/8/2019  2:45 PM   Dressing Changed Changed 4/8/2019  2:45 PM   Dressing Status Clean;Dry;Intact 4/8/2019  2:45 PM   Dressing Change Frequency Monday, Wednesday, Friday 4/8/2019  2:45 PM   WOUND NURSE ONLY - Odor None 4/8/2019  2:45 PM   WOUND NURSE ONLY - Exposed Structures None 4/8/2019  2:45 PM   WOUND NURSE ONLY - Tissue Type and Percentage 90% red, 10% yellow 4/8/2019  2:45 PM       Wound 10/11/18 Venous Ulcer Leg (Active)       Wound 10/13/18 Ankle weeping, blister like area  (Active)       Wound 11/07/18 Left posteromedial LE (Active)   Wound Image   4/8/2019  2:45 PM   Site Assessment Yellow;Pink 4/8/2019  2:45 PM   Maricruz-wound Assessment Maceration 4/8/2019  2:45 PM   Margins Attached edges 4/8/2019  2:45 PM   Wound Length (cm) 2.6 cm 4/8/2019  2:45 PM   Wound Width (cm) 2.4 cm  4/8/2019  2:45 PM   Wound Depth (cm) 0.2 cm 4/8/2019  2:45 PM   Wound Surface Area (cm^2) 6.24 cm^2 4/8/2019  2:45 PM   Post Wound Length (cm) 2.6 cm 4/8/2019  2:45 PM    Post Wound Width (cm) 2.5 cm 4/8/2019  2:45 PM   Post Wound Depth (cm) 0.2 cm 4/8/2019  2:45 PM   Post Wound Surface Area (cm^2) 6.5 cm^2 4/8/2019  2:45 PM   Tunneling 0 cm 4/8/2019  2:45 PM   Undermining 0 cm 4/8/2019  2:45 PM   Closure Secondary intention 4/8/2019  2:45 PM   Drainage Amount Small 4/8/2019  2:45 PM   Drainage Description Serosanguineous 4/8/2019  2:45 PM   Non-staged Wound Description Full thickness 4/8/2019  2:45 PM   Treatments Cleansed;Site care 4/8/2019  2:45 PM   Cleansing Normal Saline Irrigation 4/8/2019  2:45 PM   Periwound Protectant Drape 4/8/2019  2:45 PM   Dressing Options Wound Vac;Compression Wrap Two Layer 4/8/2019  2:45 PM   Dressing Cleansing/Solutions Not Applicable 4/8/2019  2:45 PM   Dressing Changed Changed 4/8/2019  2:45 PM   Dressing Status Clean;Dry;Intact 4/8/2019  2:45 PM   Dressing Change Frequency Monday, Wednesday, Friday 4/8/2019  2:45 PM   WOUND NURSE ONLY - Odor None 4/8/2019  2:45 PM   WOUND NURSE ONLY - Exposed Structures None 4/8/2019  2:45 PM   WOUND NURSE ONLY - Tissue Type and Percentage 60% pink, 40% yellow film 4/8/2019  2:45 PM       Wound 01/30/19 Full Thickness Wound Ankle L medial ankle (Active)

## 2019-04-10 ENCOUNTER — NON-PROVIDER VISIT (OUTPATIENT)
Dept: WOUND CARE | Facility: MEDICAL CENTER | Age: 74
End: 2019-04-10
Attending: NURSE PRACTITIONER
Payer: MEDICARE

## 2019-04-10 PROCEDURE — 97605 NEG PRS WND THER DME<=50SQCM: CPT

## 2019-04-10 NOTE — PATIENT INSTRUCTIONS
Should you experience any significant changes in your wound(s) such as infection (redness, swelling, localized heat, increased pain, fever >101 F, chills) or have any questions regarding your home care instructions, please contact the wound center [phone]. If after hours, contact your primary care physician or go the hospital emergency room.  Keep dressing clean and dry and cover while bathing. Only change dressing if over saturated, soiled or its falling off.

## 2019-04-12 ENCOUNTER — NON-PROVIDER VISIT (OUTPATIENT)
Dept: WOUND CARE | Facility: MEDICAL CENTER | Age: 74
End: 2019-04-12
Attending: NURSE PRACTITIONER
Payer: MEDICARE

## 2019-04-12 PROCEDURE — 97597 DBRDMT OPN WND 1ST 20 CM/<: CPT

## 2019-04-12 NOTE — PATIENT INSTRUCTIONS
Wound VAC at 125mmHg continuous or intermittent with black foam. Dressing will be changed every MWF at the wound clinic.  If you are having issues with your wound VAC, please consider patching leaks, changing the canister, or calling [phone] for troubleshooting. If the wound VAC has been off or un-operational for over 2 hours, call wound care center to inform them and remove all dressings including black foam and replace with normal saline damp gauze.     Should you experience any significant changes in your wound(s), such as infection (redness, swelling, localized heat, increased pain, fever > 101 F, chills) or have any questions regarding your home care instructions, please contact the wound center at [phone]. If after hours, contact your primary care physician or go to the hospital emergency room.   Keep dressing clean, dry and cover when bathing. Only change dressing if it's over saturated, soiled or falls off.

## 2019-04-12 NOTE — PROCEDURES
2% viscous Lidocaine gel dwell time ~5 minutes prior to debridement  CSWD with scalpel to remove ~ 10 cm2 of non viable tissue from both wound beds.  2 pieces of black sponge used. 1 long piece of foam snaked to cover both wound beds and bridge the two together. 1 black foam to accommodate track pad placed between wounds (not over wound bed). NPWT resumed at 125 mmHg, no leaks noted.

## 2019-04-15 ENCOUNTER — NON-PROVIDER VISIT (OUTPATIENT)
Dept: WOUND CARE | Facility: MEDICAL CENTER | Age: 74
End: 2019-04-15
Attending: NURSE PRACTITIONER
Payer: MEDICARE

## 2019-04-15 PROCEDURE — 97605 NEG PRS WND THER DME<=50SQCM: CPT

## 2019-04-15 NOTE — PATIENT INSTRUCTIONS
Should you experience any significant changes in your wound(s) such as infection (redness, swelling, localized heat, increased pain, fever >101 F, chills) or have any questions regarding your home care instructions, please contact the wound center [phone]. If after hours, contact your primary care physician or go the hospital emergency room.  Keep dressing clean and dry and cover while bathing. Only change dressing if over saturated, soiled or its falling off.

## 2019-04-16 ENCOUNTER — PATIENT OUTREACH (OUTPATIENT)
Dept: HEALTH INFORMATION MANAGEMENT | Facility: OTHER | Age: 74
End: 2019-04-16

## 2019-04-16 RX ORDER — PREDNISONE 10 MG/1
10 MG TABLET ORAL QID
Qty: 120 | Refills: 1 | Status: CANCELLED

## 2019-04-16 NOTE — PROGRESS NOTES
Outreach call to pt to follow up, during last phone conversation line was disconnected and when calling back phone was not accepting calls. Currently phone is still not accepting calls and MSW unable to reach pt via phone.     Plan:  Pt has continued appts with wound care clinic, if MSW remains unable to reach pt will attempt to follow up with pt at wound clinic during scheduled appt.

## 2019-04-17 ENCOUNTER — NON-PROVIDER VISIT (OUTPATIENT)
Dept: WOUND CARE | Facility: MEDICAL CENTER | Age: 74
End: 2019-04-17
Attending: NURSE PRACTITIONER
Payer: MEDICARE

## 2019-04-17 ENCOUNTER — PATIENT OUTREACH (OUTPATIENT)
Dept: HEALTH INFORMATION MANAGEMENT | Facility: OTHER | Age: 74
End: 2019-04-17

## 2019-04-17 PROCEDURE — 97605 NEG PRS WND THER DME<=50SQCM: CPT

## 2019-04-17 NOTE — NON-PROVIDER
I received a VM from Dr. Colin after pt had left [facility] today, saying MD had noted that pt had clamped the VAC tubing and did not have the VAC pump with her when she came for appt at MD office yesterday. Dr. Colin was concerned that pt is being non compliant with NPWT and needs further teaching regarding compliance with VAC. I will give pt further instr next appt on Friday 04/19/2019.

## 2019-04-17 NOTE — PATIENT INSTRUCTIONS
Should you experience any significant changes in your wound(s) such as infection (redness, swelling, localized heat, increased pain, fever >101 F, chills) or have any questions regarding your home care instructions, please contact the wound center [phone]. If after hours, contact your primary care physician or go the hospital emergency room.  Keep dressing clean and dry and cover while bathing. Only change dressing if over saturated, soiled or its falling off.

## 2019-04-17 NOTE — NON-PROVIDER
Pt had an arterial duplex scan yesterday at Dr. Colin's office. I called and spoke with Dr. Colin today, she said the scan looked good and we can continue with compression therapy. Coflex 2-layer applied today after VAC, taking care not to compress the area of the suction flange. I gave Pt a Rx to be assessed at [facility] for a softer offloading boot, as the edge of the post surgical hard plastic boot she is currently wearing is apparently placing pressure directly over the wound.

## 2019-04-17 NOTE — PROGRESS NOTES
[facility] patient advocate reached out to patient via patient's roommates number. Patient told me her phone is out of minutes but she will be getting some today. Patient reported her legs are improving and the swelling is staying down. Patient stated she also ran into Annalee LSW as patient was leaving wound care this morning. Advocate encouraged patient to make an appointment with her dermatologist as well.

## 2019-04-17 NOTE — PROGRESS NOTES
MSW met with pt briefly while en-route to another scheduled appt. Pt coming out of wound clinic and saw MSW. Discussed with pt that MSW hadn't been able to reach pt via phone, she confirmed an issue with her phone and reported she plans to go get it fixed later today. Discussed still needing to reach out to [employer] program to determine status of application. Pt voiced agreement and reported she would follow back up with MSW once she gets her phone fixed.     Plan:  · Pt to follow up with MSW once her phone is repaired. If MSW does not hear from pt over the next week will attempt to follow up via phone and/or in-person at wound clinic if needed/unable to reach via phone.

## 2019-04-19 ENCOUNTER — HOSPITAL ENCOUNTER (INPATIENT)
Facility: MEDICAL CENTER | Age: 74
LOS: 5 days | DRG: 603 | End: 2019-04-24
Attending: EMERGENCY MEDICINE | Admitting: FAMILY MEDICINE
Payer: MEDICARE

## 2019-04-19 ENCOUNTER — NON-PROVIDER VISIT (OUTPATIENT)
Dept: WOUND CARE | Facility: MEDICAL CENTER | Age: 74
End: 2019-04-19
Attending: NURSE PRACTITIONER
Payer: MEDICARE

## 2019-04-19 DIAGNOSIS — L08.9 WOUND INFECTION: ICD-10-CM

## 2019-04-19 DIAGNOSIS — S81.802D OPEN WOUND OF LEFT LOWER EXTREMITY, SUBSEQUENT ENCOUNTER: ICD-10-CM

## 2019-04-19 DIAGNOSIS — L03.116 CELLULITIS OF LEFT LOWER EXTREMITY: ICD-10-CM

## 2019-04-19 DIAGNOSIS — T14.8XXA WOUND INFECTION: ICD-10-CM

## 2019-04-19 DIAGNOSIS — E87.1 HYPONATREMIA: ICD-10-CM

## 2019-04-19 LAB
ALBUMIN SERPL BCP-MCNC: 3.2 G/DL (ref 3.2–4.9)
ALBUMIN/GLOB SERPL: 0.9 G/DL
ALP SERPL-CCNC: 95 U/L (ref 30–99)
ALT SERPL-CCNC: 11 U/L (ref 2–50)
ANION GAP SERPL CALC-SCNC: 8 MMOL/L (ref 0–11.9)
AST SERPL-CCNC: 16 U/L (ref 12–45)
BASOPHILS # BLD AUTO: 0.5 % (ref 0–1.8)
BASOPHILS # BLD: 0.06 K/UL (ref 0–0.12)
BILIRUB SERPL-MCNC: 0.5 MG/DL (ref 0.1–1.5)
BUN SERPL-MCNC: 7 MG/DL (ref 8–22)
CALCIUM SERPL-MCNC: 8.4 MG/DL (ref 8.5–10.5)
CHLORIDE SERPL-SCNC: 94 MMOL/L (ref 96–112)
CO2 SERPL-SCNC: 24 MMOL/L (ref 20–33)
CREAT SERPL-MCNC: 0.61 MG/DL (ref 0.5–1.4)
EOSINOPHIL # BLD AUTO: 0.08 K/UL (ref 0–0.51)
EOSINOPHIL NFR BLD: 0.7 % (ref 0–6.9)
ERYTHROCYTE [DISTWIDTH] IN BLOOD BY AUTOMATED COUNT: 63.3 FL (ref 35.9–50)
GLOBULIN SER CALC-MCNC: 3.4 G/DL (ref 1.9–3.5)
GLUCOSE SERPL-MCNC: 88 MG/DL (ref 65–99)
GRAM STN SPEC: NORMAL
HCT VFR BLD AUTO: 28.9 % (ref 37–47)
HGB BLD-MCNC: 9.1 G/DL (ref 12–16)
IMM GRANULOCYTES # BLD AUTO: 0.15 K/UL (ref 0–0.11)
IMM GRANULOCYTES NFR BLD AUTO: 1.3 % (ref 0–0.9)
LACTATE BLD-SCNC: 1.1 MMOL/L (ref 0.5–2)
LACTATE BLD-SCNC: 1.4 MMOL/L (ref 0.5–2)
LYMPHOCYTES # BLD AUTO: 1.87 K/UL (ref 1–4.8)
LYMPHOCYTES NFR BLD: 16.5 % (ref 22–41)
MCH RBC QN AUTO: 31.7 PG (ref 27–33)
MCHC RBC AUTO-ENTMCNC: 31.5 G/DL (ref 33.6–35)
MCV RBC AUTO: 100.7 FL (ref 81.4–97.8)
MONOCYTES # BLD AUTO: 1.1 K/UL (ref 0–0.85)
MONOCYTES NFR BLD AUTO: 9.7 % (ref 0–13.4)
NEUTROPHILS # BLD AUTO: 8.08 K/UL (ref 2–7.15)
NEUTROPHILS NFR BLD: 71.3 % (ref 44–72)
NRBC # BLD AUTO: 0 K/UL
NRBC BLD-RTO: 0 /100 WBC
PLATELET # BLD AUTO: 297 K/UL (ref 164–446)
PMV BLD AUTO: 9.4 FL (ref 9–12.9)
POTASSIUM SERPL-SCNC: 4.3 MMOL/L (ref 3.6–5.5)
PROT SERPL-MCNC: 6.6 G/DL (ref 6–8.2)
RBC # BLD AUTO: 2.87 M/UL (ref 4.2–5.4)
SIGNIFICANT IND 70042: NORMAL
SITE SITE: NORMAL
SODIUM SERPL-SCNC: 126 MMOL/L (ref 135–145)
SOURCE SOURCE: NORMAL
WBC # BLD AUTO: 11.3 K/UL (ref 4.8–10.8)

## 2019-04-19 PROCEDURE — 87040 BLOOD CULTURE FOR BACTERIA: CPT

## 2019-04-19 PROCEDURE — 87077 CULTURE AEROBIC IDENTIFY: CPT

## 2019-04-19 PROCEDURE — 85025 COMPLETE CBC W/AUTO DIFF WBC: CPT

## 2019-04-19 PROCEDURE — 99223 1ST HOSP IP/OBS HIGH 75: CPT | Performed by: FAMILY MEDICINE

## 2019-04-19 PROCEDURE — 87205 SMEAR GRAM STAIN: CPT

## 2019-04-19 PROCEDURE — 700102 HCHG RX REV CODE 250 W/ 637 OVERRIDE(OP): Performed by: EMERGENCY MEDICINE

## 2019-04-19 PROCEDURE — 87186 SC STD MICRODIL/AGAR DIL: CPT

## 2019-04-19 PROCEDURE — 96367 TX/PROPH/DG ADDL SEQ IV INF: CPT

## 2019-04-19 PROCEDURE — 96366 THER/PROPH/DIAG IV INF ADDON: CPT

## 2019-04-19 PROCEDURE — 700111 HCHG RX REV CODE 636 W/ 250 OVERRIDE (IP): Performed by: EMERGENCY MEDICINE

## 2019-04-19 PROCEDURE — 700111 HCHG RX REV CODE 636 W/ 250 OVERRIDE (IP): Performed by: FAMILY MEDICINE

## 2019-04-19 PROCEDURE — A9270 NON-COVERED ITEM OR SERVICE: HCPCS | Performed by: FAMILY MEDICINE

## 2019-04-19 PROCEDURE — 99285 EMERGENCY DEPT VISIT HI MDM: CPT

## 2019-04-19 PROCEDURE — 99211 OFF/OP EST MAY X REQ PHY/QHP: CPT

## 2019-04-19 PROCEDURE — 770006 HCHG ROOM/CARE - MED/SURG/GYN SEMI*

## 2019-04-19 PROCEDURE — 87070 CULTURE OTHR SPECIMN AEROBIC: CPT

## 2019-04-19 PROCEDURE — 96365 THER/PROPH/DIAG IV INF INIT: CPT

## 2019-04-19 PROCEDURE — 700105 HCHG RX REV CODE 258: Performed by: EMERGENCY MEDICINE

## 2019-04-19 PROCEDURE — 36415 COLL VENOUS BLD VENIPUNCTURE: CPT

## 2019-04-19 PROCEDURE — 83605 ASSAY OF LACTIC ACID: CPT | Mod: 91

## 2019-04-19 PROCEDURE — A9270 NON-COVERED ITEM OR SERVICE: HCPCS | Performed by: EMERGENCY MEDICINE

## 2019-04-19 PROCEDURE — 700102 HCHG RX REV CODE 250 W/ 637 OVERRIDE(OP): Performed by: FAMILY MEDICINE

## 2019-04-19 PROCEDURE — 80053 COMPREHEN METABOLIC PANEL: CPT

## 2019-04-19 PROCEDURE — 700105 HCHG RX REV CODE 258: Performed by: FAMILY MEDICINE

## 2019-04-19 RX ORDER — OXYCODONE HYDROCHLORIDE 5 MG/1
5 TABLET ORAL
Status: DISCONTINUED | OUTPATIENT
Start: 2019-04-19 | End: 2019-04-24 | Stop reason: HOSPADM

## 2019-04-19 RX ORDER — OXYCODONE HYDROCHLORIDE 5 MG/1
2.5 TABLET ORAL
Status: DISCONTINUED | OUTPATIENT
Start: 2019-04-19 | End: 2019-04-24 | Stop reason: HOSPADM

## 2019-04-19 RX ORDER — CHOLECALCIFEROL (VITAMIN D3) 125 MCG
1000 CAPSULE ORAL DAILY
Status: DISCONTINUED | OUTPATIENT
Start: 2019-04-19 | End: 2019-04-24 | Stop reason: HOSPADM

## 2019-04-19 RX ORDER — AMOXICILLIN 250 MG
2 CAPSULE ORAL 2 TIMES DAILY
Status: DISCONTINUED | OUTPATIENT
Start: 2019-04-20 | End: 2019-04-24 | Stop reason: HOSPADM

## 2019-04-19 RX ORDER — ONDANSETRON 4 MG/1
4 TABLET, ORALLY DISINTEGRATING ORAL EVERY 4 HOURS PRN
Status: DISCONTINUED | OUTPATIENT
Start: 2019-04-19 | End: 2019-04-24 | Stop reason: HOSPADM

## 2019-04-19 RX ORDER — SODIUM CHLORIDE 9 MG/ML
INJECTION, SOLUTION INTRAVENOUS CONTINUOUS
Status: DISCONTINUED | OUTPATIENT
Start: 2019-04-19 | End: 2019-04-21

## 2019-04-19 RX ORDER — ACETAMINOPHEN 325 MG/1
650 TABLET ORAL ONCE
Status: COMPLETED | OUTPATIENT
Start: 2019-04-19 | End: 2019-04-19

## 2019-04-19 RX ORDER — HYDRALAZINE HYDROCHLORIDE 20 MG/ML
10 INJECTION INTRAMUSCULAR; INTRAVENOUS EVERY 4 HOURS PRN
Status: DISCONTINUED | OUTPATIENT
Start: 2019-04-19 | End: 2019-04-24 | Stop reason: HOSPADM

## 2019-04-19 RX ORDER — METOPROLOL SUCCINATE 50 MG/1
100 TABLET, EXTENDED RELEASE ORAL DAILY
Status: DISCONTINUED | OUTPATIENT
Start: 2019-04-20 | End: 2019-04-24 | Stop reason: HOSPADM

## 2019-04-19 RX ORDER — MORPHINE SULFATE 4 MG/ML
2 INJECTION, SOLUTION INTRAMUSCULAR; INTRAVENOUS
Status: DISCONTINUED | OUTPATIENT
Start: 2019-04-19 | End: 2019-04-24 | Stop reason: HOSPADM

## 2019-04-19 RX ORDER — BISACODYL 10 MG
10 SUPPOSITORY, RECTAL RECTAL
Status: DISCONTINUED | OUTPATIENT
Start: 2019-04-19 | End: 2019-04-24 | Stop reason: HOSPADM

## 2019-04-19 RX ORDER — ACETAMINOPHEN 325 MG/1
650 TABLET ORAL EVERY 6 HOURS PRN
Status: DISCONTINUED | OUTPATIENT
Start: 2019-04-19 | End: 2019-04-24 | Stop reason: HOSPADM

## 2019-04-19 RX ORDER — POLYETHYLENE GLYCOL 3350 17 G/17G
1 POWDER, FOR SOLUTION ORAL
Status: DISCONTINUED | OUTPATIENT
Start: 2019-04-19 | End: 2019-04-24 | Stop reason: HOSPADM

## 2019-04-19 RX ORDER — HEPARIN SODIUM 5000 [USP'U]/ML
5000 INJECTION, SOLUTION INTRAVENOUS; SUBCUTANEOUS EVERY 8 HOURS
Status: DISCONTINUED | OUTPATIENT
Start: 2019-04-19 | End: 2019-04-24 | Stop reason: HOSPADM

## 2019-04-19 RX ORDER — ONDANSETRON 2 MG/ML
4 INJECTION INTRAMUSCULAR; INTRAVENOUS EVERY 4 HOURS PRN
Status: DISCONTINUED | OUTPATIENT
Start: 2019-04-19 | End: 2019-04-24 | Stop reason: HOSPADM

## 2019-04-19 RX ORDER — SERTRALINE HYDROCHLORIDE 100 MG/1
100 TABLET, FILM COATED ORAL DAILY
Status: DISCONTINUED | OUTPATIENT
Start: 2019-04-20 | End: 2019-04-24 | Stop reason: HOSPADM

## 2019-04-19 RX ADMIN — SODIUM CHLORIDE: 9 INJECTION, SOLUTION INTRAVENOUS at 18:00

## 2019-04-19 RX ADMIN — VITAMIN D, TAB 1000IU (100/BT) 1000 UNITS: 25 TAB at 20:47

## 2019-04-19 RX ADMIN — CEFEPIME 2 G: 2 INJECTION, POWDER, FOR SOLUTION INTRAVENOUS at 21:30

## 2019-04-19 RX ADMIN — CYANOCOBALAMIN TAB 500 MCG 1000 MCG: 500 TAB at 20:48

## 2019-04-19 RX ADMIN — ACETAMINOPHEN 650 MG: 325 TABLET, FILM COATED ORAL at 15:15

## 2019-04-19 RX ADMIN — VANCOMYCIN HYDROCHLORIDE 1700 MG: 100 INJECTION, POWDER, LYOPHILIZED, FOR SOLUTION INTRAVENOUS at 16:30

## 2019-04-19 RX ADMIN — ACETAMINOPHEN 650 MG: 325 TABLET, FILM COATED ORAL at 23:52

## 2019-04-19 RX ADMIN — HEPARIN SODIUM 5000 UNITS: 5000 INJECTION, SOLUTION INTRAVENOUS; SUBCUTANEOUS at 20:48

## 2019-04-19 RX ADMIN — AMPICILLIN AND SULBACTAM 3 G: 2; 1 INJECTION, POWDER, FOR SOLUTION INTRAVENOUS at 15:45

## 2019-04-19 ASSESSMENT — ENCOUNTER SYMPTOMS
NERVOUS/ANXIOUS: 0
VOMITING: 0
WHEEZING: 0
HEADACHES: 0
SHORTNESS OF BREATH: 0
FEVER: 0
NAUSEA: 0
PALPITATIONS: 0
HEARTBURN: 0
DIARRHEA: 0
CHILLS: 0
SORE THROAT: 0
WEAKNESS: 0
COUGH: 0
NECK PAIN: 0
DIZZINESS: 0
BLURRED VISION: 0
ABDOMINAL PAIN: 0
BACK PAIN: 0

## 2019-04-19 ASSESSMENT — LIFESTYLE VARIABLES: DO YOU DRINK ALCOHOL: YES

## 2019-04-19 NOTE — ED TRIAGE NOTES
"Chief Complaint   Patient presents with   • Wound Infection     Pt reports that she has chronic cellulitis, finished her abx and was sent by wound clinic for new infection.  BP (!) 96/63   Pulse (!) 103   Temp 35.9 °C (96.7 °F) (Temporal)   Ht 1.702 m (5' 7\")   Wt 67.1 kg (147 lb 14.9 oz)   SpO2 94%   Pt informed of wait times. Educated on triage process.  Asked to return to triage RN for any new or worsening of symptoms. Thanked for patience.        "

## 2019-04-19 NOTE — ED NOTES
Med Rec completed per patient  Allergies reviewed  No ORAL antibiotics in last 30 days    Patient was prescribed Prednisone but has not picked up yet

## 2019-04-19 NOTE — ED PROVIDER NOTES
"ED Provider Note    CHIEF COMPLAINT  Chief Complaint   Patient presents with   • Wound Infection       HPI  Nadege Mae is a 73 y.o. female who presents with \"cellulitis.\"  The patient has a history of chronic cellulitides in her lower extremities.  She has been out of the hospital since February, and off of antibiotics for 2 weeks.  Over that time she has had progressively worsening warmth, erythema.  She points out that the wounds themselves look okay, referring to her ulcers, but her foot is just so hot and inflamed.  She does not think she has had a fever but she just feels generally poorly.  She denies any cough or cold symptoms.  No change in bowel or bladder.  Based upon her previous infections, she suspects that she will need to stay in the hospital for this.  She was sent over from the wound care for this it sounds.  There is no other complaint.    PAST MEDICAL HISTORY  Past Medical History:   Diagnosis Date   • Anxiety    • Cellulitis and abscess of lower extremity    • Dental disorder     full dentures   • Generalized osteoarthritis of multiple sites 10/20/2015   • Heart valve disease     pt not sure    • Hyperlipidemia    • Hypertension    • Osteoporosis        FAMILY HISTORY  Family History   Problem Relation Age of Onset   • GI Mother         colostomy   • Heart Attack Father    • Diabetes Father    • Hypertension Father    • Psychiatry Brother         bipolar disorder       SOCIAL HISTORY  Social History   Substance Use Topics   • Smoking status: Former Smoker     Packs/day: 0.50     Years: 25.00     Types: Cigarettes     Quit date: 1/1/2007   • Smokeless tobacco: Never Used   • Alcohol use 3.0 oz/week     5 Glasses of wine per week      Comment: glass of wine per day         SURGICAL HISTORY  Past Surgical History:   Procedure Laterality Date   • FEMORAL POPLITEAL BYPASS Left 6/8/2018    Procedure: FEMORAL POPLITEAL BYPASS;  Surgeon: Milana Colin M.D.;  Location: SURGERY [facility]" ORS;  Service: General   • IRRIGATION & DEBRIDEMENT GENERAL Left 6/8/2018    Procedure: IRRIGATION & DEBRIDEMENT ANKLE WOUND;  Surgeon: Milana Colin M.D.;  Location: SURGERY [facility];  Service: General   • IRRIGATION & DEBRIDEMENT HIP Left 6/4/2018    Procedure: IRRIGATION & DEBRIDEMENT HIP;  Surgeon: Chong Vazquez M.D.;  Location: SURGERY [facility];  Service: Orthopedics   • HIP REVISION TOTAL Left 2/11/2018    Procedure: HIP REVISION TOTAL- femoral nail removal conversion to total hip arthoroplasty;  Surgeon: Chong Vazquez M.D.;  Location: SURGERY [facility];  Service: Orthopedics   • HIP NAILING INTRAMEDULLARY Left 12/20/2017    Procedure: HIP NAILING INTRAMEDULLARY;  Surgeon: Jorge Peters M.D.;  Location: SURGERY [facility];  Service: Orthopedics   • BREAST BIOPSY  8/28/2014    Performed by Magdalena Londono M.D. at SURGERY [facility]   • BREAST BIOPSY Right 8/14    benign   • GYN SURGERY  1973    complete hysterectomy   • ABDOMINAL HYSTERECTOMY TOTAL      w/BSO due to uterine cyst and endometriosis   • ATHROPLASTY      hip       CURRENT MEDICATIONS  Home Medications     Reviewed by Collette Morrison R.N. (Registered Nurse) on 04/19/19 at 1304  Med List Status: Partial   Medication Last Dose Status   Cyanocobalamin (VITAMIN B-12) 1000 MCG Tab  Active   metoprolol SR (TOPROL XL) 100 MG TABLET SR 24 HR  Active   Multiple Vitamins-Minerals (THERAPEUTIC-M/LUTEIN) Tab  Active   niacinamide 500 MG tablet  Active   predniSONE (DELTASONE) 10 MG Tab  Active   sertraline (ZOLOFT) 100 MG Tab  Active   vitamin D (CHOLECALCIFEROL) 1000 UNIT Tab  Active                I have reviewed the nurses notes and/or the list brought with the patient.    ALLERGIES  Allergies   Allergen Reactions   • Bactrim [Sulfamethoxazole-Trimethoprim] Rash     Diffuse pruritic skin rash with blisters (no mucous membrane involvement) (was also taking cipro at the time - reaction thought more likely to be 2/2  "Bactrim)   • Meropenem Unspecified     Pt can not remember reaction         REVIEW OF SYSTEMS  See HPI for further details. Review of systems as above, otherwise all other systems are negative.     PHYSICAL EXAM  VITAL SIGNS: BP (!) 96/63   Pulse (!) 103   Temp 35.9 °C (96.7 °F) (Temporal)   Ht 1.702 m (5' 7\")   Wt 67.1 kg (147 lb 14.9 oz)   SpO2 94%   BMI 23.17 kg/m²      Constitutional: Well appearing patient in no acute distress.  Not toxic, nor ill in appearance.  HENT: Mucus membranes moist.  Oropharynx is clear.  Eyes: Pupils equally round.  No scleral icterus.   Neck: Full nontender range of motion.  Lymphatic: No popliteal lymphadenopathy noted.   Cardiovascular: Mildly tachycardic, right at 100 to my count.  No murmurs, rubs, nor gallop appreciated.   Thorax & Lungs: Chest is nontender.  Lungs are clear to auscultation with good air movement bilaterally.  No wheeze, rhonchi, nor rales.   Abdomen: Soft, with no tenderness, rebound nor guarding.  No mass, pulsatile mass, nor hepatosplenomegaly appreciated.  Skin: No purpura nor petechia noted.  The right leg is mildly edematous, there is some blanching erythema from the foot up to the proximal leg.  On the left, the foot is hot, erythematous, edematous, with blanching erythema.  This extends up through her calf/leg.  It is mildly tender.  She has 2 ulcers on the medial aspect of the ankle both of which are granulating well.  Extremities/Musculoskeletal: As above.  She has chronic deformity of the left ankle, apparently was a fracture here.  Neurologic: Alert & oriented.  Strength and sensation is intact all around.  Gait is not assessed.  Psychiatric: Normal affect appropriate for the clinical situation.    LABS  Labs Reviewed   CBC WITH DIFFERENTIAL - Abnormal; Notable for the following:        Result Value    WBC 11.3 (*)     RBC 2.87 (*)     Hemoglobin 9.1 (*)     Hematocrit 28.9 (*)     .7 (*)     MCHC 31.5 (*)     RDW 63.3 (*)     " "Lymphocytes 16.50 (*)     Immature Granulocytes 1.30 (*)     Neutrophils (Absolute) 8.08 (*)     Monos (Absolute) 1.10 (*)     Immature Granulocytes (abs) 0.15 (*)     All other components within normal limits   COMP METABOLIC PANEL - Abnormal; Notable for the following:     Sodium 126 (*)     Chloride 94 (*)     Bun 7 (*)     Calcium 8.4 (*)     All other components within normal limits   LACTIC ACID   BLOOD CULTURE    Narrative:     1 of 2 for Blood Culture x 2 sites order. Per Hospital  Policy: Only change Specimen Src: to \"Line\" if specified by  physician order.   BLOOD CULTURE    Narrative:     2 of 2 blood culture x2  Sites order. Per Hospital Policy:  Only change Specimen Src: to \"Line\" if specified by physician  order.   CULTURE WOUND W/ GRAM STAIN   ESTIMATED GFR   LACTIC ACID           MEDICAL RECORD  I have reviewed patient's medical record and pertinent results are listed above.    COURSE & MEDICAL DECISION MAKING  I have reviewed any medical record information, laboratory studies and radiographic results as noted above.  This patient presents with some leg cellulitis bilaterally, most notable on the left.  Given the appearance, as well as her previous history of a suspicion that she may be admitted to the hospital.  The patient for her part think the same thing.  This would be for IV antibiotics, wound consultation.  She does have a mild leukocytosis.  She does not have a lactic acidosis however.  I discussed the patient's case with pharmacy, were putting on antibiotics to cover skin rubi.  I discussed the patient's case with Dr. Keller, hospitalist, who has seen and admitted her.    FINAL IMPRESSION  1. Cellulitis of left lower extremity    2. Wound infection    3. Hyponatremia           This dictation was created using voice recognition software.    Electronically signed by: Vinay Hartman, 4/19/2019 2:42 PM    "

## 2019-04-19 NOTE — PATIENT INSTRUCTIONS
Reviewed [employer] VAC,  compliance - not turning VAC off for long periods of time due to increased risk of infection. Pt has developed a cellulitis of LE, and is nauseous and does not feel well. Instr pt to go directly to ER. She refuses ambulance, says her friend will drive her over there. Pt has good understanding of instr. .

## 2019-04-19 NOTE — NON-PROVIDER
Reviewed [employer] VAC, mechanisms of action, troubleshooting,how to patch a leak, how to change cannister,  remove dressing and apply moist gauze dressing if suction is lost and dressing fails and cannot be patched, and also compliance - not turning VAC off for long periods of time.Pt has developed a cellulitis of LE, and is nauseous and does not feel well. Instr pt to go directly to ER. She refuses ambulance, says her friend will drive her over there. Pt has good understanding of instr. V/s at appt today - Hr 90 reg, resps 20, /79, o2 sat 98% on ra, and temp 98.1. VAC dressing was removed, NSS moist gauze applied, ABD and kerlix wrap. I called charge RN at ER and gave report.

## 2019-04-19 NOTE — ED TRIAGE NOTES
.  Chief Complaint   Patient presents with   • Wound Infection   Agree with triage assessment.  Pt finished multiple antibiotics approximately 2 weeks ago.  Pt reports decreased BLE edema with increased redness on LLE.   No fever/chills.

## 2019-04-20 PROBLEM — M21.072 VALGUS DEFORMITY OF FOOT, LEFT: Status: ACTIVE | Noted: 2019-04-20

## 2019-04-20 PROBLEM — L97.322 LOWER LIMB ULCER, ANKLE, LEFT, WITH FAT LAYER EXPOSED (HCC): Status: ACTIVE | Noted: 2019-03-14

## 2019-04-20 LAB
ANION GAP SERPL CALC-SCNC: 7 MMOL/L (ref 0–11.9)
BASOPHILS # BLD AUTO: 0.7 % (ref 0–1.8)
BASOPHILS # BLD: 0.06 K/UL (ref 0–0.12)
BUN SERPL-MCNC: 12 MG/DL (ref 8–22)
CALCIUM SERPL-MCNC: 8.3 MG/DL (ref 8.5–10.5)
CHLORIDE SERPL-SCNC: 99 MMOL/L (ref 96–112)
CO2 SERPL-SCNC: 24 MMOL/L (ref 20–33)
CREAT SERPL-MCNC: 0.73 MG/DL (ref 0.5–1.4)
EOSINOPHIL # BLD AUTO: 0.17 K/UL (ref 0–0.51)
EOSINOPHIL NFR BLD: 2.1 % (ref 0–6.9)
ERYTHROCYTE [DISTWIDTH] IN BLOOD BY AUTOMATED COUNT: 65.4 FL (ref 35.9–50)
GLUCOSE SERPL-MCNC: 94 MG/DL (ref 65–99)
HCT VFR BLD AUTO: 30.2 % (ref 37–47)
HGB BLD-MCNC: 9.5 G/DL (ref 12–16)
IMM GRANULOCYTES # BLD AUTO: 0.12 K/UL (ref 0–0.11)
IMM GRANULOCYTES NFR BLD AUTO: 1.5 % (ref 0–0.9)
LYMPHOCYTES # BLD AUTO: 1.9 K/UL (ref 1–4.8)
LYMPHOCYTES NFR BLD: 23.5 % (ref 22–41)
MCH RBC QN AUTO: 31.9 PG (ref 27–33)
MCHC RBC AUTO-ENTMCNC: 31.5 G/DL (ref 33.6–35)
MCV RBC AUTO: 101.3 FL (ref 81.4–97.8)
MONOCYTES # BLD AUTO: 0.88 K/UL (ref 0–0.85)
MONOCYTES NFR BLD AUTO: 10.9 % (ref 0–13.4)
NEUTROPHILS # BLD AUTO: 4.96 K/UL (ref 2–7.15)
NEUTROPHILS NFR BLD: 61.3 % (ref 44–72)
NRBC # BLD AUTO: 0 K/UL
NRBC BLD-RTO: 0 /100 WBC
PLATELET # BLD AUTO: 245 K/UL (ref 164–446)
PMV BLD AUTO: 9.2 FL (ref 9–12.9)
POTASSIUM SERPL-SCNC: 4 MMOL/L (ref 3.6–5.5)
RBC # BLD AUTO: 2.98 M/UL (ref 4.2–5.4)
SODIUM SERPL-SCNC: 130 MMOL/L (ref 135–145)
TSH SERPL DL<=0.005 MIU/L-ACNC: 1.33 UIU/ML (ref 0.38–5.33)
VIT B12 SERPL-MCNC: 278 PG/ML (ref 211–911)
WBC # BLD AUTO: 8.4 K/UL (ref 4.8–10.8)

## 2019-04-20 PROCEDURE — 36415 COLL VENOUS BLD VENIPUNCTURE: CPT

## 2019-04-20 PROCEDURE — 97597 DBRDMT OPN WND 1ST 20 CM/<: CPT

## 2019-04-20 PROCEDURE — 84443 ASSAY THYROID STIM HORMONE: CPT

## 2019-04-20 PROCEDURE — 700102 HCHG RX REV CODE 250 W/ 637 OVERRIDE(OP): Performed by: FAMILY MEDICINE

## 2019-04-20 PROCEDURE — 700111 HCHG RX REV CODE 636 W/ 250 OVERRIDE (IP): Performed by: FAMILY MEDICINE

## 2019-04-20 PROCEDURE — 82607 VITAMIN B-12: CPT

## 2019-04-20 PROCEDURE — 770006 HCHG ROOM/CARE - MED/SURG/GYN SEMI*

## 2019-04-20 PROCEDURE — 700105 HCHG RX REV CODE 258: Performed by: FAMILY MEDICINE

## 2019-04-20 PROCEDURE — 99233 SBSQ HOSP IP/OBS HIGH 50: CPT | Performed by: HOSPITALIST

## 2019-04-20 PROCEDURE — 97161 PT EVAL LOW COMPLEX 20 MIN: CPT

## 2019-04-20 PROCEDURE — 85025 COMPLETE CBC W/AUTO DIFF WBC: CPT

## 2019-04-20 PROCEDURE — 80048 BASIC METABOLIC PNL TOTAL CA: CPT

## 2019-04-20 PROCEDURE — A9270 NON-COVERED ITEM OR SERVICE: HCPCS | Performed by: FAMILY MEDICINE

## 2019-04-20 RX ADMIN — HEPARIN SODIUM 5000 UNITS: 5000 INJECTION, SOLUTION INTRAVENOUS; SUBCUTANEOUS at 15:26

## 2019-04-20 RX ADMIN — CYANOCOBALAMIN TAB 500 MCG 1000 MCG: 500 TAB at 05:45

## 2019-04-20 RX ADMIN — ACETAMINOPHEN 650 MG: 325 TABLET, FILM COATED ORAL at 21:28

## 2019-04-20 RX ADMIN — CEFEPIME 2 G: 2 INJECTION, POWDER, FOR SOLUTION INTRAVENOUS at 05:46

## 2019-04-20 RX ADMIN — HEPARIN SODIUM 5000 UNITS: 5000 INJECTION, SOLUTION INTRAVENOUS; SUBCUTANEOUS at 05:45

## 2019-04-20 RX ADMIN — OXYCODONE HYDROCHLORIDE 5 MG: 5 TABLET ORAL at 15:36

## 2019-04-20 RX ADMIN — SERTRALINE HYDROCHLORIDE 100 MG: 100 TABLET ORAL at 05:45

## 2019-04-20 RX ADMIN — SODIUM CHLORIDE: 9 INJECTION, SOLUTION INTRAVENOUS at 18:16

## 2019-04-20 RX ADMIN — HEPARIN SODIUM 5000 UNITS: 5000 INJECTION, SOLUTION INTRAVENOUS; SUBCUTANEOUS at 21:28

## 2019-04-20 RX ADMIN — VITAMIN D, TAB 1000IU (100/BT) 1000 UNITS: 25 TAB at 05:45

## 2019-04-20 RX ADMIN — VANCOMYCIN HYDROCHLORIDE 1100 MG: 100 INJECTION, POWDER, LYOPHILIZED, FOR SOLUTION INTRAVENOUS at 18:16

## 2019-04-20 ASSESSMENT — ENCOUNTER SYMPTOMS
SENSORY CHANGE: 0
HEADACHES: 0
ABDOMINAL PAIN: 0
WHEEZING: 0
COUGH: 0
FEVER: 0
EYE PAIN: 0
DIZZINESS: 0
HEMOPTYSIS: 0
BRUISES/BLEEDS EASILY: 0
FOCAL WEAKNESS: 0
MYALGIAS: 0
CLAUDICATION: 0
VOMITING: 0
SPUTUM PRODUCTION: 0
SPEECH CHANGE: 0
EYE DISCHARGE: 0
NAUSEA: 0
CONSTIPATION: 0
DIARRHEA: 0
SORE THROAT: 0
NECK PAIN: 0
LOSS OF CONSCIOUSNESS: 0
PALPITATIONS: 0
BACK PAIN: 0
DIAPHORESIS: 0
CHILLS: 0
WEAKNESS: 0
SHORTNESS OF BREATH: 0
DEPRESSION: 0

## 2019-04-20 ASSESSMENT — COGNITIVE AND FUNCTIONAL STATUS - GENERAL
SUGGESTED CMS G CODE MODIFIER DAILY ACTIVITY: CH
MOBILITY SCORE: 24
SUGGESTED CMS G CODE MODIFIER MOBILITY: CH
DAILY ACTIVITIY SCORE: 24

## 2019-04-20 ASSESSMENT — LIFESTYLE VARIABLES
ALCOHOL_USE: NO
SUBSTANCE_ABUSE: 0

## 2019-04-20 NOTE — PROGRESS NOTES
"Pharmacy Kinetics 73 y.o. female on vancomycin day # 2  4/20/2019    Currently Dose: Vancomycin 1000 mg iv q24hr (~15 mg/kg)  Received Load Dose: Yes    Indication for Treatment: cellulitis  ID Service Following: No    Pertinent history per medical record: Admitted on 4/19/2019 for lower extremity cellulitis. Noted history of PVD and chronic lower extremity cellulitis (follows outpatient wound care). Referred to ED for worsening lower extremity wounds in latest wound care visit. Hx MRSA, Enterococcus faecalis, and PSAR. Admitted for further workup and treatment.    Other antibiotics: cefepime 2 gm iv q12h    Allergies: Bactrim [sulfamethoxazole-trimethoprim] and Meropenem     List concerns for accumulation of vancomycin: electrolyte derangement    Pertinent cultures to date:   Results     Procedure Component Value Units Date/Time    CULTURE WOUND W/ GRAM STAIN [[id number]]  (Abnormal) Collected:  04/19/19 1520    Order Status:  Completed Specimen:  Wound from Exudate Updated:  04/20/19 1057     Significant Indicator POS (POS)     Source WND     Site Left Foot     Culture Result Wound - (A)     Gram Stain Result Few WBCs.  Many Gram positive cocci.       Culture Result Wound Staphylococcus aureus  Heavy growth   (A)    BLOOD CULTURE [[id number]] Collected:  04/19/19 1450    Order Status:  Completed Specimen:  Blood from Peripheral Updated:  04/20/19 0733     Significant Indicator NEG     Source BLD     Site PERIPHERAL     Blood Culture No Growth    Note: Blood cultures are incubated for 5 days and  are monitored continuously.Positive blood cultures  are called to the RN and reported as soon as  they are identified.      Narrative:       1 of 2 for Blood Culture x 2 sites order. Per Hospital  Policy: Only change Specimen Src: to \"Line\" if specified by  physician order.    BLOOD CULTURE [[id number]] Collected:  04/19/19 1530    Order Status:  Completed Specimen:  Blood from Peripheral Updated:  04/20/19 0722     " "Significant Indicator NEG     Source BLD     Site PERIPHERAL     Blood Culture No Growth    Note: Blood cultures are incubated for 5 days and  are monitored continuously.Positive blood cultures  are called to the RN and reported as soon as  they are identified.      Narrative:       2 of 2 blood culture x2  Sites order. Per Hospital Policy:  Only change Specimen Src: to \"Line\" if specified by physician  order.    GRAM STAIN [[id number]] Collected:  19 1520    Order Status:  Completed Specimen:  Wound Updated:  19     Significant Indicator .     Source WND     Site Left Foot     Gram Stain Result Few WBCs.  Many Gram positive cocci.          MRSA nares swab if pneumonia is a concern (ordered/positive/negative/n-a): n/a    Recent Labs      19   1451  19   0522   WBC  11.3*  8.4   NEUTSPOLYS  71.30  61.30     Recent Labs      19   1451  19   0522   BUN  7*  12   CREATININE  0.61  0.73   ALBUMIN  3.2   --      /73   Pulse 86   Temp 36.2 °C (97.1 °F) (Temporal)   Resp 17   Ht 1.702 m (5' 7\")   Wt 67.3 kg (148 lb 5.9 oz)   SpO2 98%  Temp (24hrs), Av.5 °C (97.7 °F), Min:35.9 °C (96.7 °F), Max:37.3 °C (99.1 °F)    Estimated Creatinine Clearance: 64.8 mL/min (by C-G formula based on SCr of 0.73 mg/dL).    A/P   1. Vancomycin dose change: not indicated   2. Next vancomycin level: 19 @1530 (ordered)  3. Goal trough: 12-16 mcg/mL  4. Comments: VS stable. Afebrile. WBC improved. CrCl ~65 mL/min. Microbiology pending. Factors for accumulation noted. Prior vancomycin exposure noted. Trough in place prior to 3rd total dose. BMP with AM labs. Pharmacy will continue to follow.    Sincere Boudreaux, PharmD  "

## 2019-04-20 NOTE — PROGRESS NOTES
"Pharmacy Kinetics 73 y.o. female on vancomycin day # 1   2019    Vancomycin New Start   : Vanco load 1700 mg IV at 1630    Indication for Treatment: Empiric - lower extremity cellulitis    Pertinent history per medical record: Admitted on 2019 for lower extremity cellulitis. Patient with history of PVD and chronic lower extremity cellulitis followed by outpatient wound care with a wound vacuum in place. Patient referred from wound care clinic today for worsening leg wounds. Empiric antibiotics initiated for treatment of patient's SSTI; historical wound cultures positive for MRSA, E. faecalis & P. aeruginosa.     Other antibiotics: cefepime 2 g IV q12hrs    Allergies: Bactrim [sulfamethoxazole-trimethoprim] and Meropenem     Concern for renal function includes age.    Pertinent cultures to date:   : peripheral blood cx X2 - in process  : wound cx - in process    MRSA nares swab if pneumonia is a concern (ordered/positive/negative/n-a): N/A    Imaging:  None    Recent Labs      19   1451   WBC  11.3*   NEUTSPOLYS  71.30     Recent Labs      19   1451   BUN  7*   CREATININE  0.61   ALBUMIN  3.2     No results for input(s): VANCOTROUGH, VANCOPEAK, VANCORANDOM in the last 72 hours.    No intake or output data in the 24 hours ending 19 1748     BP (!) 99/57   Pulse 83   Temp 37.3 °C (99.1 °F) (Oral)   Resp 18   Ht 1.702 m (5' 7\")   Wt 67.1 kg (147 lb 14.9 oz)   SpO2 94%  Temp (24hrs), Av.8 °C (98.2 °F), Min:35.9 °C (96.7 °F), Max:37.3 °C (99.1 °F)      A/P   1. Vancomycin dose change: Start vancomycin 1100 mg IV q24hrs  2. Next vancomycin level: 3 days (not currently ordered)  3. Goal trough: 12-16 mcg/mL  4. Comments: Empiric vancomycin initiated for treatment of SSTI in patient primarily at risk for accumulation d/t age. Patient previously seen by ID. Reviewed patient's vanco dosing history and she developed slightly supratherapeutic trough results with vanco 20 mg/kg " daily dosing. Will start once daily vanco dosing per protocol and plan trough at steady state to evaluate and adjust as necessary to achieve goal trough results. Blood and wound cultures in process, recommend ID consult to guide antibiotic selection/duration of therapy given patient's complicated wound history.    Pharmacy will continue to follow.     Lacey Multani, TraceeD

## 2019-04-20 NOTE — ASSESSMENT & PLAN NOTE
Consulted ID  Admission 3/19 wound culture with MSSA.   LPS following.  Large ulceration left medial from likely underlying valgus deformity from prior ankle fracture.  H/o left fem-pop bypass:  Check for arterial insufficiency, ordered TOMI arterial study.  H/o ankle fracture with valgus deformity likely leading to chronic ulceration medial aspect of ankle.  IV unasyn based on cultures.  4/21:  MRI no OM seen, severe deformity from trimalleolar fracture.    4/23 OP wound care clinic referral

## 2019-04-20 NOTE — WOUND TEAM
LIMB PRESERVATION SERVICE - RN NOTE    Reason for LPS Consultation: leg redness and swelling    Past medical history, medicines, allergies, family history, social history,    reviewed    History of Present Illness: Patient is a 73 y.o. female with past medical history that includes cellulitis, LE wounds, dyslipidemia, HTN, fem/pop bypass, former smoker, PVD.   Bullous pephigus, PVD, B12 deficiency, steroid use.  Admitted to [facility] for leg cellulitis and started on IV vancomycin and cefepime.. Pt recently completed a course of ciprofloxacin and Zyvox.  An LPS consult has been requested for evaluation of leg cellulitis.  This wound started 10/2018.  He has been treating the wound with VAC, unna boot, .              DIAGNOSTICS this admission    Xray:  6/2018 FINDINGS:  There are fractures of the distal fibular shaft and of the medial malleolus, both of which appear to be subacute with at least minimal callus formation. There is marked lateral subluxation of the talus. No other significant findings.     MRI: nt               CT:4/2018 Tri-malleolus fractures with displacement as detailed above    Likely posterior tibial tendon tear               Vascular:  5/2018 Left.    Doppler waveform of the common femoral artery is of high amplitude and    triphasic.    Doppler doppler waveforms at the popliteal and tibial arteries are    monophasic with moderate amplitude.    Ankle-brachial index is moderately reduced.    Other: micro - heavy growth staph  4/20/19 hand held doppler left ankle pulsed are multiphasic 2+               Labs      WBC 11.3  4/19/19  8.4 4/20/19                                ESR: 3/14/19 80                      CRP: 3/14/19 14.8           A1c: 6/4/18 5.6    PHYSICAL EXAMINATION:   Sensory Assessment  Monofilament testing nt  Wound Assessment:    Skin Risk/Luis   Sensory Perception: No Impairment  Moisture: Rarely Moist  Activity: Walks Occasionally  Mobility: Slightly Limited  Nutrition:  Adequate  Friction and Shear: No Apparent Problem  Total Score: 20  Skin Breakdown Risk: 18-23 Minimal Risk for Skin Breakdown     Patient Turns / Repositioning: Patient Turns Self from Side to Side              Wound 04/19/19 Leg Left leg (Active)   Site Assessment Red;Yellow 4/20/2019 12:00 PM   Maricruz-wound Assessment Pink 4/20/2019 12:00 PM   Margins Defined edges 4/20/2019 12:00 PM    Superior leg 3x1.4x0.3  Inferior 4x3x0.2    Tunneling 0 cm 4/20/2019 12:00 PM   Undermining 0 cm 4/20/2019 12:00 PM   Closure Secondary intention 4/20/2019 12:00 PM   Drainage Amount Small 4/20/2019 12:00 PM   Drainage Description Serosanguineous 4/20/2019 12:00 PM   Non-staged Wound Description Full thickness 4/20/2019 12:00 PM   Treatments Site care;Cleansed 4/20/2019 12:00 PM   Cleansing Approved Wound Cleanser 4/20/2019 12:00 PM   Periwound Protectant Barrier Paste 4/20/2019 12:00 PM   Dressing Options Honey Colloid;Absorbent Abdominal Pad;Roll Gauze 4/20/2019 12:00 PM   Dressing Changed Changed 4/20/2019 12:00 PM   Dressing Change Frequency Every 72 hrs 4/20/2019 12:00 PM   NEXT Dressing Change  04/22/19 4/20/2019 12:00 PM   NEXT Weekly Photo (Inpatient Only) 04/27/19 4/20/2019 12:00 PM   WOUND NURSE ONLY - Odor None 4/20/2019 12:00 PM   WOUND NURSE ONLY - Pulses Left;2+;DP;PT 4/20/2019 12:00 PM   WOUND NURSE ONLY - Exposed Structures None 4/20/2019 12:00 PM   WOUND NURSE ONLY - Tissue Type and Percentage 80 red, 20 yellow 4/20/2019 12:00 PM   WOUND NURSE ONLY - Time Spent with Patient (mins) 60 4/20/2019 12:00 PM           Patient Turns / Repositioning: Patient Turns Self from Side to Side          Procedures:     Debridement :  Scalpel  used to debride wound bed  Entire surface of wound,  20 cm2, debrided, removing non viable tissue.   Cleansed with:  Normal Saline                                                                        Periwound protected with: barrier paste   Primary dressing: honey colloid, abd, rolled  gauze     Patient Education:  POC    PLAN    Wound Care:   Wound team to follow up for compression bandaging when s&s of infection are receeding    Offloading  Pt has boot although she is going to [employer] for new shoe.  Pt states current boot irritates wounds     Labs  No new labs    Imaging  No new imaging.  Pt states Dr Colin did a vascular study in her office 4/2019    Infection Management  Microbiology swab of wound growing staph species  Antibiotics IV        Referrals   Vascular na   Ortho na   Infectious diseases ED MD consulted   Diabetes Education na   Ortho tech na               Professional collaboration:

## 2019-04-20 NOTE — PROGRESS NOTES
[facility] Medicine Daily Progress Note    Date of Service  4/20/2019    Chief Complaint  73 y.o. female admitted 4/19/2019 with left lower medial ankle ulceration, sent by outpatient wound clinic.    Hospital Course    4/20:  This 73 y.o. female with past medical history that includes cellulitis, LE wounds, dyslipidemia, HTN, fem/pop bypass, former smoker, PVD.   Bullous pephigus, PVD, B12 deficiency, steroid use.  Admitted to [facility] for leg cellulitis and started on IV vancomycin and cefepime. Pt recently completed a course of ciprofloxacin and Zyvox.  An LPS consult has been requested for evaluation of leg cellulitis.  This wound started 10/2018.  He has been treating the wound with VAC, unna boot and followed by outpatient wound clinic.  ID consulted given chronic nature of wound.  4/19 wound culture with heavy growth of staph aureus ss pending.  On IV vancomycin, ID stopped cefepime since only growing staph. *        Consultants/Specialty  LPS   ID Dr. Rainey    Code Status  full    Disposition  Home once medically cleared, has FWW with seat at home.    Review of Systems  Review of Systems   Constitutional: Negative for chills, diaphoresis, fever and malaise/fatigue.   HENT: Negative for congestion and sore throat.    Eyes: Negative for pain and discharge.   Respiratory: Negative for cough, hemoptysis, sputum production, shortness of breath and wheezing.    Cardiovascular: Negative for chest pain, palpitations, claudication and leg swelling.   Gastrointestinal: Negative for abdominal pain, constipation, diarrhea, melena, nausea and vomiting.   Genitourinary: Negative for dysuria, frequency and urgency.   Musculoskeletal: Positive for joint pain (left ankle pain). Negative for back pain, myalgias and neck pain.   Skin: Negative for itching and rash.   Neurological: Negative for dizziness, sensory change, speech change, focal weakness, loss of consciousness, weakness and headaches.   Endo/Heme/Allergies: Does not  bruise/bleed easily.   Psychiatric/Behavioral: Negative for depression, substance abuse and suicidal ideas.        Physical Exam  Temp:  [36.1 °C (97 °F)-37.3 °C (99.1 °F)] 36.2 °C (97.1 °F)  Pulse:  [83-94] 86  Resp:  [16-18] 17  BP: ()/(46-73) 139/73  SpO2:  [92 %-98 %] 98 %    Physical Exam   Constitutional: She is oriented to person, place, and time. She appears well-developed and well-nourished. No distress.   HENT:   Head: Normocephalic and atraumatic.   Nose: Nose normal.   Mouth/Throat: Oropharynx is clear and moist. No oropharyngeal exudate.   Eyes: Pupils are equal, round, and reactive to light. Conjunctivae and EOM are normal. Right eye exhibits no discharge. Left eye exhibits no discharge. No scleral icterus.   Neck: Normal range of motion. Neck supple. No JVD present. No tracheal deviation present. No thyromegaly present.   Cardiovascular: Normal rate, regular rhythm, normal heart sounds and intact distal pulses.  Exam reveals no gallop and no friction rub.    No murmur heard.  Pulmonary/Chest: Effort normal and breath sounds normal. No stridor. No respiratory distress. She has no wheezes. She has no rales. She exhibits no tenderness.   Abdominal: Soft. Bowel sounds are normal. She exhibits no distension and no mass. There is no tenderness. There is no rebound and no guarding.   Musculoskeletal: Normal range of motion. She exhibits edema, tenderness and deformity (left ankle deformity from prior left ankle fracture with valgus deformity.  left medial ulceration 3cm diameter open with surrounding erythema.).   Lymphadenopathy:     She has no cervical adenopathy.   Neurological: She is alert and oriented to person, place, and time. No cranial nerve deficit. She exhibits normal muscle tone. Coordination normal.   Skin: Skin is warm and dry. No rash noted. She is not diaphoretic. No erythema.   Psychiatric: She has a normal mood and affect. Her behavior is normal. Judgment and thought content normal.    Nursing note and vitals reviewed.      Fluids  No intake or output data in the 24 hours ending 04/20/19 1554    Laboratory  Recent Labs      04/19/19   1451  04/20/19   0522   WBC  11.3*  8.4   RBC  2.87*  2.98*   HEMOGLOBIN  9.1*  9.5*   HEMATOCRIT  28.9*  30.2*   MCV  100.7*  101.3*   MCH  31.7  31.9   MCHC  31.5*  31.5*   RDW  63.3*  65.4*   PLATELETCT  297  245   MPV  9.4  9.2     Recent Labs      04/19/19   1451  04/20/19   0522   SODIUM  126*  130*   POTASSIUM  4.3  4.0   CHLORIDE  94*  99   CO2  24  24   GLUCOSE  88  94   BUN  7*  12   CREATININE  0.61  0.73   CALCIUM  8.4*  8.3*                   Imaging  No orders to display        Assessment/Plan  * Lower limb ulcer, ankle, left, with fat layer exposed (HCC)- (present on admission)   Assessment & Plan    IV vancomycin   Consulted ID  Admission 3/19 wound culture with heavy staph aureus ss pending.  ID dc'd cefepime IV since only growing staph.  LPS following.  Large ulceration left medial from likely underlying valgus deformity from prior ankle fracture.  H/o left fem-pop bypass:  Check for arterial insufficiency.  H/o ankle fracture with valgus deformity likely leading to chronic ulceration medial aspect of ankle.     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)   Assessment & Plan    Wound care  Consult LPS     Hyponatremia- (present on admission)   Assessment & Plan    IVF NS, follow BMP, check TSH     Peripheral vascular disease (HCC)- (present on admission)   Assessment & Plan    History of left femoropopliteal bypass surgery  Consult LPS  Check lipid panel     B12 deficiency- (present on admission)   Assessment & Plan    Check level     Essential hypertension- (present on admission)   Assessment & Plan    Toprol     Valgus deformity of foot, left- (present on admission)   Assessment & Plan    Patient states prior left ankle fracture  Query need for reconstructive surgery of left ankle in order for left medial ulceration to heal?     Anemia- (present  on admission)   Assessment & Plan    Anemia studies ordered.          VTE prophylaxis: heparin tid

## 2019-04-20 NOTE — CARE PLAN
Problem: Infection  Goal: Will remain free from infection  Outcome: PROGRESSING AS EXPECTED  No s/s of infection    Problem: Discharge Barriers/Planning  Goal: Patient's continuum of care needs will be met  Outcome: PROGRESSING AS EXPECTED  Needs met so far

## 2019-04-20 NOTE — CARE PLAN
Problem: Infection  Goal: Will remain free from infection  Outcome: PROGRESSING AS EXPECTED  Implemented hand hygiene and proper isolation precautions before and after interacting with patient to prevent spread of germs     Problem: Pain Management  Goal: Pain level will decrease to patient's comfort goal    Intervention: Follow pain managment plan developed in collaboration with patient and Interdisciplinary Team  Pt pain assessed and medicated as per MAR. Pt pain well controlled and meeting comfort goal of sleeping comfortably

## 2019-04-20 NOTE — H&P
Hospital Medicine History & Physical Note    Date of Service  4/19/2019    Primary Care Physician  RYANN Son    Consultants  Consult LPS   Consult ID    Code Status  Full    Chief Complaint  Leg redness and swelling    History of Presenting Illness  73 y.o. female who presented 4/19/2019 with leg redness and swelling which she states is increased from baseline. Patient with history of bullous pemphigus, and chronic leg ulcers.  She is being follow-up with wound care, apparently saw him today and she was told that she might have cellulitis again.  She has had increased leg redness and swelling and pain.  She denies any fever or chills at home.  She denies any chest pain palpitation shortness of breath.  She denies any nausea or vomiting or diarrhea or abdominal pain.  She was admitted here a month ago, states she just finished a course of Zyvox and ciprofloxacin.  Her last wound culture then showed Pseudomonas.  Previous cultures have shown MRSA, enterococcus and Pseudomonas.    Review of Systems  Review of Systems   Constitutional: Negative for chills, fever and malaise/fatigue.   HENT: Negative for hearing loss and sore throat.    Eyes: Negative for blurred vision.   Respiratory: Negative for cough, shortness of breath and wheezing.    Cardiovascular: Positive for leg swelling. Negative for chest pain and palpitations.   Gastrointestinal: Negative for abdominal pain, diarrhea, heartburn, nausea and vomiting.   Genitourinary: Negative for dysuria.   Musculoskeletal: Negative for back pain and neck pain.   Skin: Negative for rash.   Neurological: Negative for dizziness, weakness and headaches.   Psychiatric/Behavioral: The patient is not nervous/anxious.        Past Medical History   has a past medical history of Anxiety; Cellulitis and abscess of lower extremity; Dental disorder; Generalized osteoarthritis of multiple sites (10/20/2015); Heart valve disease; Hyperlipidemia; Hypertension; and  Osteoporosis. She also has no past medical history of Allergy; Diabetes; Muscle disorder; or OSTEOPOROSIS.    Surgical History   has a past surgical history that includes gyn surgery (1973); breast biopsy (8/28/2014); abdominal hysterectomy total; breast biopsy (Right, 8/14); hip nailing intramedullary (Left, 12/20/2017); hip revision total (Left, 2/11/2018); irrigation & debridement hip (Left, 6/4/2018); femoral popliteal bypass (Left, 6/8/2018); irrigation & debridement general (Left, 6/8/2018); and athroplasty.     Family History  family history includes Diabetes in her father; GI in her mother; Heart Attack in her father; Hypertension in her father; Psychiatry in her brother.     Social History   reports that she quit smoking about 12 years ago. Her smoking use included Cigarettes. She has a 12.50 pack-year smoking history. She has never used smokeless tobacco. She reports that she drinks about 3.0 oz of alcohol per week . She reports that she does not use drugs.    Allergies  Allergies   Allergen Reactions   • Bactrim [Sulfamethoxazole-Trimethoprim] Rash     Diffuse pruritic skin rash with blisters (no mucous membrane involvement) (was also taking cipro at the time - reaction thought more likely to be 2/2 Bactrim)   • Meropenem Unspecified     Pt can not remember reaction         Medications  Prior to Admission Medications   Prescriptions Last Dose Informant Patient Reported? Taking?   Cyanocobalamin (VITAMIN B-12) 1000 MCG Tab 4/18/2019 at AM Patient Yes No   Sig: Take 1,000 mcg by mouth every day.   Multiple Vitamins-Minerals (THERAPEUTIC-M/LUTEIN) Tab 4/18/2019 at AM Patient Yes No   Sig: Take 1 Tab by mouth every day.   NIACIN PO 4/19/2019 at AM Patient Yes Yes   Sig: Take 1 Tab by mouth every day.   metoprolol SR (TOPROL XL) 100 MG TABLET SR 24 HR 4/19/2019 at AM Patient Yes No   Sig: Take 100 mg by mouth every day.   sertraline (ZOLOFT) 100 MG Tab 4/19/2019 at AM Patient No No   Sig: Take 1 Tab by mouth  every day.   vitamin D (CHOLECALCIFEROL) 1000 UNIT Tab 4/18/2019 at AM Patient Yes No   Sig: Take 1,000 Units by mouth every day.      Facility-Administered Medications: None       Physical Exam  Temp:  [35.9 °C (96.7 °F)-37.3 °C (99.1 °F)] 36.6 °C (97.9 °F)  Pulse:  [] 94  Resp:  [16-18] 16  BP: ()/(57-63) 114/60  SpO2:  [93 %-94 %] 94 %    Physical Exam   Constitutional: She is oriented to person, place, and time. She appears well-developed and well-nourished.   HENT:   Head: Normocephalic and atraumatic.   Eyes: Pupils are equal, round, and reactive to light. Conjunctivae are normal.   Neck: No tracheal deviation present. No thyromegaly present.   Cardiovascular: Normal rate and regular rhythm.    Pulmonary/Chest: Effort normal and breath sounds normal.   Abdominal: Soft. She exhibits no distension. There is no tenderness.   Musculoskeletal: She exhibits edema.   Ulcers at left leg with surrounding erythema and swelling.   Lymphadenopathy:     She has no cervical adenopathy.   Neurological: She is alert and oriented to person, place, and time.   Skin: There is erythema.   Nursing note and vitals reviewed.      Laboratory:  Recent Labs      04/19/19   1451   WBC  11.3*   RBC  2.87*   HEMOGLOBIN  9.1*   HEMATOCRIT  28.9*   MCV  100.7*   MCH  31.7   MCHC  31.5*   RDW  63.3*   PLATELETCT  297   MPV  9.4     Recent Labs      04/19/19   1451   SODIUM  126*   POTASSIUM  4.3   CHLORIDE  94*   CO2  24   GLUCOSE  88   BUN  7*   CREATININE  0.61   CALCIUM  8.4*     Recent Labs      04/19/19   1451   ALTSGPT  11   ASTSGOT  16   ALKPHOSPHAT  95   TBILIRUBIN  0.5   GLUCOSE  88                 No results for input(s): TROPONINI in the last 72 hours.    Urinalysis:    No results found     Imaging:  No orders to display         Assessment/Plan:  I anticipate this patient will require at least two midnights for appropriate medical management, necessitating inpatient admission.    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

## 2019-04-20 NOTE — ED NOTES
IV RFA dc'd - catheter not functioning properly(not able to infuse antibiotic).  No infiltration.  New IV placed by RN.

## 2019-04-21 ENCOUNTER — APPOINTMENT (OUTPATIENT)
Dept: RADIOLOGY | Facility: MEDICAL CENTER | Age: 74
DRG: 603 | End: 2019-04-21
Attending: HOSPITALIST
Payer: MEDICARE

## 2019-04-21 PROBLEM — D50.9 IRON DEFICIENCY ANEMIA: Status: ACTIVE | Noted: 2017-11-04

## 2019-04-21 LAB
ANION GAP SERPL CALC-SCNC: 7 MMOL/L (ref 0–11.9)
BACTERIA WND AEROBE CULT: ABNORMAL
BACTERIA WND AEROBE CULT: ABNORMAL
BASOPHILS # BLD AUTO: 0.9 % (ref 0–1.8)
BASOPHILS # BLD: 0.06 K/UL (ref 0–0.12)
BUN SERPL-MCNC: 10 MG/DL (ref 8–22)
CALCIUM SERPL-MCNC: 8.6 MG/DL (ref 8.5–10.5)
CHLORIDE SERPL-SCNC: 101 MMOL/L (ref 96–112)
CHOLEST SERPL-MCNC: 154 MG/DL (ref 100–199)
CO2 SERPL-SCNC: 23 MMOL/L (ref 20–33)
CREAT SERPL-MCNC: 0.64 MG/DL (ref 0.5–1.4)
EOSINOPHIL # BLD AUTO: 0.19 K/UL (ref 0–0.51)
EOSINOPHIL NFR BLD: 2.8 % (ref 0–6.9)
ERYTHROCYTE [DISTWIDTH] IN BLOOD BY AUTOMATED COUNT: 64.5 FL (ref 35.9–50)
FERRITIN SERPL-MCNC: 143.3 NG/ML (ref 10–291)
GLUCOSE SERPL-MCNC: 110 MG/DL (ref 65–99)
GRAM STN SPEC: ABNORMAL
HCT VFR BLD AUTO: 29.1 % (ref 37–47)
HDLC SERPL-MCNC: 74 MG/DL
HGB BLD-MCNC: 8.9 G/DL (ref 12–16)
IMM GRANULOCYTES # BLD AUTO: 0.09 K/UL (ref 0–0.11)
IMM GRANULOCYTES NFR BLD AUTO: 1.3 % (ref 0–0.9)
IRON SATN MFR SERPL: 5 % (ref 15–55)
IRON SERPL-MCNC: 15 UG/DL (ref 40–170)
LDLC SERPL CALC-MCNC: 63 MG/DL
LYMPHOCYTES # BLD AUTO: 1.51 K/UL (ref 1–4.8)
LYMPHOCYTES NFR BLD: 22.6 % (ref 22–41)
MCH RBC QN AUTO: 31.3 PG (ref 27–33)
MCHC RBC AUTO-ENTMCNC: 30.6 G/DL (ref 33.6–35)
MCV RBC AUTO: 102.5 FL (ref 81.4–97.8)
MONOCYTES # BLD AUTO: 0.67 K/UL (ref 0–0.85)
MONOCYTES NFR BLD AUTO: 10 % (ref 0–13.4)
NEUTROPHILS # BLD AUTO: 4.15 K/UL (ref 2–7.15)
NEUTROPHILS NFR BLD: 62.4 % (ref 44–72)
NRBC # BLD AUTO: 0 K/UL
NRBC BLD-RTO: 0 /100 WBC
PLATELET # BLD AUTO: 284 K/UL (ref 164–446)
PMV BLD AUTO: 9.1 FL (ref 9–12.9)
POTASSIUM SERPL-SCNC: 3.7 MMOL/L (ref 3.6–5.5)
RBC # BLD AUTO: 2.84 M/UL (ref 4.2–5.4)
SIGNIFICANT IND 70042: ABNORMAL
SITE SITE: ABNORMAL
SODIUM SERPL-SCNC: 131 MMOL/L (ref 135–145)
SOURCE SOURCE: ABNORMAL
TIBC SERPL-MCNC: 322 UG/DL (ref 250–450)
TRIGL SERPL-MCNC: 84 MG/DL (ref 0–149)
WBC # BLD AUTO: 6.7 K/UL (ref 4.8–10.8)

## 2019-04-21 PROCEDURE — 770006 HCHG ROOM/CARE - MED/SURG/GYN SEMI*

## 2019-04-21 PROCEDURE — 700111 HCHG RX REV CODE 636 W/ 250 OVERRIDE (IP): Performed by: INTERNAL MEDICINE

## 2019-04-21 PROCEDURE — 36415 COLL VENOUS BLD VENIPUNCTURE: CPT

## 2019-04-21 PROCEDURE — 83550 IRON BINDING TEST: CPT

## 2019-04-21 PROCEDURE — 80048 BASIC METABOLIC PNL TOTAL CA: CPT

## 2019-04-21 PROCEDURE — A9270 NON-COVERED ITEM OR SERVICE: HCPCS | Performed by: HOSPITALIST

## 2019-04-21 PROCEDURE — 80061 LIPID PANEL: CPT

## 2019-04-21 PROCEDURE — 700102 HCHG RX REV CODE 250 W/ 637 OVERRIDE(OP): Performed by: HOSPITALIST

## 2019-04-21 PROCEDURE — 700102 HCHG RX REV CODE 250 W/ 637 OVERRIDE(OP): Performed by: FAMILY MEDICINE

## 2019-04-21 PROCEDURE — 93926 LOWER EXTREMITY STUDY: CPT | Mod: LT

## 2019-04-21 PROCEDURE — 83540 ASSAY OF IRON: CPT

## 2019-04-21 PROCEDURE — 700111 HCHG RX REV CODE 636 W/ 250 OVERRIDE (IP): Performed by: FAMILY MEDICINE

## 2019-04-21 PROCEDURE — A9270 NON-COVERED ITEM OR SERVICE: HCPCS | Performed by: FAMILY MEDICINE

## 2019-04-21 PROCEDURE — 85025 COMPLETE CBC W/AUTO DIFF WBC: CPT

## 2019-04-21 PROCEDURE — 82728 ASSAY OF FERRITIN: CPT

## 2019-04-21 PROCEDURE — 93922 UPR/L XTREMITY ART 2 LEVELS: CPT

## 2019-04-21 PROCEDURE — 99232 SBSQ HOSP IP/OBS MODERATE 35: CPT | Performed by: HOSPITALIST

## 2019-04-21 PROCEDURE — 99222 1ST HOSP IP/OBS MODERATE 55: CPT | Performed by: INTERNAL MEDICINE

## 2019-04-21 PROCEDURE — 700105 HCHG RX REV CODE 258: Performed by: INTERNAL MEDICINE

## 2019-04-21 RX ORDER — DIPHENHYDRAMINE HCL 25 MG
25 TABLET ORAL EVERY 6 HOURS PRN
Status: DISCONTINUED | OUTPATIENT
Start: 2019-04-21 | End: 2019-04-24 | Stop reason: HOSPADM

## 2019-04-21 RX ORDER — TRIAMCINOLONE ACETONIDE 5 MG/G
CREAM TOPICAL DAILY
Status: DISCONTINUED | OUTPATIENT
Start: 2019-04-21 | End: 2019-04-24 | Stop reason: HOSPADM

## 2019-04-21 RX ORDER — FERROUS SULFATE 325(65) MG
325 TABLET ORAL
Status: DISCONTINUED | OUTPATIENT
Start: 2019-04-21 | End: 2019-04-24 | Stop reason: HOSPADM

## 2019-04-21 RX ORDER — DIPHENHYDRAMINE HYDROCHLORIDE, ZINC ACETATE 2; .1 G/100G; G/100G
1 CREAM TOPICAL 3 TIMES DAILY PRN
Status: DISCONTINUED | OUTPATIENT
Start: 2019-04-21 | End: 2019-04-24 | Stop reason: HOSPADM

## 2019-04-21 RX ORDER — ASCORBIC ACID 500 MG
500 TABLET ORAL DAILY
Status: DISCONTINUED | OUTPATIENT
Start: 2019-04-21 | End: 2019-04-24 | Stop reason: HOSPADM

## 2019-04-21 RX ADMIN — DIPHENHYDRAMINE HYDROCHLORIDE, ZINC ACETATE 1 EACH: 2; .1 CREAM TOPICAL at 23:16

## 2019-04-21 RX ADMIN — TRIAMCINOLONE ACETONIDE: 5 CREAM TOPICAL at 18:01

## 2019-04-21 RX ADMIN — SENNOSIDES,DOCUSATE SODIUM 2 TABLET: 8.6; 5 TABLET, FILM COATED ORAL at 17:55

## 2019-04-21 RX ADMIN — FERROUS SULFATE TAB 325 MG (65 MG ELEMENTAL FE) 325 MG: 325 (65 FE) TAB at 17:55

## 2019-04-21 RX ADMIN — AMPICILLIN SODIUM AND SULBACTAM SODIUM 3 G: 2; 1 INJECTION, POWDER, FOR SOLUTION INTRAMUSCULAR; INTRAVENOUS at 23:16

## 2019-04-21 RX ADMIN — HEPARIN SODIUM 5000 UNITS: 5000 INJECTION, SOLUTION INTRAVENOUS; SUBCUTANEOUS at 05:17

## 2019-04-21 RX ADMIN — HEPARIN SODIUM 5000 UNITS: 5000 INJECTION, SOLUTION INTRAVENOUS; SUBCUTANEOUS at 23:16

## 2019-04-21 RX ADMIN — SERTRALINE HYDROCHLORIDE 100 MG: 100 TABLET ORAL at 05:17

## 2019-04-21 RX ADMIN — METOPROLOL SUCCINATE 100 MG: 50 TABLET, EXTENDED RELEASE ORAL at 05:17

## 2019-04-21 RX ADMIN — CYANOCOBALAMIN TAB 500 MCG 1000 MCG: 500 TAB at 05:17

## 2019-04-21 RX ADMIN — DIPHENHYDRAMINE HCL 25 MG: 25 TABLET ORAL at 23:17

## 2019-04-21 RX ADMIN — HEPARIN SODIUM 5000 UNITS: 5000 INJECTION, SOLUTION INTRAVENOUS; SUBCUTANEOUS at 13:11

## 2019-04-21 RX ADMIN — AMPICILLIN SODIUM AND SULBACTAM SODIUM 3 G: 2; 1 INJECTION, POWDER, FOR SOLUTION INTRAMUSCULAR; INTRAVENOUS at 17:56

## 2019-04-21 RX ADMIN — AMPICILLIN SODIUM AND SULBACTAM SODIUM 3 G: 2; 1 INJECTION, POWDER, FOR SOLUTION INTRAMUSCULAR; INTRAVENOUS at 13:11

## 2019-04-21 RX ADMIN — OXYCODONE HYDROCHLORIDE AND ACETAMINOPHEN 500 MG: 500 TABLET ORAL at 08:30

## 2019-04-21 RX ADMIN — FERROUS SULFATE TAB 325 MG (65 MG ELEMENTAL FE) 325 MG: 325 (65 FE) TAB at 13:11

## 2019-04-21 RX ADMIN — VITAMIN D, TAB 1000IU (100/BT) 1000 UNITS: 25 TAB at 05:17

## 2019-04-21 RX ADMIN — ACETAMINOPHEN 650 MG: 325 TABLET, FILM COATED ORAL at 18:03

## 2019-04-21 ASSESSMENT — ENCOUNTER SYMPTOMS
SORE THROAT: 0
NAUSEA: 0
PALPITATIONS: 0
SENSORY CHANGE: 0
FOCAL WEAKNESS: 0
HEMOPTYSIS: 0
WEAKNESS: 0
HEADACHES: 0
NECK PAIN: 0
DIARRHEA: 0
DEPRESSION: 0
MYALGIAS: 0
CONSTIPATION: 0
DIZZINESS: 0
SPEECH CHANGE: 0
WHEEZING: 0
EYE PAIN: 0
LOSS OF CONSCIOUSNESS: 0
ABDOMINAL PAIN: 0
DIAPHORESIS: 0
VOMITING: 0
EYE DISCHARGE: 0
CHILLS: 0
COUGH: 0
BRUISES/BLEEDS EASILY: 0
FEVER: 0
SHORTNESS OF BREATH: 0
BACK PAIN: 0
SPUTUM PRODUCTION: 0
CLAUDICATION: 0

## 2019-04-21 ASSESSMENT — LIFESTYLE VARIABLES: SUBSTANCE_ABUSE: 0

## 2019-04-21 NOTE — CONSULTS
[facility] – [facility] of the [facility] INFECTIOUS DISEASES INPATIENT CONSULT NOTE     Date of Service: 4/21/2019    Consult Requested By: Neyda Lerma M.D.    Reason for Consultation: Cellulitis    Chief Complaint: Infected wound    History of Present Illness:     Nadege Mae is a 73 y.o. female admitted 4/19/2019.  Patient has a past medical history of peripheral vascular disease with chronic lower extremity ulcers since mid 2018, status post femoropopliteal bypass, bullous pemphigus recently on prednisone.  Patient was recently admitted last month with cellulitis, with wound cultures growing MRSA, enterococcus and Pseudomonas, treated with more than 10 days of Zyvox and Cipro.    Patient is following in wound care, resented to the ER on 4/19 with increasing leg redness and swelling from her baseline.  She was seen in wound care and noted that she may have a superimposed cellulitis once more.  Wound cultures were obtained and patient started on IV vancomycin and cefepime.  Patient is afebrile, initially leukocytosis of 11.3, now down to 6.7.     Patient noted increasing redness started 3 days ago.  Notes some improvement with IV antibiotics here.  She also notes a new pruritic rash most prominent over upper extremities here.    Review of Systems:  All other systems reviewed and are negative expect as noted in HPI    Past Medical History:   Diagnosis Date   • Anxiety    • Cellulitis and abscess of lower extremity    • Dental disorder     full dentures   • Generalized osteoarthritis of multiple sites 10/20/2015   • Heart valve disease     pt not sure    • Hyperlipidemia    • Hypertension    • Osteoporosis        Past Surgical History:   Procedure Laterality Date   • FEMORAL POPLITEAL BYPASS Left 6/8/2018    Procedure: FEMORAL POPLITEAL BYPASS;  Surgeon: Milana Colin M.D.;  Location: SURGERY [facility];  Service: General   • IRRIGATION & DEBRIDEMENT GENERAL Left 6/8/2018    Procedure: IRRIGATION & DEBRIDEMENT ANKLE WOUND;   Surgeon: Milana Colin M.D.;  Location: SURGERY [facility];  Service: General   • IRRIGATION & DEBRIDEMENT HIP Left 6/4/2018    Procedure: IRRIGATION & DEBRIDEMENT HIP;  Surgeon: Chong Vazquez M.D.;  Location: SURGERY [facility];  Service: Orthopedics   • HIP REVISION TOTAL Left 2/11/2018    Procedure: HIP REVISION TOTAL- femoral nail removal conversion to total hip arthoroplasty;  Surgeon: Chong Vazquez M.D.;  Location: SURGERY [facility];  Service: Orthopedics   • HIP NAILING INTRAMEDULLARY Left 12/20/2017    Procedure: HIP NAILING INTRAMEDULLARY;  Surgeon: Jorge Peters M.D.;  Location: SURGERY [facility];  Service: Orthopedics   • BREAST BIOPSY  8/28/2014    Performed by Magdalena Londono M.D. 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[employer]  [employer]     Social History Main Topics   • Smoking status: Former Smoker     Packs/day: 0.50     Years: 25.00     Types: Cigarettes     Quit date: 1/1/2007   • Smokeless tobacco: Never Used   • Alcohol use 3.0 oz/week     5 Glasses of wine per week      Comment: glass of wine per day   • Drug use: No   • Sexual activity: Not Currently     Partners: Male     Other Topics Concern   • Not on file     Social History Narrative   • No narrative on file       Allergies   Allergen Reactions   • Bactrim [Sulfamethoxazole-Trimethoprim] Rash     Diffuse pruritic skin rash with blisters (no  mucous membrane involvement) (was also taking cipro at the time - reaction thought more likely to be 2/2 Bactrim)   • Meropenem Unspecified     Pt can not remember reaction         Medications:    Current Facility-Administered Medications:   •  ferrous sulfate tablet 325 mg, 325 mg, Oral, TID WITH MEALS, Neyda Lerma M.D.  •  ascorbic acid tablet 500 mg, 500 mg, Oral, DAILY, Neyda Lerma M.D., 500 mg at 04/21/19 0830  •  diphenhydrAMINE (BENADRYL) tablet/capsule 25 mg, 25 mg, Oral, Q6HRS PRN, Neyda Lerma M.D.  •  diphenhydrAMINE-zinc acetate 2-0.1 % CREA 1 Each, 1 Each, Apply externally, TID PRN, Neyda Lerma M.D.  •  cyanocobalamin (VITAMIN B-12) tablet 1,000 mcg, 1,000 mcg, Oral, DAILY, Tyson Warren M.D., 1,000 mcg at 04/21/19 0517  •  metoprolol SR (TOPROL XL) tablet 100 mg, 100 mg, Oral, DAILY, Tyson Warren M.D., 100 mg at 04/21/19 0517  •  sertraline (ZOLOFT) tablet 100 mg, 100 mg, Oral, DAILY, Tyson Warren M.D., 100 mg at 04/21/19 0517  •  vitamin D (cholecalciferol) tablet 1,000 Units, 1,000 Units, Oral, DAILY, Tyson Warren M.D., 1,000 Units at 04/21/19 0517  •  senna-docusate (PERICOLACE or SENOKOT S) 8.6-50 MG per tablet 2 Tab, 2 Tab, Oral, BID, Stopped at 04/20/19 1800 **AND** polyethylene glycol/lytes (MIRALAX) PACKET 1 Packet, 1 Packet, Oral, QDAY PRN **AND** magnesium hydroxide (MILK OF MAGNESIA) suspension 30 mL, 30 mL, Oral, QDAY PRN **AND** bisacodyl (DULCOLAX) suppository 10 mg, 10 mg, Rectal, QDAY PRN, Tyson Warren M.D.  •  NS infusion, , Intravenous, Continuous, Tyson Warren M.D., Last Rate: 50 mL/hr at 04/20/19 1816  •  heparin injection 5,000 Units, 5,000 Units, Subcutaneous, Q8HRS, Tyson Warren M.D., 5,000 Units at 04/21/19 0517  •  acetaminophen (TYLENOL) tablet 650 mg, 650 mg, Oral, Q6HRS PRN, Tyson Warren M.D., 650 mg at 04/20/19 2128  •  Notify provider if pain remains uncontrolled, , , CONTINUOUS **AND** Use the numeric rating scale (NRS-11) on  "regular floors and Critical-Care Pain Observation Tool (CPOT) on ICUs/Trauma to assess pain, , , CONTINUOUS **AND** Pulse Ox (Oximetry), , , CONTINUOUS **AND** Pharmacy Consult Request ...Pain Management Review 1 Each, 1 Each, Other, PHARMACY TO DOSE **AND** If patient difficult to arouse and/or has respiratory depression, stop any opiates that are currently infusing and call a Rapid Response., , , CONTINUOUS **AND** oxyCODONE immediate-release (ROXICODONE) tablet 2.5 mg, 2.5 mg, Oral, Q3HRS PRN **AND** oxyCODONE immediate-release (ROXICODONE) tablet 5 mg, 5 mg, Oral, Q3HRS PRN, 5 mg at 19 1536 **AND** morphine (pf) 4 mg/ml injection 2 mg, 2 mg, Intravenous, Q3HRS PRN, Tyson Warren M.D.  •  hydrALAZINE (APRESOLINE) injection 10 mg, 10 mg, Intravenous, Q4HRS PRN, Tyson Warren M.D.  •  ondansetron (ZOFRAN) syringe/vial injection 4 mg, 4 mg, Intravenous, Q4HRS PRN, Tyson Warren M.D.  •  ondansetron (ZOFRAN ODT) dispertab 4 mg, 4 mg, Oral, Q4HRS PRN, Tyson Warren M.D.  •  MD Alert...Vancomycin per Pharmacy, , Other, PHARMACY TO DOSE, Tyson Warren M.D.  •  vancomycin 1,100 mg in  mL IVPB, 16 mg/kg, Intravenous, Q24HR, Tyson Warren M.D., Stopped at 19    Physical Exam:   Vital Signs: /70   Pulse 84   Temp 36.2 °C (97.1 °F) (Temporal)   Resp 18   Ht 1.702 m (5' 7\")   Wt 67.3 kg (148 lb 5.9 oz)   SpO2 93%   Breastfeeding? No   BMI 23.24 kg/m²   Temp  Av.4 °C (97.6 °F)  Min: 35.9 °C (96.7 °F)  Max: 37.3 °C (99.1 °F)  Vital signs reviewed  Constitutional: Patient is oriented to person, place, and time. Appears well-developed and well-nourished. No distress  Head: Atraumatic, normocephalic  Eyes: Conjunctivae normal, EOM intact. Pupils are equal, round, and reactive to light.   Mouth/Throat: Lips without lesions, oropharynx is clear and moist.  Neck: Neck supple. No masses/lymphadenopathy  Cardiovascular: Normal rate, regular rhythm, normal S1S2 and intact " distal pulses. No murmur, gallop, or friction rub. No pedal edema.  Pulmonary/Chest: No respiratory distress. Unlabored respiratory effort, lungs clear to auscultation. No wheezes or rales.   Abdominal: Soft, non tender. BS + x 4. No masses or hepatosplenomegaly.   Musculoskeletal: No joint tenderness, swelling, erythema, or restriction of motion noted.  Left foot with redness, swelling, increased warmth, scattered areas, 1 over distal foot, also over the distal leg around chronic ulcers.  Clean and dry dressing in place  Neurological: Alert and oriented to person, place, and time. No gross cranial nerve deficit. No focal neural deficit noted  Skin: Diffuse scattered pruritic papules noted, most prominent over upper extremities  Psychiatric: Normal mood and affect. Behavior is normal.  Pleasant    LABS:  Recent Labs      04/19/19   1451  04/20/19   0522  04/21/19   0400   WBC  11.3*  8.4  6.7      Recent Labs      04/19/19   1451  04/20/19   0522  04/21/19   0400   HEMOGLOBIN  9.1*  9.5*  8.9*   HEMATOCRIT  28.9*  30.2*  29.1*   MCV  100.7*  101.3*  102.5*   MCH  31.7  31.9  31.3   PLATELETCT  297  245  284       Recent Labs      04/19/19   1451  04/20/19   0522  04/21/19   0400   SODIUM  126*  130*  131*   POTASSIUM  4.3  4.0  3.7   CHLORIDE  94*  99  101   CO2  24  24  23   CREATININE  0.61  0.73  0.64        Recent Labs      04/19/19   1451   ALBUMIN  3.2        MICRO:  Blood Culture   Date Value Ref Range Status   04/19/2019   Preliminary    No Growth    Note: Blood cultures are incubated for 5 days and  are monitored continuously.Positive blood cultures  are called to the RN and reported as soon as  they are identified.          Latest pertinent labs were reviewed    IMAGING STUDIES:  No new imaging to review    Hospital Course/Assessment:   Nadege Mae is a 73 y.o. female with a history of peripheral vascular disease with chronic lower extremity ulcers since mid 2018, status post femoropopliteal  bypass, bullous pemphigus recently on prednisone.  Patient was recently admitted last month with cellulitis, with wound cultures growing MRSA, Enterococcus and Pseudomonas, treated with more than 10 days of Zyvox and Cipro, now back with recurrent cellulitis, wound culture growing MSSA.    Pertinent Diagnoses:  Cellulitis  Bullous pemphigus  Chronic lower extremity ulcers  Pruritic skin papules, new  PVD    Plan:   -Wound culture growing MSSA.  Stop IV vancomycin.  IV cefepime was stopped yesterday  -Start IV Unasyn 3 g every 6 hours  -Pruritic rash started today.  Yesterday, patient was on cefepime and vancomycin.  Patient states that this is not how her pemphigus lesions begin.  No mucous membrane lesions.  Monitor closely for now with symptomatic management  -Follow wound cultures for any additional organisms   -Prednisone is currently being held.  After discharge, if patient goes back on 40 mg of prednisone daily for bullous pemphigus, would need PCP prophylaxis with either every 4 weeks aerosolized pentamidine or daily atovaquone    Plan was discussed with the primary team, Dr. Lerma    ID will follow. Please feel free to call with questions.    Fernando Rainey M.D.    Please note that this dictation was created using voice recognition software. I have worked with technical experts from [facility] to optimize the interface.  I have made every reasonable attempt to correct obvious errors, but there may be errors of grammar and possibly content that I did not discover before finalizing the note.

## 2019-04-21 NOTE — PROGRESS NOTES
[facility] Medicine Daily Progress Note    Date of Service  4/21/2019    Chief Complaint  73 y.o. female admitted 4/19/2019 with left lower medial ankle ulceration, sent by outpatient wound clinic.    Hospital Course    4/20:  This 73 y.o. female with past medical history that includes cellulitis, LE wounds, dyslipidemia, HTN, fem/pop bypass, former smoker, PVD.   Bullous pephigus, PVD, B12 deficiency, steroid use.  Admitted to [facility] for leg cellulitis and started on IV vancomycin and cefepime. Pt recently completed a course of ciprofloxacin and Zyvox.  An LPS consult has been requested for evaluation of leg cellulitis.  This wound started 10/2018.  He has been treating the wound with VAC, unna boot and followed by outpatient wound clinic.  ID consulted given chronic nature of wound.  4/19 wound culture with heavy growth of staph aureus ss pending.  On IV vancomycin, ID stopped cefepime since only growing staph.   4/21:  Rash noted on bilateral arms, patient states this is her pemphigus vulgaris.  Started benadryl cream and po as well as triamcinolone cream.  Avoiding oral prednisone for now.  MSSA in left ankle wound.  ID changed vanco to unasyn iV.  *        Consultants/Specialty  LPS   ID Dr. Rainey    Code Status  full    Disposition  Home once medically cleared, has FWW with seat at home.    Review of Systems  Review of Systems   Constitutional: Negative for chills, diaphoresis, fever and malaise/fatigue.   HENT: Negative for congestion and sore throat.    Eyes: Negative for pain and discharge.   Respiratory: Negative for cough, hemoptysis, sputum production, shortness of breath and wheezing.    Cardiovascular: Negative for chest pain, palpitations, claudication and leg swelling.   Gastrointestinal: Negative for abdominal pain, constipation, diarrhea, melena, nausea and vomiting.   Genitourinary: Negative for dysuria, frequency and urgency.   Musculoskeletal: Positive for joint pain (left ankle pain). Negative  for back pain, myalgias and neck pain.   Skin: Negative for itching and rash.   Neurological: Negative for dizziness, sensory change, speech change, focal weakness, loss of consciousness, weakness and headaches.   Endo/Heme/Allergies: Does not bruise/bleed easily.   Psychiatric/Behavioral: Negative for depression, substance abuse and suicidal ideas.        Physical Exam  Temp:  [36.2 °C (97.1 °F)-36.8 °C (98.3 °F)] 36.2 °C (97.1 °F)  Pulse:  [81-94] 84  Resp:  [16-18] 18  BP: (132-144)/(57-72) 144/70  SpO2:  [93 %-98 %] 93 %    Physical Exam   Constitutional: She is oriented to person, place, and time. She appears well-developed and well-nourished. No distress.   HENT:   Head: Normocephalic and atraumatic.   Nose: Nose normal.   Mouth/Throat: Oropharynx is clear and moist. No oropharyngeal exudate.   Eyes: Pupils are equal, round, and reactive to light. Conjunctivae and EOM are normal. Right eye exhibits no discharge. Left eye exhibits no discharge. No scleral icterus.   Neck: Normal range of motion. Neck supple. No JVD present. No tracheal deviation present. No thyromegaly present.   Cardiovascular: Normal rate, regular rhythm, normal heart sounds and intact distal pulses.  Exam reveals no gallop and no friction rub.    No murmur heard.  Pulmonary/Chest: Effort normal and breath sounds normal. No stridor. No respiratory distress. She has no wheezes. She has no rales. She exhibits no tenderness.   Abdominal: Soft. Bowel sounds are normal. She exhibits no distension and no mass. There is no tenderness. There is no rebound and no guarding.   Musculoskeletal: Normal range of motion. She exhibits edema, tenderness and deformity (left ankle deformity from prior left ankle fracture with valgus deformity.  left medial ulceration 3cm diameter open with surrounding erythema.).   Lymphadenopathy:     She has no cervical adenopathy.   Neurological: She is alert and oriented to person, place, and time. No cranial nerve deficit.  She exhibits normal muscle tone. Coordination normal.   Skin: Skin is warm and dry. No rash noted. She is not diaphoretic. No erythema.   Psychiatric: She has a normal mood and affect. Her behavior is normal. Judgment and thought content normal.   Nursing note and vitals reviewed.      Fluids    Intake/Output Summary (Last 24 hours) at 04/21/19 1613  Last data filed at 04/21/19 0900   Gross per 24 hour   Intake              480 ml   Output                0 ml   Net              480 ml       Laboratory  Recent Labs      04/19/19   1451  04/20/19   0522  04/21/19   0400   WBC  11.3*  8.4  6.7   RBC  2.87*  2.98*  2.84*   HEMOGLOBIN  9.1*  9.5*  8.9*   HEMATOCRIT  28.9*  30.2*  29.1*   MCV  100.7*  101.3*  102.5*   MCH  31.7  31.9  31.3   MCHC  31.5*  31.5*  30.6*   RDW  63.3*  65.4*  64.5*   PLATELETCT  297  245  284   MPV  9.4  9.2  9.1     Recent Labs      04/19/19   1451  04/20/19   0522  04/21/19   0400   SODIUM  126*  130*  131*   POTASSIUM  4.3  4.0  3.7   CHLORIDE  94*  99  101   CO2  24  24  23   GLUCOSE  88  94  110*   BUN  7*  12  10   CREATININE  0.61  0.73  0.64   CALCIUM  8.4*  8.3*  8.6             Recent Labs      04/21/19   0400   TRIGLYCERIDE  84   HDL  74   LDL  63       Imaging  US-EXTREMITY ARTERY LOWER UNILAT W/TOMI (COMBO) LEFT    (Results Pending)        Assessment/Plan  * Lower limb ulcer, ankle, left, with fat layer exposed (HCC)- (present on admission)   Assessment & Plan    IV vancomycin   Consulted ID  Admission 3/19 wound culture with MSSA.   LPS following.  Large ulceration left medial from likely underlying valgus deformity from prior ankle fracture.  H/o left fem-pop bypass:  Check for arterial insufficiency, ordered TOMI arterial study.  H/o ankle fracture with valgus deformity likely leading to chronic ulceration medial aspect of ankle.  IV unasyn based on cultures.     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)   Assessment & Plan    4/21:  Rash to bilateral upper arms.  Patient states  this is her bullous pemphigus.  Started benadryl cream and po prn itching.  Ordered triamcinolone cream.  Holding off on prednisone since active infection.     Hyponatremia- (present on admission)   Assessment & Plan    IVF NS, follow BMP, check TSH     Peripheral vascular disease (HCC)- (present on admission)   Assessment & Plan    History of left femoropopliteal bypass surgery   LPS on   lipid panel wnl.     B12 deficiency- (present on admission)   Assessment & Plan    Check level     Essential hypertension- (present on admission)   Assessment & Plan    Toprol     Valgus deformity of foot, left- (present on admission)   Assessment & Plan    Patient states prior left ankle fracture  Query need for reconstructive surgery of left ankle in order for left medial ulceration to heal?     Iron deficiency anemia- (present on admission)   Assessment & Plan    Iron levels low.  Started on replacement          VTE prophylaxis: heparin tid

## 2019-04-21 NOTE — CARE PLAN
Problem: Safety  Goal: Will remain free from falls  Outcome: PROGRESSING AS EXPECTED  Educated on fall risk and ensured fall precautions in place. Bed in lowest position, treaded socks in place, call light in reach, bed alarm in place, room free of clutter, and proper assistance for patient to ambulate     Problem: Infection  Goal: Will remain free from infection  Outcome: PROGRESSING AS EXPECTED  Implemented hand hygiene and proper isolation precautions before and after interacting with patient to prevent spread of germs

## 2019-04-21 NOTE — CARE PLAN
Problem: Safety  Goal: Will remain free from injury  Outcome: PROGRESSING AS EXPECTED  Proper signs communicated in pts doorway; floor clear of clutter, debris, or cords; pts personal items and call light within reach; pt educated to use call light for assistance and prior to getting out of bed    Problem: Knowledge Deficit  Goal: Knowledge of disease process/condition, treatment plan, diagnostic tests, and medications will improve  Outcome: PROGRESSING AS EXPECTED  Pt educated regarding plan of care and medications. All questions answered.

## 2019-04-22 ENCOUNTER — APPOINTMENT (OUTPATIENT)
Dept: RADIOLOGY | Facility: MEDICAL CENTER | Age: 74
DRG: 603 | End: 2019-04-22
Attending: HOSPITALIST
Payer: MEDICARE

## 2019-04-22 ENCOUNTER — PATIENT OUTREACH (OUTPATIENT)
Dept: HEALTH INFORMATION MANAGEMENT | Facility: OTHER | Age: 74
End: 2019-04-22

## 2019-04-22 ENCOUNTER — APPOINTMENT (OUTPATIENT)
Dept: WOUND CARE | Facility: MEDICAL CENTER | Age: 74
End: 2019-04-22
Attending: NURSE PRACTITIONER
Payer: MEDICARE

## 2019-04-22 LAB
BASOPHILS # BLD AUTO: 0.7 % (ref 0–1.8)
BASOPHILS # BLD: 0.05 K/UL (ref 0–0.12)
EOSINOPHIL # BLD AUTO: 0.21 K/UL (ref 0–0.51)
EOSINOPHIL NFR BLD: 3 % (ref 0–6.9)
ERYTHROCYTE [DISTWIDTH] IN BLOOD BY AUTOMATED COUNT: 64.7 FL (ref 35.9–50)
HCT VFR BLD AUTO: 31.6 % (ref 37–47)
HGB BLD-MCNC: 9.9 G/DL (ref 12–16)
IMM GRANULOCYTES # BLD AUTO: 0.06 K/UL (ref 0–0.11)
IMM GRANULOCYTES NFR BLD AUTO: 0.9 % (ref 0–0.9)
LYMPHOCYTES # BLD AUTO: 1.7 K/UL (ref 1–4.8)
LYMPHOCYTES NFR BLD: 24.4 % (ref 22–41)
MCH RBC QN AUTO: 31.8 PG (ref 27–33)
MCHC RBC AUTO-ENTMCNC: 31.3 G/DL (ref 33.6–35)
MCV RBC AUTO: 101.6 FL (ref 81.4–97.8)
MONOCYTES # BLD AUTO: 0.72 K/UL (ref 0–0.85)
MONOCYTES NFR BLD AUTO: 10.3 % (ref 0–13.4)
NEUTROPHILS # BLD AUTO: 4.22 K/UL (ref 2–7.15)
NEUTROPHILS NFR BLD: 60.7 % (ref 44–72)
NRBC # BLD AUTO: 0 K/UL
NRBC BLD-RTO: 0 /100 WBC
PLATELET # BLD AUTO: 270 K/UL (ref 164–446)
PMV BLD AUTO: 9.6 FL (ref 9–12.9)
RBC # BLD AUTO: 3.11 M/UL (ref 4.2–5.4)
WBC # BLD AUTO: 7 K/UL (ref 4.8–10.8)

## 2019-04-22 PROCEDURE — 93922 UPR/L XTREMITY ART 2 LEVELS: CPT | Mod: 26 | Performed by: SURGERY

## 2019-04-22 PROCEDURE — 99232 SBSQ HOSP IP/OBS MODERATE 35: CPT | Performed by: HOSPITALIST

## 2019-04-22 PROCEDURE — 36415 COLL VENOUS BLD VENIPUNCTURE: CPT

## 2019-04-22 PROCEDURE — 700102 HCHG RX REV CODE 250 W/ 637 OVERRIDE(OP): Performed by: FAMILY MEDICINE

## 2019-04-22 PROCEDURE — 85025 COMPLETE CBC W/AUTO DIFF WBC: CPT

## 2019-04-22 PROCEDURE — A9270 NON-COVERED ITEM OR SERVICE: HCPCS | Performed by: HOSPITALIST

## 2019-04-22 PROCEDURE — 700111 HCHG RX REV CODE 636 W/ 250 OVERRIDE (IP): Performed by: INTERNAL MEDICINE

## 2019-04-22 PROCEDURE — 99232 SBSQ HOSP IP/OBS MODERATE 35: CPT | Performed by: INTERNAL MEDICINE

## 2019-04-22 PROCEDURE — 700102 HCHG RX REV CODE 250 W/ 637 OVERRIDE(OP): Performed by: HOSPITALIST

## 2019-04-22 PROCEDURE — 700111 HCHG RX REV CODE 636 W/ 250 OVERRIDE (IP): Performed by: FAMILY MEDICINE

## 2019-04-22 PROCEDURE — 770006 HCHG ROOM/CARE - MED/SURG/GYN SEMI*

## 2019-04-22 PROCEDURE — 700117 HCHG RX CONTRAST REV CODE 255: Performed by: HOSPITALIST

## 2019-04-22 PROCEDURE — 73723 MRI JOINT LWR EXTR W/O&W/DYE: CPT | Mod: LT

## 2019-04-22 PROCEDURE — 93926 LOWER EXTREMITY STUDY: CPT | Mod: 26 | Performed by: SURGERY

## 2019-04-22 PROCEDURE — 700105 HCHG RX REV CODE 258: Performed by: INTERNAL MEDICINE

## 2019-04-22 PROCEDURE — 700105 HCHG RX REV CODE 258

## 2019-04-22 PROCEDURE — A9270 NON-COVERED ITEM OR SERVICE: HCPCS | Performed by: FAMILY MEDICINE

## 2019-04-22 PROCEDURE — A9585 GADOBUTROL INJECTION: HCPCS | Performed by: HOSPITALIST

## 2019-04-22 RX ORDER — SODIUM CHLORIDE 9 MG/ML
INJECTION, SOLUTION INTRAVENOUS
Status: COMPLETED
Start: 2019-04-22 | End: 2019-04-22

## 2019-04-22 RX ORDER — LORAZEPAM 2 MG/ML
.5-1 INJECTION INTRAMUSCULAR
Status: DISCONTINUED | OUTPATIENT
Start: 2019-04-22 | End: 2019-04-24 | Stop reason: HOSPADM

## 2019-04-22 RX ORDER — GADOBUTROL 604.72 MG/ML
7.5 INJECTION INTRAVENOUS ONCE
Status: COMPLETED | OUTPATIENT
Start: 2019-04-22 | End: 2019-04-22

## 2019-04-22 RX ADMIN — FERROUS SULFATE TAB 325 MG (65 MG ELEMENTAL FE) 325 MG: 325 (65 FE) TAB at 18:03

## 2019-04-22 RX ADMIN — HEPARIN SODIUM 5000 UNITS: 5000 INJECTION, SOLUTION INTRAVENOUS; SUBCUTANEOUS at 14:00

## 2019-04-22 RX ADMIN — HEPARIN SODIUM 5000 UNITS: 5000 INJECTION, SOLUTION INTRAVENOUS; SUBCUTANEOUS at 05:09

## 2019-04-22 RX ADMIN — CYANOCOBALAMIN TAB 500 MCG 1000 MCG: 500 TAB at 05:09

## 2019-04-22 RX ADMIN — AMPICILLIN SODIUM AND SULBACTAM SODIUM 3 G: 2; 1 INJECTION, POWDER, FOR SOLUTION INTRAMUSCULAR; INTRAVENOUS at 05:09

## 2019-04-22 RX ADMIN — METOPROLOL SUCCINATE 100 MG: 50 TABLET, EXTENDED RELEASE ORAL at 05:09

## 2019-04-22 RX ADMIN — FERROUS SULFATE TAB 325 MG (65 MG ELEMENTAL FE) 325 MG: 325 (65 FE) TAB at 09:30

## 2019-04-22 RX ADMIN — TRIAMCINOLONE ACETONIDE: 5 CREAM TOPICAL at 05:10

## 2019-04-22 RX ADMIN — ACETAMINOPHEN 650 MG: 325 TABLET, FILM COATED ORAL at 23:26

## 2019-04-22 RX ADMIN — SODIUM CHLORIDE 1000 ML: 9 INJECTION, SOLUTION INTRAVENOUS at 05:12

## 2019-04-22 RX ADMIN — HEPARIN SODIUM 5000 UNITS: 5000 INJECTION, SOLUTION INTRAVENOUS; SUBCUTANEOUS at 22:00

## 2019-04-22 RX ADMIN — AMPICILLIN SODIUM AND SULBACTAM SODIUM 3 G: 2; 1 INJECTION, POWDER, FOR SOLUTION INTRAMUSCULAR; INTRAVENOUS at 18:03

## 2019-04-22 RX ADMIN — OXYCODONE HYDROCHLORIDE AND ACETAMINOPHEN 500 MG: 500 TABLET ORAL at 05:09

## 2019-04-22 RX ADMIN — ACETAMINOPHEN 650 MG: 325 TABLET, FILM COATED ORAL at 12:17

## 2019-04-22 RX ADMIN — AMPICILLIN SODIUM AND SULBACTAM SODIUM 3 G: 2; 1 INJECTION, POWDER, FOR SOLUTION INTRAMUSCULAR; INTRAVENOUS at 12:20

## 2019-04-22 RX ADMIN — GADOBUTROL 7.5 ML: 604.72 INJECTION INTRAVENOUS at 11:59

## 2019-04-22 RX ADMIN — VITAMIN D, TAB 1000IU (100/BT) 1000 UNITS: 25 TAB at 05:09

## 2019-04-22 RX ADMIN — SERTRALINE HYDROCHLORIDE 100 MG: 100 TABLET ORAL at 05:09

## 2019-04-22 RX ADMIN — FERROUS SULFATE TAB 325 MG (65 MG ELEMENTAL FE) 325 MG: 325 (65 FE) TAB at 14:00

## 2019-04-22 ASSESSMENT — ENCOUNTER SYMPTOMS
DEPRESSION: 0
SPEECH CHANGE: 0
LOSS OF CONSCIOUSNESS: 0
ABDOMINAL PAIN: 0
WHEEZING: 0
CHILLS: 0
NECK PAIN: 0
DIAPHORESIS: 0
WEAKNESS: 0
COUGH: 0
SPUTUM PRODUCTION: 0
DIZZINESS: 0
SHORTNESS OF BREATH: 0
DIARRHEA: 0
SENSORY CHANGE: 0
EYE DISCHARGE: 0
SORE THROAT: 0
HEADACHES: 0
NAUSEA: 0
CONSTIPATION: 0
FOCAL WEAKNESS: 0
HEMOPTYSIS: 0
VOMITING: 0
FEVER: 0
BACK PAIN: 0
BRUISES/BLEEDS EASILY: 0
PALPITATIONS: 0
CLAUDICATION: 0
EYE PAIN: 0
MYALGIAS: 0

## 2019-04-22 ASSESSMENT — LIFESTYLE VARIABLES: SUBSTANCE_ABUSE: 0

## 2019-04-22 NOTE — PROGRESS NOTES
"Nadege \"Jen\" Tu was admitted to [facility] from 3/8/19-3/11/19 for leg swelling. Patient readmitted from 3/14/19-3/18/19. [facility] patient advocate was able to successfully engage with patient post-discharge and throughout the case. Per discharge orders, patient was instructed to see her orthopedic doctor, and her dermatologist. Patient declined to see her orthopedic doctor for the time being due to  her vascular doctor taking point on patient's legs. Despite encouragement from advocate, patient has also declined to see her dermatologist at this time. However, patient resumed out-patient wound care three times a week, starting on 3/20/19. Patient also saw  on 4/8/19. Patient is followed by Annalee HALE at [facility].   "

## 2019-04-22 NOTE — PROGRESS NOTES
Angel from MRI called down to verify if pt. Is Alert & Orriented. Pt. Is aware and the MRI screening tool will be done to send before transport picks up pt.

## 2019-04-22 NOTE — CARE PLAN
Problem: Safety  Goal: Will remain free from falls  Outcome: PROGRESSING AS EXPECTED  Educated on fall risk and ensured fall precautions in place. Bed in lowest position, treaded socks in place, call light in reach, room free of clutter, and proper assistance for patient to ambulate     Problem: Infection  Goal: Will remain free from infection  Outcome: PROGRESSING AS EXPECTED  Implemented hand hygiene and proper isolation precautions before and after interacting with patient to prevent spread of germs

## 2019-04-22 NOTE — CARE PLAN
Problem: Pain Management  Goal: Pain level will decrease to patient's comfort goal  Outcome: PROGRESSING AS EXPECTED  Pt. Is currently mobilizing and using non pharmacological methods to help with pain, check MAR for pain medications

## 2019-04-22 NOTE — PROGRESS NOTES
Infectious Disease Progress Note    Author: Amber Valencia M.D. Date & Time of service: 2019  10:08 AM    Chief Complaint:  Follow-up for cellulitis of the lower extremities    Interval History:  73 y.o. female with a history of peripheral vascular disease with chronic lower extremity ulcers since mid , status post femoropopliteal bypass, bullous pemphigus recently on prednisone.    Readmitted for lower extremity cellulitis     afebrile WBC 7 patient states that erythema on her right and left leg are improving.  She is happy she does not have any edema.  Wound cultures growing MSSA.  Tolerating Unasyn    Labs Reviewed and Wound Reviewed.    Review of Systems:  Review of Systems   Constitutional: Negative for chills and fever.   Respiratory: Negative for cough and shortness of breath.    Cardiovascular: Negative for leg swelling.   Gastrointestinal: Negative for abdominal pain, diarrhea, nausea and vomiting.   Neurological: Negative for dizziness and headaches.       Hemodynamics:  Temp (24hrs), Av.4 °C (97.6 °F), Min:36.3 °C (97.3 °F), Max:36.6 °C (97.8 °F)  Temperature: 36.6 °C (97.8 °F)  Pulse  Av.7  Min: 72  Max: 103   Blood Pressure : 120/80       Physical Exam:  Physical Exam   Constitutional: She is oriented to person, place, and time. She appears well-developed.   HENT:   Mouth/Throat: Oropharynx is clear and moist. No oropharyngeal exudate.   Eyes: Pupils are equal, round, and reactive to light. Conjunctivae and EOM are normal.   Cardiovascular: Normal rate.    Murmur heard.  Pulmonary/Chest: Effort normal. She has no wheezes. She has no rales.   Abdominal: Soft. There is no tenderness.   Musculoskeletal: She exhibits edema and deformity.   Right lower extremity erythema, resolving    Left lower extremity erythema down to foot.  Chronic wound on the left medial malleolus.  No drainage   Neurological: She is alert and oriented to person, place, and time.   Skin: Rash noted. There is  erythema.   Psychiatric: She has a normal mood and affect. Her behavior is normal.   Pleasant       Meds:    Current Facility-Administered Medications:   •  LORazepam  •  ferrous sulfate  •  ascorbic acid  •  diphenhydrAMINE  •  diphenhydrAMINE-zinc acetate  •  ampicillin-sulbactam (UNASYN) IV  •  triamcinolone acetonide  •  cyanocobalamin  •  metoprolol SR  •  sertraline  •  vitamin D  •  senna-docusate **AND** polyethylene glycol/lytes **AND** magnesium hydroxide **AND** bisacodyl  •  heparin  •  acetaminophen  •  Notify provider if pain remains uncontrolled **AND** Use the numeric rating scale (NRS-11) on regular floors and Critical-Care Pain Observation Tool (CPOT) on ICUs/Trauma to assess pain **AND** Pulse Ox (Oximetry) **AND** Pharmacy Consult Request **AND** If patient difficult to arouse and/or has respiratory depression, stop any opiates that are currently infusing and call a Rapid Response. **AND** oxyCODONE immediate-release **AND** oxyCODONE immediate-release **AND** morphine injection  •  hydrALAZINE  •  ondansetron  •  ondansetron    Labs:  Recent Labs      04/20/19   0522  04/21/19   0400  04/22/19   0228   WBC  8.4  6.7  7.0   RBC  2.98*  2.84*  3.11*   HEMOGLOBIN  9.5*  8.9*  9.9*   HEMATOCRIT  30.2*  29.1*  31.6*   MCV  101.3*  102.5*  101.6*   MCH  31.9  31.3  31.8   RDW  65.4*  64.5*  64.7*   PLATELETCT  245  284  270   MPV  9.2  9.1  9.6   NEUTSPOLYS  61.30  62.40  60.70   LYMPHOCYTES  23.50  22.60  24.40   MONOCYTES  10.90  10.00  10.30   EOSINOPHILS  2.10  2.80  3.00   BASOPHILS  0.70  0.90  0.70     Recent Labs      04/19/19   1451  04/20/19   0522  04/21/19   0400   SODIUM  126*  130*  131*   POTASSIUM  4.3  4.0  3.7   CHLORIDE  94*  99  101   CO2  24  24  23   GLUCOSE  88  94  110*   BUN  7*  12  10     Recent Labs      04/19/19   1451  04/20/19   0522  04/21/19   0400   ALBUMIN  3.2   --    --    TBILIRUBIN  0.5   --    --    ALKPHOSPHAT  95   --    --    TOTPROTEIN  6.6   --    --     ALTSGPT  11   --    --    ASTSGOT  16   --    --    CREATININE  0.61  0.73  0.64       Imaging:  Us-monika Single Level Bilat    Result Date: 2019   Vascular Laboratory  Conclusions  Compared to prior study of 2018.  NANCY SANCHEZ  Age:    73    Gender:     F  MRN:    [id number]  :    1945      BSA:  Exam Date:     2019 17:59  Room #:     Inpatient  Priority:     Routine  Ht (in):             Wt (lb):  Ordering Physician:        DYLON DENNIS  Referring Physician:       DYLON DENNIS  Sonographer:               Jermain Dahl RVT  Study Type:                Limited Bilateral  Technical Quality:         Adequate  Indications:     Ulcer of lower extremity  CPT Codes:       99107  ICD Codes:       707.1  History:         Left ankle ulceration; status/post femoral-popliteal bypass                   graft (2018)  Limitations:     Bandaging of left ankle ulcer: no MONIKA obtained. Equipment                   failure: no TBI obtained                 RIGHT  Waveform            Systolic BPs (mmHg)                             140           Brachial  Bi, non-                                 Common Femoral  reversed  Bi, non-                   142           Posterior Tibial  reversed  Bi, non-                   125           Dorsalis Pedis  reversed                                           Peroneal                             0.99          MONIKA                                           TBI                       LEFT  Waveform        Systolic BPs (mmHg)                             144           Brachial  Bi, non-                                 Common Femoral  reversed  Bi, non-                                 Posterior Tibial  reversed  Bi, non-                                 Dorsalis Pedis  reversed                                           Peroneal                                           MONIKA                                           TBI  Findings  Right.  Doppler waveform of the common femoral artery is  of high amplitude and  biphasic without reversal of flow.  Doppler waveforms at the ankle are brisk and biphasic without reversal of  flow.  Ankle-brachial index is normal.  Left.  Doppler waveform of the common femoral artery is of high amplitude and  biphasic without reversal of flow.  Doppler waveforms at the ankle are brisk and biphasic without reversal of  flow.  TOMI not obtained due to bandaging of ankle.  TBI not obtained due to equipment failure.  Arterial duplex scan of the left lower extremity was performed - see  separate report.    Us-extremity Artery Lower Unilat Left    Result Date: 2019  Lower Extremity  Arterial Duplex Report  Vascular Laboratory  CONCLUSIONS  Compared to the prior study of 2018.  NANCY SANCHEZ  Exam Date:     2019 17:07  Room #:     Inpatient  Priority:     Routine  Ht (in):             Wt (lb):  Ordering Physician:        DYLON DENNIS  Referring Physician:       DYLON DENNIS  Sonographer:               Jermain Dahl RVT  Study Type:                Complete Unilateral  Technical Quality:         Adequate  Age:    73    Gender:     F  MRN:    [id number]  :    1945      BSA:  Indications:     Ulcer of lower extremity  CPT Codes:       16060  ICD Codes:       707.1  History:         Ulceration of left lower extremity status/post fem-pop bypass                   graft (2018)  Limitations:     Cardiac arrhythmia, difficult to determine waveform phasicity                RIGHT  Waveform        Peak Systolic Velocity (cm/s)                  Prox    Prox-Mid  Mid    Mid-Dist  Distal                                                             CFA                                                             PFA                                                             SFA                                                             POP                                                             AT                                                             PT                                                              JESSICA                LEFT  Waveform        Peak Systolic Velocity (cm/s)                  Prox    Prox-Mid  Mid    Mid-Dist  Distal  Bi, non-                          191                      CFA  reversed  Triphasic       140                                        PFA  Absent          0                 0                0       SFA  Bi, non-                          116                      POP  reversed  Bi, non-        150                                122     AT  reversed  Bi, non-        87                                 133     PT  reversed  Bi, non-        88                                 81      JESSICA  reversed  FINDINGS  Left.  Biphasic inflow without reversal of flow and a sharp upstroke is visualized  within the common femoral artery, limiting the possibility of more proximal  stenosis.  No flow can be demonstrated in the native superficial femoral artery.  The femoral-popliteal bypass graft is widely patent with no evidence of  stenosis and sharp, multiphasic flow demonstrated throughout its length.  3-vessel, multiphasic runoff to the foot.      Micro:  Results     Procedure Component Value Units Date/Time    CULTURE WOUND W/ GRAM STAIN [[id number]]  (Abnormal)  (Susceptibility) Collected:  04/19/19 1520    Order Status:  Completed Specimen:  Wound from Exudate Updated:  04/21/19 0848     Significant Indicator POS (POS)     Source WND     Site Left Foot     Culture Result Wound - (A)     Gram Stain Result Few WBCs.  Many Gram positive cocci.       Culture Result Wound Staphylococcus aureus  Heavy growth   (A)    Culture & Susceptibility     STAPHYLOCOCCUS AUREUS     Antibiotic Sensitivity Microscan Unit Status    Ampicillin/sulbactam Sensitive <=8/4 mcg/mL Final    Method: LICO    Clindamycin Sensitive <=0.5 mcg/mL Final    Method: LICO    Daptomycin Sensitive <=0.5 mcg/mL Final    Method: LICO    Erythromycin Sensitive <=0.5 mcg/mL Final    Method: LICO  "   Moxifloxacin Sensitive <=0.5 mcg/mL Final    Method: LICO    Oxacillin Sensitive <=0.25 mcg/mL Final    Method: LICO    Tetracycline Sensitive <=4 mcg/mL Final    Method: LICO    Trimeth/Sulfa Sensitive <=0.5/9.5 mcg/mL Final    Method: LICO    Vancomycin Sensitive 1 mcg/mL Final    Method: LICO                       BLOOD CULTURE [[id number]] Collected:  04/19/19 1450    Order Status:  Completed Specimen:  Blood from Peripheral Updated:  04/20/19 0733     Significant Indicator NEG     Source BLD     Site PERIPHERAL     Blood Culture No Growth    Note: Blood cultures are incubated for 5 days and  are monitored continuously.Positive blood cultures  are called to the RN and reported as soon as  they are identified.      Narrative:       1 of 2 for Blood Culture x 2 sites order. Per Hospital  Policy: Only change Specimen Src: to \"Line\" if specified by  physician order.    BLOOD CULTURE [[id number]] Collected:  04/19/19 1530    Order Status:  Completed Specimen:  Blood from Peripheral Updated:  04/20/19 0733     Significant Indicator NEG     Source BLD     Site PERIPHERAL     Blood Culture No Growth    Note: Blood cultures are incubated for 5 days and  are monitored continuously.Positive blood cultures  are called to the RN and reported as soon as  they are identified.      Narrative:       2 of 2 blood culture x2  Sites order. Per Hospital Policy:  Only change Specimen Src: to \"Line\" if specified by physician  order.    GRAM STAIN [[id number]] Collected:  04/19/19 1520    Order Status:  Completed Specimen:  Wound Updated:  04/20/19 0028     Significant Indicator .     Source WND     Site Left Foot     Gram Stain Result Few WBCs.  Many Gram positive cocci.            Assessment:  Active Hospital Problems    Diagnosis   • *Lower limb ulcer, ankle, left, with fat layer exposed ([facility]) [L97.322]   • Bullous pemphigus [L10.9]   • Peripheral vascular disease ([facility]) [I73.9]   • Valgus deformity of foot, left [M21.072] "       Plan:  Left lower extremity cellulitis  No fevers  Leukocytosis resolved  Wound culture+ MSSA  Continue IV Unasyn  Anticipate transitioning to p.o. Augmentin at discharge  Anticipate a 10-day course pending clinical improvement    Chronic ulceration on the left ankle  Does not appear infected at this time  Continue wound care    Bullous pemphigus  Recently on prednisone  Rash stable    Discussed with internal medicine.

## 2019-04-22 NOTE — DISCHARGE PLANNING
Transitional Care Navigator:    Per chart review patient has been identified as a candidate for HH based on her medical history and LACE+ of 63 indicating a high risk of readmission. Please consider a referral to HH prior to a DC of home.

## 2019-04-22 NOTE — CARE PLAN
Problem: Communication  Goal: The ability to communicate needs accurately and effectively will improve  Outcome: PROGRESSING AS EXPECTED  Pt. Communicates with staff about care plan, educated about medications, and care given to her, pt. Understands and communicates effectively back to the staff    Problem: Safety  Goal: Will remain free from falls  Outcome: PROGRESSING AS EXPECTED  Pt. Using call light appropriately, will mobilize with a FWW assistive device, walks at a steady pace, uses treaded socks, bed in lowest position

## 2019-04-23 LAB
BASOPHILS # BLD AUTO: 0.7 % (ref 0–1.8)
BASOPHILS # BLD: 0.05 K/UL (ref 0–0.12)
EOSINOPHIL # BLD AUTO: 0.19 K/UL (ref 0–0.51)
EOSINOPHIL NFR BLD: 2.7 % (ref 0–6.9)
ERYTHROCYTE [DISTWIDTH] IN BLOOD BY AUTOMATED COUNT: 64.8 FL (ref 35.9–50)
HCT VFR BLD AUTO: 29 % (ref 37–47)
HGB BLD-MCNC: 8.8 G/DL (ref 12–16)
IMM GRANULOCYTES # BLD AUTO: 0.07 K/UL (ref 0–0.11)
IMM GRANULOCYTES NFR BLD AUTO: 1 % (ref 0–0.9)
LYMPHOCYTES # BLD AUTO: 1.65 K/UL (ref 1–4.8)
LYMPHOCYTES NFR BLD: 23.6 % (ref 22–41)
MCH RBC QN AUTO: 31.4 PG (ref 27–33)
MCHC RBC AUTO-ENTMCNC: 30.3 G/DL (ref 33.6–35)
MCV RBC AUTO: 103.6 FL (ref 81.4–97.8)
MONOCYTES # BLD AUTO: 0.59 K/UL (ref 0–0.85)
MONOCYTES NFR BLD AUTO: 8.4 % (ref 0–13.4)
NEUTROPHILS # BLD AUTO: 4.45 K/UL (ref 2–7.15)
NEUTROPHILS NFR BLD: 63.6 % (ref 44–72)
NRBC # BLD AUTO: 0 K/UL
NRBC BLD-RTO: 0 /100 WBC
PLATELET # BLD AUTO: 295 K/UL (ref 164–446)
PMV BLD AUTO: 9.1 FL (ref 9–12.9)
RBC # BLD AUTO: 2.8 M/UL (ref 4.2–5.4)
WBC # BLD AUTO: 7 K/UL (ref 4.8–10.8)

## 2019-04-23 PROCEDURE — 700105 HCHG RX REV CODE 258: Performed by: INTERNAL MEDICINE

## 2019-04-23 PROCEDURE — 99232 SBSQ HOSP IP/OBS MODERATE 35: CPT | Performed by: INTERNAL MEDICINE

## 2019-04-23 PROCEDURE — 700102 HCHG RX REV CODE 250 W/ 637 OVERRIDE(OP): Performed by: NURSE PRACTITIONER

## 2019-04-23 PROCEDURE — 36415 COLL VENOUS BLD VENIPUNCTURE: CPT

## 2019-04-23 PROCEDURE — 700111 HCHG RX REV CODE 636 W/ 250 OVERRIDE (IP): Performed by: INTERNAL MEDICINE

## 2019-04-23 PROCEDURE — A9270 NON-COVERED ITEM OR SERVICE: HCPCS | Performed by: NURSE PRACTITIONER

## 2019-04-23 PROCEDURE — 700111 HCHG RX REV CODE 636 W/ 250 OVERRIDE (IP): Performed by: FAMILY MEDICINE

## 2019-04-23 PROCEDURE — 306373 DRESSING,VAC SIMPLACE SMALL: Performed by: FAMILY MEDICINE

## 2019-04-23 PROCEDURE — 700102 HCHG RX REV CODE 250 W/ 637 OVERRIDE(OP): Performed by: FAMILY MEDICINE

## 2019-04-23 PROCEDURE — 306263 VAC CANNISTER W/GEL 500ML: Performed by: FAMILY MEDICINE

## 2019-04-23 PROCEDURE — 770006 HCHG ROOM/CARE - MED/SURG/GYN SEMI*

## 2019-04-23 PROCEDURE — 700102 HCHG RX REV CODE 250 W/ 637 OVERRIDE(OP): Performed by: HOSPITALIST

## 2019-04-23 PROCEDURE — A9270 NON-COVERED ITEM OR SERVICE: HCPCS | Performed by: FAMILY MEDICINE

## 2019-04-23 PROCEDURE — 85025 COMPLETE CBC W/AUTO DIFF WBC: CPT

## 2019-04-23 PROCEDURE — A9270 NON-COVERED ITEM OR SERVICE: HCPCS | Performed by: HOSPITALIST

## 2019-04-23 RX ORDER — CHOLECALCIFEROL (VITAMIN D3) 125 MCG
1000 CAPSULE ORAL
Status: DISCONTINUED | OUTPATIENT
Start: 2019-04-23 | End: 2019-04-24 | Stop reason: HOSPADM

## 2019-04-23 RX ADMIN — OXYCODONE HYDROCHLORIDE AND ACETAMINOPHEN 500 MG: 500 TABLET ORAL at 05:17

## 2019-04-23 RX ADMIN — AMPICILLIN SODIUM AND SULBACTAM SODIUM 3 G: 2; 1 INJECTION, POWDER, FOR SOLUTION INTRAMUSCULAR; INTRAVENOUS at 17:12

## 2019-04-23 RX ADMIN — HEPARIN SODIUM 5000 UNITS: 5000 INJECTION, SOLUTION INTRAVENOUS; SUBCUTANEOUS at 05:18

## 2019-04-23 RX ADMIN — FERROUS SULFATE TAB 325 MG (65 MG ELEMENTAL FE) 325 MG: 325 (65 FE) TAB at 07:59

## 2019-04-23 RX ADMIN — TRIAMCINOLONE ACETONIDE: 5 CREAM TOPICAL at 06:00

## 2019-04-23 RX ADMIN — METOPROLOL SUCCINATE 100 MG: 50 TABLET, EXTENDED RELEASE ORAL at 05:17

## 2019-04-23 RX ADMIN — ACETAMINOPHEN 650 MG: 325 TABLET, FILM COATED ORAL at 11:05

## 2019-04-23 RX ADMIN — SENNOSIDES,DOCUSATE SODIUM 2 TABLET: 8.6; 5 TABLET, FILM COATED ORAL at 17:12

## 2019-04-23 RX ADMIN — AMPICILLIN SODIUM AND SULBACTAM SODIUM 3 G: 2; 1 INJECTION, POWDER, FOR SOLUTION INTRAMUSCULAR; INTRAVENOUS at 22:47

## 2019-04-23 RX ADMIN — DIPHENHYDRAMINE HCL 25 MG: 25 TABLET ORAL at 20:56

## 2019-04-23 RX ADMIN — OXYCODONE HYDROCHLORIDE 5 MG: 5 TABLET ORAL at 20:52

## 2019-04-23 RX ADMIN — CYANOCOBALAMIN TAB 500 MCG 1000 MCG: 500 TAB at 15:30

## 2019-04-23 RX ADMIN — CYANOCOBALAMIN TAB 500 MCG 1000 MCG: 500 TAB at 05:17

## 2019-04-23 RX ADMIN — SERTRALINE HYDROCHLORIDE 100 MG: 100 TABLET ORAL at 05:17

## 2019-04-23 RX ADMIN — FERROUS SULFATE TAB 325 MG (65 MG ELEMENTAL FE) 325 MG: 325 (65 FE) TAB at 17:12

## 2019-04-23 RX ADMIN — FERROUS SULFATE TAB 325 MG (65 MG ELEMENTAL FE) 325 MG: 325 (65 FE) TAB at 11:05

## 2019-04-23 RX ADMIN — HEPARIN SODIUM 5000 UNITS: 5000 INJECTION, SOLUTION INTRAVENOUS; SUBCUTANEOUS at 20:52

## 2019-04-23 RX ADMIN — AMPICILLIN SODIUM AND SULBACTAM SODIUM 3 G: 2; 1 INJECTION, POWDER, FOR SOLUTION INTRAMUSCULAR; INTRAVENOUS at 06:00

## 2019-04-23 RX ADMIN — VITAMIN D, TAB 1000IU (100/BT) 1000 UNITS: 25 TAB at 05:17

## 2019-04-23 RX ADMIN — HEPARIN SODIUM 5000 UNITS: 5000 INJECTION, SOLUTION INTRAVENOUS; SUBCUTANEOUS at 15:26

## 2019-04-23 RX ADMIN — AMPICILLIN SODIUM AND SULBACTAM SODIUM 3 G: 2; 1 INJECTION, POWDER, FOR SOLUTION INTRAMUSCULAR; INTRAVENOUS at 00:00

## 2019-04-23 RX ADMIN — DIPHENHYDRAMINE HYDROCHLORIDE, ZINC ACETATE 1 EACH: 2; .1 CREAM TOPICAL at 20:53

## 2019-04-23 RX ADMIN — ACETAMINOPHEN 650 MG: 325 TABLET, FILM COATED ORAL at 20:52

## 2019-04-23 RX ADMIN — AMPICILLIN SODIUM AND SULBACTAM SODIUM 3 G: 2; 1 INJECTION, POWDER, FOR SOLUTION INTRAMUSCULAR; INTRAVENOUS at 11:05

## 2019-04-23 ASSESSMENT — ENCOUNTER SYMPTOMS
DOUBLE VISION: 0
DIARRHEA: 0
HEADACHES: 0
PALPITATIONS: 0
SENSORY CHANGE: 0
HEARTBURN: 1
COUGH: 0
FEVER: 0
NAUSEA: 1
PND: 0
VOMITING: 0
FOCAL WEAKNESS: 0
WEIGHT LOSS: 0
DIAPHORESIS: 0
MYALGIAS: 0
SHORTNESS OF BREATH: 0
PHOTOPHOBIA: 0
SORE THROAT: 0
WHEEZING: 0
STRIDOR: 0
NAUSEA: 0
SEIZURES: 0
BLURRED VISION: 0
ABDOMINAL PAIN: 0
HEMOPTYSIS: 0
BRUISES/BLEEDS EASILY: 0
ORTHOPNEA: 0
CHILLS: 0
DIZZINESS: 0
BLOOD IN STOOL: 0

## 2019-04-23 NOTE — CARE PLAN
Problem: Communication  Goal: The ability to communicate needs accurately and effectively will improve  Outcome: PROGRESSING AS EXPECTED  Pt education provided on pt's need to communicate pain level.Pt education provided on pt's need to communicate pain level.

## 2019-04-23 NOTE — CARE PLAN
Problem: Safety  Goal: Will remain free from falls    Intervention: Assess risk factors for falls  Safety precautions in place. Call light within reach. Bed is low and in locked position. Hourly rounding in place.

## 2019-04-23 NOTE — PROGRESS NOTES
[facility] Medicine Daily Progress Note    Date of Service  4/22/2019    Chief Complaint  73 y.o. female admitted 4/19/2019 with left lower medial ankle ulceration, sent by outpatient wound clinic.    Hospital Course    4/20:  This 73 y.o. female with past medical history that includes cellulitis, LE wounds, dyslipidemia, HTN, fem/pop bypass, former smoker, PVD.   Bullous pephigus, PVD, B12 deficiency, steroid use.  Admitted to [facility] for leg cellulitis and started on IV vancomycin and cefepime. Pt recently completed a course of ciprofloxacin and Zyvox.  An LPS consult has been requested for evaluation of leg cellulitis.  This wound started 10/2018.  He has been treating the wound with VAC, unna boot and followed by outpatient wound clinic.  ID consulted given chronic nature of wound.  4/19 wound culture with heavy growth of staph aureus ss pending.  On IV vancomycin, ID stopped cefepime since only growing staph.   4/21:  Rash noted on bilateral arms, patient states this is her pemphigus vulgaris.  Started benadryl cream and po as well as triamcinolone cream.  Avoiding oral prednisone for now.  MSSA in left ankle wound.  ID changed vanco to unasyn iV.  4/22:  Rash improved.  Ordered MRI left ankle, negative for OM.  Continue unasyn IV for MSSA wound and wound care.  Severe trimalleolar fracture deformity likely complicating ulcer resolution.   *        Consultants/Specialty  LPS   ID Dr. Rainey    Code Status  full    Disposition  Home once medically cleared, has FWW with seat at home.    Review of Systems  Review of Systems   Constitutional: Negative for chills, diaphoresis, fever and malaise/fatigue.   HENT: Negative for congestion and sore throat.    Eyes: Negative for pain and discharge.   Respiratory: Negative for cough, hemoptysis, sputum production, shortness of breath and wheezing.    Cardiovascular: Negative for chest pain, palpitations, claudication and leg swelling.   Gastrointestinal: Negative for  abdominal pain, constipation, diarrhea, melena, nausea and vomiting.   Genitourinary: Negative for dysuria, frequency and urgency.   Musculoskeletal: Positive for joint pain (left ankle pain). Negative for back pain, myalgias and neck pain.   Skin: Negative for itching and rash.   Neurological: Negative for dizziness, sensory change, speech change, focal weakness, loss of consciousness, weakness and headaches.   Endo/Heme/Allergies: Does not bruise/bleed easily.   Psychiatric/Behavioral: Negative for depression, substance abuse and suicidal ideas.        Physical Exam  Temp:  [36.3 °C (97.3 °F)-36.6 °C (97.8 °F)] 36.6 °C (97.8 °F)  Pulse:  [72-89] 72  Resp:  [15-17] 17  BP: (120-159)/(74-87) 120/80  SpO2:  [94 %-98 %] 98 %    Physical Exam   Constitutional: She is oriented to person, place, and time. She appears well-developed and well-nourished. No distress.   HENT:   Head: Normocephalic and atraumatic.   Nose: Nose normal.   Mouth/Throat: Oropharynx is clear and moist. No oropharyngeal exudate.   Eyes: Pupils are equal, round, and reactive to light. Conjunctivae and EOM are normal. Right eye exhibits no discharge. Left eye exhibits no discharge. No scleral icterus.   Neck: Normal range of motion. Neck supple. No JVD present. No tracheal deviation present. No thyromegaly present.   Cardiovascular: Normal rate, regular rhythm, normal heart sounds and intact distal pulses.  Exam reveals no gallop and no friction rub.    No murmur heard.  Pulmonary/Chest: Effort normal and breath sounds normal. No stridor. No respiratory distress. She has no wheezes. She has no rales. She exhibits no tenderness.   Abdominal: Soft. Bowel sounds are normal. She exhibits no distension and no mass. There is no tenderness. There is no rebound and no guarding.   Musculoskeletal: Normal range of motion. She exhibits edema, tenderness and deformity (left ankle deformity from prior left ankle fracture with valgus deformity.  left medial  ulceration 3cm diameter open with surrounding erythema.).   Lymphadenopathy:     She has no cervical adenopathy.   Neurological: She is alert and oriented to person, place, and time. No cranial nerve deficit. She exhibits normal muscle tone. Coordination normal.   Skin: Skin is warm and dry. No rash noted. She is not diaphoretic. No erythema.   Psychiatric: She has a normal mood and affect. Her behavior is normal. Judgment and thought content normal.   Nursing note and vitals reviewed.      Fluids    Intake/Output Summary (Last 24 hours) at 04/22/19 1720  Last data filed at 04/22/19 1500   Gross per 24 hour   Intake              480 ml   Output                0 ml   Net              480 ml       Laboratory  Recent Labs      04/20/19   0522  04/21/19   0400  04/22/19   0228   WBC  8.4  6.7  7.0   RBC  2.98*  2.84*  3.11*   HEMOGLOBIN  9.5*  8.9*  9.9*   HEMATOCRIT  30.2*  29.1*  31.6*   MCV  101.3*  102.5*  101.6*   MCH  31.9  31.3  31.8   MCHC  31.5*  30.6*  31.3*   RDW  65.4*  64.5*  64.7*   PLATELETCT  245  284  270   MPV  9.2  9.1  9.6     Recent Labs      04/20/19   0522  04/21/19   0400   SODIUM  130*  131*   POTASSIUM  4.0  3.7   CHLORIDE  99  101   CO2  24  23   GLUCOSE  94  110*   BUN  12  10   CREATININE  0.73  0.64   CALCIUM  8.3*  8.6             Recent Labs      04/21/19   0400   TRIGLYCERIDE  84   HDL  74   LDL  63       Imaging  US-TOMI SINGLE LEVEL BILAT   Final Result      US-EXTREMITY ARTERY LOWER UNILAT LEFT   Final Result      MR-ANKLE-WITH & W/O LEFT   Final Result      1.  No evidence of osteomyelitis.      2.  Open wound overlying the distal tibia medially with surrounding cellulitis.      3.  Chronic fracture of the medial malleolus with cortical and trabecular bridging only seen at the medial aspect of the fracture site. There is also marked valgus deformity of the tibiotalar joint with chronic widening of the syndesmosis.      4.  Old healed fractures of the lateral malleolus and distal tibia  posteriorly.      5.  Degenerative change of the tibiotalar joint.      6.  Mild tendinopathy of the Achilles tendon.           Assessment/Plan  * Lower limb ulcer, ankle, left, with fat layer exposed (HCC)- (present on admission)   Assessment & Plan      Consulted ID  Admission 3/19 wound culture with MSSA.   LPS following.  Large ulceration left medial from likely underlying valgus deformity from prior ankle fracture.  H/o left fem-pop bypass:  Check for arterial insufficiency, ordered TOMI arterial study.  H/o ankle fracture with valgus deformity likely leading to chronic ulceration medial aspect of ankle.  IV unasyn based on cultures.  4/21:  MRI no OM seen, severe deformity from trimalleolar fracture.       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)   Assessment & Plan    4/21:  Rash to bilateral upper arms.  Patient states this is her bullous pemphigus.  Started benadryl cream and po prn itching.  Ordered triamcinolone cream.  Holding off on prednisone since active infection.     Hyponatremia- (present on admission)   Assessment & Plan    IVF NS, follow BMP, check TSH     Peripheral vascular disease (HCC)- (present on admission)   Assessment & Plan    History of left femoropopliteal bypass surgery   LPS on   lipid panel wnl.     B12 deficiency- (present on admission)   Assessment & Plan    Check level     Essential hypertension- (present on admission)   Assessment & Plan    Toprol     Valgus deformity of foot, left- (present on admission)   Assessment & Plan    Patient states prior left ankle fracture  Query need for reconstructive surgery of left ankle in order for left medial ulceration to heal?     Iron deficiency anemia- (present on admission)   Assessment & Plan    Iron levels low.  Started on replacement          VTE prophylaxis: heparin tid

## 2019-04-23 NOTE — WOUND TEAM
LIMB PRESERVATION SERVICE - RN NOTE    Reason for LPS Consultation: follow up left leg    Past medical history, medicines, allergies, family history, social history,    reviewed    History of Present Illness: Patient is a 73 y.o. female with past medical history that includes cellulitis, LE wounds, dyslipidemia, HTN, fem/pop bypass, former smoker, PVD.   Bullous pephigus, PVD, B12 deficiency, steroid use.  Admitted to [facility] for leg cellulitis and started on IV vancomycin and cefepime.. Pt recently completed a course of ciprofloxacin and Zyvox.  An LPS consult has been requested for evaluation of leg cellulitis.  This wound started 10/2018.  He has been treating the wound with VAC, unna boot, .      DIAGNOSTICS this admission    Xray:  6/2018 FINDINGS:  There are fractures of the distal fibular shaft and of the medial malleolus, both of which appear to be subacute with at least minimal callus formation. There is marked lateral subluxation of the talus. No other significant findings.     MRI: 04/22/19    1.  No evidence of osteomyelitis.    2.  Open wound overlying the distal tibia medially with surrounding cellulitis.    3.  Chronic fracture of the medial malleolus with cortical and trabecular bridging only seen at the medial aspect of the fracture site. There is also marked valgus deformity of the tibiotalar joint with chronic widening of the syndesmosis.    4.  Old healed fractures of the lateral malleolus and distal tibia posteriorly.    5.  Degenerative change of the tibiotalar joint.    6.  Mild tendinopathy of the Achilles tendon.               CT:4/2018 Tri-malleolus fractures with displacement as detailed above    Likely posterior tibial tendon tear               Vascular:  5/2018 Left.    Doppler waveform of the common femoral artery is of high amplitude and    triphasic.    Doppler doppler waveforms at the popliteal and tibial arteries are    monophasic with moderate amplitude.    Ankle-brachial index is  moderately reduced.    Other: micro - heavy growth staph  4/20/19 hand held doppler left ankle pulsed are multiphasic 2+               Labs      WBC 11.3  4/19/19  8.4 4/20/19                                ESR: 3/14/19 80                      CRP: 3/14/19 14.8           A1c: 6/4/18 5.6    PHYSICAL EXAMINATION:   Sensory Assessment   Monofilament testing nt - Not completed at this  visit  Wound Assessment:        Wound 04/19/19 Leg Left leg (Active)   Site Assessment Red/pink;Yellow    Maricruz-wound Assessment Pink/red    Margins Defined edges     Superior leg 3x1.4x0.3  Inferior 4x3x0.2    Tunneling 0 cm    Undermining 0 cm    Closure Secondary intention    Drainage Amount Small    Drainage Description Serosanguineous    Non-staged Wound Description Full thickness    Treatments Site care;Cleansed    Cleansing Approved Wound Cleanser    Periwound Protectant Barrier Paste    Dressing Options Honey Colloid;Absorbent Abdominal Pad;Roll Gauze    Dressing Changed Changed    Dressing Change Frequency Every 72 hrs    NEXT Dressing Change  04/25/19    NEXT Weekly Photo (Inpatient Only) 04/27/19    WOUND NURSE ONLY - Odor None    WOUND NURSE ONLY - Pulses Left;2+;DP;PT    WOUND NURSE ONLY - Exposed Structures None    WOUND NURSE ONLY - Tissue Type and Percentage 80 red, 20 yellow pale    WOUND NURSE ONLY - Time Spent with Patient (mins) 60       Patient Turns / Repositioning: Patient Turns Self from Side to Side          Procedures:     Debridement :  Scalpel  used to debride wound bed  Entire surface of wound,  20 cm2, debrided, removing non viable tissue.   Cleansed with:  Normal Saline                                                                        Periwound protected with: barrier paste   Primary dressing: honey colloid, abd, rolled gauze     Patient Education:  POC    PLAN    Wound Care:   Wound team to follow up for compression bandaging when s&s of infection are receeding    Offloading  Pt has boot although she is  going to [employer] for new shoe.  Pt states current boot irritates wounds     Labs  No new labs    Imaging  No new imaging.  Pt states Dr Colin did a vascular study in her office 4/2019    Infection Management  Microbiology swab of wound growing staph species  Antibiotics IV    Referrals   Vascular na   Ortho na   Infectious diseases ED MD consulted   Diabetes Education na   Ortho tech na

## 2019-04-23 NOTE — DISCHARGE PLANNING
Spoke with Izzy from Wound Care. Patient has home wound vac already and is established with [facility]. Called wound clinic and patient has appointment on Friday 4/26 @ 1030. New orders placed by MD

## 2019-04-23 NOTE — PROGRESS NOTES
[facility] Medicine Daily Progress Note    Date of Service  4/23/2019    Chief Complaint  73 y.o. female admitted 4/19/2019 with left lower medial ankle ulceration, sent by outpatient wound clinic.    Interval Update  4/23 Clinically improving  ROS benign - denies symptoms except heartburn from taking her meds on an empty stomach  Tolerating PO diet well  Last BM 4/22  Voiding  WBC normal 7.0  Mild macro anemia 8.8/29, stable  ID and LPS note reviewed    Consultants/Specialty  LPS   ID    Code Status  full    Disposition  Home w OP wound clinic; likely 4/24    Review of Systems  Review of Systems   Constitutional: Negative for chills, diaphoresis, fever and weight loss.   HENT: Negative for ear pain, sore throat and tinnitus.    Eyes: Negative for blurred vision, double vision and photophobia.   Respiratory: Negative for cough, hemoptysis, shortness of breath, wheezing and stridor.    Cardiovascular: Negative for chest pain, palpitations, orthopnea and PND.   Gastrointestinal: Positive for heartburn. Negative for abdominal pain, blood in stool, diarrhea, melena, nausea and vomiting.   Genitourinary: Negative for dysuria, hematuria and urgency.   Musculoskeletal: Negative for joint pain and myalgias.   Neurological: Negative for dizziness, sensory change, focal weakness, seizures and headaches.   Endo/Heme/Allergies: Does not bruise/bleed easily.   All other systems reviewed and are negative.       Physical Exam  Temp:  [36.4 °C (97.6 °F)-36.7 °C (98 °F)] 36.7 °C (98 °F)  Pulse:  [74-88] 76  Resp:  [17-18] 17  BP: (127-163)/(62-85) 163/76  SpO2:  [92 %-100 %] 100 %    Physical Exam   Constitutional: She is oriented to person, place, and time. She appears well-developed and well-nourished. No distress.   HENT:   Head: Normocephalic and atraumatic.   Eyes: Pupils are equal, round, and reactive to light. EOM are normal.   Neck: Neck supple.   Cardiovascular: Normal rate.  A regularly irregular rhythm present.   Murmur  heard.   Systolic murmur is present with a grade of 2/6   Pulmonary/Chest: Effort normal and breath sounds normal. No respiratory distress. She has no wheezes. She has no rales.   Abdominal: Soft. She exhibits no distension. There is no tenderness.   Musculoskeletal:   LLE CDI  L ankle deformity   Neurological: She is alert and oriented to person, place, and time. No cranial nerve deficit.   Skin: Skin is warm and dry.       Fluids    Intake/Output Summary (Last 24 hours) at 04/23/19 1031  Last data filed at 04/22/19 1500   Gross per 24 hour   Intake              240 ml   Output                0 ml   Net              240 ml       Laboratory  Recent Labs      04/21/19   0400  04/22/19   0228  04/23/19   0426   WBC  6.7  7.0  7.0   RBC  2.84*  3.11*  2.80*   HEMOGLOBIN  8.9*  9.9*  8.8*   HEMATOCRIT  29.1*  31.6*  29.0*   MCV  102.5*  101.6*  103.6*   MCH  31.3  31.8  31.4   MCHC  30.6*  31.3*  30.3*   RDW  64.5*  64.7*  64.8*   PLATELETCT  284  270  295   MPV  9.1  9.6  9.1     Recent Labs      04/21/19   0400   SODIUM  131*   POTASSIUM  3.7   CHLORIDE  101   CO2  23   GLUCOSE  110*   BUN  10   CREATININE  0.64   CALCIUM  8.6             Recent Labs      04/21/19   0400   TRIGLYCERIDE  84   HDL  74   LDL  63       Imaging  US-TOMI SINGLE LEVEL BILAT   Final Result      US-EXTREMITY ARTERY LOWER UNILAT LEFT   Final Result      MR-ANKLE-WITH & W/O LEFT   Final Result      1.  No evidence of osteomyelitis.      2.  Open wound overlying the distal tibia medially with surrounding cellulitis.      3.  Chronic fracture of the medial malleolus with cortical and trabecular bridging only seen at the medial aspect of the fracture site. There is also marked valgus deformity of the tibiotalar joint with chronic widening of the syndesmosis.      4.  Old healed fractures of the lateral malleolus and distal tibia posteriorly.      5.  Degenerative change of the tibiotalar joint.      6.  Mild tendinopathy of the Achilles tendon.            Assessment/Plan  * Lower limb ulcer, ankle, left, with fat layer exposed (HCC)- (present on admission)   Assessment & Plan      Consulted ID  Admission 3/19 wound culture with MSSA.   LPS following.  Large ulceration left medial from likely underlying valgus deformity from prior ankle fracture.  H/o left fem-pop bypass:  Check for arterial insufficiency, ordered TOMI arterial study.  H/o ankle fracture with valgus deformity likely leading to chronic ulceration medial aspect of ankle.  IV unasyn based on cultures.  4/21:  MRI no OM seen, severe deformity from trimalleolar fracture.    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  Patient states this is her bullous pemphigus.  Started benadryl cream and po prn itching.  Ordered triamcinolone cream.  Holding off on prednisone since active infection.     Hyponatremia- (present on admission)   Assessment & Plan    Improved with fluids  Monitor     Peripheral vascular disease (HCC)- (present on admission)   Assessment & Plan    History of left femoropopliteal bypass surgery   LPS on   lipid panel wnl.     B12 deficiency- (present on admission)   Assessment & Plan    Low normal  Once weekly replacement     Essential hypertension- (present on admission)   Assessment & Plan    Toprol     Valgus deformity of foot, left- (present on admission)   Assessment & Plan    Patient states prior left ankle fracture  Reconstructive surgery of left ankle when wounds healed     Iron deficiency anemia- (present on admission)   Assessment & Plan    Iron levels low.  Started on replacement          VTE prophylaxis: heparin tid

## 2019-04-23 NOTE — PROGRESS NOTES
" LIMB PRESERVATION SERVICE      HPI:  73 y.o. female with past medical history that includes cellulitis, LE wounds, dyslipidemia, HTN, fem/pop bypass, former smoker, Bullous pephigus, PVD, B12 deficiency, steroid use.  Admitted to [facility] for leg cellulitis and started on IV vancomycin and cefepime. Pt recently completed a course of ciprofloxacin and Zyvox.   Followed by ID. Followed by wound care clinic.   Does not have DM.         4/23/2019: Patient denies fevers, chills, nausea, vomiting.  Pain well controlled. Seen by wound team. VAC applied to lower leg ulcers and 2 layer compression therapy. Pt reports she will have son bring her home VAC to hospital tomorrow for when she discharges.       Results:   MRI 4/22/19 negative for OM to ankle  4/21/19: Arterial duplex LLE : 1.  Patent left femoral to popliteal artery bypass without stenosis.   2.  Normal 3 vessel run-off to the left ankle.       ASSESSMENT:    BP (!) 163/76 Comment: RN notified  Pulse 76   Temp 36.7 °C (98 °F) (Temporal)   Resp 17   Ht 1.702 m (5' 7\")   Wt 67.3 kg (148 lb 5.9 oz)   SpO2 100%   Breastfeeding? No   BMI 23.24 kg/m²       Reviewed wound photos. Discussed with wound team Izzy MUKHERJEE.   Cellulitis improving.   Wound beds improving    Drsg CDI. VAC connected 125mmhg continuous.        INFECTION MANAGEMENT:  WBC: 7. Trending down.  Wound culture results:   Results     Procedure Component Value Units Date/Time    CULTURE WOUND W/ GRAM STAIN [[id number]]  (Abnormal)  (Susceptibility) Collected:  04/19/19 1520    Order Status:  Completed Specimen:  Wound from Exudate Updated:  04/21/19 0872     Significant Indicator POS (POS)     Source WND     Site Left Foot     Culture Result Wound - (A)     Gram Stain Result Few WBCs.  Many Gram positive cocci.       Culture Result Wound Staphylococcus aureus  Heavy growth   (A)    Culture & Susceptibility     STAPHYLOCOCCUS AUREUS     Antibiotic Sensitivity Microscan Unit Status    " "Ampicillin/sulbactam Sensitive <=8/4 mcg/mL Final    Method: LICO    Clindamycin Sensitive <=0.5 mcg/mL Final    Method: LICO    Daptomycin Sensitive <=0.5 mcg/mL Final    Method: LICO    Erythromycin Sensitive <=0.5 mcg/mL Final    Method: LICO    Moxifloxacin Sensitive <=0.5 mcg/mL Final    Method: LICO    Oxacillin Sensitive <=0.25 mcg/mL Final    Method: LICO    Tetracycline Sensitive <=4 mcg/mL Final    Method: LICO    Trimeth/Sulfa Sensitive <=0.5/9.5 mcg/mL Final    Method: LICO    Vancomycin Sensitive 1 mcg/mL Final    Method: LICO                       BLOOD CULTURE [[id number]] Collected:  04/19/19 1450    Order Status:  Completed Specimen:  Blood from Peripheral Updated:  04/20/19 0733     Significant Indicator NEG     Source BLD     Site PERIPHERAL     Blood Culture No Growth    Note: Blood cultures are incubated for 5 days and  are monitored continuously.Positive blood cultures  are called to the RN and reported as soon as  they are identified.      Narrative:       1 of 2 for Blood Culture x 2 sites order. Per Hospital  Policy: Only change Specimen Src: to \"Line\" if specified by  physician order.    BLOOD CULTURE [[id number]] Collected:  04/19/19 1530    Order Status:  Completed Specimen:  Blood from Peripheral Updated:  04/20/19 0733     Significant Indicator NEG     Source BLD     Site PERIPHERAL     Blood Culture No Growth    Note: Blood cultures are incubated for 5 days and  are monitored continuously.Positive blood cultures  are called to the RN and reported as soon as  they are identified.      Narrative:       2 of 2 blood culture x2  Sites order. Per Hospital Policy:  Only change Specimen Src: to \"Line\" if specified by physician  order.    GRAM STAIN [[id number]] Collected:  04/19/19 1520    Order Status:  Completed Specimen:  Wound Updated:  04/20/19 0028     Significant Indicator .     Source WND     Site Left Foot     Gram Stain Result Few WBCs.  Many Gram positive cocci.         "           PLAN:    Wound care: Resume previous Wound Care orders.  VAC to ulcers, and 2 layer compression therapy      Antibiotics: Per ID recommendation    Weight Bearing Status: Weight bearing as tolerated      LPS to sign off         DISCHARGE PLAN:    Disposition: home with OP wound clinic at [facility]    Follow-up: OP Wound Clinic 3 x/wk for VAC changes. Appt scheduled for 4/26/19.     D/W: pt, RN, Izzy MUKHERJEE, Zak PERDUE with hospitalist    Letty Marroquin, A.P.R.N.    If any questions or concerns, please call [phone]

## 2019-04-23 NOTE — PROGRESS NOTES
IV site was leaking, stopped fluid antibiotics. Reinforced dressing but continued leaking. Notified SHREE Enrique about IV site

## 2019-04-23 NOTE — PROGRESS NOTES
Infectious Disease Progress Note    Author: Amber Valencia M.D. Date & Time of service: 2019  10:13 AM    Chief Complaint:  Follow-up for cellulitis of the lower extremities    Interval History:  73 y.o. female with a history of peripheral vascular disease with chronic lower extremity ulcers since mid , status post femoropopliteal bypass, bullous pemphigus recently on prednisone.    Readmitted for lower extremity cellulitis     afebrile WBC 7 patient states that erythema on her right and left leg are improving.  She is happy she does not have any edema.  Wound cultures growing MSSA.  Tolerating Unasyn   afebrile WBC 7 patient complaining of nausea and abdominal upset after taking her morning meds without food.  She was seen by wound care yesterday with a dressing change     Labs Reviewed and Wound Reviewed.    Review of Systems:  Review of Systems   Constitutional: Negative for chills and fever.   Respiratory: Negative for cough and shortness of breath.    Cardiovascular: Negative for leg swelling.   Gastrointestinal: Positive for nausea. Negative for abdominal pain, diarrhea and vomiting.   Neurological: Negative for dizziness and headaches.       Hemodynamics:  Temp (24hrs), Av.6 °C (97.8 °F), Min:36.4 °C (97.6 °F), Max:36.7 °C (98 °F)  Temperature: 36.7 °C (98 °F)  Pulse  Av.3  Min: 72  Max: 103   Blood Pressure : (!) 163/76 (RN notified)       Physical Exam:  Physical Exam   Constitutional: She is oriented to person, place, and time. She appears well-developed.   HENT:   Mouth/Throat: Oropharynx is clear and moist. No oropharyngeal exudate.   Eyes: Pupils are equal, round, and reactive to light. Conjunctivae and EOM are normal.   Cardiovascular: Normal rate.    Murmur heard.  Pulmonary/Chest: Effort normal. She has no wheezes. She has no rales.   Abdominal: Soft. There is no tenderness.   Musculoskeletal: She exhibits edema and deformity.   Right lower extremity erythema,  resolving    Left lower extremity dressed   Neurological: She is alert and oriented to person, place, and time.   Skin: Rash noted. There is erythema.   Psychiatric: She has a normal mood and affect. Her behavior is normal.   Pleasant       Meds:    Current Facility-Administered Medications:   •  LORazepam  •  ferrous sulfate  •  ascorbic acid  •  diphenhydrAMINE  •  diphenhydrAMINE-zinc acetate  •  ampicillin-sulbactam (UNASYN) IV  •  triamcinolone acetonide  •  cyanocobalamin  •  metoprolol SR  •  sertraline  •  vitamin D  •  senna-docusate **AND** polyethylene glycol/lytes **AND** magnesium hydroxide **AND** bisacodyl  •  heparin  •  acetaminophen  •  Notify provider if pain remains uncontrolled **AND** Use the numeric rating scale (NRS-11) on regular floors and Critical-Care Pain Observation Tool (CPOT) on ICUs/Trauma to assess pain **AND** Pulse Ox (Oximetry) **AND** Pharmacy Consult Request **AND** If patient difficult to arouse and/or has respiratory depression, stop any opiates that are currently infusing and call a Rapid Response. **AND** oxyCODONE immediate-release **AND** oxyCODONE immediate-release **AND** morphine injection  •  hydrALAZINE  •  ondansetron  •  ondansetron    Labs:  Recent Labs      04/21/19   0400  04/22/19   0228  04/23/19   0426   WBC  6.7  7.0  7.0   RBC  2.84*  3.11*  2.80*   HEMOGLOBIN  8.9*  9.9*  8.8*   HEMATOCRIT  29.1*  31.6*  29.0*   MCV  102.5*  101.6*  103.6*   MCH  31.3  31.8  31.4   RDW  64.5*  64.7*  64.8*   PLATELETCT  284  270  295   MPV  9.1  9.6  9.1   NEUTSPOLYS  62.40  60.70  63.60   LYMPHOCYTES  22.60  24.40  23.60   MONOCYTES  10.00  10.30  8.40   EOSINOPHILS  2.80  3.00  2.70   BASOPHILS  0.90  0.70  0.70     Recent Labs      04/21/19   0400   SODIUM  131*   POTASSIUM  3.7   CHLORIDE  101   CO2  23   GLUCOSE  110*   BUN  10     Recent Labs      04/21/19   0400   CREATININE  0.64       Imaging:  Us-monika Single Level Bilat    Result Date: 4/21/2019   Vascular  Laboratory  Conclusions  Compared to prior study of 2018.  NANCY SANCHEZ  Age:    73    Gender:     F  MRN:    [id number]  :    1945      BSA:  Exam Date:     2019 17:59  Room #:     Inpatient  Priority:     Routine  Ht (in):             Wt (lb):  Ordering Physician:        DYLON DENNIS  Referring Physician:       DYLON DENNIS  Sonographer:               Jermain Dahl RVT  Study Type:                Limited Bilateral  Technical Quality:         Adequate  Indications:     Ulcer of lower extremity  CPT Codes:       88336  ICD Codes:       707.1  History:         Left ankle ulceration; status/post femoral-popliteal bypass                   graft (2018)  Limitations:     Bandaging of left ankle ulcer: no TOMI obtained. Equipment                   failure: no TBI obtained                 RIGHT  Waveform            Systolic BPs (mmHg)                             140           Brachial  Bi, non-                                 Common Femoral  reversed  Bi, non-                   142           Posterior Tibial  reversed  Bi, non-                   125           Dorsalis Pedis  reversed                                           Peroneal                             0.99          TOMI                                           TBI                       LEFT  Waveform        Systolic BPs (mmHg)                             144           Brachial  Bi, non-                                 Common Femoral  reversed  Bi, non-                                 Posterior Tibial  reversed  Bi, non-                                 Dorsalis Pedis  reversed                                           Peroneal                                           TOMI                                           TBI  Findings  Right.  Doppler waveform of the common femoral artery is of high amplitude and  biphasic without reversal of flow.  Doppler waveforms at the ankle are brisk and biphasic without reversal of  flow.  Ankle-brachial index  is normal.  Left.  Doppler waveform of the common femoral artery is of high amplitude and  biphasic without reversal of flow.  Doppler waveforms at the ankle are brisk and biphasic without reversal of  flow.  TOMI not obtained due to bandaging of ankle.  TBI not obtained due to equipment failure.  Arterial duplex scan of the left lower extremity was performed - see  separate report.    Us-extremity Artery Lower Unilat Left    Result Date: 2019  Lower Extremity  Arterial Duplex Report  Vascular Laboratory  CONCLUSIONS  Compared to the prior study of 2018.  NANCY SANCHEZ  Exam Date:     2019 17:07  Room #:     Inpatient  Priority:     Routine  Ht (in):             Wt (lb):  Ordering Physician:        DYLON DENNIS  Referring Physician:       DYLON DENNIS  Sonographer:               Jermain Dahl RVT  Study Type:                Complete Unilateral  Technical Quality:         Adequate  Age:    73    Gender:     F  MRN:    [id number]  :    1945      BSA:  Indications:     Ulcer of lower extremity  CPT Codes:       78130  ICD Codes:       707.1  History:         Ulceration of left lower extremity status/post fem-pop bypass                   graft (2018)  Limitations:     Cardiac arrhythmia, difficult to determine waveform phasicity                RIGHT  Waveform        Peak Systolic Velocity (cm/s)                  Prox    Prox-Mid  Mid    Mid-Dist  Distal                                                             CFA                                                             PFA                                                             SFA                                                             POP                                                             AT                                                             PT                                                             JESSICA                LEFT  Waveform        Peak Systolic Velocity (cm/s)                  Prox    Prox-Mid   Mid    Mid-Dist  Distal  Bi, non-                          191                      CFA  reversed  Triphasic       140                                        PFA  Absent          0                 0                0       SFA  Bi, non-                          116                      POP  reversed  Bi, non-        150                                122     AT  reversed  Bi, non-        87                                 133     PT  reversed  Bi, non-        88                                 81      JESSICA  reversed  FINDINGS  Left.  Biphasic inflow without reversal of flow and a sharp upstroke is visualized  within the common femoral artery, limiting the possibility of more proximal  stenosis.  No flow can be demonstrated in the native superficial femoral artery.  The femoral-popliteal bypass graft is widely patent with no evidence of  stenosis and sharp, multiphasic flow demonstrated throughout its length.  3-vessel, multiphasic runoff to the foot.      Micro:  Results     Procedure Component Value Units Date/Time    CULTURE WOUND W/ GRAM STAIN [[id number]]  (Abnormal)  (Susceptibility) Collected:  04/19/19 1520    Order Status:  Completed Specimen:  Wound from Exudate Updated:  04/21/19 0848     Significant Indicator POS (POS)     Source WND     Site Left Foot     Culture Result Wound - (A)     Gram Stain Result Few WBCs.  Many Gram positive cocci.       Culture Result Wound Staphylococcus aureus  Heavy growth   (A)    Culture & Susceptibility     STAPHYLOCOCCUS AUREUS     Antibiotic Sensitivity Microscan Unit Status    Ampicillin/sulbactam Sensitive <=8/4 mcg/mL Final    Method: LICO    Clindamycin Sensitive <=0.5 mcg/mL Final    Method: LICO    Daptomycin Sensitive <=0.5 mcg/mL Final    Method: LICO    Erythromycin Sensitive <=0.5 mcg/mL Final    Method: LICO    Moxifloxacin Sensitive <=0.5 mcg/mL Final    Method: LICO    Oxacillin Sensitive <=0.25 mcg/mL Final    Method: LICO    Tetracycline Sensitive <=4 mcg/mL Final  "   Method: LICO    Trimeth/Sulfa Sensitive <=0.5/9.5 mcg/mL Final    Method: LICO    Vancomycin Sensitive 1 mcg/mL Final    Method: LICO                       BLOOD CULTURE [[id number]] Collected:  04/19/19 1450    Order Status:  Completed Specimen:  Blood from Peripheral Updated:  04/20/19 0733     Significant Indicator NEG     Source BLD     Site PERIPHERAL     Blood Culture No Growth    Note: Blood cultures are incubated for 5 days and  are monitored continuously.Positive blood cultures  are called to the RN and reported as soon as  they are identified.      Narrative:       1 of 2 for Blood Culture x 2 sites order. Per Hospital  Policy: Only change Specimen Src: to \"Line\" if specified by  physician order.    BLOOD CULTURE [[id number]] Collected:  04/19/19 1530    Order Status:  Completed Specimen:  Blood from Peripheral Updated:  04/20/19 0733     Significant Indicator NEG     Source BLD     Site PERIPHERAL     Blood Culture No Growth    Note: Blood cultures are incubated for 5 days and  are monitored continuously.Positive blood cultures  are called to the RN and reported as soon as  they are identified.      Narrative:       2 of 2 blood culture x2  Sites order. Per Hospital Policy:  Only change Specimen Src: to \"Line\" if specified by physician  order.    GRAM STAIN [[id number]] Collected:  04/19/19 1520    Order Status:  Completed Specimen:  Wound Updated:  04/20/19 0028     Significant Indicator .     Source WND     Site Left Foot     Gram Stain Result Few WBCs.  Many Gram positive cocci.            Assessment:  Active Hospital Problems    Diagnosis   • *Lower limb ulcer, ankle, left, with fat layer exposed ([facility]) [L97.322]   • Bullous pemphigus [L10.9]   • Peripheral vascular disease ([facility]) [I73.9]   • Valgus deformity of foot, left [M21.072]       Plan:  Left lower extremity cellulitis, slowly improving  No fevers  Leukocytosis resolved  Wound culture+ MSSA  Continue IV Unasyn  Anticipate transitioning to p.o. " Augmentin at discharge  Anticipate a 10-day course pending clinical improvement  Wound care  If patient's wound and left lower extremity improving tomorrow, will transition to p.o. Augmentin and plan for discharge home    Chronic ulceration on the left ankle  Does not appear infected at this time  Continue wound care    Bullous pemphigus  Recently on prednisone  Rash stable    I have performed a physical exam and reviewed and updated ROS and plan today 4/23/2019.  In review of yesterday's note 4/22/2019, there are no changes except as documented above.    Discussed with internal medicine APRN

## 2019-04-24 ENCOUNTER — TELEPHONE (OUTPATIENT)
Dept: MEDICAL GROUP | Facility: PHYSICIAN GROUP | Age: 74
End: 2019-04-24

## 2019-04-24 ENCOUNTER — APPOINTMENT (OUTPATIENT)
Dept: WOUND CARE | Facility: MEDICAL CENTER | Age: 74
End: 2019-04-24
Attending: NURSE PRACTITIONER
Payer: MEDICARE

## 2019-04-24 ENCOUNTER — PATIENT OUTREACH (OUTPATIENT)
Dept: HEALTH INFORMATION MANAGEMENT | Facility: OTHER | Age: 74
End: 2019-04-24

## 2019-04-24 VITALS
OXYGEN SATURATION: 97 % | TEMPERATURE: 97.6 F | WEIGHT: 148.37 LBS | DIASTOLIC BLOOD PRESSURE: 69 MMHG | RESPIRATION RATE: 17 BRPM | BODY MASS INDEX: 23.29 KG/M2 | HEART RATE: 81 BPM | HEIGHT: 67 IN | SYSTOLIC BLOOD PRESSURE: 142 MMHG

## 2019-04-24 LAB
BACTERIA BLD CULT: NORMAL
BACTERIA BLD CULT: NORMAL
BASOPHILS # BLD AUTO: 0.5 % (ref 0–1.8)
BASOPHILS # BLD: 0.03 K/UL (ref 0–0.12)
EOSINOPHIL # BLD AUTO: 0.21 K/UL (ref 0–0.51)
EOSINOPHIL NFR BLD: 3.6 % (ref 0–6.9)
ERYTHROCYTE [DISTWIDTH] IN BLOOD BY AUTOMATED COUNT: 63 FL (ref 35.9–50)
HCT VFR BLD AUTO: 27.4 % (ref 37–47)
HGB BLD-MCNC: 8.6 G/DL (ref 12–16)
IMM GRANULOCYTES # BLD AUTO: 0.04 K/UL (ref 0–0.11)
IMM GRANULOCYTES NFR BLD AUTO: 0.7 % (ref 0–0.9)
LYMPHOCYTES # BLD AUTO: 1.65 K/UL (ref 1–4.8)
LYMPHOCYTES NFR BLD: 28 % (ref 22–41)
MCH RBC QN AUTO: 31.7 PG (ref 27–33)
MCHC RBC AUTO-ENTMCNC: 31.4 G/DL (ref 33.6–35)
MCV RBC AUTO: 101.1 FL (ref 81.4–97.8)
MONOCYTES # BLD AUTO: 0.55 K/UL (ref 0–0.85)
MONOCYTES NFR BLD AUTO: 9.3 % (ref 0–13.4)
NEUTROPHILS # BLD AUTO: 3.41 K/UL (ref 2–7.15)
NEUTROPHILS NFR BLD: 57.9 % (ref 44–72)
NRBC # BLD AUTO: 0 K/UL
NRBC BLD-RTO: 0 /100 WBC
PLATELET # BLD AUTO: 292 K/UL (ref 164–446)
PMV BLD AUTO: 9.2 FL (ref 9–12.9)
RBC # BLD AUTO: 2.71 M/UL (ref 4.2–5.4)
SIGNIFICANT IND 70042: NORMAL
SIGNIFICANT IND 70042: NORMAL
SITE SITE: NORMAL
SITE SITE: NORMAL
SOURCE SOURCE: NORMAL
SOURCE SOURCE: NORMAL
WBC # BLD AUTO: 5.9 K/UL (ref 4.8–10.8)

## 2019-04-24 PROCEDURE — 700105 HCHG RX REV CODE 258: Performed by: INTERNAL MEDICINE

## 2019-04-24 PROCEDURE — 700102 HCHG RX REV CODE 250 W/ 637 OVERRIDE(OP): Performed by: HOSPITALIST

## 2019-04-24 PROCEDURE — 700111 HCHG RX REV CODE 636 W/ 250 OVERRIDE (IP): Performed by: FAMILY MEDICINE

## 2019-04-24 PROCEDURE — A9270 NON-COVERED ITEM OR SERVICE: HCPCS | Performed by: FAMILY MEDICINE

## 2019-04-24 PROCEDURE — 700102 HCHG RX REV CODE 250 W/ 637 OVERRIDE(OP): Performed by: FAMILY MEDICINE

## 2019-04-24 PROCEDURE — 700102 HCHG RX REV CODE 250 W/ 637 OVERRIDE(OP): Performed by: NURSE PRACTITIONER

## 2019-04-24 PROCEDURE — 99232 SBSQ HOSP IP/OBS MODERATE 35: CPT | Performed by: INTERNAL MEDICINE

## 2019-04-24 PROCEDURE — 700111 HCHG RX REV CODE 636 W/ 250 OVERRIDE (IP): Performed by: INTERNAL MEDICINE

## 2019-04-24 PROCEDURE — 99239 HOSP IP/OBS DSCHRG MGMT >30: CPT | Performed by: INTERNAL MEDICINE

## 2019-04-24 PROCEDURE — A9270 NON-COVERED ITEM OR SERVICE: HCPCS | Performed by: HOSPITALIST

## 2019-04-24 PROCEDURE — 85025 COMPLETE CBC W/AUTO DIFF WBC: CPT

## 2019-04-24 PROCEDURE — A9270 NON-COVERED ITEM OR SERVICE: HCPCS | Performed by: NURSE PRACTITIONER

## 2019-04-24 PROCEDURE — 36415 COLL VENOUS BLD VENIPUNCTURE: CPT

## 2019-04-24 RX ORDER — AMOXICILLIN AND CLAVULANATE POTASSIUM 875; 125 MG/1; MG/1
1 TABLET, FILM COATED ORAL EVERY 12 HOURS
Status: DISCONTINUED | OUTPATIENT
Start: 2019-04-24 | End: 2019-04-24 | Stop reason: HOSPADM

## 2019-04-24 RX ORDER — AMOXICILLIN AND CLAVULANATE POTASSIUM 875; 125 MG/1; MG/1
1 TABLET, FILM COATED ORAL EVERY 12 HOURS
Qty: 20 TAB | Refills: 0 | Status: SHIPPED | OUTPATIENT
Start: 2019-04-24 | End: 2019-04-24

## 2019-04-24 RX ORDER — AMOXICILLIN AND CLAVULANATE POTASSIUM 875; 125 MG/1; MG/1
1 TABLET, FILM COATED ORAL EVERY 12 HOURS
Qty: 10 TAB | Refills: 0 | Status: SHIPPED | OUTPATIENT
Start: 2019-04-24 | End: 2019-04-29

## 2019-04-24 RX ORDER — FERROUS SULFATE 325(65) MG
325 TABLET ORAL
Qty: 90 TAB | Refills: 1 | Status: SHIPPED | OUTPATIENT
Start: 2019-04-24 | End: 2019-09-02

## 2019-04-24 RX ORDER — TRIAMCINOLONE ACETONIDE 5 MG/G
CREAM TOPICAL
Qty: 60 G | Refills: 0 | Status: SHIPPED | OUTPATIENT
Start: 2019-04-25 | End: 2019-05-13

## 2019-04-24 RX ORDER — ASCORBIC ACID 500 MG
500 TABLET ORAL DAILY
Qty: 30 TAB | Refills: 3 | Status: SHIPPED | OUTPATIENT
Start: 2019-04-25 | End: 2019-09-02

## 2019-04-24 RX ADMIN — METOPROLOL SUCCINATE 100 MG: 50 TABLET, EXTENDED RELEASE ORAL at 06:43

## 2019-04-24 RX ADMIN — VITAMIN D, TAB 1000IU (100/BT) 1000 UNITS: 25 TAB at 06:43

## 2019-04-24 RX ADMIN — CYANOCOBALAMIN TAB 500 MCG 1000 MCG: 500 TAB at 06:43

## 2019-04-24 RX ADMIN — AMOXICILLIN AND CLAVULANATE POTASSIUM 1 TABLET: 875; 125 TABLET, FILM COATED ORAL at 09:13

## 2019-04-24 RX ADMIN — AMPICILLIN SODIUM AND SULBACTAM SODIUM 3 G: 2; 1 INJECTION, POWDER, FOR SOLUTION INTRAMUSCULAR; INTRAVENOUS at 06:43

## 2019-04-24 RX ADMIN — HEPARIN SODIUM 5000 UNITS: 5000 INJECTION, SOLUTION INTRAVENOUS; SUBCUTANEOUS at 06:43

## 2019-04-24 RX ADMIN — OXYCODONE HYDROCHLORIDE AND ACETAMINOPHEN 500 MG: 500 TABLET ORAL at 06:43

## 2019-04-24 RX ADMIN — FERROUS SULFATE TAB 325 MG (65 MG ELEMENTAL FE) 325 MG: 325 (65 FE) TAB at 09:12

## 2019-04-24 RX ADMIN — SERTRALINE HYDROCHLORIDE 100 MG: 100 TABLET ORAL at 06:43

## 2019-04-24 RX ADMIN — FERROUS SULFATE TAB 325 MG (65 MG ELEMENTAL FE) 325 MG: 325 (65 FE) TAB at 12:47

## 2019-04-24 RX ADMIN — OXYCODONE HYDROCHLORIDE 5 MG: 5 TABLET ORAL at 06:43

## 2019-04-24 ASSESSMENT — ENCOUNTER SYMPTOMS
COUGH: 0
VOMITING: 0
CHILLS: 0
DIARRHEA: 0
DIZZINESS: 0
HEADACHES: 0
ABDOMINAL PAIN: 0
NAUSEA: 0
SHORTNESS OF BREATH: 0
FEVER: 0

## 2019-04-24 NOTE — DISCHARGE INSTRUCTIONS
Discharge Instructions    Discharged to home by car with relative. Discharged via wheelchair, hospital escort: Yes.  Special equipment needed: Not Applicable    Be sure to schedule a follow-up appointment with your primary care doctor or any specialists as instructed.     Discharge Plan:   Diet Plan: Discussed  Activity Level: Discussed  Confirmed Follow up Appointment: Appointment Scheduled  Confirmed Symptoms Management: Discussed  Medication Reconciliation Updated: Yes  Pneumococcal Vaccine Administered/Refused: Not given - Patient refused pneumococcal vaccine  Influenza Vaccine Indication: Not indicated: Previously immunized this influenza season and > 8 years of age    I understand that a diet low in cholesterol, fat, and sodium is recommended for good health. Unless I have been given specific instructions below for another diet, I accept this instruction as my diet prescription.   Other diet: diabetic      Special Instructions: None    · Is patient discharged on Warfarin / Coumadin?   No     Depression / Suicide Risk    As you are discharged from this [facility] facility, it is important to learn how to keep safe from harming yourself.    Recognize the warning signs:  · Abrupt changes in personality, positive or negative- including increase in energy   · Giving away possessions  · Change in eating patterns- significant weight changes-  positive or negative  · Change in sleeping patterns- unable to sleep or sleeping all the time   · Unwillingness or inability to communicate  · Depression  · Unusual sadness, discouragement and loneliness  · Talk of wanting to die  · Neglect of personal appearance   · Rebelliousness- reckless behavior  · Withdrawal from people/activities they love  · Confusion- inability to concentrate     If you or a loved one observes any of these behaviors or has concerns about self-harm, here's what you can do:  · Talk about it- your feelings and reasons for harming yourself  · Remove any  means that you might use to hurt yourself (examples: pills, rope, extension cords, firearm)  · Get professional help from the community (Mental Health, Substance Abuse, psychological counseling)  · Do not be alone:Call your Safe Contact- someone whom you trust who will be there for you.  · Call your local CRISIS HOTLINE 587-1683 or 489-195-7094  · Call your local Children's Mobile Crisis Response Team [place] (360) 399-7906 or wwwThubrikar Aortic Valve  · Call the toll free National Suicide Prevention Hotlines   · National Suicide Prevention Lifeline 737-122-HWXU (1936)  · National Hope Line Network 800-SUICIDE (897-9790).        Diabetes and Foot Care  Diabetes may cause you to have problems because of poor blood supply (circulation) to your feet and legs. This may cause the skin on your feet to become thinner, break easier, and heal more slowly. Your skin may become dry, and the skin may peel and crack. You may also have nerve damage in your legs and feet causing decreased feeling in them. You may not notice minor injuries to your feet that could lead to infections or more serious problems. Taking care of your feet is one of the most important things you can do for yourself.  Follow these instructions at home:  · Wear shoes at all times, even in the house. Do not go barefoot. Bare feet are easily injured.  · Check your feet daily for blisters, cuts, and redness. If you cannot see the bottom of your feet, use a mirror or ask someone for help.  · Wash your feet with warm water (do not use hot water) and mild soap. Then pat your feet and the areas between your toes until they are completely dry. Do not soak your feet as this can dry your skin.  · Apply a moisturizing lotion or petroleum jelly (that does not contain alcohol and is unscented) to the skin on your feet and to dry, brittle toenails. Do not apply lotion between your toes.  · Trim your toenails straight across. Do not dig under them or around the cuticle.  File the edges of your nails with an emery board or nail file.  · Do not cut corns or calluses or try to remove them with medicine.  · Wear clean socks or stockings every day. Make sure they are not too tight. Do not wear knee-high stockings since they may decrease blood flow to your legs.  · Wear shoes that fit properly and have enough cushioning. To break in new shoes, wear them for just a few hours a day. This prevents you from injuring your feet. Always look in your shoes before you put them on to be sure there are no objects inside.  · Do not cross your legs. This may decrease the blood flow to your feet.  · If you find a minor scrape, cut, or break in the skin on your feet, keep it and the skin around it clean and dry. These areas may be cleansed with mild soap and water. Do not cleanse the area with peroxide, alcohol, or iodine.  · When you remove an adhesive bandage, be sure not to damage the skin around it.  · If you have a wound, look at it several times a day to make sure it is healing.  · Do not use heating pads or hot water bottles. They may burn your skin. If you have lost feeling in your feet or legs, you may not know it is happening until it is too late.  · Make sure your health care provider performs a complete foot exam at least annually or more often if you have foot problems. Report any cuts, sores, or bruises to your health care provider immediately.  Contact a health care provider if:  · You have an injury that is not healing.  · You have cuts or breaks in the skin.  · You have an ingrown nail.  · You notice redness on your legs or feet.  · You feel burning or tingling in your legs or feet.  · You have pain or cramps in your legs and feet.  · Your legs or feet are numb.  · Your feet always feel cold.  Get help right away if:  · There is increasing redness, swelling, or pain in or around a wound.  · There is a red line that goes up your leg.  · Pus is coming from a wound.  · You develop a fever  or as directed by your health care provider.  · You notice a bad smell coming from an ulcer or wound.  This information is not intended to replace advice given to you by your health care provider. Make sure you discuss any questions you have with your health care provider.  Document Released: 12/15/2001 Document Revised: 05/25/2017 Document Reviewed: 05/27/2014  [employer] Patient Education © 2017 [employer]

## 2019-04-24 NOTE — PROGRESS NOTES
Infectious Disease Progress Note    Author: Amber Valencia M.D. Date & Time of service: 2019  10:58 AM    Chief Complaint:  Follow-up for cellulitis of the lower extremities    Interval History:  73 y.o. female with a history of peripheral vascular disease with chronic lower extremity ulcers since mid , status post femoropopliteal bypass, bullous pemphigus recently on prednisone.    Readmitted for lower extremity cellulitis     afebrile WBC 7 patient states that erythema on her right and left leg are improving.  She is happy she does not have any edema.  Wound cultures growing MSSA.  Tolerating Unasyn   afebrile WBC 7 patient complaining of nausea and abdominal upset after taking her morning meds without food.  She was seen by wound care yesterday with a dressing change    afebrile WBC 5.9 patient feeling somewhat better today and states she was seen by wound care yesterday with wound VAC placement on her left ankle.  Patient states she is going home today.  We will follow-up with the wound clinic.    Labs Reviewed and Wound Reviewed.    Review of Systems:  Review of Systems   Constitutional: Negative for chills and fever.   Respiratory: Negative for cough and shortness of breath.    Cardiovascular: Negative for leg swelling.   Gastrointestinal: Negative for abdominal pain, diarrhea, nausea and vomiting.   Neurological: Negative for dizziness and headaches.       Hemodynamics:  Temp (24hrs), Av.5 °C (97.7 °F), Min:36.4 °C (97.5 °F), Max:36.7 °C (98 °F)  Temperature: 36.4 °C (97.6 °F)  Pulse  Av.2  Min: 72  Max: 103   Blood Pressure : 142/69       Physical Exam:  Physical Exam   Constitutional: She is oriented to person, place, and time. She appears well-developed.   HENT:   Mouth/Throat: Oropharynx is clear and moist. No oropharyngeal exudate.   Eyes: Pupils are equal, round, and reactive to light. Conjunctivae and EOM are normal.   Cardiovascular: Normal rate.    Murmur  heard.  Pulmonary/Chest: Effort normal. She has no wheezes. She has no rales.   Abdominal: Soft. There is no tenderness.   Musculoskeletal: She exhibits edema and deformity.   Right lower extremity erythema, resolving    Left lower extremity dressed and left ankle and wound VAC   Neurological: She is alert and oriented to person, place, and time.   Skin: Rash noted. There is erythema.   Psychiatric: She has a normal mood and affect. Her behavior is normal.   Pleasant       Meds:    Current Facility-Administered Medications:   •  amoxicillin-clavulanate  •  cyanocobalamin  •  LORazepam  •  ferrous sulfate  •  ascorbic acid  •  diphenhydrAMINE  •  diphenhydrAMINE-zinc acetate  •  triamcinolone acetonide  •  cyanocobalamin  •  metoprolol SR  •  sertraline  •  vitamin D  •  senna-docusate **AND** polyethylene glycol/lytes **AND** magnesium hydroxide **AND** bisacodyl  •  heparin  •  acetaminophen  •  Notify provider if pain remains uncontrolled **AND** Use the numeric rating scale (NRS-11) on regular floors and Critical-Care Pain Observation Tool (CPOT) on ICUs/Trauma to assess pain **AND** Pulse Ox (Oximetry) **AND** Pharmacy Consult Request **AND** If patient difficult to arouse and/or has respiratory depression, stop any opiates that are currently infusing and call a Rapid Response. **AND** oxyCODONE immediate-release **AND** oxyCODONE immediate-release **AND** morphine injection  •  hydrALAZINE  •  ondansetron  •  ondansetron    Labs:  Recent Labs      04/22/19   0228  04/23/19   0426  04/24/19   0347   WBC  7.0  7.0  5.9   RBC  3.11*  2.80*  2.71*   HEMOGLOBIN  9.9*  8.8*  8.6*   HEMATOCRIT  31.6*  29.0*  27.4*   MCV  101.6*  103.6*  101.1*   MCH  31.8  31.4  31.7   RDW  64.7*  64.8*  63.0*   PLATELETCT  270  295  292   MPV  9.6  9.1  9.2   NEUTSPOLYS  60.70  63.60  57.90   LYMPHOCYTES  24.40  23.60  28.00   MONOCYTES  10.30  8.40  9.30   EOSINOPHILS  3.00  2.70  3.60   BASOPHILS  0.70  0.70  0.50     No results  for input(s): SODIUM, POTASSIUM, CHLORIDE, CO2, GLUCOSE, BUN, CPKTOTAL in the last 72 hours.  No results for input(s): ALBUMIN, TBILIRUBIN, ALKPHOSPHAT, TOTPROTEIN, ALTSGPT, ASTSGOT, CREATININE in the last 72 hours.    Imaging:  Us-monika Single Level Bilat    Result Date: 2019   Vascular Laboratory  Conclusions  Compared to prior study of 2018.  NANCY SANCHEZ  Age:    73    Gender:     F  MRN:    [id number]  :    1945      BSA:  Exam Date:     2019 17:59  Room #:     Inpatient  Priority:     Routine  Ht (in):             Wt (lb):  Ordering Physician:        DYLON DENNIS  Referring Physician:       DYLON DENNIS  Sonographer:               Jermain Dahl RVT  Study Type:                Limited Bilateral  Technical Quality:         Adequate  Indications:     Ulcer of lower extremity  CPT Codes:       70459  ICD Codes:       707.1  History:         Left ankle ulceration; status/post femoral-popliteal bypass                   graft (2018)  Limitations:     Bandaging of left ankle ulcer: no MONIKA obtained. Equipment                   failure: no TBI obtained                 RIGHT  Waveform            Systolic BPs (mmHg)                             140           Brachial  Bi, non-                                 Common Femoral  reversed  Bi, non-                   142           Posterior Tibial  reversed  Bi, non-                   125           Dorsalis Pedis  reversed                                           Peroneal                             0.99          MONIKA                                           TBI                       LEFT  Waveform        Systolic BPs (mmHg)                             144           Brachial  Bi, non-                                 Common Femoral  reversed  Bi, non-                                 Posterior Tibial  reversed  Bi, non-                                 Dorsalis Pedis  reversed                                           Peroneal                                            TOMI                                           TBI  Findings  Right.  Doppler waveform of the common femoral artery is of high amplitude and  biphasic without reversal of flow.  Doppler waveforms at the ankle are brisk and biphasic without reversal of  flow.  Ankle-brachial index is normal.  Left.  Doppler waveform of the common femoral artery is of high amplitude and  biphasic without reversal of flow.  Doppler waveforms at the ankle are brisk and biphasic without reversal of  flow.  TOMI not obtained due to bandaging of ankle.  TBI not obtained due to equipment failure.  Arterial duplex scan of the left lower extremity was performed - see  separate report.    Us-extremity Artery Lower Unilat Left    Result Date: 2019  Lower Extremity  Arterial Duplex Report  Vascular Laboratory  CONCLUSIONS  Compared to the prior study of 2018.  NANCY SANCHEZ  Exam Date:     2019 17:07  Room #:     Inpatient  Priority:     Routine  Ht (in):             Wt (lb):  Ordering Physician:        DYLON DENNIS  Referring Physician:       DYLON DENNIS  Sonographer:               Jermain Dahl RVT  Study Type:                Complete Unilateral  Technical Quality:         Adequate  Age:    73    Gender:     F  MRN:    [id number]  :    1945      BSA:  Indications:     Ulcer of lower extremity  CPT Codes:       63911  ICD Codes:       707.1  History:         Ulceration of left lower extremity status/post fem-pop bypass                   graft (2018)  Limitations:     Cardiac arrhythmia, difficult to determine waveform phasicity                RIGHT  Waveform        Peak Systolic Velocity (cm/s)                  Prox    Prox-Mid  Mid    Mid-Dist  Distal                                                             CFA                                                             PFA                                                             SFA                                                             POP                                                              AT                                                             PT                                                             JESSICA                LEFT  Waveform        Peak Systolic Velocity (cm/s)                  Prox    Prox-Mid  Mid    Mid-Dist  Distal  Bi, non-                          191                      CFA  reversed  Triphasic       140                                        PFA  Absent          0                 0                0       SFA  Bi, non-                          116                      POP  reversed  Bi, non-        150                                122     AT  reversed  Bi, non-        87                                 133     PT  reversed  Bi, non-        88                                 81      JESSICA  reversed  FINDINGS  Left.  Biphasic inflow without reversal of flow and a sharp upstroke is visualized  within the common femoral artery, limiting the possibility of more proximal  stenosis.  No flow can be demonstrated in the native superficial femoral artery.  The femoral-popliteal bypass graft is widely patent with no evidence of  stenosis and sharp, multiphasic flow demonstrated throughout its length.  3-vessel, multiphasic runoff to the foot.      Micro:  Results     Procedure Component Value Units Date/Time    CULTURE WOUND W/ GRAM STAIN [[id number]]  (Abnormal)  (Susceptibility) Collected:  04/19/19 1520    Order Status:  Completed Specimen:  Wound from Exudate Updated:  04/21/19 0848     Significant Indicator POS (POS)     Source WND     Site Left Foot     Culture Result Wound - (A)     Gram Stain Result Few WBCs.  Many Gram positive cocci.       Culture Result Wound Staphylococcus aureus  Heavy growth   (A)    Culture & Susceptibility     STAPHYLOCOCCUS AUREUS     Antibiotic Sensitivity Microscan Unit Status    Ampicillin/sulbactam Sensitive <=8/4 mcg/mL Final    Method: LICO    Clindamycin Sensitive <=0.5 mcg/mL Final     "Method: LICO    Daptomycin Sensitive <=0.5 mcg/mL Final    Method: LICO    Erythromycin Sensitive <=0.5 mcg/mL Final    Method: LICO    Moxifloxacin Sensitive <=0.5 mcg/mL Final    Method: LICO    Oxacillin Sensitive <=0.25 mcg/mL Final    Method: LICO    Tetracycline Sensitive <=4 mcg/mL Final    Method: LICO    Trimeth/Sulfa Sensitive <=0.5/9.5 mcg/mL Final    Method: LICO    Vancomycin Sensitive 1 mcg/mL Final    Method: LICO                       BLOOD CULTURE [[id number]] Collected:  04/19/19 1450    Order Status:  Completed Specimen:  Blood from Peripheral Updated:  04/20/19 0733     Significant Indicator NEG     Source BLD     Site PERIPHERAL     Blood Culture No Growth    Note: Blood cultures are incubated for 5 days and  are monitored continuously.Positive blood cultures  are called to the RN and reported as soon as  they are identified.      Narrative:       1 of 2 for Blood Culture x 2 sites order. Per Hospital  Policy: Only change Specimen Src: to \"Line\" if specified by  physician order.    BLOOD CULTURE [[id number]] Collected:  04/19/19 1530    Order Status:  Completed Specimen:  Blood from Peripheral Updated:  04/20/19 0733     Significant Indicator NEG     Source BLD     Site PERIPHERAL     Blood Culture No Growth    Note: Blood cultures are incubated for 5 days and  are monitored continuously.Positive blood cultures  are called to the RN and reported as soon as  they are identified.      Narrative:       2 of 2 blood culture x2  Sites order. Per Hospital Policy:  Only change Specimen Src: to \"Line\" if specified by physician  order.    GRAM STAIN [[id number]] Collected:  04/19/19 1520    Order Status:  Completed Specimen:  Wound Updated:  04/20/19 0028     Significant Indicator .     Source WND     Site Left Foot     Gram Stain Result Few WBCs.  Many Gram positive cocci.            Assessment:  Active Hospital Problems    Diagnosis   • *Lower limb ulcer, ankle, left, with fat layer exposed ([facility]) [L97.322]   • " Bullous pemphigus [L10.9]   • Peripheral vascular disease (HCC) [I73.9]   • Valgus deformity of foot, left [M21.072]       Plan:  Left lower extremity cellulitis, improving  No fevers  Leukocytosis resolved  Wound culture+ MSSA  Continue IV Unasyn  Transition to p.o. Augmentin at discharge  Anticipate a 10-day course pending clinical improvement  Stop date 4/29/2019  Wound care and wound VAC    Chronic ulceration on the left ankle  Does not appear infected at this time  Continue wound care    Bullous pemphigus  Recently on prednisone  Rash stable    I have performed a physical exam and reviewed and updated ROS and plan today 4/24/2019.  In review of yesterday's note 4/23/2019, there are no changes except as documented above.    Patient to follow-up with wound care as an outpatient    Plan for discharge home today    Follow-up in the ID clinic as needed    Discussed with internal medicine IRON Crespo.  ID signing off.

## 2019-04-24 NOTE — CARE PLAN
Problem: Communication  Goal: The ability to communicate needs accurately and effectively will improve    Intervention: Educate patient and significant other/support system about the plan of care, procedures, treatments, medications and allow for questions  Discussed POC, pt communicates questions and poc well. Pt and family understand poc.      Problem: Safety  Goal: Will remain free from injury  Educated on safety measures, using call light ect

## 2019-04-24 NOTE — PROGRESS NOTES
Iv infiltrated during ATB administration. Zak ANP aware. ATB to be switched to orals. No need for new IV

## 2019-04-24 NOTE — CARE PLAN
Problem: Pain Management  Goal: Pain level will decrease to patient's comfort goal    Intervention: Follow pain managment plan developed in collaboration with patient and Interdisciplinary Team  Pt educated on 0-10 pain scale and pain management regimen. Pt verbalizes understanding

## 2019-04-24 NOTE — PROGRESS NOTES
Discharge to home. Pt signed a copy of the discharge papers and confirms all questions have been answered by the MD or the RN. Second copy of d/c papers in chart. IV discontinued. Pt states all person belongings are in possession. Pt escorted off unit with assistance from RN

## 2019-04-24 NOTE — DISCHARGE SUMMARY
Discharge Summary    CHIEF COMPLAINT ON ADMISSION  Chief Complaint   Patient presents with   • Wound Infection       Reason for Admission  Leg Pain/Sent by MD     Admission Date  4/19/2019    CODE STATUS  Full Code    HPI & HOSPITAL COURSE  This is a 73 y.o. female with history of bullous pemphigoid, hypertension, B12 deficiency, peripheral vascular disease status post femoral-popliteal bypass, hyperlipidemia, osteoporosis, anxiety, and multiple prior admissions for cellulitis and ulcers of the lower extremities stemming back to at least 2018 related to immunocompromised status due to history of steroid use for her pemphigoid as well as a chronic deformity of the left ankle from a trimalleolar fracture, who is here with repeat cellulitis of the left lower extremity and wound formation.  Infectious disease consultation was obtained with the recommendation to treat with IV Unasyn as culture results produced MSSA.  Her prednisone was held.  Of note, ID has recommended PCP prophylaxis should the patient require resumption of her outpatient prednisone 40 mg daily with either every 4 weeks aerosolized pentamidine or daily atovaquone.    Wound care consultation was also obtained and the patient was set up with negative pressure wound therapy.  Limb preservation services was also contacted with recommendations to continue home or outpatient wound care at [facility] 3 times per week.  She does have an appointment at discharge scheduled for 4/26/2019.    Infectious disease has recommended transition from Unasyn to oral Augmentin twice daily with anticipated stop date of April 29, 2019 this would complete a 10 days of antibiotics from her last positive wound culture.     Therefore, she is discharged in good and stable condition to home with close outpatient follow-up.    Discharge Date  4/24/2019    FOLLOW UP ITEMS POST DISCHARGE  Follow-up at wound care and PCP    DISCHARGE DIAGNOSES  Principal Problem:    Lower limb ulcer, ankle,  left, with fat layer exposed (HCC) POA: Yes  Active Problems:    Bullous pemphigus POA: Yes    Essential hypertension POA: Yes    B12 deficiency POA: Yes    Peripheral vascular disease (HCC) POA: Yes    Hyponatremia POA: Yes    Iron deficiency anemia POA: Yes    Valgus deformity of foot, left POA: Yes  Resolved Problems:    * No resolved hospital problems. *      FOLLOW UP  Future Appointments  Date Time Provider Department Center   4/26/2019 10:30 AM VANITA Moralez [address] [place] [address]   4/29/2019 9:00 AM Annalee Jesus R.N. [facility] [address] [place] [address]   5/1/2019 9:30 AM Annalee Escobar R.N. [facility] [address] [place] [address]   5/3/2019 9:00 AM VANITA Chavarria [address] [place] [address]   5/6/2019 3:30 PM Milana Colin M.D. [address]   5/8/2019 2:30 PM HEBER Krishna [address]   5/10/2019 9:00 AM VANITA Moralez [address] [place] [address]   5/13/2019 10:30 AM VANITA Acevedo [address] [place] [address]   5/15/2019 10:00 AM Leonor Siegel R.N. [facility] [address] [place] [address]   5/17/2019 10:00 AM Scar Jesus R.N. [place] [address] [place] [address]   6/12/2019 11:00 AM RYANN Son [facility]     No follow-up provider specified.    MEDICATIONS ON DISCHARGE     Medication List      START taking these medications      Instructions   amoxicillin-clavulanate 875-125 MG Tabs  Commonly known as:  AUGMENTIN   Take 1 Tab by mouth every 12 hours for 5 days.  Dose:  1 Tab     ascorbic acid 500 MG tablet  Start taking on:  4/25/2019  Commonly known as:  VITAMIN C   Take 1 Tab by mouth every day.  Dose:  500 mg     ferrous sulfate 325 (65 Fe) MG tablet   Take 1 Tab by mouth 3 times a day, with meals.  Dose:  325 mg     triamcinolone acetonide 0.5 % Crea  Start taking on:  4/25/2019  Commonly known as:  KENALOG   BID to inflamed skin; not to be used beyond two weeks        CONTINUE taking these medications      Instructions   metoprolol  MG Tb24  Commonly known as:  TOPROL XL   Take 100 mg by mouth every day.  Dose:  100 mg     NIACIN PO   Take 1 Tab by mouth every day.  Dose:  1  Tab     sertraline 100 MG Tabs  Commonly known as:  ZOLOFT   Take 1 Tab by mouth every day.  Dose:  100 mg     THERAPEUTIC-M/LUTEIN Tabs   Take 1 Tab by mouth every day.  Dose:  1 Tab     vitamin B-12 1000 MCG Tabs   Take 1,000 mcg by mouth every day.  Dose:  1000 mcg     vitamin D 1000 UNIT Tabs  Commonly known as:  cholecalciferol   Take 1,000 Units by mouth every day.  Dose:  1000 Units            Allergies  Allergies   Allergen Reactions   • Bactrim [Sulfamethoxazole-Trimethoprim] Rash     Diffuse pruritic skin rash with blisters (no mucous membrane involvement) (was also taking cipro at the time - reaction thought more likely to be 2/2 Bactrim)   • Meropenem Unspecified     Pt can not remember reaction         DIET  Orders Placed This Encounter   Procedures   • Diet Order Regular     Standing Status:   Standing     Number of Occurrences:   1     Order Specific Question:   Diet:     Answer:   Regular [1]       ACTIVITY  As tolerated.  Weight bearing as tolerated    CONSULTATIONS  Infectious disease  Limb preservation services  Wound care    PROCEDURES  None    LABORATORY  Lab Results   Component Value Date    SODIUM 131 (L) 04/21/2019    POTASSIUM 3.7 04/21/2019    CHLORIDE 101 04/21/2019    CO2 23 04/21/2019    GLUCOSE 110 (H) 04/21/2019    BUN 10 04/21/2019    CREATININE 0.64 04/21/2019        Lab Results   Component Value Date    WBC 5.9 04/24/2019    HEMOGLOBIN 8.6 (L) 04/24/2019    HEMATOCRIT 27.4 (L) 04/24/2019    PLATELETCT 292 04/24/2019        Total time of the discharge process exceeds 36 minutes.

## 2019-04-24 NOTE — TELEPHONE ENCOUNTER
Future Appointments       Provider Department Center    4/26/2019 10:30 AM Lisseth Ghosh R.[facility] [address] [place] [address]    4/29/2019 9:00 AM Annalee Jesus R.N[facility] [address] [place] [address]    4/29/2019 2:00 PM RYANN Buckley [facility]    5/1/2019 9:30 AM Annalee Escobar R.N. [facility] [address] [place] [address]    5/3/2019 9:00 AM Tae Santana R.N. [facility] [address] [place] [address]    5/6/2019 3:30 PM Milana Colin M.D. [facility] [address] [place] [address]    5/8/2019 2:30 PM HEBER Krishna [facility] [address] [place] [address]    5/10/2019 9:00 AM Lisseth Ghosh R.N. [facility] [address] [place] [address]    5/13/2019 10:30 AM Annalee Jesus R.N. [facility] [address] [place] [address]    5/15/2019 10:00 AM Leonor Siegel R.N. [facility] [address] [place] [address]    5/17/2019 10:00 AM Scar Jesus R.N. [facility] [address] [place] [address]    6/12/2019 11:00 AM RYANN Son [facility]        ESTABLISHED PATIENT PRE-VISIT PLANNING     Patient was NOT contacted to complete PVP.     1.  Reviewed notes from the last few office visits within the medical group: Yes LOV 2/12/19    2.  If any orders were placed at last visit or intended to be done for this visit (i.e. 6 mos follow-up), do we have Results/Consult Notes?        •  Labs - Labs ordered, completed on 4/23/19 and results are in chart.          •  Imaging - Imaging ordered, completed and results are in chart.       •  Referrals - Referral ordered, patient has NOT been seen.    3. Is this appointment scheduled as a Hospital Follow-Up? Yes, visit was at [facility].     4.  Immunizations were updated in [facility] using WebIZ?: Yes       •  Web Iz Recommendations: FLU, TD, VARICELLA (Chicken Pox)  and SHINGRIX (Shingles)    5.  Patient is due for the following Health Maintenance Topics:   Health Maintenance Due   Topic Date Due   • COLON CANCER SCREENING ANNUAL FIT  10/13/1993   • IMM ZOSTER VACCINES (1 of 2) 10/13/1993   • Annual Wellness Visit  10/20/2016   •  MAMMOGRAM  03/29/2017     6. Orders for overdue Health Maintenance topics pended in Pre-Charting? NO    8.  Patient was NOT informed to arrive 15 min prior to their scheduled appointment and bring in their medication bottles.

## 2019-04-25 ENCOUNTER — PATIENT OUTREACH (OUTPATIENT)
Dept: HEALTH INFORMATION MANAGEMENT | Facility: OTHER | Age: 74
End: 2019-04-25

## 2019-04-25 NOTE — PROGRESS NOTES
[facility] patient advocate reached out to patient for a hospital follow-up. Patient reported she is doing okay, and today she plans on just resting and taking it easy. Patient confirmed she starts wound care again tomorrow and stated she feels comfortable driving. Advocate will check in with patient again early next week.

## 2019-04-25 NOTE — PROGRESS NOTES
SCP post discharge med rec completed. No clinically significant medication issues noted. Unable to reach patient for f/u. Left  for patient to call [phone].

## 2019-04-26 ENCOUNTER — NON-PROVIDER VISIT (OUTPATIENT)
Dept: WOUND CARE | Facility: MEDICAL CENTER | Age: 74
End: 2019-04-26
Attending: NURSE PRACTITIONER
Payer: MEDICARE

## 2019-04-26 PROCEDURE — 99211 OFF/OP EST MAY X REQ PHY/QHP: CPT

## 2019-04-26 PROCEDURE — 97597 DBRDMT OPN WND 1ST 20 CM/<: CPT

## 2019-04-26 NOTE — PATIENT INSTRUCTIONS
-Keep your wound dressing clean, dry, and intact.    -Change your dressing if it becomes soiled, soaked, or falls off.    -Remove your compression wrap if you have severe pain, severe swelling, numbness, color change, or temperature change in your toes. If you need to remove your compression wrap, do so by unrolling it. Do not cut the compression wrap off to prevent cutting yourself on accident.    -Wound vac may not have any drainage in tube or cannister & it will still be working.   Change cannister if it does become full by pressing tab on side of machine to remove canister and snap on new one. Full canister can be thrown in the trash. If cannister fills with bright red blood - go to ER. Dressing will be changed every MWF at the wound clini.  If you are having issues with your wound VAC, please consider patching leaks, changing the canister, or calling [phone] for troubleshooting. If the wound VAC has been off or un-operational for over 2 hours, call wound care center to inform them and remove all dressings including black foam and replace with normal saline damp gauze.     -Should you experience any significant changes in your wound(s), such as infection (redness, swelling, localized heat, increased pain, fever > 101 F, chills) or have any questions regarding your home care instructions, please contact the wound center at [phone]. If after hours, contact your primary care physician or go to the hospital emergency room.

## 2019-04-26 NOTE — CERTIFICATION
Non Provider Encounter- Lower Extremity Ulcer    HISTORY OF PRESENT ILLNESS    START OF CARE IN CLINIC: 04/26/2019    REFERRING PROVIDER: HEBER Dumas     WOUND- Lower Extremity Ulcer   LOCATION: L medial ankle, L posteromedial LE, L medial foot, L plantar hallux, L dorsum 3rd ray   VARICOSE VEINS: NO   WOUND HISTORY: Pt who is very well known to this clinic returns for evaluation after yet another hospitalization for LLE cellulitis.  Admitted to [facility] for leg cellulitis and started on IV vancomycin and cefepime. Pt recently completed a course of ciprofloxacin and Zyvox.   Followed by ID.   Does not have DM.    PERTINENT PMH: cellulitis, LE wounds, dyslipidemia, HTN, fem/pop bypass, former smoker, Bullous pephigus, PVD, B12 deficiency, steroid use     IMAGING: MRI of Ankle 04/22/2019    FINDINGS: There is extensive patient motion artifact.    Osseous structures/Cartilaginous surfaces: There is deformity of the tibiotalar joint characterized by chronic fracture of the medial malleolus. There is cortical and trabecular bridging only seen medially at the fracture site. There is marked valgus   deformity of the tibiotalar joint. There is marked asymmetry of the ankle mortise. There is widening of the syndesmosis. There is degenerative change of the tibiotalar joint characterized by cartilage loss with osteophytic spurring. No evidence of acute   fracture. There are old fracture fragment seen adjacent to the lateral malleolus. There is old healed distal posterior tibial fracture. There is no evidence of abnormal enhancement of the osseous structures postcontrast administration to suggest presence   of osteomyelitis.    Ligaments/Tendons: Ligamentous structures are not well seen due to motion artifact. No evidence of full-thickness tendon tear. There is mild tendinopathy of the Achilles tendon.    Miscellaneous: There is no significant joint effusion. Fat signal is maintained within the sinus tarsi. No evidence of  mass seen within the tarsal tunnel. There is extensive skin thickening surrounding the ankle most prominent medially where there is   also some enhancement. There is a large open wound overlying the distal tibia medially.     VASCULAR STUDIES: 04/21/2019 - see below    LAST  WOUND CULTURE DATE: 04/19/2019 - Positve - Heavy growth Staph Aureus. Pt currently on antibiotics     COMPRESSION: Yes       DIABETES:  NO  TOBACCO USE: Not currently    RESULTS:   04/21/2019                    RIGHT     Waveform            Systolic BPs (mmHg)                              140           Brachial   Bi, non-                                 Common Femoral   reversed   Bi, non-                   142           Posterior Tibial   reversed   Bi, non-                   125           Dorsalis Pedis   reversed                                            Peroneal                              0.99          TOMI                                            TBI                          LEFT   Waveform        Systolic BPs (mmHg)                              144           Brachial   Bi, non-                                 Common Femoral   reversed   Bi, non-                                 Posterior Tibial   reversed   Bi, non-                                 Dorsalis Pedis   reversed                                            Peroneal                                            TOMI                                            TBI       Findings   Right.    Doppler waveform of the common femoral artery is of high amplitude and    biphasic without reversal of flow.   Doppler waveforms at the ankle are brisk and biphasic without reversal of    flow.    Ankle-brachial index is normal.      Left.    Doppler waveform of the common femoral artery is of high amplitude and    biphasic without reversal of flow.   Doppler waveforms at the ankle are brisk and biphasic without reversal of    flow.    TOMI not obtained due to bandaging of ankle.   TBI not obtained  due to equipment failure.     Arterial duplex scan of the left lower extremity was performed - see    separate report.      FALL RISK ASSESSMENT:    X 65 years or older     Fall within the last 2 years   X Uses ambulatory devices  Loss of protective sensation in feet   Use of prostethic/orthotic    Presence of lower extremity/foot/toe amputation   Taking medication that increases risk (per facility policy)    Interventions Recommended (if any of the above are selected):   Use of Assistive Device:   Supervision with ambulation: Caregiver   Assistance with ambulation: Caregiver   Home safety education: Educational material provided        PAST MEDICAL HISTORY:   Past Medical History:   Diagnosis Date   • Anxiety    • Cellulitis and abscess of lower extremity    • Dental disorder     full dentures   • Generalized osteoarthritis of multiple sites 10/20/2015   • Heart valve disease     pt not sure    • Hyperlipidemia    • Hypertension    • Osteoporosis        PAST SURGICAL HISTORY:   Past Surgical History:   Procedure Laterality Date   • FEMORAL POPLITEAL BYPASS Left 6/8/2018    Procedure: FEMORAL POPLITEAL BYPASS;  Surgeon: Milana Colin M.D.;  Location: [facility];  Service: General   • IRRIGATION & DEBRIDEMENT GENERAL Left 6/8/2018    Procedure: IRRIGATION & DEBRIDEMENT ANKLE WOUND;  Surgeon: Milana Colin M.D.;  Location: SURGERY [facility];  Service: General   • IRRIGATION & DEBRIDEMENT HIP Left 6/4/2018    Procedure: IRRIGATION & DEBRIDEMENT HIP;  Surgeon: Chong Vazquez M.D.;  Location: SURGERY [facility];  Service: Orthopedics   • HIP REVISION TOTAL Left 2/11/2018    Procedure: HIP REVISION TOTAL- femoral nail removal conversion to total hip arthoroplasty;  Surgeon: Chong Vazquez M.D.;  Location: SURGERY [facility];  Service: Orthopedics   • HIP NAILING INTRAMEDULLARY Left 12/20/2017    Procedure: HIP NAILING INTRAMEDULLARY;  Surgeon: Jorge Peters M.D.;  Location: SURGERY  [facility];  Service: Orthopedics   • BREAST BIOPSY  8/28/2014    Performed by Magdalena Londono M.D. at SURGERY [facility]   • BREAST BIOPSY Right 8/14    benign   • GYN SURGERY  1973    complete hysterectomy   • ABDOMINAL HYSTERECTOMY TOTAL      w/BSO due to uterine cyst and endometriosis   • ATHROPLASTY      hip        MEDICATIONS:   Current Outpatient Prescriptions   Medication   • ascorbic acid (VITAMIN C) 500 MG tablet   • ferrous sulfate 325 (65 Fe) MG tablet   • triamcinolone acetonide (KENALOG) 0.5 % Cream   • amoxicillin-clavulanate (AUGMENTIN) 875-125 MG Tab   • NIACIN PO   • sertraline (ZOLOFT) 100 MG Tab   • metoprolol SR (TOPROL XL) 100 MG TABLET SR 24 HR   • Multiple Vitamins-Minerals (THERAPEUTIC-M/LUTEIN) Tab   • vitamin D (CHOLECALCIFEROL) 1000 UNIT Tab     No current facility-administered medications for this visit.        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  [employer]     Social History Main Topics   • Smoking status: Former Smoker     Packs/day: 0.50     Years: 25.00     Types: Cigarettes     Quit date: 1/1/2007   • Smokeless tobacco: Never Used   • Alcohol use 3.0 oz/week     5 Glasses of wine per week      Comment: glass of wine per day   • Drug use: No   • Sexual activity: Not Currently     Partners: Male     Other Topics Concern   • Not on file     Social History Narrative   • No narrative on file       FAMILY HISTORY:   Family History   Problem Relation Age of Onset   • GI Mother          colostomy   • Heart Attack Father    • Diabetes Father    • Hypertension Father    • Psychiatry Brother         bipolar disorder       WOUND ASSESSMENT     Negative Pressure Wound Therapy Leg Left;Proximal;Other (Comment) (Active)   NPWT Pump Mode / Pressure Setting Continuous;125 mmHg 4/26/2019 11:20 AM   Dressing Type Black foam 4/26/2019 11:20 AM   Number of Foam Pieces Used 2 4/26/2019 11:20 AM   Canister Changed No 4/26/2019 11:20 AM       Negative Pressure Wound Therapy Leg Left;Inner (Active)     Wound 07/05/18 Leg Left Medial Malleolus (Active)   Wound Image   4/26/2019 11:20 AM   Site Assessment Red;Yellow 4/26/2019 11:20 AM   Maricruz-wound Assessment Fragile;Edema 4/26/2019 11:20 AM   Margins Attached edges 4/26/2019 11:20 AM   Wound Length (cm) 4.2 cm 4/8/2019  2:45 PM   Wound Width (cm) 3 cm 4/8/2019  2:45 PM   Wound Depth (cm) 0.1 cm 4/8/2019  2:45 PM   Wound Surface Area (cm^2) 12.6 cm^2 4/8/2019  2:45 PM   Post Wound Length (cm) 3.9 cm 4/26/2019 11:20 AM    Post Wound Width (cm) 2.5 cm 4/26/2019 11:20 AM   Post Wound Depth (cm) 0.2 cm 4/26/2019 11:20 AM   Post Wound Surface Area (cm^2) 9.75 cm^2 4/26/2019 11:20 AM   Tunneling 0 cm 4/15/2019  8:00 AM   Undermining 0 cm 4/15/2019  8:00 AM   Closure Secondary intention 4/26/2019 11:20 AM   Drainage Amount Moderate 4/26/2019 11:20 AM   Drainage Description Serosanguineous 4/26/2019 11:20 AM   Non-staged Wound Description Full thickness 4/26/2019 11:20 AM   Treatments Cleansed;Pharmaceutical agent;Other (Comment) 4/26/2019 11:20 AM   Cleansing Normal Saline Irrigation 4/26/2019 11:20 AM   Periwound Protectant Skin Protectant wipes to Periwound;Drape 4/26/2019 11:20 AM   Dressing Options Wound Vac;Unna Boot 4/26/2019 11:20 AM   Dressing Cleansing/Solutions Not Applicable 4/26/2019 11:20 AM   Dressing Changed Changed 4/26/2019 11:20 AM   Dressing Status Clean;Dry;Intact 4/26/2019 11:20 AM   Dressing Change Frequency Monday, Wednesday, Friday 4/26/2019 11:20 AM    NEXT Dressing Change  04/17/19 4/15/2019  8:00 AM   NEXT Weekly Photo (Inpatient Only) 12/17/18 12/10/2018  2:00 PM   WOUND NURSE ONLY - Odor None 4/26/2019 11:20 AM   WOUND NURSE ONLY - Pulses Other (Comments) 3/4/2019  2:30 PM   WOUND NURSE ONLY - Exposed Structures None 4/26/2019 11:20 AM   WOUND NURSE ONLY - Tissue Type and Percentage 90% red, 10% yellow post debridement 4/26/2019 11:20 AM       Wound 10/11/18 Venous Ulcer Leg (Active)       Wound 10/13/18 Ankle weeping, blister like area  (Active)       Wound 11/07/18 Left posteromedial LE (Active)   Wound Image   4/26/2019 11:20 AM   Site Assessment Red 4/26/2019 11:20 AM   Maricruz-wound Assessment Fragile;Edema 4/26/2019 11:20 AM   Margins Attached edges 4/26/2019 11:20 AM   Wound Length (cm) 2.6 cm 4/8/2019  2:45 PM   Wound Width (cm) 2.4 cm 4/8/2019  2:45 PM   Wound Depth (cm) 0.2 cm 4/8/2019  2:45 PM   Wound Surface Area (cm^2) 6.24 cm^2 4/8/2019  2:45 PM   Post Wound Length (cm) 2.2 cm 4/26/2019 11:20 AM    Post Wound Width (cm) 1.8 cm 4/26/2019 11:20 AM   Post Wound Depth (cm) 0.3 cm 4/26/2019 11:20 AM   Post Wound Surface Area (cm^2) 3.96 cm^2 4/26/2019 11:20 AM   Tunneling 0 cm 4/15/2019  8:00 AM   Undermining 0 cm 4/15/2019  8:00 AM   Closure Secondary intention 4/26/2019 11:20 AM   Drainage Amount Small 4/26/2019 11:20 AM   Drainage Description Serosanguineous 4/26/2019 11:20 AM   Non-staged Wound Description Full thickness 4/26/2019 11:20 AM   Treatments Cleansed;Pharmaceutical agent;Other (Comment) 4/26/2019 11:20 AM   Cleansing Normal Saline Irrigation 4/26/2019 11:20 AM   Periwound Protectant Skin Protectant wipes to Periwound;Drape 4/26/2019 11:20 AM   Dressing Options Wound Vac;Unna Boot 4/26/2019 11:20 AM   Dressing Cleansing/Solutions Not Applicable 4/26/2019 11:20 AM   Dressing Changed Changed 4/26/2019 11:20 AM   Dressing Status Clean;Dry;Intact 4/26/2019 11:20 AM   Dressing Change Frequency Monday, Wednesday, Friday 4/26/2019 11:20 AM    NEXT Dressing Change  04/17/19 4/15/2019  8:00 AM   NEXT Weekly Photo (Inpatient Only) 12/17/18 12/10/2018  2:00 PM   WOUND NURSE ONLY - Odor None 4/26/2019 11:20 AM   WOUND NURSE ONLY - Pulses Other (Comments) 3/4/2019  2:30 PM   WOUND NURSE ONLY - Exposed Structures None 4/26/2019 11:20 AM   WOUND NURSE ONLY - Tissue Type and Percentage 100% moist red post debridement 4/26/2019 11:20 AM       Wound 01/30/19 Full Thickness Wound Ankle L medial ankle (Active)       Wound 04/19/19 Leg Left leg (Active)       Wound 04/26/19 Left medial foot (Active)   Wound Image   4/26/2019 11:20 AM   Site Assessment Pink;Pale;Yellow 4/26/2019 11:20 AM   Maricruz-wound Assessment Fragile 4/26/2019 11:20 AM   Margins Attached edges 4/26/2019 11:20 AM   Post Wound Length (cm) 0.5 cm 4/26/2019 11:20 AM    Post Wound Width (cm) 0.5 cm 4/26/2019 11:20 AM   Post Wound Depth (cm) 0.3 cm 4/26/2019 11:20 AM   Post Wound Surface Area (cm^2) 0.25 cm^2 4/26/2019 11:20 AM   Tunneling 0 cm 4/26/2019 11:20 AM   Undermining 0 cm 4/26/2019 11:20 AM   Closure Secondary intention 4/26/2019 11:20 AM   Drainage Amount Small 4/26/2019 11:20 AM   Drainage Description Serosanguineous 4/26/2019 11:20 AM   Non-staged Wound Description Full thickness 4/26/2019 11:20 AM   Treatments Cleansed 4/26/2019 11:20 AM   Cleansing Normal Saline Irrigation 4/26/2019 11:20 AM   Periwound Protectant Skin Protectant wipes to Periwound 4/26/2019 11:20 AM   Dressing Options Hydrofiber Silver;Nonadhesive Foam;Hypafix Tape;Unna Boot 4/26/2019 11:20 AM   Dressing Changed New 4/26/2019 11:20 AM   Dressing Status Clean;Dry;Intact 4/26/2019 11:20 AM   WOUND NURSE ONLY - Odor None 4/26/2019 11:20 AM   WOUND NURSE ONLY - Exposed Structures None 4/26/2019 11:20 AM   WOUND NURSE ONLY - Tissue Type and Percentage 95% pale pink, 5% yellow 4/26/2019 11:20 AM       Wound 04/26/19 Left plantar hallux (Active)   Wound Image   4/26/2019 11:20 AM   Site Assessment Red 4/26/2019 11:20 AM    Maricruz-wound Assessment Intact 4/26/2019 11:20 AM   Margins Attached edges 4/26/2019 11:20 AM   Wound Length (cm) 0.5 cm 4/26/2019 11:20 AM   Wound Width (cm) 0.6 cm 4/26/2019 11:20 AM   Wound Depth (cm) 0.1 cm 4/26/2019 11:20 AM   Wound Surface Area (cm^2) 0.3 cm^2 4/26/2019 11:20 AM   Tunneling 0 cm 4/26/2019 11:20 AM   Undermining 0 cm 4/26/2019 11:20 AM   Closure Secondary intention 4/26/2019 11:20 AM   Drainage Amount Scant 4/26/2019 11:20 AM   Drainage Description Serosanguineous 4/26/2019 11:20 AM   Non-staged Wound Description Partial thickness 4/26/2019 11:20 AM   Treatments Cleansed 4/26/2019 11:20 AM   Cleansing Normal Saline Irrigation 4/26/2019 11:20 AM   Periwound Protectant Skin Protectant wipes to Periwound 4/26/2019 11:20 AM   Dressing Options Hydrofiber Silver;Nonadhesive Foam;Hypafix Tape 4/26/2019 11:20 AM   Dressing Changed New 4/26/2019 11:20 AM   Dressing Status Clean;Dry;Intact 4/26/2019 11:20 AM   WOUND NURSE ONLY - Odor None 4/26/2019 11:20 AM   WOUND NURSE ONLY - Exposed Structures None 4/26/2019 11:20 AM   WOUND NURSE ONLY - Tissue Type and Percentage 100% moist red 4/26/2019 11:20 AM       Wound 04/26/19 L dorsum 3rd toe (Active)   Wound Image   4/26/2019 11:20 AM   Maricruz-wound Assessment Intact 4/26/2019 11:20 AM   Margins Attached edges 4/26/2019 11:20 AM   Closure Approximated 4/26/2019 11:20 AM   Drainage Amount None 4/26/2019 11:20 AM   Treatments Cleansed 4/26/2019 11:20 AM   Cleansing Normal Saline Irrigation 4/26/2019 11:20 AM   Periwound Protectant Skin Protectant wipes to Periwound 4/26/2019 11:20 AM   Dressing Options Nonadhesive Foam;Hypafix Tape 4/26/2019 11:20 AM   Dressing Changed New 4/26/2019 11:20 AM   Dressing Status Clean;Dry;Intact 4/26/2019 11:20 AM   WOUND NURSE ONLY - Odor None 4/26/2019 11:20 AM   WOUND NURSE ONLY - Exposed Structures None 4/26/2019 11:20 AM   WOUND NURSE ONLY - Tissue Type and Percentage 100% dried blister 4/26/2019 11:20 AM             Procedures:     Debridement :  2% Viscous Lidocaine gel dwell time ~10 minutes prior to debridement. CSWD with scalpel to remove ~15 cm2 of non viable tissue from wound bed and yuki wound.    See flowsheets for wound care.     Post debridement photo:                               PATIENT EDUCATION   - Importance of managing edema for healing of ulcer, and for prevention of new ulcer development  -Need for lifelong compression of lower legs   -Elevate legs above the level of the heart periodically throughout the day.  - Importance of adequate nutrition for wound healing  -Increase protein intake (unless contraindicated by renal status)  -Discussed importance of smoking cessation.  -Advised to go to ER for any increased redness, swelling, drainage or odor, or if patient develops fever, chills, nausea or vomiting.

## 2019-04-29 ENCOUNTER — NON-PROVIDER VISIT (OUTPATIENT)
Dept: WOUND CARE | Facility: MEDICAL CENTER | Age: 74
End: 2019-04-29
Attending: NURSE PRACTITIONER
Payer: MEDICARE

## 2019-04-29 PROCEDURE — 97597 DBRDMT OPN WND 1ST 20 CM/<: CPT

## 2019-04-29 NOTE — PATIENT INSTRUCTIONS
Pt refused AVS  Instructed pt on s/s infection - chills, fever, NV, increased pain/swelling/drainage or foul odor and to go to Urgent Care or ER; that battery lasts 6 hours and to always charge overnight (or plug in whenever at home); on trouble shooting, including making sure cannister is seated properly and not cracked, that clamps are open, and signs of leakage including machine alarming and screen showing that pressure is not at 125 mm/Hg or prescribed pressure; to seal leaks by painting with Skin Prep and applying more drape tape to dressing if necessary; that if unable to fix leak for 2 hours, to remove all tape and foam and cover wound with moist dressing, ( gauze soaked with normal saline, then dry sterile gauze, pt given supplies) and to notify [facility] during business hours; to come to appointments 3x/week; to keep dressing D/I; to bring supplies to each appointment

## 2019-04-29 NOTE — PROCEDURES
Lidocaine gel to wound beds, 10 minute dwell time.   CSWD of lt medial ankle wound and left posterior medial wound, approx 10 cm2 of biofilm removed. Crusting to periwound with ostomy powder and skin prep. Bridge up LLE with track pad above wounds RT indentation from foam over wounds.   LLE - zinc paste applied under 2 layer Coflex per pt preference to small medial foot wounds and LE.   Pt tolerated procedure well.

## 2019-04-30 ENCOUNTER — PATIENT OUTREACH (OUTPATIENT)
Dept: HEALTH INFORMATION MANAGEMENT | Facility: OTHER | Age: 74
End: 2019-04-30

## 2019-04-30 NOTE — PROGRESS NOTES
[facility] patient advocate reached out to patient. Patient reported she was at  office and could not talk at this time. Advocate encouraged patient to reach out if she needs anything at all.

## 2019-05-01 ENCOUNTER — NON-PROVIDER VISIT (OUTPATIENT)
Dept: WOUND CARE | Facility: MEDICAL CENTER | Age: 74
End: 2019-05-01
Attending: NURSE PRACTITIONER
Payer: MEDICARE

## 2019-05-01 PROCEDURE — 97597 DBRDMT OPN WND 1ST 20 CM/<: CPT

## 2019-05-01 NOTE — PROCEDURES
Wound Care Procedures 5/1/2019:  Viscous lidocaine 2% applied to LLE wounds prior to debridement with ~5 minute dwell time.  CSWD with scalpel to remove ~10 cm2 slough from LLE wounds.    Wound vac discontinued today as discussed in IDT rounds with Dr. Milana Colin.     Advised patient to return wound vac to [facility]. She verbalized understanding.

## 2019-05-01 NOTE — PATIENT INSTRUCTIONS
-Keep dressings clean, dry and covered while bathing. Only change dressings if they become over saturated, soiled or fall off.     -Avoid prolonged standing or sitting without elevating your legs.    -Remove your compression garments if you have severe pain, severe swelling, numbness, color change, or temperature change in your toes. If you need to remove your compression garments, do so by unrolling them. Do not cut the compression garments off, this is to prevent cutting yourself on accident.    -Should you experience any significant changes in your wound(s), such as infection (redness, swelling, localized heat, increased pain, fever > 101 F, chills) or have any questions regarding your home care instructions, please contact the wound center at [phone]. If after hours, contact your primary care physician or go to the hospital emergency room.

## 2019-05-02 RX ORDER — METOPROLOL SUCCINATE 100 MG/1
TABLET, EXTENDED RELEASE ORAL
Qty: 90 TAB | Refills: 0 | Status: SHIPPED | OUTPATIENT
Start: 2019-05-02 | End: 2019-08-01 | Stop reason: SDUPTHER

## 2019-05-02 NOTE — TELEPHONE ENCOUNTER
Was the patient seen in the last year in this department? Yes LOV 02/12/19    Does patient have an active prescription for medications requested? No     Received Request Via: Pharmacy

## 2019-05-02 NOTE — TELEPHONE ENCOUNTER
Requested Prescriptions     Pending Prescriptions Disp Refills   • metoprolol SR (TOPROL XL) 100 MG TABLET SR 24 HR [Pharmacy Med Name: METOPROLOL SUCC  MG TAB] 90 Tab 0     Sig: TAKE 1 TABLET BY MOUTH EVERY DAY   Radha Michael M.D.

## 2019-05-03 ENCOUNTER — NON-PROVIDER VISIT (OUTPATIENT)
Dept: WOUND CARE | Facility: MEDICAL CENTER | Age: 74
End: 2019-05-03
Attending: NURSE PRACTITIONER
Payer: MEDICARE

## 2019-05-03 PROCEDURE — 97597 DBRDMT OPN WND 1ST 20 CM/<: CPT

## 2019-05-03 NOTE — PATIENT INSTRUCTIONS
Avoid prolonged standing or sitting without elevating your legs.  - Apply tubigrip to your right leg ending 2 fingers below back of knee without wrinkles.  - Multilayer compression wrap to left leg. Do not get wet and keep on for the week. Only remove if temperature or sensation changes.    Keep dressing clean and dry and cover while bathing. Only change dressing if over saturated, soiled or its falling off.     Should you experience any significant changes in your wound(s) such as infection (redness, swelling, localized heat, increased pain, fever >101 F, chills) or have any questions regarding your home care instructions, please contact the wound center [phone]. If after hours, contact your primary care physician or go the hospital emergency room.

## 2019-05-03 NOTE — PROCEDURES
2% Viscous lidocaine applied to wound and periwound ~5 minutes dwell time.   CSWD using currette to remove approx. ~9cm2 of nonviable tissue from Right anterior LE, Left medial malleolus, and left posteromedial LE wound beds.

## 2019-05-04 DIAGNOSIS — M79.89 SWELLING OF LOWER EXTREMITY: ICD-10-CM

## 2019-05-06 ENCOUNTER — PATIENT OUTREACH (OUTPATIENT)
Dept: HEALTH INFORMATION MANAGEMENT | Facility: OTHER | Age: 74
End: 2019-05-06

## 2019-05-06 ENCOUNTER — OFFICE VISIT (OUTPATIENT)
Dept: WOUND CARE | Facility: MEDICAL CENTER | Age: 74
End: 2019-05-06
Attending: NURSE PRACTITIONER
Payer: MEDICARE

## 2019-05-06 VITALS
SYSTOLIC BLOOD PRESSURE: 126 MMHG | TEMPERATURE: 98.7 F | HEART RATE: 97 BPM | OXYGEN SATURATION: 93 % | RESPIRATION RATE: 18 BRPM | DIASTOLIC BLOOD PRESSURE: 74 MMHG

## 2019-05-06 DIAGNOSIS — L97.922 LEG ULCER, LEFT, WITH FAT LAYER EXPOSED (HCC): ICD-10-CM

## 2019-05-06 DIAGNOSIS — L10.9 BULLOUS PEMPHIGUS: ICD-10-CM

## 2019-05-06 DIAGNOSIS — I73.9 PERIPHERAL VASCULAR DISEASE (HCC): ICD-10-CM

## 2019-05-06 PROCEDURE — 11042 DBRDMT SUBQ TIS 1ST 20SQCM/<: CPT

## 2019-05-06 RX ORDER — FUROSEMIDE 20 MG/1
TABLET ORAL
Qty: 90 TAB | Refills: 0 | Status: SHIPPED | OUTPATIENT
Start: 2019-05-06 | End: 2019-09-02

## 2019-05-06 RX ORDER — NIACINAMIDE 500 MG
500 MG TABLET ORAL TID
Qty: 90 | Refills: 5 | Status: ERX

## 2019-05-06 ASSESSMENT — ENCOUNTER SYMPTOMS
DIARRHEA: 0
VOMITING: 0
WEAKNESS: 1
CHILLS: 0
CLAUDICATION: 0
COUGH: 0
NAUSEA: 0
FEVER: 0
DIZZINESS: 0
SHORTNESS OF BREATH: 0
CONSTIPATION: 0

## 2019-05-06 ASSESSMENT — PAIN SCALES - GENERAL: PAINLEVEL: NO PAIN

## 2019-05-06 NOTE — PATIENT INSTRUCTIONS
Avoid prolonged standing or sitting without elevating your legs.  - Apply tubigrip to your legs ending 2 fingers below back of knee without wrinkles.     Should you experience any significant changes in your wound(s), such as infection (redness, swelling, localized heat, increased pain, fever > 101 F, chills) or have any questions regarding your home care instructions, please contact the wound center at [phone]. If after hours, contact your primary care physician or go to the hospital emergency room.   Keep dressing clean, dry and covered while bathing. Only change dressing if it becomes over saturated, soiled or falls off.

## 2019-05-06 NOTE — PROGRESS NOTES
Provider Encounter- Full Thickness wound      HISTORY OF PRESENT ILLNESS        START OF CARE IN CLINIC: 10/26/2018    REFERRING PROVIDER: IRON Son     WOUND- Full thickness wound   LOCATION: Medial left calf and medial ankle   WOUND HISTORY: Patient has a long wound history with a hip and ankle fracture.  Developed a wound over the medial ankle, a prominent malleolus and the wound likely developed from pressure.    PREVIOUS TREATMENT: Debridement NPWT, hospitalization with IV abx and Veriflow, which really worked   PERTINENT PMH: Recent diagnosis of Bullous Pemphigous - steroids for 2 months and antibiotics. Peripheral vascular disease with leftfem-pop bypass   [id number]  Plain films left ankle  There are fractures of the distal fibular shaft and of the medial malleolus, both of which appear to be subacute with at least minimal callus formation. There is marked lateral subluxation of the talus. No other significant findings.   VASCULAR STUDIES:Arterial duplex shows patent left fem-pop without stenosis     LAST  WOUND CULTURE:  DATE : 2019 Heavy growth of pseudomonas, Enterococcus and light growth MRSA             DIABETES:  No  TOBACCO USE: no    3/4/2018 - Hospitalized for 6 dayfor IV abx     She has stopped taking her prednisone, and is now taking CellCept as prescribed by her dermatologist for bullous pemphigoid.    PAST MEDICAL HISTORY:   Past Medical History:   Diagnosis Date   • Anxiety    • Cellulitis and abscess of lower extremity    • Dental disorder     full dentures   • Generalized osteoarthritis of multiple sites 10/20/2015   • Heart valve disease     pt not sure    • Hyperlipidemia    • Hypertension    • Osteoporosis        PAST SURGICAL HISTORY:   Past Surgical History:   Procedure Laterality Date   • FEMORAL POPLITEAL BYPASS Left 2018    Procedure: FEMORAL POPLITEAL BYPASS;  Surgeon: Milana Colin M.D.;  Location: SURGERY [facility];  Service: General   •  IRRIGATION & DEBRIDEMENT GENERAL Left 6/8/2018    Procedure: IRRIGATION & DEBRIDEMENT ANKLE WOUND;  Surgeon: Milana Colin M.D.;  Location: SURGERY [facility];  Service: General   • IRRIGATION & DEBRIDEMENT HIP Left 6/4/2018    Procedure: IRRIGATION & DEBRIDEMENT HIP;  Surgeon: Chong Vazquez M.D.;  Location: SURGERY [facility];  Service: Orthopedics   • HIP REVISION TOTAL Left 2/11/2018    Procedure: HIP REVISION TOTAL- femoral nail removal conversion to total hip arthoroplasty;  Surgeon: Chong Vazquez M.D.;  Location: SURGERY [facility];  Service: Orthopedics   • HIP NAILING INTRAMEDULLARY Left 12/20/2017    Procedure: HIP NAILING INTRAMEDULLARY;  Surgeon: Jorge Peters M.D.;  Location: SURGERY [facility];  Service: Orthopedics   • BREAST BIOPSY  8/28/2014    Performed by Magdalena Londono M.D. at [facility]   • BREAST BIOPSY Right 8/14    benign   • GYN SURGERY  1973    complete hysterectomy   • ABDOMINAL HYSTERECTOMY TOTAL      w/BSO due to uterine cyst and endometriosis   • ATHROPLASTY      hip        MEDICATIONS:   Current Outpatient Prescriptions   Medication   • furosemide (LASIX) 20 MG Tab   • metoprolol SR (TOPROL XL) 100 MG TABLET SR 24 HR   • ascorbic acid (VITAMIN C) 500 MG tablet   • ferrous sulfate 325 (65 Fe) MG tablet   • triamcinolone acetonide (KENALOG) 0.5 % Cream   • NIACIN PO   • sertraline (ZOLOFT) 100 MG Tab   • Multiple Vitamins-Minerals (THERAPEUTIC-M/LUTEIN) Tab   • vitamin D (CHOLECALCIFEROL) 1000 UNIT Tab     No current facility-administered medications for this visit.    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[employer]  [employer]     Social History Main Topics   • Smoking status: Former Smoker     Packs/day: 0.50     Years: 25.00     Types: Cigarettes     Quit date: 1/1/2007   • Smokeless tobacco: Never Used   • Alcohol use 3.0 oz/week     5 Glasses of wine per week      Comment: glass of wine per day   • Drug use: No   • Sexual activity: Not Currently     Partners: Male     Other Topics Concern   • Not on file     Social History Narrative   • No narrative on file       FAMILY HISTORY:   Family History   Problem Relation Age of Onset   • GI Mother         colostomy   • Heart Attack Father    • Diabetes Father    • Hypertension Father    • Psychiatry Brother         bipolar disorder        REVIEW OF SYSTEMS:   Review of Systems   Constitutional: Positive for malaise/fatigue. Negative for chills and fever.   Respiratory: Negative for cough and shortness of breath.    Cardiovascular: Positive for leg swelling. Negative for chest pain and claudication.   Gastrointestinal: Negative for constipation, diarrhea, nausea and vomiting.   Neurological: Positive for weakness. Negative for dizziness.   All other systems reviewed and are negative.        PHYSICAL EXAMINATION:   /74 (BP Location: Right arm, Patient Position: Sitting, BP Cuff Size: Adult)   Pulse 97   Temp 37.1 °C (98.7 °F) (Temporal)   Resp 18   SpO2 93%   Physical Exam   Constitutional: She is oriented to person, place, and time and well-developed, well-nourished, and in no distress. No distress.   HENT:   Head: Normocephalic and atraumatic.   Eyes: Pupils are equal, round, and reactive to light.   Neck: Normal range of motion. Neck supple. JVD present.   Cardiovascular: Normal rate, regular rhythm and intact distal pulses.    Audible graft pulse over the medial thigh    Pulmonary/Chest: Effort normal. No respiratory distress.   Abdominal: Soft. She exhibits no distension.   Musculoskeletal: Normal range of motion. She exhibits edema.   Worse   Neurological: She is alert and oriented to person, place, and time.   Skin: There is erythema.   New blisters on both legs.     Psychiatric: Mood, memory, affect and judgment normal.        Wound Assessment- Refer to wound flowsheet    Left medial ankle, pre-debridement    Left medial distal calf, pre-debridement      PROCEDURE  -2% viscous lidocaine applied topically to wound bed for approximately 5 minutes prior to debridement.  4m curette used to debride wound bed.  Excisional debridement was performed to remove devitalized tissue until healthy, bleeding tissue was visualized.   Entire surface of wound,  7.04cm2, debrided, into the subcutaneous tissue layer, taking a thin rim of epithelium.   -Bleeding controlled with manual pressure      PROCEDURE  -2% viscous lidocaine applied topically to wound bed for approximately 5 minutes prior to debridement.  4m curette used to debride wound bed.  Excisional debridement was performed to remove devitalized tissue until healthy, bleeding tissue was visualized.   Entire surface of wound,  2.56cm2, debrided, into the subcutaneous tissue layer, taking a thin rim of epithelium.   -Bleeding controlled with manual pressure   Left medial lower ankle, post-debridement Photo          Left medial/distal calf post debridement                      PATIENT EDUCATION  -Advised to go to ER for any increased redness, swelling, drainage or odor, or if patient develops fever, chills, nausea or vomiting.  -Importance of adequate nutrition for wound healing  -Increase protein intake (unless contraindicated by renal status)    ASSESSMENT AND PLAN:  1. Leg ulcer, left, with fat layer exposed (HCC)  Comments: Full-thickness ulcers x2 to left medial lower leg, probably due to pressure.  Complicated by PVD.  5/6/2019 -  both leg are improved and medial malleolus looks improved    2. Peripheral vascular disease (HCC)  Comments: Per Dr. Colin, vascular,Arterial duplex shows patent left fem-pop without stenosis  5/619 - audible graft pulse to the medial left thigh with ?biphasic DP and PT    3. Bullous pemphigus  Comments: Diagnosed by dermatology.  Dermatology managing     4.  Infected wound  Continue cound care as prescirbed.  Medial malleolus now showing healing    Please note that this dictation was created using voice recognition software. I have worked with technical experts from [employer] to optimize the interface.  I have made every reasonable attempt to correct obvious errors, but there may be errors of grammar and possibly content that I did not discover before finalizing the note.

## 2019-05-06 NOTE — PROGRESS NOTES
[facility] patient advocate reached out to patient to touch bases. Patient reports she is no longer on the wound vac, and only has to go to wound care two times a week now. Patient will be seeing  this afternoon, and her dermatologist on Thursday.

## 2019-05-06 NOTE — TELEPHONE ENCOUNTER
Requested Prescriptions     Pending Prescriptions Disp Refills   • furosemide (LASIX) 20 MG Tab [Pharmacy Med Name: FUROSEMIDE 20 MG TABLET] 90 Tab 0     Sig: TAKE 1 TABLET BY MOUTH EVERY DAY   Radha Michael M.D.

## 2019-05-06 NOTE — TELEPHONE ENCOUNTER
Was the patient seen in the last year in this department? Yes    Does patient have an active prescription for medications requested? No   Not on active med list     Received Request Via: Pharmacy

## 2019-05-06 NOTE — PROGRESS NOTES
"Outreach call to pt to follow up on social service needs. Pt reported she is doing well and has a provider appt later today which she was getting ready for. She reported, \"I'm going about it day by day\" and indicated she has been sleeping better. [facility] and explained her current application is still pending with the committee. Pt voiced understanding. Reviewed if she has started using her SNAP benefits yet. Pt declined reporting she hadn't yet (no particular reason). Discussed plan to move pt towards graduation of [facility] services as her interventions related to her goal have been met and discussed pt following up with [facility] program independently should application still be pending at time of planned graduation. Pt voiced agreement with plan.     Plan:  · MSW will follow up again this month and graduate pt from services if there are no new needs.   "

## 2019-05-09 ENCOUNTER — APPOINTMENT (RX ONLY)
Dept: URBAN - METROPOLITAN AREA CLINIC 22 | Facility: CLINIC | Age: 74
Setting detail: DERMATOLOGY
End: 2019-05-09

## 2019-05-09 ENCOUNTER — NON-PROVIDER VISIT (OUTPATIENT)
Dept: WOUND CARE | Facility: MEDICAL CENTER | Age: 74
End: 2019-05-09
Attending: NURSE PRACTITIONER
Payer: MEDICARE

## 2019-05-09 DIAGNOSIS — L30.0 NUMMULAR DERMATITIS: ICD-10-CM

## 2019-05-09 DIAGNOSIS — Z71.89 OTHER SPECIFIED COUNSELING: ICD-10-CM

## 2019-05-09 DIAGNOSIS — L81.0 POSTINFLAMMATORY HYPERPIGMENTATION: ICD-10-CM

## 2019-05-09 DIAGNOSIS — L12.0 BULLOUS PEMPHIGOID: ICD-10-CM | Status: IMPROVED

## 2019-05-09 DIAGNOSIS — L85.3 XEROSIS CUTIS: ICD-10-CM

## 2019-05-09 PROCEDURE — ? TREATMENT REGIMEN

## 2019-05-09 PROCEDURE — ? COUNSELING

## 2019-05-09 PROCEDURE — 97602 WOUND(S) CARE NON-SELECTIVE: CPT

## 2019-05-09 PROCEDURE — ? ADDITIONAL NOTES

## 2019-05-09 PROCEDURE — 99214 OFFICE O/P EST MOD 30 MIN: CPT

## 2019-05-09 PROCEDURE — ? PRESCRIPTION

## 2019-05-09 RX ORDER — TRIAMCINOLONE ACETONIDE 1 MG/G
OINTMENT TOPICAL
Qty: 1 | Refills: 0 | Status: ERX | COMMUNITY
Start: 2019-05-09

## 2019-05-09 RX ADMIN — TRIAMCINOLONE ACETONIDE: 1 OINTMENT TOPICAL at 00:00

## 2019-05-09 ASSESSMENT — LOCATION ZONE DERM
LOCATION ZONE: TRUNK
LOCATION ZONE: ARM
LOCATION ZONE: LEG

## 2019-05-09 ASSESSMENT — LOCATION DETAILED DESCRIPTION DERM
LOCATION DETAILED: RIGHT PROXIMAL DORSAL FOREARM
LOCATION DETAILED: LEFT PROXIMAL DORSAL FOREARM
LOCATION DETAILED: LEFT ELBOW
LOCATION DETAILED: LEFT INFRAMAMMARY CREASE (INNER QUADRANT)
LOCATION DETAILED: RIGHT ELBOW
LOCATION DETAILED: RIGHT DISTAL PRETIBIAL REGION
LOCATION DETAILED: LEFT PROXIMAL PRETIBIAL REGION
LOCATION DETAILED: RIGHT INFERIOR UPPER BACK
LOCATION DETAILED: EPIGASTRIC SKIN

## 2019-05-09 ASSESSMENT — LOCATION SIMPLE DESCRIPTION DERM
LOCATION SIMPLE: LEFT BREAST
LOCATION SIMPLE: ABDOMEN
LOCATION SIMPLE: LEFT ELBOW
LOCATION SIMPLE: RIGHT PRETIBIAL REGION
LOCATION SIMPLE: RIGHT ELBOW
LOCATION SIMPLE: RIGHT FOREARM
LOCATION SIMPLE: RIGHT UPPER BACK
LOCATION SIMPLE: LEFT FOREARM
LOCATION SIMPLE: LEFT PRETIBIAL REGION

## 2019-05-09 NOTE — PATIENT INSTRUCTIONS
Should you experience any significant changes in your wound(s) such as infection (redness, swelling, localized heat, increased pain, fever >101 F, chills) or have any questions regarding your home care instructions, please contact the wound center [phone]. If after hours, contact your primary care physician or go the hospital emergency room.  Keep dressing clean and dry and cover while bathing. Only change dressing if over saturated, soiled or its falling off.

## 2019-05-09 NOTE — PROCEDURES
Non selective with NS and gauze to remove non viable tissue from wound beds. No rinse foam cleanser to bilateral lower legs.

## 2019-05-09 NOTE — HPI: BLISTERS (BULLOUS PEMPHIGOID)
How Severe Is It?: moderate
Is This A New Presentation, Or A Follow-Up?: Follow Up Bullous Pemphigoid
Additional History: She stopped Cellcept, Prednisone, and Doxycycline

## 2019-05-09 NOTE — PROCEDURE: ADDITIONAL NOTES
Additional Notes: She has discontinued treatment and she is clear of blisters at this time.
Detail Level: Generalized

## 2019-05-13 ENCOUNTER — OFFICE VISIT (OUTPATIENT)
Dept: WOUND CARE | Facility: MEDICAL CENTER | Age: 74
End: 2019-05-13
Attending: NURSE PRACTITIONER
Payer: MEDICARE

## 2019-05-13 VITALS
TEMPERATURE: 96.7 F | DIASTOLIC BLOOD PRESSURE: 82 MMHG | HEART RATE: 95 BPM | RESPIRATION RATE: 18 BRPM | SYSTOLIC BLOOD PRESSURE: 157 MMHG | OXYGEN SATURATION: 92 %

## 2019-05-13 DIAGNOSIS — L97.812 ULCER OF RIGHT SHIN WITH FAT LAYER EXPOSED (HCC): ICD-10-CM

## 2019-05-13 DIAGNOSIS — L10.9 BULLOUS PEMPHIGUS: ICD-10-CM

## 2019-05-13 DIAGNOSIS — I73.9 PERIPHERAL VASCULAR DISEASE (HCC): ICD-10-CM

## 2019-05-13 DIAGNOSIS — L08.9 WOUND INFECTION: ICD-10-CM

## 2019-05-13 DIAGNOSIS — T14.8XXA WOUND INFECTION: ICD-10-CM

## 2019-05-13 DIAGNOSIS — L97.922 LEG ULCER, LEFT, WITH FAT LAYER EXPOSED (HCC): ICD-10-CM

## 2019-05-13 PROCEDURE — 11042 DBRDMT SUBQ TIS 1ST 20SQCM/<: CPT | Performed by: NURSE PRACTITIONER

## 2019-05-13 PROCEDURE — 11042 DBRDMT SUBQ TIS 1ST 20SQCM/<: CPT

## 2019-05-13 RX ORDER — MYCOPHENOLATE MOFETIL 500 MG/1
TABLET ORAL
COMMUNITY
Start: 2019-05-05 | End: 2019-05-13

## 2019-05-13 RX ORDER — NIACINAMIDE 500 MG
500 TABLET ORAL 2 TIMES DAILY
COMMUNITY
Start: 2019-05-06 | End: 2021-08-10

## 2019-05-13 RX ORDER — AMOXICILLIN AND CLAVULANATE POTASSIUM 875; 125 MG/1; MG/1
TABLET, FILM COATED ORAL
Refills: 0 | COMMUNITY
Start: 2019-05-01 | End: 2019-06-12

## 2019-05-13 ASSESSMENT — ENCOUNTER SYMPTOMS
COUGH: 0
PSYCHIATRIC NEGATIVE: 1
NAUSEA: 0
DIZZINESS: 0
WEAKNESS: 0
CLAUDICATION: 0
CONSTIPATION: 0
SHORTNESS OF BREATH: 0
FEVER: 0
DIARRHEA: 0
VOMITING: 0
CHILLS: 0

## 2019-05-13 ASSESSMENT — PAIN SCALES - GENERAL: PAINLEVEL: 5=MODERATE PAIN

## 2019-05-13 NOTE — PROGRESS NOTES
"Provider Encounter- Full Thickness wound      HISTORY OF PRESENT ILLNESS        START OF CARE IN CLINIC: 10/26/2018    REFERRING PROVIDER: IRON Son     WOUND- Full thickness wound   LOCATION: Medial left calf and medial ankle-2018  Right shin wound-first noted 5/3/19 from bulla that opened     WOUND HISTORY: Patient has a long wound history with a hip and ankle fracture.  Developed a wound over the medial ankle, a prominent malleolus and the wound likely developed from pressure.    PREVIOUS TREATMENT: Debridement NPWT, hospitalization with IV abx and Veriflow, which really worked   PERTINENT PMH: Recent diagnosis of Bullous Pemphigous - steroids for 2 months and antibiotics. Peripheral vascular disease with left fem-pop bypass   IMAGIN2018  Plain films left ankle  There are fractures of the distal fibular shaft and of the medial malleolus, both of which appear to be subacute with at least minimal callus formation. There is marked lateral subluxation of the talus. No other significant findings.   VASCULAR STUDIES: 2019: Arterial duplex shows patent left fem-pop without stenosis run of the ankle     LAST  WOUND CULTURE:  DATE : 2019 MSSA to left foot           DIABETES:  No  TOBACCO USE: no    3/4/2018 - Hospitalized for 6 dayfor IV abx     She has stopped taking her prednisone, and is now taking CellCept as prescribed by her dermatologist for bullous pemphigoid.      : Patient was hospitalized from 3/8 until 3/18 for right lower extremity infection.  She was treated with IV antibiotics, managed by infectious diseases.  She was discharged on p.o. ciprofloxacin and linezolid, and returned to the wound clinic on 3/20.  She presents today, \"feeling much better\".  The wound VAC is resumed to the larger of her lower extremity ulcers.  The other 2 wounds, to the anterior and posterior proximal leg are nearly resolved.    4/3: Patient returns to clinic today for assessment debridement.  " She has completed her antibiotics.  VAC therapy being used on her leg ulcers. The other 2 wounds, to the anterior and posterior proximal leg are now resolved.    4/5: Patient returns to clinic today for assessment, debridement, VAC change. unnas boot has helped to resolve dermatitis to foot and lower leg.  Edema to LLE is also improved.  Patient wears offloading boot only to appointments otherwise she is not wearing the boot due to pressure to the ankle.  She has been evaluated by Dr. Leon in the past regarding ankle deformity and she reports that surgeon recommended the ulcers heal prior to any sort of ankle surgery.    5/13: Patient returns for assessment, debridement.  VAC to ankle was discontinued on 5/1/2019.  Ankle wound with slough that required debridement today.  Patient reports that she is no longer wearing the offloading boot to the left leg, feels this has helped with wound healing.  Left posterior medial lower extremity ulcer has significantly improved.  Patient also presents with a new ulcer from a ruptured bulla to right shin first noted on 5/3.  Recently had arterial studies done at [facility] beginning of May.  Patient reports that studies were fine, does not require further intervention.  She followed up with dermatology last week was given a cream for eczema to forearms and instructed to apply CeraVe anti-itch to dry skin.    PAST MEDICAL HISTORY:   Past Medical History:   Diagnosis Date   • Anxiety    • Cellulitis and abscess of lower extremity    • Dental disorder     full dentures   • Generalized osteoarthritis of multiple sites 10/20/2015   • Heart valve disease     pt not sure    • Hyperlipidemia    • Hypertension    • Osteoporosis        PAST SURGICAL HISTORY:   Past Surgical History:   Procedure Laterality Date   • FEMORAL POPLITEAL BYPASS Left 6/8/2018    Procedure: FEMORAL POPLITEAL BYPASS;  Surgeon: Milana Colin M.D.;  Location: SURGERY [facility];  Service:  General   • IRRIGATION & DEBRIDEMENT GENERAL Left 6/8/2018    Procedure: IRRIGATION & DEBRIDEMENT ANKLE WOUND;  Surgeon: Milana Colin M.D.;  Location: SURGERY [facility];  Service: General   • IRRIGATION & DEBRIDEMENT HIP Left 6/4/2018    Procedure: IRRIGATION & DEBRIDEMENT HIP;  Surgeon: Chong Vazquez M.D.;  Location: SURGERY [facility];  Service: Orthopedics   • HIP REVISION TOTAL Left 2/11/2018    Procedure: HIP REVISION TOTAL- femoral nail removal conversion to total hip arthoroplasty;  Surgeon: Chong Vazquez M.D.;  Location: SURGERY [facility];  Service: Orthopedics   • HIP NAILING INTRAMEDULLARY Left 12/20/2017    Procedure: HIP NAILING INTRAMEDULLARY;  Surgeon: Jorge Peters M.D.;  Location: SURGERY [facility];  Service: Orthopedics   • BREAST BIOPSY  8/28/2014    Performed by Magdalena [facility]o M.D. at [facility]   • BREAST BIOPSY Right 8/14    benign   • GYN SURGERY  1973    complete hysterectomy   • ABDOMINAL HYSTERECTOMY TOTAL      w/BSO due to uterine cyst and endometriosis   • ATHROPLASTY      hip        MEDICATIONS:   Current Outpatient Prescriptions   Medication   • furosemide (LASIX) 20 MG Tab   • metoprolol SR (TOPROL XL) 100 MG TABLET SR 24 HR   • ascorbic acid (VITAMIN C) 500 MG tablet   • ferrous sulfate 325 (65 Fe) MG tablet   • triamcinolone acetonide (KENALOG) 0.5 % Cream   • NIACIN PO   • sertraline (ZOLOFT) 100 MG Tab   • Multiple Vitamins-Minerals (THERAPEUTIC-M/LUTEIN) Tab   • vitamin D (CHOLECALCIFEROL) 1000 UNIT Tab     No current facility-administered medications for this visit.    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[employer]  [employer]     Social History Main Topics   • Smoking status: Former Smoker     Packs/day: 0.50     Years: 25.00     Types: Cigarettes     Quit date: 1/1/2007   • Smokeless tobacco: Never Used   • Alcohol use 3.0 oz/week     5 Glasses of wine per week      Comment: glass of wine per day   • Drug use: No   • Sexual activity: Not Currently     Partners: Male     Other Topics Concern   • Not on file     Social History Narrative   • No narrative on file       FAMILY HISTORY:   Family History   Problem Relation Age of Onset   • GI Mother         colostomy   • Heart Attack Father    • Diabetes Father    • Hypertension Father    • Psychiatry Brother         bipolar disorder        REVIEW OF SYSTEMS:   Review of Systems   Constitutional: Negative for chills, fever and malaise/fatigue.   Respiratory: Negative for cough and shortness of breath.    Cardiovascular: Positive for leg swelling. Negative for chest pain and claudication.   Gastrointestinal: Negative for constipation, diarrhea, nausea and vomiting.   Musculoskeletal:        Uses walker   Skin: Negative for itching and rash.   Neurological: Negative for dizziness and weakness.   Psychiatric/Behavioral: Negative.    All other systems reviewed and are negative.        PHYSICAL EXAMINATION:   /82   Pulse 95   Temp 35.9 °C (96.7 °F)   Resp 18   SpO2 92%   Physical Exam   Constitutional: She is oriented to person, place, and time and well-developed, well-nourished, and in no distress. No distress.   HENT:   Head: Normocephalic and atraumatic.   Eyes: Pupils are equal, round, and reactive to light.   Neck: Normal range of motion. Neck supple.   Cardiovascular: Normal rate, regular rhythm and intact distal pulses.    Audible graft pulse over the  medial thigh  Palpable AT to left foot   Pulmonary/Chest: Effort normal. No respiratory distress.   Abdominal: Soft. She exhibits no distension.   Musculoskeletal: Normal range of motion. She exhibits edema.   Improved with compression therapy   Neurological: She is alert and oriented to person, place, and time.   Skin: Skin is warm and dry. No rash noted. No erythema.   Bullae resolved to left lower proximal leg and foot.  Bulla resolved to right great toe.  New wound from bulla to right mid tibia  Left medial ankle with granular tissue  Left medial lower leg ulcer decreased in size    Psychiatric: Mood, memory, affect and judgment normal.       Left medial ankle wound, pre-debridement          Left proximal medial lower leg resolved            Left posteromedial lower extremity wound, pre-debridement        Right anterior lower leg pre-debridement                    PROCEDURE NOTES AND POST-DEBRIDEMENT PHOTOS    PROCEDURE: Excisional debridement of left lower extremity ulcers x2  -2% viscous lidocaine applied topically to wound beds for approximately 10 minutes prior to debridement.  4m curette used to debride wound edges.  Excisional debridement was performed to remove devitalized tissue until healthy, bleeding tissue was visualized.   Entire surface of wounds, 7.38 cm², debrided  Tissue debrided into the subcutaneous layer.  -Bleeding controlled with manual pressure   -wound care completed by Scar Jesus RN                                                                                                         Left medial  ankle, post-debridement         Left posteromedial lower extremity wound, post debridement            Procedure: Excisional debridement of right anterior lower leg wound  2% viscous lidocaine applied and allowed to dwell topically for approximately 10 minutes  4 mm curette used to debride wound and wound edges.  Total area debrided 0.42 cm² to subcutaneous tissue level.  Bleeding controlled with  manual pressure bleeding wound care completed by Scar Jesus RN  See flowsheet for further detail.    Right anterior lower leg post debridement photo            PATIENT EDUCATION  -Advised to go to ER for any increased redness, swelling, drainage or odor, or if patient develops fever, chills, nausea or vomiting.  -Importance of adequate nutrition for wound healing  -Increase protein intake (unless contraindicated by renal status)    ASSESSMENT AND PLAN:  1. Leg ulcer, left, with fat layer exposed (HCC)  Comments: Full-thickness ulcers x2 to left medial lower leg, probably due to pressure.  Complicated by PVD.    5/13-medial ankle wound continues to improve, increased granular tissue.  Posterior medial lower leg wound also continues to decrease in size.  Patient has resolved proximal medial lower leg wound.  Excisional debridement performed today medically necessary for wound healing  Patient to return to wound clinic weekly starting Friday.  Patient wants to verify she has enough dressing supplies to change out her own bandage at home 1 time per week.  Wound care: Hydrofiber silver-antimicrobial effect, absorb drainage, foam, Hypafix tape, Tubigrip      2. Peripheral vascular disease (HCC)  Comments: Per Dr. Colin, vascular,Arterial duplex shows patent left fem-pop without stenosis  5/13: Arterial studies completed while in hospital on 4/21/2019.  Femoropopliteal bypass patent with three-vessel runoff.  Patient also had arterial studies completed at Dr. Colni's office beginning of May.  No further vascular intervention required.  Wounds continue to decrease in size.    3. Bullous pemphigus  Comments: Diagnosed by dermatology.  Dermatology managing    5/13: No longer on prednisone.  No longer on CellCept.  Saw dermatologist on 5/9 and was prescribed a cream for eczema for her forearms for which she is going to  today from pharmacy.  She was also instructed to apply CeraVe anti-itch lotion to her dry flaky  skin.     4.  Infected wound  Recently admitted from 4/19 to 4/24/2019.  Was seen by infectious disease and discharged on Augmentin.      5/13: Patient reports that she is taking Augmentin.  Cellulitis has improved since hospital admission.  High risk for recurrent infections.      5.  Ulcer of right shin with fat layer exposed  First noted on 5/3 appointment.  Started as a bulla that ruptured.    5/13: Initial encounter.  Wound bed dry with callus/rolled edges.  Excisional debridement performed today.  Medically necessary for wound healing.  No signs and symptoms of infection.  Wound care: HydrofiberSilver moistened with NS to provide moist wound environment, foam, Hypafix tape, Tubigrip.  Patient to be seen weekly.    Please note that this dictation was created using voice recognition software. I have worked with technical experts from [facility] to optimize the interface.  I have made every reasonable attempt to correct obvious errors, but there may be errors of grammar and possibly content that I did not discover before finalizing the note.

## 2019-05-13 NOTE — NON-PROVIDER
Wound care supplies ordered from [facility]        Wound 07/05/18 Leg Left Medial Malleolus (Active)   Wound Image    5/13/2019 10:00 AM   Site Assessment Red;Yellow 5/13/2019 10:00 AM   Maricruz-wound Assessment Red;Edema;Maceration 5/13/2019 10:00 AM   Margins Attached edges 5/13/2019 10:00 AM   Wound Length (cm) 2.6 cm 5/13/2019 10:00 AM   Wound Width (cm) 2 cm 5/13/2019 10:00 AM   Wound Depth (cm) 0.1 cm 5/13/2019 10:00 AM   Wound Surface Area (cm^2) 5.2 cm^2 5/13/2019 10:00 AM   Post Wound Length (cm) 2.6 cm 5/13/2019 10:00 AM    Post Wound Width (cm) 2.1 cm 5/13/2019 10:00 AM   Post Wound Depth (cm) 0.1 cm 5/13/2019 10:00 AM   Post Wound Surface Area (cm^2) 5.46 cm^2 5/13/2019 10:00 AM   Tunneling 0 cm 5/6/2019  3:36 PM   Undermining 0 cm 5/6/2019  3:36 PM   Closure Secondary intention 5/9/2019 11:00 AM   Drainage Amount Moderate 5/13/2019 10:00 AM   Drainage Description Serosanguineous;Yellow 5/13/2019 10:00 AM   Non-staged Wound Description Full thickness 5/13/2019 10:00 AM   Treatments Cleansed;Pharmaceutical agent;Other (Comment) 5/13/2019 10:00 AM   Cleansing Normal Saline Irrigation 5/13/2019 10:00 AM   Periwound Protectant Barrier Paste;Skin Moisturizer 5/13/2019 10:00 AM   Dressing Options Hydrofiber Silver;Adhesive Foam;Tubigrip 5/13/2019 10:00 AM   Dressing Cleansing/Solutions Normal Saline 5/9/2019 11:00 AM   Dressing Changed Changed 5/9/2019 11:00 AM   Dressing Status Clean;Dry;Intact 5/9/2019 11:00 AM   Dressing Change Frequency Other (Comments) 5/9/2019 11:00 AM   WOUND NURSE ONLY - Odor None 5/13/2019 10:00 AM   WOUND NURSE ONLY - Exposed Structures None 5/13/2019 10:00 AM   WOUND NURSE ONLY - Tissue Type and Percentage Pre: 80% red, 20% yellow 5/13/2019 10:00 AM   Wound 11/07/18 Left posteromedial LE (Active)   Wound Image    5/13/2019 10:00 AM   Site Assessment Red;Yellow 5/13/2019 10:00 AM   Maricruz-wound Assessment Edema 5/13/2019 10:00 AM   Margins Attached edges 5/13/2019 10:00 AM   Wound Length (cm)  1.6 cm 5/13/2019 10:00 AM   Wound Width (cm) 1.1 cm 5/13/2019 10:00 AM   Wound Depth (cm) 0.2 cm 5/13/2019 10:00 AM   Wound Surface Area (cm^2) 1.76 cm^2 5/13/2019 10:00 AM   Post Wound Length (cm) 1.6 cm 5/13/2019 10:00 AM    Post Wound Width (cm) 1.2 cm 5/13/2019 10:00 AM   Post Wound Depth (cm) 0.2 cm 5/13/2019 10:00 AM   Post Wound Surface Area (cm^2) 1.92 cm^2 5/13/2019 10:00 AM   Tunneling 0 cm 5/6/2019  3:36 PM   Undermining 0 cm 5/6/2019  3:36 PM   Closure Secondary intention 5/9/2019 11:00 AM   Drainage Amount Small 5/13/2019 10:00 AM   Drainage Description Serosanguineous;Yellow 5/13/2019 10:00 AM   Non-staged Wound Description Full thickness 5/13/2019 10:00 AM   Treatments Cleansed;Pharmaceutical agent;Other (Comment) 5/13/2019 10:00 AM   Cleansing Normal Saline Irrigation 5/13/2019 10:00 AM   Periwound Protectant Barrier Paste;Skin Moisturizer 5/13/2019 10:00 AM   Dressing Options Hydrofiber Silver;Adhesive Foam;Tubigrip 5/13/2019 10:00 AM   Dressing Cleansing/Solutions Normal Saline 5/9/2019 11:00 AM   Dressing Changed Changed 5/9/2019 11:00 AM   Dressing Status Clean;Dry;Intact 5/9/2019 11:00 AM   Dressing Change Frequency Other (Comments) 5/9/2019 11:00 AM   WOUND NURSE ONLY - Odor None 5/13/2019 10:00 AM   WOUND NURSE ONLY - Exposed Structures None 5/13/2019 10:00 AM   WOUND NURSE ONLY - Tissue Type and Percentage Pre: 80% yellow, 20% red 5/13/2019 10:00 AM   Wound 05/03/19 Right Anterior LE (Active)   Wound Image    5/13/2019 10:00 AM   Site Assessment [place] 5/13/2019 10:00 AM   Maricruz-wound Assessment Other (Comment);Edema 5/13/2019 10:00 AM   Margins Attached edges 5/13/2019 10:00 AM   Wound Length (cm) 0.7 cm 5/13/2019 10:00 AM   Wound Width (cm) 0.5 cm 5/13/2019 10:00 AM   Wound Depth (cm) 0.1 cm 5/13/2019 10:00 AM   Wound Surface Area (cm^2) 0.35 cm^2 5/13/2019 10:00 AM   Post Wound Length (cm) 0.7 cm 5/13/2019 10:00 AM    Post Wound Width (cm) 0.6 cm 5/13/2019 10:00 AM   Post Wound Depth (cm) 0.1  cm 5/13/2019 10:00 AM   Post Wound Surface Area (cm^2) 0.42 cm^2 5/13/2019 10:00 AM   Tunneling 0 cm 5/6/2019  3:36 PM   Undermining 0 cm 5/6/2019  3:36 PM   Closure Secondary intention 5/9/2019 11:00 AM   Drainage Amount Scant 5/13/2019 10:00 AM   Drainage Description Serosanguineous 5/13/2019 10:00 AM   Non-staged Wound Description Full thickness 5/13/2019 10:00 AM   Treatments Cleansed;Pharmaceutical agent;Other (Comment) 5/13/2019 10:00 AM   Cleansing Normal Saline Irrigation 5/13/2019 10:00 AM   Periwound Protectant Barrier Paste;Skin Moisturizer 5/13/2019 10:00 AM   Dressing Options Hydrofiber Silver;Adhesive Foam;Tubigrip 5/13/2019 10:00 AM   Dressing Cleansing/Solutions Normal Saline 5/9/2019 11:00 AM   Dressing Changed Changed 5/9/2019 11:00 AM   Dressing Status Clean;Dry;Intact 5/9/2019 11:00 AM   WOUND NURSE ONLY - Odor None 5/13/2019 10:00 AM   WOUND NURSE ONLY - Exposed Structures None 5/13/2019 10:00 AM   WOUND NURSE ONLY - Tissue Type and Percentage Pre: 100% moist pink 5/13/2019 10:00 AM

## 2019-05-13 NOTE — PATIENT INSTRUCTIONS
Avoid prolonged standing or sitting without elevating your legs.  - Apply tubigrip to your legs ending 2 fingers below back of knee without wrinkles.    Keep dressing clean and dry and cover while bathing. Only change dressing if over saturated, soiled or its falling off.     Should you experience any significant changes in your wound(s) such as infection (redness, swelling, localized heat, increased pain, fever >101 F, chills) or have any questions regarding your home care instructions, please contact the wound center [phone]. If after hours, contact your primary care physician or go the hospital emergency room.

## 2019-05-15 ENCOUNTER — PATIENT OUTREACH (OUTPATIENT)
Dept: HEALTH INFORMATION MANAGEMENT | Facility: OTHER | Age: 74
End: 2019-05-15

## 2019-05-16 ENCOUNTER — PATIENT OUTREACH (OUTPATIENT)
Dept: HEALTH INFORMATION MANAGEMENT | Facility: OTHER | Age: 74
End: 2019-05-16

## 2019-05-16 NOTE — PROGRESS NOTES
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 Pt has obtained her SNAP benefits and reported she is able to follow up with her [employer] application. Pt provided with contact number for [employer] to follow up on status. Discussed if pt had any additional social needs. Pt denied at this time reporting she feels like she is making improvement and is down to going to wound care once a week. Pt agreeable to graduation from [facility] services.     Plan:  · Pt graduated from  CC services; goal met.

## 2019-05-17 ENCOUNTER — NON-PROVIDER VISIT (OUTPATIENT)
Dept: WOUND CARE | Facility: MEDICAL CENTER | Age: 74
End: 2019-05-17
Attending: NURSE PRACTITIONER
Payer: MEDICARE

## 2019-05-17 PROCEDURE — 97597 DBRDMT OPN WND 1ST 20 CM/<: CPT

## 2019-05-17 NOTE — PATIENT INSTRUCTIONS
-Keep dressings clean, dry and covered while bathing. Only change dressings if they become over saturated, soiled or fall off.     -Avoid prolonged standing or sitting without elevating your legs.    -Remove your compression garments if you have severe pain, severe swelling, numbness, color change, or temperature change in your toes.     -Should you experience any significant changes in your wound(s), such as infection (redness, swelling, localized heat, increased pain, fever > 101 F, chills) or have any questions regarding your home care instructions, please contact the wound center at [phone]. If after hours, contact your primary care physician or go to the hospital emergency room.

## 2019-05-21 ENCOUNTER — APPOINTMENT (OUTPATIENT)
Dept: WOUND CARE | Facility: MEDICAL CENTER | Age: 74
End: 2019-05-21
Attending: NURSE PRACTITIONER
Payer: MEDICARE

## 2019-05-24 ENCOUNTER — NON-PROVIDER VISIT (OUTPATIENT)
Dept: WOUND CARE | Facility: MEDICAL CENTER | Age: 74
End: 2019-05-24
Attending: NURSE PRACTITIONER
Payer: MEDICARE

## 2019-05-24 PROCEDURE — 97597 DBRDMT OPN WND 1ST 20 CM/<: CPT

## 2019-05-24 NOTE — PROCEDURES
2% Viscous Lidocaine gel dwell time ~5 minutes prior to debridement  CSWD with curette to remove ~5 cm2 of non viable tissue from all wound beds

## 2019-05-24 NOTE — PATIENT INSTRUCTIONS
-Keep dressings clean, dry and covered while bathing. Only change dressings if they become over saturated, soiled or fall off.     -Avoid prolonged standing or sitting without elevating your legs.    -Remove your compression garments if you have severe pain, severe swelling, numbness, color change, or temperature change in your toes.     -Should you experience any significant changes in your wound(s), such as infection (redness, swelling, localized heat, increased pain, fever > 101 F, chills) or have any questions regarding your home care instructions, please contact the wound center at [phone]. If after hours, contact your primary care physician or go to the hospital emergency room.

## 2019-05-27 ENCOUNTER — APPOINTMENT (OUTPATIENT)
Dept: WOUND CARE | Facility: MEDICAL CENTER | Age: 74
End: 2019-05-27
Attending: NURSE PRACTITIONER
Payer: MEDICARE

## 2019-05-30 ENCOUNTER — APPOINTMENT (OUTPATIENT)
Dept: WOUND CARE | Facility: MEDICAL CENTER | Age: 74
End: 2019-05-30
Attending: NURSE PRACTITIONER
Payer: MEDICARE

## 2019-05-31 ENCOUNTER — PATIENT OUTREACH (OUTPATIENT)
Dept: HEALTH INFORMATION MANAGEMENT | Facility: OTHER | Age: 74
End: 2019-05-31

## 2019-06-02 NOTE — PROGRESS NOTES
Nadege Mae was admitted to [facility] [place] on 4/19/19 for a wound infection. Patient discharged home on 4/24/19. [facility] patient advocate was able to successfully engage with patient post-discharge and throughout the case. Per discharged orders, patient was instructed to see her PCP and continue regular follow up with [facility] wound care. Patient had an appointment with her PCP on 4/29/19 but elected to only follow up with her specialist who have been treating her legs, and ended up cancelling her appointment. Patient saw her vascular doctor on 5/6/19, and her dermatologist on 5/9/19. Patient resumed wound care treatments on 4/26/19. PPS score above 50%.

## 2019-06-03 ENCOUNTER — NON-PROVIDER VISIT (OUTPATIENT)
Dept: WOUND CARE | Facility: MEDICAL CENTER | Age: 74
End: 2019-06-03
Attending: NURSE PRACTITIONER
Payer: MEDICARE

## 2019-06-03 PROCEDURE — 97597 DBRDMT OPN WND 1ST 20 CM/<: CPT

## 2019-06-03 NOTE — PATIENT INSTRUCTIONS
-Keep your wound dressing clean, dry, and intact.    -Change your dressing 2x/week and if it becomes soiled, soaked, or falls off.    -Should you experience any significant changes in your wound(s), such as infection (redness, swelling, localized heat, increased pain, fever > 101 F, chills) or have any questions regarding your home care instructions, please contact the wound center at [phone]. If after hours, contact your primary care physician or go to the hospital emergency room.

## 2019-06-03 NOTE — PROCEDURES
cswd using scalpel to remove ~4cm2 slough from left medial malleolus wound.  2% topical lidocaine applied prior to debridement with ~5 minute dwell time.  Pt tolerated well.

## 2019-06-07 ENCOUNTER — NON-PROVIDER VISIT (OUTPATIENT)
Dept: WOUND CARE | Facility: MEDICAL CENTER | Age: 74
End: 2019-06-07
Attending: NURSE PRACTITIONER
Payer: MEDICARE

## 2019-06-07 PROCEDURE — 97602 WOUND(S) CARE NON-SELECTIVE: CPT

## 2019-06-07 NOTE — PATIENT INSTRUCTIONS
Should you experience any significant changes in your wound(s) such as infection (redness, swelling, localized heat, increased pain, fever >101 F, chills) or have any questions regarding your home care instructions, please contact the wound center [phone]. If after hours, contact your primary care physician or go the hospital emergency room.  Keep dressing clean and dry and cover while bathing. Only change dressing if over saturated, soiled or its falling off.

## 2019-06-12 ENCOUNTER — OFFICE VISIT (OUTPATIENT)
Dept: MEDICAL GROUP | Facility: PHYSICIAN GROUP | Age: 74
End: 2019-06-12
Payer: MEDICARE

## 2019-06-12 VITALS
HEIGHT: 67 IN | HEART RATE: 89 BPM | SYSTOLIC BLOOD PRESSURE: 144 MMHG | TEMPERATURE: 97.8 F | OXYGEN SATURATION: 94 % | DIASTOLIC BLOOD PRESSURE: 78 MMHG | WEIGHT: 146 LBS | RESPIRATION RATE: 18 BRPM | BODY MASS INDEX: 22.91 KG/M2

## 2019-06-12 DIAGNOSIS — D50.8 IRON DEFICIENCY ANEMIA SECONDARY TO INADEQUATE DIETARY IRON INTAKE: ICD-10-CM

## 2019-06-12 DIAGNOSIS — T14.8XXA WOUND INFECTION: ICD-10-CM

## 2019-06-12 DIAGNOSIS — L08.9 WOUND INFECTION: ICD-10-CM

## 2019-06-12 DIAGNOSIS — Z95.828 S/P FEMORAL-POPLITEAL BYPASS SURGERY: ICD-10-CM

## 2019-06-12 DIAGNOSIS — E53.8 LOW FOLATE: ICD-10-CM

## 2019-06-12 DIAGNOSIS — D50.8 OTHER IRON DEFICIENCY ANEMIA: ICD-10-CM

## 2019-06-12 PROBLEM — L97.812 ULCER OF RIGHT SHIN WITH FAT LAYER EXPOSED (HCC): Status: RESOLVED | Noted: 2019-05-13 | Resolved: 2019-06-12

## 2019-06-12 PROCEDURE — 99214 OFFICE O/P EST MOD 30 MIN: CPT | Performed by: NURSE PRACTITIONER

## 2019-06-12 NOTE — ASSESSMENT & PLAN NOTE
Patient is seeing wound care at [facility], weekly.  States improvement.  They debride weekly and redress.  Wound is dressed with compression stocking no exudate.  Afebrile.  No antibiotics at this time.  Improving.

## 2019-06-12 NOTE — ASSESSMENT & PLAN NOTE
Reports that she is currently not taking any extra iron or folic acid.  She is taking multi vitamin.

## 2019-06-12 NOTE — PROGRESS NOTES
Chief Complaint   Patient presents with   • Follow-Up   • Wound Check       Subjective:   Nadege Mae is a 73 y.o. female here today for evaluation and management of:    Wound infection  Patient is seeing wound care at [facility], weekly.  States improvement.  They debride weekly and redress.  Wound is dressed with compression stocking no exudate.  Afebrile.  No antibiotics at this time.  Improving.      Iron deficiency anemia  Reports that she is currently not taking any extra iron or folic acid.  She is taking multi vitamin.     Low folate  Reports that she is taking folate.             Current medicines (including changes today)  Current Outpatient Prescriptions   Medication Sig Dispense Refill   • niacinamide 500 MG tablet      • metoprolol SR (TOPROL XL) 100 MG TABLET SR 24 HR TAKE 1 TABLET BY MOUTH EVERY DAY 90 Tab 0   • ascorbic acid (VITAMIN C) 500 MG tablet Take 1 Tab by mouth every day. 30 Tab 3   • sertraline (ZOLOFT) 100 MG Tab Take 1 Tab by mouth every day. 90 Tab 3   • Multiple Vitamins-Minerals (THERAPEUTIC-M/LUTEIN) Tab Take 1 Tab by mouth every day.  0   • vitamin D (CHOLECALCIFEROL) 1000 UNIT Tab Take 1,000 Units by mouth every day.     • furosemide (LASIX) 20 MG Tab TAKE 1 TABLET BY MOUTH EVERY DAY (Patient not taking: Reported on 6/12/2019) 90 Tab 0   • ferrous sulfate 325 (65 Fe) MG tablet Take 1 Tab by mouth 3 times a day, with meals. (Patient not taking: Reported on 6/12/2019) 90 Tab 1     No current facility-administered medications for this visit.      She  has a past medical history of Anxiety; Cellulitis and abscess of lower extremity; Dental disorder; Generalized osteoarthritis of multiple sites (10/20/2015); Heart valve disease; Hyperlipidemia; Hypertension; and Osteoporosis. She also has no past medical history of Allergy; Diabetes; Muscle disorder; or OSTEOPOROSIS.    ROS as stated in hpi  No chest pain, no shortness of breath, no abdominal pain       Objective:     /78 (BP  "Location: Left arm, Patient Position: Sitting)   Pulse 89   Temp 36.6 °C (97.8 °F) (Temporal)   Resp 18   Ht 1.702 m (5' 7\")   Wt 66.2 kg (146 lb)   SpO2 94%  Body mass index is 22.87 kg/m². stable  Physical Exam:  Constitutional: Alert, no distress.  Skin: Warm, dry, good turgor,no cyanosis, no rashes in visible areas.  Left leg wound with dressing and compression stocking on.  Has wound care on Friday  Eye: Equal, round and reactive, conjunctiva clear, lids normal.  Ears: No tenderness, no discharge.  External canals are without any significant edema or erythema.    Gross auditory acuity is intact.  Nose: symmetrical without tenderness, no discharge.  Mouth/Throat: lips without lesion.  Oropharynx clear.   Neck: Trachea midline, no masses, no obvious thyroid enlargement.. No cervical or supraclavicular lymphadenopathy. Range of motion within normal limits.  Neuro: Cranial nerves 2-12 grossly intact.  No sensory deficit.  Respiratory: Unlabored respiratory effort, lungs clear to auscultation, no wheezes, no ronchi.  Cardiovascular: Normal S1, S2, no murmur, no edema.  Psych: Alert and oriented x3, normal affect and mood and judgement.        Assessment and Plan:   The following treatment plan was discussed    1. Wound infection  Chronic, ongoing, improving.  Followed by [facility] wound care weekly.  Seeing improvement.      2. Other iron deficiency anemia  Chronic issue.  Due for updated labs.  Encouraged diet high in iron and supplemental iron.  Monitor.     3. Low folate  Chronic issue.  Due for updated labs.  Encouraged diet high in folic acid, supplmentation recommended.   - CBC WITH DIFFERENTIAL; Future  - Comp Metabolic Panel; Future  - VITAMIN B12; Future  - FOLATE; Future    4. Iron deficiency anemia secondary to inadequate dietary iron intake  See #2  - FERRITIN; Future    5. S/P femoral-popliteal bypass surgery  Historical issue.  Followed by Dr. Colin.  Recent Ultrasound showed patent blood flow. "       Followup: Return in about 4 months (around 10/12/2019) for Multiple issues.         Educated in proper administration of medication(s) ordered today including safety, possible SE, risks, benefits, rationale and alternatives to therapy.     Please note that this dictation was created using voice recognition software. I have made every reasonable attempt to correct obvious errors, but I expect that there are errors of grammar and possibly content that I did not discover before finalizing the note.

## 2019-06-14 ENCOUNTER — NON-PROVIDER VISIT (OUTPATIENT)
Dept: WOUND CARE | Facility: MEDICAL CENTER | Age: 74
End: 2019-06-14
Attending: NURSE PRACTITIONER
Payer: MEDICARE

## 2019-06-14 DIAGNOSIS — S81.801A OPEN WOUND OF BOTH LEGS WITH COMPLICATION: ICD-10-CM

## 2019-06-14 DIAGNOSIS — S81.802A OPEN WOUND OF BOTH LEGS WITH COMPLICATION: ICD-10-CM

## 2019-06-14 PROCEDURE — 97597 DBRDMT OPN WND 1ST 20 CM/<: CPT

## 2019-06-14 NOTE — PROCEDURES
2% Viscous lidocaine applied to wound and periwound ~5 minutes dwell time.   CSWD using curette to remove approx. ~10cm2 of nonviable tissue from all wound beds.

## 2019-06-14 NOTE — PATIENT INSTRUCTIONS
Keep dressing clean and dry and cover while bathing. Only change dressing if over saturated, soiled or its falling off.     Should you experience any significant changes in your wound(s) such as infection (redness, swelling, localized heat, increased pain, fever >101 F, chills) or have any questions regarding your home care instructions, please contact the wound center [phone]. If after hours, contact your primary care physician or go the hospital emergency room.

## 2019-06-21 ENCOUNTER — NON-PROVIDER VISIT (OUTPATIENT)
Dept: WOUND CARE | Facility: MEDICAL CENTER | Age: 74
End: 2019-06-21
Attending: NURSE PRACTITIONER
Payer: MEDICARE

## 2019-06-21 PROCEDURE — 97597 DBRDMT OPN WND 1ST 20 CM/<: CPT

## 2019-06-21 NOTE — PATIENT INSTRUCTIONS
Keep dressing clean and dry and cover while bathing. Only change dressing if over saturated, soiled or its falling off.     Should you experience any significant changes in your wound(s) such as infection (redness, swelling, localized heat, increased pain, fever >101 F, chills) or have any questions regarding your home care instructions, please contact the wound center [phone]. If after hours, contact your primary care physician or go the hospital emergency room.

## 2019-06-21 NOTE — PROCEDURES
2% Viscous lidocaine applied to wound and periwound 5 minutes dwell time.   CSWD using curette to remove approx. ~3cm2 of nonviable tissue from left medial malleolus wound bed.

## 2019-06-28 ENCOUNTER — NON-PROVIDER VISIT (OUTPATIENT)
Dept: WOUND CARE | Facility: MEDICAL CENTER | Age: 74
End: 2019-06-28
Attending: NURSE PRACTITIONER
Payer: MEDICARE

## 2019-06-28 DIAGNOSIS — S81.802A LEG WOUND, LEFT, INITIAL ENCOUNTER: ICD-10-CM

## 2019-06-28 PROCEDURE — 97597 DBRDMT OPN WND 1ST 20 CM/<: CPT

## 2019-06-28 NOTE — PROCEDURES
2% Viscous lidocaine applied to wound and periwound 5 minutes dwell time.   CSWD using curette to remove approx. ~3cm2 of nonviable tissue from left medial malleolus wound bed.    Pt presents with red, swollen, irritated LLE. Possible reaction to silicone border foams. Discussed TAC, abd pad, rolled gauze and tubi instead, pt agreeable however refuses tubigrip this visit.

## 2019-06-28 NOTE — PATIENT INSTRUCTIONS
Keep dressing clean and dry and cover while bathing. Only change dressing if over saturated, soiled or its falling off.     Should you experience any significant changes in your wound(s) such as infection (redness, swelling, localized heat, increased pain, fever >101 F, chills) or have any questions regarding your home care instructions, please contact the wound center [phone]. If after hours, contact your primary care physician or go the hospital emergency room.

## 2019-07-05 ENCOUNTER — HOSPITAL ENCOUNTER (OUTPATIENT)
Facility: MEDICAL CENTER | Age: 74
End: 2019-07-05
Attending: NURSE PRACTITIONER
Payer: MEDICARE

## 2019-07-05 ENCOUNTER — NON-PROVIDER VISIT (OUTPATIENT)
Dept: WOUND CARE | Facility: MEDICAL CENTER | Age: 74
End: 2019-07-05
Attending: NURSE PRACTITIONER
Payer: MEDICARE

## 2019-07-05 DIAGNOSIS — S81.801A OPEN WOUND OF RIGHT LOWER LEG, INITIAL ENCOUNTER: ICD-10-CM

## 2019-07-05 DIAGNOSIS — M79.89 LEFT LEG SWELLING: ICD-10-CM

## 2019-07-05 LAB
GRAM STN SPEC: NORMAL
SIGNIFICANT IND 70042: NORMAL
SITE SITE: NORMAL
SOURCE SOURCE: NORMAL

## 2019-07-05 PROCEDURE — 87070 CULTURE OTHR SPECIMN AEROBIC: CPT

## 2019-07-05 PROCEDURE — 87205 SMEAR GRAM STAIN: CPT

## 2019-07-05 PROCEDURE — 87186 SC STD MICRODIL/AGAR DIL: CPT

## 2019-07-05 PROCEDURE — 97597 DBRDMT OPN WND 1ST 20 CM/<: CPT

## 2019-07-05 PROCEDURE — 87077 CULTURE AEROBIC IDENTIFY: CPT

## 2019-07-05 NOTE — PATIENT INSTRUCTIONS
Keep dressing clean and dry and cover while bathing. Only change dressing if over saturated, soiled or its falling off.     Should you experience any significant changes in your wound(s) such as infection (redness, swelling, localized heat, increased pain, fever >101 F, chills) or have any questions regarding your home care instructions, please contact the wound center [phone]. If after hours, contact your primary care physician or go the hospital emergency room.

## 2019-07-05 NOTE — NON-PROVIDER
Pt c/o persistent edema to LLE and pain to left lower leg below knee area. Wound culture was collected from left medial malleolus wound and sent to lab.

## 2019-07-08 DIAGNOSIS — L08.9 WOUND INFECTION: ICD-10-CM

## 2019-07-08 DIAGNOSIS — T14.8XXA WOUND INFECTION: ICD-10-CM

## 2019-07-08 RX ORDER — DOXYCYCLINE HYCLATE 100 MG
100 TABLET ORAL 2 TIMES DAILY
Qty: 20 TAB | Refills: 0 | Status: SHIPPED | OUTPATIENT
Start: 2019-07-08 | End: 2019-07-18

## 2019-07-08 NOTE — PROGRESS NOTES
Wound culture results reviewed.  Positive for MRSA to left medial ankle wound.  Wound care notes and photos reviewed.  Culture results discussed with pt via phone, pharmacy info verified.  Doxycycline twice daily for 10 days sent to pharmacy on file.  Reviewed patient allergies, medications, labs.  She does have an allergy to Bactrim DS.  She believes she developed a rash.  Reaction alert message noted for ingredient sodium benzoate which is in doxycycline and Bactrim DS.  Discussed with patient to stop medication if she develops signs and symptoms of allergic reaction.  Verbalized understanding.

## 2019-07-12 ENCOUNTER — NON-PROVIDER VISIT (OUTPATIENT)
Dept: WOUND CARE | Facility: MEDICAL CENTER | Age: 74
End: 2019-07-12
Attending: NURSE PRACTITIONER
Payer: MEDICARE

## 2019-07-12 PROCEDURE — 97597 DBRDMT OPN WND 1ST 20 CM/<: CPT

## 2019-07-12 NOTE — PATIENT INSTRUCTIONS
Should you experience any significant changes in your wound(s) such as infection (redness, swelling, localized heat, increased pain, fever >101 F, chills) or have any questions regarding your home care instructions, please contact the wound center [phone]. If after hours, contact your primary care physician or go the hospital emergency room.  Keep dressing clean and dry and cover while bathing. Only change dressing if over saturated, soiled or its falling off.

## 2019-07-12 NOTE — PROCEDURES
2% Viscous lidocaine applied to L medial ankle wound and periwound 5 minutes dwell time.   CSWD using curette to remove  <20cm2 of nonviable tissue from left medial malleolus wound bed.

## 2019-07-19 ENCOUNTER — NON-PROVIDER VISIT (OUTPATIENT)
Dept: WOUND CARE | Facility: MEDICAL CENTER | Age: 74
End: 2019-07-19
Attending: NURSE PRACTITIONER
Payer: MEDICARE

## 2019-07-19 PROCEDURE — 97597 DBRDMT OPN WND 1ST 20 CM/<: CPT

## 2019-07-19 NOTE — PATIENT INSTRUCTIONS
Avoid prolonged standing or sitting without elevating your legs.  - Apply tubigrip to your legs ending 2 fingers below back of knee without wrinkles.       Should you experience any significant changes in your wound(s), such as infection (redness, swelling, localized heat, increased pain, fever > 101 F, chills) or have any questions regarding your home care instructions, please contact the wound center at [phone]. If after hours, contact your primary care physician or go to the hospital emergency room.   Keep dressing clean, dry and covered while bathing. Only change dressing if it becomes over saturated, soiled or falls off.

## 2019-07-19 NOTE — PROCEDURES
Post application of topical lidocaine to left medial ankle wound; CSWD with scalpel to remove thick biofilm to reveal red viable tissue beneath of ~1cm2 to left medial ankle wound.

## 2019-08-02 ENCOUNTER — NON-PROVIDER VISIT (OUTPATIENT)
Dept: WOUND CARE | Facility: MEDICAL CENTER | Age: 74
End: 2019-08-02
Attending: NURSE PRACTITIONER
Payer: MEDICARE

## 2019-08-02 PROCEDURE — 97597 DBRDMT OPN WND 1ST 20 CM/<: CPT

## 2019-08-02 NOTE — PROCEDURES
After 10 mins dwell time of topical viscous lidocaine 2%, wound was selectively debrided with a curette  to remove yuki wound callus and non viable tissue from wound bed. Area debrided <20cm2. Pt reid well.

## 2019-08-02 NOTE — PATIENT INSTRUCTIONS
Should you experience any significant changes in your wound(s) such as infection (redness, swelling, localized heat, increased pain, fever >101 F, chills) or have any questions regarding your home care instructions, please contact the wound center [phone]. If after hours, contact your primary care physician or go the hospital emergency room.  Keep dressing clean and dry and cover while bathing. Only change dressing if over saturated, soiled or its falling off.

## 2019-08-08 NOTE — PROCEDURES
2% Viscous lidocaine applied to wound and periwound ~10 minutes dwell time.   CSWD using curette to remove approx. ~3cm2 of nonviable tissue from wound bed.

## 2019-08-09 ENCOUNTER — NON-PROVIDER VISIT (OUTPATIENT)
Dept: WOUND CARE | Facility: MEDICAL CENTER | Age: 74
End: 2019-08-09
Attending: NURSE PRACTITIONER
Payer: MEDICARE

## 2019-08-09 PROCEDURE — 97597 DBRDMT OPN WND 1ST 20 CM/<: CPT

## 2019-08-09 NOTE — PROCEDURES
2% Viscous lidocaine applied to wound and periwound ~5 minutes dwell time.  CSWD using curette to remove approx. ~1.5cm2 of nonviable tissue from wound bed and periwound crusts.

## 2019-08-09 NOTE — PATIENT INSTRUCTIONS
Keep dressing clean and dry and cover while bathing. Only change dressing if over saturated, soiled or its falling off.     Should you experience any significant changes in your wound(s) such as infection (redness, swelling, localized heat, increased pain, fever >101 F, chills) or have any questions regarding your home care instructions, please contact the wound center [phone]. If after hours, contact your primary care physician or go the hospital emergency room.

## 2019-08-16 ENCOUNTER — NON-PROVIDER VISIT (OUTPATIENT)
Dept: WOUND CARE | Facility: MEDICAL CENTER | Age: 74
End: 2019-08-16
Attending: NURSE PRACTITIONER
Payer: MEDICARE

## 2019-08-16 PROCEDURE — 97597 DBRDMT OPN WND 1ST 20 CM/<: CPT

## 2019-08-16 NOTE — PROCEDURES
After 10 mins dwell time of topical viscous lidocaine 2%, wound was selectively debrided with a scalpel to remove yuki wound callus and non viable tissue from wound bed. Area debrided ~2cm2. Pt reid well.

## 2019-08-16 NOTE — PATIENT INSTRUCTIONS
Should you experience any significant changes in your wound(s) such as infection (redness, swelling, localized heat, increased pain, fever >101 F, chills) or have any questions regarding your home care instructions, please contact the wound center [phone]. If after hours, contact your primary care physician or go the hospital emergency room.  Keep dressing clean and dry and cover while bathing. Only change dressing if over saturated, soiled or its falling off or every 72 hours.

## 2019-08-23 ENCOUNTER — HOSPITAL ENCOUNTER (OUTPATIENT)
Facility: MEDICAL CENTER | Age: 74
End: 2019-08-23
Attending: NURSE PRACTITIONER
Payer: MEDICARE

## 2019-08-23 ENCOUNTER — NON-PROVIDER VISIT (OUTPATIENT)
Dept: WOUND CARE | Facility: MEDICAL CENTER | Age: 74
End: 2019-08-23
Attending: NURSE PRACTITIONER
Payer: MEDICARE

## 2019-08-23 DIAGNOSIS — S81.802A WOUND OF LEFT LEG, INITIAL ENCOUNTER: ICD-10-CM

## 2019-08-23 DIAGNOSIS — T14.8XXA WOUND INFECTION: ICD-10-CM

## 2019-08-23 DIAGNOSIS — L08.9 WOUND INFECTION: ICD-10-CM

## 2019-08-23 LAB
GRAM STN SPEC: NORMAL
SIGNIFICANT IND 70042: NORMAL
SITE SITE: NORMAL
SOURCE SOURCE: NORMAL

## 2019-08-23 PROCEDURE — 87205 SMEAR GRAM STAIN: CPT

## 2019-08-23 PROCEDURE — 97597 DBRDMT OPN WND 1ST 20 CM/<: CPT

## 2019-08-23 PROCEDURE — 87077 CULTURE AEROBIC IDENTIFY: CPT

## 2019-08-23 PROCEDURE — 87070 CULTURE OTHR SPECIMN AEROBIC: CPT

## 2019-08-23 PROCEDURE — 87186 SC STD MICRODIL/AGAR DIL: CPT

## 2019-08-23 NOTE — PROCEDURES
2% Viscous lidocaine applied to wound and periwound 5 minutes dwell time.   CSWD using curette to remove approx. ~1.5cm2 of nonviable tissue from left medial malleolus wound bed.     Pt presents with edema, swelling, and blanching erythema to LLE. Wound culture collected from medial malleolus wound.

## 2019-08-23 NOTE — PATIENT INSTRUCTIONS
Keep dressing clean and dry and cover while bathing. Only change dressing if over saturated, soiled or its falling off.     Should you experience any significant changes in your wound(s) such as infection (redness, swelling, localized heat, increased pain, fever >101 F, chills) or have any questions regarding your home care instructions, please contact the wound center [phone]. If after hours, contact your primary care physician or go the hospital emergency room.

## 2019-08-30 ENCOUNTER — NON-PROVIDER VISIT (OUTPATIENT)
Dept: WOUND CARE | Facility: MEDICAL CENTER | Age: 74
End: 2019-08-30
Attending: NURSE PRACTITIONER
Payer: MEDICARE

## 2019-08-30 PROCEDURE — 97597 DBRDMT OPN WND 1ST 20 CM/<: CPT

## 2019-08-30 NOTE — PATIENT INSTRUCTIONS
Should you experience any significant changes in your wound(s) such as infection (redness, swelling, localized heat, increased pain, fever >101 F, chills) or have any questions regarding your home care instructions, please contact the wound center [phone]. If after hours, contact your primary care physician or go the hospital emergency room.  Keep dressing clean and dry and cover while bathing. Only change dressing if over saturated, soiled or its falling off.

## 2019-08-30 NOTE — WOUND TEAM
Notes recorded by HEBER Krishna on 8/28/2019 at 8:48 AM PDT  Wound culture results reviewed.  Wound clinic notes and photos also reviewed.  No antibiotics at this time, continue to monitor   Contains abnormal data CULTURE WOUND W/ GRAM STAIN   Order: [id number]   Status:  Final result   Visible to patient:  No (Inaccessible in MyChart) Next appt:  09/06/2019 at 10:00 AM in Wound Care (Scar Jesus, RNadiaNNadia)   Specimen Information: Wound        Component 7d ago   Significant Indicator POSPositive (POS)    Source WND    Site Left Leg    Culture Result -Abnormal     Gram Stain Result Rare WBCs.   No organisms seen.    Culture Result Abnormal    Methicillin Resistant Staphylococcus aureus   Rare growth            I reviewed culture results and APRN notes today. Pt reports LE is much less red today and feels/looks better. Tae Santana RN, CFCN, CWS

## 2019-08-30 NOTE — PROCEDURES
After 10 mins dwell time of topical viscous lidocaine 2%, wound L medial malleolus was selectively debrided with a curette  to remove yuki wound callus and non viable tissue from wound bed. Area debrided <20cm2. Pt reid well.

## 2019-09-02 ENCOUNTER — APPOINTMENT (OUTPATIENT)
Dept: RADIOLOGY | Facility: MEDICAL CENTER | Age: 74
DRG: 603 | End: 2019-09-02
Attending: INTERNAL MEDICINE
Payer: MEDICARE

## 2019-09-02 ENCOUNTER — APPOINTMENT (OUTPATIENT)
Dept: RADIOLOGY | Facility: MEDICAL CENTER | Age: 74
DRG: 603 | End: 2019-09-02
Attending: EMERGENCY MEDICINE
Payer: MEDICARE

## 2019-09-02 ENCOUNTER — HOSPITAL ENCOUNTER (INPATIENT)
Facility: MEDICAL CENTER | Age: 74
LOS: 9 days | DRG: 603 | End: 2019-09-12
Attending: EMERGENCY MEDICINE | Admitting: HOSPITALIST
Payer: MEDICARE

## 2019-09-02 DIAGNOSIS — Z86.14 HISTORY OF MRSA INFECTION: ICD-10-CM

## 2019-09-02 DIAGNOSIS — I10 ESSENTIAL HYPERTENSION: ICD-10-CM

## 2019-09-02 DIAGNOSIS — L97.322 LOWER LIMB ULCER, ANKLE, LEFT, WITH FAT LAYER EXPOSED (HCC): ICD-10-CM

## 2019-09-02 DIAGNOSIS — Z87.81 HISTORY OF HIP FRACTURE: ICD-10-CM

## 2019-09-02 DIAGNOSIS — L02.416 CUTANEOUS ABSCESS OF LEFT LOWER EXTREMITY: ICD-10-CM

## 2019-09-02 DIAGNOSIS — L10.9 BULLOUS PEMPHIGUS: ICD-10-CM

## 2019-09-02 DIAGNOSIS — L02.91 ABSCESS: ICD-10-CM

## 2019-09-02 DIAGNOSIS — R26.2 INABILITY TO AMBULATE DUE TO HIP: ICD-10-CM

## 2019-09-02 PROBLEM — R62.7 ADULT FAILURE TO THRIVE: Status: ACTIVE | Noted: 2019-09-02

## 2019-09-02 PROBLEM — Z78.9 ALCOHOL USE: Status: ACTIVE | Noted: 2019-09-02

## 2019-09-02 PROBLEM — F10.90 ALCOHOL USE: Status: ACTIVE | Noted: 2019-09-02

## 2019-09-02 LAB
ALBUMIN SERPL BCP-MCNC: 2.5 G/DL (ref 3.2–4.9)
ALBUMIN/GLOB SERPL: 0.6 G/DL
ALP SERPL-CCNC: 199 U/L (ref 30–99)
ALT SERPL-CCNC: 50 U/L (ref 2–50)
ANION GAP SERPL CALC-SCNC: 10 MMOL/L (ref 0–11.9)
APPEARANCE UR: CLEAR
AST SERPL-CCNC: 114 U/L (ref 12–45)
BACTERIA #/AREA URNS HPF: NEGATIVE /HPF
BASOPHILS # BLD AUTO: 0.7 % (ref 0–1.8)
BASOPHILS # BLD: 0.05 K/UL (ref 0–0.12)
BILIRUB SERPL-MCNC: 0.6 MG/DL (ref 0.1–1.5)
BILIRUB UR QL STRIP.AUTO: NEGATIVE
BUN SERPL-MCNC: 7 MG/DL (ref 8–22)
CALCIUM SERPL-MCNC: 7.1 MG/DL (ref 8.5–10.5)
CHLORIDE SERPL-SCNC: 103 MMOL/L (ref 96–112)
CO2 SERPL-SCNC: 20 MMOL/L (ref 20–33)
COLOR UR: YELLOW
CREAT SERPL-MCNC: 0.42 MG/DL (ref 0.5–1.4)
EOSINOPHIL # BLD AUTO: 0 K/UL (ref 0–0.51)
EOSINOPHIL NFR BLD: 0 % (ref 0–6.9)
EPI CELLS #/AREA URNS HPF: NEGATIVE /HPF
ERYTHROCYTE [DISTWIDTH] IN BLOOD BY AUTOMATED COUNT: 50.6 FL (ref 35.9–50)
GLOBULIN SER CALC-MCNC: 3.9 G/DL (ref 1.9–3.5)
GLUCOSE SERPL-MCNC: 95 MG/DL (ref 65–99)
GLUCOSE UR STRIP.AUTO-MCNC: NEGATIVE MG/DL
HCT VFR BLD AUTO: 34.8 % (ref 37–47)
HGB BLD-MCNC: 11.2 G/DL (ref 12–16)
HYALINE CASTS #/AREA URNS LPF: ABNORMAL /LPF
IMM GRANULOCYTES # BLD AUTO: 0.04 K/UL (ref 0–0.11)
IMM GRANULOCYTES NFR BLD AUTO: 0.5 % (ref 0–0.9)
INR PPP: 1.19 (ref 0.87–1.13)
KETONES UR STRIP.AUTO-MCNC: NEGATIVE MG/DL
LEUKOCYTE ESTERASE UR QL STRIP.AUTO: NEGATIVE
LYMPHOCYTES # BLD AUTO: 1.74 K/UL (ref 1–4.8)
LYMPHOCYTES NFR BLD: 23.7 % (ref 22–41)
MCH RBC QN AUTO: 31.4 PG (ref 27–33)
MCHC RBC AUTO-ENTMCNC: 32.2 G/DL (ref 33.6–35)
MCV RBC AUTO: 97.5 FL (ref 81.4–97.8)
MICRO URNS: ABNORMAL
MONOCYTES # BLD AUTO: 0.77 K/UL (ref 0–0.85)
MONOCYTES NFR BLD AUTO: 10.5 % (ref 0–13.4)
NEUTROPHILS # BLD AUTO: 4.75 K/UL (ref 2–7.15)
NEUTROPHILS NFR BLD: 64.6 % (ref 44–72)
NITRITE UR QL STRIP.AUTO: NEGATIVE
NRBC # BLD AUTO: 0 K/UL
NRBC BLD-RTO: 0 /100 WBC
PH UR STRIP.AUTO: 7.5 [PH] (ref 5–8)
PLATELET # BLD AUTO: 219 K/UL (ref 164–446)
PMV BLD AUTO: 9.1 FL (ref 9–12.9)
POTASSIUM SERPL-SCNC: 4.3 MMOL/L (ref 3.6–5.5)
PROT SERPL-MCNC: 6.4 G/DL (ref 6–8.2)
PROT UR QL STRIP: NEGATIVE MG/DL
PROTHROMBIN TIME: 15.4 SEC (ref 12–14.6)
RBC # BLD AUTO: 3.57 M/UL (ref 4.2–5.4)
RBC # URNS HPF: ABNORMAL /HPF
RBC UR QL AUTO: ABNORMAL
SODIUM SERPL-SCNC: 133 MMOL/L (ref 135–145)
SP GR UR STRIP.AUTO: 1.02
UROBILINOGEN UR STRIP.AUTO-MCNC: 0.2 MG/DL
WBC # BLD AUTO: 7.4 K/UL (ref 4.8–10.8)
WBC #/AREA URNS HPF: ABNORMAL /HPF

## 2019-09-02 PROCEDURE — A9270 NON-COVERED ITEM OR SERVICE: HCPCS | Performed by: EMERGENCY MEDICINE

## 2019-09-02 PROCEDURE — 96375 TX/PRO/DX INJ NEW DRUG ADDON: CPT

## 2019-09-02 PROCEDURE — 85610 PROTHROMBIN TIME: CPT

## 2019-09-02 PROCEDURE — 80053 COMPREHEN METABOLIC PANEL: CPT

## 2019-09-02 PROCEDURE — A9270 NON-COVERED ITEM OR SERVICE: HCPCS | Performed by: INTERNAL MEDICINE

## 2019-09-02 PROCEDURE — 700101 HCHG RX REV CODE 250: Performed by: EMERGENCY MEDICINE

## 2019-09-02 PROCEDURE — 700111 HCHG RX REV CODE 636 W/ 250 OVERRIDE (IP): Performed by: EMERGENCY MEDICINE

## 2019-09-02 PROCEDURE — 700102 HCHG RX REV CODE 250 W/ 637 OVERRIDE(OP): Performed by: INTERNAL MEDICINE

## 2019-09-02 PROCEDURE — 87186 SC STD MICRODIL/AGAR DIL: CPT

## 2019-09-02 PROCEDURE — 700102 HCHG RX REV CODE 250 W/ 637 OVERRIDE(OP): Performed by: EMERGENCY MEDICINE

## 2019-09-02 PROCEDURE — 700105 HCHG RX REV CODE 258: Performed by: INTERNAL MEDICINE

## 2019-09-02 PROCEDURE — 36415 COLL VENOUS BLD VENIPUNCTURE: CPT

## 2019-09-02 PROCEDURE — 700111 HCHG RX REV CODE 636 W/ 250 OVERRIDE (IP): Performed by: INTERNAL MEDICINE

## 2019-09-02 PROCEDURE — 87040 BLOOD CULTURE FOR BACTERIA: CPT

## 2019-09-02 PROCEDURE — 87077 CULTURE AEROBIC IDENTIFY: CPT

## 2019-09-02 PROCEDURE — 72100 X-RAY EXAM L-S SPINE 2/3 VWS: CPT

## 2019-09-02 PROCEDURE — 700117 HCHG RX CONTRAST REV CODE 255: Performed by: EMERGENCY MEDICINE

## 2019-09-02 PROCEDURE — 99285 EMERGENCY DEPT VISIT HI MDM: CPT

## 2019-09-02 PROCEDURE — 96366 THER/PROPH/DIAG IV INF ADDON: CPT

## 2019-09-02 PROCEDURE — 87070 CULTURE OTHR SPECIMN AEROBIC: CPT

## 2019-09-02 PROCEDURE — 700111 HCHG RX REV CODE 636 W/ 250 OVERRIDE (IP): Performed by: HOSPITALIST

## 2019-09-02 PROCEDURE — G0378 HOSPITAL OBSERVATION PER HR: HCPCS

## 2019-09-02 PROCEDURE — 73701 CT LOWER EXTREMITY W/DYE: CPT | Mod: LT

## 2019-09-02 PROCEDURE — 87205 SMEAR GRAM STAIN: CPT

## 2019-09-02 PROCEDURE — 96376 TX/PRO/DX INJ SAME DRUG ADON: CPT

## 2019-09-02 PROCEDURE — 85025 COMPLETE CBC W/AUTO DIFF WBC: CPT

## 2019-09-02 PROCEDURE — 96367 TX/PROPH/DG ADDL SEQ IV INF: CPT

## 2019-09-02 PROCEDURE — 81001 URINALYSIS AUTO W/SCOPE: CPT

## 2019-09-02 PROCEDURE — 99220 PR INITIAL OBSERVATION CARE,LEVL III: CPT | Performed by: INTERNAL MEDICINE

## 2019-09-02 PROCEDURE — 96365 THER/PROPH/DIAG IV INF INIT: CPT

## 2019-09-02 RX ORDER — ASCORBIC ACID 500 MG
500 TABLET ORAL DAILY
COMMUNITY
End: 2021-08-10

## 2019-09-02 RX ORDER — ONDANSETRON 4 MG/1
4 TABLET, ORALLY DISINTEGRATING ORAL EVERY 4 HOURS PRN
Status: DISCONTINUED | OUTPATIENT
Start: 2019-09-02 | End: 2019-09-12 | Stop reason: HOSPADM

## 2019-09-02 RX ORDER — MORPHINE SULFATE 4 MG/ML
4 INJECTION, SOLUTION INTRAMUSCULAR; INTRAVENOUS ONCE
Status: COMPLETED | OUTPATIENT
Start: 2019-09-02 | End: 2019-09-02

## 2019-09-02 RX ORDER — MYCOPHENOLATE MOFETIL 500 MG/1
1000 TABLET ORAL 2 TIMES DAILY
Status: ON HOLD | COMMUNITY
End: 2019-09-12

## 2019-09-02 RX ORDER — ACETAMINOPHEN 325 MG/1
650 TABLET ORAL EVERY 6 HOURS PRN
Status: DISCONTINUED | OUTPATIENT
Start: 2019-09-02 | End: 2019-09-12 | Stop reason: HOSPADM

## 2019-09-02 RX ORDER — ONDANSETRON 2 MG/ML
4 INJECTION INTRAMUSCULAR; INTRAVENOUS EVERY 4 HOURS PRN
Status: DISCONTINUED | OUTPATIENT
Start: 2019-09-02 | End: 2019-09-12 | Stop reason: HOSPADM

## 2019-09-02 RX ORDER — METOPROLOL SUCCINATE 100 MG/1
100 TABLET, EXTENDED RELEASE ORAL DAILY
COMMUNITY
End: 2020-09-11

## 2019-09-02 RX ORDER — METOPROLOL SUCCINATE 50 MG/1
100 TABLET, EXTENDED RELEASE ORAL DAILY
Status: DISCONTINUED | OUTPATIENT
Start: 2019-09-02 | End: 2019-09-12 | Stop reason: HOSPADM

## 2019-09-02 RX ORDER — NIACINAMIDE 500 MG
500 TABLET ORAL 2 TIMES DAILY
Status: DISCONTINUED | OUTPATIENT
Start: 2019-09-02 | End: 2019-09-02

## 2019-09-02 RX ORDER — THIAMINE MONONITRATE (VIT B1) 100 MG
100 TABLET ORAL DAILY
Status: DISCONTINUED | OUTPATIENT
Start: 2019-09-03 | End: 2019-09-12 | Stop reason: HOSPADM

## 2019-09-02 RX ORDER — SERTRALINE HYDROCHLORIDE 100 MG/1
100 TABLET, FILM COATED ORAL DAILY
Status: DISCONTINUED | OUTPATIENT
Start: 2019-09-03 | End: 2019-09-12 | Stop reason: HOSPADM

## 2019-09-02 RX ORDER — MYCOPHENOLATE MOFETIL 500 MG/1
1000 TABLET ORAL 2 TIMES DAILY
Status: DISCONTINUED | OUTPATIENT
Start: 2019-09-10 | End: 2019-09-02

## 2019-09-02 RX ORDER — ASCORBIC ACID 500 MG
500 TABLET ORAL DAILY
Status: DISCONTINUED | OUTPATIENT
Start: 2019-09-03 | End: 2019-09-12 | Stop reason: HOSPADM

## 2019-09-02 RX ORDER — SODIUM CHLORIDE, SODIUM LACTATE, POTASSIUM CHLORIDE, AND CALCIUM CHLORIDE .6; .31; .03; .02 G/100ML; G/100ML; G/100ML; G/100ML
500 INJECTION, SOLUTION INTRAVENOUS
Status: DISCONTINUED | OUTPATIENT
Start: 2019-09-02 | End: 2019-09-09

## 2019-09-02 RX ORDER — OXYCODONE HYDROCHLORIDE AND ACETAMINOPHEN 5; 325 MG/1; MG/1
1 TABLET ORAL ONCE
Status: COMPLETED | OUTPATIENT
Start: 2019-09-02 | End: 2019-09-02

## 2019-09-02 RX ORDER — MYCOPHENOLATE MOFETIL 500 MG/1
500 TABLET ORAL 2 TIMES DAILY
Status: ON HOLD | COMMUNITY
Start: 2019-08-26 | End: 2019-09-12

## 2019-09-02 RX ORDER — SODIUM CHLORIDE 9 MG/ML
INJECTION, SOLUTION INTRAVENOUS CONTINUOUS
Status: DISCONTINUED | OUTPATIENT
Start: 2019-09-02 | End: 2019-09-05

## 2019-09-02 RX ORDER — MORPHINE SULFATE 4 MG/ML
2 INJECTION, SOLUTION INTRAMUSCULAR; INTRAVENOUS EVERY 4 HOURS PRN
Status: DISCONTINUED | OUTPATIENT
Start: 2019-09-02 | End: 2019-09-09

## 2019-09-02 RX ORDER — CLINDAMYCIN PHOSPHATE 900 MG/50ML
900 INJECTION, SOLUTION INTRAVENOUS ONCE
Status: COMPLETED | OUTPATIENT
Start: 2019-09-02 | End: 2019-09-02

## 2019-09-02 RX ORDER — POLYETHYLENE GLYCOL 3350 17 G/17G
1 POWDER, FOR SOLUTION ORAL
Status: DISCONTINUED | OUTPATIENT
Start: 2019-09-02 | End: 2019-09-07

## 2019-09-02 RX ORDER — BISACODYL 10 MG
10 SUPPOSITORY, RECTAL RECTAL
Status: DISCONTINUED | OUTPATIENT
Start: 2019-09-02 | End: 2019-09-07

## 2019-09-02 RX ORDER — LACTOBACILLUS RHAMNOSUS GG 10B CELL
1 CAPSULE ORAL
Status: DISCONTINUED | OUTPATIENT
Start: 2019-09-03 | End: 2019-09-12 | Stop reason: HOSPADM

## 2019-09-02 RX ORDER — OXYCODONE HYDROCHLORIDE 5 MG/1
5 TABLET ORAL EVERY 4 HOURS PRN
Status: DISCONTINUED | OUTPATIENT
Start: 2019-09-02 | End: 2019-09-12

## 2019-09-02 RX ORDER — SERTRALINE HYDROCHLORIDE 100 MG/1
100 TABLET, FILM COATED ORAL DAILY
Status: ON HOLD | COMMUNITY
End: 2019-09-12 | Stop reason: SDUPTHER

## 2019-09-02 RX ORDER — AMOXICILLIN 250 MG
2 CAPSULE ORAL 2 TIMES DAILY
Status: DISCONTINUED | OUTPATIENT
Start: 2019-09-02 | End: 2019-09-07

## 2019-09-02 RX ORDER — MYCOPHENOLATE MOFETIL 250 MG/1
500 CAPSULE ORAL 2 TIMES DAILY
Status: COMPLETED | OUTPATIENT
Start: 2019-09-02 | End: 2019-09-10

## 2019-09-02 RX ADMIN — THERA TABS 1 TABLET: TAB at 20:32

## 2019-09-02 RX ADMIN — MORPHINE SULFATE 4 MG: 4 INJECTION INTRAVENOUS at 14:06

## 2019-09-02 RX ADMIN — SODIUM CHLORIDE 1000 ML: 9 INJECTION, SOLUTION INTRAVENOUS at 20:33

## 2019-09-02 RX ADMIN — AMPICILLIN AND SULBACTAM 3 G: 2; 1 INJECTION, POWDER, FOR SOLUTION INTRAVENOUS at 23:06

## 2019-09-02 RX ADMIN — OXYCODONE HYDROCHLORIDE 5 MG: 5 TABLET ORAL at 20:32

## 2019-09-02 RX ADMIN — METOPROLOL SUCCINATE 100 MG: 50 TABLET, FILM COATED, EXTENDED RELEASE ORAL at 20:33

## 2019-09-02 RX ADMIN — CLINDAMYCIN IN 5 PERCENT DEXTROSE 900 MG: 18 INJECTION, SOLUTION INTRAVENOUS at 17:55

## 2019-09-02 RX ADMIN — IOHEXOL 100 ML: 350 INJECTION, SOLUTION INTRAVENOUS at 14:07

## 2019-09-02 RX ADMIN — VANCOMYCIN HYDROCHLORIDE 1600 MG: 500 INJECTION, POWDER, LYOPHILIZED, FOR SOLUTION INTRAVENOUS at 20:51

## 2019-09-02 RX ADMIN — ACETAMINOPHEN 650 MG: 325 TABLET, FILM COATED ORAL at 23:14

## 2019-09-02 RX ADMIN — MORPHINE SULFATE 2 MG: 4 INJECTION INTRAVENOUS at 23:03

## 2019-09-02 RX ADMIN — OXYCODONE HYDROCHLORIDE AND ACETAMINOPHEN 1 TABLET: 5; 325 TABLET ORAL at 17:17

## 2019-09-02 RX ADMIN — MYCOPHENOLATE MOFETIL 500 MG: 250 CAPSULE ORAL at 23:15

## 2019-09-02 NOTE — DISCHARGE PLANNING
Medical Social Work    Referral: Home Health    Intervention: GRACE was notified that MD requesting Home Health for this pt. MD notified that Home Health order and F2F required in order for Home Health referrals to be sent. MD aware that pt's referral will not be reviewed until tomorrow for acceptance.     GRACE met w/ pt and pt's family at [facility] and explained HH Choice. Pt and family agreeable to HH and pt signed choice for [facility] stating that she had their services in the past. GRACE explained to family that due to the holiday that pt's referral would likely be reviewed tomorrow which means pt would leave the ER without HH services in place. GRACE further explained that if for some reason pt's referral is declined then the pt would need to follow up w/ PCP to establish with another HH agency. Pt and family verbalized understanding. GRACE faxed completed Choice for [facility] to [facility] ([id number]).      Plan: HH referral will be sent to [facility] for review. Pt pending acceptance to [facility].

## 2019-09-02 NOTE — ED PROVIDER NOTES
ED Provider  Scribed for Juan Carlos Liu D.O. by Franky Montana. 2019  12:49 PM    Means of arrival:Ambulance  History obtained from:Patient  History limited by: None    CHIEF COMPLAINT  Chief Complaint   Patient presents with   • Hip Pain     left hip       HPI  Nadege Mae is a 73 y.o. female who presents to the ED for worsening left hip pain onset 1 week ago. The patient reports that she had a left hip surgery on 2017 completed by Dr. Terrazas. Over the last week, she reports worsening left hip pain with associated brown-colored drainage. Additionally, she notes that she has previously fractured her foot 1 year ago in 3 separate locations. She notes cellulitis to her left lower extremity as well. She denies any associated fevers, chills, diaphoresis, or recent injuries to her left hip, and no alleviating or exacerbating factors were identified for the patient's left hip pain. She has a history of generalized osteoarthritis diagnosed in , osteoporosis, hyperlipidemia, and hypertension.    REVIEW OF SYSTEMS  See HPI for further details. All other systems are negative.     PAST MEDICAL HISTORY   has a past medical history of Anxiety, Cellulitis and abscess of lower extremity, Dental disorder, Generalized osteoarthritis of multiple sites (10/20/2015), Heart valve disease, Hyperlipidemia, Hypertension, and Osteoporosis.    SOCIAL HISTORY  Social History     Tobacco Use   • Smoking status: Former Smoker     Packs/day: 0.50     Years: 25.00     Pack years: 12.50     Types: Cigarettes     Last attempt to quit: 2007     Years since quittin.6   • Smokeless tobacco: Never Used   Substance and Sexual Activity   • Alcohol use: Yes     Alcohol/week: 3.0 oz     Types: 5 Glasses of wine per week     Comment: glass of wine per day   • Drug use: No   • Sexual activity: Not Currently     Partners: Male       SURGICAL HISTORY   has a past surgical history that includes gyn surgery (); breast  "biopsy (8/28/2014); abdominal hysterectomy total; breast biopsy (Right, 8/14); hip nailing intramedullary (Left, 12/20/2017); hip revision total (Left, 2/11/2018); irrigation & debridement hip (Left, 6/4/2018); femoral popliteal bypass (Left, 6/8/2018); irrigation & debridement general (Left, 6/8/2018); and athroplasty.    CURRENT MEDICATIONS  No current facility-administered medications for this encounter.     Current Outpatient Medications:   •  metoprolol SR (TOPROL XL) 100 MG TABLET SR 24 HR, TAKE 1 TABLET BY MOUTH EVERY DAY, Disp: 100 Tab, Rfl: 3  •  niacinamide 500 MG tablet, , Disp: , Rfl:   •  furosemide (LASIX) 20 MG Tab, TAKE 1 TABLET BY MOUTH EVERY DAY (Patient not taking: Reported on 6/12/2019), Disp: 90 Tab, Rfl: 0  •  ascorbic acid (VITAMIN C) 500 MG tablet, Take 1 Tab by mouth every day., Disp: 30 Tab, Rfl: 3  •  ferrous sulfate 325 (65 Fe) MG tablet, Take 1 Tab by mouth 3 times a day, with meals. (Patient not taking: Reported on 6/12/2019), Disp: 90 Tab, Rfl: 1  •  sertraline (ZOLOFT) 100 MG Tab, Take 1 Tab by mouth every day., Disp: 90 Tab, Rfl: 3  •  Multiple Vitamins-Minerals (THERAPEUTIC-M/LUTEIN) Tab, Take 1 Tab by mouth every day., Disp: , Rfl: 0  •  vitamin D (CHOLECALCIFEROL) 1000 UNIT Tab, Take 1,000 Units by mouth every day., Disp: , Rfl:     ALLERGIES  Allergies   Allergen Reactions   • Bactrim [Sulfamethoxazole-Trimethoprim] Rash     Diffuse pruritic skin rash with blisters (no mucous membrane involvement) (was also taking cipro at the time - reaction thought more likely to be 2/2 Bactrim)   • Meropenem Unspecified     Pt can not remember reaction         PHYSICAL EXAM  VITAL SIGNS: BP (!) 199/86   Pulse 96   Temp 36.9 °C (98.5 °F) (Temporal)   Resp 20   Ht 1.702 m (5' 7\")   Wt 65.8 kg (145 lb)   SpO2 90%   BMI 22.71 kg/m²   Constitutional: Alert in no apparent distress.  HENT: Mucous membranes are dry  Eyes: Left periorbital ecchymosis. EOMI. Pupils equal. No hyphema. Conjunctiva " normal, Non-icteric.   Neck: Normal range of motion, No tenderness, Supple.  Lymphatic: No lymphadenopathy noted.   Cardiovascular: Regular rate and rhythm, no murmurs.   Thorax & Lungs: Normal breath sounds, No respiratory distress, No wheezing, No chest tenderness.   Abdomen: Bowel sounds normal, Soft, No tenderness, No masses, No pulsatile masses. No peritoneal signs.  Skin: Left hip with lateral abrasion with surrounding induration. No surrounding erythema or drainage. Warm, Dry, normal color.   Back: No bony tenderness, No CVA tenderness.   Extremities: Pedal pulses present. No edema, No tenderness, No cyanosis  Musculoskeletal: Good range of motion in all major joints. No tenderness to palpation or major deformities noted.   Neurologic: Alert and oriented x4, Normal motor function, Normal sensory function, No focal deficits noted.   Psychiatric: Affect normal, Judgment normal, Mood normal.     MEDICAL DECISION MAKING  This is a 73 y.o. female who presents with complaints of drainage from left lower quadrant and inability to ambulate.  She normally lives at home, she has a roommate, she walks with a walker she has had multiple falls.  She is got a history of chronic ulcers of the leg, and she knows that she had drainage of the left hip scar from hip surgery.  CT shows no intra-articular concerns for infection.  There is a cutaneous abscess.  Ultrasound was ordered but however on my manipulation of the wound is still draining purulent drainage.  IV antibiotics are being initiated.    The patient had episodes of incontinence while she was here catheter urinalysis shows no infection.    The patient was attempted to walk using a walker and due to generalized weakness she has an inability to ambulate.  She also is complaining of pain in the leg she is given Percocet here.  Her narcotics screening does not show any chronic pain medication use.  I spoke with the hospitalist for admission and she will be admitted for  further evaluation and treatment.    DIAGNOSTIC STUDIES / PROCEDURES    LABS  Results for orders placed or performed during the hospital encounter of 09/02/19   CBC WITH DIFFERENTIAL   Result Value Ref Range    WBC 7.4 4.8 - 10.8 K/uL    RBC 3.57 (L) 4.20 - 5.40 M/uL    Hemoglobin 11.2 (L) 12.0 - 16.0 g/dL    Hematocrit 34.8 (L) 37.0 - 47.0 %    MCV 97.5 81.4 - 97.8 fL    MCH 31.4 27.0 - 33.0 pg    MCHC 32.2 (L) 33.6 - 35.0 g/dL    RDW 50.6 (H) 35.9 - 50.0 fL    Platelet Count 219 164 - 446 K/uL    MPV 9.1 9.0 - 12.9 fL    Neutrophils-Polys 64.60 44.00 - 72.00 %    Lymphocytes 23.70 22.00 - 41.00 %    Monocytes 10.50 0.00 - 13.40 %    Eosinophils 0.00 0.00 - 6.90 %    Basophils 0.70 0.00 - 1.80 %    Immature Granulocytes 0.50 0.00 - 0.90 %    Nucleated RBC 0.00 /100 WBC    Neutrophils (Absolute) 4.75 2.00 - 7.15 K/uL    Lymphs (Absolute) 1.74 1.00 - 4.80 K/uL    Monos (Absolute) 0.77 0.00 - 0.85 K/uL    Eos (Absolute) 0.00 0.00 - 0.51 K/uL    Baso (Absolute) 0.05 0.00 - 0.12 K/uL    Immature Granulocytes (abs) 0.04 0.00 - 0.11 K/uL    NRBC (Absolute) 0.00 K/uL   COMP METABOLIC PANEL   Result Value Ref Range    Sodium 133 (L) 135 - 145 mmol/L    Potassium 4.3 3.6 - 5.5 mmol/L    Chloride 103 96 - 112 mmol/L    Co2 20 20 - 33 mmol/L    Anion Gap 10.0 0.0 - 11.9    Glucose 95 65 - 99 mg/dL    Bun 7 (L) 8 - 22 mg/dL    Creatinine 0.42 (L) 0.50 - 1.40 mg/dL    Calcium 7.1 (L) 8.5 - 10.5 mg/dL    AST(SGOT) 114 (H) 12 - 45 U/L    ALT(SGPT) 50 2 - 50 U/L    Alkaline Phosphatase 199 (H) 30 - 99 U/L    Total Bilirubin 0.6 0.1 - 1.5 mg/dL    Albumin 2.5 (L) 3.2 - 4.9 g/dL    Total Protein 6.4 6.0 - 8.2 g/dL    Globulin 3.9 (H) 1.9 - 3.5 g/dL    A-G Ratio 0.6 g/dL   ESTIMATED GFR   Result Value Ref Range    GFR If African American >60 >60 mL/min/1.73 m 2    GFR If Non African American >60 >60 mL/min/1.73 m 2   URINALYSIS,CULTURE IF INDICATED   Result Value Ref Range    Color Yellow     Character Clear     Specific Gravity 1.016  <1.035    Ph 7.5 5.0 - 8.0    Glucose Negative Negative mg/dL    Ketones Negative Negative mg/dL    Protein Negative Negative mg/dL    Bilirubin Negative Negative    Urobilinogen, Urine 0.2 Negative    Nitrite Negative Negative    Leukocyte Esterase Negative Negative    Occult Blood Small (A) Negative    Micro Urine Req Microscopic    URINE MICROSCOPIC (W/UA)   Result Value Ref Range    WBC 0-2 /hpf    RBC 10-20 (A) /hpf    Bacteria Negative None /hpf    Epithelial Cells Negative /hpf    Hyaline Cast 0-2 /lpf     All labs reviewed by me.    RADIOLOGY  CT-HIP WITH LEFT   Final Result      1.  There is a questionable small subcutaneous fluid collection just deep to the skin along the left lateral hip at the level the greater trochanter which could be a small subcutaneous abscess.   2.  There is a left hip arthroplasty with postoperative changes as noted above causing moderate artifact to the joint space. There is no gross effusion.        The radiologist's interpretations of all radiological studies have been reviewed by me.        COURSE  Pertinent Labs & Imaging studies reviewed. (See chart for details)    12:49 PM - Patient seen and examined at bedside. Discussed plan of care. Ordered for CT-Hip Left, CBC Diff, Estimated GFR, and CMP to evaluate her symptoms.     1:52 PM - Ordered morphine 4 mg.    3:01 PM - Patient was reevaluated at bedside. The wound on her left thigh was manually manipulated and a moderate amount of purulent thick discharge was expressed. She was incontinent of urine, I will check a urinalysis for infection. Will have  consult with the patient to evaluate a plan for care at home. Updated the patient on the findings of her radiology and lab results.     3:12 PM - Ordered UA Culture     4:00 PM - Ordered Urine Microscopic    4:53 PM - Patient was reevaluated at bedside. Attempts were made by the patient to walk without her walker but were unsuccessful. We will admit the patient for  further treatment and evaluation.     4:54 PM - Ordered Cleocin 900 mg    4:55 PM - Paged hospitalist    DISPOSITION:  Patient will be admitted to Dr. Ruiz (Hospitalist) in guarded condition.    FINAL IMPRESSION  1. Lower limb ulcer, ankle, left, with fat layer exposed (HCC)         Franky PRINCE (Scribe), am scribing for, and in the presence of, Juan Carlos Liu D.O..    Electronically signed by: Franky Montana (Scribe), 9/2/2019    IJuan Carlos D.O. personally performed the services described in this documentation, as scribed by Franky Montana in my presence, and it is both accurate and complete.    C    The note accurately reflects work and decisions made by me.  Juan Carlos Liu  9/2/2019  5:11 PM

## 2019-09-02 NOTE — FACE TO FACE
Face to Face Note  -  Durable Medical Equipment    Juan Carlos Liu D.O. - NPI: [id number]  I certify that this patient is under my care and that they have had a durable medical equipment(DME)face to face encounter by myself that meets the physician DME face-to-face encounter requirements with this patient on:    Date of encounter:   Patient:                    MRN:                       YOB: 2021  Nadege Mae  [id number]  1945     The encounter with the patient was in whole, or in part, for the following medical condition, which is the primary reason for durable medical equipment:  Other - Frequent falls    I certify that, based on my findings, the following durable medical equipment is medically necessary:  Other DME Equipment - None.    HOME O2 Saturation Measurements:(Values must be present for Home Oxygen orders)         ,     ,         My Clinical findings support the need for the above equipment due to:  Abnormal Gait    Supporting Symptoms: Leg pain, foot pain,  ------------------------------------------------------------------------------------------------------------------    Face to Face Supporting Documentation - Home Health    The encounter with this patient was in whole or in part the primary reason for home health admission.    Date of encounter:   Patient:                    MRN:                       YOB: 2021  Nadege Mae  [id number]  1945     Home health to see patient for:  Skilled Nursing care for assessment, interventions & education    Skilled need for:  Recent Deterioration of Health Status Decreasing ability to walk.    Skilled nursing interventions to include:  Comment: Assessment of the home, need for assistive devices and visiting nurse    Homebound evidenced status by:  Needs the assistance of another person in order to leave the home. Leaving home must require a considerable and taxing effort. There must exist a  normal inability to leave the home.    Community Physician to provide follow up care: RYANN Son     Optional Interventions    Wound information & treatment:    Home Infusion Therapy orders:    Line/Drain/Airway:    I certify the face to face encounter for this home care referral meets the CMS requirements and the encounter/clinical assessment with the patient was, in whole, or in part, for the medical condition(s) listed above, which is the primary reason for home health care. Based on my clinical findings: the service(s) are medically necessary, support the need for home health care, and the homebound criteria are met.  I certify that this patient has had a face to face encounter by myself.  Juan Carlos Liu D.O. - NPI: [id number]    *Debility, frailty and advanced age in the absence of an acute deterioration or exacerbation of a condition do not qualify a patient for home health.

## 2019-09-02 NOTE — ED TRIAGE NOTES
"Chief Complaint   Patient presents with   • Hip Pain     left hip     BP (!) 199/86   Pulse 96   Temp 36.9 °C (98.5 °F) (Temporal)   Resp 20   Ht 1.702 m (5' 7\")   Wt 65.8 kg (145 lb)   SpO2 90%   BMI 22.71 kg/m²     BIB EMS, pt has increased left sided hip pain and believes it to be infected. Pt also has cellulitis in the left lower leg, and c/p right sided neck pain.     Pt has hx of hip surgery in 2018.     Family states that pt as been falling at home, she has several wounds on her knees and a black eye from \"a car door\".   "

## 2019-09-03 ENCOUNTER — HOME HEALTH ADMISSION (OUTPATIENT)
Dept: HOME HEALTH SERVICES | Facility: HOME HEALTHCARE | Age: 74
End: 2019-09-03
Payer: MEDICARE

## 2019-09-03 ENCOUNTER — APPOINTMENT (OUTPATIENT)
Dept: RADIOLOGY | Facility: MEDICAL CENTER | Age: 74
DRG: 603 | End: 2019-09-03
Attending: INTERNAL MEDICINE
Payer: MEDICARE

## 2019-09-03 LAB
ANION GAP SERPL CALC-SCNC: 10 MMOL/L (ref 0–11.9)
BUN SERPL-MCNC: 9 MG/DL (ref 8–22)
CALCIUM SERPL-MCNC: 8.3 MG/DL (ref 8.5–10.5)
CHLORIDE SERPL-SCNC: 94 MMOL/L (ref 96–112)
CO2 SERPL-SCNC: 27 MMOL/L (ref 20–33)
CREAT SERPL-MCNC: 0.58 MG/DL (ref 0.5–1.4)
ERYTHROCYTE [DISTWIDTH] IN BLOOD BY AUTOMATED COUNT: 49.9 FL (ref 35.9–50)
GLUCOSE SERPL-MCNC: 108 MG/DL (ref 65–99)
GRAM STN SPEC: NORMAL
HCT VFR BLD AUTO: 36 % (ref 37–47)
HGB BLD-MCNC: 11.8 G/DL (ref 12–16)
IRON SATN MFR SERPL: 20 % (ref 15–55)
IRON SERPL-MCNC: 44 UG/DL (ref 40–170)
MAGNESIUM SERPL-MCNC: 1.6 MG/DL (ref 1.5–2.5)
MCH RBC QN AUTO: 31.8 PG (ref 27–33)
MCHC RBC AUTO-ENTMCNC: 32.8 G/DL (ref 33.6–35)
MCV RBC AUTO: 97 FL (ref 81.4–97.8)
PLATELET # BLD AUTO: 167 K/UL (ref 164–446)
PMV BLD AUTO: 9.4 FL (ref 9–12.9)
POTASSIUM SERPL-SCNC: 3.8 MMOL/L (ref 3.6–5.5)
RBC # BLD AUTO: 3.71 M/UL (ref 4.2–5.4)
SIGNIFICANT IND 70042: NORMAL
SITE SITE: NORMAL
SODIUM SERPL-SCNC: 131 MMOL/L (ref 135–145)
SOURCE SOURCE: NORMAL
TIBC SERPL-MCNC: 223 UG/DL (ref 250–450)
WBC # BLD AUTO: 6.2 K/UL (ref 4.8–10.8)

## 2019-09-03 PROCEDURE — 99233 SBSQ HOSP IP/OBS HIGH 50: CPT | Performed by: HOSPITALIST

## 2019-09-03 PROCEDURE — 96366 THER/PROPH/DIAG IV INF ADDON: CPT

## 2019-09-03 PROCEDURE — 83735 ASSAY OF MAGNESIUM: CPT

## 2019-09-03 PROCEDURE — 700111 HCHG RX REV CODE 636 W/ 250 OVERRIDE (IP): Performed by: INTERNAL MEDICINE

## 2019-09-03 PROCEDURE — 700105 HCHG RX REV CODE 258: Performed by: INTERNAL MEDICINE

## 2019-09-03 PROCEDURE — 96376 TX/PRO/DX INJ SAME DRUG ADON: CPT

## 2019-09-03 PROCEDURE — 83540 ASSAY OF IRON: CPT

## 2019-09-03 PROCEDURE — 770001 HCHG ROOM/CARE - MED/SURG/GYN PRIV*

## 2019-09-03 PROCEDURE — 700111 HCHG RX REV CODE 636 W/ 250 OVERRIDE (IP): Performed by: HOSPITALIST

## 2019-09-03 PROCEDURE — 99223 1ST HOSP IP/OBS HIGH 75: CPT | Performed by: INTERNAL MEDICINE

## 2019-09-03 PROCEDURE — 700102 HCHG RX REV CODE 250 W/ 637 OVERRIDE(OP): Performed by: HOSPITALIST

## 2019-09-03 PROCEDURE — 36415 COLL VENOUS BLD VENIPUNCTURE: CPT

## 2019-09-03 PROCEDURE — A6213 FOAM DRG >16<=48 SQ IN W/BDR: HCPCS | Performed by: HOSPITALIST

## 2019-09-03 PROCEDURE — 72148 MRI LUMBAR SPINE W/O DYE: CPT

## 2019-09-03 PROCEDURE — 83550 IRON BINDING TEST: CPT

## 2019-09-03 PROCEDURE — A9270 NON-COVERED ITEM OR SERVICE: HCPCS | Performed by: HOSPITALIST

## 2019-09-03 PROCEDURE — 307059 PAD,EAR PROTECTOR: Performed by: HOSPITALIST

## 2019-09-03 PROCEDURE — 96372 THER/PROPH/DIAG INJ SC/IM: CPT

## 2019-09-03 PROCEDURE — A9270 NON-COVERED ITEM OR SERVICE: HCPCS | Performed by: INTERNAL MEDICINE

## 2019-09-03 PROCEDURE — 80048 BASIC METABOLIC PNL TOTAL CA: CPT

## 2019-09-03 PROCEDURE — 700102 HCHG RX REV CODE 250 W/ 637 OVERRIDE(OP): Performed by: INTERNAL MEDICINE

## 2019-09-03 PROCEDURE — 85027 COMPLETE CBC AUTOMATED: CPT

## 2019-09-03 RX ORDER — AMLODIPINE BESYLATE 10 MG/1
10 TABLET ORAL
Status: DISCONTINUED | OUTPATIENT
Start: 2019-09-03 | End: 2019-09-12 | Stop reason: HOSPADM

## 2019-09-03 RX ORDER — FERROUS SULFATE 325(65) MG
325 TABLET ORAL
Status: DISCONTINUED | OUTPATIENT
Start: 2019-09-04 | End: 2019-09-12 | Stop reason: HOSPADM

## 2019-09-03 RX ADMIN — MORPHINE SULFATE 2 MG: 4 INJECTION INTRAVENOUS at 13:49

## 2019-09-03 RX ADMIN — MYCOPHENOLATE MOFETIL 500 MG: 250 CAPSULE ORAL at 17:57

## 2019-09-03 RX ADMIN — THERA TABS 1 TABLET: TAB at 17:57

## 2019-09-03 RX ADMIN — SERTRALINE HYDROCHLORIDE 100 MG: 100 TABLET ORAL at 06:21

## 2019-09-03 RX ADMIN — Medication 100 MG: at 06:22

## 2019-09-03 RX ADMIN — MORPHINE SULFATE 2 MG: 4 INJECTION INTRAVENOUS at 22:18

## 2019-09-03 RX ADMIN — VANCOMYCIN HYDROCHLORIDE 1100 MG: 500 INJECTION, POWDER, LYOPHILIZED, FOR SOLUTION INTRAVENOUS at 09:26

## 2019-09-03 RX ADMIN — MORPHINE SULFATE 2 MG: 4 INJECTION INTRAVENOUS at 17:56

## 2019-09-03 RX ADMIN — OXYCODONE HYDROCHLORIDE 5 MG: 5 TABLET ORAL at 06:19

## 2019-09-03 RX ADMIN — VANCOMYCIN HYDROCHLORIDE 1100 MG: 500 INJECTION, POWDER, LYOPHILIZED, FOR SOLUTION INTRAVENOUS at 20:50

## 2019-09-03 RX ADMIN — SODIUM CHLORIDE: 9 INJECTION, SOLUTION INTRAVENOUS at 20:50

## 2019-09-03 RX ADMIN — Medication 1 CAPSULE: at 09:26

## 2019-09-03 RX ADMIN — SENNOSIDES, DOCUSATE SODIUM 2 TABLET: 50; 8.6 TABLET, FILM COATED ORAL at 06:21

## 2019-09-03 RX ADMIN — OXYCODONE HYDROCHLORIDE 5 MG: 5 TABLET ORAL at 00:54

## 2019-09-03 RX ADMIN — MORPHINE SULFATE 2 MG: 4 INJECTION INTRAVENOUS at 04:04

## 2019-09-03 RX ADMIN — SODIUM CHLORIDE: 9 INJECTION, SOLUTION INTRAVENOUS at 13:53

## 2019-09-03 RX ADMIN — MYCOPHENOLATE MOFETIL 500 MG: 250 CAPSULE ORAL at 06:19

## 2019-09-03 RX ADMIN — AMLODIPINE BESYLATE 10 MG: 10 TABLET ORAL at 13:53

## 2019-09-03 RX ADMIN — ACETAMINOPHEN 650 MG: 325 TABLET, FILM COATED ORAL at 16:52

## 2019-09-03 RX ADMIN — ENOXAPARIN SODIUM 40 MG: 100 INJECTION SUBCUTANEOUS at 06:22

## 2019-09-03 RX ADMIN — OXYCODONE HYDROCHLORIDE AND ACETAMINOPHEN 500 MG: 500 TABLET ORAL at 06:20

## 2019-09-03 ASSESSMENT — LIFESTYLE VARIABLES
HAVE PEOPLE ANNOYED YOU BY CRITICIZING YOUR DRINKING: NO
HOW MANY TIMES IN THE PAST YEAR HAVE YOU HAD 5 OR MORE DRINKS IN A DAY: 12
SUBSTANCE_ABUSE: 1
HAVE YOU EVER FELT YOU SHOULD CUT DOWN ON YOUR DRINKING: NO
AVERAGE NUMBER OF DAYS PER WEEK YOU HAVE A DRINK CONTAINING ALCOHOL: 5
EVER HAD A DRINK FIRST THING IN THE MORNING TO STEADY YOUR NERVES TO GET RID OF A HANGOVER: NO
TOTAL SCORE: 0
CONSUMPTION TOTAL: POSITIVE
TOTAL SCORE: 0
EVER FELT BAD OR GUILTY ABOUT YOUR DRINKING: NO
ON A TYPICAL DAY WHEN YOU DRINK ALCOHOL HOW MANY DRINKS DO YOU HAVE: 2
TOTAL SCORE: 0
ALCOHOL_USE: YES
EVER_SMOKED: NEVER
DOES PATIENT WANT TO STOP DRINKING: NO

## 2019-09-03 ASSESSMENT — ENCOUNTER SYMPTOMS
COUGH: 0
CONSTIPATION: 0
ABDOMINAL PAIN: 0
MYALGIAS: 1
HEADACHES: 0
PALPITATIONS: 0
DOUBLE VISION: 0
EYE DISCHARGE: 0
DIARRHEA: 0
DIAPHORESIS: 0
CHILLS: 0
SPUTUM PRODUCTION: 0
BACK PAIN: 1
CLAUDICATION: 0
FEVER: 0
SHORTNESS OF BREATH: 0
PHOTOPHOBIA: 0
DEPRESSION: 0
FALLS: 1
DIZZINESS: 0
SENSORY CHANGE: 0
VOMITING: 0
HEMOPTYSIS: 0
BLURRED VISION: 0
NERVOUS/ANXIOUS: 1
WEAKNESS: 1
NAUSEA: 0
TINGLING: 0
NERVOUS/ANXIOUS: 0

## 2019-09-03 ASSESSMENT — COGNITIVE AND FUNCTIONAL STATUS - GENERAL
DRESSING REGULAR LOWER BODY CLOTHING: A LOT
STANDING UP FROM CHAIR USING ARMS: TOTAL
TURNING FROM BACK TO SIDE WHILE IN FLAT BAD: A LOT
HELP NEEDED FOR BATHING: A LOT
DAILY ACTIVITIY SCORE: 13
WALKING IN HOSPITAL ROOM: TOTAL
MOBILITY SCORE: 7
TOILETING: TOTAL
SUGGESTED CMS G CODE MODIFIER MOBILITY: CM
PERSONAL GROOMING: A LITTLE
EATING MEALS: A LITTLE
MOVING FROM LYING ON BACK TO SITTING ON SIDE OF FLAT BED: UNABLE
DRESSING REGULAR UPPER BODY CLOTHING: A LOT
MOVING TO AND FROM BED TO CHAIR: UNABLE
CLIMB 3 TO 5 STEPS WITH RAILING: TOTAL
SUGGESTED CMS G CODE MODIFIER DAILY ACTIVITY: CL

## 2019-09-03 NOTE — PROGRESS NOTES
"Pharmacy Kinetics 73 y.o. female on vancomycin day # 2 9/3/2019    Currently on Vancomycin 1000mg q12hr ()    Indication for Treatment: Abscess     Pertinent history per medical record: Admitted on 2019 for an abscess. She presented with left hip pain worsening over the past week associated with brown colored drainage. It is also noted she has cellulitis to her left lower extremity. She has cultures from 19 from her lower extremity cellulitis that grew MRSA.  Of note, she is on Cellcept for bullous pemphgus and may not present with typical infectious markers.     Other antibiotics: Unasyn 3g x 1 in ED     Allergies: Bactrim [sulfamethoxazole-trimethoprim] and Meropenem      List concerns for renal function: age. Her serum creatinine is at baseline with no changes in medications or body habitus that would effect renal function acutely.      Pertinent cultures to date:   19: left leg wound- MRSA (Vanco LICO = 1)  19: Wound: GPC  19: blood cultures x 2: NGTD    Recent Labs     19  1244 19  0146   WBC 7.4 6.2   NEUTSPOLYS 64.60  --      Recent Labs     19  1244 19  0146   BUN 7* 9   CREATININE 0.42* 0.58   ALBUMIN 2.5*  --      No results for input(s): VANCOTROUGH, VANCOPEAK, VANCORANDOM in the last 72 hours.    Intake/Output Summary (Last 24 hours) at 9/3/2019 1039  Last data filed at 9/3/2019 0400  Gross per 24 hour   Intake 1158.85 ml   Output --   Net 1158.85 ml      BP (!) 161/81   Pulse 71   Temp 36.6 °C (97.9 °F) (Temporal)   Resp 16   Ht 1.702 m (5' 7\")   Wt 62.8 kg (138 lb 7.2 oz)   SpO2 96%  Temp (24hrs), Av.7 °C (98.1 °F), Min:36.6 °C (97.8 °F), Max:37.1 °C (98.7 °F)      A/P   1. Vancomycin dose change: none  1. Next vancomycin level: 19 @ 0830 prior to 4th dose.   2. Goal trough: 12-16  2. Comments: Afebrile overnight with minimal changes in renal indices.  GPC growing from wound site, will monitor for further plan of source control.  Will " assess trough in AM.    Rodolfo Cuevas, PharmD, BCPS

## 2019-09-03 NOTE — DISCHARGE PLANNING
ATTN: Case Management  RE: Referral for Home Health    As of 09/03/2019, we have accepted the Home Health referral for the patient listed above.    A [facility] clinician will be out to see the patient within 48 hours. If you have any questions or concerns regarding the patient’s transition to Home Health, please do not hesitate to contact us at [phone].      We look forward to collaborating with you,  [facility] Team

## 2019-09-03 NOTE — DISCHARGE PLANNING
Care Transition Team Assessment    LSW met with pt at bedside to complete assessment and discuss discharge planning and potential barriers. Pt and LSW are familiar with one another as LSW worked with pt through [facility] outpatient services. Pt is wanting to return home upon discharge and would like to have [facility] for continued follow up services.     Pt is a 73 year old female who lives in a 2 story [facility] with her room mate. Pt is independent with most ADLs/iADLs but her room mate assist with her ADL/iADL needs when pt is unable to complete independently. Pt's gross SSA income is $972.50 and is still currently her sole income. Pt had to stop working over a year ago due to her medical conditions but her goal is ultimately to get better to return to work. She has primary been living off her savings over the last year. Pt is aware once her savings is below ~$7500 to $8000 she can apply for the Medicare Savings Programs though Medicaid to help reduce her cost/expenses associated with her medical insurance and healthcare bills; however, currently pt remains over assets to qualify. Pt does have SNAP benefits $44/month and reported she is currently using benefits. Pt reported she has not followed up with [facility] regarding previous application submitted back in Feb of 2019. LSW will provide contact information for pt to follow up with program.     Pt's primary support is her room mate. Pt denies any current/ongoing social needs at this time or potential barriers.     Information Source  Orientation : Oriented x 4  Information Given By: Patient  Informant's Name: Nadege  Who is responsible for making decisions for patient? : Patient    Readmission Evaluation  Is this a readmission?: No    Elopement Risk  Legal Hold: No  Ambulatory or Self Mobile in Wheelchair: No-Not an Elopement Risk  Elopement Risk: Not at Risk for Elopement    Interdisciplinary Discharge Planning  Primary Care Physician:  IRON Son  Lives with - Patient's Self Care Capacity: Unrelated Adult  Support Systems: Other (Comments)(Room mate)  Housing / Facility: 2 Story Apartment / Condo  Prior Services: Other (Comments)(SNAP benefits )  Patient Expects to be Discharged to:: Home     Discharge Preparedness  What is your plan after discharge?: Home with help  Prior Functional Level: Ambulatory, Independent with Activities of Daily Living, Independent with Medication Management, Uses Walker  Difficulity with ADLs: None  Difficulity with IADLs: None    Functional Assesment  Prior Functional Level: Ambulatory, Independent with Activities of Daily Living, Independent with Medication Management, Uses Walker    Finances   Financial Barriers to Discharge: No. [facility] gross income $972.50/month   Prescription Coverage: Yes    Vision / Hearing Impairment  Right Eye Vision: Impaired, Wears Glasses  Left Eye Vision: Impaired, Wears Glasses    Advance Directive  Advance Directive?: None  Advance Directive offered?: AD Booklet given    Psychological Assessment  History of Substance Abuse: None  History of Psychiatric Problems: No    Discharge Risks or Barriers  Discharge risks or barriers?: No    Anticipated Discharge Information  Anticipated discharge disposition: Home  Discharge Address: [facility] [place] [address]  Discharge Contact Phone Number: [phone]

## 2019-09-03 NOTE — ASSESSMENT & PLAN NOTE
With history of fem-pop bypass. ABIs WNL in April   Cleared by neurosurgery for antiplatelets- plan aspirin therapy

## 2019-09-03 NOTE — DISCHARGE PLANNING
LSW was notified by ERP that pt is going to be admitted.  NATALYAW will notify case management to follow up w/ pt's HH referral tomorrow.

## 2019-09-03 NOTE — PROGRESS NOTES
"Hospital Medicine Daily Progress Note    Date of Service  9/3/2019    Chief Complaint  73 y.o. female admitted 9/2/2019 with left hip pain    Hospital Course    73 y.o. female who presented 9/2/2019 with Hip Pain (left hip)  History of bullous penphigoid, PVD with fem-pop, hyperlipidemia, cellulitides and lower extremity ulcers with history of immunocompromised status due to use of immunomodulators and steroids for her bullous pemphigioids.  She has multiple chronic wounds one of which is on an incision where she had  hip surgery. She has a history of MRSA and MSSA infection in her LLE. Recent was MSSA infection requiring a wound vac and completed course of antibiotics last April 2019 and there is a wound culture that grew MRSA just August 2019 that is resistant to clindamycin.  Cognitive deficits has dementia, poor short term memory. Therefore poor historian., Family at bedside.  Lives with roommate. Roommate not a caretaker as she is arthritic herself.  Presents with multiple falls. She \"does not know\" how she falls. She also complained of Hip Pain (left hip)  She denies smoking or alcohol but on family member spoke to me outside the room and tells me that her roommate reported to him that she has been drinking alcohol. He does not know how much alcohol and when her last drink was.  Family members expressed that she may need advanced level of care.  She also has been complaining of low back pain as well.      Interval Problem Update  patient seen and examined this morning continues to complain of left hip pain which is not a 10 in severity.  Otherwise denies any chest pain, shortness of breath, nausea or vomiting.  She is up in bed eating her breakfast.  PT OT has been consulted as has been clear.  On further review of her charting appears the patient has history of alcohol abuse however she admits to only drinking 1 once in a while.  Last drink was Sunday    Vitals of been noted in her blood pressure is elevated " at 161/81  Labs have been reviewed by me personally hemoglobin is stable at 11.8  Sodium is 131  Consultants/Specialty    None  Code Status  Full code    Disposition  Physical therapy occupational therapy evaluation pending disposition told and I will transfer to medical    Review of Systems  Review of Systems   Constitutional: Positive for malaise/fatigue. Negative for diaphoresis and fever.   HENT: Negative for congestion, hearing loss and nosebleeds.    Eyes: Negative for blurred vision, photophobia and discharge.   Respiratory: Negative for cough, hemoptysis and shortness of breath.    Cardiovascular: Negative for chest pain, palpitations and claudication.   Gastrointestinal: Negative for diarrhea and vomiting.   Genitourinary: Negative for dysuria, hematuria and urgency.   Musculoskeletal: Positive for back pain, falls, joint pain and myalgias.   Skin: Negative for itching and rash.   Neurological: Positive for weakness. Negative for dizziness, tingling, sensory change and headaches.   Psychiatric/Behavioral: Positive for substance abuse. Negative for depression and suicidal ideas. The patient is nervous/anxious.         Physical Exam  Temp:  [36.6 °C (97.8 °F)-37.1 °C (98.7 °F)] 36.6 °C (97.9 °F)  Pulse:  [] 71  Resp:  [16-20] 16  BP: (142-199)/() 161/81  SpO2:  [89 %-99 %] 96 %    Physical Exam   Constitutional: She is oriented to person, place, and time. She appears well-developed and well-nourished.   HENT:   Head: Normocephalic and atraumatic.   Mouth/Throat: Oropharynx is clear and moist. No oropharyngeal exudate.   Eyes: Pupils are equal, round, and reactive to light. Conjunctivae and EOM are normal. No scleral icterus.   Neck: Normal range of motion. Neck supple. No JVD present. No thyromegaly present.   Cardiovascular: Normal rate, regular rhythm and intact distal pulses.   No murmur heard.  Pulmonary/Chest: Effort normal and breath sounds normal. No respiratory distress. She has no  wheezes.   Abdominal: Soft. Bowel sounds are normal. She exhibits no distension. There is no tenderness. There is no rebound.   Musculoskeletal: Normal range of motion. She exhibits edema, tenderness and deformity.   Lymphadenopathy:     She has no cervical adenopathy.   Neurological: She is alert and oriented to person, place, and time. She exhibits normal muscle tone.   Skin: Skin is warm and dry. No rash noted. No erythema.   Psychiatric: She has a normal mood and affect. Her behavior is normal.       Fluids    Intake/Output Summary (Last 24 hours) at 9/3/2019 1049  Last data filed at 9/3/2019 0400  Gross per 24 hour   Intake 1158.85 ml   Output --   Net 1158.85 ml       Laboratory  Recent Labs     09/02/19  1244 09/03/19  0146   WBC 7.4 6.2   RBC 3.57* 3.71*   HEMOGLOBIN 11.2* 11.8*   HEMATOCRIT 34.8* 36.0*   MCV 97.5 97.0   MCH 31.4 31.8   MCHC 32.2* 32.8*   RDW 50.6* 49.9   PLATELETCT 219 167   MPV 9.1 9.4     Recent Labs     09/02/19  1244 09/03/19  0146   SODIUM 133* 131*   POTASSIUM 4.3 3.8   CHLORIDE 103 94*   CO2 20 27   GLUCOSE 95 108*   BUN 7* 9   CREATININE 0.42* 0.58   CALCIUM 7.1* 8.3*     Recent Labs     09/02/19  1244   INR 1.19*               Imaging  DX-LUMBAR SPINE-2 OR 3 VIEWS   Final Result      1.  There is moderate lumbar spondylosis with degenerative disease most prominent at the L3-4 level.   2.  There is no acute fracture or malalignment.      CT-HIP WITH LEFT   Final Result      1.  There is a questionable small subcutaneous fluid collection just deep to the skin along the left lateral hip at the level the greater trochanter which could be a small subcutaneous abscess.   2.  There is a left hip arthroplasty with postoperative changes as noted above causing moderate artifact to the joint space. There is no gross effusion.      MR-LUMBAR SPINE-W/O    (Results Pending)        Assessment/Plan  * Skin abscess over hip, inability to walk  Assessment & Plan  Recent MRSA positive wound      Unasyn and vancomycin.  Follow-up wound culture  Continue IV fluids.   Wound team to see  Ordered XR lumbar. That showed no fractures but L3-4 disease. Ordered lumbar MRI pending   PT/OT  She will benefit from routine visits to an ID specialist as she is on Cellcept and has recurrent cellulitides and abscesses.  -I will consult ID at this point    Iron Deficiency anemia:   previously iron was 15, I will recheck and give IV iron at this time  hgb 11    Thigh pain  9/10 uncontrolled, will add PRN pain meds, with caution    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)  Assessment & Plan  Continue the Cellcept.  Need to follow up with her rheumatologist    History of hip fracture- (present on admission)  Assessment & Plan  Noted  No fracture on CT    Peripheral vascular disease (HCC)- (present on admission)  Assessment & Plan  History of   S/p fem pop  Ordered ABIs as she continues to have poor healing of wounds.  Had TOMI in 4/19 which were normal     Essential hypertension- (present on admission)  Assessment & Plan  Uncontrolled.  Will add to her home regimen- adding CCB to BB    Alcohol use  Assessment & Plan  Does not appear to be withdrawing. States last drink as 4 days ago, drink wine occasionally  She seems to be guarded or in denial. Encourage cessation as this will only exacerbate her falls.  Ordered PO thiamine.  Monitor for withdrawal.    History of MRSA infection  Assessment & Plan  Noted  Ordered contact precautions.    Adult failure to thrive  Assessment & Plan  PT/OT  May need 24/7 level of care         VTE prophylaxis: lovenox    Discussed plan of care with patient, nursing and ID in detail today.

## 2019-09-03 NOTE — ASSESSMENT & PLAN NOTE
With drainage from wound - MRSA (+).  Decreased  continue Zyvox through 9/14/2019.  Continue with wound care, ID input

## 2019-09-03 NOTE — ASSESSMENT & PLAN NOTE
Reported recent use by roommate- not showing s/s withdrawal  Nutrition support-- continue daily MVI, thiamine and folate

## 2019-09-03 NOTE — PROGRESS NOTES
Assumed care from Kajal SWANN. Received bedside report.  Updated patient on daily plan of care on white board. Patient denies any additional needs at this time.  Patient belongings and call light with in reach.  Vitals stable. Bed alarm on and working appropriately.  Will continue to monitor.

## 2019-09-03 NOTE — PROGRESS NOTES
Patient continues to c/o 8/10 pain in her back and left hip after receiving 5mg oxycodone. Hospitalist fernandod.

## 2019-09-03 NOTE — CARE PLAN
Problem: Safety  Goal: Will remain free from falls  Outcome: PROGRESSING AS EXPECTED   Fall risk assessed, and in place.  Patient is moderate fall risk.  Patient educated not to get up with out assistance.  Patient verbalized understanding.  Bed alarm in place and on.  Call light with in reach.      Problem: Infection  Goal: Will remain free from infection  Outcome: PROGRESSING AS EXPECTED   Educated patient and family members on hand washing. Standard precautions in place. Assessed patient for s/s of infection.

## 2019-09-03 NOTE — CARE PLAN
Problem: Pain Management  Goal: Pain level will decrease to patient's comfort goal  Outcome: NOT MET  Note:   Provided medications and non-pharmacologic pain interventions, pain goal of 3/10 at rest not met.      Problem: Safety  Goal: Will remain free from falls  Outcome: PROGRESSING AS EXPECTED  Intervention: Assess risk factors for falls  Flowsheets (Taken 9/3/2019 0653)  Fall Risk: High Risk to Fall - 2 or more points   History of fall: 2  Note:   Bed in lowest position, non-skid socks in place, call light and belongings within reach, high-risk wrist band on, bed alarm on

## 2019-09-03 NOTE — ED NOTES
Met with pt at bedside and dicussed current medications and last doses taken.  Pt denies antibiotic use in last 14 days.  Recent new start of mycophenolate on 08/26/19.  Medication will increase in dose on 09/10/19.  Home pharmacy [facility]

## 2019-09-03 NOTE — ED NOTES
Notified tele 8 patient is ready for transport, updated patient and family on POC and admission status

## 2019-09-03 NOTE — ASSESSMENT & PLAN NOTE
per therapy, patient is not safe to return to her current living situation  Thyroid, B12 level normal  Discussed with case management, anticipate will DC to SNF for continued rehab/PT OT

## 2019-09-03 NOTE — DIETARY
"Nutrition services: Day 0 of admit.  Nadege Mae is a 73 y.o. female with admitting DX of Abscess  Consult received for FTT noted on hospital problems    Visit with pt. Pt reports variable appetite, didn't eat today r/t MRI and cold food when she returned. Pt reports UBW fluctuates around 140 lb, eats 2-3 meals/day. Pt agreeable to Boost and high protein milkshake. Ok per Dr. Thomas via phone.      Assessment:  Height: 170.2 cm (5' 7.01\")  Weight: 62.8 kg (138 lb 7.2 oz)  Body mass index is 21.68 kg/m²., BMI classification: Normal  Diet/Intake: Regular, 0% x1 meal    Evaluation:   1. PMH: bullous pemphigoid (chronic steroids), PVD with fem-pop, hyperlipidemia, cellulitides and lower extremity ulcers, dementia, poor short term memory  2. Weight stable per pt  3. Skin: multiple wounds, wound culture that grew MRSA 8/2019. Wound consult pending  4. Labs: Na 131, Glu 108  5. Meds: Vit C, ferrous sulfate, Culturelle, MVI, NS @ 75 ml/hr, Senna given today, Thiamine, Vancomycin             Malnutrition Risk: Does not meet criteria at this time    Recommendations/Plan:  1. Boost Plus/high protein milkshake BID   2. Encourage intake of meals  3. Document intake of all meals as % taken in ADL's to provide interdisciplinary communication across all shifts.   4. Monitor weight.  5. Nutrition rep will continue to see patient for ongoing meal and snack preferences.    RD following             "

## 2019-09-03 NOTE — THERAPY
Physical Therapy Contact Note    PT orders received and acknowledged. Upon eval attempt patient pending MRI and POC. Will re attempt as appropriate and able.    Krista Poole, PT, DPT  [phone]

## 2019-09-03 NOTE — PROGRESS NOTES
"Pharmacy Kinetics 73 y.o. female on vancomycin day # 1 2019    New Start  Currently on Vancomycin 1600 mg load in ED at 2100 followed by 1100 mg (17.5mg/kg) iv q12hr    Indication for Treatment: Abscess    Pertinent history per medical record: Admitted on 2019 for an abscess. She presented with left hip pain worsening over the past week associated with brown colored drainage. It is also noted she has cellulitis to her left lower extremity. She has cultures from 19 from her lower extremity cellulitis that grew MRSA.      Other antibiotics: Unasyn 3g x 1 in ED    Allergies: Bactrim [sulfamethoxazole-trimethoprim] and Meropenem     List concerns for renal function: age. Her serum creatinine is at baseline with no changes in medications or body habitus that would effect renal function acutely.     Pertinent cultures to date:   19: left leg wound- MRSA (Vanco LICO = 1)  19: Wound, urinalysis, blood cultures x 2: pending        Recent Labs     19  1244   WBC 7.4   NEUTSPOLYS 64.60     Recent Labs     19  1244   BUN 7*   CREATININE 0.42*   ALBUMIN 2.5*     No results for input(s): VANCOTROUGH, VANCOPEAK, VANCORANDOM in the last 72 hours.No intake or output data in the 24 hours ending 19 2101   BP (!) 174/89   Pulse 90   Temp 36.6 °C (97.8 °F) (Temporal)   Resp 18   Ht 1.702 m (5' 7\")   Wt 62.8 kg (138 lb 7.2 oz)   SpO2 98%  Temp (24hrs), Av.8 °C (98.2 °F), Min:36.6 °C (97.8 °F), Max:36.9 °C (98.5 °F)      A/P   1. Vancomycin dose change: 1100 mg q12h (0900,2100)  2. Next vancomycin level: 19 @ 0830 prior to 4th dose.   3. Goal trough: 12-16  4. Comments: no concerns for renal function decline or accumulation. Will acquire trough prior to 4th dose. Due to allergies and previous cultures, would consider vancomycin the best coverage for MRSA.     Soledad Sharpe, PharmD  Reviewed by Izzy Ko    "

## 2019-09-03 NOTE — THERAPY
OT orders received. Pt awaiting MRI of hip to determine POC. Will hold OT eval until appropriate and able. CANDACE Kapoor, OTR/L  Pager: [phone]

## 2019-09-03 NOTE — H&P
"Hospital Medicine History & Physical Note    Date of Service  9/2/2019    Primary Care Physician  RYANN Son    Consultants      Code Status  Hip Pain (left hip)        Chief Complaint  Hip Pain (left hip)        History of Presenting Illness  73 y.o. female who presented 9/2/2019 with Hip Pain (left hip)  History of bullous penphigoid, PVD with fem-pop, hyperlipidemia, cellulitides and lower extremity ulcers with history of immunocompromised status due to use of immunomodulators and steroids for her bullous pemphigioids.  She has multiple chronic wounds one of which is on an incision where she had  hip surgery. She has a history of MRSA and MSSA infection in her LLE. Recent was MSSA infection requiring a wound vac and completed course of antibiotics last April 2019 and there is a wound culture that grew MRSA just August 2019 that is resistant to clindamycin.  Cognitive deficits has dementia, poor short term memory. Therefore poor historian., Family at bedside.  Lives with roommate. Roommate not a caretaker as she is arthritic herself.  Presents with multiple falls. She \"does not know\" how she falls. She also complained of Hip Pain (left hip)  She denies smoking or alcohol but on family member spoke to me outside the room and tells me that her roommate reported to him that she has been drinking alcohol. He does not know how much alcohol and when her last drink was.  Family members expressed that she may need advanced level of care.  She also has been complaining of low back pain as well.    At the ED, she is afebrile and hypertensive.   CT left hip:  1.  There is a questionable small subcutaneous fluid collection just deep to the skin along the left lateral hip at the level the greater trochanter which could be a small subcutaneous abscess.  2.  There is a left hip arthroplasty with postoperative changes as noted above causing moderate artifact to the joint space. There is no gross effusion.  No " leukocytosis. Elevated AST.   EDP did an InD and gave the patient a shot of clindamycin.  When I saw her at the ED, she is in no acute distress. Poor insight unreliable historian. L periorbital ecchymosis and scrape L forehead. Low back tenderness and L hip tenderness.     Review of Systems  Review of Systems   Unable to perform ROS: Mental acuity       Past Medical History   has a past medical history of Anxiety, Cellulitis and abscess of lower extremity, Dental disorder, Generalized osteoarthritis of multiple sites (10/20/2015), Heart valve disease, Hyperlipidemia, Hypertension, and Osteoporosis. She also has no past medical history of Allergy, Diabetes, Muscle disorder, or OSTEOPOROSIS.    Surgical History   has a past surgical history that includes gyn surgery (1973); breast biopsy (8/28/2014); abdominal hysterectomy total; breast biopsy (Right, 8/14); hip nailing intramedullary (Left, 12/20/2017); hip revision total (Left, 2/11/2018); irrigation & debridement hip (Left, 6/4/2018); femoral popliteal bypass (Left, 6/8/2018); irrigation & debridement general (Left, 6/8/2018); and athroplasty.     Family History  family history includes Diabetes in her father; GI Disease in her mother; Heart Attack in her father; Hypertension in her father; Psychiatric Illness in her brother.     Social History   reports that she quit smoking about 12 years ago. Her smoking use included cigarettes. She has a 12.50 pack-year smoking history. She has never used smokeless tobacco. She reports that she drinks about 3.0 oz of alcohol per week. She reports that she does not use drugs.    Allergies  Allergies   Allergen Reactions   • Bactrim [Sulfamethoxazole-Trimethoprim] Rash     Diffuse pruritic skin rash with blisters (no mucous membrane involvement) (was also taking cipro at the time - reaction thought more likely to be 2/2 Bactrim)   • Meropenem Unspecified     Pt can not remember reaction         Medications  Prior to Admission  Medications   Prescriptions Last Dose Informant Patient Reported? Taking?   Multiple Vitamins-Minerals (THERAPEUTIC-M/LUTEIN) Tab 9/1/2019 at 0830 Patient Yes No   Sig: Take 1 Tab by mouth every day.   ascorbic acid (ASCORBIC ACID) 500 MG Tab 9/1/2019 at 0830 Patient Yes Yes   Sig: Take 500 mg by mouth every day.   metoprolol SR (TOPROL XL) 100 MG TABLET SR 24 HR 9/1/2019 at 0830 Patient Yes Yes   Sig: Take 100 mg by mouth every day.   mycophenolate (CELLCEPT) 500 MG tablet 9/1/2019 at 2030 Patient Yes Yes   Sig: Take 500 mg by mouth 2 times a day.   mycophenolate (CELLCEPT) 500 MG tablet BEGIN at 9/10/19 Patient Yes Yes   Sig: Take 1,000 mg by mouth 2 times a day.   niacinamide 500 MG tablet 9/1/2019 at 2030 Patient Yes No   Sig: Take 500 mg by mouth 2 times a day.   sertraline (ZOLOFT) 100 MG Tab 9/1/2019 at 0830 Patient Yes Yes   Sig: Take 100 mg by mouth every day.      Facility-Administered Medications: None       Physical Exam  Temp:  [36.9 °C (98.5 °F)] 36.9 °C (98.5 °F)  Pulse:  [89-97] 90  Resp:  [20] 20  BP: (170-199)/() 170/85  SpO2:  [89 %-97 %] 97 %    Physical Exam   Constitutional: She appears well-developed.   Elderly, frail   HENT:   Head: Normocephalic and atraumatic.   Small L scrape forehead   Eyes: Conjunctivae are normal. No scleral icterus.   L periorbital ecchymosis. No evidence of conjunctival or subconjuctival hemorhage   Neck: Normal range of motion. Neck supple.   Cardiovascular: Normal rate and regular rhythm. Exam reveals no gallop and no friction rub.   Murmur heard.  Pulmonary/Chest: Effort normal and breath sounds normal. No respiratory distress. She has no wheezes. She has no rales.   Abdominal: Soft. Bowel sounds are normal. She exhibits no distension. There is no tenderness. There is no rebound and no guarding.   Musculoskeletal: She exhibits tenderness (L hip, low back). She exhibits no edema.   L hip bandage CDI.   Neurological: She is alert.   Cognitive deficits  Short  term memory loss.   Skin: Skin is warm.   Psychiatric: She has a normal mood and affect. Her behavior is normal.   Guarded       Laboratory:  Recent Labs     09/02/19  1244   WBC 7.4   RBC 3.57*   HEMOGLOBIN 11.2*   HEMATOCRIT 34.8*   MCV 97.5   MCH 31.4   MCHC 32.2*   RDW 50.6*   PLATELETCT 219   MPV 9.1     Recent Labs     09/02/19  1244   SODIUM 133*   POTASSIUM 4.3   CHLORIDE 103   CO2 20   GLUCOSE 95   BUN 7*   CREATININE 0.42*   CALCIUM 7.1*     Recent Labs     09/02/19  1244   ALTSGPT 50   ASTSGOT 114*   ALKPHOSPHAT 199*   TBILIRUBIN 0.6   GLUCOSE 95         No results for input(s): NTPROBNP in the last 72 hours.      No results for input(s): TROPONINT in the last 72 hours.    Urinalysis:    Recent Labs     09/02/19  1600   SPECGRAVITY 1.016   GLUCOSEUR Negative   KETONES Negative   NITRITE Negative   LEUKESTERAS Negative   WBCURINE 0-2   RBCURINE 10-20*   BACTERIA Negative   EPITHELCELL Negative        Imaging:  DX-LUMBAR SPINE-2 OR 3 VIEWS   Final Result      1.  There is moderate lumbar spondylosis with degenerative disease most prominent at the L3-4 level.   2.  There is no acute fracture or malalignment.      CT-HIP WITH LEFT   Final Result      1.  There is a questionable small subcutaneous fluid collection just deep to the skin along the left lateral hip at the level the greater trochanter which could be a small subcutaneous abscess.   2.  There is a left hip arthroplasty with postoperative changes as noted above causing moderate artifact to the joint space. There is no gross effusion.      MR-LUMBAR SPINE-W/O    (Results Pending)         Assessment/Plan:  I anticipate this patient is appropriate for observation status at this time.    * Skin abscess over hip, inability to walk  Assessment & Plan  Recent MRSA positive wound  Discontinue clindamycin.  Ordered Unasyn and vancomycin.  Ordered wound culture  Ordered IV fluids.  Ordered Wound team to see  Ordered XR lumbar. That showed no fractures but L3-4  disease. Ordered lumbar MRI.  Ordered PT/OT  She will benefit from routine visits to an ID specialist as she is on Cellcept and has recurrent cellulitides and abscesses.    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)  Assessment & Plan  Continue the Cellcept.  Need to follow up with her rheumatologist    History of hip fracture- (present on admission)  Assessment & Plan  Noted  No fracture on CT    Peripheral vascular disease (HCC)- (present on admission)  Assessment & Plan  History of   S/p fem pop  Ordered ABIs as she continues ot have poor healing of wounds.    Essential hypertension- (present on admission)  Assessment & Plan  Uncontrolled.  Improved with pain control and resume her blood pressure medications.  Trend for now; if worsening again, add a second blood pressure agent.    Alcohol use  Assessment & Plan  Does not appear to be withdrawing  She seems to be guarded or in denial. Encourage cessation as this will only exacerbate her falls.  Ordered PO thiamine.  Monitor for withdrawal.    History of MRSA infection  Assessment & Plan  Noted  Ordered contact precautions.    Adult failure to thrive  Assessment & Plan  PT/OT  May need 24/7 level of care      VTE prophylaxis: Lovenox SQ  Reviewed vitals, labs, imaging, staff notes.  Discussed assessment and plan with Nadege Mae and family  Discussed with ED physician

## 2019-09-03 NOTE — CONSULTS
Consults   INFECTIOUS DISEASES INPATIENT CONSULT NOTE     Date of Service: 9/3/2019    Consult Requested By: Liya Thomas M.D.    Reason for Consultation: Thigh abscess    History of Present Illness:   Nadege Mae is a 73 y.o.  admitted 9/2/2019. Pt has a past medical history of dementia, peripheral vascular disease with chronic lower extremity ulcers, she is status post femoral-popliteal bypass.  She also has a history of bullous pemphigus and has been treated with prednisone.  She has last seen by ID in April 2019 for left lower extremity cellulitis.  Wound cultures that time grew MSSA.  Plan at that time was to continue Augmentin with stop date of 4/29/2019.  She was in the ED complaining of multiple falls as well as pain in left hip.  There is a question whether alcohol use was involved.  Patient is poor historian.  Most recent cultures from 8/23 of the left leg with MRSA.     Hospital Course:   She is afebrile, no leukocytosis.  CT of the hip on 9/2 with questionable fluid collection on the left lateral hip may be  a small abscess.  There is left hip arthroplasty postop changes, no gross effusion.  In the ER the potential abscess was opened with bedside I&D and she was given clindamycin.  She underwent MR spine on 9/3 with finding of multilevel degenerative changes and spinal stenosis at L1-L4.  Blood cultures on 9/2 are no growth.  She was started on Unasyn and vancomycin.      Review Of Systems:  Review of Systems   Constitutional: Negative for chills, fever and malaise/fatigue.   HENT: Negative for hearing loss.    Eyes: Negative for blurred vision and double vision.   Respiratory: Negative for cough, sputum production and shortness of breath.    Cardiovascular: Negative for chest pain.   Gastrointestinal: Negative for abdominal pain, constipation, diarrhea, nausea and vomiting.   Genitourinary: Negative for dysuria.   Musculoskeletal: Positive for back pain, joint pain and myalgias.   Skin:  Positive for rash.   Neurological: Negative for headaches.   Psychiatric/Behavioral: The patient is not nervous/anxious.          PMH:   Past Medical History:   Diagnosis Date   • Anxiety    • Cellulitis and abscess of lower extremity    • Dental disorder     full dentures   • Generalized osteoarthritis of multiple sites 10/20/2015   • Heart valve disease     pt not sure    • Hyperlipidemia    • Hypertension    • Osteoporosis        PSH:  Past Surgical History:   Procedure Laterality Date   • FEMORAL POPLITEAL BYPASS Left 6/8/2018    Procedure: FEMORAL POPLITEAL BYPASS;  Surgeon: Milana Colin M.D.;  Location: SURGERY [facility] [place];  Service: General   • IRRIGATION & DEBRIDEMENT GENERAL Left 6/8/2018    Procedure: IRRIGATION & DEBRIDEMENT ANKLE WOUND;  Surgeon: Milana Colin M.D.;  Location: SURGERY [facility] [place];  Service: General   • IRRIGATION & DEBRIDEMENT HIP Left 6/4/2018    Procedure: IRRIGATION & DEBRIDEMENT HIP;  Surgeon: Chong Vazquez M.D.;  Location: SURGERY [facility] [place];  Service: Orthopedics   • HIP REVISION TOTAL Left 2/11/2018    Procedure: HIP REVISION TOTAL- femoral nail removal conversion to total hip arthoroplasty;  Surgeon: Chong Vazquez M.D.;  Location: SURGERY [facility] [place];  Service: Orthopedics   • HIP NAILING INTRAMEDULLARY Left 12/20/2017    Procedure: HIP NAILING INTRAMEDULLARY;  Surgeon: Jorge Peters M.D.;  Location: SURGERY [facility] [place];  Service: Orthopedics   • BREAST BIOPSY  8/28/2014    Performed by Magdalena Londono M.D. at [facility]   • BREAST BIOPSY Right 8/14    benign   • GYN SURGERY  1973    complete hysterectomy   • ABDOMINAL HYSTERECTOMY TOTAL      w/BSO due to uterine cyst and endometriosis   • ATHROPLASTY      hip       FAMILY HX:  Family History   Problem Relation Age of Onset   • GI Disease Mother         colostomy   • Heart Attack Father    • Diabetes Father    • Hypertension Father    • Psychiatric Illness Brother         bipolar  disorder       SOCIAL HX:  Social History     Socioeconomic History   • Marital status: Single     Spouse name: Not on file   • Number of children: 1   • Years of education: Not on file   • Highest education level: Not on file   Occupational History   • 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      Employer: [employer]   Social Needs   • Financial resource strain: Not on file   • Food insecurity:     Worry: Not on file     Inability: Not on file   • Transportation needs:     Medical: Not on file     Non-medical: Not on file   Tobacco Use   • Smoking status: Former Smoker     Packs/day: 0.50     Years: 25.00     Pack years: 12.50     Types: Cigarettes     Last attempt to quit: 2007     Years since quittin.6   • Smokeless tobacco: Never Used   Substance and Sexual Activity   • Alcohol use: Yes     Alcohol/week: 3.0 oz     Types: 5 Glasses of wine per week     Comment: glass of wine per day   • Drug use: No   • Sexual activity: Not Currently     Partners: Male   Lifestyle   • Physical activity:     Days per week: Not on file     Minutes per session: Not on file   • Stress: Not on file   Relationships   • Social connections:     Talks on phone: Not on file     Gets together: Not on file     Attends Sabianism service: Not on file     Active member of club or organization: Not on file     Attends meetings of clubs or organizations: Not on file     Relationship status: Not on file   • Intimate partner violence:     Fear of current or ex partner: Not on file     Emotionally abused: Not on file     Physically abused: Not on file     Forced sexual activity: Not on file   Other Topics Concern   • Not on file   Social History Narrative   • Not on file     Social History     Tobacco Use   Smoking Status Former Smoker   • Packs/day: 0.50   • Years: 25.00   • Pack years: 12.50   • Types: Cigarettes   • Last attempt to quit: 2007   • Years since quittin.6   Smokeless Tobacco Never Used     Social History      Substance and Sexual Activity   Alcohol Use Yes   • Alcohol/week: 3.0 oz   • Types: 5 Glasses of wine per week    Comment: glass of wine per day       Allergies/Intolerances:  Allergies   Allergen Reactions   • Bactrim [Sulfamethoxazole-Trimethoprim] Rash     Diffuse pruritic skin rash with blisters (no mucous membrane involvement) (was also taking cipro at the time - reaction thought more likely to be 2/2 Bactrim)   • Meropenem Unspecified     Pt can not remember reaction         History reviewed with the patient     Other Current Medications:    Current Facility-Administered Medications:   •  amLODIPine (NORVASC) tablet 10 mg, 10 mg, Oral, Q DAY, Liya Thomas M.D., 10 mg at 09/03/19 1353  •  [START ON 9/4/2019] ferrous sulfate tablet 325 mg, 325 mg, Oral, QDAY with Breakfast, Liya Thomas M.D.  •  ascorbic acid tablet 500 mg, 500 mg, Oral, DAILY, Shahbaz Ruiz M.D., 500 mg at 09/03/19 0620  •  metoprolol SR (TOPROL XL) tablet 100 mg, 100 mg, Oral, DAILY, Shahbaz Ruiz M.D., 100 mg at 09/02/19 2033  •  multivitamin (THERAGRAN) tablet 1 Tab, 1 Tab, Oral, QDAY, Shahbaz Ruiz M.D., 1 Tab at 09/02/19 2032  •  mycophenolate (CELLCEPT) capsule 500 mg, 500 mg, Oral, BID, Shahbaz Ruiz M.D., 500 mg at 09/03/19 0619  •  sertraline (ZOLOFT) tablet 100 mg, 100 mg, Oral, DAILY, Shahbaz Ruiz M.D., 100 mg at 09/03/19 0621  •  senna-docusate (PERICOLACE or SENOKOT S) 8.6-50 MG per tablet 2 Tab, 2 Tab, Oral, BID, 2 Tab at 09/03/19 0621 **AND** polyethylene glycol/lytes (MIRALAX) PACKET 1 Packet, 1 Packet, Oral, QDAY PRN **AND** magnesium hydroxide (MILK OF MAGNESIA) suspension 30 mL, 30 mL, Oral, QDAY PRN **AND** bisacodyl (DULCOLAX) suppository 10 mg, 10 mg, Rectal, QDAY PRN, Shahbaz Ruiz M.D.  •  NS infusion, , Intravenous, Continuous, Shahbaz Ruiz M.D., Last Rate: 75 mL/hr at 09/03/19 1353  •  enoxaparin (LOVENOX) inj 40 mg, 40 mg, Subcutaneous, DAILY, Shahbaz Ruiz M.D., 40 mg at  "19 06  •  acetaminophen (TYLENOL) tablet 650 mg, 650 mg, Oral, Q6HRS PRN, Shahbaz Ruiz M.D., 650 mg at 19 2314  •  ondansetron (ZOFRAN) syringe/vial injection 4 mg, 4 mg, Intravenous, Q4HRS PRN, Shahbaz Ruiz M.D.  •  ondansetron (ZOFRAN ODT) dispertab 4 mg, 4 mg, Oral, Q4HRS PRN, Shahbaz Ruiz M.D.  •  lactated ringers infusion (BOLUS), 500 mL, Intravenous, Once PRN, Shahbaz Ruiz M.D.  •  MD Alert...Vancomycin per Pharmacy, 1 Each, Other, PRN, Shahbaz Ruiz M.D.  •  lactobacillus rhamnosus (CULTURELLE) capsule 1 Cap, 1 Cap, Oral, QDAY with Breakfast, Shahbaz Ruiz M.D., 1 Cap at 19 09  •  oxyCODONE immediate-release (ROXICODONE) tablet 5 mg, 5 mg, Oral, Q4HRS PRN, Shahbaz Ruiz M.D., 5 mg at 19  •  thiamine tablet 100 mg, 100 mg, Oral, DAILY, Shahbaz Ruiz M.D., 100 mg at 19  •  vancomycin (VANCOCIN) 1,100 mg in  mL IVPB, 1,100 mg, Intravenous, Q12HR, Shahbaz Ruiz M.D., Stopped at 19 1126  •  morphine (pf) 4 mg/ml injection 2 mg, 2 mg, Intravenous, Q4HRS PRN, Amelia Ferguson M.D., 2 mg at 19 1349  [unfilled]    Most Recent Vital Signs:  BP (!) 161/81 Comment: RN notified  Pulse 71   Temp 36.6 °C (97.9 °F) (Temporal)   Resp 16   Ht 1.702 m (5' 7.01\")   Wt 62.8 kg (138 lb 7.2 oz)   SpO2 96%   BMI 21.68 kg/m²   Temp  Av.7 °C (98.1 °F)  Min: 36.6 °C (97.8 °F)  Max: 37.1 °C (98.7 °F)    Physical Exam:  Physical Exam   Constitutional: She is oriented to person, place, and time and well-developed, well-nourished, and in no distress.   HENT:   Head: Normocephalic.   Left forehead with some bruising and scab   Eyes: Pupils are equal, round, and reactive to light. Conjunctivae and EOM are normal.   Left eye bruising   Cardiovascular: Normal rate, regular rhythm and normal heart sounds.   Pulmonary/Chest: Effort normal and breath sounds normal.   Abdominal: Soft. Bowel sounds are normal. She " "exhibits no distension. There is no tenderness. There is no rebound and no guarding.   Musculoskeletal: She exhibits no edema.   Left hip with area of erythema, induration, tenderness, incision from I&D with packing in place.   Neurological: She is alert and oriented to person, place, and time.   Skin: Skin is warm.   Several lesions on lower extremities, no sign of infection   Psychiatric: Affect and judgment normal.           Pertinent Lab Results:  Recent Labs     09/02/19  1244 09/03/19  0146   WBC 7.4 6.2      Recent Labs     09/02/19  1244 09/03/19  0146   HEMOGLOBIN 11.2* 11.8*   HEMATOCRIT 34.8* 36.0*   MCV 97.5 97.0   MCH 31.4 31.8   PLATELETCT 219 167         Recent Labs     09/02/19  1244 09/03/19  0146   SODIUM 133* 131*   POTASSIUM 4.3 3.8   CHLORIDE 103 94*   CO2 20 27   CREATININE 0.42* 0.58        Recent Labs     09/02/19  1244   ALBUMIN 2.5*        Pertinent Micro:  Results     Procedure Component Value Units Date/Time    Blood Culture [[id number]] Collected:  09/02/19 2138    Order Status:  Completed Specimen:  Blood from Peripheral Updated:  09/03/19 0835     Significant Indicator NEG     Source BLD     Site PERIPHERAL     Culture Result No Growth  Note: Blood cultures are incubated for 5 days and  are monitored continuously.Positive blood cultures  are called to the RN and reported as soon as  they are identified.      Narrative:       From different peripheral sites, if not done within the last  24 hours (Per Hospital Policy: Only change specimen source to  \"Line\" if specified by physician order)  Left AC    Blood Culture [[id number]] Collected:  09/02/19 2138    Order Status:  Completed Specimen:  Blood from Peripheral Updated:  09/03/19 0835     Significant Indicator NEG     Source BLD     Site PERIPHERAL     Culture Result No Growth  Note: Blood cultures are incubated for 5 days and  are monitored continuously.Positive blood cultures  are called to the RN and reported as soon as  they are " "identified.      Narrative:       From different peripheral sites, if not done within the last  24 hours (Per Hospital Policy: Only change specimen source to  \"Line\" if specified by physician order)  Right Hand    GRAM STAIN [[id number]] Collected:  09/02/19 2350    Order Status:  Completed Specimen:  Wound Updated:  09/03/19 0526     Significant Indicator .     Source WND     Site RIGHT HIP     Gram Stain Result Moderate WBCs.  Few Gram positive cocci.      CULTURE WOUND W/ GRAM STAIN [[id number]] Collected:  09/02/19 2350    Order Status:  Completed Specimen:  Wound from Right Hip Updated:  09/03/19 0000    CULTURE WOUND W/ GRAM STAIN [[id number]] Collected:  09/02/19 2350    Order Status:  Sent Specimen:  Wound from Left Leg     Urinalysis [[id number]]     Order Status:  Canceled Specimen:  Urine     URINALYSIS,CULTURE IF INDICATED [[id number]]  (Abnormal) Collected:  09/02/19 1600    Order Status:  Completed Specimen:  Urine, Straight Cath Updated:  09/02/19 1615     Color Yellow     Character Clear     Specific Gravity 1.016     Ph 7.5     Glucose Negative mg/dL      Ketones Negative mg/dL      Protein Negative mg/dL      Bilirubin Negative     Urobilinogen, Urine 0.2     Nitrite Negative     Leukocyte Esterase Negative     Occult Blood Small     Micro Urine Req Microscopic        Blood Culture   Date Value Ref Range Status   04/19/2019 No growth after 5 days of incubation.  Final        Studies:  Ct-hip With Left    Result Date: 9/2/2019 9/2/2019 1:41 PM HISTORY/REASON FOR EXAM:  Hip replaced, infection suspected TECHNIQUE/EXAM DESCRIPTION AND NUMBER OF VIEWS: Helical CT scan of the pelvis and left hip is performed following the IV administration of 100 mL of Omnipaque 350. Coronal and sagittal reconstructions are provided. COMPARISON: None FINDINGS: There are postoperative changes of a left hip arthroplasty which causes some degree of artifact. There are areas of heterotopic ossification surrounding the surgical " site. There is no acute fracture. There is no dislocation. There are mild degenerative changes in the lower lumbar spine and both SI joints. There is some atrophy of the left hip musculature with some soft tissue density surrounding portions of the left hip arthroplasty but no definite joint effusion is evident on this exam. There is subcutaneous edema or skin thickening in the proximal left lateral thigh. There is a questionable small superficial fluid collection just deep to the skin along the posterior aspect of the left hip at the level of the greater trochanter measuring 24 x 9 mm. Soft tissues demonstrate atherosclerosis of the aorta and pelvic vasculature. There is colonic diverticulosis without diverticulitis. The bladder is distended.     1.  There is a questionable small subcutaneous fluid collection just deep to the skin along the left lateral hip at the level the greater trochanter which could be a small subcutaneous abscess. 2.  There is a left hip arthroplasty with postoperative changes as noted above causing moderate artifact to the joint space. There is no gross effusion.    Dx-lumbar Spine-2 Or 3 Views    Result Date: 9/2/2019 9/2/2019 6:28 PM HISTORY/REASON FOR EXAM:  Low back pain for 7 days. Left hip pain. TECHNIQUE/ EXAM DESCRIPTION AND NUMBER OF VIEWS:  3 views of the lumbar spine. COMPARISON: None. FINDINGS: The alignment of the lumbar spine is within normal limits. Vertebral body heights are maintained. There is degenerative endplate spurring at all levels throughout the lumbar spine. There is disc space narrowing most prominent at the L3-4 level. There is bilateral facet arthropathy in the mid and lower lumbar spine. SI joints are symmetric. Bony pelvis is intact. There is atherosclerosis of the aorta.     1.  There is moderate lumbar spondylosis with degenerative disease most prominent at the L3-4 level. 2.  There is no acute fracture or malalignment.    Mr-lumbar Spine-w/o    Result Date:  9/3/2019  9/3/2019 11:07 AM HISTORY/REASON FOR EXAM:  inability to walk, back pain, findings of L3-4 DJD TECHNIQUE/EXAM DESCRIPTION: MRI of the lumbar spine without contrast. The study was performed on a adjust Signa 1.5 Dayami MRI scanner. T1 sagittal, T2 fast spin-echo sagittal, and T2 axial images were obtained of the lumbar spine. COMPARISON:  Lumbar radiographs 9/2/2019 FINDINGS: 5 lumbar type vertebral bodies are counted. The conus medullaris terminates at T12-L1 and is normal in signal. There is a 8 mm T2 hyperintense lesion within the T12 vertebral body most likely an intraosseous hemangioma. There is no compression fracture. T11-T12: Mild diffuse disc bulge and osteophyte. No significant spinal or foraminal stenosis. T12-L1:  Minor diffuse disc bulge, osteophyte. No significant spinal or foraminal stenosis. L1-L2:  Mild diffuse disc bulge, osteophyte and small right paracentral disc protrusion. Mild spinal stenosis and left foraminal stenosis. L2-L3:  Diffuse disc bulge and osteophyte. Ligamentum flavum thickening and mild facet degeneration. Mild to moderate spinal stenosis. Foramina are adequate. L3-L4:  Disc narrowing, reactive marrow endplate changes/edema and mild disc osteophyte. Mild facet degeneration. Mild spinal stenosis and left foraminal stenosis. Right foramen is adequate. L4-L5:  Diffuse mild disc bulge and osteophyte. No significant spinal or foraminal stenosis. L5-S1:  Canal and foramina are patent. There is some lower posterior paraspinal muscle atrophy and left psoas atrophy.     1.  Multilevel degenerative changes detailed. 2.  Mild to moderate spinal stenosis L2-L3, mild L1-L2 and L3-L4.       ASSESSMENT/PLAN:     Nadege Mae is a 73 y.o.  admitted 9/2/2019. Pt has a past medical history of dementia, peripheral vascular disease with chronic lower extremity ulcers, she is status post femoral-popliteal bypass.  She also has a history of bullous pemphigus and has been treated with  prednisone.  She has last seen by ID in April 2019 for left lower extremity cellulitis.  Wound cultures that time grew MSSA.  Plan at that time was to continue Augmentin with stop date of 4/29/2019. Most recent cultures from 8/23 of the left leg with MRSA.  She came into the ED complaining of multiple falls as well as pain in left hip.  There is a question whether alcohol use was involved.  Patient is poor historian.      Hospital Course:   She is afebrile, no leukocytosis.  CT of the hip on 9/2 with left lateral hip with a small abscess.  There is left hip arthroplasty postop changes, no gross effusion.  In the ER the potential abscess was open with bedside I&D and she was given clindamycin.  She underwent MR spine on 9/3 with finding of multilevel degenerative changes and spinal stenosis at L1-L4.  Blood cultures on 9/2 are no growth.  She was  started on Unasyn and vancomycin.      Left hip abscess  -I&D in the ER on 9/2-cultures with GPC's    Left hip prosthetic joint  -Does not appear to be involved, however she continues to have pain with movement would need aspiration    Peripheral vascular disease  Chronic nonhealing lower extremity ulcers    Bullous pemphigus    Immunosuppression, secondary treatment for bullous pemphigus  -Per chart review she is on CellCept 500 mg twice daily-patient states she has not actually been taking this    History of cellulitis  - Prior wound cultures have grown MRSA, MSSA, Enterococcus faecalis and Pseudomonas    Depression, on sertraline    --- Continue Unasyn and vancomycin for now while awaiting culture results  --- Monitor for pain or swelling in left hip that could be concerning for presented joint infection  --- Continue wound care for lower extremity ulcers related to her bullous pemphigus as well as for the left hip        Plan of care discussed with WILEY Thomas M.D.. Will continue to follow    Zuri More M.D.

## 2019-09-03 NOTE — ASSESSMENT & PLAN NOTE
Controlled  continue Amlodipine and Metoprolol   Hydralazine dosing recently decreased- monitor BP response.   Prn Vasotec.

## 2019-09-03 NOTE — WOUND TEAM
"[facility]  Inpatient Services  Initial Wound and Skin Care Evaluation    Admission Date: 9/2/2019     Consult Date: 09/02 @ 1757   [facility], [facility], SH: Reviewed    Unit where seen by Wound Team: [id number]     WOUND CONSULT RELATED TO:  Left  Medial ankle and left hip      SUBJECTIVE:  \"I don't know, one day the hip just started draining.\"      Self Report / Pain Level:  No complaints of pain        OBJECTIVE:  In bed, RN getting ready to change dressings.  Previous dressings intact to LLE.  No packing in left hip identified.  Dry gauze dressing in place.    WOUND TYPE, LOCATION, CHARACTERISTICS (Pressure Injuries: location, stage, POA or date identified)         Wound 09/02/19 Full Thickness Wound Ankle Left medial resolving full thickness  (Active)   Wound Image      Site Assessment Pink;Red    Maricruz-wound Assessment Intact    Margins Attached edges    Wound Length (cm) 2 cm    Wound Width (cm) 1 cm    Wound Depth (cm) 0.2 cm    Wound Surface Area (cm^2) 2 cm^2    Tunneling 0 cm    Undermining 0 cm    Closure None    Drainage Amount Small    Drainage Description Serosanguineous    Non-staged Wound Description Full thickness    Treatments Cleansed;Site care    Cleansing Normal Saline Irrigation    Periwound Protectant Skin Protectant wipes to Periwound    Dressing Options Hydrofiber Silver;Adhesive Foam    Dressing Cleansing/Solutions Not Applicable    Dressing Changed Changed    Dressing Status Clean;Dry;Intact    Dressing Change Frequency Every 72 hrs    NEXT Dressing Change  09/06/19    NEXT Weekly Photo (Inpatient Only) 09/10/19    WOUND NURSE ONLY - Odor None    WOUND NURSE ONLY - Exposed Structures None    WOUND NURSE ONLY - Tissue Type and Percentage 100% pink    WOUND NURSE ONLY - Time Spent with Patient (mins) 60        Wound 09/02/19 Full Thickness Wound Hip Left lateral hip/thigh non healing incision (Active)   Wound Image      Site Assessment Pale;Pink    Maricruz-wound Assessment Intact    Margins " Attached edges    Wound Length (cm) 0.5 cm    Wound Width (cm) 0.5 cm    Wound Depth (cm) 3 cm    Wound Surface Area (cm^2) 0.25 cm^2    Closure Secondary intention    Drainage Amount Moderate    Drainage Description Yellow    Non-staged Wound Description Full thickness    Treatments Cleansed;Site care    Cleansing Normal Saline Irrigation    Periwound Protectant Skin Protectant wipes to Periwound    Dressing Options Silver Strip Packing;Adhesive Foam    Dressing Cleansing/Solutions Not Applicable    Dressing Changed New    Dressing Status Clean;Dry;Intact    Dressing Change Frequency Every 72 hrs    NEXT Dressing Change  09/06/19    NEXT Weekly Photo (Inpatient Only) 09/10/19    WOUND NURSE ONLY - Odor None    WOUND NURSE ONLY - Exposed Structures None    WOUND NURSE ONLY - Tissue Type and Percentage 100% pale       Vascular:    TOMI:    04/21/19         RIGHT     Waveform            Systolic BPs (mmHg)                              140           Brachial   Bi, non-                                 Common Femoral   reversed   Bi, non-                   142           Posterior Tibial   reversed   Bi, non-                   125           Dorsalis Pedis   reversed                                            Peroneal                              0.99          TOMI                                            TBI                          LEFT   Waveform        Systolic BPs (mmHg)                              144           Brachial   Bi, non-                                 Common Femoral   reversed   Bi, non-                                 Posterior Tibial   reversed   Bi, non-                                 Dorsalis Pedis   reversed                                            Peroneal                                            TOMI                                            TBI    Lab Values:    Lab Results   Component Value Date/Time    WBC 6.2 09/03/2019 01:46 AM    RBC 3.71 (L) 09/03/2019 01:46 AM    HEMOGLOBIN 11.8 (L)  "09/03/2019 01:46 AM    HEMATOCRIT 36.0 (L) 09/03/2019 01:46 AM        Lab Results   Component Value Date/Time    HBA1C 5.6 06/04/2018 12:00 PM     Culture:   09/02/19 left hip; results pending       Hx MRSA to LLE    INTERVENTIONS BY WOUND TEAM:  Previous dressings removed to LLE and left hip.  New photos taken.  Leg yuki-wound cleansed with warm wash cloth.  Wound cleansed with NS and gauze.  Covered with cut piece of hydrofiber silver and secured with adhesive foam.  Adhesive foam placed proximal over red/fragile skin.  Tubi  E applied.  Left hip cleansed with NS and gauze.  Gently filled with 1/4 \" silver packing.  Covered with adhesive foam.      Dressing Selection:  LLE silver hydrofiber, adhesive foam       Left hip: silver packing adhesive foam          Interdisciplinary consultation: Patient, Bedside RN (Taylor)     EVALUATION: patient well known to wound team and clinic with chronic LLE wound.  Has had multiple surgeries to left hip after falling down the stairs in 2018.  Non healing area of incision started draining and is being seen by wound clinic as well.      CT completed 09/02;  1.  There is a questionable small subcutaneous fluid collection just deep to the skin along the left lateral hip at the level the greater trochanter which could be a small subcutaneous abscess.  2.  There is a left hip arthroplasty with postoperative changes as noted above causing moderate artifact to the joint space. There is no gross effusion.     Packing to wick drainage and provide antimicrobial.     Factors affecting wound healing: drainage, abscess, Bullous pemphigus  Goals: Steady decrease in wound area and depth weekly.    NURSING PLAN OF CARE ORDERS (X):    Dressing changes: See Dressing Care orders: X  Skin care: See Skin Care orders: X  Rectal tube care: See Rectal Tube Care orders:   Other orders:    RSKIN: CURRENT (X) ORDERED (O):   Q shift Luis:  X  Q shift pressure point assessments:  X  Pressure " redistribution mattress          X  SHORTY          Bariatric SHORTY         Bariatric foam           Heel float boots          Float Heels off Bed with Pillows        X       Barrier wipes         Barrier Cream         Barrier paste          Sacral silicone dressing       O  Silicone O2 tubing         Anchorfast         Cannula fixation Device (Tender )          Gray Foam Ear protectors           Trach with Optifoam split foam                 Waffle cushion        Waffle Overlay       O  Rectal tube or BMS         Antifungal tx      Interdry          Reposition q 2 hours      O ensure patient is turning self   Up to chair        Ambulate      PT/OT        Dietician        Diabetes Education      PO    X TF     TPN     NPO   # days   Other        WOUND TEAM PLAN OF CARE (X):   NPWT change 3 x week:        Dressing changes by wound team:       Follow up as needed:     X wound team to follow up weekly   Other (explain):     Anticipated discharge plans (X):   SNF:           Home Care:           Outpatient Wound Center:          X  Self Care:            Other:

## 2019-09-03 NOTE — CARE PLAN
Problem: Nutritional:  Goal: Achieve adequate nutritional intake  Description  Patient will consume 50% of meals   Outcome: PROGRESSING SLOWER THAN EXPECTED    See RD note

## 2019-09-04 LAB
ANION GAP SERPL CALC-SCNC: 10 MMOL/L (ref 0–11.9)
BASOPHILS # BLD AUTO: 0.8 % (ref 0–1.8)
BASOPHILS # BLD: 0.06 K/UL (ref 0–0.12)
BUN SERPL-MCNC: 8 MG/DL (ref 8–22)
CALCIUM SERPL-MCNC: 8.6 MG/DL (ref 8.5–10.5)
CHLORIDE SERPL-SCNC: 96 MMOL/L (ref 96–112)
CO2 SERPL-SCNC: 24 MMOL/L (ref 20–33)
CREAT SERPL-MCNC: 0.48 MG/DL (ref 0.5–1.4)
EOSINOPHIL # BLD AUTO: 0.02 K/UL (ref 0–0.51)
EOSINOPHIL NFR BLD: 0.3 % (ref 0–6.9)
ERYTHROCYTE [DISTWIDTH] IN BLOOD BY AUTOMATED COUNT: 49.6 FL (ref 35.9–50)
GLUCOSE BLD-MCNC: 171 MG/DL (ref 65–99)
GLUCOSE SERPL-MCNC: 139 MG/DL (ref 65–99)
HCT VFR BLD AUTO: 38.2 % (ref 37–47)
HGB BLD-MCNC: 11.9 G/DL (ref 12–16)
IMM GRANULOCYTES # BLD AUTO: 0.04 K/UL (ref 0–0.11)
IMM GRANULOCYTES NFR BLD AUTO: 0.5 % (ref 0–0.9)
LYMPHOCYTES # BLD AUTO: 0.9 K/UL (ref 1–4.8)
LYMPHOCYTES NFR BLD: 12 % (ref 22–41)
MCH RBC QN AUTO: 31.1 PG (ref 27–33)
MCHC RBC AUTO-ENTMCNC: 31.2 G/DL (ref 33.6–35)
MCV RBC AUTO: 99.7 FL (ref 81.4–97.8)
MONOCYTES # BLD AUTO: 0.54 K/UL (ref 0–0.85)
MONOCYTES NFR BLD AUTO: 7.2 % (ref 0–13.4)
NEUTROPHILS # BLD AUTO: 5.93 K/UL (ref 2–7.15)
NEUTROPHILS NFR BLD: 79.2 % (ref 44–72)
NRBC # BLD AUTO: 0 K/UL
NRBC BLD-RTO: 0 /100 WBC
PLATELET # BLD AUTO: 173 K/UL (ref 164–446)
PMV BLD AUTO: 9.8 FL (ref 9–12.9)
POTASSIUM SERPL-SCNC: 3.2 MMOL/L (ref 3.6–5.5)
RBC # BLD AUTO: 3.83 M/UL (ref 4.2–5.4)
SODIUM SERPL-SCNC: 130 MMOL/L (ref 135–145)
VANCOMYCIN TROUGH SERPL-MCNC: 19.2 UG/ML (ref 10–20)
WBC # BLD AUTO: 7.5 K/UL (ref 4.8–10.8)

## 2019-09-04 PROCEDURE — A9270 NON-COVERED ITEM OR SERVICE: HCPCS | Performed by: HOSPITALIST

## 2019-09-04 PROCEDURE — 700111 HCHG RX REV CODE 636 W/ 250 OVERRIDE (IP): Performed by: HOSPITALIST

## 2019-09-04 PROCEDURE — 770001 HCHG ROOM/CARE - MED/SURG/GYN PRIV*

## 2019-09-04 PROCEDURE — 99232 SBSQ HOSP IP/OBS MODERATE 35: CPT | Performed by: HOSPITALIST

## 2019-09-04 PROCEDURE — 99232 SBSQ HOSP IP/OBS MODERATE 35: CPT | Performed by: INTERNAL MEDICINE

## 2019-09-04 PROCEDURE — 36415 COLL VENOUS BLD VENIPUNCTURE: CPT

## 2019-09-04 PROCEDURE — 700105 HCHG RX REV CODE 258: Performed by: HOSPITALIST

## 2019-09-04 PROCEDURE — 96376 TX/PRO/DX INJ SAME DRUG ADON: CPT

## 2019-09-04 PROCEDURE — 80202 ASSAY OF VANCOMYCIN: CPT

## 2019-09-04 PROCEDURE — 96366 THER/PROPH/DIAG IV INF ADDON: CPT

## 2019-09-04 PROCEDURE — 85025 COMPLETE CBC W/AUTO DIFF WBC: CPT

## 2019-09-04 PROCEDURE — 700105 HCHG RX REV CODE 258: Performed by: INTERNAL MEDICINE

## 2019-09-04 PROCEDURE — 97162 PT EVAL MOD COMPLEX 30 MIN: CPT

## 2019-09-04 PROCEDURE — 80048 BASIC METABOLIC PNL TOTAL CA: CPT

## 2019-09-04 PROCEDURE — A9270 NON-COVERED ITEM OR SERVICE: HCPCS | Performed by: INTERNAL MEDICINE

## 2019-09-04 PROCEDURE — 700102 HCHG RX REV CODE 250 W/ 637 OVERRIDE(OP): Performed by: INTERNAL MEDICINE

## 2019-09-04 PROCEDURE — 82962 GLUCOSE BLOOD TEST: CPT

## 2019-09-04 PROCEDURE — 700102 HCHG RX REV CODE 250 W/ 637 OVERRIDE(OP): Performed by: HOSPITALIST

## 2019-09-04 PROCEDURE — 700111 HCHG RX REV CODE 636 W/ 250 OVERRIDE (IP): Performed by: INTERNAL MEDICINE

## 2019-09-04 PROCEDURE — 97165 OT EVAL LOW COMPLEX 30 MIN: CPT

## 2019-09-04 RX ADMIN — Medication 1 CAPSULE: at 05:04

## 2019-09-04 RX ADMIN — MYCOPHENOLATE MOFETIL 500 MG: 250 CAPSULE ORAL at 18:33

## 2019-09-04 RX ADMIN — VANCOMYCIN HYDROCHLORIDE 1100 MG: 500 INJECTION, POWDER, LYOPHILIZED, FOR SOLUTION INTRAVENOUS at 10:29

## 2019-09-04 RX ADMIN — MYCOPHENOLATE MOFETIL 500 MG: 250 CAPSULE ORAL at 05:06

## 2019-09-04 RX ADMIN — SENNOSIDES, DOCUSATE SODIUM 2 TABLET: 50; 8.6 TABLET, FILM COATED ORAL at 05:04

## 2019-09-04 RX ADMIN — AMLODIPINE BESYLATE 10 MG: 10 TABLET ORAL at 05:04

## 2019-09-04 RX ADMIN — OXYCODONE HYDROCHLORIDE 5 MG: 5 TABLET ORAL at 21:36

## 2019-09-04 RX ADMIN — MORPHINE SULFATE 2 MG: 4 INJECTION INTRAVENOUS at 18:33

## 2019-09-04 RX ADMIN — FERROUS SULFATE TAB 325 MG (65 MG ELEMENTAL FE) 325 MG: 325 (65 FE) TAB at 05:05

## 2019-09-04 RX ADMIN — ENOXAPARIN SODIUM 40 MG: 100 INJECTION SUBCUTANEOUS at 05:05

## 2019-09-04 RX ADMIN — OXYCODONE HYDROCHLORIDE AND ACETAMINOPHEN 500 MG: 500 TABLET ORAL at 05:04

## 2019-09-04 RX ADMIN — Medication 100 MG: at 05:04

## 2019-09-04 RX ADMIN — METOPROLOL SUCCINATE 100 MG: 50 TABLET, FILM COATED, EXTENDED RELEASE ORAL at 05:04

## 2019-09-04 RX ADMIN — SODIUM CHLORIDE: 9 INJECTION, SOLUTION INTRAVENOUS at 18:35

## 2019-09-04 RX ADMIN — VANCOMYCIN HYDROCHLORIDE 800 MG: 500 INJECTION, POWDER, LYOPHILIZED, FOR SOLUTION INTRAVENOUS at 21:39

## 2019-09-04 RX ADMIN — SERTRALINE HYDROCHLORIDE 100 MG: 100 TABLET ORAL at 05:04

## 2019-09-04 RX ADMIN — THERA TABS 1 TABLET: TAB at 18:33

## 2019-09-04 ASSESSMENT — COGNITIVE AND FUNCTIONAL STATUS - GENERAL
STANDING UP FROM CHAIR USING ARMS: A LOT
SUGGESTED CMS G CODE MODIFIER DAILY ACTIVITY: CK
PERSONAL GROOMING: A LITTLE
WALKING IN HOSPITAL ROOM: TOTAL
HELP NEEDED FOR BATHING: A LOT
DRESSING REGULAR LOWER BODY CLOTHING: A LOT
SUGGESTED CMS G CODE MODIFIER MOBILITY: CM
DAILY ACTIVITIY SCORE: 15
EATING MEALS: A LITTLE
MOVING FROM LYING ON BACK TO SITTING ON SIDE OF FLAT BED: UNABLE
TOILETING: A LOT
TURNING FROM BACK TO SIDE WHILE IN FLAT BAD: UNABLE
CLIMB 3 TO 5 STEPS WITH RAILING: TOTAL
DRESSING REGULAR UPPER BODY CLOTHING: A LITTLE
MOBILITY SCORE: 7
MOVING TO AND FROM BED TO CHAIR: UNABLE

## 2019-09-04 ASSESSMENT — ENCOUNTER SYMPTOMS
FALLS: 1
COUGH: 0
NAUSEA: 0
CLAUDICATION: 0
PALPITATIONS: 0
NECK PAIN: 1
EYE DISCHARGE: 0
DIARRHEA: 0
SHORTNESS OF BREATH: 1
MYALGIAS: 1
DIZZINESS: 0
VOMITING: 0
DEPRESSION: 0
BLURRED VISION: 0
SHORTNESS OF BREATH: 0
CHILLS: 0
HEMOPTYSIS: 0
HEADACHES: 0
ABDOMINAL PAIN: 0
PHOTOPHOBIA: 0
WEAKNESS: 1
NERVOUS/ANXIOUS: 1
TINGLING: 0
FEVER: 0
SENSORY CHANGE: 0
DIAPHORESIS: 0
BACK PAIN: 1

## 2019-09-04 ASSESSMENT — LIFESTYLE VARIABLES: SUBSTANCE_ABUSE: 1

## 2019-09-04 ASSESSMENT — GAIT ASSESSMENTS: GAIT LEVEL OF ASSIST: REFUSED

## 2019-09-04 ASSESSMENT — ACTIVITIES OF DAILY LIVING (ADL): TOILETING: INDEPENDENT

## 2019-09-04 NOTE — PROGRESS NOTES
"Pharmacy Kinetics 73 y.o. female on vancomycin day # 3 2019    Currently on Vancomycin 1000mg q12hr ()     Indication for Treatment: Abscess     Pertinent history per medical record: Admitted on 2019 for an abscess. She presented with left hip pain worsening over the past week associated with brown colored drainage. It is also noted she has cellulitis to her left lower extremity. She has cultures from 19 from her lower extremity cellulitis that grew MRSA.  Of note, she is on Cellcept for bullous pemphgus and may not present with typical infectious markers.     Other antibiotics: Unasyn 3g x 1 in ED     Allergies: Bactrim [sulfamethoxazole-trimethoprim] and Meropenem      List concerns for renal function: age. Her serum creatinine is at baseline with no changes in medications or body habitus that would effect renal function acutely.      Pertinent cultures to date:   19: left leg wound- MRSA (Vanco LICO = 1)  19: Wound: MRSA with sensitivities pending  19: blood cultures x 2: NGTD    Recent Labs     19  1244 19  0146   WBC 7.4 6.2   NEUTSPOLYS 64.60  --      Recent Labs     19  1244 19  0146   BUN 7* 9   CREATININE 0.42* 0.58   ALBUMIN 2.5*  --      Recent Labs     19  0832   VANCOTROUGH 19.2       Intake/Output Summary (Last 24 hours) at 2019 1103  Last data filed at 2019 0000  Gross per 24 hour   Intake 200 ml   Output 250 ml   Net -50 ml      /80   Pulse 81   Temp 36.6 °C (97.8 °F) (Temporal)   Resp 17   Ht 1.702 m (5' 7.01\")   Wt 62.8 kg (138 lb 7.2 oz)   SpO2 98%  Temp (24hrs), Av.7 °C (98.1 °F), Min:36.2 °C (97.2 °F), Max:37.4 °C (99.3 °F)      A/P   1. Vancomycin dose change: begin 800mg q12hr ()  2. Next vancomycin level: ~3 days or sooner if clinically indicated  3. Goal trough: 12-16 mcg/mL  4. Comments: Preliminary cultures resulting with MRSA and sensitivities pending. ID following. Likely able to transition to PO " antibiotics if susceptible options.  Will decrease vancomycin dose in mean time and redraw trough in 2-3 days.    Rodolfo Cuevas, TraceeD, BCPS

## 2019-09-04 NOTE — THERAPY
"Occupational Therapy Evaluation completed.   Functional Status:  Pt presents to skilled OT services following c/o L hip pain, negative for acute fx, abscess, h/o bullous penphigoid, PVD with fem-pop, hyperlipidemia, cellulitis and lower extremity ulcers. Pt performed bed mobility with supervision, STS eob with min a, eob->BSC with min a of 2 and return back with max a, toileting task completed with max a, in sitting significant c/o neck pain, noted muscle spasm of traps. Pt demonstrates poor activity tolerance, pain, impaired balance, decreased cervical ROM and decreased strength which all impact pt negatively to complete her ADLs/IADLs self. Pt will benefit from acute skilled OT services while in house and post acute recommended prior to d/c home.   Plan of Care: Will benefit from Occupational Therapy 3 times per week  Discharge Recommendations:  Equipment: Will Continue to Assess for Equipment Needs. Post-acute therapy Recommend post-acute placement for additional occupational therapy services prior to discharge home. Patient can tolerate post-acute therapies at a 5x/week frequency.      See \"Rehab Therapy-Acute\" Patient Summary Report for complete documentation.    "

## 2019-09-04 NOTE — THERAPY
"Physical Therapy Evaluation completed.   Bed Mobility:  Supine to Sit: Stand by Assist(increased time, raised HOB)  Transfers: Sit to Stand: Minimal Assist(x 2 reps with HHA)  Gait: Level Of Assist: declined 2/2 neck pain; could take a few steps EOB    Plan of Care: Will benefit from Physical Therapy 3 times per week  Discharge Recommendations: Equipment: Will Continue to Assess for Equipment Needs.     Pt presents with impaired activity tolerance, dynamic balance and strength associated with c/c of left hip pain. Session today limited by severe neck pain, does report several head strikes with noted significant forward head posture, improves with manual assist of head positioning, no guarding with palpation, resolved in supine. Currentlywould recommend placement, however will follow.       See \"Rehab Therapy-Acute\" Patient Summary Report for complete documentation.     "

## 2019-09-04 NOTE — PROGRESS NOTES
Assumed care from Jared SWANN. Received bedside report.  Updated patient on daily plan of care on white board. Patient denies any additional needs at this time.  Patient belongings and call light with in reach.  Vitals stable. Bed alarm on and working appropriately.  Will continue to monitor.

## 2019-09-04 NOTE — PROGRESS NOTES
"Hospital Medicine Daily Progress Note    Date of Service  9/4/2019    Chief Complaint  73 y.o. female admitted 9/2/2019 with left hip pain    Hospital Course    73 y.o. female who presented 9/2/2019 with Hip Pain (left hip)  History of bullous penphigoid, PVD with fem-pop, hyperlipidemia, cellulitides and lower extremity ulcers with history of immunocompromised status due to use of immunomodulators and steroids for her bullous pemphigioids.  She has multiple chronic wounds one of which is on an incision where she had  hip surgery. She has a history of MRSA and MSSA infection in her LLE. Recent was MSSA infection requiring a wound vac and completed course of antibiotics last April 2019 and there is a wound culture that grew MRSA just August 2019 that is resistant to clindamycin.  Cognitive deficits has dementia, poor short term memory. Therefore poor historian., Family at bedside.  Lives with roommate. Roommate not a caretaker as she is arthritic herself.  Presents with multiple falls. She \"does not know\" how she falls. She also complained of Hip Pain (left hip)  She denies smoking or alcohol but on family member spoke to me outside the room and tells me that her roommate reported to him that she has been drinking alcohol. He does not know how much alcohol and when her last drink was.  Family members expressed that she may need advanced level of care.  She also has been complaining of low back pain as well.      Interval Problem Update  patient seen and examined this morning continues to complain of left hip pain which is not a 10 in severity.  Otherwise denies any chest pain, shortness of breath, nausea or vomiting.  She is up in bed eating her breakfast.  PT OT has been consulted as has been clear.  On further review of her charting appears the patient has history of alcohol abuse however she admits to only drinking 1 once in a while.  Last drink was Sunday    Vitals of been noted in her blood pressure is elevated " at 161/81  Labs have been reviewed by me personally hemoglobin is stable at 11.8  Sodium is 131    9/4: seen and examined this morning, doing ok , still with 8/10 hip pain, specially with mobility  Vitals noted  Morning labs ordered and pending  Continue IV abx, awaiting final cultures  Consultants/Specialty    ID  Code Status  Full code    Disposition  Physical therapy occupational therapy evaluation pending disposition told and I will transfer to medical    Review of Systems  Review of Systems   Constitutional: Positive for malaise/fatigue. Negative for diaphoresis and fever.   HENT: Negative for congestion, hearing loss and nosebleeds.    Eyes: Negative for blurred vision, photophobia and discharge.   Respiratory: Negative for cough, hemoptysis and shortness of breath.    Cardiovascular: Negative for chest pain, palpitations and claudication.   Gastrointestinal: Negative for diarrhea and vomiting.   Genitourinary: Negative for dysuria, hematuria and urgency.   Musculoskeletal: Positive for back pain, falls, joint pain and myalgias.   Skin: Negative for itching and rash.   Neurological: Positive for weakness. Negative for dizziness, tingling, sensory change and headaches.   Psychiatric/Behavioral: Positive for substance abuse. Negative for depression and suicidal ideas. The patient is nervous/anxious.         Physical Exam  Temp:  [36.2 °C (97.2 °F)-37.4 °C (99.3 °F)] 36.6 °C (97.8 °F)  Pulse:  [81-91] 81  Resp:  [17-20] 17  BP: (122-162)/(80-88) 140/80  SpO2:  [96 %-98 %] 98 %    Physical Exam   Constitutional: She is oriented to person, place, and time. She appears well-developed and well-nourished.   HENT:   Head: Normocephalic and atraumatic.   Mouth/Throat: Oropharynx is clear and moist. No oropharyngeal exudate.   Eyes: Pupils are equal, round, and reactive to light. Conjunctivae and EOM are normal. No scleral icterus.   Neck: Normal range of motion. Neck supple. No JVD present. No thyromegaly present.    Cardiovascular: Normal rate, regular rhythm and intact distal pulses.   No murmur heard.  Pulmonary/Chest: Effort normal and breath sounds normal. No respiratory distress. She has no wheezes.   Abdominal: Soft. Bowel sounds are normal. She exhibits no distension. There is no tenderness. There is no rebound.   Musculoskeletal: Normal range of motion. She exhibits edema, tenderness and deformity.   Lymphadenopathy:     She has no cervical adenopathy.   Neurological: She is alert and oriented to person, place, and time. She exhibits normal muscle tone.   Skin: Skin is warm and dry. No rash noted. No erythema.   Psychiatric: She has a normal mood and affect. Her behavior is normal.       Fluids    Intake/Output Summary (Last 24 hours) at 9/4/2019 0853  Last data filed at 9/4/2019 0000  Gross per 24 hour   Intake 200 ml   Output 250 ml   Net -50 ml       Laboratory  Recent Labs     09/02/19  1244 09/03/19  0146   WBC 7.4 6.2   RBC 3.57* 3.71*   HEMOGLOBIN 11.2* 11.8*   HEMATOCRIT 34.8* 36.0*   MCV 97.5 97.0   MCH 31.4 31.8   MCHC 32.2* 32.8*   RDW 50.6* 49.9   PLATELETCT 219 167   MPV 9.1 9.4     Recent Labs     09/02/19  1244 09/03/19  0146   SODIUM 133* 131*   POTASSIUM 4.3 3.8   CHLORIDE 103 94*   CO2 20 27   GLUCOSE 95 108*   BUN 7* 9   CREATININE 0.42* 0.58   CALCIUM 7.1* 8.3*     Recent Labs     09/02/19  1244   INR 1.19*               Imaging  MR-LUMBAR SPINE-W/O   Final Result      1.  Multilevel degenerative changes detailed.   2.  Mild to moderate spinal stenosis L2-L3, mild L1-L2 and L3-L4.         DX-LUMBAR SPINE-2 OR 3 VIEWS   Final Result      1.  There is moderate lumbar spondylosis with degenerative disease most prominent at the L3-4 level.   2.  There is no acute fracture or malalignment.      CT-HIP WITH LEFT   Final Result      1.  There is a questionable small subcutaneous fluid collection just deep to the skin along the left lateral hip at the level the greater trochanter which could be a small  subcutaneous abscess.   2.  There is a left hip arthroplasty with postoperative changes as noted above causing moderate artifact to the joint space. There is no gross effusion.           Assessment/Plan  * Skin abscess over hip, inability to walk  Assessment & Plan  Recent MRSA positive wound     Unasyn and vancomycin to be continued till final cultures are resulted  Follow-up wound culture  Continue IV fluids.   Wound team to see  Ordered XR lumbar. That showed no fractures but L3-4 disease. Ordered lumbar MRI pending   PT/OT  She will benefit from routine visits to an ID specialist as she is on Cellcept and has recurrent cellulitides and abscesses.  -I will consult ID at this point    Iron Deficiency anemia:  Recheck iron low as well, however will start on oral supplementation at this time    Thigh pain  9/10 uncontrolled, will add PRN pain meds, with caution  - monitor for worsening swelling and pain      pemphigus- (present on admission)  Assessment & Plan  Continue the Cellcept.  Need to follow up with her rheumatologist    History of hip fracture- (present on admission)  Assessment & Plan  Noted  No fracture on CT    Peripheral vascular disease (HCC)- (present on admission)  Assessment & Plan  History of   S/p fem pop  Ordered ABIs as she continues to have poor healing of wounds.  Had TOMI in 4/19 which were normal     Essential hypertension- (present on admission)  Assessment & Plan  Uncontrolled.  Continue CB to BB    Alcohol use  Assessment & Plan  Does not appear to be withdrawing. States last drink as 4 days ago, drink wine occasionally  She seems to be guarded or in denial. Encourage cessation as this will only exacerbate her falls.  Ordered PO thiamine.  Monitor for withdrawal.    History of MRSA infection  Assessment & Plan  Noted  Ordered contact precautions.    Adult failure to thrive  Assessment & Plan  PT/OT  May need 24/7 level of care         VTE prophylaxis: lovenox    Discussed plan of care with  patient, nursing and ID in detail today.continue IV abx till final cultures have resulted    Morning labs ordered

## 2019-09-05 ENCOUNTER — APPOINTMENT (OUTPATIENT)
Dept: RADIOLOGY | Facility: MEDICAL CENTER | Age: 74
DRG: 603 | End: 2019-09-05
Attending: HOSPITALIST
Payer: MEDICARE

## 2019-09-05 LAB
ANION GAP SERPL CALC-SCNC: 8 MMOL/L (ref 0–11.9)
BACTERIA WND AEROBE CULT: ABNORMAL
BACTERIA WND AEROBE CULT: ABNORMAL
BUN SERPL-MCNC: 6 MG/DL (ref 8–22)
CALCIUM SERPL-MCNC: 8.4 MG/DL (ref 8.5–10.5)
CHLORIDE SERPL-SCNC: 96 MMOL/L (ref 96–112)
CO2 SERPL-SCNC: 28 MMOL/L (ref 20–33)
CREAT SERPL-MCNC: 0.5 MG/DL (ref 0.5–1.4)
GLUCOSE SERPL-MCNC: 132 MG/DL (ref 65–99)
GRAM STN SPEC: ABNORMAL
MAGNESIUM SERPL-MCNC: 1.3 MG/DL (ref 1.5–2.5)
POTASSIUM SERPL-SCNC: 2.8 MMOL/L (ref 3.6–5.5)
SIGNIFICANT IND 70042: ABNORMAL
SITE SITE: ABNORMAL
SODIUM SERPL-SCNC: 132 MMOL/L (ref 135–145)
SOURCE SOURCE: ABNORMAL

## 2019-09-05 PROCEDURE — 700105 HCHG RX REV CODE 258: Performed by: HOSPITALIST

## 2019-09-05 PROCEDURE — 700111 HCHG RX REV CODE 636 W/ 250 OVERRIDE (IP): Performed by: HOSPITALIST

## 2019-09-05 PROCEDURE — 99233 SBSQ HOSP IP/OBS HIGH 50: CPT | Performed by: HOSPITALIST

## 2019-09-05 PROCEDURE — 80048 BASIC METABOLIC PNL TOTAL CA: CPT

## 2019-09-05 PROCEDURE — 83735 ASSAY OF MAGNESIUM: CPT

## 2019-09-05 PROCEDURE — A9270 NON-COVERED ITEM OR SERVICE: HCPCS | Performed by: HOSPITALIST

## 2019-09-05 PROCEDURE — 700102 HCHG RX REV CODE 250 W/ 637 OVERRIDE(OP): Performed by: HOSPITALIST

## 2019-09-05 PROCEDURE — 700102 HCHG RX REV CODE 250 W/ 637 OVERRIDE(OP): Performed by: INTERNAL MEDICINE

## 2019-09-05 PROCEDURE — 700111 HCHG RX REV CODE 636 W/ 250 OVERRIDE (IP): Performed by: INTERNAL MEDICINE

## 2019-09-05 PROCEDURE — A9270 NON-COVERED ITEM OR SERVICE: HCPCS | Performed by: INTERNAL MEDICINE

## 2019-09-05 PROCEDURE — 36415 COLL VENOUS BLD VENIPUNCTURE: CPT

## 2019-09-05 PROCEDURE — 770001 HCHG ROOM/CARE - MED/SURG/GYN PRIV*

## 2019-09-05 RX ORDER — POTASSIUM CHLORIDE 20 MEQ/1
40 TABLET, EXTENDED RELEASE ORAL 3 TIMES DAILY
Status: COMPLETED | OUTPATIENT
Start: 2019-09-05 | End: 2019-09-07

## 2019-09-05 RX ORDER — POTASSIUM CHLORIDE 20 MEQ/1
40 TABLET, EXTENDED RELEASE ORAL 2 TIMES DAILY
Status: DISCONTINUED | OUTPATIENT
Start: 2019-09-05 | End: 2019-09-05

## 2019-09-05 RX ADMIN — Medication 100 MG: at 06:11

## 2019-09-05 RX ADMIN — SENNOSIDES, DOCUSATE SODIUM 2 TABLET: 50; 8.6 TABLET, FILM COATED ORAL at 17:29

## 2019-09-05 RX ADMIN — AMLODIPINE BESYLATE 10 MG: 10 TABLET ORAL at 06:10

## 2019-09-05 RX ADMIN — SERTRALINE HYDROCHLORIDE 100 MG: 100 TABLET ORAL at 06:08

## 2019-09-05 RX ADMIN — OXYCODONE HYDROCHLORIDE AND ACETAMINOPHEN 500 MG: 500 TABLET ORAL at 06:10

## 2019-09-05 RX ADMIN — ACETAMINOPHEN 650 MG: 325 TABLET, FILM COATED ORAL at 13:15

## 2019-09-05 RX ADMIN — POTASSIUM CHLORIDE 40 MEQ: 20 TABLET, EXTENDED RELEASE ORAL at 12:58

## 2019-09-05 RX ADMIN — METOPROLOL SUCCINATE 100 MG: 50 TABLET, FILM COATED, EXTENDED RELEASE ORAL at 06:05

## 2019-09-05 RX ADMIN — OXYCODONE HYDROCHLORIDE 5 MG: 5 TABLET ORAL at 22:36

## 2019-09-05 RX ADMIN — POTASSIUM CHLORIDE 40 MEQ: 20 TABLET, EXTENDED RELEASE ORAL at 17:30

## 2019-09-05 RX ADMIN — MYCOPHENOLATE MOFETIL 500 MG: 250 CAPSULE ORAL at 06:12

## 2019-09-05 RX ADMIN — SENNOSIDES, DOCUSATE SODIUM 2 TABLET: 50; 8.6 TABLET, FILM COATED ORAL at 06:09

## 2019-09-05 RX ADMIN — THERA TABS 1 TABLET: TAB at 17:30

## 2019-09-05 RX ADMIN — MYCOPHENOLATE MOFETIL 500 MG: 250 CAPSULE ORAL at 18:36

## 2019-09-05 RX ADMIN — VANCOMYCIN HYDROCHLORIDE 800 MG: 500 INJECTION, POWDER, LYOPHILIZED, FOR SOLUTION INTRAVENOUS at 22:36

## 2019-09-05 RX ADMIN — FERROUS SULFATE TAB 325 MG (65 MG ELEMENTAL FE) 325 MG: 325 (65 FE) TAB at 09:24

## 2019-09-05 RX ADMIN — Medication 1 CAPSULE: at 09:23

## 2019-09-05 RX ADMIN — ENOXAPARIN SODIUM 40 MG: 100 INJECTION SUBCUTANEOUS at 06:11

## 2019-09-05 RX ADMIN — VANCOMYCIN HYDROCHLORIDE 800 MG: 500 INJECTION, POWDER, LYOPHILIZED, FOR SOLUTION INTRAVENOUS at 09:24

## 2019-09-05 ASSESSMENT — ENCOUNTER SYMPTOMS
PALPITATIONS: 0
VOMITING: 0
CHILLS: 0
HEARTBURN: 0
MYALGIAS: 1
TINGLING: 0
SENSORY CHANGE: 0
FEVER: 0
NAUSEA: 0
TREMORS: 0

## 2019-09-05 NOTE — PROGRESS NOTES
Infectious Disease Progress Note    Author: Zuri More M.D. Date & Time of service: 2019  5:40 PM    Chief Complaint:  Thigh abscess    Interval History:    Review of Systems:  Review of Systems   Constitutional: Negative for chills and fever.   Respiratory: Positive for shortness of breath. Negative for cough.    Gastrointestinal: Negative for abdominal pain, diarrhea, nausea and vomiting.   Musculoskeletal: Positive for myalgias and neck pain. Negative for joint pain.       Hemodynamics:  Temp (24hrs), Av.4 °C (97.6 °F), Min:36.2 °C (97.2 °F), Max:36.6 °C (97.9 °F)  Temperature: 36.4 °C (97.5 °F)  Pulse  Av.3  Min: 71  Max: 110   Blood Pressure : 151/69       Physical Exam:  Physical Exam   Constitutional: She is oriented to person, place, and time. She appears well-developed and well-nourished.   HENT:   Head: Normocephalic and atraumatic.   Eyes: Pupils are equal, round, and reactive to light. Conjunctivae and EOM are normal.   Cardiovascular: Normal rate, regular rhythm and normal heart sounds.   Pulmonary/Chest: Effort normal and breath sounds normal.   Abdominal: Soft. Bowel sounds are normal. She exhibits no distension. There is no tenderness. There is no rebound and no guarding.   Musculoskeletal: She exhibits edema and tenderness.   Left hip lesion with significant purulent drainage, tenderness palpation, no significant erythema or warmth   Neurological: She is alert and oriented to person, place, and time.   Skin: Skin is warm and dry.   Psychiatric: She has a normal mood and affect. Her behavior is normal.       Meds:    Current Facility-Administered Medications:   •  vancomycin  •  amLODIPine  •  ferrous sulfate  •  ascorbic acid  •  metoprolol SR  •  multivitamin  •  mycophenolate  •  sertraline  •  senna-docusate **AND** polyethylene glycol/lytes **AND** magnesium hydroxide **AND** bisacodyl  •  NS  •  enoxaparin  •  acetaminophen  •  ondansetron  •  ondansetron  •  LR  •  MD  Alert...Vancomycin per Pharmacy  •  lactobacillus rhamnosus  •  oxyCODONE immediate-release  •  thiamine  •  morphine injection    Labs:  Recent Labs     09/02/19  1244 09/03/19  0146 09/04/19  1247   WBC 7.4 6.2 7.5   RBC 3.57* 3.71* 3.83*   HEMOGLOBIN 11.2* 11.8* 11.9*   HEMATOCRIT 34.8* 36.0* 38.2   MCV 97.5 97.0 99.7*   MCH 31.4 31.8 31.1   RDW 50.6* 49.9 49.6   PLATELETCT 219 167 173   MPV 9.1 9.4 9.8   NEUTSPOLYS 64.60  --  79.20*   LYMPHOCYTES 23.70  --  12.00*   MONOCYTES 10.50  --  7.20   EOSINOPHILS 0.00  --  0.30   BASOPHILS 0.70  --  0.80     Recent Labs     09/02/19  1244 09/03/19  0146 09/04/19  1247   SODIUM 133* 131* 130*   POTASSIUM 4.3 3.8 3.2*   CHLORIDE 103 94* 96   CO2 20 27 24   GLUCOSE 95 108* 139*   BUN 7* 9 8     Recent Labs     09/02/19  1244 09/03/19  0146 09/04/19  1247   ALBUMIN 2.5*  --   --    TBILIRUBIN 0.6  --   --    ALKPHOSPHAT 199*  --   --    TOTPROTEIN 6.4  --   --    ALTSGPT 50  --   --    ASTSGOT 114*  --   --    CREATININE 0.42* 0.58 0.48*       Imaging:  Ct-hip With Left    Result Date: 9/2/2019 9/2/2019 1:41 PM HISTORY/REASON FOR EXAM:  Hip replaced, infection suspected TECHNIQUE/EXAM DESCRIPTION AND NUMBER OF VIEWS: Helical CT scan of the pelvis and left hip is performed following the IV administration of 100 mL of Omnipaque 350. Coronal and sagittal reconstructions are provided. COMPARISON: None FINDINGS: There are postoperative changes of a left hip arthroplasty which causes some degree of artifact. There are areas of heterotopic ossification surrounding the surgical site. There is no acute fracture. There is no dislocation. There are mild degenerative changes in the lower lumbar spine and both SI joints. There is some atrophy of the left hip musculature with some soft tissue density surrounding portions of the left hip arthroplasty but no definite joint effusion is evident on this exam. There is subcutaneous edema or skin thickening in the proximal left lateral thigh.  There is a questionable small superficial fluid collection just deep to the skin along the posterior aspect of the left hip at the level of the greater trochanter measuring 24 x 9 mm. Soft tissues demonstrate atherosclerosis of the aorta and pelvic vasculature. There is colonic diverticulosis without diverticulitis. The bladder is distended.     1.  There is a questionable small subcutaneous fluid collection just deep to the skin along the left lateral hip at the level the greater trochanter which could be a small subcutaneous abscess. 2.  There is a left hip arthroplasty with postoperative changes as noted above causing moderate artifact to the joint space. There is no gross effusion.    Dx-lumbar Spine-2 Or 3 Views    Result Date: 9/2/2019 9/2/2019 6:28 PM HISTORY/REASON FOR EXAM:  Low back pain for 7 days. Left hip pain. TECHNIQUE/ EXAM DESCRIPTION AND NUMBER OF VIEWS:  3 views of the lumbar spine. COMPARISON: None. FINDINGS: The alignment of the lumbar spine is within normal limits. Vertebral body heights are maintained. There is degenerative endplate spurring at all levels throughout the lumbar spine. There is disc space narrowing most prominent at the L3-4 level. There is bilateral facet arthropathy in the mid and lower lumbar spine. SI joints are symmetric. Bony pelvis is intact. There is atherosclerosis of the aorta.     1.  There is moderate lumbar spondylosis with degenerative disease most prominent at the L3-4 level. 2.  There is no acute fracture or malalignment.    Mr-lumbar Spine-w/o    Result Date: 9/3/2019  9/3/2019 11:07 AM HISTORY/REASON FOR EXAM:  inability to walk, back pain, findings of L3-4 DJD TECHNIQUE/EXAM DESCRIPTION: MRI of the lumbar spine without contrast. The study was performed on a [email or web address] 1.5 Dayami MRI scanner. T1 sagittal, T2 fast spin-echo sagittal, and T2 axial images were obtained of the lumbar spine. COMPARISON:  Lumbar radiographs 9/2/2019 FINDINGS: 5 lumbar type vertebral  bodies are counted. The conus medullaris terminates at T12-L1 and is normal in signal. There is a 8 mm T2 hyperintense lesion within the T12 vertebral body most likely an intraosseous hemangioma. There is no compression fracture. T11-T12: Mild diffuse disc bulge and osteophyte. No significant spinal or foraminal stenosis. T12-L1:  Minor diffuse disc bulge, osteophyte. No significant spinal or foraminal stenosis. L1-L2:  Mild diffuse disc bulge, osteophyte and small right paracentral disc protrusion. Mild spinal stenosis and left foraminal stenosis. L2-L3:  Diffuse disc bulge and osteophyte. Ligamentum flavum thickening and mild facet degeneration. Mild to moderate spinal stenosis. Foramina are adequate. L3-L4:  Disc narrowing, reactive marrow endplate changes/edema and mild disc osteophyte. Mild facet degeneration. Mild spinal stenosis and left foraminal stenosis. Right foramen is adequate. L4-L5:  Diffuse mild disc bulge and osteophyte. No significant spinal or foraminal stenosis. L5-S1:  Canal and foramina are patent. There is some lower posterior paraspinal muscle atrophy and left psoas atrophy.     1.  Multilevel degenerative changes detailed. 2.  Mild to moderate spinal stenosis L2-L3, mild L1-L2 and L3-L4.       Micro:  Results     Procedure Component Value Units Date/Time    CULTURE WOUND W/ GRAM STAIN [[id number]]  (Abnormal) Collected:  09/02/19 2350    Order Status:  Completed Specimen:  Wound from Right Hip Updated:  09/04/19 1059     Significant Indicator POS     Source WND     Site RIGHT HIP     Culture Result -     Gram Stain Result Moderate WBCs.  Few Gram positive cocci.       Culture Result Staphylococcus aureus  Light growth  Oxacillin resistant via screening method      Narrative:       CALL  Nava  183 tel. [phone],  CALLED  183 tel. [phone] 09/04/2019, 10:59, RB PERF. RESULTS CALLED  TO:[id number]    Blood Culture [[id number]] Collected:  09/02/19 2138    Order Status:  Completed Specimen:  Blood  "from Peripheral Updated:  09/03/19 0835     Significant Indicator NEG     Source BLD     Site PERIPHERAL     Culture Result No Growth  Note: Blood cultures are incubated for 5 days and  are monitored continuously.Positive blood cultures  are called to the RN and reported as soon as  they are identified.      Narrative:       From different peripheral sites, if not done within the last  24 hours (Per Hospital Policy: Only change specimen source to  \"Line\" if specified by physician order)  Left AC    Blood Culture [[id number]] Collected:  09/02/19 2138    Order Status:  Completed Specimen:  Blood from Peripheral Updated:  09/03/19 0835     Significant Indicator NEG     Source BLD     Site PERIPHERAL     Culture Result No Growth  Note: Blood cultures are incubated for 5 days and  are monitored continuously.Positive blood cultures  are called to the RN and reported as soon as  they are identified.      Narrative:       From different peripheral sites, if not done within the last  24 hours (Per Hospital Policy: Only change specimen source to  \"Line\" if specified by physician order)  Right Hand    GRAM STAIN [[id number]] Collected:  09/02/19 2350    Order Status:  Completed Specimen:  Wound Updated:  09/03/19 0526     Significant Indicator .     Source WND     Site RIGHT HIP     Gram Stain Result Moderate WBCs.  Few Gram positive cocci.      CULTURE WOUND W/ GRAM STAIN [[id number]] Collected:  09/02/19 2350    Order Status:  Sent Specimen:  Wound from Left Leg     Urinalysis [[id number]]     Order Status:  Canceled Specimen:  Urine     URINALYSIS,CULTURE IF INDICATED [[id number]]  (Abnormal) Collected:  09/02/19 1600    Order Status:  Completed Specimen:  Urine, Straight Cath Updated:  09/02/19 1615     Color Yellow     Character Clear     Specific Gravity 1.016     Ph 7.5     Glucose Negative mg/dL      Ketones Negative mg/dL      Protein Negative mg/dL      Bilirubin Negative     Urobilinogen, Urine 0.2     Nitrite Negative "     Leukocyte Esterase Negative     Occult Blood Small     Micro Urine Req Microscopic          Assessment:  Active Hospital Problems    Diagnosis   • *Skin abscess over hip, inability to walk [L02.91]   • Bullous pemphigus [L10.9]   • History of hip fracture [Z87.81]   • Peripheral vascular disease (HCC) [I73.9]   • Essential hypertension [I10]   • Adult failure to thrive [R62.7]   • History of MRSA infection [Z86.14]   • Alcohol use [Z78.9]     Interval 24 hour assessment:    AF, O2 2 L NC- stable     Labs reviewed  Micro reviewed    Pt continued on vancomycin alone and Unasyn stopped.  She is able to get up and walk without pain in the left hip.  She still has significant purulent drainage from abscess.       ASSESSMENT/PLAN:      Nadege Mae is a 73 y.o.  admitted 9/2/2019. Pt has a past medical history of dementia, peripheral vascular disease with chronic lower extremity ulcers, she is status post femoral-popliteal bypass.  She also has a history of bullous pemphigus and has been treated with prednisone.  She has last seen by ID in April 2019 for left lower extremity cellulitis.  Wound cultures that time grew MSSA.  Plan at that time was to continue Augmentin with stop date of 4/29/2019. Most recent cultures from 8/23 of the left leg with MRSA.  She came into the ED complaining of multiple falls as well as pain in left hip.  There is a question whether alcohol use was involved.  Patient is poor historian.       Hospital Course:   She is afebrile, no leukocytosis.  CT of the hip on 9/2 with left lateral hip with a small abscess.  There is left hip arthroplasty postop changes, no gross effusion.  In the ER the potential abscess was open with bedside I&D and she was given clindamycin.  She underwent MR spine on 9/3 with finding of multilevel degenerative changes and spinal stenosis at L1-L4.  Blood cultures on 9/2 are no growth.  She was  started on Unasyn and vancomycin.       Left hip abscess  -I&D in  the ER on 9/2-cultures with GPC's-MRSA     Left hip prosthetic joint  -Does not appear to be involved, she was able to ambulate today without pain in the left hip     Peripheral vascular disease  Chronic nonhealing lower extremity ulcers     Bullous pemphigus     Immunosuppression, secondary treatment for bullous pemphigus  -Per chart review she is on CellCept 500 mg twice daily-patient states she has not actually been taking this     History of cellulitis  - Prior wound cultures have grown MRSA, MSSA, Enterococcus faecalis and Pseudomonas     Depression, on sertraline     --- Ccontinue vancomycin for now while awaiting culture results, avoiding linezolid for now due to treatment with sertraline but I believe she has tolerated the combination of the past   --- Monitor for new pain with movement of the left hip or other signs or symptoms would suggest the joint is infected.    --- Continue wound care for lower extremity ulcers related to her bullous pemphigus as well as for the left hip           Plan of care discussed with nurse. Will continue to follow.

## 2019-09-05 NOTE — PROGRESS NOTES
Assumed care of patient at bedside report from NOC RN. Updated on POC. Patient currently A & O x 4; on 1-2 L O2 nasal cannula, for comfort; 2 person assist; without complaints of acute pain. Call light within reach. Whiteboard updated. Fall precautions in place. Bed locked and in lowest position. All questions answered. No other needs indicated at this time.

## 2019-09-05 NOTE — CARE PLAN
Problem: Nutritional:  Goal: Achieve adequate nutritional intake  Description  Patient will consume 50% of meals   Outcome: PROGRESSING AS EXPECTED    Visit with pt at bedside.  Pt likes supplements. PO intake 25-50% + supplement BID. RD will continue to monitor.

## 2019-09-05 NOTE — CARE PLAN
Problem: Infection  Goal: Will remain free from infection  Outcome: PROGRESSING AS EXPECTED     Problem: Bowel/Gastric:  Goal: Normal bowel function is maintained or improved  Outcome: PROGRESSING AS EXPECTED

## 2019-09-05 NOTE — CARE PLAN
Goal: Will remain free from falls  Outcome: PROGRESSING AS EXPECTED   Fall precautions in place. Bed locked and in lowest position.     Problem: Venous Thromboembolism (VTW)/Deep Vein Thrombosis (DVT) Prevention:  Goal: Patient will participate in Venous Thrombosis (VTE)/Deep Vein Thrombosis (DVT)Prevention Measures  Outcome: PROGRESSING AS EXPECTED  SCD's in use.

## 2019-09-05 NOTE — PROGRESS NOTES
"Hospital Medicine Daily Progress Note    Date of Service  9/5/2019    Chief Complaint  73 y.o. female admitted 9/2/2019 with left hip pain    Hospital Course    73 y.o. female who presented 9/2/2019 with Hip Pain (left hip)  History of bullous penphigoid, PVD with fem-pop, hyperlipidemia, cellulitides and lower extremity ulcers with history of immunocompromised status due to use of immunomodulators and steroids for her bullous pemphigioids.  She has multiple chronic wounds one of which is on an incision where she had  hip surgery. She has a history of MRSA and MSSA infection in her LLE. Recent was MSSA infection requiring a wound vac and completed course of antibiotics last April 2019 and there is a wound culture that grew MRSA just August 2019 that is resistant to clindamycin.  Lives with roommate. Roommate not a caretaker as she is arthritic herself.  Presents with multiple falls. She \"does not know\" how she falls. She also complained of Hip Pain (left hip)      Interval Problem Update  Pt seen and examined this morning, improved pain in left hip, no events overnight, no reported chest pain or SOB. K 3.2. Will replace. Continue IV abx, sensitivities returned from culture. Awaiting ID. Has been losing PIVs, will get midline if needs IV still.     Consultants/Specialty  ID    Code Status  Full code    Disposition  PT OT recommend post acute care  Patient is currently medical  Awaiting ABX plan before discharge planning    Review of Systems  Review of Systems   Constitutional: Negative for chills and fever.   Cardiovascular: Negative for chest pain and palpitations.   Gastrointestinal: Negative for heartburn, nausea and vomiting.   Musculoskeletal: Positive for myalgias.   Skin: Negative for itching and rash.   Neurological: Negative for tingling, tremors and sensory change.   All other systems reviewed and are negative.       Physical Exam  Temp:  [36.3 °C (97.4 °F)-37.1 °C (98.7 °F)] 36.3 °C (97.4 °F)  Pulse:  " [] 83  Resp:  [16-18] 18  BP: (127-189)/(69-93) 162/84  SpO2:  [95 %-99 %] 98 %    Physical Exam   Constitutional: She is oriented to person, place, and time. She appears well-developed and well-nourished.   HENT:   Head: Normocephalic and atraumatic.   Mouth/Throat: Oropharynx is clear and moist. No oropharyngeal exudate.   Eyes: Pupils are equal, round, and reactive to light. EOM are normal. No scleral icterus.   Neck: Normal range of motion. Neck supple. No JVD present.   Cardiovascular: Normal rate, regular rhythm and intact distal pulses.   No murmur heard.  Pulmonary/Chest: Effort normal and breath sounds normal. No respiratory distress. She has no wheezes. She has no rales.   Abdominal: Soft. Bowel sounds are normal. She exhibits no distension. There is no tenderness. There is no rebound.   Musculoskeletal: Normal range of motion. She exhibits edema, tenderness and deformity.   Lymphadenopathy:     She has no cervical adenopathy.   Neurological: She is alert and oriented to person, place, and time. She exhibits normal muscle tone.   Skin: Skin is warm and dry. No rash noted. There is erythema.   Left hip bandage with central serosang oozing   Psychiatric: She has a normal mood and affect. Her behavior is normal.     Fluids    Intake/Output Summary (Last 24 hours) at 9/5/2019 0908  Last data filed at 9/4/2019 1400  Gross per 24 hour   Intake 240 ml   Output --   Net 240 ml       Laboratory  Recent Labs     09/02/19  1244 09/03/19  0146 09/04/19  1247   WBC 7.4 6.2 7.5   RBC 3.57* 3.71* 3.83*   HEMOGLOBIN 11.2* 11.8* 11.9*   HEMATOCRIT 34.8* 36.0* 38.2   MCV 97.5 97.0 99.7*   MCH 31.4 31.8 31.1   MCHC 32.2* 32.8* 31.2*   RDW 50.6* 49.9 49.6   PLATELETCT 219 167 173   MPV 9.1 9.4 9.8     Recent Labs     09/02/19  1244 09/03/19  0146 09/04/19  1247   SODIUM 133* 131* 130*   POTASSIUM 4.3 3.8 3.2*   CHLORIDE 103 94* 96   CO2 20 27 24   GLUCOSE 95 108* 139*   BUN 7* 9 8   CREATININE 0.42* 0.58 0.48*   CALCIUM  7.1* 8.3* 8.6     Recent Labs     09/02/19  1244   INR 1.19*               Imaging  MR-LUMBAR SPINE-W/O   Final Result      1.  Multilevel degenerative changes detailed.   2.  Mild to moderate spinal stenosis L2-L3, mild L1-L2 and L3-L4.         DX-LUMBAR SPINE-2 OR 3 VIEWS   Final Result      1.  There is moderate lumbar spondylosis with degenerative disease most prominent at the L3-4 level.   2.  There is no acute fracture or malalignment.      CT-HIP WITH LEFT   Final Result      1.  There is a questionable small subcutaneous fluid collection just deep to the skin along the left lateral hip at the level the greater trochanter which could be a small subcutaneous abscess.   2.  There is a left hip arthroplasty with postoperative changes as noted above causing moderate artifact to the joint space. There is no gross effusion.      IR-MIDLINE CATHETER INSERTION WO GUIDANCE > AGE 3    (Results Pending)        Assessment/Plan  * Skin abscess over hip, inability to walk  Assessment & Plan  Recent MRSA positive wound, sensitive to vanc and bactrim   Wound team following  ID consulted, awaiting abx decision  I am monitoring vancomycin toxicity and therapeutics.     Iron Deficiency anemia:  Recheck iron low as well, however will start on oral supplementation at this time  Could consider IV iron given numerous comorbidity, age    Thigh pain  9/10 uncontrolled, will add PRN pain meds, with caution  - monitor for worsening swelling and pain    pemphigus- (present on admission)  Assessment & Plan  Continue the Cellcept.  Need to follow up with her rheumatologist    History of hip fracture- (present on admission)  Assessment & Plan  Noted  No fracture on CT    Peripheral vascular disease (HCC)- (present on admission)  Assessment & Plan  History of   S/p fem pop  Ordered ABIs as she continues to have poor healing of wounds.  Had TOMI in 4/19 which were normal     Essential hypertension- (present on admission)  Assessment &  Plan  Uncontrolled.  Continue CB to BB    Alcohol use  Assessment & Plan  Does not appear to be withdrawing. States last drink as 4 days ago, drink wine occasionally  She seems to be guarded or in denial. Encourage cessation as this will only exacerbate her falls.  Ordered PO thiamine.  Monitor for withdrawal.    History of MRSA infection  Assessment & Plan  Noted  Ordered contact precautions.    Adult failure to thrive  Assessment & Plan  PT/OT  May need 24/7 level of care    Hypokalemia, d/t poor PO intake?  Replace    VTE prophylaxis: lovenox    Discussed plan of care with patient, nursing and ID in detail today. continue IV abx for now, discussed with ID. Replacing K, d/w RN.

## 2019-09-05 NOTE — PROGRESS NOTES
Bedside report received. Patient A&O x4. Complains of no pain currently, will continue to monitor.     POC discussed with patient. Patient verbalized understanding. Call light and belongings within reach. Bed locked and in lowest position, alarm and fall precautions in place.

## 2019-09-06 ENCOUNTER — APPOINTMENT (OUTPATIENT)
Dept: RADIOLOGY | Facility: MEDICAL CENTER | Age: 74
DRG: 603 | End: 2019-09-06
Attending: HOSPITALIST
Payer: MEDICARE

## 2019-09-06 ENCOUNTER — APPOINTMENT (OUTPATIENT)
Dept: WOUND CARE | Facility: MEDICAL CENTER | Age: 74
End: 2019-09-06
Attending: NURSE PRACTITIONER
Payer: MEDICARE

## 2019-09-06 LAB
ANION GAP SERPL CALC-SCNC: 6 MMOL/L (ref 0–11.9)
BUN SERPL-MCNC: 8 MG/DL (ref 8–22)
CALCIUM SERPL-MCNC: 9 MG/DL (ref 8.5–10.5)
CHLORIDE SERPL-SCNC: 97 MMOL/L (ref 96–112)
CO2 SERPL-SCNC: 28 MMOL/L (ref 20–33)
CREAT SERPL-MCNC: 0.41 MG/DL (ref 0.5–1.4)
ERYTHROCYTE [DISTWIDTH] IN BLOOD BY AUTOMATED COUNT: 46.9 FL (ref 35.9–50)
GLUCOSE SERPL-MCNC: 111 MG/DL (ref 65–99)
HCT VFR BLD AUTO: 38 % (ref 37–47)
HGB BLD-MCNC: 12.1 G/DL (ref 12–16)
MCH RBC QN AUTO: 31.3 PG (ref 27–33)
MCHC RBC AUTO-ENTMCNC: 31.8 G/DL (ref 33.6–35)
MCV RBC AUTO: 98.2 FL (ref 81.4–97.8)
PLATELET # BLD AUTO: 197 K/UL (ref 164–446)
PMV BLD AUTO: 9.5 FL (ref 9–12.9)
POTASSIUM SERPL-SCNC: 3.4 MMOL/L (ref 3.6–5.5)
RBC # BLD AUTO: 3.87 M/UL (ref 4.2–5.4)
SODIUM SERPL-SCNC: 131 MMOL/L (ref 135–145)
WBC # BLD AUTO: 6.4 K/UL (ref 4.8–10.8)

## 2019-09-06 PROCEDURE — 76937 US GUIDE VASCULAR ACCESS: CPT

## 2019-09-06 PROCEDURE — 700111 HCHG RX REV CODE 636 W/ 250 OVERRIDE (IP): Performed by: HOSPITALIST

## 2019-09-06 PROCEDURE — 99233 SBSQ HOSP IP/OBS HIGH 50: CPT | Performed by: INTERNAL MEDICINE

## 2019-09-06 PROCEDURE — 99233 SBSQ HOSP IP/OBS HIGH 50: CPT | Performed by: HOSPITALIST

## 2019-09-06 PROCEDURE — 700105 HCHG RX REV CODE 258: Performed by: HOSPITALIST

## 2019-09-06 PROCEDURE — 700102 HCHG RX REV CODE 250 W/ 637 OVERRIDE(OP): Performed by: INTERNAL MEDICINE

## 2019-09-06 PROCEDURE — 05HY33Z INSERTION OF INFUSION DEVICE INTO UPPER VEIN, PERCUTANEOUS APPROACH: ICD-10-PCS | Performed by: HOSPITALIST

## 2019-09-06 PROCEDURE — 97530 THERAPEUTIC ACTIVITIES: CPT

## 2019-09-06 PROCEDURE — A9270 NON-COVERED ITEM OR SERVICE: HCPCS | Performed by: INTERNAL MEDICINE

## 2019-09-06 PROCEDURE — 80048 BASIC METABOLIC PNL TOTAL CA: CPT

## 2019-09-06 PROCEDURE — 36415 COLL VENOUS BLD VENIPUNCTURE: CPT

## 2019-09-06 PROCEDURE — 700102 HCHG RX REV CODE 250 W/ 637 OVERRIDE(OP): Performed by: HOSPITALIST

## 2019-09-06 PROCEDURE — 97116 GAIT TRAINING THERAPY: CPT

## 2019-09-06 PROCEDURE — 85027 COMPLETE CBC AUTOMATED: CPT

## 2019-09-06 PROCEDURE — 700111 HCHG RX REV CODE 636 W/ 250 OVERRIDE (IP): Performed by: INTERNAL MEDICINE

## 2019-09-06 PROCEDURE — 700105 HCHG RX REV CODE 258: Performed by: INTERNAL MEDICINE

## 2019-09-06 PROCEDURE — 770021 HCHG ROOM/CARE - ISO PRIVATE

## 2019-09-06 PROCEDURE — A9270 NON-COVERED ITEM OR SERVICE: HCPCS | Performed by: HOSPITALIST

## 2019-09-06 PROCEDURE — 700105 HCHG RX REV CODE 258

## 2019-09-06 RX ORDER — MAGNESIUM SULFATE HEPTAHYDRATE 40 MG/ML
2 INJECTION, SOLUTION INTRAVENOUS ONCE
Status: COMPLETED | OUTPATIENT
Start: 2019-09-06 | End: 2019-09-06

## 2019-09-06 RX ORDER — LINEZOLID 600 MG/1
600 TABLET, FILM COATED ORAL EVERY 12 HOURS
Status: DISCONTINUED | OUTPATIENT
Start: 2019-09-07 | End: 2019-09-12 | Stop reason: HOSPADM

## 2019-09-06 RX ORDER — SODIUM CHLORIDE 9 MG/ML
INJECTION, SOLUTION INTRAVENOUS
Status: COMPLETED
Start: 2019-09-06 | End: 2019-09-07

## 2019-09-06 RX ADMIN — SENNOSIDES, DOCUSATE SODIUM 2 TABLET: 50; 8.6 TABLET, FILM COATED ORAL at 17:19

## 2019-09-06 RX ADMIN — MYCOPHENOLATE MOFETIL 500 MG: 250 CAPSULE ORAL at 05:22

## 2019-09-06 RX ADMIN — Medication 1 CAPSULE: at 08:04

## 2019-09-06 RX ADMIN — FERROUS SULFATE TAB 325 MG (65 MG ELEMENTAL FE) 325 MG: 325 (65 FE) TAB at 08:04

## 2019-09-06 RX ADMIN — OXYCODONE HYDROCHLORIDE AND ACETAMINOPHEN 500 MG: 500 TABLET ORAL at 05:16

## 2019-09-06 RX ADMIN — VANCOMYCIN HYDROCHLORIDE 800 MG: 500 INJECTION, POWDER, LYOPHILIZED, FOR SOLUTION INTRAVENOUS at 08:04

## 2019-09-06 RX ADMIN — POTASSIUM CHLORIDE 40 MEQ: 20 TABLET, EXTENDED RELEASE ORAL at 11:56

## 2019-09-06 RX ADMIN — METOPROLOL SUCCINATE 100 MG: 50 TABLET, FILM COATED, EXTENDED RELEASE ORAL at 05:20

## 2019-09-06 RX ADMIN — Medication 100 MG: at 05:19

## 2019-09-06 RX ADMIN — MYCOPHENOLATE MOFETIL 500 MG: 250 CAPSULE ORAL at 17:19

## 2019-09-06 RX ADMIN — SERTRALINE HYDROCHLORIDE 100 MG: 100 TABLET ORAL at 05:20

## 2019-09-06 RX ADMIN — POTASSIUM CHLORIDE 40 MEQ: 20 TABLET, EXTENDED RELEASE ORAL at 05:17

## 2019-09-06 RX ADMIN — SENNOSIDES, DOCUSATE SODIUM 2 TABLET: 50; 8.6 TABLET, FILM COATED ORAL at 05:18

## 2019-09-06 RX ADMIN — THERA TABS 1 TABLET: TAB at 17:19

## 2019-09-06 RX ADMIN — ENOXAPARIN SODIUM 40 MG: 100 INJECTION SUBCUTANEOUS at 05:22

## 2019-09-06 RX ADMIN — ACETAMINOPHEN 650 MG: 325 TABLET, FILM COATED ORAL at 08:04

## 2019-09-06 RX ADMIN — VANCOMYCIN HYDROCHLORIDE 800 MG: 500 INJECTION, POWDER, LYOPHILIZED, FOR SOLUTION INTRAVENOUS at 21:53

## 2019-09-06 RX ADMIN — MAGNESIUM SULFATE IN WATER 2 G: 40 INJECTION, SOLUTION INTRAVENOUS at 10:19

## 2019-09-06 RX ADMIN — POTASSIUM CHLORIDE 40 MEQ: 20 TABLET, EXTENDED RELEASE ORAL at 17:19

## 2019-09-06 RX ADMIN — SODIUM CHLORIDE 500 ML: 9 INJECTION, SOLUTION INTRAVENOUS at 21:53

## 2019-09-06 RX ADMIN — AMLODIPINE BESYLATE 10 MG: 10 TABLET ORAL at 05:16

## 2019-09-06 ASSESSMENT — COGNITIVE AND FUNCTIONAL STATUS - GENERAL
WALKING IN HOSPITAL ROOM: A LOT
CLIMB 3 TO 5 STEPS WITH RAILING: TOTAL
MOBILITY SCORE: 10
STANDING UP FROM CHAIR USING ARMS: A LOT
SUGGESTED CMS G CODE MODIFIER MOBILITY: CL
MOVING TO AND FROM BED TO CHAIR: A LOT
TURNING FROM BACK TO SIDE WHILE IN FLAT BAD: A LOT
MOVING FROM LYING ON BACK TO SITTING ON SIDE OF FLAT BED: UNABLE

## 2019-09-06 ASSESSMENT — GAIT ASSESSMENTS
GAIT LEVEL OF ASSIST: MODERATE ASSIST
DEVIATION: STEP TO;INCREASED BASE OF SUPPORT;DECREASED HEEL STRIKE;DECREASED TOE OFF
ASSISTIVE DEVICE: FRONT WHEEL WALKER
DISTANCE (FEET): 20

## 2019-09-06 ASSESSMENT — ENCOUNTER SYMPTOMS
ABDOMINAL PAIN: 0
DIARRHEA: 0
SHORTNESS OF BREATH: 0
COUGH: 0
FEVER: 0
PALPITATIONS: 0
NAUSEA: 0
VOMITING: 0
FOCAL WEAKNESS: 1
CHILLS: 0
WEAKNESS: 1
CONSTIPATION: 0
HEARTBURN: 0
MYALGIAS: 1

## 2019-09-06 NOTE — THERAPY
"Physical Therapy Treatment completed.   Bed Mobility:  Supine to Sit: Minimal Assist (with bed features, extra time)  Transfers: Sit to Stand: Moderate Assist  Gait: Level Of Assist: Moderate Assist with Front-Wheel Walker       Plan of Care: Will benefit from Physical Therapy 3 times per week  Discharge Recommendations: Equipment: Will Continue to Assess for Equipment Needs. Post-acute therapy: see below.    See \"Rehab Therapy-Acute\" Patient Summary Report for complete documentation.     Patient continues to be limited by impaired balance, functional weakness, and neck and L hip pain but is progressing with mobility. She ambulated approximately 20ft x2 with FWW and mod A, demonstrated posterior lean in standing and required significant assist to maintain balance. Continue to recommend post acute placement as patient is not safe to return home at current level; patient resistant to the idea of SNF when DC plan discussed. Will follow while in house.  "

## 2019-09-06 NOTE — PROCEDURES
Vascular Access Team    Date of Insertion: 9+/6/19  Arm Circumference: n/a  Line Length: 8  Line Size: 20  Vein Occupancy %: 45  Reason for Midline: access  Labs: WBC 6.4, , BUN 8, Cr 0.41, GFR >60, INR 1.19    Orders confirmed, vessel patency confirmed with ultrasound. Risks and benefits of procedure explained to patient and education regarding line associated bloodstream infections provided. Questions answered.     PowerGlide Midline placed in LUE per MD order with ultrasound guidance. 20g, 8 cm line placed in basilic vein after 01 attempt(s).  Catheter inserted with good blood return. Secured with 2cm external from insertion site.  Flushed without resistance with 10 mL 0.9% normal saline. Midline secured with Biopatch and Tegaderm.     Midline placement is confirmed by nurse using ultrasound and ability to flush and draw blood. Midline is appropriate for use at this time.  No X-ray is needed for placement confirmation. Pt tolerated procedure well.  Patient condition relayed to unit RN or ordering physician via this post procedure note in the EMR.    Ultrasound images uploaded to PACS and viewable in the EMR - yes  Ultrasound imaged printed and placed in paper chart - no      BARD PowerGlide Midline ref # [id number], Lot # [id number]

## 2019-09-06 NOTE — PROGRESS NOTES
"Pharmacy Kinetics 73 y.o. female on vancomycin day # 4 2019    Currently on Vancomycin 800mg q12hr ()     Indication for Treatment: Abscess     Pertinent history per medical record: Admitted on 2019 for an abscess. She presented with left hip pain worsening over the past week associated with brown colored drainage. It is also noted she has cellulitis to her left lower extremity. She has cultures from 19 from her lower extremity cellulitis that grew MRSA.  Of note, she is on Cellcept for bullous pemphgus and may not present with typical infectious markers.     Other antibiotics: Unasyn 3g x 1 in ED     Allergies: Bactrim [sulfamethoxazole-trimethoprim] and Meropenem      List concerns for renal function: age. Her serum creatinine is at baseline with no changes in medications or body habitus that would effect renal function acutely.      Pertinent cultures to date:   19: left leg wound- MRSA (Vanco LICO = 1)  19: Wound: MRSA (bactrim - S, Doxycycline - S)  19: blood cultures x 2: NGTD    Recent Labs     19  0146 19  1247   WBC 6.2 7.5   NEUTSPOLYS  --  79.20*     Recent Labs     19  0146 19  1247 19  1023   BUN 9 8 6*   CREATININE 0.58 0.48* 0.50     Recent Labs     19  0832   VANCOTROUGH 19.2       Intake/Output Summary (Last 24 hours) at 2019 1728  Last data filed at 2019 1434  Gross per 24 hour   Intake 420 ml   Output --   Net 420 ml      /51   Pulse 77   Temp 36.3 °C (97.3 °F) (Temporal)   Resp 18   Ht 1.702 m (5' 7.01\")   Wt 65.8 kg (145 lb 1 oz)   SpO2 100%  Temp (24hrs), Av.6 °C (97.9 °F), Min:36.3 °C (97.3 °F), Max:37.1 °C (98.7 °F)      A/P   1. Vancomycin dose change: none  2. Next vancomycin level: ~3 days or sooner if clinically indicated  3. Goal trough: 12-16 mcg/mL  4. Comments: Cultures resulted sensitive to multiple PO antibiotics. ID remains consulted and still to see the patient today.  Likely able to " transition to PO therapy as the infection appears to not involve the prosthetic joint.    Rodolfo Cuevas, PharmD, BCPS

## 2019-09-06 NOTE — DISCHARGE PLANNING
.Received Choice form at 2757  Agency/Facility Name: [facility]  Referral sent per Choice form @ [phone]

## 2019-09-06 NOTE — CARE PLAN
Problem: Safety  Goal: Will remain free from falls  Outcome: PROGRESSING AS EXPECTED  Note:   Fall risk assessed and fall precautions in place, pt 2 person assist. Bed alarm on, appropriate signs in place, call light and personal belongings within reach. Pt educated to call for help and not get up without assistance. Verbalized understanding.      Problem: Infection  Goal: Will remain free from infection  Outcome: PROGRESSING AS EXPECTED  Note:   Pt educated on hand hygiene to reduce the risk of infection

## 2019-09-06 NOTE — PROGRESS NOTES
"Pharmacy Kinetics 73 y.o. female on vancomycin day # 5    2019    Currently on Vancomycin 800mg q12hr (0900 2100)     Indication for Treatment: Abscess     Pertinent history per medical record: Admitted on 2019 for an abscess. She presented with left hip pain worsening over the past week associated with brown colored drainage. It is also noted she has cellulitis to her left lower extremity. She has cultures from 19 from her lower extremity cellulitis that grew MRSA.  Of note, she is on Cellcept for bullous pemphgus and may not present with typical infectious markers.     Other antibiotics:     Allergies: Bactrim [sulfamethoxazole-trimethoprim] and Meropenem      List concerns for renal function: age     Pertinent cultures to date:   19: lt lg,wnd- MRSA (Vanco LICO = 1)  19:Rt Hip,Wnd: MRSA (bactrim - S, Doxycycline - S)  19:PBCx2: NGTD    MRSA nares swab if pneumonia is a concern (ordered/positive/negative/n-a): NA    Recent Labs     19  1247 19  0534   WBC 7.5 6.4   NEUTSPOLYS 79.20*  --      Recent Labs     19  1247 19  1023 19  0534   BUN 8 6* 8   CREATININE 0.48* 0.50 0.41*     Recent Labs     [facility]  08[facility]   [facility]       Intake/Output Summary (Last 24 hours) at 2019 0926  Last data filed at 2019 0430  Gross per 24 hour   Intake 780 ml   Output 400 ml   Net 380 ml      BP (!) 166/88   Pulse 90   Temp 36.3 °C (97.3 °F) (Temporal)   Resp 16   Ht 1.702 m (5' 7.01\")   Wt 67.3 kg (148 lb 5.9 oz)   SpO2 91%  Temp (24hrs), Av.3 °C (97.4 °F), Min:36.2 °C (97.2 °F), Max:36.6 °C (97.8 °F)      A/P   1. Vancomycin dose change: None   2. Next vancomycin level:19@0830  3. Goal trough: 12-16 mcg/mL   4. Comments: No leukocytosis, afebrile. Renal indices stable over interval. Vancomycin level ordered. ID following:  Continue vancomycin for now.    Justino EasleyD BCPS   "

## 2019-09-06 NOTE — PROGRESS NOTES
Assumed care at 0715. Bedside report received from Night SHREE Sharp. Patient's chart and MAR reviewed. 12 hour chart check complete. Assessment complete, pt 7-10 pain at this time. Pt is awake in bed. Pt is A & O x 4. Patient was updated on plan of care for the day. Questions answered and concerns addressed.  Pt denies any additional needs at this time. White board updated. Call light, phone and personal belongings within reach. Bed alarm on and working appropriately. Vital signs stable.

## 2019-09-06 NOTE — PROGRESS NOTES
Infectious Disease Progress Note    Author: Zuri More M.D. Date & Time of service: 2019  3:52 PM    Chief Complaint:  Thigh abscess     Interval History:   AF, O2 2 L NC- stable, able to get up and walk without pain in the left hip.  She still has significant purulent drainage from abscess.      Review of Systems:  Review of Systems   Constitutional: Negative for chills, fever and malaise/fatigue.   Gastrointestinal: Negative for abdominal pain, constipation, diarrhea, nausea and vomiting.   Musculoskeletal: Positive for joint pain and myalgias.   Skin: Negative for rash.   Neurological: Positive for weakness.       Hemodynamics:  Temp (24hrs), Av.3 °C (97.4 °F), Min:36.2 °C (97.2 °F), Max:36.6 °C (97.8 °F)  Temperature: 36.3 °C (97.3 °F)  Pulse  Av.6  Min: 71  Max: 110   Blood Pressure : (!) 166/88       Physical Exam:  Physical Exam   Constitutional: She appears well-developed and well-nourished.   HENT:   Head: Normocephalic and atraumatic.   Eyes: Pupils are equal, round, and reactive to light. Conjunctivae and EOM are normal.   Cardiovascular: Normal rate, regular rhythm and normal heart sounds.   Pulmonary/Chest: Effort normal and breath sounds normal.   Abdominal: Soft. Bowel sounds are normal. She exhibits no distension. There is no tenderness. There is no rebound and no guarding.   Skin: Skin is warm and dry.   Scattered scabbed over lesions on her lower extremities, no sign of infection.       Meds:    Current Facility-Administered Medications:   •  potassium chloride SA  •  vancomycin  •  amLODIPine  •  ferrous sulfate  •  ascorbic acid  •  metoprolol SR  •  multivitamin  •  mycophenolate  •  sertraline  •  senna-docusate **AND** polyethylene glycol/lytes **AND** magnesium hydroxide **AND** bisacodyl  •  enoxaparin  •  acetaminophen  •  ondansetron  •  ondansetron  •  LR  •  MD Alert...Vancomycin per Pharmacy  •  lactobacillus rhamnosus  •  oxyCODONE immediate-release  •   thiamine  •  morphine injection    Labs:  Recent Labs     09/04/19  1247 09/06/19  0534   WBC 7.5 6.4   RBC 3.83* 3.87*   HEMOGLOBIN 11.9* 12.1   HEMATOCRIT 38.2 38.0   MCV 99.7* 98.2*   MCH 31.1 31.3   RDW 49.6 46.9   PLATELETCT 173 197   MPV 9.8 9.5   NEUTSPOLYS 79.20*  --    LYMPHOCYTES 12.00*  --    MONOCYTES 7.20  --    EOSINOPHILS 0.30  --    BASOPHILS 0.80  --      Recent Labs     09/04/19  1247 09/05/19  1023 09/06/19  0534   SODIUM 130* 132* 131*   POTASSIUM 3.2* 2.8* 3.4*   CHLORIDE 96 96 97   CO2 24 28 28   GLUCOSE 139* 132* 111*   BUN 8 6* 8     Recent Labs     09/04/19 1247 09/05/19  1023 09/06/19  0534   CREATININE 0.48* 0.50 0.41*       Imaging:  Ct-hip With Left    Result Date: 9/2/2019 9/2/2019 1:41 PM HISTORY/REASON FOR EXAM:  Hip replaced, infection suspected TECHNIQUE/EXAM DESCRIPTION AND NUMBER OF VIEWS: Helical CT scan of the pelvis and left hip is performed following the IV administration of 100 mL of Omnipaque 350. Coronal and sagittal reconstructions are provided. COMPARISON: None FINDINGS: There are postoperative changes of a left hip arthroplasty which causes some degree of artifact. There are areas of heterotopic ossification surrounding the surgical site. There is no acute fracture. There is no dislocation. There are mild degenerative changes in the lower lumbar spine and both SI joints. There is some atrophy of the left hip musculature with some soft tissue density surrounding portions of the left hip arthroplasty but no definite joint effusion is evident on this exam. There is subcutaneous edema or skin thickening in the proximal left lateral thigh. There is a questionable small superficial fluid collection just deep to the skin along the posterior aspect of the left hip at the level of the greater trochanter measuring 24 x 9 mm. Soft tissues demonstrate atherosclerosis of the aorta and pelvic vasculature. There is colonic diverticulosis without diverticulitis. The bladder is  distended.     1.  There is a questionable small subcutaneous fluid collection just deep to the skin along the left lateral hip at the level the greater trochanter which could be a small subcutaneous abscess. 2.  There is a left hip arthroplasty with postoperative changes as noted above causing moderate artifact to the joint space. There is no gross effusion.    Dx-lumbar Spine-2 Or 3 Views    Result Date: 9/2/2019 9/2/2019 6:28 PM HISTORY/REASON FOR EXAM:  Low back pain for 7 days. Left hip pain. TECHNIQUE/ EXAM DESCRIPTION AND NUMBER OF VIEWS:  3 views of the lumbar spine. COMPARISON: None. FINDINGS: The alignment of the lumbar spine is within normal limits. Vertebral body heights are maintained. There is degenerative endplate spurring at all levels throughout the lumbar spine. There is disc space narrowing most prominent at the L3-4 level. There is bilateral facet arthropathy in the mid and lower lumbar spine. SI joints are symmetric. Bony pelvis is intact. There is atherosclerosis of the aorta.     1.  There is moderate lumbar spondylosis with degenerative disease most prominent at the L3-4 level. 2.  There is no acute fracture or malalignment.    Mr-lumbar Spine-w/o    Result Date: 9/3/2019  9/3/2019 11:07 AM HISTORY/REASON FOR EXAM:  inability to walk, back pain, findings of L3-4 DJD TECHNIQUE/EXAM DESCRIPTION: MRI of the lumbar spine without contrast. The study was performed on a [employer] Signa 1.5 Dayami MRI scanner. T1 sagittal, T2 fast spin-echo sagittal, and T2 axial images were obtained of the lumbar spine. COMPARISON:  Lumbar radiographs 9/2/2019 FINDINGS: 5 lumbar type vertebral bodies are counted. The conus medullaris terminates at T12-L1 and is normal in signal. There is a 8 mm T2 hyperintense lesion within the T12 vertebral body most likely an intraosseous hemangioma. There is no compression fracture. T11-T12: Mild diffuse disc bulge and osteophyte. No significant spinal or foraminal stenosis. T12-L1:   Minor diffuse disc bulge, osteophyte. No significant spinal or foraminal stenosis. L1-L2:  Mild diffuse disc bulge, osteophyte and small right paracentral disc protrusion. Mild spinal stenosis and left foraminal stenosis. L2-L3:  Diffuse disc bulge and osteophyte. Ligamentum flavum thickening and mild facet degeneration. Mild to moderate spinal stenosis. Foramina are adequate. L3-L4:  Disc narrowing, reactive marrow endplate changes/edema and mild disc osteophyte. Mild facet degeneration. Mild spinal stenosis and left foraminal stenosis. Right foramen is adequate. L4-L5:  Diffuse mild disc bulge and osteophyte. No significant spinal or foraminal stenosis. L5-S1:  Canal and foramina are patent. There is some lower posterior paraspinal muscle atrophy and left psoas atrophy.     1.  Multilevel degenerative changes detailed. 2.  Mild to moderate spinal stenosis L2-L3, mild L1-L2 and L3-L4.     Ir-midline Catheter Insertion Wo Guidance > Age 3    Result Date: 9/6/2019  HISTORY/REASON FOR EXAM:  Midline Placement   TECHNIQUE/EXAM DESCRIPTION AND NUMBER OF VIEWS: Midline insertion with ultrasound guidance.  FINDINGS: Midline insertion with Ultrasound Guidance was performed by qualified nursing staff without the assistance of a Radiologist. Midline positioning as measured by RN or as appropriate length of catheter selected.              Ultrasound-guided midline placement performed by qualified nursing staff as above.       Micro:  Results     Procedure Component Value Units Date/Time    CULTURE WOUND W/ GRAM STAIN [[id number]]  (Abnormal)  (Susceptibility) Collected:  09/02/19 3500    Order Status:  Completed Specimen:  Wound from Right Hip Updated:  09/05/19 0834     Significant Indicator POS     Source WND     Site RIGHT HIP     Culture Result -     Gram Stain Result Moderate WBCs.  Few Gram positive cocci.       Culture Result Methicillin Resistant Staphylococcus aureus  Light growth      Narrative:       CALL  Nava  183  "tel. [phone],  CALLED  183 tel. [phone] 09/04/2019, 10:59, RB PERF. RESULTS CALLED  TO:[id number]    Susceptibility     Methicillin resistant staphylococcus aureus (1)     Antibiotic Interpretation Microscan Method Status    Clindamycin Resistant >4 mcg/mL LICO Final    Daptomycin Sensitive <=0.5 mcg/mL LICO Final    Erythromycin Resistant >4 mcg/mL LICO Final    Moxifloxacin Sensitive 2 mcg/mL LICO Final    Ampicillin/sulbactam Resistant 16/8 mcg/mL LICO Final    Oxacillin Resistant >2 mcg/mL LICO Final    Vancomycin Sensitive 1 mcg/mL LICO Final    Penicillin Resistant >8 mcg/mL LICO Final    Trimeth/Sulfa Sensitive <=0.5/9.5 mcg/mL LICO Final    Tetracycline Sensitive <=4 mcg/mL LICO Final                   Blood Culture [[id number]] Collected:  09/02/19 2138    Order Status:  Completed Specimen:  Blood from Peripheral Updated:  09/03/19 0835     Significant Indicator NEG     Source BLD     Site PERIPHERAL     Culture Result No Growth  Note: Blood cultures are incubated for 5 days and  are monitored continuously.Positive blood cultures  are called to the RN and reported as soon as  they are identified.      Narrative:       From different peripheral sites, if not done within the last  24 hours (Per Hospital Policy: Only change specimen source to  \"Line\" if specified by physician order)  Left AC    Blood Culture [[id number]] Collected:  09/02/19 2138    Order Status:  Completed Specimen:  Blood from Peripheral Updated:  09/03/19 0835     Significant Indicator NEG     Source BLD     Site PERIPHERAL     Culture Result No Growth  Note: Blood cultures are incubated for 5 days and  are monitored continuously.Positive blood cultures  are called to the RN and reported as soon as  they are identified.      Narrative:       From different peripheral sites, if not done within the last  24 hours (Per Hospital Policy: Only change specimen source to  \"Line\" if specified by physician order)  Right Hand    GRAM STAIN [[id number]] Collected: "  09/02/19 2350    Order Status:  Completed Specimen:  Wound Updated:  09/03/19 0526     Significant Indicator .     Source WND     Site RIGHT HIP     Gram Stain Result Moderate WBCs.  Few Gram positive cocci.      CULTURE WOUND W/ GRAM STAIN [[id number]] Collected:  09/02/19 2350    Order Status:  Sent Specimen:  Wound from Left Leg     Urinalysis [[id number]]     Order Status:  Canceled Specimen:  Urine     URINALYSIS,CULTURE IF INDICATED [[id number]]  (Abnormal) Collected:  09/02/19 1600    Order Status:  Completed Specimen:  Urine, Straight Cath Updated:  09/02/19 1615     Color Yellow     Character Clear     Specific Gravity 1.016     Ph 7.5     Glucose Negative mg/dL      Ketones Negative mg/dL      Protein Negative mg/dL      Bilirubin Negative     Urobilinogen, Urine 0.2     Nitrite Negative     Leukocyte Esterase Negative     Occult Blood Small     Micro Urine Req Microscopic          Assessment:  Active Hospital Problems    Diagnosis   • *Skin abscess over hip, inability to walk [L02.91]   • Bullous pemphigus [L10.9]   • History of hip fracture [Z87.81]   • Peripheral vascular disease (HCC) [I73.9]   • Essential hypertension [I10]   • Adult failure to thrive [R62.7]   • History of MRSA infection [Z86.14]   • Alcohol use [Z78.9]     Interval 24 hour assessment:      AF, O2 RA   Labs reviewed  Micro reviewed    Pt continued on vancomycin.  Will transition her to linezolid starting tomorrow.  She still having significant drainage from left hip abscess but less erythema, less pain.  She is also endorsing significant weakness due to debilitation.      ASSESSMENT/PLAN:      Nadege Mae is a 73 y.o.  admitted 9/2/2019. Pt has a past medical history of dementia, peripheral vascular disease with chronic lower extremity ulcers, she is status post femoral-popliteal bypass.  She also has a history of bullous pemphigus and has been treated with prednisone.  She has last seen by ID in April 2019 for left lower  extremity cellulitis.  Wound cultures that time grew MSSA.  Plan at that time was to continue Augmentin with stop date of 4/29/2019. Most recent cultures from 8/23 of the left leg with MRSA.  She came into the ED complaining of multiple falls as well as pain in left hip.  There is a question whether alcohol use was involved.  Patient is poor historian.       Hospital Course:   She is afebrile, no leukocytosis.  CT of the hip on 9/2 with left lateral hip with a small abscess.  There is left hip arthroplasty postop changes, no gross effusion.  In the ER the potential abscess was open with bedside I&D and she was given clindamycin.  She underwent MR spine on 9/3 with finding of multilevel degenerative changes and spinal stenosis at L1-L4.  Blood cultures on 9/2 are no growth.  She was  started on Unasyn and vancomycin.       Left hip abscess  -I&D in the ER on 9/2-cultures with GPC's-MRSA     Left hip prosthetic joint  -Does not appear to be involved,  she is ambulating without any significant pain in the joint      Peripheral vascular disease  Chronic nonhealing lower extremity ulcers     Bullous pemphigus     Immunosuppression, secondary treatment for bullous pemphigus  -Per chart review she is on CellCept 500 mg twice daily-patient states she has not actually been taking this     History of cellulitis  - Prior wound cultures have grown MRSA, MSSA, Enterococcus faecalis and Pseudomonas     Depression, on sertraline     --- Continue vancomycin, transition to linezolid tomorrow-she is on sertraline but has tolerated combination with linezolid in the past  -we will plan on additional 7 days of antibiotics  --- Monitor for new pain with movement of the left hip or other signs or symptoms would suggest the joint is infected.    --- Continue wound care for lower extremity ulcers related to her bullous pemphigus as well as for the left hip     --- Dispo: likely to skilled facility     Plan of care discussed with Dr. Chung.  Will continue to follow.

## 2019-09-06 NOTE — PROGRESS NOTES
Pt does not want me to turn her with a pillow, educated pt. On readjusting herself every two hours, will continue to round and prompt her to move and adjust every 2 hours.

## 2019-09-06 NOTE — PROGRESS NOTES
Bedside report received. Patient A&O x4. Complains of pain 8/10, in back and arms,  medicated per MAR. Dressings on wounds were changed today per day shift, they look clean dry and intact, will continue to monitor.      POC discussed with patient. Patient verbalized understanding. Call light and belongings within reach. Bed locked and in lowest position, alarm and fall precautions in place.

## 2019-09-06 NOTE — PROGRESS NOTES
"Hospital Medicine Daily Progress Note    Date of Service  9/6/2019    Chief Complaint  73 y.o. female admitted 9/2/2019 with left hip pain    Hospital Course    73 y.o. female who presented 9/2/2019 with Hip Pain (left hip)  History of bullous penphigoid, PVD with fem-pop, hyperlipidemia, cellulitides and lower extremity ulcers with history of immunocompromised status due to use of immunomodulators and steroids for her bullous pemphigioids.  She has multiple chronic wounds one of which is on an incision where she had  hip surgery. She has a history of MRSA and MSSA infection in her LLE. Recent was MSSA infection requiring a wound vac and completed course of antibiotics last April 2019 and there is a wound culture that grew MRSA just August 2019 that is resistant to clindamycin.  Lives with roommate. Roommate not a caretaker as she is arthritic herself.  Presents with multiple falls. She \"does not know\" how she falls. She also complained of Hip Pain (left hip)      Interval Problem Update  Pt seen and examined this morning, improved pain in left hip, no events overnight, no reported chest pain or SOB. K improving, will continue replacement. Currently on IV vanc, I've had a midline placed in case of long term abx and for lab draws/infusions, she has lost a lot of PIVs. Consideration for Linezolid use in patient.     Consultants/Specialty  ID    Code Status  Full code    Disposition  PT OT recommend post acute care  Patient is currently medical  Awaiting ABX plan before discharge planning  Likely needs SNF    Review of Systems  Review of Systems   Constitutional: Negative for chills and fever.   Respiratory: Negative for cough and shortness of breath.    Cardiovascular: Negative for chest pain and palpitations.   Gastrointestinal: Negative for heartburn, nausea and vomiting.   Musculoskeletal: Positive for myalgias.   Skin: Negative for itching and rash.   Neurological: Positive for focal weakness.   All other systems " reviewed and are negative.     Physical Exam  Temp:  [36.2 °C (97.2 °F)-36.6 °C (97.8 °F)] 36.3 °C (97.3 °F)  Pulse:  [90-93] 90  Resp:  [1-17] 16  BP: (148-167)/(84-96) 166/88  SpO2:  [91 %-94 %] 91 %    Physical Exam   Constitutional: She is oriented to person, place, and time. She appears well-developed and well-nourished.   HENT:   Head: Normocephalic and atraumatic.   Mouth/Throat: Oropharynx is clear and moist. No oropharyngeal exudate.   Eyes: Pupils are equal, round, and reactive to light. EOM are normal. No scleral icterus.   Neck: Normal range of motion. Neck supple. No JVD present.   Cardiovascular: Normal rate, regular rhythm and intact distal pulses.   No murmur heard.  Pulmonary/Chest: Effort normal and breath sounds normal. No respiratory distress. She has no wheezes. She has no rales.   Abdominal: Soft. Bowel sounds are normal. She exhibits no distension. There is no tenderness. There is no rebound.   Musculoskeletal: Normal range of motion. She exhibits edema, tenderness and deformity.   Lymphadenopathy:     She has no cervical adenopathy.   Neurological: She is alert and oriented to person, place, and time. She exhibits normal muscle tone.   Skin: Skin is warm and dry. No rash noted. There is erythema.   Left hip bandage with central serosang oozing   Psychiatric: She has a normal mood and affect. Her behavior is normal.     Fluids    Intake/Output Summary (Last 24 hours) at 9/6/2019 1558  Last data filed at 9/6/2019 0430  Gross per 24 hour   Intake 360 ml   Output 400 ml   Net -40 ml       Laboratory  Recent Labs     09/04/19  1247 09/06/19  0534   WBC 7.5 6.4   RBC 3.83* 3.87*   HEMOGLOBIN 11.9* 12.1   HEMATOCRIT 38.2 38.0   MCV 99.7* 98.2*   MCH 31.1 31.3   MCHC 31.2* 31.8*   RDW 49.6 46.9   PLATELETCT 173 197   MPV 9.8 9.5     Recent Labs     09/04/19  1247 09/05/19  1023 09/06/19  0534   SODIUM 130* 132* 131*   POTASSIUM 3.2* 2.8* 3.4*   CHLORIDE 96 96 97   CO2 24 28 28   GLUCOSE 139* 132*  111*   BUN 8 6* 8   CREATININE 0.48* 0.50 0.41*   CALCIUM 8.6 8.4* 9.0                   Imaging  IR-MIDLINE CATHETER INSERTION WO GUIDANCE > AGE 3   Final Result                  Ultrasound-guided midline placement performed by qualified nursing staff    as above.          MR-LUMBAR SPINE-W/O   Final Result      1.  Multilevel degenerative changes detailed.   2.  Mild to moderate spinal stenosis L2-L3, mild L1-L2 and L3-L4.         DX-LUMBAR SPINE-2 OR 3 VIEWS   Final Result      1.  There is moderate lumbar spondylosis with degenerative disease most prominent at the L3-4 level.   2.  There is no acute fracture or malalignment.      CT-HIP WITH LEFT   Final Result      1.  There is a questionable small subcutaneous fluid collection just deep to the skin along the left lateral hip at the level the greater trochanter which could be a small subcutaneous abscess.   2.  There is a left hip arthroplasty with postoperative changes as noted above causing moderate artifact to the joint space. There is no gross effusion.           Assessment/Plan  * Skin abscess over hip, inability to walk  Assessment & Plan  Recent MRSA positive wound, sensitive to vanc and bactrim   Wound team following  ID consulted, awaiting abx decision  I am monitoring vancomycin toxicity and therapeutics.   Consideration of transition to Linezolid, discussed with ID 9/6    Iron Deficiency anemia:  Recheck iron low as well, however will start on oral supplementation at this time  Could consider IV iron given numerous comorbidity, age    Hypokalemia  Multiple etiologies  Replacing  Improving    Thigh pain  9/10 uncontrolled, will add PRN pain meds, with caution  - monitor for worsening swelling and pain    pemphigus- (present on admission)  Assessment & Plan  Continue the Cellcept.  Need to follow up with her rheumatologist    History of hip fracture- (present on admission)  Assessment & Plan  Noted  No fracture on CT    Peripheral vascular disease  (HCC)- (present on admission)  Assessment & Plan  History of   S/p fem pop  Ordered ABIs as she continues to have poor healing of wounds.  Had TOMI in 4/19 which were normal     Essential hypertension- (present on admission)  Assessment & Plan  Uncontrolled.  Continue CB to BB    Alcohol use  Assessment & Plan  Does not appear to be withdrawing. States last drink as 4 days ago, drink wine occasionally  She seems to be guarded or in denial. Encourage cessation as this will only exacerbate her falls.  Ordered PO thiamine.  Monitor for withdrawal.    History of MRSA infection  Assessment & Plan  Noted  Ordered contact precautions.    Adult failure to thrive  Assessment & Plan  PT/OT  May need 24/7 level of care  Hypokalemia, d/t poor PO intake?  Replace    VTE prophylaxis: lovenox    Discussed plan of care with patient, nursing and ID in detail today. continue IV abx for now, discussed with ID. Replacing K, d/w RN.

## 2019-09-07 PROBLEM — R54 AGE-RELATED PHYSICAL DEBILITY: Status: ACTIVE | Noted: 2019-09-02

## 2019-09-07 PROBLEM — R19.7 DIARRHEA: Status: ACTIVE | Noted: 2019-09-07

## 2019-09-07 PROBLEM — Z87.81 HISTORY OF HIP FRACTURE: Chronic | Status: ACTIVE | Noted: 2017-12-20

## 2019-09-07 LAB
ANION GAP SERPL CALC-SCNC: 6 MMOL/L (ref 0–11.9)
BACTERIA BLD CULT: NORMAL
BACTERIA BLD CULT: NORMAL
BUN SERPL-MCNC: 10 MG/DL (ref 8–22)
CALCIUM SERPL-MCNC: 8.6 MG/DL (ref 8.5–10.5)
CHLORIDE SERPL-SCNC: 100 MMOL/L (ref 96–112)
CO2 SERPL-SCNC: 27 MMOL/L (ref 20–33)
CREAT SERPL-MCNC: 0.55 MG/DL (ref 0.5–1.4)
ERYTHROCYTE [DISTWIDTH] IN BLOOD BY AUTOMATED COUNT: 47.3 FL (ref 35.9–50)
GLUCOSE SERPL-MCNC: 113 MG/DL (ref 65–99)
HCT VFR BLD AUTO: 35.9 % (ref 37–47)
HGB BLD-MCNC: 11.4 G/DL (ref 12–16)
MCH RBC QN AUTO: 31 PG (ref 27–33)
MCHC RBC AUTO-ENTMCNC: 31.8 G/DL (ref 33.6–35)
MCV RBC AUTO: 97.6 FL (ref 81.4–97.8)
PLATELET # BLD AUTO: 200 K/UL (ref 164–446)
PMV BLD AUTO: 9.3 FL (ref 9–12.9)
POTASSIUM SERPL-SCNC: 4.7 MMOL/L (ref 3.6–5.5)
RBC # BLD AUTO: 3.68 M/UL (ref 4.2–5.4)
SIGNIFICANT IND 70042: NORMAL
SIGNIFICANT IND 70042: NORMAL
SITE SITE: NORMAL
SITE SITE: NORMAL
SODIUM SERPL-SCNC: 133 MMOL/L (ref 135–145)
SOURCE SOURCE: NORMAL
SOURCE SOURCE: NORMAL
VANCOMYCIN TROUGH SERPL-MCNC: 20.1 UG/ML (ref 10–20)
WBC # BLD AUTO: 6.3 K/UL (ref 4.8–10.8)

## 2019-09-07 PROCEDURE — A9270 NON-COVERED ITEM OR SERVICE: HCPCS | Performed by: INTERNAL MEDICINE

## 2019-09-07 PROCEDURE — 85027 COMPLETE CBC AUTOMATED: CPT

## 2019-09-07 PROCEDURE — 700102 HCHG RX REV CODE 250 W/ 637 OVERRIDE(OP): Performed by: HOSPITALIST

## 2019-09-07 PROCEDURE — 700102 HCHG RX REV CODE 250 W/ 637 OVERRIDE(OP): Performed by: NURSE PRACTITIONER

## 2019-09-07 PROCEDURE — 700102 HCHG RX REV CODE 250 W/ 637 OVERRIDE(OP): Performed by: INTERNAL MEDICINE

## 2019-09-07 PROCEDURE — 80202 ASSAY OF VANCOMYCIN: CPT

## 2019-09-07 PROCEDURE — 770021 HCHG ROOM/CARE - ISO PRIVATE

## 2019-09-07 PROCEDURE — A9270 NON-COVERED ITEM OR SERVICE: HCPCS | Performed by: HOSPITALIST

## 2019-09-07 PROCEDURE — 99233 SBSQ HOSP IP/OBS HIGH 50: CPT | Performed by: INTERNAL MEDICINE

## 2019-09-07 PROCEDURE — 80048 BASIC METABOLIC PNL TOTAL CA: CPT

## 2019-09-07 PROCEDURE — 99233 SBSQ HOSP IP/OBS HIGH 50: CPT | Performed by: FAMILY MEDICINE

## 2019-09-07 PROCEDURE — A9270 NON-COVERED ITEM OR SERVICE: HCPCS | Performed by: NURSE PRACTITIONER

## 2019-09-07 PROCEDURE — 700111 HCHG RX REV CODE 636 W/ 250 OVERRIDE (IP): Performed by: INTERNAL MEDICINE

## 2019-09-07 RX ORDER — ENALAPRILAT 1.25 MG/ML
1.25 INJECTION INTRAVENOUS EVERY 6 HOURS PRN
Status: DISCONTINUED | OUTPATIENT
Start: 2019-09-07 | End: 2019-09-12 | Stop reason: HOSPADM

## 2019-09-07 RX ORDER — FOLIC ACID 1 MG/1
1 TABLET ORAL DAILY
Status: DISCONTINUED | OUTPATIENT
Start: 2019-09-07 | End: 2019-09-12 | Stop reason: HOSPADM

## 2019-09-07 RX ORDER — HYDRALAZINE HYDROCHLORIDE 50 MG/1
50 TABLET, FILM COATED ORAL EVERY 8 HOURS
Status: DISCONTINUED | OUTPATIENT
Start: 2019-09-07 | End: 2019-09-09

## 2019-09-07 RX ADMIN — FOLIC ACID 1 MG: 1 TABLET ORAL at 12:19

## 2019-09-07 RX ADMIN — SENNOSIDES, DOCUSATE SODIUM 2 TABLET: 50; 8.6 TABLET, FILM COATED ORAL at 06:02

## 2019-09-07 RX ADMIN — METOPROLOL SUCCINATE 100 MG: 50 TABLET, FILM COATED, EXTENDED RELEASE ORAL at 06:02

## 2019-09-07 RX ADMIN — ACETAMINOPHEN 650 MG: 325 TABLET, FILM COATED ORAL at 19:40

## 2019-09-07 RX ADMIN — MYCOPHENOLATE MOFETIL 500 MG: 250 CAPSULE ORAL at 06:01

## 2019-09-07 RX ADMIN — Medication 100 MG: at 06:02

## 2019-09-07 RX ADMIN — LINEZOLID 600 MG: 600 TABLET, FILM COATED ORAL at 18:19

## 2019-09-07 RX ADMIN — SERTRALINE HYDROCHLORIDE 100 MG: 100 TABLET ORAL at 06:02

## 2019-09-07 RX ADMIN — Medication 1 CAPSULE: at 08:38

## 2019-09-07 RX ADMIN — OXYCODONE HYDROCHLORIDE AND ACETAMINOPHEN 500 MG: 500 TABLET ORAL at 06:02

## 2019-09-07 RX ADMIN — MYCOPHENOLATE MOFETIL 500 MG: 250 CAPSULE ORAL at 18:19

## 2019-09-07 RX ADMIN — FERROUS SULFATE TAB 325 MG (65 MG ELEMENTAL FE) 325 MG: 325 (65 FE) TAB at 08:38

## 2019-09-07 RX ADMIN — POTASSIUM CHLORIDE 40 MEQ: 20 TABLET, EXTENDED RELEASE ORAL at 06:02

## 2019-09-07 RX ADMIN — ACETAMINOPHEN 650 MG: 325 TABLET, FILM COATED ORAL at 12:23

## 2019-09-07 RX ADMIN — OXYCODONE HYDROCHLORIDE 5 MG: 5 TABLET ORAL at 00:04

## 2019-09-07 RX ADMIN — HYDRALAZINE HYDROCHLORIDE 50 MG: 50 TABLET, FILM COATED ORAL at 12:20

## 2019-09-07 RX ADMIN — LINEZOLID 600 MG: 600 TABLET, FILM COATED ORAL at 06:02

## 2019-09-07 RX ADMIN — HYDRALAZINE HYDROCHLORIDE 50 MG: 50 TABLET, FILM COATED ORAL at 22:16

## 2019-09-07 RX ADMIN — OXYCODONE HYDROCHLORIDE 5 MG: 5 TABLET ORAL at 06:15

## 2019-09-07 RX ADMIN — THERA TABS 1 TABLET: TAB at 18:19

## 2019-09-07 RX ADMIN — ENOXAPARIN SODIUM 40 MG: 100 INJECTION SUBCUTANEOUS at 06:03

## 2019-09-07 RX ADMIN — AMLODIPINE BESYLATE 10 MG: 10 TABLET ORAL at 06:01

## 2019-09-07 ASSESSMENT — ENCOUNTER SYMPTOMS
COUGH: 0
ABDOMINAL PAIN: 0
WEAKNESS: 1
PHOTOPHOBIA: 0
BACK PAIN: 0
SHORTNESS OF BREATH: 0
HEADACHES: 0
CHILLS: 0
VOMITING: 0
CONSTIPATION: 0
FALLS: 0
EYE DISCHARGE: 0
MYALGIAS: 0
CLAUDICATION: 0
DIZZINESS: 0
HEMOPTYSIS: 0
DIARRHEA: 0
DIAPHORESIS: 0
PALPITATIONS: 0
SENSORY CHANGE: 0
INSOMNIA: 1
BLURRED VISION: 0
FEVER: 0
NAUSEA: 0
NERVOUS/ANXIOUS: 0
TINGLING: 0

## 2019-09-07 NOTE — PROGRESS NOTES
"Hospital Medicine Daily Progress Note    Date of Service  9/7/2019    Chief Complaint  Left hip pain    Hospital Course   This is a 73-year-old female with PMH significant for bullous pemphigus on CellCept, HTN, MRSA, dementia, PVD with femoropopliteal bypass and alcohol abuse who presented 9/3/2019 with left hip pain.  She has multiple chronic wounds over her previous left hip surgical site which has a history of MSSA requiring wound in April.  Single wound grew MRSA resistant to clindamycin a month.  Patient is poor historian at baseline.  CT showed questionable small subcutaneous fluid collection just deep to the skin along the left lateral hip the level of the greater trochanter possibly representing abscess.  Hip culture this hospitalization growing MRSA. ID following. Treated with Vancomycin and transitioned to Linezolid.      Interval Problem Update  -2 diarrhea stools this morning. DC'd bowel protocol. Consider CDIFF workup if continues  -ongoing HTN. Added scheduled PO Hydralazine today. PRN Vasotec    Consultants/Specialty  -Infectious Disease    Code Status  FULL    Disposition  SNF    Review of Systems  Review of Systems   Constitutional: Negative for diaphoresis, fever and malaise/fatigue.   HENT: Negative for congestion, hearing loss and nosebleeds.    Eyes: Negative for blurred vision, photophobia and discharge.   Respiratory: Negative for cough, hemoptysis and shortness of breath.    Cardiovascular: Negative for chest pain, palpitations and claudication.   Gastrointestinal: Negative for diarrhea and vomiting.   Genitourinary: Negative for dysuria, hematuria and urgency.   Musculoskeletal: Positive for joint pain. Negative for back pain, falls and myalgias.   Neurological: Positive for weakness. Negative for dizziness, tingling, sensory change and headaches.   Psychiatric/Behavioral: The patient has insomnia (\"from being on that monitor unit\"). The patient is not nervous/anxious.    All other systems " reviewed and are negative.       Physical Exam  Temp:  [36.2 °C (97.1 °F)-37.2 °C (99 °F)] 37.2 °C (99 °F)  Pulse:  [79-91] 91  Resp:  [16-18] 16  BP: (140-167)/() 167/105  SpO2:  [90 %-97 %] 93 %    Physical Exam   Constitutional: She is oriented to person, place, and time. She appears well-developed and well-nourished. She is cooperative.  Non-toxic appearance. No distress.   Hypertension   HENT:   Head: Normocephalic.   Right Ear: Hearing normal.   Left Ear: Hearing normal.   Nose: Nose normal.   Mouth/Throat: Oropharynx is clear and moist and mucous membranes are normal.   Eyes: Pupils are equal, round, and reactive to light. Conjunctivae and EOM are normal.   Neck: Phonation normal. Neck supple. No JVD present.   Cardiovascular: Normal rate, normal heart sounds and intact distal pulses.   Pulmonary/Chest: Effort normal and breath sounds normal. She has no wheezes. She has no rhonchi.   Abdominal: Soft. Bowel sounds are normal. She exhibits distension. There is no tenderness. There is no rigidity, no rebound and no guarding.   Musculoskeletal: Normal range of motion. She exhibits edema, tenderness and deformity.   Lymphadenopathy:     She has no cervical adenopathy.   Neurological: She is alert and oriented to person, place, and time.   Skin: Skin is warm and dry. No rash noted. No erythema.   Dressing to left hip CDI   Psychiatric: She has a normal mood and affect. Her speech is normal and behavior is normal. Judgment normal.       Fluids    Intake/Output Summary (Last 24 hours) at 9/7/2019 1143  Last data filed at 9/7/2019 0400  Gross per 24 hour   Intake 260 ml   Output 300 ml   Net -40 ml       Laboratory  Recent Labs     09/04/19  1247 09/06/19  0534 09/07/19  0400   WBC 7.5 6.4 6.3   RBC 3.83* 3.87* 3.68*   HEMOGLOBIN 11.9* 12.1 11.4*   HEMATOCRIT 38.2 38.0 35.9*   MCV 99.7* 98.2* 97.6   MCH 31.1 31.3 31.0   MCHC 31.2* 31.8* 31.8*   RDW 49.6 46.9 47.3   PLATELETCT 173 197 200   MPV 9.8 9.5 9.3      Recent Labs     09/05/19  1023 09/06/19  0534 09/07/19  0400   SODIUM 132* 131* 133*   POTASSIUM 2.8* 3.4* 4.7   CHLORIDE 96 97 100   CO2 28 28 27   GLUCOSE 132* 111* 113*   BUN 6* 8 10   CREATININE 0.50 0.41* 0.55   CALCIUM 8.4* 9.0 8.6                   Imaging  IR-MIDLINE CATHETER INSERTION WO GUIDANCE > AGE 3   Final Result                  Ultrasound-guided midline placement performed by qualified nursing staff    as above.          MR-LUMBAR SPINE-W/O   Final Result      1.  Multilevel degenerative changes detailed.   2.  Mild to moderate spinal stenosis L2-L3, mild L1-L2 and L3-L4.         DX-LUMBAR SPINE-2 OR 3 VIEWS   Final Result      1.  There is moderate lumbar spondylosis with degenerative disease most prominent at the L3-4 level.   2.  There is no acute fracture or malalignment.      CT-HIP WITH LEFT   Final Result      1.  There is a questionable small subcutaneous fluid collection just deep to the skin along the left lateral hip at the level the greater trochanter which could be a small subcutaneous abscess.   2.  There is a left hip arthroplasty with postoperative changes as noted above causing moderate artifact to the joint space. There is no gross effusion.           Assessment/Plan  * Skin abscess over hip, inability to walk- (present on admission)  Assessment & Plan  -MRSA (+)  -ID following - ABX per their rec's  -wound team following   -PT OT  -SNF    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)  Assessment & Plan  Continue the Cellcept.  Need to follow up with her rheumatologist    History of hip fracture- (present on admission)  Assessment & Plan  -CT (-) for fracture    Peripheral vascular disease (HCC)- (present on admission)  Assessment & Plan  -history of fem-pop bypass  -TOMI's WNL in April      Essential hypertension- (present on admission)  Assessment & Plan  -Uncontrolled.  -SBP's 160-180's with DBP's 's  -continue Amlodipine and Metoprolol  -I have started her on Hydralazine  "PO today with holding parameteres  -Vasotec PRN with parameters      Diarrhea  Assessment & Plan  -she reports \"mostly normal\" BM yesterday  -she has had 2 loose stools today (incontinent)  -I have DC'd her bowel protocol  -she has been on ABX, so concern for CDIFF.  -her abdomen is distended at her baseline, assessment is benign. No leukocytosis. Tolerating a diet. Hemodynamically stable.   -continue to monitor - if continues when off stool softeners, will do CDIFF workup.      Alcohol use- (present on admission)  Assessment & Plan  -not showing s/s withdrawal  -daily MVI, thiamine and folate        History of MRSA infection- (present on admission)  Assessment & Plan  -now with active MRSA infection  -ID following  -isolation - likely will delay acceptance to SNF  -continue ABX      Age-related physical debility- (present on admission)  Assessment & Plan  -per therapy, patient is not safe to return to her current living situation  -SNF pending acceptance      Iron deficiency anemia- (present on admission)  Assessment & Plan  -Hgb stable  -continue daily supplementation  -follow labs       VTE prophylaxis: Enoxaparin    Toya Cuevas A.P.R.N.        "

## 2019-09-07 NOTE — PROGRESS NOTES
2 RN Skin Check    2 RN skin check complete.   Devices in place: N/A.  Skin assessed under devices: yes.  Confirmed pressure ulcers found on: N/A.  New potential pressure ulcers noted on N/A. Wound consult placed No.  The following interventions in place Pillows, Waffle bed overlay, Waffle chair cushion and q2hr turns.    Scabs noted to Left forehead, by left eye, L elbow, Bilat anterior knees, several to medial L knee, R foot second toe. Wound with dressing to L lateral hip, anterior L ankle, medial L ankle. Redness to coccyx with blanchable redness, bilat entire toes, blanchable bilat heels.     Waffle in place on bed. Available chair waffle, bilat boots.

## 2019-09-07 NOTE — CARE PLAN
Problem: Safety  Goal: Will remain free from injury  Outcome: PROGRESSING AS EXPECTED     Problem: Bowel/Gastric:  Goal: Normal bowel function is maintained or improved  Outcome: PROGRESSING AS EXPECTED

## 2019-09-07 NOTE — PROGRESS NOTES
Infectious Disease Progress Note    Author: Zuri More M.D. Date & Time of service: 2019  8:41 AM    Chief Complaint:  Thigh abscess     Interval History:   AF, O2 2 L NC- stable, able to get up and walk without pain in the left hip.  She still has significant purulent drainage from abscess.    AF, O2 RA, transition her to linezolid starting tomorrow.  She still having significant drainage from left hip abscess but less erythema, less pain.  She is also endorsing significant weakness due to debilitation.     Review of Systems:  Review of Systems   Constitutional: Positive for malaise/fatigue. Negative for chills and fever.   Gastrointestinal: Negative for abdominal pain, constipation, diarrhea, nausea and vomiting.   Musculoskeletal: Negative for joint pain and myalgias.   Neurological: Positive for weakness.       Hemodynamics:  Temp (24hrs), Av.8 °C (98.3 °F), Min:36.2 °C (97.1 °F), Max:37.2 °C (99 °F)  Temperature: 37.2 °C (99 °F)  Pulse  Av.8  Min: 71  Max: 110   Blood Pressure : (!) 167/105       Physical Exam:  Physical Exam   Constitutional: She is oriented to person, place, and time. She appears well-developed and well-nourished.   HENT:   Head: Normocephalic and atraumatic.   Eyes: Pupils are equal, round, and reactive to light. Conjunctivae and EOM are normal.   Cardiovascular: Normal rate, regular rhythm and normal heart sounds.   Pulmonary/Chest: Effort normal and breath sounds normal.   Abdominal: Soft. Bowel sounds are normal.   Musculoskeletal: She exhibits tenderness. She exhibits no edema.   Left hip continues to drain purulent fluid, no significant tenderness, mild erythema at site of incision, no significant pain with movement   Neurological: She is alert and oriented to person, place, and time.   Skin: Skin is warm and dry.   Lower leg lesions with scabbing no sign of infection   Psychiatric: She has a normal mood and affect. Her behavior is normal.        Meds:    Current Facility-Administered Medications:   •  enalaprilat  •  linezolid  •  amLODIPine  •  ferrous sulfate  •  ascorbic acid  •  metoprolol SR  •  multivitamin  •  mycophenolate  •  sertraline  •  senna-docusate **AND** polyethylene glycol/lytes **AND** magnesium hydroxide **AND** bisacodyl  •  enoxaparin  •  acetaminophen  •  ondansetron  •  ondansetron  •  LR  •  lactobacillus rhamnosus  •  oxyCODONE immediate-release  •  thiamine  •  morphine injection    Labs:  Recent Labs     09/04/19  1247 09/06/19  0534 09/07/19  0400   WBC 7.5 6.4 6.3   RBC 3.83* 3.87* 3.68*   HEMOGLOBIN 11.9* 12.1 11.4*   HEMATOCRIT 38.2 38.0 35.9*   MCV 99.7* 98.2* 97.6   MCH 31.1 31.3 31.0   RDW 49.6 46.9 47.3   PLATELETCT 173 197 200   MPV 9.8 9.5 9.3   NEUTSPOLYS 79.20*  --   --    LYMPHOCYTES 12.00*  --   --    MONOCYTES 7.20  --   --    EOSINOPHILS 0.30  --   --    BASOPHILS 0.80  --   --      Recent Labs     09/05/19  1023 09/06/19  0534 09/07/19  0400   SODIUM 132* 131* 133*   POTASSIUM 2.8* 3.4* 4.7   CHLORIDE 96 97 100   CO2 28 28 27   GLUCOSE 132* 111* 113*   BUN 6* 8 10     Recent Labs     09/05/19  1023 09/06/19  0534 09/07/19  0400   CREATININE 0.50 0.41* 0.55       Imaging:  Ct-hip With Left    Result Date: 9/2/2019 9/2/2019 1:41 PM HISTORY/REASON FOR EXAM:  Hip replaced, infection suspected TECHNIQUE/EXAM DESCRIPTION AND NUMBER OF VIEWS: Helical CT scan of the pelvis and left hip is performed following the IV administration of 100 mL of Omnipaque 350. Coronal and sagittal reconstructions are provided. COMPARISON: None FINDINGS: There are postoperative changes of a left hip arthroplasty which causes some degree of artifact. There are areas of heterotopic ossification surrounding the surgical site. There is no acute fracture. There is no dislocation. There are mild degenerative changes in the lower lumbar spine and both SI joints. There is some atrophy of the left hip musculature with some soft tissue density  surrounding portions of the left hip arthroplasty but no definite joint effusion is evident on this exam. There is subcutaneous edema or skin thickening in the proximal left lateral thigh. There is a questionable small superficial fluid collection just deep to the skin along the posterior aspect of the left hip at the level of the greater trochanter measuring 24 x 9 mm. Soft tissues demonstrate atherosclerosis of the aorta and pelvic vasculature. There is colonic diverticulosis without diverticulitis. The bladder is distended.     1.  There is a questionable small subcutaneous fluid collection just deep to the skin along the left lateral hip at the level the greater trochanter which could be a small subcutaneous abscess. 2.  There is a left hip arthroplasty with postoperative changes as noted above causing moderate artifact to the joint space. There is no gross effusion.    Dx-lumbar Spine-2 Or 3 Views    Result Date: 9/2/2019 9/2/2019 6:28 PM HISTORY/REASON FOR EXAM:  Low back pain for 7 days. Left hip pain. TECHNIQUE/ EXAM DESCRIPTION AND NUMBER OF VIEWS:  3 views of the lumbar spine. COMPARISON: None. FINDINGS: The alignment of the lumbar spine is within normal limits. Vertebral body heights are maintained. There is degenerative endplate spurring at all levels throughout the lumbar spine. There is disc space narrowing most prominent at the L3-4 level. There is bilateral facet arthropathy in the mid and lower lumbar spine. SI joints are symmetric. Bony pelvis is intact. There is atherosclerosis of the aorta.     1.  There is moderate lumbar spondylosis with degenerative disease most prominent at the L3-4 level. 2.  There is no acute fracture or malalignment.    Mr-lumbar Spine-w/o    Result Date: 9/3/2019  9/3/2019 11:07 AM HISTORY/REASON FOR EXAM:  inability to walk, back pain, findings of L3-4 DJD TECHNIQUE/EXAM DESCRIPTION: MRI of the lumbar spine without contrast. The study was performed on a [employer] 1.5  [employer] MRI scanner. T1 sagittal, T2 fast spin-echo sagittal, and T2 axial images were obtained of the lumbar spine. COMPARISON:  Lumbar radiographs 9/2/2019 FINDINGS: 5 lumbar type vertebral bodies are counted. The conus medullaris terminates at T12-L1 and is normal in signal. There is a 8 mm T2 hyperintense lesion within the T12 vertebral body most likely an intraosseous hemangioma. There is no compression fracture. T11-T12: Mild diffuse disc bulge and osteophyte. No significant spinal or foraminal stenosis. T12-L1:  Minor diffuse disc bulge, osteophyte. No significant spinal or foraminal stenosis. L1-L2:  Mild diffuse disc bulge, osteophyte and small right paracentral disc protrusion. Mild spinal stenosis and left foraminal stenosis. L2-L3:  Diffuse disc bulge and osteophyte. Ligamentum flavum thickening and mild facet degeneration. Mild to moderate spinal stenosis. Foramina are adequate. L3-L4:  Disc narrowing, reactive marrow endplate changes/edema and mild disc osteophyte. Mild facet degeneration. Mild spinal stenosis and left foraminal stenosis. Right foramen is adequate. L4-L5:  Diffuse mild disc bulge and osteophyte. No significant spinal or foraminal stenosis. L5-S1:  Canal and foramina are patent. There is some lower posterior paraspinal muscle atrophy and left psoas atrophy.     1.  Multilevel degenerative changes detailed. 2.  Mild to moderate spinal stenosis L2-L3, mild L1-L2 and L3-L4.     Ir-midline Catheter Insertion Wo Guidance > Age 3    Result Date: 9/6/2019  HISTORY/REASON FOR EXAM:  Midline Placement   TECHNIQUE/EXAM DESCRIPTION AND NUMBER OF VIEWS: Midline insertion with ultrasound guidance.  FINDINGS: Midline insertion with Ultrasound Guidance was performed by qualified nursing staff without the assistance of a Radiologist. Midline positioning as measured by RN or as appropriate length of catheter selected.              Ultrasound-guided midline placement performed by qualified nursing staff as  "above.       Micro:  Results     Procedure Component Value Units Date/Time    CULTURE WOUND W/ GRAM STAIN [[id number]]  (Abnormal)  (Susceptibility) Collected:  09/02/19 2350    Order Status:  Completed Specimen:  Wound from Right Hip Updated:  09/05/19 0834     Significant Indicator POS     Source WND     Site RIGHT HIP     Culture Result -     Gram Stain Result Moderate WBCs.  Few Gram positive cocci.       Culture Result Methicillin Resistant Staphylococcus aureus  Light growth      Narrative:       CALL  Nava  183 tel. [phone],  CALLED  183 tel. [phone] 09/04/2019, 10:59, RB PERF. RESULTS CALLED  TO:[id number]    Susceptibility     Methicillin resistant staphylococcus aureus (1)     Antibiotic Interpretation Microscan Method Status    Clindamycin Resistant >4 mcg/mL LICO Final    Daptomycin Sensitive <=0.5 mcg/mL LICO Final    Erythromycin Resistant >4 mcg/mL LICO Final    Moxifloxacin Sensitive 2 mcg/mL LICO Final    Ampicillin/sulbactam Resistant 16/8 mcg/mL LICO Final    Oxacillin Resistant >2 mcg/mL LICO Final    Vancomycin Sensitive 1 mcg/mL LICO Final    Penicillin Resistant >8 mcg/mL LICO Final    Trimeth/Sulfa Sensitive <=0.5/9.5 mcg/mL LICO Final    Tetracycline Sensitive <=4 mcg/mL LICO Final                   Blood Culture [[id number]] Collected:  09/02/19 2138    Order Status:  Completed Specimen:  Blood from Peripheral Updated:  09/03/19 0835     Significant Indicator NEG     Source BLD     Site PERIPHERAL     Culture Result No Growth  Note: Blood cultures are incubated for 5 days and  are monitored continuously.Positive blood cultures  are called to the RN and reported as soon as  they are identified.      Narrative:       From different peripheral sites, if not done within the last  24 hours (Per Hospital Policy: Only change specimen source to  \"Line\" if specified by physician order)  Left AC    Blood Culture [[id number]] Collected:  09/02/19 2138    Order Status:  Completed Specimen:  Blood from Peripheral " "Updated:  09/03/19 0835     Significant Indicator NEG     Source BLD     Site PERIPHERAL     Culture Result No Growth  Note: Blood cultures are incubated for 5 days and  are monitored continuously.Positive blood cultures  are called to the RN and reported as soon as  they are identified.      Narrative:       From different peripheral sites, if not done within the last  24 hours (Per Hospital Policy: Only change specimen source to  \"Line\" if specified by physician order)  Right Hand    GRAM STAIN [[id number]] Collected:  09/02/19 2350    Order Status:  Completed Specimen:  Wound Updated:  09/03/19 0526     Significant Indicator .     Source WND     Site RIGHT HIP     Gram Stain Result Moderate WBCs.  Few Gram positive cocci.      CULTURE WOUND W/ GRAM STAIN [[id number]] Collected:  09/02/19 2350    Order Status:  Sent Specimen:  Wound from Left Leg     Urinalysis [[id number]]     Order Status:  Canceled Specimen:  Urine     URINALYSIS,CULTURE IF INDICATED [[id number]]  (Abnormal) Collected:  09/02/19 1600    Order Status:  Completed Specimen:  Urine, Straight Cath Updated:  09/02/19 1615     Color Yellow     Character Clear     Specific Gravity 1.016     Ph 7.5     Glucose Negative mg/dL      Ketones Negative mg/dL      Protein Negative mg/dL      Bilirubin Negative     Urobilinogen, Urine 0.2     Nitrite Negative     Leukocyte Esterase Negative     Occult Blood Small     Micro Urine Req Microscopic          Assessment:  Active Hospital Problems    Diagnosis   • *Skin abscess over hip, inability to walk [L02.91]   • Bullous pemphigus [L10.9]   • History of hip fracture [Z87.81]   • Peripheral vascular disease (HCC) [I73.9]   • Essential hypertension [I10]   • Age-related physical debility [R54]   • History of MRSA infection [Z86.14]   • Alcohol use [Z78.9]     Interval 24 hour assessment:      AF, O2 2 L NC   Labs reviewed  Micro reviewed    Pt continued on linezolid, she appears to be tolerating well.  Still with " significant drainage out of abscess and left hip.  No fluctuance, no pain and denies pain with ambulation.        ASSESSMENT/PLAN:      Nadege Mae is a 73 y.o.  admitted 9/2/2019. Pt has a past medical history of dementia, peripheral vascular disease with chronic lower extremity ulcers, she is status post femoral-popliteal bypass.  She also has a history of bullous pemphigus and has been treated with prednisone.  She has last seen by ID in April 2019 for left lower extremity cellulitis.  Wound cultures that time grew MSSA.  Plan at that time was to continue Augmentin with stop date of 4/29/2019. Most recent cultures from 8/23 of the left leg with MRSA.  She came into the ED complaining of multiple falls as well as pain in left hip.  There is a question whether alcohol use was involved.  Patient is poor historian.       Hospital Course:   She is afebrile, no leukocytosis.  CT of the hip on 9/2 with left lateral hip with a small abscess.  There is left hip arthroplasty postop changes, no gross effusion.  In the ER the potential abscess was open with bedside I&D and she was given clindamycin.  She underwent MR spine on 9/3 with finding of multilevel degenerative changes and spinal stenosis at L1-L4.  Blood cultures on 9/2 are no growth.  She was  started on Unasyn and vancomycin.       Left hip abscess  -I&D in the ER on 9/2-cultures with GPC's-MRSA     Left hip prosthetic joint  -Does not appear to be involved,  she is ambulating without any significant pain in the joint      Peripheral vascular disease  Chronic nonhealing lower extremity ulcers     Bullous pemphigus     Immunosuppression, secondary treatment for bullous pemphigus  -Per chart review she is on CellCept 500 mg twice daily-patient states she has not actually been taking this     History of cellulitis  - Prior wound cultures have grown MRSA, MSSA, Enterococcus faecalis and Pseudomonas     Depression, on sertraline     --- Continue linezolid, she  is on sertraline but has tolerated combination with linezolid in the past  -will plan on additional 7-10 days of antibiotics in response.  She still is having significant drainage of purulent fluid.  --- If purulent drainage continues to be significant recommend repeat CT of the hip to ensure no sinus tract from the joint, prior CT with superficial abscess only   --- Monitor for new pain with movement of the left hip or other signs or symptoms would suggest the joint is infected.    --- Continue wound care for lower extremity ulcers related to her bullous pemphigus as well as for the left hip        Plan of care discussed with Dr. Chung. Will continue to follow.

## 2019-09-08 LAB
ANION GAP SERPL CALC-SCNC: 9 MMOL/L (ref 0–11.9)
BUN SERPL-MCNC: 10 MG/DL (ref 8–22)
CALCIUM SERPL-MCNC: 9 MG/DL (ref 8.5–10.5)
CHLORIDE SERPL-SCNC: 98 MMOL/L (ref 96–112)
CO2 SERPL-SCNC: 23 MMOL/L (ref 20–33)
CREAT SERPL-MCNC: 0.51 MG/DL (ref 0.5–1.4)
ERYTHROCYTE [DISTWIDTH] IN BLOOD BY AUTOMATED COUNT: 47 FL (ref 35.9–50)
GLUCOSE SERPL-MCNC: 108 MG/DL (ref 65–99)
HCT VFR BLD AUTO: 36.5 % (ref 37–47)
HGB BLD-MCNC: 11.7 G/DL (ref 12–16)
MCH RBC QN AUTO: 31.1 PG (ref 27–33)
MCHC RBC AUTO-ENTMCNC: 32.1 G/DL (ref 33.6–35)
MCV RBC AUTO: 97.1 FL (ref 81.4–97.8)
PLATELET # BLD AUTO: 219 K/UL (ref 164–446)
PMV BLD AUTO: 9.3 FL (ref 9–12.9)
POTASSIUM SERPL-SCNC: 4 MMOL/L (ref 3.6–5.5)
RBC # BLD AUTO: 3.76 M/UL (ref 4.2–5.4)
SODIUM SERPL-SCNC: 130 MMOL/L (ref 135–145)
WBC # BLD AUTO: 6.4 K/UL (ref 4.8–10.8)

## 2019-09-08 PROCEDURE — 700102 HCHG RX REV CODE 250 W/ 637 OVERRIDE(OP): Performed by: INTERNAL MEDICINE

## 2019-09-08 PROCEDURE — A9270 NON-COVERED ITEM OR SERVICE: HCPCS | Performed by: INTERNAL MEDICINE

## 2019-09-08 PROCEDURE — 770021 HCHG ROOM/CARE - ISO PRIVATE

## 2019-09-08 PROCEDURE — 700102 HCHG RX REV CODE 250 W/ 637 OVERRIDE(OP): Performed by: NURSE PRACTITIONER

## 2019-09-08 PROCEDURE — 80048 BASIC METABOLIC PNL TOTAL CA: CPT

## 2019-09-08 PROCEDURE — 85027 COMPLETE CBC AUTOMATED: CPT

## 2019-09-08 PROCEDURE — A9270 NON-COVERED ITEM OR SERVICE: HCPCS | Performed by: NURSE PRACTITIONER

## 2019-09-08 PROCEDURE — 700102 HCHG RX REV CODE 250 W/ 637 OVERRIDE(OP): Performed by: HOSPITALIST

## 2019-09-08 PROCEDURE — 99233 SBSQ HOSP IP/OBS HIGH 50: CPT | Performed by: INTERNAL MEDICINE

## 2019-09-08 PROCEDURE — 700111 HCHG RX REV CODE 636 W/ 250 OVERRIDE (IP): Performed by: INTERNAL MEDICINE

## 2019-09-08 PROCEDURE — 36415 COLL VENOUS BLD VENIPUNCTURE: CPT

## 2019-09-08 PROCEDURE — A9270 NON-COVERED ITEM OR SERVICE: HCPCS | Performed by: HOSPITALIST

## 2019-09-08 PROCEDURE — 99233 SBSQ HOSP IP/OBS HIGH 50: CPT | Performed by: FAMILY MEDICINE

## 2019-09-08 RX ADMIN — THERA TABS 1 TABLET: TAB at 17:16

## 2019-09-08 RX ADMIN — FERROUS SULFATE TAB 325 MG (65 MG ELEMENTAL FE) 325 MG: 325 (65 FE) TAB at 08:05

## 2019-09-08 RX ADMIN — ACETAMINOPHEN 650 MG: 325 TABLET, FILM COATED ORAL at 13:09

## 2019-09-08 RX ADMIN — Medication 100 MG: at 05:17

## 2019-09-08 RX ADMIN — LINEZOLID 600 MG: 600 TABLET, FILM COATED ORAL at 05:17

## 2019-09-08 RX ADMIN — MYCOPHENOLATE MOFETIL 500 MG: 250 CAPSULE ORAL at 17:17

## 2019-09-08 RX ADMIN — HYDRALAZINE HYDROCHLORIDE 50 MG: 50 TABLET, FILM COATED ORAL at 05:17

## 2019-09-08 RX ADMIN — Medication 1 CAPSULE: at 08:05

## 2019-09-08 RX ADMIN — SERTRALINE HYDROCHLORIDE 100 MG: 100 TABLET ORAL at 05:17

## 2019-09-08 RX ADMIN — ENOXAPARIN SODIUM 40 MG: 100 INJECTION SUBCUTANEOUS at 05:32

## 2019-09-08 RX ADMIN — METOPROLOL SUCCINATE 100 MG: 50 TABLET, FILM COATED, EXTENDED RELEASE ORAL at 05:18

## 2019-09-08 RX ADMIN — MYCOPHENOLATE MOFETIL 500 MG: 250 CAPSULE ORAL at 05:17

## 2019-09-08 RX ADMIN — OXYCODONE HYDROCHLORIDE 5 MG: 5 TABLET ORAL at 21:07

## 2019-09-08 RX ADMIN — AMLODIPINE BESYLATE 10 MG: 10 TABLET ORAL at 05:17

## 2019-09-08 RX ADMIN — LINEZOLID 600 MG: 600 TABLET, FILM COATED ORAL at 17:17

## 2019-09-08 RX ADMIN — HYDRALAZINE HYDROCHLORIDE 50 MG: 50 TABLET, FILM COATED ORAL at 21:04

## 2019-09-08 RX ADMIN — FOLIC ACID 1 MG: 1 TABLET ORAL at 05:18

## 2019-09-08 RX ADMIN — OXYCODONE HYDROCHLORIDE AND ACETAMINOPHEN 500 MG: 500 TABLET ORAL at 05:18

## 2019-09-08 ASSESSMENT — ENCOUNTER SYMPTOMS
PHOTOPHOBIA: 0
WEAKNESS: 1
DIARRHEA: 1
SHORTNESS OF BREATH: 0
HEMOPTYSIS: 0
MYALGIAS: 1
COUGH: 0
ABDOMINAL PAIN: 0
SENSORY CHANGE: 0
BLURRED VISION: 0
INSOMNIA: 0
BACK PAIN: 0
FALLS: 0
CONSTIPATION: 0
VOMITING: 0
DIZZINESS: 0
EYE DISCHARGE: 0
HEADACHES: 0
DIAPHORESIS: 0
CLAUDICATION: 0
MYALGIAS: 0
CHILLS: 1
PALPITATIONS: 0
DIARRHEA: 0
TINGLING: 0
NAUSEA: 0
FEVER: 0
NERVOUS/ANXIOUS: 0

## 2019-09-08 NOTE — PROGRESS NOTES
Infectious Disease Progress Note    Author: Zuri More M.D. Date & Time of service: 2019  8:41 AM    Chief Complaint:  Thigh abscess     Interval History:   AF, O2 2 L NC- stable, able to get up and walk without pain in the left hip.  She still has significant purulent drainage from abscess.    AF, O2 RA, transition her to linezolid starting tomorrow.  She still having significant drainage from left hip abscess but less erythema, less pain.  She is also endorsing significant weakness due to debilitation.   AF, O2 2 L NC, continued on linezolid, she appears to be tolerating well.  Still with significant drainage out of abscess and left hip.  No fluctuance, no pain and denies pain with ambulation.         Review of Systems:  Review of Systems   Constitutional: Positive for chills. Negative for fever and malaise/fatigue.   Gastrointestinal: Positive for diarrhea. Negative for abdominal pain, constipation, nausea and vomiting.   Musculoskeletal: Positive for joint pain and myalgias.   Skin: Negative for rash.       Hemodynamics:  Temp (24hrs), Av.8 °C (98.3 °F), Min:36.7 °C (98 °F), Max:37.1 °C (98.8 °F)  Temperature: 36.8 °C (98.3 °F)  Pulse  Av  Min: 71  Max: 110   Blood Pressure : 145/91       Physical Exam:  Physical Exam   Constitutional: She is oriented to person, place, and time. She appears well-developed and well-nourished.   HENT:   Head: Normocephalic and atraumatic.   Eyes: Pupils are equal, round, and reactive to light. Conjunctivae and EOM are normal.   Cardiovascular: Normal rate and normal heart sounds.   Pulmonary/Chest: Effort normal and breath sounds normal.   Abdominal: Soft. Bowel sounds are normal.   Musculoskeletal: She exhibits no edema or tenderness.   Neurological: She is alert and oriented to person, place, and time.       Meds:    Current Facility-Administered Medications:   •  enalaprilat  •  hydrALAZINE  •  folic acid  •  linezolid  •  amLODIPine  •  ferrous  sulfate  •  ascorbic acid  •  metoprolol SR  •  multivitamin  •  mycophenolate  •  sertraline  •  enoxaparin  •  acetaminophen  •  ondansetron  •  ondansetron  •  LR  •  lactobacillus rhamnosus  •  oxyCODONE immediate-release  •  thiamine  •  morphine injection    Labs:  Recent Labs     09/06/19  0534 09/07/19  0400 09/08/19  0245   WBC 6.4 6.3 6.4   RBC 3.87* 3.68* 3.76*   HEMOGLOBIN 12.1 11.4* 11.7*   HEMATOCRIT 38.0 35.9* 36.5*   MCV 98.2* 97.6 97.1   MCH 31.3 31.0 31.1   RDW 46.9 47.3 47.0   PLATELETCT 197 200 219   MPV 9.5 9.3 9.3     Recent Labs     09/06/19  0534 09/07/19  0400 09/08/19  0245   SODIUM 131* 133* 130*   POTASSIUM 3.4* 4.7 4.0   CHLORIDE 97 100 98   CO2 28 27 23   GLUCOSE 111* 113* 108*   BUN 8 10 10     Recent Labs     09/06/19  0534 09/07/19  0400 09/08/19  0245   CREATININE 0.41* 0.55 0.51       Imaging:  Ct-hip With Left    Result Date: 9/2/2019 9/2/2019 1:41 PM HISTORY/REASON FOR EXAM:  Hip replaced, infection suspected TECHNIQUE/EXAM DESCRIPTION AND NUMBER OF VIEWS: Helical CT scan of the pelvis and left hip is performed following the IV administration of 100 mL of Omnipaque 350. Coronal and sagittal reconstructions are provided. COMPARISON: None FINDINGS: There are postoperative changes of a left hip arthroplasty which causes some degree of artifact. There are areas of heterotopic ossification surrounding the surgical site. There is no acute fracture. There is no dislocation. There are mild degenerative changes in the lower lumbar spine and both SI joints. There is some atrophy of the left hip musculature with some soft tissue density surrounding portions of the left hip arthroplasty but no definite joint effusion is evident on this exam. There is subcutaneous edema or skin thickening in the proximal left lateral thigh. There is a questionable small superficial fluid collection just deep to the skin along the posterior aspect of the left hip at the level of the greater trochanter  measuring 24 x 9 mm. Soft tissues demonstrate atherosclerosis of the aorta and pelvic vasculature. There is colonic diverticulosis without diverticulitis. The bladder is distended.     1.  There is a questionable small subcutaneous fluid collection just deep to the skin along the left lateral hip at the level the greater trochanter which could be a small subcutaneous abscess. 2.  There is a left hip arthroplasty with postoperative changes as noted above causing moderate artifact to the joint space. There is no gross effusion.    Dx-lumbar Spine-2 Or 3 Views    Result Date: 9/2/2019 9/2/2019 6:28 PM HISTORY/REASON FOR EXAM:  Low back pain for 7 days. Left hip pain. TECHNIQUE/ EXAM DESCRIPTION AND NUMBER OF VIEWS:  3 views of the lumbar spine. COMPARISON: None. FINDINGS: The alignment of the lumbar spine is within normal limits. Vertebral body heights are maintained. There is degenerative endplate spurring at all levels throughout the lumbar spine. There is disc space narrowing most prominent at the L3-4 level. There is bilateral facet arthropathy in the mid and lower lumbar spine. SI joints are symmetric. Bony pelvis is intact. There is atherosclerosis of the aorta.     1.  There is moderate lumbar spondylosis with degenerative disease most prominent at the L3-4 level. 2.  There is no acute fracture or malalignment.    Mr-lumbar Spine-w/o    Result Date: 9/3/2019  9/3/2019 11:07 AM HISTORY/REASON FOR EXAM:  inability to walk, back pain, findings of L3-4 DJD TECHNIQUE/EXAM DESCRIPTION: MRI of the lumbar spine without contrast. The study was performed on a [employer] Signa 1.5 Dayami MRI scanner. T1 sagittal, T2 fast spin-echo sagittal, and T2 axial images were obtained of the lumbar spine. COMPARISON:  Lumbar radiographs 9/2/2019 FINDINGS: 5 lumbar type vertebral bodies are counted. The conus medullaris terminates at T12-L1 and is normal in signal. There is a 8 mm T2 hyperintense lesion within the T12 vertebral body most  likely an intraosseous hemangioma. There is no compression fracture. T11-T12: Mild diffuse disc bulge and osteophyte. No significant spinal or foraminal stenosis. T12-L1:  Minor diffuse disc bulge, osteophyte. No significant spinal or foraminal stenosis. L1-L2:  Mild diffuse disc bulge, osteophyte and small right paracentral disc protrusion. Mild spinal stenosis and left foraminal stenosis. L2-L3:  Diffuse disc bulge and osteophyte. Ligamentum flavum thickening and mild facet degeneration. Mild to moderate spinal stenosis. Foramina are adequate. L3-L4:  Disc narrowing, reactive marrow endplate changes/edema and mild disc osteophyte. Mild facet degeneration. Mild spinal stenosis and left foraminal stenosis. Right foramen is adequate. L4-L5:  Diffuse mild disc bulge and osteophyte. No significant spinal or foraminal stenosis. L5-S1:  Canal and foramina are patent. There is some lower posterior paraspinal muscle atrophy and left psoas atrophy.     1.  Multilevel degenerative changes detailed. 2.  Mild to moderate spinal stenosis L2-L3, mild L1-L2 and L3-L4.     Ir-midline Catheter Insertion Wo Guidance > Age 3    Result Date: 9/6/2019  HISTORY/REASON FOR EXAM:  Midline Placement   TECHNIQUE/EXAM DESCRIPTION AND NUMBER OF VIEWS: Midline insertion with ultrasound guidance.  FINDINGS: Midline insertion with Ultrasound Guidance was performed by qualified nursing staff without the assistance of a Radiologist. Midline positioning as measured by RN or as appropriate length of catheter selected.              Ultrasound-guided midline placement performed by qualified nursing staff as above.       Micro:  Results     Procedure Component Value Units Date/Time    Blood Culture [[id number]] Collected:  09/02/19 2138    Order Status:  Completed Specimen:  Blood from Peripheral Updated:  09/07/19 2300     Significant Indicator NEG     Source BLD     Site PERIPHERAL     Culture Result No growth after 5 days of incubation.    Narrative:  "      From different peripheral sites, if not done within the last  24 hours (Per Hospital Policy: Only change specimen source to  \"Line\" if specified by physician order)  Left AC    Blood Culture [[id number]] Collected:  09/02/19 2138    Order Status:  Completed Specimen:  Blood from Peripheral Updated:  09/07/19 2300     Significant Indicator NEG     Source BLD     Site PERIPHERAL     Culture Result No growth after 5 days of incubation.    Narrative:       From different peripheral sites, if not done within the last  24 hours (Per Hospital Policy: Only change specimen source to  \"Line\" if specified by physician order)  Right Hand    CULTURE WOUND W/ GRAM STAIN [[id number]]  (Abnormal)  (Susceptibility) Collected:  09/02/19 2350    Order Status:  Completed Specimen:  Wound from Right Hip Updated:  09/05/19 0834     Significant Indicator POS     Source WND     Site RIGHT HIP     Culture Result -     Gram Stain Result Moderate WBCs.  Few Gram positive cocci.       Culture Result Methicillin Resistant Staphylococcus aureus  Light growth      Narrative:       CALL  Nava  183 tel. [phone],  CALLED  183 tel. [phone] 09/04/2019, 10:59, RB PERF. RESULTS CALLED  TO:[phone]    Susceptibility     Methicillin resistant staphylococcus aureus (1)     Antibiotic Interpretation Microscan Method Status    Clindamycin Resistant >4 mcg/mL LICO Final    Daptomycin Sensitive <=0.5 mcg/mL LICO Final    Erythromycin Resistant >4 mcg/mL LICO Final    Moxifloxacin Sensitive 2 mcg/mL LICO Final    Ampicillin/sulbactam Resistant 16/8 mcg/mL LICO Final    Oxacillin Resistant >2 mcg/mL LICO Final    Vancomycin Sensitive 1 mcg/mL LICO Final    Penicillin Resistant >8 mcg/mL LICO Final    Trimeth/Sulfa Sensitive <=0.5/9.5 mcg/mL LICO Final    Tetracycline Sensitive <=4 mcg/mL LICO Final                   GRAM STAIN [[id number]] Collected:  09/02/19 2350    Order Status:  Completed Specimen:  Wound Updated:  09/03/19 0526     Significant Indicator .     " Source WND     Site RIGHT HIP     Gram Stain Result Moderate WBCs.  Few Gram positive cocci.      CULTURE WOUND W/ GRAM STAIN [[id number]] Collected:  09/02/19 2350    Order Status:  Sent Specimen:  Wound from Left Leg     Urinalysis [[id number]]     Order Status:  Canceled Specimen:  Urine     URINALYSIS,CULTURE IF INDICATED [[id number]]  (Abnormal) Collected:  09/02/19 1600    Order Status:  Completed Specimen:  Urine, Straight Cath Updated:  09/02/19 1615     Color Yellow     Character Clear     Specific Gravity 1.016     Ph 7.5     Glucose Negative mg/dL      Ketones Negative mg/dL      Protein Negative mg/dL      Bilirubin Negative     Urobilinogen, Urine 0.2     Nitrite Negative     Leukocyte Esterase Negative     Occult Blood Small     Micro Urine Req Microscopic          Assessment:  Active Hospital Problems    Diagnosis   • *Skin abscess over hip, inability to walk [L02.91]   • Bullous pemphigus [L10.9]   • History of hip fracture [Z87.81]   • Peripheral vascular disease (HCC) [I73.9]   • Essential hypertension [I10]   • Iron deficiency anemia [D50.9]   • Diarrhea [R19.7]   • Age-related physical debility [R54]   • History of MRSA infection [Z86.14]   • Alcohol use [Z78.9]     Interval 24 hour assessment:      AF, O2 RA   Labs reviewed, vanco trough of 20.1 on 9/8   Micro reviewed    Pt continued on linezolid   she is having some chills today but otherwise doing well.  Still with significant drainage from left hip I&D site.  She has no pain with hip flexion or extension.  Denies any pain with ambulation.  She had multiple loose stools yesterday.  No abdominal pain.         ASSESSMENT/PLAN:      Nadege Mae is a 73 y.o.  admitted 9/2/2019. Pt has a past medical history of dementia, peripheral vascular disease with chronic lower extremity ulcers, she is status post femoral-popliteal bypass.  She also has a history of bullous pemphigus and has been treated with prednisone.  She has last seen by ID in  April 2019 for left lower extremity cellulitis.  Wound cultures that time grew MSSA.  Plan at that time was to continue Augmentin with stop date of 4/29/2019. Most recent cultures from 8/23 of the left leg with MRSA.  She came into the ED complaining of multiple falls as well as pain in left hip.  There is a question whether alcohol use was involved.  Patient is poor historian.       Hospital Course:   She is afebrile, no leukocytosis.  CT of the hip on 9/2 with left lateral hip with a small abscess.  There is left hip arthroplasty postop changes, no gross effusion.  In the ER the potential abscess was open with bedside I&D and she was given clindamycin.  She underwent MR spine on 9/3 with finding of multilevel degenerative changes and spinal stenosis at L1-L4.  Blood cultures on 9/2 are no growth.  She was  started on Unasyn and vancomycin.       Left hip abscess  -I&D in the ER on 9/2-cultures with GPC's-MRSA     Left hip prosthetic joint  -Does not appear to be involved,  she is ambulating without any significant pain in the joint      Peripheral vascular disease  Chronic nonhealing lower extremity ulcers     Bullous pemphigus     Immunosuppression, secondary treatment for bullous pemphigus  -Per chart review she is on CellCept 500 mg twice daily-patient states she has not actually been taking this     History of cellulitis  - Prior wound cultures have grown MRSA, MSSA, Enterococcus faecalis and Pseudomonas     Diarrhea  -6 loose stools yesterday.  Patient is denying any abdominal pain.  One loose stool so far today.  She is on iron.     Depression, on sertraline      --- Continue linezolid, she is on sertraline but has tolerated combination with linezolid in the past  -will plan on additional 7-10 days of antibiotics in response.  She still is having significant drainage of purulent fluid.  --- Monitor for vital irregularities, new confusion or rigidity to suggest serotonin syndrome, none so far  --- If purulent  drainage continues to be significant tomorrow will recommend repeat CT of the hip to ensure no sinus tract from the joint, prior CT with superficial abscess only   --- Monitor for new pain with movement of the left hip or other signs or symptoms would suggest the joint is infected.    --- Continue wound care for lower extremity ulcers related to her bullous pemphigus as well as for the left hip  --- C. difficile testing if patient continues to have loose stools today, discussed with        Plan of care discussed with nurse. ID will continue to follow.

## 2019-09-08 NOTE — PROGRESS NOTES
"Hospital Medicine Daily Progress Note    Date of Service  9/8/2019    Chief Complaint  Left hip pain    Hospital Course   This is a 73-year-old female with PMH significant for bullous pemphigus on CellCept, HTN, MRSA, dementia, PVD with femoropopliteal bypass and alcohol abuse who presented 9/3/2019 with left hip pain.  She has multiple chronic wounds over her previous left hip surgical site which has a history of MSSA requiring wound in April.  Single wound grew MRSA resistant to clindamycin a month.  Patient is poor historian at baseline.  CT showed questionable small subcutaneous fluid collection just deep to the skin along the left lateral hip the level of the greater trochanter possibly representing abscess.  Hip culture this hospitalization growing MRSA. ID following. Treated with Vancomycin and transitioned to Linezolid.      Interval Problem Update  9/8 - better BP control today with addition of Hydralazine yesterday  -diarrhea improving. She reports only 1 small mucoid/loose stool this morning.    9/7 -2 diarrhea stools this morning. DC'd bowel protocol. Consider CDIFF workup if continues  -ongoing HTN. Added scheduled PO Hydralazine today. PRN Vasotec    Consultants/Specialty  -Infectious Disease    Code Status  FULL    Disposition  SNF    Review of Systems  Review of Systems   Constitutional: Positive for malaise/fatigue. Negative for diaphoresis and fever.        \"I'm tired today\"     HENT: Negative for congestion, hearing loss and nosebleeds.    Eyes: Negative for blurred vision, photophobia and discharge.   Respiratory: Negative for cough, hemoptysis and shortness of breath.    Cardiovascular: Negative for chest pain, palpitations and claudication.   Gastrointestinal: Negative for diarrhea and vomiting.   Genitourinary: Negative for dysuria, hematuria and urgency.   Musculoskeletal: Positive for joint pain. Negative for back pain, falls and myalgias.   Neurological: Positive for weakness. Negative for " dizziness, tingling, sensory change and headaches.   Psychiatric/Behavioral: The patient is not nervous/anxious and does not have insomnia.    All other systems reviewed and are negative.       Physical Exam  Temp:  [36.7 °C (98 °F)-37.1 °C (98.8 °F)] 37 °C (98.6 °F)  Pulse:  [73-84] 80  Resp:  [16-18] 17  BP: (134-158)/(83-96) 140/90  SpO2:  [92 %-95 %] 94 %    Physical Exam   Constitutional: She is oriented to person, place, and time. Vital signs are normal. She appears well-developed and well-nourished. She is sleeping. She is easily aroused.  Non-toxic appearance. No distress.   HENT:   Head: Normocephalic.   Right Ear: Hearing normal.   Left Ear: Hearing normal.   Nose: Nose normal.   Mouth/Throat: Oropharynx is clear and moist and mucous membranes are normal.   Eyes: Pupils are equal, round, and reactive to light. Conjunctivae and EOM are normal.   Neck: Phonation normal. Neck supple.   Cardiovascular: Normal rate, normal heart sounds and intact distal pulses.   Pulmonary/Chest: Effort normal. She has decreased breath sounds. She has no rhonchi. She has no rales.   Abdominal: Soft. Bowel sounds are normal. She exhibits distension. There is no tenderness. There is no rigidity, no rebound and no guarding.   Musculoskeletal: Normal range of motion.   Lymphadenopathy:     She has no cervical adenopathy.   Neurological: She is oriented to person, place, and time and easily aroused.   Skin: Skin is warm and dry. No rash noted. No erythema.   Dressing to left hip CDI   Psychiatric: She has a normal mood and affect. Her speech is normal and behavior is normal. Judgment normal.   Nursing note and vitals reviewed.      Fluids    Intake/Output Summary (Last 24 hours) at 9/8/2019 1126  Last data filed at 9/8/2019 0500  Gross per 24 hour   Intake --   Output 700 ml   Net -700 ml       Laboratory  Recent Labs     09/06/19  0534 09/07/19  0400 09/08/19  0245   WBC 6.4 6.3 6.4   RBC 3.87* 3.68* 3.76*   HEMOGLOBIN 12.1 11.4*  11.7*   HEMATOCRIT 38.0 35.9* 36.5*   MCV 98.2* 97.6 97.1   MCH 31.3 31.0 31.1   MCHC 31.8* 31.8* 32.1*   RDW 46.9 47.3 47.0   PLATELETCT 197 200 219   MPV 9.5 9.3 9.3     Recent Labs     09/06/19  0534 09/07/19  0400 09/08/19  0245   SODIUM 131* 133* 130*   POTASSIUM 3.4* 4.7 4.0   CHLORIDE 97 100 98   CO2 28 27 23   GLUCOSE 111* 113* 108*   BUN 8 10 10   CREATININE 0.41* 0.55 0.51   CALCIUM 9.0 8.6 9.0                   Imaging  IR-MIDLINE CATHETER INSERTION WO GUIDANCE > AGE 3   Final Result                  Ultrasound-guided midline placement performed by qualified nursing staff    as above.          MR-LUMBAR SPINE-W/O   Final Result      1.  Multilevel degenerative changes detailed.   2.  Mild to moderate spinal stenosis L2-L3, mild L1-L2 and L3-L4.         DX-LUMBAR SPINE-2 OR 3 VIEWS   Final Result      1.  There is moderate lumbar spondylosis with degenerative disease most prominent at the L3-4 level.   2.  There is no acute fracture or malalignment.      CT-HIP WITH LEFT   Final Result      1.  There is a questionable small subcutaneous fluid collection just deep to the skin along the left lateral hip at the level the greater trochanter which could be a small subcutaneous abscess.   2.  There is a left hip arthroplasty with postoperative changes as noted above causing moderate artifact to the joint space. There is no gross effusion.           Assessment/Plan  * Skin abscess over hip, inability to walk- (present on admission)  Assessment & Plan  -MRSA (+)  -ID following - ABX per their rec's  -wound team following   -PT OT  -SNF    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)  Assessment & Plan  -continue Cellcept  -OP FU with Rheumatology        History of hip fracture- (present on admission)  Assessment & Plan  -CT (-) for fracture    Hyponatremia- (present on admission)  Assessment & Plan  -this appears to be chronic  -stable at her baseline    Peripheral vascular disease (HCC)- (present on  admission)  Assessment & Plan  -history of fem-pop bypass  -TOMI's WNL in April      Essential hypertension- (present on admission)  Assessment & Plan  -Uncontrolled.  -better BP's today after adding Hydralazine yesterday  -continue Amlodipine and Metoprolol  -Vasotec PRN with parameters      Diarrhea  Assessment & Plan  -only 1 small loose/mucous stool today  -continue to hold bowel protocol for now. If diarrhea subsides will consider re-ordering tomorrow  -ID is following        Alcohol use- (present on admission)  Assessment & Plan  -not showing s/s withdrawal  -daily MVI, thiamine and folate        History of MRSA infection- (present on admission)  Assessment & Plan  -now with active MRSA infection  -ID following  -isolation - likely will delay acceptance to SNF  -continue ABX      Age-related physical debility- (present on admission)  Assessment & Plan  -per therapy, patient is not safe to return to her current living situation  -SNF pending acceptance      Iron deficiency anemia- (present on admission)  Assessment & Plan  -Hgb stable  -continue daily supplementation  -follow labs       VTE prophylaxis: Enoxaparin    RYANN Gallegos

## 2019-09-08 NOTE — CARE PLAN
Problem: Pain Management  Goal: Pain level will decrease to patient's comfort goal  Outcome: PROGRESSING AS EXPECTED   Patient reports adequate pain control with minimal pharmacologic intervention.    Problem: Mobility  Goal: Risk for activity intolerance will decrease  Outcome: PROGRESSING SLOWER THAN EXPECTED   Patient resistant to mobilization. Continuing education provided on importance.

## 2019-09-08 NOTE — PROGRESS NOTES
Notified IRON Pittman of pt report of neck pain especially with movement and pt request for neck xray.

## 2019-09-08 NOTE — CARE PLAN
Problem: Skin Integrity  Goal: Risk for impaired skin integrity will decrease  Outcome: PROGRESSING AS EXPECTED   Communicated with team that patient needs Q2 hour turns, waffle cushion in place and barrier cream applied.     Problem: Mobility  Goal: Risk for activity intolerance will decrease  Outcome: PROGRESSING SLOWER THAN EXPECTED   Patient is unsteady on feet with FWW.

## 2019-09-08 NOTE — PROGRESS NOTES
Spoke with Dr. More regarding report from night shift that patient had multiple loose stools yesterday. Received instruction to order stool sample if this continues today.

## 2019-09-09 ENCOUNTER — APPOINTMENT (OUTPATIENT)
Dept: RADIOLOGY | Facility: MEDICAL CENTER | Age: 74
DRG: 603 | End: 2019-09-09
Attending: HOSPITALIST
Payer: MEDICARE

## 2019-09-09 ENCOUNTER — APPOINTMENT (OUTPATIENT)
Dept: RADIOLOGY | Facility: MEDICAL CENTER | Age: 74
DRG: 603 | End: 2019-09-09
Attending: NURSE PRACTITIONER
Payer: MEDICARE

## 2019-09-09 PROBLEM — M54.2 NECK PAIN ON RIGHT SIDE: Status: ACTIVE | Noted: 2019-09-09

## 2019-09-09 LAB
ANION GAP SERPL CALC-SCNC: 8 MMOL/L (ref 0–11.9)
BUN SERPL-MCNC: 12 MG/DL (ref 8–22)
CALCIUM SERPL-MCNC: 8.9 MG/DL (ref 8.5–10.5)
CHLORIDE SERPL-SCNC: 99 MMOL/L (ref 96–112)
CO2 SERPL-SCNC: 24 MMOL/L (ref 20–33)
CREAT SERPL-MCNC: 0.63 MG/DL (ref 0.5–1.4)
ERYTHROCYTE [DISTWIDTH] IN BLOOD BY AUTOMATED COUNT: 48.1 FL (ref 35.9–50)
GLUCOSE SERPL-MCNC: 109 MG/DL (ref 65–99)
HCT VFR BLD AUTO: 36.6 % (ref 37–47)
HGB BLD-MCNC: 11.5 G/DL (ref 12–16)
MCH RBC QN AUTO: 30.9 PG (ref 27–33)
MCHC RBC AUTO-ENTMCNC: 31.4 G/DL (ref 33.6–35)
MCV RBC AUTO: 98.4 FL (ref 81.4–97.8)
PLATELET # BLD AUTO: 273 K/UL (ref 164–446)
PMV BLD AUTO: 9.6 FL (ref 9–12.9)
POTASSIUM SERPL-SCNC: 3.9 MMOL/L (ref 3.6–5.5)
RBC # BLD AUTO: 3.72 M/UL (ref 4.2–5.4)
SODIUM SERPL-SCNC: 131 MMOL/L (ref 135–145)
WBC # BLD AUTO: 7.1 K/UL (ref 4.8–10.8)

## 2019-09-09 PROCEDURE — 700102 HCHG RX REV CODE 250 W/ 637 OVERRIDE(OP): Performed by: INTERNAL MEDICINE

## 2019-09-09 PROCEDURE — 85027 COMPLETE CBC AUTOMATED: CPT

## 2019-09-09 PROCEDURE — 700102 HCHG RX REV CODE 250 W/ 637 OVERRIDE(OP): Performed by: HOSPITALIST

## 2019-09-09 PROCEDURE — 97535 SELF CARE MNGMENT TRAINING: CPT

## 2019-09-09 PROCEDURE — A9270 NON-COVERED ITEM OR SERVICE: HCPCS | Performed by: HOSPITALIST

## 2019-09-09 PROCEDURE — 36415 COLL VENOUS BLD VENIPUNCTURE: CPT

## 2019-09-09 PROCEDURE — A9270 NON-COVERED ITEM OR SERVICE: HCPCS | Performed by: INTERNAL MEDICINE

## 2019-09-09 PROCEDURE — 72040 X-RAY EXAM NECK SPINE 2-3 VW: CPT

## 2019-09-09 PROCEDURE — 770021 HCHG ROOM/CARE - ISO PRIVATE

## 2019-09-09 PROCEDURE — 72125 CT NECK SPINE W/O DYE: CPT

## 2019-09-09 PROCEDURE — 700111 HCHG RX REV CODE 636 W/ 250 OVERRIDE (IP): Performed by: INTERNAL MEDICINE

## 2019-09-09 PROCEDURE — 99232 SBSQ HOSP IP/OBS MODERATE 35: CPT | Performed by: INTERNAL MEDICINE

## 2019-09-09 PROCEDURE — 99232 SBSQ HOSP IP/OBS MODERATE 35: CPT | Performed by: FAMILY MEDICINE

## 2019-09-09 PROCEDURE — 700102 HCHG RX REV CODE 250 W/ 637 OVERRIDE(OP): Performed by: NURSE PRACTITIONER

## 2019-09-09 PROCEDURE — A9270 NON-COVERED ITEM OR SERVICE: HCPCS | Performed by: NURSE PRACTITIONER

## 2019-09-09 PROCEDURE — 80048 BASIC METABOLIC PNL TOTAL CA: CPT

## 2019-09-09 RX ORDER — HYDRALAZINE HYDROCHLORIDE 50 MG/1
50 TABLET, FILM COATED ORAL 2 TIMES DAILY
Status: DISCONTINUED | OUTPATIENT
Start: 2019-09-09 | End: 2019-09-12 | Stop reason: HOSPADM

## 2019-09-09 RX ORDER — POLYETHYLENE GLYCOL 3350 17 G/17G
1 POWDER, FOR SOLUTION ORAL
Status: DISCONTINUED | OUTPATIENT
Start: 2019-09-09 | End: 2019-09-12 | Stop reason: HOSPADM

## 2019-09-09 RX ORDER — AMOXICILLIN 250 MG
1 CAPSULE ORAL DAILY
Status: DISCONTINUED | OUTPATIENT
Start: 2019-09-09 | End: 2019-09-12 | Stop reason: HOSPADM

## 2019-09-09 RX ADMIN — THERA TABS 1 TABLET: TAB at 17:25

## 2019-09-09 RX ADMIN — MYCOPHENOLATE MOFETIL 500 MG: 250 CAPSULE ORAL at 17:25

## 2019-09-09 RX ADMIN — ENOXAPARIN SODIUM 40 MG: 100 INJECTION SUBCUTANEOUS at 05:56

## 2019-09-09 RX ADMIN — ACETAMINOPHEN 650 MG: 325 TABLET, FILM COATED ORAL at 19:32

## 2019-09-09 RX ADMIN — OXYCODONE HYDROCHLORIDE AND ACETAMINOPHEN 500 MG: 500 TABLET ORAL at 05:56

## 2019-09-09 RX ADMIN — FOLIC ACID 1 MG: 1 TABLET ORAL at 05:56

## 2019-09-09 RX ADMIN — SERTRALINE HYDROCHLORIDE 100 MG: 100 TABLET ORAL at 05:56

## 2019-09-09 RX ADMIN — AMLODIPINE BESYLATE 10 MG: 10 TABLET ORAL at 05:56

## 2019-09-09 RX ADMIN — SENNOSIDES, DOCUSATE SODIUM 1 TABLET: 50; 8.6 TABLET, FILM COATED ORAL at 17:25

## 2019-09-09 RX ADMIN — LINEZOLID 600 MG: 600 TABLET, FILM COATED ORAL at 05:56

## 2019-09-09 RX ADMIN — ACETAMINOPHEN 650 MG: 325 TABLET, FILM COATED ORAL at 06:07

## 2019-09-09 RX ADMIN — FERROUS SULFATE TAB 325 MG (65 MG ELEMENTAL FE) 325 MG: 325 (65 FE) TAB at 07:50

## 2019-09-09 RX ADMIN — HYDRALAZINE HYDROCHLORIDE 50 MG: 50 TABLET, FILM COATED ORAL at 17:25

## 2019-09-09 RX ADMIN — Medication 100 MG: at 05:56

## 2019-09-09 RX ADMIN — Medication 1 CAPSULE: at 07:50

## 2019-09-09 RX ADMIN — MYCOPHENOLATE MOFETIL 500 MG: 250 CAPSULE ORAL at 06:38

## 2019-09-09 RX ADMIN — HYDRALAZINE HYDROCHLORIDE 50 MG: 50 TABLET, FILM COATED ORAL at 05:56

## 2019-09-09 RX ADMIN — LINEZOLID 600 MG: 600 TABLET, FILM COATED ORAL at 17:25

## 2019-09-09 RX ADMIN — METOPROLOL SUCCINATE 100 MG: 50 TABLET, FILM COATED, EXTENDED RELEASE ORAL at 06:38

## 2019-09-09 RX ADMIN — ACETAMINOPHEN 650 MG: 325 TABLET, FILM COATED ORAL at 13:20

## 2019-09-09 ASSESSMENT — ENCOUNTER SYMPTOMS
MYALGIAS: 1
SENSORY CHANGE: 0
HEMOPTYSIS: 0
FALLS: 0
BLURRED VISION: 0
DIZZINESS: 0
COUGH: 0
HEADACHES: 0
WEAKNESS: 1
MYALGIAS: 0
FEVER: 0
PHOTOPHOBIA: 0
NERVOUS/ANXIOUS: 0
VOMITING: 0
INSOMNIA: 0
DIAPHORESIS: 0
DIARRHEA: 0
NECK PAIN: 1
MEMORY LOSS: 1
EYE DISCHARGE: 0
SHORTNESS OF BREATH: 0
CHILLS: 0
TINGLING: 0
PALPITATIONS: 0
BACK PAIN: 0
CLAUDICATION: 0

## 2019-09-09 ASSESSMENT — COGNITIVE AND FUNCTIONAL STATUS - GENERAL
DRESSING REGULAR LOWER BODY CLOTHING: A LOT
TOILETING: A LOT
DRESSING REGULAR UPPER BODY CLOTHING: A LITTLE
HELP NEEDED FOR BATHING: A LOT
DAILY ACTIVITIY SCORE: 17
SUGGESTED CMS G CODE MODIFIER DAILY ACTIVITY: CK

## 2019-09-09 NOTE — THERAPY
"Occupational Therapy Treatment completed with focus on ADLs, ADL transfers and patient education.  Functional Status:  Pt seen for OT tx. Supv w/ HOB elevated supine > sit, min A sit > stand, mod A transfer using FWW > BSC for balance, safety and FWW management. Min A for BSC transfer for balance and safety. Max A pericare. Completed seated grooming w/ setup and supv. Min A sit > stand from BSC, mod  A amb to chair. Pt w/ impaired balance, safety and decreased activity tolerance. Pleasant and cooperative. This pt is familiar to this therapist from previous admissions. Pt w/ increased confusion and decreased activity tolerance this session from previous hospitalizations. During session pt expressed motivation to regain strength, endurance and independence in ADLs and ADL transfers. Would continue to benefit from OT services while in-house to increase strength, endurance and independence in ADLs and ADL transfers.   Plan of Care: Will benefit from Occupational Therapy 3 times per week  Discharge Recommendations:  Equipment Will Continue to Assess for Equipment Needs. Post-acute therapy Discharge to a transitional care facility for continued skilled therapy services.    See \"Rehab Therapy-Acute\" Patient Summary Report for complete documentation.   "

## 2019-09-09 NOTE — CARE PLAN
Problem: Safety  Goal: Will remain free from injury  Outcome: PROGRESSING AS EXPECTED   Precautions taken to prevent falls and skin breakdown. Waffle cushion and Q2 turns in place. Bed in locked and in low position. Call light and personal belongings within reach.     Problem: Mobility  Goal: Risk for activity intolerance will decrease  Outcome: PROGRESSING SLOWER THAN EXPECTED  Patient refusing mobilization despite education on importance. Will continue to encourage patient to mobilize and provide education.

## 2019-09-09 NOTE — CARE PLAN
Problem: Fluid Volume:  Goal: Will maintain balanced intake and output  Outcome: PROGRESSING AS EXPECTED  Note:   Tolerating PO fluids with no issues, voiding with no issues. Continue to provide fluids, monitor I/Os, offer toileting     Problem: Urinary Elimination:  Goal: Ability to reestablish a normal urinary elimination pattern will improve  Outcome: PROGRESSING SLOWER THAN EXPECTED  Incontinence. Purewick in use, offer regular toileting

## 2019-09-09 NOTE — PROGRESS NOTES
"Hospital Medicine Daily Progress Note    Date of Service  9/9/2019    Chief Complaint  Left hip pain    Hospital Course   This is a 73-year-old female with PMH significant for bullous pemphigus on CellCept, HTN, MRSA, dementia, PVD with femoropopliteal bypass and alcohol abuse who presented 9/3/2019 with left hip pain.  She has multiple chronic wounds over her previous left hip surgical site which has a history of MSSA requiring wound in April.  Single wound grew MRSA resistant to clindamycin a month.  Patient is poor historian at baseline.  CT showed questionable small subcutaneous fluid collection just deep to the skin along the left lateral hip the level of the greater trochanter possibly representing abscess.  Hip culture this hospitalization growing MRSA. ID following. Treated with Vancomycin and transitioned to Linezolid.      Interval Problem Update  9/9 - BP's better controlled. Decreased Hydralazine from TID to BID dosing.  -she c/o neck pain - \"I must have hurt it when I fell\". X-ray pending. Heat packs  -no diarrhea > 24 hours    9/8 - better BP control today with addition of Hydralazine yesterday  -diarrhea improving. She reports only 1 small mucoid/loose stool this morning.     9/7 -2 diarrhea stools this morning. DC'd bowel protocol. Consider CDIFF workup if continues  -ongoing HTN. Added scheduled PO Hydralazine today. PRN Vasotec    Consultants/Specialty  -Infectious Disease    Code Status  FULL    Disposition  SNF    Review of Systems  Review of Systems   Constitutional: Positive for malaise/fatigue. Negative for diaphoresis and fever.             HENT: Negative for congestion, hearing loss and nosebleeds.    Eyes: Negative for blurred vision, photophobia and discharge.   Respiratory: Negative for cough, hemoptysis and shortness of breath.    Cardiovascular: Negative for chest pain, palpitations and claudication.   Gastrointestinal: Negative for diarrhea and vomiting.   Genitourinary: Negative for " dysuria, hematuria and urgency.   Musculoskeletal: Positive for joint pain and neck pain. Negative for back pain, falls and myalgias.   Neurological: Positive for weakness. Negative for dizziness, tingling, sensory change and headaches.   Psychiatric/Behavioral: Positive for memory loss. The patient is not nervous/anxious and does not have insomnia.    All other systems reviewed and are negative.       Physical Exam  Temp:  [36.2 °C (97.2 °F)-36.8 °C (98.3 °F)] 36.2 °C (97.2 °F)  Pulse:  [85-94] 85  Resp:  [16-17] 17  BP: (116-150)/(61-78) 125/76  SpO2:  [90 %-92 %] 90 %    Physical Exam   Constitutional: She is oriented to person, place, and time. Vital signs are normal. She appears well-developed and well-nourished. She is sleeping. She is easily aroused.  Non-toxic appearance. No distress.   HENT:   Head: Normocephalic.   Right Ear: Hearing normal.   Left Ear: Hearing normal.   Nose: Nose normal.   Mouth/Throat: Oropharynx is clear and moist and mucous membranes are normal.   Eyes: Pupils are equal, round, and reactive to light. Conjunctivae and EOM are normal.   Neck: Phonation normal.   Full ROM of neck but with discomfort.  No numbness, tingling.  No focal tenderness.   Cardiovascular: Normal rate, normal heart sounds and intact distal pulses.   Pulmonary/Chest: Effort normal. She has decreased breath sounds. She has no rhonchi. She has no rales.   Abdominal: Soft. Bowel sounds are normal. She exhibits distension. There is no tenderness. There is no rigidity, no rebound and no guarding.   Musculoskeletal: Normal range of motion.   Lymphadenopathy:     She has no cervical adenopathy.   Neurological: She is oriented to person, place, and time and easily aroused.   Skin: Skin is warm and dry. No rash noted. No erythema.   Dressing to left hip CDI   Psychiatric: She has a normal mood and affect. Her speech is normal and behavior is normal. Judgment normal. Cognition and memory are impaired. She exhibits abnormal  recent memory and abnormal remote memory.   Nursing note and vitals reviewed.      Fluids    Intake/Output Summary (Last 24 hours) at 9/9/2019 1559  Last data filed at 9/8/2019 1800  Gross per 24 hour   Intake --   Output 150 ml   Net -150 ml       Laboratory  Recent Labs     09/07/19  0400 09/08/19  0245 09/09/19  0358   WBC 6.3 6.4 7.1   RBC 3.68* 3.76* 3.72*   HEMOGLOBIN 11.4* 11.7* 11.5*   HEMATOCRIT 35.9* 36.5* 36.6*   MCV 97.6 97.1 98.4*   MCH 31.0 31.1 30.9   MCHC 31.8* 32.1* 31.4*   RDW 47.3 47.0 48.1   PLATELETCT 200 219 273   MPV 9.3 9.3 9.6     Recent Labs     09/07/19 0400 09/08/19  0245 09/09/19  0358   SODIUM 133* 130* 131*   POTASSIUM 4.7 4.0 3.9   CHLORIDE 100 98 99   CO2 27 23 24   GLUCOSE 113* 108* 109*   BUN 10 10 12   CREATININE 0.55 0.51 0.63   CALCIUM 8.6 9.0 8.9                   Imaging  IR-MIDLINE CATHETER INSERTION WO GUIDANCE > AGE 3   Final Result                  Ultrasound-guided midline placement performed by qualified nursing staff    as above.          MR-LUMBAR SPINE-W/O   Final Result      1.  Multilevel degenerative changes detailed.   2.  Mild to moderate spinal stenosis L2-L3, mild L1-L2 and L3-L4.         DX-LUMBAR SPINE-2 OR 3 VIEWS   Final Result      1.  There is moderate lumbar spondylosis with degenerative disease most prominent at the L3-4 level.   2.  There is no acute fracture or malalignment.      CT-HIP WITH LEFT   Final Result      1.  There is a questionable small subcutaneous fluid collection just deep to the skin along the left lateral hip at the level the greater trochanter which could be a small subcutaneous abscess.   2.  There is a left hip arthroplasty with postoperative changes as noted above causing moderate artifact to the joint space. There is no gross effusion.      DX-CERVICAL SPINE-FLX-EXT ONLY    (Results Pending)        Assessment/Plan  * Skin abscess over hip, inability to walk- (present on admission)  Assessment & Plan  -MRSA (+)  -ID following -  ABX per their rec's  -wound team following   -PT OT  -SNF    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)  Assessment & Plan  -continue Cellcept  -OP FU with Rheumatology        History of hip fracture- (present on admission)  Assessment & Plan  -CT (-) for fracture    Hyponatremia- (present on admission)  Assessment & Plan  -this appears to be chronic  -stable at her baseline    Peripheral vascular disease (HCC)- (present on admission)  Assessment & Plan  -history of fem-pop bypass  -TOMI's WNL in April      Essential hypertension- (present on admission)  Assessment & Plan  -continue Amlodipine and Metoprolol   -continue Hydralazine (I've decreased dosing from TID to BID today)  -Vasotec PRN with parameters      Neck pain on right side  Assessment & Plan  -No numbness, tingling. No tenderness on palpation. Does have full ROM, but with pain.  -I've ordered x-rays  -heat packs to area  -pain management  -PT/OT    Diarrhea  Assessment & Plan  -no diarrhea > 24 hours  -I will resume bowel protocol but only daily versus BID  -ID is following        Alcohol use- (present on admission)  Assessment & Plan  -not showing s/s withdrawal  -daily MVI, thiamine and folate        History of MRSA infection- (present on admission)  Assessment & Plan  -now with active MRSA infection  -ID following  -isolation - likely will delay acceptance to SNF  -continue ABX      Age-related physical debility- (present on admission)  Assessment & Plan  -per therapy, patient is not safe to return to her current living situation  -SNF pending acceptance      Iron deficiency anemia- (present on admission)  Assessment & Plan  -Hgb stable  -continue daily supplementation  -follow labs       VTE prophylaxis: Enoxaparin    RYANN Gallegos

## 2019-09-09 NOTE — DISCHARGE PLANNING
Agency/Facility Name: [facility]  Spoke To: Odalys  Outcome: Patient is set for transport for 9/10 at 1400 going to [facility].     SHREE Rivers notified.

## 2019-09-09 NOTE — PROGRESS NOTES
Pt again refusing mobility this morning and has not krystina out of bed in 24 hours except pivot to commode yesterday morning.    Educated pt again on importance of mobility. Pt agrees to sit in chair for lunch.

## 2019-09-09 NOTE — PROGRESS NOTES
Infectious Disease Progress Note    Author: Zuri More M.D. Date & Time of service: 2019  4:05 PM    Chief Complaint:  Thigh abscess     Interval History:   AF, O2 2 L NC- stable, able to get up and walk without pain in the left hip.  She still has significant purulent drainage from abscess.    AF, O2 RA, transition her to linezolid starting tomorrow.  She still having significant drainage from left hip abscess but less erythema, less pain.  She is also endorsing significant weakness due to debilitation.   AF, O2 2 L NC, continued on linezolid, she appears to be tolerating well.  Still with significant drainage out of abscess and left hip.  No fluctuance, no pain and denies pain with ambulation.     AF, O2 RA, having some chills today but otherwise doing well.  Still with significant drainage from left hip I&D site.  She has no pain with hip flexion or extension.  Denies any pain with ambulation.  She had multiple loose stools yesterday.  No abdominal pain.        Review of Systems:  Review of Systems   Constitutional: Negative for chills, fever and malaise/fatigue.   Musculoskeletal: Positive for myalgias and neck pain. Negative for joint pain.       Hemodynamics:  Temp (24hrs), Av.6 °C (97.9 °F), Min:36.2 °C (97.2 °F), Max:36.8 °C (98.3 °F)  Temperature: 36.2 °C (97.2 °F)  Pulse  Av  Min: 71  Max: 110   Blood Pressure : 125/76       Physical Exam:  Physical Exam   Constitutional: She is oriented to person, place, and time. She appears well-developed and well-nourished.   HENT:   Head: Normocephalic and atraumatic.   Eyes: Pupils are equal, round, and reactive to light. Conjunctivae and EOM are normal.   Cardiovascular: Normal rate, regular rhythm and normal heart sounds.   Pulmonary/Chest: Effort normal and breath sounds normal.   Abdominal: Soft. Bowel sounds are normal. She exhibits no distension. There is no tenderness. There is no rebound and no guarding.   Musculoskeletal:  She exhibits no edema or tenderness.   Left hip with minimal drainage.  No tenderness palpation.  No warmth.  No pain with joint movement.   Neurological: She is alert and oriented to person, place, and time.   Skin: Skin is warm and dry.   Psychiatric: She has a normal mood and affect. Her behavior is normal.       Meds:    Current Facility-Administered Medications:   •  hydrALAZINE  •  senna-docusate  •  polyethylene glycol/lytes  •  magnesium hydroxide  •  enalaprilat  •  folic acid  •  linezolid  •  amLODIPine  •  ferrous sulfate  •  ascorbic acid  •  metoprolol SR  •  multivitamin  •  mycophenolate  •  sertraline  •  enoxaparin  •  acetaminophen  •  ondansetron  •  ondansetron  •  lactobacillus rhamnosus  •  oxyCODONE immediate-release  •  thiamine    Labs:  Recent Labs     09/07/19  0400 09/08/19  0245 09/09/19  0358   WBC 6.3 6.4 7.1   RBC 3.68* 3.76* 3.72*   HEMOGLOBIN 11.4* 11.7* 11.5*   HEMATOCRIT 35.9* 36.5* 36.6*   MCV 97.6 97.1 98.4*   MCH 31.0 31.1 30.9   RDW 47.3 47.0 48.1   PLATELETCT 200 219 273   MPV 9.3 9.3 9.6     Recent Labs     09/07/19  0400 09/08/19  0245 09/09/19  0358   SODIUM 133* 130* 131*   POTASSIUM 4.7 4.0 3.9   CHLORIDE 100 98 99   CO2 27 23 24   GLUCOSE 113* 108* 109*   BUN 10 10 12     Recent Labs     09/07/19  0400 09/08/19  0245 09/09/19  0358   CREATININE 0.55 0.51 0.63       Imaging:  Ct-hip With Left    Result Date: 9/2/2019 9/2/2019 1:41 PM HISTORY/REASON FOR EXAM:  Hip replaced, infection suspected TECHNIQUE/EXAM DESCRIPTION AND NUMBER OF VIEWS: Helical CT scan of the pelvis and left hip is performed following the IV administration of 100 mL of Omnipaque 350. Coronal and sagittal reconstructions are provided. COMPARISON: None FINDINGS: There are postoperative changes of a left hip arthroplasty which causes some degree of artifact. There are areas of heterotopic ossification surrounding the surgical site. There is no acute fracture. There is no dislocation. There are mild  degenerative changes in the lower lumbar spine and both SI joints. There is some atrophy of the left hip musculature with some soft tissue density surrounding portions of the left hip arthroplasty but no definite joint effusion is evident on this exam. There is subcutaneous edema or skin thickening in the proximal left lateral thigh. There is a questionable small superficial fluid collection just deep to the skin along the posterior aspect of the left hip at the level of the greater trochanter measuring 24 x 9 mm. Soft tissues demonstrate atherosclerosis of the aorta and pelvic vasculature. There is colonic diverticulosis without diverticulitis. The bladder is distended.     1.  There is a questionable small subcutaneous fluid collection just deep to the skin along the left lateral hip at the level the greater trochanter which could be a small subcutaneous abscess. 2.  There is a left hip arthroplasty with postoperative changes as noted above causing moderate artifact to the joint space. There is no gross effusion.    Dx-lumbar Spine-2 Or 3 Views    Result Date: 9/2/2019 9/2/2019 6:28 PM HISTORY/REASON FOR EXAM:  Low back pain for 7 days. Left hip pain. TECHNIQUE/ EXAM DESCRIPTION AND NUMBER OF VIEWS:  3 views of the lumbar spine. COMPARISON: None. FINDINGS: The alignment of the lumbar spine is within normal limits. Vertebral body heights are maintained. There is degenerative endplate spurring at all levels throughout the lumbar spine. There is disc space narrowing most prominent at the L3-4 level. There is bilateral facet arthropathy in the mid and lower lumbar spine. SI joints are symmetric. Bony pelvis is intact. There is atherosclerosis of the aorta.     1.  There is moderate lumbar spondylosis with degenerative disease most prominent at the L3-4 level. 2.  There is no acute fracture or malalignment.    Mr-lumbar Spine-w/o    Result Date: 9/3/2019  9/3/2019 11:07 AM HISTORY/REASON FOR EXAM:  inability to walk,  back pain, findings of L3-4 DJD TECHNIQUE/EXAM DESCRIPTION: MRI of the lumbar spine without contrast. The study was performed on a [employer] Signa 1.5 Dayami MRI scanner. T1 sagittal, T2 fast spin-echo sagittal, and T2 axial images were obtained of the lumbar spine. COMPARISON:  Lumbar radiographs 9/2/2019 FINDINGS: 5 lumbar type vertebral bodies are counted. The conus medullaris terminates at T12-L1 and is normal in signal. There is a 8 mm T2 hyperintense lesion within the T12 vertebral body most likely an intraosseous hemangioma. There is no compression fracture. T11-T12: Mild diffuse disc bulge and osteophyte. No significant spinal or foraminal stenosis. T12-L1:  Minor diffuse disc bulge, osteophyte. No significant spinal or foraminal stenosis. L1-L2:  Mild diffuse disc bulge, osteophyte and small right paracentral disc protrusion. Mild spinal stenosis and left foraminal stenosis. L2-L3:  Diffuse disc bulge and osteophyte. Ligamentum flavum thickening and mild facet degeneration. Mild to moderate spinal stenosis. Foramina are adequate. L3-L4:  Disc narrowing, reactive marrow endplate changes/edema and mild disc osteophyte. Mild facet degeneration. Mild spinal stenosis and left foraminal stenosis. Right foramen is adequate. L4-L5:  Diffuse mild disc bulge and osteophyte. No significant spinal or foraminal stenosis. L5-S1:  Canal and foramina are patent. There is some lower posterior paraspinal muscle atrophy and left psoas atrophy.     1.  Multilevel degenerative changes detailed. 2.  Mild to moderate spinal stenosis L2-L3, mild L1-L2 and L3-L4.     Ir-midline Catheter Insertion Wo Guidance > Age 3    Result Date: 9/6/2019  HISTORY/REASON FOR EXAM:  Midline Placement   TECHNIQUE/EXAM DESCRIPTION AND NUMBER OF VIEWS: Midline insertion with ultrasound guidance.  FINDINGS: Midline insertion with Ultrasound Guidance was performed by qualified nursing staff without the assistance of a Radiologist. Midline positioning as  measured by RN or as appropriate length of catheter selected.              Ultrasound-guided midline placement performed by qualified nursing staff as above.       Micro:  Results     Procedure Component Value Units Date/Time    CULTURE WOUND W/ GRAM STAIN [[id number]]  (Abnormal)  (Susceptibility) Collected:  09/02/19 2350    Order Status:  Completed Specimen:  Wound from Right Hip Updated:  09/08/19 0849     Significant Indicator POS     Source WND     Site RIGHT HIP     Culture Result -     Gram Stain Result Moderate WBCs.  Few Gram positive cocci.       Culture Result Methicillin Resistant Staphylococcus aureus  Light growth      Narrative:       CALL  Nava  183 tel. [phone],  CALLED  183 tel. [phone] 09/04/2019, 10:59, RB PERF. RESULTS CALLED  TO:[id number]    Susceptibility     Methicillin resistant staphylococcus aureus (1)     Antibiotic Interpretation Microscan Method Status    Clindamycin Resistant >4 mcg/mL LICO Final    Daptomycin Sensitive <=0.5 mcg/mL LICO Final    Erythromycin Resistant >4 mcg/mL LICO Final    Linezolid Sensitive 2 mcg/mL LICO Final    Ampicillin/sulbactam Resistant 16/8 mcg/mL LICO Final    Moxifloxacin Sensitive 2 mcg/mL LICO Final    Vancomycin Sensitive 1 mcg/mL LICO Final    Oxacillin Resistant >2 mcg/mL LICO Final    Penicillin Resistant >8 mcg/mL LICO Final    Trimeth/Sulfa Sensitive <=0.5/9.5 mcg/mL LICO Final    Tetracycline Sensitive <=4 mcg/mL LICO Final                   GRAM STAIN [[id number]] Collected:  09/02/19 2350    Order Status:  Completed Specimen:  Wound Updated:  09/08/19 0849     Significant Indicator .     Source WND     Site RIGHT HIP     Gram Stain Result Moderate WBCs.  Few Gram positive cocci.      Narrative:       CALL  Nava  183 tel. [phone],  CALLED  183 tel. [phone] 09/04/2019, 10:59, RB PERF. RESULTS CALLED  TO:[id number]    Blood Culture [[id number]] Collected:  09/02/19 2138    Order Status:  Completed Specimen:  Blood from Peripheral Updated:  09/07/19 2300     " Significant Indicator NEG     Source BLD     Site PERIPHERAL     Culture Result No growth after 5 days of incubation.    Narrative:       From different peripheral sites, if not done within the last  24 hours (Per Hospital Policy: Only change specimen source to  \"Line\" if specified by physician order)  Left AC    Blood Culture [[id number]] Collected:  09/02/19 2138    Order Status:  Completed Specimen:  Blood from Peripheral Updated:  09/07/19 2300     Significant Indicator NEG     Source BLD     Site PERIPHERAL     Culture Result No growth after 5 days of incubation.    Narrative:       From different peripheral sites, if not done within the last  24 hours (Per Hospital Policy: Only change specimen source to  \"Line\" if specified by physician order)  Right Hand    CULTURE WOUND W/ GRAM STAIN [[id number]] Collected:  09/02/19 2350    Order Status:  Sent Specimen:  Wound from Left Leg     Urinalysis [[id number]]     Order Status:  Canceled Specimen:  Urine     URINALYSIS,CULTURE IF INDICATED [[id number]]  (Abnormal) Collected:  09/02/19 1600    Order Status:  Completed Specimen:  Urine, Straight Cath Updated:  09/02/19 1615     Color Yellow     Character Clear     Specific Gravity 1.016     Ph 7.5     Glucose Negative mg/dL      Ketones Negative mg/dL      Protein Negative mg/dL      Bilirubin Negative     Urobilinogen, Urine 0.2     Nitrite Negative     Leukocyte Esterase Negative     Occult Blood Small     Micro Urine Req Microscopic          Assessment:  Active Hospital Problems    Diagnosis   • *Skin abscess over hip, inability to walk [L02.91]   • Bullous pemphigus [L10.9]   • History of hip fracture [Z87.81]   • Hyponatremia [E87.1]   • Peripheral vascular disease (HCC) [I73.9]   • Essential hypertension [I10]   • Iron deficiency anemia [D50.9]   • Neck pain on right side [M54.2]   • Diarrhea [R19.7]   • Age-related physical debility [R54]   • History of MRSA infection [Z86.14]   • Alcohol use [Z78.9]     Interval " 24 hour assessment:      AF, O2 Ra  Labs reviewed  Micro reviewed    Pt continued on linezolid.  She appears to be tolerating well.  She has some right-sided neck pain which she states is been ongoing since her fall about a week ago.  She reports it is somewhat worse today.  The drainage from the left hip appears to be decreasing and she has no significant pain in the joint.       ASSESSMENT/PLAN:      Nadege Mae is a 73 y.o.  admitted 9/2/2019. Pt has a past medical history of dementia, peripheral vascular disease with chronic lower extremity ulcers, she is status post femoral-popliteal bypass.  She also has a history of bullous pemphigus and has been treated with prednisone.  She has last seen by ID in April 2019 for left lower extremity cellulitis.  Wound cultures that time grew MSSA.  Plan at that time was to continue Augmentin with stop date of 4/29/2019. Most recent cultures from 8/23 of the left leg with MRSA.  She came into the ED complaining of multiple falls as well as pain in left hip.  There is a question whether alcohol use was involved.  Patient is poor historian.       Hospital Course:   She is afebrile, no leukocytosis.  CT of the hip on 9/2 with left lateral hip with a small abscess.  There is left hip arthroplasty postop changes, no gross effusion.  In the ER the potential abscess was open with bedside I&D and she was given clindamycin.  She underwent MR spine on 9/3 with finding of multilevel degenerative changes and spinal stenosis at L1-L4.  Blood cultures on 9/2 are no growth.  She was  started on Unasyn and vancomycin.       Left hip abscess  -I&D in the ER on 9/2-cultures with GPC's-MRSA  -Drainage has decreased      Left hip prosthetic joint  -Does not appear to be involved,  she is ambulating without any significant pain in the joint      Peripheral vascular disease  Chronic nonhealing lower extremity ulcers     Bullous pemphigus     Immunosuppression, secondary treatment for  bullous pemphigus  -Per chart review she is on CellCept 500 mg twice daily-patient states she has not actually been taking this     History of cellulitis  - Prior wound cultures have grown MRSA, MSSA, Enterococcus faecalis and Pseudomonas     Diarrhea-resolved     Depression, on sertraline      --- Continue linezolid, she is on sertraline but has tolerated combination with linezolid in the past  -will plan on additional 5 days (end 9/14/19)   --- Monitor for vital irregularities, new confusion or rigidity to suggest serotonin syndrome, none so far  --- Monitor for new pain with movement of the left hip or other signs or symptoms would suggest the joint is infected.    --- Continue wound care for lower extremity ulcers related to her bullous pemphigus as well as for the left hip        Plan of care discussed with nurse. ID will sign off.

## 2019-09-09 NOTE — CARE PLAN
Problem: Nutritional:  Goal: Achieve adequate nutritional intake  Description  Patient will consume 50% of meals   Outcome: MET    PO intake generally %, weight trending up, BM+ 9/8 (loose). Nutrition supplement BID. RD to follow weekly.

## 2019-09-09 NOTE — THERAPY
Attempted PT treatment session. Pt refusing due to having been previously up with nursing and stating that she had just finished eating. Will re-attempt as able.

## 2019-09-09 NOTE — ASSESSMENT & PLAN NOTE
Associated with falls - fu CT scan with subacute / chronic- C2 Fx.   [place] J collar plan for 2 months, pain control   Outpatient follow-up with neurosurgery to assess for proper healing.  May need surgery depending on response.  Pain spasm control.

## 2019-09-10 ENCOUNTER — APPOINTMENT (OUTPATIENT)
Dept: RADIOLOGY | Facility: MEDICAL CENTER | Age: 74
DRG: 603 | End: 2019-09-10
Attending: INTERNAL MEDICINE
Payer: MEDICARE

## 2019-09-10 ENCOUNTER — APPOINTMENT (OUTPATIENT)
Dept: RADIOLOGY | Facility: MEDICAL CENTER | Age: 74
DRG: 603 | End: 2019-09-10
Attending: NEUROLOGICAL SURGERY
Payer: MEDICARE

## 2019-09-10 PROBLEM — S22.49XA CLOSED FRACTURE OF MULTIPLE RIBS: Status: ACTIVE | Noted: 2019-09-10

## 2019-09-10 PROBLEM — J90 PLEURAL EFFUSION: Status: ACTIVE | Noted: 2019-09-10

## 2019-09-10 PROBLEM — S12.100A C2 CERVICAL FRACTURE (HCC): Status: ACTIVE | Noted: 2019-09-10

## 2019-09-10 PROCEDURE — A9270 NON-COVERED ITEM OR SERVICE: HCPCS | Performed by: INTERNAL MEDICINE

## 2019-09-10 PROCEDURE — 700102 HCHG RX REV CODE 250 W/ 637 OVERRIDE(OP): Performed by: NURSE PRACTITIONER

## 2019-09-10 PROCEDURE — A9270 NON-COVERED ITEM OR SERVICE: HCPCS | Performed by: NURSE PRACTITIONER

## 2019-09-10 PROCEDURE — 700102 HCHG RX REV CODE 250 W/ 637 OVERRIDE(OP): Performed by: HOSPITALIST

## 2019-09-10 PROCEDURE — 700117 HCHG RX CONTRAST REV CODE 255: Performed by: INTERNAL MEDICINE

## 2019-09-10 PROCEDURE — 99233 SBSQ HOSP IP/OBS HIGH 50: CPT | Performed by: HOSPITALIST

## 2019-09-10 PROCEDURE — 700111 HCHG RX REV CODE 636 W/ 250 OVERRIDE (IP): Performed by: INTERNAL MEDICINE

## 2019-09-10 PROCEDURE — 72141 MRI NECK SPINE W/O DYE: CPT

## 2019-09-10 PROCEDURE — 700102 HCHG RX REV CODE 250 W/ 637 OVERRIDE(OP): Performed by: INTERNAL MEDICINE

## 2019-09-10 PROCEDURE — 770021 HCHG ROOM/CARE - ISO PRIVATE

## 2019-09-10 PROCEDURE — 70498 CT ANGIOGRAPHY NECK: CPT

## 2019-09-10 PROCEDURE — A9270 NON-COVERED ITEM OR SERVICE: HCPCS | Performed by: HOSPITALIST

## 2019-09-10 RX ADMIN — SERTRALINE HYDROCHLORIDE 100 MG: 100 TABLET ORAL at 05:20

## 2019-09-10 RX ADMIN — MYCOPHENOLATE MOFETIL 500 MG: 250 CAPSULE ORAL at 05:20

## 2019-09-10 RX ADMIN — Medication 100 MG: at 05:20

## 2019-09-10 RX ADMIN — HYDRALAZINE HYDROCHLORIDE 50 MG: 50 TABLET, FILM COATED ORAL at 16:50

## 2019-09-10 RX ADMIN — THERA TABS 1 TABLET: TAB at 16:50

## 2019-09-10 RX ADMIN — OXYCODONE HYDROCHLORIDE AND ACETAMINOPHEN 500 MG: 500 TABLET ORAL at 05:20

## 2019-09-10 RX ADMIN — LINEZOLID 600 MG: 600 TABLET, FILM COATED ORAL at 05:19

## 2019-09-10 RX ADMIN — FOLIC ACID 1 MG: 1 TABLET ORAL at 05:20

## 2019-09-10 RX ADMIN — AMLODIPINE BESYLATE 10 MG: 10 TABLET ORAL at 05:19

## 2019-09-10 RX ADMIN — OXYCODONE HYDROCHLORIDE 5 MG: 5 TABLET ORAL at 00:00

## 2019-09-10 RX ADMIN — ACETAMINOPHEN 650 MG: 325 TABLET, FILM COATED ORAL at 16:50

## 2019-09-10 RX ADMIN — IOHEXOL 75 ML: 350 INJECTION, SOLUTION INTRAVENOUS at 04:23

## 2019-09-10 RX ADMIN — FERROUS SULFATE TAB 325 MG (65 MG ELEMENTAL FE) 325 MG: 325 (65 FE) TAB at 07:30

## 2019-09-10 RX ADMIN — METOPROLOL SUCCINATE 100 MG: 50 TABLET, FILM COATED, EXTENDED RELEASE ORAL at 05:20

## 2019-09-10 RX ADMIN — LINEZOLID 600 MG: 600 TABLET, FILM COATED ORAL at 16:50

## 2019-09-10 RX ADMIN — HYDRALAZINE HYDROCHLORIDE 50 MG: 50 TABLET, FILM COATED ORAL at 05:20

## 2019-09-10 RX ADMIN — SENNOSIDES, DOCUSATE SODIUM 1 TABLET: 50; 8.6 TABLET, FILM COATED ORAL at 05:20

## 2019-09-10 RX ADMIN — Medication 1 CAPSULE: at 07:30

## 2019-09-10 ASSESSMENT — ENCOUNTER SYMPTOMS
DIAPHORESIS: 0
ABDOMINAL PAIN: 0
DIZZINESS: 0
FLANK PAIN: 0
BACK PAIN: 0
WEAKNESS: 1
EYE REDNESS: 0
SENSORY CHANGE: 0
FEVER: 0
HEADACHES: 0
SHORTNESS OF BREATH: 0
NECK PAIN: 1
STRIDOR: 0
HALLUCINATIONS: 0
FALLS: 1
VOMITING: 0
DIARRHEA: 0
COUGH: 0
FOCAL WEAKNESS: 0
CHILLS: 0
EYE DISCHARGE: 0
SPEECH CHANGE: 0

## 2019-09-10 ASSESSMENT — LIFESTYLE VARIABLES: SUBSTANCE_ABUSE: 1

## 2019-09-10 NOTE — THERAPY
Pt had CT/MRI showing acute C2 fx. Will await neurosx input for POC, then resume PT as appropriate.

## 2019-09-10 NOTE — ASSESSMENT & PLAN NOTE
Mod to large left sided.  Incidental findings- patient reports no shortness of breath or  acute respiratory symptoms.  No tachycardia or hypoxia.  I discussed option of thoracentesis with patient.  She continues to report no shortness of breath.  We will have reconsideration  if develops shortness of breath, dyspnea.  Monitor SaO2.  Check BNP

## 2019-09-10 NOTE — PROGRESS NOTES
Called by radiologist x-ray of C-spine with possible subacute to chronic C2 fracture recommending CT for further evaluation.  CT neck ordered and discussed with patient's RN plan for Ct and to call back hopitalist on call  with results

## 2019-09-10 NOTE — PROGRESS NOTES
Hospital Medicine Daily Progress Note    Date of Service  9/10/2019    Chief Complaint  73 y.o. female admitted 9/2/2019 with left hip pain.     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 History of MRSA and MSSA infection in her LLE. Recent was MSSA infection requiring a wound vac and completed course of antibiotics last April 2019 and there is a wound culture that grew MRSA in  August 2019 that is resistant to clindamycin.  She also presented with multiple falls with left hip pain. She lives with a roommate and family member had reported roommate recalling that she had been consuming alcohol. Poor historian at baseline.  Hip wound has been draining-  culture + MRSA.   ID consulted and has been on antibiotics.    Interval Problem Update    Had co of neck pain - fu CT findings of type III C2 Fx with blood within the spinal canal.  [place] J collar placed to stabilize. Evaluated by NSG- felt likely poor surgical candidate. To have further assessment with MRI. . Left hip mod- mild drainage. No fevers.     Consultants/Specialty  Dr. Guevara, NSG  Dr. More, ID    Code Status  Full     Disposition    Anticipate will need placement.     Review of Systems  Review of Systems   Constitutional: Positive for malaise/fatigue. Negative for chills, diaphoresis and fever.   HENT: Negative for congestion.    Eyes: Negative for discharge and redness.   Respiratory: Negative for cough, shortness of breath and stridor.    Cardiovascular: Negative for chest pain and leg swelling.   Gastrointestinal: Negative for abdominal pain, diarrhea and vomiting.   Genitourinary: Negative for flank pain and hematuria.   Musculoskeletal: Positive for falls, joint pain and neck pain. Negative for back pain.   Skin: Negative for itching and  rash.   Neurological: Positive for weakness (generalized ). Negative for dizziness, sensory change, speech change, focal weakness and headaches.   Psychiatric/Behavioral: Positive for substance abuse. Negative for hallucinations.        Physical Exam  Temp:  [36.2 °C (97.2 °F)-36.9 °C (98.5 °F)] 36.6 °C (97.9 °F)  Pulse:  [80-93] 93  Resp:  [17-19] 19  BP: (108-125)/(70-77) 114/73  SpO2:  [90 %-92 %] 91 %    Physical Exam   Constitutional: She is oriented to person, place, and time. No distress.   HENT:   Head: Normocephalic and atraumatic.   Right Ear: External ear normal.   Left Ear: External ear normal.   Nose: Nose normal.   Eyes: EOM are normal. Right eye exhibits no discharge. Left eye exhibits no discharge. No scleral icterus.   Neck:   [place] J collar support.    Cardiovascular: Normal rate and regular rhythm.   No murmur heard.  Pulmonary/Chest: Effort normal and breath sounds normal. No stridor. She has no wheezes. She has no rales.   Abdominal: Soft. She exhibits no distension. There is no tenderness.   Obese.   Musculoskeletal: She exhibits deformity (chronic left foot.). She exhibits no edema or tenderness.   Left hip incision dressing present-mild saturation - plan change   Neurological: She is alert and oriented to person, place, and time. No cranial nerve deficit.   Skin: Skin is warm and dry. She is not diaphoretic. No pallor.   Vitals reviewed.      Fluids  No intake or output data in the 24 hours ending 09/10/19 0804    Laboratory  Recent Labs     09/08/19 0245 09/09/19 0358   WBC 6.4 7.1   RBC 3.76* 3.72*   HEMOGLOBIN 11.7* 11.5*   HEMATOCRIT 36.5* 36.6*   MCV 97.1 98.4*   MCH 31.1 30.9   MCHC 32.1* 31.4*   RDW 47.0 48.1   PLATELETCT 219 273   MPV 9.3 9.6     Recent Labs     09/08/19  0245 09/09/19  0358   SODIUM 130* 131*   POTASSIUM 4.0 3.9   CHLORIDE 98 99   CO2 23 24   GLUCOSE 108* 109*   BUN 10 12   CREATININE 0.51 0.63   CALCIUM 9.0 8.9                   Imaging  CT-CTA NECK WITH &  W/O-POST PROCESSING   Final Result      1.  Comminuted type III C2 fracture involving the right foramen transversarium again noted.   2.  No evidence of dissection or significant stenosis of the neck arteries.   3.  Moderate to large left pleural effusion. Multiple acute/recent-appearing left-sided rib fractures.      CT-CSPINE WITHOUT PLUS RECONS   Final Result   Addendum 1 of 1   Addendum:   Findings are discussed with Dr. Awan at 1:00 AM on 9/10/2019.      Final      DX-CERVICAL SPINE-2 OR 3 VIEWS   Final Result         Very limited exam.      There appears to be an oblique fracture involving the C2, poorly visualized. The fracture lucency appears somewhat corticated, suggesting subacute to chronic. Further evaluation with CT is recommended.         CRITICAL RESULT READ BACK: Preliminary findings discussed with and critical read back performed by Dr Norton  via telephone on 9/9/2019 6:46 PM      IR-MIDLINE CATHETER INSERTION WO GUIDANCE > AGE 3   Final Result                  Ultrasound-guided midline placement performed by qualified nursing staff    as above.          MR-LUMBAR SPINE-W/O   Final Result      1.  Multilevel degenerative changes detailed.   2.  Mild to moderate spinal stenosis L2-L3, mild L1-L2 and L3-L4.         DX-LUMBAR SPINE-2 OR 3 VIEWS   Final Result      1.  There is moderate lumbar spondylosis with degenerative disease most prominent at the L3-4 level.   2.  There is no acute fracture or malalignment.      CT-HIP WITH LEFT   Final Result      1.  There is a questionable small subcutaneous fluid collection just deep to the skin along the left lateral hip at the level the greater trochanter which could be a small subcutaneous abscess.   2.  There is a left hip arthroplasty with postoperative changes as noted above causing moderate artifact to the joint space. There is no gross effusion.      MR-CERVICAL SPINE-W/O    (Results Pending)        Assessment/Plan  * C2 cervical fracture  (HCC)  Assessment & Plan  Suspect subacute/ chronic associated with hx of falls with assoc spinal canal blood on CT.    Plan stabilize with [place] J collar  Fu MRI cervical spine.   Evaluated by NSG- Dr. Guevara - felt likely a poor surgical candidate- to fu imaging.   Pain control - prn tylenol, oxycodone.   Check Vit d level.    Skin abscess over hip, inability to walk- (present on admission)  Assessment & Plan  With drainage from wound - MRSA (+)  Continue Zyvox   ID input  Wound care     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)  Assessment & Plan  continue Cellcept  Recommended to follow up with outpatient Rheumatology.      History of hip fracture- (present on admission)  Assessment & Plan  Follow up CT (-) for fracture    Closed fracture of multiple ribs  Assessment & Plan  Left sided - subacute.   Supportive pain control- prn oxycodone, tylenol.     Pleural effusion  Assessment & Plan  Mod to large left sided.  Incidental findings- no reported increased shortness of breath  Unclear cause--monitor for acute resp symptoms--  Consider left thoracentesis     Hyponatremia- (present on admission)  Assessment & Plan  Chronic, previous Na 120-130s earlier in year.  Monitor.     Peripheral vascular disease (HCC)- (present on admission)  Assessment & Plan  With history of fem-pop bypass. ABIs WNL in April   Consider anti plt therapy .        Essential hypertension- (present on admission)  Assessment & Plan  Controlled  continue Amlodipine and Metoprolol   Hydralazine dosing recently decreased- monitor BP response.   Prn Vasotec.      Neck pain on right side  Assessment & Plan  Associated with falls - fu CT scan with subacute / chronic- C2 Fx.   [place] J collar, pain control   Fu MRI.  NSG input.     Diarrhea  Assessment & Plan  Resolved.   Hold laxatives as indicated.   ID is following        Alcohol use- (present on admission)  Assessment & Plan  Reported recent use by roommate- not showing s/s withdrawal  Nutrition  support-- continue daily MVI, thiamine and folate        History of MRSA infection- (present on admission)  Assessment & Plan  With Active MRSA infection  Oral Zyvox   ID input.       Age-related physical debility- (present on admission)  Assessment & Plan  per therapy, patient is not safe to return to her current living situation  Check b12, thyroid function.   SNF pending acceptance      Iron deficiency anemia- (present on admission)  Assessment & Plan  Hgb stable   Continue iron supplements.   Monitor for acute bleed symptoms       VTE prophylaxis: Lovenox.

## 2019-09-10 NOTE — PROGRESS NOTES
[place] J collar fitted and applied to pt at bedside. Any questions please call ortho tech at [phone].

## 2019-09-10 NOTE — PROGRESS NOTES
Followed up results of CT cervical spine, Consulted Dr. Guevara neurosurgery recommended C collar, I have placed the order. CTA has been ordered by Dr Awan. Dr. Guevara will evaluate in the am.

## 2019-09-10 NOTE — THERAPY
Attempted to see pt for OT tx. Pt pending MRI for new C2 fx. Will await MRI results and continue POC as appropriate.

## 2019-09-10 NOTE — DISCHARGE PLANNING
ATTN: Case Management  RE: Referral for Home Health    Reason for referral denial: Per notes entered on 9/9 by JOSEPH Maxwell, patient will transfer to SNF              Unfortunately, we are not able to accept this referral for the reason listed above. If further clarity is needed, our Transitional Care Specialists are available to discuss any barriers to service at [phone].      We look forward to collaborating with you in the future,  [facility]

## 2019-09-10 NOTE — CONSULTS
DATE OF SERVICE:  09/10/2019    CHIEF COMPLAINT:  Chronic C2 fracture, neck pain.    HISTORY OF PRESENT ILLNESS:  This is a 73-year-old woman with multiple medical   problems, including immunocompromised status, on immunomodulator for bullous   pemphigoids, history of MRSA infections in the left leg, most notably a wound   VAC for surgery on the hip the last month after a fall.  She is out of the   care of the wound care clinic.  She has cognitive issues including dementia.    She has falls for unclear reasons.  She is poorly kept.  She came into the   emergency room, where a CAT scan showed a comminuted, likely subacute fracture   of C2.  There is partial occlusion of the left vertebral artery at the   transversarium foramen by CT angiogram.  She has osteoporosis as well.  She   came in for neck pain, hip pain, etc.    PAST MEDICAL HISTORY:  Significant for all which is mentioned in the HPI plus   osteoarthritis, heart valve disease, hypertension, hyperlipidemia,   osteoporosis.    PAST SURGICAL HISTORY:  Include breast surgery, gynecologic surgery, abdominal   hysterectomy, hip nailing, hip revision, irrigation and debridement of the   hip, fem-pop bypass, peripheral vascular disease, arthroplasty.    SOCIAL HISTORY:  She smokes, but quit smoking 12 years ago, and drinks.    PHYSICAL EXAMINATION:  GENERAL:  This woman has a C-collar on.  NEUROLOGIC:  She has expected neck tenderness.  She has full strength,   although generalized weakness and cachexia in the deltoid, bicep, tricep,   , interosseous, dorsiflexion, EHL and plantar flexion, although she has a   nonhealing wound in the left foot, and it is everted, externally rotated.  She   has intact sensation in the upper and lower extremities and no pathologic   reflex.    ASSESSMENT AND PLAN:  I am doubting she is very poor medical candidate with a   comminuted, likely subacute (1 month) C2 fracture.  We will get an MRI of the   cervical spine looking for  transversarium ligament integrity.  We will place   her in a collar.  She can be considered a candidate for aspirin and   osteoporosis treatment; aspirin for the partial dissection, although this is   chronic.  She may be thinking about surgery, but after I look at the   supplemental imaging.    Thank you for allowing me to participate in her care.       ____________________________________     MD BONY Morales III / LARRY    DD:  09/10/2019 07:07:00  DT:  09/10/2019 07:48:49    D#:  [id number]  Job#:  [id number]

## 2019-09-10 NOTE — ASSESSMENT & PLAN NOTE
Suspect subacute/ chronic associated with hx of falls with assoc spinal canal blood on CT. vitamin D level normal  Plan stabilize with [place] J collar.  MRI with combination spinal stenosis from disk bulge, ligament hypertrophy and epidural bleed- upper ext neuro exam non focal- will monitor neuro symptoms with serial exams.  Evaluated by NSG-- Poor surgical candidate - stabilize in collar x 2 months. To have outpatient fu with NSG to assess response to treatment and determination for surgical intervention.   Pain control - prn tylenol. Decrease oxycodone with concern for delirium.  Trial low dose robaxin prn for pain and spasms.     MRI cervical spine :   1.  Acute fracture C2 vertebral body. Please see CT scan report for further details about the fracture.  2.  Mild amount of epidural hemorrhage at the levels of C2, C3 and C4.  3.  Severe central canal stenosis at the levels of C2-3, C3-4 and C4-5 likely secondary to the combinations of disc bulge, prominent ligamentum flavum and epidural hemorrhage.  4.  There is T2 hyperintensity in the region of apical ligament and anterior atlantooccipital membrane likely representing injury. The alar and transverse ligaments are intact.  5.  The flow voids of the bilateral vertebral arteries are intact.  6.  Degenerative disease as described above.  7.  Left pleural effusion.  8.  There is an approximately 11 x 24 x 9 mm sized nodule in the left lobe of the thyroid.

## 2019-09-10 NOTE — PROGRESS NOTES
I did discuss the case with the radiologist, he noted a comminuted type III C2 fracture which is acute.  He also noted hemorrhage within the spinal canal at the C2 level.  He recommended to obtain CTA of the neck now, can obtain an MRI of the C-spine tomorrow.  I will order the CTA of the neck.

## 2019-09-11 PROBLEM — E04.1 THYROID NODULE: Status: ACTIVE | Noted: 2019-09-11

## 2019-09-11 LAB
25(OH)D3 SERPL-MCNC: 33 NG/ML (ref 30–100)
TSH SERPL DL<=0.005 MIU/L-ACNC: 0.84 UIU/ML (ref 0.38–5.33)
VIT B12 SERPL-MCNC: 562 PG/ML (ref 211–911)

## 2019-09-11 PROCEDURE — 700102 HCHG RX REV CODE 250 W/ 637 OVERRIDE(OP): Performed by: NURSE PRACTITIONER

## 2019-09-11 PROCEDURE — 770021 HCHG ROOM/CARE - ISO PRIVATE

## 2019-09-11 PROCEDURE — 84443 ASSAY THYROID STIM HORMONE: CPT

## 2019-09-11 PROCEDURE — A9270 NON-COVERED ITEM OR SERVICE: HCPCS | Performed by: INTERNAL MEDICINE

## 2019-09-11 PROCEDURE — A9270 NON-COVERED ITEM OR SERVICE: HCPCS | Performed by: NURSE PRACTITIONER

## 2019-09-11 PROCEDURE — A9270 NON-COVERED ITEM OR SERVICE: HCPCS | Performed by: HOSPITALIST

## 2019-09-11 PROCEDURE — 82306 VITAMIN D 25 HYDROXY: CPT

## 2019-09-11 PROCEDURE — 700102 HCHG RX REV CODE 250 W/ 637 OVERRIDE(OP): Performed by: INTERNAL MEDICINE

## 2019-09-11 PROCEDURE — 99233 SBSQ HOSP IP/OBS HIGH 50: CPT | Performed by: HOSPITALIST

## 2019-09-11 PROCEDURE — 82607 VITAMIN B-12: CPT

## 2019-09-11 PROCEDURE — 700102 HCHG RX REV CODE 250 W/ 637 OVERRIDE(OP): Performed by: HOSPITALIST

## 2019-09-11 PROCEDURE — 36415 COLL VENOUS BLD VENIPUNCTURE: CPT

## 2019-09-11 RX ADMIN — METOPROLOL SUCCINATE 100 MG: 50 TABLET, FILM COATED, EXTENDED RELEASE ORAL at 05:04

## 2019-09-11 RX ADMIN — Medication 1 CAPSULE: at 08:39

## 2019-09-11 RX ADMIN — LINEZOLID 600 MG: 600 TABLET, FILM COATED ORAL at 05:04

## 2019-09-11 RX ADMIN — AMLODIPINE BESYLATE 10 MG: 10 TABLET ORAL at 05:04

## 2019-09-11 RX ADMIN — LINEZOLID 600 MG: 600 TABLET, FILM COATED ORAL at 17:12

## 2019-09-11 RX ADMIN — Medication 100 MG: at 05:04

## 2019-09-11 RX ADMIN — OXYCODONE HYDROCHLORIDE 5 MG: 5 TABLET ORAL at 08:38

## 2019-09-11 RX ADMIN — THERA TABS 1 TABLET: TAB at 17:12

## 2019-09-11 RX ADMIN — ACETAMINOPHEN 650 MG: 325 TABLET, FILM COATED ORAL at 06:20

## 2019-09-11 RX ADMIN — FOLIC ACID 1 MG: 1 TABLET ORAL at 05:04

## 2019-09-11 RX ADMIN — SENNOSIDES, DOCUSATE SODIUM 1 TABLET: 50; 8.6 TABLET, FILM COATED ORAL at 05:04

## 2019-09-11 RX ADMIN — HYDRALAZINE HYDROCHLORIDE 50 MG: 50 TABLET, FILM COATED ORAL at 05:04

## 2019-09-11 RX ADMIN — SERTRALINE HYDROCHLORIDE 100 MG: 100 TABLET ORAL at 05:04

## 2019-09-11 RX ADMIN — OXYCODONE HYDROCHLORIDE AND ACETAMINOPHEN 500 MG: 500 TABLET ORAL at 05:04

## 2019-09-11 RX ADMIN — FERROUS SULFATE TAB 325 MG (65 MG ELEMENTAL FE) 325 MG: 325 (65 FE) TAB at 08:38

## 2019-09-11 RX ADMIN — OXYCODONE HYDROCHLORIDE 5 MG: 5 TABLET ORAL at 22:51

## 2019-09-11 ASSESSMENT — ENCOUNTER SYMPTOMS
FEVER: 0
STRIDOR: 0
CHILLS: 0
VOMITING: 0
FLANK PAIN: 0
DIARRHEA: 0
DIAPHORESIS: 0
FALLS: 1
NECK PAIN: 1
SENSORY CHANGE: 0
ABDOMINAL PAIN: 0
BACK PAIN: 0
SHORTNESS OF BREATH: 0
HALLUCINATIONS: 0
SPEECH CHANGE: 0
WEAKNESS: 1
COUGH: 0
FOCAL WEAKNESS: 0

## 2019-09-11 ASSESSMENT — LIFESTYLE VARIABLES: SUBSTANCE_ABUSE: 1

## 2019-09-11 NOTE — PROGRESS NOTES
Neurosurgery Progress Note    Subjective:  73-year-old female with multiple medical comorbidities including immunocompromised state on immunomodulators who presented with fracture at C2 noted on CT scan.  MRI cervical spine demonstrates mild amount of epidural hemorrhage from C2-4.  There is severe stenosis from C2-5.  There may be evidence of apical ligament injury.  The alar and transverse ligaments are intact.  Patient reports significant improvement with collar in place in regards to her neck pain  She also is not in favor of any surgical interventions at this time      Exam:  She has significant cachexia throughout the upper and lower extremities full-strength  There is no Andrew's  There is no significant hyperreflexia    BP  Min: 119/63  Max: 136/83  Pulse  Av.6  Min: 85  Max: 103  Resp  Av.8  Min: 16  Max: 19  Temp  Av.8 °C (98.3 °F)  Min: 36.6 °C (97.9 °F)  Max: 37.1 °C (98.7 °F)  SpO2  Av.6 %  Min: 90 %  Max: 95 %    No data recorded    Recent Labs     19  0358   WBC 7.1   RBC 3.72*   HEMOGLOBIN 11.5*   HEMATOCRIT 36.6*   MCV 98.4*   MCH 30.9   MCHC 31.4*   RDW 48.1   PLATELETCT 273   MPV 9.6     Recent Labs     19  0358   SODIUM 131*   POTASSIUM 3.9   CHLORIDE 99   CO2 24   GLUCOSE 109*   BUN 12   CREATININE 0.63   CALCIUM 8.9               Intake/Output       09/10/19 07 - 1959 19 07 - 19 0659       Total  Total       Intake    Total Intake -- -- -- -- -- --       Output    Urine  --  -- --  --  -- --    Number of Times Voided -- 2 x 2 x -- -- --    Total Output -- -- -- -- -- --       Net I/O     -- -- -- -- -- --          No intake or output data in the 24 hours ending 19 0832         • hydrALAZINE  50 mg BID   • senna-docusate  1 Tab DAILY   • polyethylene glycol/lytes  1 Packet QDAY PRN   • magnesium hydroxide  30 mL QDAY PRN   • enalaprilat  1.25 mg Q6HRS PRN   • folic acid  1 mg DAILY   • linezolid   600 mg Q12HRS   • amLODIPine  10 mg Q DAY   • ferrous sulfate  325 mg QDAY with Breakfast   • ascorbic acid  500 mg DAILY   • metoprolol SR  100 mg DAILY   • multivitamin  1 Tab QDAY   • sertraline  100 mg DAILY   • enoxaparin  40 mg DAILY   • acetaminophen  650 mg Q6HRS PRN   • ondansetron  4 mg Q4HRS PRN   • ondansetron  4 mg Q4HRS PRN   • lactobacillus rhamnosus  1 Cap QDAY with Breakfast   • oxyCODONE immediate-release  5 mg Q4HRS PRN   • thiamine  100 mg DAILY       Assessment and Plan:  Imaging has been reviewed by Dr. Guevara  Plan is to keep patient in collar for 2 months time  If she fails to heal,  occiput to C6 laminectomy/fusion could be considered but given her immunocompromised state and poor wound healing to her recent hip,  surgery is deferred for now given risks  Following

## 2019-09-11 NOTE — PROGRESS NOTES
Hospital Medicine Daily Progress Note    Date of Service  9/11/2019    Chief Complaint  73 y.o. female admitted 9/2/2019 with left hip pain.     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 History of MRSA and MSSA infection in her LLE. Recent was MSSA infection requiring a wound vac and completed course of antibiotics last April 2019 and there is a wound culture that grew MRSA in  August 2019 that is resistant to clindamycin.  She also presented with multiple falls with left hip pain. She lives with a roommate and family member had reported roommate recalling that she had been consuming alcohol. Poor historian at baseline.  Hip wound has been draining-  culture + MRSA.   ID consulted and has been on antibiotics.    Interval Problem Update    Neck pain controlled.  MRI revealed C2 fracture with epidural hemorrhage C2 -C4 with severe central canal stenosis at those levels associated with disc bulge, prominent ligament and epidural hemorrhage.  Patient re evaluated by neurosurgery will plan for [place] J collar stabilization for 2 months and then reassess for proper healing.  She reports no numbness, upper extremity localized weakness.    Consultants/Specialty  Dr. Guevara, NSG  Dr. More, ID    Code Status  Full     Disposition    Anticipate will need placement.     Review of Systems  Review of Systems   Constitutional: Negative for chills, diaphoresis, fever and malaise/fatigue.   HENT: Negative for congestion.    Respiratory: Negative for cough, shortness of breath and stridor.    Cardiovascular: Negative for chest pain and leg swelling.   Gastrointestinal: Negative for abdominal pain, diarrhea and vomiting.   Genitourinary: Negative for flank pain and hematuria.   Musculoskeletal: Positive for falls, joint  pain and neck pain. Negative for back pain.        Improved   Neurological: Positive for weakness (generalized ). Negative for sensory change, speech change and focal weakness.   Psychiatric/Behavioral: Positive for substance abuse. Negative for hallucinations.        Physical Exam  Temp:  [36.3 °C (97.4 °F)-37.1 °C (98.7 °F)] 36.3 °C (97.4 °F)  Pulse:  [] 79  Resp:  [16-19] 17  BP: (119-136)/(63-83) 132/78  SpO2:  [90 %-98 %] 98 %    Physical Exam   Constitutional: She is oriented to person, place, and time. No distress.   HENT:   Head: Normocephalic and atraumatic.   Eyes: EOM are normal. Right eye exhibits no discharge. Left eye exhibits no discharge.   Neck:   [place] J collar support.    Cardiovascular: Normal rate and regular rhythm.   No murmur heard.  Pulmonary/Chest: Breath sounds normal. No stridor. She has no wheezes. She has no rales.   Abdominal: Soft. She exhibits no distension. There is no tenderness.   Obese.   Musculoskeletal: She exhibits deformity (chronic left foot.). She exhibits no edema or tenderness.   Left hip incision dressed   Neurological: She is alert and oriented to person, place, and time.   No focal extremity weakness, sensation intact. No clonus, or hyperreflexia.   Skin: Skin is warm and dry. She is not diaphoretic. No pallor.   Vitals reviewed.      Fluids  No intake or output data in the 24 hours ending 09/11/19 1256    Laboratory  Recent Labs     09/09/19  0358   WBC 7.1   RBC 3.72*   HEMOGLOBIN 11.5*   HEMATOCRIT 36.6*   MCV 98.4*   MCH 30.9   MCHC 31.4*   RDW 48.1   PLATELETCT 273   MPV 9.6     Recent Labs     09/09/19  0358   SODIUM 131*   POTASSIUM 3.9   CHLORIDE 99   CO2 24   GLUCOSE 109*   BUN 12   CREATININE 0.63   CALCIUM 8.9                   Imaging  MR-CERVICAL SPINE-W/O   Final Result      1.  Acute fracture C2 vertebral body. Please see CT scan report for further details about the fracture.   2.  Mild amount of epidural hemorrhage at the levels of C2, C3 and C4.    3.  Severe central canal stenosis at the levels of C2-3, C3-4 and C4-5 likely secondary to the combinations of disc bulge, prominent ligamentum flavum and epidural hemorrhage.   4.  There is T2 hyperintensity in the region of apical ligament and anterior atlantooccipital membrane likely representing injury. The alar and transverse ligaments are intact.   5.  The flow voids of the bilateral vertebral arteries are intact.   6.  Degenerative disease as described above.   7.  Left pleural effusion.   8.  There is an approximately 11 x 24 x 9 mm sized nodule in the left lobe of the thyroid.      CT-CTA NECK WITH & W/O-POST PROCESSING   Final Result      1.  Comminuted type III C2 fracture involving the right foramen transversarium again noted.   2.  No evidence of dissection or significant stenosis of the neck arteries.   3.  Moderate to large left pleural effusion. Multiple acute/recent-appearing left-sided rib fractures.      CT-CSPINE WITHOUT PLUS RECONS   Final Result   Addendum 1 of 1   Addendum:   Findings are discussed with Dr. Awan at 1:00 AM on 9/10/2019.      Final      DX-CERVICAL SPINE-2 OR 3 VIEWS   Final Result         Very limited exam.      There appears to be an oblique fracture involving the C2, poorly visualized. The fracture lucency appears somewhat corticated, suggesting subacute to chronic. Further evaluation with CT is recommended.         CRITICAL RESULT READ BACK: Preliminary findings discussed with and critical read back performed by Dr Norton  via telephone on 9/9/2019 6:46 PM      IR-MIDLINE CATHETER INSERTION WO GUIDANCE > AGE 3   Final Result                  Ultrasound-guided midline placement performed by qualified nursing staff    as above.          MR-LUMBAR SPINE-W/O   Final Result      1.  Multilevel degenerative changes detailed.   2.  Mild to moderate spinal stenosis L2-L3, mild L1-L2 and L3-L4.         DX-LUMBAR SPINE-2 OR 3 VIEWS   Final Result      1.  There is moderate  lumbar spondylosis with degenerative disease most prominent at the L3-4 level.   2.  There is no acute fracture or malalignment.      CT-HIP WITH LEFT   Final Result      1.  There is a questionable small subcutaneous fluid collection just deep to the skin along the left lateral hip at the level the greater trochanter which could be a small subcutaneous abscess.   2.  There is a left hip arthroplasty with postoperative changes as noted above causing moderate artifact to the joint space. There is no gross effusion.           Assessment/Plan  * C2 cervical fracture (HCC)  Assessment & Plan  Suspect subacute/ chronic associated with hx of falls with assoc spinal canal blood on CT. vitamin D level normal  Plan stabilize with [place] J collar.  MRI with combination spinal stenosis from disk bulge, ligament hypertrophy and epidural bleed- upper ext neuro exam non focal- will monitor neuro symptoms with serial exams.  Evaluated by NSG-- Poor surgical candidate - stabilize in collar x 2 months. To have outpatient fu with NSG.   Pain control - prn tylenol, oxycodone.     MRI cervical spine :   1.  Acute fracture C2 vertebral body. Please see CT scan report for further details about the fracture.  2.  Mild amount of epidural hemorrhage at the levels of C2, C3 and C4.  3.  Severe central canal stenosis at the levels of C2-3, C3-4 and C4-5 likely secondary to the combinations of disc bulge, prominent ligamentum flavum and epidural hemorrhage.  4.  There is T2 hyperintensity in the region of apical ligament and anterior atlantooccipital membrane likely representing injury. The alar and transverse ligaments are intact.  5.  The flow voids of the bilateral vertebral arteries are intact.  6.  Degenerative disease as described above.  7.  Left pleural effusion.  8.  There is an approximately 11 x 24 x 9 mm sized nodule in the left lobe of the thyroid.    Skin abscess over hip, inability to walk- (present on admission)  Assessment &  Plan  With drainage from wound - MRSA (+).  Decreased  continue Zyvox through 9/14/2019.  ID input  Wound care     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)  Assessment & Plan  continue Cellcept  Recommended to follow up with outpatient Rheumatology.      History of hip fracture- (present on admission)  Assessment & Plan  Follow up CT (-) for fracture    Closed fracture of multiple ribs  Assessment & Plan  Left sided - subacute.   Supportive pain control- prn oxycodone, tylenol.     Pleural effusion  Assessment & Plan  Mod to large left sided.  Incidental findings- no reported increased shortness of breath-no acute respiratory symptoms.  Consideration for left thoracentesis if increased shortness of breath, exercise intolerance/dyspnea    Hyponatremia- (present on admission)  Assessment & Plan  Chronic, previous Na 120-130s earlier in year.  Patient asymptomatic, monitor periodically    Peripheral vascular disease (HCC)- (present on admission)  Assessment & Plan  With history of fem-pop bypass. ABIs WNL in April   Cleared for aspirin therapy by neurosurgery-plan start in several days        Essential hypertension- (present on admission)  Assessment & Plan  Controlled  continue Amlodipine and Metoprolol   Hydralazine dosing recently decreased- monitor BP response.   Prn Vasotec.      Neck pain on right side  Assessment & Plan  Associated with falls - fu CT scan with subacute / chronic- C2 Fx.   [place] J collar plan for 2 months, pain control   Outpatient follow-up with neurosurgery to assess for proper healing.     Diarrhea  Assessment & Plan  Resolved.   Hold laxatives as indicated.   ID is following        Alcohol use- (present on admission)  Assessment & Plan  Reported recent use by roommate- not showing s/s withdrawal  Nutrition support-- continue daily MVI, thiamine and folate        History of MRSA infection- (present on admission)  Assessment & Plan  With Active MRSA infection  Oral Zyvox per ID through  9/14/2019      Age-related physical debility- (present on admission)  Assessment & Plan  per therapy, patient is not safe to return to her current living situation  Thyroid, B12 level normal  Discussed with case management, anticipate will DC to SNF for continued rehab/PT OT      Thyroid nodule  Assessment & Plan  Outpatient follow up. Discussed with patient.     Iron deficiency anemia- (present on admission)  Assessment & Plan  Hgb stable   Continue iron supplements.   Monitor for acute bleed symptoms       VTE prophylaxis: Lovenox.     Discussed with multidisciplinary team plan of care.

## 2019-09-12 VITALS
WEIGHT: 151.9 LBS | BODY MASS INDEX: 23.84 KG/M2 | TEMPERATURE: 97.8 F | SYSTOLIC BLOOD PRESSURE: 160 MMHG | HEART RATE: 100 BPM | OXYGEN SATURATION: 93 % | RESPIRATION RATE: 18 BRPM | DIASTOLIC BLOOD PRESSURE: 81 MMHG | HEIGHT: 67 IN

## 2019-09-12 PROBLEM — R41.0 DELIRIUM: Status: ACTIVE | Noted: 2019-09-12

## 2019-09-12 LAB
ANION GAP SERPL CALC-SCNC: 11 MMOL/L (ref 0–11.9)
BUN SERPL-MCNC: 20 MG/DL (ref 8–22)
CALCIUM SERPL-MCNC: 9.5 MG/DL (ref 8.5–10.5)
CHLORIDE SERPL-SCNC: 100 MMOL/L (ref 96–112)
CO2 SERPL-SCNC: 22 MMOL/L (ref 20–33)
CREAT SERPL-MCNC: 0.73 MG/DL (ref 0.5–1.4)
GLUCOSE SERPL-MCNC: 126 MG/DL (ref 65–99)
POTASSIUM SERPL-SCNC: 3.9 MMOL/L (ref 3.6–5.5)
SODIUM SERPL-SCNC: 133 MMOL/L (ref 135–145)

## 2019-09-12 PROCEDURE — 700102 HCHG RX REV CODE 250 W/ 637 OVERRIDE(OP): Performed by: HOSPITALIST

## 2019-09-12 PROCEDURE — 97535 SELF CARE MNGMENT TRAINING: CPT

## 2019-09-12 PROCEDURE — 700102 HCHG RX REV CODE 250 W/ 637 OVERRIDE(OP): Performed by: INTERNAL MEDICINE

## 2019-09-12 PROCEDURE — A9270 NON-COVERED ITEM OR SERVICE: HCPCS | Performed by: INTERNAL MEDICINE

## 2019-09-12 PROCEDURE — 99239 HOSP IP/OBS DSCHRG MGMT >30: CPT | Performed by: HOSPITALIST

## 2019-09-12 PROCEDURE — 700102 HCHG RX REV CODE 250 W/ 637 OVERRIDE(OP): Performed by: NURSE PRACTITIONER

## 2019-09-12 PROCEDURE — A9270 NON-COVERED ITEM OR SERVICE: HCPCS | Performed by: NURSE PRACTITIONER

## 2019-09-12 PROCEDURE — 80048 BASIC METABOLIC PNL TOTAL CA: CPT

## 2019-09-12 PROCEDURE — A9270 NON-COVERED ITEM OR SERVICE: HCPCS | Performed by: HOSPITALIST

## 2019-09-12 PROCEDURE — 36415 COLL VENOUS BLD VENIPUNCTURE: CPT

## 2019-09-12 RX ORDER — METHOCARBAMOL 500 MG/1
500 TABLET, FILM COATED ORAL EVERY 8 HOURS PRN
Status: DISCONTINUED | OUTPATIENT
Start: 2019-09-12 | End: 2019-09-12 | Stop reason: HOSPADM

## 2019-09-12 RX ORDER — FERROUS SULFATE 325(65) MG
325 TABLET ORAL
Qty: 30 TAB
Start: 2019-09-13 | End: 2021-03-16

## 2019-09-12 RX ORDER — ACETAMINOPHEN 325 MG/1
650 TABLET ORAL EVERY 6 HOURS PRN
Qty: 30 TAB | Refills: 0 | Status: SHIPPED | OUTPATIENT
Start: 2019-09-12

## 2019-09-12 RX ORDER — HYDRALAZINE HYDROCHLORIDE 50 MG/1
50 TABLET, FILM COATED ORAL 2 TIMES DAILY
Qty: 90 TAB
Start: 2019-09-12 | End: 2021-03-16

## 2019-09-12 RX ORDER — AMLODIPINE BESYLATE 10 MG/1
10 TABLET ORAL DAILY
Qty: 30 TAB
Start: 2019-09-13 | End: 2020-08-12

## 2019-09-12 RX ORDER — SERTRALINE HYDROCHLORIDE 100 MG/1
100 TABLET, FILM COATED ORAL DAILY
Qty: 30 TAB | Refills: 11
Start: 2019-09-15 | End: 2020-04-16

## 2019-09-12 RX ORDER — OXYCODONE HYDROCHLORIDE 5 MG/1
2.5 TABLET ORAL EVERY 6 HOURS PRN
Status: DISCONTINUED | OUTPATIENT
Start: 2019-09-12 | End: 2019-09-12 | Stop reason: HOSPADM

## 2019-09-12 RX ORDER — FOLIC ACID 1 MG/1
1 TABLET ORAL DAILY
Qty: 30 TAB
Start: 2019-09-13 | End: 2021-03-16

## 2019-09-12 RX ORDER — LANOLIN ALCOHOL/MO/W.PET/CERES
100 CREAM (GRAM) TOPICAL DAILY
Qty: 30 TAB
Start: 2019-09-13 | End: 2020-08-12

## 2019-09-12 RX ORDER — MYCOPHENOLATE MOFETIL 500 MG/1
500 TABLET ORAL 2 TIMES DAILY
Qty: 28 TAB | Refills: 0 | Status: SHIPPED | OUTPATIENT
Start: 2019-09-12 | End: 2019-09-26

## 2019-09-12 RX ORDER — METHOCARBAMOL 500 MG/1
500 TABLET, FILM COATED ORAL EVERY 8 HOURS PRN
Qty: 120 TAB
Start: 2019-09-12 | End: 2021-03-16

## 2019-09-12 RX ORDER — LINEZOLID 600 MG/1
600 TABLET, FILM COATED ORAL EVERY 12 HOURS
Qty: 20 TAB | Refills: 0 | Status: SHIPPED | OUTPATIENT
Start: 2019-09-12 | End: 2019-09-13

## 2019-09-12 RX ORDER — POLYETHYLENE GLYCOL 3350 17 G/17G
17 POWDER, FOR SOLUTION ORAL
Refills: 3
Start: 2019-09-12 | End: 2020-08-12

## 2019-09-12 RX ORDER — ASPIRIN 81 MG/1
81 TABLET, CHEWABLE ORAL DAILY
Qty: 100 TAB
Start: 2019-09-12 | End: 2020-08-12

## 2019-09-12 RX ORDER — OXYCODONE HYDROCHLORIDE 5 MG/1
2.5 TABLET ORAL EVERY 6 HOURS PRN
Qty: 10 TAB | Refills: 0 | Status: SHIPPED | OUTPATIENT
Start: 2019-09-12 | End: 2019-09-15

## 2019-09-12 RX ORDER — LACTOBACILLUS RHAMNOSUS GG 10B CELL
1 CAPSULE ORAL
Qty: 30 CAP
Start: 2019-09-13 | End: 2021-03-16

## 2019-09-12 RX ADMIN — METOPROLOL SUCCINATE 100 MG: 50 TABLET, FILM COATED, EXTENDED RELEASE ORAL at 05:45

## 2019-09-12 RX ADMIN — AMLODIPINE BESYLATE 10 MG: 10 TABLET ORAL at 05:45

## 2019-09-12 RX ADMIN — Medication 1 CAPSULE: at 08:00

## 2019-09-12 RX ADMIN — SENNOSIDES, DOCUSATE SODIUM 1 TABLET: 50; 8.6 TABLET, FILM COATED ORAL at 05:45

## 2019-09-12 RX ADMIN — LINEZOLID 600 MG: 600 TABLET, FILM COATED ORAL at 05:45

## 2019-09-12 RX ADMIN — Medication 100 MG: at 05:45

## 2019-09-12 RX ADMIN — HYDRALAZINE HYDROCHLORIDE 50 MG: 50 TABLET, FILM COATED ORAL at 05:44

## 2019-09-12 RX ADMIN — FERROUS SULFATE TAB 325 MG (65 MG ELEMENTAL FE) 325 MG: 325 (65 FE) TAB at 08:00

## 2019-09-12 RX ADMIN — SERTRALINE HYDROCHLORIDE 100 MG: 100 TABLET ORAL at 05:45

## 2019-09-12 RX ADMIN — FOLIC ACID 1 MG: 1 TABLET ORAL at 05:44

## 2019-09-12 RX ADMIN — OXYCODONE HYDROCHLORIDE AND ACETAMINOPHEN 500 MG: 500 TABLET ORAL at 05:44

## 2019-09-12 ASSESSMENT — ENCOUNTER SYMPTOMS
COUGH: 0
WEAKNESS: 1
STRIDOR: 0
NECK PAIN: 1
DIZZINESS: 0
DIARRHEA: 0
EYE REDNESS: 0
FALLS: 1
MEMORY LOSS: 1
FLANK PAIN: 0
FEVER: 0
SPEECH CHANGE: 0
SENSORY CHANGE: 0
EYE DISCHARGE: 0
ABDOMINAL PAIN: 0
BACK PAIN: 0
CHILLS: 0
SHORTNESS OF BREATH: 0
FOCAL WEAKNESS: 0
VOMITING: 0
HALLUCINATIONS: 1
HEADACHES: 0

## 2019-09-12 ASSESSMENT — COGNITIVE AND FUNCTIONAL STATUS - GENERAL
DRESSING REGULAR UPPER BODY CLOTHING: A LITTLE
DRESSING REGULAR LOWER BODY CLOTHING: A LOT
TOILETING: A LOT
DAILY ACTIVITIY SCORE: 16
PERSONAL GROOMING: A LITTLE
SUGGESTED CMS G CODE MODIFIER DAILY ACTIVITY: CK
HELP NEEDED FOR BATHING: A LOT

## 2019-09-12 ASSESSMENT — LIFESTYLE VARIABLES: SUBSTANCE_ABUSE: 1

## 2019-09-12 NOTE — ASSESSMENT & PLAN NOTE
Likely assoc with opiates, hospitalization w baseline dementia.   Decrease oxycodone. Trial of non narcotic Robaxin prn for neck and hip pain, spasms.

## 2019-09-12 NOTE — PROGRESS NOTES
Neurosurgery Progress Note    Subjective:  Hospital Day 10 with  C2 Non-displaced Fx with ligamentous disruption and underlying C2-C5 severe Stenosis.  Pt in [place] J collar and tolerating it well.  Pain ranges from 3-7, but well controlled on meds.  Thin and frail appearing 73-year-old female with immunocompromised state on immunomodulators.  Yarely Guevara has diiscussed treatment option and extent of surgical correction that would be needsed if Conservative management failed.  Pt wishes to avoid surgery and is willing to work with collar.  Pt moving all extremities well and wih good strength and sensory .  She also is not in favor of any surgical interventions at this time      Exam:  She has significant cachexia throughout the upper and lower extremities but full-strength and sensory  There is no Andrew's  There is no significant hyperreflexia  Tolerating collar    BP  Min: 102/67  Max: 134/83  Pulse  Av.3  Min: 79  Max: 99  Resp  Av.5  Min: 17  Max: 19  Temp  Av.3 °C (97.4 °F)  Min: 35.8 °C (96.5 °F)  Max: 36.8 °C (98.3 °F)  SpO2  Av %  Min: 90 %  Max: 98 %    No data recorded        Recent Labs     19  0522   SODIUM 133*   POTASSIUM 3.9   CHLORIDE 100   CO2 22   GLUCOSE 126*   BUN 20   CREATININE 0.73   CALCIUM 9.5               Intake/Output       19 0700 - 19 0659 19 07 - 19 0659      [id number] [id number] Total  3531-0028 Total       Intake    Total Intake -- -- -- -- -- --       Output    Urine  --  -- --  --  -- --    Number of Times Voided 1 x 2 x 3 x -- -- --    Stool  --  -- --  --  -- --    Number of Times Stooled -- 1 x 1 x -- -- --    Total Output -- -- -- -- -- --       Net I/O     -- -- -- -- -- --          No intake or output data in the 24 hours ending 19 0757         • hydrALAZINE  50 mg BID   • senna-docusate  1 Tab DAILY   • polyethylene glycol/lytes  1 Packet QDAY PRN   • magnesium hydroxide  30 mL QDAY PRN   • enalaprilat  1.25 mg  Q6HRS PRN   • folic acid  1 mg DAILY   • linezolid  600 mg Q12HRS   • amLODIPine  10 mg Q DAY   • ferrous sulfate  325 mg QDAY with Breakfast   • ascorbic acid  500 mg DAILY   • metoprolol SR  100 mg DAILY   • multivitamin  1 Tab QDAY   • sertraline  100 mg DAILY   • enoxaparin  40 mg DAILY   • acetaminophen  650 mg Q6HRS PRN   • ondansetron  4 mg Q4HRS PRN   • ondansetron  4 mg Q4HRS PRN   • lactobacillus rhamnosus  1 Cap QDAY with Breakfast   • oxyCODONE immediate-release  5 mg Q4HRS PRN   • thiamine  100 mg DAILY       Assessment and Plan:  Hospital Day 10 with  C2 Non-displaced Fx with ligamentous disruption and underlying C2-C5 severe Stenosis.  All Imaging reviewed by Dr. Guevara and conservative management  recommended.  Plan is to keep patient in collar for 2 months time, then recheck CT.  Continue Collar and Pain management  If she fails to heal,  occiput to C6 laminectomy/fusion could be considered.  Given her immunocompromised state and poor wound healing, surgery is deferred for now   Will sign off for now.  Follow up with Dr Guevara ([facility]) in 1 month with New Cervical CT  Call if sx change or questions arise.

## 2019-09-12 NOTE — DISCHARGE INSTRUCTIONS
Discharge Instructions    Discharged to other by medical transportation with self. Discharged via wheelchair, hospital escort: Yes.  Special equipment needed: Not Applicable    Be sure to schedule a follow-up appointment with your primary care doctor or any specialists as instructed.     Discharge Plan:   Influenza Vaccine Indication: Indicated: Not available from distributor/  Influenza Vaccine Given - only chart on this line when given: Influenza Vaccine Given (See MAR)    I understand that a diet low in cholesterol, fat, and sodium is recommended for good health. Unless I have been given specific instructions below for another diet, I accept this instruction as my diet prescription.   Other diet: Regular    Special Instructions: None    · Is patient discharged on Warfarin / Coumadin?   No     Depression / Suicide Risk    As you are discharged from this [facility] facility, it is important to learn how to keep safe from harming yourself.    Recognize the warning signs:  · Abrupt changes in personality, positive or negative- including increase in energy   · Giving away possessions  · Change in eating patterns- significant weight changes-  positive or negative  · Change in sleeping patterns- unable to sleep or sleeping all the time   · Unwillingness or inability to communicate  · Depression  · Unusual sadness, discouragement and loneliness  · Talk of wanting to die  · Neglect of personal appearance   · Rebelliousness- reckless behavior  · Withdrawal from people/activities they love  · Confusion- inability to concentrate     If you or a loved one observes any of these behaviors or has concerns about self-harm, here's what you can do:  · Talk about it- your feelings and reasons for harming yourself  · Remove any means that you might use to hurt yourself (examples: pills, rope, extension cords, firearm)  · Get professional help from the community (Mental Health, Substance Abuse, psychological  counseling)  · Do not be alone:Call your Safe Contact- someone whom you trust who will be there for you.  · Call your local CRISIS HOTLINE 042-3673 or 508-000-5614  · Call your local Children's Mobile Crisis Response Team [place] (558) 047-7641 or www.[employer]  · Call the toll free National Suicide Prevention Hotlines   · National Suicide Prevention Lifeline 114-201-LSNR (5249)  · National Hope Line Network 800-SUICIDE (906-9917)

## 2019-09-12 NOTE — THERAPY
"Occupational Therapy Treatment completed with focus on ADLs, ADL transfers and patient education.  Functional Status:  Pt seen for OT tx. Pleasant and cooperative w/ nonsensical speech during tx. Supervision supine >sit, suprv sit >stand, suprv to fww, txf to bedside chair. Frequent cues to stay on task. Suprv grooming ADLs with setup. Due to balance and safety mod A sit >stand txf from bedside chair, min A > bed. Suprv sit  >supine. Pt continues to be motivated to regain strength, endurance, and independence in ADLs and ADL txfs.    Plan of Care: Will benefit from Occupational Therapy 3 times per week  Discharge Recommendations:  Equipment Will Continue to Assess for Equipment Needs. Post-acute therapy Discharge to a transitional care facility for continued skilled therapy services.    See \"Rehab Therapy-Acute\" Patient Summary Report for complete documentation.   "

## 2019-09-12 NOTE — DISCHARGE PLANNING
Agency/Facility Name: [facility]  Spoke To: purnima  Outcome: Patient is set for transport for 9/12 at 1600 going to [place].     RN ARLEEN Rivers notified.

## 2019-09-12 NOTE — DISCHARGE SUMMARY
Hospital Medicine Discharge Note     Admit Date:  9/2/2019       Discharge Date:   9/12/2019    Attending Physician:  Tino Delgado M.D.      Diagnoses (includes active and resolved):     Principal Problem:    C2 cervical fracture (HCC) POA: Unknown  Active Problems:    History of hip fracture (Chronic) POA: Yes    Bullous pemphigus POA: Yes    Skin abscess over hip, inability to walk POA: Yes    Delirium POA: Unknown    Essential hypertension POA: Yes    Peripheral vascular disease (HCC) POA: Yes    Hyponatremia POA: Yes    Pleural effusion POA: Unknown    Closed fracture of multiple ribs POA: Unknown    Age-related physical debility POA: Yes    History of MRSA infection POA: Yes    Alcohol use POA: Yes    Diarrhea POA: No    Neck pain on right side POA: No    Iron deficiency anemia POA: Yes    Thyroid nodule POA: Unknown    Subacute left rib fractures  Resolved Problems:  Delirium  Skin abscess        Hospital Summary (Brief Narrative):         72yo female hx of dementia, alcohol abuse bullous penphigoid on cellcept prescribed by her dermatologist, PVD with fem-pop, hyperlipidemia, cellulitis and lower extremity ulcers, chronic wounds of left hip with surgeries admitted with findings of left hip skin abscess and cellulitis.   She has a of MRSA and MSSA infection of LLE.  She experienced a  MSSA infection requiring a wound vac and completed course of antibiotics last April 2019.  Also had wound culture that grew MRSA in  August 2019 that was resistant to clindamycin.  She presented with multiple falls with left hip pain on 9-2-19.  Following admission hip wound has been draining-  culture + MRSA.   ID consulted she was recommended for oral Zyvox    Following admission she had complaint of left neck pain.  A CT scan revealed acute C2 fracture with further imaging revealing combination spinal stenosis from disk bulge, ligament hypertrophy and epidural bleed.  Dr. Guevara neurosurgery consulted and felt would be poor  surgical candidate.  Has recommended stabilization with Dare J collar x2 months with outpatient follow-up neurosurgery to assess progress and to determine if surgical intervention indicated.  She appears a delirium that resolved.  Incidental findings of moderate to large left pleural effusion on CT imaging.  Declined further work-up.  Her pleural effusion is asymptomatic without acute respiratory symptoms, fevers,  tachycardia or hypoxia.  Her left hip drainage marked improved.  Will need ongoing wound care.  Plan complete oral Zyvox for her MSSA infection through 9/13/2019.  Patient had incidental finding of left rib fractures likely from her falls.  Plan consider continued supportive treatment with analgesics.  Also findings of thyroid nodule will recommend outpatient follow-up.  No difficulty swallowing.  Diarrhea has resolved.  Patient has been cleared for aspirin therapy per neurosurgery.  She had episodes of delirium that have resolved.  Patient hyponatremia has been mild in the 131-133 range.  Patient has been unsteady and will benefit from continued PT/OT.  Plan to discharge to skilled nursing facility.  On day of discharge I examined patient, discussed findings, plan of care and follow-up.  Neurologic, nonfocal with sensation intact.  Minimal drainage from left hip wound.  She has been afebrile with normal white count.  She was counseled regarding cessation of alcohol use.  Plan discharge to skilled nursing facility for continued rehab.     Consultants:        Dr. Guevara , AISSATOU More, ID    Imaging/ Testing:      MR-CERVICAL SPINE-W/O   Final Result      1.  Acute fracture C2 vertebral body. Please see CT scan report for further details about the fracture.   2.  Mild amount of epidural hemorrhage at the levels of C2, C3 and C4.   3.  Severe central canal stenosis at the levels of C2-3, C3-4 and C4-5 likely secondary to the combinations of disc bulge, prominent ligamentum flavum and epidural  hemorrhage.   4.  There is T2 hyperintensity in the region of apical ligament and anterior atlantooccipital membrane likely representing injury. The alar and transverse ligaments are intact.   5.  The flow voids of the bilateral vertebral arteries are intact.   6.  Degenerative disease as described above.   7.  Left pleural effusion.   8.  There is an approximately 11 x 24 x 9 mm sized nodule in the left lobe of the thyroid.      CT-CTA NECK WITH & W/O-POST PROCESSING   Final Result      1.  Comminuted type III C2 fracture involving the right foramen transversarium again noted.   2.  No evidence of dissection or significant stenosis of the neck arteries.   3.  Moderate to large left pleural effusion. Multiple acute/recent-appearing left-sided rib fractures.      CT-CSPINE WITHOUT PLUS RECONS   Final Result   Addendum 1 of 1   Addendum:   Findings are discussed with Dr. Awan at 1:00 AM on 9/10/2019.      Final      DX-CERVICAL SPINE-2 OR 3 VIEWS   Final Result         Very limited exam.      There appears to be an oblique fracture involving the C2, poorly visualized. The fracture lucency appears somewhat corticated, suggesting subacute to chronic. Further evaluation with CT is recommended.         CRITICAL RESULT READ BACK: Preliminary findings discussed with and critical read back performed by Dr Norton  via telephone on 9/9/2019 6:46 PM      IR-MIDLINE CATHETER INSERTION WO GUIDANCE > AGE 3   Final Result                  Ultrasound-guided midline placement performed by qualified nursing staff    as above.          MR-LUMBAR SPINE-W/O   Final Result      1.  Multilevel degenerative changes detailed.   2.  Mild to moderate spinal stenosis L2-L3, mild L1-L2 and L3-L4.         DX-LUMBAR SPINE-2 OR 3 VIEWS   Final Result      1.  There is moderate lumbar spondylosis with degenerative disease most prominent at the L3-4 level.   2.  There is no acute fracture or malalignment.      CT-HIP WITH LEFT   Final Result       1.  There is a questionable small subcutaneous fluid collection just deep to the skin along the left lateral hip at the level the greater trochanter which could be a small subcutaneous abscess.   2.  There is a left hip arthroplasty with postoperative changes as noted above causing moderate artifact to the joint space. There is no gross effusion.            Procedures:          None     Discharge Medications:             Medication List      START taking these medications      Instructions   acetaminophen 325 MG Tabs  Commonly known as:  TYLENOL   Take 2 Tabs by mouth every 6 hours as needed (Mild Pain; (Pain scale 1-3); Temp greater than 100.5 F).  Dose:  650 mg     amLODIPine 10 MG Tabs  Start taking on:  9/13/2019  Commonly known as:  NORVASC   Take 1 Tab by mouth every day.  Dose:  10 mg     aspirin 81 MG Chew chewable tablet  Commonly known as:  ASA   Take 1 Tab by mouth every day.  Dose:  81 mg     ferrous sulfate 325 (65 Fe) MG tablet  Start taking on:  9/13/2019   Take 1 Tab by mouth every morning with breakfast.  Dose:  325 mg     folic acid 1 MG Tabs  Start taking on:  9/13/2019  Commonly known as:  FOLVITE   Take 1 Tab by mouth every day.  Dose:  1 mg     hydrALAZINE 50 MG Tabs  Commonly known as:  APRESOLINE   Doctor's comments:  Hold sbp less 100  Take 1 Tab by mouth 2 Times a Day.  Dose:  50 mg     lactobacillus rhamnosus Caps capsule  Start taking on:  9/13/2019   Take 1 Cap by mouth every morning with breakfast.  Dose:  1 Cap     linezolid 600 MG Tabs  Commonly known as:  ZYVOX   Take 1 Tab by mouth every 12 hours for 1 day.  Dose:  600 mg     magnesium hydroxide 400 MG/5ML Susp  Commonly known as:  MILK OF MAGNESIA   Take 30 mL by mouth 1 time daily as needed (prn constipation).  Dose:  30 mL     methocarbamol 500 MG Tabs  Commonly known as:  ROBAXIN   Take 1 Tab by mouth every 8 hours as needed (mild pain , spasms).  Dose:  500 mg     oxyCODONE immediate-release 5 MG Tabs  Commonly known as:   ROXICODONE   Take 0.5 Tabs by mouth every 6 hours as needed for Severe Pain for up to 3 days. Indications: Acute Pain  Dose:  2.5 mg     polyethylene glycol/lytes Pack  Commonly known as:  MIRALAX   Take 1 Packet by mouth 1 time daily as needed.  Dose:  17 g     thiamine 100 MG tablet  Start taking on:  9/13/2019  Commonly known as:  THIAMINE   Take 1 Tab by mouth every day.  Dose:  100 mg        CHANGE how you take these medications      Instructions   mycophenolate 500 MG tablet  What changed:  Another medication with the same name was removed. Continue taking this medication, and follow the directions you see here.  Commonly known as:  CELLCEPT   Take 1 Tab by mouth 2 times a day for 14 days.  Dose:  500 mg     sertraline 100 MG Tabs  Start taking on:  9/15/2019  What changed:  These instructions start on 9/15/2019. If you are unsure what to do until then, ask your doctor or other care provider.  Commonly known as:  ZOLOFT   Take 1 Tab by mouth every day.  Dose:  100 mg        CONTINUE taking these medications      Instructions   ascorbic acid 500 MG Tabs  Commonly known as:  ascorbic acid   Take 500 mg by mouth every day.  Dose:  500 mg     metoprolol  MG Tb24  Commonly known as:  TOPROL XL   Take 100 mg by mouth every day.  Dose:  100 mg     niacinamide 500 MG tablet   Take 500 mg by mouth 2 times a day.  Dose:  500 mg     THERAPEUTIC-M/LUTEIN Tabs   Take 1 Tab by mouth every day.  Dose:  1 Tab          Sequential compression devices to bilateral calfs.    Diet:       DIET ORDERS (From admission to next 24h)     Start     Ordered    09/03/19 1423  Supplements  ONCE     Question:  Which Supplement  Answer:  Per RD    09/03/19 1422    09/02/19 1756  Diet Order Regular  ALL MEALS     Question:  Diet:  Answer:  Regular    09/02/19 1756                  Activity:   As tolerated and directed by skilled nursing.      Code status:   Full code    Primary Care Provider:    RYANN Son    Follow up  appointment details :      .  RYANN Son  [address]  [place] [address] [phone]    In 2 weeks      Steven Guevara III, M.D.  [address]  [place] [address]  [phone]    In 4 weeks      Future Appointments   Date Time Provider Department Center   10/22/2019 10:00 AM RYANN Son [facility]           Time spent on discharge day patient visit: 40 minutes    #################################################

## 2019-09-12 NOTE — CARE PLAN
Problem: Pain Management  Goal: Pain level will decrease to patient's comfort goal  Outcome: PROGRESSING AS EXPECTED   Pharmacologic and non-pharmacologic pain relief measures such as repositioning and distraction have been offered to the patient. Patient reports adequate pain control at this time.     Problem: Skin Integrity  Goal: Risk for impaired skin integrity will decrease  Outcome: PROGRESSING AS EXPECTED   Measures to prevent skin breakdown are in place, including waffle cushion, heel float boots, turns every 2 hours, and pillows for positioning.

## 2019-09-12 NOTE — DISCHARGE PLANNING
Anticipated Discharge Disposition: SNF    Action: Met with patient made aware that MD feels she is medically cleared to transfer to [place] today, the choice she made before transferring to this floor. She wants to discuss with her son first. Bedside RN aware, will check back with patient and see if bed is available today.     Barriers to Discharge:     Plan: [facility]      UPDATE: [facility] has bed available today and can transport patient at 1600 today. Patient spoke with her son Dg and he is agreeable. Tiger Txt to Dr. Delgado for the DC summary.

## 2019-09-12 NOTE — DOCUMENTATION QUERY
[facility]                                                                       Query Response Note      PATIENT:               NANCY SANCHEZ  ACCT #:                  [id number]  MRN:                     [id number]  :                      1945  ADMIT DATE:       2019 12:38 PM  DISCH DATE:          RESPONDING  PROVIDER #:        [id number]           QUERY TEXT:    When both osteoporosis and a trauma occur with a resulting fracture, coding clinic directs the  to query the physician for clarification of the type of fracture. Based on clinical findings, risk factors and treatment, can the fracture C2 vertebral body be further specified as:    NOTE:  If an appropriate response is not listed below, please respond with a new note.    The patient's Clinical Indicators include:  9/10 Neurosurgery Consult: Comminuted, likely subacute (1 month) C2 fracture. She has osteoporosis.    9/10 MR Cervical Spine: Acute fracture C2 vertebral body. Epidural hemorrhage at C2, C3 and C4. Severe central canal stenosis at C2-3, C3-4 and C4-5.   MD Note: C2 cervical fracture, suspect subacute/ chronic associated with hx of falls with assoc spinal canal blood on CT. Vitamin D level normal.   Treatment: Neurosurgery consult; Brooks J collar; MRI  Risk Factors: Age; repeated falls; dementia; osteoporosis  Options provided:   -- Pathological fracture secondary to osteoporosis   -- Traumatic fracture   -- Unable to determine      Query created by: Anitha Arias on 2019 9:03 AM    RESPONSE TEXT:    Traumatic fracture          Electronically signed by:  GUSTAVO DOBSON 2019 3:12 PM

## 2019-09-13 ENCOUNTER — APPOINTMENT (OUTPATIENT)
Dept: WOUND CARE | Facility: MEDICAL CENTER | Age: 74
End: 2019-09-13
Attending: NURSE PRACTITIONER
Payer: MEDICARE

## 2019-10-07 ENCOUNTER — OFFICE VISIT (OUTPATIENT)
Dept: INFECTIOUS DISEASES | Facility: MEDICAL CENTER | Age: 74
End: 2019-10-07
Payer: MEDICARE

## 2019-10-07 ENCOUNTER — HOSPITAL ENCOUNTER (INPATIENT)
Facility: MEDICAL CENTER | Age: 74
LOS: 4 days | DRG: 467 | End: 2019-10-11
Attending: INTERNAL MEDICINE | Admitting: INTERNAL MEDICINE
Payer: MEDICARE

## 2019-10-07 ENCOUNTER — HOSPITAL ENCOUNTER (OUTPATIENT)
Facility: MEDICAL CENTER | Age: 74
End: 2019-10-07
Payer: MEDICARE

## 2019-10-07 VITALS
OXYGEN SATURATION: 93 % | WEIGHT: 138 LBS | BODY MASS INDEX: 21.66 KG/M2 | SYSTOLIC BLOOD PRESSURE: 110 MMHG | DIASTOLIC BLOOD PRESSURE: 60 MMHG | HEIGHT: 67 IN | HEART RATE: 80 BPM | TEMPERATURE: 97.8 F

## 2019-10-07 DIAGNOSIS — L02.91 ABSCESS: ICD-10-CM

## 2019-10-07 DIAGNOSIS — L97.921 CHRONIC ULCER OF LEFT LEG, LIMITED TO BREAKDOWN OF SKIN (HCC): ICD-10-CM

## 2019-10-07 DIAGNOSIS — I73.9 PERIPHERAL VASCULAR DISEASE (HCC): ICD-10-CM

## 2019-10-07 DIAGNOSIS — Z22.322 MRSA (METHICILLIN RESISTANT STAPH AUREUS) CULTURE POSITIVE: ICD-10-CM

## 2019-10-07 DIAGNOSIS — L02.91 ABSCESS: Primary | ICD-10-CM

## 2019-10-07 PROBLEM — R19.7 DIARRHEA: Status: RESOLVED | Noted: 2019-09-07 | Resolved: 2019-10-07

## 2019-10-07 PROBLEM — L97.322 LOWER LIMB ULCER, ANKLE, LEFT, WITH FAT LAYER EXPOSED (HCC): Status: RESOLVED | Noted: 2019-03-14 | Resolved: 2019-10-07

## 2019-10-07 PROBLEM — R73.9 HYPERGLYCEMIA: Status: RESOLVED | Noted: 2017-11-04 | Resolved: 2019-10-07

## 2019-10-07 PROBLEM — R41.0 DELIRIUM: Status: RESOLVED | Noted: 2019-09-12 | Resolved: 2019-10-07

## 2019-10-07 PROBLEM — E87.6 HYPOKALEMIA: Status: RESOLVED | Noted: 2019-02-03 | Resolved: 2019-10-07

## 2019-10-07 PROBLEM — Z09 HOSPITAL DISCHARGE FOLLOW-UP: Status: RESOLVED | Noted: 2017-12-05 | Resolved: 2019-10-07

## 2019-10-07 PROBLEM — M54.2 NECK PAIN ON RIGHT SIDE: Status: RESOLVED | Noted: 2019-09-09 | Resolved: 2019-10-07

## 2019-10-07 LAB
ALBUMIN SERPL BCP-MCNC: 3.2 G/DL (ref 3.2–4.9)
ALBUMIN/GLOB SERPL: 0.7 G/DL
ALP SERPL-CCNC: 103 U/L (ref 30–99)
ALT SERPL-CCNC: 10 U/L (ref 2–50)
ANION GAP SERPL CALC-SCNC: 8 MMOL/L (ref 0–11.9)
AST SERPL-CCNC: 15 U/L (ref 12–45)
BILIRUB SERPL-MCNC: 0.2 MG/DL (ref 0.1–1.5)
BUN SERPL-MCNC: 25 MG/DL (ref 8–22)
CALCIUM SERPL-MCNC: 9.3 MG/DL (ref 8.5–10.5)
CHLORIDE SERPL-SCNC: 100 MMOL/L (ref 96–112)
CO2 SERPL-SCNC: 26 MMOL/L (ref 20–33)
CREAT SERPL-MCNC: 0.65 MG/DL (ref 0.5–1.4)
CRP SERPL HS-MCNC: 8.74 MG/DL (ref 0–0.75)
GLOBULIN SER CALC-MCNC: 4.8 G/DL (ref 1.9–3.5)
GLUCOSE SERPL-MCNC: 121 MG/DL (ref 65–99)
POTASSIUM SERPL-SCNC: 4.1 MMOL/L (ref 3.6–5.5)
PROT SERPL-MCNC: 8 G/DL (ref 6–8.2)
SODIUM SERPL-SCNC: 134 MMOL/L (ref 135–145)

## 2019-10-07 PROCEDURE — 99222 1ST HOSP IP/OBS MODERATE 55: CPT | Mod: AI | Performed by: HOSPITALIST

## 2019-10-07 PROCEDURE — A9270 NON-COVERED ITEM OR SERVICE: HCPCS | Performed by: HOSPITALIST

## 2019-10-07 PROCEDURE — 85652 RBC SED RATE AUTOMATED: CPT

## 2019-10-07 PROCEDURE — 80053 COMPREHEN METABOLIC PANEL: CPT

## 2019-10-07 PROCEDURE — 99215 OFFICE O/P EST HI 40 MIN: CPT | Performed by: INTERNAL MEDICINE

## 2019-10-07 PROCEDURE — 36415 COLL VENOUS BLD VENIPUNCTURE: CPT

## 2019-10-07 PROCEDURE — 770006 HCHG ROOM/CARE - MED/SURG/GYN SEMI*

## 2019-10-07 PROCEDURE — 700102 HCHG RX REV CODE 250 W/ 637 OVERRIDE(OP): Performed by: HOSPITALIST

## 2019-10-07 PROCEDURE — 86140 C-REACTIVE PROTEIN: CPT

## 2019-10-07 RX ORDER — OXYCODONE HYDROCHLORIDE 5 MG/1
2.5 TABLET ORAL
Status: DISCONTINUED | OUTPATIENT
Start: 2019-10-07 | End: 2019-10-09

## 2019-10-07 RX ORDER — AMLODIPINE BESYLATE 10 MG/1
10 TABLET ORAL DAILY
Status: DISCONTINUED | OUTPATIENT
Start: 2019-10-08 | End: 2019-10-11 | Stop reason: HOSPADM

## 2019-10-07 RX ORDER — FUROSEMIDE 40 MG/1
40 TABLET ORAL DAILY
Status: DISCONTINUED | OUTPATIENT
Start: 2019-10-08 | End: 2019-10-11 | Stop reason: HOSPADM

## 2019-10-07 RX ORDER — METOPROLOL TARTRATE 100 MG/1
75 TABLET ORAL 2 TIMES DAILY
COMMUNITY
End: 2020-08-12

## 2019-10-07 RX ORDER — METHOCARBAMOL 500 MG/1
500 TABLET, FILM COATED ORAL EVERY 8 HOURS PRN
Status: DISCONTINUED | OUTPATIENT
Start: 2019-10-07 | End: 2019-10-11 | Stop reason: HOSPADM

## 2019-10-07 RX ORDER — FUROSEMIDE 40 MG/1
40 TABLET ORAL DAILY
COMMUNITY
End: 2021-08-10

## 2019-10-07 RX ORDER — DOXYCYCLINE HYCLATE 100 MG
100 TABLET ORAL 2 TIMES DAILY
Status: ON HOLD | COMMUNITY
End: 2019-10-11

## 2019-10-07 RX ORDER — HYDRALAZINE HYDROCHLORIDE 50 MG/1
50 TABLET, FILM COATED ORAL 2 TIMES DAILY
Status: DISCONTINUED | OUTPATIENT
Start: 2019-10-07 | End: 2019-10-11 | Stop reason: HOSPADM

## 2019-10-07 RX ORDER — POTASSIUM CHLORIDE 20 MEQ/1
20 TABLET, EXTENDED RELEASE ORAL DAILY
COMMUNITY
End: 2021-08-10

## 2019-10-07 RX ORDER — FOLIC ACID 1 MG/1
1 TABLET ORAL DAILY
Status: DISCONTINUED | OUTPATIENT
Start: 2019-10-08 | End: 2019-10-11 | Stop reason: HOSPADM

## 2019-10-07 RX ORDER — ASCORBIC ACID 500 MG
500 TABLET ORAL DAILY
Status: DISCONTINUED | OUTPATIENT
Start: 2019-10-08 | End: 2019-10-11 | Stop reason: HOSPADM

## 2019-10-07 RX ORDER — PHENOL 1.4 %
AEROSOL, SPRAY (ML) MUCOUS MEMBRANE 2 TIMES DAILY
COMMUNITY
End: 2021-08-10

## 2019-10-07 RX ORDER — BISACODYL 10 MG
10 SUPPOSITORY, RECTAL RECTAL DAILY
COMMUNITY
End: 2020-08-12

## 2019-10-07 RX ORDER — SERTRALINE HYDROCHLORIDE 100 MG/1
100 TABLET, FILM COATED ORAL DAILY
Status: DISCONTINUED | OUTPATIENT
Start: 2019-10-08 | End: 2019-10-11 | Stop reason: HOSPADM

## 2019-10-07 RX ORDER — OXYCODONE HYDROCHLORIDE 5 MG/1
5 TABLET ORAL
Status: DISCONTINUED | OUTPATIENT
Start: 2019-10-07 | End: 2019-10-09

## 2019-10-07 RX ORDER — BISACODYL 10 MG
10 SUPPOSITORY, RECTAL RECTAL
Status: DISCONTINUED | OUTPATIENT
Start: 2019-10-07 | End: 2019-10-11 | Stop reason: HOSPADM

## 2019-10-07 RX ORDER — ONDANSETRON 2 MG/ML
4 INJECTION INTRAMUSCULAR; INTRAVENOUS EVERY 4 HOURS PRN
Status: DISCONTINUED | OUTPATIENT
Start: 2019-10-07 | End: 2019-10-11 | Stop reason: HOSPADM

## 2019-10-07 RX ORDER — ASPIRIN 81 MG/1
81 TABLET, CHEWABLE ORAL DAILY
Status: DISCONTINUED | OUTPATIENT
Start: 2019-10-08 | End: 2019-10-11 | Stop reason: HOSPADM

## 2019-10-07 RX ORDER — MORPHINE SULFATE 4 MG/ML
2 INJECTION, SOLUTION INTRAMUSCULAR; INTRAVENOUS
Status: DISCONTINUED | OUTPATIENT
Start: 2019-10-07 | End: 2019-10-11 | Stop reason: HOSPADM

## 2019-10-07 RX ORDER — AMOXICILLIN 250 MG
2 CAPSULE ORAL 2 TIMES DAILY
Status: DISCONTINUED | OUTPATIENT
Start: 2019-10-07 | End: 2019-10-11 | Stop reason: HOSPADM

## 2019-10-07 RX ORDER — BISACODYL 10 MG
10 SUPPOSITORY, RECTAL RECTAL DAILY
Status: DISCONTINUED | OUTPATIENT
Start: 2019-10-08 | End: 2019-10-11 | Stop reason: HOSPADM

## 2019-10-07 RX ORDER — LACTOBACILLUS RHAMNOSUS GG 10B CELL
1 CAPSULE ORAL
Status: DISCONTINUED | OUTPATIENT
Start: 2019-10-08 | End: 2019-10-11 | Stop reason: HOSPADM

## 2019-10-07 RX ORDER — POLYETHYLENE GLYCOL 3350 17 G/17G
1 POWDER, FOR SOLUTION ORAL
Status: DISCONTINUED | OUTPATIENT
Start: 2019-10-07 | End: 2019-10-11 | Stop reason: HOSPADM

## 2019-10-07 RX ORDER — DOXYCYCLINE 100 MG/1
100 TABLET ORAL EVERY 12 HOURS
Status: DISCONTINUED | OUTPATIENT
Start: 2019-10-07 | End: 2019-10-10

## 2019-10-07 RX ORDER — CHOLECALCIFEROL (VITAMIN D3) 125 MCG
500 CAPSULE ORAL DAILY
COMMUNITY
End: 2021-08-10

## 2019-10-07 RX ORDER — ONDANSETRON 4 MG/1
4 TABLET, ORALLY DISINTEGRATING ORAL EVERY 4 HOURS PRN
Status: DISCONTINUED | OUTPATIENT
Start: 2019-10-07 | End: 2019-10-11 | Stop reason: HOSPADM

## 2019-10-07 RX ORDER — THIAMINE MONONITRATE (VIT B1) 100 MG
100 TABLET ORAL DAILY
Status: DISCONTINUED | OUTPATIENT
Start: 2019-10-08 | End: 2019-10-11 | Stop reason: HOSPADM

## 2019-10-07 RX ORDER — CEPHALEXIN 500 MG/1
500 CAPSULE ORAL 3 TIMES DAILY
Status: ON HOLD | COMMUNITY
End: 2019-10-11

## 2019-10-07 RX ORDER — POTASSIUM CHLORIDE 20 MEQ/1
20 TABLET, EXTENDED RELEASE ORAL DAILY
Status: DISCONTINUED | OUTPATIENT
Start: 2019-10-08 | End: 2019-10-11 | Stop reason: HOSPADM

## 2019-10-07 RX ORDER — ACETAMINOPHEN 325 MG/1
650 TABLET ORAL EVERY 6 HOURS PRN
Status: DISCONTINUED | OUTPATIENT
Start: 2019-10-07 | End: 2019-10-11 | Stop reason: HOSPADM

## 2019-10-07 RX ORDER — FERROUS SULFATE 325(65) MG
325 TABLET ORAL
Status: DISCONTINUED | OUTPATIENT
Start: 2019-10-08 | End: 2019-10-11 | Stop reason: HOSPADM

## 2019-10-07 RX ADMIN — HYDRALAZINE HYDROCHLORIDE 50 MG: 50 TABLET, FILM COATED ORAL at 20:12

## 2019-10-07 RX ADMIN — DOXYCYCLINE 100 MG: 100 TABLET, FILM COATED ORAL at 20:11

## 2019-10-07 RX ADMIN — METOPROLOL TARTRATE 75 MG: 25 TABLET, FILM COATED ORAL at 20:12

## 2019-10-07 ASSESSMENT — COGNITIVE AND FUNCTIONAL STATUS - GENERAL
TOILETING: A LITTLE
SUGGESTED CMS G CODE MODIFIER MOBILITY: CJ
SUGGESTED CMS G CODE MODIFIER DAILY ACTIVITY: CJ
MOBILITY SCORE: 22
DAILY ACTIVITIY SCORE: 21
WALKING IN HOSPITAL ROOM: A LITTLE
HELP NEEDED FOR BATHING: A LITTLE
MOVING FROM LYING ON BACK TO SITTING ON SIDE OF FLAT BED: A LITTLE
DRESSING REGULAR LOWER BODY CLOTHING: A LITTLE

## 2019-10-07 ASSESSMENT — ENCOUNTER SYMPTOMS
BACK PAIN: 1
NECK PAIN: 0
CARDIOVASCULAR NEGATIVE: 1
HEADACHES: 0
NERVOUS/ANXIOUS: 1
FOCAL WEAKNESS: 0
NAUSEA: 0
EYES NEGATIVE: 1
FEVER: 0
CHILLS: 1
POLYDIPSIA: 0
VOMITING: 0
FALLS: 1
NEUROLOGICAL NEGATIVE: 1
PALPITATIONS: 0
DEPRESSION: 0
BRUISES/BLEEDS EASILY: 0
HEARTBURN: 0
WEAKNESS: 0
DIZZINESS: 0
RESPIRATORY NEGATIVE: 1
MYALGIAS: 1
GASTROINTESTINAL NEGATIVE: 1
COUGH: 0

## 2019-10-07 ASSESSMENT — LIFESTYLE VARIABLES
TOTAL SCORE: 0
EVER FELT BAD OR GUILTY ABOUT YOUR DRINKING: NO
ON A TYPICAL DAY WHEN YOU DRINK ALCOHOL HOW MANY DRINKS DO YOU HAVE: 0
TOTAL SCORE: 0
EVER HAD A DRINK FIRST THING IN THE MORNING TO STEADY YOUR NERVES TO GET RID OF A HANGOVER: NO
HAVE YOU EVER FELT YOU SHOULD CUT DOWN ON YOUR DRINKING: NO
HAVE PEOPLE ANNOYED YOU BY CRITICIZING YOUR DRINKING: NO
ALCOHOL_USE: NO
EVER_SMOKED: NEVER
HOW MANY TIMES IN THE PAST YEAR HAVE YOU HAD 5 OR MORE DRINKS IN A DAY: 0
AVERAGE NUMBER OF DAYS PER WEEK YOU HAVE A DRINK CONTAINING ALCOHOL: 0
TOTAL SCORE: 0
CONSUMPTION TOTAL: NEGATIVE

## 2019-10-07 NOTE — PROGRESS NOTES
"Chief Complaint   Patient presents with   • Follow-Up     Left hip wound       HISTORY OF PRESENT ILLNESS: Patient is a 73 y.o. female established patient who presents today for FU Sent from SNF due to concern for cellulitis    73-year-old white female with chronic arterial ulcerations of the LLE, valvular heart disease, and chronic rash that was recently biopsied and diagnosed, but does not recall biopsy/diagnosis, but she was given a cream to \"dry it up\"  (given Cellcept as well) and Bullous pemphigus.   Known to the ID service from prior hospitalizations for the same.    s/p fem pop bypass and chronic ulcerations primarily affecting the left lower extremity.  Followed regularly by Dr. Colin and wound clinic.  Wounds have been persistent without much improvement over the last several months.  Hospitalized from 1/30-2/5/19.  Wound culture on 1/30+ Pseudomonas.  Previous wound culture from July 2000 18+ MRSA, E faecalis and pseudomonas.  Ultrasound of LLE negative for DVT.  Patient discharged home on p.o. Zyvox and ciprofloxacin through 2/13/19.     2/20/2019: Patient reports feeling well and tolerated the p.o. Zyvox and ciprofloxacin Some diarrhea and nausea, both of which have resolved since finishing the antibiotics.  c/oSOB with activity; (baseline)No fevers/chills  Wound care clinic 3 times a week, noting that there is scant yellow drainage, no odor and a wound VAC is being applied. Seen by dermatology last week, she has been continued on her prednisone.    10/7/2019  Started on keflex and doxy for \"cellulitis\" by SNF  Last wound care note 8/30/2019-wound cx MRSA and wounds improving  Had a fall last month and sustained C2 fracture-wearing neck brace-no new neuro deficits  Her LLE wounds have nearly healed-does have chronic and worsening edema/stasis sice neck fracture and having to sit in wheelchair. Mild erythema, not warm-given abx for this  In the interim has developed increased drainage from left hip " prior arthroplasty site.  Known prior MRSA. Drainage noted during hosp, CT shoed abscess. Given Zyvox but did not resolve and is now increased. Cx +MRSA  No fever or chills. Remains very unsteady    Patient Active Problem List    Diagnosis Date Noted   • C2 cervical fracture ([facility]) 09/10/2019     Priority: High   • Skin abscess over hip, inability to walk 09/02/2019     Priority: High   • Bullous pemphigus 01/23/2019     Priority: High   • History of hip fracture 12/20/2017     Priority: High   • Pleural effusion 09/10/2019     Priority: Medium   • Closed fracture of multiple ribs 09/10/2019     Priority: Medium   • Hyponatremia 01/30/2019     Priority: Medium   • Peripheral vascular disease ([facility]) 07/20/2018     Priority: Medium   • B12 deficiency 12/20/2017     Priority: Medium   • Essential hypertension 10/06/2015     Priority: Medium   • Thyroid nodule 09/11/2019     Priority: Low   • Iron deficiency anemia 11/04/2017     Priority: Low   • Major depression, recurrent, chronic ([facility]) 10/06/2015     Priority: Low   • Chronic ulcer of left leg, limited to breakdown of skin ([facility]) 10/07/2019   • Age-related physical debility 09/02/2019   • History of MRSA infection 09/02/2019   • Alcohol use 09/02/2019   • S/P femoral-popliteal bypass surgery 06/12/2019   • Valgus deformity of foot, left 04/20/2019   • VRE (vancomycin resistant enterococcus) culture positive 03/17/2019   • Low folate 10/04/2018   • Pseudomonas infection 08/22/2018   • Alcohol abuse 07/31/2018   • Pain 07/20/2018   • MRSA (methicillin resistant staph aureus) culture positive 05/31/2018   • Wound infection 05/29/2018   • Open leg wound 02/10/2018   • Generalized osteoarthritis of multiple sites 10/20/2015       Allergies:Bactrim [sulfamethoxazole-trimethoprim] and Meropenem    Current Outpatient Medications   Medication Sig Dispense Refill   • Calcium Carbonate 600 MG Tab Take  by mouth.     • cyanocobalamin (VITAMIN B-12) 500 MCG Tab Take 500 mcg by  mouth every day.     • doxycycline (VIBRAMYCIN) 100 MG Tab Take 100 mg by mouth 2 times a day.     • bisacodyl (DULCOLAX) 10 MG Suppos Insert 10 mg in rectum every day.     • furosemide (LASIX) 40 MG Tab Take 40 mg by mouth every day.     • cephALEXin (KEFLEX) 500 MG Cap Take 500 mg by mouth 4 times a day.     • potassium chloride SA (KDUR) 20 MEQ Tab CR Take 20 mEq by mouth 2 times a day.     • Cholecalciferol 2000 UNIT Cap Take 2,000 Units by mouth every day.     • acetaminophen (TYLENOL) 325 MG Tab Take 2 Tabs by mouth every 6 hours as needed (Mild Pain; (Pain scale 1-3); Temp greater than 100.5 F). 30 Tab 0   • amLODIPine (NORVASC) 10 MG Tab Take 1 Tab by mouth every day. 30 Tab    • ferrous sulfate 325 (65 Fe) MG tablet Take 1 Tab by mouth every morning with breakfast. 30 Tab    • folic acid (FOLVITE) 1 MG Tab Take 1 Tab by mouth every day. 30 Tab    • hydrALAZINE (APRESOLINE) 50 MG Tab Take 1 Tab by mouth 2 Times a Day. 90 Tab    • lactobacillus rhamnosus (CULTURELLE) Cap capsule Take 1 Cap by mouth every morning with breakfast. 30 Cap    • magnesium hydroxide (MILK OF MAGNESIA) 400 MG/5ML Suspension Take 30 mL by mouth 1 time daily as needed (prn constipation). 1 Bottle    • methocarbamol (ROBAXIN) 500 MG Tab Take 1 Tab by mouth every 8 hours as needed (mild pain , spasms). 120 Tab    • polyethylene glycol/lytes (MIRALAX) Pack Take 1 Packet by mouth 1 time daily as needed.  3   • thiamine (THIAMINE) 100 MG tablet Take 1 Tab by mouth every day. 30 Tab    • sertraline (ZOLOFT) 100 MG Tab Take 1 Tab by mouth every day. 30 Tab 11   • aspirin (ASA) 81 MG Chew Tab chewable tablet Take 1 Tab by mouth every day. 100 Tab    • ascorbic acid (ASCORBIC ACID) 500 MG Tab Take 500 mg by mouth every day.     • metoprolol SR (TOPROL XL) 100 MG TABLET SR 24 HR Take 100 mg by mouth every day.     • Multiple Vitamins-Minerals (THERAPEUTIC-M/LUTEIN) Tab Take 1 Tab by mouth every day.  0   • niacinamide 500 MG tablet Take 500 mg  "by mouth 2 times a day.       No current facility-administered medications for this visit.        Social History     Tobacco Use   • Smoking status: Former Smoker     Packs/day: 0.50     Years: 25.00     Pack years: 12.50     Types: Cigarettes     Last attempt to quit: 2007     Years since quittin.7   • Smokeless tobacco: Never Used   Substance Use Topics   • Alcohol use: Yes     Alcohol/week: 3.0 oz     Types: 5 Glasses of wine per week     Comment: glass of wine per day   • Drug use: No       Family History   Problem Relation Age of Onset   • GI Disease Mother         colostomy   • Heart Attack Father    • Diabetes Father    • Hypertension Father    • Psychiatric Illness Brother         bipolar disorder       ROS:  Review of Systems   Constitutional: Negative for fever, chills, weight loss and malaise/fatigue.   HENT: Negative for ear pain, nosebleeds, congestion, sore throat + neck pain.    Eyes: Negative for blurred vision.   Respiratory: Negative for cough, sputum production, shortness of breath and wheezing.    Cardiovascular: Negative for chest pain, palpitations, orthopnea and leg swelling.   Gastrointestinal: Negative for heartburn, nausea, vomiting and abdominal pain.   Musculoskeletal: + joint pain.   Skin: Negative for rash and itching.   Neurological: Negative for dizziness, tingling, tremors, sensory change  Psychiatric/Behavioral: Negative for depression, suicidal ideas and memory loss.  The patient is not nervous/anxious and does not have insomnia.    All other systems reviewed and are negative except as in HPI.      Exam:  /60 (BP Location: Left arm, Patient Position: Sitting, BP Cuff Size: Adult)   Pulse 80   Temp 36.6 °C (97.8 °F) (Temporal)   Ht 1.702 m (5' 7\")   Wt 62.6 kg (138 lb)   SpO2 93%   General:   female in NAD. Pleasant and cooperative frail  Head: NCAT, Pupils are equal round reactive to light. Extraocular movements intact. Oropharynx is clear.  Neck: wearing neck " brace  Pulmonary: Clear to ausculation.  No rales, rhonchi, or wheezing. Unlabored  Cardiovascular: Regular rate and rhythm without murmur. No ectopy  Abdominal: Soft, nontender, nondistended. Positive bowel sounds. No hepatosplenomegaly.  Skin: No rashes.  Extremities: no clubbing, cyanosis, edema BLE left greater than right-pitting mild erythema crusted shallow ulceration left-no purulence  Left hip 2 draining signs, purulent. minimal surrounding erythema +sarcopenia deformity left ankle  Neurologic: Alert and oriented x4. Speech is fluent without dysarthria. Cranial nerves intact. Moving all extremities. Gait within normal limits.      Assessment/Plan:  1. Skin abscess over hip, inability to walk     2. Peripheral vascular disease (HCC)     3. MRSA (methicillin resistant staph aureus) culture positive     4. Chronic ulcer of left leg, limited to breakdown of skin (HCC)         Bullous pemphigus      On Prednisone, being managed by Dermatology.  Places pt at risk for recurrent infection.     Peripheral vascular disease (HCC)      Will impair wound healing and resolution of infection.   Lesions much improved [id number] cellulitis seen  Erythema due to stasis rather than cellulitis  Continue doxy for MRSA infection hip  DC Keflex  Anticipate IV daptomycin 8 mg/kg IV daily after adequate drainage left hip  Query infection of YOLANDA-did not resolve with oral abx and continues to drain-Needs eval for surgical drainage. Unclear if she is candidate for 2 stage revision given mult co-morbidities. Recommend course IV abx followed by oral as indicated. Declined admit to hospital today-prefers direct admit tomorrow      Manisha Ferrari M.D.

## 2019-10-07 NOTE — PROGRESS NOTES
Pt is a direct admit from infection disease, Dr. Arias office for left hip prosthesis infection. Will need to start her on daptomycin 8mg/kg daily when she comes as per ID rec.  Will need ortho consult for surgical evaluation  Her hip prosthesis was done by Dr. Chong Vazquez

## 2019-10-07 NOTE — ASSESSMENT & PLAN NOTE
Query infection of YOLANDA-did not resolve with oral abx and continues to drain-Needs eval for surgical drainage. Unclear if she is candidate for 2 stage revision given mult co-morbidities. Recommend course IV abx followed by oral as indicated. Declined admit to hospital today-prefers direct admit tomorrow

## 2019-10-07 NOTE — PROGRESS NOTES
Patient is a direct admit from MD office.   Dr Reid is accepting the patient.   ADT order signed and held, needs to be released when the patient arrives on the unit.

## 2019-10-08 ENCOUNTER — APPOINTMENT (OUTPATIENT)
Dept: RADIOLOGY | Facility: MEDICAL CENTER | Age: 74
DRG: 467 | End: 2019-10-08
Attending: HOSPITALIST
Payer: MEDICARE

## 2019-10-08 LAB
ALBUMIN SERPL BCP-MCNC: 3 G/DL (ref 3.2–4.9)
ALBUMIN/GLOB SERPL: 0.7 G/DL
ALP SERPL-CCNC: 86 U/L (ref 30–99)
ALT SERPL-CCNC: 9 U/L (ref 2–50)
ANION GAP SERPL CALC-SCNC: 7 MMOL/L (ref 0–11.9)
AST SERPL-CCNC: 14 U/L (ref 12–45)
BASOPHILS # BLD AUTO: 1.6 % (ref 0–1.8)
BASOPHILS # BLD: 0.13 K/UL (ref 0–0.12)
BILIRUB SERPL-MCNC: 0.3 MG/DL (ref 0.1–1.5)
BUN SERPL-MCNC: 19 MG/DL (ref 8–22)
CALCIUM SERPL-MCNC: 9.2 MG/DL (ref 8.5–10.5)
CHLORIDE SERPL-SCNC: 102 MMOL/L (ref 96–112)
CO2 SERPL-SCNC: 24 MMOL/L (ref 20–33)
CREAT SERPL-MCNC: 0.49 MG/DL (ref 0.5–1.4)
EOSINOPHIL # BLD AUTO: 0.57 K/UL (ref 0–0.51)
EOSINOPHIL NFR BLD: 6.8 % (ref 0–6.9)
ERYTHROCYTE [DISTWIDTH] IN BLOOD BY AUTOMATED COUNT: 47.2 FL (ref 35.9–50)
ERYTHROCYTE [SEDIMENTATION RATE] IN BLOOD BY WESTERGREN METHOD: 95 MM/HOUR (ref 0–30)
GLOBULIN SER CALC-MCNC: 4.5 G/DL (ref 1.9–3.5)
GLUCOSE SERPL-MCNC: 92 MG/DL (ref 65–99)
HCT VFR BLD AUTO: 29.8 % (ref 37–47)
HGB BLD-MCNC: 9.5 G/DL (ref 12–16)
IMM GRANULOCYTES # BLD AUTO: 0.08 K/UL (ref 0–0.11)
IMM GRANULOCYTES NFR BLD AUTO: 1 % (ref 0–0.9)
LYMPHOCYTES # BLD AUTO: 2.01 K/UL (ref 1–4.8)
LYMPHOCYTES NFR BLD: 24 % (ref 22–41)
MCH RBC QN AUTO: 30 PG (ref 27–33)
MCHC RBC AUTO-ENTMCNC: 31.9 G/DL (ref 33.6–35)
MCV RBC AUTO: 94 FL (ref 81.4–97.8)
MONOCYTES # BLD AUTO: 1.05 K/UL (ref 0–0.85)
MONOCYTES NFR BLD AUTO: 12.6 % (ref 0–13.4)
NEUTROPHILS # BLD AUTO: 4.52 K/UL (ref 2–7.15)
NEUTROPHILS NFR BLD: 54 % (ref 44–72)
NRBC # BLD AUTO: 0 K/UL
NRBC BLD-RTO: 0 /100 WBC
PLATELET # BLD AUTO: 445 K/UL (ref 164–446)
PMV BLD AUTO: 9.6 FL (ref 9–12.9)
POTASSIUM SERPL-SCNC: 4.1 MMOL/L (ref 3.6–5.5)
PROT SERPL-MCNC: 7.5 G/DL (ref 6–8.2)
RBC # BLD AUTO: 3.17 M/UL (ref 4.2–5.4)
SODIUM SERPL-SCNC: 133 MMOL/L (ref 135–145)
WBC # BLD AUTO: 8.4 K/UL (ref 4.8–10.8)

## 2019-10-08 PROCEDURE — 85025 COMPLETE CBC W/AUTO DIFF WBC: CPT

## 2019-10-08 PROCEDURE — A9270 NON-COVERED ITEM OR SERVICE: HCPCS | Performed by: HOSPITALIST

## 2019-10-08 PROCEDURE — 700102 HCHG RX REV CODE 250 W/ 637 OVERRIDE(OP): Performed by: HOSPITALIST

## 2019-10-08 PROCEDURE — 73701 CT LOWER EXTREMITY W/DYE: CPT | Mod: LT

## 2019-10-08 PROCEDURE — 80053 COMPREHEN METABOLIC PANEL: CPT

## 2019-10-08 PROCEDURE — 99223 1ST HOSP IP/OBS HIGH 75: CPT | Performed by: INTERNAL MEDICINE

## 2019-10-08 PROCEDURE — 99232 SBSQ HOSP IP/OBS MODERATE 35: CPT | Performed by: INTERNAL MEDICINE

## 2019-10-08 PROCEDURE — 770006 HCHG ROOM/CARE - MED/SURG/GYN SEMI*

## 2019-10-08 PROCEDURE — 700117 HCHG RX CONTRAST REV CODE 255: Performed by: HOSPITALIST

## 2019-10-08 PROCEDURE — 36415 COLL VENOUS BLD VENIPUNCTURE: CPT

## 2019-10-08 RX ADMIN — HYDRALAZINE HYDROCHLORIDE 50 MG: 50 TABLET, FILM COATED ORAL at 17:54

## 2019-10-08 RX ADMIN — FOLIC ACID 1 MG: 1 TABLET ORAL at 06:00

## 2019-10-08 RX ADMIN — DOXYCYCLINE 100 MG: 100 TABLET, FILM COATED ORAL at 17:53

## 2019-10-08 RX ADMIN — POTASSIUM CHLORIDE 20 MEQ: 1500 TABLET, EXTENDED RELEASE ORAL at 06:01

## 2019-10-08 RX ADMIN — HYDRALAZINE HYDROCHLORIDE 50 MG: 50 TABLET, FILM COATED ORAL at 06:00

## 2019-10-08 RX ADMIN — FERROUS SULFATE TAB 325 MG (65 MG ELEMENTAL FE) 325 MG: 325 (65 FE) TAB at 08:30

## 2019-10-08 RX ADMIN — DOXYCYCLINE 100 MG: 100 TABLET, FILM COATED ORAL at 05:59

## 2019-10-08 RX ADMIN — IOHEXOL 100 ML: 350 INJECTION, SOLUTION INTRAVENOUS at 17:00

## 2019-10-08 RX ADMIN — METOPROLOL TARTRATE 75 MG: 25 TABLET, FILM COATED ORAL at 06:01

## 2019-10-08 RX ADMIN — Medication 1 CAPSULE: at 08:30

## 2019-10-08 RX ADMIN — ASPIRIN 81 MG 81 MG: 81 TABLET ORAL at 05:59

## 2019-10-08 RX ADMIN — ACETAMINOPHEN 650 MG: 325 TABLET, FILM COATED ORAL at 23:06

## 2019-10-08 RX ADMIN — OXYCODONE HYDROCHLORIDE AND ACETAMINOPHEN 500 MG: 500 TABLET ORAL at 05:59

## 2019-10-08 RX ADMIN — METOPROLOL TARTRATE 75 MG: 25 TABLET, FILM COATED ORAL at 17:53

## 2019-10-08 RX ADMIN — SERTRALINE HYDROCHLORIDE 100 MG: 100 TABLET ORAL at 06:01

## 2019-10-08 RX ADMIN — Medication 100 MG: at 06:01

## 2019-10-08 RX ADMIN — AMLODIPINE BESYLATE 10 MG: 10 TABLET ORAL at 06:00

## 2019-10-08 ASSESSMENT — ENCOUNTER SYMPTOMS
TINGLING: 0
FEVER: 0
MYALGIAS: 1
NECK PAIN: 1
NAUSEA: 0
SEIZURES: 0
PALPITATIONS: 0
HEADACHES: 0
DIZZINESS: 0
SHORTNESS OF BREATH: 0
VOMITING: 0
CHILLS: 0
WHEEZING: 0
LOSS OF CONSCIOUSNESS: 0
ABDOMINAL PAIN: 0

## 2019-10-08 NOTE — DISCHARGE PLANNING
"Care Transition Team Assessment    Assessment Complete.  LSW met with the patient at bedside.  The patient verified the demographic information in the Facesheet.  Patient reports she lives at home with her roommate in a single story home.      Prior to admission, the patient was at [facility].  The patient indicates Raymond transported her to Dr. Saleh's Office for a routine appointment and she was sent to [facility] by Dr. Ferrari.    The patient needs assistance with ADLS and reports she owns a FWW.  Patient is insured, uses [facility] on [address], and reports no financial barriers.  The patient's PCP is Dr. Medina. At this time, the patient's anticipated discharge disposition is SNF; however, the patient wishes to wait until after surgery tomorrow to initiate the SNF Referral.    Information Source  Orientation : Oriented x 4  Information Given By: Patient  Informant's Name: (Nadege)  Who is responsible for making decisions for patient? : Patient    Readmission Evaluation  Is this a readmission?: Yes - unplanned readmission  Why do you think you were readmitted?: (\"Infection\")  Was an appointment arranged for you prior to discharge?: Yes, attended appointment  Were there new prescriptions you were supposed to fill after you were discharged?: Yes, prescriptions filled  Did you understand your discharge instructions?: Yes  Did you have enough support after your last discharge?: Yes    Elopement Risk  Legal Hold: No  Ambulatory or Self Mobile in Wheelchair: Yes  Disoriented: No  Psychiatric Symptoms: None  History of Wandering: No  Elopement this Admit: No  Vocalizing Wanting to Leave: No  Displays Behaviors, Body Language Wanting to Leave: No-Not at Risk for Elopement    Interdisciplinary Discharge Planning  Patient or legal guardian wants to designate a caregiver (see row info): No    Discharge Preparedness  What is your plan after discharge?: Home with help, Skilled nursing facility, Home health care  What are your " discharge supports?: Child  Prior Functional Level: Needs Assist with Activities of Daily Living  Difficulity with ADLs: Eating, Walking    Functional Assesment  Prior Functional Level: Needs Assist with Activities of Daily Living    Finances  Financial Barriers to Discharge: No  Prescription Coverage: Yes    Vision / Hearing Impairment  Right Eye Vision: Wears Glasses  Left Eye Vision: Wears Glasses  Hearing Impairment : No         Advance Directive  Advance Directive?: None  Advance Directive offered?: AD Booklet refused    Domestic Abuse  Have you ever been the victim of abuse or violence?: No  Physical Abuse or Sexual Abuse: No  Verbal Abuse or Emotional Abuse: No  Possible Abuse Reported to:: Not Applicable    Psychological Assessment  History of Substance Abuse: None  History of Psychiatric Problems: No    Discharge Risks or Barriers  Discharge risks or barriers?: No    Anticipated Discharge Information  Anticipated discharge disposition: SNF  Discharge Address: ([place])

## 2019-10-08 NOTE — PROGRESS NOTES
2 RN skin check completed at shift change with SHREE Cortes.  Large tibial wound observed on left leg. Surgical incision covered by dressing observed on left hip. Small scab observed on right shin.  Pressure ulcers: None  Interventions: Pillows for support and positioning.  Devices in place: Cervical neck collar.

## 2019-10-08 NOTE — PROGRESS NOTES
[facility] Medicine Daily Progress Note    Date of Service  10/8/2019    Chief Complaint  Left hip [facility]    Hospital Course    Mrs. Mae is a 74 y/o female w/ a past medical history of C2 cervical fracture, bullous pemphigus, cellulitis, multiple falls and osteoarthritis. She presented to the hospital with c/o purulent left-sided drainage from previous hip fracture repair site. Wound cultures were positive for MRSA. Due to her worsening clinic status, she was admitted for orthopedic surgery evaluation, and possible I&D.          Interval Problem Update  No acute events overnight. Scheduled for I&D by ortho tomorrow.     Consultants/Specialty  Ortho surgery  Infectious disease     Code Status  FULL    Disposition  TBD pending clinical course.     Review of Systems  Review of Systems   Constitutional: Negative for chills, fever and malaise/fatigue.   Respiratory: Negative for shortness of breath and wheezing.    Cardiovascular: Negative for chest pain and palpitations.   Gastrointestinal: Negative for abdominal pain, nausea and vomiting.   Genitourinary: Negative for dysuria, frequency and urgency.   Musculoskeletal: Positive for joint pain, myalgias and neck pain.   Neurological: Negative for dizziness, tingling, seizures, loss of consciousness and headaches.   All other systems reviewed and are negative.       Physical Exam  Temp:  [36.1 °C (97 °F)-37.4 °C (99.3 °F)] 37.4 °C (99.3 °F)  Pulse:  [74-94] 85  Resp:  [16-18] 18  BP: (113-144)/(66-82) 135/74  SpO2:  [90 %-95 %] 90 %    Physical Exam   Constitutional: She appears well-developed and well-nourished. No distress.   HENT:   Mouth/Throat: Oropharynx is clear and moist.   Eyes: Conjunctivae are normal.   Neck:   Cervical collar in place d/t history of C2 fracture.    Cardiovascular: Normal rate and intact distal pulses.   Pulmonary/Chest: No accessory muscle usage. No respiratory distress. She has rales in the right lower field and the left lower field.    Abdominal: Normal appearance and bowel sounds are normal. She exhibits no distension. There is no tenderness.   Musculoskeletal:        Left ankle: She exhibits decreased range of motion and deformity. She exhibits no swelling. No tenderness.   Skin: Skin is warm and dry. She is not diaphoretic. There is pallor.        Healing lesions to calves bilaterally, r/t recent bullous pemphigus    Nursing note and vitals reviewed.      Fluids  No intake or output data in the 24 hours ending 10/08/19 1332    Laboratory  Recent Labs     10/08/19  0557   WBC 8.4   RBC 3.17*   HEMOGLOBIN 9.5*   HEMATOCRIT 29.8*   MCV 94.0   MCH 30.0   MCHC 31.9*   RDW 47.2   PLATELETCT 445   MPV 9.6     Recent Labs     10/07/19  2132 10/08/19  0557   SODIUM 134* 133*   POTASSIUM 4.1 4.1   CHLORIDE 100 102   CO2 26 24   GLUCOSE 121* 92   BUN 25* 19   CREATININE 0.65 0.49*   CALCIUM 9.3 9.2                   Imaging  CT-HIP WITH LEFT    (Results Pending)        Assessment/Plan  * Skin abscess over hip, inability to walk- (present on admission)  Assessment & Plan  - Findings from recent CT, question of deep seeding infection now with arthralgia and worsening status  - Continue Doxycycline. Adjusting antibiotics per ID recs    Chronic ulcer of left leg, limited to breakdown of skin (HCC)- (present on admission)  Assessment & Plan  - Continue wound care    C2 cervical fracture (HCC)- (present on admission)  Assessment & Plan  - Recent diagnosis, status post fall, deemed nonoperative candidate  - continue hard collar    History of MRSA infection- (present on admission)  Assessment & Plan  - Of the left hip, appears to be recurrent  - ID following    MRSA (methicillin resistant staph aureus) culture positive- (present on admission)  Assessment & Plan  - Left hip lesion  - Orthopedic surgery consulted. NPO at midnight in anticipation for I&D tomorrow  - ID following    Open leg wound- (present on admission)  Assessment & Plan  - Ongoing wound  care    Alcohol use- (present on admission)  Assessment & Plan  - History of, led to a degree of debility  - Continue thiamine supplementation     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)  Assessment & Plan  - Reported, currently not on steroids anymore.   - Continue Wound care    Iron deficiency anemia- (present on admission)  Assessment & Plan  - chronic   - continue iron supplementation     Generalized osteoarthritis of multiple sites- (present on admission)  Assessment & Plan  - Multiple joints involved, lower extremity joint deformities  - Continue PT/OT  - Continue pain management     Essential hypertension- (present on admission)  Assessment & Plan  - Takes hydralazine, lasix , lopressor and norvasc at home  - Continue home BP meds        VTE prophylaxis: Holding lovenox in anticipation of surgery tomorrow. Continue SCDs.

## 2019-10-08 NOTE — WOUND TEAM
"[facility]  Inpatient Services  Initial Wound and Skin Care Evaluation    Admission Date: 10/7/2019     Consult Date: 10/7/19   HPI, PMH, SH: Reviewed    Unit where seen by Wound Team: [id number]     WOUND CONSULT RELATED TO:  Evaluation of bilateral leg wounds     SUBJECTIVE:  \"I was going to wound clinic when they were bad.  I know I'm supposed to wear compression\"      Self Report / Pain Level:  Denies related to wounds       OBJECTIVE:  Patient with intact tiny scabbed areas x ~3 RLE; LLE with 1+ edema and pink but wounds LLE do not appear infected.    WOUND TYPE, LOCATION, CHARACTERISTICS (Pressure Injuries: location, stage, POA or date identified)       Wound 08/23/19 Pretibial Left Medial LE (Active)   Wound Image   10/8/2019 11:25 AM   Site Assessment Clean;Intact;Brown 10/8/2019 11:25 AM   Maricruz-wound Assessment Clean;Intact 10/8/2019 11:25 AM   Margins Attached edges 10/8/2019 11:25 AM   Wound Length (cm) 6 cm 10/8/2019 11:25 AM   Wound Width (cm) 4.5 cm 10/8/2019 11:25 AM   Wound Surface Area (cm^2) 27 cm^2 10/8/2019 11:25 AM   Tunneling 0 cm 10/8/2019 11:25 AM   Undermining 0 cm 10/8/2019 11:25 AM   Closure Secondary intention 10/8/2019 11:25 AM   Drainage Amount None 10/8/2019 11:25 AM   Non-staged Wound Description Full thickness 10/8/2019 11:25 AM   Cleansing Not Applicable 10/8/2019 11:25 AM   Periwound Protectant Not Applicable 10/8/2019 11:25 AM   Dressing Options Open to Air 10/8/2019 11:25 AM   Dressing Cleansing/Solutions Not Applicable 10/8/2019 11:25 AM   NEXT Weekly Photo (Inpatient Only) 10/14/19 10/8/2019 11:25 AM   WOUND NURSE ONLY - Odor None 10/8/2019 11:25 AM   WOUND NURSE ONLY - Pulses 2+;Left;DP 10/8/2019 11:25 AM   WOUND NURSE ONLY - Exposed Structures None 10/8/2019 11:25 AM   WOUND NURSE ONLY - Tissue Type and Percentage brown intact scab 10/8/2019 11:25 AM   WOUND NURSE ONLY - Time Spent with Patient (mins) 60 10/8/2019 11:25 AM       Wound 10/08/19 Partial Thickness " Wound Leg partial thickness left lateral leg (Active)   Wound Image   10/8/2019 11:25 AM   Site Assessment Clean;Pink;Intact 10/8/2019 11:25 AM   Maricruz-wound Assessment Clean;Pink;Edema 10/8/2019 11:25 AM   Margins Attached edges 10/8/2019 11:25 AM   Wound Length (cm) 2.7 cm 10/8/2019 11:25 AM   Wound Width (cm) 3 cm 10/8/2019 11:25 AM   Wound Depth (cm) 0.1 cm 10/8/2019 11:25 AM   Wound Surface Area (cm^2) 8.1 cm^2 10/8/2019 11:25 AM   Tunneling 0 cm 10/8/2019 11:25 AM   Undermining 0 cm 10/8/2019 11:25 AM   Closure Secondary intention 10/8/2019 11:25 AM   Drainage Amount Scant 10/8/2019 11:25 AM   Drainage Description Serous 10/8/2019 11:25 AM   Non-staged Wound Description Partial thickness 10/8/2019 11:25 AM   Treatments Cleansed;Site care 10/8/2019 11:25 AM   Cleansing Normal Saline Irrigation 10/8/2019 11:25 AM   Periwound Protectant Not Applicable 10/8/2019 11:25 AM   Dressing Options Adhesive Foam 10/8/2019 11:25 AM   Dressing Cleansing/Solutions Not Applicable 10/8/2019 11:25 AM   Dressing Changed New 10/8/2019 11:25 AM   Dressing Status Intact 10/8/2019 11:25 AM   Dressing Change Frequency As Needed 10/8/2019 11:25 AM   WOUND NURSE ONLY - Odor None 10/8/2019 11:25 AM   WOUND NURSE ONLY - Pulses DP;2+;Left 10/8/2019 11:25 AM   WOUND NURSE ONLY - Exposed Structures None 10/8/2019 11:25 AM   WOUND NURSE ONLY - Tissue Type and Percentage pink 100% 10/8/2019 11:25 AM      Vascular:    Dorsal Pedal pulses:  (LLE only)  2+  Posterior tib pulses:   2+    TOMI:  4/21/19 RIGHT     Waveform            Systolic BPs (mmHg)                              140           Brachial   Bi, non-                                 Common Femoral   reversed   Bi, non-                   142           Posterior Tibial   reversed   Bi, non-                   125           Dorsalis Pedis   reversed                                            Peroneal                               0.99          TOMI     Ultrasound Arterial LLE    "CONCLUSIONS   1.  Patent left femoral to popliteal artery bypass without stenosis.   2.  Normal 3 vessel run-off to the left ankle. *    Lab Values:    Lab Results   Component Value Date/Time    WBC 8.4 10/08/2019 05:57 AM    RBC 3.17 (L) 10/08/2019 05:57 AM    HEMOGLOBIN 9.5 (L) 10/08/2019 05:57 AM    HEMATOCRIT 29.8 (L) 10/08/2019 05:57 AM        Lab Results   Component Value Date/Time    HBA1C 5.6 06/04/2018 12:00 PM           Culture:  NA      INTERVENTIONS BY WOUND TEAM:  Met with patient and history recounted.  Dried scabbed area medial LLE appears to dry and intact and no wound care indicated.  Patient reports \"water blisters\" formed lateral LLE yesterday and now de roofed.  All wounds appear clean and without infection.  Covered partial thickness wounds lateral leg with adhesive foam and then applied tubi  D to both legs.  Discussed with staff RN.    Dressing Selection:  See above         Interdisciplinary consultation: Patient, Bedside RN    EVALUATION: clean appearing wounds that should resolve with mild compression and dressing for protection.  IVAB for hip infection should be of benefit as well    Factors affecting wound healing: venous insufficiency   Goals: Steady decrease in wound area and depth weekly.    NURSING PLAN OF CARE ORDERS (X):    Dressing changes: See Dressing Care orders: X  Skin care: See Skin Care orders:   Rectal tube care: See Rectal Tube Care orders:   Other orders:    RSKIN: CURRENT (X) ORDERED (O):   Q shift Luis:  X  Q shift pressure point assessments:  X  Pressure redistribution mattress  X          SHORTY          Bariatric SHORTY         Bariatric foam           Heel float boots          Float Heels off Bed with Pillows    X           Barrier wipes         Barrier Cream         Barrier paste          Sacral silicone dressing         Silicone O2 tubing         Anchorfast         Cannula fixation Device (Tender )          Gray Foam Ear protectors           Trach with Optifoam " split foam                 Waffle cushion        Waffle Overlay         Rectal tube or BMS   Purwick/Condom Cath         Antifungal tx      Interdry          Reposition q 2 hours   X remind patient     Up to chair        Ambulate      PT/OT        Dietician        Diabetes Education      PO   X  TF     TPN     NPO   # days   Other        WOUND TEAM PLAN OF CARE (X):   NPWT change 3 x week:        Dressing changes by wound team:       Follow up as needed:   PRN staff request    Other (explain):     Anticipated discharge plans (X):   SNF:           Home Care:           Outpatient Wound Center:            Self Care:            Other:     TBD

## 2019-10-08 NOTE — ASSESSMENT & PLAN NOTE
Recurrent left hip MRSA  - S/p revision of left hip arthoplasty and wound cultures on 10/09/19  - Follow post-op cultures   - Currently on doxycycline, will adjust Abx according to ID recommendations   - Continue PT/OT  - Continue pain management

## 2019-10-08 NOTE — CONSULTS
Date of Service: 10/08/19    CC: Left hip wound and possible deep infection    HPI: Nadege Mae is a 73 y.o. female who presents with complaints of pain to left hip.  She also has developed erythema and pustules along the previous incision line.  This started over 2 years ago after hip fracture.  She initially underwent treatment with an intramedullary nail and had to be revised to a diaphyseal fitting hip replacement.  She states this was complicated by superficial infections which were treated with debridement and antibiotics.  She was doing okay until she redeveloped erythema pain and some light drainage from the hip recently.  The pain is 6/10 and is described as sharp.  The pain is made worse by palpation of the area and made better by rest and immobilization.    PMH:   Past Medical History:   Diagnosis Date   • Anxiety    • Cellulitis and abscess of lower extremity    • Dental disorder     full dentures   • Generalized osteoarthritis of multiple sites 10/20/2015   • Heart valve disease     pt not sure    • Hyperlipidemia    • Hypertension    • Osteoporosis        PSH:   Past Surgical History:   Procedure Laterality Date   • FEMORAL POPLITEAL BYPASS Left 6/8/2018    Procedure: FEMORAL POPLITEAL BYPASS;  Surgeon: Milana Colin M.D.;  Location: [facility];  Service: General   • IRRIGATION & DEBRIDEMENT GENERAL Left 6/8/2018    Procedure: IRRIGATION & DEBRIDEMENT ANKLE WOUND;  Surgeon: Milana Colin M.D.;  Location: [facility];  Service: General   • IRRIGATION & DEBRIDEMENT HIP Left 6/4/2018    Procedure: IRRIGATION & DEBRIDEMENT HIP;  Surgeon: Chong Vazquez M.D.;  Location: [facility];  Service: Orthopedics   • HIP REVISION TOTAL Left 2/11/2018    Procedure: HIP REVISION TOTAL- femoral nail removal conversion to total hip arthoroplasty;  Surgeon: Chong Vazquez M.D.;  Location: [facility];  Service: Orthopedics   • HIP NAILING INTRAMEDULLARY Left  2017    Procedure: HIP NAILING INTRAMEDULLARY;  Surgeon: Jorge Peters M.D.;  Location: SUR[facility]ERY [facility];  Service: Orthopedics   • BREAST BIOPSY  2014    Performed by Magdalena Londono M.D. at SURGERY [facility]   • BREAST BIOPSY Right     benign   • GYN SURGERY  1973    complete hysterectomy   • ABDOMINAL HYSTERECTOMY TOTAL      w/BSO due to uterine cyst and endometriosis   • ATHROPLASTY      hip       FH:   Family History   Problem Relation Age of Onset   • GI Disease Mother         colostomy   • Heart Attack Father    • Diabetes Father    • Hypertension Father    • Psychiatric Illness Brother         bipolar disorder       SH:   Social History     Socioeconomic History   • Marital status: Single     Spouse name: Not on file   • Number of children: 1   • Years of education: Not on file   • Highest education level: Not on file   Occupational History   • 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      Employer: [employer]   Social Needs   • Financial resource strain: Not on file   • Food insecurity:     Worry: Not on file     Inability: Not on file   • Transportation needs:     Medical: Not on file     Non-medical: Not on file   Tobacco Use   • Smoking status: Former Smoker     Packs/day: 0.50     Years: 25.00     Pack years: 12.50     Types: Cigarettes     Last attempt to quit: 2007     Years since quittin.7   • Smokeless tobacco: Never Used   Substance and Sexual Activity   • Alcohol use: Yes     Alcohol/week: 3.0 oz     Types: 5 Glasses of wine per week     Comment: glass of wine per day   • Drug use: No   • Sexual activity: Not Currently     Partners: Male   Lifestyle   • Physical activity:     Days per week: Not on file     Minutes per session: Not on file   • Stress: Not on file   Relationships   • Social connections:     Talks on phone: Not on file     Gets together: Not on file     Attends Muslim service: Not on file     Active member of club or organization: Not on  file     Attends meetings of clubs or organizations: Not on file     Relationship status: Not on file   • Intimate partner violence:     Fear of current or ex partner: Not on file     Emotionally abused: Not on file     Physically abused: Not on file     Forced sexual activity: Not on file   Other Topics Concern   • Not on file   Social History Narrative   • Not on file       ROS: A 10 system review of systems was negative outside what was listed in the HPI    /82   Pulse 89   Temp 36.4 °C (97.5 °F) (Temporal)   Resp 16   SpO2 90%     Physical Exam:  General: AAOx3, NAD  HEENT: normocephalic, atraumatic  Psych: Normal mood and affect  Neck: C-collar in place  Chest/Pulmonary: breathing unlabored, no audible wheezing  Cardio: regular heart rate and rhythm  Neuro: sensation grossly intact to BUE and BLE, moving all four extremities  Skin: Left hip incision and surrounding area is erythematous and edematous.  There is some pustules with light drainage over the incision  MSK: Left hip with soreness to motion.  Distally she has some small scabs along the leg.  Right lower extremity bilateral pressures are atraumatic and are nontender    Imaging and labs: CT of the left hip is pending,   Recent Labs     10/08/19  0557   WBC 8.4   RBC 3.17*   HEMOGLOBIN 9.5*   HEMATOCRIT 29.8*   MCV 94.0   MCH 30.0   RDW 47.2   PLATELETCT 445   MPV 9.6   NEUTSPOLYS 54.00   LYMPHOCYTES 24.00   MONOCYTES 12.60   EOSINOPHILS 6.80   BASOPHILS 1.60         Assessment: Left hip cellulitis and possible deep infection    We discussed the diagnosis and findings with the patient at length.  We reviewed possible non operative and operative interventions and the risks and benefits of these.  she had a chance to ask questions and all of these were answered to her satisfaction.    The patient was discussed with Dr. Vazquez.  He will be willing to take over her care starting tomorrow.  Patient will remain n.p.o. at midnight for possible surgical  interventions.        Plan:  N.p.o. at midnight  Weightbearing as tolerated  Antibiotics per primary team  Dressing changes as needed  Hold DVT prophylaxis this evening for possible surgery tomorrow

## 2019-10-08 NOTE — ASSESSMENT & PLAN NOTE
- Findings from recent CT, question of deep seeding infection now with arthralgia and worsening status  - Continue Doxycycline. Will switch to IV antibiotics post-op

## 2019-10-08 NOTE — DIETARY
Nutrition Services - Poor po and/or weight loss reported on admission. Malnutrition Screening Tool score <2. No other risk for malnutrition, or other nutrition concerns, identified on screening. Nutrition assessment not indicated at this time. Noted that pt drinks Boost and takes vitamins at home - order for Boost Plus in place; Nutrition Representative to obtain flavor preference. RD will monitor per department guidelines.

## 2019-10-08 NOTE — CONSULTS
ADULT INFECTIOUS DISEASE CONSULT     Date of Service: 10/8/2019    Consult Requested By: Rajan Ahmadi M.D.    Reason for Consultation: Left hip infection    History of Present Illness:   Nadege Mae is a 73 y.o. female who is known to us from her previous admissions.  She has history of dementia, peripheral vascular disease with chronic lower extremity ulcers and is status post femoropopliteal bypass.  She also has history of bullous pemphigus and has been treated with prednisone.  She has had multiple falls.  Back in December 2017, pt had a fall and fractured left intertrochanteric femur. It was surgically treated with intramedullary device by Dr. Peters. 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 She underwent left total hip arthroplasty with removal of hardware by Dr. Vazquez on 2/11/18.  She has history of MSSA as well as MRSA.  Most recently she was admitted on 9/2/2019 with draining left hip wound.  The cultures grew MRSA.  She was also found to have acute C2 fracture.  Plan was to complete oral Zyvox for her infection through 9/13/2019.  She was seen in the infectious disease office on 10/7/2019 and was advised to be admitted to the hospital for nonhealing left hip.  She also complained of significant pain.  CT of the hip has been done.  Infectious disease has been called for antibiotics.  Review Of Systems:  Gen.-denies of fevers. Denies any chills.  Complains of malaise and fatigue  HEENT- denies any sore throat, headache or vision changes  Pulmonary- denies any cough, shortness of breath  Cardiovascular- denies any chest pain, leg swelling.    GI- denies any nausea vomiting diarrhea or abdominal pain  Musculoskeletal-complains of pain and drainage in her left hip  Neuro- denies any weakness or sensory change  Psych- denies any depression or suicidal ideation  Genitourinary- denies any frequency or dysuria        PMH:   Past  Medical History:   Diagnosis Date   • Anxiety    • Cellulitis and abscess of lower extremity    • Dental disorder     full dentures   • Generalized osteoarthritis of multiple sites 10/20/2015   • Heart valve disease     pt not sure    • Hyperlipidemia    • Hypertension    • Osteoporosis          PSH:  Past Surgical History:   Procedure Laterality Date   • FEMORAL POPLITEAL BYPASS Left 6/8/2018    Procedure: FEMORAL POPLITEAL BYPASS;  Surgeon: Milana Colin M.D.;  Location: SURGERY [facility];  Service: General   • IRRIGATION & DEBRIDEMENT GENERAL Left 6/8/2018    Procedure: IRRIGATION & DEBRIDEMENT ANKLE WOUND;  Surgeon: Milana Colin M.D.;  Location: SURGERY [facility];  Service: General   • IRRIGATION & DEBRIDEMENT HIP Left 6/4/2018    Procedure: IRRIGATION & DEBRIDEMENT HIP;  Surgeon: Chong Vazquez M.D.;  Location: SURGERY [facility];  Service: Orthopedics   • HIP REVISION TOTAL Left 2/11/2018    Procedure: HIP REVISION TOTAL- femoral nail removal conversion to total hip arthoroplasty;  Surgeon: Chong Vazquez M.D.;  Location: SURGERY [facility];  Service: Orthopedics   • HIP NAILING INTRAMEDULLARY Left 12/20/2017    Procedure: HIP NAILING INTRAMEDULLARY;  Surgeon: Jorge Peters M.D.;  Location: SURGERY [facility];  Service: Orthopedics   • BREAST BIOPSY  8/28/2014    Performed by Magdalena Londono M.D. at [facility]   • BREAST BIOPSY Right 8/14    benign   • GYN SURGERY  1973    complete hysterectomy   • ABDOMINAL HYSTERECTOMY TOTAL      w/BSO due to uterine cyst and endometriosis   • ATHROPLASTY      hip       FAMILY HX:  Family History   Problem Relation Age of Onset   • GI Disease Mother         colostomy   • Heart Attack Father    • Diabetes Father    • Hypertension Father    • Psychiatric Illness Brother         bipolar disorder       SOCIAL HX:  Social History     Socioeconomic History   • Marital status: Single     Spouse name: Not on file   • Number of  children: 1   • Years of education: Not on file   • Highest education level: Not on file   Occupational History   • 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      Employer: [employer]   Social Needs   • Financial resource strain: Not on file   • Food insecurity:     Worry: Not on file     Inability: Not on file   • Transportation needs:     Medical: Not on file     Non-medical: Not on file   Tobacco Use   • Smoking status: Former Smoker     Packs/day: 0.50     Years: 25.00     Pack years: 12.50     Types: Cigarettes     Last attempt to quit: 2007     Years since quittin.7   • Smokeless tobacco: Never Used   Substance and Sexual Activity   • Alcohol use: Yes     Alcohol/week: 3.0 oz     Types: 5 Glasses of wine per week     Comment: glass of wine per day   • Drug use: No   • Sexual activity: Not Currently     Partners: Male   Lifestyle   • Physical activity:     Days per week: Not on file     Minutes per session: Not on file   • Stress: Not on file   Relationships   • Social connections:     Talks on phone: Not on file     Gets together: Not on file     Attends Zoroastrianism service: Not on file     Active member of club or organization: Not on file     Attends meetings of clubs or organizations: Not on file     Relationship status: Not on file   • Intimate partner violence:     Fear of current or ex partner: Not on file     Emotionally abused: Not on file     Physically abused: Not on file     Forced sexual activity: Not on file   Other Topics Concern   • Not on file   Social History Narrative   • Not on file     Social History     Tobacco Use   Smoking Status Former Smoker   • Packs/day: 0.50   • Years: 25.00   • Pack years: 12.50   • Types: Cigarettes   • Last attempt to quit: 2007   • Years since quittin.7   Smokeless Tobacco Never Used     Social History     Substance and Sexual Activity   Alcohol Use Yes   • Alcohol/week: 3.0 oz   • Types: 5 Glasses of wine per week    Comment: glass of wine per  day       Allergies/Intolerances:  Allergies   Allergen Reactions   • Bactrim [Sulfamethoxazole-Trimethoprim] Rash     Diffuse pruritic skin rash with blisters (no mucous membrane involvement) (was also taking cipro at the time - reaction thought more likely to be 2/2 Bactrim)   • Meropenem Unspecified     Pt can not remember reaction         History reviewed with the patient    Other Current Medications:    Current Facility-Administered Medications:   •  amLODIPine (NORVASC) tablet 10 mg, 10 mg, Oral, DAILY, Greyson Archibald M.D., 10 mg at 10/08/19 0600  •  ascorbic acid tablet 500 mg, 500 mg, Oral, DAILY, Greyson Archibald M.D., 500 mg at 10/08/19 0559  •  aspirin (ASA) chewable tab 81 mg, 81 mg, Oral, DAILY, Greyson Archibald M.D., 81 mg at 10/08/19 0559  •  bisacodyl (DULCOLAX) suppository 10 mg, 10 mg, Rectal, DAILY, Greyson Archibald M.D.  •  ferrous sulfate tablet 325 mg, 325 mg, Oral, QDAY with Breakfast, Greyson Archibald M.D., 325 mg at 10/08/19 0830  •  folic acid (FOLVITE) tablet 1 mg, 1 mg, Oral, DAILY, Greyson Archibald M.D., 1 mg at 10/08/19 0600  •  furosemide (LASIX) tablet 40 mg, 40 mg, Oral, DAILY, Greyson Archibald M.D.  •  hydrALAZINE (APRESOLINE) tablet 50 mg, 50 mg, Oral, BID, Greyson Archibald M.D., 50 mg at 10/08/19 0600  •  lactobacillus rhamnosus (CULTURELLE) capsule 1 Cap, 1 Cap, Oral, QDAY with Breakfast, Greyson Archibald M.D., 1 Cap at 10/08/19 0830  •  methocarbamol (ROBAXIN) tablet 500 mg, 500 mg, Oral, Q8HRS PRN, Greyson Archibald M.D.  •  metoprolol (LOPRESSOR) tablet 75 mg, 75 mg, Oral, BID, Greyson Archibald M.D., 75 mg at 10/08/19 0601  •  potassium chloride SA (Kdur) tablet 20 mEq, 20 mEq, Oral, DAILY, Greyson Archibald M.D., 20 mEq at 10/08/19 0601  •  sertraline (ZOLOFT) tablet 100 mg, 100 mg, Oral, DAILY, Greyson Archibald M.D., 100 mg at 10/08/19 0601  •  thiamine tablet 100 mg, 100 mg, Oral, DAILY, Greyson Archibald M.D., 100 mg at 10/08/19  0601  •  senna-docusate (PERICOLACE or SENOKOT S) 8.6-50 MG per tablet 2 Tab, 2 Tab, Oral, BID **AND** polyethylene glycol/lytes (MIRALAX) PACKET 1 Packet, 1 Packet, Oral, QDAY PRN **AND** magnesium hydroxide (MILK OF MAGNESIA) suspension 30 mL, 30 mL, Oral, QDAY PRN **AND** bisacodyl (DULCOLAX) suppository 10 mg, 10 mg, Rectal, QDAY PRN, Greyson Archibald M.D.  •  acetaminophen (TYLENOL) tablet 650 mg, 650 mg, Oral, Q6HRS PRN, Greyson Archibald M.D.  •  Notify provider if pain remains uncontrolled, , , CONTINUOUS **AND** Use the numeric rating scale (NRS-11) on regular floors and Critical-Care Pain Observation Tool (CPOT) on ICUs/Trauma to assess pain, , , CONTINUOUS **AND** Pulse Ox (Oximetry), , , CONTINUOUS **AND** Pharmacy Consult Request ...Pain Management Review 1 Each, 1 Each, Other, PHARMACY TO DOSE **AND** If patient difficult to arouse and/or has respiratory depression, stop any opiates that are currently infusing and call a Rapid Response., , , CONTINUOUS **AND** oxyCODONE immediate-release (ROXICODONE) tablet 2.5 mg, 2.5 mg, Oral, Q3HRS PRN **AND** oxyCODONE immediate-release (ROXICODONE) tablet 5 mg, 5 mg, Oral, Q3HRS PRN **AND** morphine (pf) 4 MG/ML injection 2 mg, 2 mg, Intravenous, Q3HRS PRN, Greyson Archibald M.D.  •  ondansetron (ZOFRAN) syringe/vial injection 4 mg, 4 mg, Intravenous, Q4HRS PRN, Greyson Archibald M.D.  •  ondansetron (ZOFRAN ODT) dispertab 4 mg, 4 mg, Oral, Q4HRS PRN, Greyson Archibald M.D.  •  doxycycline monohydrate (ADOXA) tablet 100 mg, 100 mg, Oral, Q12HRS, ODESSA GalvanD., 100 mg at 10/08/19 0559  [unfilled]    Most Recent Vital Signs:  /74   Pulse 85   Temp 37.4 °C (99.3 °F) (Temporal)   Resp 18   SpO2 90%   Temp  Av.7 °C (98 °F)  Min: 35.8 °C (96.5 °F)  Max: 37.4 °C (99.3 °F)    Physical Exam:  General: Elderly female looks chronically ill  HEENT: sclera anicteric, PERRL, EOMI, MMM, no oral lesions  Neck: Patient has hard  c-collar  Chest: CTAB, no r/r/w, normal work of breathing.  Cardiac: Regular, no murmurs no gallops heard  Abdomen: + bowel sounds, soft, non-tender, non-distended, no HSM  Extremities: Left hip has induration, wounds and purulent drainage  Skin: Lower extremity chronic changes as well as wounds-refer to the wound care pictures in epic  Neuro: Alert and oriented times 3, non-focal exam    Pertinent Lab Results:  Recent Labs     10/08/19  0557   WBC 8.4      Recent Labs     10/08/19  0557   HEMOGLOBIN 9.5*   HEMATOCRIT 29.8*   MCV 94.0   MCH 30.0   PLATELETCT 445         Recent Labs     10/07/19  2132 10/08/19  0557   SODIUM 134* 133*   POTASSIUM 4.1 4.1   CHLORIDE 100 102   CO2 26 24   CREATININE 0.65 0.49*        Recent Labs     10/07/19  2132 10/08/19  0557   ALBUMIN 3.2 3.0*        Pertinent Micro:  Results     Procedure Component Value Units Date/Time    Urinalysis [[id number]]     Order Status:  No result Specimen:  Urine, Clean Catch         Blood Culture   Date Value Ref Range Status   04/19/2019 No growth after 5 days of incubation.  Final        Studies:  Ct-cspine Without Plus Recons    Addendum Date: 9/10/2019    Addendum: Findings are discussed with Dr. Awan at 1:00 AM on 9/10/2019.    Result Date: 9/10/2019  9/9/2019 10:13 PM HISTORY/REASON FOR EXAM: abnormal xray with ? subacute Fx Neck pain, fall, abnormal x-ray TECHNIQUE/EXAM DESCRIPTION: CT cervical spine without contrast, with reconstructions. The study was performed on a helical multidetector CT scanner. Thin-section helical scanning was performed from the skull base through T1. Sagittal and coronal multiplanar reconstructions were generated from the axial images. Low dose optimization technique was utilized for this CT exam including automated exposure control and adjustment of the mA and/or kV according to patient size. COMPARISON:  Cervical radiographs earlier in the day FINDINGS: There is a comminuted type III C2 fracture involving the body  and lateral masses, worse on the right with a fracture extending into the right foramen transversarium with fracture fragment within this region. CT angiogram of the neck is recommended for further evaluation. No other cervical spine fractures seen. There is hemorrhage seen within the spinal canal, most conspicuous at the C2 level however not adequately evaluated on CT. There is prevertebral soft tissue swelling. There is multilevel disc and facet degeneration. There is spinal stenosis and foraminal narrowing which is most pronounced at C3-C4. There is possible posterior left third rib fracture. There is a dependent left pleural effusion. Nonspecific irregular nodular opacity in the right upper lobe. Suggest follow-up nonemergent chest CT for further evaluation.     1.  Comminuted type III C2 fracture. This involves the right foramen transversarium with a displaced fracture fragment within this region. Recommend CT angiogram of neck to further evaluate. 2.  There is hemorrhage within the spinal canal most conspicuous at the C2 level. Cervical MRI may be performed for further evaluation. 3.  Left pleural effusion. 4.  Possible posterior left third rib fracture.    Ct-cta Neck With & W/o-post Processing    Result Date: 9/10/2019  9/10/2019 4:18 AM HISTORY/REASON FOR EXAM:  further evaluate results from ct c spine C2 fracture involving the foramen transversarium TECHNIQUE/EXAM DESCRIPTION: CT angiogram of the neck with contrast. Postcontrast images were obtained of the neck from the great vessels through the skull base following the power injection of nonionic contrast at 5.0 mL/sec. Thin-section helical images were obtained with overlapping reconstruction interval. Coronal and oblique multiplanar volume reformats were generated. Cervical internal carotid artery percent stenosis is calculated using the standard method according to the NASCET criteria wherein a segment of uniform caliber mid or distal cervical internal  carotid is used as the reference denominator. 3D angiographic images were reviewed on PACS.  Maximum intensity projection (MIP) images were generated and reviewed 75 mL of Omnipaque 350 nonionic contrast was injected intravenously. Low dose optimization technique was utilized for this CT exam including automated exposure control and adjustment of the mA and/or kV according to patient size. COMPARISON:  Cervical spine CT one day prior. FINDINGS: There is a three-vessel aortic arch.  Origin of the supra aortic arteries are patent. Mild plaque at the carotid bifurcations and bulbs. Bilateral common and internal carotid arteries are patent without significant stenosis by NASCET criteria. Distal internal carotid arteries are tortuous.  Vertebral arteries are patent from their origins to the vertebrobasilar junction. Type III C2 fracture again noted involving and with fracture fragments extending into the right foramen transversarium again noted. There is hemorrhage within the spinal canal surrounding the cervical cord. Moderate to large left pleural effusion. There is posterior left third rib fracture. There is partially visualized left anterolateral third, left lateral fourth and fifth mildly displaced rib fractures. These appear acute/recent. There is bronchial wall thickening. There are small groundglass density foci in the right upper lobe which are nonspecific, possibly small foci of inflammation/pneumonitis. Short-term follow-up chest CT is suggested. 3D angiographic/MIP images of the vasculature confirm the vascular findings as described above.     1.  Comminuted type III C2 fracture involving the right foramen transversarium again noted. 2.  No evidence of dissection or significant stenosis of the neck arteries. 3.  Moderate to large left pleural effusion. Multiple acute/recent-appearing left-sided rib fractures.    Ct-hip With Left    Result Date: 10/8/2019  10/8/2019 4:43 PM HISTORY/REASON FOR EXAM:  infection  TECHNIQUE/EXAM DESCRIPTION AND NUMBER OF VIEWS: Helical CT scan of the pelvis and left hip is performed following the IV administration of 80 mL of Omnipaque 350. Coronal and sagittal reconstructions are provided. COMPARISON: 9/2/2019 FINDINGS: Multiple dilated small bowel loops present. Colonic diverticula noted. LEFT hip prosthesis in place, normally located.  Heterotopic ossification again seen adjacent the proximal femur. Apparent fluid is seen anterior and lateral to LEFT hip prosthesis, unchanged from prior exam.  No significant associated enhancement. Subcutaneous edema is seen lateral to LEFT hip. No focal bony destruction or soft tissue gas. Vascular calcifications noted.     1.  LEFT hip prosthesis in place, normally located.  Associated fluid likely indicating effusion.  No significant associated enhancement however infection is not excluded. 2.  Worsening subcutaneous edema lateral to LEFT hip. 3.  No associated fracture. 4.  Multiple dilated small bowel loops suggesting ileus.    Dx-cervical Spine-2 Or 3 Views    Result Date: 9/9/2019 9/9/2019 6:00 PM HISTORY/REASON FOR EXAM:  Pain Following Trauma Fall TECHNIQUE/EXAM DESCRIPTION AND NUMBER OF VIEWS: Cervical spine series, 4 views. COMPARISON:  None. FINDINGS: Diffuse osseous demineralization, limiting evaluation for nondisplaced fracture. The cervical spine is visualized laterally from C1 to C6. C7 is obscured. There appears to be an oblique fracture involving the C2, poorly visualized. The fracture lucency appears somewhat corticated, suggesting subacute to chronic Moderate degenerative change of the lumbar spine. Mild prevertebral soft tissue swelling.     Very limited exam. There appears to be an oblique fracture involving the C2, poorly visualized. The fracture lucency appears somewhat corticated, suggesting subacute to chronic. Further evaluation with CT is recommended. CRITICAL RESULT READ BACK: Preliminary findings discussed with and critical  read back performed by Dr Norton  via telephone on 9/9/2019 6:46 PM    Mr-cervical Spine-w/o    Result Date: 9/10/2019  9/10/2019 9:27 AM HISTORY/REASON FOR EXAM:  C2 fracture. TECHNIQUE/EXAM DESCRIPTION: MRI of the cervical spine without contrast. The study was performed on a Vitasol Signa 1.5 Dayami MRI scanner. T1 sagittal, T2 fast spin-echo sagittal, and gradient echo axial images were obtained of the cervical spine. COMPARISON: None. FINDINGS: There is mildly displaced fracture of the C2. Diffuse bone marrow edema is noted within the vertebral body. There is mild anterolisthesis of C5 on C6. There is T2 hyperintensity in the region of apical ligament and anterior atlantooccipital membrane. The alar and transverse ligaments are intact. The flow voids of the bilateral vertebral arteries are intact. There is mild amount of prevertebral hematoma is seen. There is mild amount of epidural hemorrhage along the C2, C3 and C4. The maximum transverse dimension measures an approximately 2 mm. At the level of C2-3, there is disc degeneration. There are combinations of diffuse disc bulge, epidural hemorrhage and prominent ligamentum flavum causing severe central canal stenosis. Mild amount of epidural hemorrhage is seen. At the level of C3-4, there is disc degeneration. There are combinations of diffuse disc bulge, ligamentum flavum hypertrophy and epidural hemorrhage causing severe central canal stenosis. At the level of C4-5, there is disc degeneration. There are combinations of diffuse disc bulge, epidural hemorrhage and prominent ligamentum flavum causing severe central canal stenosis. At the level of C5-6, there is disc degeneration. There is minimal increased disc T2 signal intensity. There is no central canal stenosis. At the level of C6-7, there is diffuse disc bulge. There is no spinal or neural foraminal stenosis. At the level of C7-T1, there is no spinal or neural foraminal stenosis. There is an approximately 11 x  24 x 9 mm sized nodule in the left lobe of the thyroid. Mild amount of left pleural effusion is seen. There is no abnormal intramedullary T2 signal intensity in the cervical spinal cord.     1.  Acute fracture C2 vertebral body. Please see CT scan report for further details about the fracture. 2.  Mild amount of epidural hemorrhage at the levels of C2, C3 and C4. 3.  Severe central canal stenosis at the levels of C2-3, C3-4 and C4-5 likely secondary to the combinations of disc bulge, prominent ligamentum flavum and epidural hemorrhage. 4.  There is T2 hyperintensity in the region of apical ligament and anterior atlantooccipital membrane likely representing injury. The alar and transverse ligaments are intact. 5.  The flow voids of the bilateral vertebral arteries are intact. 6.  Degenerative disease as described above. 7.  Left pleural effusion. 8.  There is an approximately 11 x 24 x 9 mm sized nodule in the left lobe of the thyroid.  IMPRESSION:   Infected left hip arthroplasty  Fracture of the C2 vertebral body  Lower extremity wounds  Severe peripheral vascular disease  Failure to thrive        PLAN:   Nadege Mae is a 73 y.o. female who has infected left hip.  She will be going for surgery.  She is currently chronically on doxycycline.  I would not recommend giving her IV antibiotics till after the surgery.  Follow the OR cultures.  Overall prognosis is guarded because of her underlying medical problems.      Discussed with IM. Will continue to follow    Sharon Rao M.D.     Please note that this dictation was created using voice recognition software. I have worked with technical experts from [facility]  [employer] to optimize the interface.  I have made every reasonable attempt to correct obvious errors, but there may be errors of grammar and possibly content that I did not discover before finalizing the note.

## 2019-10-08 NOTE — THERAPY
Occupational Therapy Contact Note    OT order received. Per chart, patient awaiting ortho consult with possible surgical intervention. Will hold and attempt once POC has been established.    Riana Echeverria, OTR/L

## 2019-10-08 NOTE — CARE PLAN
Problem: Communication  Goal: The ability to communicate needs accurately and effectively will improve  Outcome: PROGRESSING AS EXPECTED  Note:   Pt communicates needs appropriately.     Problem: Safety  Goal: Will remain free from injury  Outcome: PROGRESSING AS EXPECTED  Goal: Will remain free from falls  Outcome: PROGRESSING AS EXPECTED  Note:   Pt abides by safety precautions.

## 2019-10-08 NOTE — H&P
Hospital Medicine History & Physical Note    Date of Service  10/7/2019    Primary Care Physician  JACLYN Son.    Consultants  Infectious disease, orthopedic surgery  Code Status  Full code  Chief Complaint  Left hip pain and drainage, infection with MRSA  History of Presenting Illness  73 y.o. female who presented 10/7/2019 with complicated recent history, the patient residing currently at [facility] because of wound care and recent fall with C-spine fracture.  Patient has been followed better wound care and infectious disease.  She had multiple cultures from wounds in the lower extremities and is being treated with empiric antibiotics for that.  She had a remote history of left-sided hip fracture repair with complications of infection with MRSA.  Patient developed some left-sided hip drainage from her previous surgery scar and was seen in the infectious disease clinic by Dr. Ferrari, cultures have returned positive for MRSA.  The patient on medication regimen, in light of her worsening status sent to the hospital for evaluation of orthopedic surgery, likely intervention cleanout and further IV antibiotic therapy.  The patient denies fevers but she had some chills.  Left-sided hip is significantly painful.  Review of Systems  Review of Systems   Constitutional: Positive for chills and malaise/fatigue. Negative for fever.   HENT: Negative.    Eyes: Negative.    Respiratory: Negative.  Negative for cough.    Cardiovascular: Negative.  Negative for chest pain and palpitations.   Gastrointestinal: Negative.  Negative for heartburn, nausea and vomiting.   Genitourinary: Negative.  Negative for dysuria and frequency.   Musculoskeletal: Positive for back pain, falls, joint pain and myalgias. Negative for neck pain.   Skin: Negative.  Negative for itching and rash.        Lower extremity leg wounds, healing   Neurological: Negative.  Negative for dizziness, focal weakness, weakness and  headaches.   Endo/Heme/Allergies: Negative.  Negative for polydipsia. Does not bruise/bleed easily.   Psychiatric/Behavioral: Negative for depression. The patient is nervous/anxious.        Past Medical History   has a past medical history of Anxiety, Cellulitis and abscess of lower extremity, Dental disorder, Generalized osteoarthritis of multiple sites (10/20/2015), Heart valve disease, Hyperlipidemia, Hypertension, and Osteoporosis. She also has no past medical history of Allergy, Diabetes, Muscle disorder, or OSTEOPOROSIS.  Reported bullous pemphigus, multiple falls,    Surgical History   has a past surgical history that includes gyn surgery (1973); breast biopsy (8/28/2014); abdominal hysterectomy total; breast biopsy (Right, 8/14); hip nailing intramedullary (Left, 12/20/2017); hip revision total (Left, 2/11/2018); irrigation & debridement hip (Left, 6/4/2018); femoral popliteal bypass (Left, 6/8/2018); irrigation & debridement general (Left, 6/8/2018); and athroplasty.     Family History  family history includes Diabetes in her father; GI Disease in her mother; Heart Attack in her father; Hypertension in her father; Psychiatric Illness in her brother.     Social History   reports that she quit smoking about 12 years ago. Her smoking use included cigarettes. She has a 12.50 pack-year smoking history. She has never used smokeless tobacco. She reports that she drinks about 3.0 oz of alcohol per week. She reports that she does not use drugs.    Allergies  Allergies   Allergen Reactions   • Bactrim [Sulfamethoxazole-Trimethoprim] Rash     Diffuse pruritic skin rash with blisters (no mucous membrane involvement) (was also taking cipro at the time - reaction thought more likely to be 2/2 Bactrim)   • Meropenem Unspecified     Pt can not remember reaction         Medications  Prior to Admission Medications   Prescriptions Last Dose Informant Patient Reported? Taking?   Calcium Carbonate 600 MG Tab 10/7/2019 at 0800   Yes No   Sig: Take  by mouth 2 Times a Day.   Cholecalciferol 2000 UNIT Cap 10/7/2019 at 0800  Yes No   Sig: Take 2,000 Units by mouth every day.   Multiple Vitamins-Minerals (THERAPEUTIC-M/LUTEIN) Tab 10/7/2019 at 0800 Patient Yes No   Sig: Take 1 Tab by mouth every day.   acetaminophen (TYLENOL) 325 MG Tab 10/7/2019 at 0825  No No   Sig: Take 2 Tabs by mouth every 6 hours as needed (Mild Pain; (Pain scale 1-3); Temp greater than 100.5 F).   Patient taking differently: Take 650 mg by mouth every four hours as needed (Mild Pain; (Pain scale 1-3); Temp greater than 100.5 F).   amLODIPine (NORVASC) 10 MG Tab 10/7/2019 at 0800  No No   Sig: Take 1 Tab by mouth every day.   ascorbic acid (ASCORBIC ACID) 500 MG Tab 10/7/2019 at 0800 Patient Yes No   Sig: Take 500 mg by mouth every day.   aspirin (ASA) 81 MG Chew Tab chewable tablet 10/7/2019 at 0800  No No   Sig: Take 1 Tab by mouth every day.   bisacodyl (DULCOLAX) 10 MG Suppos   Yes No   Sig: Insert 10 mg in rectum every day.   cephALEXin (KEFLEX) 500 MG Cap 10/7/2019 at 1400  Yes No   Sig: Take 500 mg by mouth 3 times a day.   cyanocobalamin (VITAMIN B-12) 500 MCG Tab 10/7/2019 at 0800  Yes No   Sig: Take 500 mcg by mouth every day.   doxycycline (VIBRAMYCIN) 100 MG Tab 10/7/2019 at 0800  Yes No   Sig: Take 100 mg by mouth 2 times a day.   ferrous sulfate 325 (65 Fe) MG tablet 10/7/2019 at 0800  No No   Sig: Take 1 Tab by mouth every morning with breakfast.   folic acid (FOLVITE) 1 MG Tab 10/7/2019 at 0800  No No   Sig: Take 1 Tab by mouth every day.   furosemide (LASIX) 40 MG Tab 10/7/2019 at 0800  Yes No   Sig: Take 40 mg by mouth every day.   hydrALAZINE (APRESOLINE) 50 MG Tab 10/7/2019 at 0800  No No   Sig: Take 1 Tab by mouth 2 Times a Day.   lactobacillus rhamnosus (CULTURELLE) Cap capsule 10/7/2019 at 0800  No No   Sig: Take 1 Cap by mouth every morning with breakfast.   magnesium hydroxide (MILK OF MAGNESIA) 400 MG/5ML Suspension   No No   Sig: Take 30 mL by  mouth 1 time daily as needed (prn constipation).   methocarbamol (ROBAXIN) 500 MG Tab 10/7/2019 at 0800  No No   Sig: Take 1 Tab by mouth every 8 hours as needed (mild pain , spasms).   metoprolol (LOPRESSOR) 100 MG Tab 10/7/2019 at 0800  Yes Yes   Sig: Take 75 mg by mouth 2 times a day. Indications: High Blood Pressure Disorder   metoprolol SR (TOPROL XL) 100 MG TABLET SR 24 HR  at Unknown time Patient Yes No   Sig: Take 100 mg by mouth every day.   niacinamide 500 MG tablet  Patient Yes No   Sig: Take 500 mg by mouth 2 times a day.   polyethylene glycol/lytes (MIRALAX) Pack   No No   Sig: Take 1 Packet by mouth 1 time daily as needed.   potassium chloride SA (KDUR) 20 MEQ Tab CR 10/7/2019 at 0800  Yes No   Sig: Take 20 mEq by mouth every day.   sertraline (ZOLOFT) 100 MG Tab 10/7/2019 at 0800  No No   Sig: Take 1 Tab by mouth every day.   thiamine (THIAMINE) 100 MG tablet 10/7/2019 at 0800  No No   Sig: Take 1 Tab by mouth every day.      Facility-Administered Medications: None       Physical Exam  Temp:  [36.1 °C (97 °F)] 36.1 °C (97 °F)  Pulse:  [92] 92  Resp:  [18] 18  BP: (144)/(75) 144/75  SpO2:  [94 %] 94 %    Physical Exam   Constitutional: She is oriented to person, place, and time. She appears well-developed and well-nourished. She appears lethargic. She has a sickly appearance. Nasal cannula in place.   Chronically ill-appearing female   HENT:   Head: Normocephalic and atraumatic.   Nose: Nose normal.   Mouth/Throat: Oropharynx is clear and moist.   Patient in a hard c-collar   Eyes: Pupils are equal, round, and reactive to light. Conjunctivae and EOM are normal.   Neck: Normal range of motion. Neck supple. No JVD present. No thyromegaly present.   Cardiovascular: Normal rate, regular rhythm, normal heart sounds and intact distal pulses. Exam reveals no gallop and no friction rub.   Capillary refill <3 secs   Pulmonary/Chest: Effort normal and breath sounds normal. She has no wheezes. She has no rales.    Abdominal: Soft. Bowel sounds are normal. She exhibits no distension and no mass. There is no tenderness. There is no rebound and no guarding.   Musculoskeletal: Normal range of motion. She exhibits no edema or tenderness.   Left hip with drainage and dressing in place   Lymphadenopathy:     She has no cervical adenopathy.   Neurological: She is oriented to person, place, and time. She appears lethargic. No cranial nerve deficit.   Skin: Skin is warm and dry. Rash noted. She is not diaphoretic. No cyanosis. There is pallor.   Multiple healing lower extremity scars and ulcers   Psychiatric: She has a normal mood and affect. Her behavior is normal.   Nursing note and vitals reviewed.      Laboratory:          No results for input(s): ALTSGPT, ASTSGOT, ALKPHOSPHAT, TBILIRUBIN, DBILIRUBIN, GAMMAGT, AMYLASE, LIPASE, ALB, PREALBUMIN, GLUCOSE in the last 72 hours.      No results for input(s): NTPROBNP in the last 72 hours.      No results for input(s): TROPONINT in the last 72 hours.    Urinalysis:    No results found     Imaging:  CT-HIP WITH LEFT    (Results Pending)         Assessment/Plan:  I anticipate this patient will require at least two midnights for appropriate medical management, necessitating inpatient admission.    C2 cervical fracture (HCC)- (present on admission)  Assessment & Plan  Recent diagnosis, status post fall, deemed nonoperative candidate, continue hard collar    Skin abscess over hip, inability to walk- (present on admission)  Assessment & Plan  Findings from recent CT, question of deep seeding infection now with arthralgia and worsening status    Alcohol use- (present on admission)  Assessment & Plan  History of, led to a degree of debility    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)  Assessment & Plan  Reported, currently not on steroids anymore.  Wound care    Chronic ulcer of left leg, limited to breakdown of skin (HCC)- (present on admission)  Assessment & Plan  Ongoing wound  care,    History of MRSA infection- (present on admission)  Assessment & Plan  Of the left hip, appears to be recurrent, ID consult    MRSA (methicillin resistant staph aureus) culture positive- (present on admission)  Assessment & Plan  Left hip lesion, further evaluated by CT, repeat testing, referral to orthopedic surgery    Open leg wound- (present on admission)  Assessment & Plan  Ongoing wound care    Generalized osteoarthritis of multiple sites- (present on admission)  Assessment & Plan  Multiple joints involved, lower extremity joint deformities, orthopedic eval  Plan  Hospital admission for evaluation, reimaging, orthopedic consult  ID consult  Likely will need surgical intervention if overall feasible and tolerated  Continue  with pain control and supportive care  Wound care  Medically complex and overall high risk  See orders      VTE prophylaxis: Lovenox

## 2019-10-08 NOTE — ASSESSMENT & PLAN NOTE
- Multiple joints involved, lower extremity joint deformities  - Continue PT/OT  - Continue pain management

## 2019-10-09 ENCOUNTER — ANESTHESIA (OUTPATIENT)
Dept: SURGERY | Facility: MEDICAL CENTER | Age: 74
DRG: 467 | End: 2019-10-09
Payer: MEDICARE

## 2019-10-09 ENCOUNTER — ANESTHESIA EVENT (OUTPATIENT)
Dept: SURGERY | Facility: MEDICAL CENTER | Age: 74
DRG: 467 | End: 2019-10-09
Payer: MEDICARE

## 2019-10-09 LAB
ANION GAP SERPL CALC-SCNC: 7 MMOL/L (ref 0–11.9)
BUN SERPL-MCNC: 12 MG/DL (ref 8–22)
CALCIUM SERPL-MCNC: 8.9 MG/DL (ref 8.5–10.5)
CHLORIDE SERPL-SCNC: 102 MMOL/L (ref 96–112)
CO2 SERPL-SCNC: 23 MMOL/L (ref 20–33)
CREAT SERPL-MCNC: 0.5 MG/DL (ref 0.5–1.4)
EKG IMPRESSION: NORMAL
ERYTHROCYTE [DISTWIDTH] IN BLOOD BY AUTOMATED COUNT: 46.5 FL (ref 35.9–50)
GLUCOSE SERPL-MCNC: 91 MG/DL (ref 65–99)
GRAM STN SPEC: NORMAL
HCT VFR BLD AUTO: 30.8 % (ref 37–47)
HGB BLD-MCNC: 9.7 G/DL (ref 12–16)
MCH RBC QN AUTO: 29.3 PG (ref 27–33)
MCHC RBC AUTO-ENTMCNC: 31.5 G/DL (ref 33.6–35)
MCV RBC AUTO: 93.1 FL (ref 81.4–97.8)
PLATELET # BLD AUTO: 425 K/UL (ref 164–446)
PMV BLD AUTO: 9.4 FL (ref 9–12.9)
POTASSIUM SERPL-SCNC: 3.8 MMOL/L (ref 3.6–5.5)
RBC # BLD AUTO: 3.31 M/UL (ref 4.2–5.4)
SIGNIFICANT IND 70042: NORMAL
SITE SITE: NORMAL
SODIUM SERPL-SCNC: 132 MMOL/L (ref 135–145)
SOURCE SOURCE: NORMAL
WBC # BLD AUTO: 9.1 K/UL (ref 4.8–10.8)

## 2019-10-09 PROCEDURE — 501445 HCHG STAPLER, SKIN DISP: Performed by: ORTHOPAEDIC SURGERY

## 2019-10-09 PROCEDURE — 93010 ELECTROCARDIOGRAM REPORT: CPT | Performed by: INTERNAL MEDICINE

## 2019-10-09 PROCEDURE — 502000 HCHG MISC OR IMPLANTS RC 0278: Performed by: ORTHOPAEDIC SURGERY

## 2019-10-09 PROCEDURE — 85027 COMPLETE CBC AUTOMATED: CPT

## 2019-10-09 PROCEDURE — A9270 NON-COVERED ITEM OR SERVICE: HCPCS | Performed by: INTERNAL MEDICINE

## 2019-10-09 PROCEDURE — 700102 HCHG RX REV CODE 250 W/ 637 OVERRIDE(OP): Performed by: INTERNAL MEDICINE

## 2019-10-09 PROCEDURE — A4314 CATH W/DRAINAGE 2-WAY LATEX: HCPCS | Performed by: ORTHOPAEDIC SURGERY

## 2019-10-09 PROCEDURE — 502578 HCHG PACK, TOTAL HIP: Performed by: ORTHOPAEDIC SURGERY

## 2019-10-09 PROCEDURE — 93005 ELECTROCARDIOGRAM TRACING: CPT | Performed by: ORTHOPAEDIC SURGERY

## 2019-10-09 PROCEDURE — 160035 HCHG PACU - 1ST 60 MINS PHASE I: Performed by: ORTHOPAEDIC SURGERY

## 2019-10-09 PROCEDURE — 160042 HCHG SURGERY MINUTES - EA ADDL 1 MIN LEVEL 5: Performed by: ORTHOPAEDIC SURGERY

## 2019-10-09 PROCEDURE — 0SPB09Z REMOVAL OF LINER FROM LEFT HIP JOINT, OPEN APPROACH: ICD-10-PCS | Performed by: ORTHOPAEDIC SURGERY

## 2019-10-09 PROCEDURE — 501838 HCHG SUTURE GENERAL: Performed by: ORTHOPAEDIC SURGERY

## 2019-10-09 PROCEDURE — 80048 BASIC METABOLIC PNL TOTAL CA: CPT

## 2019-10-09 PROCEDURE — 87102 FUNGUS ISOLATION CULTURE: CPT | Mod: 91

## 2019-10-09 PROCEDURE — 700111 HCHG RX REV CODE 636 W/ 250 OVERRIDE (IP): Performed by: ANESTHESIOLOGY

## 2019-10-09 PROCEDURE — 0SUE09Z SUPPLEMENT LEFT HIP JOINT, ACETABULAR SURFACE WITH LINER, OPEN APPROACH: ICD-10-PCS | Performed by: ORTHOPAEDIC SURGERY

## 2019-10-09 PROCEDURE — 36415 COLL VENOUS BLD VENIPUNCTURE: CPT

## 2019-10-09 PROCEDURE — 700101 HCHG RX REV CODE 250: Performed by: ANESTHESIOLOGY

## 2019-10-09 PROCEDURE — 502240 HCHG MISC OR SUPPLY RC 0272: Performed by: ORTHOPAEDIC SURGERY

## 2019-10-09 PROCEDURE — 87077 CULTURE AEROBIC IDENTIFY: CPT | Mod: 91

## 2019-10-09 PROCEDURE — 87015 SPECIMEN INFECT AGNT CONCNTJ: CPT | Mod: 91

## 2019-10-09 PROCEDURE — 87070 CULTURE OTHR SPECIMN AEROBIC: CPT | Mod: 91

## 2019-10-09 PROCEDURE — 0SRS0JA REPLACEMENT OF LEFT HIP JOINT, FEMORAL SURFACE WITH SYNTHETIC SUBSTITUTE, UNCEMENTED, OPEN APPROACH: ICD-10-PCS | Performed by: ORTHOPAEDIC SURGERY

## 2019-10-09 PROCEDURE — A9270 NON-COVERED ITEM OR SERVICE: HCPCS | Performed by: HOSPITALIST

## 2019-10-09 PROCEDURE — 160002 HCHG RECOVERY MINUTES (STAT): Performed by: ORTHOPAEDIC SURGERY

## 2019-10-09 PROCEDURE — 87075 CULTR BACTERIA EXCEPT BLOOD: CPT | Mod: 91

## 2019-10-09 PROCEDURE — 0SPS0JZ REMOVAL OF SYNTHETIC SUBSTITUTE FROM LEFT HIP JOINT, FEMORAL SURFACE, OPEN APPROACH: ICD-10-PCS | Performed by: ORTHOPAEDIC SURGERY

## 2019-10-09 PROCEDURE — 700101 HCHG RX REV CODE 250: Performed by: ORTHOPAEDIC SURGERY

## 2019-10-09 PROCEDURE — 160036 HCHG PACU - EA ADDL 30 MINS PHASE I: Performed by: ORTHOPAEDIC SURGERY

## 2019-10-09 PROCEDURE — 87205 SMEAR GRAM STAIN: CPT | Mod: 91

## 2019-10-09 PROCEDURE — 160009 HCHG ANES TIME/MIN: Performed by: ORTHOPAEDIC SURGERY

## 2019-10-09 PROCEDURE — 160031 HCHG SURGERY MINUTES - 1ST 30 MINS LEVEL 5: Performed by: ORTHOPAEDIC SURGERY

## 2019-10-09 PROCEDURE — 160048 HCHG OR STATISTICAL LEVEL 1-5: Performed by: ORTHOPAEDIC SURGERY

## 2019-10-09 PROCEDURE — 770006 HCHG ROOM/CARE - MED/SURG/GYN SEMI*

## 2019-10-09 PROCEDURE — 0Q570ZZ DESTRUCTION OF LEFT UPPER FEMUR, OPEN APPROACH: ICD-10-PCS | Performed by: ORTHOPAEDIC SURGERY

## 2019-10-09 PROCEDURE — 700102 HCHG RX REV CODE 250 W/ 637 OVERRIDE(OP): Performed by: HOSPITALIST

## 2019-10-09 PROCEDURE — 87186 SC STD MICRODIL/AGAR DIL: CPT

## 2019-10-09 PROCEDURE — 99232 SBSQ HOSP IP/OBS MODERATE 35: CPT | Performed by: INTERNAL MEDICINE

## 2019-10-09 PROCEDURE — 700111 HCHG RX REV CODE 636 W/ 250 OVERRIDE (IP): Performed by: ORTHOPAEDIC SURGERY

## 2019-10-09 PROCEDURE — 700111 HCHG RX REV CODE 636 W/ 250 OVERRIDE (IP): Performed by: HOSPITALIST

## 2019-10-09 DEVICE — IMPLANTABLE DEVICE: Type: IMPLANTABLE DEVICE | Site: HIP | Status: FUNCTIONAL

## 2019-10-09 RX ORDER — ROCURONIUM BROMIDE 10 MG/ML
INJECTION, SOLUTION INTRAVENOUS PRN
Status: DISCONTINUED | OUTPATIENT
Start: 2019-10-09 | End: 2019-10-09 | Stop reason: SURG

## 2019-10-09 RX ORDER — MORPHINE SULFATE 15 MG/1
7.5 TABLET ORAL EVERY 4 HOURS PRN
Status: DISCONTINUED | OUTPATIENT
Start: 2019-10-09 | End: 2019-10-11 | Stop reason: HOSPADM

## 2019-10-09 RX ORDER — MAGNESIUM HYDROXIDE 1200 MG/15ML
LIQUID ORAL
Status: COMPLETED | OUTPATIENT
Start: 2019-10-09 | End: 2019-10-09

## 2019-10-09 RX ORDER — ONDANSETRON 2 MG/ML
4 INJECTION INTRAMUSCULAR; INTRAVENOUS
Status: DISCONTINUED | OUTPATIENT
Start: 2019-10-09 | End: 2019-10-09 | Stop reason: HOSPADM

## 2019-10-09 RX ORDER — HALOPERIDOL 5 MG/ML
1 INJECTION INTRAMUSCULAR
Status: DISCONTINUED | OUTPATIENT
Start: 2019-10-09 | End: 2019-10-09 | Stop reason: HOSPADM

## 2019-10-09 RX ORDER — MAGNESIUM HYDROXIDE 1200 MG/15ML
LIQUID ORAL
Status: DISCONTINUED | OUTPATIENT
Start: 2019-10-09 | End: 2019-10-09 | Stop reason: HOSPADM

## 2019-10-09 RX ORDER — DEXAMETHASONE SODIUM PHOSPHATE 4 MG/ML
INJECTION, SOLUTION INTRA-ARTICULAR; INTRALESIONAL; INTRAMUSCULAR; INTRAVENOUS; SOFT TISSUE PRN
Status: DISCONTINUED | OUTPATIENT
Start: 2019-10-09 | End: 2019-10-09 | Stop reason: SURG

## 2019-10-09 RX ORDER — LIDOCAINE HYDROCHLORIDE 20 MG/ML
INJECTION, SOLUTION EPIDURAL; INFILTRATION; INTRACAUDAL; PERINEURAL PRN
Status: DISCONTINUED | OUTPATIENT
Start: 2019-10-09 | End: 2019-10-09 | Stop reason: SURG

## 2019-10-09 RX ORDER — VANCOMYCIN HYDROCHLORIDE 1 G/20ML
INJECTION, POWDER, LYOPHILIZED, FOR SOLUTION INTRAVENOUS
Status: COMPLETED | OUTPATIENT
Start: 2019-10-09 | End: 2019-10-09

## 2019-10-09 RX ORDER — CEFAZOLIN SODIUM 1 G/3ML
INJECTION, POWDER, FOR SOLUTION INTRAMUSCULAR; INTRAVENOUS PRN
Status: DISCONTINUED | OUTPATIENT
Start: 2019-10-09 | End: 2019-10-09 | Stop reason: SURG

## 2019-10-09 RX ORDER — MEPERIDINE HYDROCHLORIDE 25 MG/ML
6.25 INJECTION INTRAMUSCULAR; INTRAVENOUS; SUBCUTANEOUS
Status: DISCONTINUED | OUTPATIENT
Start: 2019-10-09 | End: 2019-10-09 | Stop reason: HOSPADM

## 2019-10-09 RX ORDER — DIPHENHYDRAMINE HYDROCHLORIDE 50 MG/ML
12.5 INJECTION INTRAMUSCULAR; INTRAVENOUS
Status: DISCONTINUED | OUTPATIENT
Start: 2019-10-09 | End: 2019-10-09 | Stop reason: HOSPADM

## 2019-10-09 RX ORDER — KETOROLAC TROMETHAMINE 30 MG/ML
INJECTION, SOLUTION INTRAMUSCULAR; INTRAVENOUS PRN
Status: DISCONTINUED | OUTPATIENT
Start: 2019-10-09 | End: 2019-10-09 | Stop reason: SURG

## 2019-10-09 RX ADMIN — OXYCODONE HYDROCHLORIDE AND ACETAMINOPHEN 500 MG: 500 TABLET ORAL at 05:51

## 2019-10-09 RX ADMIN — METHOCARBAMOL TABLETS 500 MG: 500 TABLET, COATED ORAL at 20:32

## 2019-10-09 RX ADMIN — ACETAMINOPHEN 650 MG: 325 TABLET, FILM COATED ORAL at 20:32

## 2019-10-09 RX ADMIN — METOPROLOL TARTRATE 75 MG: 25 TABLET, FILM COATED ORAL at 05:52

## 2019-10-09 RX ADMIN — MORPHINE SULFATE 7.5 MG: 15 TABLET ORAL at 22:59

## 2019-10-09 RX ADMIN — KETOROLAC TROMETHAMINE 30 MG: 30 INJECTION, SOLUTION INTRAMUSCULAR at 11:44

## 2019-10-09 RX ADMIN — SERTRALINE HYDROCHLORIDE 100 MG: 100 TABLET ORAL at 05:53

## 2019-10-09 RX ADMIN — DEXAMETHASONE SODIUM PHOSPHATE 4 MG: 4 INJECTION, SOLUTION INTRA-ARTICULAR; INTRALESIONAL; INTRAMUSCULAR; INTRAVENOUS; SOFT TISSUE at 11:44

## 2019-10-09 RX ADMIN — ONDANSETRON 4 MG: 2 INJECTION INTRAMUSCULAR; INTRAVENOUS at 11:44

## 2019-10-09 RX ADMIN — FOLIC ACID 1 MG: 1 TABLET ORAL at 05:53

## 2019-10-09 RX ADMIN — AMLODIPINE BESYLATE 10 MG: 10 TABLET ORAL at 05:51

## 2019-10-09 RX ADMIN — SUGAMMADEX 200 MG: 100 INJECTION, SOLUTION INTRAVENOUS at 13:09

## 2019-10-09 RX ADMIN — DOXYCYCLINE 100 MG: 100 TABLET, FILM COATED ORAL at 18:42

## 2019-10-09 RX ADMIN — PROPOFOL 100 MG: 10 INJECTION, EMULSION INTRAVENOUS at 11:44

## 2019-10-09 RX ADMIN — METOPROLOL TARTRATE 75 MG: 25 TABLET, FILM COATED ORAL at 18:43

## 2019-10-09 RX ADMIN — Medication 100 MG: at 05:52

## 2019-10-09 RX ADMIN — FENTANYL CITRATE 25 MCG: 50 INJECTION, SOLUTION INTRAMUSCULAR; INTRAVENOUS at 13:37

## 2019-10-09 RX ADMIN — LIDOCAINE HYDROCHLORIDE 80 MG: 20 INJECTION, SOLUTION EPIDURAL; INFILTRATION; INTRACAUDAL at 11:45

## 2019-10-09 RX ADMIN — ROCURONIUM BROMIDE 50 MG: 10 INJECTION, SOLUTION INTRAVENOUS at 11:44

## 2019-10-09 RX ADMIN — DOXYCYCLINE 100 MG: 100 TABLET, FILM COATED ORAL at 05:51

## 2019-10-09 RX ADMIN — HYDRALAZINE HYDROCHLORIDE 50 MG: 50 TABLET, FILM COATED ORAL at 18:43

## 2019-10-09 RX ADMIN — POTASSIUM CHLORIDE 20 MEQ: 1500 TABLET, EXTENDED RELEASE ORAL at 05:53

## 2019-10-09 RX ADMIN — CEFAZOLIN 2 G: 330 INJECTION, POWDER, FOR SOLUTION INTRAMUSCULAR; INTRAVENOUS at 11:45

## 2019-10-09 RX ADMIN — FENTANYL CITRATE 25 MCG: 50 INJECTION, SOLUTION INTRAMUSCULAR; INTRAVENOUS at 13:53

## 2019-10-09 ASSESSMENT — COGNITIVE AND FUNCTIONAL STATUS - GENERAL
DAILY ACTIVITIY SCORE: 24
SUGGESTED CMS G CODE MODIFIER DAILY ACTIVITY: CH
SUGGESTED CMS G CODE MODIFIER MOBILITY: CH
MOBILITY SCORE: 24

## 2019-10-09 ASSESSMENT — ENCOUNTER SYMPTOMS
CHILLS: 0
WHEEZING: 0
HEADACHES: 0
DIZZINESS: 0
ABDOMINAL PAIN: 0
PALPITATIONS: 0
NAUSEA: 0
SEIZURES: 0
FEVER: 0
SHORTNESS OF BREATH: 0
TINGLING: 0
MYALGIAS: 1
NECK PAIN: 1
VOMITING: 0
LOSS OF CONSCIOUSNESS: 0

## 2019-10-09 NOTE — ANESTHESIA QCDR
2019 [facility] Clinical Data Registry (for Quality Improvement)     Postoperative nausea/vomiting risk protocol (Adult = 18 yrs and Pediatric 3-17 yrs)- (430 and 463)  General inhalation anesthetic (NOT TIVA) with PONV risk factors: Yes  Provision of anti-emetic therapy with at least 2 different classes of agents: Yes   Patient DID NOT receive anti-emetic therapy and reason is documented in Medical Record:  N/A    Multimodal Pain Management- (AQI59)  Patient undergoing Elective Surgery (i.e. Outpatient, or ASC, or Prescheduled Surgery prior to Hospital Admission): Yes  Use of Multimodal Pain Management, two or more drugs and/or interventions, NOT including systemic opioids:    Exception: Documented allergy to multiple classes of analgesics:      PACU assessment of acute postoperative pain prior to Anesthesia Care End- Applies to Patients Age = 18- (ABG7)  Initial PACU pain score is which of the following: < 7/10  Patient unable to report pain score: N/A    Post-anesthetic transfer of care checklist/protocol to PACU/ICU- (426 and 427)  Upon conclusion of case, patient transferred to which of the following locations: PACU/Non-ICU  Use of transfer checklist/protocol:   Exclusion: Service Performed in Patient Hospital Room (and thus did not require transfer):     PACU Reintubation- (AQI31)  General anesthesia requiring endotracheal intubation (ETT) along with subsequent extubation in OR or PACU: No  Required reintubation in the PACU: N/A  Extubation was a planned trial documented in the medical record prior to removal of the original airway device: N/A    Unplanned admission to ICU related to anesthesia service up through end of PACU care- (MD51)  Unplanned admission to ICU (not initially anticipated at anesthesia start time): No

## 2019-10-09 NOTE — ANESTHESIA PREPROCEDURE EVALUATION
Relevant Problems   PULMONARY   (+) History of MRSA infection      NEURO   (+) History of MRSA infection   (+) History of hip fracture      CARDIAC   (+) Essential hypertension   (+) Peripheral vascular disease (HCC)       Physical Exam    Airway   Mallampati: II  TM distance: >3 FB  Neck ROM: full       Cardiovascular - normal exam  Rhythm: regular  Rate: normal  (-) murmur     Dental - normal exam         Pulmonary - normal exam  Breath sounds clear to auscultation     Abdominal    Neurological - normal exam                 Anesthesia Plan    ASA 4       Plan - general             Induction: intravenous    Postoperative Plan: Postoperative administration of opioids is intended.    Pertinent diagnostic labs and testing reviewed    Informed Consent:    Anesthetic plan and risks discussed with patient.    Use of blood products discussed with: patient whom consented to blood products.

## 2019-10-09 NOTE — OP REPORT
Pre-operative Dx: Chronic infection of left hip arthroplasty  Post-operative Dx: same   Procedure:  Revision left total hip arthroplasty (exchange of head, liner, and proximal stem body)  Surgeon:  Dr. Chong Vazquez MD  Assistants: Luciana Lynn PA-C  Anesthesia:  Dr. Browning.  General  EBL:  200 mL  Drains:  None  Complications:  None    Findings: There is a sinus tract which went all the way down to the hip.  There is purulent material present.  She had essentially no abductors intact.  The distal stem body and acetabular component were well fixed.    Implants removed: MDM liner, 23+0 proximal body, and a 28+4 head inside of the poly head.  Implants placed: Were all the same size other than increasing to a 25 +0 proximal body.    Indications: She is a pleasant 73-year-old female with multiple comorbidities who is had a chronic infection of the left hip arthroplasty which was performed for a failed hip nail.  She has chronic lower extremity wounds as well.  She presented with drainage from her left hip wound and increasing left hip pain.  After discussing the options, she was indicated for irrigation and debridement with exchange of modular components and attempt at suppression.  The alternative was resection of her hip, which would likely result in her inability to bear weight likely indefinitely.  We discussed the high risk of recurrence at some point down the road and potential need for future surgery, as well as the risk of neurovascular injury, bleeding, transfusion, leg length discrepancy, dislocation, pain, limp and weakness, and medical issues around the time of surgery.  She expressed understanding and wished to proceed.      Description of Procedure:      The patient was identified in the preoperative holding area and informed consent and site marking were confirmed. The patient was then brought to the operating room. Anesthesia was administered. Patient was positioned lateral on the operative table with  appropriate padding of the extremities. Sterile prepping and draping of the surgical field was completed. I performed a pre timeout to confirm we had the correct patient, side, site, presence of necessary personal, and equipment.  I confirmed that pre-operative antibiotics were administered including Ancef in addition to her floor antibiotics.       Her entire lateral hip incision was excised including a sinus tract in the mid-portion of the wound.  Excised down to fascia which was incised in line with its fibers.  There was not a clear plane between the fascia and the vastus, so plain was made.  The posterior approach the hip was performed.  The trochanter was basically comminuted into several pieces, some of which had to be excised in order to allow access to the hip.  Thorough synovectomy was performed, excising all necrotic appearing tissue is possible.  The hip was dislocated and the proximal body was disengaged from the distal stem.  The proximal part of the femur was debrided with electrocautery as well as a reamer.  Acetabulum was exposed and yuki-articular tissues were further debrided at this point.  The acetabulum was exposed and the liner was removed.  The cup seem to be well fixed without any gross motion.  Wound was then thoroughly irrigated throughout with multiple liters of saline.  Soaks were performed with a dilute Betadine as well as dilute hydrogen peroxide.  The dual mobility liner was impacted and checked to make sure it was fully locked.  I then impacted a 25+0 proximal body into position with similar anteversion as the pre-existing implant.  A 28+ forehead was then assembled with a dual modular poly-had impacted onto a clean and dried trunnion.  The hip was then reduced and found to be stable with appropriate soft tissue tension and leg length.  The wound was then again irrigated and a Betadine soak performed.  Vancomycin powder was left in the wound.  The posterior capsule was closed with  PDS suture.  The fascia was closed with running Quill, and the wound was closed in layers with staples in the skin.  An incisional wound VAC was applied and she was turned over to anesthesia in stable condition without any apparent complications.    Plan: She can weight-bear as tolerated with posterior hip precautions.  She will be on IV antibiotics under the direction of infectious disease.  She needs to follow-up with me in 2-3 weeks for staple removal.

## 2019-10-09 NOTE — THERAPY
PT consult received. Discussed with RN, pt scheduled for sx today. Will complete eval post-op as able/appropriate.

## 2019-10-09 NOTE — ANESTHESIA PROCEDURE NOTES
Airway  Date/Time: 10/9/2019 11:45 PM  Performed by: Austen Browning M.D.  Authorized by: Austen Browning M.D.     Location:  OR  Urgency:  Elective  Indications for Airway Management:  Anesthesia  Spontaneous Ventilation: absent    Sedation Level:  Deep  Preoxygenated: Yes    Patient Position:  Sniffing  Final Airway Type:  Endotracheal airway  Final Endotracheal Airway:  ETT  Cuffed: Yes    Technique Used for Successful ETT Placement:  Direct laryngoscopy  Insertion Site:  Oral  Blade Type:  Chapin  Laryngoscope Blade/Videolaryngoscope Blade Size:  3  ETT Size (mm):  7.5  Measured from:  Teeth  ETT to Teeth (cm):  20  Placement Verified by: auscultation and capnometry    Cormack-Lehane Classification:  Grade I - full view of glottis  Number of Attempts at Approach:  1

## 2019-10-09 NOTE — ANESTHESIA TIME REPORT
Anesthesia Start and Stop Event Times     Date Time Event    10/9/2019 1132 Ready for Procedure     1139 Anesthesia Start        Responsible Staff  10/09/19    Name Role Begin End    Austen Browning M.D. 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

## 2019-10-09 NOTE — PROGRESS NOTES
[facility] Medicine Daily Progress Note    Date of Service  10/9/2019    Chief Complaint  Left hip [facility]    Hospital Course    Mrs. Mae is a 72 y/o female w/ a past medical history of C2 cervical fracture, bullous pemphigus, cellulitis, multiple falls and osteoarthritis. She presented to the hospital with c/o purulent left-sided drainage from previous hip fracture repair site. Wound cultures were positive for MRSA. Due to her worsening clinic status, she was admitted for orthopedic surgery evaluation, and I&D.          Interval Problem Update  No acute events overnight. NPO this morning in anticipation for surgical intervention today. Left hip wound looks overall unchanged from yesterday's exam. Patient reports no pain, SOB, fever or chills.     Consultants/Specialty  Ortho surgery  Infectious disease     Code Status  FULL    Disposition  TBD pending clinical course.     Review of Systems  Review of Systems   Constitutional: Negative for chills, fever and malaise/fatigue.   Respiratory: Negative for shortness of breath and wheezing.    Cardiovascular: Negative for chest pain and palpitations.   Gastrointestinal: Negative for abdominal pain, nausea and vomiting.   Genitourinary: Negative for dysuria, frequency and urgency.   Musculoskeletal: Positive for joint pain, myalgias and neck pain.   Neurological: Negative for dizziness, tingling, seizures, loss of consciousness and headaches.   All other systems reviewed and are negative.       Physical Exam  Temp:  [36.1 °C (97 °F)-37.4 °C (99.3 °F)] 36.1 °C (97 °F)  Pulse:  [74-93] 89  Resp:  [16-20] 17  BP: (113-145)/(61-80) 144/75  SpO2:  [90 %-95 %] 91 %    Physical Exam   Constitutional: She appears well-developed and well-nourished. No distress.   HENT:   Mouth/Throat: Oropharynx is clear and moist.   Eyes: Conjunctivae are normal.   Neck:   Cervical collar in place d/t history of C2 fracture.    Cardiovascular: Normal rate and intact distal pulses.    Pulmonary/Chest: No accessory muscle usage. No respiratory distress. She has rales in the right lower field and the left lower field.   Abdominal: Normal appearance and bowel sounds are normal. She exhibits no distension. There is no tenderness.   Musculoskeletal:        Left ankle: She exhibits decreased range of motion and deformity. She exhibits no swelling. No tenderness.   Skin: Skin is warm and dry. She is not diaphoretic. There is pallor.        Healing lesions to calves bilaterally, r/t recent bullous pemphigus    Nursing note and vitals reviewed.      Fluids  No intake or output data in the 24 hours ending 10/09/19 0818    Laboratory  Recent Labs     10/08/19  0557 10/09/19  0055   WBC 8.4 9.1   RBC 3.17* 3.31*   HEMOGLOBIN 9.5* 9.7*   HEMATOCRIT 29.8* 30.8*   MCV 94.0 93.1   MCH 30.0 29.3   MCHC 31.9* 31.5*   RDW 47.2 46.5   PLATELETCT 445 425   MPV 9.6 9.4     Recent Labs     10/07/19  2132 10/08/19  0557 10/09/19  0055   SODIUM 134* 133* 132*   POTASSIUM 4.1 4.1 3.8   CHLORIDE 100 102 102   CO2 26 24 23   GLUCOSE 121* 92 91   BUN 25* 19 12   CREATININE 0.65 0.49* 0.50   CALCIUM 9.3 9.2 8.9                   Imaging  CT-HIP WITH LEFT   Final Result      1.  LEFT hip prosthesis in place, normally located.  Associated fluid likely indicating effusion.  No significant associated enhancement however infection is not excluded.   2.  Worsening subcutaneous edema lateral to LEFT hip.   3.  No associated fracture.   4.  Multiple dilated small bowel loops suggesting ileus.           Assessment/Plan  * Skin abscess over hip, inability to walk- (present on admission)  Assessment & Plan  - Findings from recent CT, question of deep seeding infection now with arthralgia and worsening status  - Continue Doxycycline. Will switch to IV antibiotics post-op     Chronic ulcer of left leg, limited to breakdown of skin (HCC)- (present on admission)  Assessment & Plan  - Continue wound care    C2 cervical fracture (HCC)-  (present on admission)  Assessment & Plan  - Recent diagnosis, status post fall, deemed nonoperative candidate  - continue hard collar    History of MRSA infection- (present on admission)  Assessment & Plan  - Of the left hip, appears to be recurrent  - ID following    MRSA (methicillin resistant staph aureus) culture positive- (present on admission)  Assessment & Plan   Left hip lesion  - Orthopedic surgery consulted.  I&D scheduled today. Will follow post-op cultures   - ID following    Open leg wound- (present on admission)  Assessment & Plan  - Ongoing wound care    Alcohol use- (present on admission)  Assessment & Plan  - History of, led to a degree of debility  - Continue thiamine supplementation     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)  Assessment & Plan  - Reported, currently not on steroids anymore.   - Continue Wound care    Iron deficiency anemia- (present on admission)  Assessment & Plan  - chronic   - continue iron supplementation     Generalized osteoarthritis of multiple sites- (present on admission)  Assessment & Plan  - Multiple joints involved, lower extremity joint deformities  - Continue PT/OT  - Continue pain management     Essential hypertension- (present on admission)  Assessment & Plan  - Takes hydralazine, lasix , lopressor and norvasc at home  - Continue home BP meds        VTE prophylaxis: Holding lovenox in anticipation of surgery today.     I have performed a physical exam and reviewed and updated ROS and Plan today (10/9/2019). In review of yesterday's note (10/8/2019), there are no changes except as documented above.

## 2019-10-09 NOTE — DISCHARGE PLANNING
Anticipated Disposition:  Home with Home Health VS SNF    Action:   Attempted to obtain SNF choice, Pt is Intra-Op. Will try again when Pt is available.            Barriers to Discharge:   Medical clearance.       Plan:  Follow up with Pt to Obtain choice.     PASSR#[id number]

## 2019-10-09 NOTE — PROGRESS NOTES
Subjective:    History reviewed.  She is well-known to me.  She had a intertrochanteric hip fracture which was initially fixed by Dr. Peters which did not heal and was converted to total hip arthroplasty in February 2018.  She has chronic lower extremity open wounds and has developed an infection of her left hip replacement with drainage which has been cultured for MRSA.  She is not overtly sick but does have some left hip discomfort.  She recently had a fall and has a upper cervical fracture which is being treated in a collar.  She has relatively frequent falls.  No other complaints today.    Objective:    /70   Pulse 73   Temp 36.7 °C (98.1 °F) (Oral)   Resp 17   SpO2 93%     Recent Labs     10/08/19  0557 10/09/19  0055   WBC 8.4 9.1   RBC 3.17* 3.31*   HEMOGLOBIN 9.5* 9.7*   HEMATOCRIT 29.8* 30.8*   MCV 94.0 93.1   MCH 30.0 29.3   MCHC 31.9* 31.5*   RDW 47.2 46.5   PLATELETCT 445 425   MPV 9.6 9.4     Recent Labs     10/07/19  2132 10/08/19  0557 10/09/19  0055   SODIUM 134* 133* 132*   POTASSIUM 4.1 4.1 3.8   CHLORIDE 100 102 102   CO2 26 24 23   GLUCOSE 121* 92 91   BUN 25* 19 12   CREATININE 0.65 0.49* 0.50   CALCIUM 9.3 9.2 8.9       No intake or output data in the 24 hours ending 10/09/19 1109    Comfortable, no distress  She continues to have lower extremity open wounds, though they are much more healed than the last time I saw her.  She has some cellulitic change as well as drainage from her left hip wound.      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  I think she may ultimately require resection of the hip, but given all of her comorbidities, I think if she were to have her hip resected there is a very high chance she would never be able to have anything reimplanted.  With that in mind, and her hopes to maintain  mobility as long as possible, she was consented for irrigation and debridement with exchange of modular components.  This is with the understanding that she has MRSA which is difficult to treat in such a situation, and it may not be successful, and may fail at any point and require reoperation.  Discussed the risk of infection, fracture, neurovascular injury, bleeding, transfusion, ligament discrepancy, dislocation, pain, blood clots, or other medical problems around the time of surgery.  She expressed understanding wishes to proceed.

## 2019-10-09 NOTE — ANESTHESIA POSTPROCEDURE EVALUATION
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 Responsible Provider:  Austen Browning M.D.    Anesthesia Type:  general ASA Status:  4          Final Anesthesia Type: general  Last vitals  BP   Blood Pressure : 144/70, NIBP: 127/86    Temp   36.3 °C (97.3 °F)    Pulse   Pulse: 73, Heart Rate (Monitored): 83   Resp   18    SpO2   92 %      Anesthesia Post Evaluation    Patient location during evaluation: PACU  Patient participation: complete - patient participated  Level of consciousness: awake and alert    Airway patency: patent  Anesthetic complications: no  Cardiovascular status: hemodynamically stable  Respiratory status: acceptable  Hydration status: euvolemic    PONV: none           Nurse Pain Score: 0 (NPRS)

## 2019-10-09 NOTE — OR NURSING
"Patient saying \"ow, ow, ow\" states \"it's ok\"; states it is \"a little\" burning type pain; ice pack in place.  Tolerating fluids well; no complaints of nausea.  Declines juice.  Cervical collar in place  Dressing bilateral legs, dry and intact  Discharged to room via bed    "

## 2019-10-10 ENCOUNTER — APPOINTMENT (OUTPATIENT)
Dept: RADIOLOGY | Facility: MEDICAL CENTER | Age: 74
DRG: 467 | End: 2019-10-10
Attending: NURSE PRACTITIONER
Payer: MEDICARE

## 2019-10-10 PROBLEM — Z86.14 HISTORY OF MRSA INFECTION: Status: RESOLVED | Noted: 2019-09-02 | Resolved: 2019-10-10

## 2019-10-10 PROBLEM — L02.91 ABSCESS: Status: RESOLVED | Noted: 2019-09-02 | Resolved: 2019-10-10

## 2019-10-10 PROCEDURE — 700111 HCHG RX REV CODE 636 W/ 250 OVERRIDE (IP): Performed by: INTERNAL MEDICINE

## 2019-10-10 PROCEDURE — A9270 NON-COVERED ITEM OR SERVICE: HCPCS | Performed by: HOSPITALIST

## 2019-10-10 PROCEDURE — 99233 SBSQ HOSP IP/OBS HIGH 50: CPT | Performed by: INTERNAL MEDICINE

## 2019-10-10 PROCEDURE — 700102 HCHG RX REV CODE 250 W/ 637 OVERRIDE(OP): Performed by: INTERNAL MEDICINE

## 2019-10-10 PROCEDURE — 700105 HCHG RX REV CODE 258: Performed by: INTERNAL MEDICINE

## 2019-10-10 PROCEDURE — 700105 HCHG RX REV CODE 258

## 2019-10-10 PROCEDURE — 770006 HCHG ROOM/CARE - MED/SURG/GYN SEMI*

## 2019-10-10 PROCEDURE — 99232 SBSQ HOSP IP/OBS MODERATE 35: CPT | Performed by: INTERNAL MEDICINE

## 2019-10-10 PROCEDURE — 97165 OT EVAL LOW COMPLEX 30 MIN: CPT

## 2019-10-10 PROCEDURE — 700102 HCHG RX REV CODE 250 W/ 637 OVERRIDE(OP): Performed by: HOSPITALIST

## 2019-10-10 PROCEDURE — A9270 NON-COVERED ITEM OR SERVICE: HCPCS | Performed by: INTERNAL MEDICINE

## 2019-10-10 PROCEDURE — 97162 PT EVAL MOD COMPLEX 30 MIN: CPT

## 2019-10-10 RX ORDER — SODIUM CHLORIDE 9 MG/ML
INJECTION, SOLUTION INTRAVENOUS
Status: COMPLETED
Start: 2019-10-10 | End: 2019-10-10

## 2019-10-10 RX ADMIN — SENNOSIDES, DOCUSATE SODIUM 2 TABLET: 50; 8.6 TABLET, FILM COATED ORAL at 05:20

## 2019-10-10 RX ADMIN — POTASSIUM CHLORIDE 20 MEQ: 1500 TABLET, EXTENDED RELEASE ORAL at 05:22

## 2019-10-10 RX ADMIN — Medication 1 CAPSULE: at 08:13

## 2019-10-10 RX ADMIN — METOPROLOL TARTRATE 75 MG: 25 TABLET, FILM COATED ORAL at 05:22

## 2019-10-10 RX ADMIN — DOXYCYCLINE 100 MG: 100 TABLET, FILM COATED ORAL at 05:21

## 2019-10-10 RX ADMIN — SERTRALINE HYDROCHLORIDE 100 MG: 100 TABLET ORAL at 05:20

## 2019-10-10 RX ADMIN — FOLIC ACID 1 MG: 1 TABLET ORAL at 05:21

## 2019-10-10 RX ADMIN — VANCOMYCIN HYDROCHLORIDE 1600 MG: 500 INJECTION, POWDER, LYOPHILIZED, FOR SOLUTION INTRAVENOUS at 18:28

## 2019-10-10 RX ADMIN — HYDRALAZINE HYDROCHLORIDE 50 MG: 50 TABLET, FILM COATED ORAL at 05:23

## 2019-10-10 RX ADMIN — AMLODIPINE BESYLATE 10 MG: 10 TABLET ORAL at 05:21

## 2019-10-10 RX ADMIN — SODIUM CHLORIDE: 9 INJECTION, SOLUTION INTRAVENOUS at 18:30

## 2019-10-10 RX ADMIN — HYDRALAZINE HYDROCHLORIDE 50 MG: 50 TABLET, FILM COATED ORAL at 17:56

## 2019-10-10 RX ADMIN — MORPHINE SULFATE 7.5 MG: 15 TABLET ORAL at 17:56

## 2019-10-10 RX ADMIN — MORPHINE SULFATE 7.5 MG: 15 TABLET ORAL at 12:23

## 2019-10-10 RX ADMIN — Medication 100 MG: at 05:23

## 2019-10-10 RX ADMIN — METOPROLOL TARTRATE 75 MG: 25 TABLET, FILM COATED ORAL at 17:57

## 2019-10-10 RX ADMIN — FERROUS SULFATE TAB 325 MG (65 MG ELEMENTAL FE) 325 MG: 325 (65 FE) TAB at 08:13

## 2019-10-10 RX ADMIN — ASPIRIN 81 MG 81 MG: 81 TABLET ORAL at 05:22

## 2019-10-10 RX ADMIN — MORPHINE SULFATE 7.5 MG: 15 TABLET ORAL at 08:12

## 2019-10-10 RX ADMIN — OXYCODONE HYDROCHLORIDE AND ACETAMINOPHEN 500 MG: 500 TABLET ORAL at 05:23

## 2019-10-10 RX ADMIN — ACETAMINOPHEN 650 MG: 325 TABLET, FILM COATED ORAL at 05:26

## 2019-10-10 RX ADMIN — FUROSEMIDE 40 MG: 40 TABLET ORAL at 05:21

## 2019-10-10 ASSESSMENT — COGNITIVE AND FUNCTIONAL STATUS - GENERAL
EATING MEALS: A LITTLE
PERSONAL GROOMING: A LITTLE
DAILY ACTIVITIY SCORE: 18
CLIMB 3 TO 5 STEPS WITH RAILING: A LOT
MOBILITY SCORE: 14
HELP NEEDED FOR BATHING: A LITTLE
SUGGESTED CMS G CODE MODIFIER DAILY ACTIVITY: CK
MOVING TO AND FROM BED TO CHAIR: A LOT
SUGGESTED CMS G CODE MODIFIER MOBILITY: CL
MOVING FROM LYING ON BACK TO SITTING ON SIDE OF FLAT BED: UNABLE
STANDING UP FROM CHAIR USING ARMS: A LITTLE
DRESSING REGULAR UPPER BODY CLOTHING: A LITTLE
DRESSING REGULAR LOWER BODY CLOTHING: A LITTLE
TURNING FROM BACK TO SIDE WHILE IN FLAT BAD: A LITTLE
TOILETING: A LITTLE
WALKING IN HOSPITAL ROOM: A LITTLE

## 2019-10-10 ASSESSMENT — GAIT ASSESSMENTS
DISTANCE (FEET): 75
GAIT LEVEL OF ASSIST: MINIMAL ASSIST
ASSISTIVE DEVICE: FRONT WHEEL WALKER

## 2019-10-10 ASSESSMENT — ENCOUNTER SYMPTOMS
SENSORY CHANGE: 0
NAUSEA: 0
SPEECH CHANGE: 0
FEVER: 0
LOSS OF CONSCIOUSNESS: 0
CHILLS: 0
SEIZURES: 0
NECK PAIN: 1
COUGH: 0
MYALGIAS: 0
TINGLING: 0
HEADACHES: 0
VOMITING: 0
WHEEZING: 0
SHORTNESS OF BREATH: 0
MYALGIAS: 1
ABDOMINAL PAIN: 0
DIZZINESS: 0
PALPITATIONS: 0

## 2019-10-10 NOTE — CARE PLAN
Problem: Safety  Goal: Will remain free from falls  Outcome: PROGRESSING AS EXPECTED   Patient ambulates with a steady gait. Patient calls appropriately for assistance, wears non-slip footwear, and utilizes a front wheel walker during ambulation.     Problem: Knowledge Deficit  Goal: Knowledge of disease process/condition, treatment plan, diagnostic tests, and medications will improve  Outcome: PROGRESSING AS EXPECTED   Patient demonstrates appropriate knowledge of treatment plan at this time. Will continue to monitor.

## 2019-10-10 NOTE — THERAPY
"Physical Therapy Evaluation completed.   Bed Mobility:  Supine to Sit: (Up in chair)  Transfers: Sit to Stand: Minimal Assist  Gait: Level Of Assist: Minimal Assist with Front-Wheel Walker       Plan of Care: Will benefit from Physical Therapy 3 times per week  Discharge Recommendations: Equipment: Will Continue to Assess for Equipment Needs. Post-acute therapy Discharge to a transitional care facility for continued skilled therapy services.    Pt is a 73 year old female admitted to the hospital from SNF due to recurrent L hip infection. Pt underwent L total hip revision and is now WBAT with posterior hip precautions.Pt was at [facility] due to fall in September resulting in C2 fracture, pt to wear C-collar at all times.  Pt able to recall 2/3 posterior hip precautions and 2/3 cervical spine precautions. Reviewed hip and spine precautions as well as WBAT. PT educated on use of ice and supine ther ex for YOLANDA. Pt performed sit to stand with FWW, minimal assist. Cues to extend L LE during sit to stand to maintain posterior precautions. Pt ambulated short distance down hallway with FWW, minimal assist. Pt with baseline L ankle deformity which causes significant eversion and pronation of L foot with ambulation. Pt with limited heel strike and toe off on the L as well as inconsistent step and stride length. Antalgic gait also present due to surgery. Pt returned to room and up in chair following session. Pt would benefit from ongoing PT intervention while in the acute care setting to address deficits related to new YOLANDA revision and ongoing issues from recent fall. Recommend post-acute placement for continued physical therapy services prior to discharge home. Patient can tolerate post-acute therapies at a 5x/week frequency.         See \"Rehab Therapy-Acute\" Patient Summary Report for complete documentation.     "

## 2019-10-10 NOTE — THERAPY
"Occupational Therapy Evaluation completed.   Functional Status: Pt is a 74 y/o female admitted from [facility] with L hip pain and drainage, found to have chronic infection of L hip arthroplasty now s/p revision of L hip arthroplasty with exchange of head, liner, and proximal stem body. She also has a somewhat recent C2 fracture in which she has a c-collar on. She was very pleasant and cooperative, motivated to participate. Lindsay STS, toilet txf, and functional mobility with FWW. Lindsay for toileting and donning underwear. Supv grooming in stance. She was able to recite hip precautions without prompting. SHe is limited by weakness, fatigue, and impaired higher level balance during ADLs/IADLs which impacts independence in self care and functional mobility.  Plan of Care: Will benefit from Occupational Therapy 2 times per week  Discharge Recommendations:  Equipment: Will Continue to Assess for Equipment Needs. Recommend post-acute placement for additional occupational therapy services prior to discharge home. Patient can tolerate post-acute therapies at a 5x/week frequency.      See \"Rehab Therapy-Acute\" Patient Summary Report for complete documentation.    "

## 2019-10-10 NOTE — PROGRESS NOTES
[facility] Medicine Daily Progress Note    Date of Service  10/10/2019    Chief Complaint  Left hip [facility] Course    Mrs. Mae is a 74 y/o female w/ a past medical history of C2 cervical fracture, bullous pemphigus, cellulitis, multiple falls and osteoarthritis. She presented to the hospital with c/o purulent left-sided drainage from previous hip fracture repair site. Wound cultures were positive for MRSA. Due to her worsening clinic status, she was admitted for orthopedic surgery evaluation, and I&D. On 10/09/19 patient underwent a revision of the left total hip arthroplasty. She returned to the floor hemodynamically stable for close monitoring and post-operative management.           Interval Problem Update  Patient remained stable overnight. Was up in chair eating breakfast this morning. Appeared calm and without signs of acute distress.  Patient reports 5/10 burning pain from the left hip operative site that is aggravated with movement incision. Reports pain is well controlled with PRN medications.Tolerating regular diet. Denies SOB, chest pain, dizziness or sensory changes.     Consultants/Specialty  Ortho surgery  Infectious disease     Code Status  FULL    Disposition  TBD pending ID recommendations and ortho clearance    Review of Systems  Review of Systems   Constitutional: Negative for chills, fever and malaise/fatigue.   Respiratory: Negative for shortness of breath and wheezing.    Cardiovascular: Negative for chest pain and palpitations.   Gastrointestinal: Negative for abdominal pain, nausea and vomiting.   Genitourinary: Negative for dysuria, frequency and urgency.   Musculoskeletal: Positive for joint pain, myalgias and neck pain.   Neurological: Negative for dizziness, tingling, seizures, loss of consciousness and headaches.   All other systems reviewed and are negative.       Physical Exam  Temp:  [35.9 °C (96.7 °F)-36.7 °C (98.1 °F)] 36.4 °C (97.5 °F)  Pulse:  [64-88] 76  Resp:   [16-22] 16  BP: ()/(40-66) 103/57  SpO2:  [90 %-100 %] 93 %    Physical Exam   Constitutional: She appears well-developed and well-nourished. No distress.   HENT:   Mouth/Throat: Oropharynx is clear and moist.   Eyes: Conjunctivae are normal.   Neck:   Cervical collar in place d/t history of C2 fracture.    Cardiovascular: Normal rate and intact distal pulses.   Pulmonary/Chest: Effort normal. No accessory muscle usage. No respiratory distress. She has rales in the right lower field and the left lower field.   Abdominal: Normal appearance and bowel sounds are normal. She exhibits no distension. There is no tenderness.   Musculoskeletal:        Left ankle: She exhibits decreased range of motion and deformity. She exhibits no swelling. No tenderness.   S/p left hip arthroplasty revision. Wound vac to incision.   Skin: Skin is warm and dry. She is not diaphoretic.        Healing lesions to calves bilaterally, r/t recent bullous pemphigus    Nursing note and vitals reviewed.      Fluids    Intake/Output Summary (Last 24 hours) at 10/10/2019 1100  Last data filed at 10/10/2019 0800  Gross per 24 hour   Intake 960 ml   Output 150 ml   Net 810 ml       Laboratory  Recent Labs     10/08/19  0557 10/09/19  0055   WBC 8.4 9.1   RBC 3.17* 3.31*   HEMOGLOBIN 9.5* 9.7*   HEMATOCRIT 29.8* 30.8*   MCV 94.0 93.1   MCH 30.0 29.3   MCHC 31.9* 31.5*   RDW 47.2 46.5   PLATELETCT 445 425   MPV 9.6 9.4     Recent Labs     10/07/19  2132 10/08/19  0557 10/09/19  0055   SODIUM 134* 133* 132*   POTASSIUM 4.1 4.1 3.8   CHLORIDE 100 102 102   CO2 26 24 23   GLUCOSE 121* 92 91   BUN 25* 19 12   CREATININE 0.65 0.49* 0.50   CALCIUM 9.3 9.2 8.9                   Imaging  CT-HIP WITH LEFT   Final Result      1.  LEFT hip prosthesis in place, normally located.  Associated fluid likely indicating effusion.  No significant associated enhancement however infection is not excluded.   2.  Worsening subcutaneous edema lateral to LEFT hip.   3.  No  associated fracture.   4.  Multiple dilated small bowel loops suggesting ileus.           Assessment/Plan  Chronic ulcer of left leg, limited to breakdown of skin (HCC)- (present on admission)  Assessment & Plan  - Continue wound care    C2 cervical fracture (HCC)- (present on admission)  Assessment & Plan  - Recent diagnosis, status post fall, deemed nonoperative candidate  - continue hard collar    MRSA (methicillin resistant staph aureus) culture positive- (present on admission)  Assessment & Plan  Recurrent left hip MRSA  - S/p revision of left hip arthoplasty and wound cultures on 10/09/19  - Follow post-op cultures   - Currently on doxycycline, will adjust Abx according to ID recommendations   - Continue PT/OT  - Continue pain management     Open leg wound- (present on admission)  Assessment & Plan  - Ongoing wound care    Alcohol use- (present on admission)  Assessment & Plan  - History of, led to a degree of debility  - Continue thiamine supplementation     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)  Assessment & Plan  - History of, not on steroids anymore.   - Continue wound care    Iron deficiency anemia- (present on admission)  Assessment & Plan  - chronic   - continue iron supplementation     Generalized osteoarthritis of multiple sites- (present on admission)  Assessment & Plan  - Multiple joints involved, lower extremity joint deformities  - Continue PT/OT  - Continue pain management     Essential hypertension- (present on admission)  Assessment & Plan  - Takes hydralazine, lasix , lopressor and norvasc at home  - Continue home BP meds        VTE prophylaxis: Currently holding, as patient is <24 hours s/p left hip arthroplasty revision.     I have performed a physical exam and reviewed and updated ROS and Plan today (10/10/2019). In review of yesterday's note (10/9/2019), there are no changes except as documented above.

## 2019-10-10 NOTE — PROGRESS NOTES
Spoke to Dr. Weldon regarding the patient's IV access and pain medication.     Patient currently has no IV access due to infiltration. MD authorized leaving the IV out at this time as the patient does not have scheduled IV medications, but requested that it be reevaluated on day shift.     MD to order oral Morphine for pain per patient request due to the side effect of hallucinations while taking Oxycodone.

## 2019-10-10 NOTE — PROGRESS NOTES
Infectious Disease Progress Note    Author: Sharon Rao M.D. Date & Time of service: 10/10/2019  3:13 PM    Chief Complaint:  Left hip infection    Interval History:  10/10/2019-no fevers.  No new issues overnight.  Underwent surgery yesterday.  Labs Reviewed and Medications Reviewed.    Review of Systems:  Review of Systems   Constitutional: Positive for malaise/fatigue. Negative for fever.   HENT: Negative for hearing loss.    Respiratory: Negative for cough and shortness of breath.    Cardiovascular: Positive for leg swelling. Negative for chest pain.   Gastrointestinal: Negative for nausea and vomiting.   Genitourinary: Negative for dysuria.   Musculoskeletal: Positive for joint pain. Negative for myalgias.        Complains of some pain in the left hip   Neurological: Negative for sensory change and speech change.       Hemodynamics:  Temp (24hrs), Av.3 °C (97.4 °F), Min:35.9 °C (96.7 °F), Max:36.7 °C (98.1 °F)  Temperature: 36.4 °C (97.6 °F)  Pulse  Av.3  Min: 64  Max: 110   Blood Pressure : 124/66       Physical Exam:  Physical Exam   Constitutional: She is oriented to person, place, and time. No distress.   Comfortable and nontoxic   HENT:   Mouth/Throat: No oropharyngeal exudate.   Eyes: No scleral icterus.   Neck:   Hard collar   Cardiovascular: Regular rhythm.   Murmur heard.  Pulmonary/Chest: She has no wheezes. She has no rales.   Abdominal: Soft. There is no tenderness. There is no rebound.   Musculoskeletal: She exhibits no edema.   Left hip has incisional wound VAC   Neurological: She is alert and oriented to person, place, and time.   No gross focal neurological deficit   Skin: No rash noted. No erythema.   Vitals reviewed.      Meds:    Current Facility-Administered Medications:   •  morphine  •  amLODIPine  •  ascorbic acid  •  aspirin  •  bisacodyl  •  ferrous sulfate  •  folic acid  •  furosemide  •  hydrALAZINE  •  lactobacillus rhamnosus  •  methocarbamol  •  metoprolol  •   potassium chloride SA  •  sertraline  •  thiamine  •  senna-docusate **AND** polyethylene glycol/lytes **AND** magnesium hydroxide **AND** bisacodyl  •  acetaminophen  •  Notify provider if pain remains uncontrolled **AND** Use the numeric rating scale (NRS-11) on regular floors and Critical-Care Pain Observation Tool (CPOT) on ICUs/Trauma to assess pain **AND** Pulse Ox (Oximetry) **AND** Pharmacy Consult Request **AND** If patient difficult to arouse and/or has respiratory depression, stop any opiates that are currently infusing and call a Rapid Response. **AND** [DISCONTINUED] oxyCODONE immediate-release **AND** [DISCONTINUED] oxyCODONE immediate-release **AND** morphine injection  •  ondansetron  •  ondansetron  •  doxycycline monohydrate    Labs:  Recent Labs     10/08/19  0557 10/09/19  0055   WBC 8.4 9.1   RBC 3.17* 3.31*   HEMOGLOBIN 9.5* 9.7*   HEMATOCRIT 29.8* 30.8*   MCV 94.0 93.1   MCH 30.0 29.3   RDW 47.2 46.5   PLATELETCT 445 425   MPV 9.6 9.4   NEUTSPOLYS 54.00  --    LYMPHOCYTES 24.00  --    MONOCYTES 12.60  --    EOSINOPHILS 6.80  --    BASOPHILS 1.60  --      Recent Labs     10/07/19  2132 10/08/19  0557 10/09/19  0055   SODIUM 134* 133* 132*   POTASSIUM 4.1 4.1 3.8   CHLORIDE 100 102 102   CO2 26 24 23   GLUCOSE 121* 92 91   BUN 25* 19 12     Recent Labs     10/07/19  2132 10/08/19  0557 10/09/19  0055   ALBUMIN 3.2 3.0*  --    TBILIRUBIN 0.2 0.3  --    ALKPHOSPHAT 103* 86  --    TOTPROTEIN 8.0 7.5  --    ALTSGPT 10 9  --    ASTSGOT 15 14  --    CREATININE 0.65 0.49* 0.50       Imaging:  Ct-hip With Left    Result Date: 10/8/2019  10/8/2019 4:43 PM HISTORY/REASON FOR EXAM:  infection TECHNIQUE/EXAM DESCRIPTION AND NUMBER OF VIEWS: Helical CT scan of the pelvis and left hip is performed following the IV administration of 80 mL of Omnipaque 350. Coronal and sagittal reconstructions are provided. COMPARISON: 9/2/2019 FINDINGS: Multiple dilated small bowel loops present. Colonic diverticula noted.  LEFT hip prosthesis in place, normally located.  Heterotopic ossification again seen adjacent the proximal femur. Apparent fluid is seen anterior and lateral to LEFT hip prosthesis, unchanged from prior exam.  No significant associated enhancement. Subcutaneous edema is seen lateral to LEFT hip. No focal bony destruction or soft tissue gas. Vascular calcifications noted.     1.  LEFT hip prosthesis in place, normally located.  Associated fluid likely indicating effusion.  No significant associated enhancement however infection is not excluded. 2.  Worsening subcutaneous edema lateral to LEFT hip. 3.  No associated fracture. 4.  Multiple dilated small bowel loops suggesting ileus.      Micro:  Results     Procedure Component Value Units Date/Time    Fungal Culture [[id number]] Collected:  10/09/19 1211    Order Status:  Completed Specimen:  Tissue Updated:  10/10/19 1140     Significant Indicator NEG     Source TISS     Site LEFT HIP     Culture Result Culture in progress.    Narrative:       Surgery Specimen    Anaerobic Culture [[id number]] Collected:  10/09/19 1211    Order Status:  Completed Specimen:  Tissue Updated:  10/10/19 1140     Significant Indicator NEG     Source TISS     Site LEFT HIP     Culture Result Culture in progress.    Narrative:       Surgery Specimen    CULTURE TISSUE W/ GRM STAIN [[id number]] Collected:  10/09/19 1211    Order Status:  Completed Specimen:  Tissue Updated:  10/10/19 1140     Significant Indicator NEG     Source TISS     Site LEFT HIP     Culture Result No growth at 24 hours.     Gram Stain Result No organisms seen.    Narrative:       Surgery Specimen    CULTURE TISSUE W/ GRM STAIN [[id number]] Collected:  10/09/19 1208    Order Status:  Completed Specimen:  Tissue Updated:  10/10/19 1139     Significant Indicator NEG     Source TISS     Site LEFT HIP     Culture Result No growth at 24 hours.     Gram Stain Result No organisms seen.    Narrative:       Surgery Specimen    Fungal  Culture [[id number]] Collected:  10/09/19 1208    Order Status:  Completed Specimen:  Tissue Updated:  10/10/19 1139     Significant Indicator NEG     Source TISS     Site LEFT HIP     Culture Result Culture in progress.    Narrative:       Surgery Specimen    Anaerobic Culture [[id number]] Collected:  10/09/19 1208    Order Status:  Completed Specimen:  Tissue Updated:  10/10/19 1139     Significant Indicator NEG     Source TISS     Site LEFT HIP     Culture Result Culture in progress.    Narrative:       Surgery Specimen    Fungal Culture [[id number]] Collected:  10/09/19 1207    Order Status:  Completed Specimen:  Tissue Updated:  10/10/19 1139     Significant Indicator NEG     Source TISS     Site LEFT HIP     Culture Result Culture in progress.    Narrative:       Surgery Specimen    Anaerobic Culture [[id number]] Collected:  10/09/19 1207    Order Status:  Completed Specimen:  Tissue Updated:  10/10/19 1139     Significant Indicator NEG     Source TISS     Site LEFT HIP     Culture Result Culture in progress.    Narrative:       Surgery Specimen    CULTURE TISSUE W/ GRM STAIN [[id number]] Collected:  10/09/19 1207    Order Status:  Completed Specimen:  Tissue Updated:  10/10/19 1139     Significant Indicator NEG     Source TISS     Site LEFT HIP     Culture Result No growth at 24 hours.     Gram Stain Result Rare WBCs.  No organisms seen.      Narrative:       Surgery Specimen    Fungal Culture [[id number]] Collected:  10/09/19 1207    Order Status:  Completed Specimen:  Tissue Updated:  10/10/19 1139     Significant Indicator NEG     Source TISS     Site LEFT HIP     Culture Result Culture in progress.    Narrative:       Surgery Specimen    Anaerobic Culture [[id number]] Collected:  10/09/19 1207    Order Status:  Completed Specimen:  Tissue Updated:  10/10/19 1139     Significant Indicator NEG     Source TISS     Site LEFT HIP     Culture Result Culture in progress.    Narrative:       Surgery Specimen    CULTURE  TISSUE W/ GRM STAIN [[id number]] Collected:  10/09/19 1207    Order Status:  Completed Specimen:  Tissue Updated:  10/10/19 1139     Significant Indicator NEG     Source TISS     Site LEFT HIP     Culture Result No growth at 24 hours.     Gram Stain Result No organisms seen.    Narrative:       Surgery Specimen    GRAM STAIN [[id number]] Collected:  10/09/19 1208    Order Status:  Completed Specimen:  Tissue Updated:  10/09/19 1927     Significant Indicator .     Source TISS     Site LEFT HIP     Gram Stain Result No organisms seen.    Narrative:       Surgery Specimen    GRAM STAIN [[id number]] Collected:  10/09/19 1207    Order Status:  Completed Specimen:  Tissue Updated:  10/09/19 1926     Significant Indicator .     Source TISS     Site LEFT HIP     Gram Stain Result Rare WBCs.  No organisms seen.      Narrative:       Surgery Specimen    GRAM STAIN [[id number]] Collected:  10/09/19 1211    Order Status:  Completed Specimen:  Tissue Updated:  10/09/19 1705     Significant Indicator .     Source TISS     Site LEFT HIP     Gram Stain Result No organisms seen.    Narrative:       Surgery Specimen    GRAM STAIN [[id number]] Collected:  10/09/19 1207    Order Status:  Completed Specimen:  Tissue Updated:  10/09/19 1323     Significant Indicator .     Source TISS     Site LEFT HIP     Gram Stain Result No organisms seen.    Narrative:       Surgery Specimen    Urinalysis [[id number]]     Order Status:  No result Specimen:  Urine, Clean Catch           Assessment:  Active Hospital Problems    Diagnosis   • Chronic ulcer of left leg, limited to breakdown of skin ([facility]) [L97.921]   • C2 cervical fracture ([facility]) [S12.100A]   • MRSA (methicillin resistant staph aureus) culture positive [Z22.322]   • Open leg wound [S81.809A]   • Alcohol use [Z72.89]   • Bullous pemphigus [L10.9]   • Iron deficiency anemia [D50.9]   • Generalized osteoarthritis of multiple sites [M15.9]   • Essential hypertension [I10]   Infected left hip  arthroplasty    Plan:  Infected left hip arthroplasty  Underwent revision on 10/9/2019  His cultures are negative so far  She has history of MRSA in the past-the cultures stays right hip but likely it was from the left hip-reviewed the old chart  Discontinue the doxycycline and start the patient on vancomycin-aim for 6 weeks.  Will likely will need rifampin as well    Lower extremity ulceration  Patient has history of bullous pemphigoid  Continue with the wound care    Peripheral vascular disease  Has undergone revascularization    History of alcohol abuse    Discussed with internal medicine.

## 2019-10-10 NOTE — CARE PLAN
Problem: Safety  Goal: Will remain free from injury  Outcome: PROGRESSING AS EXPECTED   Bed locked and in lowest position. Call light within reach   Problem: Pain Management  Goal: Pain level will decrease to patient's comfort goal  Outcome: PROGRESSING AS EXPECTED   Pt states pain 7/10. Pt medicated per MAR

## 2019-10-11 ENCOUNTER — APPOINTMENT (OUTPATIENT)
Dept: RADIOLOGY | Facility: MEDICAL CENTER | Age: 74
DRG: 467 | End: 2019-10-11
Attending: NURSE PRACTITIONER
Payer: MEDICARE

## 2019-10-11 VITALS
HEIGHT: 67 IN | HEART RATE: 87 BPM | DIASTOLIC BLOOD PRESSURE: 63 MMHG | BODY MASS INDEX: 21.66 KG/M2 | OXYGEN SATURATION: 91 % | TEMPERATURE: 98.2 F | RESPIRATION RATE: 20 BRPM | WEIGHT: 138 LBS | SYSTOLIC BLOOD PRESSURE: 106 MMHG

## 2019-10-11 LAB
ANION GAP SERPL CALC-SCNC: 12 MMOL/L (ref 0–11.9)
BUN SERPL-MCNC: 31 MG/DL (ref 8–22)
CALCIUM SERPL-MCNC: 10.4 MG/DL (ref 8.5–10.5)
CHLORIDE SERPL-SCNC: 99 MMOL/L (ref 96–112)
CO2 SERPL-SCNC: 22 MMOL/L (ref 20–33)
CREAT SERPL-MCNC: 0.84 MG/DL (ref 0.5–1.4)
GLUCOSE SERPL-MCNC: 110 MG/DL (ref 65–99)
POTASSIUM SERPL-SCNC: 4.4 MMOL/L (ref 3.6–5.5)
SODIUM SERPL-SCNC: 133 MMOL/L (ref 135–145)

## 2019-10-11 PROCEDURE — 99239 HOSP IP/OBS DSCHRG MGMT >30: CPT | Performed by: INTERNAL MEDICINE

## 2019-10-11 PROCEDURE — 80048 BASIC METABOLIC PNL TOTAL CA: CPT

## 2019-10-11 PROCEDURE — A9270 NON-COVERED ITEM OR SERVICE: HCPCS | Performed by: INTERNAL MEDICINE

## 2019-10-11 PROCEDURE — 02HV33Z INSERTION OF INFUSION DEVICE INTO SUPERIOR VENA CAVA, PERCUTANEOUS APPROACH: ICD-10-PCS | Performed by: INTERNAL MEDICINE

## 2019-10-11 PROCEDURE — 36573 INSJ PICC RS&I 5 YR+: CPT

## 2019-10-11 PROCEDURE — 700102 HCHG RX REV CODE 250 W/ 637 OVERRIDE(OP): Performed by: INTERNAL MEDICINE

## 2019-10-11 PROCEDURE — A9270 NON-COVERED ITEM OR SERVICE: HCPCS | Performed by: HOSPITALIST

## 2019-10-11 PROCEDURE — B548ZZA ULTRASONOGRAPHY OF SUPERIOR VENA CAVA, GUIDANCE: ICD-10-PCS | Performed by: INTERNAL MEDICINE

## 2019-10-11 PROCEDURE — 700102 HCHG RX REV CODE 250 W/ 637 OVERRIDE(OP): Performed by: HOSPITALIST

## 2019-10-11 PROCEDURE — 36415 COLL VENOUS BLD VENIPUNCTURE: CPT

## 2019-10-11 RX ORDER — RIFAMPIN 300 MG/1
600 CAPSULE ORAL DAILY
Qty: 84 CAP | Refills: 0
Start: 2019-10-11 | End: 2019-11-22

## 2019-10-11 RX ORDER — MORPHINE SULFATE 15 MG/1
7.5 TABLET ORAL EVERY 4 HOURS PRN
Qty: 21 TAB | Refills: 0 | Status: SHIPPED | OUTPATIENT
Start: 2019-10-11 | End: 2019-10-18

## 2019-10-11 RX ADMIN — FUROSEMIDE 40 MG: 40 TABLET ORAL at 04:45

## 2019-10-11 RX ADMIN — Medication 100 MG: at 04:40

## 2019-10-11 RX ADMIN — FERROUS SULFATE TAB 325 MG (65 MG ELEMENTAL FE) 325 MG: 325 (65 FE) TAB at 10:49

## 2019-10-11 RX ADMIN — HYDRALAZINE HYDROCHLORIDE 50 MG: 50 TABLET, FILM COATED ORAL at 04:45

## 2019-10-11 RX ADMIN — SERTRALINE HYDROCHLORIDE 100 MG: 100 TABLET ORAL at 04:40

## 2019-10-11 RX ADMIN — Medication 1 CAPSULE: at 10:49

## 2019-10-11 RX ADMIN — AMLODIPINE BESYLATE 10 MG: 10 TABLET ORAL at 04:46

## 2019-10-11 RX ADMIN — ASPIRIN 81 MG 81 MG: 81 TABLET ORAL at 04:40

## 2019-10-11 RX ADMIN — OXYCODONE HYDROCHLORIDE AND ACETAMINOPHEN 500 MG: 500 TABLET ORAL at 04:41

## 2019-10-11 RX ADMIN — FOLIC ACID 1 MG: 1 TABLET ORAL at 04:41

## 2019-10-11 RX ADMIN — MORPHINE SULFATE 7.5 MG: 15 TABLET ORAL at 10:50

## 2019-10-11 RX ADMIN — METOPROLOL TARTRATE 75 MG: 25 TABLET, FILM COATED ORAL at 04:46

## 2019-10-11 RX ADMIN — POTASSIUM CHLORIDE 20 MEQ: 1500 TABLET, EXTENDED RELEASE ORAL at 04:41

## 2019-10-11 RX ADMIN — MORPHINE SULFATE 7.5 MG: 15 TABLET ORAL at 04:49

## 2019-10-11 NOTE — DISCHARGE SUMMARY
Discharge Summary    CHIEF COMPLAINT ON ADMISSION  Left hip pain    Reason for Admission  Left Hip Infection     CODE STATUS  Full Code    HPI & HOSPITAL COURSE   Mrs. Mae is a 72 y/o female w/ a past medical history of C2 cervical fracture, bullous pemphigus, cellulitis, multiple falls and osteoarthritis. She presented to the hospital with c/o purulent left-sided drainage from previous hip fracture repair site. Wound cultures were positive for MRSA. Due to her worsening clinic status, she was admitted for orthopedic surgery evaluation, and I&D. On 10/09/19 patient underwent a revision of the left total hip arthroplasty. She returned to the floor hemodynamically stable for close monitoring and post-operative management. Postoperatively, patient was switched from doxycycline to vancomycin per ID . Due to the need for long term IV antibiotic therapy, a PICC line was place. Once patient was medically cleared by ortho and ID, she was discharged to skilled nursing facility for further management.        Therefore, she is discharged in good and stable condition to skilled nursing facility.      FOLLOW UP ITEMS POST DISCHARGE  Post-operative wound cultures   Infectious disease due to long-term antibiotics , continue Rifampin and Vancomycin for 6wks  Ortho surgery for suture/staple removal in 10-14 days     DISCHARGE DIAGNOSES  Principal Problem (Resolved):    Skin abscess over hip, inability to walk POA: Yes  Active Problems:    Open leg wound POA: Yes    MRSA (methicillin resistant staph aureus) culture positive POA: Yes    C2 cervical fracture (HCC) POA: Yes    Chronic ulcer of left leg, limited to breakdown of skin (HCC) POA: Yes      Overview: Lesions much improved      Erythema due to stasis rather than cellulitis      Continue doxy for MRSA infection hip      DC Keflex    Alcohol use POA: Yes    Essential hypertension POA: Yes    Generalized osteoarthritis of multiple sites POA: Yes    Iron deficiency anemia POA:  Yes    Bullous pemphigus POA: Yes  Resolved Problems:    History of MRSA infection POA: Yes      FOLLOW UP  Future Appointments   Date Time Provider Department Center   10/22/2019 10:00 AM RYANN Son     No follow-up provider specified.    MEDICATIONS ON DISCHARGE     Medication List      START taking these medications      Instructions   morphine 15 MG tablet  Commonly known as:  MS IR   Take 0.5 Tabs by mouth every four hours as needed for up to 7 days.  Dose:  7.5 mg     NS SOLN 250 mL with vancomycin 5 g SOLR 1,300 mg   1,300 mg by Intravenous route every 24 hours.  Dose:  20 mg/kg     riFAMPin 300 MG Caps  Commonly known as:  RIFADINE   Take 2 Caps by mouth every day for 42 days.  Dose:  600 mg        CHANGE how you take these medications      Instructions   acetaminophen 325 MG Tabs  What changed:  when to take this  Commonly known as:  TYLENOL   Take 2 Tabs by mouth every 6 hours as needed (Mild Pain; (Pain scale 1-3); Temp greater than 100.5 F).  Dose:  650 mg        CONTINUE taking these medications      Instructions   amLODIPine 10 MG Tabs  Commonly known as:  NORVASC   Take 1 Tab by mouth every day.  Dose:  10 mg     ascorbic acid 500 MG Tabs  Commonly known as:  ascorbic acid   Take 500 mg by mouth every day.  Dose:  500 mg     aspirin 81 MG Chew chewable tablet  Commonly known as:  ASA   Take 1 Tab by mouth every day.  Dose:  81 mg     bisacodyl 10 MG Supp  Commonly known as:  DULCOLAX   Insert 10 mg in rectum every day.  Dose:  10 mg     Calcium Carbonate 600 MG Tabs   Take  by mouth 2 Times a Day.     Cholecalciferol 2000 UNIT Caps   Take 2,000 Units by mouth every day.  Dose:  2,000 Units     cyanocobalamin 500 MCG Tabs  Commonly known as:  VITAMIN B-12   Take 500 mcg by mouth every day.  Dose:  500 mcg     ferrous sulfate 325 (65 Fe) MG tablet   Take 1 Tab by mouth every morning with breakfast.  Dose:  325 mg     folic acid 1 MG Tabs  Commonly known as:  FOLVITE   Take 1 Tab  by mouth every day.  Dose:  1 mg     furosemide 40 MG Tabs  Commonly known as:  LASIX   Take 40 mg by mouth every day.  Dose:  40 mg     hydrALAZINE 50 MG Tabs  Commonly known as:  APRESOLINE   Doctor's comments:  Hold sbp less 100  Take 1 Tab by mouth 2 Times a Day.  Dose:  50 mg     lactobacillus rhamnosus Caps capsule   Take 1 Cap by mouth every morning with breakfast.  Dose:  1 Cap     magnesium hydroxide 400 MG/5ML Susp  Commonly known as:  MILK OF MAGNESIA   Take 30 mL by mouth 1 time daily as needed (prn constipation).  Dose:  30 mL     methocarbamol 500 MG Tabs  Commonly known as:  ROBAXIN   Take 1 Tab by mouth every 8 hours as needed (mild pain , spasms).  Dose:  500 mg     metoprolol 100 MG Tabs  Commonly known as:  LOPRESSOR   Take 75 mg by mouth 2 times a day. Indications: High Blood Pressure Disorder  Dose:  75 mg     metoprolol  MG Tb24  Commonly known as:  TOPROL XL   Take 100 mg by mouth every day.  Dose:  100 mg     niacinamide 500 MG tablet   Take 500 mg by mouth 2 times a day.  Dose:  500 mg     polyethylene glycol/lytes Pack  Commonly known as:  MIRALAX   Take 1 Packet by mouth 1 time daily as needed.  Dose:  17 g     potassium chloride SA 20 MEQ Tbcr  Commonly known as:  Kdur   Take 20 mEq by mouth every day.  Dose:  20 mEq     sertraline 100 MG Tabs  Commonly known as:  ZOLOFT   Take 1 Tab by mouth every day.  Dose:  100 mg     THERAPEUTIC-M/LUTEIN Tabs   Take 1 Tab by mouth every day.  Dose:  1 Tab     thiamine 100 MG tablet  Commonly known as:  THIAMINE   Take 1 Tab by mouth every day.  Dose:  100 mg        STOP taking these medications    cephALEXin 500 MG Caps  Commonly known as:  KEFLEX     doxycycline 100 MG Tabs  Commonly known as:  VIBRAMYCIN            Allergies  Allergies   Allergen Reactions   • Bactrim [Sulfamethoxazole-Trimethoprim] Rash     Diffuse pruritic skin rash with blisters (no mucous membrane involvement) (was also taking cipro at the time - reaction thought more  "likely to be 2/2 Bactrim)   • Meropenem Unspecified     Pt can not remember reaction     • Oxycodone      \"I got bad hallucinations & slurred speech\"       DIET  Orders Placed This Encounter   Procedures   • Diet Order Regular     Standing Status:   Standing     Number of Occurrences:   1     Order Specific Question:   Diet:     Answer:   Regular [1]       ACTIVITY  As tolerated and directed by skilled nursing.  Weight bearing as tolerated    LINES, DRAINS, AND WOUNDS  This is an automated list. Peripheral IVs will be removed prior to discharge.  Peripheral IV 10/10/19 22 G Right Forearm (Active)   Site Assessment Clean;Dry;Intact 10/10/2019  7:15 PM   Dressing Type Transparent 10/10/2019  7:15 PM   Line Status Infusing 10/10/2019  7:15 PM   Dressing Status Clean;Dry;Intact 10/10/2019  7:15 PM   Dressing Intervention Initial dressing 10/10/2019  7:15 PM   Date Primary Tubing Changed 10/10/19 10/10/2019  7:15 PM   Date Secondary Tubing Changed 10/10/19 10/10/2019  7:15 PM   NEXT Primary Tubing Change  10/14/19 10/10/2019  7:15 PM   NEXT Secondary Tubing Change  10/11/19 10/10/2019  7:15 PM   Infiltration Grading ([facility], [facility]) 0 10/10/2019  7:15 PM   Phlebitis Scale ([facility] Only) 0 10/10/2019  7:15 PM       PICC Single Lumen 10/11/19 Right Brachial (Active)   Site Assessment Clean;Intact;Dry 10/11/2019  8:00 AM   Line Status Blood return noted;Flushed;Scrubbed the hub prior to access;Saline locked 10/11/2019  8:00 AM   Extremity Circumference (cm) 25.5 cm 10/11/2019  8:00 AM   Dressing Type Biopatch;Occlusive;Securing device;Transparent 10/11/2019  8:00 AM   Dressing Status Clean;Dry;Intact 10/11/2019  8:00 AM   Dressing Intervention Initial dressing 10/11/2019  8:00 AM   Dressing Change Due 10/12/19 10/11/2019  8:00 AM   Date IV Connector(s) Changed 10/11/19 10/11/2019  8:00 AM   NEXT IV Connector(s) Change 10/12/19 10/11/2019  8:00 AM   Line Necessity Assessed Antibiotic Therapy Greater than 7 Days 10/11/2019  8:00 " AM   $ Single Lumen PICC Charge Single kit used 10/11/2019  8:00 AM       Wound 08/23/19 Pretibial Left Medial LE (Active)   Wound Image   10/8/2019 11:25 AM   Site Assessment Clean;Brown 10/10/2019  7:15 PM   Maricruz-wound Assessment Clean;Intact 10/10/2019  7:15 PM   Margins Attached edges 10/10/2019  7:15 PM   Wound Length (cm) 6 cm 10/8/2019 11:25 AM   Wound Width (cm) 4.5 cm 10/8/2019 11:25 AM   Wound Surface Area (cm^2) 27 cm^2 10/8/2019 11:25 AM   Tunneling 0 cm 10/8/2019 11:25 AM   Undermining 0 cm 10/8/2019 11:25 AM   Closure Secondary intention 10/9/2019  8:00 PM   Drainage Amount None 10/9/2019  8:00 PM   Non-staged Wound Description Full thickness 10/9/2019  8:00 PM   Cleansing Not Applicable 10/9/2019  8:00 PM   Periwound Protectant Not Applicable 10/9/2019  8:00 PM   Dressing Options Open to Air 10/10/2019  7:15 PM   Dressing Cleansing/Solutions Not Applicable 10/9/2019  8:00 PM   NEXT Weekly Photo (Inpatient Only) 10/14/19 10/8/2019 11:25 AM   WOUND NURSE ONLY - Odor None 10/8/2019 11:25 AM   WOUND NURSE ONLY - Pulses 2+;Left;DP 10/8/2019 11:25 AM   WOUND NURSE ONLY - Exposed Structures None 10/8/2019 11:25 AM   WOUND NURSE ONLY - Tissue Type and Percentage brown intact scab 10/8/2019 11:25 AM   WOUND NURSE ONLY - Time Spent with Patient (mins) 60 10/8/2019 11:25 AM       Wound 10/08/19 Partial Thickness Wound Leg partial thickness left lateral leg (Active)   Wound Image    10/8/2019 11:00 PM   Site Assessment CARLITA 10/10/2019  7:15 PM   Maricruz-wound Assessment Clean;Dry;Intact 10/10/2019  7:15 PM   Margins CARLITA 10/10/2019  7:15 PM   Wound Length (cm) 2.7 cm 10/8/2019 11:25 AM   Wound Width (cm) 3 cm 10/8/2019 11:25 AM   Wound Depth (cm) 0.1 cm 10/8/2019 11:25 AM   Wound Surface Area (cm^2) 8.1 cm^2 10/8/2019 11:25 AM   Tunneling 0 cm 10/8/2019 11:25 AM   Undermining 0 cm 10/8/2019 11:25 AM   Closure Secondary intention 10/9/2019  9:00 AM   Drainage Amount Scant 10/10/2019  8:00 AM   Drainage Description  Serosanguineous 10/10/2019  8:00 AM   Non-staged Wound Description Partial thickness 10/9/2019  9:00 AM   Treatments Cleansed 10/9/2019  9:00 AM   Cleansing Normal Saline Irrigation 10/9/2019  8:00 PM   Periwound Protectant Not Applicable 10/9/2019  8:00 PM   Dressing Options Adhesive Foam 10/10/2019  8:00 AM   Dressing Cleansing/Solutions Not Applicable 10/10/2019  8:00 AM   Dressing Changed Changed 10/9/2019  9:00 AM   Dressing Status Dry;Intact 10/10/2019  7:15 PM   Dressing Change Frequency Every 48 hrs 10/10/2019  7:15 PM   NEXT Dressing Change  10/11/19 10/10/2019  7:15 PM   WOUND NURSE ONLY - Odor None 10/9/2019  9:00 AM   WOUND NURSE ONLY - Pulses DP;2+;Left 10/8/2019 11:25 AM   WOUND NURSE ONLY - Exposed Structures None 10/8/2019 11:25 AM   WOUND NURSE ONLY - Tissue Type and Percentage pink 100% 10/8/2019 11:25 AM      PICC Single Lumen 10/11/19 Right Brachial (Active)   Site Assessment Clean;Intact;Dry 10/11/2019  8:00 AM   Line Status Blood return noted;Flushed;Scrubbed the hub prior to access;Saline locked 10/11/2019  8:00 AM   Extremity Circumference (cm) 25.5 cm 10/11/2019  8:00 AM   Dressing Type Biopatch;Occlusive;Securing device;Transparent 10/11/2019  8:00 AM   Dressing Status Clean;Dry;Intact 10/11/2019  8:00 AM   Dressing Intervention Initial dressing 10/11/2019  8:00 AM   Dressing Change Due 10/12/19 10/11/2019  8:00 AM   Date IV Connector(s) Changed 10/11/19 10/11/2019  8:00 AM   NEXT IV Connector(s) Change 10/12/19 10/11/2019  8:00 AM   Line Necessity Assessed Antibiotic Therapy Greater than 7 Days 10/11/2019  8:00 AM   $ Single Lumen PICC Charge Single kit used 10/11/2019  8:00 AM     Peripheral IV 10/10/19 22 G Right Forearm (Active)   Site Assessment Clean;Dry;Intact 10/10/2019  7:15 PM   Dressing Type Transparent 10/10/2019  7:15 PM   Line Status Infusing 10/10/2019  7:15 PM   Dressing Status Clean;Dry;Intact 10/10/2019  7:15 PM   Dressing Intervention Initial dressing 10/10/2019  7:15 PM    Date Primary Tubing Changed 10/10/19 10/10/2019  7:15 PM   Date Secondary Tubing Changed 10/10/19 10/10/2019  7:15 PM   NEXT Primary Tubing Change  10/14/19 10/10/2019  7:15 PM   NEXT Secondary Tubing Change  10/11/19 10/10/2019  7:15 PM   Infiltration Grading ([facility], [facility]) 0 10/10/2019  7:15 PM   Phlebitis Scale ([facility] Only) 0 10/10/2019  7:15 PM               MENTAL STATUS ON TRANSFER  Level of Consciousness: Alert  Orientation : Oriented x 4  Speech: Speech Clear    CONSULTATIONS  Ortho surgery   Infectious Disease    PROCEDURES  Revision left total hip arthroplasty     LABORATORY  Lab Results   Component Value Date    SODIUM 133 (L) 10/11/2019    POTASSIUM 4.4 10/11/2019    CHLORIDE 99 10/11/2019    CO2 22 10/11/2019    GLUCOSE 110 (H) 10/11/2019    BUN 31 (H) 10/11/2019    CREATININE 0.84 10/11/2019        Lab Results   Component Value Date    WBC 9.1 10/09/2019    HEMOGLOBIN 9.7 (L) 10/09/2019    HEMATOCRIT 30.8 (L) 10/09/2019    PLATELETCT 425 10/09/2019        Total time of the discharge process exceeds 35 minutes.

## 2019-10-11 NOTE — DISCHARGE PLANNING
Received Choice form at 1030  Agency/Facility Name: [facility]  Referral sent per Choice form at 1032

## 2019-10-11 NOTE — DISCHARGE PLANNING
Received Transport Form at 1300  [facility]     Transport is scheduled for 10/11 at 1700 going to [facility].    ALEXIA Magana notified.

## 2019-10-11 NOTE — DISCHARGE PLANNING
Anticipated Discharge Disposition: SNF    Action: LSW spoke with pt at bedside to discuss transport. Pt signed COBRA form and agreed to transport. LSW completed COBRA packet and gave to bedside RN.     Barriers to Discharge: None    Plan: Pt to transport to [place] at 1700 via [facility] van. No further case management needs at this time.

## 2019-10-11 NOTE — PROGRESS NOTES
"Pharmacy Kinetics 73 y.o. female on vancomycin day # 2    10/11/2019     Currently on Vancomycin 1300 mg iv q24hr (1600)  Provider specified end date: 6wks vancomycin    Indication for Treatment: infected L hip arthroplasty     Pertinent history per medical record: Admitted on 10/7/2019 for L hip pain and drainage.  Patient resides at [facility] with remote history of L-sided hip fracture, s/p repair. Patient developed L sided drainage from previous surgical scar. She was admitted in September for abscess over the site, which was positive for MRSA. Now with ongoing concerns for prosthetic hip infection, she was admitted again for washout of the wound and antibiotic therapy. ID is consulting. Revision and washout of lip hip performed 10/9 with removal of implant.      Other antibiotics: none     Allergies: Bactrim [sulfamethoxazole-trimethoprim]; Meropenem; and Oxycodone      List concerns for renal function: age     Pertinent cultures to date:   10/09/19:Lt hip;TISS:NGTD     MRSA nares swab if pneumonia is a concern: N/A    Recent Labs     10/09/19  0055   WBC 9.1     Recent Labs     10/09/19  0055   BUN 12   CREATININE 0.50     No results for input(s): VANCOTROUGH, VANCOPEAK, VANCORANDOM in the last 72 hours.    Intake/Output Summary (Last 24 hours) at 10/11/2019 0821  Last data filed at 10/11/2019 0600  Gross per 24 hour   Intake 550 ml   Output --   Net 550 ml      /60   Pulse 88   Temp 36.3 °C (97.4 °F) (Temporal)   Resp 16   Ht 1.702 m (5' 7.01\")   Wt 62.6 kg (138 lb)   SpO2 92%  Temp (24hrs), Av.5 °C (97.7 °F), Min:36.3 °C (97.4 °F), Max:36.8 °C (98.2 °F)      A/P   1. Vancomycin dose change: None   2. Next vancomycin level: 10/12/19@1530  3. Goal trough: 12-16 mcg/mL   4. Comments: No leukocytosis , afebrile over interval, CX NGTD. Following renal indices, continue current dose. Vancomycin level ordered. ID following , aim for 6wks of vancomycin.       Justino oGncalves PharmD BCPS   "

## 2019-10-11 NOTE — PROGRESS NOTES
"   Orthopaedic Progress Note    Interval changes:  Patient doing well post op  Provina dressing intact with no leak  Cultures pending     ROS - Patient denies any new issues.  Pain well controlled.    /65   Pulse 80   Temp 36.7 °C (98 °F) (Temporal)   Resp 18   Ht 1.702 m (5' 7.01\")   Wt 62.6 kg (138 lb)   SpO2 93%       Patient seen and examined  No acute distress  Breathing non labored  RRR  Left hip Provina dressing intact with no leak. Patient clearly fires tibialis anterior, EHL, and gastrocnemius/soleus. Sensation is intact to light touch throughout superficial peroneal, deep peroneal, tibial, saphenous, and sural nerve distributions. Strong and palpable 2+ dorsalis pedis and posterior tibial pulses with capillary refill less than 2 seconds. No lower leg tenderness or discomfort.       Recent Labs     10/08/19  0557 10/09/19  0055   WBC 8.4 9.1   RBC 3.17* 3.31*   HEMOGLOBIN 9.5* 9.7*   HEMATOCRIT 29.8* 30.8*   MCV 94.0 93.1   MCH 30.0 29.3   MCHC 31.9* 31.5*   RDW 47.2 46.5   PLATELETCT 445 425   MPV 9.6 9.4       Active Hospital Problems    Diagnosis   • Chronic ulcer of left leg, limited to breakdown of skin (HCC) [L97.921]     Priority: High     Lesions much improved  Erythema due to stasis rather than cellulitis  Continue doxy for MRSA infection hip  DC Keflex     • C2 cervical fracture (HCC) [S12.100A]     Priority: High   • MRSA (methicillin resistant staph aureus) culture positive [Z22.322]     Priority: High   • Open leg wound [S81.809A]     Priority: High   • Alcohol use [Z72.89]     Priority: Medium   • Bullous pemphigus [L10.9]     Priority: Low   • Iron deficiency anemia [D50.9]     Priority: Low   • Generalized osteoarthritis of multiple sites [M15.9]     Priority: Low   • Essential hypertension [I10]     Priority: Low       Assessment/Plan:  Cultures pending   Likely will need 6 weeks IV   POD#1 S/P Revision left total hip arthroplasty (exchange of head, liner, and proximal stem " body)  Wt bearing status - WBAT with post hip precautions  Wound care/Drains - vac left in place  Future Procedures - none planned  Sutures/Staples out- 10-14 days post operatively  PT/OT-initiated  Antibiotics: vancocin 1300mg IV QD  DVT Prophylaxis- TEDS/SCDs/Foot pumps  [place]-none  Case Coordination for Discharge Planning - Disposition pending abx needs- likely return to [place]

## 2019-10-11 NOTE — PROGRESS NOTES
" Subjective:      Pain controlled.  Ambulating well.  OR cultures NGTD.    Objective:    /66   Pulse 84   Temp 36.2 °C (97.2 °F) (Temporal)   Resp 20   Ht 1.702 m (5' 7.01\")   Wt 62.6 kg (138 lb)   SpO2 90%     Recent Labs     10/09/19  0055   WBC 9.1   RBC 3.31*   HEMOGLOBIN 9.7*   HEMATOCRIT 30.8*   MCV 93.1   MCH 29.3   MCHC 31.5*   RDW 46.5   PLATELETCT 425   MPV 9.4     Recent Labs     10/09/19  0055 10/11/19  1148   SODIUM 132* 133*   POTASSIUM 3.8 4.4   CHLORIDE 102 99   CO2 23 22   GLUCOSE 91 110*   BUN 12 31*   CREATININE 0.50 0.84   CALCIUM 8.9 10.4         Intake/Output Summary (Last 24 hours) at 10/11/2019 1352  Last data filed at 10/11/2019 0900  Gross per 24 hour   Intake 1030 ml   Output --   Net 1030 ml       Comfortable, no distress  Neurologically and vascularly intact with palpable pedal pulses bilaterally.  Dressing C/D/I    Assessment:      S/p head/liner exchange for infected left revision YOLANDA    Plan:      IV abx per ID  WBAT with posterior precautions  F/U with me on 10/25 for staple removal  OK to d/c anytime  "

## 2019-10-11 NOTE — DISCHARGE PLANNING
Anticipated Discharge Disposition: SNF    Action: LSW spoke with pt at bedside. Pt signed choice form to return to [place]. LSW faxed choice form to [facility].     Barriers to Discharge: None    Plan: Awaiting acceptance to SNF.

## 2019-10-11 NOTE — CARE PLAN
Problem: Knowledge Deficit  Goal: Knowledge of the prescribed therapeutic regimen will improve  Outcome: PROGRESSING AS EXPECTED   Plan of care reviewed and questions answered.    Problem: Urinary Elimination:  Goal: Ability to reestablish a normal urinary elimination pattern will improve  Outcome: PROGRESSING AS EXPECTED   Patient reports stress incontinence.  Skin monitored and patient using depends which are changed as needed.

## 2019-10-11 NOTE — PROGRESS NOTES
Received report from Day RN and assumed care of patient.  She is alert and oriented.  IV is patent and infusing.  Patient reports minimal pain and denies the need for pain medication at this time.  She is positioned for comfort in bed.  Plan of care reviewed, questions answered and hourly rounding in place.

## 2019-10-11 NOTE — DISCHARGE INSTRUCTIONS
Discharge Instructions    Discharged to other by medical transportation with escort. Discharged via wheelchair, hospital escort: Yes.  Special equipment needed: C-Collar    Be sure to schedule a follow-up appointment with your primary care doctor or any specialists as instructed.     Discharge Plan:   Influenza Vaccine Indication: Not indicated: Previously immunized this influenza season and > 8 years of age    I understand that a diet low in cholesterol, fat, and sodium is recommended for good health. Unless I have been given specific instructions below for another diet, I accept this instruction as my diet prescription.   Other diet: reg    Special Instructions: Discharge instructions for the Orthopedic Patient    Follow up with Primary Care Physician within 2 weeks of discharge to home, regarding:  Review of medications and diagnostic testing.  Surveillance for medical complications.  Workup and treatment of osteoporosis, if appropriate.     -Is this a Joint Replacement patient? Yes   Total Joint Hip Replacement Discharge Instructions    Pain  - The goal is to slowly wean off the prescription pain medicine.  - Ice can be used for pain control.  20 minutes at a time is recommended, and never directly against your skin or incision.  - Most patients are off the pain pills by 3 weeks; others may require a low level of pain medications for many months. If your pain continues to be severe, follow up with your physician.  Infection  Deep hip joint infections that require removal of the prostheses occur in less than 0.1% of patients. Lesser infections in the skin (cellulites) are more common and much more easily treated.  - Keep the incision as clean and dry as possible.  - Always wash your hands before touching your incision.  - Skin infections tend to develop around 7-10 days after surgery, most can be treated with oral antibiotics.  - Dental Care should be delayed for 3 months after surgery, your surgeon recommends  taking a dose of antibiotics 1 hour prior to any dental procedure.  After 2 years, most surgeons recommend antibiotics only before an extensive procedure.  Ask your surgeon what he recommends.  - Signs and symptoms of infection can include:  low grade fever, redness, pain, swelling and drainage from your incision.  Notify your surgeon immediately if you develop any of these symptoms.  Post op Disturbances  - Bowel habits - constipation is extremely common and is caused by a combination of anesthesia, lack of mobility and pain medicine.  Use stool softeners or laxatives if necessary. It is important not to ignore this problem, as bowel obstructions can be a serious complication after joint replacement surgery.  - Mood/Energy Level - Many patients experience a lack of energy and endurance for up to 2-3 months after surgery.  Some may also feel down and can even become depressed.  This is likely due to the postoperative anemia, change in activity level, lack of sleep, pain medicine and just the emotional reaction to the surgery itself that is a big disruption in a person’s life.  This usually passes.  If symptoms persist, follow up with your primary physician.  - Returning to work - Your surgeon will give you more specific instructions.  Generally, if you work a sedentary job requiring little standing or walking, most patients may return within 2-6 weeks.  Manual labor jobs involving walking, lifting and standing may take 3-4 months.  Your surgeon’s office can provide a release to part-time or light duty work early on in your recovery and progress you to full duty as able.  - Driving - You can begin driving an automatic shift car in 4 to 8 weeks, provided you are no longer taking narcotic pain medication. If you have a stick-shift car and your right hip was replaced, do not begin driving until your doctor says you can.   - Avoiding falls -  throw rugs and tack down loose carpeting.  Be aware of floor hazards  such as pets, small objects or uneven surfaces.   -  Airport Metal Detectors - The sensitivity of metal detectors varies and it is likely that your prosthesis will cause an alarm. Inform the  that you have an artificial joint.  Diet  - Resume your normal diet as tolerated.  - It is important to achieve a healthy nutritional status by eating a well balanced diet on a regular basis.  - Your physician may recommend that you take iron and vitamin supplements.   - Continue to drink plenty of fluids.  Shower/Bathing  - You may shower as soon as you get home from the hospital unless otherwise instructed.  - Keep your incision out of water.  To keep the incision dry when showering, cover it with a plastic bag or plastic wrap.  - Pat incision dry if it gets wet.  Don’t rub.  - Do not submerge in a bath until staples are out and the incision is completely healed. (Approximately 6-8 weeks after surgery).  Dressing Change:  Procedure (if recommended by your physician)  - Wash hands.  - Open all dressing change materials.  - Remove old dressing and discard.  - Inspect incision for redness, increase in clear drainage, yellow/green drainage, odor and surrounding skin hot to touch.  -  ABD (large gauze) pad by one corner and lay over the incision.  Be careful not to touch the inside of the dressing that will lay over the incision.  - Secure in place as instructed (Ace wrap or tape).    Swelling/Bruising  - Swelling is normal after hip replacement and can involve the thigh, knee, calf and foot.  - Swelling can last from 3-6 months.  - Elevate your leg higher than your heart while reclining.  The first week you are home you should elevate your leg an equal amount of time, as you are active.    - Anti-inflammatory pills can be taken once you have stopped the blood thinners.  - The swelling is usually worse after you go home since you are upright for longer periods of time.  - Bruising is common and can involve  the entire leg including the thigh, calf and even foot.  Bruising often does not appear until after you arrive home and it can be quite dramatic- purple, black, green.  The bruising you can see is not usually concerning and will subside without any treatment.      Blood Clot Prevention  Blood clots in the legs and the less common, but frightening, clots that travel to the lungs are a real focus of our preventative. Most patients are at standard risk for them, but those patients who are at higher risk include people who have had previous clots, a family history of clotting, smoking, diabetes, obesity, advanced age, use of estrogen and a sedentary lifestyle.    - Signs of blood clots in legs - Swelling in thigh, calf or ankle that does not go down with elevation.  Pain, heat and tenderness in calf, back of calf or groin area.  NOTE: blood clots can occur in either leg.  - You have been receiving anticoagulant therapy (blood thinners) in the hospital and you may be instructed to continue at home depending on your risk factors.  - Your risk for developing a clot continues for up to 2-3 months after surgery.  You should avoid prolonged sitting and dehydration during that time (long air trips and car trips).  If you do take a trip during this time, please get up and move around every 1- 1.5 hours.  - If you are prescribed blood thinning medication for home, follow instructions as directed. (Handouts provided if applicable).      Activity    Once you get home, you should stay active. The key is not to overdo it! While you can expect some good days and some bad days, you should notice a gradual improvement over time you should notice a gradual improvement and a gradual increase in your endurance over the next 6 to 12 months.    - Weight Bearing - If you have undergone cemented or hybrid hip replacement, you can put some weight on the leg immediately using a cane or walker, and you should continue to use some support for 4  to 6 weeks to help the muscles recover.   - Sleeping Positions - Sleep on your back with your legs slightly apart or on your side with a regular pillow between your knees. Be sure to use the pillow for at least 6 weeks, or until your doctor says you can do without it. Sleeping on your stomach should be all right  - Sitting - For at least the first 3 months, sit only in chairs that have arms. Do not sit on low chairs, low stools, or reclining chairs. Do not cross your legs at the knees. The physical therapist will show you how to sit and stand from a chair, keeping your affected leg out in front of you. Get up and move around on a regular basis--at least once every hour.  - Walking - Walk as much as you like once your doctor gives you the go-ahead, but remember that walking is no substitute for your prescribed exercises. Walking with a pair of trekking poles is helpful and adds as much as 40% to the exercise you get when you walk  - Therapy may be needed in some cases, to strengthen your muscles and improve your gait (walking pattern).  This decision will be made at your post-operative appointment.  Follow your therapist recommended post-operative exercises (handout provided by Therapist).  - Swimming is also recommended; you can begin as soon as the sutures have been removed and the wound is healed, approximately 6 to 8 weeks after surgery. Using a pair of training fins may make swimming a more enjoyable and effective exercise.  - Other activities - Lower impact activities are preferred.  If you have specific questions, consult your Surgeon.    - Sexual activity - Your surgeon can tell you when it should be safe to resume sexual activity.      When to Call the Doctor   Call the physician if:   - Fever over 100.5? F  - Increased pain, drainage, redness, odor or heat around the incision area  - Shaking chills  - Increased knee pain with activity and rest  - Increased pain in calf, tenderness or redness above or below  the knee  - Increased swelling of calf, ankle, foot  - Sudden increased shortness of breath, sudden onset of chest pain, localized chest pain with coughing  - Incision opening  Or, if there are any questions or concerns about medications or care.       -Is this patient being discharged with medication to prevent blood clots?  Yes, Aspirin Aspirin, ASA oral tablets  What is this medicine?  ASPIRIN (AS pir in) is a pain reliever. It is used to treat mild pain and fever. This medicine is also used as directed by a doctor to prevent and to treat heart attacks, to prevent strokes, and to treat arthritis or inflammation.  This medicine may be used for other purposes; ask your health care provider or pharmacist if you have questions.  COMMON BRAND NAME(S): Aspir-Low, Aspir-Amber, Aspirtab, Gilbert Advanced Aspirin, Gilbert Aspirin, AquarisPLUS Int Aspirin Extra Strength, Gilbert Aspirin Plus, Gilbert Extra Strength, Gilbert Extra Strength Plus, Gilbert Genuine Aspirin, AquarisPLUS Int Womens Aspirin, Bufferin, Bufferin Extra Strength, Bufferin Low Dose  What should I tell my health care provider before I take this medicine?  They need to know if you have any of these conditions:  -anemia  -asthma  -bleeding problems  -child with chickenpox, the flu, or other viral infection  -diabetes  -gout  -if you frequently drink alcohol containing drinks  -kidney disease  -liver disease  -low level of vitamin K  -lupus  -smoke tobacco  -stomach ulcers or other problems  -an unusual or allergic reaction to aspirin, tartrazine dye, other medicines, dyes, or preservatives  -pregnant or trying to get pregnant  -breast-feeding  How should I use this medicine?  Take this medicine by mouth with a glass of water. Follow the directions on the package or prescription label. You can take this medicine with or without food. If it upsets your stomach, take it with food. Do not take your medicine more often than directed.  Talk to your pediatrician regarding the use of this  medicine in children. While this drug may be prescribed for children as young as 12 years of age for selected conditions, precautions do apply. Children and teenagers should not use this medicine to treat chicken pox or flu symptoms unless directed by a doctor.  Patients over 65 years old may have a stronger reaction and need a smaller dose.  Overdosage: If you think you have taken too much of this medicine contact a poison control center or emergency room at once.  NOTE: This medicine is only for you. Do not share this medicine with others.  What if I miss a dose?  If you are taking this medicine on a regular schedule and miss a dose, take it as soon as you can. If it is almost time for your next dose, take only that dose. Do not take double or extra doses.  What may interact with this medicine?  Do not take this medicine with any of the following medications:  -cidofovir  -ketorolac  -probenecid  This medicine may also interact with the following medications:  -alcohol  -alendronate  -bismuth subsalicylate  -flavocoxid  -herbal supplements like feverfew, garlic, sadiq, ginkgo biloba, horse chestnut  -medicines for diabetes or glaucoma like acetazolamide, methazolamide  -medicines for gout  -medicines that treat or prevent blood clots like enoxaparin, heparin, ticlopidine, warfarin  -other aspirin and aspirin-like medicines  -NSAIDs, medicines for pain and inflammation, like ibuprofen or naproxen  -pemetrexed  -sulfinpyrazone  -varicella live vaccine  This list may not describe all possible interactions. Give your health care provider a list of all the medicines, herbs, non-prescription drugs, or dietary supplements you use. Also tell them if you smoke, drink alcohol, or use illegal drugs. Some items may interact with your medicine.  What should I watch for while using this medicine?  If you are treating yourself for pain, tell your doctor or health care professional if the pain lasts more than 10 days, if it gets  worse, or if there is a new or different kind of pain. Tell your doctor if you see redness or swelling. Also, check with your doctor if you have a fever that lasts for more than 3 days. Only take this medicine to prevent heart attacks or blood clotting if prescribed by your doctor or health care professional.  Do not take aspirin or aspirin-like medicines with this medicine. Too much aspirin can be dangerous. Always read the labels carefully.  This medicine can irritate your stomach or cause bleeding problems. Do not smoke cigarettes or drink alcohol while taking this medicine. Do not lie down for 30 minutes after taking this medicine to prevent irritation to your throat.  If you are scheduled for any medical or dental procedure, tell your healthcare provider that you are taking this medicine. You may need to stop taking this medicine before the procedure.  This medicine may be used to treat migraines. If you take migraine medicines for 10 or more days a month, your migraines may get worse. Keep a diary of headache days and medicine use. Contact your healthcare professional if your migraine attacks occur more frequently.  What side effects may I notice from receiving this medicine?  Side effects that you should report to your doctor or health care professional as soon as possible:  -allergic reactions like skin rash, itching or hives, swelling of the face, lips, or tongue  -breathing problems  -changes in hearing, ringing in the ears  -confusion  -general ill feeling or flu-like symptoms  -pain on swallowing  -redness, blistering, peeling or loosening of the skin, including inside the mouth or nose  -signs and symptoms of bleeding such as bloody or black, tarry stools; red or dark-brown urine; spitting up blood or brown material that looks like coffee grounds; red spots on the skin; unusual bruising or bleeding from the eye, gums, or nose  -trouble passing urine or change in the amount of urine  -unusually weak or  tired  -yellowing of the eyes or skin  Side effects that usually do not require medical attention (report to your doctor or health care professional if they continue or are bothersome):  -diarrhea or constipation  -headache  -nausea, vomiting  -stomach gas, heartburn  This list may not describe all possible side effects. Call your doctor for medical advice about side effects. You may report side effects to FDA at 3-674-ENO-8331.  Where should I keep my medicine?  Keep out of the reach of children.  Store at room temperature between 15 and 30 degrees C (59 and 86 degrees F). Protect from heat and moisture. Do not use this medicine if it has a strong vinegar smell. Throw away any unused medicine after the expiration date.  NOTE: This sheet is a summary. It may not cover all possible information. If you have questions about this medicine, talk to your doctor, pharmacist, or health care provider.  © 2018 [employer]/Gold Standard (2014-08-19 11:30:31)      · Is patient discharged on Warfarin / Coumadin?   No       Total Hip Replacement  Total hip replacement is a surgery to replace your damaged hip joint. Your hip joint is replaced with a man-made (artificial) hip joint. This man-made hip joint is called a prosthesis. This surgery is done to lessen pain and improve movement.  What happens before the procedure?  · Do not eat or drink anything after midnight on the night before the procedure or as told by your doctor.  · Ask your doctor about:  ¨ Changing or stopping your normal medicines. This is important if you take diabetes medicines or blood thinners.  ¨ Taking aspirin or ibuprofen medicines. These thin your blood. Do not take these medicines if your doctor tells you not to.  · Plan to have someone take you home after the procedure.  · Ask your health care team how your surgery site will be marked.  · You may be given medicines that kill germs (antibiotics) to help prevent infection.  What happens during the  procedure?  · To help prevent infection:  ¨ Your health care team will wash or sanitize their hands.  ¨ Your skin will be washed with soap.  · An IV tube will be put into one of your veins.  · You will be given one or more of the following:  ¨ A medicine that makes you relaxed (sedative).  ¨ A medicine that makes you fall asleep (general anesthetic).  ¨ A medicine that numbs your body below the waist (spinal anesthetic).  · A cut (incision) will be made in your hip. Your surgeon will take out any damaged parts of your hip joint.  · Your surgeon will then:  ¨ Put a man-made hip joint into your pelvic bone. Screws may be used to keep the hip joint in place.  ¨ Take out the damaged ball of your thigh bone (femur). A man-made ball on a metal pole will replace the damaged ball.  ¨ The ball will be put into the new socket to make a new hip joint. Your hip joint will be checked to see if it moves as it should.  ¨ Close the cut and place a bandage over it.  What happens after the procedure?  · You will stay in a recovery area until your medicines wear off.  · Your nurse will monitor your vital signs. These include:  ¨ Your pulse.  ¨ Your blood pressure.  · Once you are doing okay, you will be taken to your hospital room.  · You may be told to take actions to help prevent blood clots. These may include:  ¨ Walking soon after surgery with someone helping you. Moving around helps to improve blood flow.  ¨ Taking medicines to thin your blood (anticoagulants).  ¨ Wearing special socks (compression stockings) or using other types of devices.  · You will do exercise therapy (physical therapy) until you are doing well. Your doctor will tell you when you are well enough to go home.  This information is not intended to replace advice given to you by your health care provider. Make sure you discuss any questions you have with your health care provider.  Document Released: 03/11/2013 Document Revised: 08/21/2017 Document Reviewed:  02/18/2015  [employer] Interactive Patient Education © 2017 [employer]      Cervical Collar  A cervical collar is a device that supports your chin and the back of your head. It is used after a severe neck injury to protect your head and neck. It does this by restricting the movement of the top part of your spine, which is located in your neck. A cervical collar may be used when you have:  · A fractured neck.  · Ligament damage.  · A spinal cord injury.  WHAT INSTRUCTIONS SHOULD I FOLLOW?  · Wear the collar for as long as your health care provider instructs.  · Follow your health care provider's instructions about how to put on and take off your collar.  · Do not make your collar so tight that you feel pain or it is hard for you to breathe.  · Do not remove the collar unless your health care provider says it is okay. Ask your health care provider if you can remove the collar for showering or eating or to apply ice.  · Do not drive a car until your health care provider says it is okay.  · Keep all follow-up visits as directed by your health care provider. This is important. Any delay in getting necessary care can keep your injury from healing properly.  · Apply ice to the injured area:  ¨ Put ice in a plastic bag.  ¨ Place a towel between your skin and the bag.  ¨ Leave the ice on for 20 minutes, 2-3 times per day for the first 2 days.     This information is not intended to replace advice given to you by your health care provider. Make sure you discuss any questions you have with your health care provider.     Document Released: 09/09/2005 Document Revised: 01/08/2016 Document Reviewed: 07/27/2015  [employer] Interactive Patient Education ©2016 [employer]        Depression / Suicide Risk    As you are discharged from this [facility] facility, it is important to learn how to keep safe from harming yourself.    Recognize the warning signs:  · Abrupt changes in personality, positive or negative- including increase  in energy   · Giving away possessions  · Change in eating patterns- significant weight changes-  positive or negative  · Change in sleeping patterns- unable to sleep or sleeping all the time   · Unwillingness or inability to communicate  · Depression  · Unusual sadness, discouragement and loneliness  · Talk of wanting to die  · Neglect of personal appearance   · Rebelliousness- reckless behavior  · Withdrawal from people/activities they love  · Confusion- inability to concentrate     If you or a loved one observes any of these behaviors or has concerns about self-harm, here's what you can do:  · Talk about it- your feelings and reasons for harming yourself  · Remove any means that you might use to hurt yourself (examples: pills, rope, extension cords, firearm)  · Get professional help from the community (Mental Health, Substance Abuse, psychological counseling)  · Do not be alone:Call your Safe Contact- someone whom you trust who will be there for you.  · Call your local CRISIS HOTLINE 825-1647 or 730-804-2686  · Call your local Children's Mobile Crisis Response Team [place] (073) 873-1953 or www.[employer]tree  · Call the toll free National Suicide Prevention Hotlines   · National Suicide Prevention Lifeline 788-799-LWPB (7711)  · National Hope Line Network 800-SUICIDE (956-1339)

## 2019-10-11 NOTE — PROCEDURES
Vascular Access Team     Date of Insertion: 10/11/19  Arm Circumference: 25.5  Internal length: 37  External Length: hub  Vein Occupancy %: 41  Reason for PICC: antibiotics  Labs: WBC 9.1, , BUN 12, Cr 0.50, GFR >60, INR none    Consents confirmed, vessel patency confirmed with ultrasound. Risks and benefits of procedure explained to patient and education regarding central line associated bloodstream infections provided. Questions answered.     PICC placed in RUE per licensed provider order with ultrasound guidance.  4 Fr, 01 lumen PICC placed in brachial vein after 01 attempt(s). 2 mL of 1% lidocaine injected intradermally, 21 gauge microintroducer needle and modified Seldinger technique used. 37 cm catheter inserted with good blood return. Secured at hub cm marker. Each lumen flushed without resistance with 10 mL 0.9% normal saline. PICC line secured with Biopatch and Tegaderm.     PICC tip placement location is confirmed by nurse to be in the Superior Vena Cava (SVC) utilizing 3CG technology. PICC line is appropriate for use at this time. Patient tolerated procedure well, without complications.  Patient condition relayed to unit RN or ordering physician via this post procedure note in the EMR.     Ultrasound images uploaded to PACS and viewable in the EMR - yes  Ultrasound imaged printed and placed in paper chart - no     BARD Power PICC ref # [id number], Lot # [id number]

## 2019-10-12 LAB
BACTERIA TISS AEROBE CULT: NORMAL
BACTERIA TISS AEROBE CULT: NORMAL
GRAM STN SPEC: NORMAL
GRAM STN SPEC: NORMAL
SIGNIFICANT IND 70042: NORMAL
SIGNIFICANT IND 70042: NORMAL
SITE SITE: NORMAL
SITE SITE: NORMAL
SOURCE SOURCE: NORMAL
SOURCE SOURCE: NORMAL

## 2019-10-14 LAB
BACTERIA SPEC ANAEROBE CULT: NORMAL
BACTERIA TISS AEROBE CULT: ABNORMAL
GRAM STN SPEC: ABNORMAL
GRAM STN SPEC: ABNORMAL
SIGNIFICANT IND 70042: ABNORMAL
SIGNIFICANT IND 70042: ABNORMAL
SIGNIFICANT IND 70042: NORMAL
SITE SITE: ABNORMAL
SITE SITE: ABNORMAL
SITE SITE: NORMAL
SOURCE SOURCE: ABNORMAL
SOURCE SOURCE: ABNORMAL
SOURCE SOURCE: NORMAL

## 2019-10-22 ENCOUNTER — PATIENT OUTREACH (OUTPATIENT)
Dept: HEALTH INFORMATION MANAGEMENT | Facility: OTHER | Age: 74
End: 2019-10-22

## 2019-10-29 ENCOUNTER — OFFICE VISIT (OUTPATIENT)
Dept: INFECTIOUS DISEASES | Facility: MEDICAL CENTER | Age: 74
End: 2019-10-29
Payer: MEDICARE

## 2019-10-29 VITALS
BODY MASS INDEX: 21.61 KG/M2 | HEIGHT: 67 IN | DIASTOLIC BLOOD PRESSURE: 62 MMHG | SYSTOLIC BLOOD PRESSURE: 120 MMHG | TEMPERATURE: 97.8 F | HEART RATE: 68 BPM | OXYGEN SATURATION: 95 %

## 2019-10-29 DIAGNOSIS — T84.52XD INFECTION ASSOCIATED WITH INTERNAL LEFT HIP PROSTHESIS, SUBSEQUENT ENCOUNTER: Primary | ICD-10-CM

## 2019-10-29 DIAGNOSIS — A49.01 MSSA (METHICILLIN SUSCEPTIBLE STAPHYLOCOCCUS AUREUS) INFECTION: ICD-10-CM

## 2019-10-29 DIAGNOSIS — L97.921 CHRONIC ULCER OF LEFT LEG, LIMITED TO BREAKDOWN OF SKIN (HCC): ICD-10-CM

## 2019-10-29 PROCEDURE — 99214 OFFICE O/P EST MOD 30 MIN: CPT | Performed by: INTERNAL MEDICINE

## 2019-10-29 ASSESSMENT — ENCOUNTER SYMPTOMS
MYALGIAS: 0
FEVER: 0
DIARRHEA: 0
NAUSEA: 0
SHORTNESS OF BREATH: 0
CHILLS: 0
VOMITING: 0
ABDOMINAL PAIN: 0

## 2019-10-29 NOTE — PROGRESS NOTES
Infectious Disease Follow up Note      Subjective:     Chief Complaint   Patient presents with   • Hospital Follow-up     Left Hip Infection       Interval History:  74-year-old woman well known to the ID service with a history of peripheral vascular disease with chronic lower extremity ulcerations, bullous pemphigus on steroids, history of a left intertrochanteric fracture and a history of left total hip arthroplasty status post hardware removal by Dr. Vazquez on 2/11/2018, and recent fall in September resulting in a C2 fracture admitted in early October for drainage from her left hip wound.  Prior cultures have grown MSSA and MRSA.  She is status post revision of a left total hip arthroplasty with exchange of head, liner and proximal stem body by Dr. Vazquez on 10/9/2019.  There was a sinus tract all the way down to the hip with significant purulence noted intraoperatively.  Operative cultures grew a single colony of diphtheroids and MSSA.  Patient was transferred to a skilled nursing facility on vancomycin and rifampin for a planned 6-week course from surgery.  Planned stop date 11/20/2019.    Hospital records reviewed    Patient here for hospital follow-up.  Patient is doing well and tolerating IV antibiotics.  She denies any recent fevers, chills, nausea, vomiting, abdominal pain or diarrhea.  Her surgical site is clean.  She denies any left hip pain.  She is working with physical therapy at the skilled nursing facility.  She also notes significant improvement in her neck pain.  When she is cleared by physical therapy to go home, she would like to do either home IV antibiotics or go to the infusion center.    Review of Systems   Constitutional: Negative for chills and fever.   Respiratory: Negative for shortness of breath.    Gastrointestinal: Negative for abdominal pain, diarrhea, nausea and vomiting.   Musculoskeletal: Negative for joint pain and myalgias.       Past Medical History:   Diagnosis Date   • Anxiety     • Cellulitis and abscess of lower extremity    • Dental disorder     full dentures   • Generalized osteoarthritis of multiple sites 10/20/2015   • Heart valve disease     pt not sure    • Hyperlipidemia    • Hypertension    • Osteoporosis    Peripheral vascular disease  Bullous pemphigus on steroids  Chronic lower extremity ulcerations    Past Surgical History:   Procedure Laterality Date   • HIP REVISION TOTAL Left 10/9/2019    Procedure: REVISION, TOTAL ARTHROPLASTY, HIP;  Surgeon: Chong Vazquez M.D.;  Location: SURGERY [facility];  Service: Orthopedics   • FEMORAL POPLITEAL BYPASS Left 6/8/2018    Procedure: FEMORAL POPLITEAL BYPASS;  Surgeon: Milana Colin M.D.;  Location: SURGERY [facility];  Service: General   • IRRIGATION & DEBRIDEMENT GENERAL Left 6/8/2018    Procedure: IRRIGATION & DEBRIDEMENT ANKLE WOUND;  Surgeon: Milana Colin M.D.;  Location: SURGERY [facility];  Service: General   • IRRIGATION & DEBRIDEMENT HIP Left 6/4/2018    Procedure: IRRIGATION & DEBRIDEMENT HIP;  Surgeon: Chong Vazquez M.D.;  Location: SURGERY [facility];  Service: Orthopedics   • HIP REVISION TOTAL Left 2/11/2018    Procedure: HIP REVISION TOTAL- femoral nail removal conversion to total hip arthoroplasty;  Surgeon: Chong Vazquez M.D.;  Location: SURGERY [facility];  Service: Orthopedics   • HIP NAILING INTRAMEDULLARY Left 12/20/2017    Procedure: HIP NAILING INTRAMEDULLARY;  Surgeon: Jorge Peters M.D.;  Location: SURGERY [facility];  Service: Orthopedics   • BREAST BIOPSY  8/28/2014    Performed by Magdalena Londono M.D. at [facility]   • BREAST BIOPSY Right 8/14    benign   • GYN SURGERY  1973    complete hysterectomy   • ABDOMINAL HYSTERECTOMY TOTAL      w/BSO due to uterine cyst and endometriosis   • ATHROPLASTY      hip       Allergies:   Allergies   Allergen Reactions   • Bactrim [Sulfamethoxazole-Trimethoprim] Rash     Diffuse pruritic skin rash with blisters (no  "mucous membrane involvement) (was also taking cipro at the time - reaction thought more likely to be 2/2 Bactrim)   • Meropenem Unspecified     Pt can not remember reaction     • Oxycodone      \"I got bad hallucinations & slurred speech\"         Medications:  Current Outpatient Medications on File Prior to Visit   Medication Sig Dispense Refill   • NS SOLN 250 mL with vancomycin 5 g SOLR 1,300 mg 1,300 mg by Intravenous route every 24 hours.     • riFAMPin (RIFADINE) 300 MG Cap Take 2 Caps by mouth every day for 42 days. 84 Cap 0   • Calcium Carbonate 600 MG Tab Take  by mouth 2 Times a Day.     • cyanocobalamin (VITAMIN B-12) 500 MCG Tab Take 500 mcg by mouth every day.     • bisacodyl (DULCOLAX) 10 MG Suppos Insert 10 mg in rectum every day.     • furosemide (LASIX) 40 MG Tab Take 40 mg by mouth every day.     • potassium chloride SA (KDUR) 20 MEQ Tab CR Take 20 mEq by mouth every day.     • Cholecalciferol 2000 UNIT Cap Take 2,000 Units by mouth every day.     • metoprolol (LOPRESSOR) 100 MG Tab Take 75 mg by mouth 2 times a day. Indications: High Blood Pressure Disorder     • acetaminophen (TYLENOL) 325 MG Tab Take 2 Tabs by mouth every 6 hours as needed (Mild Pain; (Pain scale 1-3); Temp greater than 100.5 F). (Patient taking differently: Take 650 mg by mouth every four hours as needed (Mild Pain; (Pain scale 1-3); Temp greater than 100.5 F).) 30 Tab 0   • amLODIPine (NORVASC) 10 MG Tab Take 1 Tab by mouth every day. 30 Tab    • ferrous sulfate 325 (65 Fe) MG tablet Take 1 Tab by mouth every morning with breakfast. 30 Tab    • folic acid (FOLVITE) 1 MG Tab Take 1 Tab by mouth every day. 30 Tab    • hydrALAZINE (APRESOLINE) 50 MG Tab Take 1 Tab by mouth 2 Times a Day. 90 Tab    • lactobacillus rhamnosus (CULTURELLE) Cap capsule Take 1 Cap by mouth every morning with breakfast. 30 Cap    • magnesium hydroxide (MILK OF MAGNESIA) 400 MG/5ML Suspension Take 30 mL by mouth 1 time daily as needed (prn constipation). 1 " "Bottle    • methocarbamol (ROBAXIN) 500 MG Tab Take 1 Tab by mouth every 8 hours as needed (mild pain , spasms). 120 Tab    • polyethylene glycol/lytes (MIRALAX) Pack Take 1 Packet by mouth 1 time daily as needed.  3   • thiamine (THIAMINE) 100 MG tablet Take 1 Tab by mouth every day. 30 Tab    • sertraline (ZOLOFT) 100 MG Tab Take 1 Tab by mouth every day. 30 Tab 11   • aspirin (ASA) 81 MG Chew Tab chewable tablet Take 1 Tab by mouth every day. 100 Tab    • ascorbic acid (ASCORBIC ACID) 500 MG Tab Take 500 mg by mouth every day.     • metoprolol SR (TOPROL XL) 100 MG TABLET SR 24 HR Take 100 mg by mouth every day.     • niacinamide 500 MG tablet Take 500 mg by mouth 2 times a day.     • Multiple Vitamins-Minerals (THERAPEUTIC-M/LUTEIN) Tab Take 1 Tab by mouth every day.  0     No current facility-administered medications on file prior to visit.          ROS  As documented above in my HPI       Objective:     PE:  /62 (BP Location: Left arm, Patient Position: Sitting, BP Cuff Size: Adult)   Pulse 68   Temp 36.6 °C (97.8 °F) (Temporal)   Ht 1.702 m (5' 7\")   SpO2 95%   Breastfeeding? No   BMI 21.61 kg/m²      Vital signs reviewed  Constitutional: patient is oriented to person, place, and time. Appears well-developed and well-nourished. No distress.  Arrives in wheelchair  Eyes: Conjunctivae normal and EOM are normal. Pupils are equal, round, and reactive to light.   Mouth/Throat: Lips without lesions, good dentition, oropharynx is clear and moist.  Edentulous  Neck: Trachea midline.  Cervical collar in place.  Neck supple. No masses  Cardiovascular: Normal rate, intact distal pulses. +murmur, but no gallop, or friction rub. No edema.  Pulmonary/Chest: No respiratory distress. Unlabored respiratory effort, lungs clear to auscultation. No wheezes or rales.   Abdominal: Soft, non tender. BS + x 4. No masses or hepatosplenomegaly.   Musculoskeletal: Normal range of motion. No tenderness, swelling, erythema, " deformity noted.  Right upper extremity PICC line-nontender, no surrounding erythema.  Left hip surgical site healing well, there is no evidence of wound dehiscence, surrounding erythema edema or drainage.  Area is nontender to palpation.  Left lower extremity chronic superficial ulcerations are clean without any surrounding erythema.  Posterior ulceration is superficial and has serous drainage.  Neurological: alert and oriented to person, place, and time. No cranial nerve deficit. Coordination normal. Moves all extremities  Skin: Skin is warm and dry. Good turgor. No rashes visable.  Psychiatric: Normal mood and affect. Behavior is normal.  Very pleasant    LABS:  WBC   Date/Time Value Ref Range Status   10/09/2019 12:55 AM 9.1 4.8 - 10.8 K/uL Final     RBC   Date/Time Value Ref Range Status   10/09/2019 12:55 AM 3.31 (L) 4.20 - 5.40 M/uL Final     Hemoglobin   Date/Time Value Ref Range Status   10/09/2019 12:55 AM 9.7 (L) 12.0 - 16.0 g/dL Final     Hematocrit   Date/Time Value Ref Range Status   10/09/2019 12:55 AM 30.8 (L) 37.0 - 47.0 % Final     MCV   Date/Time Value Ref Range Status   10/09/2019 12:55 AM 93.1 81.4 - 97.8 fL Final     MCH   Date/Time Value Ref Range Status   10/09/2019 12:55 AM 29.3 27.0 - 33.0 pg Final     MCHC   Date/Time Value Ref Range Status   10/09/2019 12:55 AM 31.5 (L) 33.6 - 35.0 g/dL Final     MPV   Date/Time Value Ref Range Status   10/09/2019 12:55 AM 9.4 9.0 - 12.9 fL Final        Sodium   Date/Time Value Ref Range Status   10/11/2019 11:48  (L) 135 - 145 mmol/L Final     Potassium   Date/Time Value Ref Range Status   10/11/2019 11:48 AM 4.4 3.6 - 5.5 mmol/L Final     Chloride   Date/Time Value Ref Range Status   10/11/2019 11:48 AM 99 96 - 112 mmol/L Final     Co2   Date/Time Value Ref Range Status   10/11/2019 11:48 AM 22 20 - 33 mmol/L Final     Glucose   Date/Time Value Ref Range Status   10/11/2019 11:48  (H) 65 - 99 mg/dL Final     Bun   Date/Time Value Ref Range  Status   10/11/2019 11:48 AM 31 (H) 8 - 22 mg/dL Final     Creatinine   Date/Time Value Ref Range Status   10/11/2019 11:48 AM 0.84 0.50 - 1.40 mg/dL Final     Alkaline Phosphatase   Date/Time Value Ref Range Status   10/08/2019 05:57 AM 86 30 - 99 U/L Final     AST(SGOT)   Date/Time Value Ref Range Status   10/08/2019 05:57 AM 14 12 - 45 U/L Final     ALT(SGPT)   Date/Time Value Ref Range Status   10/08/2019 05:57 AM 9 2 - 50 U/L Final     Total Bilirubin   Date/Time Value Ref Range Status   10/08/2019 05:57 AM 0.3 0.1 - 1.5 mg/dL Final        No results found for: CPKTOTAL     MICRO:  Blood Culture   Date Value Ref Range Status   04/19/2019 No growth after 5 days of incubation.  Final        IMAGING STUDIES:  CT of the left hip on 10/8/2019  Impression       1.  LEFT hip prosthesis in place, normally located.  Associated fluid likely indicating effusion.  No significant associated enhancement however infection is not excluded.  2.  Worsening subcutaneous edema lateral to LEFT hip.  3.  No associated fracture.  4.  Multiple dilated small bowel loops suggesting ileus.       Assessment/Plan:     Problem List Items Addressed This Visit     Chronic ulcers of left leg, limited to breakdown of skin (HCC).  Stable and not atively infected on exam today.  Continue wound care.      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  On treatment.  Status post revision on 10/9/2019 by Dr. Vazquez.  Sinus tract and purulence down to the hip joint per operative note.  Operative cultures grew diphtheroids and MSSA.  Continue IV vancomycin and p.o. rifampin 300 mg twice daily for 6 weeks.  Planned stop date is 11/20/2019.  Continue weekly CBC, BMP and Vanco troughs.  Recent Vanco trough reviewed on 10/22 was 22.7. Once patient is ready for discharge from the skilled nursing facility, she will contact our office as she would like to continue her antibiotic course  either at home or at the infusion center.    MSSA (methicillin susceptible Staphylococcus aureus) infection.  On treatment and plan per above             Follow up: 2 weeks, RTC sooner if needed. FU with PCP for ongoing chronic medical conditions.     Amber Valencia M.D.

## 2019-11-05 LAB
FUNGUS SPEC CULT: NORMAL
SIGNIFICANT IND 70042: NORMAL
SITE SITE: NORMAL
SOURCE SOURCE: NORMAL

## 2019-11-13 ENCOUNTER — OFFICE VISIT (OUTPATIENT)
Dept: INFECTIOUS DISEASES | Facility: MEDICAL CENTER | Age: 74
End: 2019-11-13
Payer: MEDICARE

## 2019-11-13 VITALS
TEMPERATURE: 97.3 F | HEIGHT: 67 IN | OXYGEN SATURATION: 98 % | HEART RATE: 72 BPM | BODY MASS INDEX: 22.13 KG/M2 | DIASTOLIC BLOOD PRESSURE: 78 MMHG | SYSTOLIC BLOOD PRESSURE: 140 MMHG | WEIGHT: 141 LBS

## 2019-11-13 DIAGNOSIS — T84.59XD INFECTION OF PROSTHETIC HIP JOINT, SUBSEQUENT ENCOUNTER: ICD-10-CM

## 2019-11-13 DIAGNOSIS — I73.9 PERIPHERAL VASCULAR DISEASE (HCC): ICD-10-CM

## 2019-11-13 DIAGNOSIS — Z96.649 INFECTION OF PROSTHETIC HIP JOINT, SUBSEQUENT ENCOUNTER: ICD-10-CM

## 2019-11-13 DIAGNOSIS — L97.921 CHRONIC ULCER OF LEFT LEG, LIMITED TO BREAKDOWN OF SKIN (HCC): ICD-10-CM

## 2019-11-13 DIAGNOSIS — Z95.828 S/P FEMORAL-POPLITEAL BYPASS SURGERY: ICD-10-CM

## 2019-11-13 DIAGNOSIS — M21.072 VALGUS DEFORMITY OF FOOT, LEFT: ICD-10-CM

## 2019-11-13 PROCEDURE — 99213 OFFICE O/P EST LOW 20 MIN: CPT | Performed by: INTERNAL MEDICINE

## 2019-11-13 RX ORDER — MIRTAZAPINE 15 MG/1
15 TABLET, FILM COATED ORAL NIGHTLY
COMMUNITY
End: 2020-08-12

## 2019-11-13 ASSESSMENT — ENCOUNTER SYMPTOMS
DIARRHEA: 0
CHILLS: 0
ABDOMINAL PAIN: 0
SHORTNESS OF BREATH: 0
FEVER: 0
NAUSEA: 0
VOMITING: 0
MYALGIAS: 0

## 2019-11-13 NOTE — PROGRESS NOTES
Infectious Disease Follow up Note      Subjective:     Chief Complaint   Patient presents with   • Follow-Up     Non-pressure chronic ulcer of left lower leg with necrosis of muscle        Interval History:  74-year-old woman well known to the ID service with a history of peripheral vascular disease with chronic lower extremity ulcerations, bullous pemphigus on steroids, history of a left intertrochanteric fracture and a history of left total hip arthroplasty status post hardware removal by Dr. Vazquez on 2/11/2018, and recent fall in September resulting in a C2 fracture admitted in early October for drainage from her left hip wound.  Prior cultures have grown MSSA and MRSA.  She is status post revision of a left total hip arthroplasty with exchange of head, liner and proximal stem body by Dr. Vazquez on 10/9/2019.  There was a sinus tract all the way down to the hip with significant purulence noted intraoperatively.  Operative cultures grew a single colony of diphtheroids and MSSA.  Patient was transferred to a skilled nursing facility on vancomycin and rifampin for a planned 6-week course from surgery.  Planned stop date 11/20/2019.     Hospital records reviewed    Seen by Dr. Valencia on 10/29/2019 -patient here for hospital follow-up.  Patient is doing well and tolerating IV antibiotics.  She denies any recent fevers, chills, nausea, vomiting, abdominal pain or diarrhea.  Her surgical site is clean.  She denies any left hip pain.  She is working with physical therapy at the skilled nursing facility.  She also notes significant improvement in her neck pain.  When she is cleared by physical therapy to go home, she would like to do either home IV antibiotics or go to the infusion center.  11/13/2019-patient is currently at [facility].  The sed rate is still 120 last WBC is 6.4.  Complains of some pain in her left hip.  Nothing major.  Feels much better.  No drainage from the hip.  The left leg is draining well less.  The  wounds are healing.  At [facility] they have been diuresing her.  No cough no shortness of breath no nausea vomiting diarrhea.  Review of Systems   Constitutional: Negative for chills and fever.   Respiratory: Negative for shortness of breath.    Gastrointestinal: Negative for abdominal pain, diarrhea, nausea and vomiting.   Musculoskeletal: Negative for joint pain and myalgias.       Past Medical History:   Diagnosis Date   • Anxiety    • Cellulitis and abscess of lower extremity    • Dental disorder     full dentures   • Generalized osteoarthritis of multiple sites 10/20/2015   • Heart valve disease     pt not sure    • Hyperlipidemia    • Hypertension    • Osteoporosis    Peripheral vascular disease  Bullous pemphigus on steroids  Chronic lower extremity ulcerations    Past Surgical History:   Procedure Laterality Date   • HIP REVISION TOTAL Left 10/9/2019    Procedure: REVISION, TOTAL ARTHROPLASTY, HIP;  Surgeon: Chong Vazquez M.D.;  Location: [facility];  Service: Orthopedics   • FEMORAL POPLITEAL BYPASS Left 6/8/2018    Procedure: FEMORAL POPLITEAL BYPASS;  Surgeon: Milana Colin M.D.;  Location: [facility];  Service: General   • IRRIGATION & DEBRIDEMENT GENERAL Left 6/8/2018    Procedure: IRRIGATION & DEBRIDEMENT ANKLE WOUND;  Surgeon: Milana Colin M.D.;  Location: [facility];  Service: General   • IRRIGATION & DEBRIDEMENT HIP Left 6/4/2018    Procedure: IRRIGATION & DEBRIDEMENT HIP;  Surgeon: Chong Vazquez M.D.;  Location: [facility];  Service: Orthopedics   • HIP REVISION TOTAL Left 2/11/2018    Procedure: HIP REVISION TOTAL- femoral nail removal conversion to total hip arthoroplasty;  Surgeon: Chong Vazquez M.D.;  Location: [facility];  Service: Orthopedics   • HIP NAILING INTRAMEDULLARY Left 12/20/2017    Procedure: HIP NAILING INTRAMEDULLARY;  Surgeon: Jorge Peters M.D.;  Location: [facility] [place] [facility];  Service: Orthopedics  "  • BREAST BIOPSY  8/28/2014    Performed by Magdalena Londono M.D. at SURGERY [facility] ORS   • BREAST BIOPSY Right 8/14    benign   • GYN SURGERY  1973    complete hysterectomy   • ABDOMINAL HYSTERECTOMY TOTAL      w/BSO due to uterine cyst and endometriosis   • ATHROPLASTY      hip       Allergies:   Allergies   Allergen Reactions   • Bactrim [Sulfamethoxazole-Trimethoprim] Rash     Diffuse pruritic skin rash with blisters (no mucous membrane involvement) (was also taking cipro at the time - reaction thought more likely to be 2/2 Bactrim)   • Meropenem Unspecified     Pt can not remember reaction     • Oxycodone      \"I got bad hallucinations & slurred speech\"         Medications:  Current Outpatient Medications on File Prior to Visit   Medication Sig Dispense Refill   • mirtazapine (REMERON) 15 MG Tab Take 15 mg by mouth every evening.     • NS SOLN 250 mL with vancomycin 5 g SOLR 1,300 mg 1,300 mg by Intravenous route every 24 hours.     • riFAMPin (RIFADINE) 300 MG Cap Take 2 Caps by mouth every day for 42 days. 84 Cap 0   • Calcium Carbonate 600 MG Tab Take  by mouth 2 Times a Day.     • cyanocobalamin (VITAMIN B-12) 500 MCG Tab Take 500 mcg by mouth every day.     • furosemide (LASIX) 40 MG Tab Take 40 mg by mouth every day.     • potassium chloride SA (KDUR) 20 MEQ Tab CR Take 20 mEq by mouth every day.     • Cholecalciferol 2000 UNIT Cap Take 2,000 Units by mouth every day.     • metoprolol (LOPRESSOR) 100 MG Tab Take 75 mg by mouth 2 times a day. Indications: High Blood Pressure Disorder     • acetaminophen (TYLENOL) 325 MG Tab Take 2 Tabs by mouth every 6 hours as needed (Mild Pain; (Pain scale 1-3); Temp greater than 100.5 F). (Patient taking differently: Take 650 mg by mouth every four hours as needed (Mild Pain; (Pain scale 1-3); Temp greater than 100.5 F).) 30 Tab 0   • amLODIPine (NORVASC) 10 MG Tab Take 1 Tab by mouth every day. 30 Tab    • ferrous sulfate 325 (65 Fe) MG tablet Take 1 Tab by mouth " "every morning with breakfast. 30 Tab    • folic acid (FOLVITE) 1 MG Tab Take 1 Tab by mouth every day. 30 Tab    • hydrALAZINE (APRESOLINE) 50 MG Tab Take 1 Tab by mouth 2 Times a Day. 90 Tab    • magnesium hydroxide (MILK OF MAGNESIA) 400 MG/5ML Suspension Take 30 mL by mouth 1 time daily as needed (prn constipation). 1 Bottle    • methocarbamol (ROBAXIN) 500 MG Tab Take 1 Tab by mouth every 8 hours as needed (mild pain , spasms). 120 Tab    • polyethylene glycol/lytes (MIRALAX) Pack Take 1 Packet by mouth 1 time daily as needed.  3   • thiamine (THIAMINE) 100 MG tablet Take 1 Tab by mouth every day. 30 Tab    • sertraline (ZOLOFT) 100 MG Tab Take 1 Tab by mouth every day. 30 Tab 11   • aspirin (ASA) 81 MG Chew Tab chewable tablet Take 1 Tab by mouth every day. 100 Tab    • ascorbic acid (ASCORBIC ACID) 500 MG Tab Take 500 mg by mouth every day.     • Multiple Vitamins-Minerals (THERAPEUTIC-M/LUTEIN) Tab Take 1 Tab by mouth every day.  0   • bisacodyl (DULCOLAX) 10 MG Suppos Insert 10 mg in rectum every day.     • lactobacillus rhamnosus (CULTURELLE) Cap capsule Take 1 Cap by mouth every morning with breakfast. (Patient not taking: Reported on 11/13/2019) 30 Cap    • metoprolol SR (TOPROL XL) 100 MG TABLET SR 24 HR Take 100 mg by mouth every day.     • niacinamide 500 MG tablet Take 500 mg by mouth 2 times a day.       No current facility-administered medications on file prior to visit.          ROS  As documented above in my HPI       Objective:     PE:  /78 (BP Location: Left arm, Patient Position: Sitting, BP Cuff Size: Adult)   Pulse 72   Temp 36.3 °C (97.3 °F) (Temporal)   Ht 1.702 m (5' 7\")   Wt 64 kg (141 lb)   SpO2 98%   Breastfeeding? No   BMI 22.08 kg/m²      Vital signs reviewed  Constitutional: patient is oriented to person, place, and time. Appears well-developed and well-nourished. No distress.  Arrives in wheelchair  Eyes: Conjunctivae normal and EOM are normal. Pupils are equal, round, " and reactive to light.   Mouth/Throat: Lips without lesions, good dentition, oropharynx is clear and moist.  Edentulous  Neck: Trachea midline.  Cervical collar in place.  Neck supple. No masses  Cardiovascular: Normal rate, intact distal pulses. +murmur, but no gallop, or friction rub. No edema.  Pulmonary/Chest: No respiratory distress. Unlabored respiratory effort, lungs clear to auscultation. No wheezes or rales.   Abdominal: Soft, non tender. BS + x 4. No masses or hepatosplenomegaly.   Musculoskeletal: Normal range of motion. No tenderness, swelling, erythema, deformity noted.  Right upper extremity PICC line-nontender, no surrounding erythema.  Left hip surgical site healing well, there is no evidence of wound dehiscence, surrounding erythema edema or drainage.  Area is nontender to palpation.  Left lower extremity chronic superficial ulcerations are clean without any surrounding erythema.  Posterior ulceration is superficial and has serous drainage.  Neurological: alert and oriented to person, place, and time. No cranial nerve deficit. Coordination normal. Moves all extremities  Skin: Skin is warm and dry. Good turgor. No rashes visable.  Psychiatric: Normal mood and affect. Behavior is normal.  Very pleasant    LABS:  WBC   Date/Time Value Ref Range Status   10/09/2019 12:55 AM 9.1 4.8 - 10.8 K/uL Final     RBC   Date/Time Value Ref Range Status   10/09/2019 12:55 AM 3.31 (L) 4.20 - 5.40 M/uL Final     Hemoglobin   Date/Time Value Ref Range Status   10/09/2019 12:55 AM 9.7 (L) 12.0 - 16.0 g/dL Final     Hematocrit   Date/Time Value Ref Range Status   10/09/2019 12:55 AM 30.8 (L) 37.0 - 47.0 % Final     MCV   Date/Time Value Ref Range Status   10/09/2019 12:55 AM 93.1 81.4 - 97.8 fL Final     MCH   Date/Time Value Ref Range Status   10/09/2019 12:55 AM 29.3 27.0 - 33.0 pg Final     MCHC   Date/Time Value Ref Range Status   10/09/2019 12:55 AM 31.5 (L) 33.6 - 35.0 g/dL Final     MPV   Date/Time Value Ref  Range Status   10/09/2019 12:55 AM 9.4 9.0 - 12.9 fL Final        Sodium   Date/Time Value Ref Range Status   10/11/2019 11:48  (L) 135 - 145 mmol/L Final     Potassium   Date/Time Value Ref Range Status   10/11/2019 11:48 AM 4.4 3.6 - 5.5 mmol/L Final     Chloride   Date/Time Value Ref Range Status   10/11/2019 11:48 AM 99 96 - 112 mmol/L Final     Co2   Date/Time Value Ref Range Status   10/11/2019 11:48 AM 22 20 - 33 mmol/L Final     Glucose   Date/Time Value Ref Range Status   10/11/2019 11:48  (H) 65 - 99 mg/dL Final     Bun   Date/Time Value Ref Range Status   10/11/2019 11:48 AM 31 (H) 8 - 22 mg/dL Final     Creatinine   Date/Time Value Ref Range Status   10/11/2019 11:48 AM 0.84 0.50 - 1.40 mg/dL Final     Alkaline Phosphatase   Date/Time Value Ref Range Status   10/08/2019 05:57 AM 86 30 - 99 U/L Final     AST(SGOT)   Date/Time Value Ref Range Status   10/08/2019 05:57 AM 14 12 - 45 U/L Final     ALT(SGPT)   Date/Time Value Ref Range Status   10/08/2019 05:57 AM 9 2 - 50 U/L Final     Total Bilirubin   Date/Time Value Ref Range Status   10/08/2019 05:57 AM 0.3 0.1 - 1.5 mg/dL Final        No results found for: CPKTOTAL     MICRO:  Blood Culture   Date Value Ref Range Status   04/19/2019 No growth after 5 days of incubation.  Final        IMAGING STUDIES:  CT of the left hip on 10/8/2019  Impression       1.  LEFT hip prosthesis in place, normally located.  Associated fluid likely indicating effusion.  No significant associated enhancement however infection is not excluded.  2.  Worsening subcutaneous edema lateral to LEFT hip.  3.  No associated fracture.  4.  Multiple dilated small bowel loops suggesting ileus.       Assessment/Plan:     Problem List Items Addressed This Visit     Chronic ulcers of left leg, limited to breakdown of skin (HCC).  Stable and not atively infected on exam today.  Continue wound care.      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  On treatment.  Status post revision on 10/9/2019 by Dr. Vazquez.  Sinus tract and purulence down to the hip joint per operative note.  Operative cultures grew diphtheroids and MSSA.  Continue IV vancomycin and p.o. rifampin 300 mg twice daily for 6 weeks.  Planned stop date is 11/20/2019.  Continue weekly CBC, BMP and Vanco troughs.  Recent Vanco trough reviewed on 10/22 was 22.7. Once patient finishes her IV antibiotics she will get p.o. doxycycline.  Aim for at least 2 to 3 months and extension as needed    MSSA (methicillin susceptible Staphylococcus aureus) infection.  On treatment and plan per above             Follow up: 4 weeks, RTC sooner if needed. FU with PCP for ongoing chronic medical conditions.     Sharon Rao M.D.

## 2019-11-14 ENCOUNTER — HOSPITAL ENCOUNTER (OUTPATIENT)
Dept: RADIOLOGY | Facility: MEDICAL CENTER | Age: 74
End: 2019-11-14
Attending: PHYSICIAN ASSISTANT
Payer: MEDICARE

## 2019-11-14 DIAGNOSIS — S12.100S: ICD-10-CM

## 2019-11-14 PROCEDURE — 72125 CT NECK SPINE W/O DYE: CPT

## 2019-11-21 ENCOUNTER — HOME HEALTH ADMISSION (OUTPATIENT)
Dept: HOME HEALTH SERVICES | Facility: HOME HEALTHCARE | Age: 74
End: 2019-11-21
Payer: MEDICARE

## 2019-11-22 ENCOUNTER — PATIENT OUTREACH (OUTPATIENT)
Dept: HEALTH INFORMATION MANAGEMENT | Facility: OTHER | Age: 74
End: 2019-11-22

## 2019-12-02 ENCOUNTER — TELEPHONE (OUTPATIENT)
Dept: INFECTIOUS DISEASES | Facility: MEDICAL CENTER | Age: 74
End: 2019-12-02

## 2019-12-02 DIAGNOSIS — T84.7XXA HARDWARE COMPLICATING WOUND INFECTION, INITIAL ENCOUNTER (HCC): ICD-10-CM

## 2019-12-02 PROBLEM — S22.49XA CLOSED FRACTURE OF MULTIPLE RIBS: Status: RESOLVED | Noted: 2019-09-10 | Resolved: 2019-12-02

## 2019-12-02 RX ORDER — DOXYCYCLINE HYCLATE 100 MG
100 TABLET ORAL 2 TIMES DAILY
Qty: 60 TAB | Refills: 3 | Status: SHIPPED | OUTPATIENT
Start: 2019-12-02 | End: 2020-03-30

## 2019-12-02 NOTE — TELEPHONE ENCOUNTER
Pt called saying she was discharged from [facility] about a week ago and was not sent home with any doxycycline as noted in Dr. Rao's progress note on 11/13/19. Verified pt's pharmacy on file.  -AMP

## 2019-12-11 ENCOUNTER — OFFICE VISIT (OUTPATIENT)
Dept: MEDICAL GROUP | Facility: PHYSICIAN GROUP | Age: 74
End: 2019-12-11
Payer: MEDICARE

## 2019-12-11 VITALS
BODY MASS INDEX: 20.72 KG/M2 | HEART RATE: 68 BPM | RESPIRATION RATE: 14 BRPM | HEIGHT: 67 IN | TEMPERATURE: 98 F | OXYGEN SATURATION: 96 % | SYSTOLIC BLOOD PRESSURE: 126 MMHG | WEIGHT: 132 LBS | DIASTOLIC BLOOD PRESSURE: 62 MMHG

## 2019-12-11 DIAGNOSIS — S12.191A OTHER CLOSED NONDISPLACED FRACTURE OF SECOND CERVICAL VERTEBRA, INITIAL ENCOUNTER (HCC): ICD-10-CM

## 2019-12-11 DIAGNOSIS — D64.9 ANEMIA, UNSPECIFIED TYPE: ICD-10-CM

## 2019-12-11 DIAGNOSIS — Z22.322 MRSA (METHICILLIN RESISTANT STAPH AUREUS) CULTURE POSITIVE: ICD-10-CM

## 2019-12-11 DIAGNOSIS — Z09 HOSPITAL DISCHARGE FOLLOW-UP: ICD-10-CM

## 2019-12-11 PROBLEM — L10.9 BULLOUS PEMPHIGUS: Status: RESOLVED | Noted: 2019-01-23 | Resolved: 2019-12-11

## 2019-12-11 PROCEDURE — 99214 OFFICE O/P EST MOD 30 MIN: CPT | Performed by: NURSE PRACTITIONER

## 2019-12-11 NOTE — ASSESSMENT & PLAN NOTE
MRSA infection in hip with sugsequent hip surgery for the past two months.  Wounds have closed up with the antibiotics/PICC line has been removed.

## 2019-12-11 NOTE — ASSESSMENT & PLAN NOTE
C2 fracture occurred in hospital.  Wore collar for 9 weeks.  Has appointment with spine doctor at [facility].

## 2019-12-11 NOTE — ASSESSMENT & PLAN NOTE
presented to the hospital with c/o purulent left-sided drainage from previous hip fracture repair site. Wound cultures were positive for MRSA. Due to her worsening clinic status, she was admitted for orthopedic surgery evaluation, and I&D. On 10/09/19 patient underwent a revision of the left total hip arthroplasty. She returned to the floor hemodynamically stable for close monitoring and post-operative management. Postoperatively, patient was switched from doxycycline to vancomycin per ID . Due to the need for long term IV antibiotic therapy, a PICC line was place. Once patient was medically cleared by ortho and ID, she was discharged to skilled nursing facility for further management.         Therefore, she is discharged in good and stable condition to skilled nursing facility.    Is followed by infectious disease.  Has an appointment to check back with them.  Has appointment with them 12/16/19, Dr. Rao  Taking po doxycycline at home.     Reports she is eating and drinking well and focusing on protein.

## 2019-12-12 NOTE — PROGRESS NOTES
Chief Complaint   Patient presents with   • Hospital Follow-up     and rehab        Subjective:   Nadege Mae is a 74 y.o. female here today for evaluation and management of:    Hospital discharge follow-up  presented to the hospital with c/o purulent left-sided drainage from previous hip fracture repair site. Wound cultures were positive for MRSA. Due to her worsening clinic status, she was admitted for orthopedic surgery evaluation, and I&D. On 10/09/19 patient underwent a revision of the left total hip arthroplasty. She returned to the floor hemodynamically stable for close monitoring and post-operative management. Postoperatively, patient was switched from doxycycline to vancomycin per ID . Due to the need for long term IV antibiotic therapy, a PICC line was place. Once patient was medically cleared by ortho and ID, she was discharged to skilled nursing facility for further management.         Therefore, she is discharged in good and stable condition to skilled nursing facility.    Is followed by infectious disease.  Has an appointment to check back with them.  Has appointment with them 12/16/19, Dr. Rao  Taking po doxycycline at home.     Reports she is eating and drinking well and focusing on protein.         MRSA (methicillin resistant staph aureus) culture positive  MRSA infection in hip with sugsequent hip surgery for the past two months.  Wounds have closed up with the antibiotics/PICC line has been removed.     C2 cervical fracture (HCC)  C2 fracture occurred in hospital.  Wore collar for 9 weeks.  Has appointment with spine doctor at [facility].          Current medicines (including changes today)  Current Outpatient Medications   Medication Sig Dispense Refill   • mirtazapine (REMERON) 15 MG Tab Take 15 mg by mouth every evening.     • Calcium Carbonate 600 MG Tab Take  by mouth 2 Times a Day.     • cyanocobalamin (VITAMIN B-12) 500 MCG Tab Take 500 mcg by mouth every day.     • bisacodyl  (DULCOLAX) 10 MG Suppos Insert 10 mg in rectum every day.     • furosemide (LASIX) 40 MG Tab Take 40 mg by mouth every day.     • potassium chloride SA (KDUR) 20 MEQ Tab CR Take 20 mEq by mouth every day.     • Cholecalciferol 2000 UNIT Cap Take 2,000 Units by mouth every day.     • metoprolol (LOPRESSOR) 100 MG Tab Take 75 mg by mouth 2 times a day. Indications: High Blood Pressure Disorder     • acetaminophen (TYLENOL) 325 MG Tab Take 2 Tabs by mouth every 6 hours as needed (Mild Pain; (Pain scale 1-3); Temp greater than 100.5 F). (Patient taking differently: Take 650 mg by mouth every four hours as needed (Mild Pain; (Pain scale 1-3); Temp greater than 100.5 F).) 30 Tab 0   • amLODIPine (NORVASC) 10 MG Tab Take 1 Tab by mouth every day. 30 Tab    • ferrous sulfate 325 (65 Fe) MG tablet Take 1 Tab by mouth every morning with breakfast. 30 Tab    • folic acid (FOLVITE) 1 MG Tab Take 1 Tab by mouth every day. 30 Tab    • hydrALAZINE (APRESOLINE) 50 MG Tab Take 1 Tab by mouth 2 Times a Day. 90 Tab    • magnesium hydroxide (MILK OF MAGNESIA) 400 MG/5ML Suspension Take 30 mL by mouth 1 time daily as needed (prn constipation). 1 Bottle    • methocarbamol (ROBAXIN) 500 MG Tab Take 1 Tab by mouth every 8 hours as needed (mild pain , spasms). 120 Tab    • polyethylene glycol/lytes (MIRALAX) Pack Take 1 Packet by mouth 1 time daily as needed.  3   • thiamine (THIAMINE) 100 MG tablet Take 1 Tab by mouth every day. 30 Tab    • sertraline (ZOLOFT) 100 MG Tab Take 1 Tab by mouth every day. 30 Tab 11   • aspirin (ASA) 81 MG Chew Tab chewable tablet Take 1 Tab by mouth every day. 100 Tab    • ascorbic acid (ASCORBIC ACID) 500 MG Tab Take 500 mg by mouth every day.     • metoprolol SR (TOPROL XL) 100 MG TABLET SR 24 HR Take 100 mg by mouth every day.     • niacinamide 500 MG tablet Take 500 mg by mouth 2 times a day.     • Multiple Vitamins-Minerals (THERAPEUTIC-M/LUTEIN) Tab Take 1 Tab by mouth every day.  0   • doxycycline  "(VIBRAMYCIN) 100 MG Tab Take 1 Tab by mouth 2 times a day. 60 Tab 3   • NS SOLN 250 mL with vancomycin 5 g SOLR 1,300 mg 1,300 mg by Intravenous route every 24 hours. (Patient not taking: Reported on 12/11/2019)     • lactobacillus rhamnosus (CULTURELLE) Cap capsule Take 1 Cap by mouth every morning with breakfast. (Patient not taking: Reported on 11/13/2019) 30 Cap      No current facility-administered medications for this visit.      She  has a past medical history of Anxiety, Cellulitis and abscess of lower extremity, Dental disorder, Generalized osteoarthritis of multiple sites (10/20/2015), Heart valve disease, Hyperlipidemia, Hypertension, and Osteoporosis. She also has no past medical history of Allergy, Diabetes, Muscle disorder, or OSTEOPOROSIS.    ROS as stated in hpi  No chest pain, no shortness of breath, no abdominal pain       Objective:     /62 (BP Location: Left arm, Patient Position: Sitting)   Pulse 68   Temp 36.7 °C (98 °F) (Temporal)   Resp 14   Ht 1.702 m (5' 7\")   Wt 59.9 kg (132 lb)   SpO2 96%  Body mass index is 20.67 kg/m². weight is down 8 pounds   Physical Exam:  Constitutional: Alert, no distress.  Skin: Warm, dry, good turgor,no cyanosis, no rashes in visible areas.  Eye: Equal, round and reactive, conjunctiva clear, lids normal.  Ears: No tenderness, no discharge.  External canals are without any significant edema or erythema.  Tympanic membranes are without any inflammation, no effusion.  Gross auditory acuity is intact.  Nose: symmetrical without tenderness, no discharge.  Mouth/Throat: lips without lesion.  Oropharynx clear.  Throat without erythema, exudates or tonsillar enlargement.  Neck: Trachea midline, no masses, no obvious thyroid enlargement.. No cervical or supraclavicular lymphadenopathy. Range of motion within normal limits.  Neuro: Cranial nerves 2-12 grossly intact.  No sensory deficit.  Respiratory: Unlabored respiratory effort, lungs clear to auscultation, " no wheezes, no ronchi.  Cardiovascular: Normal S1, S2, no murmur, no edema.  Abdomen: Soft, non-tender, no masses, no guarding,  no hepatosplenomegaly.  Psych: Alert and oriented x3, normal affect and mood and judgement.        Assessment and Plan:   The following treatment plan was discussed    1. Hospital discharge follow-up  This is a new problem to me.  Follow up to hip infection/repeat surgery and rehab for 2 months with IV antibiotics.  Available records and discharge summaries reviewed.  Patient has ongoing anemia, taking iron.  Will recheck in 6 weeks.  Followed by Infectious disease for her MRSA and repeated infections.  Next appointment is next Tuesday. Patient declines home health and physical therapy at this time.  Patient has upcoming appointment with [facility] due to her C2 fracture in the hospital.  Advised to wear collar if she is having pain.  Patient to continue with daily oral doxycycline.  Return in 6-8 weeks.  monitor    2. MRSA (methicillin resistant staph aureus) culture positive  Chronic, ongoing. Hip abscess with hospitalization, redo surgical prosthesis and rehab with IV antibiotics.  Followed by Dr. Rao, infectious disease.  Daily oral doxy.  Monitor.     3. Other closed nondisplaced fracture of second cervical vertebra, initial encounter ([facility])  This is anew problem to me.  Acute issue that occurred in hospital.  Wore collar for 9 weeks while in rehab.  Has upcoming appointment with [facility] for follow up.  Monitor.     4. Anemia, unspecified type  This is a new problem to me.  Chronic, ongoing.  Taking daily iron.  Discussed iron rich foods.  Repeat CBC in 6 weeks.  Monitor and follow.   - CBC WITH DIFFERENTIAL; Future  - Comp Metabolic Panel; Future      Followup: Return in about 2 months (around 2/11/2020) for follow up anemia.         Educated in proper administration of medication(s) ordered today including safety, possible SE, risks, benefits, rationale and alternatives to  therapy.     Please note that this dictation was created using voice recognition software. I have made every reasonable attempt to correct obvious errors, but I expect that there are errors of grammar and possibly content that I did not discover before finalizing the note.

## 2019-12-16 ENCOUNTER — OFFICE VISIT (OUTPATIENT)
Dept: INFECTIOUS DISEASES | Facility: MEDICAL CENTER | Age: 74
End: 2019-12-16
Payer: MEDICARE

## 2019-12-16 VITALS
DIASTOLIC BLOOD PRESSURE: 70 MMHG | BODY MASS INDEX: 20.72 KG/M2 | HEART RATE: 73 BPM | WEIGHT: 132 LBS | TEMPERATURE: 98.4 F | SYSTOLIC BLOOD PRESSURE: 120 MMHG | OXYGEN SATURATION: 96 % | HEIGHT: 67 IN

## 2019-12-16 DIAGNOSIS — Z78.9 ALCOHOL USE: ICD-10-CM

## 2019-12-16 DIAGNOSIS — S12.191A OTHER CLOSED NONDISPLACED FRACTURE OF SECOND CERVICAL VERTEBRA, INITIAL ENCOUNTER (HCC): ICD-10-CM

## 2019-12-16 DIAGNOSIS — T84.52XS INFECTION ASSOCIATED WITH INTERNAL LEFT HIP PROSTHESIS, SEQUELA: ICD-10-CM

## 2019-12-16 PROCEDURE — 99212 OFFICE O/P EST SF 10 MIN: CPT | Performed by: INTERNAL MEDICINE

## 2019-12-16 ASSESSMENT — ENCOUNTER SYMPTOMS
ABDOMINAL PAIN: 0
SHORTNESS OF BREATH: 0
VOMITING: 0
FEVER: 0
CHILLS: 0
DIARRHEA: 0
NAUSEA: 0
MYALGIAS: 0

## 2019-12-16 NOTE — PROGRESS NOTES
Infectious Disease Follow up Note      Subjective:     Chief Complaint   Patient presents with   • Follow-Up     Infection of prosthetic hip joint, subsequent encounter       Interval History:  74-year-old woman well known to the ID service with a history of peripheral vascular disease with chronic lower extremity ulcerations, bullous pemphigus on steroids, history of a left intertrochanteric fracture and a history of left total hip arthroplasty status post hardware removal by Dr. Vazquez on 2/11/2018, and recent fall in September resulting in a C2 fracture admitted in early October for drainage from her left hip wound.  Prior cultures have grown MSSA and MRSA.  She is status post revision of a left total hip arthroplasty with exchange of head, liner and proximal stem body by Dr. Vazquez on 10/9/2019.  There was a sinus tract all the way down to the hip with significant purulence noted intraoperatively.  Operative cultures grew a single colony of diphtheroids and MSSA.  Patient was transferred to a skilled nursing facility on vancomycin and rifampin for a planned 6-week course from surgery.  Planned stop date 11/20/2019.     Hospital records reviewed    Seen by Dr. Valencia on 10/29/2019 -patient here for hospital follow-up.  Patient is doing well and tolerating IV antibiotics.  She denies any recent fevers, chills, nausea, vomiting, abdominal pain or diarrhea.  Her surgical site is clean.  She denies any left hip pain.  She is working with physical therapy at the skilled nursing facility.  She also notes significant improvement in her neck pain.  When she is cleared by physical therapy to go home, she would like to do either home IV antibiotics or go to the infusion center.  11/13/2019-patient is currently at [facility].  The sed rate is still 120 last WBC is 6.4.  Complains of some pain in her left hip.  Nothing major.  Feels much better.  No drainage from the hip.  The left leg is draining well less.  The wounds are  healing.  At [facility] they have been diuresing her.  No cough no shortness of breath no nausea vomiting diarrhea.  12/16/2019-patient has come back for follow-up.  Is currently at home.  Feels much better.  Denies any fevers or chills.  The cellulitis has almost gone.  No drainage from the hip.  Still get some ulcers on her legs because she retains water.  But she also scratches.  States that she is ambulating without a walker at home now.  Denies any increased hip pain.  Is gaining weight again.  Has given up wine.  Review of Systems   Constitutional: Negative for chills and fever.   Respiratory: Negative for shortness of breath.    Gastrointestinal: Negative for abdominal pain, diarrhea, nausea and vomiting.   Musculoskeletal: Negative for joint pain and myalgias.       Past Medical History:   Diagnosis Date   • Anxiety    • Cellulitis and abscess of lower extremity    • Dental disorder     full dentures   • Generalized osteoarthritis of multiple sites 10/20/2015   • Heart valve disease     pt not sure    • Hyperlipidemia    • Hypertension    • Osteoporosis    Peripheral vascular disease  Bullous pemphigus on steroids  Chronic lower extremity ulcerations    Past Surgical History:   Procedure Laterality Date   • HIP REVISION TOTAL Left 10/9/2019    Procedure: REVISION, TOTAL ARTHROPLASTY, HIP;  Surgeon: Chong Vazquez M.D.;  Location: SURGERY [facility];  Service: Orthopedics   • FEMORAL POPLITEAL BYPASS Left 6/8/2018    Procedure: FEMORAL POPLITEAL BYPASS;  Surgeon: Milana Colin M.D.;  Location: SURGERY [facility];  Service: General   • IRRIGATION & DEBRIDEMENT GENERAL Left 6/8/2018    Procedure: IRRIGATION & DEBRIDEMENT ANKLE WOUND;  Surgeon: Milana Colin M.D.;  Location: SURGERY [facility];  Service: General   • IRRIGATION & DEBRIDEMENT HIP Left 6/4/2018    Procedure: IRRIGATION & DEBRIDEMENT HIP;  Surgeon: Chong Vazquez M.D.;  Location: SURGERY [facility];  Service: Orthopedics   •  "HIP REVISION TOTAL Left 2/11/2018    Procedure: HIP REVISION TOTAL- femoral nail removal conversion to total hip arthoroplasty;  Surgeon: Chong Vazquez M.D.;  Location: SURGERY [facility];  Service: Orthopedics   • HIP NAILING INTRAMEDULLARY Left 12/20/2017    Procedure: HIP NAILING INTRAMEDULLARY;  Surgeon: Jorge Peters M.D.;  Location: SURGERY [facility];  Service: Orthopedics   • BREAST BIOPSY  8/28/2014    Performed by Magdalena Londono M.D. at SURGERY [facility]   • BREAST BIOPSY Right 8/14    benign   • GYN SURGERY  1973    complete hysterectomy   • ABDOMINAL HYSTERECTOMY TOTAL      w/BSO due to uterine cyst and endometriosis   • ATHROPLASTY      hip       Allergies:   Allergies   Allergen Reactions   • Bactrim [Sulfamethoxazole-Trimethoprim] Rash     Diffuse pruritic skin rash with blisters (no mucous membrane involvement) (was also taking cipro at the time - reaction thought more likely to be 2/2 Bactrim)   • Meropenem Unspecified     Pt can not remember reaction     • Oxycodone      \"I got bad hallucinations & slurred speech\"         Medications:  Current Outpatient Medications on File Prior to Visit   Medication Sig Dispense Refill   • doxycycline (VIBRAMYCIN) 100 MG Tab Take 1 Tab by mouth 2 times a day. 60 Tab 3   • mirtazapine (REMERON) 15 MG Tab Take 15 mg by mouth every evening.     • Calcium Carbonate 600 MG Tab Take  by mouth 2 Times a Day.     • cyanocobalamin (VITAMIN B-12) 500 MCG Tab Take 500 mcg by mouth every day.     • bisacodyl (DULCOLAX) 10 MG Suppos Insert 10 mg in rectum every day.     • furosemide (LASIX) 40 MG Tab Take 40 mg by mouth every day.     • potassium chloride SA (KDUR) 20 MEQ Tab CR Take 20 mEq by mouth every day.     • Cholecalciferol 2000 UNIT Cap Take 2,000 Units by mouth every day.     • metoprolol (LOPRESSOR) 100 MG Tab Take 75 mg by mouth 2 times a day. Indications: High Blood Pressure Disorder     • acetaminophen (TYLENOL) 325 MG Tab Take 2 Tabs by " mouth every 6 hours as needed (Mild Pain; (Pain scale 1-3); Temp greater than 100.5 F). (Patient taking differently: Take 650 mg by mouth every four hours as needed (Mild Pain; (Pain scale 1-3); Temp greater than 100.5 F).) 30 Tab 0   • amLODIPine (NORVASC) 10 MG Tab Take 1 Tab by mouth every day. 30 Tab    • ferrous sulfate 325 (65 Fe) MG tablet Take 1 Tab by mouth every morning with breakfast. 30 Tab    • folic acid (FOLVITE) 1 MG Tab Take 1 Tab by mouth every day. 30 Tab    • hydrALAZINE (APRESOLINE) 50 MG Tab Take 1 Tab by mouth 2 Times a Day. 90 Tab    • magnesium hydroxide (MILK OF MAGNESIA) 400 MG/5ML Suspension Take 30 mL by mouth 1 time daily as needed (prn constipation). 1 Bottle    • methocarbamol (ROBAXIN) 500 MG Tab Take 1 Tab by mouth every 8 hours as needed (mild pain , spasms). 120 Tab    • polyethylene glycol/lytes (MIRALAX) Pack Take 1 Packet by mouth 1 time daily as needed.  3   • thiamine (THIAMINE) 100 MG tablet Take 1 Tab by mouth every day. 30 Tab    • sertraline (ZOLOFT) 100 MG Tab Take 1 Tab by mouth every day. 30 Tab 11   • aspirin (ASA) 81 MG Chew Tab chewable tablet Take 1 Tab by mouth every day. 100 Tab    • ascorbic acid (ASCORBIC ACID) 500 MG Tab Take 500 mg by mouth every day.     • niacinamide 500 MG tablet Take 500 mg by mouth 2 times a day.     • Multiple Vitamins-Minerals (THERAPEUTIC-M/LUTEIN) Tab Take 1 Tab by mouth every day.  0   • NS SOLN 250 mL with vancomycin 5 g SOLR 1,300 mg 1,300 mg by Intravenous route every 24 hours. (Patient not taking: Reported on 12/11/2019)     • lactobacillus rhamnosus (CULTURELLE) Cap capsule Take 1 Cap by mouth every morning with breakfast. (Patient not taking: Reported on 11/13/2019) 30 Cap    • metoprolol SR (TOPROL XL) 100 MG TABLET SR 24 HR Take 100 mg by mouth every day.       No current facility-administered medications on file prior to visit.          ROS  As documented above in my HPI       Objective:     PE:  /70 (BP Location:  "Left arm, Patient Position: Sitting, BP Cuff Size: Adult)   Pulse 73   Temp 36.9 °C (98.4 °F) (Temporal)   Ht 1.702 m (5' 7\")   Wt 59.9 kg (132 lb)   SpO2 96%   Breastfeeding? No   BMI 20.67 kg/m²      Vital signs reviewed  Constitutional: patient is oriented to person, place, and time. Appears well-developed and well-nourished. No distress.  Arrives in wheelchair  Eyes: Conjunctivae normal and EOM are normal. Pupils are equal, round, and reactive to light.   Mouth/Throat: Lips without lesions, good dentition, oropharynx is clear and moist.  Edentulous  Neck: Trachea midline.  Cervical collar in place.  Neck supple. No masses  Cardiovascular: Normal rate, intact distal pulses. +murmur, but no gallop, or friction rub. No edema.  Pulmonary/Chest: No respiratory distress. Unlabored respiratory effort, lungs clear to auscultation. No wheezes or rales.   Abdominal: Soft, non tender. BS + x 4. No masses or hepatosplenomegaly.   Musculoskeletal: Normal range of motion. No tenderness, swelling, erythema, deformity noted.  Right upper extremity PICC line-nontender, no surrounding erythema.  Left hip surgical site healing well, there is no evidence of wound dehiscence, surrounding erythema edema or drainage.  Area is nontender to palpation.  Left lower extremity chronic superficial ulcerations are clean without any surrounding erythema.  Posterior ulceration is superficial and has serous drainage.  Neurological: alert and oriented to person, place, and time. No cranial nerve deficit. Coordination normal. Moves all extremities  Skin: Skin is warm and dry. Good turgor. No rashes visable.  Psychiatric: Normal mood and affect. Behavior is normal.  Very pleasant    LABS:  WBC   Date/Time Value Ref Range Status   10/09/2019 12:55 AM 9.1 4.8 - 10.8 K/uL Final     RBC   Date/Time Value Ref Range Status   10/09/2019 12:55 AM 3.31 (L) 4.20 - 5.40 M/uL Final     Hemoglobin   Date/Time Value Ref Range Status   10/09/2019 12:55 AM " 9.7 (L) 12.0 - 16.0 g/dL Final     Hematocrit   Date/Time Value Ref Range Status   10/09/2019 12:55 AM 30.8 (L) 37.0 - 47.0 % Final     MCV   Date/Time Value Ref Range Status   10/09/2019 12:55 AM 93.1 81.4 - 97.8 fL Final     MCH   Date/Time Value Ref Range Status   10/09/2019 12:55 AM 29.3 27.0 - 33.0 pg Final     MCHC   Date/Time Value Ref Range Status   10/09/2019 12:55 AM 31.5 (L) 33.6 - 35.0 g/dL Final     MPV   Date/Time Value Ref Range Status   10/09/2019 12:55 AM 9.4 9.0 - 12.9 fL Final        Sodium   Date/Time Value Ref Range Status   10/11/2019 11:48  (L) 135 - 145 mmol/L Final     Potassium   Date/Time Value Ref Range Status   10/11/2019 11:48 AM 4.4 3.6 - 5.5 mmol/L Final     Chloride   Date/Time Value Ref Range Status   10/11/2019 11:48 AM 99 96 - 112 mmol/L Final     Co2   Date/Time Value Ref Range Status   10/11/2019 11:48 AM 22 20 - 33 mmol/L Final     Glucose   Date/Time Value Ref Range Status   10/11/2019 11:48  (H) 65 - 99 mg/dL Final     Bun   Date/Time Value Ref Range Status   10/11/2019 11:48 AM 31 (H) 8 - 22 mg/dL Final     Creatinine   Date/Time Value Ref Range Status   10/11/2019 11:48 AM 0.84 0.50 - 1.40 mg/dL Final     Alkaline Phosphatase   Date/Time Value Ref Range Status   10/08/2019 05:57 AM 86 30 - 99 U/L Final     AST(SGOT)   Date/Time Value Ref Range Status   10/08/2019 05:57 AM 14 12 - 45 U/L Final     ALT(SGPT)   Date/Time Value Ref Range Status   10/08/2019 05:57 AM 9 2 - 50 U/L Final     Total Bilirubin   Date/Time Value Ref Range Status   10/08/2019 05:57 AM 0.3 0.1 - 1.5 mg/dL Final        No results found for: CPKTOTAL     MICRO:  Blood Culture   Date Value Ref Range Status   04/19/2019 No growth after 5 days of incubation.  Final        IMAGING STUDIES:  CT of the left hip on 10/8/2019  Impression       1.  LEFT hip prosthesis in place, normally located.  Associated fluid likely indicating effusion.  No significant associated enhancement however infection is  not excluded.  2.  Worsening subcutaneous edema lateral to LEFT hip.  3.  No associated fracture.  4.  Multiple dilated small bowel loops suggesting ileus.       Assessment/Plan:          Chronic ulcers of left leg, limited to breakdown of skin (HCC).  Stable and not atively infected on exam today.  Continue wound care.  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  On treatment.  Status post revision on 10/9/2019 by Dr. Vazquez.  Sinus tract and purulence down to the hip joint per operative note.  Operative cultures grew diphtheroids and MSSA.  Continue IV vancomycin and p.o. rifampin 300 mg twice daily for 6 weeks.  stopped  11/20/2019.  Has been on doxycycline since then.  She will likely require lifelong doxycycline.  Patient has been following up with her orthopedics who has reiterated the same thing.    MSSA (methicillin susceptible Staphylococcus aureus) infection.  On treatment and plan per above             Follow up: 1 year, RTC sooner if needed. FU with PCP for ongoing chronic medical conditions.     Sharon Rao M.D.

## 2019-12-20 ENCOUNTER — PATIENT OUTREACH (OUTPATIENT)
Dept: HEALTH INFORMATION MANAGEMENT | Facility: OTHER | Age: 74
End: 2019-12-20

## 2019-12-27 NOTE — PROGRESS NOTES
A 73-year-old female who was admitted to [facility] from 9/2/19 to 9/12/19 to reach a fall at home, resulting in a neck fracture. Patient was transferred to [facility] from 9/12/19 to 10/7/19.Patient readmitted to [facility] once more from 10/7/19 to 10/1119 for a total hip arthroplasy preformed by Dr. Vazquez. Patient was transferred to [facility] from 10/11/2019 to 11/21/2019. [facility] did not visit the patient bedside. The patient was discharged home. The patient was not under clinical case management.     The patient was not sent home with any new medications.    Per discharge orders, patient was instructed to see her PCP, orthopedic surgeon, and neursurgeon for hospital follow-up's. [facility] patient advocate was able to schedule patient to see her PCP on 12/11/19 and this appointment was kept. Patient saw her orthopedic surgeon on 12/5/19. Patient will see her neurosurgeon on 12/30/19. Patient declined home health services.     [facility] patient advocate was able to successfully engage with patient post-discharge and throughout the case.    Lace score 49.

## 2020-01-02 NOTE — PROGRESS NOTES
Outcome: Left Message    Please transfer to Patient Outreach Team at [phone] when patient returns call.    [facility] Verified: yes    Attempt # 1

## 2020-01-10 NOTE — TELEPHONE ENCOUNTER
Can you please replace verbal order for this. If symptoms do not improve with antibiotic that was sent to pharmacy patient should be seen in clinic Thank you   Dr. Amador   0

## 2020-02-03 NOTE — PATIENT INSTRUCTIONS
-You have a wound vac at 125 mmHg with black foam. The dressing will be changed every Monday, Wednesday, and Friday at the wound clinic.    -If you are having issues with your wound vac, please consider patching leaks, changing the canister, or calling [phone] for troubleshooting. If the wound vac has been off or non-operational for over 2 hours, call wound care center to inform them and remove all dressings including black foam and replace with gauze moistened with normal saline.     -Should you experience any significant changes in your wound(s), such as infection (redness, swelling, localized heat, increased pain, fever > 101 F, chills) or have any questions regarding your home care instructions, please contact the wound center at [phone]. If after hours, contact your primary care physician or go to the hospital emergency room.          Statement Selected

## 2020-02-12 ENCOUNTER — OFFICE VISIT (OUTPATIENT)
Dept: MEDICAL GROUP | Facility: PHYSICIAN GROUP | Age: 75
End: 2020-02-12
Payer: MEDICARE

## 2020-02-12 VITALS
OXYGEN SATURATION: 98 % | WEIGHT: 125 LBS | HEIGHT: 67 IN | DIASTOLIC BLOOD PRESSURE: 78 MMHG | TEMPERATURE: 98.7 F | HEART RATE: 71 BPM | SYSTOLIC BLOOD PRESSURE: 130 MMHG | BODY MASS INDEX: 19.62 KG/M2

## 2020-02-12 DIAGNOSIS — I73.9 PERIPHERAL VASCULAR DISEASE (HCC): ICD-10-CM

## 2020-02-12 DIAGNOSIS — F33.9 MAJOR DEPRESSION, RECURRENT, CHRONIC (HCC): ICD-10-CM

## 2020-02-12 DIAGNOSIS — I10 ESSENTIAL HYPERTENSION: ICD-10-CM

## 2020-02-12 DIAGNOSIS — S12.191A OTHER CLOSED NONDISPLACED FRACTURE OF SECOND CERVICAL VERTEBRA, INITIAL ENCOUNTER (HCC): ICD-10-CM

## 2020-02-12 DIAGNOSIS — R63.4 WEIGHT LOSS, NON-INTENTIONAL: ICD-10-CM

## 2020-02-12 DIAGNOSIS — T84.7XXA HARDWARE COMPLICATING WOUND INFECTION, INITIAL ENCOUNTER (HCC): ICD-10-CM

## 2020-02-12 DIAGNOSIS — D50.8 OTHER IRON DEFICIENCY ANEMIA: ICD-10-CM

## 2020-02-12 DIAGNOSIS — R01.1 SYSTOLIC MURMUR: ICD-10-CM

## 2020-02-12 DIAGNOSIS — L97.921 CHRONIC ULCER OF LEFT LEG, LIMITED TO BREAKDOWN OF SKIN (HCC): ICD-10-CM

## 2020-02-12 DIAGNOSIS — F10.20 ALCOHOLISM (HCC): ICD-10-CM

## 2020-02-12 PROBLEM — T14.8XXA WOUND INFECTION: Status: RESOLVED | Noted: 2018-05-29 | Resolved: 2020-02-12

## 2020-02-12 PROBLEM — J90 PLEURAL EFFUSION: Status: RESOLVED | Noted: 2019-09-10 | Resolved: 2020-02-12

## 2020-02-12 PROBLEM — L08.9 WOUND INFECTION: Status: RESOLVED | Noted: 2018-05-29 | Resolved: 2020-02-12

## 2020-02-12 PROBLEM — Z09 HOSPITAL DISCHARGE FOLLOW-UP: Status: RESOLVED | Noted: 2017-12-05 | Resolved: 2020-02-12

## 2020-02-12 PROBLEM — A49.8 PSEUDOMONAS INFECTION: Status: RESOLVED | Noted: 2018-08-22 | Resolved: 2020-02-12

## 2020-02-12 PROCEDURE — 8041 PR SCP AHA: Performed by: NURSE PRACTITIONER

## 2020-02-12 PROCEDURE — 99214 OFFICE O/P EST MOD 30 MIN: CPT | Performed by: NURSE PRACTITIONER

## 2020-02-12 NOTE — PROGRESS NOTES
Annual Health Assessment Questions:    1.  Are you currently engaging in any exercise or physical activity? Yes    2.  How would you describe your mood or emotional well-being today? good    3.  Have you had any falls in the last year? No    4.  Have you noticed any problems with your balance or had difficulty walking? Yes    5.  In the last six months have you experienced any leakage of urine? Yes    6. DPA/Advanced Directive: Patient has Advanced Directive on file.

## 2020-02-12 NOTE — ASSESSMENT & PLAN NOTE
Reports that since she stopped drinking she has lost some weight.  Drinking more milk and protein shakes.

## 2020-02-12 NOTE — ASSESSMENT & PLAN NOTE
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  Was athletic in her youth.  No increased shortness of breath or increased edema

## 2020-02-12 NOTE — ASSESSMENT & PLAN NOTE
Reports that lesions on legs are improving.  Left leg with reduced swelling and small lesions that are healing.  Taking daily antibiotic. Doxy daily for MRSA

## 2020-02-12 NOTE — ASSESSMENT & PLAN NOTE
Left leg wounds are starting to heal.  Wound care has improved and she is no longer on wound care.  No cellulitis at this time.  Seeing derm for some skin creams and treatment.   No longer smoking.   No longer followed by .

## 2020-02-12 NOTE — PROGRESS NOTES
Subjective:     Nadege Mae is a 74 y.o. female here today for follow up on numerous items, including her MRSA, leg wounds and anemia  and Annual Health Assessment.    Major depression, recurrent, chronic (HCC)  Reports she is feeling relaxed.  No longer drinking, sleeping better. Taking sertraline daily and doing well.  No suicidal ideation reported.     C2 cervical fracture ([facility])  Reports that followup showed healing fracture. Occasional pain.     Alcoholism ([facility])  Reports that she has quit drinking now and feeling so much better.  In remission at this time.     Chronic ulcer of left leg, limited to breakdown of skin (HCC)  Reports that lesions on legs are improving.  Left leg with reduced swelling and small lesions that are healing.  Taking daily antibiotic. 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  Daily antibiotic.  Daily exercises from rehab.  afebrile    Iron deficiency anemia  Due for labs.  She forgot to get this done prior to our appointment.      Peripheral vascular disease (HCC)  Left leg wounds are starting to heal.  Wound care has improved and she is no longer on wound care.  No cellulitis at this time.  Seeing derm for some skin creams and treatment.   No longer smoking.   No longer followed by .      Weight loss, non-intentional  Reports that since she stopped drinking she has lost some weight.  Drinking more milk and protein shakes.      Essential hypertension  Metoprolol daily.  bp at goal today 130/78.  No chest pain, shortness of breath reported.     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  Was athletic in her youth.  No increased shortness of breath or increased edema       Health Maintenance Summary                IMM ZOSTER VACCINES Overdue 10/13/1993     Annual Wellness Visit Overdue 10/20/2016      Done 10/20/2015     BONE DENSITY Overdue 12/30/2019      Done 12/30/2014 DS-BONE DENSITY STUDY (DEXA)     Patient has  more history with this topic...    IMM DTaP/Tdap/Td Vaccine Next Due 12/9/2022      Done 12/9/2012 Imm Admin: Tdap Vaccine           Annual Health Assessment Questions:     1.  Are you currently engaging in any exercise or physical activity? No    2.  How would you describe your mood or emotional well-being today? good    3.  Have you had any falls in the last year? Yes    4.  Have you noticed any problems with your balance or had difficulty walking? Yes using a walker post hip fracture and surgery    5.  In the last six months have you experienced any leakage of urine? Yes    6. DPA/Advanced Directive: Patient has Advanced Directive on file.     Current medicines (including changes today)  Current Outpatient Medications   Medication Sig Dispense Refill   • doxycycline (VIBRAMYCIN) 100 MG Tab Take 1 Tab by mouth 2 times a day. 60 Tab 3   • sertraline (ZOLOFT) 100 MG Tab Take 1 Tab by mouth every day. 30 Tab 11   • metoprolol SR (TOPROL XL) 100 MG TABLET SR 24 HR Take 100 mg by mouth every day.     • mirtazapine (REMERON) 15 MG Tab Take 15 mg by mouth every evening.     • NS SOLN 250 mL with vancomycin 5 g SOLR 1,300 mg 1,300 mg by Intravenous route every 24 hours. (Patient not taking: Reported on 12/11/2019)     • Calcium Carbonate 600 MG Tab Take  by mouth 2 Times a Day.     • cyanocobalamin (VITAMIN B-12) 500 MCG Tab Take 500 mcg by mouth every day.     • bisacodyl (DULCOLAX) 10 MG Suppos Insert 10 mg in rectum every day.     • furosemide (LASIX) 40 MG Tab Take 40 mg by mouth every day.     • potassium chloride SA (KDUR) 20 MEQ Tab CR Take 20 mEq by mouth every day.     • Cholecalciferol 2000 UNIT Cap Take 2,000 Units by mouth every day.     • metoprolol (LOPRESSOR) 100 MG Tab Take 75 mg by mouth 2 times a day. Indications: High Blood Pressure Disorder     • acetaminophen (TYLENOL) 325 MG Tab Take 2 Tabs by mouth every 6 hours as needed (Mild Pain; (Pain scale 1-3); Temp greater than 100.5 F). (Patient taking  differently: Take 650 mg by mouth every four hours as needed (Mild Pain; (Pain scale 1-3); Temp greater than 100.5 F).) 30 Tab 0   • amLODIPine (NORVASC) 10 MG Tab Take 1 Tab by mouth every day. (Patient not taking: Reported on 2/12/2020) 30 Tab    • ferrous sulfate 325 (65 Fe) MG tablet Take 1 Tab by mouth every morning with breakfast. (Patient not taking: Reported on 2/12/2020) 30 Tab    • folic acid (FOLVITE) 1 MG Tab Take 1 Tab by mouth every day. (Patient not taking: Reported on 2/12/2020) 30 Tab    • hydrALAZINE (APRESOLINE) 50 MG Tab Take 1 Tab by mouth 2 Times a Day. (Patient not taking: Reported on 2/12/2020) 90 Tab    • lactobacillus rhamnosus (CULTURELLE) Cap capsule Take 1 Cap by mouth every morning with breakfast. (Patient not taking: Reported on 11/13/2019) 30 Cap    • magnesium hydroxide (MILK OF MAGNESIA) 400 MG/5ML Suspension Take 30 mL by mouth 1 time daily as needed (prn constipation). (Patient not taking: Reported on 2/12/2020) 1 Bottle    • methocarbamol (ROBAXIN) 500 MG Tab Take 1 Tab by mouth every 8 hours as needed (mild pain , spasms). (Patient not taking: Reported on 2/12/2020) 120 Tab    • polyethylene glycol/lytes (MIRALAX) Pack Take 1 Packet by mouth 1 time daily as needed. (Patient not taking: Reported on 2/12/2020)  3   • thiamine (THIAMINE) 100 MG tablet Take 1 Tab by mouth every day. (Patient not taking: Reported on 2/12/2020) 30 Tab    • aspirin (ASA) 81 MG Chew Tab chewable tablet Take 1 Tab by mouth every day. (Patient not taking: Reported on 2/12/2020) 100 Tab    • ascorbic acid (ASCORBIC ACID) 500 MG Tab Take 500 mg by mouth every day.     • niacinamide 500 MG tablet Take 500 mg by mouth 2 times a day.     • Multiple Vitamins-Minerals (THERAPEUTIC-M/LUTEIN) Tab Take 1 Tab by mouth every day.  0     No current facility-administered medications for this visit.        She  has a past medical history of Anxiety, Cellulitis and abscess of lower extremity, Dental disorder, Generalized  "osteoarthritis of multiple sites (10/20/2015), Heart valve disease, Hyperlipidemia, Hypertension, and Osteoporosis. She also has no past medical history of Allergy, Diabetes, Muscle disorder, or OSTEOPOROSIS.    Bactrim [sulfamethoxazole-trimethoprim]; Meropenem; and Oxycodone    She  reports that she quit smoking about 13 years ago. Her smoking use included cigarettes. She has a 12.50 pack-year smoking history. She has never used smokeless tobacco. She reports previous alcohol use of about 3.0 oz of alcohol per week. She reports that she does not use drugs.  Counseling given: Not Answered      ROS as stated in hpi  No chest pain, no shortness of breath, no abdominal pain.     Objective:     Physical Exam:  /78 (BP Location: Left arm, Patient Position: Sitting, BP Cuff Size: Adult)   Pulse 71   Temp 37.1 °C (98.7 °F) (Temporal)   Ht 1.702 m (5' 7\")   Wt 56.7 kg (125 lb)   SpO2 98%  Body mass index is 19.58 kg/m².   Constitutional: Alert, no distress.  Skin: Warm, dry, good turgor, no rashes in visible areas.  Eye: Equal, round and reactive, conjunctiva clear, lids normal.  ENMT: Lips without lesions, good dentition, oropharynx clear.  Neck: Trachea midline, no masses, no thyromegaly. No cervical or supraclavicular lymphadenopathy.  Respiratory: Unlabored respiratory effort, lungs clear to auscultation, no wheezes, no rhonchi.  Cardiovascular: Normal S1, S2, systolic murmur, no edema.  Abdomen: Soft, non-tender, no masses, no hepatosplenomegaly.  Psych: Alert and oriented x3, normal affect and mood.    Assessment and Plan:     1. Major depression, recurrent, chronic (HCC)  Chronic, ongoing, stable with sertraline.  Feeling more upbeat as her health issues are improving and that she has stopped drinking alcohol    2. Other closed nondisplaced fracture of second cervical vertebra, initial encounter (HCC)  Chronic, ongoing. Stable.  Followed by [facility].  Due for DEXA.  Consider prolia    3. Alcoholism " (HCC)  Chronic, ongoing. In remission.  Has stopped drinking alcohol.  Feeling better, sleeping better.  Due for labs to check on chronic anemia.     4. Chronic ulcer of left leg, limited to breakdown of skin (HCC)  Chronic, ongoing. Improving.  No longer doing wound care.  Swelling down.  Monitor and follow.     5. Hardware complicating wound infection, initial encounter (HCC)  Chronic, ongoing.  Daily doxy for MRSA in hip hardware.  Afebrile.  Doing well.  Monitor and follow    6. Other iron deficiency anemia  Chronic, ongoing.  Due for labs.  Orders provided monitor and follow.     7. Peripheral vascular disease (HCC)  Chronic, ongoing. No longer followed by Dr. Colin after fem pop bypass.  Her left lower leg wounds are healing and improving.  Monitor and follow.     8. Weight loss, non-intentional  New problem.  Acute finding after stopping alcohol.  Continue with high protein foods.  Monitor and follow.      9. Essential hypertension  Chronic ongoing at goal.  Metoprolol only.  No chest pain.  Monitor and follow.     10. Systolic murmur  Chronic, ongoing. Stable.  Since childhood.  No chest pain, shortness of breath reported.  Monitor and follow.       Discussion today about general wellness and lifestyle habits:    · Engage in regular physical activity and social activities.  · Prevent falls and reduce trip hazards; using ambulatory aides, hearing and vision testing if appropriate.  · Steps to improve urinary incontinence.  · Advanced care planning.    Follow-Up: Return in about 6 months (around 8/12/2020) for Multiple issues.         PLEASE NOTE: This dictation was created using voice recognition software. I have made every reasonable attempt to correct obvious errors, but I expect that there are errors of grammar and possibly content that I did not discover before finalizing the note.

## 2020-03-29 DIAGNOSIS — T84.7XXA HARDWARE COMPLICATING WOUND INFECTION, INITIAL ENCOUNTER (HCC): ICD-10-CM

## 2020-03-30 RX ORDER — DOXYCYCLINE HYCLATE 100 MG
TABLET ORAL
Qty: 60 TAB | Refills: 3 | Status: SHIPPED | OUTPATIENT
Start: 2020-03-30 | End: 2020-07-28

## 2020-04-16 RX ORDER — SERTRALINE HYDROCHLORIDE 100 MG/1
TABLET, FILM COATED ORAL
Qty: 90 TAB | Refills: 3 | Status: SHIPPED
Start: 2020-04-16 | End: 2021-06-25 | Stop reason: SDUPTHER

## 2020-04-16 NOTE — TELEPHONE ENCOUNTER
Requested Prescriptions     Signed Prescriptions Disp Refills   • sertraline (ZOLOFT) 100 MG Tab 90 Tab 3     Sig: TAKE 1 TABLET BY MOUTH EVERY DAY     Authorizing Provider: SANDHYA PAGAN A.P.R.N.

## 2020-04-16 NOTE — TELEPHONE ENCOUNTER
Received request via: Pharmacy    Was the patient seen in the last year in this department? Yes on 02/12/2020    Does the patient have an active prescription (recently filled or refills available) for medication(s) requested? No

## 2020-04-16 NOTE — TELEPHONE ENCOUNTER
Prescription faxed to:    [facility]/pharmacy #[id number] - [place] - [address]  [address] [place] [address]  Phone: [phone] Fax: [phone]  .

## 2020-07-28 DIAGNOSIS — T84.7XXA HARDWARE COMPLICATING WOUND INFECTION, INITIAL ENCOUNTER (HCC): ICD-10-CM

## 2020-07-28 RX ORDER — DOXYCYCLINE HYCLATE 100 MG
TABLET ORAL
Qty: 60 TAB | Refills: 3 | Status: SHIPPED | OUTPATIENT
Start: 2020-07-28 | End: 2020-12-02

## 2020-08-12 ENCOUNTER — OFFICE VISIT (OUTPATIENT)
Dept: MEDICAL GROUP | Facility: PHYSICIAN GROUP | Age: 75
End: 2020-08-12
Payer: MEDICARE

## 2020-08-12 VITALS
SYSTOLIC BLOOD PRESSURE: 140 MMHG | HEIGHT: 67 IN | RESPIRATION RATE: 16 BRPM | TEMPERATURE: 97.8 F | BODY MASS INDEX: 21.03 KG/M2 | DIASTOLIC BLOOD PRESSURE: 72 MMHG | HEART RATE: 68 BPM | WEIGHT: 134 LBS | OXYGEN SATURATION: 97 %

## 2020-08-12 DIAGNOSIS — R63.4 WEIGHT LOSS, NON-INTENTIONAL: ICD-10-CM

## 2020-08-12 DIAGNOSIS — F33.9 MAJOR DEPRESSION, RECURRENT, CHRONIC (HCC): ICD-10-CM

## 2020-08-12 DIAGNOSIS — F10.20 ALCOHOLISM (HCC): ICD-10-CM

## 2020-08-12 DIAGNOSIS — L97.921 CHRONIC ULCER OF LEFT LEG, LIMITED TO BREAKDOWN OF SKIN (HCC): ICD-10-CM

## 2020-08-12 DIAGNOSIS — S12.191A OTHER CLOSED NONDISPLACED FRACTURE OF SECOND CERVICAL VERTEBRA, INITIAL ENCOUNTER (HCC): ICD-10-CM

## 2020-08-12 PROBLEM — Z78.9 ALCOHOL USE: Status: RESOLVED | Noted: 2019-09-02 | Resolved: 2020-08-12

## 2020-08-12 PROBLEM — F10.90 ALCOHOL USE: Status: RESOLVED | Noted: 2019-09-02 | Resolved: 2020-08-12

## 2020-08-12 PROBLEM — R52 PAIN: Status: RESOLVED | Noted: 2018-07-20 | Resolved: 2020-08-12

## 2020-08-12 PROCEDURE — 99214 OFFICE O/P EST MOD 30 MIN: CPT | Performed by: NURSE PRACTITIONER

## 2020-08-12 RX ORDER — TRIAMCINOLONE ACETONIDE 1 MG/G
1 CREAM TOPICAL 2 TIMES DAILY
Qty: 45 G | Refills: 1 | Status: SHIPPED | OUTPATIENT
Start: 2020-08-12 | End: 2020-12-02

## 2020-08-12 ASSESSMENT — PATIENT HEALTH QUESTIONNAIRE - PHQ9
2. FEELING DOWN, DEPRESSED, IRRITABLE, OR HOPELESS: SEVERAL DAYS
8. MOVING OR SPEAKING SO SLOWLY THAT OTHER PEOPLE COULD HAVE NOTICED. OR THE OPPOSITE, BEING SO FIGETY OR RESTLESS THAT YOU HAVE BEEN MOVING AROUND A LOT MORE THAN USUAL: NOT AT ALL
4. FEELING TIRED OR HAVING LITTLE ENERGY: SEVERAL DAYS
SUM OF ALL RESPONSES TO PHQ9 QUESTIONS 1 AND 2: 2
SUM OF ALL RESPONSES TO PHQ QUESTIONS 1-9: 4
1. LITTLE INTEREST OR PLEASURE IN DOING THINGS: SEVERAL DAYS
6. FEELING BAD ABOUT YOURSELF - OR THAT YOU ARE A FAILURE OR HAVE LET YOURSELF OR YOUR FAMILY DOWN: NOT AL ALL
9. THOUGHTS THAT YOU WOULD BE BETTER OFF DEAD, OR OF HURTING YOURSELF: NOT AT ALL
5. POOR APPETITE OR OVEREATING: NOT AT ALL
7. TROUBLE CONCENTRATING ON THINGS, SUCH AS READING THE NEWSPAPER OR WATCHING TELEVISION: NOT AT ALL
3. TROUBLE FALLING OR STAYING ASLEEP OR SLEEPING TOO MUCH: SEVERAL DAYS

## 2020-08-12 ASSESSMENT — FIBROSIS 4 INDEX: FIB4 SCORE: 0.83

## 2020-08-12 NOTE — ASSESSMENT & PLAN NOTE
No longer wearing brace.  Fracture was from a fall but she does not remember.  Osteopenia taking calcium and vitamin D.  Using a walker outside.  No recent falls.

## 2020-08-12 NOTE — PROGRESS NOTES
Chief Complaint   Patient presents with   • Follow-Up   • Hypertension   • Depression       Subjective:   Nadege Mae is a 74 y.o. female here today for evaluation and management of:    Major depression, recurrent, chronic (HCC)  Continues with sertraline 100 mg daily.  Feels that her symptoms are in good control     Weight loss, non-intentional  Weight is up 9 pounds since our last visit.  She is feeling stronger.    Chronic ulcer of left leg, limited to breakdown of skin ([facility])  Followed by wound clinic.  Daily doxycycline.  Has appointment next month.     Alcoholism ([facility])  No longer drinking.  Feeling better.     C2 cervical fracture ([facility])  No longer wearing brace.  Fracture was from a fall but she does not remember.  Osteopenia taking calcium and vitamin D.  Using a walker outside.  No recent falls.            Current medicines (including changes today)  Current Outpatient Medications   Medication Sig Dispense Refill   • doxycycline (VIBRAMYCIN) 100 MG Tab TAKE 1 TABLET BY MOUTH TWICE A DAY 60 Tab 3   • sertraline (ZOLOFT) 100 MG Tab TAKE 1 TABLET BY MOUTH EVERY DAY 90 Tab 3   • acetaminophen (TYLENOL) 325 MG Tab Take 2 Tabs by mouth every 6 hours as needed (Mild Pain; (Pain scale 1-3); Temp greater than 100.5 F). (Patient taking differently: Take 650 mg by mouth every four hours as needed (Mild Pain; (Pain scale 1-3); Temp greater than 100.5 F).) 30 Tab 0   • metoprolol SR (TOPROL XL) 100 MG TABLET SR 24 HR Take 100 mg by mouth every day.     • Multiple Vitamins-Minerals (THERAPEUTIC-M/LUTEIN) Tab Take 1 Tab by mouth every day.  0   • Calcium Carbonate 600 MG Tab Take  by mouth 2 Times a Day.     • cyanocobalamin (VITAMIN B-12) 500 MCG Tab Take 500 mcg by mouth every day.     • furosemide (LASIX) 40 MG Tab Take 40 mg by mouth every day.     • potassium chloride SA (KDUR) 20 MEQ Tab CR Take 20 mEq by mouth every day.     • Cholecalciferol 2000 UNIT Cap Take 2,000 Units by mouth every day.     • ferrous  "sulfate 325 (65 Fe) MG tablet Take 1 Tab by mouth every morning with breakfast. (Patient not taking: Reported on 2/12/2020) 30 Tab    • folic acid (FOLVITE) 1 MG Tab Take 1 Tab by mouth every day. (Patient not taking: Reported on 2/12/2020) 30 Tab    • hydrALAZINE (APRESOLINE) 50 MG Tab Take 1 Tab by mouth 2 Times a Day. (Patient not taking: Reported on 2/12/2020) 90 Tab    • lactobacillus rhamnosus (CULTURELLE) Cap capsule Take 1 Cap by mouth every morning with breakfast. (Patient not taking: Reported on 11/13/2019) 30 Cap    • magnesium hydroxide (MILK OF MAGNESIA) 400 MG/5ML Suspension Take 30 mL by mouth 1 time daily as needed (prn constipation). (Patient not taking: Reported on 2/12/2020) 1 Bottle    • methocarbamol (ROBAXIN) 500 MG Tab Take 1 Tab by mouth every 8 hours as needed (mild pain , spasms). (Patient not taking: Reported on 2/12/2020) 120 Tab    • ascorbic acid (ASCORBIC ACID) 500 MG Tab Take 500 mg by mouth every day.     • niacinamide 500 MG tablet Take 500 mg by mouth 2 times a day.       No current facility-administered medications for this visit.      She  has a past medical history of Anxiety, Cellulitis and abscess of lower extremity, Dental disorder, Generalized osteoarthritis of multiple sites (10/20/2015), Heart valve disease, Hyperlipidemia, Hypertension, and Osteoporosis. She also has no past medical history of Allergy, Diabetes, Muscle disorder, or OSTEOPOROSIS.    ROS as stated in hpi  No chest pain, no shortness of breath, no abdominal pain       Objective:     /72   Pulse 68   Temp 36.6 °C (97.8 °F) (Temporal)   Resp 16   Ht 1.702 m (5' 7\")   Wt 60.8 kg (134 lb)   SpO2 97%  Body mass index is 20.99 kg/m². weight is up 9 pounds.   Physical Exam:  Constitutional: Alert, no distress.  Skin: Warm, dry, good turgor,no cyanosis, no rashes in visible areas.  Eye: Equal, round and reactive, conjunctiva clear, lids normal.  Ears: No tenderness, no discharge.  External canals are " without any significant edema or erythema.  .  Gross auditory acuity is intact.  Nose: symmetrical without tenderness, no discharge.  Mouth/Throat: lips without lesion.  Oropharynx clear.    Neck: Trachea midline, no masses, no obvious thyroid enlargement. Range of motion within normal limits.  Neuro: Cranial nerves 2-12 grossly intact.  No sensory deficit.  Respiratory: Unlabored respiratory effort,hi.  Cardiovascular: Normal S1, S2, no murmur, no edema.  Psych: Alert and oriented x3, normal affect and mood and judgement.        Assessment and Plan:   The following treatment plan was discussed    1. Major depression, recurrent, chronic (HCC)  Chronic, ongoing. Sertraline managing her symptoms.     2. Weight loss, non-intentional  [place]orn[place], improved since she stopped drinking alcohol.   - CBC WITH DIFFERENTIAL; Future  - Comp Metabolic Panel; Future    3. Chronic ulcer of left leg, limited to breakdown of skin ([facility])  Chronic, ongoing. Stable.  Followed by infectious disease.  Daily vancomycin.     4. Alcoholism ([facility])  In remission and doing well.   - VITAMIN D,25 HYDROXY; Future  - FOLATE; Future  - VITAMIN B12; Future    5. Other closed nondisplaced fracture of second cervical vertebra, initial encounter ([facility])  Chronic, ongoing, taking calcium/vitamin D.  Discussed falls.       Followup: Return in about 6 months (around 2/12/2021) for Multiple issues.         Educated in proper administration of medication(s) ordered today including safety, possible SE, risks, benefits, rationale and alternatives to therapy.     Please note that this dictation was created using voice recognition software. I have made every reasonable attempt to correct obvious errors, but I expect that there are errors of grammar and possibly content that I did not discover before finalizing the note.

## 2020-09-06 NOTE — ASSESSMENT & PLAN NOTE
Dr. Colin is following her at wound care on Mondays.  Has upcoming appointment.    alert and oriented, BMI 33.8 (87.3kg/160cm- new ht per pt report), IBW- 52.3kg. Skin surgical incision. Regular BMs (on bowel regimen)

## 2020-09-08 ENCOUNTER — OFFICE VISIT (OUTPATIENT)
Dept: INFECTIOUS DISEASES | Facility: MEDICAL CENTER | Age: 75
End: 2020-09-08
Payer: MEDICARE

## 2020-09-08 VITALS
BODY MASS INDEX: 20.88 KG/M2 | RESPIRATION RATE: 16 BRPM | SYSTOLIC BLOOD PRESSURE: 132 MMHG | TEMPERATURE: 98.4 F | HEIGHT: 67 IN | HEART RATE: 92 BPM | OXYGEN SATURATION: 94 % | WEIGHT: 133 LBS | DIASTOLIC BLOOD PRESSURE: 90 MMHG

## 2020-09-08 DIAGNOSIS — T84.7XXD HARDWARE COMPLICATING WOUND INFECTION, SUBSEQUENT ENCOUNTER: ICD-10-CM

## 2020-09-08 DIAGNOSIS — L97.921 CHRONIC ULCER OF LEFT LEG, LIMITED TO BREAKDOWN OF SKIN (HCC): ICD-10-CM

## 2020-09-08 PROCEDURE — 99213 OFFICE O/P EST LOW 20 MIN: CPT | Performed by: INTERNAL MEDICINE

## 2020-09-08 ASSESSMENT — FIBROSIS 4 INDEX: FIB4 SCORE: 0.83

## 2020-09-08 ASSESSMENT — PAIN SCALES - GENERAL: PAINLEVEL: NO PAIN

## 2020-09-08 NOTE — ASSESSMENT & PLAN NOTE
Mild erythema due to stasis rather than cellulitis-not warm or tender  Multiple shallow ulceration-local care discussed at length  S/sxs cellulitis discussed at length

## 2020-09-08 NOTE — PROGRESS NOTES
"Chief Complaint   Patient presents with   • Follow-Up     Skin abscess       HISTORY OF PRESENT ILLNESS: Patient is a 74 y.o. female established patient who presents today for FU  Well known to the ID service +PVD with chronic lower extremity ulcerations, bullous pemphigus on steroids, history of a left intertrochanteric fracture and a history of left total hip arthroplasty status post hardware removal by Dr. Vazquez on 2/11/2018, and recent fall in September resulting in a C2 fracture admitted in early October for drainage from her left hip wound.  Prior cultures have grown MSSA and MRSA.   S/p revision of a left total hip arthroplasty with exchange of head, liner and proximal stem body by Dr. Vazquez on 10/9/2019.  There was a sinus tract all the way down to the hip with significant purulence noted intraoperatively.  Operative cultures grew a single colony of diphtheroids and MSSA.  Patient was transferred to a skilled nursing facility on vancomycin and rifampin for a planned 6-week course from surgery.  Completed 11/20/2019.  12/16/2019-patient has come back for follow-up.  Is currently at home.  Feels much better.  Denies any fevers or chills.  The cellulitis has almost gone.  No drainage from the hip.  Still get some ulcers on her legs because she retains water.  But she also scratches.  States that she is ambulating without a walker at home now.  Denies any increased hip pain.  Is gaining weight again.  Has given up wine.    9/8/2020  Here \"just to check in and reassure everybody\"  Quit drinking one year ago- \"I don't miss it\"  Has chronic swelling and dry skin legs-still itchy. Does try not to scratch  Uses Eucerin and a product with zinc on her legs-has \"tiny' bubbles that rupture and shallow ulcerations. No episodes cellulitis. No fever or chills. Has been able to ambulate in the house without her walker  Tolerating doxy well- \"I will be on it forever\"  Has seen Ortho in FU        Patient Active Problem List    " Diagnosis Date Noted   • Chronic ulcer of left leg, limited to breakdown of skin ([facility]) 10/07/2019     Priority: High   • C2 cervical fracture ([facility]) 09/10/2019     Priority: High   • MRSA (methicillin resistant staph aureus) culture positive 05/31/2018     Priority: High   • Open leg wound 02/10/2018     Priority: High   • History of hip fracture 12/20/2017     Priority: High   • Hyponatremia 01/30/2019     Priority: Medium   • Peripheral vascular disease ([facility]) 07/20/2018     Priority: Medium   • B12 deficiency 12/20/2017     Priority: Medium   • Thyroid nodule 09/11/2019     Priority: Low   • Iron deficiency anemia 11/04/2017     Priority: Low   • Generalized osteoarthritis of multiple sites 10/20/2015     Priority: Low   • Essential hypertension 10/06/2015     Priority: Low   • Major depression, recurrent, chronic ([facility]) 10/06/2015     Priority: Low   • Weight loss, non-intentional 02/12/2020   • Systolic murmur 02/12/2020   • Hardware complicating wound infection ([facility]) 12/02/2019   • Age-related physical debility 09/02/2019   • S/P femoral-popliteal bypass surgery 06/12/2019   • Valgus deformity of foot, left 04/20/2019   • VRE (vancomycin resistant enterococcus) culture positive 03/17/2019   • Low folate 10/04/2018   • Alcoholism ([facility]) 07/31/2018       Allergies:Bactrim [sulfamethoxazole-trimethoprim], Meropenem, and Oxycodone    Current Outpatient Medications   Medication Sig Dispense Refill   • doxycycline (VIBRAMYCIN) 100 MG Tab TAKE 1 TABLET BY MOUTH TWICE A DAY 60 Tab 3   • triamcinolone acetonide (KENALOG) 0.1 % Cream Apply 1 Application to affected area(s) 2 times a day. 45 g 1   • sertraline (ZOLOFT) 100 MG Tab TAKE 1 TABLET BY MOUTH EVERY DAY 90 Tab 3   • Calcium Carbonate 600 MG Tab Take  by mouth 2 Times a Day.     • cyanocobalamin (VITAMIN B-12) 500 MCG Tab Take 500 mcg by mouth every day.     • furosemide (LASIX) 40 MG Tab Take 40 mg by mouth every day.     • potassium chloride SA (KDUR) 20 MEQ Tab CR  Take 20 mEq by mouth every day.     • Cholecalciferol 2000 UNIT Cap Take 2,000 Units by mouth every day.     • acetaminophen (TYLENOL) 325 MG Tab Take 2 Tabs by mouth every 6 hours as needed (Mild Pain; (Pain scale 1-3); Temp greater than 100.5 F). (Patient taking differently: Take 650 mg by mouth every four hours as needed (Mild Pain; (Pain scale 1-3); Temp greater than 100.5 F).) 30 Tab 0   • ferrous sulfate 325 (65 Fe) MG tablet Take 1 Tab by mouth every morning with breakfast. (Patient not taking: Reported on 2020) 30 Tab    • folic acid (FOLVITE) 1 MG Tab Take 1 Tab by mouth every day. (Patient not taking: Reported on 2020) 30 Tab    • hydrALAZINE (APRESOLINE) 50 MG Tab Take 1 Tab by mouth 2 Times a Day. (Patient not taking: Reported on 2020) 90 Tab    • lactobacillus rhamnosus (CULTURELLE) Cap capsule Take 1 Cap by mouth every morning with breakfast. (Patient not taking: Reported on 2019) 30 Cap    • magnesium hydroxide (MILK OF MAGNESIA) 400 MG/5ML Suspension Take 30 mL by mouth 1 time daily as needed (prn constipation). (Patient not taking: Reported on 2020) 1 Bottle    • methocarbamol (ROBAXIN) 500 MG Tab Take 1 Tab by mouth every 8 hours as needed (mild pain , spasms). (Patient not taking: Reported on 2020) 120 Tab    • ascorbic acid (ASCORBIC ACID) 500 MG Tab Take 500 mg by mouth every day.     • metoprolol SR (TOPROL XL) 100 MG TABLET SR 24 HR Take 100 mg by mouth every day.     • niacinamide 500 MG tablet Take 500 mg by mouth 2 times a day.     • Multiple Vitamins-Minerals (THERAPEUTIC-M/LUTEIN) Tab Take 1 Tab by mouth every day.  0     No current facility-administered medications for this visit.        Social History     Tobacco Use   • Smoking status: Former Smoker     Packs/day: 0.50     Years: 25.00     Pack years: 12.50     Types: Cigarettes     Quit date: 2007     Years since quittin.6   • Smokeless tobacco: Never Used   Substance Use Topics   • Alcohol use:  "Not Currently     Alcohol/week: 3.0 oz     Types: 5 Glasses of wine per week     Comment: stop 6 month ago November 2019   • Drug use: No       Family History   Problem Relation Age of Onset   • GI Disease Mother         colostomy   • Heart Attack Father    • Diabetes Father    • Hypertension Father    • Psychiatric Illness Brother         bipolar disorder       ROS:  Review of Systems   Constitutional: Negative for fever, chills, weight loss and malaise/fatigue.   HENT: Negative for ear pain, nosebleeds, congestion, sore throat and neck pain.    Eyes: Negative for blurred vision.   Respiratory: Negative for cough, sputum production, shortness of breath and wheezing.    Cardiovascular: Negative for chest pain, palpitations, orthopnea. +leg swelling.   Gastrointestinal: Negative for heartburn, nausea, vomiting and abdominal pain.   Genitourinary: Negative for dysuria, urgency and frequency.   Musculoskeletal: + joint pain.   Skin: Negative for rash and itching.   Neurological: Negative for dizziness, tingling, tremors  Endo/Heme/Allergies: Does  bruise/bleed easily.   Psychiatric/Behavioral: Negative for depression, suicidal ideas and memory loss.  The patient is not nervous/anxious and does not have insomnia.    All other systems reviewed and are negative except as in HPI.      Exam:  /90 (BP Location: Left arm, Patient Position: Sitting, BP Cuff Size: Adult)   Pulse 92   Temp 36.9 °C (98.4 °F) (Temporal)   Resp 16   Ht 1.702 m (5' 7\")   Wt 60.3 kg (133 lb)   SpO2 94%   General:  Thin Well nourished, well developed female in NAD. Pleasant and cooperative  Head: NCAT, Pupils are equal round reactive to light. Extraocular movements intact.   Neck: Supple.  No LAD  Pulmonary: Clear to ausculation.  No rales, rhonchi, or wheezing. Unlabored  Cardiovascular: Regular rate and rhythm  Abdominal: Soft, nontender, nondistended.   Skin: Chronic stasis changes-thickened skin mult shallow small less than 1 cm " superficial ulceration few small vesicles  Extremities: no clubbing, cyanosis +edema.Not hot   Neurologic: Alert and oriented x4. Speech is fluent without dysarthria. Cranial nerves intact. Moving all extremities. Uses rolling walker      Assessment/Plan:  1. Hardware complicating wound infection, subsequent encounter     2. Chronic ulcer of left leg, limited to breakdown of skin (HCC)     Completed 6 weeks IV abx-switched to oral doxy for suppression prior MSSA/MRSA. Continue  Mild dusky erythema due to stasis rather than cellulitis-not warm or tender  Multiple shallow ulcerations confined to skin breakdown-local care discussed at length  S/sxs cellulitis discussed at length  FU 6 months or prn  Manisha Ferrari M.D.

## 2020-09-11 DIAGNOSIS — I10 ESSENTIAL HYPERTENSION: ICD-10-CM

## 2020-09-11 NOTE — TELEPHONE ENCOUNTER
Received request via: Pharmacy    Was the patient seen in the last year in this department? Yes LOV 08/12/2020    Does the patient have an active prescription (recently filled or refills available) for medication(s) requested? No

## 2020-09-13 RX ORDER — METOPROLOL SUCCINATE 100 MG/1
TABLET, EXTENDED RELEASE ORAL
Qty: 100 TAB | Refills: 3 | Status: SHIPPED | OUTPATIENT
Start: 2020-09-13 | End: 2021-09-03

## 2020-09-13 NOTE — TELEPHONE ENCOUNTER
Requested Prescriptions     Signed Prescriptions Disp Refills   • metoprolol SR (TOPROL XL) 100 MG TABLET SR 24  Tab 3     Sig: TAKE 1 TABLET BY MOUTH EVERY DAY     Authorizing Provider: SANDHYA PAGAN A.P.R.N.

## 2020-12-02 DIAGNOSIS — T84.7XXA HARDWARE COMPLICATING WOUND INFECTION, INITIAL ENCOUNTER (HCC): ICD-10-CM

## 2020-12-02 RX ORDER — TRIAMCINOLONE ACETONIDE 1 MG/G
1 CREAM TOPICAL 2 TIMES DAILY
Qty: 45 G | Refills: 2 | Status: SHIPPED | OUTPATIENT
Start: 2020-12-02 | End: 2021-08-10

## 2020-12-02 RX ORDER — DOXYCYCLINE HYCLATE 100 MG
TABLET ORAL
Qty: 60 TAB | Refills: 3 | Status: SHIPPED | OUTPATIENT
Start: 2020-12-02 | End: 2021-03-16 | Stop reason: SDUPTHER

## 2020-12-02 NOTE — TELEPHONE ENCOUNTER
Requested Prescriptions     Signed Prescriptions Disp Refills   • triamcinolone acetonide (KENALOG) 0.1 % Cream 45 g 2     Sig: APPLY 1 APPLICATION TO AFFECTED AREA(S) 2 TIMES A DAY.     Authorizing Provider: SANDHYA PAGAN A.P.R.N.

## 2021-01-08 DIAGNOSIS — Z23 NEED FOR VACCINATION: ICD-10-CM

## 2021-02-09 ENCOUNTER — OFFICE VISIT (OUTPATIENT)
Dept: MEDICAL GROUP | Facility: PHYSICIAN GROUP | Age: 76
End: 2021-02-09
Payer: MEDICARE

## 2021-02-09 ENCOUNTER — PATIENT OUTREACH (OUTPATIENT)
Dept: MEDICAL GROUP | Facility: PHYSICIAN GROUP | Age: 76
End: 2021-02-09

## 2021-02-09 VITALS
SYSTOLIC BLOOD PRESSURE: 146 MMHG | WEIGHT: 134 LBS | RESPIRATION RATE: 20 BRPM | TEMPERATURE: 98.1 F | OXYGEN SATURATION: 95 % | HEART RATE: 74 BPM | DIASTOLIC BLOOD PRESSURE: 76 MMHG | HEIGHT: 67 IN | BODY MASS INDEX: 21.03 KG/M2

## 2021-02-09 DIAGNOSIS — T84.7XXD HARDWARE COMPLICATING WOUND INFECTION, SUBSEQUENT ENCOUNTER: ICD-10-CM

## 2021-02-09 DIAGNOSIS — F10.20 ALCOHOLISM (HCC): ICD-10-CM

## 2021-02-09 DIAGNOSIS — F33.9 MAJOR DEPRESSION, RECURRENT, CHRONIC (HCC): ICD-10-CM

## 2021-02-09 DIAGNOSIS — L97.921 CHRONIC ULCER OF LEFT LEG, LIMITED TO BREAKDOWN OF SKIN (HCC): ICD-10-CM

## 2021-02-09 DIAGNOSIS — Z87.81 HISTORY OF HIP FRACTURE: Chronic | ICD-10-CM

## 2021-02-09 DIAGNOSIS — S12.191S OTHER CLOSED NONDISPLACED FRACTURE OF SECOND CERVICAL VERTEBRA, SEQUELA: ICD-10-CM

## 2021-02-09 DIAGNOSIS — Z78.0 POST-MENOPAUSAL: ICD-10-CM

## 2021-02-09 DIAGNOSIS — I73.9 PERIPHERAL VASCULAR DISEASE (HCC): ICD-10-CM

## 2021-02-09 DIAGNOSIS — R21 ACUTE SKIN ERUPTION OF DISCOLORATION, ELEVATIONS, BLISTERS: ICD-10-CM

## 2021-02-09 PROCEDURE — 99214 OFFICE O/P EST MOD 30 MIN: CPT | Performed by: NURSE PRACTITIONER

## 2021-02-09 RX ORDER — TRIAMCINOLONE ACETONIDE 1 MG/G
1 CREAM TOPICAL 2 TIMES DAILY
Qty: 45 G | Refills: 1 | Status: SHIPPED | OUTPATIENT
Start: 2021-02-09 | End: 2021-06-08 | Stop reason: SDUPTHER

## 2021-02-09 ASSESSMENT — FIBROSIS 4 INDEX: FIB4 SCORE: 0.85

## 2021-02-09 NOTE — ASSESSMENT & PLAN NOTE
No longer seeing Dr. Colin.  Wound is healing.  Seeing infectious disease to continue home treatment.

## 2021-02-09 NOTE — PROGRESS NOTES
Subjective:     Nadege Mae is a 75 y.o. female here today for chronic medical management and Annual Health Assessment.    Major depression, recurrent, chronic (HCC)  Taking sertraline 100 mg daily.  Reports she is doing well. Doing some walking daily.     Acute skin eruption of discoloration, elevations, blisters  Chronic skin condition.  Seen by derm and now seeing infection control.  Doxy daily/.  Kenalog cream for itching.     Chronic ulcer of left leg, limited to breakdown of skin (HCC)  Reports less swelling.  Wound is healing slowly.  Bag balm bid.  No longer going to wound care at this time.     Hardware complicating wound infection (HCC)  Healing open wound left ankle.  Treating with zinc and bag balm.      Peripheral vascular disease (HCC)  No longer seeing Dr. Colin.  Wound is healing.  Seeing infectious disease to continue home treatment.     C2 cervical fracture (HCC)  Healed.  Due for dexa scan  Ordered.  No recent falls.  Using walker   Has some hand rails added for fall prevention.      Alcoholism (HCC)  Remission.  No alcohol since 11/19.  Due for labs.  ordered   Major depression, recurrent, chronic (HCC)  Taking sertraline 100 mg daily.  Reports she is doing well. Doing some walking daily.     Acute skin eruption of discoloration, elevations, blisters  Chronic skin condition.  Seen by derm and now seeing infection control.  Doxy daily/.  Kenalog cream for itching.     Chronic ulcer of left leg, limited to breakdown of skin (HCC)  Reports less swelling.  Wound is healing slowly.  Bag balm bid.  No longer going to wound care at this time.     Hardware complicating wound infection (HCC)  Healing open wound left ankle.  Treating with zinc and bag balm.      Peripheral vascular disease (HCC)  No longer seeing Dr. Colin.  Wound is healing.  Seeing infectious disease to continue home treatment.     C2 cervical fracture (HCC)  Healed.  Due for dexa scan  Ordered.  No recent falls.  Using  walker   Has some hand rails added for fall prevention.      Alcoholism (HCC)  Remission.  No alcohol since 11/19.  Due for labs.  ordered      Health Maintenance Summary                COVID-19 Vaccine Overdue 10/13/1961     IMM ZOSTER VACCINES Overdue 10/13/1993     Annual Wellness Visit Overdue 10/20/2016      Done 10/20/2015     BONE DENSITY Overdue 12/30/2019      Done 12/30/2014 DS-BONE DENSITY STUDY (DEXA)     Patient has more history with this topic...    IMM DTaP/Tdap/Td Vaccine Next Due 12/9/2022      Done 12/9/2012 Imm Admin: Tdap Vaccine           Annual Health Assessment Questions:     1.  Are you currently engaging in any exercise or physical activity? Yes    2.  How would you describe your mood or emotional well-being today? good    3.  Have you had any falls in the last year? No    4.  Have you noticed any problems with your balance or had difficulty walking? No    5.  In the last six months have you experienced any leakage of urine? No    6. DPA/Advanced Directive: Patient does not have an Advanced Directive.  A packet and workshop information was given on Advanced Directives.    Current medicines (including changes today)  Current Outpatient Medications   Medication Sig Dispense Refill   • triamcinolone acetonide (KENALOG) 0.1 % Cream Apply 1 Application topically 2 times a day. 45 g 1   • triamcinolone acetonide (KENALOG) 0.1 % Cream APPLY 1 APPLICATION TO AFFECTED AREA(S) 2 TIMES A DAY. 45 g 2   • doxycycline (VIBRAMYCIN) 100 MG Tab TAKE 1 TABLET BY MOUTH TWICE A DAY 60 Tab 3   • metoprolol SR (TOPROL XL) 100 MG TABLET SR 24 HR TAKE 1 TABLET BY MOUTH EVERY  Tab 3   • sertraline (ZOLOFT) 100 MG Tab TAKE 1 TABLET BY MOUTH EVERY DAY 90 Tab 3   • acetaminophen (TYLENOL) 325 MG Tab Take 2 Tabs by mouth every 6 hours as needed (Mild Pain; (Pain scale 1-3); Temp greater than 100.5 F). (Patient taking differently: Take 650 mg by mouth every four hours as needed (Mild Pain; (Pain scale 1-3); Temp  greater than 100.5 F).) 30 Tab 0   • ascorbic acid (ASCORBIC ACID) 500 MG Tab Take 500 mg by mouth every day.     • Calcium Carbonate 600 MG Tab Take  by mouth 2 Times a Day.     • cyanocobalamin (VITAMIN B-12) 500 MCG Tab Take 500 mcg by mouth every day.     • furosemide (LASIX) 40 MG Tab Take 40 mg by mouth every day.     • potassium chloride SA (KDUR) 20 MEQ Tab CR Take 20 mEq by mouth every day.     • Cholecalciferol 2000 UNIT Cap Take 2,000 Units by mouth every day.     • ferrous sulfate 325 (65 Fe) MG tablet Take 1 Tab by mouth every morning with breakfast. (Patient not taking: Reported on 2/12/2020) 30 Tab    • folic acid (FOLVITE) 1 MG Tab Take 1 Tab by mouth every day. (Patient not taking: Reported on 2/12/2020) 30 Tab    • hydrALAZINE (APRESOLINE) 50 MG Tab Take 1 Tab by mouth 2 Times a Day. (Patient not taking: Reported on 2/12/2020) 90 Tab    • lactobacillus rhamnosus (CULTURELLE) Cap capsule Take 1 Cap by mouth every morning with breakfast. (Patient not taking: Reported on 11/13/2019) 30 Cap    • magnesium hydroxide (MILK OF MAGNESIA) 400 MG/5ML Suspension Take 30 mL by mouth 1 time daily as needed (prn constipation). (Patient not taking: Reported on 2/12/2020) 1 Bottle    • methocarbamol (ROBAXIN) 500 MG Tab Take 1 Tab by mouth every 8 hours as needed (mild pain , spasms). (Patient not taking: Reported on 2/12/2020) 120 Tab    • niacinamide 500 MG tablet Take 500 mg by mouth 2 times a day.     • Multiple Vitamins-Minerals (THERAPEUTIC-M/LUTEIN) Tab Take 1 Tab by mouth every day.  0     No current facility-administered medications for this visit.        She  has a past medical history of Anxiety, Cellulitis and abscess of lower extremity, Dental disorder, Generalized osteoarthritis of multiple sites (10/20/2015), Heart valve disease, Hyperlipidemia, Hypertension, and Osteoporosis. She also has no past medical history of Allergy, Diabetes, Muscle disorder, or OSTEOPOROSIS.    Bactrim  "[sulfamethoxazole-trimethoprim], Meropenem, and Oxycodone    She  reports that she quit smoking about 14 years ago. Her smoking use included cigarettes. She has a 12.50 pack-year smoking history. She has never used smokeless tobacco. She reports previous alcohol use of about 3.0 oz of alcohol per week. She reports that she does not use drugs.  Counseling given: Not Answered      ROS as stated in hpi  No chest pain, no shortness of breath, no abdominal pain.     Objective:     Physical Exam:  /76 (BP Location: Left arm, Patient Position: Sitting)   Pulse 74   Temp 36.7 °C (98.1 °F) (Temporal)   Resp 20   Ht 1.702 m (5' 7\")   Wt 60.8 kg (134 lb)   SpO2 95%  Body mass index is 20.99 kg/m². stable weight  Constitutional: Alert, no distress.  Skin: Warm, dry, good turgor, multiple healing lesions on arms and handsareas.  Eye: Equal, round and reactive, conjunctiva clear, lids normal.  Neck: Trachea midline, no masses, no thyromegaly. No cervical or supraclavicular lymphadenopathy.  Respiratory: Unlabored respiratory effort, lungs clear to auscultation, no wheezes, no rhonchi.  Cardiovascular: Normal S1, S2, no murmur, no edema.  Abdomen: Soft, non-tender, no masses, no hepatosplenomegaly.  Psych: Alert and oriented x3, normal affect and mood.    Assessment and Plan:     1. Other closed nondisplaced fracture of second cervical vertebra, sequela  Chronic issue.  Resolved.  Due for dexa with her past hx of fractures and disability.     2. Post-menopausal  Due for dexa.  Order provided.  Monitor and follow.  Not currently smoking or taking calcium/vitamin D  - DS-BONE DENSITY STUDY (DEXA); Future    3. Major depression, recurrent, chronic (HCC)  Chronic, ongoing. Stable and controlled with sertraline 100 mg daily.      4. Acute skin eruption of discoloration, elevations, blisters  New problem. Skin lesion/blisters on arms hands.  Seeing derm and infectious disease.  Doxy daily.  Kenalog for itching.  Refill " today.     5. Chronic ulcer of left leg, limited to breakdown of skin (HCC)  Chronic, ongoing. Stable.  No longer seeing wound care.  Treating at home.     6. Hardware complicating wound infection, subsequent encounter  See #5    7. Peripheral vascular disease (HCC)  Chronic, ongoing. Wound is healing and is being followed by Infectious disease.  Dressing a home with zinc and bag balm.     8. History of hip fracture  Chronic.  Patient in need of help getting hand rails in showers and bathroom.  Risk of falls.  Order sent to care management.  Monitor.   - REFERRAL TO CARE MANAGEMENT    9. Alcoholism (HCC)  Chronic, in remission.    .    Discussion today about general wellness and lifestyle habits:    · Engage in regular physical activity and social activities.  · Prevent falls and reduce trip hazards; using ambulatory aides, hearing and vision testing if appropriate.  · Steps to improve urinary incontinence.  · Advanced care planning.    Follow-Up: Return in about 6 months (around 8/9/2021) for Labs, Multiple issues.         PLEASE NOTE: This dictation was created using voice recognition software. I have made every reasonable attempt to correct obvious errors, but I expect that there are errors of grammar and possibly content that I did not discover before finalizing the note.

## 2021-02-09 NOTE — ASSESSMENT & PLAN NOTE
Chronic skin condition.  Seen by derm and now seeing infection control.  Doxy daily/.  Kenalog cream for itching.

## 2021-02-09 NOTE — ASSESSMENT & PLAN NOTE
Healed.  Due for dexa scan  Ordered.  No recent falls.  Using walker   Has some hand rails added for fall prevention.

## 2021-02-09 NOTE — ASSESSMENT & PLAN NOTE
Reports less swelling.  Wound is healing slowly.  Bag balm bid.  No longer going to wound care at this time.

## 2021-02-09 NOTE — PROGRESS NOTES
Referral received from PCP-APRN, specifying need of . TC to pt to engage, assess and establish care plan. Pt educated on role of SW and reason for referral.    Pt specifically interesting in screw-secured hand rail for bath/shower. She is open to a shower seat as well. She is in a rental and believes the owner will accommodate and approve this alteration.     Reviewed ADL/IADL; pt able to complete all with the exception of bathing (spong bath w/ kitchen sink). PT has local son who provide support as needed. Current income is [facility] $853.     TC to [employer] for [employer] referral. VM(1) left requesting return TC. IF no return call si received SW will attempt to find resource via [facility] or [facility].     Mail:  POA-    Plan:  Care Plan Established  TC to pt on or around 2/17/2021

## 2021-02-17 ENCOUNTER — PATIENT OUTREACH (OUTPATIENT)
Dept: MEDICAL GROUP | Facility: PHYSICIAN GROUP | Age: 76
End: 2021-02-17

## 2021-02-23 ENCOUNTER — PATIENT OUTREACH (OUTPATIENT)
Dept: MEDICAL GROUP | Facility: PHYSICIAN GROUP | Age: 76
End: 2021-02-23

## 2021-03-01 ENCOUNTER — PATIENT OUTREACH (OUTPATIENT)
Dept: MEDICAL GROUP | Facility: PHYSICIAN GROUP | Age: 76
End: 2021-03-01

## 2021-03-01 NOTE — PROGRESS NOTES
VM received from pt requesting return TC.    TC2 to pt. VM(1) left requesting return TC.    Plan:  TC3 to pt on or around 3/5/2021

## 2021-03-02 ENCOUNTER — PATIENT OUTREACH (OUTPATIENT)
Dept: MEDICAL GROUP | Facility: PHYSICIAN GROUP | Age: 76
End: 2021-03-02

## 2021-03-02 NOTE — PROGRESS NOTES
TC from pt. Discussed AD-HC document as well as pt's request for a hand rail. Pt lost [employer] contact information. Sw re-provided contact information. Pt declined assistance form this SW by placing a referral directly to [employer]. Pt agreeable to f/u by SW at later time/date.    Plan:  TC to pt on or around 3/30/2021

## 2021-03-16 ENCOUNTER — OFFICE VISIT (OUTPATIENT)
Dept: INFECTIOUS DISEASES | Facility: MEDICAL CENTER | Age: 76
End: 2021-03-16
Payer: MEDICARE

## 2021-03-16 VITALS
DIASTOLIC BLOOD PRESSURE: 90 MMHG | HEART RATE: 75 BPM | OXYGEN SATURATION: 98 % | BODY MASS INDEX: 20.5 KG/M2 | WEIGHT: 130.6 LBS | RESPIRATION RATE: 16 BRPM | HEIGHT: 67 IN | SYSTOLIC BLOOD PRESSURE: 140 MMHG | TEMPERATURE: 98.7 F

## 2021-03-16 DIAGNOSIS — T84.7XXS HARDWARE COMPLICATING WOUND INFECTION, SEQUELA: ICD-10-CM

## 2021-03-16 DIAGNOSIS — L97.921 CHRONIC ULCER OF LEFT LEG, LIMITED TO BREAKDOWN OF SKIN (HCC): ICD-10-CM

## 2021-03-16 DIAGNOSIS — L27.0: ICD-10-CM

## 2021-03-16 PROCEDURE — 99213 OFFICE O/P EST LOW 20 MIN: CPT | Performed by: NURSE PRACTITIONER

## 2021-03-16 RX ORDER — DOXYCYCLINE HYCLATE 100 MG
100 TABLET ORAL 2 TIMES DAILY
Qty: 180 TABLET | Refills: 3 | Status: SHIPPED | OUTPATIENT
Start: 2021-04-01 | End: 2021-06-30

## 2021-03-16 ASSESSMENT — FIBROSIS 4 INDEX: FIB4 SCORE: 0.85

## 2021-03-16 ASSESSMENT — PAIN SCALES - GENERAL: PAINLEVEL: NO PAIN

## 2021-03-16 NOTE — PROGRESS NOTES
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  Well known to the ID service +PVD with chronic lower extremity ulcerations, bullous pemphigus on steroids, history of a left intertrochanteric fracture and a history of left total hip arthroplasty status post hardware removal by Dr. Vazquez on 2/11/2018.  Fell in September 2019 resulting in a C2 fracture, admitted in early October 2019 for drainage from her left hip wound.  Prior cultures MSSA and MRSA. S/p revision of a left total hip arthroplasty with exchange of head, liner and proximal stem body by Dr. Vazquez on 10/9/2019.  There was a sinus tract all the way down to the hip with significant purulence noted intraoperatively.  Operative cultures grew a single colony of diphtheroids and MSSA.  Patient was transferred to a skilled nursing facility on vancomycin and rifampin for a planned 6-week course from surgery, completed IV abx on 11/20/2019.  Started on chronic suppressive p.o. doxycycline.     Last seen by ID on 9/8/2020:  Seen by Dr. Ferrari.  Came in for a quick follow-up to check in and reassure everybody.  Reported chronic swelling and dry skin to BLE.  Uses Eucerin and a product with zinc on her legs-has \"tiny' bubbles that rupture and shallow ulcerations. No episodes cellulitis.  Continue chronic oral doxycycline suppression for prior MSSA/MRSA.     Records reviewed    Today, 3/16/2021: Patient reports feeling well and has been tolerating the p.o. doxycycline without adverse effect.  Denies feeling generally ill, fevers/chills, general malaise, headache, n/v/d, abdominal pain, chest pain, shortness of breath, cough, sore throat.  Left hip remains well-healed without any breakdown or " "drainage.  10 used to use Vaseline to help keep skin moist.  States that she gets little bubbles every now and then, but not nearly as often and they are much smaller than they had been in the past.  Received both of her Covid vaccines, stating that after she received the first 1 approximately 1 week later she developed a head to toe rash.  Her PCP has her using a steroid cream twice a day that is somewhat helping.  States that she still gets a fair amount of itching.  Patient also feels that the swelling to her BLE has gone down since receiving the Covid vaccine.    ROS as above in HPI.    Past Medical History:   Diagnosis Date   • Anxiety    • Cellulitis and abscess of lower extremity    • Dental disorder     full dentures   • Generalized osteoarthritis of multiple sites 10/20/2015   • Heart valve disease     pt not sure    • Hyperlipidemia    • Hypertension    • Osteoporosis        Family History   Problem Relation Age of Onset   • GI Disease Mother         colostomy   • Heart Attack Father    • Diabetes Father    • Hypertension Father    • Psychiatric Illness Brother         bipolar disorder       Social History     Tobacco Use   • Smoking status: Former Smoker     Packs/day: 0.50     Years: 25.00     Pack years: 12.50     Types: Cigarettes     Quit date: 2007     Years since quittin.2   • Smokeless tobacco: Never Used   Substance Use Topics   • Alcohol use: Not Currently     Alcohol/week: 3.0 oz     Types: 5 Glasses of wine per week     Comment: stop 6 month ago 2019   • Drug use: No       Allergies: Bactrim [sulfamethoxazole-trimethoprim], Meropenem, and Oxycodone    Pt's medication and problem list reviewed.     Objective:     /90 (BP Location: Left arm, Patient Position: Sitting, BP Cuff Size: Adult)   Pulse 75   Temp 37.1 °C (98.7 °F) (Temporal)   Resp 16   Ht 1.702 m (5' 7\") Comment: Patient reported  Wt 59.2 kg (130 lb 9.6 oz)   SpO2 98%   BMI 20.45 kg/m²     Physical Exam "   Constitutional: She is oriented to person, place, and time and well-developed, well-nourished, and in no distress. No distress.   Elderly   HENT:   Head: Normocephalic and atraumatic.   Right Ear: External ear normal.   Left Ear: External ear normal.   Eyes: Pupils are equal, round, and reactive to light. Conjunctivae and EOM are normal. No scleral icterus.   Neck: No JVD present. No tracheal deviation present.   Cardiovascular: Normal rate, regular rhythm and normal heart sounds.   No murmur heard.  Pulmonary/Chest: Effort normal and breath sounds normal. No respiratory distress. She has no wheezes. She has no rales.   Abdominal: Soft. Bowel sounds are normal. She exhibits no distension. There is no abdominal tenderness. There is no rebound and no guarding.   Musculoskeletal:         General: No edema.      Cervical back: Normal range of motion and neck supple.   Neurological: She is alert and oriented to person, place, and time. No cranial nerve deficit.   Fairly steady gait, FWW for ambulatory assistance   Skin: Skin is warm and dry. Rash (Diffuse rash with healing scabs throughout) noted. She is not diaphoretic. No erythema.   Psychiatric: Mood, memory, affect and judgment normal.   Pleasant   Vitals reviewed.    Assessment and Plan:   The following treatment plan was discussed with patient at length:    1. Hardware complicating wound infection, sequela  doxycycline (VIBRAMYCIN) 100 MG Tab    Continue p.o. doxycycline 100 mg twice daily for chronic lifelong suppression due to MSSA/MRSA infection.  Okay for PCP to take over doxycycline prescription if comfortable; otherwise, patient will need to be seen by ID on an annual basis.   2. Chronic ulcer of left leg, limited to breakdown of skin (HCC)      As above   3. Rash, drug contact      Continue steroid cream twice a day.  Trial p.o. Benadryl over-the-counter to help with itch.  If not improving, contact PCP for further management.     Follow up: 1 year or as  needed if PCP wants to take over management of doxycycline which is fine from ID standpoint.  RTC sooner if needed. FU with PCP for ongoing chronic medical conditions.     CHESTER [email or web address].       Please note that this dictation was created using voice recognition software. I have  worked with technical experts from [facility] to optimize the interface.  I have made every reasonable attempt to correct obvious errors, but there may be errors of grammar and possibly content that I did not discover before finalizing the note.

## 2021-04-02 ENCOUNTER — PATIENT MESSAGE (OUTPATIENT)
Dept: HEALTH INFORMATION MANAGEMENT | Facility: OTHER | Age: 76
End: 2021-04-02

## 2021-04-06 ENCOUNTER — PATIENT OUTREACH (OUTPATIENT)
Dept: MEDICAL GROUP | Facility: PHYSICIAN GROUP | Age: 76
End: 2021-04-06

## 2021-04-06 NOTE — PROGRESS NOTES
Call to Nadege. Voicemail 1 left requesting return TC.     Plan:  Call 2 of 3 to Nadege on or around 4/13/2021

## 2021-04-13 ENCOUNTER — PATIENT OUTREACH (OUTPATIENT)
Dept: MEDICAL GROUP | Facility: PHYSICIAN GROUP | Age: 76
End: 2021-04-13

## 2021-04-13 ENCOUNTER — PATIENT OUTREACH (OUTPATIENT)
Dept: HEALTH INFORMATION MANAGEMENT | Facility: OTHER | Age: 76
End: 2021-04-13

## 2021-04-13 NOTE — PROGRESS NOTES
Call 2 of 3 to patient. Voicemail (2) left requesting return call.     Plan:  Call 3 of 3 to patient on or around 4/19/2021

## 2021-04-13 NOTE — PROGRESS NOTES
Outcome: Left voice message regarding Comprehensive Geriatric Assessment.     Please transfer to Patient Outreach Team at [phone] when patient returns call.    Attempt # 2

## 2021-04-16 ENCOUNTER — PATIENT OUTREACH (OUTPATIENT)
Dept: MEDICAL GROUP | Facility: PHYSICIAN GROUP | Age: 76
End: 2021-04-16

## 2021-04-19 NOTE — PROGRESS NOTES
Call 3 of 3 to patient. Voicemail (3) left informing patient of last attempt by SW and that patient may re-establish by returning call.     Care Plan: Social Determinants of Health  Problem: Deficits in Advance Life Planning  Complete  Problem: ADL/IADL (ahndrail)  Complete (outcome unknown)  Plan:  SW will not actively follow due to loss of contact

## 2021-05-24 ENCOUNTER — TELEPHONE (OUTPATIENT)
Dept: MEDICAL GROUP | Facility: PHYSICIAN GROUP | Age: 76
End: 2021-05-24

## 2021-05-24 NOTE — TELEPHONE ENCOUNTER
.Phone Number Called: [phone] (home)     I called the patient to follow-up on her blood pressure.  The patient stated that the 140/90 reading obtained on 3/16/2021 by [facility] was elevated due to having to park far from the office and walk an extended distance with her walker, as well as the degree of discomfort her legs presented.   The patient stated that she monitors her  blood pressure on occasion.  She will call me tomorrow with an updated reading from [facility] or tomorrow morning.      The patient reported that she had developed a head to foot rash about one week after getting the Pfizer COVID vaccine.  This has been previously documented by others.   She said that triamcinolone helps with the itching, as well as vaseline and a zinc-containing cream used for diaper rash, however, the rash hasn't improved.  As such, the patient will be making an appointment to return to a dermatology office where she had been seen previously.

## 2021-05-26 VITALS — SYSTOLIC BLOOD PRESSURE: 130 MMHG | DIASTOLIC BLOOD PRESSURE: 86 MMHG

## 2021-06-08 RX ORDER — TRIAMCINOLONE ACETONIDE 1 MG/G
1 CREAM TOPICAL 2 TIMES DAILY
Qty: 45 G | Refills: 3 | Status: SHIPPED | OUTPATIENT
Start: 2021-06-08 | End: 2024-02-29

## 2021-06-08 NOTE — TELEPHONE ENCOUNTER
Requested Prescriptions     Signed Prescriptions Disp Refills   • triamcinolone acetonide (KENALOG) 0.1 % Cream 45 g 3     Sig: Apply 1 Application topically 2 times a day.     Authorizing Provider: SANDHYA PAGAN A.P.R.N.

## 2021-06-25 NOTE — TELEPHONE ENCOUNTER
Received request via: Patient    Was the patient seen in the last year in this department? Yes  Last OV 2/9/2021    Does the patient have an active prescription (recently filled or refills available) for medication(s) requested? No

## 2021-06-27 RX ORDER — SERTRALINE HYDROCHLORIDE 100 MG/1
100 TABLET, FILM COATED ORAL
Qty: 90 TABLET | Refills: 2 | Status: SHIPPED | OUTPATIENT
Start: 2021-06-27 | End: 2022-03-16 | Stop reason: SDUPTHER

## 2021-06-27 NOTE — TELEPHONE ENCOUNTER
Requested Prescriptions     Signed Prescriptions Disp Refills   • sertraline (ZOLOFT) 100 MG Tab 90 tablet 2     Sig: Take 1 tablet by mouth every day.     Authorizing Provider: SANDHYA PAGAN A.P.R.N.

## 2021-08-05 ENCOUNTER — TELEPHONE (OUTPATIENT)
Dept: MEDICAL GROUP | Facility: PHYSICIAN GROUP | Age: 76
End: 2021-08-05

## 2021-08-05 NOTE — TELEPHONE ENCOUNTER
Future Appointments       Provider Department Center    8/10/2021 10:00 AM RYANN Son [facility] [place]        ESTABLISHED PATIENT PRE-VISIT PLANNING     Patient was contacted to complete PVP.     Note: Patient will not be contacted if there is no indication to call.     1.  Reviewed notes from the last few office visits within the medical group: Yes, LOV 02/09/2021    2.  If any orders were placed at last visit or intended to be done for this visit (i.e. 6 mos follow-up), do we have Results/Consult Notes?         •  Labs - Labs ordered, NOT completed. Patient advised to complete prior to next appointment.  Note: If patient appointment is for lab review and patient did not complete labs, check with provider if OK to reschedule patient until labs completed.       •  Imaging - Imaging ordered, NOT completed. Patient advised to complete prior to next appointment.       •  Referrals - Referral ordered, patient was seen and consult notes are in chart. Care Teams updated  YES.    3. Is this appointment scheduled as a Hospital Follow-Up? No    4.  Immunizations were updated in Epic using Reconcile Outside Information activity? Yes    5.  Patient is due for the following Health Maintenance Topics:   Health Maintenance Due   Topic Date Due   • IMM ZOSTER VACCINES (1 of 2) Never done   • Annual Wellness Visit  10/20/2016   • BONE DENSITY  12/30/2019       - Patient plans to schedule appointment for Dexa Scan.    6.  AHA (Pulse8) form printed for Provider? N/A   Patient advised to arrive 15 minutes prior to scheduled appt

## 2021-08-10 ENCOUNTER — OFFICE VISIT (OUTPATIENT)
Dept: MEDICAL GROUP | Facility: PHYSICIAN GROUP | Age: 76
End: 2021-08-10
Payer: MEDICARE

## 2021-08-10 VITALS
WEIGHT: 143 LBS | OXYGEN SATURATION: 95 % | DIASTOLIC BLOOD PRESSURE: 82 MMHG | SYSTOLIC BLOOD PRESSURE: 132 MMHG | HEART RATE: 78 BPM | TEMPERATURE: 97.7 F | RESPIRATION RATE: 18 BRPM | BODY MASS INDEX: 22.44 KG/M2 | HEIGHT: 67 IN

## 2021-08-10 DIAGNOSIS — F33.9 MAJOR DEPRESSION, RECURRENT, CHRONIC (HCC): ICD-10-CM

## 2021-08-10 DIAGNOSIS — R63.4 WEIGHT LOSS, NON-INTENTIONAL: ICD-10-CM

## 2021-08-10 DIAGNOSIS — Z22.322 MRSA (METHICILLIN RESISTANT STAPH AUREUS) CULTURE POSITIVE: ICD-10-CM

## 2021-08-10 DIAGNOSIS — S12.191S OTHER CLOSED NONDISPLACED FRACTURE OF SECOND CERVICAL VERTEBRA, SEQUELA: ICD-10-CM

## 2021-08-10 PROCEDURE — 99213 OFFICE O/P EST LOW 20 MIN: CPT | Performed by: NURSE PRACTITIONER

## 2021-08-10 ASSESSMENT — FIBROSIS 4 INDEX: FIB4 SCORE: 0.85

## 2021-08-10 NOTE — ASSESSMENT & PLAN NOTE
Sertraline daily. Doing well at this time. Sometimes feels down with skin rashes, but these have improved.

## 2021-08-10 NOTE — ASSESSMENT & PLAN NOTE
Lifelong doxycycline, followed by infectious disease.  Doing well at this time.  Rare outbreaks of small blisters reported.

## 2021-08-10 NOTE — PROGRESS NOTES
Chief Complaint   Patient presents with   • Follow-Up   • Hypertension   • Depression       Subjective:   Nadege Mae is a 75 y.o. female here today for evaluation and management of:    C2 cervical fracture (HCC)  Historical issue.  Has upcoming dexa scan tomorrow.      MRSA (methicillin resistant staph aureus) culture positive  Lifelong doxycycline, followed by infectious disease.  Doing well at this time.  Rare outbreaks of small blisters reported.     Weight loss, non-intentional  Weight improving up 10 pounds.     Major depression, recurrent, chronic (HCC)  Sertraline daily. Doing well at this time. Sometimes feels down with skin rashes, but these have improved.            Current medicines (including changes today)  Current Outpatient Medications   Medication Sig Dispense Refill   • sertraline (ZOLOFT) 100 MG Tab Take 1 tablet by mouth every day. 90 tablet 2   • triamcinolone acetonide (KENALOG) 0.1 % Cream Apply 1 Application topically 2 times a day. 45 g 3   • metoprolol SR (TOPROL XL) 100 MG TABLET SR 24 HR TAKE 1 TABLET BY MOUTH EVERY  Tab 3   • acetaminophen (TYLENOL) 325 MG Tab Take 2 Tabs by mouth every 6 hours as needed (Mild Pain; (Pain scale 1-3); Temp greater than 100.5 F). (Patient taking differently: Take 650 mg by mouth every four hours as needed (Mild Pain; (Pain scale 1-3); Temp greater than 100.5 F).) 30 Tab 0   • Multiple Vitamins-Minerals (THERAPEUTIC-M/LUTEIN) Tab Take 1 Tab by mouth every day.  0     No current facility-administered medications for this visit.     She  has a past medical history of Anxiety, Cellulitis and abscess of lower extremity, Dental disorder, Generalized osteoarthritis of multiple sites (10/20/2015), Heart valve disease, Hyperlipidemia, Hypertension, and Osteoporosis. She also has no past medical history of Allergy, Diabetes, Muscle disorder, or OSTEOPOROSIS.    ROS as stated in hpi  No chest pain, no shortness of breath, no abdominal pain        "Objective:     /82   Pulse 78   Temp 36.5 °C (97.7 °F) (Temporal)   Resp 18   Ht 1.702 m (5' 7\")   Wt 64.9 kg (143 lb)   SpO2 95%  Body mass index is 22.4 kg/m².   Physical Exam:  Constitutional: Alert, no distress.  Skin: Warm, dry, good turgor,no cyanosis, no rashes in visible areas.  Eye: Equal, round and reactive, conjunctiva clear, lids normal.  Ears: No tenderness, no discharge.  External canals are without any significant edema or erythema.  Tympanic membranes are without any inflammation, no effusion.  Gross auditory acuity is intact.  Nose: symmetrical without tenderness, no discharge.  Mouth/Throat: lips without lesion.  Oropharynx clear.  Throat without erythema, exudates or tonsillar enlargement.  Neck: Trachea midline, no masses, no obvious thyroid enlargement.. No cervical or supraclavicular lymphadenopathy. Range of motion within normal limits.  Neuro: Cranial nerves 2-12 grossly intact.  No sensory deficit.  Respiratory: Unlabored respiratory effort, lungs clear to auscultation, no wheezes, no ronchi.  Cardiovascular: Normal S1, S2, no murmur, no edema.  Abdomen: Soft, non-tender, no masses, no guarding,  no hepatosplenomegaly.  Psych: Alert and oriented x3, normal affect and mood and judgement.        Assessment and Plan:   The following treatment plan was discussed    1. Other closed nondisplaced fracture of second cervical vertebra, sequela  Chronic, ongoing, has dexa scan this week.  Monitor follow and advise based on findings.  Monitor and follow.     2. MRSA (methicillin resistant staph aureus) culture positive  Chronic, ongoing, lifelong doxycycline.  Taking daily.  Followed yearly by ID.  Monitor and follow.     3. Weight loss, non-intentional  Chronic, ongoing, improved.  Weight up 10 pounds, feeling better, cooking at home and doing well.  Monitor.     4. Major depression, recurrent, chronic (HCC)  Chronic, ongoing, stable with sertraline.  Continue daily.  Continue to do things " she enjoys.  Encouraged daily outside time.  Monitor and follow.       Followup: Return in about 4 months (around 12/10/2021) for Multiple issues.         Educated in proper administration of medication(s) ordered today including safety, possible SE, risks, benefits, rationale and alternatives to therapy.     Please note that this dictation was created using voice recognition software. I have made every reasonable attempt to correct obvious errors, but I expect that there are errors of grammar and possibly content that I did not discover before finalizing the note.

## 2021-08-11 ENCOUNTER — HOSPITAL ENCOUNTER (OUTPATIENT)
Dept: LAB | Facility: MEDICAL CENTER | Age: 76
End: 2021-08-11
Attending: NURSE PRACTITIONER
Payer: MEDICARE

## 2021-08-11 DIAGNOSIS — F10.20 ALCOHOLISM (HCC): ICD-10-CM

## 2021-08-11 DIAGNOSIS — R63.4 WEIGHT LOSS, NON-INTENTIONAL: ICD-10-CM

## 2021-08-11 LAB
25(OH)D3 SERPL-MCNC: 27 NG/ML (ref 30–100)
ALBUMIN SERPL BCP-MCNC: 3.5 G/DL (ref 3.2–4.9)
ALBUMIN/GLOB SERPL: 0.8 G/DL
ALP SERPL-CCNC: 85 U/L (ref 30–99)
ALT SERPL-CCNC: 11 U/L (ref 2–50)
ANION GAP SERPL CALC-SCNC: 13 MMOL/L (ref 7–16)
AST SERPL-CCNC: 24 U/L (ref 12–45)
BASOPHILS # BLD AUTO: 1.4 % (ref 0–1.8)
BASOPHILS # BLD: 0.08 K/UL (ref 0–0.12)
BILIRUB SERPL-MCNC: 0.3 MG/DL (ref 0.1–1.5)
BUN SERPL-MCNC: 22 MG/DL (ref 8–22)
CALCIUM SERPL-MCNC: 9.6 MG/DL (ref 8.5–10.5)
CHLORIDE SERPL-SCNC: 101 MMOL/L (ref 96–112)
CO2 SERPL-SCNC: 24 MMOL/L (ref 20–33)
CREAT SERPL-MCNC: 0.9 MG/DL (ref 0.5–1.4)
EOSINOPHIL # BLD AUTO: 0.13 K/UL (ref 0–0.51)
EOSINOPHIL NFR BLD: 2.3 % (ref 0–6.9)
ERYTHROCYTE [DISTWIDTH] IN BLOOD BY AUTOMATED COUNT: 51.8 FL (ref 35.9–50)
FASTING STATUS PATIENT QL REPORTED: NORMAL
FOLATE SERPL-MCNC: 24 NG/ML
GLOBULIN SER CALC-MCNC: 4.6 G/DL (ref 1.9–3.5)
GLUCOSE SERPL-MCNC: 94 MG/DL (ref 65–99)
HCT VFR BLD AUTO: 35.1 % (ref 37–47)
HGB BLD-MCNC: 10.7 G/DL (ref 12–16)
IMM GRANULOCYTES # BLD AUTO: 0.02 K/UL (ref 0–0.11)
IMM GRANULOCYTES NFR BLD AUTO: 0.4 % (ref 0–0.9)
LYMPHOCYTES # BLD AUTO: 1.24 K/UL (ref 1–4.8)
LYMPHOCYTES NFR BLD: 22.2 % (ref 22–41)
MCH RBC QN AUTO: 28.2 PG (ref 27–33)
MCHC RBC AUTO-ENTMCNC: 30.5 G/DL (ref 33.6–35)
MCV RBC AUTO: 92.4 FL (ref 81.4–97.8)
MONOCYTES # BLD AUTO: 0.57 K/UL (ref 0–0.85)
MONOCYTES NFR BLD AUTO: 10.2 % (ref 0–13.4)
NEUTROPHILS # BLD AUTO: 3.55 K/UL (ref 2–7.15)
NEUTROPHILS NFR BLD: 63.5 % (ref 44–72)
NRBC # BLD AUTO: 0 K/UL
NRBC BLD-RTO: 0 /100 WBC
PLATELET # BLD AUTO: 266 K/UL (ref 164–446)
PMV BLD AUTO: 10.8 FL (ref 9–12.9)
POTASSIUM SERPL-SCNC: 4.2 MMOL/L (ref 3.6–5.5)
PROT SERPL-MCNC: 8.1 G/DL (ref 6–8.2)
RBC # BLD AUTO: 3.8 M/UL (ref 4.2–5.4)
SODIUM SERPL-SCNC: 138 MMOL/L (ref 135–145)
VIT B12 SERPL-MCNC: 415 PG/ML (ref 211–911)
WBC # BLD AUTO: 5.6 K/UL (ref 4.8–10.8)

## 2021-08-11 PROCEDURE — 85025 COMPLETE CBC W/AUTO DIFF WBC: CPT

## 2021-08-11 PROCEDURE — 82607 VITAMIN B-12: CPT

## 2021-08-11 PROCEDURE — 80053 COMPREHEN METABOLIC PANEL: CPT

## 2021-08-11 PROCEDURE — 82306 VITAMIN D 25 HYDROXY: CPT

## 2021-08-11 PROCEDURE — 82746 ASSAY OF FOLIC ACID SERUM: CPT

## 2021-08-11 PROCEDURE — 36415 COLL VENOUS BLD VENIPUNCTURE: CPT

## 2021-08-12 ENCOUNTER — HOSPITAL ENCOUNTER (OUTPATIENT)
Dept: RADIOLOGY | Facility: MEDICAL CENTER | Age: 76
End: 2021-08-12
Attending: NURSE PRACTITIONER
Payer: MEDICARE

## 2021-08-12 DIAGNOSIS — Z78.0 POST-MENOPAUSAL: ICD-10-CM

## 2021-08-12 PROCEDURE — 77080 DXA BONE DENSITY AXIAL: CPT

## 2021-09-02 ENCOUNTER — OFFICE VISIT (OUTPATIENT)
Dept: MEDICAL GROUP | Facility: PHYSICIAN GROUP | Age: 76
End: 2021-09-02
Payer: MEDICARE

## 2021-09-02 VITALS
DIASTOLIC BLOOD PRESSURE: 100 MMHG | OXYGEN SATURATION: 95 % | HEIGHT: 67 IN | RESPIRATION RATE: 16 BRPM | WEIGHT: 144 LBS | HEART RATE: 84 BPM | TEMPERATURE: 97.3 F | SYSTOLIC BLOOD PRESSURE: 178 MMHG | BODY MASS INDEX: 22.6 KG/M2

## 2021-09-02 DIAGNOSIS — M81.0 AGE-RELATED OSTEOPOROSIS WITHOUT CURRENT PATHOLOGICAL FRACTURE: ICD-10-CM

## 2021-09-02 DIAGNOSIS — Z02.89 ENCOUNTER FOR COMPLETION OF FORM WITH PATIENT: ICD-10-CM

## 2021-09-02 PROCEDURE — 99213 OFFICE O/P EST LOW 20 MIN: CPT | Performed by: NURSE PRACTITIONER

## 2021-09-02 RX ORDER — FERROUS SULFATE 325(65) MG
325 TABLET ORAL DAILY
COMMUNITY
End: 2022-04-28

## 2021-09-02 RX ORDER — VITAMIN B COMPLEX
1000 TABLET ORAL DAILY
COMMUNITY

## 2021-09-02 ASSESSMENT — FIBROSIS 4 INDEX: FIB4 SCORE: 2.09

## 2021-09-02 NOTE — PROGRESS NOTES
Chief Complaint   Patient presents with   • Paperwork       Subjective:   Nadege Mae is a 75 y.o. female here today for evaluation and management of:    Encounter for completion of form with patient  Here for DMV form.  Has eye appointment next week.  No medications that would interfere with her driving.  No recent accidents/tickets.  Minimal driving reported.  Feels that her reflexes are good in responding.  Does not drive at night.      Age-related osteoporosis without current pathological fracture  dexa 8/21 shows osteoporosis worsening.  Will order prolia  Using walker.  No recent falls reported.            Current medicines (including changes today)  Current Outpatient Medications   Medication Sig Dispense Refill   • vitamin D (CHOLECALCIFEROL) 1000 Unit (25 mcg) Tab Take 1,000 Units by mouth every day.     • ferrous sulfate 325 (65 Fe) MG tablet Take 325 mg by mouth every day.     • denosumab (PROLIA) 60 MG/ML Solution Prefilled Syringe Inject 1 mL under the skin one time for 1 dose. 1 mL 0   • sertraline (ZOLOFT) 100 MG Tab Take 1 tablet by mouth every day. 90 tablet 2   • triamcinolone acetonide (KENALOG) 0.1 % Cream Apply 1 Application topically 2 times a day. 45 g 3   • metoprolol SR (TOPROL XL) 100 MG TABLET SR 24 HR TAKE 1 TABLET BY MOUTH EVERY  Tab 3   • acetaminophen (TYLENOL) 325 MG Tab Take 2 Tabs by mouth every 6 hours as needed (Mild Pain; (Pain scale 1-3); Temp greater than 100.5 F). (Patient taking differently: Take 650 mg by mouth every four hours as needed (Mild Pain; (Pain scale 1-3); Temp greater than 100.5 F).) 30 Tab 0   • Multiple Vitamins-Minerals (THERAPEUTIC-M/LUTEIN) Tab Take 1 Tab by mouth every day.  0     No current facility-administered medications for this visit.     She  has a past medical history of Anxiety, Cellulitis and abscess of lower extremity, Dental disorder, Generalized osteoarthritis of multiple sites (10/20/2015), Heart valve disease, Hyperlipidemia,  "Hypertension, and Osteoporosis. She also has no past medical history of Allergy, Diabetes, Muscle disorder, or OSTEOPOROSIS.    ROS as stated inhpi  No chest pain, no shortness of breath, no abdominal pain       Objective:     BP (!) 178/100 (BP Location: Left arm, Patient Position: Sitting)   Pulse 84   Temp 36.3 °C (97.3 °F) (Temporal)   Resp 16   Ht 1.702 m (5' 7\")   Wt 65.3 kg (144 lb)   SpO2 95%  Body mass index is 22.55 kg/m². elevated, reports traffic was heavy coming in  Physical Exam:  Constitutional: Alert, no distress.  Skin: Warm, dry, good turgor,no cyanosis, no rashes in visible areas.  Eye: Equal, round and reactive, conjunctiva clear, lids normal.  Ears: No tenderness, no discharge.  External canals are without any significant edema or erythema.  Tympanic membranes are without any inflammation, no effusion.  Gross auditory acuity is intact.  Nose: symmetrical without tenderness, no discharge.  Mouth/Throat: lips without lesion.  Oropharynx clear.  Throat without erythema, exudates or tonsillar enlargement.  Neck: Trachea midline, no masses, no obvious thyroid enlargement.. No cervical or supraclavicular lymphadenopathy. Range of motion within normal limits.  Neuro: Cranial nerves 2-12 grossly intact.  No sensory deficit.  Respiratory: Unlabored respiratory effort, lungs clear to auscultation, no wheezes, no ronchi.  Cardiovascular: Normal S1, S2, no murmur, no edema.  Abdomen: Soft, non-tender, no masses, no guarding,  no hepatosplenomegaly.  Psych: Alert and oriented x3, normal affect and mood and judgement.        Assessment and Plan:   The following treatment plan was discussed    1. Encounter for completion of form with patient  Here for DMV driving form.  No restrictions at this time.  Continue to monitor her reflex time, visiion, any memory concerns.  Monitor and follow.     2. Age-related osteoporosis without current pathological fracture  Chronic, ongoing, worsening.  Patient would like " to try the prolia.  Rx ordered.  Monitor.       Followup: No follow-ups on file.         Educated in proper administration of medication(s) ordered today including safety, possible SE, risks, benefits, rationale and alternatives to therapy.     Please note that this dictation was created using voice recognition software. I have made every reasonable attempt to correct obvious errors, but I expect that there are errors of grammar and possibly content that I did not discover before finalizing the note.

## 2021-09-02 NOTE — ASSESSMENT & PLAN NOTE
dexa 8/21 shows osteoporosis worsening.  Will order prolia  Using walker.  No recent falls reported.

## 2021-09-02 NOTE — ASSESSMENT & PLAN NOTE
Here for DMV form.  Has eye appointment next week.  No medications that would interfere with her driving.  No recent accidents/tickets.  Minimal driving reported.  Feels that her reflexes are good in responding.  Does not drive at night.

## 2021-09-03 DIAGNOSIS — I10 ESSENTIAL HYPERTENSION: ICD-10-CM

## 2021-09-03 RX ORDER — METOPROLOL SUCCINATE 100 MG/1
TABLET, EXTENDED RELEASE ORAL
Qty: 100 TABLET | Refills: 0 | Status: SHIPPED | OUTPATIENT
Start: 2021-09-03 | End: 2021-12-09

## 2021-09-03 NOTE — TELEPHONE ENCOUNTER
Requested Prescriptions     Signed Prescriptions Disp Refills   • metoprolol SR (TOPROL XL) 100 MG TABLET SR 24  Tablet 0     Sig: TAKE 1 TABLET BY MOUTH EVERY DAY     Authorizing Provider: ABBIE DYE A.P.R.N.

## 2021-09-15 ENCOUNTER — NON-PROVIDER VISIT (OUTPATIENT)
Dept: MEDICAL GROUP | Facility: PHYSICIAN GROUP | Age: 76
End: 2021-09-15
Payer: MEDICARE

## 2021-09-15 VITALS — HEART RATE: 90 BPM | SYSTOLIC BLOOD PRESSURE: 132 MMHG | OXYGEN SATURATION: 92 % | DIASTOLIC BLOOD PRESSURE: 80 MMHG

## 2021-09-15 NOTE — PROGRESS NOTES
Nadege Mae is a 75 y.o. female here for a non-provider visit for BP CHECK     Vitals:    09/15/21 1012   BP: 132/80   BP Location: Left arm   Patient Position: Sitting   Pulse: 90   SpO2: 92%     If abnormal, was the Registered Nurse (office provider if RN is unavailable) notified today? Not Indicated    Routed to PCP? Yes

## 2021-10-08 ENCOUNTER — OFFICE VISIT (OUTPATIENT)
Dept: MEDICAL GROUP | Facility: PHYSICIAN GROUP | Age: 76
End: 2021-10-08
Payer: MEDICARE

## 2021-10-08 VITALS
OXYGEN SATURATION: 94 % | HEIGHT: 63 IN | BODY MASS INDEX: 24.98 KG/M2 | TEMPERATURE: 99.1 F | SYSTOLIC BLOOD PRESSURE: 138 MMHG | DIASTOLIC BLOOD PRESSURE: 78 MMHG | RESPIRATION RATE: 16 BRPM | WEIGHT: 141 LBS | HEART RATE: 76 BPM

## 2021-10-08 DIAGNOSIS — M81.0 AGE-RELATED OSTEOPOROSIS WITHOUT CURRENT PATHOLOGICAL FRACTURE: ICD-10-CM

## 2021-10-08 DIAGNOSIS — Z01.89 ENCOUNTER FOR GERIATRIC ASSESSMENT: ICD-10-CM

## 2021-10-08 DIAGNOSIS — E04.1 THYROID NODULE: ICD-10-CM

## 2021-10-08 DIAGNOSIS — T07.XXXA FRACTURES: ICD-10-CM

## 2021-10-08 DIAGNOSIS — E55.9 VITAMIN D INSUFFICIENCY: ICD-10-CM

## 2021-10-08 DIAGNOSIS — Z23 NEED FOR VACCINATION: ICD-10-CM

## 2021-10-08 DIAGNOSIS — H91.93 HEARING DIFFICULTY OF BOTH EARS: ICD-10-CM

## 2021-10-08 DIAGNOSIS — R21 SKIN ERUPTION: ICD-10-CM

## 2021-10-08 PROBLEM — R63.4 WEIGHT LOSS, NON-INTENTIONAL: Status: RESOLVED | Noted: 2020-02-12 | Resolved: 2021-10-08

## 2021-10-08 PROBLEM — Z02.89 ENCOUNTER FOR COMPLETION OF FORM WITH PATIENT: Status: RESOLVED | Noted: 2021-09-02 | Resolved: 2021-10-08

## 2021-10-08 PROCEDURE — G0008 ADMIN INFLUENZA VIRUS VAC: HCPCS | Performed by: STUDENT IN AN ORGANIZED HEALTH CARE EDUCATION/TRAINING PROGRAM

## 2021-10-08 PROCEDURE — 90662 IIV NO PRSV INCREASED AG IM: CPT | Performed by: STUDENT IN AN ORGANIZED HEALTH CARE EDUCATION/TRAINING PROGRAM

## 2021-10-08 PROCEDURE — 99214 OFFICE O/P EST MOD 30 MIN: CPT | Mod: 25 | Performed by: STUDENT IN AN ORGANIZED HEALTH CARE EDUCATION/TRAINING PROGRAM

## 2021-10-08 RX ORDER — DOXYCYCLINE HYCLATE 100 MG
TABLET ORAL
COMMUNITY
Start: 2021-09-24 | End: 2022-04-14 | Stop reason: SDUPTHER

## 2021-10-08 ASSESSMENT — PATIENT HEALTH QUESTIONNAIRE - PHQ9: CLINICAL INTERPRETATION OF PHQ2 SCORE: 0

## 2021-10-08 ASSESSMENT — FIBROSIS 4 INDEX: FIB4 SCORE: 2.09

## 2021-10-08 NOTE — ASSESSMENT & PLAN NOTE
Prior diagnosis listed as osteoporosis without fracture.  However, patient notes multiple fractures, at C2, and multiple on feet.  She has a recent DEXA scan, noting osteoporosis, but elevated osteoporotic fracture risk.  … Prior PCP had been planning on Prolia, and she notes she is planning to have this done in a couple of weeks.  ... unable to quickly find details of prior fractures, so continuing to list this issue, but may have potential for fractures....

## 2021-10-08 NOTE — PATIENT INSTRUCTIONS
Please follow-up with the referral(s) to GERIATRICS.  You will likely receive a phone call or [facility] message, with information about what office to contact, in order to schedule.  However, if you do not hear from them in the next week or two, please call [phone], and ask for the referral line, to find out the status of the referral.        Abdomen soft, non-tender, no guarding. BS positive.

## 2021-10-08 NOTE — ASSESSMENT & PLAN NOTE
Geriatric patient, with multiple fractures.  She reports having had at least 4 fractures on her left foot (which continues to be in a soft boot today)....   Additionally, she notes previously having a C2 fracture.  She states for those issues, they are allowing her to heal on her own, without surgery or intervention.  She states that the reason for this was multiple (repeated) surgeries on her hip, and feeling it is better for conservative management at this time.…  However, patient was mildly low in vitamin D.  -Advising patient to increase vitamin D (see that section), and also controlling osteopenia/osteoporosis, with consideration for Prolia, as noted in other section.

## 2021-10-08 NOTE — ASSESSMENT & PLAN NOTE
Prior imaging in 2019 incidentally noted thyroid nodule.  Recommended ultrasound follow-up, but apparently never done.  This was discussed with patient, but she declines for now.  However, concern for how well she is understanding this.…  Since we are requesting transfer to geriatrics, recommend that they follow-up at some point in the future.  However, this is unlikely to be an acute or urgent change, as it was noted 2 years ago.

## 2021-10-08 NOTE — ASSESSMENT & PLAN NOTE
Patient notes difficulty hearing, repeatedly throughout the exam.  She frequently tries to guess the questions being asked, and answering different questions (rather than ask what was said).  Patient declined hearing evaluation at this time.…  Patient is also being referred to geriatrics, and hopefully, they will be able to follow-up on her hearing, and consideration for potential hearing aids.

## 2021-10-08 NOTE — ASSESSMENT & PLAN NOTE
"Chronic and ongoing skin eruptions.  Patient provides poor history for this, but notes being started on doxycycline either by dermatology or infectious disease.  She states she has to use this \"lifelong.\"  I am unable to quickly find a prior note verifying if this was the case, but medical reconciliation did: And doxycycline, from outside source, and history is consistent with note from prior PCP.  -Continue to follow-up with Derm     (and ID, if established there).  -Currently on doxycycline.  -Can continue for now, but specialists will manage.  "

## 2021-10-08 NOTE — ASSESSMENT & PLAN NOTE
Patient with osteopenic/osteoporotic history and multiple fractures.  Recent labs with vitamin D-27. ... Currently only on 1000 units/day.  -Patient will increase to ~3000 units daily, for the next 3-6 months, and then reduce to 4403-3463 units daily, after that.

## 2021-10-19 ENCOUNTER — OUTPATIENT INFUSION SERVICES (OUTPATIENT)
Dept: ONCOLOGY | Facility: MEDICAL CENTER | Age: 76
End: 2021-10-19
Attending: FAMILY MEDICINE
Payer: MEDICARE

## 2021-10-19 VITALS
HEIGHT: 61 IN | DIASTOLIC BLOOD PRESSURE: 76 MMHG | RESPIRATION RATE: 18 BRPM | SYSTOLIC BLOOD PRESSURE: 185 MMHG | TEMPERATURE: 98.5 F | OXYGEN SATURATION: 91 % | WEIGHT: 144.4 LBS | BODY MASS INDEX: 27.26 KG/M2 | HEART RATE: 93 BPM

## 2021-10-19 DIAGNOSIS — Z87.81 HISTORY OF HIP FRACTURE: ICD-10-CM

## 2021-10-19 DIAGNOSIS — M81.0 AGE-RELATED OSTEOPOROSIS WITHOUT CURRENT PATHOLOGICAL FRACTURE: ICD-10-CM

## 2021-10-19 LAB
CA-I BLD ISE-SCNC: 1.18 MMOL/L (ref 1.1–1.3)
CREAT BLD-MCNC: 0.9 MG/DL (ref 0.5–1.4)

## 2021-10-19 PROCEDURE — 700111 HCHG RX REV CODE 636 W/ 250 OVERRIDE (IP): Mod: JG | Performed by: NURSE PRACTITIONER

## 2021-10-19 PROCEDURE — 96372 THER/PROPH/DIAG INJ SC/IM: CPT

## 2021-10-19 PROCEDURE — 36415 COLL VENOUS BLD VENIPUNCTURE: CPT

## 2021-10-19 PROCEDURE — 82565 ASSAY OF CREATININE: CPT

## 2021-10-19 PROCEDURE — 82330 ASSAY OF CALCIUM: CPT

## 2021-10-19 RX ADMIN — DENOSUMAB 60 MG: 60 INJECTION SUBCUTANEOUS at 13:52

## 2021-10-19 ASSESSMENT — FIBROSIS 4 INDEX: FIB4 SCORE: 2.12

## 2021-10-19 NOTE — PROGRESS NOTES
Patient arrived ambulatory to IS for Prolia.  Reviewed plan of care and medication side effects, patient verbalized understanding.  Labs drawn from left a/c and results within parameters to treat.  Prolia given to left abdomen, patient tolerated well.  Next appointment scheduled and patient ambulated out of clinic in no apparent distress.

## 2021-12-09 ENCOUNTER — TELEPHONE (OUTPATIENT)
Dept: MEDICAL GROUP | Facility: PHYSICIAN GROUP | Age: 76
End: 2021-12-09

## 2021-12-09 NOTE — TELEPHONE ENCOUNTER
Future Appointments       Provider Department Center    12/16/2021 7:30 AM RYANN Hardy [facility]    4/20/2022 1:30 PM [facility]FUSION Infusion Services [facility]        ESTABLISHED PATIENT PRE-VISIT PLANNING     Patient was contacted to complete PVP.     Note: Patient will not be contacted if there is no indication to call.     1.  Reviewed notes from the last few office visits within the medical group: Yes, LOV 10/08/2021    2.  If any orders were placed at last visit or intended to be done for this visit (i.e. 6 mos follow-up), do we have Results/Consult Notes?         •  Labs - Labs were not ordered at last office visit.  Note: If patient appointment is for lab review and patient did not complete labs, check with provider if OK to reschedule patient until labs completed.       •  Imaging - Imaging was not ordered at last office visit.       •  Referrals - Referral ordered, patient has NOT been seen.  Pt is possibly establishing with Divya Cage she wants to stay at [facility] because of the location and parking.    3. Is this appointment scheduled as a Hospital Follow-Up? No    4.  Immunizations were updated in Epic using Reconcile Outside Information activity? Yes    5.  Patient is due for the following Health Maintenance Topics:   Health Maintenance Due   Topic Date Due   • IMM ZOSTER VACCINES (1 of 2) Never done   • COLORECTAL CANCER SCREENING  06/02/2016   • Annual Wellness Visit  10/20/2016   • COVID-19 Vaccine (3 - Booster for Pfizer series) 09/09/2021     6.  AHA (Pulse8) form printed for Provider? N/A

## 2021-12-16 ENCOUNTER — OFFICE VISIT (OUTPATIENT)
Dept: MEDICAL GROUP | Facility: PHYSICIAN GROUP | Age: 76
End: 2021-12-16
Payer: MEDICARE

## 2021-12-16 VITALS
TEMPERATURE: 98.1 F | DIASTOLIC BLOOD PRESSURE: 78 MMHG | HEART RATE: 86 BPM | HEIGHT: 67 IN | OXYGEN SATURATION: 93 % | BODY MASS INDEX: 22.6 KG/M2 | WEIGHT: 144 LBS | RESPIRATION RATE: 20 BRPM | SYSTOLIC BLOOD PRESSURE: 160 MMHG

## 2021-12-16 DIAGNOSIS — H91.93 HEARING DIFFICULTY OF BOTH EARS: ICD-10-CM

## 2021-12-16 DIAGNOSIS — F10.21 HISTORY OF ALCOHOLISM (HCC): ICD-10-CM

## 2021-12-16 DIAGNOSIS — F33.9 MAJOR DEPRESSION, RECURRENT, CHRONIC (HCC): ICD-10-CM

## 2021-12-16 DIAGNOSIS — Z12.11 SCREENING FOR COLORECTAL CANCER: ICD-10-CM

## 2021-12-16 DIAGNOSIS — I10 ESSENTIAL HYPERTENSION: ICD-10-CM

## 2021-12-16 DIAGNOSIS — T07.XXXA FRACTURES: ICD-10-CM

## 2021-12-16 DIAGNOSIS — Z12.12 SCREENING FOR COLORECTAL CANCER: ICD-10-CM

## 2021-12-16 DIAGNOSIS — D50.8 OTHER IRON DEFICIENCY ANEMIA: ICD-10-CM

## 2021-12-16 DIAGNOSIS — Z76.89 ENCOUNTER TO ESTABLISH CARE: ICD-10-CM

## 2021-12-16 DIAGNOSIS — Z22.322 MRSA (METHICILLIN RESISTANT STAPH AUREUS) CULTURE POSITIVE: ICD-10-CM

## 2021-12-16 DIAGNOSIS — M81.0 AGE-RELATED OSTEOPOROSIS WITHOUT CURRENT PATHOLOGICAL FRACTURE: ICD-10-CM

## 2021-12-16 PROBLEM — E87.1 HYPONATREMIA: Status: RESOLVED | Noted: 2019-01-30 | Resolved: 2021-12-16

## 2021-12-16 PROBLEM — E53.8 LOW FOLATE: Status: RESOLVED | Noted: 2018-10-04 | Resolved: 2021-12-16

## 2021-12-16 PROBLEM — T84.7XXA HARDWARE COMPLICATING WOUND INFECTION (HCC): Status: RESOLVED | Noted: 2019-12-02 | Resolved: 2021-12-16

## 2021-12-16 PROBLEM — S81.809A OPEN LEG WOUND: Status: RESOLVED | Noted: 2018-02-10 | Resolved: 2021-12-16

## 2021-12-16 PROCEDURE — 99214 OFFICE O/P EST MOD 30 MIN: CPT | Performed by: NURSE PRACTITIONER

## 2021-12-16 RX ORDER — METOPROLOL SUCCINATE 100 MG/1
100 TABLET, EXTENDED RELEASE ORAL
Qty: 100 TABLET | Refills: 1 | Status: SHIPPED | OUTPATIENT
Start: 2021-12-16 | End: 2022-07-28 | Stop reason: SDUPTHER

## 2021-12-16 ASSESSMENT — FIBROSIS 4 INDEX: FIB4 SCORE: 2.12

## 2021-12-16 NOTE — ASSESSMENT & PLAN NOTE
She is taking sertraline 100 mg daily. Denies any self-harm or SI today.  She reports that she has been on this dose for years.  Reports that it controls her depression.

## 2021-12-16 NOTE — ASSESSMENT & PLAN NOTE
She is taking doxycycline 100 mg BID. She is followed by infectious disease and reports will need lifelong treatment.  Her last office visit was in March 2021.

## 2021-12-16 NOTE — ASSESSMENT & PLAN NOTE
Her initial blood pressure today is 182/92 and pulse is 86. She needs a refill on her metoprolol today. She is taking metoprolol  mg daily. She states that her blood pressure is high when she comes to the doctor's office. She will check her blood pressure at [facility] and states that her numbers are 130's/80's. She has had her dose this morning.  She does not have any chest pain, shortness of breath, dizziness, blurry vision, or headaches.

## 2021-12-16 NOTE — PROGRESS NOTES
Subjective:     CC:  Diagnoses of Essential hypertension, Age-related osteoporosis without current pathological fracture, Major depression, recurrent, chronic (HCC), Other iron deficiency anemia, History of alcoholism ([facility]), MRSA (methicillin resistant staph aureus) culture positive, Fractures, Screening for colorectal cancer, and Hearing difficulty of both ears were pertinent to this visit.    HISTORY OF THE PRESENT ILLNESS: Patient is a 76 y.o. female. This pleasant patient is here today to establish care and discuss the following. Her prior PCP was Dr. Sean Ruiz.    Essential hypertension  Her initial blood pressure today is 182/92 and pulse is 86. She needs a refill on her metoprolol today. She is taking metoprolol  mg daily. She states that her blood pressure is high when she comes to the doctor's office. She will check her blood pressure at [facility] and states that her numbers are 130's/80's. She has had her dose this morning.  She does not have any chest pain, shortness of breath, dizziness, blurry vision, or headaches.    Age-related osteoporosis without current pathological fracture  She had her first dose of Prolia in October 2021. Reports fracture of C2 and notes improvement in her ROM. She is taking vitamin D 1,000 units and daily multivitamin. She is walking for her exercises.    Major depression, recurrent, chronic (HCC)  She is taking sertraline 100 mg daily. Denies any self-harm or SI today.  She reports that she has been on this dose for years.  Reports that it controls her depression.    Iron deficiency anemia  She is taking ferrous sulfate 325 mg daily. Due for updated labs, last iron level was in 2019.    History of alcoholism ([facility])  She quit drinking in 2019.  She remains in remission.    MRSA (methicillin resistant staph aureus) culture positive  She is taking doxycycline 100 mg BID. She is followed by infectious disease and reports will need lifelong treatment.  Her last office visit  was in March 2021.    Fractures  Reports left foot fracture 4 years ago and did not see ortho. Plans to see ortho sometime this year. Wearing soft shoe to left foot and using walker to ambulate.    Hearing difficulty of both ears  She does not wearing aids. She has plans to get a hearing test.  She is interested in referral to audiology.      Allergies: Bactrim [sulfamethoxazole-trimethoprim], Meropenem, and Oxycodone    Current Outpatient Medications Ordered in Epic   Medication Sig Dispense Refill   • metoprolol SR (TOPROL XL) 100 MG TABLET SR 24 HR Take 1 Tablet by mouth every day. 100 Tablet 1   • doxycycline (VIBRAMYCIN) 100 MG Tab TAKE 1 TABLET BY MOUTH TWICE A DAY   (Through Infectious disease ?)     • vitamin D (CHOLECALCIFEROL) 1000 Unit (25 mcg) Tab Take 1,000 Units by mouth every day.     • ferrous sulfate 325 (65 Fe) MG tablet Take 325 mg by mouth every day.     • sertraline (ZOLOFT) 100 MG Tab Take 1 tablet by mouth every day. 90 tablet 2   • acetaminophen (TYLENOL) 325 MG Tab Take 2 Tabs by mouth every 6 hours as needed (Mild Pain; (Pain scale 1-3); Temp greater than 100.5 F). (Patient taking differently: Take 650 mg by mouth every four hours as needed (Mild Pain; (Pain scale 1-3); Temp greater than 100.5 F).) 30 Tab 0   • Multiple Vitamins-Minerals (THERAPEUTIC-M/LUTEIN) Tab Take 1 Tab by mouth every day.  0   • triamcinolone acetonide (KENALOG) 0.1 % Cream Apply 1 Application topically 2 times a day. (Patient not taking: Reported on 12/16/2021) 45 g 3     No current [facility]-ordered facility-administered medications on file.       Past Medical History:   Diagnosis Date   • Anxiety    • Cellulitis and abscess of lower extremity    • Dental disorder     full dentures   • Generalized osteoarthritis of multiple sites 10/20/2015   • Hardware complicating wound infection (HCC) 12/2/2019   • Heart valve disease     pt not sure    • Hyperlipidemia    • Hypertension    • Osteoporosis        Past Surgical History:    Procedure Laterality Date   • HIP REVISION TOTAL Left 10/9/2019    Procedure: REVISION, TOTAL ARTHROPLASTY, HIP;  Surgeon: Chong Vazquez M.D.;  Location: SURGERY [facility];  Service: Orthopedics   • FEMORAL POPLITEAL BYPASS Left 2018    Procedure: FEMORAL POPLITEAL BYPASS;  Surgeon: Milana Colin M.D.;  Location: SURGERY [facility];  Service: General   • IRRIGATION & DEBRIDEMENT GENERAL Left 2018    Procedure: IRRIGATION & DEBRIDEMENT ANKLE WOUND;  Surgeon: Milana Colin M.D.;  Location: SURGERY [facility];  Service: General   • IRRIGATION & DEBRIDEMENT HIP Left 2018    Procedure: IRRIGATION & DEBRIDEMENT HIP;  Surgeon: Chong Vazquez M.D.;  Location: SURGERY [facility];  Service: Orthopedics   • HIP REVISION TOTAL Left 2018    Procedure: HIP REVISION TOTAL- femoral nail removal conversion to total hip arthoroplasty;  Surgeon: Chong Vazquez M.D.;  Location: SURGERY [facility];  Service: Orthopedics   • HIP NAILING INTRAMEDULLARY Left 2017    Procedure: HIP NAILING INTRAMEDULLARY;  Surgeon: Jorge Peters M.D.;  Location: SURGERY [facility];  Service: Orthopedics   • BREAST BIOPSY  2014    Performed by Magdalena Londono M.D. at [facility]   • BREAST BIOPSY Right     benign   • GYN SURGERY  1973    complete hysterectomy   • ABDOMINAL HYSTERECTOMY TOTAL      w/BSO due to uterine cyst and endometriosis   • ARTHROPLASTY      hip       Social History     Tobacco Use   • Smoking status: Former Smoker     Packs/day: 0.50     Years: 25.00     Pack years: 12.50     Types: Cigarettes     Quit date: 2007     Years since quittin.9   • Smokeless tobacco: Never Used   • Tobacco comment: Ex-smoker,  Quit ~  (prior hx ~ 1ppd x 25 years)   Vaping Use   • Vaping Use: Never used   Substance Use Topics   • Alcohol use: Not Currently     Alcohol/week: 0.0 oz     Comment: stopped ~  2019  (unclear about prior use).    • Drug use: No  "      Social History     Social History Narrative    Initial Intake Date: 2/9/2021Cshlomo Funes  Last Updated:        Financial situation:    SSR of $853. Pt has cost sharing with roommate.        Food resources & insecurities:    Pt able to independantly shop and cook. Often, local son will go with pt to grocery store to assist.        Housing & environmental:    2 story condo rental w/ stairs. Pt ahs rail for stairs, denies concern. Pt has good relationship with ; he will allow her to install a shower/bath rail.        Transportation:    Active  with licensed and insured vehicle.        Family dynamics & family/friend social supports:    Pt resides with roommate and receives support from local adult son. Confirmed son is emergency contact.        ADLs/IADLs & associated diagnoses:    It completing all ADL/IADLS independently with the exception of showers. Pt completing sponge bath w/ kitchen sink.        Advanced life planning:    Not on file. POA- packet mailed 2/9/2021.        Behavioral/Mental:    Pertinent diagnoses include, major depression, recurrent, chronic       Family History   Problem Relation Age of Onset   • GI Disease Mother         colostomy   • Heart Attack Father    • Diabetes Father    • Hypertension Father    • Psychiatric Illness Brother         bipolar disorder       Health Maintenance: Due for zoster vaccine, colon cancer screening, and annual wellness visit.  We discussed colon cancer screening and she is agreeable to the fit test.  She recently had her Covid booster on 12/13/2021.        Objective:     Vital signs reviewed  Exam: /78 (BP Location: Right arm, Patient Position: Sitting)   Pulse 86   Temp 36.7 °C (98.1 °F) (Temporal)   Resp 20   Ht 1.702 m (5' 7\")   Wt 65.3 kg (144 lb)   SpO2 93%  Body mass index is 22.55 kg/m².    General: Normal appearing. No distress.  Requires reorienting and redirection as she does get off topic.  HENT: Normocephalic.  " Hard of hearing.  Eyes: Eyes conjunctiva clear lids without ptosis, lids normal.  Glasses in place.  Pulmonary: Clear to ausculation.  Normal effort. No rales, ronchi, or wheezing.  Cardiovascular: Regular rate and rhythm with systolic murmur. Radial pulses are intact and equal bilaterally.  Lymph: No cervical or supraclavicular lymph nodes are palpable  Skin: Warm and dry.  No obvious lesions.  Musculoskeletal: Unsteady gait. No extremity cyanosis, clubbing, or edema.  Left foot in soft boot.  Using front wheel walker with ambulation.  Psych: Normal mood and affect. Alert and oriented x3. Judgment and insight is normal.      Assessment & Plan:   76 y.o. female with the following -    1. Encounter to establish care  Acute uncomplicated problem.  Care established today.    2. Essential hypertension  Chronic exacerbated problem.  On repeat by me her blood pressure today is 160/78.  Reinforced diet and lifestyle modifications.  We discussed home blood pressure monitoring and she states that she is able to purchase a blood pressure cuff to start monitoring.  We discussed how to correctly take her home blood pressure at home.  Blood pressure log provided today.  We discussed that I would like her to follow-up with me in 1 month and bring her blood pressure log to next appointment so that we can see how her blood pressures are.  She verbalized understanding.  Red flags discussed.  - metoprolol SR (TOPROL XL) 100 MG TABLET SR 24 HR; Take 1 Tablet by mouth every day.  Dispense: 100 Tablet; Refill: 1    3. Age-related osteoporosis without current pathological fracture  Chronic stable problem.  She will continue with her Prolia injections every 6 months.  She will be due for upcoming injection in April 2022.  She will continue with her vitamin D and multivitamin supplementation.  She will continue with daily weightbearing exercises.  I encouraged her to use her walker with ambulation.  She verbalized understanding.    4.  Major depression, recurrent, chronic (HCC)  Chronic stable problem.  She will continue with her sertraline 100 mg daily.  No new complaints today.  She denies any self-harm or SI today.    5. Other iron deficiency anemia  Chronic stable problem.  She will continue with her ferrous sulfate 325 mg daily.  With next labs will need to order iron level.    6. MRSA (methicillin resistant staph aureus) culture positive  Chronic stable problem.  Encouraged to continue her follow-up with infectious disease.  Encouraged to continue with her doxycycline 100 mg twice daily per infectious disease.    7. Fractures  Chronic stable problem.  She is not interested in referral to orthopedics today.  She states that she will make plans for this year sometime.  She will continue to use her walker with ambulation.  She will continue with her Prolia, vitamin D supplementation and multivitamin.     8. Hearing difficulty of both ears  Chronic stable problem.  Referral placed to audiology.  Currently not wearing any hearing aids.  - Referral to Audiology    9. History of alcoholism (HCC)  Chronic stable problem.  Encouraged continuing to abstain from alcohol.    10. Screening for colorectal cancer  Acute uncomplicated problem.  We discussed colon cancer screening, she is interested in fit test today.  Order placed.  - OCCULT BLOOD FECES IMMUNOASSAY (FIT); Future        Return in about 1 month (around 1/16/2022) for hypertension, iron levels, depression.    Please note that this dictation was created using voice recognition software. I have made every reasonable attempt to correct obvious errors, but I expect that there are errors of grammar and possibly content that I did not discover before finalizing the note.

## 2021-12-16 NOTE — ASSESSMENT & PLAN NOTE
She had her first dose of Prolia in October 2021. Reports fracture of C2 and notes improvement in her ROM. She is taking vitamin D 1,000 units and daily multivitamin. She is walking for her exercises.

## 2021-12-16 NOTE — ASSESSMENT & PLAN NOTE
She does not wearing aids. She has plans to get a hearing test.  She is interested in referral to audiology.   show

## 2021-12-16 NOTE — ASSESSMENT & PLAN NOTE
Reports left foot fracture 4 years ago and did not see ortho. Plans to see ortho sometime this year. Wearing soft shoe to left foot and using walker to ambulate.

## 2022-01-18 ENCOUNTER — OFFICE VISIT (OUTPATIENT)
Dept: MEDICAL GROUP | Facility: PHYSICIAN GROUP | Age: 77
End: 2022-01-18
Payer: MEDICARE

## 2022-01-18 ENCOUNTER — HOSPITAL ENCOUNTER (OUTPATIENT)
Facility: MEDICAL CENTER | Age: 77
End: 2022-01-18
Attending: NURSE PRACTITIONER
Payer: MEDICARE

## 2022-01-18 VITALS
BODY MASS INDEX: 26.81 KG/M2 | HEART RATE: 77 BPM | TEMPERATURE: 98.4 F | SYSTOLIC BLOOD PRESSURE: 162 MMHG | DIASTOLIC BLOOD PRESSURE: 86 MMHG | OXYGEN SATURATION: 97 % | WEIGHT: 142 LBS | HEIGHT: 61 IN

## 2022-01-18 DIAGNOSIS — I10 ESSENTIAL HYPERTENSION: ICD-10-CM

## 2022-01-18 DIAGNOSIS — D50.8 OTHER IRON DEFICIENCY ANEMIA: ICD-10-CM

## 2022-01-18 PROCEDURE — 82274 ASSAY TEST FOR BLOOD FECAL: CPT

## 2022-01-18 PROCEDURE — 99214 OFFICE O/P EST MOD 30 MIN: CPT | Performed by: NURSE PRACTITIONER

## 2022-01-18 RX ORDER — LISINOPRIL 5 MG/1
5 TABLET ORAL DAILY
Qty: 100 TABLET | Refills: 0 | Status: SHIPPED | OUTPATIENT
Start: 2022-01-18 | End: 2022-02-17 | Stop reason: DRUGHIGH

## 2022-01-18 ASSESSMENT — FIBROSIS 4 INDEX: FIB4 SCORE: 2.12

## 2022-01-18 NOTE — PROGRESS NOTES
Subjective:     CC: Hypertension    HPI:   Nadege presents today with the following:    Essential hypertension  Her initial blood pressure today is 180/94.  She is taking metoprolol  mg daily.  She has been checking her blood pressures at home and brings in her blood pressure log today.  Her home blood pressures have been ranging 152-173/72- 91. She is not having chest pain, shortness of breath, dizziness, blurry vision or headache. Her friend is experiencing depression and she has been trying to help her.       Past Medical History:   Diagnosis Date   • Anxiety    • Cellulitis and abscess of lower extremity    • Dental disorder     full dentures   • Generalized osteoarthritis of multiple sites 10/20/2015   • Hardware complicating wound infection (HCC) 12/2/2019   • Heart valve disease     pt not sure    • Hyperlipidemia    • Hypertension    • Osteoporosis        Social History     Tobacco Use   • Smoking status: Former Smoker     Packs/day: 0.50     Years: 25.00     Pack years: 12.50     Types: Cigarettes     Quit date: 1/1/2007     Years since quitting: 15.0   • Smokeless tobacco: Never Used   • Tobacco comment: Ex-smoker,  Quit ~ 2007 (prior hx ~ 1ppd x 25 years)   Vaping Use   • Vaping Use: Never used   Substance Use Topics   • Alcohol use: Not Currently     Alcohol/week: 0.0 oz     Comment: stopped ~  11/2019  (unclear about prior use).    • Drug use: No       Current Outpatient Medications Ordered in Epic   Medication Sig Dispense Refill   • lisinopril (PRINIVIL) 5 MG Tab Take 1 Tablet by mouth every day. 100 Tablet 0   • metoprolol SR (TOPROL XL) 100 MG TABLET SR 24 HR Take 1 Tablet by mouth every day. 100 Tablet 1   • doxycycline (VIBRAMYCIN) 100 MG Tab TAKE 1 TABLET BY MOUTH TWICE A DAY   (Through Infectious disease ?)     • vitamin D (CHOLECALCIFEROL) 1000 Unit (25 mcg) Tab Take 1,000 Units by mouth every day.     • ferrous sulfate 325 (65 Fe) MG tablet Take 325 mg by mouth every day.     • sertraline  "(ZOLOFT) 100 MG Tab Take 1 tablet by mouth every day. 90 tablet 2   • triamcinolone acetonide (KENALOG) 0.1 % Cream Apply 1 Application topically 2 times a day. 45 g 3   • acetaminophen (TYLENOL) 325 MG Tab Take 2 Tabs by mouth every 6 hours as needed (Mild Pain; (Pain scale 1-3); Temp greater than 100.5 F). (Patient taking differently: Take 650 mg by mouth every four hours as needed (Mild Pain; (Pain scale 1-3); Temp greater than 100.5 F).) 30 Tab 0   • Multiple Vitamins-Minerals (THERAPEUTIC-M/LUTEIN) Tab Take 1 Tab by mouth every day.  0     No current [facility]-ordered facility-administered medications on file.       Allergies:  Bactrim [sulfamethoxazole-trimethoprim], Meropenem, and Oxycodone    Health Maintenance: Reviewed      Objective:     Vital signs reviewed  Exam:  BP (!) 162/86 (BP Location: Right arm, Patient Position: Sitting)   Pulse 77   Temp 36.9 °C (98.4 °F) (Temporal)   Ht 1.549 m (5' 1\")   Wt 64.4 kg (142 lb)   SpO2 97%   BMI 26.83 kg/m²  Body mass index is 26.83 kg/m².    Gen: Alert and oriented, No apparent distress.  Eyes:   Lids normal. Glasses in place.   Lungs: Normal effort, CTA bilaterally, no wheezes, rhonchi, or rales  CV: Regular rate and rhythm. No murmurs, rubs, or gallops.  Ext: No clubbing, cyanosis, edema.  Using front wheel walker with ambulation.      Assessment & Plan:     76 y.o. female with the following -     1. Essential hypertension  Chronic exacerbated problem.  On repeat by me her blood pressure today is 162/86.  Given her elevated home blood pressure readings and elevated reading in office today, I am going to add lisinopril 5 mg daily to her regimen.  She will continue with her metoprolol  mg daily.  She will continue with home blood pressure monitoring.  We discussed that she can check her blood pressure once a day at least 2 hours after her blood pressure medications.  She verbalized understanding.  We will check updated labs as below.  She will return in 1 " month for follow-up.  Red flags discussed.  - Comp Metabolic Panel; Future  - Lipid Profile; Future  - TSH WITH REFLEX TO FT4; Future  - lisinopril (PRINIVIL) 5 MG Tab; Take 1 Tablet by mouth every day.  Dispense: 100 Tablet; Refill: 0    2. Other iron deficiency anemia  Chronic stable problem.  She will continue with her ferrous sulfate 320 mg daily.  She is due for updated labs.  Orders placed today.  - CBC WITH DIFFERENTIAL; Future  - IRON; Future  - FERRITIN; Future      Return in about 3 weeks (around 2/8/2022) for Hypertension.    Please note that this dictation was created using voice recognition software. I have made every reasonable attempt to correct obvious errors, but I expect that there are errors of grammar and possibly content that I did not discover before finalizing the note.

## 2022-01-18 NOTE — ASSESSMENT & PLAN NOTE
Her initial blood pressure today is 180/94.  She is taking metoprolol  mg daily.  She has been checking her blood pressures at home and brings in her blood pressure log today.  Her home blood pressures have been ranging 152-173/72- 91. She is not having chest pain, shortness of breath, dizziness, blurry vision or headache. Her friend is experiencing depression and she has been trying to help her.

## 2022-01-19 DIAGNOSIS — Z12.12 SCREENING FOR COLORECTAL CANCER: ICD-10-CM

## 2022-01-19 DIAGNOSIS — Z12.11 SCREENING FOR COLORECTAL CANCER: ICD-10-CM

## 2022-01-20 DIAGNOSIS — R19.5 POSITIVE FIT (FECAL IMMUNOCHEMICAL TEST): ICD-10-CM

## 2022-01-20 LAB — IMM ASSAY OCC BLD FITOB: POSITIVE

## 2022-01-21 NOTE — PROGRESS NOTES
Results for NANCY SANCHEZ (MRN [id number]) as of 1/20/2022 16:53   Ref. Range 1/18/2022 07:25   Occult Blood, IA Unknown Positive (A)       Referral placed to GI for colonoscopy

## 2022-02-08 ENCOUNTER — PATIENT MESSAGE (OUTPATIENT)
Dept: HEALTH INFORMATION MANAGEMENT | Facility: OTHER | Age: 77
End: 2022-02-08
Payer: MEDICARE

## 2022-02-17 ENCOUNTER — OFFICE VISIT (OUTPATIENT)
Dept: MEDICAL GROUP | Facility: PHYSICIAN GROUP | Age: 77
End: 2022-02-17
Payer: MEDICARE

## 2022-02-17 VITALS
OXYGEN SATURATION: 94 % | HEART RATE: 75 BPM | TEMPERATURE: 97.4 F | BODY MASS INDEX: 26.81 KG/M2 | DIASTOLIC BLOOD PRESSURE: 84 MMHG | SYSTOLIC BLOOD PRESSURE: 152 MMHG | HEIGHT: 61 IN | WEIGHT: 142 LBS

## 2022-02-17 DIAGNOSIS — I10 ESSENTIAL HYPERTENSION: ICD-10-CM

## 2022-02-17 DIAGNOSIS — R19.5 POSITIVE FIT (FECAL IMMUNOCHEMICAL TEST): ICD-10-CM

## 2022-02-17 DIAGNOSIS — H91.93 HEARING DIFFICULTY OF BOTH EARS: ICD-10-CM

## 2022-02-17 PROCEDURE — 99214 OFFICE O/P EST MOD 30 MIN: CPT | Performed by: NURSE PRACTITIONER

## 2022-02-17 RX ORDER — LISINOPRIL 10 MG/1
10 TABLET ORAL DAILY
Qty: 100 TABLET | Refills: 0 | Status: SHIPPED | OUTPATIENT
Start: 2022-02-17 | End: 2022-03-24

## 2022-02-17 ASSESSMENT — FIBROSIS 4 INDEX: FIB4 SCORE: 2.12

## 2022-02-17 NOTE — ASSESSMENT & PLAN NOTE
Her initial blood pressure today is 152/82. She is taking lisinopril 5 mg daily and metoprolol  mg daily. She did check her blood pressure at home and she brings in her log today. Her home blood pressures systolically have been ranging 141-170 and her diastolic blood pressures have been ranging 70-82. She has noticed the side effect of fatigue but this is tolerable to her.  No chest pain, shortness of breath, dizziness, blurry vision, or headache.  She has lab appointment scheduled for 2/25/2022.

## 2022-02-17 NOTE — ASSESSMENT & PLAN NOTE
Her fit test was positive.  Referral has been placed to GI and she has been scheduled for 3/13/2022 appointment.

## 2022-02-17 NOTE — PROGRESS NOTES
Subjective:     CC: Hypertension follow-up    HPI:   Nadege presents today with the following:    Essential hypertension  Her initial blood pressure today is 152/82. She is taking lisinopril 5 mg daily and metoprolol  mg daily. She did check her blood pressure at home and she brings in her log today. Her home blood pressures systolically have been ranging 141-170 and her diastolic blood pressures have been ranging 70-82. She has noticed the side effect of fatigue but this is tolerable to her.  No chest pain, shortness of breath, dizziness, blurry vision, or headache.  She has lab appointment scheduled for 2/25/2022.     Positive FIT (fecal immunochemical test)  Her fit test was positive.  Referral has been placed to GI and she has been scheduled for 3/13/2022 appointment.    Hearing difficulty of both ears  She has scheduled her audiology appointment for 3/2/2022.  No new complaints today.      Past Medical History:   Diagnosis Date   • Anxiety    • Cellulitis and abscess of lower extremity    • Dental disorder     full dentures   • Generalized osteoarthritis of multiple sites 10/20/2015   • Hardware complicating wound infection (HCC) 12/2/2019   • Heart valve disease     pt not sure    • Hyperlipidemia    • Hypertension    • Osteoporosis        Social History     Tobacco Use   • Smoking status: Former Smoker     Packs/day: 0.50     Years: 25.00     Pack years: 12.50     Types: Cigarettes     Quit date: 1/1/2007     Years since quitting: 15.1   • Smokeless tobacco: Never Used   • Tobacco comment: Ex-smoker,  Quit ~ 2007 (prior hx ~ 1ppd x 25 years)   Vaping Use   • Vaping Use: Never used   Substance Use Topics   • Alcohol use: Not Currently     Alcohol/week: 0.0 oz     Comment: stopped ~  11/2019  (unclear about prior use).    • Drug use: No       Current Outpatient Medications Ordered in Epic   Medication Sig Dispense Refill   • lisinopril (PRINIVIL) 10 MG Tab Take 1 Tablet by mouth every day. 100 Tablet 0   •  "metoprolol SR (TOPROL XL) 100 MG TABLET SR 24 HR Take 1 Tablet by mouth every day. 100 Tablet 1   • doxycycline (VIBRAMYCIN) 100 MG Tab TAKE 1 TABLET BY MOUTH TWICE A DAY   (Through Infectious disease ?)     • vitamin D (CHOLECALCIFEROL) 1000 Unit (25 mcg) Tab Take 1,000 Units by mouth every day.     • ferrous sulfate 325 (65 Fe) MG tablet Take 325 mg by mouth every day.     • sertraline (ZOLOFT) 100 MG Tab Take 1 tablet by mouth every day. 90 tablet 2   • triamcinolone acetonide (KENALOG) 0.1 % Cream Apply 1 Application topically 2 times a day. 45 g 3   • acetaminophen (TYLENOL) 325 MG Tab Take 2 Tabs by mouth every 6 hours as needed (Mild Pain; (Pain scale 1-3); Temp greater than 100.5 F). (Patient taking differently: Take 650 mg by mouth every four hours as needed (Mild Pain; (Pain scale 1-3); Temp greater than 100.5 F).) 30 Tab 0   • Multiple Vitamins-Minerals (THERAPEUTIC-M/LUTEIN) Tab Take 1 Tab by mouth every day.  0     No current [facility]-ordered facility-administered medications on file.       Allergies:  Bactrim [sulfamethoxazole-trimethoprim], Meropenem, and Oxycodone    Health Maintenance: Reviewed      Objective:     Vital signs reviewed  Exam:  /84 (BP Location: Right arm, Patient Position: Sitting)   Pulse 75   Temp 36.3 °C (97.4 °F) (Temporal)   Ht 1.549 m (5' 1\")   Wt 64.4 kg (142 lb)   SpO2 94%   BMI 26.83 kg/m²  Body mass index is 26.83 kg/m².    Gen: Alert and oriented, No apparent distress.  Eyes:   Lids normal. Glasses in place.   Lungs: Normal effort, CTA bilaterally, no wheezes, rhonchi, or rales  CV: Regular rate and rhythm. No murmurs, rubs, or gallops.  Ext: No clubbing, cyanosis, edema.  Using front wheel walker with ambulation.      Assessment & Plan:     76 y.o. female with the following -     1. Essential hypertension  Chronic exacerbated problem.  On repeat by me today her blood pressure is 152/84.  I reviewed her home blood pressure log that do show her blood pressure has " not been less than 140/90 consistently.  We discussed that I will increase her lisinopril to 10 mg daily.  We discussed that she may take 2 tablets of her 5 mg tablets to equal the new daily dosing of 10 mg.  She verbalized understanding.  She will continue with her metoprolol  mg daily.  She will continue with home blood pressure monitoring.  New log provided today.  I would like her to keep her upcoming appointment for her labs.  She will return in 1 month for follow-up of her blood pressure and her lab results.  Red flags discussed.  - lisinopril (PRINIVIL) 10 MG Tab; Take 1 Tablet by mouth every day.  Dispense: 100 Tablet; Refill: 0    2. Hearing difficulty of both ears  Chronic stable problem.  Encouraged her to keep her upcoming audiology appointment.  No acute complaints today.    3. Positive FIT (fecal immunochemical test)  Chronic stable problem.  Encouraged her to keep her upcoming gastroenterology appointment.  No acute complaints today.      Return in about 4 weeks (around 3/17/2022) for Hypertension, Labs.    Please note that this dictation was created using voice recognition software. I have made every reasonable attempt to correct obvious errors, but I expect that there are errors of grammar and possibly content that I did not discover before finalizing the note.

## 2022-02-25 ENCOUNTER — HOSPITAL ENCOUNTER (OUTPATIENT)
Dept: LAB | Facility: MEDICAL CENTER | Age: 77
End: 2022-02-25
Attending: NURSE PRACTITIONER
Payer: MEDICARE

## 2022-02-25 DIAGNOSIS — I10 ESSENTIAL HYPERTENSION: ICD-10-CM

## 2022-02-25 DIAGNOSIS — D50.8 OTHER IRON DEFICIENCY ANEMIA: ICD-10-CM

## 2022-02-25 LAB
ALBUMIN SERPL BCP-MCNC: 3.9 G/DL (ref 3.2–4.9)
ALBUMIN/GLOB SERPL: 0.9 G/DL
ALP SERPL-CCNC: 81 U/L (ref 30–99)
ALT SERPL-CCNC: 8 U/L (ref 2–50)
ANION GAP SERPL CALC-SCNC: 11 MMOL/L (ref 7–16)
AST SERPL-CCNC: 21 U/L (ref 12–45)
BASOPHILS # BLD AUTO: 1.8 % (ref 0–1.8)
BASOPHILS # BLD: 0.1 K/UL (ref 0–0.12)
BILIRUB SERPL-MCNC: 0.3 MG/DL (ref 0.1–1.5)
BUN SERPL-MCNC: 21 MG/DL (ref 8–22)
CALCIUM SERPL-MCNC: 9.5 MG/DL (ref 8.5–10.5)
CHLORIDE SERPL-SCNC: 104 MMOL/L (ref 96–112)
CHOLEST SERPL-MCNC: 158 MG/DL (ref 100–199)
CO2 SERPL-SCNC: 24 MMOL/L (ref 20–33)
CREAT SERPL-MCNC: 0.77 MG/DL (ref 0.5–1.4)
EOSINOPHIL # BLD AUTO: 0.09 K/UL (ref 0–0.51)
EOSINOPHIL NFR BLD: 1.6 % (ref 0–6.9)
ERYTHROCYTE [DISTWIDTH] IN BLOOD BY AUTOMATED COUNT: 47.5 FL (ref 35.9–50)
FASTING STATUS PATIENT QL REPORTED: NORMAL
FERRITIN SERPL-MCNC: 146 NG/ML (ref 10–291)
GLOBULIN SER CALC-MCNC: 4.2 G/DL (ref 1.9–3.5)
GLUCOSE SERPL-MCNC: 88 MG/DL (ref 65–99)
HCT VFR BLD AUTO: 36.6 % (ref 37–47)
HDLC SERPL-MCNC: 58 MG/DL
HGB BLD-MCNC: 11.1 G/DL (ref 12–16)
IMM GRANULOCYTES # BLD AUTO: 0.03 K/UL (ref 0–0.11)
IMM GRANULOCYTES NFR BLD AUTO: 0.5 % (ref 0–0.9)
IRON SERPL-MCNC: 97 UG/DL (ref 40–170)
LDLC SERPL CALC-MCNC: 79 MG/DL
LYMPHOCYTES # BLD AUTO: 1.35 K/UL (ref 1–4.8)
LYMPHOCYTES NFR BLD: 23.8 % (ref 22–41)
MCH RBC QN AUTO: 27.7 PG (ref 27–33)
MCHC RBC AUTO-ENTMCNC: 30.3 G/DL (ref 33.6–35)
MCV RBC AUTO: 91.3 FL (ref 81.4–97.8)
MONOCYTES # BLD AUTO: 0.53 K/UL (ref 0–0.85)
MONOCYTES NFR BLD AUTO: 9.3 % (ref 0–13.4)
NEUTROPHILS # BLD AUTO: 3.58 K/UL (ref 2–7.15)
NEUTROPHILS NFR BLD: 63 % (ref 44–72)
NRBC # BLD AUTO: 0 K/UL
NRBC BLD-RTO: 0 /100 WBC
PLATELET # BLD AUTO: 267 K/UL (ref 164–446)
PMV BLD AUTO: 10.8 FL (ref 9–12.9)
POTASSIUM SERPL-SCNC: 4.3 MMOL/L (ref 3.6–5.5)
PROT SERPL-MCNC: 8.1 G/DL (ref 6–8.2)
RBC # BLD AUTO: 4.01 M/UL (ref 4.2–5.4)
SODIUM SERPL-SCNC: 139 MMOL/L (ref 135–145)
TRIGL SERPL-MCNC: 105 MG/DL (ref 0–149)
TSH SERPL DL<=0.005 MIU/L-ACNC: 0.87 UIU/ML (ref 0.38–5.33)
WBC # BLD AUTO: 5.7 K/UL (ref 4.8–10.8)

## 2022-02-25 PROCEDURE — 80053 COMPREHEN METABOLIC PANEL: CPT

## 2022-02-25 PROCEDURE — 82728 ASSAY OF FERRITIN: CPT

## 2022-02-25 PROCEDURE — 85025 COMPLETE CBC W/AUTO DIFF WBC: CPT

## 2022-02-25 PROCEDURE — 36415 COLL VENOUS BLD VENIPUNCTURE: CPT

## 2022-02-25 PROCEDURE — 83540 ASSAY OF IRON: CPT

## 2022-02-25 PROCEDURE — 84443 ASSAY THYROID STIM HORMONE: CPT

## 2022-02-25 PROCEDURE — 80061 LIPID PANEL: CPT

## 2022-03-16 RX ORDER — SERTRALINE HYDROCHLORIDE 100 MG/1
100 TABLET, FILM COATED ORAL
Qty: 90 TABLET | Refills: 0 | Status: SHIPPED | OUTPATIENT
Start: 2022-03-16 | End: 2022-06-23 | Stop reason: SDUPTHER

## 2022-03-16 NOTE — TELEPHONE ENCOUNTER
Received request via: Patient    Was the patient seen in the last year in this department? Yes Apt with Divya 3/24/22    Does the patient have an active prescription (recently filled or refills available) for medication(s) requested? No

## 2022-03-24 ENCOUNTER — OFFICE VISIT (OUTPATIENT)
Dept: MEDICAL GROUP | Facility: PHYSICIAN GROUP | Age: 77
End: 2022-03-24
Payer: MEDICARE

## 2022-03-24 VITALS
HEART RATE: 78 BPM | DIASTOLIC BLOOD PRESSURE: 72 MMHG | BODY MASS INDEX: 24.24 KG/M2 | HEIGHT: 64 IN | WEIGHT: 142 LBS | TEMPERATURE: 98.2 F | SYSTOLIC BLOOD PRESSURE: 136 MMHG | OXYGEN SATURATION: 95 %

## 2022-03-24 DIAGNOSIS — L97.921 CHRONIC ULCER OF LEFT LEG, LIMITED TO BREAKDOWN OF SKIN (HCC): ICD-10-CM

## 2022-03-24 DIAGNOSIS — D50.8 OTHER IRON DEFICIENCY ANEMIA: ICD-10-CM

## 2022-03-24 DIAGNOSIS — H91.93 HEARING DIFFICULTY OF BOTH EARS: ICD-10-CM

## 2022-03-24 DIAGNOSIS — F33.9 MAJOR DEPRESSION, RECURRENT, CHRONIC (HCC): ICD-10-CM

## 2022-03-24 DIAGNOSIS — I10 ESSENTIAL HYPERTENSION: ICD-10-CM

## 2022-03-24 DIAGNOSIS — I73.9 PERIPHERAL VASCULAR DISEASE (HCC): ICD-10-CM

## 2022-03-24 DIAGNOSIS — R19.5 POSITIVE FIT (FECAL IMMUNOCHEMICAL TEST): ICD-10-CM

## 2022-03-24 PROBLEM — Z95.828 S/P FEMORAL-POPLITEAL BYPASS SURGERY: Status: RESOLVED | Noted: 2019-06-12 | Resolved: 2022-03-24

## 2022-03-24 PROBLEM — F10.21 HISTORY OF ALCOHOLISM (HCC): Status: RESOLVED | Noted: 2018-07-31 | Resolved: 2022-03-24

## 2022-03-24 PROCEDURE — 99215 OFFICE O/P EST HI 40 MIN: CPT | Performed by: NURSE PRACTITIONER

## 2022-03-24 RX ORDER — LISINOPRIL 10 MG/1
20 TABLET ORAL DAILY
Qty: 100 TABLET | Refills: 0 | Status: SHIPPED | OUTPATIENT
Start: 2022-03-24 | End: 2022-04-28

## 2022-03-24 ASSESSMENT — PATIENT HEALTH QUESTIONNAIRE - PHQ9
5. POOR APPETITE OR OVEREATING: NOT AT ALL
SUM OF ALL RESPONSES TO PHQ QUESTIONS 1-9: 4
6. FEELING BAD ABOUT YOURSELF - OR THAT YOU ARE A FAILURE OR HAVE LET YOURSELF OR YOUR FAMILY DOWN: NOT AL ALL
2. FEELING DOWN, DEPRESSED, IRRITABLE, OR HOPELESS: SEVERAL DAYS
SUM OF ALL RESPONSES TO PHQ9 QUESTIONS 1 AND 2: 2
9. THOUGHTS THAT YOU WOULD BE BETTER OFF DEAD, OR OF HURTING YOURSELF: NOT AT ALL
7. TROUBLE CONCENTRATING ON THINGS, SUCH AS READING THE NEWSPAPER OR WATCHING TELEVISION: NOT AT ALL
1. LITTLE INTEREST OR PLEASURE IN DOING THINGS: SEVERAL DAYS
4. FEELING TIRED OR HAVING LITTLE ENERGY: SEVERAL DAYS
8. MOVING OR SPEAKING SO SLOWLY THAT OTHER PEOPLE COULD HAVE NOTICED. OR THE OPPOSITE, BEING SO FIGETY OR RESTLESS THAT YOU HAVE BEEN MOVING AROUND A LOT MORE THAN USUAL: NOT AT ALL
3. TROUBLE FALLING OR STAYING ASLEEP OR SLEEPING TOO MUCH: SEVERAL DAYS

## 2022-03-24 ASSESSMENT — FIBROSIS 4 INDEX: FIB4 SCORE: 2.17

## 2022-03-24 NOTE — ASSESSMENT & PLAN NOTE
She has not noticed any bleeding. She is taking ferrous sulfate 325 mg daily. She is waiting for GI to schedule her colonoscopy. Recent iron and ferritin in normal range, H/H is increasing.  Recent positive fit test.

## 2022-03-24 NOTE — ASSESSMENT & PLAN NOTE
She reports that she has completed her hearing test.  She was recommended for hearing aids which she states that she does not plan to get due to cost.

## 2022-03-24 NOTE — ASSESSMENT & PLAN NOTE
She has not seen Dr. Colin in over a year. She has upcoming appointment with infectious disease regarding chronic left leg ulcer, continuing with home treatments. History of femoral-popliteal bypass surgery.

## 2022-03-24 NOTE — ASSESSMENT & PLAN NOTE
She states that she is followed up with GI and is awaiting scheduling of her colonoscopy.  She has not noticed any bright red bleeding.  She does take iron supplementation and does not notice any change in her stools that can be dark brown to black at baseline.

## 2022-03-24 NOTE — PATIENT INSTRUCTIONS
Blood pressure medications:    -Lisinopril 20 mg daily (may take two tablets of 10 mg to equal 20 mg)    -Metoprolol 100 mg daily

## 2022-03-24 NOTE — ASSESSMENT & PLAN NOTE
Her initial blood pressure today is 180/98.  She is currently taking her lisinopril 10 mg daily and metoprolol 100 mg daily. She had her medications today at 8:00 a.m.  She has been checking her blood pressures at home and brings in her blood pressure log today.  Her blood pressures have been ranging 140-164/68-82 and pulse rate 62-79.  She is not having any chest pain, shortness of breath, dizziness, blurry vision, or headaches.

## 2022-03-24 NOTE — PROGRESS NOTES
Subjective:     CC: Hypertension and lab results    HPI:   Nadege presents today with the following:    Essential hypertension  Her initial blood pressure today is 180/98.  She is currently taking her lisinopril 10 mg daily and metoprolol 100 mg daily. She had her medications today at 8:00 a.m.  She has been checking her blood pressures at home and brings in her blood pressure log today.  Her blood pressures have been ranging 140-164/68-82 and pulse rate 62-79.  She is not having any chest pain, shortness of breath, dizziness, blurry vision, or headaches.    Iron deficiency anemia  She has not noticed any bleeding. She is taking ferrous sulfate 325 mg daily. She is waiting for GI to schedule her colonoscopy. Recent iron and ferritin in normal range, H/H is increasing.  Recent positive fit test.    Chronic ulcer of left leg, limited to breakdown of skin (HCC)  She is not going to wound care at this time. She was followed by infectious disease and has an upcoming appointment scheduled.  Continues with her home treatments.    Major depression, recurrent, chronic (HCC)  She continues with sertraline 100 mg daily. Her PHQ score today is 4. Denies any self harm or SI today.    Peripheral vascular disease ([facility])  She has not seen Dr. Colin in over a year. She has upcoming appointment with infectious disease regarding chronic left leg ulcer, continuing with home treatments. History of femoral-popliteal bypass surgery.     Positive FIT (fecal immunochemical test)  She states that she is followed up with GI and is awaiting scheduling of her colonoscopy.  She has not noticed any bright red bleeding.  She does take iron supplementation and does not notice any change in her stools that can be dark brown to black at baseline.    Hearing difficulty of both ears  She reports that she has completed her hearing test.  She was recommended for hearing aids which she states that she does not plan to get due to cost.      Past Medical  History:   Diagnosis Date   • Anxiety    • Cellulitis and abscess of lower extremity    • Dental disorder     full dentures   • Generalized osteoarthritis of multiple sites 10/20/2015   • Hardware complicating wound infection (HCC) 12/2/2019   • Heart valve disease     pt not sure    • Hyperlipidemia    • Hypertension    • Osteoporosis    • S/P femoral-popliteal bypass surgery 6/12/2019    Dr crum 2018       Social History     Tobacco Use   • Smoking status: Former Smoker     Packs/day: 0.50     Years: 25.00     Pack years: 12.50     Types: Cigarettes     Quit date: 1/1/2007     Years since quitting: 15.2   • Smokeless tobacco: Never Used   • Tobacco comment: Ex-smoker,  Quit ~ 2007 (prior hx ~ 1ppd x 25 years)   Vaping Use   • Vaping Use: Never used   Substance Use Topics   • Alcohol use: Not Currently     Alcohol/week: 0.0 oz     Comment: stopped ~  11/2019  (unclear about prior use).    • Drug use: No       Current Outpatient Medications Ordered in Epic   Medication Sig Dispense Refill   • lisinopril (PRINIVIL) 10 MG Tab Take 2 Tablets by mouth every day. 100 Tablet 0   • sertraline (ZOLOFT) 100 MG Tab Take 1 Tablet by mouth every day. 90 Tablet 0   • metoprolol SR (TOPROL XL) 100 MG TABLET SR 24 HR Take 1 Tablet by mouth every day. 100 Tablet 1   • doxycycline (VIBRAMYCIN) 100 MG Tab TAKE 1 TABLET BY MOUTH TWICE A DAY   (Through Infectious disease ?)     • vitamin D (CHOLECALCIFEROL) 1000 Unit (25 mcg) Tab Take 1,000 Units by mouth every day.     • ferrous sulfate 325 (65 Fe) MG tablet Take 325 mg by mouth every day.     • triamcinolone acetonide (KENALOG) 0.1 % Cream Apply 1 Application topically 2 times a day. 45 g 3   • acetaminophen (TYLENOL) 325 MG Tab Take 2 Tabs by mouth every 6 hours as needed (Mild Pain; (Pain scale 1-3); Temp greater than 100.5 F). (Patient taking differently: Take 650 mg by mouth every four hours as needed (Mild Pain; (Pain scale 1-3); Temp greater than 100.5 F).) 30 Tab 0   •  "Multiple Vitamins-Minerals (THERAPEUTIC-M/LUTEIN) Tab Take 1 Tab by mouth every day.  0     No current [facility]-ordered facility-administered medications on file.       Allergies:  Bactrim [sulfamethoxazole-trimethoprim], Meropenem, and Oxycodone    Health Maintenance: Due for zoster vaccine, annual wellness.      Objective:     Vital signs reviewed  Exam:  /72 (BP Location: Left arm, Patient Position: Sitting)   Pulse 78   Temp 36.8 °C (98.2 °F) (Temporal)   Ht 1.626 m (5' 4\")   Wt 64.4 kg (142 lb)   SpO2 95%   BMI 24.37 kg/m²  Body mass index is 24.37 kg/m².    Gen: Alert and oriented, No apparent distress.  Eyes:   Lids normal. Glasses in place.   Neck: Neck is supple without lymphadenopathy.  Lungs: Normal effort, CTA bilaterally, no wheezes, rhonchi, or rales  CV: Regular rate and rhythm. No murmurs, rubs, or gallops.  Ext: No clubbing, cyanosis, edema. Using front wheel walker with ambulation.    Assessment & Plan:     76 y.o. female with the following -     1. Essential hypertension  Chronic exacerbated problem.  On repeat by me today her blood pressure is 136/72.  Review of her home blood pressure show that she is still consistently greater than 140/90.  We will increase her lisinopril to 20 mg daily.  She will continue with her metoprolol 100 mg daily.  She will continue with home blood pressure monitoring, new blood pressure log provided today.  We discussed that when she returns to her next appointment I would like her to bring her blood pressure cuff so we can correlate.  Home blood pressure monitoring discussed and patient was able to verbalize how to correctly take her blood pressure.  We discussed reviewed her recent lab results.  - lisinopril (PRINIVIL) 10 MG Tab; Take 2 Tablets by mouth every day.  Dispense: 100 Tablet; Refill: 0    2. Other iron deficiency anemia  Chronic stable problem.  Discussed reviewed her recent lab results.  She will continue with her ferrous sulfate 325 mg daily.  I " would like her to continue follow-up with GI.  No acute complaints today.    3. Chronic ulcer of left leg, limited to breakdown of skin (HCC)  Chronic stable problem.  Recommend that she continue follow-up with infectious disease.  We did discuss if she like referral back to wound care which she declines today.  She states that she would like to follow-up with infectious disease first.    4. Major depression, recurrent, chronic (HCC)  Chronic stable problem.  Discussed and reviewed her PHQ score today.  She will continue with her sertraline 100 mg daily.  Denies any self-harm or SI today.    5. Peripheral vascular disease (HCC)  Chronic stable problem.  She states that she will call and schedule an appointment for follow-up with Dr. Colin.    6. Positive FIT (fecal immunochemical test)  Chronic stable problem.  Continue with GI for colonoscopy.    7. Hearing difficulty of both ears  Chronic stable problem.  Appreciate that she had her hearing test completed.  I also understand that she is unable to afford hearing aids at this time.  At her next visit we will suggest that she call [facility] to see if they can assist with any hearing aid assistance.     My total time spent caring for the patient on the day of the encounter was 46 minutes.   This does not include time spent on separately billable procedures/tests.      Return in about 4 weeks (around 4/21/2022) for Hypertension, memory screening.    Please note that this dictation was created using voice recognition software. I have made every reasonable attempt to correct obvious errors, but I expect that there are errors of grammar and possibly content that I did not discover before finalizing the note.

## 2022-03-24 NOTE — ASSESSMENT & PLAN NOTE
She continues with sertraline 100 mg daily. Her PHQ score today is 4. Denies any self harm or SI today.

## 2022-03-24 NOTE — ASSESSMENT & PLAN NOTE
She is not going to wound care at this time. She was followed by infectious disease and has an upcoming appointment scheduled.  Continues with her home treatments.

## 2022-04-15 RX ORDER — DOXYCYCLINE HYCLATE 100 MG
100 TABLET ORAL DAILY
Qty: 60 TABLET | Refills: 0 | Status: SHIPPED | OUTPATIENT
Start: 2022-04-15 | End: 2022-08-01 | Stop reason: SDUPTHER

## 2022-04-20 ENCOUNTER — OUTPATIENT INFUSION SERVICES (OUTPATIENT)
Dept: ONCOLOGY | Facility: MEDICAL CENTER | Age: 77
End: 2022-04-20
Attending: FAMILY MEDICINE
Payer: MEDICARE

## 2022-04-20 VITALS
DIASTOLIC BLOOD PRESSURE: 91 MMHG | HEIGHT: 62 IN | SYSTOLIC BLOOD PRESSURE: 170 MMHG | RESPIRATION RATE: 18 BRPM | HEART RATE: 90 BPM | WEIGHT: 138.89 LBS | BODY MASS INDEX: 25.56 KG/M2 | TEMPERATURE: 98 F | OXYGEN SATURATION: 98 %

## 2022-04-20 DIAGNOSIS — M81.0 AGE-RELATED OSTEOPOROSIS WITHOUT CURRENT PATHOLOGICAL FRACTURE: ICD-10-CM

## 2022-04-20 DIAGNOSIS — Z87.81 HISTORY OF HIP FRACTURE: ICD-10-CM

## 2022-04-20 LAB
CA-I BLD ISE-SCNC: 1.25 MMOL/L (ref 1.1–1.3)
CREAT BLD-MCNC: 0.8 MG/DL (ref 0.5–1.4)

## 2022-04-20 PROCEDURE — 36415 COLL VENOUS BLD VENIPUNCTURE: CPT

## 2022-04-20 PROCEDURE — 82565 ASSAY OF CREATININE: CPT

## 2022-04-20 PROCEDURE — 82330 ASSAY OF CALCIUM: CPT

## 2022-04-20 ASSESSMENT — FIBROSIS 4 INDEX: FIB4 SCORE: 2.17

## 2022-04-20 NOTE — PROGRESS NOTES
Pt presented to infusion center for prolia injection. Due to change in providers, care being transferred at this time. Labs drawn as ordered from [facility] with 25G butterfly needle, gauze and coban dressing placed. In contact with office to obtain new order for prolia. Rescheduled patient. Nadege discharged home to self care.

## 2022-04-20 NOTE — PROGRESS NOTES
Jimmy, pharmacist, with infusion center contacting me regarding Prolia order for patient. Verbal order given.

## 2022-04-21 ENCOUNTER — TELEPHONE (OUTPATIENT)
Dept: MEDICAL GROUP | Facility: PHYSICIAN GROUP | Age: 77
End: 2022-04-21
Payer: MEDICARE

## 2022-04-21 NOTE — TELEPHONE ENCOUNTER
Future Appointments       Provider Department Center    4/28/2022 10:00 AM RYANN Hardy [facility]    4/29/2022 9:15 AM [facility]        ESTABLISHED PATIENT PRE-VISIT PLANNING     Patient was NOT contacted to complete PVP.     Note: Patient will not be contacted if there is no indication to call.     1.  Reviewed notes from the last few office visits within the medical group: Yes, LOV 03/24/2022    2.  If any orders were placed at last visit or intended to be done for this visit (i.e. 6 mos follow-up), do we have Results/Consult Notes?         •  Labs - Labs were not ordered at last office visit.  Note: If patient appointment is for lab review and patient did not complete labs, check with provider if OK to reschedule patient until labs completed.       •  Imaging - Imaging was not ordered at last office visit.       •  Referrals - No referrals were ordered at last office visit.    3. Is this appointment scheduled as a Hospital Follow-Up? No    4.  Immunizations were updated in Epic using Reconcile Outside Information activity? No    5.  Patient is due for the following Health Maintenance Topics:   Health Maintenance Due   Topic Date Due   • IMM ZOSTER VACCINES (1 of 2) Never done   • Annual Wellness Visit  10/20/2016   • COLORECTAL CANCER SCREENING  01/19/2022     6.  AHA (Pulse[employer]) form printed for Provider? N/A

## 2022-04-28 ENCOUNTER — OFFICE VISIT (OUTPATIENT)
Dept: MEDICAL GROUP | Facility: PHYSICIAN GROUP | Age: 77
End: 2022-04-28
Payer: MEDICARE

## 2022-04-28 VITALS
DIASTOLIC BLOOD PRESSURE: 84 MMHG | TEMPERATURE: 98 F | OXYGEN SATURATION: 97 % | HEIGHT: 62 IN | SYSTOLIC BLOOD PRESSURE: 166 MMHG | HEART RATE: 80 BPM | WEIGHT: 139 LBS | BODY MASS INDEX: 25.58 KG/M2

## 2022-04-28 DIAGNOSIS — L97.921 CHRONIC ULCER OF LEFT LEG, LIMITED TO BREAKDOWN OF SKIN (HCC): ICD-10-CM

## 2022-04-28 DIAGNOSIS — F99 ABNORMAL MMSE: ICD-10-CM

## 2022-04-28 DIAGNOSIS — I10 ESSENTIAL HYPERTENSION: ICD-10-CM

## 2022-04-28 DIAGNOSIS — Z22.39 VRE (VANCOMYCIN RESISTANT ENTEROCOCCUS) CULTURE POSITIVE: ICD-10-CM

## 2022-04-28 DIAGNOSIS — Z22.322 MRSA (METHICILLIN RESISTANT STAPH AUREUS) CULTURE POSITIVE: ICD-10-CM

## 2022-04-28 DIAGNOSIS — R41.3 MEMORY DEFICIT: ICD-10-CM

## 2022-04-28 PROCEDURE — 99214 OFFICE O/P EST MOD 30 MIN: CPT | Performed by: NURSE PRACTITIONER

## 2022-04-28 RX ORDER — LISINOPRIL 30 MG/1
30 TABLET ORAL DAILY
Qty: 100 TABLET | Refills: 0 | Status: SHIPPED | OUTPATIENT
Start: 2022-04-28 | End: 2022-07-28 | Stop reason: SDUPTHER

## 2022-04-28 ASSESSMENT — FIBROSIS 4 INDEX: FIB4 SCORE: 2.17

## 2022-04-28 NOTE — ASSESSMENT & PLAN NOTE
Her initial blood pressure today is 182/96.  She continues with lisinopril 20 mg daily and metoprolol  mg daily.  She has been monitoring her blood pressure and brings in her blood pressure log today.  Home blood pressures have been ranging 139- 160/67- 80.  Her home blood pressures are not at goal.  Her heart rate ranges 65- 73.

## 2022-04-28 NOTE — ASSESSMENT & PLAN NOTE
She believes at one point she was told she had dementia by her neurosurgeon but is not certain. When she was at a SNF she was diagnosed with altered mental status. Completing MOCA today.

## 2022-04-28 NOTE — PROGRESS NOTES
Subjective:     CC: Hypertension follow-up    HPI:   Nadege presents today with the following:    Essential hypertension  Her initial blood pressure today is 182/96.  She continues with lisinopril 20 mg daily and metoprolol  mg daily.  She has been monitoring her blood pressure and brings in her blood pressure log today.  Home blood pressures have been ranging 139- 160/67- 80.  Her home blood pressures are not at goal.  Her heart rate ranges 65- 73.    Memory deficit  She believes at one point she was told she had dementia by her neurosurgeon but is not certain. When she was at a SNF she was diagnosed with altered mental status. Completing MOCA today.       Past Medical History:   Diagnosis Date   • Anxiety    • Cellulitis and abscess of lower extremity    • Dental disorder     full dentures   • Generalized osteoarthritis of multiple sites 10/20/2015   • Hardware complicating wound infection (HCC) 12/2/2019   • Heart valve disease     pt not sure    • Hyperlipidemia    • Hypertension    • Osteoporosis    • S/P femoral-popliteal bypass surgery 6/12/2019    Dr crum 2018       Social History     Tobacco Use   • Smoking status: Former Smoker     Packs/day: 0.50     Years: 25.00     Pack years: 12.50     Types: Cigarettes     Quit date: 1/1/2007     Years since quitting: 15.3   • Smokeless tobacco: Never Used   • Tobacco comment: Ex-smoker,  Quit ~ 2007 (prior hx ~ 1ppd x 25 years)   Vaping Use   • Vaping Use: Never used   Substance Use Topics   • Alcohol use: Not Currently     Alcohol/week: 0.0 oz     Comment: stopped ~  11/2019  (unclear about prior use).    • Drug use: No       Current Outpatient Medications Ordered in Epic   Medication Sig Dispense Refill   • lisinopril (PRINIVIL) 30 MG tablet Take 1 Tablet by mouth every day. 100 Tablet 0   • doxycycline (VIBRAMYCIN) 100 MG Tab Take 1 Tablet by mouth every day. TAKE 1 TABLET BY MOUTH TWICE A DAY   (Through Infectious disease ?) 60 Tablet 0   • sertraline  "(ZOLOFT) 100 MG Tab Take 1 Tablet by mouth every day. 90 Tablet 0   • metoprolol SR (TOPROL XL) 100 MG TABLET SR 24 HR Take 1 Tablet by mouth every day. 100 Tablet 1   • vitamin D (CHOLECALCIFEROL) 1000 Unit (25 mcg) Tab Take 1,000 Units by mouth every day.     • triamcinolone acetonide (KENALOG) 0.1 % Cream Apply 1 Application topically 2 times a day. 45 g 3   • acetaminophen (TYLENOL) 325 MG Tab Take 2 Tabs by mouth every 6 hours as needed (Mild Pain; (Pain scale 1-3); Temp greater than 100.5 F). (Patient taking differently: Take 650 mg by mouth every four hours as needed (Mild Pain; (Pain scale 1-3); Temp greater than 100.5 F).) 30 Tab 0   • Multiple Vitamins-Minerals (THERAPEUTIC-M/LUTEIN) Tab Take 1 Tab by mouth every day.  0     No current [facility]-ordered facility-administered medications on file.       Allergies:  Bactrim [sulfamethoxazole-trimethoprim], Meropenem, and Oxycodone    Health Maintenance: Reviewed    Objective:     Vital signs reviewed   Exam:  BP (!) 166/84 (BP Location: Right arm, Patient Position: Sitting)   Pulse 80   Temp 36.7 °C (98 °F) (Temporal)   Ht 1.57 m (5' 1.81\")   Wt 63 kg (139 lb)   SpO2 97%   BMI 25.58 kg/m²  Body mass index is 25.58 kg/m².    Gen: Alert and oriented, No apparent distress.  Eyes:   Lids normal. Glasses in place.   Lungs: Normal effort, CTA bilaterally, no wheezes, rhonchi, or rales  CV: Regular rate and rhythm. No murmurs, rubs, or gallops.  Ext: No clubbing, cyanosis, edema. Using FWW with ambulation.     MMSE    -What is the:  Year, season, date, day, month.               4/5 points    -Where are we:   State, county, town, hospital, floor.      5/5 points    -3 objects immediate recall.                                             3/3 points    -World backwards or serial sevens.                                 5/5 points    -Three-minute recall.                                                         1/3 points    -Name 2 objects.                               " "                                2/2 points    -Repeat the following.      \"No ifs and is or buts\"                                                    1/1 point    -Follow a 3 stage command.      Paper right hand, fold in half, place on floor.                 3/3 points    -Follow a written command.       Close your eyes                                                          1/1 point    -Write a complete sentence.                                           1/1 point    -Copy a design.                                                                1/1 point    Total                                                                                  27/30    Cutoff of 24 indicating dementia.      Assessment & Plan:     76 y.o. female with the following -     1. Essential hypertension  Chronic exacerbated problem.  On repeat by me today her blood pressure is 166/84.  Home blood pressures remain above goal.  We will increase her lisinopril to 30 mg daily.  She will continue with her metoprolol 100 mg daily.  Continue home blood pressure monitoring.  Return in 3 weeks for follow-up.  - lisinopril (PRINIVIL) 30 MG tablet; Take 1 Tablet by mouth every day.  Dispense: 100 Tablet; Refill: 0    2. Memory deficit  3. Abnormal MMSE  Chronic stable problem.  We discussed referral to neurology for formal testing for dementia and she is in agreement.  Referral placed today.  - Referral to Neurology    4. MRSA (methicillin resistant staph aureus) culture positive  5. VRE (vancomycin resistant enterococcus) culture positive  6. Chronic ulcer of left leg, limited to breakdown of skin (HCC)  Chronic exacerbated problem.  Patient asking for refill on her doxycycline that this previously been managed by infectious disease.  Informed her that this prescription is needs to be managed by infectious disease.  She states several times that she has been on a prescription for the last year and needs a medication refill.  I again informed her that " she needs to see infectious disease as they were previously managing.  I offered her new referral and she is in agreement.  - Referral to Infectious Disease      Return in about 3 weeks (around 5/19/2022) for Hypertension.    Please note that this dictation was created using voice recognition software. I have made every reasonable attempt to correct obvious errors, but I expect that there are errors of grammar and possibly content that I did not discover before finalizing the note.

## 2022-04-29 ENCOUNTER — OUTPATIENT INFUSION SERVICES (OUTPATIENT)
Dept: ONCOLOGY | Facility: MEDICAL CENTER | Age: 77
End: 2022-04-29
Attending: NURSE PRACTITIONER
Payer: MEDICARE

## 2022-04-29 VITALS
HEART RATE: 59 BPM | SYSTOLIC BLOOD PRESSURE: 155 MMHG | RESPIRATION RATE: 18 BRPM | BODY MASS INDEX: 25.4 KG/M2 | WEIGHT: 138.01 LBS | TEMPERATURE: 97.5 F | DIASTOLIC BLOOD PRESSURE: 82 MMHG | HEIGHT: 62 IN | OXYGEN SATURATION: 94 %

## 2022-04-29 DIAGNOSIS — M81.0 AGE-RELATED OSTEOPOROSIS WITHOUT CURRENT PATHOLOGICAL FRACTURE: ICD-10-CM

## 2022-04-29 DIAGNOSIS — Z87.81 HISTORY OF HIP FRACTURE: ICD-10-CM

## 2022-04-29 PROCEDURE — 96372 THER/PROPH/DIAG INJ SC/IM: CPT

## 2022-04-29 PROCEDURE — 700111 HCHG RX REV CODE 636 W/ 250 OVERRIDE (IP): Performed by: NURSE PRACTITIONER

## 2022-04-29 RX ADMIN — DENOSUMAB 60 MG: 60 INJECTION SUBCUTANEOUS at 10:00

## 2022-04-29 ASSESSMENT — FIBROSIS 4 INDEX: FIB4 SCORE: 2.17

## 2022-04-29 NOTE — PROGRESS NOTES
Nadege arrived ambulatory for Q 6 month Prolia injection. She denies any s/s acute infection or illness, denies dental procedures in the past 4 weeks or upcoming dental procedures, denies s/s of hypocalcimia.  Pt confirms taking calcuim/vitamin D at home.    Istat Ca/Cr lab drawn 04/20/2022 reviewed, parameters met, Prolia injected Sub Q to back left arm per MAR, bandaid applied to site.  Nadege tolerated well, discharged in no apparent distress, next appointment scheduled and confirmed.

## 2022-05-12 ENCOUNTER — TELEPHONE (OUTPATIENT)
Dept: MEDICAL GROUP | Facility: PHYSICIAN GROUP | Age: 77
End: 2022-05-12
Payer: MEDICARE

## 2022-05-12 NOTE — TELEPHONE ENCOUNTER
Future Appointments       Provider Department Center    5/19/2022 10:20 AM RYANN Hardy [facility] [place] [facility]    11/4/2022 2:30 PM [facility]FUSION Infusion Services [facility]        ESTABLISHED PATIENT PRE-VISIT PLANNING     Patient was contacted to complete PVP.     Note: Patient will not be contacted if there is no indication to call.     1.  Reviewed notes from the last few office visits within the medical group: Yes, LOV 04/28/2022    2.  If any orders were placed at last visit or intended to be done for this visit (i.e. 6 mos follow-up), do we have Results/Consult Notes?         •  Labs - Labs were not ordered at last office visit.  Note: If patient appointment is for lab review and patient did not complete labs, check with provider if OK to reschedule patient until labs completed.       •  Imaging - Imaging was not ordered at last office visit.       •  Referrals - Referral ordered, patient has NOT been seen. Pt stated that she did receive a call from Neurology to schedule, she was driving at the time but has their number and will call back to schedule. Her referral to Infectious Disease is showing incomplete. The reason for denial is due to  History of infection.     3. Is this appointment scheduled as a Hospital Follow-Up? No    4.  Immunizations were updated in Epic using Reconcile Outside Information activity? Yes    5.  Patient is due for the following Health Maintenance Topics:   Health Maintenance Due   Topic Date Due   • IMM ZOSTER VACCINES (1 of 2) Never done   • Annual Wellness Visit  10/20/2016   • COLORECTAL CANCER SCREENING  01/19/2022     6.  AHA (Pulse8) form printed for Provider? N/A   Pt was advised to arrive 15 minutes prior to scheduled appointment.

## 2022-05-16 RX ORDER — DOXYCYCLINE HYCLATE 100 MG
TABLET ORAL
Qty: 60 TABLET | Refills: 0 | OUTPATIENT
Start: 2022-05-16

## 2022-05-16 NOTE — TELEPHONE ENCOUNTER
Requested Prescriptions     Refused Prescriptions Disp Refills   • doxycycline (VIBRAMYCIN) 100 MG Tab [Pharmacy Med Name: DOXYCYCLINE HYCLATE 100 MG TAB] 60 Tablet 0     Sig: TAKE 1 TABLET BY MOUTH TWICE A DAY     Refused By: ABBIE DYE     Reason for Refusal: Refill not appropriate.     Needs to see infectious disease for further refills.    JACLYN Hardy.

## 2022-05-19 ENCOUNTER — OFFICE VISIT (OUTPATIENT)
Dept: MEDICAL GROUP | Facility: PHYSICIAN GROUP | Age: 77
End: 2022-05-19
Payer: MEDICARE

## 2022-05-19 VITALS
OXYGEN SATURATION: 93 % | HEART RATE: 84 BPM | BODY MASS INDEX: 25.21 KG/M2 | WEIGHT: 137 LBS | DIASTOLIC BLOOD PRESSURE: 72 MMHG | TEMPERATURE: 97.9 F | SYSTOLIC BLOOD PRESSURE: 138 MMHG

## 2022-05-19 DIAGNOSIS — L97.921 CHRONIC ULCER OF LEFT LEG, LIMITED TO BREAKDOWN OF SKIN (HCC): ICD-10-CM

## 2022-05-19 DIAGNOSIS — I10 ESSENTIAL HYPERTENSION: ICD-10-CM

## 2022-05-19 DIAGNOSIS — R19.5 POSITIVE FIT (FECAL IMMUNOCHEMICAL TEST): ICD-10-CM

## 2022-05-19 PROCEDURE — 99214 OFFICE O/P EST MOD 30 MIN: CPT | Performed by: NURSE PRACTITIONER

## 2022-05-19 ASSESSMENT — FIBROSIS 4 INDEX: FIB4 SCORE: 2.17

## 2022-05-19 NOTE — ASSESSMENT & PLAN NOTE
She has not followed up with GI.  Her referral has been approved.  She plans to follow-up with infectious disease first as this more important to her.  Encouraged her to schedule appointment with GI after she sees infectious disease.

## 2022-05-19 NOTE — ASSESSMENT & PLAN NOTE
She plans to schedule with infectious disease.  She takes doxycycline 100 mg daily for chronic leg ulcer.

## 2022-05-19 NOTE — PROGRESS NOTES
Subjective:     CC: hypertension     HPI:   Nadege presents today with the following:    Essential hypertension  Her initial blood pressure today is 140/70.  She continues lisinopril 30 mg daily and metoprolol  mg daily. She states that she does feel better. Her home blood pressures have been ranging 133-151/73-80 and pulse 59-74. Her lowest reading was 120/73 and highest reading was 155/82. Majority of her readings her systolic remains greater than 140. No chest pain, shortness of breath, dizziness, blurry vision or headache.     Chronic ulcer of left leg, limited to breakdown of skin (HCC)  She plans to schedule with infectious disease.  She takes doxycycline 100 mg daily for chronic leg ulcer.    Positive FIT (fecal immunochemical test)  She has not followed up with GI.  Her referral has been approved.  She plans to follow-up with infectious disease first as this more important to her.  Encouraged her to schedule appointment with GI after she sees infectious disease.      Past Medical History:   Diagnosis Date   • Anxiety    • Cellulitis and abscess of lower extremity    • Dental disorder     full dentures   • Generalized osteoarthritis of multiple sites 10/20/2015   • Hardware complicating wound infection (HCC) 12/2/2019   • Heart valve disease     pt not sure    • Hyperlipidemia    • Hypertension    • Osteoporosis    • S/P femoral-popliteal bypass surgery 6/12/2019    Dr crum 2018       Social History     Tobacco Use   • Smoking status: Former Smoker     Packs/day: 0.50     Years: 25.00     Pack years: 12.50     Types: Cigarettes     Quit date: 1/1/2007     Years since quitting: 15.3   • Smokeless tobacco: Never Used   • Tobacco comment: Ex-smoker,  Quit ~ 2007 (prior hx ~ 1ppd x 25 years)   Vaping Use   • Vaping Use: Never used   Substance Use Topics   • Alcohol use: Not Currently     Alcohol/week: 0.0 oz     Comment: stopped ~  11/2019  (unclear about prior use).    • Drug use: No       Current  Outpatient Medications Ordered in Epic   Medication Sig Dispense Refill   • lisinopril (PRINIVIL) 30 MG tablet Take 1 Tablet by mouth every day. 100 Tablet 0   • doxycycline (VIBRAMYCIN) 100 MG Tab Take 1 Tablet by mouth every day. TAKE 1 TABLET BY MOUTH TWICE A DAY   (Through Infectious disease ?) 60 Tablet 0   • sertraline (ZOLOFT) 100 MG Tab Take 1 Tablet by mouth every day. 90 Tablet 0   • metoprolol SR (TOPROL XL) 100 MG TABLET SR 24 HR Take 1 Tablet by mouth every day. 100 Tablet 1   • vitamin D (CHOLECALCIFEROL) 1000 Unit (25 mcg) Tab Take 1,000 Units by mouth every day.     • triamcinolone acetonide (KENALOG) 0.1 % Cream Apply 1 Application topically 2 times a day. 45 g 3   • acetaminophen (TYLENOL) 325 MG Tab Take 2 Tabs by mouth every 6 hours as needed (Mild Pain; (Pain scale 1-3); Temp greater than 100.5 F). (Patient taking differently: Take 650 mg by mouth every four hours as needed (Mild Pain; (Pain scale 1-3); Temp greater than 100.5 F).) 30 Tab 0   • Multiple Vitamins-Minerals (THERAPEUTIC-M/LUTEIN) Tab Take 1 Tab by mouth every day.  0     No current [facility]-ordered facility-administered medications on file.       Allergies:  Bactrim [sulfamethoxazole-trimethoprim], Meropenem, and Oxycodone    Health Maintenance: Reviewed       Objective:     Vital signs reviewed  Exam:  /72 (BP Location: Left arm, Patient Position: Sitting)   Pulse 84   Temp 36.6 °C (97.9 °F) (Temporal)   Wt 62.1 kg (137 lb)   SpO2 93%   BMI 25.21 kg/m²  Body mass index is 25.21 kg/m².    Gen: Alert and oriented, No apparent distress.  Eyes:   Lids normal. Glasses in place.   Lungs: Normal effort, CTA bilaterally, no wheezes, rhonchi, or rales  CV: Regular rate and rhythm. No murmurs, rubs, or gallops.  Ext: No clubbing, cyanosis, edema. Using FWW with ambulation.       Assessment & Plan:     76 y.o. female with the following -     1. Essential hypertension  Chronic stable problem.  On repeat by me her blood pressure today  is 138/72.  She will continue with her lisinopril 20 mg daily and metoprolol  mg daily.  She has improved after 3 weeks with the increased dose.  I like to see her back in 2 months to make sure blood pressure remains controlled.  She will continue with home blood pressure monitoring.  Continue healthy diet and lifestyle modifications as tolerated.    2. Chronic ulcer of left leg, limited to breakdown of skin (HCC)  Chronic stable problem.  Encouraged her to schedule an appointment with infectious disease.  Her referral has been approved.    3. Positive FIT (fecal immunochemical test)  Chronic stable problem.  Encouraged her to make an appointment with GI after she follows up with infectious disease.  She verbalized understanding.        Return in about 2 months (around 7/19/2022) for Hypertension.    Please note that this dictation was created using voice recognition software. I have made every reasonable attempt to correct obvious errors, but I expect that there are errors of grammar and possibly content that I did not discover before finalizing the note.

## 2022-05-19 NOTE — ASSESSMENT & PLAN NOTE
Her initial blood pressure today is 140/70.  She continues lisinopril 30 mg daily and metoprolol  mg daily. She states that she does feel better. Her home blood pressures have been ranging 133-151/73-80 and pulse 59-74. Her lowest reading was 120/73 and highest reading was 155/82. Majority of her readings her systolic remains greater than 140. No chest pain, shortness of breath, dizziness, blurry vision or headache.

## 2022-06-13 ENCOUNTER — TELEPHONE (OUTPATIENT)
Dept: INTERNAL MEDICINE | Facility: OTHER | Age: 77
End: 2022-06-13
Payer: MEDICARE

## 2022-06-13 NOTE — TELEPHONE ENCOUNTER
VOICEMAIL  1. Caller Name: Nadege Mae                      Call Back Number: [phone]    2. Message: patient called and lm requesting an appt with , she was referred by PCP.    3. Patient approves office to leave a detailed voicemail/MyChart message: yes

## 2022-06-14 NOTE — TELEPHONE ENCOUNTER
Dr. Meredith please referral for  Chronic ulcer of left leg, limited to breakdown of skin (HCC),VRE (vancomycin resistant enterococcus) culture positive,  MRSA (methicillin resistant staph aureus) culture positive

## 2022-06-23 DIAGNOSIS — F33.9 MAJOR DEPRESSION, RECURRENT, CHRONIC (HCC): ICD-10-CM

## 2022-06-23 RX ORDER — SERTRALINE HYDROCHLORIDE 100 MG/1
100 TABLET, FILM COATED ORAL
Qty: 90 TABLET | Refills: 3 | Status: SHIPPED | OUTPATIENT
Start: 2022-06-23 | End: 2023-05-08

## 2022-06-23 NOTE — TELEPHONE ENCOUNTER
Requested Prescriptions     Signed Prescriptions Disp Refills   • sertraline (ZOLOFT) 100 MG Tab 90 Tablet 3     Sig: Take 1 Tablet by mouth every day.     Authorizing Provider: ABBIE DYE A.P.R.N.

## 2022-06-29 ENCOUNTER — TELEPHONE (OUTPATIENT)
Dept: HEALTH INFORMATION MANAGEMENT | Facility: OTHER | Age: 77
End: 2022-06-29

## 2022-07-21 ENCOUNTER — TELEPHONE (OUTPATIENT)
Dept: MEDICAL GROUP | Facility: PHYSICIAN GROUP | Age: 77
End: 2022-07-21
Payer: MEDICARE

## 2022-07-21 NOTE — TELEPHONE ENCOUNTER
Future Appointments       Provider Department Center    7/28/2022 10:20 AM (Arrive by 10:05 AM) RYANN Hardy [facility] [place] [facility]    11/4/2022 2:30 PM [facility] INFUSION Infusion Services [facility]        ESTABLISHED PATIENT PRE-VISIT PLANNING     Patient was NOT contacted to complete PVP.     Note: Patient will not be contacted if there is no indication to call.     1.  Reviewed notes from the last few office visits within the medical group: Yes, LOV 05/19/2022    2.  If any orders were placed at last visit or intended to be done for this visit (i.e. 6 mos follow-up), do we have Results/Consult Notes?         •  Labs - Labs were not ordered at last office visit.  Note: If patient appointment is for lab review and patient did not complete labs, check with provider if OK to reschedule patient until labs completed.       •  Imaging - Imaging was not ordered at last office visit.       •  Referrals - No referrals were ordered at last office visit.    3. Is this appointment scheduled as a Hospital Follow-Up? No    4.  Immunizations were updated in Epic using Reconcile Outside Information activity? Yes    5.  Patient is due for the following Health Maintenance Topics:   Health Maintenance Due   Topic Date Due   • IMM ZOSTER VACCINES (1 of 2) Never done   • Annual Wellness Visit  10/20/2016   • COLORECTAL CANCER SCREENING  01/19/2022   • COVID-19 Vaccine (4 - Booster for Pfizer series) 04/13/2022     6.  AHA (Pulse8) form printed for Provider? N/A

## 2022-07-28 ENCOUNTER — OFFICE VISIT (OUTPATIENT)
Dept: MEDICAL GROUP | Facility: PHYSICIAN GROUP | Age: 77
End: 2022-07-28
Payer: MEDICARE

## 2022-07-28 VITALS
TEMPERATURE: 97.5 F | HEART RATE: 68 BPM | SYSTOLIC BLOOD PRESSURE: 134 MMHG | OXYGEN SATURATION: 96 % | HEIGHT: 61 IN | DIASTOLIC BLOOD PRESSURE: 80 MMHG | WEIGHT: 137 LBS | BODY MASS INDEX: 25.86 KG/M2

## 2022-07-28 DIAGNOSIS — I10 ESSENTIAL HYPERTENSION: ICD-10-CM

## 2022-07-28 DIAGNOSIS — R19.5 POSITIVE FIT (FECAL IMMUNOCHEMICAL TEST): ICD-10-CM

## 2022-07-28 PROCEDURE — 99214 OFFICE O/P EST MOD 30 MIN: CPT | Performed by: NURSE PRACTITIONER

## 2022-07-28 RX ORDER — METOPROLOL SUCCINATE 100 MG/1
100 TABLET, EXTENDED RELEASE ORAL
Qty: 100 TABLET | Refills: 3 | Status: SHIPPED | OUTPATIENT
Start: 2022-07-28 | End: 2023-10-16

## 2022-07-28 RX ORDER — LISINOPRIL 30 MG/1
30 TABLET ORAL DAILY
Qty: 100 TABLET | Refills: 3 | Status: SHIPPED | OUTPATIENT
Start: 2022-07-28 | End: 2023-09-05

## 2022-07-28 ASSESSMENT — FIBROSIS 4 INDEX: FIB4 SCORE: 2.17

## 2022-07-28 NOTE — ASSESSMENT & PLAN NOTE
Her blood pressure is at goal today. She continues with lisinopril 30 mg daily and metoprolol  mg daily. She has been taking her blood pressure today and brings in her log today. For the month of July her blood pressures have been ranging 120-130/60 and heart rate is 60-70.  She denies any chest pain, shortness of breath, dizziness, blurry vision or headache.  She states that she can usually feel when her blood pressure is elevated.

## 2022-07-28 NOTE — ASSESSMENT & PLAN NOTE
Fit test from January 2022 was positive.  She has followed up with GI for the consultation and was approved for a colonoscopy.  She has not had her colonoscopy at this time.  Encouraged her to follow-up with GI.  Contact number provided for GI consultants.

## 2022-07-28 NOTE — PROGRESS NOTES
Subjective:     CC: Hypertension follow-up    HPI:   aNdege presents today with the following:    Essential hypertension  Her blood pressure is at goal today. She continues with lisinopril 30 mg daily and metoprolol  mg daily. She has been taking her blood pressure today and brings in her log today. For the month of July her blood pressures have been ranging 120-130/60 and heart rate is 60-70.  She denies any chest pain, shortness of breath, dizziness, blurry vision or headache.  She states that she can usually feel when her blood pressure is elevated.    Positive FIT (fecal immunochemical test)  Fit test from January 2022 was positive.  She has followed up with GI for the consultation and was approved for a colonoscopy.  She has not had her colonoscopy at this time.  Encouraged her to follow-up with GI.  Contact number provided for GI consultants.      Past Medical History:   Diagnosis Date   • Anxiety    • Cellulitis and abscess of lower extremity    • Dental disorder     full dentures   • Generalized osteoarthritis of multiple sites 10/20/2015   • Hardware complicating wound infection (HCC) 12/2/2019   • Heart valve disease     pt not sure    • Hyperlipidemia    • Hypertension    • Osteoporosis    • S/P femoral-popliteal bypass surgery 6/12/2019    Dr crum 2018       Social History     Tobacco Use   • Smoking status: Former Smoker     Packs/day: 0.50     Years: 25.00     Pack years: 12.50     Types: Cigarettes     Quit date: 1/1/2007     Years since quitting: 15.5   • Smokeless tobacco: Never Used   • Tobacco comment: Ex-smoker,  Quit ~ 2007 (prior hx ~ 1ppd x 25 years)   Vaping Use   • Vaping Use: Never used   Substance Use Topics   • Alcohol use: Not Currently     Alcohol/week: 0.0 oz     Comment: stopped ~  11/2019  (unclear about prior use).    • Drug use: No       Current Outpatient Medications Ordered in Epic   Medication Sig Dispense Refill   • Ferrous Sulfate (IRON PO) Take  by mouth.     •  "lisinopril (PRINIVIL) 30 MG tablet Take 1 Tablet by mouth every day. 100 Tablet 3   • metoprolol SR (TOPROL XL) 100 MG TABLET SR 24 HR Take 1 Tablet by mouth every day. 100 Tablet 3   • sertraline (ZOLOFT) 100 MG Tab Take 1 Tablet by mouth every day. 90 Tablet 3   • vitamin D (CHOLECALCIFEROL) 1000 Unit (25 mcg) Tab Take 1,000 Units by mouth every day.     • acetaminophen (TYLENOL) 325 MG Tab Take 2 Tabs by mouth every 6 hours as needed (Mild Pain; (Pain scale 1-3); Temp greater than 100.5 F). (Patient taking differently: Take 650 mg by mouth every four hours as needed (Mild Pain; (Pain scale 1-3); Temp greater than 100.5 F).) 30 Tab 0   • Multiple Vitamins-Minerals (THERAPEUTIC-M/LUTEIN) Tab Take 1 Tab by mouth every day.  0   • doxycycline (VIBRAMYCIN) 100 MG Tab Take 1 Tablet by mouth every day. TAKE 1 TABLET BY MOUTH TWICE A DAY   (Through Infectious disease ?) (Patient not taking: Reported on 7/28/2022) 60 Tablet 0   • triamcinolone acetonide (KENALOG) 0.1 % Cream Apply 1 Application topically 2 times a day. (Patient not taking: Reported on 7/28/2022) 45 g 3     No current [facility]-ordered facility-administered medications on file.       Allergies:  Bactrim [sulfamethoxazole-trimethoprim], Meropenem, and Oxycodone    Health Maintenance: Due for zoster vaccine, annual wellness visit, and colonoscoopy. Due for second COVID booster and will make an appointment. She will call GI consultants and schedule her colonoscopy.     Objective:     Vital signs reviewed  Exam:  /80 (BP Location: Right arm, Patient Position: Sitting, BP Cuff Size: Adult)   Pulse 68   Temp 36.4 °C (97.5 °F) (Temporal)   Ht 1.549 m (5' 1\")   Wt 62.1 kg (137 lb)   SpO2 96%   BMI 25.89 kg/m²  Body mass index is 25.89 kg/m².    Gen: Alert and oriented, No apparent distress.  Eyes:   Lids normal. Glasses in place.   Lungs: Normal effort, CTA bilaterally, no wheezes, rhonchi, or rales  CV: Regular rate and rhythm. No murmurs, rubs, or " gallops.  Ext: No clubbing, cyanosis, edema.  Using front wheel walker with ambulation.      Assessment & Plan:     76 y.o. female with the following -     1. Essential hypertension  Chronic stable problem.  Blood pressure is at goal.  She will continue with her lisinopril 30 mg daily and metoprolol  mg daily.  Medications refilled today.  Discussed she may take her blood pressure 1-2 times monthly and as needed.  She will make appointment if her blood pressure becomes elevated again greater than 140/90 consistently.  - lisinopril (PRINIVIL) 30 MG tablet; Take 1 Tablet by mouth every day.  Dispense: 100 Tablet; Refill: 3  - metoprolol SR (TOPROL XL) 100 MG TABLET SR 24 HR; Take 1 Tablet by mouth every day.  Dispense: 100 Tablet; Refill: 3    2. Positive FIT (fecal immunochemical test)  Chronic stable problem.  Encouraged her to follow-up with GI to complete her colonoscopy.  She verbalized understanding.  She states that she has been busy and has not been able to schedule.      Return in about 5 months (around 12/28/2022) for Hypertension.    Please note that this dictation was created using voice recognition software. I have made every reasonable attempt to correct obvious errors, but I expect that there are errors of grammar and possibly content that I did not discover before finalizing the note.

## 2022-08-28 NOTE — ASSESSMENT & PLAN NOTE
Reports she is feeling relaxed.  No longer drinking, sleeping better. Taking sertraline daily and doing well.  No suicidal ideation reported.    Abnormal Lactate: > 2

## 2022-09-14 ENCOUNTER — DOCUMENTATION (OUTPATIENT)
Dept: HEALTH INFORMATION MANAGEMENT | Facility: OTHER | Age: 77
End: 2022-09-14
Payer: MEDICARE

## 2022-09-21 NOTE — PROGRESS NOTES
Your Child's Health  Five to Six-Year-Old Visit      [facility]  September 21, 2022    There were no vitals taken for this visit.  Weight:       YOUR CHILD'S 5 to 6 YEAR-OLD VISIT    School  Starting school is a major milestone for children and their family members. Good language and willingness or readiness to separate from parents easily are a couple of the most important skills indicating school readiness. Once a child is enrolled in school, parents need to keep in close contact with the teachers. Learning or developmental problems which had not been previously apparent may become evident in  or first grade. If your child had previously received some educational services or therapies in a  program, they may qualify for continued services in school. School systems are obligated to evaluate children if there are significant concerns about learning or developmental problems. If you need help accessing this type of evaluation, talk with your schools, your doctor, or you can call the Department of Public Instruction at [phone] [phone] or visit their  website at http://dpi.wi.gov.    Help ensure your child's success at school by making sure they are well rested (a regular bedtime routine is important in this regard). Also, make sure that they have eaten (or have an opportunity to eat at school) every morning. Children who are tired or hungry are not in the best state to learn well! Make sure they are not over scheduled with afterschool activities (sports, clubs, other social activities)-- children need some unstructured downtime every day. As your child learns to read, set aside time daily to read together--it helps them learn and is a great way to spend family time together.     Social Development  Five and six-year-old children will be spending more time with friends and peers and away from their families. It is important to know the friends and families that your child is spending time  Infectious Disease Progress Note    Author: Amber Valencia M.D. Date & Time of service: 2019  12:33 PM    Chief Complaint:  Follow-up on left lower extremity cellulitis/ulcerations    Interval History:  2 afebrile WBC 5.7 patient states her left leg was just redressed this morning, she has ongoing pain and erythema, patient also complaining of diarrhea  2/ afebrile CBC not done patient concerned about new small white blister formation on both hands secondary to her skin condition, denies any left lower extremity pain, dressings were changed this morning, continues to have significant erythema on her leg  /3 afebrile WBC 5.9 patient slept well overnight, dressings were changed this morning, she noted some decreased swelling in her left leg and foot   afebrile no CBC patient complaining of diffuse pruritus secondary to her skin condition and is more concerned about that than her infection on her left leg, was started on steroids per primary team yesterday, wounds were redressed today with improvement in the wound bed per notes  Labs Reviewed, Medications Reviewed, Radiology Reviewed and Wound Reviewed.    Review of Systems:  Review of Systems   Constitutional: Negative for chills, fever and weight loss.   Respiratory: Negative for cough and shortness of breath.    Cardiovascular: Positive for leg swelling.        Decreased   Gastrointestinal: Positive for diarrhea. Negative for abdominal pain, nausea and vomiting.   Musculoskeletal: Positive for myalgias.   Skin: Positive for itching and rash.   Neurological: Negative for dizziness and headaches.   All other systems reviewed and are negative.      Hemodynamics:  Temp (24hrs), Av.5 °C (97.7 °F), Min:36.3 °C (97.4 °F), Max:36.6 °C (97.9 °F)  Temperature: 36.5 °C (97.7 °F)  Pulse  Av  Min: 65  Max: 100   Blood Pressure : 134/47       Physical Exam:  Physical Exam   Constitutional: She is oriented to person, place, and time. She appears  well-developed.   HENT:   Head: Normocephalic and atraumatic.   Eyes: Pupils are equal, round, and reactive to light. EOM are normal.   Neck: Neck supple.   Cardiovascular: Normal rate.    Murmur heard.  Irregular   Pulmonary/Chest: Effort normal. She has no wheezes. She has no rales.   Abdominal: Soft. There is no tenderness.   Musculoskeletal: She exhibits edema and tenderness.   Left lower extremity dressed.  There is extending erythema from the dressing and swelling down to the dorsum of the foot.  Left lower extremity is warm to palpation   Neurological: She is alert and oriented to person, place, and time.   Skin: Rash noted. There is erythema.   Scattered erythematous lesions on bilateral upper and lower extremities and abdomen       Meds:    Current Facility-Administered Medications:   •  miconazole 2%-zinc oxide  •  predniSONE  •  doxycycline monohydrate  •  diphenhydrAMINE-ZnAcetate  •  triamcinolone acetonide  •  senna-docusate **AND** polyethylene glycol/lytes **AND** magnesium hydroxide **AND** bisacodyl  •  enoxaparin  •  acetaminophen  •  [COMPLETED] piperacillin-tazobactam **AND** piperacillin-tazobactam  •  dakins 0.125% (1/4 strength)  •  metoprolol SR  •  mycophenolate  •  niacin  •  sertraline    Labs:  Recent Labs      02/03/19 0426   WBC  5.9   RBC  3.60*   HEMOGLOBIN  11.1*   HEMATOCRIT  35.2*   MCV  97.8   MCH  30.8   RDW  53.3*   PLATELETCT  204   MPV  9.4   NEUTSPOLYS  68.60   LYMPHOCYTES  17.70*   MONOCYTES  11.00   EOSINOPHILS  1.50   BASOPHILS  0.20     Recent Labs      02/02/19   0408  02/03/19   0426  02/04/19   0247   SODIUM  135  134*  132*   POTASSIUM  3.7  3.5*  3.9   CHLORIDE  103  104  104   CO2  24  21  21   GLUCOSE  111*  124*  125*   BUN  10  8  10     Recent Labs      02/02/19 0408  02/03/19 0426  02/04/19   0247   ALBUMIN  2.5*  2.5*  2.7*   CREATININE  0.68  0.77  0.66       Imaging:  None new to review    Us-extremity Venous Lower Unilat Left    Result Date:  with.     Peers have a lot of influence on each other, and your child may be interacting with friends who do not have to follow the same rules that you have set for your child. Some rules may change as they get older, but being very clear and very consistent continues to be critical component of effective parenting. Children will frequently push the limits at this age to see what they can get away with--so it is important to regularly remind your child of appropriate rules and expectations that you have for them.     Help your child feel good about themselves by giving them praise for their accomplishments, doing things together, and listening to them without interrupting. Give them opportunities to gradually be more independent and responsible.    Continue to set a good example for them - be responsible in your own obligations, mean what you say, model appropriate behavior when you yourself are angry or upset, and show respect for others by being on time. When your child is angry or frustrated, talk to them about why they are feeling that way, what their options are and how to resolve conflicts appropriately. Physical aggression - hitting, biting, kicking, throwing things- should not be tolerated at this age; teach your children healthier ways to respond to upsetting situations and blow off steam.    Households with working parents and children school are busy! Try to devote mealtimes, bedtimes, and even driving time, to talk with your children-- keep phones and other electronic media turned off and focus on talking to each other.     Remind your children that they can tell you anything. It is especially important that they know to report you if they feel they are being teased or bullied. They should also be taught to report bullying that they witness to you or to a teacher. Any concerns about bullying should be addressed-talk to your teachers, administrators or guidance counselors at the school to help with this  2019   Vascular Laboratory  CONCLUSIONS  No DVT in the visualized veins of the LEFT lower extremity.  NANCY SANCHEZ  Exam Date:     2019 08:51  Room #:     Inpatient  Priority:     Routine  Ht (in):             Wt (lb):  Ordering Physician:        MIR GREGG  Referring Physician:       [id number] Patrick  Sonographer:               Santos Che RVT  Study Type:                Complete Unilateral  Technical Quality:         Adequate  Age:    73    Gender:     F  MRN:    [id number]  :    1945      BSA:  Indications:     Localized swelling, mass and lump, left lower limb  CPT Codes:       31004  ICD Codes:         History:         Left calf cellulitis with history of PVD and surgical removal                   of greater saphenous vein  Limitations:     Imaging not performed on low calf due to dressing and                   underlying ulcer  PROCEDURES:  Left lower extremity venous duplex imaging.  The following venous structures were evaluated: common femoral, profunda  femoral, greater saphenous, femoral, popliteal , peroneal and posterior  tibial veins.  Serial compression, augmentation maneuvers,  color and spectral Doppler  flow evaluations were performed.  FINDINGS:  Left lower extremity -.  Complete color filling and compressibility with normal venous flow dynamics  including spontaneous flow, response to augmentation maneuvers, and  respiratory phasicity.  The peroneal and posterior tibial veins are difficult to assess for  compressibility in the proximal calf, but flow response to augmentation is  demonstrated.  Imaging not performed on low calf due to dressing and underlying ulcer,  cannot rule out the possibility of thrombus at this level.  Flow was evaluated in the contralateral common femoral vein and normal  venous flow dynamics including spontaneous flow, respiratory phasic  variation and augmentation were demonstrated.  Sathya Pichardo MD  issue. Good online resources regarding bullying are [employer] and the American Academy of Pediatrics \"HealthyChildren.org\" website (search for \"Bullying\").     Dental  Your child should be brushing at least twice daily for 2 minutes at a time with a pea-sized amount of regular (fluoridated) toothpaste. Children this age can also be taught to floss their teeth, but they still need a parent’s help to make sure all their back teeth are brushed well. Look for a dentist if you do not already have one. Limiting candy, other sweets, juice and sticky/chewy foods is good for their dental health.     Nutrition  Your child will be making more of their own choices about what to eat, so keep plenty of nutritious foods in your home for meal and snack times. Make sure your child eats something healthy for breakfast every morning; this is proven to positively impact school performance and learning. Your child needs ~3 servings of good sources of calcium everyday; this can include lowfat (or skim) milk, yogurt, low fat cheese or foods which have been fortified with calcium. Children this age should get 600 IU of vitamin D daily which (along with appropriate calcium intake) ensures good bone health. Most people cannot meet their vitamin D needs with their usual diet, so a multivitamin with iron supplement continues to be appropriate for this age. (A pure vitamin D supplement with 400 or 600 IU is also okay.)    Overweight and obesity are very serious problems; teaching your growing child to make healthy choices is important, as is continuing to avoid unhealthy choices like greasy fast food, bagged snacks, sodas and sweet drinks, lots of juice, candies and sweets. Make unhealthy choices an occasional treat only; don’t keep them in your home, because your child will find them!    Physical Activity and Screen Time   A child who enjoys being physically active when they are young will be more likely to stay active as they grow   (Electronically Signed)  Final Date:      31 January 2019                   10:50      Micro:  Results     Procedure Component Value Units Date/Time    CULTURE WOUND W/ GRAM STAIN [[id number]]     Order Status:  No result Specimen:  Wound from Left Leg           Assessment:  Active Hospital Problems    Diagnosis   • *Cellulitis of left lower extremity [L03.116]   • Leg ulcer, left, with fat layer exposed ([facility]) [L97.922]   • Peripheral vascular disease ([facility]) [I73.9]   • Bullous pemphigus [L10.9]       Plan:  Cellulitis of left lower extremity  Low-grade temp resolved  No leukocytosis  Wound gram stain +GPC, GPR, culture 1/30 - [facility] (S-cipro)  Prior cultures in July 2018+ MRSA, Enterococcus faecalis, pseudomonas aeruginosa  Doppler ultrasound-negative for DVT  Continue Zosyn and doxy  Wound care -wound bed improving  Anticipate a 2-week course of antibiotics total  Estimated stop date 2/13/2019  Transition to linezolid and p.o. Cipro at discharge  Check an EKG to evaluate QTc interval as last EKG showed a QTC of 478 in June 2018    Bullous pemphigus  On CellCept  Started on prednisone on 2/3    Peripheral vascular disease  Status post fem-pop bypass 06/08/18    Discussed with internal medicine RN and Dr. Ko   older. Set a goal of 60 minutes of physical activity every day--it can be all at once or broken up into shorter segments. Try to make it a family activity as much as possible.     Time watching television, playing on the computer or using any other form of electronic media is NOT physical activity. As your child learns to read and their fine motor skills improve, they are going to become very skilled at using the computer and Internet (and they are going to like it!). Setting limits and supervising media use is very important. Set a time limit for media use each day (and enforce it). Consider setting up child-specific browsers and creating a “Favorites” toolbar so your child can only get to approved websites. For suggestions on developing healthy media habits, do an internet search for “healthychildren media use plan” for recommendations from the American Academy of Pediatrics.  Also, don't allow snacking in front of the TV set-- that is another unhealthy habit that is easier to prevent than change!    And parents need to be a role model--if you are constantly on your phone in situations where you could be talking with or interacting with your child, you are teaching them that the phone takes priority over your interaction with them.        Safety  Car:  Until a child weighs at least 40 pounds, they are safest in a rear or forward-facing car seat with a 5-point harness. If your 5-point harness seat has a higher weight limit than 40 pounds, keep your child in it-- they are safest in these seats until they outgrow the 's recommended size limits. (There is not a specific height limit for most of these seats, but children are safe as long as the top of their ears are below the top/ back edge of the seat and their shoulders are below the highest shoulder strap slots.) When they do outgrow the 5-point harness seat limits, they should ride in a booster seat in the backseat. High-backed booster seats should be  used if there are low seat backs or no head rests in your car; backless boosters can be used if your car has high seat backs and head rests.    Street Safety: Teach your child how to be safe when standing outside waiting for a bus or walking to school. Children this age should always be supervised when crossing streets.     Biking: Children (and their parents) should wear properly fitted helmets when biking. Children this age are not safe riding in the street.    Water & Sun Safety: Swimming lessons are a good idea if your child does not know how to swim yet. Even if they can swim, constant supervision by an adult who know how to swim is a must. Remember to use sunscreen with an SPF of 15 or higher when outside and reapply after time in the water.  Your child should avoid prolonged time in the sun between 11 AM and 3 PM and wear hats, sunglasses and sun protection clothing.     Personal Safety: A parent’s safety is just as important as a child’s safety. Violence is common in many people’s lives. If you do not feel safe in your home or if a partner has ever hit, kicked, shoved or physically hurt you or your child, it is important for you to get help. Talk to your doctors or a . In [place], resources include [facility] (239-492-2227) and the [facility] (24 hour hotline is 245- 601-6819); the National Domestic Violence Hotline is 8-889-060-RAGR (8238).    Review with your child that certain body parts (the parts usually covered by a bathing suit) and behaviors are private. For safety purposes, make sure your child knows that they should never keep secrets from parents, and they should always report to you if any adult shows any interest in their private parts (or if an adult discussed or shared their own private parts with a child). Tell them they should talk to you if any adult is doing or saying anything that makes them uncomfortable, especially if an adult is asking them  to keep a secret.    Fires & Burns: Children this age are fascinated by fires and they can be very compulsive--so watch them very carefully around fires,  grills, and stoves. Make sure matches and lighters are safely stored out of reach and continue to keep all hot items out of reach. Have a family fire safety plan, including regular smoke detector (and carbon monoxide detector) battery checks, working fire extinguishers, and escape routes. It is important that children this age know what door they should go out of if the smoke alarm goes off, where to meet family members outside and the importance of not going back into a burning building.     Firearms: Children this age are not capable of understanding how dangerous firearms are and evidence shows a child is safest in a home where no firearms are stored. If there any hunters or firearm owners in your home or anywhere your child is visiting, make sure all weapons are safely stored: unloaded, securely locked and ammunition stored separately.   Smoking: Continue to protect your child from cigarette smoke; secondhand smoke increases the risk of heart and lung disease in your child. Vapors from e-cigarettes may also be harmful, so don’t use those around your child either. If you smoke and are ready to consider quitting, talk to your doctor. Nicotine replacement products can be very helpful in breaking this tough addiction. 1-800-QUIT-NOW is a national help line that can help you find resources; other resources can be found at cdc.gov.       MEDICATION FOR FEVER OR PAIN:   Acetaminophen liquid (e.g., Tylenol or Tempra) may be given every four hours as needed for pain or fever.  Acetaminophen liquid is less concentrated than the infant dropper bottle type.  Be sure to check which product CONCENTRATION you are using.    OLD Concentration INFANT Tylenol/Acetaminophen  Drops (80 MG/0.8 mL)    Child’s Weight: Dose:  36 - 47 pounds:   240 mg (3 droppers)  48 - 59 pounds:    320 mg (4 droppers)    NEW Concentration INFANT Tylenol/Acetaminophen  Drops (160 MG/5 mL)    Child’s Weight: Dose:  36 - 47 pounds:   240 mg (7.5 mL (1 1/2 Teaspoons))  48 - 59 pounds:   320 mg (10.0 mL (2 Teaspoons))    CHILDREN’S Tylenol/Acetaminophen  (160 MG/5 mL)    Child’s Weight: Dose:  36 - 47 pounds:   240 240 mg (7.5 mL (1 1/2 Teaspoons))  48 - 59 pounds:   320 mg (10.0 mL (2 Teaspoons))    CHILDREN’S Tylenol/Acetaminophen MELTAWAYS ( 80 MG tablets)    Child’s Weight:  Dose:  36 - 47 pounds:    240 mg (3 meltaway tablets)  48 - 59 pounds:    320 mg (4 meltaway tablets)    CHILDREN'S Ibuprofen liquid (e.g., Advil or Motrin) may be given every six hours as needed for pain or fever.    Child’s Weight:  Dose:  36 - 47 pounds:    150 MG (1 1/2 Teaspoons)  48 - 59 pounds  200 mg (2 Teaspoons)     Most Recent Immunizations   Administered Date(s) Administered   • DTaP 11/29/2017   • DTaP(INFANRIX) 11/29/2017   • DTaP/Hep B/IPV 02/13/2017   • DTaP/IPV 09/18/2020   • HIB, Unspecified Formulation 2016   • Hep A, ped/adol, 2 dose 08/13/2018   • Hep B, adolescent or pediatric 2016   • Hib (PRP-OMP) 11/29/2017   • Influenza, injectable, quadrivalent, preservative free, pediatric 11/29/2017   • Influenza, quadrivalent, multi-dose 02/13/2017   • Influenza, quadrivalent, preserve-free 09/20/2021   • MMR 08/14/2017   • Measles Mumps Rubella Varicella 09/18/2020   • Pneumococcal Conjugate 13 Valent Vacc (Prevnar 13) 08/14/2017   • Rotavirus - pentavalent 02/13/2017   • Varicella 08/14/2017       If Darya develops any of the following reactions within 72 hours after an immunization, notify your pediatrician by calling the pediatric phone nurse:  1.  A temperature of 105 degrees or above.  2.  More than 3 hours of continuous crying.  3.  A shrill, high-pitched cry.  4.  A pale, limp spell.  5.  A seizure or fainting spell.  In this case, you should call 911 or go immediately to the emergency room.      NEXT  VISIT:  6 YEARS OF AGE    Thank you for entrusting your care to [facility] [place].    Also, check out “Children’s Health” on the [facility] [place] [facility] for updates on timely topics regarding children’s health!

## 2022-09-27 NOTE — PROGRESS NOTES
Bedside shift report received. Assumed care of patient at this time. Patient sitting up in bed resting comfortably.Call light and personal items within reach. No further requests per patient at this time.    impaired balance/decreased flexibility/pain/decreased ROM/decreased strength

## 2022-10-28 NOTE — PROGRESS NOTES
"Infectious Disease [facility]    Subjective:     Chief Complaint   Patient presents with    Follow-Up     Hardware complicating wound infection  Chronic ulcer of left leg, limited to breakdown of skin     Interval History: 77 y.o. 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  Well known to the ID service +PVD with chronic lower extremity ulcerations, bullous pemphigus on steroids, history of a left intertrochanteric fracture and a history of left total hip arthroplasty status post hardware removal by Dr. Vazquez on 2/11/2018.  Fell in September 2019 resulting in a C2 fracture, admitted in early October 2019 for drainage from her left hip wound.  Prior cultures MSSA and MRSA. S/p revision of a left total hip arthroplasty with exchange of head, liner and proximal stem body by Dr. Vazquez on 10/9/2019.  There was a sinus tract all the way down to the hip with significant purulence noted intraoperatively.  Operative cultures grew a single colony of diphtheroids and MSSA.  Patient was transferred to a skilled nursing facility on vancomycin and rifampin for a planned 6-week course from surgery, completed IV abx on 11/20/2019.  Started on chronic suppressive p.o. doxycycline.    9/8/2020:  Seen by Dr. Ferrari.  Came in for a quick follow-up to check in and reassure everybody.  Reported chronic swelling and dry skin to BLE.  Uses Eucerin and a product with zinc on her legs-has \"tiny' bubbles that rupture and shallow ulcerations. No episodes cellulitis.  Continue chronic oral doxycycline suppression for prior MSSA/MRSA.    Last seen in the ID clinic on 3/16/2021: Patient reports feeling well and has been tolerating the p.o. doxycycline without adverse effect.  Denies feeling generally ill, fevers/chills, general malaise, headache, n/v/d, abdominal pain, chest pain, shortness of breath, cough, sore throat.  Left hip remains well-healed without " any breakdown or drainage.  10 used to use Vaseline to help keep skin moist.  States that she gets little bubbles every now and then, but not nearly as often and they are much smaller than they had been in the past.  Received both of her Covid vaccines, stating that after she received the first 1 approximately 1 week later she developed a head to toe rash.  Her PCP has her using a steroid cream twice a day that is somewhat helping.  States that she still gets a fair amount of itching.  Patient also feels that the swelling to her BLE has gone down since receiving the Covid vaccine.    Today, 11/3/2022: Patient states that she developed a superficial wound on the medial malleoli or of her left ankle.  It has only been draining serous fluid but patient states that the wound has been improving.  She has been doing her own wound care and protecting that area when she wears shoes.  Patient also continues to tolerate doxycycline without any issues and has been taking the medication appropriately.  She recently saw Dr. Vazquez and he refilled her doxycycline for 1 year.    ROS as above in HPI.    Past Medical History:   Diagnosis Date    Anxiety     Cellulitis and abscess of lower extremity     Dental disorder     full dentures    Generalized osteoarthritis of multiple sites 10/20/2015    Hardware complicating wound infection (HCC) 12/2/2019    Heart valve disease     pt not sure     Hyperlipidemia     Hypertension     Osteoporosis     S/P femoral-popliteal bypass surgery 6/12/2019    Dr crum 2018       Family History   Problem Relation Age of Onset    GI Disease Mother         colostomy    Heart Attack Father     Diabetes Father     Hypertension Father     Psychiatric Illness Brother         bipolar disorder       Social History     Tobacco Use    Smoking status: Former     Packs/day: 0.50     Years: 25.00     Pack years: 12.50     Types: Cigarettes     Quit date: 1/1/2007     Years since quitting: 15.8    Smokeless  tobacco: Never    Tobacco comments:     Ex-smoker,  Quit ~ 2007 (prior hx ~ 1ppd x 25 years)   Vaping Use    Vaping Use: Never used   Substance Use Topics    Alcohol use: Not Currently     Alcohol/week: 0.0 oz     Comment: stopped ~  11/2019  (unclear about prior use).     Drug use: No       Allergies: Bactrim [sulfamethoxazole-trimethoprim], Meropenem, and Oxycodone    Pt's medication and problem list reviewed.     Objective:     There were no vitals taken for this visit.    Physical Exam  Vitals reviewed.   Constitutional:       General: She is not in acute distress.     Appearance: She is not diaphoretic.      Comments: Elderly   HENT:      Head: Normocephalic and atraumatic.      Right Ear: External ear normal.      Left Ear: External ear normal.   Eyes:      General: No scleral icterus.     Conjunctiva/sclera: Conjunctivae normal.      Pupils: Pupils are equal, round, and reactive to light.   Neck:      Vascular: No JVD.      Trachea: No tracheal deviation.   Cardiovascular:      Rate and Rhythm: Normal rate and regular rhythm.      Heart sounds: Normal heart sounds. No murmur heard.  Pulmonary:      Effort: Pulmonary effort is normal. No respiratory distress.      Breath sounds: Normal breath sounds. No wheezing or rales.   Abdominal:      General: Bowel sounds are normal. There is no distension.      Palpations: Abdomen is soft.      Tenderness: There is no abdominal tenderness. There is no guarding or rebound.   Musculoskeletal:      Cervical back: Normal range of motion and neck supple.      Comments: Left lower extremity and foot with chronic skin changes.  There is a superficial wound on the medial malleoli of the left foot with serous fluid on bandage.  No periwound erythema.  No signs of active infection.    Patient also has patches of dry skin and scabs.  Evidence of excoriations   Skin:     General: Skin is warm and dry.      Findings: Rash (Diffuse rash with healing scabs throughout) present. No  erythema.   Neurological:      Mental Status: She is alert and oriented to person, place, and time.      Cranial Nerves: No cranial nerve deficit.      Comments: Fairly steady gait, FWW for ambulatory assistance   Psychiatric:         Mood and Affect: Mood and affect normal.         Cognition and Memory: Memory normal.         Judgment: Judgment normal.      Comments: Pleasant     Assessment and Plan:   The following treatment plan was discussed with patient at length:    1. LLE Hardware complicating wound infection, sequela  doxycycline (VIBRAMYCIN) 100 MG Tab    Continue p.o. doxycycline 100 mg twice daily for chronic lifelong suppression due to MSSA/MRSA infection.  Per patient, her surgeon Dr. Vazquez recently filled her prescription for 1 year.   2. Chronic ulcer of left leg, limited to breakdown of skin (HCC)  No signs of active infection.  Continue wound care    As above     Doxycycline side effects and drug drug interactions discussed with patient again in detail today.    Follow up: 1 year   RTC sooner if needed. FU with PCP for ongoing chronic medical conditions.     Amber Valencia M.D.       Please note that this dictation was created using voice recognition software. I have  worked with technical experts from [employer] to optimize the interface.  I have made every reasonable attempt to correct obvious errors, but there may be errors of grammar and possibly content that I did not discover before finalizing the note.

## 2022-11-03 ENCOUNTER — OFFICE VISIT (OUTPATIENT)
Dept: INFECTIOUS DISEASES | Facility: MEDICAL CENTER | Age: 77
End: 2022-11-03
Attending: INTERNAL MEDICINE
Payer: MEDICARE

## 2022-11-03 VITALS
DIASTOLIC BLOOD PRESSURE: 98 MMHG | OXYGEN SATURATION: 94 % | BODY MASS INDEX: 25.68 KG/M2 | HEART RATE: 95 BPM | HEIGHT: 61 IN | SYSTOLIC BLOOD PRESSURE: 150 MMHG | TEMPERATURE: 97.5 F | WEIGHT: 136.02 LBS | RESPIRATION RATE: 16 BRPM

## 2022-11-03 DIAGNOSIS — T84.7XXS HARDWARE COMPLICATING WOUND INFECTION, SEQUELA: ICD-10-CM

## 2022-11-03 PROCEDURE — 99214 OFFICE O/P EST MOD 30 MIN: CPT | Performed by: INTERNAL MEDICINE

## 2022-11-03 PROCEDURE — 99212 OFFICE O/P EST SF 10 MIN: CPT | Performed by: INTERNAL MEDICINE

## 2022-11-03 ASSESSMENT — FIBROSIS 4 INDEX: FIB4 SCORE: 2.2

## 2022-11-04 ENCOUNTER — OUTPATIENT INFUSION SERVICES (OUTPATIENT)
Dept: ONCOLOGY | Facility: MEDICAL CENTER | Age: 77
End: 2022-11-04
Attending: NURSE PRACTITIONER
Payer: MEDICARE

## 2022-11-04 VITALS
BODY MASS INDEX: 25.85 KG/M2 | RESPIRATION RATE: 18 BRPM | DIASTOLIC BLOOD PRESSURE: 85 MMHG | TEMPERATURE: 98.1 F | OXYGEN SATURATION: 93 % | WEIGHT: 136.91 LBS | SYSTOLIC BLOOD PRESSURE: 175 MMHG | HEART RATE: 87 BPM | HEIGHT: 61 IN

## 2022-11-04 DIAGNOSIS — M81.0 AGE-RELATED OSTEOPOROSIS WITHOUT CURRENT PATHOLOGICAL FRACTURE: ICD-10-CM

## 2022-11-04 DIAGNOSIS — Z87.81 HISTORY OF HIP FRACTURE: ICD-10-CM

## 2022-11-04 LAB
CA-I BLD ISE-SCNC: 1.25 MMOL/L (ref 1.1–1.3)
CREAT BLD-MCNC: 0.9 MG/DL (ref 0.5–1.4)

## 2022-11-04 PROCEDURE — 96372 THER/PROPH/DIAG INJ SC/IM: CPT

## 2022-11-04 PROCEDURE — 82565 ASSAY OF CREATININE: CPT

## 2022-11-04 PROCEDURE — 700111 HCHG RX REV CODE 636 W/ 250 OVERRIDE (IP): Performed by: NURSE PRACTITIONER

## 2022-11-04 PROCEDURE — 82330 ASSAY OF CALCIUM: CPT

## 2022-11-04 PROCEDURE — 36415 COLL VENOUS BLD VENIPUNCTURE: CPT

## 2022-11-04 RX ADMIN — DENOSUMAB 60 MG: 60 INJECTION SUBCUTANEOUS at 14:45

## 2022-11-04 ASSESSMENT — FIBROSIS 4 INDEX: FIB4 SCORE: 2.2

## 2022-11-04 NOTE — PROGRESS NOTES
Pt arrived ambulatory to [facility] for 6 month Prolia injection. POC discussed with pt and she agrees with plan. Pt with call light in reach for safety. Pt verbalized understanding to call for needs/assist.     I-stat labs drawn as ordered. Results reviewed, ok to proceed with treatment. Pt medicated per MAR with Prolia. Pt tolerated treatment without s/s adverse reaction. Pt discharged to self care, [facility]. Scheduling emailed next 6 month appt. Pt to monitor My Chart for appt info.

## 2022-12-09 ENCOUNTER — TELEPHONE (OUTPATIENT)
Dept: MEDICAL GROUP | Facility: PHYSICIAN GROUP | Age: 77
End: 2022-12-09
Payer: MEDICARE

## 2022-12-09 NOTE — TELEPHONE ENCOUNTER
Future Appointments         Provider Department Center    12/15/2022 10:00 AM (Arrive by 9:45 AM) [facility] [place] [facility]    5/12/2023 2:30 PM [facility]          ESTABLISHED PATIENT PRE-VISIT PLANNING     Patient was NOT contacted to complete PVP.     Note: Patient will not be contacted if there is no indication to call.     1.  Reviewed notes from the last few office visits within the medical group: Yes, LOV 07/28/22    2.  If any orders were placed at last visit or intended to be done for this visit (i.e. 6 mos follow-up), do we have Results/Consult Notes?           Labs - Labs were not ordered at last office visit.  Note: If patient appointment is for lab review and patient did not complete labs, check with provider if OK to reschedule patient until labs completed.         Imaging - Imaging was not ordered at last office visit.         Referrals - No referrals were ordered at last office visit.    3. Is this appointment scheduled as a Hospital Follow-Up? No    4.  Immunizations were updated in Epic using Reconcile Outside Information activity? Yes    5.  Patient is due for the following Health Maintenance Topics:   Health Maintenance Due   Topic Date Due    HEPATITIS C SCREENING  Never done    IMM ZOSTER VACCINES (1 of 2) Never done    Annual Wellness Visit  10/20/2016    IMM DTaP/Tdap/Td Vaccine (2 - Td or Tdap) 12/09/2022     6.  AHA (Pulse8) form printed for Provider? N/A

## 2022-12-15 ENCOUNTER — OFFICE VISIT (OUTPATIENT)
Dept: MEDICAL GROUP | Facility: PHYSICIAN GROUP | Age: 77
End: 2022-12-15
Payer: MEDICARE

## 2022-12-15 VITALS
WEIGHT: 140 LBS | BODY MASS INDEX: 26.43 KG/M2 | HEIGHT: 61 IN | HEART RATE: 74 BPM | OXYGEN SATURATION: 96 % | TEMPERATURE: 97.2 F | DIASTOLIC BLOOD PRESSURE: 70 MMHG | SYSTOLIC BLOOD PRESSURE: 136 MMHG

## 2022-12-15 DIAGNOSIS — F33.9 MAJOR DEPRESSION, RECURRENT, CHRONIC (HCC): ICD-10-CM

## 2022-12-15 DIAGNOSIS — E55.9 VITAMIN D INSUFFICIENCY: ICD-10-CM

## 2022-12-15 DIAGNOSIS — M81.0 AGE-RELATED OSTEOPOROSIS WITHOUT CURRENT PATHOLOGICAL FRACTURE: ICD-10-CM

## 2022-12-15 DIAGNOSIS — L97.921 CHRONIC ULCER OF LEFT LEG, LIMITED TO BREAKDOWN OF SKIN (HCC): ICD-10-CM

## 2022-12-15 DIAGNOSIS — E53.8 B12 DEFICIENCY: ICD-10-CM

## 2022-12-15 DIAGNOSIS — I10 ESSENTIAL HYPERTENSION: ICD-10-CM

## 2022-12-15 DIAGNOSIS — D50.8 OTHER IRON DEFICIENCY ANEMIA: ICD-10-CM

## 2022-12-15 PROCEDURE — 99214 OFFICE O/P EST MOD 30 MIN: CPT | Performed by: NURSE PRACTITIONER

## 2022-12-15 ASSESSMENT — FIBROSIS 4 INDEX: FIB4 SCORE: 2.2

## 2022-12-15 NOTE — ASSESSMENT & PLAN NOTE
She continues with sertraline 100 mg daily.  She denies any self-harm or SI today.  She is tolerating the medication.  She is coloring to help with stress.

## 2022-12-15 NOTE — ASSESSMENT & PLAN NOTE
She has followed up with infectious disease.  She was continued on her doxycycline 100 mg twice daily for chronic lifelong suppression due to MSSA/MRSA infection. She is not using her walker using a cane.

## 2022-12-15 NOTE — ASSESSMENT & PLAN NOTE
Her initial blood pressure today is 168/90.  She notes that with longer walks her blood pressure can be elevated.  On repeat today her blood pressure is 136/70.  She continues with metoprolol  mg daily and lisinopril 30 mg daily.  She is monitoring her blood pressure at home and her home blood pressure log shows that her blood pressures have been ranging 130's/60-80's with heart rate in the 60's. Denies chest pain, shortness of breath, dizziness, blurry vision or headaches.  She notes that her last 2 readings at the infusion center and infectious disease were elevated but did not have her blood pressure rechecked.  When she arrived home her blood pressure did return to baseline of 130s over 60-70.

## 2022-12-15 NOTE — ASSESSMENT & PLAN NOTE
She continues with ferrous sulfate 325 mg daily. She still plans to schedule her colonoscopy.  Waiting for recent superficial wound to heal as per recommendations from ID.  She is monitoring for signs and symptoms of bleeding.  Denies any hematuria or bloody stools.

## 2022-12-15 NOTE — PROGRESS NOTES
Subjective:     CC: Hypertension follow-up    HPI:   Nadege presents today with the following:    Essential hypertension  Her initial blood pressure today is 168/90.  She notes that with longer walks her blood pressure can be elevated.  On repeat today her blood pressure is 136/70.  She continues with metoprolol  mg daily and lisinopril 30 mg daily.  She is monitoring her blood pressure at home and her home blood pressure log shows that her blood pressures have been ranging 130's/60-80's with heart rate in the 60's. Denies chest pain, shortness of breath, dizziness, blurry vision or headaches.  She notes that her last 2 readings at the infusion center and infectious disease were elevated but did not have her blood pressure rechecked.  When she arrived home her blood pressure did return to baseline of 130s over 60-70.    Chronic ulcer of left leg, limited to breakdown of skin (HCC)  She has followed up with infectious disease.  She was continued on her doxycycline 100 mg twice daily for chronic lifelong suppression due to MSSA/MRSA infection. She is not using her walker using a cane.     Age-related osteoporosis without current pathological fracture  She continues with Prolia and her last dose was 11/4/22. She notes improvement. She continues with calcium and vitamin D supplementation.  Weightbearing exercise as tolerated.    Iron deficiency anemia  She continues with ferrous sulfate 325 mg daily. She still plans to schedule her colonoscopy.  Waiting for recent superficial wound to heal as per recommendations from ID.  She is monitoring for signs and symptoms of bleeding.  Denies any hematuria or bloody stools.    Major depression, recurrent, chronic (HCC)  She continues with sertraline 100 mg daily.  She denies any self-harm or SI today.  She is tolerating the medication.  She is coloring to help with stress.    Past Medical History:   Diagnosis Date    Anxiety     Cellulitis and abscess of lower extremity      Dental disorder     full dentures    Generalized osteoarthritis of multiple sites 10/20/2015    Hardware complicating wound infection (HCC) 12/2/2019    Heart valve disease     pt not sure     Hyperlipidemia     Hypertension     Osteoporosis     S/P femoral-popliteal bypass surgery 6/12/2019    Dr crum 2018       Social History     Tobacco Use    Smoking status: Former     Packs/day: 0.50     Years: 25.00     Pack years: 12.50     Types: Cigarettes     Quit date: 1/1/2007     Years since quitting: 15.9    Smokeless tobacco: Never    Tobacco comments:     Ex-smoker,  Quit ~ 2007 (prior hx ~ 1ppd x 25 years)   Vaping Use    Vaping Use: Never used   Substance Use Topics    Alcohol use: Not Currently     Alcohol/week: 0.0 oz     Comment: stopped ~  11/2019  (unclear about prior use).     Drug use: No       Current Outpatient Medications Ordered in Epic   Medication Sig Dispense Refill    doxycycline (VIBRAMYCIN) 100 MG Tab Take 1 Tablet by mouth 2 times a day. 60 Tablet 11    Ferrous Sulfate (IRON PO) Take  by mouth.      lisinopril (PRINIVIL) 30 MG tablet Take 1 Tablet by mouth every day. 100 Tablet 3    metoprolol SR (TOPROL XL) 100 MG TABLET SR 24 HR Take 1 Tablet by mouth every day. 100 Tablet 3    sertraline (ZOLOFT) 100 MG Tab Take 1 Tablet by mouth every day. 90 Tablet 3    vitamin D (CHOLECALCIFEROL) 1000 Unit (25 mcg) Tab Take 1,000 Units by mouth every day.      acetaminophen (TYLENOL) 325 MG Tab Take 2 Tabs by mouth every 6 hours as needed (Mild Pain; (Pain scale 1-3); Temp greater than 100.5 F). (Patient taking differently: Take 650 mg by mouth every four hours as needed (Mild Pain; (Pain scale 1-3); Temp greater than 100.5 F).) 30 Tab 0    Multiple Vitamins-Minerals (THERAPEUTIC-M/LUTEIN) Tab Take 1 Tab by mouth every day.  0    triamcinolone acetonide (KENALOG) 0.1 % Cream Apply 1 Application topically 2 times a day. (Patient not taking: Reported on 7/28/2022) 45 g 3     No current Epic-ordered  "facility-administered medications on file.       Allergies:  Bactrim [sulfamethoxazole-trimethoprim], Meropenem, and Oxycodone    Health Maintenance: Reviewed.      Objective:     Vital signs reviewed  Exam:  /70 (BP Location: Left arm, Patient Position: Sitting, BP Cuff Size: Adult)   Pulse 74   Temp 36.2 °C (97.2 °F) (Temporal)   Ht 1.549 m (5' 1\")   Wt 63.5 kg (140 lb)   SpO2 96%   BMI 26.45 kg/m²  Body mass index is 26.45 kg/m².    Gen: Alert and oriented, No apparent distress.  Eyes:   Lids normal. Glasses in place.   Lungs: Normal effort, CTA bilaterally, no wheezes, rhonchi, or rales  CV: Regular rate and rhythm with systolic murmur.  No rubs or gallops.  Ext: Using 3 pronged cane with ambulation.      Assessment & Plan:     77 y.o. female with the following -     1. Essential hypertension  Chronic stable problem.  Blood pressure on repeat is at goal today.  She will continue with lisinopril 30 mg daily and metoprolol  mg daily.  Her home blood pressures have been at goal.  Continue intermittent home blood pressure monitoring.  Continue healthy diet and exercise.  Plan to check updated labs before upcoming 4-month appointment around April 2023.  - CBC WITH DIFFERENTIAL; Future  - Lipid Profile; Future    2. Chronic ulcer of left leg, limited to breakdown of skin (HCC)  Chronic stable problem.  Continue annual follow-up with ID.  Continue with doxycycline 100 mg twice daily.    3. Age-related osteoporosis without current pathological fracture  Chronic stable problem.  Continue with Prolia.  Due for updated DEXA scan 2023.  Continue with daily calcium and vitamin D supplementation.  Weightbearing exercise as tolerated.    4. Major depression, recurrent, chronic (HCC)  Chronic stable problem.  Continue with sertraline 100 mg daily.  Denies any self-harm or SI today.    5. Other iron deficiency anemia  Chronic stable problem.  Continue with ferrous sulfate 325 mg daily.  Plan to check updated " labs before upcoming 4-month appointment around April 2023.  - Comp Metabolic Panel; Future  - IRON/TOTAL IRON BIND; Future  - FERRITIN; Future    6. Vitamin D insufficiency  Chronic stable problem.  Continue vitamin D supplementation.  Due for updated labs.  - VITAMIN D,25 HYDROXY (DEFICIENCY); Future    7. B12 deficiency  Chronic stable problem.  Due for updated annual labs around April 2023.  - VITAMIN B12; Future        Return in about 4 months (around 4/15/2023) for Labs, Hypertension.    Please note that this dictation was created using voice recognition software. I have made every reasonable attempt to correct obvious errors, but I expect that there are errors of grammar and possibly content that I did not discover before finalizing the note.

## 2022-12-15 NOTE — ASSESSMENT & PLAN NOTE
She continues with Prolia and her last dose was 11/4/22. She notes improvement. She continues with calcium and vitamin D supplementation.  Weightbearing exercise as tolerated.

## 2023-03-02 NOTE — WOUND TEAM
"[facility]  Inpatient Services  Initial Wound and Skin Care Evaluation    Admission Date: 4/19/2019     HPI, PMH, SH: Reviewed    Unit where seen by Wound Team: [id number]     WOUND CONSULT RELATED TO:  Wound VAC placement      SUBJECTIVE:  \"I missed my wound VAC\"      Self Report / Pain Level:  Some discomfort with moving foot around, otherwise comfortable        OBJECTIVE:  In bed, previous dressings intact     WOUND TYPE, LOCATION, CHARACTERISTICS (Pressure Injuries: location, stage, POA or date identified)        Wound 04/19/19 Leg Left leg (Active)   Site Assessment Pink;Fragile    Maricruz-wound Assessment Pink;Red    Margins Attached edges    Measurements:  Superior LLE  Inferior LLE        Tunneling   3x1x0.3  4x3x0.2      0 cm    Undermining 0 cm    Closure Secondary intention    Drainage Amount Small    Drainage Description Serosanguineous    Non-staged Wound Description Full thickness    Treatments Cleansed;Site care    Cleansing Normal Saline Irrigation    Periwound Protectant Not Applicable    Dressing Options Honey Colloid;Nonadhesive Foam    Dressing Cleansing/Solutions Not Applicable    Dressing Changed Changed    Dressing Status Clean;Dry;Intact    Dressing Change Frequency Every 72 hrs    NEXT Dressing Change  04/26/19    NEXT Weekly Photo (Inpatient Only) 04/27/19    WOUND NURSE ONLY - Odor None    WOUND NURSE ONLY - Pulses 1+;Left    WOUND NURSE ONLY - Exposed Structures None    WOUND NURSE ONLY - Tissue Type and Percentage 100% pale pink    WOUND NURSE ONLY - Time Spent with Patient (mins) 45      Vascular:      Vascular:  5/2018 Left.    Doppler waveform of the common femoral artery is of high amplitude and    triphasic.    Doppler doppler waveforms at the popliteal and tibial arteries are    monophasic with moderate amplitude.    Ankle-brachial index is moderately reduced.               Other: micro - heavy growth staph  4/20/19 hand held doppler left ankle pulsed are multiphasic " Goal Outcome Evaluation:              Outcome Evaluation: New admit.  VSS on room air.  Alert and oriented x4.  No complaints during shift.  IV fluids (see MAR) per order.  Neuro checks performed.  No acute events noted during shift.  Urine sample sent for urinalysis as ordered.  Will continue to monitor patient.    2+    Dorsal Pedal pulses:  Palpable   Posterior tib pulses:   Palpable     TOMI:      NA    Lab Values:    WBC:       WBC   Date/Time Value Ref Range Status   04/23/2019 04:26 AM 7.0 4.8 - 10.8 K/uL Final     A1C:      Lab Results   Component Value Date/Time    HBA1C 5.6 06/04/2018 12:00 PM      Culture:   Obtained 04/19, Results Staphylococcus aureus Heavy growth     INTERVENTIONS BY WOUND TEAM:  Previous dressing removed, cleanse yuki-wound with warm wash cloth.  Cleansed wounds with wound cleanser and dry gauze, benzoin and drape to yuki-wound, one piece of black foam to inferior/distal wound, bridged to inferior wound, sealed with drape, button added for track pad, track pad applied and suction obtained at 125 mmHg continuous.    2 layer compression wraps applied to LLE with tubing out of wraps.    Dressing selection:  Wound VAC, 2 layers compression          Interdisciplinary consultation: Patient, Bedside RN, Naveed JUAREZ NP    EVALUATION: patient has chronic wounds to LLE medial calf/ankle.  Admitted with cellulitis.  Infection resolved.  Resume previous treatment of wound VAC therapy and 2 layer wraps.      Factors affecting wound healing: steroid use, B12 deficiency, former smoker  Goals: Steady decrease in wound area and depth weekly.    NURSING PLAN OF CARE ORDERS (X):    Dressing changes: See Dressing Care orders: X  Skin care: See Skin Care orders: moves self  Rectal tube care: See Rectal Tube Care orders:   Other orders:    RSKIN: CURRENT (X) ORDERED (O):   Q shift Luis:  X  Q shift pressure point assessments:  X  Pressure redistribution mattress          X  SHORTY          Bariatric SHORTY         Bariatric foam           Heel float boots          Float Heels off Bed with Pillows        X       Barrier wipes         Barrier Cream         Barrier paste          Sacral silicone dressing       PRN  Silicone O2 tubing         Anchorfast         Cannula fixation Device (Tender )          Gray Foam Ear  protectors           Trach with Optifoam split foam                 Waffle cushion        Waffle Overlay         Rectal tube or BMS         Antifungal tx      Interdry          Reposition q 2 hours      Ensure patient is turning   Up to chair        Ambulate      PT/OT        Dietician        Diabetes Education      PO  X   TF     TPN     NPO   # days   Other        WOUND TEAM PLAN OF CARE (X):   NPWT change 3 x week:      X  Dressing changes by wound team:       Follow up as needed:       Other (explain):     Anticipated discharge plans (X):   SNF:           Home Care:           Outpatient Wound Center:          X has appointment with OP wound clinic Friday  Self Care:            Other:

## 2023-04-12 ENCOUNTER — HOSPITAL ENCOUNTER (OUTPATIENT)
Dept: LAB | Facility: MEDICAL CENTER | Age: 78
End: 2023-04-12
Attending: NURSE PRACTITIONER
Payer: MEDICARE

## 2023-04-12 DIAGNOSIS — D50.8 OTHER IRON DEFICIENCY ANEMIA: ICD-10-CM

## 2023-04-12 DIAGNOSIS — E53.8 B12 DEFICIENCY: ICD-10-CM

## 2023-04-12 DIAGNOSIS — I10 ESSENTIAL HYPERTENSION: ICD-10-CM

## 2023-04-12 DIAGNOSIS — E55.9 VITAMIN D INSUFFICIENCY: ICD-10-CM

## 2023-04-12 LAB
25(OH)D3 SERPL-MCNC: 42 NG/ML (ref 30–100)
ALBUMIN SERPL BCP-MCNC: 3.9 G/DL (ref 3.2–4.9)
ALBUMIN/GLOB SERPL: 0.9 G/DL
ALP SERPL-CCNC: 72 U/L (ref 30–99)
ALT SERPL-CCNC: 12 U/L (ref 2–50)
ANION GAP SERPL CALC-SCNC: 15 MMOL/L (ref 7–16)
AST SERPL-CCNC: 23 U/L (ref 12–45)
BASOPHILS # BLD AUTO: 1.1 % (ref 0–1.8)
BASOPHILS # BLD: 0.06 K/UL (ref 0–0.12)
BILIRUB SERPL-MCNC: 0.3 MG/DL (ref 0.1–1.5)
BUN SERPL-MCNC: 27 MG/DL (ref 8–22)
CALCIUM ALBUM COR SERPL-MCNC: 9.8 MG/DL (ref 8.5–10.5)
CALCIUM SERPL-MCNC: 9.7 MG/DL (ref 8.5–10.5)
CHLORIDE SERPL-SCNC: 104 MMOL/L (ref 96–112)
CHOLEST SERPL-MCNC: 181 MG/DL (ref 100–199)
CO2 SERPL-SCNC: 22 MMOL/L (ref 20–33)
CREAT SERPL-MCNC: 0.8 MG/DL (ref 0.5–1.4)
EOSINOPHIL # BLD AUTO: 0.08 K/UL (ref 0–0.51)
EOSINOPHIL NFR BLD: 1.4 % (ref 0–6.9)
ERYTHROCYTE [DISTWIDTH] IN BLOOD BY AUTOMATED COUNT: 48.2 FL (ref 35.9–50)
FASTING STATUS PATIENT QL REPORTED: NORMAL
FERRITIN SERPL-MCNC: 163 NG/ML (ref 10–291)
GFR SERPLBLD CREATININE-BSD FMLA CKD-EPI: 75 ML/MIN/1.73 M 2
GLOBULIN SER CALC-MCNC: 4.5 G/DL (ref 1.9–3.5)
GLUCOSE SERPL-MCNC: 89 MG/DL (ref 65–99)
HCT VFR BLD AUTO: 40.1 % (ref 37–47)
HDLC SERPL-MCNC: 65 MG/DL
HGB BLD-MCNC: 12.1 G/DL (ref 12–16)
IMM GRANULOCYTES # BLD AUTO: 0.02 K/UL (ref 0–0.11)
IMM GRANULOCYTES NFR BLD AUTO: 0.4 % (ref 0–0.9)
IRON SATN MFR SERPL: 20 % (ref 15–55)
IRON SERPL-MCNC: 54 UG/DL (ref 40–170)
LDLC SERPL CALC-MCNC: 93 MG/DL
LYMPHOCYTES # BLD AUTO: 1.46 K/UL (ref 1–4.8)
LYMPHOCYTES NFR BLD: 26.1 % (ref 22–41)
MCH RBC QN AUTO: 27.3 PG (ref 27–33)
MCHC RBC AUTO-ENTMCNC: 30.2 G/DL (ref 33.6–35)
MCV RBC AUTO: 90.5 FL (ref 81.4–97.8)
MONOCYTES # BLD AUTO: 0.54 K/UL (ref 0–0.85)
MONOCYTES NFR BLD AUTO: 9.6 % (ref 0–13.4)
NEUTROPHILS # BLD AUTO: 3.44 K/UL (ref 2–7.15)
NEUTROPHILS NFR BLD: 61.4 % (ref 44–72)
NRBC # BLD AUTO: 0 K/UL
NRBC BLD-RTO: 0 /100 WBC
PLATELET # BLD AUTO: 253 K/UL (ref 164–446)
PMV BLD AUTO: 10.8 FL (ref 9–12.9)
POTASSIUM SERPL-SCNC: 4.7 MMOL/L (ref 3.6–5.5)
PROT SERPL-MCNC: 8.4 G/DL (ref 6–8.2)
RBC # BLD AUTO: 4.43 M/UL (ref 4.2–5.4)
SODIUM SERPL-SCNC: 141 MMOL/L (ref 135–145)
TIBC SERPL-MCNC: 274 UG/DL (ref 250–450)
TRIGL SERPL-MCNC: 115 MG/DL (ref 0–149)
UIBC SERPL-MCNC: 220 UG/DL (ref 110–370)
VIT B12 SERPL-MCNC: 427 PG/ML (ref 211–911)
WBC # BLD AUTO: 5.6 K/UL (ref 4.8–10.8)

## 2023-04-12 PROCEDURE — 85025 COMPLETE CBC W/AUTO DIFF WBC: CPT

## 2023-04-12 PROCEDURE — 82607 VITAMIN B-12: CPT

## 2023-04-12 PROCEDURE — 82728 ASSAY OF FERRITIN: CPT

## 2023-04-12 PROCEDURE — 80053 COMPREHEN METABOLIC PANEL: CPT

## 2023-04-12 PROCEDURE — 80061 LIPID PANEL: CPT

## 2023-04-12 PROCEDURE — 36415 COLL VENOUS BLD VENIPUNCTURE: CPT

## 2023-04-12 PROCEDURE — 82306 VITAMIN D 25 HYDROXY: CPT

## 2023-04-12 PROCEDURE — 83550 IRON BINDING TEST: CPT

## 2023-04-12 PROCEDURE — 83540 ASSAY OF IRON: CPT

## 2023-04-13 ENCOUNTER — TELEPHONE (OUTPATIENT)
Dept: MEDICAL GROUP | Facility: PHYSICIAN GROUP | Age: 78
End: 2023-04-13
Payer: MEDICARE

## 2023-04-13 NOTE — TELEPHONE ENCOUNTER
Future Appointments         Provider Department Center    4/20/2023 10:40 AM (Arrive by 10:25 AM) [facility] [place] [facility]    5/12/2023 2:30 PM INFUSION QUICK INJECT Infusion Services [facility]          ESTABLISHED PATIENT PRE-VISIT PLANNING     Patient was NOT contacted to complete PVP.     Note: Patient will not be contacted if there is no indication to call.     1.  Reviewed notes from the last few office visits within the medical group: Yes, LOV 12/15/2022    2.  If any orders were placed at last visit or intended to be done for this visit (i.e. 6 mos follow-up), do we have Results/Consult Notes?           Labs - Labs ordered, completed on 04/12/2023 and results are in chart.  Note: If patient appointment is for lab review and patient did not complete labs, check with provider if OK to reschedule patient until labs completed.         Imaging - Imaging was not ordered at last office visit.         Referrals - No referrals were ordered at last office visit.    3. Is this appointment scheduled as a Hospital Follow-Up? No    4.  Immunizations were updated in Epic using Reconcile Outside Information activity? Yes    5.  Patient is due for the following Health Maintenance Topics:   Health Maintenance Due   Topic Date Due    HEPATITIS C SCREENING  Never done    IMM ZOSTER VACCINES (1 of 2) Never done    Annual Wellness Visit  10/20/2016    IMM DTaP/Tdap/Td Vaccine (2 - Td or Tdap) 12/09/2022     6.  AHA (Pulse8) form printed for Provider? N/A

## 2023-04-20 ENCOUNTER — OFFICE VISIT (OUTPATIENT)
Dept: MEDICAL GROUP | Facility: PHYSICIAN GROUP | Age: 78
End: 2023-04-20
Payer: MEDICARE

## 2023-04-20 ENCOUNTER — PATIENT MESSAGE (OUTPATIENT)
Dept: MEDICAL GROUP | Facility: PHYSICIAN GROUP | Age: 78
End: 2023-04-20

## 2023-04-20 VITALS
OXYGEN SATURATION: 95 % | WEIGHT: 140 LBS | DIASTOLIC BLOOD PRESSURE: 76 MMHG | HEART RATE: 76 BPM | TEMPERATURE: 99 F | SYSTOLIC BLOOD PRESSURE: 150 MMHG | BODY MASS INDEX: 25.76 KG/M2 | HEIGHT: 62 IN

## 2023-04-20 VITALS — DIASTOLIC BLOOD PRESSURE: 75 MMHG | SYSTOLIC BLOOD PRESSURE: 139 MMHG

## 2023-04-20 DIAGNOSIS — F33.9 MAJOR DEPRESSION, RECURRENT, CHRONIC (HCC): ICD-10-CM

## 2023-04-20 DIAGNOSIS — I10 ESSENTIAL HYPERTENSION: ICD-10-CM

## 2023-04-20 DIAGNOSIS — L97.921 CHRONIC ULCER OF LEFT LEG, LIMITED TO BREAKDOWN OF SKIN (HCC): ICD-10-CM

## 2023-04-20 DIAGNOSIS — D50.8 OTHER IRON DEFICIENCY ANEMIA: ICD-10-CM

## 2023-04-20 DIAGNOSIS — I73.9 PERIPHERAL VASCULAR DISEASE (HCC): ICD-10-CM

## 2023-04-20 DIAGNOSIS — Z97.4 WEARS HEARING AID IN BOTH EARS: ICD-10-CM

## 2023-04-20 DIAGNOSIS — E53.8 B12 DEFICIENCY: ICD-10-CM

## 2023-04-20 PROCEDURE — 99214 OFFICE O/P EST MOD 30 MIN: CPT | Performed by: NURSE PRACTITIONER

## 2023-04-20 ASSESSMENT — FIBROSIS 4 INDEX: FIB4 SCORE: 2.07

## 2023-04-20 ASSESSMENT — PATIENT HEALTH QUESTIONNAIRE - PHQ9: CLINICAL INTERPRETATION OF PHQ2 SCORE: 0

## 2023-04-20 NOTE — ASSESSMENT & PLAN NOTE
Previously followed by Dr. Colin.  She was seen by ID and plans to continue to take doxycycline. Plans to make appointment with Dr. Colin.   History of femoral-popliteal bypass surgery.

## 2023-04-20 NOTE — PROGRESS NOTES
Subjective:     CC: lab results    HPI:   Nadege presents today with the following:    Essential hypertension  Her initial blood pressure today is 150/70.  She continues with metoprolol  mg daily and lisinopril 30 mg daily.  Her home blood pressure has been ranging 130's/60-70's with a heart rate 60's.  Home blood pressures are at goal.  She has been under more stress recently.  She has been caring for her friend who has had a recent hip surgery.  Her next-door neighbor recently had her cats taken by the [facility]. The 10-year ASCVD risk score (Jae TAYLOR, et al., 2019) is: 41.3%. Recent cholesterol within normal limits.      Major depression, recurrent, chronic (HCC)  She continues with sertraline 100 mg daily. Her PHQ score today is 0. Denies self-harm or SI today.  Reports depression is controlled.    Peripheral vascular disease ([facility])  Previously followed by Dr. Colin.  She was seen by ID and plans to continue to take doxycycline. Plans to make appointment with Dr. Colin.   History of femoral-popliteal bypass surgery.    Chronic ulcer of left leg, limited to breakdown of skin ([facility])  Continues with doxycycline 100 mg twice daily and followed by infectious disease. Wound has not changed. Continues daily home dressings.     B12 deficiency  Recent B12 within normal range.    Iron deficiency anemia  She continues with ferrous sulfate daily supplement.  Recent labs show improved anemia and iron.  Plan to continue current treatment.    Wears hearing aid in both ears  She has recently had bilateral hearing aids.  She notes improvement in her hearing.    Past Medical History:   Diagnosis Date    Anxiety     Cellulitis and abscess of lower extremity     Dental disorder     full dentures    Generalized osteoarthritis of multiple sites 10/20/2015    Hardware complicating wound infection ([facility]) 12/2/2019    Heart valve disease     pt not sure     Hyperlipidemia     Hypertension     Osteoporosis     S/P femoral-popliteal  bypass surgery 2019    Dr crum 2018       Social History     Tobacco Use    Smoking status: Former     Packs/day: 0.50     Years: 25.00     Pack years: 12.50     Types: Cigarettes     Quit date: 2007     Years since quittin.3    Smokeless tobacco: Never    Tobacco comments:     Ex-smoker,  Quit ~  (prior hx ~ 1ppd x 25 years)   Vaping Use    Vaping Use: Never used   Substance Use Topics    Alcohol use: Not Currently     Alcohol/week: 0.0 oz     Comment: stopped ~  2019  (unclear about prior use).     Drug use: No       Current Outpatient Medications Ordered in Epic   Medication Sig Dispense Refill    doxycycline (VIBRAMYCIN) 100 MG Tab Take 1 Tablet by mouth 2 times a day. 60 Tablet 11    Ferrous Sulfate (IRON PO) Take  by mouth.      lisinopril (PRINIVIL) 30 MG tablet Take 1 Tablet by mouth every day. 100 Tablet 3    metoprolol SR (TOPROL XL) 100 MG TABLET SR 24 HR Take 1 Tablet by mouth every day. 100 Tablet 3    sertraline (ZOLOFT) 100 MG Tab Take 1 Tablet by mouth every day. 90 Tablet 3    vitamin D (CHOLECALCIFEROL) 1000 Unit (25 mcg) Tab Take 1,000 Units by mouth every day.      triamcinolone acetonide (KENALOG) 0.1 % Cream Apply 1 Application topically 2 times a day. 45 g 3    acetaminophen (TYLENOL) 325 MG Tab Take 2 Tabs by mouth every 6 hours as needed (Mild Pain; (Pain scale 1-3); Temp greater than 100.5 F). (Patient taking differently: Take 650 mg by mouth every four hours as needed (Mild Pain; (Pain scale 1-3); Temp greater than 100.5 F).) 30 Tab 0    Multiple Vitamins-Minerals (THERAPEUTIC-M/LUTEIN) Tab Take 1 Tab by mouth every day.  0     No current [facility]-ordered facility-administered medications on file.       Allergies:  Bactrim [sulfamethoxazole-trimethoprim], Meropenem, and Oxycodone    Health Maintenance: Reviewed       Objective:     Vital signs reviewed  Exam:  BP (!) 150/76 (BP Location: Right arm, Patient Position: Sitting)   Pulse 76   Temp 37.2 °C (99 °F)  "(Temporal)   Ht 1.568 m (5' 1.75\")   Wt 63.5 kg (140 lb)   SpO2 95%   BMI 25.81 kg/m²  Body mass index is 25.81 kg/m².    Gen: Alert and oriented, No apparent distress.  Eyes:   Lids normal. Glasses in place.   Lungs: Normal effort, CTA bilaterally, no wheezes, rhonchi, or rales  CV: Regular rate and rhythm with systolic murmur. No rubs or gallops.  Ext: No clubbing, cyanosis, edema. Using 3 pronged cane with ambulation.       Assessment & Plan:     77 y.o. female with the following -     Discussed and reviewed lab results from 4/12/2023.    1. Essential hypertension  Chronic exacerbated problem.  On repeat by me her blood pressure is 150/76.  Her home blood pressures have been at goal less than 140/90.  She was sent to [facility] later when she gets home with an updated home blood pressure.  Continue with lisinopril 30 mg daily and metoprolol  mg daily.  Continue home blood pressure monitoring.  Suspect elevation today due to increased stress in personal life.     2. Major depression, recurrent, chronic (HCC)  Chronic stable problem.  Continue with sertraline 100 mg daily.    3. Peripheral vascular disease (HCC)  Chronic stable problem.  Continue follow-up with ID continue daily wound dressing.  Home blood pressures have been at goal.    4. Other iron deficiency anemia  Chronic stable problem.  Continue with ferrous sulfate daily supplementation.    5. Chronic ulcer of left leg, limited to breakdown of skin (HCC)  Chronic stable problem.  Continue follow-up with infectious disease.  Continue with doxycycline 100 mg twice daily.    6. B12 deficiency  Chronic stable problem.  Vitamin B12 within normal limits.  Continue daily multivitamin.    7. Wears hearing aid in both ears  Chronic stable problem.  Continue bilateral hearing aids.        Return in about 4 months (around 8/20/2023) for Hypertension.    Please note that this dictation was created using voice recognition software. I have made every " reasonable attempt to correct obvious errors, but I expect that there are errors of grammar and possibly content that I did not discover before finalizing the note.

## 2023-04-20 NOTE — ASSESSMENT & PLAN NOTE
Her initial blood pressure today is 150/70.  She continues with metoprolol  mg daily and lisinopril 30 mg daily.  Her home blood pressure has been ranging 130's/60-70's with a heart rate 60's.  Home blood pressures are at goal.  She has been under more stress recently.  She has been caring for her friend who has had a recent hip surgery.  Her next-door neighbor recently had her cats taken by the [facility]. The 10-year ASCVD risk score (Jae TAYLOR, et al., 2019) is: 41.3%. Recent cholesterol within normal limits.

## 2023-04-20 NOTE — ASSESSMENT & PLAN NOTE
Continues with doxycycline 100 mg twice daily and followed by infectious disease. Wound has not changed. Continues daily home dressings.

## 2023-04-20 NOTE — ASSESSMENT & PLAN NOTE
Patient was escorted to the unit ambulatory accompanied by hospital . Pt was alert and oriented. Pt sad and tearful during admission process. Pt frustrated with living situation at home. She is taking care of her elderly mother and her niece moved into the house  with her son and they are trashing the house and she can not do anything about it. Pt was oriented to unit and expectations related to treatment. Patient  verbalized understanding of all information provided. Staff continues to monitor for safety and provide a supportive environment. She continues with sertraline 100 mg daily. Her PHQ score today is 0. Denies self-harm or SI today.  Reports depression is controlled.

## 2023-04-20 NOTE — ASSESSMENT & PLAN NOTE
She continues with ferrous sulfate daily supplement.  Recent labs show improved anemia and iron.  Plan to continue current treatment.

## 2023-05-02 NOTE — PROGRESS NOTES
"Pharmacy Kinetics 73 y.o. female on vancomycin day # 1 10/10/2019    Currently on Vancomycin new start  Provider specified end date: ~6 weeks    Indication for Treatment: infected L hip arthroplasty    Pertinent history per medical record: Admitted on 10/7/2019 for L hip pain and drainage.  Patient resides at [facility] with remote history of L-sided hip fracture, s/p repair. Patient developed L sided drainage from previous surgical scar. She was admitted in September for abscess over the site, which was positive for MRSA. Now with ongoing concerns for prosthetic hip infection, she was admitted again for washout of the wound and antibiotic therapy. ID is consulting. Revision and washout of lip hip performed 10/9 with removal of implant.     Other antibiotics: none    Allergies: Bactrim [sulfamethoxazole-trimethoprim]; Meropenem; and Oxycodone     List concerns for renal function: age    Pertinent cultures to date:   10/9 L hip tissue x 2: NGTD   R (?abscess was over L hip) wound: MRSA    MRSA nares swab if pneumonia is a concern: N/A    Recent Labs     10/08/19  0557 10/09/19  0055   WBC 8.4 9.1   NEUTSPOLYS 54.00  --      Recent Labs     10/07/19  2132 10/08/19  0557 10/09/19  0055   BUN 25* 19 12   CREATININE 0.65 0.49* 0.50   ALBUMIN 3.2 3.0*  --      /66   Pulse 84   Temp 36.4 °C (97.6 °F) (Temporal)   Resp 20   Ht 1.702 m (5' 7.01\")   Wt 62.6 kg (138 lb)   SpO2 93%  Temp (24hrs), Av.3 °C (97.4 °F), Min:35.9 °C (96.7 °F), Max:36.7 °C (98.1 °F)      A/P   1. Vancomycin dose change: 20 mg/kg q24h  2. Next vancomycin level: ~2-3 days (not yet ordered)  3. Goal trough: 12-16 mcg/mL  4. Comments: new start vancomycin for infected prosthetic hip. Patient had revision of hip yesterday, with 4 g of vancomycin placed in spacer. Unclear if this will influence vancomycin levels during treatment course. Patient was on 800 mg IV q12h on previous admission, with supratherapeutic levels during that time. She " appears to have accumulated as dose was decreased, but level continued to climb. Doubtful that this patient will tolerate q12h dosing long-term. Will start with 20 mg/kg q24h and plan for a level when closer to steady state. Will follow.    Shi Ham, PharmD     chest/complains of pain/discomfort

## 2023-05-06 DIAGNOSIS — F33.9 MAJOR DEPRESSION, RECURRENT, CHRONIC (HCC): ICD-10-CM

## 2023-05-08 RX ORDER — SERTRALINE HYDROCHLORIDE 100 MG/1
100 TABLET, FILM COATED ORAL
Qty: 90 TABLET | Refills: 3 | Status: SHIPPED | OUTPATIENT
Start: 2023-05-08

## 2023-05-08 NOTE — TELEPHONE ENCOUNTER
Requested Prescriptions     Signed Prescriptions Disp Refills    sertraline (ZOLOFT) 100 MG Tab 90 Tablet 3     Sig: TAKE 1 TABLET BY MOUTH EVERY DAY     Authorizing Provider: ABBIE DYE A.P.R.N.

## 2023-05-15 ENCOUNTER — TELEPHONE (OUTPATIENT)
Dept: HEALTH INFORMATION MANAGEMENT | Facility: OTHER | Age: 78
End: 2023-05-15

## 2023-05-15 ENCOUNTER — PATIENT MESSAGE (OUTPATIENT)
Dept: HEALTH INFORMATION MANAGEMENT | Facility: OTHER | Age: 78
End: 2023-05-15

## 2023-05-25 NOTE — ASSESSMENT & PLAN NOTE
Pt had swelling of the lower extremities after hospital discharge. She was given a 3 day course of lasix at the post hospital follow up clinic visit. At last visit her edema had improved and she was deemed to not need to continue lasix. In the hospital she had a DVT ultrasound that was negative for DVT on the right side.     Recently her LLE has had swelling and symptoms as described above. She has pain in the LLE but this may be due to cellulitis.     Denies chest pain, SOB, hemoptysis.   
Pt was seen about 2 weeks ago for hospital discharge follow up. She was treated with IV antibiotics for cellulitis. She had improvement but at last clinic visit she was deemed to still have cellulitis. She was restarted on clindamycin and referred to infectious disease to be evaluated to see if she would need IV antibiotics.     Per review of chart patient has not scheduled appointment with ID to date.     Today patient reports that the redness has improved but it is still present. She continue to have warmth and pain in the areas. She feels that it has improved with the antibiotic. She now also has redness of the left lower extremity that started recently. She also has swelling of the left leg. She denies fevers/chills, nausea/vomiting.     She reports she has the phone number for ID and will call them today to schedule the appointment.   
EMS
No

## 2023-08-15 NOTE — CARE PLAN
Problem: Infection  Goal: Will remain free from infection    Intervention: Assess signs and symptoms of infection  Pt afebrile, WBC wnl. PIV site CDI. No new s/s of infection this shift.      Problem: Respiratory:  Goal: Respiratory status will improve    Intervention: Assess and monitor pulmonary status  Pt oxygen saturation remains above 90% on room air.          normal... Well appearing, awake, alert, oriented to person, place, time/situation and in no apparent distress.

## 2023-08-17 ENCOUNTER — TELEPHONE (OUTPATIENT)
Dept: MEDICAL GROUP | Facility: PHYSICIAN GROUP | Age: 78
End: 2023-08-17
Payer: MEDICARE

## 2023-08-17 NOTE — TELEPHONE ENCOUNTER
Future Appointments         Provider Department Center    8/24/2023 11:40 AM (Arrive by 11:25 AM) [facility] [place] [facility]          ESTABLISHED PATIENT PRE-VISIT PLANNING     Patient was NOT contacted to complete PVP.     Note: Patient will not be contacted if there is no indication to call.     1.  Reviewed notes from the last few office visits within the medical group: Yes, LOV 04/20/2023    2.  If any orders were placed at last visit or intended to be done for this visit (i.e. 6 mos follow-up), do we have Results/Consult Notes?           Labs - Labs ordered, NOT completed. Patient advised to complete prior to next appointment.  Note: If patient appointment is for lab review and patient did not complete labs, check with provider if OK to reschedule patient until labs completed.         Imaging - Imaging was not ordered at last office visit.         Referrals - No referrals were ordered at last office visit.    3. Is this appointment scheduled as a Hospital Follow-Up? No    4.  Immunizations were updated in Epic using Reconcile Outside Information activity? Yes    5.  Patient is due for the following Health Maintenance Topics:   Health Maintenance Due   Topic Date Due    Hepatitis C Screening  Never done    Zoster (Shingles) Vaccines (1 of 2) Never done    Annual Wellness Visit  10/20/2016    IMM DTaP/Tdap/Td Vaccine (2 - Td or Tdap) 12/09/2022    COVID-19 Vaccine (5 - Pfizer series) 01/15/2023    Bone Density Scan  08/12/2023     6.  AHA (Pulse8) form printed for Provider? N/A

## 2023-08-24 ENCOUNTER — OFFICE VISIT (OUTPATIENT)
Dept: MEDICAL GROUP | Facility: PHYSICIAN GROUP | Age: 78
End: 2023-08-24
Payer: MEDICARE

## 2023-08-24 VITALS
SYSTOLIC BLOOD PRESSURE: 138 MMHG | DIASTOLIC BLOOD PRESSURE: 84 MMHG | WEIGHT: 139 LBS | TEMPERATURE: 97.2 F | HEIGHT: 61 IN | HEART RATE: 72 BPM | OXYGEN SATURATION: 97 % | BODY MASS INDEX: 26.24 KG/M2

## 2023-08-24 DIAGNOSIS — I10 ESSENTIAL HYPERTENSION: ICD-10-CM

## 2023-08-24 DIAGNOSIS — M81.0 AGE-RELATED OSTEOPOROSIS WITHOUT CURRENT PATHOLOGICAL FRACTURE: ICD-10-CM

## 2023-08-24 DIAGNOSIS — F10.21 ALCOHOL DEPENDENCE, IN REMISSION (HCC): ICD-10-CM

## 2023-08-24 DIAGNOSIS — Z23 NEED FOR VACCINATION: ICD-10-CM

## 2023-08-24 DIAGNOSIS — Z13.820 ENCOUNTER FOR OSTEOPOROSIS SCREENING IN ASYMPTOMATIC POSTMENOPAUSAL PATIENT: ICD-10-CM

## 2023-08-24 DIAGNOSIS — Z78.0 ENCOUNTER FOR OSTEOPOROSIS SCREENING IN ASYMPTOMATIC POSTMENOPAUSAL PATIENT: ICD-10-CM

## 2023-08-24 PROCEDURE — 90471 IMMUNIZATION ADMIN: CPT | Performed by: NURSE PRACTITIONER

## 2023-08-24 PROCEDURE — 90715 TDAP VACCINE 7 YRS/> IM: CPT | Performed by: NURSE PRACTITIONER

## 2023-08-24 PROCEDURE — 3079F DIAST BP 80-89 MM HG: CPT | Performed by: NURSE PRACTITIONER

## 2023-08-24 PROCEDURE — 99214 OFFICE O/P EST MOD 30 MIN: CPT | Mod: 25 | Performed by: NURSE PRACTITIONER

## 2023-08-24 PROCEDURE — 3075F SYST BP GE 130 - 139MM HG: CPT | Performed by: NURSE PRACTITIONER

## 2023-08-24 ASSESSMENT — FIBROSIS 4 INDEX: FIB4 SCORE: 2.07

## 2023-08-24 NOTE — PROGRESS NOTES
Subjective:     CC: Hypertension follow-up    HPI:   Nadege presents today with the following:    Essential hypertension  Blood pressure today 138/84. Continues metoprolol  mg daily and lisinopril 30 mg daily. She has taken her blood pressure 4 times this last month and blood pressure readings are:  133/78  135/78  137/77  133/78  141/76  Denies chest pain, shortness of breath, dizziness, blurry vision or headaches. She has been having allergy flare and taking benadryl at bedtime that helps.      Age-related osteoporosis without current pathological fracture  She can not afford Prolia due to insurance. Due for DEXA scan. Last dose 2022.  States she previously tolerated Fosamax without any GI complaints but this was stopped after a 5-year course.    Past Medical History:   Diagnosis Date    Anxiety     Cellulitis and abscess of lower extremity     Dental disorder     full dentures    Generalized osteoarthritis of multiple sites 10/20/2015    Hardware complicating wound infection (HCC) 2019    Heart valve disease     pt not sure     Hyperlipidemia     Hypertension     Osteoporosis     S/P femoral-popliteal bypass surgery 2019    Dr crum 2018       Social History     Tobacco Use    Smoking status: Former     Current packs/day: 0.00     Average packs/day: 0.5 packs/day for 25.0 years (12.5 ttl pk-yrs)     Types: Cigarettes     Start date: 1982     Quit date: 2007     Years since quittin.6    Smokeless tobacco: Never    Tobacco comments:     Ex-smoker,  Quit ~  (prior hx ~ 1ppd x 25 years)   Vaping Use    Vaping Use: Never used   Substance Use Topics    Alcohol use: Not Currently     Alcohol/week: 0.0 oz     Comment: stopped ~  2019  (unclear about prior use).     Drug use: No       Current Outpatient Medications Ordered in Epic   Medication Sig Dispense Refill    sertraline (ZOLOFT) 100 MG Tab TAKE 1 TABLET BY MOUTH EVERY DAY 90 Tablet 3    doxycycline (VIBRAMYCIN) 100 MG  "Tab Take 1 Tablet by mouth 2 times a day. 60 Tablet 11    Ferrous Sulfate (IRON PO) Take  by mouth.      lisinopril (PRINIVIL) 30 MG tablet Take 1 Tablet by mouth every day. 100 Tablet 3    metoprolol SR (TOPROL XL) 100 MG TABLET SR 24 HR Take 1 Tablet by mouth every day. 100 Tablet 3    vitamin D (CHOLECALCIFEROL) 1000 Unit (25 mcg) Tab Take 1,000 Units by mouth every day.      triamcinolone acetonide (KENALOG) 0.1 % Cream Apply 1 Application topically 2 times a day. 45 g 3    acetaminophen (TYLENOL) 325 MG Tab Take 2 Tabs by mouth every 6 hours as needed (Mild Pain; (Pain scale 1-3); Temp greater than 100.5 F). (Patient taking differently: Take 650 mg by mouth every four hours as needed (Mild Pain; (Pain scale 1-3); Temp greater than 100.5 F).) 30 Tab 0    Multiple Vitamins-Minerals (THERAPEUTIC-M/LUTEIN) Tab Take 1 Tab by mouth every day.  0     No current [facility]-ordered facility-administered medications on file.       Allergies:  Bactrim [sulfamethoxazole-trimethoprim], Meropenem, and Oxycodone    Health Maintenance: Reviewed      Objective:     Vital signs reviewed  Exam:  /84 (BP Location: Left arm, Patient Position: Sitting, BP Cuff Size: Adult)   Pulse 72   Temp 36.2 °C (97.2 °F) (Temporal)   Ht 1.549 m (5' 1\")   Wt 63 kg (139 lb)   SpO2 97%   BMI 26.26 kg/m²  Body mass index is 26.26 kg/m².    Gen: Alert and oriented, No apparent distress.  Eyes:   Lids normal. Glasses in place.   Lungs: Normal effort, CTA bilaterally, no wheezes, rhonchi, or rales  CV: Regular rate and rhythm with systolic murmur.  No rubs or gallops.  Ext: No clubbing, cyanosis, edema.  Using cane with ambulation.      Assessment & Plan:     77 y.o. female with the following -     1. Essential hypertension  Chronic stable problem.  Blood pressure is at goal.  Home blood pressures have been at goal.  Continue home blood pressure monitoring.  Continue lisinopril 30 mg daily and metoprolol  mg daily.    2. Age-related " osteoporosis without current pathological fracture  Chronic stable problem.  Continue daily vitamin D supplementation daily weightbearing exercises.  Due for bone density.    3. Encounter for osteoporosis screening in asymptomatic postmenopausal patient  Acute complicated problem.  Due for updated bone density scan.  - DS-BONE DENSITY STUDY (DEXA); Future    4. Alcohol dependence, in remission (HCC)  Chronic stable problem.  History of alcohol use quit since 2019.  Continue to avoid.    5. Need for vaccination  Acute uncomplicated problem.  Patient is interested in her Tdap and is aware that with [facility] is $47.  She would like her Tdap today. I have placed the below orders and discussed them with an approved delegating provider. The MA is performing the below orders under the direction of Dr. Tripp.  - Tdap Vaccine =>6YO IM    HCC Gap Form    Diagnosis to address: F10.21 - Alcohol dependence, in remission (HCC)  Assessment and plan: Chronic, stable. Continue with current defined treatment plan: Continue cessation. Follow-up at least annually.  Last edited 08/24/23 12:20 PDT by Divya Cage A.P.R.N.             Return in about 3 months (around 11/24/2023) for annual wellness visit.    Please note that this dictation was created using voice recognition software. I have made every reasonable attempt to correct obvious errors, but I expect that there are errors of grammar and possibly content that I did not discover before finalizing the note.

## 2023-08-24 NOTE — ASSESSMENT & PLAN NOTE
She can not afford Prolia due to insurance. Due for DEXA scan. Last dose November 2022.  States she previously tolerated Fosamax without any GI complaints but this was stopped after a 5-year course.

## 2023-08-24 NOTE — ASSESSMENT & PLAN NOTE
Blood pressure today 138/84. Continues metoprolol  mg daily and lisinopril 30 mg daily. She has taken her blood pressure 4 times this last month and blood pressure readings are:  133/78  135/78  137/77  133/78  141/76  Denies chest pain, shortness of breath, dizziness, blurry vision or headaches. She has been having allergy flare and taking benadryl at bedtime that helps.

## 2023-09-05 DIAGNOSIS — I10 ESSENTIAL HYPERTENSION: ICD-10-CM

## 2023-09-05 RX ORDER — LISINOPRIL 30 MG/1
30 TABLET ORAL DAILY
Qty: 100 TABLET | Refills: 3 | Status: SHIPPED | OUTPATIENT
Start: 2023-09-05

## 2023-09-05 NOTE — TELEPHONE ENCOUNTER
Requested Prescriptions     Signed Prescriptions Disp Refills    lisinopril (PRINIVIL) 30 MG tablet 100 Tablet 3     Sig: TAKE 1 TABLET BY MOUTH EVERY DAY     Authorizing Provider: ABBIE DYE A.P.R.N.

## 2023-09-11 NOTE — PROGRESS NOTES
Transport is here to  pt. For MRI, pt. Consent and MRI screening tool sent with Transport Lorenzo.    3 (mild pain)

## 2023-10-13 ENCOUNTER — HOSPITAL ENCOUNTER (OUTPATIENT)
Dept: RADIOLOGY | Facility: MEDICAL CENTER | Age: 78
End: 2023-10-13
Attending: NURSE PRACTITIONER
Payer: MEDICARE

## 2023-10-13 DIAGNOSIS — Z13.820 ENCOUNTER FOR OSTEOPOROSIS SCREENING IN ASYMPTOMATIC POSTMENOPAUSAL PATIENT: ICD-10-CM

## 2023-10-13 DIAGNOSIS — Z78.0 ENCOUNTER FOR OSTEOPOROSIS SCREENING IN ASYMPTOMATIC POSTMENOPAUSAL PATIENT: ICD-10-CM

## 2023-10-13 PROCEDURE — 77080 DXA BONE DENSITY AXIAL: CPT

## 2023-10-16 DIAGNOSIS — I10 ESSENTIAL HYPERTENSION: ICD-10-CM

## 2023-10-16 RX ORDER — METOPROLOL SUCCINATE 100 MG/1
100 TABLET, EXTENDED RELEASE ORAL
Qty: 100 TABLET | Refills: 3 | Status: SHIPPED | OUTPATIENT
Start: 2023-10-16

## 2023-10-17 NOTE — TELEPHONE ENCOUNTER
Requested Prescriptions     Signed Prescriptions Disp Refills    metoprolol SR (TOPROL XL) 100 MG TABLET SR 24  Tablet 3     Sig: TAKE 1 TABLET BY MOUTH EVERY DAY     Authorizing Provider: ABBIE DYE A.P.R.N.

## 2023-12-04 NOTE — CARE PLAN
Anesthesia OB Problem: Communication  Goal: The ability to communicate needs accurately and effectively will improve  Outcome: PROGRESSING AS EXPECTED  Pt education provided on pt's need to communicate pain level.       OB

## 2024-02-07 ENCOUNTER — APPOINTMENT (OUTPATIENT)
Dept: MEDICAL GROUP | Facility: PHYSICIAN GROUP | Age: 79
End: 2024-02-07
Payer: MEDICARE

## 2024-02-29 ENCOUNTER — OFFICE VISIT (OUTPATIENT)
Dept: MEDICAL GROUP | Facility: PHYSICIAN GROUP | Age: 79
End: 2024-02-29
Payer: MEDICARE

## 2024-02-29 VITALS
OXYGEN SATURATION: 97 % | TEMPERATURE: 98.1 F | HEART RATE: 87 BPM | HEIGHT: 61 IN | SYSTOLIC BLOOD PRESSURE: 122 MMHG | DIASTOLIC BLOOD PRESSURE: 68 MMHG | BODY MASS INDEX: 27.19 KG/M2 | WEIGHT: 144 LBS

## 2024-02-29 DIAGNOSIS — L29.9 ITCHING: ICD-10-CM

## 2024-02-29 DIAGNOSIS — F33.9 MAJOR DEPRESSION, RECURRENT, CHRONIC (HCC): ICD-10-CM

## 2024-02-29 DIAGNOSIS — F10.21 ALCOHOL DEPENDENCE, IN REMISSION (HCC): ICD-10-CM

## 2024-02-29 DIAGNOSIS — I73.9 PERIPHERAL VASCULAR DISEASE (HCC): ICD-10-CM

## 2024-02-29 DIAGNOSIS — L97.921 CHRONIC ULCER OF LEFT LEG, LIMITED TO BREAKDOWN OF SKIN (HCC): ICD-10-CM

## 2024-02-29 DIAGNOSIS — I10 ESSENTIAL HYPERTENSION: ICD-10-CM

## 2024-02-29 PROCEDURE — 3074F SYST BP LT 130 MM HG: CPT | Performed by: NURSE PRACTITIONER

## 2024-02-29 PROCEDURE — 3078F DIAST BP <80 MM HG: CPT | Performed by: NURSE PRACTITIONER

## 2024-02-29 PROCEDURE — 99214 OFFICE O/P EST MOD 30 MIN: CPT | Performed by: NURSE PRACTITIONER

## 2024-02-29 RX ORDER — TRIAMCINOLONE ACETONIDE 1 MG/G
1 CREAM TOPICAL 2 TIMES DAILY
Qty: 45 G | Refills: 3 | Status: SHIPPED | OUTPATIENT
Start: 2024-02-29 | End: 2024-03-14

## 2024-02-29 ASSESSMENT — PATIENT HEALTH QUESTIONNAIRE - PHQ9
1. LITTLE INTEREST OR PLEASURE IN DOING THINGS: NOT AT ALL
4. FEELING TIRED OR HAVING LITTLE ENERGY: NOT AT ALL
7. TROUBLE CONCENTRATING ON THINGS, SUCH AS READING THE NEWSPAPER OR WATCHING TELEVISION: NOT AT ALL
6. FEELING BAD ABOUT YOURSELF - OR THAT YOU ARE A FAILURE OR HAVE LET YOURSELF OR YOUR FAMILY DOWN: NOT AL ALL
2. FEELING DOWN, DEPRESSED, IRRITABLE, OR HOPELESS: NOT AT ALL
5. POOR APPETITE OR OVEREATING: NOT AT ALL
8. MOVING OR SPEAKING SO SLOWLY THAT OTHER PEOPLE COULD HAVE NOTICED. OR THE OPPOSITE, BEING SO FIGETY OR RESTLESS THAT YOU HAVE BEEN MOVING AROUND A LOT MORE THAN USUAL: NOT AT ALL
SUM OF ALL RESPONSES TO PHQ QUESTIONS 1-9: 0
3. TROUBLE FALLING OR STAYING ASLEEP OR SLEEPING TOO MUCH: NOT AT ALL
9. THOUGHTS THAT YOU WOULD BE BETTER OFF DEAD, OR OF HURTING YOURSELF: NOT AT ALL
SUM OF ALL RESPONSES TO PHQ9 QUESTIONS 1 AND 2: 0

## 2024-02-29 ASSESSMENT — FIBROSIS 4 INDEX: FIB4 SCORE: 2.099455524325911871

## 2024-02-29 NOTE — ASSESSMENT & PLAN NOTE
Previously followed by Dr. Colin. Continues with doxycycline and ID for nonhealing wound. Her orthopaedic has referred her for possible skin graft. History of femoral-popliteal bypass surgery.

## 2024-02-29 NOTE — PROGRESS NOTES
Subjective:     CC: Hypertension follow-up    HPI:   Nadege presents today with the following:    Essential hypertension  Blood pressure today 122/68. Home blood pressures are 130's/70's and HR 60-70. Continues metoprolol  mg daily and lisinopril 30 mg daily. Denies chest pain, shortness of breath, dizziness, or headaches. Allergies are flaring, taking benadryl 24 hour that is helping.       Past Medical History:   Diagnosis Date    Anxiety     Cellulitis and abscess of lower extremity     Dental disorder     full dentures    Generalized osteoarthritis of multiple sites 10/20/2015    Hardware complicating wound infection (HCC) 2019    Heart valve disease     pt not sure     Hyperlipidemia     Hypertension     Osteoporosis     S/P femoral-popliteal bypass surgery 2019    Dr crum 2018       Social History     Tobacco Use    Smoking status: Former     Current packs/day: 0.00     Average packs/day: 0.5 packs/day for 25.0 years (12.5 ttl pk-yrs)     Types: Cigarettes     Start date: 1982     Quit date: 2007     Years since quittin.1    Smokeless tobacco: Never    Tobacco comments:     Ex-smoker,  Quit ~  (prior hx ~ 1ppd x 25 years)   Vaping Use    Vaping Use: Never used   Substance Use Topics    Alcohol use: Not Currently     Alcohol/week: 0.0 oz     Comment: stopped ~  2019  (unclear about prior use).     Drug use: No       Current Outpatient Medications Ordered in Epic   Medication Sig Dispense Refill    triamcinolone acetonide (KENALOG) 0.1 % Cream Apply 1 Application  topically 2 times a day for 14 days. 45 g 3    metoprolol SR (TOPROL XL) 100 MG TABLET SR 24 HR TAKE 1 TABLET BY MOUTH EVERY  Tablet 3    lisinopril (PRINIVIL) 30 MG tablet TAKE 1 TABLET BY MOUTH EVERY  Tablet 3    doxycycline (VIBRAMYCIN) 100 MG Tab TAKE 1 TABLET BY MOUTH EVERY DAY. TAKE 1 TABLET BY MOUTH TWICE A DAY (THROUGH INFECTIOUS DISEASE ?) 60 Tablet 11    sertraline (ZOLOFT) 100 MG Tab TAKE 1  "TABLET BY MOUTH EVERY DAY 90 Tablet 3    Ferrous Sulfate (IRON PO) Take  by mouth.      vitamin D (CHOLECALCIFEROL) 1000 Unit (25 mcg) Tab Take 1,000 Units by mouth every day.      acetaminophen (TYLENOL) 325 MG Tab Take 2 Tabs by mouth every 6 hours as needed (Mild Pain; (Pain scale 1-3); Temp greater than 100.5 F). (Patient taking differently: Take 650 mg by mouth every four hours as needed (Mild Pain; (Pain scale 1-3); Temp greater than 100.5 F).) 30 Tab 0    Multiple Vitamins-Minerals (THERAPEUTIC-M/LUTEIN) Tab Take 1 Tab by mouth every day.  0     No current [facility]-ordered facility-administered medications on file.       Allergies:  Bactrim [sulfamethoxazole-trimethoprim], Meropenem, and Oxycodone    Health Maintenance: Annual wellness visit scheduled.      Objective:     Vital signs reviewed  Exam:  /68 (BP Location: Left arm, Patient Position: Sitting, BP Cuff Size: Adult)   Pulse 87   Temp 36.7 °C (98.1 °F) (Temporal)   Ht 1.549 m (5' 1\")   Wt 65.3 kg (144 lb)   SpO2 97%   BMI 27.21 kg/m²  Body mass index is 27.21 kg/m².    Gen: Alert and oriented, No apparent distress.  Eyes:   Lids normal. Glasses in place.   Lungs: Normal effort, CTA bilaterally, no wheezes, rhonchi, or rales  CV: Regular rate and rhythm. No murmurs, rubs, or gallops.      Assessment & Plan:     78 y.o. female with the following -     1. Essential hypertension  Chronic stable problem.  Continue home blood pressure monitoring.  Continue metoprolol  mg daily and lisinopril 30 mg daily.    2. Itching  Chronic exacerbated problem.  Has ongoing itching that is worsening on her hands.  Had relief previously with triamcinolone cream.  Medication refilled today.  - triamcinolone acetonide (KENALOG) 0.1 % Cream; Apply 1 Application  topically 2 times a day for 14 days.  Dispense: 45 g; Refill: 3    3. Peripheral vascular disease (HCC)  Chronic stable problem.  Continue follow-up with ID.  Blood pressures controlled.  No pain in " legs.    4. Chronic ulcer of left leg, limited to breakdown of skin (HCC)  Chronic stable problem.  Continue follow-up with ID and orthopedics.  Continue doxycycline 100 mg daily.    5. Alcohol dependence, in remission (HCC)  Chronic stable problem.  Continue to abstain.    6. Major depression, recurrent, chronic (HCC)  Chronic stable problem.  Continue sertraline 100 mg daily.    HCC Gap Form    Diagnosis: L97.921 - Chronic ulcer of left leg, limited to breakdown of skin (HCC)  Assessment and plan: Chronic, stable, as based on today's assessment and impact on other conditions evaluated today. Continue with current treatment plan: antibiotic, per patient she has been referred for possible skin graft. Follow-up with specialist as directed, but at least annually.  Diagnosis: F10.21 - Alcohol dependence, in remission (HCC)  Assessment and plan: Chronic, stable. Continue with current defined treatment plan: no current use. Follow-up at least annually.  Diagnosis: F33.9 - Major depression, recurrent, chronic (HCC)  Assessment and plan: Chronic, stable. Continue with current defined treatment plan: sertraline. Annual PHQ screening and as needed.Follow-up at least annually.  Last edited 02/29/24 14:44 PST by JACLYN Hardy.           Return in about 3 months (around 5/29/2024) for annual wellness visit.    Please note that this dictation was created using voice recognition software. I have made every reasonable attempt to correct obvious errors, but I expect that there are errors of grammar and possibly content that I did not discover before finalizing the note.

## 2024-02-29 NOTE — ASSESSMENT & PLAN NOTE
Blood pressure today 122/68. Home blood pressures are 130's/70's and HR 60-70. Continues metoprolol  mg daily and lisinopril 30 mg daily. Denies chest pain, shortness of breath, dizziness, or headaches. Allergies are flaring, taking benadryl 24 hour that is helping.

## 2024-04-08 ENCOUNTER — TELEPHONE (OUTPATIENT)
Dept: HEALTH INFORMATION MANAGEMENT | Facility: OTHER | Age: 79
End: 2024-04-08
Payer: MEDICARE

## 2024-04-10 NOTE — TELEPHONE ENCOUNTER
Outbound call regarding patient request to call her back.  Patient was having issues hearing me over the phone and requested [employer] message regarding the reason for the call.  Pt advised will send [employer] message today.  No other concern on this call.

## 2024-04-11 ENCOUNTER — DOCUMENTATION (OUTPATIENT)
Dept: HEALTH INFORMATION MANAGEMENT | Facility: OTHER | Age: 79
End: 2024-04-11
Payer: MEDICARE

## 2024-04-18 PROBLEM — S12.100A C2 CERVICAL FRACTURE (HCC): Status: RESOLVED | Noted: 2019-09-10 | Resolved: 2024-04-18

## 2024-04-22 PROBLEM — Z22.322 MRSA (METHICILLIN RESISTANT STAPH AUREUS) CULTURE POSITIVE: Status: RESOLVED | Noted: 2018-05-31 | Resolved: 2024-04-22

## 2024-04-22 PROBLEM — R19.5 POSITIVE FIT (FECAL IMMUNOCHEMICAL TEST): Status: RESOLVED | Noted: 2022-02-17 | Resolved: 2024-04-22

## 2024-04-22 NOTE — ASSESSMENT & PLAN NOTE
Chronic, stable. Currently taking 1,000 units of cholecalciferol daily. Followed by primary care provider.

## 2024-04-22 NOTE — ASSESSMENT & PLAN NOTE
Chronic, stable. Currently taking lisinopril 30 mg and metoprolol  mg daily. Denies chest pain, lightheadedness, and lower extremity edema.

## 2024-04-22 NOTE — ASSESSMENT & PLAN NOTE
Chronic, stable. Currently taking sertraline 100 mg daily. In-office depression screening is negative. Denies SI/HI.

## 2024-04-22 NOTE — ASSESSMENT & PLAN NOTE
"Chronic, stable. Reviewed DEXA scan from October 2023: \"according to the World Health Organization classification, bone mineral density of this patient is osteopenic for the proximal right femur and normal for the lumbar spine.\" Denies recent falls or fractures.    "

## 2024-04-24 ENCOUNTER — OFFICE VISIT (OUTPATIENT)
Dept: FAMILY PLANNING/WOMEN'S HEALTH CLINIC | Facility: PHYSICIAN GROUP | Age: 79
End: 2024-04-24
Payer: MEDICARE

## 2024-04-24 VITALS
DIASTOLIC BLOOD PRESSURE: 76 MMHG | SYSTOLIC BLOOD PRESSURE: 138 MMHG | HEIGHT: 62 IN | WEIGHT: 145 LBS | BODY MASS INDEX: 26.68 KG/M2

## 2024-04-24 DIAGNOSIS — E55.9 VITAMIN D INSUFFICIENCY: ICD-10-CM

## 2024-04-24 DIAGNOSIS — I73.9 PERIPHERAL VASCULAR DISEASE (HCC): ICD-10-CM

## 2024-04-24 DIAGNOSIS — I10 ESSENTIAL HYPERTENSION: ICD-10-CM

## 2024-04-24 DIAGNOSIS — F10.21 ALCOHOL DEPENDENCE, IN REMISSION (HCC): ICD-10-CM

## 2024-04-24 DIAGNOSIS — Z97.4 WEARS HEARING AID IN BOTH EARS: ICD-10-CM

## 2024-04-24 DIAGNOSIS — F33.9 MAJOR DEPRESSION, RECURRENT, CHRONIC (HCC): ICD-10-CM

## 2024-04-24 DIAGNOSIS — I70.0 ATHEROSCLEROSIS OF AORTA (HCC): ICD-10-CM

## 2024-04-24 DIAGNOSIS — M85.852 OSTEOPENIA OF NECK OF LEFT FEMUR: ICD-10-CM

## 2024-04-24 DIAGNOSIS — D50.8 OTHER IRON DEFICIENCY ANEMIA: ICD-10-CM

## 2024-04-24 PROBLEM — R54 AGE-RELATED PHYSICAL DEBILITY: Status: RESOLVED | Noted: 2019-09-02 | Resolved: 2024-04-24

## 2024-04-24 PROBLEM — Z22.39 VRE (VANCOMYCIN RESISTANT ENTEROCOCCUS) CULTURE POSITIVE: Status: RESOLVED | Noted: 2019-03-17 | Resolved: 2024-04-24

## 2024-04-24 PROBLEM — E53.8 B12 DEFICIENCY: Status: RESOLVED | Noted: 2017-12-20 | Resolved: 2024-04-24

## 2024-04-24 PROBLEM — L97.921 CHRONIC ULCER OF LEFT LEG, LIMITED TO BREAKDOWN OF SKIN (HCC): Status: RESOLVED | Noted: 2019-10-07 | Resolved: 2024-04-24

## 2024-04-24 PROCEDURE — 3078F DIAST BP <80 MM HG: CPT

## 2024-04-24 PROCEDURE — 3075F SYST BP GE 130 - 139MM HG: CPT

## 2024-04-24 PROCEDURE — G0439 PPPS, SUBSEQ VISIT: HCPCS

## 2024-04-24 PROCEDURE — 1126F AMNT PAIN NOTED NONE PRSNT: CPT

## 2024-04-24 SDOH — ECONOMIC STABILITY: INCOME INSECURITY: HOW HARD IS IT FOR YOU TO PAY FOR THE VERY BASICS LIKE FOOD, HOUSING, MEDICAL CARE, AND HEATING?: SOMEWHAT HARD

## 2024-04-24 SDOH — ECONOMIC STABILITY: FOOD INSECURITY: WITHIN THE PAST 12 MONTHS, YOU WORRIED THAT YOUR FOOD WOULD RUN OUT BEFORE YOU GOT MONEY TO BUY MORE.: NEVER TRUE

## 2024-04-24 SDOH — ECONOMIC STABILITY: TRANSPORTATION INSECURITY
IN THE PAST 12 MONTHS, HAS LACK OF TRANSPORTATION KEPT YOU FROM MEETINGS, WORK, OR FROM GETTING THINGS NEEDED FOR DAILY LIVING?: NO

## 2024-04-24 SDOH — ECONOMIC STABILITY: FOOD INSECURITY: WITHIN THE PAST 12 MONTHS, THE FOOD YOU BOUGHT JUST DIDN'T LAST AND YOU DIDN'T HAVE MONEY TO GET MORE.: NEVER TRUE

## 2024-04-24 SDOH — ECONOMIC STABILITY: TRANSPORTATION INSECURITY
IN THE PAST 12 MONTHS, HAS THE LACK OF TRANSPORTATION KEPT YOU FROM MEDICAL APPOINTMENTS OR FROM GETTING MEDICATIONS?: NO

## 2024-04-24 ASSESSMENT — ACTIVITIES OF DAILY LIVING (ADL): BATHING_REQUIRES_ASSISTANCE: 0

## 2024-04-24 ASSESSMENT — ENCOUNTER SYMPTOMS: GENERAL WELL-BEING: GOOD

## 2024-04-24 ASSESSMENT — FIBROSIS 4 INDEX: FIB4 SCORE: 2.099455524325911871

## 2024-04-24 ASSESSMENT — PAIN SCALES - GENERAL: PAINLEVEL: NO PAIN

## 2024-04-24 ASSESSMENT — PATIENT HEALTH QUESTIONNAIRE - PHQ9: CLINICAL INTERPRETATION OF PHQ2 SCORE: 0

## 2024-04-24 NOTE — ASSESSMENT & PLAN NOTE
BMI is 26.95 kg/m2. Provided education on heart healthy diet with adequate intake of fruits, vegetables, and whole grains. Encourage 30 minutes of moderate exercise 3-4 times a week.

## 2024-04-24 NOTE — ASSESSMENT & PLAN NOTE
Chronic, stable. Reports history of femoral-popliteal bypass surgery by Dr. Colin. No longer followed by vascular medicine.

## 2024-04-24 NOTE — PROGRESS NOTES
Comprehensive Health Assessment Program     Nadege Mae is a 78 y.o. here for her comprehensive health assessment.    Patient Active Problem List    Diagnosis Date Noted    Atherosclerosis of aorta (HCC) 04/24/2024    BMI 26.0-26.9,adult 04/24/2024    Memory deficit 04/28/2022    Vitamin D insufficiency 10/08/2021    Fractures 10/08/2021    Wears hearing aid in both ears 10/08/2021    Skin eruption, chronic 02/09/2021    Systolic murmur 02/12/2020    Thyroid nodule 09/11/2019    Valgus deformity of foot, left 04/20/2019    Alcohol dependence, in remission ([facility])     Peripheral vascular disease ([facility]) 07/20/2018    History of hip fracture 12/20/2017    Iron deficiency anemia 11/04/2017    Generalized osteoarthritis of multiple sites 10/20/2015    Essential hypertension 10/06/2015    Major depression, recurrent, chronic (HCC) 10/06/2015    Osteopenia of neck of left femur        Current Outpatient Medications   Medication Sig Dispense Refill    metoprolol SR (TOPROL XL) 100 MG TABLET SR 24 HR TAKE 1 TABLET BY MOUTH EVERY  Tablet 3    lisinopril (PRINIVIL) 30 MG tablet TAKE 1 TABLET BY MOUTH EVERY  Tablet 3    doxycycline (VIBRAMYCIN) 100 MG Tab TAKE 1 TABLET BY MOUTH EVERY DAY. TAKE 1 TABLET BY MOUTH TWICE A DAY (THROUGH INFECTIOUS DISEASE ?) 60 Tablet 11    sertraline (ZOLOFT) 100 MG Tab TAKE 1 TABLET BY MOUTH EVERY DAY 90 Tablet 3    Ferrous Sulfate (IRON PO) Take  by mouth.      vitamin D (CHOLECALCIFEROL) 1000 Unit (25 mcg) Tab Take 1,000 Units by mouth every day.      acetaminophen (TYLENOL) 325 MG Tab Take 2 Tabs by mouth every 6 hours as needed (Mild Pain; (Pain scale 1-3); Temp greater than 100.5 F). (Patient taking differently: Take 650 mg by mouth every four hours as needed (Mild Pain; (Pain scale 1-3); Temp greater than 100.5 F).) 30 Tab 0    Multiple Vitamins-Minerals (THERAPEUTIC-M/LUTEIN) Tab Take 1 Tab by mouth every day.  0     No current facility-administered medications for  this visit.          Current supplements as per medication list.     Allergies:   Bactrim [sulfamethoxazole-trimethoprim], Meropenem, and Oxycodone  Social History     Tobacco Use    Smoking status: Former     Current packs/day: 0.00     Average packs/day: 0.5 packs/day for 25.0 years (12.5 ttl pk-yrs)     Types: Cigarettes     Start date: 1982     Quit date: 2007     Years since quittin.3    Smokeless tobacco: Never    Tobacco comments:     Ex-smoker,  Quit ~  (prior hx ~ 1ppd x 25 years)   Vaping Use    Vaping Use: Never used   Substance Use Topics    Alcohol use: Not Currently     Alcohol/week: 0.0 oz     Comment: stopped ~  2019  (unclear about prior use).     Drug use: No     Family History   Problem Relation Age of Onset    GI Disease Mother         colostomy    Heart Attack Father     Diabetes Father     Hypertension Father     Psychiatric Illness Brother         bipolar disorder     Nadege  has a past medical history of Age-related physical debility (2019), Anxiety, B12 deficiency (2017), C2 cervical fracture ([facility]) (09/10/2019), Cellulitis and abscess of lower extremity, Chronic ulcer of left leg, limited to breakdown of skin ([facility]) (10/07/2019), Dental disorder, Generalized osteoarthritis of multiple sites (10/20/2015), Hardware complicating wound infection ([facility]) (2019), Heart valve disease, Hyperlipidemia, Hypertension, MRSA (methicillin resistant staph aureus) culture positive (2018), Osteoporosis, Positive FIT (fecal immunochemical test) (2022), S/P femoral-popliteal bypass surgery (2019), and VRE (vancomycin resistant enterococcus) culture positive (2019).    She has no past medical history of OSTEOPOROSIS.   Past Surgical History:   Procedure Laterality Date    HIP REVISION TOTAL Left 10/9/2019    Procedure: REVISION, TOTAL ARTHROPLASTY, HIP;  Surgeon: Chong Vazquez M.D.;  Location: SURGERY [facility];  Service: Orthopedics    FEMORAL  POPLITEAL BYPASS Left 6/8/2018    Procedure: FEMORAL POPLITEAL BYPASS;  Surgeon: Milana Colin M.D.;  Location: SURGERY [facility];  Service: General    IRRIGATION & DEBRIDEMENT GENERAL Left 6/8/2018    Procedure: IRRIGATION & DEBRIDEMENT ANKLE WOUND;  Surgeon: Milana Colin M.D.;  Location: SURGERY [facility];  Service: General    IRRIGATION & DEBRIDEMENT HIP Left 6/4/2018    Procedure: IRRIGATION & DEBRIDEMENT HIP;  Surgeon: Chong Vazquez M.D.;  Location: SURGERY [facility];  Service: Orthopedics    HIP REVISION TOTAL Left 2/11/2018    Procedure: HIP REVISION TOTAL- femoral nail removal conversion to total hip arthoroplasty;  Surgeon: Chong Vazquez M.D.;  Location: SURGERY [facility];  Service: Orthopedics    HIP NAILING INTRAMEDULLARY Left 12/20/2017    Procedure: HIP NAILING INTRAMEDULLARY;  Surgeon: Jorge Peters M.D.;  Location: SURGERY [facility];  Service: Orthopedics    BREAST BIOPSY  8/28/2014    Performed by Magdalena Londono M.D. at [facility]    BREAST BIOPSY Right 8/14    benign    GYN SURGERY  1973    complete hysterectomy    ABDOMINAL HYSTERECTOMY TOTAL      w/BSO due to uterine cyst and endometriosis    ARTHROPLASTY      hip       Screening:  In the last six months have you experienced any leakage of urine? No    Depression Screening  Little interest or pleasure in doing things?  0 - not at all  Feeling down, depressed , or hopeless? 0 - not at all  Patient Health Questionnaire Score: 0     If depressive symptoms identified deferred to follow up visit unless specifically addressed in assessment and plan.    Interpretation of PHQ-9 Total Score   Score Severity   1-4 No Depression   5-9 Mild Depression   10-14 Moderate Depression   15-19 Moderately Severe Depression   20-27 Severe Depression    Screening for Cognitive Impairment  Do you or any of your friends or family members have any concern about your memory? No  Three Minute Recall (Leader, Season,  Table) 1/3    Dejuan clock face with all 12 numbers and set the hands to show 10 minutes after 11.  Yes    Cognitive concerns identified deferred for follow up unless specifically addressed in assessment and plan.    Fall Risk Assessment  Has the patient had two or more falls in the last year or any fall with injury in the last year?  No    Safety Assessment  Do you always wear your seatbelt?  Yes  Any changes to home needed to function safely? No  Difficulty hearing.  Yes  Patient counseled about all safety risks that were identified.    Functional Assessment ADLs  Are there any barriers preventing you from cooking for yourself or meeting nutritional needs?  No.    Are there any barriers preventing you from driving safely or obtaining transportation?  No.    Are there any barriers preventing you from using a telephone or calling for help?  No    Are there any barriers preventing you from shopping?  No.    Are there any barriers preventing you from taking care of your own finances?  No    Are there any barriers preventing you from managing your medications?  No    Are there any barriers preventing you from showering, bathing or dressing yourself? No    Are there any barriers preventing you from doing housework or laundry? No  Are there any barriers preventing you from using the toilet?No  Are you currently engaging in any exercise or physical activity?  Yes. 50/50    Self-Assessment of Health  What is your perception of your health? Good  Do you sleep more than six hours a night? Yes  In the past 7 days, how much did pain keep you from doing your normal work? None  Do you spend quality time with family or friends (virtually or in person)? Yes  Do you usually eat a heart healthy diet that constists of a variety of fruits, vegetables, whole grains and fiber? Yes  Do you eat foods high in fat and/or Fast Food more than three times per week? No    Advance Care Planning  Do you have an Advance Directive, Living Will,  Durable Power of , or POLST? Yes    Living Will            Health Maintenance Summary            Overdue - Zoster (Shingles) Vaccines (1 of 2) Never done      No completion history exists for this topic.              Annual Wellness Visit (Yearly) Next due on 4/24/2025 04/24/2024  Done - Comprehensive Health Assessment    04/24/2024  Level of Service: ANNUAL WELLNESS VISIT-INCLUDES PPPS SUBSEQUE*    10/20/2015  Done              Bone Density Scan (Every 2 Years) Next due on 10/13/2025      10/13/2023  DS-BONE DENSITY STUDY (DEXA)    08/12/2021  DS-BONE DENSITY STUDY (DEXA)    12/30/2014  DS-BONE DENSITY STUDY (DEXA)    12/11/2012  DS-BONE DENSITY STUDY (DEXA)              IMM DTaP/Tdap/Td Vaccine (3 - Td or Tdap) Next due on 8/24/2033 08/24/2023  Imm Admin: Tdap Vaccine    12/09/2012  Imm Admin: Tdap Vaccine              Pneumococcal Vaccine: 65+ Years (Series Information) Completed      01/03/2017  Imm Admin: Pneumococcal polysaccharide vaccine (PPSV-23)    01/02/2017  Imm Admin: Pneumococcal polysaccharide vaccine (PPSV-23)    10/20/2015  Imm Admin: Pneumococcal Conjugate Vaccine (Prevnar/PCV-13)              Hepatitis A Vaccine (Hep A) (Series Information) Aged Out      02/22/2018  Imm Admin: Hepatitis A Vaccine, Adult              Influenza Vaccine (Series Information) Completed      09/20/2023  Imm Admin: Influenza Vaccine, Quadrivalent, Adjuvanted (Pf)    09/15/2022  Imm Admin: Influenza Vaccine Adult HD    10/08/2021  Imm Admin: Influenza Vaccine Adult HD    10/17/2020  Imm Admin: Influenza Vaccine Adult HD    09/19/2019  Imm Admin: Influenza Vac Subunit Quad Inj (Pf)    Only the first 5 history entries have been loaded, but more history exists.              COVID-19 Vaccine (Series Information) Completed      09/20/2023  Imm Admin: Comirnaty (Covid-19 Vaccine, Mrna, [id number] Formula)    09/15/2022  Imm Admin: PFIZER BIVALENT SARS-COV-2 VACCINE (12+)    12/13/2021  Imm Admin: PFIZER PURPLE  CAP SARS-COV-2 VACCINATION (12+)    03/09/2021  Imm Admin: PFIZER PURPLE CAP SARS-COV-2 VACCINATION (12+)    02/16/2021  Imm Admin: PFIZER PURPLE CAP SARS-COV-2 VACCINATION (12+)    Only the first 5 history entries have been loaded, but more history exists.              HPV Vaccines (Series Information) Aged Out      No completion history exists for this topic.              Polio Vaccine (Inactivated Polio) (Series Information) Aged Out      No completion history exists for this topic.              Meningococcal Immunization (Series Information) Aged Out      No completion history exists for this topic.              Discontinued - Mammogram  Discontinued        Frequency changed to Never automatically (Topic No Longer Applies)    03/29/2016  MA-SCREEN MAMMO W/CAD-BILAT    07/22/2014  MA-DIAGNOSTIC DIGITAL MAMMO-UNILAT    06/17/2014  MA-SCREENING MAMMOGRAM W/ CAD              Discontinued - Colorectal Cancer Screening  Discontinued        Frequency changed to Never automatically (Topic No Longer Applies)    01/18/2022  OCCULT BLOOD FECES IMMUNOASSAY (FIT)    06/02/2015  Colonoscopy (Patient Declined)                    Patient Care Team:  LUCIO HardyRJAMA as PCP - General (Nurse Practitioner Family)  RYANN Son as PCP - [facility]  RYANN Paris as Consulting Physician (Family Medicine)  Chong Vazquez M.D. as Consulting Physician (Orthopedic Surgery)  [facility] as Consulting Physician (Dermatology)  Milana Colin M.D. as Consulting Physician (Surgery)      Financial Resource Strain: Medium Risk (4/24/2024)    Overall Financial Resource Strain (CARDIA)     Difficulty of Paying Living Expenses: Somewhat hard      Transportation Needs: No Transportation Needs (4/24/2024)    PRAPARE - Transportation     Lack of Transportation (Medical): No     Lack of Transportation (Non-Medical): No      Food Insecurity: No Food Insecurity (4/24/2024)    Hunger Vital  "Sign     Worried About Running Out of Food in the Last Year: Never true     Ran Out of Food in the Last Year: Never true        Encounter Vitals  Blood Pressure : 138/76  Weight: 65.8 kg (145 lb)  Height: 156.2 cm (5' 1.5\")  BMI (Calculated): 26.95  Pain Score: No pain     Alert, oriented in no acute distress.  Eye contact is good, speech goal directed, affect calm.    Assessment and Plan. The following treatment and monitoring plan is recommended:    Alcohol dependence, in remission (HCC)  Stable. Reports cessation from alcohol in 2019.    Atherosclerosis of aorta (HCC)  Chronic, stable. Reviewed CT from September 2019. Denies chest pain, pain with ambulation, and weakness of extremities.    BMI 26.0-26.9,adult  BMI is 26.95 kg/m2. Provided education on heart healthy diet with adequate intake of fruits, vegetables, and whole grains. Encourage 30 minutes of moderate exercise 3-4 times a week.    Essential hypertension  Chronic, stable. Currently taking lisinopril 30 mg and metoprolol  mg daily. Denies chest pain, lightheadedness, and lower extremity edema.     Iron deficiency anemia  Chronic, stable. Reviewed CBC from April 2023. Currently supplementing with ferrous sulfate daily. Routinely monitored by primary care provider.    Major depression, recurrent, chronic (HCC)  Chronic, stable. In-office depression screening is negative. Currently taking sertraline 100 mg daily. Denies SI/HI.    Osteopenia of neck of left femur  Chronic, stable. Reviewed DEXA scan from October 2023: \"according to the World Health Organization classification, bone mineral density of this patient is osteopenic for the proximal right femur and normal for the lumbar spine.\" Denies recent falls or fractures.    Peripheral vascular disease (HCC)  Chronic, stable. Reports history of femoral-popliteal bypass surgery by Dr. Colin. No longer followed by vascular medicine. Denies chest pain and claudication.     Vitamin D " insufficiency  Chronic, stable. Currently taking 1,000 units of cholecalciferol daily. Followed by primary care provider.     Wears hearing aid in both ears  Chronic, ongoing. Current use of bilateral hearing aids.        Services suggested: No services needed at this time  Health Care Screening: Age-appropriate preventive services recommended by USPTF and ACIP covered by Medicare were discussed today. Services ordered if indicated and agreed upon by the patient.  Referrals offered: Community-based lifestyle interventions to reduce health risks and promote self-management and wellness, fall prevention, nutrition, physical activity, tobacco-use cessation, weight loss, and mental health services as per orders if indicated.    Discussion today about general wellness and lifestyle habits:    Prevent falls and reduce trip hazards; Cautioned about securing or removing rugs.  Have a working fire alarm and carbon monoxide detector.  Engage in regular physical activity and social activities.    Follow-up: Return for appointment with Primary Care Provider as needed.

## 2024-04-24 NOTE — ASSESSMENT & PLAN NOTE
Chronic, stable. Reviewed CT from September 2019. Denies chest pain, pain with ambulation, and weakness of extremities.

## 2024-04-24 NOTE — ASSESSMENT & PLAN NOTE
Chronic, stable. Reviewed CBC from April 2023. Currently supplementing with ferrous sulfate daily. Routinely monitored by primary care provider.

## 2024-05-29 ENCOUNTER — OFFICE VISIT (OUTPATIENT)
Dept: MEDICAL GROUP | Facility: PHYSICIAN GROUP | Age: 79
End: 2024-05-29
Payer: MEDICARE

## 2024-05-29 VITALS
SYSTOLIC BLOOD PRESSURE: 132 MMHG | RESPIRATION RATE: 16 BRPM | WEIGHT: 140 LBS | HEART RATE: 84 BPM | DIASTOLIC BLOOD PRESSURE: 74 MMHG | HEIGHT: 62 IN | OXYGEN SATURATION: 97 % | BODY MASS INDEX: 25.76 KG/M2 | TEMPERATURE: 97.8 F

## 2024-05-29 DIAGNOSIS — E04.1 THYROID NODULE: ICD-10-CM

## 2024-05-29 DIAGNOSIS — I70.0 ATHEROSCLEROSIS OF AORTA (HCC): ICD-10-CM

## 2024-05-29 DIAGNOSIS — R01.1 SYSTOLIC MURMUR: ICD-10-CM

## 2024-05-29 DIAGNOSIS — D50.8 OTHER IRON DEFICIENCY ANEMIA: ICD-10-CM

## 2024-05-29 DIAGNOSIS — Z00.01 ANNUAL VISIT FOR GENERAL ADULT MEDICAL EXAMINATION WITH ABNORMAL FINDINGS: ICD-10-CM

## 2024-05-29 DIAGNOSIS — I10 ESSENTIAL HYPERTENSION: ICD-10-CM

## 2024-05-29 DIAGNOSIS — H61.22 EXCESSIVE CERUMEN IN EAR CANAL, LEFT: ICD-10-CM

## 2024-05-29 DIAGNOSIS — M85.851 OSTEOPENIA OF NECK OF RIGHT FEMUR: ICD-10-CM

## 2024-05-29 DIAGNOSIS — H61.21 IMPACTED CERUMEN, RIGHT EAR: ICD-10-CM

## 2024-05-29 DIAGNOSIS — F33.9 MAJOR DEPRESSION, RECURRENT, CHRONIC (HCC): ICD-10-CM

## 2024-05-29 ASSESSMENT — FIBROSIS 4 INDEX: FIB4 SCORE: 2.099455524325911871

## 2024-05-29 NOTE — ASSESSMENT & PLAN NOTE
Blood pressure today 132/74.  Continues metoprolol  mg daily and lisinopril 30 mg daily. Home blood pressures 129-131/76-78. Heart rate 65-69. No chest pain, shortness of breath or dizziness.

## 2024-05-29 NOTE — ASSESSMENT & PLAN NOTE
Declines to start Fosamax at this time. Would like to continues daily calcium and vitamin D3 supplementation. She has not had any falls.

## 2024-05-29 NOTE — PROGRESS NOTES
"Subjective:     CC:   Chief Complaint   Patient presents with    Annual Exam     Medicare Wellness       HPI:   Nadege Mae is a 78 y.o. female who presents for annual exam. She is feeling well and denies any complaints.    Essential hypertension  Blood pressure today 132/74.  Continues metoprolol  mg daily and lisinopril 30 mg daily. Home blood pressures 129-131/76-78. Heart rate 65-69. No chest pain, shortness of breath or dizziness.    Major depression, recurrent, chronic (HCC)  Continue sertraline 100 mg daily.  Denies self-harm or SI today. Her cat has dementia and plans to take to vet for follow-up.     Osteopenia of neck of right femur  Declines to start Fosamax at this time. Would like to continues daily calcium and vitamin D3 supplementation. She has not had any falls.     Thyroid nodule  MRI cervical  showed \"11 x 24 x 9 mm sized nodule in the left lobe of the thyroid.\" She declines today. No dysphagia or voice changes. Declines follow-up today.    Systolic murmur  Declines echocardiogram today. No chest pain, shortness of breath or dizziness.      Ob-Gyn/ History:    Patient has GYN provider: no  /Para:    Last Pap Smear:  hysterectomy.   Gyn Surgery:  breast biopsy, hysterectomy complete.  Current Contraceptive Method:  postmenopausal. She is not currently sexually active.  No significant bloating/fluid retention, pelvic pain, or dyspareunia. No vaginal discharge  Post-menopausal bleeding: none  Urinary incontinence: intermittent      Health Maintenance  Advanced directive: she has living will   Osteoporosis Screen/ DEXA: 10/13/2023; showed osteopenia    PT/vit D for falls prevention: yes   Cholesterol Screening: labs ordered   Diabetes Screening: labs ordered   Diet: She is eating vegetables, cooks meals at home, rare fast food, drinks soad in the afternoon. She does drinking water during the day.   Exercise: Her roommate has ordered a sit down pedal machine. She does " walk.    Substance Abuse: Discussed and reviewed.    Not in relationship.   Seat belts safety discussed.  Sun protection use encouraged.    Cancer screening  Colorectal Cancer Screenin2022    Lung Cancer Screening: n/a, quit 17 years ago, pack years 12.5    Cervical Cancer Screening: aged out,  hx of hysterectomy  Breast Cancer Screening: 3/29/2016; aged out    Infectious disease screening/Immunizations  --Practices safe sex.  --Hepatitis C Screening: aged out   --Immunizations:    Influenza: 2023    Tetanus: 2023    Shingles: recommended and she plans to schedule   Pneumococcal : completed    Other immunizations: COVID booster up to date     She  has a past medical history of Age-related physical debility (2019), Anxiety, B12 deficiency (2017), C2 cervical fracture ([facility]) (09/10/2019), Cellulitis and abscess of lower extremity, Chronic ulcer of left leg, limited to breakdown of skin ([facility]) (10/07/2019), Dental disorder, Generalized osteoarthritis of multiple sites (10/20/2015), Hardware complicating wound infection ([facility]) (2019), Heart valve disease, Hyperlipidemia, Hypertension, MRSA (methicillin resistant staph aureus) culture positive (2018), Osteoporosis, Positive FIT (fecal immunochemical test) (2022), S/P femoral-popliteal bypass surgery (2019), and VRE (vancomycin resistant enterococcus) culture positive (2019).    She has no past medical history of OSTEOPOROSIS.  She  has a past surgical history that includes gyn surgery (); breast biopsy (2014); abdominal hysterectomy total; breast biopsy (Right, ); hip nailing intramedullary (Left, 2017); hip revision total (Left, 2018); irrigation & debridement hip (Left, 2018); femoral popliteal bypass (Left, 2018); irrigation & debridement general (Left, 2018); arthroplasty; and hip revision total (Left, 10/9/2019).    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     Drug use: No    Sexual activity: Not Currently     Partners: Male   Other Topics Concern    Not on file   Social History Narrative    Initial Intake Date: 1Cshlomo Funes  Last Updated:        Financial situation:    SSR of $853. Pt has cost sharing with roommate.        Food resources & insecurities:    Pt able to independantly shop and cook. Often, local son will go with pt to grocery store to assist.        Housing & environmental:    2 story condo rental w/ stairs. Pt ahs rail for stairs, denies concern. Pt has good relationship with ; he will allow her to install a shower/bath rail.        Transportation:    Active  with licensed and insured vehicle.        Family dynamics & family/friend social supports:    Pt resides with roommate and receives support from local adult son. Confirmed son is emergency contact.        ADLs/IADLs & associated diagnoses:    It completing all ADL/IADLS independently with the  exception of showers. Pt completing sponge bath w/ kitchen sink.        Advanced life planning:    Not on file. POA-HC packet mailed 2/9/2021.        Behavioral/Mental:    Pertinent diagnoses include, major depression, recurrent, chronic     Social Determinants of Health     Financial Resource Strain: Medium Risk (4/24/2024)    Overall Financial Resource Strain (CARDIA)     Difficulty of Paying Living Expenses: Somewhat hard   Food Insecurity: No Food Insecurity (4/24/2024)    Hunger Vital Sign     Worried About Running Out of Food in the Last Year: Never true     Ran Out of Food in the Last Year: Never true   Transportation Needs: No Transportation Needs (4/24/2024)    PRAPARE - Transportation     Lack of Transportation (Medical): No     Lack of Transportation (Non-Medical): No   Physical Activity: Not on file   Stress: Not on file   Social Connections: Not on file   Intimate Partner Violence: Not on file   Housing Stability: Not on file       Patient Active Problem List    Diagnosis Date Noted    Atherosclerosis of aorta (HCC) 04/24/2024    BMI 26.0-26.9,adult 04/24/2024    Memory deficit 04/28/2022    Vitamin D insufficiency 10/08/2021    Fractures 10/08/2021    Wears hearing aid in both ears 10/08/2021    Skin eruption, chronic 02/09/2021    Systolic murmur 02/12/2020    Thyroid nodule 09/11/2019    Valgus deformity of foot, left 04/20/2019    Alcohol dependence, in remission (HCC)     Peripheral vascular disease (HCC) 07/20/2018    History of hip fracture 12/20/2017    Iron deficiency anemia 11/04/2017    Generalized osteoarthritis of multiple sites 10/20/2015    Essential hypertension 10/06/2015    Major depression, recurrent, chronic (HCC) 10/06/2015    Osteopenia of neck of right femur          Current Outpatient Medications   Medication Sig Dispense Refill    metoprolol SR (TOPROL XL) 100 MG TABLET SR 24 HR TAKE 1 TABLET BY MOUTH EVERY  Tablet 3    lisinopril (PRINIVIL) 30 MG tablet TAKE 1 TABLET BY  "MOUTH EVERY  Tablet 3    doxycycline (VIBRAMYCIN) 100 MG Tab TAKE 1 TABLET BY MOUTH EVERY DAY. TAKE 1 TABLET BY MOUTH TWICE A DAY (THROUGH INFECTIOUS DISEASE ?) 60 Tablet 11    sertraline (ZOLOFT) 100 MG Tab TAKE 1 TABLET BY MOUTH EVERY DAY 90 Tablet 3    Ferrous Sulfate (IRON PO) Take  by mouth.      vitamin D (CHOLECALCIFEROL) 1000 Unit (25 mcg) Tab Take 1,000 Units by mouth every day.      acetaminophen (TYLENOL) 325 MG Tab Take 2 Tabs by mouth every 6 hours as needed (Mild Pain; (Pain scale 1-3); Temp greater than 100.5 F). (Patient taking differently: Take 650 mg by mouth every four hours as needed (Mild Pain; (Pain scale 1-3); Temp greater than 100.5 F).) 30 Tab 0    Multiple Vitamins-Minerals (THERAPEUTIC-M/LUTEIN) Tab Take 1 Tab by mouth every day.  0     No current facility-administered medications for this visit.     Allergies   Allergen Reactions    Bactrim [Sulfamethoxazole-Trimethoprim] Rash     Diffuse pruritic skin rash with blisters (no mucous membrane involvement) (was also taking cipro at the time - reaction thought more likely to be 2/2 Bactrim)    Meropenem Unspecified     Pt can not remember reaction      Oxycodone      \"I got bad hallucinations & slurred speech\"       Review of Systems   Constitutional: Negative for fever, chills and malaise/fatigue.   HENT: Negative for congestion.    Eyes: Negative for pain.   Respiratory: Negative for cough and shortness of breath.    Cardiovascular: Negative for leg swelling.   Gastrointestinal: Negative for nausea, vomiting, abdominal pain and diarrhea.   Genitourinary: Negative for dysuria and hematuria.   Skin: Negative for rash.   Neurological: Negative for dizziness, focal weakness and headaches.   Endo/Heme/Allergies: Does not bruise/bleed easily.   Psychiatric/Behavioral: Negative for depression.  The patient is not nervous/anxious.      Objective:     Vital signs reviewed   /74   Pulse 84   Temp 36.6 °C (97.8 °F)   Resp 16   Ht " "1.562 m (5' 1.5\")   Wt 63.5 kg (140 lb)   SpO2 97%   BMI 26.02 kg/m²   Body mass index is 26.02 kg/m².  Wt Readings from Last 4 Encounters:   05/29/24 63.5 kg (140 lb)   04/24/24 65.8 kg (145 lb)   02/29/24 65.3 kg (144 lb)   08/24/23 63 kg (139 lb)       Physical Exam:  Constitutional: Well-developed and well-nourished. Not diaphoretic. No distress.   Skin: Skin is warm and dry. No rash noted.  Head: Atraumatic without lesions.  Eyes: Conjunctivae and extraocular motions are normal. Pupils are equal, round, and reactive to light. No scleral icterus. Wears glasses.   Ears:  External ears unremarkable. Excessive cerumen to left ear canal, and partial able to visualize left TM, attempted to remove with lighted curette however patient did not tolerate.  Right ear canal with impacted cerumen and unable to visualize right TM.  I was able to partially clear the cerumen until patient was not able to tolerate due to pain but unfortunately was not able to view the right TM.  She does wear bilateral hearing aids.  Mouth/Throat: Dentition is intact. Tongue normal. Oropharynx is clear and moist. Posterior pharynx without erythema or exudates.  Neck: Supple, trachea midline. Normal range of motion. No thyromegaly present. No lymphadenopathy--cervical or supraclavicular.  Cardiovascular: Regular rate and rhythm, S1 and S2 with systolic murmur, rubs, or gallops.  Lungs: Normal inspiratory effort, CTA bilaterally, no wheezes/rhonchi/rales  Abdomen: Soft, non tender, and without distention. Active bowel sounds in all four quadrants. No rebound, guarding, masses or HSM.  Extremities: No cyanosis, clubbing, erythema, nor edema.   Musculoskeletal: All major joints AROM full in all directions without pain.  Neurological: Alert and oriented x 3.   Psychiatric:  Behavior, mood, and affect are appropriate.        Assessment and Plan:     1. Annual visit for general adult medical examination with abnormal findings  Acute uncomplicated " problem.  Annual exam completed today. Discussion today about general wellness and lifestyle habits:  Engage in regular physical and social activities  Prevent falls and reduce trip hazards, using ambulatory aids, hearing and vision testing if appropriate  Skin care, including sunscreen  Recommended annual eye exams and annual dental exams  Discussed wearing seatbelt when in car at all times    2. Impacted cerumen, right ear  Acute uncomplicated problem.  Partially able to remove cerumen but able to completely remove.  Right TM is not visualized.  Follow-up in 3 months.    3. Excessive cerumen in ear canal, left  Acute uncomplicated problem.  Attempted to remove cerumen however patient had pain and was not able to remove.  Left TM was partially visualized.  Follow-up in 3 months.    4. Essential hypertension  Chronic stable problem.  Due for annual labs.  Continue metoprolol  mg daily and lisinopril 30 mg daily.  Continue home blood pressure monitoring.  - CBC WITH DIFFERENTIAL; Future  - Comp Metabolic Panel; Future  - Lipid Profile; Future    5. Major depression, recurrent, chronic (HCC)  Chronic stable problem.  Continue sertraline 100 mg daily.    6. Osteopenia of neck of right femur  Chronic exacerbated problem.  Declines Fosamax today.  Recommend continue with daily vitamin D3 2000 units daily, calcium 1200 mg daily and daily weightbearing exercises.  Check updated vitamin D level.  - VITAMIN D,25 HYDROXY (DEFICIENCY); Future    7. Other iron deficiency anemia  Chronic stable problem.  Continue daily iron supplementation.  Due for updated labs.  - CBC WITH DIFFERENTIAL; Future  - IRON/TOTAL IRON BIND; Future  - FERRITIN; Future    8. Atherosclerosis of aorta (HCC)  Chronic stable problem.  She would like to check updated labs first before medication.  Would recommend statin.  - Lipid Profile; Future    9. Thyroid nodule  Chronic stable problem.  Declines ultrasound or further follow-up today.    10.  Systolic murmur  Chronic stable problem.  Declines echocardiogram today.  She is asymptomatic.  She did state if she does become symptomatic she would be open to echocardiogram.      HCM:  due for shingles   Labs per orders  Immunizations per orders  Patient counseled about skin care, diet, supplements, prenatal vitamins, safe sex and exercise.    Follow-up: Return in about 3 months (around 8/29/2024) for Hypertension, ear check.        Please note that this dictation was created using voice recognition software. I have made every reasonable attempt to correct obvious errors, but I expect that there are errors of grammar and possibly content that I did not discover before finalizing the note.

## 2024-05-29 NOTE — ASSESSMENT & PLAN NOTE
"MRI cervical 2019 showed \"11 x 24 x 9 mm sized nodule in the left lobe of the thyroid.\" She declines today. No dysphagia or voice changes. Declines follow-up today.  "

## 2024-05-29 NOTE — ASSESSMENT & PLAN NOTE
Continue sertraline 100 mg daily.  Denies self-harm or SI today. Her cat has dementia and plans to take to vet for follow-up.

## 2024-06-05 DIAGNOSIS — F33.9 MAJOR DEPRESSION, RECURRENT, CHRONIC (HCC): ICD-10-CM

## 2024-06-05 RX ORDER — SERTRALINE HYDROCHLORIDE 100 MG/1
100 TABLET, FILM COATED ORAL
Qty: 90 TABLET | Refills: 3 | Status: SHIPPED | OUTPATIENT
Start: 2024-06-05

## 2024-08-22 ENCOUNTER — HOSPITAL ENCOUNTER (OUTPATIENT)
Dept: LAB | Facility: MEDICAL CENTER | Age: 79
End: 2024-08-22
Attending: NURSE PRACTITIONER
Payer: MEDICARE

## 2024-08-22 DIAGNOSIS — I70.0 ATHEROSCLEROSIS OF AORTA (HCC): ICD-10-CM

## 2024-08-22 DIAGNOSIS — M85.851 OSTEOPENIA OF NECK OF RIGHT FEMUR: ICD-10-CM

## 2024-08-22 DIAGNOSIS — I10 ESSENTIAL HYPERTENSION: ICD-10-CM

## 2024-08-22 DIAGNOSIS — D50.8 OTHER IRON DEFICIENCY ANEMIA: ICD-10-CM

## 2024-08-22 LAB
ALBUMIN SERPL BCP-MCNC: 3.8 G/DL (ref 3.2–4.9)
ALBUMIN/GLOB SERPL: 0.9 G/DL
ALP SERPL-CCNC: 64 U/L (ref 30–99)
ALT SERPL-CCNC: 9 U/L (ref 2–50)
ANION GAP SERPL CALC-SCNC: 12 MMOL/L (ref 7–16)
AST SERPL-CCNC: 17 U/L (ref 12–45)
BASOPHILS # BLD AUTO: 1.2 % (ref 0–1.8)
BASOPHILS # BLD: 0.08 K/UL (ref 0–0.12)
BILIRUB SERPL-MCNC: 0.4 MG/DL (ref 0.1–1.5)
BUN SERPL-MCNC: 20 MG/DL (ref 8–22)
CALCIUM ALBUM COR SERPL-MCNC: 10 MG/DL (ref 8.5–10.5)
CALCIUM SERPL-MCNC: 9.8 MG/DL (ref 8.5–10.5)
CHLORIDE SERPL-SCNC: 103 MMOL/L (ref 96–112)
CHOLEST SERPL-MCNC: 182 MG/DL (ref 100–199)
CO2 SERPL-SCNC: 23 MMOL/L (ref 20–33)
CREAT SERPL-MCNC: 0.88 MG/DL (ref 0.5–1.4)
EOSINOPHIL # BLD AUTO: 0.07 K/UL (ref 0–0.51)
EOSINOPHIL NFR BLD: 1 % (ref 0–6.9)
ERYTHROCYTE [DISTWIDTH] IN BLOOD BY AUTOMATED COUNT: 47.9 FL (ref 35.9–50)
GFR SERPLBLD CREATININE-BSD FMLA CKD-EPI: 66 ML/MIN/1.73 M 2
GLOBULIN SER CALC-MCNC: 4.1 G/DL (ref 1.9–3.5)
GLUCOSE SERPL-MCNC: 97 MG/DL (ref 65–99)
HCT VFR BLD AUTO: 38 % (ref 37–47)
HDLC SERPL-MCNC: 52 MG/DL
HGB BLD-MCNC: 11.6 G/DL (ref 12–16)
IMM GRANULOCYTES # BLD AUTO: 0.02 K/UL (ref 0–0.11)
IMM GRANULOCYTES NFR BLD AUTO: 0.3 % (ref 0–0.9)
IRON SATN MFR SERPL: 17 % (ref 15–55)
IRON SERPL-MCNC: 51 UG/DL (ref 40–170)
LDLC SERPL CALC-MCNC: 98 MG/DL
LYMPHOCYTES # BLD AUTO: 1.76 K/UL (ref 1–4.8)
LYMPHOCYTES NFR BLD: 26.1 % (ref 22–41)
MCH RBC QN AUTO: 27.6 PG (ref 27–33)
MCHC RBC AUTO-ENTMCNC: 30.5 G/DL (ref 32.2–35.5)
MCV RBC AUTO: 90.3 FL (ref 81.4–97.8)
MONOCYTES # BLD AUTO: 0.66 K/UL (ref 0–0.85)
MONOCYTES NFR BLD AUTO: 9.8 % (ref 0–13.4)
NEUTROPHILS # BLD AUTO: 4.16 K/UL (ref 1.82–7.42)
NEUTROPHILS NFR BLD: 61.6 % (ref 44–72)
NRBC # BLD AUTO: 0 K/UL
NRBC BLD-RTO: 0 /100 WBC (ref 0–0.2)
PLATELET # BLD AUTO: 290 K/UL (ref 164–446)
PMV BLD AUTO: 10.5 FL (ref 9–12.9)
POTASSIUM SERPL-SCNC: 4 MMOL/L (ref 3.6–5.5)
PROT SERPL-MCNC: 7.9 G/DL (ref 6–8.2)
RBC # BLD AUTO: 4.21 M/UL (ref 4.2–5.4)
SODIUM SERPL-SCNC: 138 MMOL/L (ref 135–145)
TIBC SERPL-MCNC: 299 UG/DL (ref 250–450)
TRIGL SERPL-MCNC: 159 MG/DL (ref 0–149)
UIBC SERPL-MCNC: 248 UG/DL (ref 110–370)
WBC # BLD AUTO: 6.8 K/UL (ref 4.8–10.8)

## 2024-08-22 PROCEDURE — 83550 IRON BINDING TEST: CPT

## 2024-08-22 PROCEDURE — 82306 VITAMIN D 25 HYDROXY: CPT

## 2024-08-22 PROCEDURE — 80053 COMPREHEN METABOLIC PANEL: CPT

## 2024-08-22 PROCEDURE — 36415 COLL VENOUS BLD VENIPUNCTURE: CPT

## 2024-08-22 PROCEDURE — 85025 COMPLETE CBC W/AUTO DIFF WBC: CPT

## 2024-08-22 PROCEDURE — 80061 LIPID PANEL: CPT

## 2024-08-22 PROCEDURE — 82728 ASSAY OF FERRITIN: CPT

## 2024-08-22 PROCEDURE — 83540 ASSAY OF IRON: CPT

## 2024-08-23 LAB
25(OH)D3 SERPL-MCNC: 50 NG/ML (ref 30–100)
FERRITIN SERPL-MCNC: 125 NG/ML (ref 10–291)

## 2024-08-28 ENCOUNTER — OFFICE VISIT (OUTPATIENT)
Dept: MEDICAL GROUP | Facility: PHYSICIAN GROUP | Age: 79
End: 2024-08-28
Payer: MEDICARE

## 2024-08-28 VITALS
WEIGHT: 139 LBS | HEART RATE: 71 BPM | BODY MASS INDEX: 26.24 KG/M2 | DIASTOLIC BLOOD PRESSURE: 70 MMHG | OXYGEN SATURATION: 97 % | SYSTOLIC BLOOD PRESSURE: 146 MMHG | TEMPERATURE: 98.3 F | HEIGHT: 61 IN

## 2024-08-28 DIAGNOSIS — H61.21 EXCESSIVE CERUMEN IN EAR CANAL, RIGHT: ICD-10-CM

## 2024-08-28 DIAGNOSIS — I10 ESSENTIAL HYPERTENSION: ICD-10-CM

## 2024-08-28 DIAGNOSIS — H61.22 EXCESSIVE CERUMEN IN EAR CANAL, LEFT: ICD-10-CM

## 2024-08-28 ASSESSMENT — FIBROSIS 4 INDEX: FIB4 SCORE: 1.56

## 2024-08-28 NOTE — ASSESSMENT & PLAN NOTE
Blood pressure today 146/70.  On repeat blood pressure 142/82.  Home blood pressures have been 120-133/76-78 with pulse 66-68.  Continues lisinopril 30 mg daily and metoprolol 100 mg daily.  She had to put her 18-year-old cat down in room he has been having depression.  This morning she had a flat tire alarm in her car.  She is upcoming cataract consultation on 9/10/2024.  She would also like me to look in her ears.

## 2024-08-28 NOTE — PROGRESS NOTES
Subjective:     CC: BP follow-up    HPI:   Nadege presents today with the following:    Epic was in downtime during patient's appointment.  See media for downtime form.    Essential hypertension  Blood pressure today 146/70.  On repeat blood pressure 142/82.  Home blood pressures have been 120-133/76-78 with pulse 66-68.  Continues lisinopril 30 mg daily and metoprolol 100 mg daily.  She had to put her 18-year-old cat down in room he has been having depression.  This morning she had a flat tire alarm in her car.  She is upcoming cataract consultation on 9/10/2024.  She would also like me to look in her ears.      Past Medical History:   Diagnosis Date    Age-related physical debility 2019    Anxiety     B12 deficiency 2017    C2 cervical fracture ([facility]) 09/10/2019    Cellulitis and abscess of lower extremity     Chronic ulcer of left leg, limited to breakdown of skin ([facility]) 10/07/2019    Dental disorder     full dentures    Generalized osteoarthritis of multiple sites 10/20/2015    Hardware complicating wound infection ([facility]) 2019    Heart valve disease     pt not sure     Hyperlipidemia     Hypertension     MRSA (methicillin resistant staph aureus) culture positive 2018    Osteoporosis     Positive FIT (fecal immunochemical test) 2022    S/P femoral-popliteal bypass surgery 2019    Dr crum 2018    VRE (vancomycin resistant enterococcus) culture positive 2019       Social History     Tobacco Use    Smoking status: Former     Current packs/day: 0.00     Average packs/day: 0.5 packs/day for 25.0 years (12.5 ttl pk-yrs)     Types: Cigarettes     Start date: 1982     Quit date: 2007     Years since quittin.6    Smokeless tobacco: Never    Tobacco comments:     Ex-smoker,  Quit ~  (prior hx ~ 1ppd x 25 years)   Vaping Use    Vaping status: Never Used   Substance Use Topics    Alcohol use: Not Currently     Alcohol/week: 0.0 oz     Comment: stopped ~  2019   (unclear about prior use).     Drug use: No       Current Outpatient Medications Ordered in Epic   Medication Sig Dispense Refill    sertraline (ZOLOFT) 100 MG Tab TAKE 1 TABLET BY MOUTH EVERY DAY 90 Tablet 3    metoprolol SR (TOPROL XL) 100 MG TABLET SR 24 HR TAKE 1 TABLET BY MOUTH EVERY  Tablet 3    lisinopril (PRINIVIL) 30 MG tablet TAKE 1 TABLET BY MOUTH EVERY  Tablet 3    doxycycline (VIBRAMYCIN) 100 MG Tab TAKE 1 TABLET BY MOUTH EVERY DAY. TAKE 1 TABLET BY MOUTH TWICE A DAY (THROUGH INFECTIOUS DISEASE ?) 60 Tablet 11    Ferrous Sulfate (IRON PO) Take  by mouth.      vitamin D (CHOLECALCIFEROL) 1000 Unit (25 mcg) Tab Take 1,000 Units by mouth every day.      acetaminophen (TYLENOL) 325 MG Tab Take 2 Tabs by mouth every 6 hours as needed (Mild Pain; (Pain scale 1-3); Temp greater than 100.5 F). (Patient taking differently: Take 650 mg by mouth every four hours as needed (Mild Pain; (Pain scale 1-3); Temp greater than 100.5 F).) 30 Tab 0    Multiple Vitamins-Minerals (THERAPEUTIC-M/LUTEIN) Tab Take 1 Tab by mouth every day.  0     No current [facility]-ordered facility-administered medications on file.       Allergies:  Bactrim [sulfamethoxazole-trimethoprim], Meropenem, and Oxycodone    Health Maintenance: Reviewed      Objective:     Vital signs reviewed  Exam:  BP (!) 142/82 (BP Location: Right arm, Patient Position: Sitting)  There is no height or weight on file to calculate BMI.    General: Normal appearing. No distress.  HENT: Left ear canal with excessive cerumen.  Lighted curette was used by me to remove cerumen, patient tolerated well and left TM pearly gray.  Right ear canal with excessive cerumen.  I attempted to remove with lighted curette but was unable to completely removed.  Right ear was irrigated by the MA and was able to successfully remove excessive cerumen.  Right TM pearly gray with slight bleeding after cerumen removal.  She does wear bilateral hearing aids.  Eyes: Wears prescription  glasses  Pulmonary: Clear to ausculation.  Normal effort. No rales, ronchi, or wheezing.  Cardiovascular: Regular rate and rhythm with systolic murmur.       Assessment & Plan:     78 y.o. female with the following -     1. Essential hypertension  Chronic exacerbated problem.  She has been having more stress today which I suspect is reflecting in her blood pressure as home blood pressures are at goal.  Continue lisinopril 30 mg daily and metoprolol 100 mg daily.  Continue home blood pressure monitoring.    2. Excessive cerumen in ear canal, left  Acute uncomplicated problem.  Left ear cerumen removed by me with lighted curette.  Patient tolerated well.    3. Excessive cerumen in ear canal, right  Acute complicated problem.  Right ear cerumen removed by MA with irrigation.  - Ear Irrigation (MA Only); Future      Return in about 3 months (around 11/28/2024) for Chronic conditions.    Please note that this dictation was created using voice recognition software. I have made every reasonable attempt to correct obvious errors, but I expect that there are errors of grammar and possibly content that I did not discover before finalizing the note.

## 2024-10-03 NOTE — PROCEDURES
October 3, 2024      [id number]  [address]  [facility] [address]  Phone: [phone]       Patient: Mary Naranjo   YOB: 1968  Date of Visit: 10/03/2024    To Whom It May Concern:    Sheila Naranjo  was at [facility] on 10/03/2024. The patient may return to work/school on 10/3/2024 with no restrictions. If you have any questions or concerns, or if I can be of further assistance, please do not hesitate to contact me.    Sincerely,    JUANITA DaigleM       CSWD to LLE Medial (Proximal Wound) after 2% lidocaine dwell time. Approximately 2cm2 of adherent yellow tissue removed. Patient tolerated well.    -----------    Patient is taking 40mg of prednisone daily and discussed the option of applying Vitamin A to wound bed to counteract the effects of steroids on wound healing, will discuss with Dr. Colin.

## 2024-10-05 DIAGNOSIS — I10 ESSENTIAL HYPERTENSION: ICD-10-CM

## 2024-10-07 RX ORDER — LISINOPRIL 30 MG/1
30 TABLET ORAL DAILY
Qty: 100 TABLET | Refills: 0 | Status: SHIPPED | OUTPATIENT
Start: 2024-10-07
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
## 2024-11-20 ENCOUNTER — APPOINTMENT (OUTPATIENT)
Dept: MEDICAL GROUP | Facility: PHYSICIAN GROUP | Age: 79
End: 2024-11-20
Payer: MEDICARE

## 2024-11-20 DIAGNOSIS — I10 ESSENTIAL HYPERTENSION: ICD-10-CM

## 2024-11-20 RX ORDER — METOPROLOL SUCCINATE 100 MG/1
100 TABLET, EXTENDED RELEASE ORAL
Qty: 100 TABLET | Refills: 3 | Status: SHIPPED | OUTPATIENT
Start: 2024-11-20

## 2024-11-20 NOTE — TELEPHONE ENCOUNTER
Received request via: Pharmacy    Was the patient seen in the last year in this department? Yes    Does the patient have an active prescription (recently filled or refills available) for medication(s) requested? No    [facility]    Does the patient have [facility] and need 100-day supply? (This applies to ALL medications) Yes, quantity updated to 100 days

## 2024-12-12 ENCOUNTER — OFFICE VISIT (OUTPATIENT)
Dept: MEDICAL GROUP | Facility: PHYSICIAN GROUP | Age: 79
End: 2024-12-12
Payer: MEDICARE

## 2024-12-12 VITALS
WEIGHT: 139 LBS | DIASTOLIC BLOOD PRESSURE: 84 MMHG | TEMPERATURE: 98 F | HEART RATE: 69 BPM | HEIGHT: 61 IN | OXYGEN SATURATION: 96 % | BODY MASS INDEX: 26.24 KG/M2 | SYSTOLIC BLOOD PRESSURE: 138 MMHG

## 2024-12-12 DIAGNOSIS — I10 ESSENTIAL HYPERTENSION: ICD-10-CM

## 2024-12-12 DIAGNOSIS — Z98.49 STATUS POST CATARACT EXTRACTION, UNSPECIFIED LATERALITY: ICD-10-CM

## 2024-12-12 PROCEDURE — 3079F DIAST BP 80-89 MM HG: CPT | Performed by: NURSE PRACTITIONER

## 2024-12-12 PROCEDURE — 3075F SYST BP GE 130 - 139MM HG: CPT | Performed by: NURSE PRACTITIONER

## 2024-12-12 PROCEDURE — 99213 OFFICE O/P EST LOW 20 MIN: CPT | Performed by: NURSE PRACTITIONER

## 2024-12-12 ASSESSMENT — FIBROSIS 4 INDEX: FIB4 SCORE: 1.58

## 2024-12-12 NOTE — PROGRESS NOTES
Subjective:     CC: Follow-up    HPI:   Nadege presents today with the following:    She had bilateral cataracts surgery completed. Her vision is improved.   She has optometry appointment after [place].     Essential hypertension  BP today 160/84. Continues metoprolol  mg daily and lisinopril 30 mg daily. She has been having more appointments with recent cataract surgery. Home 's/70's. Denies chest pain, shortness of breath or dizziness. Repeat /84.      Past Medical History:   Diagnosis Date    Age-related physical debility 2019    Anxiety     B12 deficiency 2017    C2 cervical fracture ([facility]) 09/10/2019    Cellulitis and abscess of lower extremity     Chronic ulcer of left leg, limited to breakdown of skin ([facility]) 10/07/2019    Dental disorder     full dentures    Generalized osteoarthritis of multiple sites 10/20/2015    Hardware complicating wound infection ([facility]) 2019    Heart valve disease     pt not sure     Hyperlipidemia     Hypertension     MRSA (methicillin resistant staph aureus) culture positive 2018    Osteoporosis     Positive FIT (fecal immunochemical test) 2022    S/P femoral-popliteal bypass surgery 2019    Dr crum 2018    VRE (vancomycin resistant enterococcus) culture positive 2019       Social History     Tobacco Use    Smoking status: Former     Current packs/day: 0.00     Average packs/day: 0.5 packs/day for 25.0 years (12.5 ttl pk-yrs)     Types: Cigarettes     Start date: 1982     Quit date: 2007     Years since quittin.9    Smokeless tobacco: Never    Tobacco comments:     Ex-smoker,  Quit ~  (prior hx ~ 1ppd x 25 years)   Vaping Use    Vaping status: Never Used   Substance Use Topics    Alcohol use: Not Currently     Alcohol/week: 0.0 oz     Comment: stopped ~  2019  (unclear about prior use).     Drug use: No       Current Outpatient Medications Ordered in Epic   Medication Sig Dispense Refill    metoprolol SR  "(TOPROL XL) 100 MG TABLET SR 24 HR TAKE 1 TABLET BY MOUTH EVERY  Tablet 3    lisinopril (PRINIVIL) 30 MG tablet TAKE 1 TABLET BY MOUTH EVERY  Tablet 0    doxycycline (VIBRAMYCIN) 100 MG Tab TAKE 1 TABLET BY MOUTH EVERY DAY. TAKE 1 TABLET BY MOUTH TWICE A DAY (THROUGH INFECTIOUS DISEASE ?) 60 Tablet 11    sertraline (ZOLOFT) 100 MG Tab TAKE 1 TABLET BY MOUTH EVERY DAY 90 Tablet 3    Ferrous Sulfate (IRON PO) Take  by mouth.      vitamin D (CHOLECALCIFEROL) 1000 Unit (25 mcg) Tab Take 1,000 Units by mouth every day.      acetaminophen (TYLENOL) 325 MG Tab Take 2 Tabs by mouth every 6 hours as needed (Mild Pain; (Pain scale 1-3); Temp greater than 100.5 F). (Patient taking differently: Take 650 mg by mouth every four hours as needed (Mild Pain; (Pain scale 1-3); Temp greater than 100.5 F).) 30 Tab 0    Multiple Vitamins-Minerals (THERAPEUTIC-M/LUTEIN) Tab Take 1 Tab by mouth every day.  0     No current [facility]-ordered facility-administered medications on file.       Allergies:  Bactrim [sulfamethoxazole-trimethoprim], Meropenem, and Oxycodone    Health Maintenance: Due for second dose shingles       Objective:     Vital signs reviewed  Exam:  /84 (BP Location: Left arm, Patient Position: Sitting)   Pulse 69   Temp 36.7 °C (98 °F) (Temporal)   Ht 1.549 m (5' 1\")   Wt 63 kg (139 lb)   SpO2 96%   BMI 26.26 kg/m²  Body mass index is 26.26 kg/m².    Gen: Alert and oriented, No apparent distress.  Lungs: Normal effort, CTA bilaterally, no wheezes, rhonchi, or rales  CV: Regular rate and rhythm with systolic murmur. No rubs or gallops.    Assessment & Plan:     79 y.o. female with the following -     1. Essential hypertension  Chronic stable problem.  Continue home BP monitoring.  Continue metoprolol  mg daily and lisinopril 30 mg daily.    2. Status post cataract extraction, unspecified laterality  Acute uncomplicated problem.  Continue with upcoming optometry appointment.      Return in about 3 " months (around 3/12/2025) for Hypertension.    Please note that this dictation was created using voice recognition software. I have made every reasonable attempt to correct obvious errors, but I expect that there are errors of grammar and possibly content that I did not discover before finalizing the note.

## 2024-12-12 NOTE — ASSESSMENT & PLAN NOTE
BP today 160/84. Continues metoprolol  mg daily and lisinopril 30 mg daily. She has been having more appointments with recent cataract surgery. Home 's/70's. Denies chest pain, shortness of breath or dizziness. Repeat /84.

## 2024-12-24 NOTE — PROGRESS NOTES
Initial Provider Assessment          Asked to assess pt with clinician. Patient seen in collaboration with wound care clinician, Cordelia Jesus RN regarding positive wound cx to L medial calf. Pt also with new complaint of pus coming from L hip incision.        HISTORY OF PRESENT ILLNESS: 71 y/o female with HTN, hyperlipidemia, arterial insufficiency. Presents with L medial calf ulcer has been present for several months. Started wound care 5/8/18. L distal medial calf ulcer with erythema, edema to periwound. Heavy amount of green drainage on dressing and green tinged slough to wound bed. Wound cx positive for pseudomonas. She is wearing a walking boot for a fractured L ankle that happened recently but could not tell me exactly how long ago. She also had arterial studies completed 5/28/18 and show TOMI on RLE of 0.92 and LLE TOMI of 0.7. She has 50-75% stenosis on RLE and SFA occlusion on LLE     She fell in December 2017 and fractured left intertrochanteric femur. It was surgically treated with intramedullary device by Dr. Peters. 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 She underwent left total hip arthroplasty with removal of hardware by Dr. Vazquez on 2/11/18. She noticed her leg was wet last night and has asked for incision to be evaluated. She noticed pus coming from leg incision last night with increased erythema and edema.       PAST MEDICAL HISTORY:   Past Medical History:   Diagnosis Date   • Anxiety    • Dental disorder     full dentures   • Generalized osteoarthritis of multiple sites 10/20/2015   • Heart valve disease     pt not sure    • Hyperlipidemia    • Hypertension    • Osteoporosis        PAST SURGICAL HISTORY:   Past Surgical History:   Procedure Laterality Date   • HIP REVISION TOTAL Left 2/11/2018    Procedure: HIP REVISION TOTAL- femoral nail removal conversion to total hip arthoroplasty;  Surgeon: Chong Vazquez M.D.;   Medication:     sertraline (ZOLOFT) 100 MG tablet Take 1 tablet by mouth daily.    passed protocol.   Last office visit date: 10/9/24  Next appointment scheduled?: Yes   Number of refills given: 0   Location: SURGERY [facility];  Service: Orthopedics   • HIP NAILING INTRAMEDULLARY Left 12/20/2017    Procedure: HIP NAILING INTRAMEDULLARY;  Surgeon: Jorge Peters M.D.;  Location: SURGERY [facility];  Service: Orthopedics   • BREAST BIOPSY  8/28/2014    Performed by Magdalena Londono M.D. at SURGERY [facility]   • BREAST BIOPSY Right 8/14    benign   • GYN SURGERY  1973    complete hysterectomy   • ABDOMINAL HYSTERECTOMY TOTAL      w/BSO due to uterine cyst and endometriosis        MEDICATIONS:   Current Outpatient Prescriptions   Medication   • ciprofloxacin (CIPRO) 500 MG Tab   • amoxicillin (AMOXIL) 500 MG Cap   • pentoxifylline CR (TRENTAL) 400 MG CR tablet   • metoprolol SR (TOPROL XL) 100 MG TABLET SR 24 HR   • sertraline (ZOLOFT) 100 MG Tab   • sertraline (ZOLOFT) 100 MG Tab   • Cholecalciferol (VITAMIN D) 2000 UNIT Tab   • therapeutic multivitamin-minerals (THERAGRAN-M) TABS     No current facility-administered medications for this visit.        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  [employer]     Social History Main Topics   • Smoking status: Former Smoker     Packs/day: 0.50     Years: 25.00     Types: Cigarettes     Quit date: 1/1/2007   • Smokeless tobacco: Never Used   • Alcohol use No   • Drug use: No   • Sexual activity: Not Currently     Partners: Male     Other Topics Concern   • Not on file     Social History Narrative   • No narrative on file          Fall Risk Assessment (jeronimo all that apply with an X):   x65 years or older     xFall within the last 2 years, uses   xAmbulatory devices  Loss of protective sensation in feet,   Use of prostethic/orthotic, years    Presence of lower extremity/foot/toe amputation   xTaking medication that increases risk (per facility  policy)      REVIEW OF SYSTEMS:   Review of Systems   Constitutional: Negative for chills, diaphoresis and fever.   HENT: Negative.    Eyes: Negative.    Respiratory: Negative for cough, shortness of breath and wheezing.    Cardiovascular: Negative for chest pain, palpitations and claudication.   Gastrointestinal: Negative for nausea and vomiting.   Musculoskeletal: Negative for back pain, falls and joint pain.   Skin: Negative for itching and rash.   Neurological: Negative for weakness.   Psychiatric/Behavioral: Negative for depression. The patient is not nervous/anxious.        PHYSICAL EXAMINATION:     Physical Exam   Constitutional: She is oriented to person, place, and time and well-developed, well-nourished, and in no distress.   Eyes: Pupils are equal, round, and reactive to light.   Neck: Normal range of motion. Neck supple.   Cardiovascular: Normal rate.    Pulmonary/Chest: Effort normal.   Abdominal: Soft.   Neurological: She is alert and oriented to person, place, and time.   Skin: There is erythema.   L hip incision: indurated, hot, erythema, edema, no odor. Purulent serosang drainage expressed    L medial calf ulcer: slough 90%, green drainage, periwound erythema, edema, no odor.    Psychiatric: Mood, memory and affect normal.             PROCEDURE: wound care completed by Cordelia SWANN    PATIENT EDUCATION  D/w pt POC, seriousness of infection to L hip with recent arthroplasty and wound infection to L lower leg ulcer.     ASSESSMENT AND PLAN:     ICD-10-CM   1. Open wound of lower leg with complication, left, initial encounter S81.802A   2. Infected wound T14.8XXA    L08.9   3. Peripheral arterial disease ([facility]) I73.9   4. 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 Pt taught daily dressing changes to decrease excessive drainage on skin, promote dry wound environment.   - Follow wound cx for L hip. Wound  care completed for L hip dehisced incision. Notified surgeons Dr. Peters and Dr. Vazquez. Dr. Vazquez to see pt tomorrow  At [facility] and will order imaging as appropriate  -Continue to be seen in wound clinic 2x/week for L calf wound, L hip wound unless POC changes after seeing surgeon.  -Pt advised to go to ER for any increased redness, swelling, drainage or odor, or if pt develops fever, chills, nausea or vomiting.  -ID referral in place already. Pt saw ID in hospital in Dec 2017. Contacted ID Cordelia PERDUE, updated POC.   -Arterial study results reviewed. L SFA occlusion. Dr. Colin aware, will review studies

## 2025-01-16 DIAGNOSIS — I10 ESSENTIAL HYPERTENSION: ICD-10-CM

## 2025-01-16 RX ORDER — LISINOPRIL 30 MG/1
30 TABLET ORAL DAILY
Qty: 100 TABLET | Refills: 3 | Status: SHIPPED | OUTPATIENT
Start: 2025-01-16

## 2025-01-16 NOTE — TELEPHONE ENCOUNTER
Received request via: Pharmacy    Was the patient seen in the last year in this department? Yes    Does the patient have an active prescription (recently filled or refills available) for medication(s) requested? No    [facility]    Does the patient have [facility] Plus and need 100-day supply? (This applies to ALL medications) Yes, quantity updated to 100 days

## 2025-03-12 ENCOUNTER — OFFICE VISIT (OUTPATIENT)
Dept: MEDICAL GROUP | Facility: PHYSICIAN GROUP | Age: 80
End: 2025-03-12
Payer: MEDICARE

## 2025-03-12 VITALS
SYSTOLIC BLOOD PRESSURE: 126 MMHG | BODY MASS INDEX: 25.93 KG/M2 | OXYGEN SATURATION: 97 % | WEIGHT: 137.35 LBS | DIASTOLIC BLOOD PRESSURE: 72 MMHG | TEMPERATURE: 98.4 F | HEART RATE: 72 BPM | HEIGHT: 61 IN

## 2025-03-12 DIAGNOSIS — I10 ESSENTIAL HYPERTENSION: ICD-10-CM

## 2025-03-12 DIAGNOSIS — L97.921 NON-PRESSURE CHRONIC ULCER OF UNSPECIFIED PART OF LEFT LOWER LEG LIMITED TO BREAKDOWN OF SKIN (HCC): ICD-10-CM

## 2025-03-12 DIAGNOSIS — F10.21 ALCOHOL DEPENDENCE, IN REMISSION (HCC): ICD-10-CM

## 2025-03-12 PROCEDURE — 3074F SYST BP LT 130 MM HG: CPT | Performed by: NURSE PRACTITIONER

## 2025-03-12 PROCEDURE — 99214 OFFICE O/P EST MOD 30 MIN: CPT | Performed by: NURSE PRACTITIONER

## 2025-03-12 PROCEDURE — 3078F DIAST BP <80 MM HG: CPT | Performed by: NURSE PRACTITIONER

## 2025-03-12 ASSESSMENT — PATIENT HEALTH QUESTIONNAIRE - PHQ9: CLINICAL INTERPRETATION OF PHQ2 SCORE: 0

## 2025-03-12 ASSESSMENT — FIBROSIS 4 INDEX: FIB4 SCORE: 1.58

## 2025-03-12 NOTE — ASSESSMENT & PLAN NOTE
Initial BP today 156/80.  Repeat /72. Home 's/70's. Continues lisinopril 30 mg daily and metoprolol  mg daily. No chest pain, shortness of breath or dizziness.  Her eyes are improved after her cataract surgery.  She is now wearing reading glasses.

## 2025-03-12 NOTE — PROGRESS NOTES
Subjective:     CC: Hypertension follow-up    HPI:   Nadege presents today with the following:    Essential hypertension  Initial BP today 156/80.  Repeat /72. Home 's/70's. Continues lisinopril 30 mg daily and metoprolol  mg daily. No chest pain, shortness of breath or dizziness.  Her eyes are improved after her cataract surgery.  She is now wearing reading glasses.      Past Medical History:   Diagnosis Date    Age-related physical debility 2019    Anxiety     B12 deficiency 2017    C2 cervical fracture ([facility]) 09/10/2019    Cellulitis and abscess of lower extremity     Chronic ulcer of left leg, limited to breakdown of skin ([facility]) 10/07/2019    Dental disorder     full dentures    Generalized osteoarthritis of multiple sites 10/20/2015    Hardware complicating wound infection ([facility]) 2019    Heart valve disease     pt not sure     Hyperlipidemia     Hypertension     MRSA (methicillin resistant staph aureus) culture positive 2018    Osteoporosis     Positive FIT (fecal immunochemical test) 2022    S/P femoral-popliteal bypass surgery 2019    Dr crum     VRE (vancomycin resistant enterococcus) culture positive 2019       Social History     Tobacco Use    Smoking status: Former     Current packs/day: 0.00     Average packs/day: 0.5 packs/day for 25.0 years (12.5 ttl pk-yrs)     Types: Cigarettes     Start date: 1982     Quit date: 2007     Years since quittin.2    Smokeless tobacco: Never    Tobacco comments:     Ex-smoker,  Quit ~  (prior hx ~ 1ppd x 25 years)   Vaping Use    Vaping status: Never Used   Substance Use Topics    Alcohol use: Not Currently     Alcohol/week: 0.0 oz     Comment: stopped ~  2019  (unclear about prior use).     Drug use: No       Current Outpatient Medications Ordered in Epic   Medication Sig Dispense Refill    lisinopril (PRINIVIL) 30 MG tablet TAKE 1 TABLET BY MOUTH EVERY  Tablet 3    metoprolol SR  "(TOPROL XL) 100 MG TABLET SR 24 HR TAKE 1 TABLET BY MOUTH EVERY  Tablet 3    doxycycline (VIBRAMYCIN) 100 MG Tab TAKE 1 TABLET BY MOUTH EVERY DAY. TAKE 1 TABLET BY MOUTH TWICE A DAY (THROUGH INFECTIOUS DISEASE ?) 60 Tablet 11    sertraline (ZOLOFT) 100 MG Tab TAKE 1 TABLET BY MOUTH EVERY DAY 90 Tablet 3    Ferrous Sulfate (IRON PO) Take  by mouth.      vitamin D (CHOLECALCIFEROL) 1000 Unit (25 mcg) Tab Take 1,000 Units by mouth every day.      acetaminophen (TYLENOL) 325 MG Tab Take 2 Tabs by mouth every 6 hours as needed (Mild Pain; (Pain scale 1-3); Temp greater than 100.5 F). (Patient taking differently: Take 650 mg by mouth every four hours as needed (Mild Pain; (Pain scale 1-3); Temp greater than 100.5 F).) 30 Tab 0    Multiple Vitamins-Minerals (THERAPEUTIC-M/LUTEIN) Tab Take 1 Tab by mouth every day.  0     No current [facility]-ordered facility-administered medications on file.       Allergies:  Bactrim [sulfamethoxazole-trimethoprim], Meropenem, and Oxycodone    Health Maintenance: Completed      Objective:     Vital signs reviewed  Exam:  /72 (BP Location: Left arm, Patient Position: Sitting)   Pulse 72   Temp 36.9 °C (98.4 °F) (Temporal)   Ht 1.549 m (5' 1\")   Wt 62.3 kg (137 lb 5.6 oz)   SpO2 97%   BMI 25.95 kg/m²  Body mass index is 25.95 kg/m².    Gen: Alert and oriented, No apparent distress.  Eyes:   Lids normal. Glasses in place.   Lungs: Normal effort, CTA bilaterally, no wheezes, rhonchi, or rales  CV: Regular rate and rhythm with systolic murmur. No rubs or gallops.      Assessment & Plan:     79 y.o. female with the following -     1. Essential hypertension  Chronic stable problem.  Continue lisinopril 30 mg daily and metoprolol  mg daily.  Continue home BP monitoring.    2. Non-pressure chronic ulcer of unspecified part of left lower leg limited to breakdown of skin (HCC)  Chronic stable problem.  Reports ulcer is healing.  She would like referral back to ID.  Continue " doxycycline 100 mg daily.  - Referral to Infectious Disease    3. Alcohol dependence, in remission (HCC)  Chronic stable problem.  Continue with cessation.    HCC Gap Form    Diagnosis to address: F10.21 - Alcohol dependence, in remission (HCC)  Assessment and plan: Chronic, stable. Continue with current defined treatment plan: in remission. Follow-up at least annually.  Diagnosis: L97.921 - Non-pressure chronic ulcer of unspecified part of left lower leg limited to breakdown of skin (HCC)  Assessment and plan: Chronic, stable. Continue with current defined treatment plan: doxycycline 100 mg daily. She has followed up with ID. Follow-up at least annually.  Last edited 03/12/25 12:05 PDT by JACLYN Hardy.         Return in about 3 months (around 6/12/2025) for annual wellness visit.    Please note that this dictation was created using voice recognition software. I have made every reasonable attempt to correct obvious errors, but I expect that there are errors of grammar and possibly content that I did not discover before finalizing the note.

## 2025-03-20 ENCOUNTER — PATIENT MESSAGE (OUTPATIENT)
Dept: HEALTH INFORMATION MANAGEMENT | Facility: OTHER | Age: 80
End: 2025-03-20

## 2025-03-27 ENCOUNTER — TELEPHONE (OUTPATIENT)
Dept: INFECTIOUS DISEASES | Facility: MEDICAL CENTER | Age: 80
End: 2025-03-27
Payer: MEDICARE

## 2025-03-27 NOTE — TELEPHONE ENCOUNTER
ID referral triaged and approved, called pt to [facility] w APRN, pt did not answer and left voicemail

## 2025-04-03 NOTE — TELEPHONE ENCOUNTER
Attempted to call pt to schedule [facility] w APRN, pt answered my call and hung up.   I attempted to call pt once more and received voicemail. Left voicemail informing pt that we have called her 3 times and were unsuccessful in reaching her to schedule [facility] and will be closing her referral.

## 2025-04-10 ENCOUNTER — OFFICE VISIT (OUTPATIENT)
Dept: INFECTIOUS DISEASES | Facility: MEDICAL CENTER | Age: 80
End: 2025-04-10
Attending: NURSE PRACTITIONER
Payer: MEDICARE

## 2025-04-10 VITALS
SYSTOLIC BLOOD PRESSURE: 140 MMHG | WEIGHT: 135.38 LBS | RESPIRATION RATE: 18 BRPM | DIASTOLIC BLOOD PRESSURE: 94 MMHG | HEIGHT: 61 IN | OXYGEN SATURATION: 97 % | BODY MASS INDEX: 25.56 KG/M2 | HEART RATE: 85 BPM | TEMPERATURE: 97 F

## 2025-04-10 DIAGNOSIS — L97.921 NON-PRESSURE CHRONIC ULCER OF UNSPECIFIED PART OF LEFT LOWER LEG LIMITED TO BREAKDOWN OF SKIN (HCC): ICD-10-CM

## 2025-04-10 DIAGNOSIS — Z87.81 HISTORY OF HIP FRACTURE: Chronic | ICD-10-CM

## 2025-04-10 DIAGNOSIS — T84.7XXA HARDWARE COMPLICATING WOUND INFECTION, INITIAL ENCOUNTER (HCC): ICD-10-CM

## 2025-04-10 DIAGNOSIS — A49.01 MSSA (METHICILLIN SUSCEPTIBLE STAPHYLOCOCCUS AUREUS) INFECTION: ICD-10-CM

## 2025-04-10 DIAGNOSIS — Z22.322 MRSA (METHICILLIN RESISTANT STAPH AUREUS) CULTURE POSITIVE: ICD-10-CM

## 2025-04-10 PROCEDURE — 99212 OFFICE O/P EST SF 10 MIN: CPT | Performed by: NURSE PRACTITIONER

## 2025-04-10 PROCEDURE — 99204 OFFICE O/P NEW MOD 45 MIN: CPT | Performed by: NURSE PRACTITIONER

## 2025-04-10 PROCEDURE — 3077F SYST BP >= 140 MM HG: CPT | Performed by: NURSE PRACTITIONER

## 2025-04-10 PROCEDURE — 3080F DIAST BP >= 90 MM HG: CPT | Performed by: NURSE PRACTITIONER

## 2025-04-10 ASSESSMENT — ENCOUNTER SYMPTOMS
FOCAL WEAKNESS: 0
SHORTNESS OF BREATH: 0
BLURRED VISION: 0
PALPITATIONS: 0
CHILLS: 0
COUGH: 0
DIARRHEA: 0
NERVOUS/ANXIOUS: 0
BRUISES/BLEEDS EASILY: 0
DIZZINESS: 0
MYALGIAS: 0
HEADACHES: 0
CONSTIPATION: 0
VOMITING: 0
ABDOMINAL PAIN: 0
DOUBLE VISION: 0
WHEEZING: 0
SPUTUM PRODUCTION: 0
NAUSEA: 0
WEIGHT LOSS: 0
FEVER: 0

## 2025-04-10 ASSESSMENT — FIBROSIS 4 INDEX: FIB4 SCORE: 1.58

## 2025-04-10 NOTE — PROGRESS NOTES
INFECTIOUS  DISEASE  OUTPATIENT CLINIC  NOTE   Subjective   Primary care provider: RYANN Hardy.     Reason for Follow Up:   Follow-up for   1. History of hip fracture        2. Hardware complicating wound infection, initial encounter ([facility])        3. MRSA (methicillin resistant staph aureus) culture positive        4. MSSA (methicillin susceptible Staphylococcus aureus) infection        5. Non-pressure chronic ulcer of unspecified part of left lower leg limited to breakdown of skin ([facility])  Referral to Wound Clinic          HPI: New patient: Non-pressure chronic ulcer of unspecified part of left lower leg limited to breakdown of skin.  Known to ID team.  Previously seen 2 years prior  Nadege Mae is a 79 y.o. female with a history of anxiety, multiple recurrences of cellulitis and abscess to the left lower extremity, osteoarthritis, HLD, HTN, femoral/popliteal bypass 2018, PVD.  Patient seen by ID team 2 years prior after a fall  in September 2019 resulting in a C2 fracture, admitted in early October 2019 for drainage from her left hip wound.  Prior cultures MSSA and MRSA. S/p revision of a left total hip arthroplasty with exchange of head, liner and proximal stem body by Dr. Vazquez on 10/9/2019.  There was a sinus tract all the way down to the hip with significant purulence noted intraoperatively.  Operative cultures grew a single colony of diphtheroids and MSSA.  Patient was transferred to a skilled nursing facility on vancomycin and rifampin for a planned 6-week course from surgery, completed IV abx on 11/20/2019.  Started on chronic suppressive p.o. doxycycline. Patient states that she developed a superficial wound on the medial malleoli or of her left ankle. It has only been draining serous fluid but patient states that the wound has been improving.  She is recommended to continue on doxycycline at that time and continue wound care.    04/10/25- Today Patient reports feeling well.   Patient has been on lifelong suppression with p.o. 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  She would prefer her orthopedic surgeon continue prescribing this medication and declined ID team refills. Patient reports that she had a chronic wound on her left anterior ankle since 2019 which she was treating on her own that had developed poor tissue quality across the wound site.  She reported that a significant scab/slough had developed across the wound site but never had fully healed and then most recently fell off when removing her sock.  She denies any surrounding erythema, swelling or drainage.  Denies any fevers, chills, nausea, vomiting, diarrhea.  She has been told in the past that she has 3 ankle fractures but due to her chronic wounds and comorbidities she was high risk for surgery so her bones healed in an abnormal position resulting in abduction and some mild inversion of her ankle.  Ankle wound is an area without pressure.  Denies drainage, pungent odor, redness, pain. Denies feeling generally ill, fevers/chills, general malaise, headache, n/v/d.  Wound site without any signs of infection today.  Discussed case with wound care IRON Snyder.  Referral to [facility] wound clinic     Current Antimicrobials: Doxycycline 100 mg twice daily, lifelong suppression (prescribed by orthopedic surgery)  Previous Antimicrobials:    Other Current Medications:  Home Medications    Medication Sig Taking? Last Dose Authorizing Provider   Nutritional Supplements (CLICK ESPRESSO PROTEIN DRINK PO) Take  by mouth. Yes Taking Physician Outpatient   lisinopril (PRINIVIL) 30 MG tablet TAKE 1 TABLET BY MOUTH EVERY DAY Yes Taking RYANN Hardy   metoprolol SR (TOPROL XL) 100 MG TABLET SR 24 HR TAKE 1 TABLET BY MOUTH EVERY DAY Yes Taking Gala Witt M.D.   doxycycline (VIBRAMYCIN) 100 MG Tab TAKE 1 TABLET BY MOUTH EVERY  DAY. TAKE 1 TABLET BY MOUTH TWICE A DAY (THROUGH INFECTIOUS DISEASE ?) Yes Taking Mejia Juan P.A.-C.   sertraline (ZOLOFT) 100 MG Tab TAKE 1 TABLET BY MOUTH EVERY DAY Yes Taking RYANN Hardy   Ferrous Sulfate (IRON PO) Take  by mouth. Yes Taking Physician Outpatient   vitamin D (CHOLECALCIFEROL) 1000 Unit (25 mcg) Tab Take 1,000 Units by mouth every day. Yes Taking Physician Outpatient   acetaminophen (TYLENOL) 325 MG Tab Take 2 Tabs by mouth every 6 hours as needed (Mild Pain; (Pain scale 1-3); Temp greater than 100.5 F).  Patient taking differently: Take 650 mg by mouth every four hours as needed (Mild Pain; (Pain scale 1-3); Temp greater than 100.5 F). Yes Taking Tino Delgado M.D.        PMH:  Past Medical History:   Diagnosis Date    Age-related physical debility 09/02/2019    Anxiety     B12 deficiency 12/20/2017    C2 cervical fracture ([facility]) 09/10/2019    Cellulitis and abscess of lower extremity     Chronic ulcer of left leg, limited to breakdown of skin ([facility]) 10/07/2019    Dental disorder     full dentures    Generalized osteoarthritis of multiple sites 10/20/2015    Hardware complicating wound infection ([facility]) 12/02/2019    Heart valve disease     pt not sure     Hyperlipidemia     Hypertension     MRSA (methicillin resistant staph aureus) culture positive 05/31/2018    Osteoporosis     Positive FIT (fecal immunochemical test) 02/17/2022    S/P femoral-popliteal bypass surgery 06/12/2019    Dr colin 2018    VRE (vancomycin resistant enterococcus) culture positive 03/17/2019     Past Surgical History:   Procedure Laterality Date    HIP REVISION TOTAL Left 10/9/2019    Procedure: REVISION, TOTAL ARTHROPLASTY, HIP;  Surgeon: Chong Vazquez M.D.;  Location: SURGERY [facility];  Service: Orthopedics    FEMORAL POPLITEAL BYPASS Left 6/8/2018    Procedure: FEMORAL POPLITEAL BYPASS;  Surgeon: Milana Colin M.D.;  Location: SURGERY [facility];  Service: General    WOUND  IRRIGATION & DEBRIDEMENT Left 2018    Procedure: IRRIGATION & DEBRIDEMENT ANKLE WOUND;  Surgeon: Milana Colin M.D.;  Location: SURGERY [facility];  Service: General    IRRIGATION & DEBRIDEMENT HIP Left 2018    Procedure: IRRIGATION & DEBRIDEMENT HIP;  Surgeon: Chong Vazquez M.D.;  Location: SURGERY [facility];  Service: Orthopedics    HIP REVISION TOTAL Left 2018    Procedure: HIP REVISION TOTAL- femoral nail removal conversion to total hip arthoroplasty;  Surgeon: Cohng Vazquez M.D.;  Location: SURGERY [facility];  Service: Orthopedics    HIP NAILING INTRAMEDULLARY Left 2017    Procedure: HIP NAILING INTRAMEDULLARY;  Surgeon: Jorge Peters M.D.;  Location: SURGERY [facility];  Service: Orthopedics    BREAST BIOPSY  2014    Performed by Magdalena Londono M.D. at SURGERY [facility]    BREAST BIOPSY Right     benign    GYN SURGERY  1973    complete hysterectomy    ABDOMINAL HYSTERECTOMY TOTAL      w/BSO due to uterine cyst and endometriosis    ARTHROPLASTY      hip     Family History   Problem Relation Age of Onset    GI Disease Mother         colostomy    Heart Attack Father     Diabetes Father     Hypertension Father     Psychiatric Illness Brother         bipolar disorder     Social History     Socioeconomic History    Marital status: Single     Spouse name: Not on file    Number of children: 1    Years of education: Not on file    Highest education level: Not on file   Occupational History    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     Drug use: No    Sexual activity: Not Currently     Partners: Male   Other Topics Concern    Not on file   Social History Narrative    Initial Intake Date: 2/9/2021Cshlomo Funes  Last Updated:        Financial situation:    SSR of $853. Pt has cost sharing with roommate.        Food resources & insecurities:    Pt able to independantly shop and cook. Often, local son will go with pt to grocery store to assist.        Housing & environmental:    2 story condo rental w/ stairs. Pt ahs rail for stairs, denies concern. Pt has good relationship with ; he will allow her to install a shower/bath rail.        Transportation:    Active  with licensed and insured vehicle.        Family dynamics & family/friend social supports:    Pt resides with roommate and receives support from local adult son. Confirmed son is emergency contact.        ADLs/IADLs & associated diagnoses:    It completing all ADL/IADLS independently with the exception of showers. Pt completing sponge bath w/ kitchen sink.        Advanced life planning:    Not on file. POA-HC packet mailed 2/9/2021.        Behavioral/Mental:    Pertinent diagnoses include, major depression, recurrent, chronic     Social Drivers of Health     Financial Resource Strain: Medium Risk (4/24/2024)    Overall Financial Resource Strain (CARDIA)     Difficulty of Paying Living Expenses: Somewhat hard   Food Insecurity: No Food Insecurity (4/24/2024)    Hunger Vital Sign     Worried About Running Out of Food in the Last Year: Never true     Ran Out of Food in the Last Year: Never true   Transportation Needs: No Transportation Needs (4/24/2024)    PRAPARE - Transportation     Lack of Transportation (Medical): No     Lack of Transportation (Non-Medical): No   Physical Activity: Not on file   Stress: Not on file   Social  "Connections: Not on file   Intimate Partner Violence: Not on file   Housing Stability: Not on file           Allergies/Intolerances:  Allergies   Allergen Reactions    Bactrim [Sulfamethoxazole-Trimethoprim] Rash     Diffuse pruritic skin rash with blisters (no mucous membrane involvement) (was also taking cipro at the time - reaction thought more likely to be 2/2 Bactrim)    Meropenem Unspecified     Pt can not remember reaction      Oxycodone      \"I got bad hallucinations & slurred speech\"       ROS:   Review of Systems   Constitutional:  Negative for chills, fever, malaise/fatigue and weight loss.   HENT:  Negative for congestion and hearing loss.    Eyes:  Negative for blurred vision and double vision.   Respiratory:  Negative for cough, sputum production, shortness of breath and wheezing.    Cardiovascular:  Negative for chest pain and palpitations.   Gastrointestinal:  Negative for abdominal pain, constipation, diarrhea, nausea and vomiting.   Genitourinary:  Negative for dysuria.   Musculoskeletal:  Positive for joint pain. Negative for myalgias.   Skin:  Negative for itching and rash.   Neurological:  Negative for dizziness, focal weakness and headaches.   Endo/Heme/Allergies:  Does not bruise/bleed easily.   Psychiatric/Behavioral:  The patient is not nervous/anxious.       ROS was reviewed and were negative except as above.    Objective    Most Recent Vital Signs:  BP (!) 140/94   Pulse 85   Temp 36.1 °C (97 °F) (Temporal)   Resp 18   Ht 1.549 m (5' 1\")   Wt 61.4 kg (135 lb 6 oz)   SpO2 97%   BMI 25.58 kg/m²     Physical Exam:  Physical Exam  Vitals and nursing note reviewed.   Constitutional:       General: She is not in acute distress.     Appearance: Normal appearance. She is not ill-appearing.   HENT:      Head: Normocephalic and atraumatic.      Nose: Nose normal.      Mouth/Throat:      Mouth: Mucous membranes are moist.   Eyes:      Pupils: Pupils are equal, round, and reactive to light. "   Cardiovascular:      Rate and Rhythm: Normal rate and regular rhythm.   Pulmonary:      Effort: Pulmonary effort is normal. No respiratory distress.      Breath sounds: Normal breath sounds. No stridor.   Musculoskeletal:      Cervical back: Normal range of motion.      Right lower leg: No edema.      Left lower leg: No edema.   Skin:     General: Skin is warm and dry.      Capillary Refill: Capillary refill takes less than 2 seconds.      Coloration: Skin is not jaundiced or pale.      Comments: Chronic wound of left anterior ankle.  Poor tissue quality.  Slough in wound bed.  No surrounding erythema, swelling or drainage.  No warmth.  No signs of active infection.  See clinical photo attached   Neurological:      General: No focal deficit present.      Mental Status: She is alert and oriented to person, place, and time.   Psychiatric:         Mood and Affect: Mood normal.         Behavior: Behavior normal.          Pertinent Lab/Imaging Results:  [unfilled]  @CMP@  WBC   Date/Time Value Ref Range Status   08/22/2024 10:59 AM 6.8 4.8 - 10.8 K/uL Final     RBC   Date/Time Value Ref Range Status   08/22/2024 10:59 AM 4.21 4.20 - 5.40 M/uL Final     Hemoglobin   Date/Time Value Ref Range Status   08/22/2024 10:59 AM 11.6 (L) 12.0 - 16.0 g/dL Final     Hematocrit   Date/Time Value Ref Range Status   08/22/2024 10:59 AM 38.0 37.0 - 47.0 % Final     MCV   Date/Time Value Ref Range Status   08/22/2024 10:59 AM 90.3 81.4 - 97.8 fL Final     MCH   Date/Time Value Ref Range Status   08/22/2024 10:59 AM 27.6 27.0 - 33.0 pg Final     MCHC   Date/Time Value Ref Range Status   08/22/2024 10:59 AM 30.5 (L) 32.2 - 35.5 g/dL Final     MPV   Date/Time Value Ref Range Status   08/22/2024 10:59 AM 10.5 9.0 - 12.9 fL Final      Sodium   Date/Time Value Ref Range Status   08/22/2024 10:59  135 - 145 mmol/L Final     Potassium   Date/Time Value Ref Range Status   08/22/2024 10:59 AM 4.0 3.6 - 5.5 mmol/L Final     Chloride  "  Date/Time Value Ref Range Status   08/22/2024 10:59  96 - 112 mmol/L Final     Co2   Date/Time Value Ref Range Status   08/22/2024 10:59 AM 23 20 - 33 mmol/L Final     Glucose   Date/Time Value Ref Range Status   08/22/2024 10:59 AM 97 65 - 99 mg/dL Final     Bun   Date/Time Value Ref Range Status   08/22/2024 10:59 AM 20 8 - 22 mg/dL Final     Creatinine   Date/Time Value Ref Range Status   08/22/2024 10:59 AM 0.88 0.50 - 1.40 mg/dL Final     Alkaline Phosphatase   Date/Time Value Ref Range Status   08/22/2024 10:59 AM 64 30 - 99 U/L Final     AST(SGOT)   Date/Time Value Ref Range Status   08/22/2024 10:59 AM 17 12 - 45 U/L Final     ALT(SGPT)   Date/Time Value Ref Range Status   08/22/2024 10:59 AM 9 2 - 50 U/L Final     Total Bilirubin   Date/Time Value Ref Range Status   08/22/2024 10:59 AM 0.4 0.1 - 1.5 mg/dL Final      No results found for: \"CPKTOTAL\"       Blood Culture   Date Value Ref Range Status   04/19/2019 No growth after 5 days of incubation.  Final       Blood Culture   Date Value Ref Range Status   04/19/2019 No growth after 5 days of incubation.  Final          CULTURE TISSUE W/ GRM STAIN  Order: [id number]   Status: Edited Result - FINAL       Next appt: 06/18/2025 at 10:40 AM in [facility] (Divya Cage A.P.R.N.)    Test Result Released: Yes (not seen)    Specimen Information: Tissue   0 Result Notes      Component  Ref Range & Units (hover) 5 yr ago   Significant Indicator POS Positive (POS)   Source TISS   Site LEFT HIP   Culture Result  Abnormal   Growth noted after further incubation, see below for  organism identification.    Gram Stain Result No organisms seen.   Culture Result  Abnormal   Staphylococcus aureus  Single colony  Possible contaminant,  correlate with clinical condition.    Resulting Agency M        Susceptibility     Staphylococcus aureus     LICO     Ampicillin/sulbactam <=8/4 mcg/mL Sensitive     Clindamycin <=0.5 mcg/mL Sensitive     Daptomycin <=0.5 mcg/mL " Sensitive     Erythromycin <=0.5 mcg/mL Sensitive     Moxifloxacin <=0.5 mcg/mL Sensitive     Oxacillin <=0.25 mcg/mL Sensitive     Tetracycline <=4 mcg/mL Sensitive     Trimeth/Sulfa <=0.5/9.5 m... Sensitive     Vancomycin 1 mcg/mL Sensitive                  Narrative  Performed by: LILIYA  Surgery Specimen   Specimen Collected: 10/09/19 12:08 PM Last Resulted: 10/14/19  3:33 PM       ntains abnormal data CULTURE WOUND W/ GRAM STAIN  Order: [id number]   Status: Edited Result - FINAL       Next appt: 06/18/2025 at 10:40 AM in [facility] (Divya Cgae, A.P.R.N.)    Test Result Released: Yes (not seen)    Specimen Information: Right Hip; Wound   0 Result Notes      Component  Ref Range & Units (hover) 5 yr ago   Significant Indicator POS Positive (POS)   Source WND   Site RIGHT HIP   Culture Result - Abnormal    Gram Stain Result Moderate WBCs.  Few Gram positive cocci.   Culture Result  Abnormal   Methicillin Resistant Staphylococcus aureus  Light growth    Resulting Agency M        Susceptibility     Methicillin resistant staphylococcus aureus     LICO     Ampicillin/sulbactam 16/8 mcg/mL Resistant     Clindamycin >4 mcg/mL Resistant     Daptomycin <=0.5 mcg/mL Sensitive     Erythromycin >4 mcg/mL Resistant     Linezolid 2 mcg/mL Sensitive     Moxifloxacin 2 mcg/mL Sensitive     Oxacillin >2 mcg/mL Resistant     Penicillin >8 mcg/mL Resistant     Tetracycline <=4 mcg/mL Sensitive     Trimeth/Sulfa <=0.5/9.5 m... Sensitive     Vancomycin 1 mcg/mL Sensitive                  Narrative  Performed by: LILIYA  CALL  Nava  183 tel. [phone],  CALLED  183 tel. [phone] 09/04/2019, 10:59, RB PERF. RESULTS CALLED  TO:[id number]   Specimen Collected: 09/02/19 11:50 PM Last Resulted: 09/05/19  8:34 AM     Impression/Assessment      1. History of hip fracture        2. Hardware complicating wound infection, initial encounter ([facility])        3. MRSA (methicillin resistant staph aureus) culture positive        4. MSSA (methicillin  susceptible Staphylococcus aureus) infection        5. Non-pressure chronic ulcer of unspecified part of left lower leg limited to breakdown of skin ([facility])  Referral to Wound Clinic      Nadege Mae is a 79 y.o. female with a history of anxiety, multiple recurrences of cellulitis and abscess to the left lower extremity, osteoarthritis, HLD, HTN, femoral/popliteal bypass 2018, PVD.  Patient seen by ID team 2 years prior after a fall  in September 2019 resulting in a C2 fracture, admitted in early October 2019 for drainage from her left hip wound.  Prior cultures MSSA and MRSA. S/p revision of a left total hip arthroplasty with exchange of head, liner and proximal stem body by Dr. Vazquez on 10/9/2019.  There was a sinus tract all the way down to the hip with significant purulence noted intraoperatively.  Operative cultures grew a single colony of diphtheroids and MSSA.  Patient was transferred to a skilled nursing facility on vancomycin and rifampin for a planned 6-week course from surgery, completed IV abx on 11/20/2019.  Started on chronic suppressive p.o. doxycycline. Patient states that she developed a superficial wound on the medial malleoli or of her left ankle. It has only been draining serous fluid but patient states that the wound has been improving.  She is recommended to continue on doxycycline at that time and continue wound care.    04/10/25- Today Patient reports feeling well.  Patient has been on lifelong suppression with p.o. 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  She would prefer her orthopedic surgeon continue prescribing this medication and declined ID team refills. Patient reports that she had a chronic wound on her left anterior ankle since 2019 which she was treating on her own that had developed poor tissue quality across the wound site.  She reported that a significant scab/slough  had developed across the wound site but never had fully healed and then most recently fell off when removing her sock.  She denies any surrounding erythema, swelling or drainage.  Denies any fevers, chills, nausea, vomiting, diarrhea.  She has been told in the past that she has 3 ankle fractures but due to her chronic wounds and comorbidities she was high risk for surgery so her bones healed in an abnormal position resulting in abduction and some mild inversion of her ankle.  Ankle wound is an area without pressure.  Denies drainage, pungent odor, redness, pain. Denies feeling generally ill, fevers/chills, general malaise, headache, n/v/d.  Wound site without any signs of infection today.  Discussed case with wound care IRON Snyder.  Referral to [facility]     - This infection/ chronic illness posses a threat to life, bodily function, and limb preservation   PLAN:   - Continue lifelong suppression with p.o. doxycycline 100 mg twice daily.  Prescribed by orthopedic surgery.  No signs or symptoms of infection of left ankle today.  No changes in antibiotic therapy  - Recommend routine follow up with PCP  - Referral to wound care.  Poor tissue quality.  - Continue wound care to left ankle      Return visit: PRN. Follow up with primary care physician for chronic medical problems    I have performed a physical exam,  updated ROS and plan today. I have reviewed previous images, labs, and provider notes.      JERZY [email or web address].    All Patients should seek medical re-evaluation or report to the ER for new, increasing or worsening symptoms. In some circumstances medical conditions can change from the initial evaluation and may require emergent medical re-evaluation. This includes but is not limited to chest pain, shortness of breath, atypical abdominal pain, atypical headache, ALOC, fever >101, low blood pressure, high respiratory rate (above 30), low oxygen saturation (below 90%), acute delirium, abnormal  bleeding, inability to tolerate any intake, weakness on one side of the body, any worsened or concerning conditions.    Please note that this dictation was created using voice recognition software. I have worked with technical experts from [facility] to optimize the interface.  I have made every reasonable attempt to correct obvious errors, but there may be errors of grammar and possibly content that I did not discover before finalizing the note.

## 2025-04-22 ENCOUNTER — HOME HEALTH ADMISSION (OUTPATIENT)
Dept: HOME HEALTH SERVICES | Facility: HOME HEALTHCARE | Age: 80
End: 2025-04-22
Payer: MEDICARE

## 2025-04-22 ENCOUNTER — OFFICE VISIT (OUTPATIENT)
Dept: WOUND CARE | Facility: MEDICAL CENTER | Age: 80
End: 2025-04-22
Attending: NURSE PRACTITIONER
Payer: MEDICARE

## 2025-04-22 DIAGNOSIS — M21.179: ICD-10-CM

## 2025-04-22 DIAGNOSIS — R54 AGE-RELATED PHYSICAL DEBILITY: ICD-10-CM

## 2025-04-22 DIAGNOSIS — L97.322 SKIN ULCER OF LEFT ANKLE WITH FAT LAYER EXPOSED (HCC): Primary | ICD-10-CM

## 2025-04-22 PROCEDURE — 11045 DBRDMT SUBQ TISS EACH ADDL: CPT | Performed by: NURSE PRACTITIONER

## 2025-04-22 PROCEDURE — 99214 OFFICE O/P EST MOD 30 MIN: CPT

## 2025-04-22 PROCEDURE — 99214 OFFICE O/P EST MOD 30 MIN: CPT | Mod: 25 | Performed by: NURSE PRACTITIONER

## 2025-04-22 PROCEDURE — 11045 DBRDMT SUBQ TISS EACH ADDL: CPT

## 2025-04-22 PROCEDURE — 11042 DBRDMT SUBQ TIS 1ST 20SQCM/<: CPT | Performed by: NURSE PRACTITIONER

## 2025-04-22 PROCEDURE — 11042 DBRDMT SUBQ TIS 1ST 20SQCM/<: CPT

## 2025-04-22 ASSESSMENT — ENCOUNTER SYMPTOMS
DIARRHEA: 0
CLAUDICATION: 0
NECK PAIN: 1
CHILLS: 0
COUGH: 0
NAUSEA: 0
FEVER: 0
VOMITING: 0
CONSTIPATION: 0
SHORTNESS OF BREATH: 0

## 2025-04-22 NOTE — PROGRESS NOTES
Provider Encounter- Full Thickness wound    HISTORY OF PRESENT ILLNESS  Wound History:    START OF CARE IN CLINIC: 4/22/2025    REFERRING PROVIDER: Kolby Mejia      WOUND- Full Thickness Wound   LOCATION: Left medial ankle and lower leg   HISTORY: Patient with chronic ulcer to her left medial lower leg and ankle referred to [facility] for evaluation management.  She is well-known to this clinic from previous treatment of wounds to the same site.  She was last seen in our clinic in August 2019 at which time she had a small open wound over the medial malleolus.  She was hospitalized shortly thereafter due to a fall resulting in a C2 fracture, and hip pain.  She had several prolonged hospitalizations around that time.  She opted not to return to the clinic after she was discharged from the hospital.  She states that the wound went on to heal until a few years ago when she scratched herself while removing a sock.  Over the past few years the wound has gotten progressively larger and she has treated herself using various treatments including zinc paste and Betadine.  She does have significant deformity of this ankle, states she has had several fractures of her ankle in the past with no surgical repair.  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    Pertinent Medical History: History of alcohol dependence, in remission, atherosclerosis, history of hip fracture, osteoarthritis, memory deficit, PVD, valgus deformity of left foot    TOBACCO USE: Former smoker, quit more than 10 years ago    Patient's problem list, allergies, and current medications reviewed and updated in Epic    Interval History:  4/22/2025 : Clinic visit with IRON Vasquez, GAYLE-BC, CWSHEREENN, CFSHANNAN.   Patient states that she is feeling well, denies fevers, chills, nausea, vomiting, cough or shortness of breath.  She cannot tell me for sure how long this wound has been open, only states  that it was several years ago.  She denies having much pain from this wound.  Severe valgus deformity of left ankle, she does not wear any sort of orthotic or brace to her left lower extremity.  She states that normally she does use either a cane or walker, however did not bring one with her today.     REVIEW OF SYSTEMS:   Review of Systems   Constitutional:  Negative for chills and fever.   Respiratory:  Negative for cough and shortness of breath.    Cardiovascular:  Negative for chest pain, claudication and leg swelling.   Gastrointestinal:  Negative for constipation, diarrhea, nausea and vomiting.   Musculoskeletal:  Positive for joint pain and neck pain.       PHYSICAL EXAMINATION:   There were no vitals taken for this visit.    Physical Exam  Constitutional:       Appearance: She is normal weight.   HENT:      Ears:      Comments: Hard of hearing  Neck:      Comments: Kyphosis of neck and spine  Cardiovascular:      Pulses: Normal pulses.      Comments: Unable to palpate pedal pulses due to brawny edema.  Left DP and PT pulses brisk and multiphasic by Doppler  Pulmonary:      Effort: Pulmonary effort is normal.   Musculoskeletal:      Cervical back: Rigidity present.      Right lower leg: Edema present.      Left lower leg: Edema present.      Comments: Brawny edema of bilateral lower extremities  Severe varus deformity of left ankle   Skin:     Comments: full-thickness wound to left medial ankle and lower leg: Initial assessment.  Thick adherent slough and fibrinous tissue to wound bed.  Minimal to moderate serosanguineous drainage, no odor.  No periwound erythema or induration.    Brawny edema bilateral lower extremities, hemosiderin staining, varicose veins, scarring and skin changes-consistent with CVI   Neurological:      Mental Status: She is alert and oriented to person, place, and time.      Comments: Poor memory         WOUND ASSESSMENT  Wound 04/22/25 Venous Ulcer Pretibial Medial;Distal Left (Active)    Wound Image   04/22/25 0832   Site Assessment Pink;Red;Yellow 04/22/25 0832   Periwound Assessment Crusted;Edema;Flaky 04/22/25 0832   Margins Attached edges 04/22/25 0832   Drainage Amount Moderate 04/22/25 0832   Drainage Description Serosanguineous 04/22/25 0832   Treatments Cleansed;Topical Lidocaine;Provider debridement;Site care 04/22/25 0832   Wound Cleansing Hypochlorus Acid 04/22/25 0832   Periwound Protectant Skin Moisturizer;Moisture Barrier 04/22/25 0832   Wound Team Following Weekly 04/22/25 0832   Non-staged Wound Description Full thickness 04/22/25 0832   Wound Length (cm) 13 cm 04/22/25 0832   Wound Width (cm) 7.1 cm 04/22/25 0832   Wound Depth (cm) 0.1 cm 04/22/25 0832   Wound Surface Area (cm^2) 92.3 cm^2 04/22/25 0832   Wound Volume (cm^3) 9.23 cm^3 04/22/25 0832   Tunneling (cm) 0 cm 04/22/25 0832   Undermining (cm) 0 cm 04/22/25 0832   Wound Odor None 04/22/25 0832   Pulses Left;DP;PT;Doppler 04/22/25 0832   Exposed Structures None 04/22/25 0832   Number of days: 0       PROCEDURE:   -2% viscous lidocaine applied topically to wound bed for approximately 5 minutes prior to debridement  -Curette used to debride wound bed.  Excisional debridement was performed to remove devitalized tissue until healthy, bleeding tissue was visualized.   Entire surface of wound, 92.3 cm² debrided.  Tissue debrided into the subcutaneous layer.  Unable to establish 100% clean wound bed due to adherent nature of tissue.  -Bleeding controlled with manual pressure.    -Wound care completed by wound RN, refer to flowsheet  -Patient tolerated the procedure well, without c/o pain or discomfort.       Pertinent Labs and Diagnostics:    Labs:     A1c:   Lab Results   Component Value Date/Time    HBA1C 5.6 06/04/2018 12:00 PM          IMAGING: X-ray of left ankle ordered    VASCULAR STUDIES: None found in epic    LAST  WOUND CULTURE:  DATE :   Lab Results   Component Value Date/Time    CULTRSULT (A) 10/09/2019 12:11 PM      Growth noted after further incubation, see below for  organism identification.      CULTRSULT (A) 10/09/2019 12:11 PM     Diphtheroids  Single colony  Not Corynebacterium jeikeium      CULTRSULT No fungal growth. 10/09/2019 12:11 PM    CULTRSULT No Anaerobes isolated. 10/09/2019 12:11 PM         ASSESSMENT AND PLAN:     1. Skin ulcer of left ankle with fat layer exposed (HCC)    4/22/2025: Large full-thickness ulcer to left medial lower leg and ankle, present for several years.  Patient was treated for wounds to this same area in 2019, stopped coming into the clinic due to hospitalizations for neck and hip fractures.  She states the wound went on to heal but did not know for how long.  -Excisional debridement of wound in clinic today, medically necessary to promote wound healing.  -Patient to return to clinic weekly for assessment and debridement  - Home health referral.  Patient has difficulty getting into the clinic herself due to mobility issues.  - Home health to change dressing 1 time per week in between clinic visits  - X-ray of ankle ordered to assess for OM,     Wound care: Acticoat 7 flex, Hydrofiber, soft roll, Tubigrip    Wound care: Acticoat Flex,    2. Acquired varus deformity of ankle    4/22/2025: Severe varus deformity of ankle.  Patient states she had several fractures of this ankle in the past, she did not undergo any surgery  - Referral to Ortho offered, patient declined.  She states she would like to avoid surgery and hospitalizations as long as possible.  - Is unclear whether or not patient has a brace or some other sort of orthotic.  Patient mentioned a boot that she does not wear.    3. Age-related physical debility    4/22/2025: Patient has moderately severe kyphosis of neck and spine, normally uses cane of walker for mobility  - Home health referral.          PATIENT EDUCATION  - Importance of adequate nutrition for wound healing  -Advised to go to ER for any increased redness, swelling,  drainage, or odor, or if patient develops fever, chills, nausea or vomiting.     My total time spent caring for the patient on the day of the encounter was 30 minutes.   This does not include time spent on separately billable procedures/tests.       Please note that this note may have been created using voice recognition software. I have worked with technical experts from [facility] to optimize the interface.  I have made every reasonable attempt to correct obvious errors, but there may be errors of grammar and possibly content that I did not discover before finalizing the note.    N

## 2025-04-22 NOTE — PROGRESS NOTES
Anticipating patient will be picked up by home health. Home wound care orders placed in Epic for TBD.

## 2025-04-22 NOTE — PATIENT INSTRUCTIONS
"The following information is a summary of the education provided in the clinic today. This is not an exhaustive list of the education provided during your appointment.       DRESSING CHANGES    -Keep your wound dressing clean, dry, and intact. Change your dressing every 3 days AND/OR if it's, soiled, leaks, gets wet, or falls off.      -You may  shower with the dressing(s) off. Please do not take baths, or swim in the ocean, lakes, rivers, pools, or hot tubs.      -Wounds do not need to \"air out\" or \"breathe\". Gently dry your wound before placing a new dressing.     -After you get out of the shower, wash the wound a second time (with soap and water, wound cleanser, or saline). Gently dry the wound before you place a new dressing.       -If you need to change your dressings at home, you should wash your wound. Use normal saline, wound cleanser, hypochlorous acid, or unscented soap and water. Do not use hydrogen peroxide or rubbing alcohol to clean your wounds. Hydrogen peroxide and rubbing alcohol will damage new cells and tissue. Do not use betadine or iodine unless told to by your wound care team.    -If you do not have home health, the clinic will give you with leftover supplies from your appointment. We do not give out extra dressing supplies. We will order you supplies through a DME company. Your insurance may or may not pay for all these supplies. The company will reach out to you if insurance does not cover supplies. These supplies will be sent to your home within a few days. If you do have home health, they will provide wound care supplies.     -The dressings we use may change as your wound changes.       COMPRESSION   -Compression helps reduce swelling and helps wounds heal quicker. It is still important to elevate your feet several times daily to reduce swelling, even when you are wearing compression. It is important to wear compression every day, to help your wound heal, and to prevent new wounds from " developing.         GENERAL HEALTH ADVICE   -People with loss of protective sensation, also called neuropathy, might not be able to feel their feet well. It is important to check your feet every day, as you may not feel new injuries. You should also check your shoes every day, to make sure there are no objects inside the shoe that may hurt you. Check for rough areas inside the shoe as well. Never walk barefoot, even in your own home. Wear rubber-soled shoes to protect your feet.   and  -Nutrition is important for wound healing. Unless told otherwise by your doctor, eat more protein and consider supplementing with a multi-vitamin, zinc, and vitamin C.           RESOLVED WOUNDS   -Once your wound is healed, the scar tissue and skin are still fragile. Scar tissue is not as strong as the original skin and might only ever regain 80% of its tensile strength. Scar remodeling may continue for 6-12 months. Clean the area and dry it gently. Do not scratch or pick at the area. It is important to moisturize your skin regularly and apply sunscreen if the area is exposed to the sun.         CLINIC INFORMATION  -The clinic's hours are Monday-Friday, 7:30 AM to 5:00 PM. We are closed most holidays and on weekends. If you leave us a message, please allow 24 hours for someone to return your call. If you have concerns or are having a medical emergency, call 911 or go to the hospital emergency room.     -You might not see the same nurse or provider every visit.     -If you notice any large changes in your wound(s), or signs of infection (redness, swelling, localized heat, increased pain, fever > 101 F, chills, nausea/vomiting) or have any questions about your home care instructions, please call the wound center at [phone]. If it's after hours, contact your primary care physician or go to the hospital emergency room. If you are admitted to any hospital, you will need a new referral to come back to the wound clinic. Any wound care  appointments that you already have may be cancelled.    -If you are 5 or more minutes late for an appointment, we reserve the right to cancel and reschedule that appointment. For example, if your appointment is at 1:00 PM, and you arrive at 1:06 PM, you are more than five minutes late and might not be seen. If you are consistently late or not coming to your appointments (typically 3 late cancellations and/or no shows), we reserve the right to cancel your future appointments or discharge you from the clinic. It is then your responsibility to obtain a new referral if wound care is still needed.

## 2025-04-23 ENCOUNTER — TELEPHONE (OUTPATIENT)
Dept: HOME HEALTH SERVICES | Facility: HOME HEALTHCARE | Age: 80
End: 2025-04-23
Payer: MEDICARE

## 2025-04-24 ENCOUNTER — HOME CARE VISIT (OUTPATIENT)
Dept: HOME HEALTH SERVICES | Facility: HOME HEALTHCARE | Age: 80
End: 2025-04-24
Payer: MEDICARE

## 2025-04-24 PROCEDURE — 665005 NO-PAY RAP - HOME HEALTH

## 2025-04-24 PROCEDURE — G0299 HHS/HOSPICE OF RN EA 15 MIN: HCPCS

## 2025-04-24 ASSESSMENT — ENCOUNTER SYMPTOMS
STOOL FREQUENCY: DAILY
NAUSEA: DENIES
BOWEL PATTERN NORMAL: 1
LAST BOWEL MOVEMENT: 67319
VOMITING: DENIES
DENIES PAIN: 1

## 2025-04-25 NOTE — CASE COMMUNICATION
Primary dx/Skilled need: Admitted to  for wound care on LLE.  PMH: HLD, HTN, osteoarthritis, ETOH use, and MRSA  SN frequency: 1w4  Zip code: [address]  Disciplines ordered: SN  Insurance and authorization: 2/24/66  Certification period: 4/24/25 - 6/22/25  Special considerations: NONE

## 2025-04-26 ENCOUNTER — HOME CARE VISIT (OUTPATIENT)
Dept: HOME HEALTH SERVICES | Facility: HOME HEALTHCARE | Age: 80
End: 2025-04-26
Payer: MEDICARE

## 2025-04-26 VITALS
SYSTOLIC BLOOD PRESSURE: 138 MMHG | RESPIRATION RATE: 16 BRPM | DIASTOLIC BLOOD PRESSURE: 84 MMHG | TEMPERATURE: 98.3 F | OXYGEN SATURATION: 97 % | HEART RATE: 68 BPM

## 2025-04-26 ASSESSMENT — ENCOUNTER SYMPTOMS
HYPERTENSION: 1
FATIGUE: 1
FORGETFULNESS: 1
DESCRIPTION OF MEMORY LOSS: SHORT TERM
FATIGUES EASILY: 1
DRY SKIN: 1
SKIN LESIONS: 1

## 2025-04-26 ASSESSMENT — ACTIVITIES OF DAILY LIVING (ADL)
LIGHT HOUSEKEEPING: INDEPENDENT
TRANSPORTATION ASSESSED: 1
TRANSPORTATION: INDEPENDENT
OASIS_M1830: 02
HOUSEKEEPING ASSESSED: 1

## 2025-04-26 NOTE — CASE COMMUNICATION
Drug-Drug: doxycycline and Ferrous Sulfate (IRON PO), Nutritional Supplements (CLICK ESPRESSO PROTEIN DRINK PO)   Ferrous Sulfate (IRON PO), Nutritional Supplements (CLICK ESPRESSO PROTEIN DRINK PO) may impair the gastrointestinal absorption and decrease the antimicrobial effectiveness of doxycycline.   Details Moderate   doxycycline (VIBRAMYCIN) 100 MG Tab     Ferrous Sulfate (IRON PO)     Nutritional Supplements (CLICK ESPRESSO PROTEI N DRINK PO)   Drug-Drug  <jscript:void(0)>  Drug-Drug: Nutritional Supplements (CLICK ESPRESSO PROTEIN DRINK PO) and Ferrous Sulfate (IRON PO)   Gastrointestinal absorption of Nutritional Supplements (CLICK ESPRESSO PROTEIN DRINK PO) may be impaired by Ferrous Sulfate (IRON PO) resulting in decreased effectiveness of Nutritional Supplements (CLICK ESPRESSO PROTEIN DRINK PO) and possible zinc deficiency from dietary sources. Clinical sig nificance is not known.   Details Moderate   Nutritional Supplements (CLICK ESPRESSO PROTEIN DRINK PO)     Ferrous Sulfate (IRON PO)   Drug-Drug  <jscript:void(0)>  Drug-Drug: doxycycline and Nutritional Supplements (CLICK ESPRESSO PROTEIN DRINK PO)   The antimicrobial effectiveness of doxycycline may be decreased by Nutritional Supplements (CLICK ESPRESSO PROTEIN DRINK PO).   Details Moderate   doxycycline (VIBRAMYCIN) 100 MG Tab     N utritional Supplements (CLICK ESPRESSO PROTEIN DRINK PO)   Drug-Drug  <jscript:void(0)>  Drug-Drug: doxycycline and Calcium Carb-Cholecalciferol (CALCIUM 600 + D PO), Nutritional Supplements (CLICK ESPRESSO PROTEIN DRINK PO)   The antimicrobial effectiveness of doxycycline may be decreased by Calcium Carb-Cholecalciferol (CALCIUM 600 + D PO), Nutritional Supplements (CLICK ESPRESSO PROTEIN DRINK PO).   Details Moderate   doxycycline (VI BRAMYCIN) 100 MG Tab     Nutritional Supplements (CLICK ESPRESSO PROTEIN DRINK PO)     Calcium Carb-Cholecalciferol (CALCIUM 600 + D PO)   Drug-Drug  <jscript:void(0)>  Drug-Drug:  Ferrous Sulfate (IRON PO) and lisinopril   Adverse systemic reactions to Ferrous Sulfate (IRON PO) may be enhanced by ACE inhibitors.   Details Moderate   Ferrous Sulfate (IRON PO)     lisinopril (PRINIVIL) 30 MG tablet   Drug-Drug  <jscript:void(0)>  Drug-Rashi g: lisinopril and Nutritional Supplements (CLICK ESPRESSO PROTEIN DRINK PO)   Potassium preparations (eg, Nutritional Supplements (CLICK ESPRESSO PROTEIN DRINK PO)) may enhance the hyperkalemic effect of angiotensin antagonists, such as lisinopril.   Details Moderate   lisinopril (PRINIVIL) 30 MG tablet     Nutritional Supplements (CLICK ESPRESSO PROTEIN DRINK PO)   Drug-Drug  <jscript:void(0)>  Drug-Drug: sertraline and diphenhydrAMINE    Delirium may occur when sertraline and diphenhydrAMINE are coadministered.   Details Moderate   sertraline (ZOLOFT) 100 MG Tab     diphenhydrAMINE (BENADRYL ALLERGY) 25 MG Tab   Drug-Drug  <jscript:void(0)>  Drug-Drug: diphenhydrAMINE and metoprolol SR   Plasma concentrations and cardiovascular effects of metoprolol SR may be increased when coadministered with diphenhydrAMINE. The clinical significance of this interaction is unknown.    Details Moderate   diphenhydrAMINE (BENADRYL ALLERGY) 25 MG Tab     metoprolol SR (TOPROL XL) 100 MG TABLET SR 24 HR   Drug-Drug  <jscript:void(0)>  Drug-Drug: metoprolol SR and sertraline   Plasma concentrations and pharmacologic effects of metoprolol SR may be increased by sertraline. The clinical significance of this interaction is unknown.   Details Moderate   metoprolol SR (TOPROL XL) 100 MG TABLET SR 24 HR     sertraline (ZOLOF T) 100 MG Tab   Drug-Drug  <jscript:void(0)>  Drug-Drug: acetaminophen and diphenhydrAMINE   The analgesic and antipyretic effectiveness of acetaminophen might be delayed and/or reduced when given concurrently with diphenhydrAMINE.   Details Minor   acetaminophen (TYLENOL) 325 MG Tab     diphenhydrAMINE (BENADRYL ALLERGY) 25 MG Tab   Drug-Drug   "<jscript:void(0)>  Drug-Drug: Calcium Carb-Cholecalciferol (CALCIUM 600 + D PO), Nutritional  Supplements (CLICK ESPRESSO PROTEIN DRINK PO) and Ferrous Sulfate (IRON PO)   The hematologic response to Ferrous Sulfate (IRON PO) may be decreased by Calcium Carb-Cholecalciferol (CALCIUM 600 + D PO), Nutritional Supplements (CLICK ESPRESSO PROTEIN DRINK PO).   Details Minor   Nutritional Supplements (CLICK ESPRESSO PROTEIN DRINK PO)     Ferrous Sulfate (IRON PO)     Calcium Carb-Cholecalciferol (CALCIUM 600 + D PO)   Allergy  <jscrip t:void(0)>  Allergy/Contraindication: SULFAMETHOXAZOLE-TRIMETHOPRIM and doxycycline, sertraline   Reactions: Rash. Reaction type: Systemic. User documented allergy severity: Medium.   \"Diffuse pruritic skin rash with blisters (no mucous membrane involvement) (was also taking cipro at the time - reaction thought more likely to be 2/2 Bactrim)\"Details Ingredient Match   sertraline (ZOLOFT) 100 MG Tab     doxycycline (VIBRAMYCIN) 100 MG Ta b   Allergy  <jscript:void(0)>  Allergy/Contraindication: SULFAMETHOXAZOLE-TRIMETHOPRIM and Ferrous Sulfate (IRON PO), acetaminophen   Reactions: Rash. Reaction type: Systemic. User documented allergy severity: Medium.   \"Diffuse pruritic skin rash with blisters (no mucous membrane involvement) (was also taking cipro at the time - reaction thought more likely to be 2/2 Bactrim)\"Details Drug Class Match   acetaminophen (TYLENOL) 325 MG T ab     Ferrous Sulfate (IRON PO)   Drug-Food  <jscript:void(0)>  Drug-Food: metoprolol SR   The oral bioavailability and pharmacologic effects of propranolol and metoprolol may be increased when consistently taken with meals, especially meals high in protein. Atenolol bioavailability may be decreased when taken with food.   Details Moderate   metoprolol SR (TOPROL XL) 100 MG TABLET SR 24 HR   Drug-Food  <jscript:void(0)>  Drug-Food: dox ycycline   The gastrointestinal absorption as well as the antimicrobial effectiveness of " doxycycline may be decreased when given with milk, dairy products or other calcium-rich foods. Efficacy against Helicobacter pylori is not impaired by food.   Details Moderate   doxycycline (VIBRAMYCIN) 100 MG Tab   Drug-Food  <jscript:void(0)>  Drug-Food: sertraline   Plasma concentrations of sertraline may be increased by grapefruit juice.   Detai ls Minor   sertraline (ZOLOFT) 100 MG Tab   Drug-Alcohol  <jscript:void(0)>  Drug-Alcohol: acetaminophen   The risk of acetaminophen-induced hepatotoxicity may be increased by chronic intake of alcoholic beverages.   Details Moderate   acetaminophen (TYLENOL) 325 MG Tab   Drug-Alcohol  <jscript:void(0)>  Drug-Alcohol: diphenhydrAMINE   ethanol and diphenhydrAMINE may impair mental and/or motor performance.   Details Minor   diphenhydrAM INE (BENADRYL ALLERGY) 25 MG Tab   Drug-Alcohol  <jscript:void(0)>  Drug-Alcohol: doxycycline   Alcoholic beverages may decrease the serum concentration of Doxycycline. Specifically, doxycycline exposure may be lower in alcoholic patients. The specific role of alcohol in causing this is unclear.   Details Minor   doxycycline (VIBRAMYCIN) 100 MG Tab

## 2025-04-28 ENCOUNTER — DOCUMENTATION (OUTPATIENT)
Dept: MEDICAL GROUP | Facility: PHYSICIAN GROUP | Age: 80
End: 2025-04-28
Payer: MEDICARE

## 2025-04-28 NOTE — PROGRESS NOTES
"Medication chart review for [facility] services    Received referral from [facility].   Medications reviewed  compared with discharge summary if available.  Discharge summary date, if applicable:   N/a    Current medication list per [facility]     Medication list one, patient is currently taking    Current Outpatient Medications:     diphenhydrAMINE, 25 mg, Oral, Nightly    Calcium Carb-Cholecalciferol (CALCIUM 600 + D PO), 1 Tablet, Oral, Q72HRS    Nutritional Supplements (CLICK ESPRESSO PROTEIN DRINK PO), Take  by mouth. Indications: Supplement    lisinopril, 30 mg, Oral, DAILY    metoprolol SR, 100 mg, Oral, QDAY    doxycycline, TAKE 1 TABLET BY MOUTH EVERY DAY. TAKE 1 TABLET BY MOUTH TWICE A DAY (THROUGH INFECTIOUS DISEASE ?)    sertraline, 100 mg, Oral, QDAY    Ferrous Sulfate (IRON PO), Take  by mouth. Indications: Supplement    vitamin D3, 1,000 Units, Oral, DAILY    acetaminophen, 650 mg, Oral, Q6HRS PRN      Medication list two, drugs that the patient has been prescribed or recommended to take by their healthcare provider on discharge summary  N/a    Allergies   Allergen Reactions    Bactrim [Sulfamethoxazole-Trimethoprim] Rash     Diffuse pruritic skin rash with blisters (no mucous membrane involvement) (was also taking cipro at the time - reaction thought more likely to be 2/2 Bactrim)    Meropenem Unspecified     Pt can not remember reaction      Oxycodone      \"I got bad hallucinations & slurred speech\"       Labs     Lab Results   Component Value Date/Time    SODIUM 138 08/22/2024 10:59 AM    POTASSIUM 4.0 08/22/2024 10:59 AM    CHLORIDE 103 08/22/2024 10:59 AM    CO2 23 08/22/2024 10:59 AM    GLUCOSE 97 08/22/2024 10:59 AM    BUN 20 08/22/2024 10:59 AM    CREATININE 0.88 08/22/2024 10:59 AM     Lab Results   Component Value Date/Time    ALKPHOSPHAT 64 08/22/2024 10:59 AM    ASTSGOT 17 08/22/2024 10:59 AM    ALTSGPT 9 08/22/2024 10:59 AM    TBILIRUBIN 0.4 08/22/2024 10:59 AM    INR 1.19 (H) " 09/02/2019 12:44 PM    ALBUMIN 3.8 08/22/2024 10:59 AM    ALBUMIN 4.34 02/12/2013 08:52 AM        Assessment for clinically significant drug interactions, drug omissions/additions, duplicative therapies.            CC   Divya Cage A.P.R.N.  [address]  [place] [address]  Fax: [phone]    [facility]  Phone [phone] fax [phone]    This note was created using voice recognition software (Dragon). The accuracy of the dictation is limited by the abilities of the software. I have reviewed the note prior to signing, however some errors in grammar and context are still possible. If you have any questions related to this note please do not hesitate to contact our office.       No follow-ups on file.

## 2025-04-29 ENCOUNTER — OFFICE VISIT (OUTPATIENT)
Dept: WOUND CARE | Facility: MEDICAL CENTER | Age: 80
End: 2025-04-29
Attending: NURSE PRACTITIONER
Payer: MEDICARE

## 2025-04-29 ENCOUNTER — HOSPITAL ENCOUNTER (OUTPATIENT)
Facility: MEDICAL CENTER | Age: 80
End: 2025-04-29
Attending: NURSE PRACTITIONER
Payer: MEDICARE

## 2025-04-29 DIAGNOSIS — R54 AGE-RELATED PHYSICAL DEBILITY: ICD-10-CM

## 2025-04-29 DIAGNOSIS — L08.9 WOUND INFECTION: ICD-10-CM

## 2025-04-29 DIAGNOSIS — T14.8XXA WOUND INFECTION: ICD-10-CM

## 2025-04-29 DIAGNOSIS — L97.322 SKIN ULCER OF LEFT ANKLE WITH FAT LAYER EXPOSED (HCC): ICD-10-CM

## 2025-04-29 DIAGNOSIS — M21.179: ICD-10-CM

## 2025-04-29 LAB
GRAM STN SPEC: NORMAL
SIGNIFICANT IND 70042: NORMAL
SITE SITE: NORMAL
SOURCE SOURCE: NORMAL

## 2025-04-29 PROCEDURE — 87070 CULTURE OTHR SPECIMN AEROBIC: CPT

## 2025-04-29 PROCEDURE — 11042 DBRDMT SUBQ TIS 1ST 20SQCM/<: CPT

## 2025-04-29 PROCEDURE — 87205 SMEAR GRAM STAIN: CPT

## 2025-04-29 NOTE — PATIENT INSTRUCTIONS
"The following information is a summary of the education provided in the clinic today. This is not an exhaustive list of the education provided during your appointment.       DRESSING CHANGES    -Keep your wound dressing clean, dry, and intact.  -You may not shower with the dressing(s) off. Please do not take baths, or swim in the ocean, lakes, rivers, pools, or hot tubs.      -Wounds do not need to \"air out\" or \"breathe\". Gently dry your wound before placing a new dressing.     -After you get out of the shower, wash the wound a second time (with soap and water, wound cleanser, or saline). Gently dry the wound before you place a new dressing.       -If you need to change your dressings at home, you should wash your wound. Use normal saline, wound cleanser, hypochlorous acid, or unscented soap and water. Do not use hydrogen peroxide or rubbing alcohol to clean your wounds. Hydrogen peroxide and rubbing alcohol will damage new cells and tissue. Do not use betadine or iodine unless told to by your wound care team.    -If you do not have home health, the clinic will give you with leftover supplies from your appointment. We do not give out extra dressing supplies. We will order you supplies through a [employer] company. Your insurance may or may not pay for all these supplies. The company will reach out to you if insurance does not cover supplies. These supplies will be sent to your home within a few days. If you do have home health, they will provide wound care supplies.     -The dressings we use may change as your wound changes.     COMPRESSION   -Today, a Light 2 layer compression wrap was applied. Please take off the wrap if you have pain, extreme swelling, new numbness or tingling, a change in the color or temperature of your feet. Take off the wrap if the wrap slides down/bunches up Take off the wrap if it gets wet or leaks through.  If you have to take off the wrap, please do not cut it off with scissors or other sharp " tools. Do not trim the wrap. Unravel the wrap one layer at a time. Place a clean and dry dressing over the wound. Do not leave wrap on for more than seven days at a time. You can wear a cast protector over your wrap so you can take a shower. Cast protectors can be found at most [facility] and [employer]. [facility] does not endorse any brand of cast protector.        GENERAL HEALTH ADVICE   -Nutrition is important for wound healing. Unless told otherwise by your doctor, eat more protein and consider supplementing with a multi-vitamin, zinc, and vitamin C.       CLINIC INFORMATION  -The clinic's hours are Monday-Friday, 7:30 AM to 5:00 PM. We are closed most holidays and on weekends. If you leave us a message, please allow 24 hours for someone to return your call. If you have concerns or are having a medical emergency, call 911 or go to the hospital emergency room.     -You might not see the same nurse or provider every visit.     -If you notice any large changes in your wound(s), or signs of infection (redness, swelling, localized heat, increased pain, fever > 101 F, chills, nausea/vomiting) or have any questions about your home care instructions, please call the wound center at [phone]. If it's after hours, contact your primary care physician or go to the hospital emergency room. If you are admitted to any hospital, you will need a new referral to come back to the wound clinic. Any wound care appointments that you already have may be cancelled.    -If you are 5 or more minutes late for an appointment, we reserve the right to cancel and reschedule that appointment. For example, if your appointment is at 1:00 PM, and you arrive at 1:06 PM, you are more than five minutes late and might not be seen. If you are consistently late or not coming to your appointments (typically 3 late cancellations and/or no shows), we reserve the right to cancel your future appointments or discharge you from the clinic. It is then your  responsibility to obtain a new referral if wound care is still needed.

## 2025-04-29 NOTE — PROGRESS NOTES
Provider Encounter- Full Thickness wound    HISTORY OF PRESENT ILLNESS  Wound History:    START OF CARE IN CLINIC: 4/22/2025    REFERRING PROVIDER: Kolby Mejia      WOUND- Full Thickness Wound   LOCATION: Left medial ankle and lower leg   HISTORY: Patient with chronic ulcer to her left medial lower leg and ankle referred to [facility] for evaluation management.  She is well-known to this clinic from previous treatment of wounds to the same site.  She was last seen in our clinic in August 2019 at which time she had a small open wound over the medial malleolus.  She was hospitalized shortly thereafter due to a fall resulting in a C2 fracture, and hip pain.  She had several prolonged hospitalizations around that time.  She opted not to return to the clinic after she was discharged from the hospital.  She states that the wound went on to heal until a few years ago when she scratched herself while removing a sock.  Over the past few years the wound has gotten progressively larger and she has treated herself using various treatments including zinc paste and Betadine.  She does have significant deformity of this ankle, states she has had several fractures of her ankle in the past with no surgical repair.  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    Pertinent Medical History: History of alcohol dependence, in remission, atherosclerosis, history of hip fracture, osteoarthritis, memory deficit, PVD, valgus deformity of left foot    TOBACCO USE: Former smoker, quit more than 10 years ago    Patient's problem list, allergies, and current medications reviewed and updated in Epic    Interval History:  4/22/2025 : Clinic visit with IRON Vasquez, GAYLE-BC, CWSHEREENN, CFSHANNAN.   Patient states that she is feeling well, denies fevers, chills, nausea, vomiting, cough or shortness of breath.  She cannot tell me for sure how long this wound has been open, only states  that it was several years ago.  She denies having much pain from this wound.  Severe valgus deformity of left ankle, she does not wear any sort of orthotic or brace to her left lower extremity.  She states that normally she does use either a cane or walker, however did not bring one with her today.    4/29/25: Clinic visit with Letty PERDUE, GAYLE-BC, CWON, CFSHANNAN.  Pt denies fevers, chills, nausea, vomiting.  Ulcer area slightly decreased.  Thick slough.  Patient on suppressive doxycycline 100 mg twice daily for her chronically infected hardware to left hip.  Increased drainage, green-tinged.  Wound culture collected today.  No odor.  Follow results.       REVIEW OF SYSTEMS:   Unchanged from previous wound clinic assessment on 4/22/25, except as noted in interval history above      PHYSICAL EXAMINATION:   There were no vitals taken for this visit.    Physical Exam  Constitutional:       Appearance: She is normal weight.   HENT:      Ears:      Comments: Hard of hearing  Neck:      Comments: Kyphosis of neck and spine  Cardiovascular:      Pulses: Normal pulses.      Comments:  +2 left DP palpated.  Unable to palpate PT secondary to wound location  Left DP and PT pulses brisk and multiphasic by Doppler  Pulmonary:      Effort: Pulmonary effort is normal.   Musculoskeletal:      Cervical back: Rigidity present.      Right lower leg: Edema present.      Left lower leg: Edema present.      Comments: Brawny edema of bilateral lower extremities  Severe valgus deformity of left ankle   Skin:     Comments: full-thickness wound to left medial ankle and lower leg: Ulcer Area slightly decreased.  Severely thick adherent slough and fibrinous tissue to wound bed.  Increased drainage, green-tinged.  No odor.  Heavy serosanguineous green-tinged drainage, no odor.  No periwound erythema or induration.    Brawny edema bilateral lower extremities, hemosiderin staining, varicose veins, scarring and skin changes-consistent with  CVI   Neurological:      Mental Status: She is alert and oriented to person, place, and time.      Comments: Poor memory         WOUND ASSESSMENT  Wound 04/22/25 Venous Ulcer Pretibial Medial;Distal Left (Active)   Wound Image    04/29/25 0900   Site Assessment Pink;Yellow;Slough 04/29/25 0900   Periwound Assessment Excoriated;Blanchable erythema 04/29/25 0900   Margins Attached edges 04/29/25 0900   Drainage Amount Copious 04/29/25 0900   Drainage Description Serosanguineous;Serous 04/29/25 0900   Treatments Cleansed;Topical Lidocaine;Wound culture;Provider debridement 04/29/25 0900   Wound Cleansing Hypochlorus Acid 04/29/25 0900   Periwound Protectant Skin Moisturizer;Moisture Barrier 04/22/25 0832   Dressing Cleansing/Solutions Not Applicable 04/29/25 0900   Dressing Options Other (Comments);Super Absorbent Pad;Compression Wrap Two Layer 04/29/25 0900   Dressing Change/Treatment Frequency Every 72 hrs, and As Needed 04/29/25 0900   Wound Team Following Weekly 04/29/25 0900   Non-staged Wound Description Full thickness 04/29/25 0900   Wound Length (cm) 12.5 cm 04/29/25 0900   Wound Width (cm) 7 cm 04/29/25 0900   Wound Depth (cm) 0.2 cm 04/29/25 0900   Wound Surface Area (cm^2) 87.5 cm^2 04/29/25 0900   Wound Volume (cm^3) 17.5 cm^3 04/29/25 0900   Post-Procedure Length (cm) 12.5 cm 04/29/25 0900   Post-Procedure Width (cm) 7.2 cm 04/29/25 0900   Post-Procedure Depth (cm) 0.2 cm 04/29/25 0900   Post-Procedure Surface Area (cm^2) 90 cm^2 04/29/25 0900   Post-Procedure Volume (cm^3) 18 cm^3 04/29/25 0900   Wound Healing % -90 04/29/25 0900   Tunneling (cm) 0 cm 04/29/25 0900   Undermining (cm) 0 cm 04/29/25 0900   Wound Odor None 04/29/25 0900   Pulses Left;DP;PT;Doppler 04/29/25 0900   Exposed Structures None 04/29/25 0900   Number of days: 7       PROCEDURE:   -2% viscous lidocaine applied topically to wound bed for approximately 5 minutes prior to debridement  -Curette used to debride wound bed.  Excisional  debridement was performed to remove devitalized tissue until healthy, bleeding tissue was visualized.   Entire surface of wound, 90 cm² debrided.  Tissue debrided into the subcutaneous layer.  Unable to establish 100% clean wound bed due to adherent nature of tissue.  Debrisoft debridement pad used to remove residual adherent debris followed by additional use of curette which was able to lift tissue to clean wound bed.  Wound culture was collected.  -Bleeding controlled with manual pressure.    -Wound care completed by wound RN, refer to flowsheet  -Patient tolerated the procedure well, without c/o pain or discomfort.       Pertinent Labs and Diagnostics:    Labs:     A1c:   Lab Results   Component Value Date/Time    HBA1C 5.6 06/04/2018 12:00 PM          IMAGING: X-ray of left ankle ordered    VASCULAR STUDIES: None found in epic    LAST  WOUND CULTURE:  DATE :   Lab Results   Component Value Date/Time    CULTRSULT (A) 10/09/2019 12:11 PM     Growth noted after further incubation, see below for  organism identification.      CULTRSULT (A) 10/09/2019 12:11 PM     Diphtheroids  Single colony  Not Corynebacterium jeikeium      CULTRSULT No fungal growth. 10/09/2019 12:11 PM    CULTRSULT No Anaerobes isolated. 10/09/2019 12:11 PM         ASSESSMENT AND PLAN:     1. Skin ulcer of left ankle with fat layer exposed (HCC)  Large full-thickness ulcer to left medial lower leg and ankle, present for several years.  Patient was treated for wounds to this same area in 2019, stopped coming into the clinic due to hospitalizations for neck and hip fractures.  She states the wound went on to heal but did not know for how long.    4/29/2025: Area slightly decreased.  Thick adherent slough remains.  Heavy drainage  -Excisional debridement of wound in clinic today, medically necessary to promote wound healing.  -Patient to return to clinic weekly for assessment and debridement  - Home health to follow-up with patient 2 times weekly    - X-ray of ankle ordered to assess for OM, scheduled 5/27/2025  - Initiate 2 layer compression wrap lite to control drainage.  If tolerating, increase to 2 layer compression wrap at next visit.  Patient to keep wrap dry in between clinic appointments.  Recommend obtaining cast protector to keep wrap dry during showering.  If unable to tolerate wrap, patient to remove and apply Tubigrip which was provided to patient.    -Initiate Iodoflex    Wound care:  Iodoflex;Super Absorbent Pad;Compression Wrap Two Layer light      2. Acquired varus deformity of ankle    4/29/2025: Severe valgus deformity of ankle.  Patient states she had several fractures of this ankle in the past, she did not undergo any surgery  - Referral to Ortho offered, patient declined.  She states she would like to avoid surgery and hospitalizations as long as possible.  - Patient reports she had a boot that she is unable to wear because of location of where her wound is.  She does not have a brace for the ankle.  - X-ray of foot and ankle scheduled for 5/27/2025  -Home health to help patient obtain Prevalon boot.  Patient aware that Prevalon boot is to be used when sitting or laying in bed.  She is aware she is not supposed to ambulate in boot.  When patient sleeps she sleeps on her right side putting pressure to the left medial ankle.    3. Age-related physical debility    4/29/2025: Patient has moderately severe kyphosis of neck and spine, normally uses cane of walker for mobility  - [facility]      4.  Wound infection    4/29/25: Increased drainage, green-tinged.  No odor.    -Wound culture collected.  Follow results and treat as appropriate   -Patient is on suppressive doxycycline 100 mg p.o. twice daily ordered by Dr. Vazquez for chronic left hip infected hardware      PATIENT EDUCATION  - Importance of adequate nutrition for wound healing  -Advised to go to ER for any increased redness, swelling, drainage, or  odor, or if patient develops fever, chills, nausea or vomiting.     My total time spent caring for the patient on the day of the encounter was 20 minutes.   This does not include time spent on separately billable procedures/tests.       Please note that this note may have been created using voice recognition software. I have worked with technical experts from [facility] to optimize the interface.  I have made every reasonable attempt to correct obvious errors, but there may be errors of grammar and possibly content that I did not discover before finalizing the note.    N

## 2025-05-01 ENCOUNTER — HOME CARE VISIT (OUTPATIENT)
Dept: HOME HEALTH SERVICES | Facility: HOME HEALTHCARE | Age: 80
End: 2025-05-01
Payer: MEDICARE

## 2025-05-01 VITALS
RESPIRATION RATE: 16 BRPM | TEMPERATURE: 97.8 F | DIASTOLIC BLOOD PRESSURE: 60 MMHG | HEART RATE: 72 BPM | SYSTOLIC BLOOD PRESSURE: 100 MMHG | OXYGEN SATURATION: 92 %

## 2025-05-01 PROCEDURE — G0299 HHS/HOSPICE OF RN EA 15 MIN: HCPCS

## 2025-05-01 ASSESSMENT — ENCOUNTER SYMPTOMS
BOWEL PATTERN NORMAL: 1
STOOL FREQUENCY: DAILY
LAST BOWEL MOVEMENT: 67326
MUSCLE WEAKNESS: 1
HYPERTENSION: 1
SEVERE DYSPNEA: 1
DENIES PAIN: 1

## 2025-05-01 ASSESSMENT — PATIENT HEALTH QUESTIONNAIRE - PHQ9: CLINICAL INTERPRETATION OF PHQ2 SCORE: 0

## 2025-05-02 ENCOUNTER — RESULTS FOLLOW-UP (OUTPATIENT)
Dept: WOUND CARE | Facility: MEDICAL CENTER | Age: 80
End: 2025-05-02

## 2025-05-03 ENCOUNTER — HOME CARE VISIT (OUTPATIENT)
Dept: HOME HEALTH SERVICES | Facility: HOME HEALTHCARE | Age: 80
End: 2025-05-03
Payer: MEDICARE

## 2025-05-03 PROCEDURE — G0299 HHS/HOSPICE OF RN EA 15 MIN: HCPCS

## 2025-05-03 ASSESSMENT — ENCOUNTER SYMPTOMS
SKIN LESIONS: 1
SUBJECTIVE PAIN PROGRESSION: UNCHANGED
PAIN: 1
HIGHEST PAIN SEVERITY IN PAST 24 HOURS: 0/10
FATIGUES EASILY: 1
PAIN LOCATION - PAIN SEVERITY: 0/10
MUSCLE WEAKNESS: 1
DYSPNEA ACTIVITY LEVEL: AFTER AMBULATING MORE THAN 20 FT
FATIGUE: 1
LAST BOWEL MOVEMENT: 67328
PAIN LOCATION: LEFT ANKLE
LOWEST PAIN SEVERITY IN PAST 24 HOURS: 0/10
PAIN LOCATION - RELIEVING FACTORS: TYLENOL
LIMITED RANGE OF MOTION: 1
PAIN LOCATION - EXACERBATING FACTORS: WOUND CARE
STOOL FREQUENCY: DAILY
HYPERTENSION: 1
PAIN SEVERITY GOAL: 0/10
SHORTNESS OF BREATH: 1

## 2025-05-05 ENCOUNTER — HOME CARE VISIT (OUTPATIENT)
Dept: HOME HEALTH SERVICES | Facility: HOME HEALTHCARE | Age: 80
End: 2025-05-05
Payer: MEDICARE

## 2025-05-06 ENCOUNTER — OFFICE VISIT (OUTPATIENT)
Dept: WOUND CARE | Facility: MEDICAL CENTER | Age: 80
End: 2025-05-06
Attending: NURSE PRACTITIONER
Payer: MEDICARE

## 2025-05-06 DIAGNOSIS — R54 AGE-RELATED PHYSICAL DEBILITY: ICD-10-CM

## 2025-05-06 DIAGNOSIS — L97.322 SKIN ULCER OF LEFT ANKLE WITH FAT LAYER EXPOSED (HCC): ICD-10-CM

## 2025-05-06 DIAGNOSIS — M21.179: ICD-10-CM

## 2025-05-06 DIAGNOSIS — L08.9 WOUND INFECTION: ICD-10-CM

## 2025-05-06 DIAGNOSIS — T14.8XXA WOUND INFECTION: ICD-10-CM

## 2025-05-06 PROCEDURE — 11045 DBRDMT SUBQ TISS EACH ADDL: CPT

## 2025-05-06 PROCEDURE — 11042 DBRDMT SUBQ TIS 1ST 20SQCM/<: CPT | Performed by: NURSE PRACTITIONER

## 2025-05-06 PROCEDURE — 11042 DBRDMT SUBQ TIS 1ST 20SQCM/<: CPT

## 2025-05-06 PROCEDURE — 99213 OFFICE O/P EST LOW 20 MIN: CPT

## 2025-05-06 PROCEDURE — 11045 DBRDMT SUBQ TISS EACH ADDL: CPT | Performed by: NURSE PRACTITIONER

## 2025-05-06 RX ORDER — LEVOFLOXACIN 500 MG/1
500 TABLET, FILM COATED ORAL DAILY
Qty: 7 TABLET | Refills: 0 | Status: SHIPPED | OUTPATIENT
Start: 2025-05-06 | End: 2025-05-13

## 2025-05-06 NOTE — PROGRESS NOTES
Provider Encounter- Full Thickness wound    HISTORY OF PRESENT ILLNESS  Wound History:    START OF CARE IN CLINIC: 4/22/2025    REFERRING PROVIDER: Kolby Mejia      WOUND- Full Thickness Wound   LOCATION: Left medial ankle and lower leg   HISTORY: Patient with chronic ulcer to her left medial lower leg and ankle referred to [facility] for evaluation management.  She is well-known to this clinic from previous treatment of wounds to the same site.  She was last seen in our clinic in August 2019 at which time she had a small open wound over the medial malleolus.  She was hospitalized shortly thereafter due to a fall resulting in a C2 fracture, and hip pain.  She had several prolonged hospitalizations around that time.  She opted not to return to the clinic after she was discharged from the hospital.  She states that the wound went on to heal until a few years ago when she scratched herself while removing a sock.  Over the past few years the wound has gotten progressively larger and she has treated herself using various treatments including zinc paste and Betadine.  She does have significant deformity of this ankle, states she has had several fractures of her ankle in the past with no surgical repair.  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    Pertinent Medical History: History of alcohol dependence, in remission, atherosclerosis, history of hip fracture, osteoarthritis, memory deficit, PVD, valgus deformity of left foot    TOBACCO USE: Former smoker, quit more than 10 years ago    Patient's problem list, allergies, and current medications reviewed and updated in Epic    Interval History:  4/22/2025 : Clinic visit with IRON Vasquez, GAYLE-BC, CWSHEREENN, CFSHANNAN.   Patient states that she is feeling well, denies fevers, chills, nausea, vomiting, cough or shortness of breath.  She cannot tell me for sure how long this wound has been open, only states  that it was several years ago.  She denies having much pain from this wound.  Severe valgus deformity of left ankle, she does not wear any sort of orthotic or brace to her left lower extremity.  She states that normally she does use either a cane or walker, however did not bring one with her today.    4/29/25: Clinic visit with Letty PERDUE, GAYLE-BC, YAZ, CFCN.  Pt denies fevers, chills, nausea, vomiting.  Ulcer area slightly decreased.  Thick slough.  Patient on suppressive doxycycline 100 mg twice daily for her chronically infected hardware to left hip.  Increased drainage, green-tinged.  Wound culture collected today.  No odor.  Follow results.    5/6/2025 : Clinic visit with IRON Vasquez, LORETO, CALIN, CFSHANNAN.   Patient states she is feeling well overall.  There is quite a bit of slough to her wound today.  Reviewed culture results, positive for rare growth of Proteus.  Given appearance of wound, will go ahead and prescribe Levaquin 500 mg x 7 days.  She is already suppressive doxycycline 100 mg twice daily for infected hardware.         REVIEW OF SYSTEMS:   Unchanged from previous wound clinic assessment on 4/29/2025, except as noted in interval history above      PHYSICAL EXAMINATION:   There were no vitals taken for this visit.    Physical Exam  Constitutional:       Appearance: She is normal weight.   HENT:      Ears:      Comments: Hard of hearing  Neck:      Comments: Kyphosis of neck and spine  Cardiovascular:      Pulses: Normal pulses.      Comments:  +2 left DP palpated.  Unable to palpate PT secondary to wound location  Left DP and PT pulses brisk and multiphasic by Doppler  Pulmonary:      Effort: Pulmonary effort is normal.   Musculoskeletal:      Cervical back: Rigidity present.      Right lower leg: Edema present.      Left lower leg: Edema present.      Comments: Brawny edema of bilateral lower extremities  Severe valgus deformity of left ankle   Skin:     Comments: full-thickness  wound to left medial ankle and lower leg: Wound measures slightly smaller.  Thick adherent slough to wound bed.  Moderate serosanguineous drainage, no odor.  No periwound erythema or induration.    Brawny edema bilateral lower extremities, hemosiderin staining, varicose veins, scarring and skin changes-consistent with CVI   Neurological:      Mental Status: She is alert and oriented to person, place, and time.      Comments: Poor memory       WOUND ASSESSMENT  Wound 04/22/25 Venous Ulcer Pretibial Medial;Distal Left (Active)   Wound Image    05/06/25 0800   Site Assessment Pink;Red;Yellow;Slough 05/06/25 0800   Periwound Assessment Excoriated;Blanchable erythema 05/06/25 0800   Margins Unattached edges 05/06/25 0800   Drainage Amount Copious 05/06/25 0800   Drainage Description Serosanguineous 05/06/25 0800   Treatments Cleansed;Topical Lidocaine;Provider debridement;Site care 05/06/25 0800   Wound Cleansing Foam Cleanser/Washcloth 05/06/25 0800   Periwound Protectant Moisture Barrier;Skin Moisturizer 05/06/25 0800   Dressing Changed Changed 05/06/25 0800   Dressing Cleansing/Solutions Not Applicable 05/06/25 0800   Dressing Options Other (Comments);Super Absorbent Pad;Compression Wrap Two Layer 05/06/25 0800   Dressing Change/Treatment Frequency Every 72 hrs, and As Needed 05/06/25 0800   Wound Team Following Weekly 05/06/25 0800   Non-staged Wound Description Full thickness 05/06/25 0800   Wound Length (cm) 13.1 cm 05/06/25 0800   Wound Width (cm) 6.5 cm 05/06/25 0800   Wound Depth (cm) 0.4 cm 05/06/25 0800   Wound Surface Area (cm^2) 85.15 cm^2 05/06/25 0800   Wound Volume (cm^3) 34.06 cm^3 05/06/25 0800   Post-Procedure Length (cm) 13 cm 05/06/25 0800   Post-Procedure Width (cm) 6.5 cm 05/06/25 0800   Post-Procedure Depth (cm) 0.5 cm 05/06/25 0800   Post-Procedure Surface Area (cm^2) 84.5 cm^2 05/06/25 0800   Post-Procedure Volume (cm^3) 42.25 cm^3 05/06/25 0800   Wound Healing % -269 05/06/25 0800   Tunneling  (cm) 0 cm 05/06/25 0800   Undermining (cm) 0 cm 05/06/25 0800   Wound Odor None 05/06/25 0800   Pulses Left;DP;PT;Doppler 04/29/25 0900   Exposed Structures None 05/06/25 0800   Number of days: 14       PROCEDURE:   -2% viscous lidocaine applied topically to wound bed for approximately 5 minutes prior to debridement  -Curette used to debride wound bed.  Excisional debridement was performed to remove devitalized tissue until healthy, bleeding tissue was visualized.   Entire surface of wound, 84.5 cm² debrided.  Tissue debrided into the subcutaneous layer.  Unable to establish 100% clean wound bed due to adherent nature of tissue.   -Bleeding controlled with manual pressure.    -Wound care completed by wound RN, refer to flowsheet  -Patient tolerated the procedure well, without c/o pain or discomfort.       Pertinent Labs and Diagnostics:    Labs:     A1c:   Lab Results   Component Value Date/Time    HBA1C 5.6 06/04/2018 12:00 PM          IMAGING: X-ray of left ankle ordered    VASCULAR STUDIES: None found in epic    LAST  WOUND CULTURE:  DATE :   Lab Results   Component Value Date/Time    CULTRSULT - (A) 04/29/2025 09:00 AM    CULTRSULT Proteus mirabilis  Rare growth   (A) 04/29/2025 09:00 AM         ASSESSMENT AND PLAN:     1. Skin ulcer of left ankle with fat layer exposed (HCC)  Large full-thickness ulcer to left medial lower leg and ankle, present for several years.  Patient was treated for wounds to this same area in 2019, stopped coming into the clinic due to hospitalizations for neck and hip fractures.  She states the wound went on to heal but did not know for how long.    5/6/2025: Wound measures smaller.  Thick slough to wound bed.  Heavy drainage  -Excisional debridement of wound in clinic today, medically necessary to promote wound healing.  -Patient to return to clinic weekly for assessment and debridement  - Home health to follow-up with patient 2 times weekly   - X-ray of ankle ordered to assess for OM,  scheduled 5/27/2025  - Continue 2 layer compression wrap lite to control drainage.   Recommend obtaining cast protector to keep wrap dry during showering.  If unable to tolerate wrap, patient to remove and apply Tubigrip which was provided to patient.    - Continue Iodoflex    Wound care:  Iodoflex;Super Absorbent Pad;Compression Wrap Two Layer light      2. Acquired varus deformity of ankle    5/6/2025: Severe valgus deformity of ankle.  Patient states she had several fractures of this ankle in the past, she did not undergo any surgery  - Referral to Ortho offered, patient declined.  She states she would like to avoid surgery and hospitalizations as long as possible.  - Patient reports she had a boot that she is unable to wear because of location of where her wound is.  She does not have a brace for the ankle.  - X-ray of foot and ankle scheduled for 5/27/2025  -Home health to help patient obtain Prevalon boot.  Patient aware that Prevalon boot is to be used when sitting or laying in bed.  She is aware she is not supposed to ambulate in boot.  When patient sleeps she sleeps on her right side putting pressure to the left medial ankle.    3. Age-related physical debility    5/6/2025: Patient has moderately severe kyphosis of neck and spine, normally uses cane of walker for mobility  - Patient is established with [facility].      4.  Wound infection    5/6/2025: Culture collected last visit was positive for rare growth Proteus..  Patient was notified of results last week.  We have decided to wait until she was seen in clinic to decide whether or not to prescribe antibiotic.  - Given appearance of wound, will prescribe Levaquin 500 mg x 7 days.  Patient had allergic reaction to meropenem, cannot have penicillin or cephalosporin  -Patient is on suppressive doxycycline 100 mg p.o. twice daily ordered by Dr. Vazquez for chronic left hip infected hardware      PATIENT EDUCATION  - Importance of adequate nutrition for  wound healing  -Advised to go to ER for any increased redness, swelling, drainage, or odor, or if patient develops fever, chills, nausea or vomiting.           Please note that this note may have been created using voice recognition software. I have worked with technical experts from [facility] [employer] to optimize the interface.  I have made every reasonable attempt to correct obvious errors, but there may be errors of grammar and possibly content that I did not discover before finalizing the note.    N

## 2025-05-06 NOTE — PROGRESS NOTES
Home Health wound care orders entered into [facility] for [facility].

## 2025-05-06 NOTE — PATIENT INSTRUCTIONS
- Remove your compression wrap if you have severe pain, severe swelling, numbness, color change, or temperature change in your toes. If you need to remove your compression wrap, do so by unrolling it. Do not cut the compression wrap off to prevent cutting yourself on accident.    - Avoid prolonged standing or sitting without elevating your legs.    - Should you experience any significant changes in your wound(s), such as infection (redness, swelling, localized heat, increased pain, fever > 101 F, chills) or have any questions regarding your home care instructions, please contact the wound center at [phone]. If after hours, contact your primary care physician or go to the hospital emergency room.     - If you are admitted to any hospital, you will need a new referral to come back to the wound clinic and any scheduled appointments that you already have, may be cancelled.    - If you are 5 or more minutes late for an appointment, we reserve the right to cancel and reschedule that appointment. Additionally, if you are habitually late or not showing (3 late cancellations and/or no shows), we reserve the right to cancel your remaining appointments and it will be your responsibility to obtain a new referral if services are still needed.

## 2025-05-07 ENCOUNTER — HOME CARE VISIT (OUTPATIENT)
Dept: HOME HEALTH SERVICES | Facility: HOME HEALTHCARE | Age: 80
End: 2025-05-07
Payer: MEDICARE

## 2025-05-07 NOTE — CASE COMMUNICATION
Patient was discussed in IDT today due to recent admission. Discussed progress toward goals of care with the treatment team. The treatment team currently involves the following disciplines: nursing. Plan is wound care indefinitely as this is recurrent..

## 2025-05-08 ENCOUNTER — HOME CARE VISIT (OUTPATIENT)
Dept: HOME HEALTH SERVICES | Facility: HOME HEALTHCARE | Age: 80
End: 2025-05-08
Payer: MEDICARE

## 2025-05-09 ENCOUNTER — HOME CARE VISIT (OUTPATIENT)
Dept: HOME HEALTH SERVICES | Facility: HOME HEALTHCARE | Age: 80
End: 2025-05-09
Payer: MEDICARE

## 2025-05-09 ENCOUNTER — APPOINTMENT (OUTPATIENT)
Dept: WOUND CARE | Facility: MEDICAL CENTER | Age: 80
End: 2025-05-09
Attending: NURSE PRACTITIONER
Payer: MEDICARE

## 2025-05-09 VITALS
SYSTOLIC BLOOD PRESSURE: 110 MMHG | HEART RATE: 76 BPM | TEMPERATURE: 97.6 F | OXYGEN SATURATION: 91 % | DIASTOLIC BLOOD PRESSURE: 64 MMHG | RESPIRATION RATE: 16 BRPM

## 2025-05-09 PROCEDURE — G0299 HHS/HOSPICE OF RN EA 15 MIN: HCPCS

## 2025-05-09 ASSESSMENT — ENCOUNTER SYMPTOMS
LAST BOWEL MOVEMENT: 67334
HYPERTENSION: 1
STOOL FREQUENCY: DAILY
BOWEL PATTERN NORMAL: 1
DENIES PAIN: 1

## 2025-05-09 ASSESSMENT — PATIENT HEALTH QUESTIONNAIRE - PHQ9: CLINICAL INTERPRETATION OF PHQ2 SCORE: 0

## 2025-05-12 ENCOUNTER — HOME CARE VISIT (OUTPATIENT)
Dept: HOME HEALTH SERVICES | Facility: HOME HEALTHCARE | Age: 80
End: 2025-05-12
Payer: MEDICARE

## 2025-05-13 ENCOUNTER — OFFICE VISIT (OUTPATIENT)
Dept: WOUND CARE | Facility: MEDICAL CENTER | Age: 80
End: 2025-05-13
Attending: NURSE PRACTITIONER
Payer: MEDICARE

## 2025-05-13 DIAGNOSIS — M21.179: ICD-10-CM

## 2025-05-13 DIAGNOSIS — R54 AGE-RELATED PHYSICAL DEBILITY: ICD-10-CM

## 2025-05-13 DIAGNOSIS — L97.322 SKIN ULCER OF LEFT ANKLE WITH FAT LAYER EXPOSED (HCC): ICD-10-CM

## 2025-05-13 DIAGNOSIS — L08.9 WOUND INFECTION: ICD-10-CM

## 2025-05-13 DIAGNOSIS — T14.8XXA WOUND INFECTION: ICD-10-CM

## 2025-05-13 PROCEDURE — 11045 DBRDMT SUBQ TISS EACH ADDL: CPT

## 2025-05-13 PROCEDURE — 11042 DBRDMT SUBQ TIS 1ST 20SQCM/<: CPT | Performed by: NURSE PRACTITIONER

## 2025-05-13 PROCEDURE — 99213 OFFICE O/P EST LOW 20 MIN: CPT

## 2025-05-13 PROCEDURE — 11045 DBRDMT SUBQ TISS EACH ADDL: CPT | Performed by: NURSE PRACTITIONER

## 2025-05-13 PROCEDURE — 11042 DBRDMT SUBQ TIS 1ST 20SQCM/<: CPT

## 2025-05-13 NOTE — PROGRESS NOTES
Provider Encounter- Full Thickness wound    HISTORY OF PRESENT ILLNESS  Wound History:    START OF CARE IN CLINIC: 4/22/2025    REFERRING PROVIDER: Kolby Mejia      WOUND- Full Thickness Wound   LOCATION: Left medial ankle and lower leg   HISTORY: Patient with chronic ulcer to her left medial lower leg and ankle referred to [facility] for evaluation management.  She is well-known to this clinic from previous treatment of wounds to the same site.  She was last seen in our clinic in August 2019 at which time she had a small open wound over the medial malleolus.  She was hospitalized shortly thereafter due to a fall resulting in a C2 fracture, and hip pain.  She had several prolonged hospitalizations around that time.  She opted not to return to the clinic after she was discharged from the hospital.  She states that the wound went on to heal until a few years ago when she scratched herself while removing a sock.  Over the past few years the wound has gotten progressively larger and she has treated herself using various treatments including zinc paste and Betadine.  She does have significant deformity of this ankle, states she has had several fractures of her ankle in the past with no surgical repair.  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    Pertinent Medical History: History of alcohol dependence, in remission, atherosclerosis, history of hip fracture, osteoarthritis, memory deficit, PVD, valgus deformity of left foot    TOBACCO USE: Former smoker, quit more than 10 years ago    Patient's problem list, allergies, and current medications reviewed and updated in Epic    Interval History:  4/22/2025 : Clinic visit with IRON Vasquez, GAYLE-BC, CWSHEREENN, CFSHANNAN.   Patient states that she is feeling well, denies fevers, chills, nausea, vomiting, cough or shortness of breath.  She cannot tell me for sure how long this wound has been open, only states  that it was several years ago.  She denies having much pain from this wound.  Severe valgus deformity of left ankle, she does not wear any sort of orthotic or brace to her left lower extremity.  She states that normally she does use either a cane or walker, however did not bring one with her today.    4/29/25: Clinic visit with Letty PERDUE, LORETO, YAZ, CFSHANNAN.  Pt denies fevers, chills, nausea, vomiting.  Ulcer area slightly decreased.  Thick slough.  Patient on suppressive doxycycline 100 mg twice daily for her chronically infected hardware to left hip.  Increased drainage, green-tinged.  Wound culture collected today.  No odor.  Follow results.    5/6/2025 : Clinic visit with IRON Vasquez, LORETO, CALIN, ILDA.   Patient states she is feeling well overall.  There is quite a bit of slough to her wound today.  Reviewed culture results, positive for rare growth of Proteus.  Given appearance of wound, will go ahead and prescribe Levaquin 500 mg x 7 days.  She is already suppressive doxycycline 100 mg twice daily for infected hardware.      5/13/2025 : Clinic visit with IRON Vasquez, LORETO, CALIN, ILDA.   Patient continues to feel well.  She is tolerating Levaquin without any difficulty, should be completing these in a few days.  Her wound measures about the same, thick slough to wound bed, no odor.       REVIEW OF SYSTEMS:   Unchanged from previous wound clinic assessment on 5/6/2025, except as noted in interval history above      PHYSICAL EXAMINATION:   There were no vitals taken for this visit.    Physical Exam  Constitutional:       Appearance: She is normal weight.   HENT:      Ears:      Comments: Hard of hearing  Neck:      Comments: Kyphosis of neck and spine  Cardiovascular:      Pulses: Normal pulses.      Comments:  +2 left DP palpated.  Unable to palpate PT secondary to wound location  Left DP and PT pulses brisk and multiphasic by Doppler  Pulmonary:      Effort: Pulmonary effort is  normal.   Musculoskeletal:      Cervical back: Rigidity present.      Right lower leg: Edema present.      Left lower leg: Edema present.      Comments: Brawny edema of bilateral lower extremities  Severe valgus deformity of left ankle   Skin:     Comments: full-thickness wound to left medial ankle and lower leg: Wound measures larger.  Thick adherent slough to wound bed.  Moderate serosanguineous drainage, no odor.  No periwound erythema or induration.    Brawny edema bilateral lower extremities, hemosiderin staining, varicose veins, scarring and skin changes-consistent with CVI   Neurological:      Mental Status: She is alert and oriented to person, place, and time.      Comments: Poor memory         WOUND ASSESSMENT  Wound 04/22/25 Venous Ulcer Leg Medial;Distal Left --Left Medial/Distal Lower Leg (Active)   Wound Image    05/13/25 1100   Site Assessment Red;Yellow;Purple 05/13/25 1100   Periwound Assessment Hemosiderin Staining;Edema 05/13/25 1100   Margins Unattached edges 05/13/25 1100   Drainage Amount Large 05/13/25 1100   Drainage Description Serosanguineous 05/13/25 1100   Treatments Cleansed;Topical Lidocaine;Provider debridement 05/13/25 1100   Wound Cleansing Hypochlorus Acid 05/13/25 1100   Periwound Protectant Skin Moisturizer;Moisture Barrier 05/13/25 1100   Dressing Changed New 05/13/25 1100   Dressing Cleansing/Solutions Not Applicable 05/13/25 1100   Dressing Options Antimicrobial 7 Day (Acticoat 7);Super Absorbent Pad;Compression Wrap Two Layer 05/13/25 1100   Dressing Change/Treatment Frequency Twice Weekly 05/13/25 1100   Wound Team Following Weekly 05/06/25 0800   Non-staged Wound Description Full thickness 05/13/25 1100   Wound Length (cm) 13 cm 05/13/25 1100   Wound Width (cm) 6.8 cm 05/13/25 1100   Wound Depth (cm) 0.3 cm 05/13/25 1100   Wound Surface Area (cm^2) 88.4 cm^2 05/13/25 1100   Wound Volume (cm^3) 26.52 cm^3 05/13/25 1100   Post-Procedure Length (cm) 13.1 cm 05/13/25 1100    Post-Procedure Width (cm) 6.8 cm 05/13/25 1100   Post-Procedure Depth (cm) 0.3 cm 05/13/25 1100   Post-Procedure Surface Area (cm^2) 89.08 cm^2 05/13/25 1100   Post-Procedure Volume (cm^3) 26.724 cm^3 05/13/25 1100   Wound Healing % -187 05/13/25 1100   Tunneling (cm) 0 cm 05/13/25 1100   Undermining (cm) 0 cm 05/13/25 1100   Wound Odor None 05/13/25 1100   Pulses Left;DP;PT;Doppler 04/29/25 0900   Exposed Structures None 05/13/25 1100   Number of days: 21       PROCEDURE:   -2% viscous lidocaine applied topically to wound bed for approximately 5 minutes prior to debridement  -Curette used to debride wound bed.  Excisional debridement was performed to remove devitalized tissue until healthy, bleeding tissue was visualized.   Entire surface of wound, 89.08 cm² debrided.  Tissue debrided into the subcutaneous layer.  Unable to establish 100% clean wound bed due to adherent nature of tissue.   -Bleeding controlled with manual pressure.    -Wound care completed by wound RN, refer to flowsheet  -Patient tolerated the procedure well, without c/o pain or discomfort.       Pertinent Labs and Diagnostics:    Labs:     A1c:   Lab Results   Component Value Date/Time    HBA1C 5.6 06/04/2018 12:00 PM          IMAGING: X-ray of left ankle ordered    VASCULAR STUDIES: None found in epic    LAST  WOUND CULTURE:  DATE :   Lab Results   Component Value Date/Time    CULTRSULT - (A) 04/29/2025 09:00 AM    CULTRSULT Proteus mirabilis  Rare growth   (A) 04/29/2025 09:00 AM         ASSESSMENT AND PLAN:     1. Skin ulcer of left ankle with fat layer exposed (HCC)  Large full-thickness ulcer to left medial lower leg and ankle, present for several years.  Patient was treated for wounds to this same area in 2019, stopped coming into the clinic due to hospitalizations for neck and hip fractures.  She states the wound went on to heal but did not know for how long.    5/13/2025: Wound measures slightly larger.  Thick slough in wound  bed.  -Excisional debridement of wound in clinic today, medically necessary to promote wound healing.  -Patient to return to clinic weekly for assessment and debridement  - Home health to follow-up with patient 2 times weekly   - X-ray of ankle ordered to assess for OM, scheduled 5/27/2025  - Continue 2 layer compression wrap lite to control drainage.   Recommend obtaining cast protector to keep wrap dry during showering.  If unable to tolerate wrap, patient to remove and apply Tubigrip which was provided to patient.    - Switch to Acticoat 7 flex and effort to manage bioburden    Wound care: Acticoat 7 Flex, super Absorbent Pad;Compression Wrap Two Layer light      2. Acquired varus deformity of ankle    5/13/2025: Severe valgus deformity of ankle.  Patient states she had several fractures of this ankle in the past, she did not undergo any surgery  - Referral to Ortho offered, patient declined.  She states she would like to avoid surgery and hospitalizations as long as possible.  - Patient reports she had a boot that she is unable to wear because of location of where her wound is.  She does not have a brace for the ankle.  - X-ray of foot and ankle scheduled for 5/27/2025  -Home health to help patient obtain Prevalon boot.  Patient aware that Prevalon boot is to be used when sitting or laying in bed.  She is aware she is not supposed to ambulate in boot.  When patient sleeps she sleeps on her right side putting pressure to the left medial ankle.    3. Age-related physical debility    5/13/2025: Patient has moderately severe kyphosis of neck and spine, normally uses cane of walker for mobility  - Patient is established with [facility].      4.  Wound infection    5/13/2025: Culture collected last visit was positive for rare growth Proteus..  Given deterioration of wound, patient was started on 7-day course of Levaquin, which she has never completed.  - Patient to complete antibiotic as prescribed  -Patient  is on suppressive doxycycline 100 mg p.o. twice daily ordered by Dr. Vazquez for chronic left hip infected hardware  - Monitor for signs and symptoms of infection each clinic visit      PATIENT EDUCATION  - Importance of adequate nutrition for wound healing  -Advised to go to ER for any increased redness, swelling, drainage, or odor, or if patient develops fever, chills, nausea or vomiting.           Please note that this note may have been created using voice recognition software. I have worked with technical experts from [employer] to optimize the interface.  I have made every reasonable attempt to correct obvious errors, but there may be errors of grammar and possibly content that I did not discover before finalizing the note.    N

## 2025-05-13 NOTE — PATIENT INSTRUCTIONS
-Keep dressings clean and dry. Change dressings if they become over saturated, soiled or fall off. Otherwise, your home health nurse will change your dressings between wound clinic visits.    -On the days you are not changing your dressings, avoid getting the dressings wet. It is not harmful to get your wound wet when you are washing your wound before applying a new dressing.    -If you need to change your dressings at home: Wash your wound with normal saline, wound cleanser, or unscented soap and water prior to applying your new dressings. Please avoid cleansing with hydrogen peroxide or rubbing alcohol. Hydrogen peroxide and rubbing alcohol are toxic to new tissue and skin cells.    -Avoid prolonged standing or sitting without elevating your legs.    -Remove your compression garments if you have severe pain, severe swelling, numbness, color change, or temperature change in your toes. If you need to remove your compression garments, do so by unrolling them. Do not cut the compression garments off, this is to prevent cutting yourself on accident.    -Should you experience any significant changes in your wound, such as signs of infection (increasing redness, swelling, localized heat, increased pain, fever > 101 F, chills) or have any questions regarding your home care instructions, please contact the wound center at [phone]. If after hours, contact your primary care physician or go to the hospital emergency room.     -If you are admitted to any hospital, you will need a new referral to come back to the wound clinic and any scheduled appointments that you already have, may be cancelled.     -If you are 5 or more minutes late for an appointment, we reserve the right to cancel and reschedule that appointment. Additionally, if you are habitually late or not showing (3 late cancellations and/or no shows), we reserve the right to cancel your remaining appointments and it will be your responsibility to obtain a new  referral if services are still needed.

## 2025-05-16 ENCOUNTER — HOME CARE VISIT (OUTPATIENT)
Dept: HOME HEALTH SERVICES | Facility: HOME HEALTHCARE | Age: 80
End: 2025-05-16
Payer: MEDICARE

## 2025-05-16 ENCOUNTER — APPOINTMENT (OUTPATIENT)
Dept: WOUND CARE | Facility: MEDICAL CENTER | Age: 80
End: 2025-05-16
Attending: NURSE PRACTITIONER
Payer: MEDICARE

## 2025-05-16 VITALS
SYSTOLIC BLOOD PRESSURE: 140 MMHG | HEART RATE: 80 BPM | RESPIRATION RATE: 16 BRPM | DIASTOLIC BLOOD PRESSURE: 70 MMHG | OXYGEN SATURATION: 96 % | TEMPERATURE: 97.6 F

## 2025-05-16 PROCEDURE — G0299 HHS/HOSPICE OF RN EA 15 MIN: HCPCS

## 2025-05-16 ASSESSMENT — ENCOUNTER SYMPTOMS
BOWEL PATTERN NORMAL: 1
COUGH: 1
COUGH CHARACTERISTICS: DRY
STOOL FREQUENCY: DAILY
NAUSEA: PT DENIES ANY NAUSEA AT THIS TIME
COUGH CHARACTERISTICS: NON-PRODUCTIVE
VOMITING: PT DENIES ANY EMESIS AT THIS TIME
LAST BOWEL MOVEMENT: 67341
DENIES PAIN: 1

## 2025-05-20 ENCOUNTER — OFFICE VISIT (OUTPATIENT)
Dept: WOUND CARE | Facility: MEDICAL CENTER | Age: 80
End: 2025-05-20
Attending: NURSE PRACTITIONER
Payer: MEDICARE

## 2025-05-20 ENCOUNTER — HOME CARE VISIT (OUTPATIENT)
Dept: HOME HEALTH SERVICES | Facility: HOME HEALTHCARE | Age: 80
End: 2025-05-20
Payer: MEDICARE

## 2025-05-20 DIAGNOSIS — M21.179: ICD-10-CM

## 2025-05-20 DIAGNOSIS — R54 AGE-RELATED PHYSICAL DEBILITY: ICD-10-CM

## 2025-05-20 DIAGNOSIS — L97.322 SKIN ULCER OF LEFT ANKLE WITH FAT LAYER EXPOSED (HCC): Primary | ICD-10-CM

## 2025-05-20 DIAGNOSIS — T14.8XXA WOUND INFECTION: ICD-10-CM

## 2025-05-20 DIAGNOSIS — L08.9 WOUND INFECTION: ICD-10-CM

## 2025-05-20 PROCEDURE — 11045 DBRDMT SUBQ TISS EACH ADDL: CPT

## 2025-05-20 PROCEDURE — 99213 OFFICE O/P EST LOW 20 MIN: CPT | Mod: 25 | Performed by: NURSE PRACTITIONER

## 2025-05-20 PROCEDURE — 11042 DBRDMT SUBQ TIS 1ST 20SQCM/<: CPT

## 2025-05-20 PROCEDURE — 99213 OFFICE O/P EST LOW 20 MIN: CPT

## 2025-05-20 PROCEDURE — 11042 DBRDMT SUBQ TIS 1ST 20SQCM/<: CPT | Performed by: NURSE PRACTITIONER

## 2025-05-20 PROCEDURE — 11045 DBRDMT SUBQ TISS EACH ADDL: CPT | Performed by: NURSE PRACTITIONER

## 2025-05-20 NOTE — PROGRESS NOTES
Updated home wound care order routed to [facility] via [facility].    Increased appointment length to 60 mins, updated check out sheet and discussed with Citlalli PALACIOS. Patient is considering coming to LPS either this Friday or in two weeks. She states that she needs to discuss with her son and will call and let us know if she wants to come in for rounds.

## 2025-05-20 NOTE — PROGRESS NOTES
Provider Encounter- Full Thickness wound    HISTORY OF PRESENT ILLNESS  Wound History:    START OF CARE IN CLINIC: 4/22/2025    REFERRING PROVIDER: Kolby Mejia      WOUND- Full Thickness Wound   LOCATION: Left medial ankle and lower leg   HISTORY: Patient with chronic ulcer to her left medial lower leg and ankle referred to [facility] for evaluation management.  She is well-known to this clinic from previous treatment of wounds to the same site.  She was last seen in our clinic in August 2019 at which time she had a small open wound over the medial malleolus.  She was hospitalized shortly thereafter due to a fall resulting in a C2 fracture, and hip pain.  She had several prolonged hospitalizations around that time.  She opted not to return to the clinic after she was discharged from the hospital.  She states that the wound went on to heal until a few years ago when she scratched herself while removing a sock.  Over the past few years the wound has gotten progressively larger and she has treated herself using various treatments including zinc paste and Betadine.  She does have significant deformity of this ankle, states she has had several fractures of her ankle in the past with no surgical repair.  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    Pertinent Medical History: History of alcohol dependence, in remission, atherosclerosis, history of hip fracture, osteoarthritis, memory deficit, PVD, valgus deformity of left foot    TOBACCO USE: Former smoker, quit more than 10 years ago    Patient's problem list, allergies, and current medications reviewed and updated in Epic    Interval History:  4/22/2025 : Clinic visit with IRON Vasquez, GAYLE-BC, CWSHEREENN, CFSHANNAN.   Patient states that she is feeling well, denies fevers, chills, nausea, vomiting, cough or shortness of breath.  She cannot tell me for sure how long this wound has been open, only states  that it was several years ago.  She denies having much pain from this wound.  Severe valgus deformity of left ankle, she does not wear any sort of orthotic or brace to her left lower extremity.  She states that normally she does use either a cane or walker, however did not bring one with her today.    4/29/25: Clinic visit with Letty PERDUE, LORETO, YAZ, ILDA.  Pt denies fevers, chills, nausea, vomiting.  Ulcer area slightly decreased.  Thick slough.  Patient on suppressive doxycycline 100 mg twice daily for her chronically infected hardware to left hip.  Increased drainage, green-tinged.  Wound culture collected today.  No odor.  Follow results.    5/6/2025 : Clinic visit with IRON Vasquez FNP-BC, CALIN, ILDA.   Patient states she is feeling well overall.  There is quite a bit of slough to her wound today.  Reviewed culture results, positive for rare growth of Proteus.  Given appearance of wound, will go ahead and prescribe Levaquin 500 mg x 7 days.  She is already suppressive doxycycline 100 mg twice daily for infected hardware.      5/13/2025 : Clinic visit with IRON Vasquez, LORETO, CALIN, ILDA.   Patient continues to feel well.  She is tolerating Levaquin without any difficulty, should be completing these in a few days.  Her wound measures about the same, thick slough to wound bed, no odor.    5/20/2025 : Clinic visit with IRON Vasquez FNP-BC, CALIN, ILDA.   Patient states she is feeling well.  Unfortunately, her wound remains stalled.  Slough to wound bed.  Healing complicated by severe deformity of ankle.  Recent issue of possible surgical consult.  Possibly plastics for STSG, Ortho for correction of deformity, or both.  She seems to be more open to the idea of surgery, but states she would like to talk it over with her son first.  I did offer to put her into LPS rounds, provided her with the dates through the month of June.  At her request, I wrote out my recommendations on a  piece of paper for her to give to her son.       REVIEW OF SYSTEMS:   Unchanged from previous wound clinic assessment on 5/13/2025, except as noted in interval history above      PHYSICAL EXAMINATION:   There were no vitals taken for this visit.    Physical Exam  Constitutional:       Appearance: She is normal weight.   HENT:      Ears:      Comments: Hard of hearing  Neck:      Comments: Kyphosis of neck and spine  Cardiovascular:      Pulses: Normal pulses.      Comments:  +2 left DP palpated.  Unable to palpate PT secondary to wound location  Left DP and PT pulses brisk and multiphasic by Doppler  Pulmonary:      Effort: Pulmonary effort is normal.   Musculoskeletal:      Cervical back: Rigidity present.      Right lower leg: Edema present.      Left lower leg: Edema present.      Comments: Brawny edema of bilateral lower extremities  Severe valgus deformity of left ankle   Skin:     Comments: full-thickness wound to left medial ankle and lower leg: Wound area has not changed significantly.  Scattered areas of thick slough to wound bed.  Moderate serosanguineous drainage, no odor.  No periwound erythema induration.    Brawny edema bilateral lower extremities, hemosiderin staining, varicose veins, scarring and skin changes-consistent with CVI   Neurological:      Mental Status: She is alert and oriented to person, place, and time.      Comments: Poor memory         WOUND ASSESSMENT  Wound 04/22/25 Venous Ulcer Leg Medial;Distal Left --Left Medial/Distal Lower Leg (Active)   Wound Image    05/20/25 1000   Site Assessment Red;Yellow;Early/partial granulation;White 05/20/25 1000   Periwound Assessment Maceration;Edema;Hemosiderin Staining 05/20/25 1000   Margins Unattached edges 05/20/25 1000   Drainage Amount Copious 05/20/25 1000   Drainage Description Tan;Serosanguineous 05/20/25 1000   Treatments Cleansed;Topical Lidocaine;Provider debridement;Site care;Compression 05/20/25 1000   Offloading/DME Other (comment)  05/20/25 1000   ** Retired** Wound Cleansing Hypochlorus Acid 05/13/25 1100   Wound Cleansing Foam Cleanser/Washcloth;Hypochlorus Acid 05/20/25 1000   Periwound Protectant No-sting Skin Prep;TRIAD paste;Silicone barrier cream 05/20/25 1000   Dressing Changed New 05/20/25 1000   Dressing Cleansing/Solutions Not Applicable 05/20/25 1000   Dressing Options Hydrofiber Silver;Super Absorbent Pad;Other (Comments) 05/20/25 1000   Dressing Change/Treatment Frequency Twice Weekly 05/20/25 1000   Wound Team Following Weekly 05/06/25 0800   Non-staged Wound Description Full thickness 05/20/25 1000   Wound Length (cm) 12.5 cm 05/20/25 1000   Wound Width (cm) 5.6 cm 05/20/25 1000   Wound Depth (cm) 0.2 cm 05/20/25 1000   Wound Surface Area (cm^2) 54.98 cm^2 05/20/25 1000   Wound Volume (cm^3) 7.33 cm^3 05/20/25 1000   Post-Procedure Length (cm) 12.7 cm 05/20/25 1000   Post-Procedure Width (cm) 5.6 cm 05/20/25 1000   Post-Procedure Depth (cm) 0.2 cm 05/20/25 1000   Post-Procedure Surface Area (cm^2) 55.86 cm^2 05/20/25 1000   Post-Procedure Volume (cm^3) 7.448 cm^3 05/20/25 1000   Wound Healing % 21 05/20/25 1000   Tunneling (cm) 0 cm 05/20/25 1000   Undermining (cm) 0 cm 05/20/25 1000   Wound Odor None 05/20/25 1000   Pulses Left;DP;PT 05/20/25 1000   Exposed Structures None 05/20/25 1000   Number of days: 28       PROCEDURE:   -2% viscous lidocaine applied topically to wound bed for approximately 5 minutes prior to debridement  -Curette used to debride wound bed.  Excisional debridement was performed to remove devitalized tissue until healthy, bleeding tissue was visualized.   Entire surface of wound, 55.86 cm² debrided.  Tissue debrided into the subcutaneous layer.  Unable to establish 100% clean wound bed due to adherent nature of tissue.   -Bleeding controlled with manual pressure.    -Wound care completed by wound RN, refer to flowsheet  -Patient tolerated the procedure well, without c/o pain or discomfort.       Pertinent  Labs and Diagnostics:    Labs:     A1c:   Lab Results   Component Value Date/Time    HBA1C 5.6 06/04/2018 12:00 PM          IMAGING: X-ray of left ankle ordered    VASCULAR STUDIES: None found in epic    LAST  WOUND CULTURE:  DATE :   Lab Results   Component Value Date/Time    CULTRSULT - (A) 04/29/2025 09:00 AM    CULTRSULT Proteus mirabilis  Rare growth   (A) 04/29/2025 09:00 AM         ASSESSMENT AND PLAN:     1. Skin ulcer of left ankle with fat layer exposed (HCC)  Large full-thickness ulcer to left medial lower leg and ankle, present for several years.  Patient was treated for wounds to this same area in 2019, stopped coming into the clinic due to hospitalizations for neck and hip fractures.  She states the wound went on to heal but did not know for how long.    5/20/2025: Wound area has not changed significantly.  Scattered areas of thick slough within wound bed  -Excisional debridement of wound in clinic today, medically necessary to promote wound healing.  -Patient to return to clinic weekly for assessment and debridement  - Home health to follow-up with patient 2 times weekly   - X-ray of ankle ordered to assess for OM, scheduled 5/27/2025  - Continue 2 layer compression wrap lite to control drainage.   Recommend obtaining cast protector to keep wrap dry during showering.  If unable to tolerate wrap, patient to remove and apply Tubigrip which was provided to patient.    - DC Acticoat 7 flex  - Discussed possible surgical interventions, one of which would be for consideration of STSG.  Due to significant deformity of her ankle (see below) however, may require corrective surgery as well.    Wound care: Silver Hydrofiber, super Absorbent Pad;Compression Wrap Two Layer light      2. Acquired varus deformity of ankle    5/20/2025: Severe valgus deformity of ankle.  Patient states she had several fractures of this ankle in the past, she did not undergo any surgery  - Patient is now open to at least discussing  orthopedic surgery.  I offered to have her be seen in LPS rounds in clinic, dates through June provided.  She states she will speak with her son.  I provided her with a written recommendations.  She states she will let me know what her decision is.  - Patient reports she had a boot that she is unable to wear because of location of where her wound is.  She does not have a brace for the ankle.  - X-ray of foot and ankle scheduled for 5/27/2025  -Home health was supposed to  help patient obtain Prevalon boot.  Patient aware that Prevalon boot is to be used when sitting or laying in bed.  She is aware she is not supposed to ambulate in boot.  When patient sleeps she sleeps on her right side putting pressure to the left medial ankle.  I did not ask if she has obtained this boot yet    3. Age-related physical debility    5/20/2025: Patient has moderately severe kyphosis of neck and spine, normally uses cane of walker for mobility  - Patient is [facility] with [facility].      4.  Wound infection    5/20/2025: Culture collected on previous visit was positive for growth of Proteus.  She was prescribed short course of Levaquin which she completed.  -No overt evidence of wound infection today  -Patient is on suppressive doxycycline 100 mg p.o. twice daily ordered by Dr. Vazquez for chronic left hip infected hardware  - Monitor for signs and symptoms of infection each clinic visit      PATIENT EDUCATION  - Importance of adequate nutrition for wound healing  -Advised to go to ER for any increased redness, swelling, drainage, or odor, or if patient develops fever, chills, nausea or vomiting.     My total time spent caring for the patient on the day of the encounter was 20 minutes.   This does not include time spent on separately billable procedures/tests.       Please note that this note may have been created using voice recognition software. I have worked with technical experts from [email or web address] [facility] to optimize the  interface.  I have made every reasonable attempt to correct obvious errors, but there may be errors of grammar and possibly content that I did not discover before finalizing the note.    N

## 2025-05-20 NOTE — PATIENT INSTRUCTIONS
"The following information is a summary of the education provided in the clinic today. This is not an exhaustive list of the education provided during your appointment.       DRESSING CHANGES    Keep your wound dressing clean, dry, and intact. Change your dressing only if the dressing becomes, soiled, leaks, gets wet, or falls off.      You may shower with the dressing(s) off on dressing change days only.  Please do not take baths, or swim in the ocean, lakes, rivers, pools, or hot tubs.      Wounds do not need to \"air out\" or \"breathe\". Gently dry your wound before placing a new dressing.     After you get out of the shower, wash the wound a second time (with soap and water, wound cleanser, or saline). Gently dry the wound before you place a new dressing.       If you need to change your dressings at home, you should wash your wound. Use normal saline, wound cleanser, hypochlorous acid, or unscented soap and water. Do not use hydrogen peroxide or rubbing alcohol to clean your wounds. Hydrogen peroxide and rubbing alcohol will damage new cells and tissue. Do not use betadine or iodine unless told to by your wound care team.    Do not soak your wounds in epsom salt baths. This can worsen your wound(s) or delay wound healing. It can also lead to infection or maceration (tissue is too wet).     If you do not have home health, the clinic will give you with leftover supplies from your appointment. We do not give out extra dressing supplies. We will order you supplies through a [employer] company. Your insurance may or may not pay for all these supplies. The company will reach out to you if insurance does not cover supplies. These supplies will be sent to your home within a few days. If you do have home health, they will provide wound care supplies.     The dressings we use may change as your wound changes.       COMPRESSION   -Today, a Light 2 layer compression wrap was applied. Please take off the wrap if you have pain, extreme " swelling, new numbness or tingling, a change in the color or temperature of your feet. Take off the wrap if the wrap slides down/bunches up Take off the wrap if it gets wet or leaks through.  If you have to take off the wrap, please do not cut it off with scissors or other sharp tools. Do not trim the wrap. Unravel the wrap one layer at a time. Place a clean and dry dressing over the wound. Do not leave wrap on for more than seven days at a time. You can wear a cast protector over your wrap so you can take a shower. Cast protectors can be found at most drug [employer] and [employer]. [employer] does not endorse any brand of cast protector.        GENERAL HEALTH ADVICE   -Nutrition is important for wound healing. Unless told otherwise by your doctor, eat more protein and consider supplementing with a multi-vitamin, zinc, and vitamin C.         CLINIC INFORMATION  The clinic's hours are Monday-Friday, 7:30 AM to 5:00 PM. We are closed most holidays and on weekends. If you leave us a message, please allow 24 hours for someone to return your call. If you have concerns or are having a medical emergency, call 911 or go to the hospital emergency room.     You might not see the same nurse or provider every visit.     If you notice any large changes in your wound(s), or signs of infection (redness, swelling, localized heat, increased pain, fever > 101 F, chills, nausea/vomiting) or have any questions about your home care instructions, please call the wound center at [phone]. If it's after hours, contact your primary care physician or go to the hospital emergency room. If you are admitted to any hospital, you will need a new referral to come back to the wound clinic. Any wound care appointments that you already have may be cancelled.    If you are 5 or more minutes late for an appointment, we reserve the right to cancel and reschedule that appointment. For example, if your appointment is at 1:00 PM, and you arrive at 1:06 PM, you  are more than five minutes late and might not be seen. If you are consistently late or not coming to your appointments (typically 3 late cancellations and/or no shows), we reserve the right to cancel your future appointments or discharge you from the clinic. It is then your responsibility to obtain a new referral if wound care is still needed.

## 2025-05-21 ENCOUNTER — APPOINTMENT (OUTPATIENT)
Dept: WOUND CARE | Facility: MEDICAL CENTER | Age: 80
End: 2025-05-21
Attending: NURSE PRACTITIONER
Payer: MEDICARE

## 2025-05-23 ENCOUNTER — HOME CARE VISIT (OUTPATIENT)
Dept: HOME HEALTH SERVICES | Facility: HOME HEALTHCARE | Age: 80
End: 2025-05-23
Payer: MEDICARE

## 2025-05-23 VITALS
HEART RATE: 74 BPM | RESPIRATION RATE: 17 BRPM | SYSTOLIC BLOOD PRESSURE: 110 MMHG | OXYGEN SATURATION: 94 % | TEMPERATURE: 99 F | DIASTOLIC BLOOD PRESSURE: 64 MMHG

## 2025-05-23 PROCEDURE — G0299 HHS/HOSPICE OF RN EA 15 MIN: HCPCS

## 2025-05-23 ASSESSMENT — ENCOUNTER SYMPTOMS
BOWEL PATTERN NORMAL: 1
STOOL FREQUENCY: DAILY
LAST BOWEL MOVEMENT: 67348
DENIES PAIN: 1

## 2025-05-23 ASSESSMENT — PATIENT HEALTH QUESTIONNAIRE - PHQ9: CLINICAL INTERPRETATION OF PHQ2 SCORE: 0

## 2025-05-27 ENCOUNTER — OFFICE VISIT (OUTPATIENT)
Dept: WOUND CARE | Facility: MEDICAL CENTER | Age: 80
End: 2025-05-27
Attending: NURSE PRACTITIONER
Payer: MEDICARE

## 2025-05-27 ENCOUNTER — APPOINTMENT (OUTPATIENT)
Dept: RADIOLOGY | Facility: MEDICAL CENTER | Age: 80
End: 2025-05-27
Attending: NURSE PRACTITIONER
Payer: MEDICARE

## 2025-05-27 DIAGNOSIS — L08.9 WOUND INFECTION: ICD-10-CM

## 2025-05-27 DIAGNOSIS — M21.179: ICD-10-CM

## 2025-05-27 DIAGNOSIS — T14.8XXA WOUND INFECTION: ICD-10-CM

## 2025-05-27 DIAGNOSIS — L97.322 SKIN ULCER OF LEFT ANKLE WITH FAT LAYER EXPOSED (HCC): ICD-10-CM

## 2025-05-27 DIAGNOSIS — R54 AGE-RELATED PHYSICAL DEBILITY: ICD-10-CM

## 2025-05-27 DIAGNOSIS — L97.322 SKIN ULCER OF LEFT ANKLE WITH FAT LAYER EXPOSED (HCC): Primary | ICD-10-CM

## 2025-05-27 PROCEDURE — 99213 OFFICE O/P EST LOW 20 MIN: CPT

## 2025-05-27 PROCEDURE — 11042 DBRDMT SUBQ TIS 1ST 20SQCM/<: CPT

## 2025-05-27 PROCEDURE — 11045 DBRDMT SUBQ TISS EACH ADDL: CPT

## 2025-05-27 PROCEDURE — 11042 DBRDMT SUBQ TIS 1ST 20SQCM/<: CPT | Performed by: STUDENT IN AN ORGANIZED HEALTH CARE EDUCATION/TRAINING PROGRAM

## 2025-05-27 PROCEDURE — 11045 DBRDMT SUBQ TISS EACH ADDL: CPT | Performed by: STUDENT IN AN ORGANIZED HEALTH CARE EDUCATION/TRAINING PROGRAM

## 2025-05-27 PROCEDURE — 73630 X-RAY EXAM OF FOOT: CPT | Mod: LT

## 2025-05-27 PROCEDURE — 73600 X-RAY EXAM OF ANKLE: CPT | Mod: LT

## 2025-05-27 NOTE — PROGRESS NOTES
Provider Encounter- Full Thickness wound    HISTORY OF PRESENT ILLNESS  Wound History:    START OF CARE IN CLINIC: 4/22/2025    REFERRING PROVIDER: Kolby Mejia      WOUND- Full Thickness Wound   LOCATION: Left medial ankle and lower leg   HISTORY: Patient with chronic ulcer to her left medial lower leg and ankle referred to [facility] for evaluation management.  She is well-known to this clinic from previous treatment of wounds to the same site.  She was last seen in our clinic in August 2019 at which time she had a small open wound over the medial malleolus.  She was hospitalized shortly thereafter due to a fall resulting in a C2 fracture, and hip pain.  She had several prolonged hospitalizations around that time.  She opted not to return to the clinic after she was discharged from the hospital.  She states that the wound went on to heal until a few years ago when she scratched herself while removing a sock.  Over the past few years the wound has gotten progressively larger and she has treated herself using various treatments including zinc paste and Betadine.  She does have significant deformity of this ankle, states she has had several fractures of her ankle in the past with no surgical repair.  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    Pertinent Medical History: History of alcohol dependence, in remission, atherosclerosis, history of hip fracture, osteoarthritis, memory deficit, PVD, valgus deformity of left foot    TOBACCO USE: Former smoker, quit more than 10 years ago    Patient's problem list, allergies, and current medications reviewed and updated in Epic    Interval History:  4/22/2025 : Clinic visit with IRON Vasquez, GAYLE-BC, CWSHEREENN, CFSHANNAN.   Patient states that she is feeling well, denies fevers, chills, nausea, vomiting, cough or shortness of breath.  She cannot tell me for sure how long this wound has been open, only states  that it was several years ago.  She denies having much pain from this wound.  Severe valgus deformity of left ankle, she does not wear any sort of orthotic or brace to her left lower extremity.  She states that normally she does use either a cane or walker, however did not bring one with her today.    4/29/25: Clinic visit with Letty PERDUE, LORETO, YAZ, ILDA.  Pt denies fevers, chills, nausea, vomiting.  Ulcer area slightly decreased.  Thick slough.  Patient on suppressive doxycycline 100 mg twice daily for her chronically infected hardware to left hip.  Increased drainage, green-tinged.  Wound culture collected today.  No odor.  Follow results.    5/6/2025 : Clinic visit with IRON Vasquez FNP-BC, CALIN, ILDA.   Patient states she is feeling well overall.  There is quite a bit of slough to her wound today.  Reviewed culture results, positive for rare growth of Proteus.  Given appearance of wound, will go ahead and prescribe Levaquin 500 mg x 7 days.  She is already suppressive doxycycline 100 mg twice daily for infected hardware.      5/13/2025 : Clinic visit with IRON Vasquez, LORETO, CALIN, ILDA.   Patient continues to feel well.  She is tolerating Levaquin without any difficulty, should be completing these in a few days.  Her wound measures about the same, thick slough to wound bed, no odor.    5/20/2025 : Clinic visit with IRON Vasquez FNP-BC, CALIN, ILDA.   Patient states she is feeling well.  Unfortunately, her wound remains stalled.  Slough to wound bed.  Healing complicated by severe deformity of ankle.  Recent issue of possible surgical consult.  Possibly plastics for STSG, Ortho for correction of deformity, or both.  She seems to be more open to the idea of surgery, but states she would like to talk it over with her son first.  I did offer to put her into LPS rounds, provided her with the dates through the month of June.  At her request, I wrote out my recommendations on a  piece of paper for her to give to her son.    5/27/2025: Clinic visit with Bryan Reza MD. Patient reports feeling in normal state of health. Denies any signs or symptoms of infection. Has Xrays scheduled later today, reminded patient. Patient is agreeable to discussing surgical options during LPS clinic, I recommend her son come to appointment. Patient wounds largely unchanged.       REVIEW OF SYSTEMS:   Unchanged from previous wound clinic assessment on 5/20/2025, except as noted in interval history above      PHYSICAL EXAMINATION:   There were no vitals taken for this visit.    Physical Exam  Constitutional:       Appearance: She is normal weight.   HENT:      Ears:      Comments: Hard of hearing  Neck:      Comments: Kyphosis of neck and spine  Cardiovascular:      Pulses: Normal pulses.      Comments:  +2 left DP palpated.  Unable to palpate PT secondary to wound location  Left DP and PT pulses brisk and multiphasic by Doppler  Pulmonary:      Effort: Pulmonary effort is normal.   Musculoskeletal:      Cervical back: Rigidity present.      Right lower leg: Edema present.      Left lower leg: Edema present.      Comments: Brawny edema of bilateral lower extremities  Severe valgus deformity of left ankle   Skin:     Comments: Full-thickness wound to left medial ankle and lower leg: Wound area has not changed significantly.  Scattered areas of thick slough to wound bed.  Moderate serosanguineous drainage, no odor.  No periwound erythema induration.    Brawny edema bilateral lower extremities, hemosiderin staining, varicose veins, scarring and skin changes-consistent with CVI   Neurological:      Mental Status: She is alert and oriented to person, place, and time.      Comments: Poor memory         WOUND ASSESSMENT  Wound 04/22/25 Venous Ulcer Leg Medial;Distal Left --Left Medial/Distal Lower Leg (Active)   Wound Image    05/27/25 1000   Site Assessment Red;Yellow 05/27/25 1000   Periwound Assessment  Hemosiderin Staining;Edema 05/27/25 1000   Margins Unattached edges 05/27/25 1000   Drainage Amount Large 05/27/25 1000   Drainage Description Serosanguineous 05/27/25 1000   Treatments Cleansed;Topical Lidocaine;Provider debridement 05/27/25 1000   Offloading/DME Other (comment) 05/20/25 1000   ** Retired** Wound Cleansing Hypochlorus Acid 05/13/25 1100   Wound Cleansing Foam Cleanser/Washcloth;Hypochlorus Acid 05/27/25 1000   Periwound Protectant TRIAD paste;Skin Moisturizer 05/27/25 1000   Dressing Changed New 05/27/25 1000   Dressing Cleansing/Solutions Not Applicable 05/27/25 1000   Dressing Options Hydrofiber Silver;Super Absorbent Pad;Compression Wrap Two Layer 05/27/25 1000   Dressing Change/Treatment Frequency Twice Weekly 05/27/25 1000   Wound Team Following Weekly 05/06/25 0800   Non-staged Wound Description Full thickness 05/27/25 1000   Wound Length (cm) 12.1 cm 05/27/25 1000   Wound Width (cm) 5 cm 05/27/25 1000   Wound Depth (cm) 0.2 cm 05/27/25 1000   Wound Surface Area (cm^2) 47.52 cm^2 05/27/25 1000   Wound Volume (cm^3) 6.336 cm^3 05/27/25 1000   Post-Procedure Length (cm) 12.3 cm 05/27/25 1000   Post-Procedure Width (cm) 5.1 cm 05/27/25 1000   Post-Procedure Depth (cm) 0.2 cm 05/27/25 1000   Post-Procedure Surface Area (cm^2) 49.27 cm^2 05/27/25 1000   Post-Procedure Volume (cm^3) 6.569 cm^3 05/27/25 1000   Wound Healing % 31 05/27/25 1000   Tunneling (cm) 0 cm 05/27/25 1000   Undermining (cm) 0 cm 05/27/25 1000   Wound Odor None 05/27/25 1000   Pulses Left;Doppler 05/27/25 1000   Exposed Structures None 05/27/25 1000   Number of days: 35       PROCEDURE: Excisional debridement of left medial leg wound  -2% viscous lidocaine applied topically to wound bed for approximately 5 minutes prior to debridement  -Curette used to debride wound bed.  Excisional debridement was performed to remove devitalized tissue until healthy, bleeding tissue was visualized.   Entire surface of wound, 49.27 cm²  debrided.  Tissue debrided into the subcutaneous layer.   -Bleeding controlled with manual pressure.    -Wound care completed by wound RN, refer to flowsheet  -Patient tolerated the procedure well, without c/o pain or discomfort.       Pertinent Labs and Diagnostics:    Labs:     A1c:   Lab Results   Component Value Date/Time    HBA1C 5.6 06/04/2018 12:00 PM          IMAGING: X-ray of left ankle ordered    VASCULAR STUDIES: None found in epic    LAST  WOUND CULTURE:  DATE :   Lab Results   Component Value Date/Time    CULTRSULT - (A) 04/29/2025 09:00 AM    CULTRSULT Proteus mirabilis  Rare growth   (A) 04/29/2025 09:00 AM         ASSESSMENT AND PLAN:     1. Skin ulcer of left ankle with fat layer exposed (HCC)  Large full-thickness ulcer to left medial lower leg and ankle, present for several years.  Patient was treated for wounds to this same area in 2019, stopped coming into the clinic due to hospitalizations for neck and hip fractures.  She states the wound went on to heal but did not know for how long.    5/27/2025: Wound stable. No significant change.  -Excisional debridement of wound in clinic today, medically necessary to promote wound healing.  -Patient to return to clinic weekly for assessment and debridement  - Home health to follow-up with patient 2 times weekly   - X-ray of ankle ordered to assess for OM, scheduled 5/27/2025  - Continue 2 layer compression wrap lite to control drainage.   Recommend obtaining cast protector to keep wrap dry during showering.  If unable to tolerate wrap, patient to remove and apply Tubigrip which was provided to patient.    - Discussed possible surgical interventions, one of which would be for consideration of STSG.  Due to significant deformity of her ankle (see below) however, may require corrective surgery as well. She is agreeable to be added to LPS schedule.    Wound care: Silver Hydrofiber, super Absorbent Pad;Compression Wrap Two Layer light      2. Acquired varus  deformity of ankle    5/27/2025: Severe valgus deformity of ankle.  Patient states she had several fractures of this ankle in the past, she did not undergo any surgery  - Patient is now open to at least discussing orthopedic surgery. She is agreeable to being seen during LPS rounds. Recommend her son come to that visit to be fully informed.  - Patient reports she had a boot that she is unable to wear because of location of where her wound is.  She does not have a brace for the ankle.  - X-ray of foot and ankle scheduled for 5/27/2025  -[facility] was supposed to  help patient obtain Prevalon boot.  Patient aware that Prevalon boot is to be used when sitting or laying in bed.  She is aware she is not supposed to ambulate in boot.  When patient sleeps she sleeps on her right side putting pressure to the left medial ankle.  I did not ask if she has obtained this boot yet    3. Age-related physical debility    5/27/2025: Patient has moderately severe kyphosis of neck and spine, normally uses cane of walker for mobility  - Patient is established with [employer].    4.  Wound infection    5/27/2025:   - No overt evidence of wound infection today  - Patient is on suppressive doxycycline 100 mg p.o. twice daily ordered by Dr. Vazquez for chronic left hip infected hardware  - Monitor for signs and symptoms of infection each clinic visit      PATIENT EDUCATION  - Importance of adequate nutrition for wound healing  -Advised to go to ER for any increased redness, swelling, drainage, or odor, or if patient develops fever, chills, nausea or vomiting.     Please note that this note may have been created using voice recognition software. I have worked with technical experts from [employer] to optimize the interface.  I have made every reasonable attempt to correct obvious errors, but there may be errors of grammar and possibly content that I did not discover before finalizing the note.    N

## 2025-05-27 NOTE — PATIENT INSTRUCTIONS
-Keep your wound dressing clean, dry, and intact. Only change dressing if it's over saturated, soiled or falls off.     -Change your dressing if it becomes soiled, soaked, or falls off.    -Remove your compression wrap if you have severe pain, severe swelling, numbness, color change, or temperature change in your toes. If you need to remove your compression wrap, do so by unrolling it. Do not cut the compression wrap off to prevent cutting yourself on accident.    -Should you experience any significant changes in your wound(s), such as infection (redness, swelling, localized heat, increased pain, fever > 101 F, chills) or have any questions regarding your home care instructions, please contact the wound center at [phone]. If after hours, contact your primary care physician or go to the hospital emergency room.     If you are admitted to any hospital, you will need a new referral to come back to the wound clinic and any scheduled appointments that you already have, may be cancelled.

## 2025-05-27 NOTE — PROGRESS NOTES
Updated wound care orders placed in [facility] for [facility]. Pt scheduled for LPS rounds on 6/6/25.

## 2025-05-27 NOTE — PROGRESS NOTES
LIMB PRESERVATION SERVICE ROUNDS CHECK LIST    ROUNDS DATE: 6/6/2025      LOCATION OF WOUND: Left Medial Ankle  WOUND START DATE: Unknown Not wound clinic start date 4/22/25, very familiar to clinic    ISSUES / Reason for LPS: Orthopedic correction of deformity  Surgical Recommendation:      OFFLOADING  Offloading Device: None  Orthotist Involved? No           TOBACCO USE:       Pertinent Labs and Diagnostics:    LABS  A1c:   Lab Results   Component Value Date/Time    HBA1C 5.6 06/04/2018 12:00 PM        LPS blood sugar: ________________to be filled out day of rounds      LAST  WOUND CULTURE:    Lab Results   Component Value Date/Time    CULTRSULT - (A) 04/29/2025 09:00 AM    CULTRSULT Proteus mirabilis  Rare growth   (A) 04/29/2025 09:00 AM         On Antibiotics? No     VASCULAR STUDIES:   No results found.  TOMI 4/21/2019  Vascular procedure?     IMAGING:   No results found.

## 2025-05-28 ENCOUNTER — APPOINTMENT (OUTPATIENT)
Dept: WOUND CARE | Facility: MEDICAL CENTER | Age: 80
End: 2025-05-28
Attending: NURSE PRACTITIONER
Payer: MEDICARE

## 2025-05-30 ENCOUNTER — HOME CARE VISIT (OUTPATIENT)
Dept: HOME HEALTH SERVICES | Facility: HOME HEALTHCARE | Age: 80
End: 2025-05-30
Payer: MEDICARE

## 2025-05-30 VITALS
HEART RATE: 81 BPM | RESPIRATION RATE: 16 BRPM | SYSTOLIC BLOOD PRESSURE: 140 MMHG | TEMPERATURE: 98.2 F | DIASTOLIC BLOOD PRESSURE: 70 MMHG | OXYGEN SATURATION: 97 %

## 2025-05-30 PROCEDURE — G0299 HHS/HOSPICE OF RN EA 15 MIN: HCPCS

## 2025-05-30 ASSESSMENT — ENCOUNTER SYMPTOMS
LAST BOWEL MOVEMENT: 67355
MUSCLE WEAKNESS: 1
DENIES PAIN: 1
BOWEL PATTERN NORMAL: 1
STOOL FREQUENCY: DAILY

## 2025-05-30 ASSESSMENT — PATIENT HEALTH QUESTIONNAIRE - PHQ9: CLINICAL INTERPRETATION OF PHQ2 SCORE: 0

## 2025-06-01 DIAGNOSIS — F33.9 MAJOR DEPRESSION, RECURRENT, CHRONIC (HCC): ICD-10-CM

## 2025-06-02 RX ORDER — SERTRALINE HYDROCHLORIDE 100 MG/1
100 TABLET, FILM COATED ORAL
Qty: 100 TABLET | Refills: 3 | Status: SHIPPED | OUTPATIENT
Start: 2025-06-02

## 2025-06-02 NOTE — TELEPHONE ENCOUNTER
Requested Prescriptions     Signed Prescriptions Disp Refills    sertraline (ZOLOFT) 100 MG Tab 100 Tablet 3     Sig: TAKE 1 TABLET BY MOUTH EVERY DAY     Authorizing Provider: ABBIE DYE A.P.R.N.

## 2025-06-03 ENCOUNTER — OFFICE VISIT (OUTPATIENT)
Dept: WOUND CARE | Facility: MEDICAL CENTER | Age: 80
End: 2025-06-03
Attending: NURSE PRACTITIONER
Payer: MEDICARE

## 2025-06-03 DIAGNOSIS — T14.8XXA WOUND INFECTION: ICD-10-CM

## 2025-06-03 DIAGNOSIS — M21.179: ICD-10-CM

## 2025-06-03 DIAGNOSIS — L08.9 WOUND INFECTION: ICD-10-CM

## 2025-06-03 DIAGNOSIS — L97.322 SKIN ULCER OF LEFT ANKLE WITH FAT LAYER EXPOSED (HCC): Primary | ICD-10-CM

## 2025-06-03 DIAGNOSIS — R54 AGE-RELATED PHYSICAL DEBILITY: ICD-10-CM

## 2025-06-03 PROCEDURE — 11045 DBRDMT SUBQ TISS EACH ADDL: CPT

## 2025-06-03 PROCEDURE — 99214 OFFICE O/P EST MOD 30 MIN: CPT

## 2025-06-03 PROCEDURE — 11045 DBRDMT SUBQ TISS EACH ADDL: CPT | Performed by: NURSE PRACTITIONER

## 2025-06-03 PROCEDURE — 11042 DBRDMT SUBQ TIS 1ST 20SQCM/<: CPT | Performed by: NURSE PRACTITIONER

## 2025-06-03 PROCEDURE — 11042 DBRDMT SUBQ TIS 1ST 20SQCM/<: CPT

## 2025-06-03 NOTE — PATIENT INSTRUCTIONS
"The following information is a summary of the education provided in the clinic today. This is not an exhaustive list of the education provided during your appointment.       DRESSING CHANGES    Keep your wound dressing clean, dry, and intact.       You may  shower with the dressing(s) off. Please do not take baths, or swim in the ocean, lakes, rivers, pools, or hot tubs.      Wounds do not need to \"air out\" or \"breathe\". Gently dry your wound before placing a new dressing.     After you get out of the shower, wash the wound a second time (with soap and water, wound cleanser, or saline). Gently dry the wound before you place a new dressing.       If you need to change your dressings at home, you should wash your wound. Use normal saline, wound cleanser, hypochlorous acid, or unscented soap and water. Do not use hydrogen peroxide or rubbing alcohol to clean your wounds. Hydrogen peroxide and rubbing alcohol will damage new cells and tissue. Do not use betadine or iodine unless told to by your wound care team.    Do not soak your wounds in epsom salt baths. This can worsen your wound(s) or delay wound healing. It can also lead to infection or maceration (tissue is too wet).     If you do not have home health, the clinic will give you with leftover supplies from your appointment. We do not give out extra dressing supplies. We will order you supplies through a [employer] company. Your insurance may or may not pay for all these supplies. The company will reach out to you if insurance does not cover supplies. These supplies will be sent to your home within a few days. If you do have home health, they will provide wound care supplies.     The dressings we use may change as your wound changes.       COMPRESSION   -Today, a 4 layer compression wrap was applied. Please take off the wrap if you have pain, extreme swelling, new numbness or tingling, a change in the color or temperature of your feet. Take off the wrap if the wrap slides " down/bunches up Take off the wrap if it gets wet or leaks through.  If you have to take off the wrap, please do not cut it off with scissors or other sharp tools. Do not trim the wrap. Unravel the wrap one layer at a time. Place a clean and dry dressing over the wound. Do not leave wrap on for more than seven days at a time. You can wear a cast protector over your wrap so you can take a shower. Cast protectors can be found at most drug [employer] and [employer] does not endorse any brand of cast protector.        CLINIC INFORMATION  The clinic's hours are Monday-Friday, 7:30 AM to 5:00 PM. We are closed most holidays and on weekends. If you leave us a message, please allow 24 hours for someone to return your call. If you have concerns or are having a medical emergency, call 911 or go to the hospital emergency room.     You might not see the same nurse or provider every visit.     If you notice any large changes in your wound(s), or signs of infection (redness, swelling, localized heat, increased pain, fever > 101 F, chills, nausea/vomiting) or have any questions about your home care instructions, please call the wound center at [phone]. If it's after hours, contact your primary care physician or go to the hospital emergency room. If you are admitted to any hospital, you will need a new referral to come back to the wound clinic. Any wound care appointments that you already have may be cancelled.    If you are 5 or more minutes late for an appointment, we reserve the right to cancel and reschedule that appointment. For example, if your appointment is at 1:00 PM, and you arrive at 1:06 PM, you are more than five minutes late and might not be seen. If you are consistently late or not coming to your appointments (typically 3 late cancellations and/or no shows), we reserve the right to cancel your future appointments or discharge you from the clinic. It is then your responsibility to obtain a new referral if wound  care is still needed.

## 2025-06-03 NOTE — PROGRESS NOTES
Provider Encounter- Full Thickness wound    HISTORY OF PRESENT ILLNESS  Wound History:    START OF CARE IN CLINIC: 4/22/2025    REFERRING PROVIDER: Kolby Mejia      WOUND- Full Thickness Wound   LOCATION: Left medial ankle and lower leg     HISTORY: Patient with chronic ulcer to her left medial lower leg and ankle referred to [facility] for evaluation management.  She is well-known to this clinic from previous treatment of wounds to the same site.  She was last seen in our clinic in August 2019 at which time she had a small open wound over the medial malleolus.  She was hospitalized shortly thereafter due to a fall resulting in a C2 fracture, and hip pain.  She had several prolonged hospitalizations around that time.  She opted not to return to the clinic after she was discharged from the hospital.  She states that the wound went on to heal until a few years ago when she scratched herself while removing a sock.  Over the past few years the wound has gotten progressively larger and she has treated herself using various treatments including zinc paste and Betadine.  She does have significant deformity of this ankle, states she has had several fractures of her ankle in the past with no surgical repair.  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    Pertinent Medical History: History of alcohol dependence, in remission, atherosclerosis, history of hip fracture, osteoarthritis, memory deficit, PVD, valgus deformity of left foot    TOBACCO USE: Former smoker, quit more than 10 years ago    Patient's problem list, allergies, and current medications reviewed and updated in Epic    Interval History:  4/22/2025 : Clinic visit with IRON Vasquez, GAYLE-BC, CWSHEREENN, CFSHANNAN.   Patient states that she is feeling well, denies fevers, chills, nausea, vomiting, cough or shortness of breath.  She cannot tell me for sure how long this wound has been open, only  states that it was several years ago.  She denies having much pain from this wound.  Severe valgus deformity of left ankle, she does not wear any sort of orthotic or brace to her left lower extremity.  She states that normally she does use either a cane or walker, however did not bring one with her today.    4/29/25: Clinic visit with Letty PERDUE, LORETO, YAZ, ILDA.  Pt denies fevers, chills, nausea, vomiting.  Ulcer area slightly decreased.  Thick slough.  Patient on suppressive doxycycline 100 mg twice daily for her chronically infected hardware to left hip.  Increased drainage, green-tinged.  Wound culture collected today.  No odor.  Follow results.    5/6/2025 : Clinic visit with IRON Vasquez FNP-BC, CALIN, ILDA.   Patient states she is feeling well overall.  There is quite a bit of slough to her wound today.  Reviewed culture results, positive for rare growth of Proteus.  Given appearance of wound, will go ahead and prescribe Levaquin 500 mg x 7 days.  She is already suppressive doxycycline 100 mg twice daily for infected hardware.      5/13/2025 : Clinic visit with IRON Vasquez, LORETO, CALIN, ILDA.   Patient continues to feel well.  She is tolerating Levaquin without any difficulty, should be completing these in a few days.  Her wound measures about the same, thick slough to wound bed, no odor.    5/20/2025 : Clinic visit with IRON Vasquez FNP-BC, CALIN, ILDA.   Patient states she is feeling well.  Unfortunately, her wound remains stalled.  Slough to wound bed.  Healing complicated by severe deformity of ankle.  Recent issue of possible surgical consult.  Possibly plastics for STSG, Ortho for correction of deformity, or both.  She seems to be more open to the idea of surgery, but states she would like to talk it over with her son first.  I did offer to put her into LPS rounds, provided her with the dates through the month of June.  At her request, I wrote out my  recommendations on a piece of paper for her to give to her son.    5/27/2025: Clinic visit with Bryan Reza MD. Patient reports feeling in normal state of health. Denies any signs or symptoms of infection. Has Xrays scheduled later today, reminded patient. Patient is agreeable to discussing surgical options during LPS clinic, I recommend her son come to appointment. Patient wounds largely unchanged.    6/3/25: Clinic visit with Letty PERDUE, FNP-BC, CWON, CFCN.  Pt denies fevers, chills, nausea, vomiting.  New epithelium to center of wound has formed skin bridge.  Ulcers  into 2.  Tolerating 2 layer compression wrap.  Increased to 4-layer compression wrap.  Initiate Urgo clean.  Returns this Friday 6/6/25 for LPS rounds. Home health orders updated.  Recommend patient cancel home health appointment this Friday as she will be at LPS rounds and dressing will be changed.    L great toenail thick and dystrophic. Separation at cuticle with dry scab.  Offered podiatry for nail care.  Patient declined at this time.  L 1st MTH plantar callus filed with [place] board           REVIEW OF SYSTEMS:   Unchanged from previous wound clinic assessment on 5/20/2025, except as noted in interval history above      PHYSICAL EXAMINATION:   There were no vitals taken for this visit.    Physical Exam  Constitutional:       Appearance: She is normal weight.   HENT:      Ears:      Comments: Hard of hearing  Neck:      Comments: Kyphosis of neck and spine  Cardiovascular:      Pulses: Normal pulses.      Comments:  +2 left DP palpated.  Unable to palpate PT secondary to wound location  Left DP and PT pulses brisk and multiphasic by Doppler  Pulmonary:      Effort: Pulmonary effort is normal.   Musculoskeletal:      Cervical back: Rigidity present.      Right lower leg: Edema present.      Left lower leg: Edema present.      Comments: Brawny edema of bilateral lower extremities  Severe valgus deformity of left ankle    Skin:     Comments: Full-thickness wound to left medial ankle/lower leg: Skin bridge to center of wound, ulceration now  into proximal and distal ulcers.  Adherent thick slough to both wounds.  Heavy serosanguineous drainage.  No odor.  Periwound intact.   No periwound erythema induration.    L great toenail thick and dystrophic. Separation at cuticle with dry scab.    L 1st MTH plantar callus filed with [place] board    Brawny edema bilateral lower extremities, hemosiderin staining, varicose veins, scarring and skin changes-consistent with CVI   Neurological:      Mental Status: She is alert and oriented to person, place, and time.      Comments: Poor memory         WOUND ASSESSMENT  Wound 04/22/25 Venous Ulcer Leg Medial;Lower Left --Left Medial/proximal Lower Leg (Active)   Wound Image    06/03/25 1012   Site Assessment Red;Yellow 06/03/25 1012   Periwound Assessment Hemosiderin Staining;Fragile;Edema;Scar tissue 06/03/25 1012   Margins Unattached edges 06/03/25 1012   Drainage Amount Large 06/03/25 1012   Drainage Description Serosanguineous 06/03/25 1012   Treatments Cleansed;Topical Lidocaine;Provider debridement;Site care;Compression 06/03/25 1012   Offloading/DME Other (comment) 05/20/25 1000   ** Retired** Wound Cleansing Hypochlorus Acid 05/13/25 1100   Wound Cleansing Foam Cleanser/Washcloth;Hypochlorus Acid 06/03/25 1012   Periwound Protectant TRIAD paste;Urea lotion 06/03/25 1012   Dressing Changed New 05/27/25 1000   Dressing Cleansing/Solutions Not Applicable 06/03/25 1012   Dressing Options Other (Comments);Super Absorbent Pad;Compression Wrap Two Layer 06/03/25 1012   Dressing Change/Treatment Frequency Twice Weekly 06/03/25 1012   Wound Team Following Weekly 06/03/25 1012   Non-staged Wound Description Full thickness 06/03/25 1012   Wound Length (cm) 11.4 cm 06/03/25 1012   Wound Width (cm) 4.1 cm 06/03/25 1012   Wound Depth (cm) 0.2 cm 06/03/25 1012   Wound Surface Area (cm^2) 36.71 cm^2  06/03/25 1012   Wound Volume (cm^3) 4.895 cm^3 06/03/25 1012   Post-Procedure Length (cm) 7 cm 06/03/25 1012   Post-Procedure Width (cm) 4.6 cm 06/03/25 1012   Post-Procedure Depth (cm) 0.2 cm 06/03/25 1012   Post-Procedure Surface Area (cm^2) 25.29 cm^2 06/03/25 1012   Post-Procedure Volume (cm^3) 3.372 cm^3 06/03/25 1012   Wound Healing % 47 06/03/25 1012   Tunneling (cm) 0 cm 06/03/25 1012   Undermining (cm) 0 cm 06/03/25 1012   Wound Odor None 06/03/25 1012   Pulses Left;Doppler 05/27/25 1000   Exposed Structures None 06/03/25 1012   Number of days: 42       Wound 06/03/25 Atypical Full Thickness Pretibial Distal;Medial Left (Active)   Wound Image   06/03/25 1012   Site Assessment Red;Yellow 06/03/25 1012   Periwound Assessment Edema;Fragile;Scar tissue;Flaky 06/03/25 1012   Margins Attached edges 06/03/25 1012   Drainage Amount Large 06/03/25 1012   Drainage Description Serosanguineous 06/03/25 1012   Treatments Cleansed;Topical Lidocaine;Provider debridement;Site care;Compression 06/03/25 1012   Wound Cleansing Foam Cleanser/Washcloth;Hypochlorus Acid 06/03/25 1012   Periwound Protectant TRIAD paste;Urea lotion 06/03/25 1012   Dressing Cleansing/Solutions Not Applicable 06/03/25 1012   Dressing Options Other (Comments);Super Absorbent Pad;Compression Wrap Four Layer 06/03/25 1012   Dressing Change/Treatment Frequency Twice Weekly 06/03/25 1012   Wound Team Following Weekly 06/03/25 1012   Non-staged Wound Description Full thickness 06/03/25 1012   Post-Procedure Length (cm) 5 cm 06/03/25 1012   Post-Procedure Width (cm) 4.4 cm 06/03/25 1012   Post-Procedure Depth (cm) 0.2 cm 06/03/25 1012   Post-Procedure Surface Area (cm^2) 17.28 cm^2 06/03/25 1012   Post-Procedure Volume (cm^3) 2.304 cm^3 06/03/25 1012   Tunneling (cm) 0 cm 06/03/25 1012   Undermining (cm) 0 cm 06/03/25 1012   Wound Odor None 06/03/25 1012   Exposed Structures None 06/03/25 1012   Number of days: 0       PROCEDURE: Excisional debridement of  left medial leg wound x 2  -2% viscous lidocaine applied topically to wound bed for approximately 5 minutes prior to debridement  -Curette used to debride wound bed.  Excisional debridement was performed to remove devitalized tissue until healthy, bleeding tissue was visualized.   Entire surface of wound, 42.57 cm² debrided.  Tissue debrided into the subcutaneous layer.   -Bleeding controlled with manual pressure.    -Wound care completed by wound RN, refer to flowsheet  -Patient tolerated the procedure well, without c/o pain or discomfort.       Pertinent Labs and Diagnostics:    Labs:     A1c:   Lab Results   Component Value Date/Time    HBA1C 5.6 06/04/2018 12:00 PM          IMAGING: X-ray of left ankle 5/27/2025  Osteopenia and significant deformity without a definite acute osseous abnormality.     X-ray left foot 5/27/2025  No definite fracture or dislocation.       VASCULAR STUDIES: None found in epic    LAST  WOUND CULTURE:  DATE :   Lab Results   Component Value Date/Time    CULTRSULT - (A) 04/29/2025 09:00 AM    CULTRSULT Proteus mirabilis  Rare growth   (A) 04/29/2025 09:00 AM         ASSESSMENT AND PLAN:     1. Skin ulcer of left ankle with fat layer exposed (HCC)  Large full-thickness ulcer to left medial lower leg and ankle, present for several years.  Patient was treated for wounds to this same area in 2019, stopped coming into the clinic due to hospitalizations for neck and hip fractures.  She states the wound went on to heal but did not know for how long.    6/3/2025: New epithelium to center of wound.  Ulcer now  into 2 ulcers proximal and distal adherent slough..  -Excisional debridement of wound in clinic today, medically necessary to promote wound healing.  -Patient to return to clinic weekly for assessment and debridement  - Home health to follow-up with patient  weekly.  Cancel home health this week as she will be attending LPS rounds.  -Initiate Urgo clean to remove adherent  slough.  -Tolerating 2 layer compression wrap.  Increased to 4-layer compression wrap.    Recommend obtaining cast protector to keep wrap dry during showering.  If unable to tolerate wrap, patient to remove and apply Tubigrip which was provided to patient.    - Discussed possible surgical interventions, one of which would be for consideration of STSG.  Due to significant deformity of her ankle (see below) however, may require corrective surgery as well.  Scheduled for LPS rounds 6/6/2025.    Wound care: Urgo clean Ag;Super Absorbent Pad;Compression Wrap Four Layer    2. Acquired varus deformity of ankle    6/3/2025: Severe valgus deformity of ankle.  Patient states she had several fractures of this ankle in the past, she did not undergo any surgery  - Patient is now open to at least discussing orthopedic surgery. She is agreeable to being seen during LPS rounds. Recommend her son come to that visit to be fully informed.  - Patient reports she had a boot that she is unable to wear because of location of where her wound is.  She does not have a brace for the ankle.  - X-ray of foot and ankle completed 5/27/2025  -Negative for OM.  -Home health was supposed to  help patient obtain Prevalon boot.  Patient aware that Prevalon boot is to be used when sitting or laying in bed.  She is aware she is not supposed to ambulate in boot.  When patient sleeps she sleeps on her right side putting pressure to the left medial ankle.   -Follow-up at LPS rounds of patient has obtained Prevalon boot    3. Age-related physical debility    6/3/2025: Patient has moderately severe kyphosis of neck and spine, normally uses cane of walker for mobility  - [facility].    4.  Wound infection    6/3/2025:   - No overt evidence of wound infection today  - Patient is on suppressive doxycycline 100 mg p.o. twice daily ordered by Dr. Vazquez for chronic left hip infected hardware  - Monitor for signs and symptoms of  infection each clinic visit      PATIENT EDUCATION  - Importance of adequate nutrition for wound healing  -Advised to go to ER for any increased redness, swelling, drainage, or odor, or if patient develops fever, chills, nausea or vomiting.     My total time spent caring for the patient on the day of the encounter was 20 minutes, reviewing history, assessment, counseling and education, and coordination of care.  This does not include time spent on separately billable procedures/tests.      Please note that this note may have been created using voice recognition software. I have worked with technical experts from [employer] to optimize the interface.  I have made every reasonable attempt to correct obvious errors, but there may be errors of grammar and possibly content that I did not discover before finalizing the note.    N

## 2025-06-03 NOTE — PROGRESS NOTES
Attempting UrgoClean and 4 layer compression wrap trial. Reassess this Friday during LPS and update home health orders as necessary.     4 layer compression wrap applied to left lower extremity. Patient tolerated well. Compression wrap education (such as when and how to remove the wrap, and how to apply a cover dressing) discussed at length. Patient verbalized understanding of all. Refer to AVS and flowsheets for further details. Updated home health orders paced for [facility] via [place].

## 2025-06-04 ENCOUNTER — APPOINTMENT (OUTPATIENT)
Dept: WOUND CARE | Facility: MEDICAL CENTER | Age: 80
End: 2025-06-04
Attending: NURSE PRACTITIONER
Payer: MEDICARE

## 2025-06-06 ENCOUNTER — HOME CARE VISIT (OUTPATIENT)
Dept: HOME HEALTH SERVICES | Facility: HOME HEALTHCARE | Age: 80
End: 2025-06-06
Payer: MEDICARE

## 2025-06-06 ENCOUNTER — NON-PROVIDER VISIT (OUTPATIENT)
Dept: WOUND CARE | Facility: MEDICAL CENTER | Age: 80
End: 2025-06-06
Attending: NURSE PRACTITIONER
Payer: MEDICARE

## 2025-06-06 DIAGNOSIS — L97.322 SKIN ULCER OF LEFT ANKLE WITH FAT LAYER EXPOSED (HCC): Primary | ICD-10-CM

## 2025-06-06 DIAGNOSIS — M21.179: ICD-10-CM

## 2025-06-06 PROCEDURE — 99214 OFFICE O/P EST MOD 30 MIN: CPT

## 2025-06-06 PROCEDURE — 99214 OFFICE O/P EST MOD 30 MIN: CPT | Performed by: NURSE PRACTITIONER

## 2025-06-06 PROCEDURE — 29581 APPL MULTLAYER CMPRN SYS LEG: CPT

## 2025-06-06 NOTE — PROGRESS NOTES
RN Wound Care Procedures 6/6/2025:   FourFlex 4 layer compression wrap applied to the left lower leg and foot. Patient tolerated the procedure well.    Updated wound care order routed to [facility] via [facility].

## 2025-06-06 NOTE — PATIENT INSTRUCTIONS
Keep dressings clean and dry. Change dressings if they become over saturated, soiled or fall off. Otherwise, your home health nurse will change your dressings between wound clinic visits.    On the days you are not changing your dressings, avoid getting the dressings wet. It is not harmful to get your wound wet when you are washing your wound before applying a new dressing.    If you need to change your dressings at home: Wash your wound with normal saline, wound cleanser, or unscented soap and water prior to applying your new dressings. Please avoid cleansing with hydrogen peroxide or rubbing alcohol. Hydrogen peroxide and rubbing alcohol are toxic to new tissue and skin cells.    Avoid prolonged standing or sitting without elevating your legs.    Remove your compression garments if you have severe pain, severe swelling, numbness, color change, or temperature change in your toes. If you need to remove your compression garments, do so by unrolling them. Do not cut the compression garments off, this is to prevent cutting yourself on accident.    Should you experience any significant changes in your wounds, such as signs of infection (increasing redness, swelling, localized heat, increased pain, fever > 101 F, chills) or have any questions regarding your home care instructions, please contact the wound center at [phone]. If after hours, contact your primary care physician or go to the hospital emergency room.     If you are admitted to any hospital, you will need a new referral to come back to the wound clinic and any scheduled appointments that you already have, may be cancelled.     If you are 5 or more minutes late for an appointment, we reserve the right to cancel and reschedule that appointment. Additionally, if you are habitually late or not showing (3 late cancellations and/or no shows), we reserve the right to cancel your remaining appointments and it will be your responsibility to obtain a new referral  if services are still needed.

## 2025-06-06 NOTE — PROGRESS NOTES
LIMB PRESERVATION SERVICE ROUNDS    Referral to LPS Rounds from: Wound clinic     WOUND HISTORY:  Patient with chronic ulcer to her left medial lower leg and ankle referred to [facility] for evaluation management. She is well-known to this clinic from previous treatment of wounds to the same site. She was last seen in our clinic in August 2019 at which time she had a small open wound over the medial malleolus. She was hospitalized shortly thereafter due to a fall resulting in a C2 fracture, and hip pain. She had several prolonged hospitalizations around that time. She opted not to return to the clinic after she was discharged from the hospital. She states that the wound went on to heal until a few years ago when she scratched herself while removing a sock. Over the past few years the wound has gotten progressively larger and she has treated herself using various treatments including zinc paste and Betadine. She does have significant deformity of this ankle, states she has had several fractures of her ankle in the past with no surgical repair. 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       TOBACCO USE:   Former smoker, quit more than 10 years ago       LPS Rounds Interval notes:  6/6/2025-LPS Rounds in clinic with Dr. Banda.  X-rays and clinic notes reviewed.  Chronic wound complicated by severe ankle and foot deformity.  Patient was presented with several options.  1) continue with wound care, consider biologic  2) surgical debridement with biologic application  3) surgical correction of deformity with placement of external frame which would have to be left in place for 3 to 12 months.  4) BKA    Patient opted to continue wound care in the clinic.  Her wound actually has shown some improvement since starting Urgo clean.  Will request authorization for biologic as well.             Pertinent Labs and Diagnostics:    Labs:     A1c:   Lab Results    Component Value Date/Time    HBA1C 5.6 06/04/2018 12:00 PM          IMAGING: Foot/Toes Imaging, Past Year DX-FOOT-COMPLETE 3+ LEFT  Result Date: 5/27/2025  Narrative 5/27/2025 3:04 PM HISTORY/REASON FOR EXAM:  Atraumatic Pain/Swelling/Deformity TECHNIQUE/EXAM DESCRIPTION AND NUMBER OF VIEWS: 3 views of the LEFT foot. COMPARISON:  None. FINDINGS:  Evaluation is limited by deformity and flexion of the toes. There is diffuse osteopenia. There is no definite acute fracture or dislocation.     Impression No definite fracture or dislocation.      VASCULAR STUDIES: No results found.    LAST  WOUND CULTURE:  DATE :        Lab Results   Component Value Date/Time    CULTRSULT - (A) 04/29/2025 09:00 AM    CULTRSULT Proteus mirabilis  Rare growth   (A) 04/29/2025 09:00 AM                         OBJECTIVE FINDINGS:      Pulses: Palpable DP pulse, +2     Wound(s):    Wound 04/22/25 Venous Ulcer Leg Medial;Lower Left --Left Medial/proximal Lower Leg (Active)   Wound Image    06/03/25 1012   Site Assessment Red;Yellow 06/06/25 0900   Periwound Assessment Scar tissue;Edema 06/06/25 0900   Margins Attached edges 06/06/25 0900   Drainage Amount Moderate 06/06/25 0900   Drainage Description Serosanguineous 06/06/25 0900   Treatments Cleansed 06/06/25 0900   Offloading/DME Other (comment) 05/20/25 1000   ** Retired** Wound Cleansing Hypochlorus Acid 05/13/25 1100   Wound Cleansing Foam Cleanser/Washcloth 06/06/25 0900   Periwound Protectant Urea lotion;Silicone barrier cream 06/06/25 0900   Dressing Changed New 05/27/25 1000   Dressing Cleansing/Solutions Not Applicable 06/06/25 0900   Dressing Options Other (Comments);Super Absorbent Pad;Compression Wrap Four Layer 06/06/25 0900   Dressing Change/Treatment Frequency Twice Weekly 06/06/25 0900   Wound Team Following Weekly 06/03/25 1012   Non-staged Wound Description Full thickness 06/06/25 0900   Wound Length (cm) 11.4 cm 06/03/25 1012   Wound Width (cm) 4.1 cm 06/03/25 1012   Wound  Depth (cm) 0.2 cm 06/03/25 1012   Wound Surface Area (cm^2) 36.71 cm^2 06/03/25 1012   Wound Volume (cm^3) 4.895 cm^3 06/03/25 1012   Post-Procedure Length (cm) 7 cm 06/03/25 1012   Post-Procedure Width (cm) 4.6 cm 06/03/25 1012   Post-Procedure Depth (cm) 0.2 cm 06/03/25 1012   Post-Procedure Surface Area (cm^2) 25.29 cm^2 06/03/25 1012   Post-Procedure Volume (cm^3) 3.372 cm^3 06/03/25 1012   Wound Healing % 47 06/03/25 1012   Tunneling (cm) 0 cm 06/06/25 0900   Undermining (cm) 0 cm 06/06/25 0900   Wound Odor None 06/06/25 0900   Pulses DP;1+ 06/06/25 0900   Exposed Structures None 06/06/25 0900   Number of days: 45       Wound 06/03/25 Atypical Full Thickness Pretibial Distal;Medial Left --Left Medial/Distal Lower Leg (Active)   Wound Image   06/03/25 1012   Site Assessment Red;Yellow 06/06/25 0900   Periwound Assessment Scar tissue;Edema 06/06/25 0900   Margins Attached edges 06/06/25 0900   Drainage Amount Moderate 06/06/25 0900   Drainage Description Serosanguineous 06/06/25 0900   Treatments Cleansed 06/06/25 0900   Wound Cleansing Foam Cleanser/Washcloth 06/06/25 0900   Periwound Protectant Urea lotion;Silicone barrier cream 06/06/25 0900   Dressing Cleansing/Solutions Not Applicable 06/06/25 0900   Dressing Options Other (Comments);Super Absorbent Pad;Compression Wrap Four Layer 06/06/25 0900   Dressing Change/Treatment Frequency Twice Weekly 06/06/25 0900   Wound Team Following Weekly 06/03/25 1012   Non-staged Wound Description Full thickness 06/06/25 0900   Post-Procedure Length (cm) 5 cm 06/03/25 1012   Post-Procedure Width (cm) 4.4 cm 06/03/25 1012   Post-Procedure Depth (cm) 0.2 cm 06/03/25 1012   Post-Procedure Surface Area (cm^2) 17.28 cm^2 06/03/25 1012   Post-Procedure Volume (cm^3) 2.304 cm^3 06/03/25 1012   Tunneling (cm) 0 cm 06/06/25 0900   Undermining (cm) 0 cm 06/06/25 0900   Wound Odor None 06/06/25 0900   Pulses DP;1+ 06/06/25 0900   Exposed Structures None 06/06/25 0900   Number of days:  3                                       PROCEDURE     Wound completed by RN, refer to note and flow sheet        PLAN:         Wound Care: Continue treatment and wound clinic  Imaging/Labs: No further imaging  Offloading: No appropriate options  Antibiotics: Continue with suppressive doxycycline for infected hardware (hip)  Surgery: Patient has opted not to proceed with any surgical intervention        LPS Follow up: As needed      30 minutes was spent on preparation for visit, examination, counseling and coordination of care regarding the above.        Please note that this dictation was created using voice recognition software. I have  worked with technical experts from [facility] to optimize the interface.  I have made every reasonable attempt to correct obvious errors, but there may be errors of grammar and possibly content that I did not discover before finalizing the note.

## 2025-06-10 ENCOUNTER — OFFICE VISIT (OUTPATIENT)
Dept: WOUND CARE | Facility: MEDICAL CENTER | Age: 80
End: 2025-06-10
Attending: NURSE PRACTITIONER
Payer: MEDICARE

## 2025-06-10 DIAGNOSIS — M21.179: ICD-10-CM

## 2025-06-10 DIAGNOSIS — L97.322 SKIN ULCER OF LEFT ANKLE WITH FAT LAYER EXPOSED (HCC): Primary | ICD-10-CM

## 2025-06-10 DIAGNOSIS — L08.9 WOUND INFECTION: ICD-10-CM

## 2025-06-10 DIAGNOSIS — T14.8XXA WOUND INFECTION: ICD-10-CM

## 2025-06-10 DIAGNOSIS — R54 AGE-RELATED PHYSICAL DEBILITY: ICD-10-CM

## 2025-06-10 PROCEDURE — 11042 DBRDMT SUBQ TIS 1ST 20SQCM/<: CPT

## 2025-06-10 PROCEDURE — 11045 DBRDMT SUBQ TISS EACH ADDL: CPT | Performed by: STUDENT IN AN ORGANIZED HEALTH CARE EDUCATION/TRAINING PROGRAM

## 2025-06-10 PROCEDURE — 99213 OFFICE O/P EST LOW 20 MIN: CPT

## 2025-06-10 PROCEDURE — 11042 DBRDMT SUBQ TIS 1ST 20SQCM/<: CPT | Performed by: STUDENT IN AN ORGANIZED HEALTH CARE EDUCATION/TRAINING PROGRAM

## 2025-06-10 PROCEDURE — 11045 DBRDMT SUBQ TISS EACH ADDL: CPT

## 2025-06-10 NOTE — PROGRESS NOTES
Provider Encounter- Full Thickness wound    HISTORY OF PRESENT ILLNESS  Wound History:    START OF CARE IN CLINIC: 4/22/2025    REFERRING PROVIDER: Kolby Mejia      WOUND- Full Thickness Wound   LOCATION: Left medial ankle and lower leg     HISTORY: Patient with chronic ulcer to her left medial lower leg and ankle referred to [facility] for evaluation management.  She is well-known to this clinic from previous treatment of wounds to the same site.  She was last seen in our clinic in August 2019 at which time she had a small open wound over the medial malleolus.  She was hospitalized shortly thereafter due to a fall resulting in a C2 fracture, and hip pain.  She had several prolonged hospitalizations around that time.  She opted not to return to the clinic after she was discharged from the hospital.  She states that the wound went on to heal until a few years ago when she scratched herself while removing a sock.  Over the past few years the wound has gotten progressively larger and she has treated herself using various treatments including zinc paste and Betadine.  She does have significant deformity of this ankle, states she has had several fractures of her ankle in the past with no surgical repair.  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    Pertinent Medical History: History of alcohol dependence, in remission, atherosclerosis, history of hip fracture, osteoarthritis, memory deficit, PVD, valgus deformity of left foot    TOBACCO USE: Former smoker, quit more than 10 years ago    Patient's problem list, allergies, and current medications reviewed and updated in Epic    Interval History:  4/22/2025 : Clinic visit with IRON Vasquez, GAYLE-BC, CWSHEREENN, CFSHANNAN.   Patient states that she is feeling well, denies fevers, chills, nausea, vomiting, cough or shortness of breath.  She cannot tell me for sure how long this wound has been open, only  states that it was several years ago.  She denies having much pain from this wound.  Severe valgus deformity of left ankle, she does not wear any sort of orthotic or brace to her left lower extremity.  She states that normally she does use either a cane or walker, however did not bring one with her today.    4/29/25: Clinic visit with Letty PERDUE, LORETO, YAZ, ILDA.  Pt denies fevers, chills, nausea, vomiting.  Ulcer area slightly decreased.  Thick slough.  Patient on suppressive doxycycline 100 mg twice daily for her chronically infected hardware to left hip.  Increased drainage, green-tinged.  Wound culture collected today.  No odor.  Follow results.    5/6/2025 : Clinic visit with IRON Vasquez FNP-BC, CALIN, ILDA.   Patient states she is feeling well overall.  There is quite a bit of slough to her wound today.  Reviewed culture results, positive for rare growth of Proteus.  Given appearance of wound, will go ahead and prescribe Levaquin 500 mg x 7 days.  She is already suppressive doxycycline 100 mg twice daily for infected hardware.      5/13/2025 : Clinic visit with IRON Vasquez, LORETO, CALIN, ILDA.   Patient continues to feel well.  She is tolerating Levaquin without any difficulty, should be completing these in a few days.  Her wound measures about the same, thick slough to wound bed, no odor.    5/20/2025 : Clinic visit with IRON Vasquez FNP-BC, CALIN, ILDA.   Patient states she is feeling well.  Unfortunately, her wound remains stalled.  Slough to wound bed.  Healing complicated by severe deformity of ankle.  Recent issue of possible surgical consult.  Possibly plastics for STSG, Ortho for correction of deformity, or both.  She seems to be more open to the idea of surgery, but states she would like to talk it over with her son first.  I did offer to put her into LPS rounds, provided her with the dates through the month of June.  At her request, I wrote out my  recommendations on a piece of paper for her to give to her son.    5/27/2025: Clinic visit with Bryan Reza MD. Patient reports feeling in normal state of health. Denies any signs or symptoms of infection. Has Xrays scheduled later today, reminded patient. Patient is agreeable to discussing surgical options during LPS clinic, I recommend her son come to appointment. Patient wounds largely unchanged.    6/3/25: Clinic visit with Letty Marroquin APRN, FNP-BC, CWON, CFCN.  Pt denies fevers, chills, nausea, vomiting.  New epithelium to center of wound has formed skin bridge.  Ulcers  into 2.  Tolerating 2 layer compression wrap.  Increased to 4-layer compression wrap.  Initiate Urgo clean.  Returns this Friday 6/6/25 for LPS rounds. Home health orders updated.  Recommend patient cancel home health appointment this Friday as she will be at LPS rounds and dressing will be changed.    L great toenail thick and dystrophic. Separation at cuticle with dry scab.  Offered podiatry for nail care.  Patient declined at this time.  L 1st MTH plantar callus filed with [facility] board    6/6/2025: Clinic visit with Bryan Reza MD. Patient reports doing well, denies any acute issues. Patient was seen during LPS rounds, options were given: Continue wound care, BKA, or extensive reconstruction with external fixator. Patient has opted for conservative care. Patient wounds are slowly improving.   Patient asked about specialty footwear to accommodate her deformity. Briefly discussed with orthotist. Will need evaluation. Will hold off on referral until wound closer to resolution.    REVIEW OF SYSTEMS:   Unchanged from previous wound clinic assessment on 6/3/2025, except as noted in interval history above      PHYSICAL EXAMINATION:   There were no vitals taken for this visit.    Physical Exam  Constitutional:       Appearance: She is normal weight.   HENT:      Ears:      Comments: Hard of hearing  Neck:      Comments:  Kyphosis of neck and spine  Cardiovascular:      Pulses: Normal pulses.      Comments:  +2 left DP palpated.  Unable to palpate PT secondary to wound location  Left DP and PT pulses brisk and multiphasic by Doppler  Pulmonary:      Effort: Pulmonary effort is normal.   Musculoskeletal:      Cervical back: Rigidity present.      Right lower leg: Edema present.      Left lower leg: Edema present.      Comments: Brawny edema of bilateral lower extremities  Severe valgus deformity of left ankle   Skin:     Comments: Full-thickness wound to left medial ankle/lower leg: Skin bridge to center of wound, ulceration  into proximal and distal ulcers and measuring smaller. Less slough.  Heavy serosanguineous drainage.  No odor.  Periwound intact.   No periwound erythema induration.    Brawny edema bilateral lower extremities, hemosiderin staining, varicose veins, scarring and skin changes-consistent with CVI   Neurological:      Mental Status: She is alert and oriented to person, place, and time.      Comments: Poor memory         WOUND ASSESSMENT  Wound 04/22/25 Venous Ulcer Leg Medial;Lower Left --Left Medial/proximal Lower Leg (Active)   Wound Image    06/10/25 1027   Site Assessment Pink;Red;Granulation tissue 06/10/25 1027   Periwound Assessment Scar tissue;Fragile;Maceration 06/10/25 1027   Margins Attached edges 06/10/25 1027   Drainage Amount Moderate 06/10/25 1027   Drainage Description Serosanguineous 06/10/25 1027   Treatments Cleansed;Topical Lidocaine;Provider debridement;Site care;Compression 06/10/25 1027   Offloading/DME Other (comment) 05/20/25 1000   ** Retired** Wound Cleansing Hypochlorus Acid 05/13/25 1100   Wound Cleansing Foam Cleanser/Washcloth;Hypochlorus Acid 06/10/25 1027   Periwound Protectant Skin Moisturizer;TRIAD paste 06/10/25 1027   Dressing Changed New 05/27/25 1000   Dressing Cleansing/Solutions Not Applicable 06/10/25 1027   Dressing Options Other (Comments);Super Absorbent  Pad;Compression Wrap Two Layer 06/10/25 1027   Dressing Change/Treatment Frequency Twice Weekly 06/10/25 1027   Wound Team Following Weekly 06/03/25 1012   Non-staged Wound Description Full thickness 06/10/25 1027   Wound Length (cm) 6.5 cm 06/10/25 1027   Wound Width (cm) 3.6 cm 06/10/25 1027   Wound Depth (cm) 0.2 cm 06/10/25 1027   Wound Surface Area (cm^2) 18.38 cm^2 06/10/25 1027   Wound Volume (cm^3) 2.45 cm^3 06/10/25 1027   Post-Procedure Length (cm) 6.5 cm 06/10/25 1027   Post-Procedure Width (cm) 3.8 cm 06/10/25 1027   Post-Procedure Depth (cm) 0.2 cm 06/10/25 1027   Post-Procedure Surface Area (cm^2) 19.4 cm^2 06/10/25 1027   Post-Procedure Volume (cm^3) 2.587 cm^3 06/10/25 1027   Wound Healing % 73 06/10/25 1027   Tunneling (cm) 0 cm 06/10/25 1027   Undermining (cm) 0 cm 06/10/25 1027   Wound Odor None 06/10/25 1027   Pulses DP;1+ 06/06/25 0900   Exposed Structures None 06/10/25 1027   Number of days: 49       Wound 06/03/25 Atypical Full Thickness Pretibial Distal;Medial Left --Left Medial/Distal Lower Leg (Active)   Wound Image    06/10/25 1027   Site Assessment Pink;Red;Granulation tissue 06/10/25 1027   Periwound Assessment Fragile;Scar tissue;Maceration;Satellite lesions 06/10/25 1027   Margins Attached edges 06/10/25 1027   Drainage Amount Moderate 06/10/25 1027   Drainage Description Serosanguineous 06/10/25 1027   Treatments Cleansed;Topical Lidocaine;Provider debridement;Site care;Compression 06/10/25 1027   Wound Cleansing Foam Cleanser/Washcloth;Hypochlorus Acid 06/10/25 1027   Periwound Protectant Skin Moisturizer;TRIAD paste 06/10/25 1027   Dressing Cleansing/Solutions Not Applicable 06/10/25 1027   Dressing Options Other (Comments);Super Absorbent Pad;Compression Wrap Four Layer 06/10/25 1027   Dressing Change/Treatment Frequency Twice Weekly 06/10/25 1027   Wound Team Following Weekly 06/10/25 1027   Non-staged Wound Description Full thickness 06/10/25 1027   Wound Length (cm) 4.5 cm  06/10/25 1027   Wound Width (cm) 3.7 cm 06/10/25 1027   Wound Depth (cm) 0.1 cm 06/10/25 1027   Wound Surface Area (cm^2) 13.08 cm^2 06/10/25 1027   Wound Volume (cm^3) 0.872 cm^3 06/10/25 1027   Post-Procedure Length (cm) 4.5 cm 06/10/25 1027   Post-Procedure Width (cm) 4 cm 06/10/25 1027   Post-Procedure Depth (cm) 0.2 cm 06/10/25 1027   Post-Procedure Surface Area (cm^2) 14.14 cm^2 06/10/25 1027   Post-Procedure Volume (cm^3) 1.885 cm^3 06/10/25 1027   Tunneling (cm) 0 cm 06/10/25 1027   Undermining (cm) 0 cm 06/10/25 1027   Wound Odor None 06/10/25 1027   Pulses DP;1+ 06/06/25 0900   Exposed Structures None 06/10/25 1027   Number of days: 7       PROCEDURE: Excisional debridement of left medial leg wound x 2  -2% viscous lidocaine applied topically to wound bed for approximately 5 minutes prior to debridement  -Curette used to debride wound bed.  Excisional debridement was performed to remove devitalized tissue until healthy, bleeding tissue was visualized.   Entire surface of wound, 33.54 cm² debrided.  Tissue debrided into the subcutaneous layer.   -Bleeding controlled with manual pressure.    -Wound care completed by wound RN, refer to flowsheet  -Patient tolerated the procedure well, without c/o pain or discomfort.       Pertinent Labs and Diagnostics:    Labs:     A1c:   Lab Results   Component Value Date/Time    HBA1C 5.6 06/04/2018 12:00 PM          IMAGING: X-ray of left ankle 5/27/2025  Osteopenia and significant deformity without a definite acute osseous abnormality.     X-ray left foot 5/27/2025  No definite fracture or dislocation.       VASCULAR STUDIES: None found in epic    LAST  WOUND CULTURE:  DATE :   Lab Results   Component Value Date/Time    CULTRSULT - (A) 04/29/2025 09:00 AM    CULTRSULT Proteus mirabilis  Rare growth   (A) 04/29/2025 09:00 AM         ASSESSMENT AND PLAN:     1. Skin ulcer of left ankle with fat layer exposed (HCC)  Large full-thickness ulcer to left medial lower leg and  ankle, present for several years.  Patient was treated for wounds to this same area in 2019, stopped coming into the clinic due to hospitalizations for neck and hip fractures.  She states the wound went on to heal but did not know for how long.    6/10/2025: Wounds smaller, the skin bridge is maturing. Slightly maceration.  -Excisional debridement of wound in clinic today, medically necessary to promote wound healing.  -Patient to return to clinic weekly for assessment and debridement  - Home health to follow-up with patient  weekly.  - Continue Urgoclean to remove adherent slough.  Tolerating 4-layer compression wrap.    Recommend obtaining cast protector to keep wrap dry during showering.  If unable to tolerate wrap, patient to remove and apply Tubigrip which was provided to patient.    - Patient was seen during LPS rounds, surgical options were discussed with patient. She opted for conservative care with wound care.    Wound care: Urgo clean Ag;Super Absorbent Pad;Compression Wrap Four Layer    2. Acquired varus deformity of ankle    6/10/2025: Severe valgus deformity of ankle.  Patient states she had several fractures of this ankle in the past, she did not undergo any surgery  - Seen by LPS clinic. Surgical options including BKA or reconstruction with external fixator. Patient declined surgery and would like to continue conservative care with wound care.  - Patient reports she had a boot that she is unable to wear because of location of where her wound is.  She does not have a brace for the ankle.  - Case discussed with orthotist at [facility]. May benefit from custom bracing. Will consider when wound closer to resolution.  - X-ray of foot and ankle completed 5/27/2025  -Negative for OM.  -Home health was supposed to  help patient obtain Prevalon boot.  Patient aware that Prevalon boot is to be used when sitting or laying in bed.  She is aware she is not supposed to ambulate in boot.  When patient sleeps she  sleeps on her right side putting pressure to the left medial ankle.    3. Age-related physical debility    6/10/2025: Patient has moderately severe kyphosis of neck and spine, normally uses cane of walker for mobility  - Patient is established with [facility].    4.  Wound infection    6/10/2025:   - No overt evidence of wound infection today  - Patient is on suppressive doxycycline 100 mg p.o. twice daily ordered by Dr. Vazquez for chronic left hip infected hardware  - Monitor for signs and symptoms of infection each clinic visit      PATIENT EDUCATION  - Importance of adequate nutrition for wound healing  -Advised to go to ER for any increased redness, swelling, drainage, or odor, or if patient develops fever, chills, nausea or vomiting.     My total time spent caring for the patient on the day of the encounter was 20 minutes, reviewing history, assessment, counseling and education, and coordination of care, including discussing case with Orthotist.  This does not include time spent on separately billable procedures/tests.    Please note that this note may have been created using voice recognition software. I have worked with technical experts from [facility] to optimize the interface.  I have made every reasonable attempt to correct obvious errors, but there may be errors of grammar and possibly content that I did not discover before finalizing the note.    N

## 2025-06-10 NOTE — PROGRESS NOTES
4 layer compression wrap placed to left lower extremity. Patient tolerated well. She reports the 4 layer compression wrap is comfortable and supportive. She was sent home with leftover UrgoClean dressing. [facility]. Refer to AVS and flowsheets for further details.

## 2025-06-10 NOTE — PATIENT INSTRUCTIONS
"The following information is a summary of the education provided in the clinic today. This is not an exhaustive list of the education provided during your appointment.       DRESSING CHANGES    Keep your wound dressing clean, dry, and intact.     You may  shower with the dressing(s) off on dressing change  days only. Please do not take baths, or swim in the ocean, lakes, rivers, pools, or hot tubs.      Wounds do not need to \"air out\" or \"breathe\". Gently dry your wound before placing a new dressing.     After you get out of the shower, wash the wound a second time (with soap and water, wound cleanser, or saline). Gently dry the wound before you place a new dressing.       If you need to change your dressings at home, you should wash your wound. Use normal saline, wound cleanser, hypochlorous acid, or unscented soap and water. Do not use hydrogen peroxide or rubbing alcohol to clean your wounds. Hydrogen peroxide and rubbing alcohol will damage new cells and tissue. Do not use betadine or iodine unless told to by your wound care team.    Do not soak your wounds in epsom salt baths. This can worsen your wound(s) or delay wound healing. It can also lead to infection or maceration (tissue is too wet).     If you do not have home health, the clinic will give you with leftover supplies from your appointment. We do not give out extra dressing supplies. We will order you supplies through a [employer] company. Your insurance may or may not pay for all these supplies. The company will reach out to you if insurance does not cover supplies. These supplies will be sent to your home within a few days. If you do have home health, they will provide wound care supplies.     The dressings we use may change as your wound changes.     COMPRESSION   -Today, a 4 layer compression wrap was applied. Please take off the wrap if you have pain, extreme swelling, new numbness or tingling, a change in the color or temperature of your feet. Take off " the wrap if the wrap slides down/bunches up Take off the wrap if it gets wet or leaks through.  If you have to take off the wrap, please do not cut it off with scissors or other sharp tools. Do not trim the wrap. Unravel the wrap one layer at a time. Place a clean and dry dressing over the wound. Do not leave wrap on for more than seven days at a time. You can wear a cast protector over your wrap so you can take a shower. Cast protectors can be found at most drug [employer] and [employer] does not endorse any brand of cast protector.        GENERAL HEALTH ADVICE      CLINIC INFORMATION  The clinic's hours are Monday-Friday, 7:30 AM to 5:00 PM. We are closed most holidays and on weekends. If you leave us a message, please allow 24 hours for someone to return your call. If you have concerns or are having a medical emergency, call 911 or go to the hospital emergency room.     You might not see the same nurse or provider every visit.     If you notice any large changes in your wound(s), or signs of infection (redness, swelling, localized heat, increased pain, fever > 101 F, chills, nausea/vomiting) or have any questions about your home care instructions, please call the wound center at [phone]. If it's after hours, contact your primary care physician or go to the hospital emergency room. If you are admitted to any hospital, you will need a new referral to come back to the wound clinic. Any wound care appointments that you already have may be cancelled.    If you are 5 or more minutes late for an appointment, we reserve the right to cancel and reschedule that appointment. For example, if your appointment is at 1:00 PM, and you arrive at 1:06 PM, you are more than five minutes late and might not be seen. If you are consistently late or not coming to your appointments (typically 3 late cancellations and/or no shows), we reserve the right to cancel your future appointments or discharge you from the clinic. It is then  your responsibility to obtain a new referral if wound care is still needed.

## 2025-06-13 ENCOUNTER — HOME CARE VISIT (OUTPATIENT)
Dept: HOME HEALTH SERVICES | Facility: HOME HEALTHCARE | Age: 80
End: 2025-06-13
Payer: MEDICARE

## 2025-06-13 VITALS
RESPIRATION RATE: 17 BRPM | OXYGEN SATURATION: 97 % | TEMPERATURE: 98.3 F | SYSTOLIC BLOOD PRESSURE: 117 MMHG | DIASTOLIC BLOOD PRESSURE: 74 MMHG | HEART RATE: 74 BPM

## 2025-06-13 PROCEDURE — G0299 HHS/HOSPICE OF RN EA 15 MIN: HCPCS

## 2025-06-13 ASSESSMENT — ENCOUNTER SYMPTOMS: DEBILITATING PAIN: 1

## 2025-06-13 NOTE — CARE PLAN
DISCHARGE SUMMARY  To be completed within 30 days of last clinical contact      Tobi Beatty : 2000 MRN: [id number]      Date of Intake: 2024 Date of Last Session: 2025    Chief Complaint:anxiety, mood    Admission Diagnosis:Anxiety disorder, unspecified type  (primary encounter diagnosis)  Mood disorder (CMD)    D/C Diagnosis Codes: Anxiety disorder, unspecified type  (primary encounter diagnosis)  Mood disorder (CMD)    The above named patient was discharged from  care on 2025 for the following reason(s): Patient Has Completed Treatment (mutual agreement)    Treatment Provided: (check all that apply) Individual    No current outpatient medications on file.     No current facility-administered medications for this visit.       Summary of Patient Progress Toward Goals in the Treatment Plan: Goals completed.  Client in agreement.    Discharge Recommendations: (Include names and addresses of facilities, persons or programs the patient was referred to following discharge.)   Client should return to clinic for continued treatment if in need in the future.    Remaining Discharge Needs: (Include treatment issues not addressed.) none      BETZY Fischer Date: 2025   Time: 9:48 AM    [facility] [place], [address] [place] [address]  Dept Phone: [phone]    Tobi Beatty :2000 MRN:[id number]    RE-ADMIT PROGRESS NOTE    This visit was performed via live interactive two-way Video visit with patient's verbal consent.   Clinician Location:Home.  Patient Location: Home.  Verified patient identity:  [x] Yes    Re-admit date:  2025    Last date of service:  2025    Reason for re-admit: New symptoms    Provider reviewed current assessment with patient: Yes  []    No  []      [] No changes to current status      Changes in: [] BH symptoms  [] Safety  [] Physical Health     [] Work/School  [] Family  [] Trauma  [] Loss  [] Substance  Problem: Safety  Goal: Will remain free from falls    Intervention: Implement fall precautions  Call light and personal belongings within reach. Pt instructed to call for assistance, pt verbalizes understanding. Non skid socks on.. Bed in lowest position.          use    (Details to be included in DARP Note)    Treatment plan [] reviewed      [] updated and signed by provider     [] review deferred to next session    6/13/2025 Time Session Began: 9:00am  Time Session Ended: 9:43am    Session Type: Therapy 38-52 minutes (43584)    Others Present: none    Intervention:  Cognitive Behavioral    Suicide/Homicide/Violence Ideation: No    If Yes, explain:     No current outpatient medications on file.     No current facility-administered medications for this visit.       Change in Medication(s) Reported: No      If Yes, explain:     Patient/Family Education Provided: Yes  Patient/Family Displays Understanding: Yes    If No, explain:     Chief complaint in patient's own words: \"I am doing much better and ready for graduation from counseling services.\"    Progress Note containing chief complaint and symptoms/problems related to the complaint:    D: Tobi Beatty, a 24 year old male presents with anxiety unspecified issues.  Rule out ADHD combined type.   A:  Writer offered reflective and active listening skills.  Writer completed session by video.  Writer completed an updated treatment plan.  Writer discussed coping with improving self care and anxiety issues.  Writer offered a CBT approach to treatment.  R:  Client responded well to reflective and active listening skills.  Client discussed coping with improving self care, such as feeling very well and symptoms have resolved.  Client is ready for discharge.  Client asked for a letter to address his progress, which was written in session.  Client responded well to a CBT approach to treatment.  P:  Plan for client to discharge.      Need for Community Resources Assessed: Yes    Resources Needed: No    If Yes, what resources:     Primary Diagnosis: Anxiety D/O NOS : 0 Unspecified    Treatment Plan:  Modified     Discharge Plan: Strategies Discussed to Maintain Gains     Next Appointment: discharged  BETZY Fischer

## 2025-06-16 SDOH — ECONOMIC STABILITY: FOOD INSECURITY: WITHIN THE PAST 12 MONTHS, YOU WORRIED THAT YOUR FOOD WOULD RUN OUT BEFORE YOU GOT MONEY TO BUY MORE.: NEVER TRUE

## 2025-06-16 SDOH — HEALTH STABILITY: PHYSICAL HEALTH: ON AVERAGE, HOW MANY DAYS PER WEEK DO YOU ENGAGE IN MODERATE TO STRENUOUS EXERCISE (LIKE A BRISK WALK)?: 2 DAYS

## 2025-06-16 SDOH — ECONOMIC STABILITY: INCOME INSECURITY: IN THE LAST 12 MONTHS, WAS THERE A TIME WHEN YOU WERE NOT ABLE TO PAY THE MORTGAGE OR RENT ON TIME?: NO

## 2025-06-16 SDOH — ECONOMIC STABILITY: INCOME INSECURITY: HOW HARD IS IT FOR YOU TO PAY FOR THE VERY BASICS LIKE FOOD, HOUSING, MEDICAL CARE, AND HEATING?: SOMEWHAT HARD

## 2025-06-16 SDOH — ECONOMIC STABILITY: FOOD INSECURITY: WITHIN THE PAST 12 MONTHS, THE FOOD YOU BOUGHT JUST DIDN'T LAST AND YOU DIDN'T HAVE MONEY TO GET MORE.: NEVER TRUE

## 2025-06-16 SDOH — HEALTH STABILITY: PHYSICAL HEALTH: ON AVERAGE, HOW MANY MINUTES DO YOU ENGAGE IN EXERCISE AT THIS LEVEL?: 10 MIN

## 2025-06-16 SDOH — ECONOMIC STABILITY: HOUSING INSECURITY
IN THE LAST 12 MONTHS, WAS THERE A TIME WHEN YOU DID NOT HAVE A STEADY PLACE TO SLEEP OR SLEPT IN A SHELTER (INCLUDING NOW)?: NO

## 2025-06-16 SDOH — ECONOMIC STABILITY: TRANSPORTATION INSECURITY
IN THE PAST 12 MONTHS, HAS LACK OF RELIABLE TRANSPORTATION KEPT YOU FROM MEDICAL APPOINTMENTS, MEETINGS, WORK OR FROM GETTING THINGS NEEDED FOR DAILY LIVING?: NO

## 2025-06-16 SDOH — HEALTH STABILITY: MENTAL HEALTH
STRESS IS WHEN SOMEONE FEELS TENSE, NERVOUS, ANXIOUS, OR CAN'T SLEEP AT NIGHT BECAUSE THEIR MIND IS TROUBLED. HOW STRESSED ARE YOU?: ONLY A LITTLE

## 2025-06-16 ASSESSMENT — ENCOUNTER SYMPTOMS
BOWEL PATTERN NORMAL: 1
PAIN LOCATION: LEFT FOOT
PAIN LOCATION - PAIN FREQUENCY: INTERMITTENT
LOWER EXTREMITY EDEMA: 1
LOWEST PAIN SEVERITY IN PAST 24 HOURS: 0/10
PAIN LOCATION - PAIN SEVERITY: 2/10
PAIN: 1
PAIN SEVERITY GOAL: 0/10
HIGHEST PAIN SEVERITY IN PAST 24 HOURS: 2/10
LAST BOWEL MOVEMENT: 67362
SUBJECTIVE PAIN PROGRESSION: RESOLVED
STOOL FREQUENCY: DAILY

## 2025-06-16 ASSESSMENT — LIFESTYLE VARIABLES
HOW MANY STANDARD DRINKS CONTAINING ALCOHOL DO YOU HAVE ON A TYPICAL DAY: PATIENT DOES NOT DRINK
AUDIT-C TOTAL SCORE: 0
HOW OFTEN DO YOU HAVE SIX OR MORE DRINKS ON ONE OCCASION: NEVER
SKIP TO QUESTIONS 9-10: 1
HOW OFTEN DO YOU HAVE A DRINK CONTAINING ALCOHOL: NEVER

## 2025-06-16 ASSESSMENT — SOCIAL DETERMINANTS OF HEALTH (SDOH)
HOW HARD IS IT FOR YOU TO PAY FOR THE VERY BASICS LIKE FOOD, HOUSING, MEDICAL CARE, AND HEATING?: SOMEWHAT HARD
IN A TYPICAL WEEK, HOW MANY TIMES DO YOU TALK ON THE PHONE WITH FAMILY, FRIENDS, OR NEIGHBORS?: TWICE A WEEK
HOW OFTEN DO YOU ATTENT MEETINGS OF THE CLUB OR ORGANIZATION YOU BELONG TO?: NEVER
HOW OFTEN DO YOU ATTEND CHURCH OR RELIGIOUS SERVICES?: NEVER
HOW OFTEN DO YOU HAVE SIX OR MORE DRINKS ON ONE OCCASION: NEVER
HOW OFTEN DO YOU GET TOGETHER WITH FRIENDS OR RELATIVES?: TWICE A WEEK
HOW OFTEN DO YOU GET TOGETHER WITH FRIENDS OR RELATIVES?: TWICE A WEEK
HOW OFTEN DO YOU HAVE A DRINK CONTAINING ALCOHOL: NEVER
HOW OFTEN DO YOU ATTEND CHURCH OR RELIGIOUS SERVICES?: NEVER
IN THE PAST 12 MONTHS, HAS THE ELECTRIC, GAS, OIL, OR WATER COMPANY THREATENED TO SHUT OFF SERVICE IN YOUR HOME?: NO
IN A TYPICAL WEEK, HOW MANY TIMES DO YOU TALK ON THE PHONE WITH FAMILY, FRIENDS, OR NEIGHBORS?: TWICE A WEEK
DO YOU BELONG TO ANY CLUBS OR ORGANIZATIONS SUCH AS CHURCH GROUPS UNIONS, FRATERNAL OR ATHLETIC GROUPS, OR SCHOOL GROUPS?: NO
WITHIN THE PAST 12 MONTHS, YOU WORRIED THAT YOUR FOOD WOULD RUN OUT BEFORE YOU GOT THE MONEY TO BUY MORE: NEVER TRUE
DO YOU BELONG TO ANY CLUBS OR ORGANIZATIONS SUCH AS CHURCH GROUPS UNIONS, FRATERNAL OR ATHLETIC GROUPS, OR SCHOOL GROUPS?: NO
HOW MANY DRINKS CONTAINING ALCOHOL DO YOU HAVE ON A TYPICAL DAY WHEN YOU ARE DRINKING: PATIENT DOES NOT DRINK
HOW OFTEN DO YOU ATTENT MEETINGS OF THE CLUB OR ORGANIZATION YOU BELONG TO?: NEVER

## 2025-06-16 ASSESSMENT — PATIENT HEALTH QUESTIONNAIRE - PHQ9: CLINICAL INTERPRETATION OF PHQ2 SCORE: 0

## 2025-06-16 NOTE — CASE COMMUNICATION
patient was not tolerating 4 layer complression well, she was having foot pain and toes are swollen, used tubigrip instead per notes.

## 2025-06-17 ENCOUNTER — OFFICE VISIT (OUTPATIENT)
Dept: WOUND CARE | Facility: MEDICAL CENTER | Age: 80
End: 2025-06-17
Attending: NURSE PRACTITIONER
Payer: MEDICARE

## 2025-06-17 DIAGNOSIS — M21.179: ICD-10-CM

## 2025-06-17 DIAGNOSIS — R54 AGE-RELATED PHYSICAL DEBILITY: ICD-10-CM

## 2025-06-17 DIAGNOSIS — L97.322 SKIN ULCER OF LEFT ANKLE WITH FAT LAYER EXPOSED (HCC): Primary | ICD-10-CM

## 2025-06-17 DIAGNOSIS — T14.8XXA WOUND INFECTION: ICD-10-CM

## 2025-06-17 DIAGNOSIS — L08.9 WOUND INFECTION: ICD-10-CM

## 2025-06-17 PROCEDURE — 99213 OFFICE O/P EST LOW 20 MIN: CPT

## 2025-06-17 PROCEDURE — 11042 DBRDMT SUBQ TIS 1ST 20SQCM/<: CPT | Performed by: NURSE PRACTITIONER

## 2025-06-17 PROCEDURE — 11042 DBRDMT SUBQ TIS 1ST 20SQCM/<: CPT

## 2025-06-17 NOTE — PATIENT INSTRUCTIONS
"The following information is a summary of the education provided in the clinic today. This is not an exhaustive list of the education provided during your appointment.       DRESSING CHANGES    Keep your wound dressing clean, dry, and intact.   You may  shower with the dressing(s) on however use a cast protector to ensure that wrap stays dry. If wrap gets wet you should remove it by unwrapping it. Please do not take baths, or swim in the ocean, lakes, rivers, pools, or hot tubs.      Wounds do not need to \"air out\" or \"breathe\". Gently dry your wound before placing a new dressing.     After you get out of the shower, wash the wound a second time (with soap and water, wound cleanser, or saline). Gently dry the wound before you place a new dressing.       If you need to change your dressings at home, you should wash your wound. Use normal saline, wound cleanser, hypochlorous acid, or unscented soap and water. Do not use hydrogen peroxide or rubbing alcohol to clean your wounds. Hydrogen peroxide and rubbing alcohol will damage new cells and tissue. Do not use betadine or iodine unless told to by your wound care team.    Do not soak your wounds in epsom salt baths. This can worsen your wound(s) or delay wound healing. It can also lead to infection or maceration (tissue is too wet).     If you do not have home health, the clinic will give you with leftover supplies from your appointment. We do not give out extra dressing supplies. We will order you supplies through a [employer] company. Your insurance may or may not pay for all these supplies. The company will reach out to you if insurance does not cover supplies. These supplies will be sent to your home within a few days. If you do have home health, they will provide wound care supplies.     The dressings we use may change as your wound changes.         COMPRESSION   -Compression helps reduce swelling and helps wounds heal quicker. It is still important to elevate your feet " several times daily to reduce swelling, even when you are wearing compression. It is important to wear compression every day, to help your wound heal, and to prevent new wounds from developing.      -Today, a 4 layer compression wrap was applied. Please take off the wrap if you have pain, extreme swelling, new numbness or tingling, a change in the color or temperature of your feet. Take off the wrap if the wrap slides down/bunches up Take off the wrap if it gets wet or leaks through.  If you have to take off the wrap, please do not cut it off with scissors or other sharp tools. Do not trim the wrap. Unravel the wrap one layer at a time. Place a clean and dry dressing over the wound. Do not leave wrap on for more than seven days at a time. You can wear a cast protector over your wrap so you can take a shower. Cast protectors can be found at most [employer] and [employer]. [employer] does not endorse any brand of cast protector.        GENERAL HEALTH ADVICE   -Nutrition is important for wound healing. Unless told otherwise by your doctor, eat more protein and consider supplementing with a multi-vitamin, zinc, and vitamin C.         CLINIC INFORMATION  The clinic's hours are Monday-Friday, 7:30 AM to 5:00 PM. We are closed most holidays and on weekends. If you leave us a message, please allow 24 hours for someone to return your call. If you have concerns or are having a medical emergency, call 911 or go to the hospital emergency room.     You might not see the same nurse or provider every visit.     If you notice any large changes in your wound(s), or signs of infection (redness, swelling, localized heat, increased pain, fever > 101 F, chills, nausea/vomiting) or have any questions about your home care instructions, please call the wound center at [phone]. If it's after hours, contact your primary care physician or go to the hospital emergency room. If you are admitted to any hospital, you will need a new referral to  come back to the wound clinic. Any wound care appointments that you already have may be cancelled.    If you are 5 or more minutes late for an appointment, we reserve the right to cancel and reschedule that appointment. For example, if your appointment is at 1:00 PM, and you arrive at 1:06 PM, you are more than five minutes late and might not be seen. If you are consistently late or not coming to your appointments (typically 3 late cancellations and/or no shows), we reserve the right to cancel your future appointments or discharge you from the clinic. It is then your responsibility to obtain a new referral if wound care is still needed.

## 2025-06-17 NOTE — PROGRESS NOTES
Provider Encounter- Full Thickness wound    HISTORY OF PRESENT ILLNESS  Wound History:    START OF CARE IN CLINIC: 4/22/2025    REFERRING PROVIDER: Kolby Mejia      WOUND- Full Thickness Wound   LOCATION: Left medial ankle and lower leg     HISTORY: Patient with chronic ulcer to her left medial lower leg and ankle referred to [facility] for evaluation management.  She is well-known to this clinic from previous treatment of wounds to the same site.  She was last seen in our clinic in August 2019 at which time she had a small open wound over the medial malleolus.  She was hospitalized shortly thereafter due to a fall resulting in a C2 fracture, and hip pain.  She had several prolonged hospitalizations around that time.  She opted not to return to the clinic after she was discharged from the hospital.  She states that the wound went on to heal until a few years ago when she scratched herself while removing a sock.  Over the past few years the wound has gotten progressively larger and she has treated herself using various treatments including zinc paste and Betadine.  She does have significant deformity of this ankle, states she has had several fractures of her ankle in the past with no surgical repair.  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    Pertinent Medical History: History of alcohol dependence, in remission, atherosclerosis, history of hip fracture, osteoarthritis, memory deficit, PVD, valgus deformity of left foot    TOBACCO USE: Former smoker, quit more than 10 years ago    Patient's problem list, allergies, and current medications reviewed and updated in Epic    Interval History:  4/22/2025 : Clinic visit with IRON Vasquez, GAYLE-BC, CWSHEREENN, CFSHANNAN.   Patient states that she is feeling well, denies fevers, chills, nausea, vomiting, cough or shortness of breath.  She cannot tell me for sure how long this wound has been open, only  states that it was several years ago.  She denies having much pain from this wound.  Severe valgus deformity of left ankle, she does not wear any sort of orthotic or brace to her left lower extremity.  She states that normally she does use either a cane or walker, however did not bring one with her today.    4/29/25: Clinic visit with Letty PERDUE, LORETO, YAZ, ILDA.  Pt denies fevers, chills, nausea, vomiting.  Ulcer area slightly decreased.  Thick slough.  Patient on suppressive doxycycline 100 mg twice daily for her chronically infected hardware to left hip.  Increased drainage, green-tinged.  Wound culture collected today.  No odor.  Follow results.    5/6/2025 : Clinic visit with IRON Vasquez FNP-BC, CALIN, ILDA.   Patient states she is feeling well overall.  There is quite a bit of slough to her wound today.  Reviewed culture results, positive for rare growth of Proteus.  Given appearance of wound, will go ahead and prescribe Levaquin 500 mg x 7 days.  She is already suppressive doxycycline 100 mg twice daily for infected hardware.      5/13/2025 : Clinic visit with IRON Vasquez, LORETO, CALIN, ILDA.   Patient continues to feel well.  She is tolerating Levaquin without any difficulty, should be completing these in a few days.  Her wound measures about the same, thick slough to wound bed, no odor.    5/20/2025 : Clinic visit with IRON Vasquez FNP-BC, CALIN, ILDA.   Patient states she is feeling well.  Unfortunately, her wound remains stalled.  Slough to wound bed.  Healing complicated by severe deformity of ankle.  Recent issue of possible surgical consult.  Possibly plastics for STSG, Ortho for correction of deformity, or both.  She seems to be more open to the idea of surgery, but states she would like to talk it over with her son first.  I did offer to put her into LPS rounds, provided her with the dates through the month of June.  At her request, I wrote out my  recommendations on a piece of paper for her to give to her son.    5/27/2025: Clinic visit with Bryan Reza MD. Patient reports feeling in normal state of health. Denies any signs or symptoms of infection. Has Xrays scheduled later today, reminded patient. Patient is agreeable to discussing surgical options during LPS clinic, I recommend her son come to appointment. Patient wounds largely unchanged.    6/3/25: Clinic visit with LORETO Austin, YAZ, ILDA.  Pt denies fevers, chills, nausea, vomiting.  New epithelium to center of wound has formed skin bridge.  Ulcers  into 2.  Tolerating 2 layer compression wrap.  Increased to 4-layer compression wrap.  Initiate Urgo clean.  Returns this Friday 6/6/25 for LPS rounds. Home health orders updated.  Recommend patient cancel home health appointment this Friday as she will be at LPS rounds and dressing will be changed.    L great toenail thick and dystrophic. Separation at cuticle with dry scab.  Offered podiatry for nail care.  Patient declined at this time.  L 1st MTH plantar callus filed with [facility] board    6/10/2025: Clinic visit with Bryan Reza MD. Patient reports doing well, denies any acute issues. Patient was seen during LPS rounds, options were given: Continue wound care, BKA, or extensive reconstruction with external fixator. Patient has opted for conservative care. Patient wounds are slowly improving.   Patient asked about specialty footwear to accommodate her deformity. Briefly discussed with orthotist. Will need evaluation. Will hold off on referral until wound closer to resolution.    6/17/25: Clinic visit with LORETO Austin, YAZ, ILDA.  Pt denies fevers, chills, nausea, vomiting.  Both left medial ankle and left medial lower leg ulcers are improved.  Thick slough.  Reports that home health noted purple discoloration to toes and removed 4-layer wrap and applied Tubigrip.  Patient reports that she had no  discomfort and is agreeable to return to 4-layer compression wrap.      REVIEW OF SYSTEMS:   Unchanged from previous wound clinic assessment on 6/10/2025, except as noted in interval history above      PHYSICAL EXAMINATION:   There were no vitals taken for this visit.    Physical Exam  Constitutional:       Appearance: She is normal weight.   HENT:      Ears:      Comments: Hard of hearing  Neck:      Comments: Kyphosis of neck and spine  Cardiovascular:      Pulses: Normal pulses.      Comments:  +2 left DP palpated.  Unable to palpate PT secondary to wound location  Left DP and PT pulses brisk and multiphasic by Doppler  Pulmonary:      Effort: Pulmonary effort is normal.   Musculoskeletal:      Cervical back: Rigidity present.      Right lower leg: Edema present.      Left lower leg: Edema present.      Comments: Brawny edema of bilateral lower extremities  Severe valgus deformity of left ankle   Skin:     Comments: Full-thickness wound to left medial ankle: Full-thickness, area decreased, thick slough  No evidence of infection, moderate serosanguineous drainage    Left medial lower leg: Full-thickness, area decreased, thick slough moderate heavy serosanguineous drainage.  No odor.  Periwound intact.   No periwound erythema induration.    Brawny edema bilateral lower extremities, hemosiderin staining, varicose veins, scarring and skin changes-consistent with CVI   Neurological:      Mental Status: She is alert and oriented to person, place, and time.      Comments: Poor memory         WOUND ASSESSMENT  Wound 04/22/25 Venous Ulcer Leg Medial;Lower Left --Left Medial/proximal Lower Leg (Active)   Wound Image    06/17/25 0930   Site Assessment Red;Pink;Yellow 06/17/25 0930   Periwound Assessment Fragile;Scar tissue;Maceration 06/17/25 0930   Margins Attached edges 06/17/25 0930   Drainage Amount Moderate 06/17/25 0930   Drainage Description Serosanguineous 06/17/25 0930   Treatments Topical Lidocaine;Cleansed;Site  care;Provider debridement 06/17/25 0930   Offloading/DME Other (comment) 05/20/25 1000   ** Retired** Wound Cleansing Hypochlorus Acid 05/13/25 1100   Wound Cleansing Hypochlorus Acid;Foam Cleanser/Washcloth 06/17/25 0930   Periwound Protectant Skin Moisturizer;TRIAD paste 06/17/25 0930   Dressing Changed New 05/27/25 1000   Dressing Cleansing/Solutions Not Applicable 06/17/25 0930   Dressing Options Other (Comments);Super Absorbent Pad;Compression Wrap Four Layer 06/17/25 0930   Dressing Change/Treatment Frequency Twice Weekly 06/17/25 0930   Wound Team Following Weekly 06/03/25 1012   Non-staged Wound Description Full thickness 06/17/25 0930   Wound Length (cm) 2.5 cm 06/17/25 0930   Wound Width (cm) 3.5 cm 06/17/25 0930   Wound Depth (cm) 0.1 cm 06/17/25 0930   Wound Surface Area (cm^2) 6.87 cm^2 06/17/25 0930   Wound Volume (cm^3) 0.458 cm^3 06/17/25 0930   Post-Procedure Length (cm) 2.3 cm 06/17/25 0930   Post-Procedure Width (cm) 3.7 cm 06/17/25 0930   Post-Procedure Depth (cm) 0.2 cm 06/17/25 0930   Post-Procedure Surface Area (cm^2) 6.68 cm^2 06/17/25 0930   Post-Procedure Volume (cm^3) 0.891 cm^3 06/17/25 0930   Wound Healing % 95 06/17/25 0930   Tunneling (cm) 0 cm 06/17/25 0930   Undermining (cm) 0 cm 06/17/25 0930   Wound Odor None 06/17/25 0930   Pulses DP;1+ 06/06/25 0900   Exposed Structures None 06/17/25 0930   Number of days: 56       Wound 06/03/25 Atypical Full Thickness Pretibial Distal;Medial Left --Left Medial/Distal Lower Leg (Active)   Wound Image    06/17/25 0930   Site Assessment Pink;Yellow;Red;Granulation tissue 06/17/25 0930   Periwound Assessment Fragile;Scar tissue 06/17/25 0930   Margins Attached edges 06/17/25 0930   Drainage Amount Moderate 06/17/25 0930   Drainage Description Serosanguineous 06/17/25 0930   Treatments Topical Lidocaine;Cleansed;Site care;Provider debridement 06/17/25 0930   Wound Cleansing Foam Cleanser/Washcloth;Hypochlorus Acid 06/17/25 0930   Periwound  Protectant Skin Moisturizer;TRIAD paste 06/17/25 0930   Dressing Cleansing/Solutions Not Applicable 06/17/25 0930   Dressing Options Other (Comments);Super Absorbent Pad;Compression Wrap Four Layer 06/17/25 0930   Dressing Change/Treatment Frequency Twice Weekly 06/17/25 0930   Wound Team Following Weekly 06/17/25 0930   Non-staged Wound Description Full thickness 06/17/25 0930   Wound Length (cm) 4.1 cm 06/17/25 0930   Wound Width (cm) 3.7 cm 06/17/25 0930   Wound Depth (cm) 0.2 cm 06/17/25 0930   Wound Surface Area (cm^2) 11.91 cm^2 06/17/25 0930   Wound Volume (cm^3) 1.589 cm^3 06/17/25 0930   Post-Procedure Length (cm) 3.7 cm 06/17/25 0930   Post-Procedure Width (cm) 3.7 cm 06/17/25 0930   Post-Procedure Depth (cm) 0.2 cm 06/17/25 0930   Post-Procedure Surface Area (cm^2) 10.75 cm^2 06/17/25 0930   Post-Procedure Volume (cm^3) 1.434 cm^3 06/17/25 0930   Wound Healing % -82 06/17/25 0930   Tunneling (cm) 0 cm 06/17/25 0930   Undermining (cm) 0 cm 06/17/25 0930   Wound Odor None 06/17/25 0930   Pulses DP;1+ 06/06/25 0900   Exposed Structures None 06/17/25 0930   Number of days: 14       PROCEDURE: Excisional debridement of left medial leg wound x 2  -2% viscous lidocaine applied topically to wound bed for approximately 5 minutes prior to debridement  -Curette used to debride wound bed.  Excisional debridement was performed to remove devitalized tissue until healthy, bleeding tissue was visualized.   Entire surface of wound,17.43 cm² debrided.  Tissue debrided into the subcutaneous layer.   -Bleeding controlled with manual pressure.    -Wound care completed by wound RN, refer to flowsheet  -Patient tolerated the procedure well, without c/o pain or discomfort.       Pertinent Labs and Diagnostics:    Labs:     A1c:   Lab Results   Component Value Date/Time    HBA1C 5.6 06/04/2018 12:00 PM          IMAGING: X-ray of left ankle 5/27/2025  Osteopenia and significant deformity without a definite acute osseous  abnormality.     X-ray left foot 5/27/2025  No definite fracture or dislocation.       VASCULAR STUDIES: None found in epic    LAST  WOUND CULTURE:  DATE :   Lab Results   Component Value Date/Time    CULTRSULT - (A) 04/29/2025 09:00 AM    CULTRSULT Proteus mirabilis  Rare growth   (A) 04/29/2025 09:00 AM         ASSESSMENT AND PLAN:     1. Skin ulcer of left ankle with fat layer exposed (HCC)  Large full-thickness ulcer to left medial lower leg and ankle, present for several years.  Patient was treated for wounds to this same area in 2019, stopped coming into the clinic due to hospitalizations for neck and hip fractures.  She states the wound went on to heal but did not know for how long.    6/17/2025: Both wounds significantly improved.  Thick slough present once removed revealed granular tissue.  -Excisional debridement of wound in clinic today, medically necessary to promote wound healing.  -Patient to return to clinic weekly for assessment and debridement  - Home health to follow-up with patient  weekly.  - Continue Urgoclean to remove adherent slough.  Tolerating 4-layer compression wrap.    Recommend obtaining cast protector to keep wrap dry during showering.  If unable to tolerate wrap, patient to remove and apply Tubigrip.   -If home health removes 4-layer compression wrap as patient cannot tolerate, decreased to 2 layer compression   - Patient was seen during LPS rounds, surgical options were discussed with patient. She opted for conservative care with wound care.    Wound care: Urgo clean Ag;Super Absorbent Pad;Compression Wrap Four Layer    2. Acquired varus deformity of ankle    6/17/2025: Severe valgus deformity of ankle.  Patient states she had several fractures of this ankle in the past, she did not undergo any surgery  - Seen by LPS clinic 6/6/2025. Surgical options including BKA or reconstruction with external fixator. Patient declined surgery and would like to continue conservative care with wound  care.  - Patient reports she had a boot that she is unable to wear because of location of where her wound is.  She does not have a brace for the ankle.  - Case discussed with orthotist at [facility]. May benefit from custom bracing. Will consider when wound closer to resolution.  - X-ray of foot and ankle completed 5/27/2025  -Negative for OM.  -Home health was supposed to  help patient obtain Prevalon boot.  Patient aware that Prevalon boot is to be used when sitting or laying in bed.  She is aware she is not supposed to ambulate in boot.  When patient sleeps she sleeps on her right side putting pressure to the left medial ankle.    3. Age-related physical debility    6/17/2025: Patient has moderately severe kyphosis of neck and spine, normally uses cane of walker for mobility  - Patient is [facility] with [facility].    4.  Wound infection    6/17/2025:   - No overt evidence of wound infection today  - Patient is on suppressive doxycycline 100 mg p.o. twice daily ordered by Dr. Vazquez for chronic left hip infected hardware  - Monitor for signs and symptoms of infection each clinic visit      PATIENT EDUCATION  - Importance of adequate nutrition for wound healing  -Advised to go to ER for any increased redness, swelling, drainage, or odor, or if patient develops fever, chills, nausea or vomiting.         Please note that this note may have been created using voice recognition software. I have worked with technical experts from [employer] to optimize the interface.  I have made every reasonable attempt to correct obvious errors, but there may be errors of grammar and possibly content that I did not discover before finalizing the note.    N

## 2025-06-17 NOTE — PROGRESS NOTES
Four layer compression wrap applied to LLE. Patient tolerated well. Educated patient on when and how wraps should be removed.    Updated wound care orders sent to [facility]  via [employer].

## 2025-06-18 ENCOUNTER — OFFICE VISIT (OUTPATIENT)
Dept: MEDICAL GROUP | Facility: PHYSICIAN GROUP | Age: 80
End: 2025-06-18
Payer: MEDICARE

## 2025-06-18 VITALS
HEART RATE: 78 BPM | BODY MASS INDEX: 25.19 KG/M2 | DIASTOLIC BLOOD PRESSURE: 80 MMHG | WEIGHT: 136.91 LBS | OXYGEN SATURATION: 95 % | HEIGHT: 62 IN | SYSTOLIC BLOOD PRESSURE: 134 MMHG | TEMPERATURE: 97.2 F

## 2025-06-18 DIAGNOSIS — I10 ESSENTIAL HYPERTENSION: ICD-10-CM

## 2025-06-18 DIAGNOSIS — L97.322 SKIN ULCER OF LEFT ANKLE WITH FAT LAYER EXPOSED (HCC): ICD-10-CM

## 2025-06-18 DIAGNOSIS — D50.8 OTHER IRON DEFICIENCY ANEMIA: ICD-10-CM

## 2025-06-18 DIAGNOSIS — E55.9 VITAMIN D INSUFFICIENCY: ICD-10-CM

## 2025-06-18 DIAGNOSIS — Z00.01 ANNUAL VISIT FOR GENERAL ADULT MEDICAL EXAMINATION WITH ABNORMAL FINDINGS: Primary | ICD-10-CM

## 2025-06-18 DIAGNOSIS — M85.851 OSTEOPENIA OF NECK OF RIGHT FEMUR: ICD-10-CM

## 2025-06-18 DIAGNOSIS — H61.23 EXCESSIVE CERUMEN IN EAR CANAL, BILATERAL: ICD-10-CM

## 2025-06-18 PROCEDURE — 69210 REMOVE IMPACTED EAR WAX UNI: CPT | Performed by: NURSE PRACTITIONER

## 2025-06-18 PROCEDURE — 3075F SYST BP GE 130 - 139MM HG: CPT | Performed by: NURSE PRACTITIONER

## 2025-06-18 PROCEDURE — 3079F DIAST BP 80-89 MM HG: CPT | Performed by: NURSE PRACTITIONER

## 2025-06-18 PROCEDURE — 99397 PER PM REEVAL EST PAT 65+ YR: CPT | Mod: 25 | Performed by: NURSE PRACTITIONER

## 2025-06-18 ASSESSMENT — FIBROSIS 4 INDEX: FIB4 SCORE: 1.58

## 2025-06-18 NOTE — PROGRESS NOTES
Subjective:     CC:   Chief Complaint   Patient presents with    Annual Exam       HPI:   Nadege Mae is a 79 y.o. female who presents for annual exam.     Essential hypertension  BP today 134/80.  Continues lisinopril 30 mg daily and metoprolol  mg daily. Home BP with home health 110-140/60-84.    Osteopenia of neck of right femur  Continues daily calcium and vitamin D supplementation.  Continues to do weightbearing exercises as tolerated.  Continues bone density every 2 years.    Skin ulcer of left ankle with fat layer exposed (HCC)  She has been following up with wound care and home health. Wound culture grew Proteus mirabilis.  She completed Levaquin treatment.  She has Urgo clean dressing on with 4-layer compression wrap.      Ob-Gyn/ History:    Patient has GYN provider: no  /Para:    No history of abnormal pap smears.  Gyn Surgery:  hysterectomy.  Current Contraceptive Method:  surgical. She is not currently sexually active.  No significant bloating/fluid retention, pelvic pain. No vaginal discharge.   Post-menopausal bleeding: none  Urinary incontinence: intermittently if she holds bladder too long    Health Maintenance  Advanced directive: she is working with [email or web address] [email or web address]) for updated AD   Osteoporosis Screen/ DEXA: 10/13/2023, continue Q2 year   PT/vit D for falls prevention: yes   Cholesterol Screening: labs ordered   Diabetes Screening: labs ordered  Diet: She is drinking more water, eating vegetables, some fruit, drinks protein supplement, eating chicken and beef, occasional pork, cottage cheese.   Exercise: Activity as tolerated   Substance Abuse: discussed and reviewed   Not in relationship.   Seat belts, bike helmet, gun safety discussed.  Sun protection use encouraged.  Has established eye doctor and needs to dentist for denture follow-up      Cancer screening  Colorectal Cancer Screening: has not scheduled colonoscopy after +FIT, declines further screening or Cologuard   screening at this time  Lung Cancer Screening: quit >15 years ago    Cervical Cancer Screening: aged out   Breast Cancer Screening: aged out, encouraged to continue with self breast exams     Immunizations  ---Immunizations:    Influenza: 9/4/2024    Tetanus: 8/24/2023    Shingles: completed series   Pneumococcal : completed     Other immunizations: COVID booster up to date     She  has a past medical history of Age-related physical debility (09/02/2019), Anxiety, B12 deficiency (12/20/2017), C2 cervical fracture ([facility]) (09/10/2019), Cellulitis and abscess of lower extremity, Chronic ulcer of left leg, limited to breakdown of skin ([facility]) (10/07/2019), Dental disorder, Generalized osteoarthritis of multiple sites (10/20/2015), Hardware complicating wound infection ([facility]) (12/02/2019), Heart valve disease, Hyperlipidemia, Hypertension, MRSA (methicillin resistant staph aureus) culture positive (05/31/2018), Osteoporosis, Positive FIT (fecal immunochemical test) (02/17/2022), S/P femoral-popliteal bypass surgery (06/12/2019), and VRE (vancomycin resistant enterococcus) culture positive (03/17/2019).    She has no past medical history of OSTEOPOROSIS.  She  has a past surgical history that includes gyn surgery (1973); breast biopsy (8/28/2014); abdominal hysterectomy total; breast biopsy (Right, 8/14); hip nailing intramedullary (Left, 12/20/2017); hip revision total (Left, 2/11/2018); irrigation & debridement hip (Left, 6/4/2018); femoral popliteal bypass (Left, 6/8/2018); wound irrigation & debridement (Left, 6/8/2018); arthroplasty; and hip revision total (Left, 10/9/2019).    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     Drug use: No    Sexual activity: Not Currently     Partners: Male   Other Topics Concern    Not on file   Social History Narrative    Initial Intake Date: kyraceferino LEEshane  Last Updated:        Financial situation:    SSR of $853. Pt has cost sharing with roommate.        Food resources & insecurities:    Pt able to independantly shop and cook. Often, local son will go with pt to grocery store to assist.        Housing & environmental:    2 story condo rental w/ stairs. Pt ahs rail for stairs, denies concern. Pt has good relationship with ; he will allow her to install a shower/bath rail.        Transportation:    Active  with licensed and insured vehicle.        Family dynamics & family/friend social supports:    Pt resides with roommate and receives support from local adult son. Confirmed son is emergency contact.        ADLs/IADLs & associated diagnoses:    It completing all ADL/IADLS independently with the exception of showers. Pt completing sponge bath w/ kitchen sink.        Advanced life planning:    Not on file. POA-HC packet mailed 2021.        Behavioral/Mental:    Pertinent diagnoses include, major depression, recurrent, chronic     Social Drivers of Health     Financial Resource  Strain: Medium Risk (6/16/2025)    Overall Financial Resource Strain (CARDIA)     Difficulty of Paying Living Expenses: Somewhat hard   Food Insecurity: No Food Insecurity (6/16/2025)    Hunger Vital Sign     Worried About Running Out of Food in the Last Year: Never true     Ran Out of Food in the Last Year: Never true   Transportation Needs: No Transportation Needs (6/16/2025)    PRAPARE - Transportation     Lack of Transportation (Medical): No     Lack of Transportation (Non-Medical): No   Physical Activity: Insufficiently Active (6/16/2025)    Exercise Vital Sign     Days of Exercise per Week: 2 days     Minutes of Exercise per Session: 10 min   Stress: No Stress Concern Present (6/16/2025)    [employer] [place] [employer] - Occupational Stress Questionnaire     Feeling of Stress : Only a little   Social Connections: Socially Isolated (6/16/2025)    Social Connection and Isolation Panel [NHANES]     Frequency of Communication with Friends and Family: Twice a week     Frequency of Social Gatherings with Friends and Family: Twice a week     Attends Alevism Services: Never     Active Member of Clubs or Organizations: No     Attends Club or Organization Meetings: Never     Marital Status:    Intimate Partner Violence: Not on file   Housing Stability: Unknown (6/16/2025)    Housing Stability Vital Sign     Unable to Pay for Housing in the Last Year: No     Number of Times Moved in the Last Year: Not on file     Homeless in the Last Year: No       Patient Active Problem List    Diagnosis Date Noted    Skin ulcer of left ankle with fat layer exposed (HCC) 03/12/2025    S/P cataract extraction 12/12/2024    Atherosclerosis of aorta (HCC) 04/24/2024    BMI 26.0-26.9,adult 04/24/2024    Memory deficit 04/28/2022    Vitamin D insufficiency 10/08/2021    Fractures 10/08/2021    Wears hearing aid in both ears 10/08/2021    Skin eruption, chronic 02/09/2021    Systolic murmur 02/12/2020    Thyroid nodule  "09/11/2019    Valgus deformity of foot, left 04/20/2019    Alcohol dependence, in remission (HCC)     Peripheral vascular disease (HCC) 07/20/2018    History of hip fracture 12/20/2017    Iron deficiency anemia 11/04/2017    Generalized osteoarthritis of multiple sites 10/20/2015    Essential hypertension 10/06/2015    Major depression, recurrent, chronic (HCC) 10/06/2015    Osteopenia of neck of right femur          Current Medications[1]  Allergies[2]    Review of Systems   Constitutional: Negative for fever, chills and fatigue.   HENT: Negative for congestion.    Eyes: Negative for pain.   Respiratory: Negative for cough and shortness of breath.    Cardiovascular: Negative for leg swelling.   Gastrointestinal: Negative for nausea, vomiting, abdominal pain and diarrhea.   Genitourinary: Negative for dysuria and hematuria.   Skin: Negative for rash. Positive chronic left ankle ulcer.  Neurological: Negative for dizziness and headaches.   Endo/Heme/Allergies: Does not bruise/bleed easily.   Psychiatric/Behavioral: Negative for depression.  The patient is not nervous/anxious.      Objective:     Vital signs reviewed  /80 (BP Location: Left arm, Patient Position: Sitting, BP Cuff Size: Adult)   Pulse 78   Temp 36.2 °C (97.2 °F) (Temporal)   Ht 1.562 m (5' 1.5\")   Wt 62.1 kg (136 lb 14.5 oz)   SpO2 95%   BMI 25.45 kg/m²   Body mass index is 25.45 kg/m².  Wt Readings from Last 4 Encounters:   06/18/25 62.1 kg (136 lb 14.5 oz)   04/10/25 61.4 kg (135 lb 6 oz)   03/12/25 62.3 kg (137 lb 5.6 oz)   12/12/24 63 kg (139 lb)       Physical Exam:  Constitutional: Well-developed and well-nourished. Not diaphoretic. No distress.   Skin: Skin is warm and dry. No rash noted.  Head: Atraumatic without lesions.  Eyes: Conjunctivae and extraocular motions are normal. Pupils are equal, round, and reactive to light. No scleral icterus.   Ears:  External ears unremarkable.  Excessive cerumen to bilateral ear canals.  Lighted " curette used by me to remove.  Bilateral TMs pearly gray. Hearing aide to right ear.   Nose: Nares patent. Septum midline. Turbinates without erythema nor edema. No discharge.   Mouth/Throat: Dentition is with upper and lower dentures. Tongue normal. Oropharynx is clear and moist. Posterior pharynx without erythema or exudates.  Neck: Supple, trachea midline. Normal range of motion. No lymphadenopathy--cervical or supraclavicular.  Cardiovascular: Regular rate and rhythm, with systolic murmur.  No rubs or gallops.  Lungs: Normal inspiratory effort, CTA bilaterally, no wheezes/rhonchi/rales  Abdomen: Soft, non tender, and without distention. Active bowel sounds in all four quadrants. No rebound, guarding, masses or HSM.  Extremities: Left lower leg with wound wrap in place.  Musculoskeletal: Upper back with forward curve.  Left ankle acquired deformity.  Neurological: Alert and oriented x 3.   Psychiatric:  Behavior, mood, and affect are appropriate.        Assessment and Plan:     1. Annual visit for general adult medical examination with abnormal findings (Primary)  Acute uncomplicated problem.  Annual exam completed today. Discussion today about general wellness and lifestyle habits:  Engage in regular physical and social activities  Skin care, including sunscreen  Recommended annual eye exams and annual dental exams  Discussed wearing seatbelt when in car at all times    2. Excessive cerumen in ear canal, bilateral  Acute uncomplicated problem.  Bilateral ear cerumen removed by me with lighted curette.  Patient tolerated well.      3. Essential hypertension  Chronic stable problem.  Updated labs ordered.  Continue home BP monitoring.  Continue lisinopril 30 mg daily and metoprolol  mg daily.  - CBC WITH DIFFERENTIAL; Future  - Comp Metabolic Panel; Future  - Lipid Profile; Future    4. Skin ulcer of left ankle with fat layer exposed (HCC)  Chronic exacerbated problem.  Continue follow-up with wound care and  home health.    5. Other iron deficiency anemia  Chronic stable problem.  Continues daily iron supplement.  Check updated iron level with labs.  - CBC WITH DIFFERENTIAL; Future  - IRON/TOTAL IRON BIND; Future  - FERRITIN; Future    6. Osteopenia of neck of right femur  Chronic stable problem.  Continue with bone density every 2 years.  Continue daily calcium and vitamin D supplementation.  Continue daily weightbearing exercises.  - VITAMIN D,25 HYDROXY (DEFICIENCY); Future    7. Vitamin D insufficiency  Chronic stable problem.  Continue daily vitamin D3 supplementation.  - VITAMIN D,25 HYDROXY (DEFICIENCY); Future      HCM:  completed    Labs per orders  Immunizations per orders  Patient counseled about skin care, diet, supplements, prenatal vitamins, safe sex and exercise.    Follow-up: Return in about 3 months (around 9/18/2025) for Hypertension.      Please note that this dictation was created using voice recognition software. I have made every reasonable attempt to correct obvious errors, but I expect that there are errors of grammar and possibly content that I did not discover before finalizing the note.         [1]   Current Outpatient Medications   Medication Sig Dispense Refill    sertraline (ZOLOFT) 100 MG Tab TAKE 1 TABLET BY MOUTH EVERY  Tablet 3    diphenhydrAMINE (BENADRYL ALLERGY) 25 MG Tab Take 25 mg by mouth every evening. Indications: allergies      Calcium Carb-Cholecalciferol (CALCIUM 600 + D PO) Take 1 Tablet by mouth every 72 hours. Indications: Supplement      Nutritional Supplements (CLICK ESPRESSO PROTEIN DRINK PO) Take  by mouth. Indications: Supplement      lisinopril (PRINIVIL) 30 MG tablet TAKE 1 TABLET BY MOUTH EVERY  Tablet 3    metoprolol SR (TOPROL XL) 100 MG TABLET SR 24 HR TAKE 1 TABLET BY MOUTH EVERY  Tablet 3    Ferrous Sulfate (IRON PO) Take  by mouth. Indications: Supplement      vitamin D (CHOLECALCIFEROL) 1000 Unit (25 mcg) Tab Take 1,000 Units by mouth  "every day. Indications: Vitamin D Deficiency      acetaminophen (TYLENOL) 325 MG Tab Take 2 Tabs by mouth every 6 hours as needed (Mild Pain; (Pain scale 1-3); Temp greater than 100.5 F). 30 Tab 0    doxycycline (VIBRAMYCIN) 100 MG Tab TAKE 1 TABLET BY MOUTH EVERY DAY. TAKE 1 TABLET BY MOUTH TWICE A DAY (THROUGH INFECTIOUS DISEASE ?) (Patient not taking: Reported on 6/18/2025) 60 Tablet 11     No current facility-administered medications for this visit.   [2]   Allergies  Allergen Reactions    Bactrim [Sulfamethoxazole-Trimethoprim] Rash     Diffuse pruritic skin rash with blisters (no mucous membrane involvement) (was also taking cipro at the time - reaction thought more likely to be 2/2 Bactrim)    Meropenem Unspecified     Pt can not remember reaction      Oxycodone      \"I got bad hallucinations & slurred speech\"     "

## 2025-06-18 NOTE — ASSESSMENT & PLAN NOTE
BP today 134/80.  Continues lisinopril 30 mg daily and metoprolol  mg daily. Home BP with home health 110-140/60-84.

## 2025-06-18 NOTE — ASSESSMENT & PLAN NOTE
She has been following up with wound care and home health. Wound culture grew Proteus mirabilis.  She completed Levaquin treatment.  She has Urgo clean dressing on with 4-layer compression wrap.

## 2025-06-18 NOTE — ASSESSMENT & PLAN NOTE
Continues daily calcium and vitamin D supplementation.  Continues to do weightbearing exercises as tolerated.  Continues bone density every 2 years.

## 2025-06-19 ENCOUNTER — HOME CARE VISIT (OUTPATIENT)
Dept: HOME HEALTH SERVICES | Facility: HOME HEALTHCARE | Age: 80
End: 2025-06-19
Payer: MEDICARE

## 2025-06-19 VITALS
SYSTOLIC BLOOD PRESSURE: 104 MMHG | OXYGEN SATURATION: 97 % | DIASTOLIC BLOOD PRESSURE: 62 MMHG | WEIGHT: 135 LBS | BODY MASS INDEX: 25.09 KG/M2 | RESPIRATION RATE: 16 BRPM | HEART RATE: 74 BPM | TEMPERATURE: 98.1 F

## 2025-06-19 PROCEDURE — G0299 HHS/HOSPICE OF RN EA 15 MIN: HCPCS

## 2025-06-19 ASSESSMENT — ENCOUNTER SYMPTOMS
HYPERTENSION: 1
LOWER EXTREMITY EDEMA: 1
DENIES PAIN: 1

## 2025-06-19 ASSESSMENT — ACTIVITIES OF DAILY LIVING (ADL): OASIS_M1830: 01

## 2025-06-19 ASSESSMENT — PATIENT HEALTH QUESTIONNAIRE - PHQ9: CLINICAL INTERPRETATION OF PHQ2 SCORE: 0

## 2025-06-19 ASSESSMENT — FIBROSIS 4 INDEX: FIB4 SCORE: 1.58

## 2025-06-24 ENCOUNTER — OFFICE VISIT (OUTPATIENT)
Dept: WOUND CARE | Facility: MEDICAL CENTER | Age: 80
End: 2025-06-24
Attending: NURSE PRACTITIONER
Payer: MEDICARE

## 2025-06-24 DIAGNOSIS — L84 CALLUS OF FOOT: ICD-10-CM

## 2025-06-24 DIAGNOSIS — R54 AGE-RELATED PHYSICAL DEBILITY: ICD-10-CM

## 2025-06-24 DIAGNOSIS — L97.322 SKIN ULCER OF LEFT ANKLE WITH FAT LAYER EXPOSED (HCC): Primary | ICD-10-CM

## 2025-06-24 DIAGNOSIS — T14.8XXA WOUND INFECTION: ICD-10-CM

## 2025-06-24 DIAGNOSIS — L60.2 OVERGROWN TOENAILS: ICD-10-CM

## 2025-06-24 DIAGNOSIS — M21.179: ICD-10-CM

## 2025-06-24 DIAGNOSIS — L08.9 WOUND INFECTION: ICD-10-CM

## 2025-06-24 PROCEDURE — G0179 MD RECERTIFICATION HHA PT: HCPCS | Performed by: NURSE PRACTITIONER

## 2025-06-24 PROCEDURE — 11055 PARING/CUTG B9 HYPRKER LES 1: CPT | Mod: 59 | Performed by: STUDENT IN AN ORGANIZED HEALTH CARE EDUCATION/TRAINING PROGRAM

## 2025-06-24 PROCEDURE — 11720 DEBRIDE NAIL 1-5: CPT | Mod: 59 | Performed by: STUDENT IN AN ORGANIZED HEALTH CARE EDUCATION/TRAINING PROGRAM

## 2025-06-24 PROCEDURE — 99213 OFFICE O/P EST LOW 20 MIN: CPT

## 2025-06-24 PROCEDURE — 11042 DBRDMT SUBQ TIS 1ST 20SQCM/<: CPT | Performed by: STUDENT IN AN ORGANIZED HEALTH CARE EDUCATION/TRAINING PROGRAM

## 2025-06-24 PROCEDURE — 11055 PARING/CUTG B9 HYPRKER LES 1: CPT

## 2025-06-24 PROCEDURE — 11720 DEBRIDE NAIL 1-5: CPT

## 2025-06-24 PROCEDURE — 11042 DBRDMT SUBQ TIS 1ST 20SQCM/<: CPT

## 2025-06-24 NOTE — PROGRESS NOTES
Provider Encounter- Full Thickness wound    HISTORY OF PRESENT ILLNESS  Wound History:    START OF CARE IN CLINIC: 4/22/2025    REFERRING PROVIDER: Kolby Mejia      WOUND- Full Thickness Wound   LOCATION: Left medial ankle and lower leg     HISTORY: Patient with chronic ulcer to her left medial lower leg and ankle referred to [facility] for evaluation management.  She is well-known to this clinic from previous treatment of wounds to the same site.  She was last seen in our clinic in August 2019 at which time she had a small open wound over the medial malleolus.  She was hospitalized shortly thereafter due to a fall resulting in a C2 fracture, and hip pain.  She had several prolonged hospitalizations around that time.  She opted not to return to the clinic after she was discharged from the hospital.  She states that the wound went on to heal until a few years ago when she scratched herself while removing a sock.  Over the past few years the wound has gotten progressively larger and she has treated herself using various treatments including zinc paste and Betadine.  She does have significant deformity of this ankle, states she has had several fractures of her ankle in the past with no surgical repair.  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    Pertinent Medical History: History of alcohol dependence, in remission, atherosclerosis, history of hip fracture, osteoarthritis, memory deficit, PVD, valgus deformity of left foot    TOBACCO USE: Former smoker, quit more than 10 years ago    Patient's problem list, allergies, and current medications reviewed and updated in Epic    Interval History:  4/22/2025 : Clinic visit with IRON Vasquez, GAYLE-BC, CWSHEREENN, CFSHANNAN.   Patient states that she is feeling well, denies fevers, chills, nausea, vomiting, cough or shortness of breath.  She cannot tell me for sure how long this wound has been open, only  states that it was several years ago.  She denies having much pain from this wound.  Severe valgus deformity of left ankle, she does not wear any sort of orthotic or brace to her left lower extremity.  She states that normally she does use either a cane or walker, however did not bring one with her today.    4/29/25: Clinic visit with Letty PERDUE, LORETO, YAZ, ILDA.  Pt denies fevers, chills, nausea, vomiting.  Ulcer area slightly decreased.  Thick slough.  Patient on suppressive doxycycline 100 mg twice daily for her chronically infected hardware to left hip.  Increased drainage, green-tinged.  Wound culture collected today.  No odor.  Follow results.    5/6/2025 : Clinic visit with IRON Vasquez FNP-BC, CALIN, ILDA.   Patient states she is feeling well overall.  There is quite a bit of slough to her wound today.  Reviewed culture results, positive for rare growth of Proteus.  Given appearance of wound, will go ahead and prescribe Levaquin 500 mg x 7 days.  She is already suppressive doxycycline 100 mg twice daily for infected hardware.      5/13/2025 : Clinic visit with IRON Vasquez, LORETO, CALIN, ILDA.   Patient continues to feel well.  She is tolerating Levaquin without any difficulty, should be completing these in a few days.  Her wound measures about the same, thick slough to wound bed, no odor.    5/20/2025 : Clinic visit with IRON Vasquez FNP-BC, CALIN, ILDA.   Patient states she is feeling well.  Unfortunately, her wound remains stalled.  Slough to wound bed.  Healing complicated by severe deformity of ankle.  Recent issue of possible surgical consult.  Possibly plastics for STSG, Ortho for correction of deformity, or both.  She seems to be more open to the idea of surgery, but states she would like to talk it over with her son first.  I did offer to put her into LPS rounds, provided her with the dates through the month of June.  At her request, I wrote out my  recommendations on a piece of paper for her to give to her son.    5/27/2025: Clinic visit with Bryan Reza MD. Patient reports feeling in normal state of health. Denies any signs or symptoms of infection. Has Xrays scheduled later today, reminded patient. Patient is agreeable to discussing surgical options during LPS clinic, I recommend her son come to appointment. Patient wounds largely unchanged.    6/3/25: Clinic visit with LORETO Austin, YAZ, ILDA.  Pt denies fevers, chills, nausea, vomiting.  New epithelium to center of wound has formed skin bridge.  Ulcers  into 2.  Tolerating 2 layer compression wrap.  Increased to 4-layer compression wrap.  Initiate Urgo clean.  Returns this Friday 6/6/25 for LPS rounds. Home health orders updated.  Recommend patient cancel home health appointment this Friday as she will be at LPS rounds and dressing will be changed.    L great toenail thick and dystrophic. Separation at cuticle with dry scab.  Offered podiatry for nail care.  Patient declined at this time.  L 1st MTH plantar callus filed with [place] board    6/10/2025: Clinic visit with Bryan Reza MD. Patient reports doing well, denies any acute issues. Patient was seen during LPS rounds, options were given: Continue wound care, BKA, or extensive reconstruction with external fixator. Patient has opted for conservative care. Patient wounds are slowly improving.   Patient asked about specialty footwear to accommodate her deformity. Briefly discussed with orthotist. Will need evaluation. Will hold off on referral until wound closer to resolution.    6/17/25: Clinic visit with LORETO Austin, YAZ, ILDA.  Pt denies fevers, chills, nausea, vomiting.  Both left medial ankle and left medial lower leg ulcers are improved.  Thick slough.  Reports that home health noted purple discoloration to toes and removed 4-layer wrap and applied Tubigrip.  Patient reports that she had no  discomfort and is agreeable to return to 4-layer compression wrap.    6/24/2025: Clinic visit with Bryan Reza MD. Patient reports doing well, denies any signs or symptoms of infection. Wounds continues to improve. Patient with overgrown left 5th toenail which is cutting into her own skin and plantar 1st MTH callus that needs debridement.      REVIEW OF SYSTEMS:   Unchanged from previous wound clinic assessment on 6/17/2025, except as noted in interval history above      PHYSICAL EXAMINATION:   There were no vitals taken for this visit.    Physical Exam  Constitutional:       Appearance: She is normal weight.   HENT:      Ears:      Comments: Hard of hearing  Neck:      Comments: Kyphosis of neck and spine  Cardiovascular:      Pulses: Normal pulses.      Comments:  +2 left DP palpated.  Unable to palpate PT secondary to wound location  Left DP and PT pulses brisk and multiphasic by Doppler  Pulmonary:      Effort: Pulmonary effort is normal.   Musculoskeletal:      Cervical back: Rigidity present.      Right lower leg: Edema present.      Left lower leg: Edema present.      Comments: Brawny edema of bilateral lower extremities  Severe valgus deformity of left ankle   Skin:     Comments: Full-thickness wound to left medial ankle: Wound smaller, good granulation tissue,  slough has much improved. No evidence of infection.    Left medial lower leg wound: Wound smaller, good granulation tissue,  slough has much improved. No evidence of infection.    Brawny edema bilateral lower extremities, hemosiderin staining, varicose veins, scarring and skin changes-consistent with CVI   Neurological:      Mental Status: She is alert and oriented to person, place, and time.      Comments: Poor memory         WOUND ASSESSMENT  Wound 04/22/25 Venous Ulcer Leg Medial;Lower Left --Left Medial/proximal Lower Leg (Active)   Wound Image    06/24/25 0930   Site Assessment Red;Early/partial granulation 06/24/25 0930   Periwound  Assessment Hemosiderin Staining;Fragile;Scar tissue 06/24/25 0930   Margins Attached edges 06/24/25 0930   Drainage Amount Moderate 06/24/25 0930   Drainage Description Serosanguineous 06/24/25 0930   Treatments Cleansed;Topical Lidocaine;Provider debridement;Site care;Compression 06/24/25 0930   Offloading/DME Other (comment) 05/20/25 1000   ** Retired** Wound Cleansing Hypochlorus Acid 05/13/25 1100   Wound Cleansing Hypochlorus Acid;Foam Cleanser/Washcloth 06/24/25 0930   Periwound Protectant No-sting Skin Prep;TRIAD paste;Silicone barrier cream 06/24/25 0930   Dressing Changed New 05/27/25 1000   Dressing Cleansing/Solutions Not Applicable 06/24/25 0930   Dressing Options Other (Comments) 06/24/25 0930   Dressing Change/Treatment Frequency Twice Weekly 06/24/25 0930   Wound Team Following Weekly 06/03/25 1012   Non-staged Wound Description Full thickness 06/24/25 0930   Wound Length (cm) 2 cm 06/24/25 0930   Wound Width (cm) 2.4 cm 06/24/25 0930   Wound Depth (cm) 0.1 cm 06/24/25 0930   Wound Surface Area (cm^2) 3.77 cm^2 06/24/25 0930   Wound Volume (cm^3) 0.251 cm^3 06/24/25 0930   Post-Procedure Length (cm) 2 cm 06/24/25 0930   Post-Procedure Width (cm) 2.5 cm 06/24/25 0930   Post-Procedure Depth (cm) 0.1 cm 06/24/25 0930   Post-Procedure Surface Area (cm^2) 3.93 cm^2 06/24/25 0930   Post-Procedure Volume (cm^3) 0.262 cm^3 06/24/25 0930   Wound Healing % 97 06/24/25 0930   Tunneling (cm) 0 cm 06/24/25 0930   Undermining (cm) 0 cm 06/24/25 0930   Wound Odor None 06/24/25 0930   Pulses Left;DP;PT;Doppler 06/24/25 0930   Exposed Structures None 06/24/25 0930   Number of days: 63       Wound 06/03/25 Atypical Full Thickness Pretibial Distal;Medial Left --Left Medial/Distal Lower Leg (Active)   Wound Image    06/24/25 0930   Site Assessment Red;Early/partial granulation 06/24/25 0930   Periwound Assessment Hemosiderin Staining;Fragile;Scar tissue 06/24/25 0930   Margins Attached edges 06/24/25 0930   Drainage  Amount Moderate 06/24/25 0930   Drainage Description Serosanguineous 06/24/25 0930   Treatments Cleansed;Topical Lidocaine;Provider debridement;Site care;Compression 06/24/25 0930   Wound Cleansing Foam Cleanser/Washcloth;Hypochlorus Acid 06/24/25 0930   Periwound Protectant No-sting Skin Prep;TRIAD paste;Silicone barrier cream 06/24/25 0930   Dressing Changed Changed 06/24/25 0930   Dressing Cleansing/Solutions Not Applicable 06/24/25 0930   Dressing Options Other (Comments);Super Absorbent Pad;Compression Wrap Four Layer 06/24/25 0930   Dressing Change/Treatment Frequency Twice Weekly 06/24/25 0930   Wound Team Following Weekly 06/24/25 0930   Non-staged Wound Description Full thickness 06/24/25 0930   Wound Length (cm) 3.3 cm 06/24/25 0930   Wound Width (cm) 3 cm 06/24/25 0930   Wound Depth (cm) 0.1 cm 06/24/25 0930   Wound Surface Area (cm^2) 7.78 cm^2 06/24/25 0930   Wound Volume (cm^3) 0.518 cm^3 06/24/25 0930   Post-Procedure Length (cm) 3.3 cm 06/24/25 0930   Post-Procedure Width (cm) 3.1 cm 06/24/25 0930   Post-Procedure Depth (cm) 0.1 cm 06/24/25 0930   Post-Procedure Surface Area (cm^2) 8.03 cm^2 06/24/25 0930   Post-Procedure Volume (cm^3) 0.536 cm^3 06/24/25 0930   Wound Healing % 41 06/24/25 0930   Tunneling (cm) 0 cm 06/24/25 0930   Undermining (cm) 0 cm 06/24/25 0930   Wound Odor None 06/24/25 0930   Pulses Left;DP;PT;Doppler 06/24/25 0930   Exposed Structures None 06/24/25 0930   Number of days: 21       PROCEDURE: Excisional debridement of left medial leg wound x 2  -2% viscous lidocaine applied topically to wound bed for approximately 5 minutes prior to debridement  -Curette used to debride wound bed.  Excisional debridement was performed to remove devitalized tissue until healthy, bleeding tissue was visualized.   Entire surface of wound,11.96 cm² debrided.  Tissue debrided into the subcutaneous layer.   -Bleeding controlled with manual pressure.    -Wound care completed by wound RN, refer to  flowsheet  -Patient tolerated the procedure well, without c/o pain or discomfort.     PROCEDURE: Debridement of hyperkeratinized skin (callus)  - Left plantar 1st MTH callus is thick requiring debridement to prevent development of ulcer  - Used curette to pare down callus to skin level  - Smoothed with emery board  - No wound. Tolerated without complaint or complication    PROCEDURE: Debridement of left 5th toenail  - Left 5th toenail overgrown and curving down cutting into her own skin. High risk of self injury  - Used scissors to cut back nail  - Used emery board to smooth sharp edges of nail  - Tolerated well without complication or complaint      Pertinent Labs and Diagnostics:    Labs:     A1c:   Lab Results   Component Value Date/Time    HBA1C 5.6 06/04/2018 12:00 PM          IMAGING: X-ray of left ankle 5/27/2025  Osteopenia and significant deformity without a definite acute osseous abnormality.     X-ray left foot 5/27/2025  No definite fracture or dislocation.       VASCULAR STUDIES: None found in epic    LAST  WOUND CULTURE:  DATE :   Lab Results   Component Value Date/Time    CULTRSULT - (A) 04/29/2025 09:00 AM    CULTRSULT Proteus mirabilis  Rare growth   (A) 04/29/2025 09:00 AM         ASSESSMENT AND PLAN:     1. Skin ulcer of left ankle with fat layer exposed (HCC)  Large full-thickness ulcer to left medial lower leg and ankle, present for several years.  Patient was treated for wounds to this same area in 2019, stopped coming into the clinic due to hospitalizations for neck and hip fractures.  She states the wound went on to heal but did not know for how long.    6/24/2025: Both wounds continue to measure smaller. Significant improvement in previous thick fibrotic slough.  - Excisional debridement of wound in clinic today, medically necessary to promote wound healing.  - Patient to return to clinic weekly for assessment and debridement  - Home health to follow-up with patient  weekly.  - Continue  Urgoclean  - Tolerating 4-layer compression wrap.    Recommend obtaining cast protector to keep wrap dry during showering.  If unable to tolerate wrap, patient to remove and apply Tubigrip.   -If home health removes 4-layer compression wrap as patient cannot tolerate, decreased to 2 layer compression   - Patient was seen during LPS rounds, surgical options were discussed with patient. She opted for conservative care with wound care.    Wound care: Urgo clean Ag;Super Absorbent Pad;Compression Wrap Four Layer    2. Acquired varus deformity of ankle    6/24/2025: Severe valgus deformity of ankle.  Patient states she had several fractures of this ankle in the past, she did not undergo any surgery  - Seen by LPS clinic 6/6/2025. Surgical options including BKA or reconstruction with external fixator. Patient declined surgery and would like to continue conservative care with wound care.  - Patient reports she had a boot that she is unable to wear because of location of where her wound is.  She does not have a brace for the ankle.  - Case discussed with orthotist at [facility]. May benefit from custom bracing. Will consider when wound closer to resolution.  - X-ray of foot and ankle completed 5/27/2025  -Negative for OM.  -Home health was supposed to  help patient obtain Prevalon boot.  Patient aware that Prevalon boot is to be used when sitting or laying in bed.  She is aware she is not supposed to ambulate in boot.  When patient sleeps she sleeps on her right side putting pressure to the left medial ankle.    3. Age-related physical debility    6/24/2025: Patient has moderately severe kyphosis of neck and spine, normally uses cane of walker for mobility  - [facility]    4.  Wound infection    6/24/2025:   - No overt evidence of wound infection today  - Patient is on suppressive doxycycline 100 mg p.o. twice daily ordered by Dr. Vazquez for chronic left hip infected hardware  -  Monitor for signs and symptoms of infection each clinic visit    5. Callus of foot    6/24/2025  - Patient with thick pre-ulcerative callus left plantar 1st MTH. Undoubtedly associated with her significant ankle / foot deformity  - Debrided callus to skin level to protect underlying skin  - Smoothed down with emery board  - No open wounds    6. Overgrown toenails    6/24/2025  - Left 5th toenail was overgrown and curving back into her own skin  - High risk for self injury  - Debrided toenail in clinic today without complication.    PATIENT EDUCATION  - Importance of adequate nutrition for wound healing  -Advised to go to ER for any increased redness, swelling, drainage, or odor, or if patient develops fever, chills, nausea or vomiting.     Please note that this note may have been created using voice recognition software. I have worked with technical experts from [employer] to optimize the interface.  I have made every reasonable attempt to correct obvious errors, but there may be errors of grammar and possibly content that I did not discover before finalizing the note.    N

## 2025-06-24 NOTE — PATIENT INSTRUCTIONS
"The following information is a summary of the education provided in the clinic today. This is not an exhaustive list of the education provided during your appointment.       DRESSING CHANGES    Keep your wound dressing clean, dry, and intact. Change your dressing only if the dressing becomes, soiled, leaks, gets wet, or falls off.      You may not shower with the dressing(s) off. Please do not take baths, or swim in the ocean, lakes, rivers, pools, or hot tubs.      Wounds do not need to \"air out\" or \"breathe\". Gently dry your wound before placing a new dressing.     After you get out of the shower, wash the wound a second time (with soap and water, wound cleanser, or saline). Gently dry the wound before you place a new dressing.       If you need to change your dressings at home, you should wash your wound. Use normal saline, wound cleanser, hypochlorous acid, or unscented soap and water. Do not use hydrogen peroxide or rubbing alcohol to clean your wounds. Hydrogen peroxide and rubbing alcohol will damage new cells and tissue. Do not use betadine or iodine unless told to by your wound care team.    Do not soak your wounds in epsom salt baths. This can worsen your wound(s) or delay wound healing. It can also lead to infection or maceration (tissue is too wet).     If you do not have home health, the clinic will give you with leftover supplies from your appointment. We do not give out extra dressing supplies. We will order you supplies through a [employer] company. Your insurance may or may not pay for all these supplies. The company will reach out to you if insurance does not cover supplies. These supplies will be sent to your home within a few days. If you do have home health, they will provide wound care supplies.     The dressings we use may change as your wound changes.       COMPRESSION   -Today, a 4 layer compression wrap was applied. Please take off the wrap if you have pain, extreme swelling, new numbness or " tingling, a change in the color or temperature of your feet. Take off the wrap if the wrap slides down/bunches up Take off the wrap if it gets wet or leaks through.  If you have to take off the wrap, please do not cut it off with scissors or other sharp tools. Do not trim the wrap. Unravel the wrap one layer at a time. Place a clean and dry dressing over the wound. Do not leave wrap on for more than seven days at a time. You can wear a cast protector over your wrap so you can take a shower. Cast protectors can be found at most [employer] and [employer]. [employer] does not endorse any brand of cast protector.        CLINIC INFORMATION  The clinic's hours are Monday-Friday, 7:30 AM to 5:00 PM. We are closed most holidays and on weekends. If you leave us a message, please allow 24 hours for someone to return your call. If you have concerns or are having a medical emergency, call 911 or go to the hospital emergency room.     You might not see the same nurse or provider every visit.     If you notice any large changes in your wound(s), or signs of infection (redness, swelling, localized heat, increased pain, fever > 101 F, chills, nausea/vomiting) or have any questions about your home care instructions, please call the wound center at [phone]. If it's after hours, contact your primary care physician or go to the hospital emergency room. If you are admitted to any hospital, you will need a new referral to come back to the wound clinic. Any wound care appointments that you already have may be cancelled.    If you are 5 or more minutes late for an appointment, we reserve the right to cancel and reschedule that appointment. For example, if your appointment is at 1:00 PM, and you arrive at 1:06 PM, you are more than five minutes late and might not be seen. If you are consistently late or not coming to your appointments (typically 3 late cancellations and/or no shows), we reserve the right to cancel your future appointments  or discharge you from the clinic. It is then your responsibility to obtain a new referral if wound care is still needed.

## 2025-06-24 NOTE — PROGRESS NOTES
Home wound care orders sent to [facility]  via [facility].    COMPRESSION   -Today, a 4 layer compression wrap was applied. Please take off the wrap if you have pain, extreme swelling, new numbness or tingling, a change in the color or temperature of your feet. Take off the wrap if the wrap slides down/bunches up Take off the wrap if it gets wet or leaks through.  If you have to take off the wrap, please do not cut it off with scissors or other sharp tools. Do not trim the wrap. Unravel the wrap one layer at a time. Place a clean and dry dressing over the wound. Do not leave wrap on for more than seven days at a time. You can wear a cast protector over your wrap so you can take a shower. Cast protectors can be found at most drug stores and [employer]. [facility] does not endorse any brand of cast protector.

## 2025-06-27 ENCOUNTER — HOME CARE VISIT (OUTPATIENT)
Dept: HOME HEALTH SERVICES | Facility: HOME HEALTHCARE | Age: 80
End: 2025-06-27
Payer: MEDICARE

## 2025-06-27 PROCEDURE — 665003 FOLLOW UP-HOME HEALTH

## 2025-06-27 PROCEDURE — G0299 HHS/HOSPICE OF RN EA 15 MIN: HCPCS

## 2025-06-29 VITALS
OXYGEN SATURATION: 96 % | TEMPERATURE: 99 F | DIASTOLIC BLOOD PRESSURE: 60 MMHG | RESPIRATION RATE: 16 BRPM | HEART RATE: 81 BPM | SYSTOLIC BLOOD PRESSURE: 110 MMHG

## 2025-06-29 ASSESSMENT — ENCOUNTER SYMPTOMS
HIGHEST PAIN SEVERITY IN PAST 24 HOURS: 1/10
LAST BOWEL MOVEMENT: 67383
PAIN LOCATION - EXACERBATING FACTORS: WALKING
PAIN LOCATION: GREAT TOE
SUBJECTIVE PAIN PROGRESSION: UNCHANGED
PAIN LOCATION - PAIN FREQUENCY: INTERMITTENT
STOOL FREQUENCY: DAILY
PAIN: 1
PAIN LOCATION - PAIN SEVERITY: 1/10
LOWEST PAIN SEVERITY IN PAST 24 HOURS: 0/10
PAIN SEVERITY GOAL: 0/10
BOWEL PATTERN NORMAL: 1
PAIN LOCATION - PAIN QUALITY: ACHY

## 2025-06-29 ASSESSMENT — PATIENT HEALTH QUESTIONNAIRE - PHQ9: CLINICAL INTERPRETATION OF PHQ2 SCORE: 0

## 2025-07-01 ENCOUNTER — OFFICE VISIT (OUTPATIENT)
Dept: WOUND CARE | Facility: MEDICAL CENTER | Age: 80
End: 2025-07-01
Attending: NURSE PRACTITIONER
Payer: MEDICARE

## 2025-07-01 DIAGNOSIS — T14.8XXA WOUND INFECTION: ICD-10-CM

## 2025-07-01 DIAGNOSIS — L08.9 WOUND INFECTION: ICD-10-CM

## 2025-07-01 DIAGNOSIS — L97.322 SKIN ULCER OF LEFT ANKLE WITH FAT LAYER EXPOSED (HCC): Primary | ICD-10-CM

## 2025-07-01 DIAGNOSIS — L84 CALLUS OF FOOT: ICD-10-CM

## 2025-07-01 DIAGNOSIS — R54 AGE-RELATED PHYSICAL DEBILITY: ICD-10-CM

## 2025-07-01 DIAGNOSIS — M21.179: ICD-10-CM

## 2025-07-01 PROCEDURE — 99213 OFFICE O/P EST LOW 20 MIN: CPT

## 2025-07-01 PROCEDURE — 11042 DBRDMT SUBQ TIS 1ST 20SQCM/<: CPT

## 2025-07-01 PROCEDURE — 11042 DBRDMT SUBQ TIS 1ST 20SQCM/<: CPT | Performed by: NURSE PRACTITIONER

## 2025-07-01 NOTE — PROGRESS NOTES
Provider Encounter- Full Thickness wound    HISTORY OF PRESENT ILLNESS  Wound History:    START OF CARE IN CLINIC: 4/22/2025    REFERRING PROVIDER: Kolby Mejia      WOUND- Full Thickness Wound   LOCATION: Left medial ankle and lower leg     HISTORY: Patient with chronic ulcer to her left medial lower leg and ankle referred to [facility] for evaluation management.  She is well-known to this clinic from previous treatment of wounds to the same site.  She was last seen in our clinic in August 2019 at which time she had a small open wound over the medial malleolus.  She was hospitalized shortly thereafter due to a fall resulting in a C2 fracture, and hip pain.  She had several prolonged hospitalizations around that time.  She opted not to return to the clinic after she was discharged from the hospital.  She states that the wound went on to heal until a few years ago when she scratched herself while removing a sock.  Over the past few years the wound has gotten progressively larger and she has treated herself using various treatments including zinc paste and Betadine.  She does have significant deformity of this ankle, states she has had several fractures of her ankle in the past with no surgical repair.  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    Pertinent Medical History: History of alcohol dependence, in remission, atherosclerosis, history of hip fracture, osteoarthritis, memory deficit, PVD, valgus deformity of left foot    TOBACCO USE: Former smoker, quit more than 10 years ago    Patient's problem list, allergies, and current medications reviewed and updated in Epic    Interval History:  4/22/2025 : Clinic visit with IRON Vasquez, GAYLE-BC, CWSHEREENN, CFSHANNAN.   Patient states that she is feeling well, denies fevers, chills, nausea, vomiting, cough or shortness of breath.  She cannot tell me for sure how long this wound has been open, only  states that it was several years ago.  She denies having much pain from this wound.  Severe valgus deformity of left ankle, she does not wear any sort of orthotic or brace to her left lower extremity.  She states that normally she does use either a cane or walker, however did not bring one with her today.    4/29/25: Clinic visit with Letty PERDUE, LORETO, YAZ, ILDA.  Pt denies fevers, chills, nausea, vomiting.  Ulcer area slightly decreased.  Thick slough.  Patient on suppressive doxycycline 100 mg twice daily for her chronically infected hardware to left hip.  Increased drainage, green-tinged.  Wound culture collected today.  No odor.  Follow results.    5/6/2025 : Clinic visit with IRON Vasquez FNP-BC, CALIN, ILDA.   Patient states she is feeling well overall.  There is quite a bit of slough to her wound today.  Reviewed culture results, positive for rare growth of Proteus.  Given appearance of wound, will go ahead and prescribe Levaquin 500 mg x 7 days.  She is already suppressive doxycycline 100 mg twice daily for infected hardware.      5/13/2025 : Clinic visit with IRON Vasquez, LORETO, CALIN, ILDA.   Patient continues to feel well.  She is tolerating Levaquin without any difficulty, should be completing these in a few days.  Her wound measures about the same, thick slough to wound bed, no odor.    5/20/2025 : Clinic visit with IRON Vasquez FNP-BC, CALIN, ILDA.   Patient states she is feeling well.  Unfortunately, her wound remains stalled.  Slough to wound bed.  Healing complicated by severe deformity of ankle.  Recent issue of possible surgical consult.  Possibly plastics for STSG, Ortho for correction of deformity, or both.  She seems to be more open to the idea of surgery, but states she would like to talk it over with her son first.  I did offer to put her into LPS rounds, provided her with the dates through the month of June.  At her request, I wrote out my  recommendations on a piece of paper for her to give to her son.    5/27/2025: Clinic visit with Bryan Reza MD. Patient reports feeling in normal state of health. Denies any signs or symptoms of infection. Has Xrays scheduled later today, reminded patient. Patient is agreeable to discussing surgical options during LPS clinic, I recommend her son come to appointment. Patient wounds largely unchanged.    6/3/25: Clinic visit with LORETO Austin, YAZ, ILDA.  Pt denies fevers, chills, nausea, vomiting.  New epithelium to center of wound has formed skin bridge.  Ulcers  into 2.  Tolerating 2 layer compression wrap.  Increased to 4-layer compression wrap.  Initiate Urgo clean.  Returns this Friday 6/6/25 for LPS rounds. Home health orders updated.  Recommend patient cancel home health appointment this Friday as she will be at LPS rounds and dressing will be changed.    L great toenail thick and dystrophic. Separation at cuticle with dry scab.  Offered podiatry for nail care.  Patient declined at this time.  L 1st MTH plantar callus filed with Alpine board    6/10/2025: Clinic visit with Bryan Reza MD. Patient reports doing well, denies any acute issues. Patient was seen during LPS rounds, options were given: Continue wound care, BKA, or extensive reconstruction with external fixator. Patient has opted for conservative care. Patient wounds are slowly improving.   Patient asked about specialty footwear to accommodate her deformity. Briefly discussed with orthotist. Will need evaluation. Will hold off on referral until wound closer to resolution.    6/17/25: Clinic visit with LORETO Austin, YAZ, ILDA.  Pt denies fevers, chills, nausea, vomiting.  Both left medial ankle and left medial lower leg ulcers are improved.  Thick slough.  Reports that home health noted purple discoloration to toes and removed 4-layer wrap and applied Tubigrip.  Patient reports that she had no  discomfort and is agreeable to return to 4-layer compression wrap.    6/24/2025: Clinic visit with Bryan Reza MD. Patient reports doing well, denies any signs or symptoms of infection. Wounds continues to improve. Patient with overgrown left 5th toenail which is cutting into her own skin and plantar 1st MTH callus that needs debridement.    7/1/2025 : Clinic visit with IRON Vasquez, FNP-BC, CWSHEREENN, CFSHANNAN.   Patient continues to feel well.  Her wounds measure smaller.  She did have a consultation with Ortho, decided not to proceed with any surgery to correct her deformities.      REVIEW OF SYSTEMS:   Unchanged from previous wound clinic assessment on 6/24/2025, except as noted in interval history above      PHYSICAL EXAMINATION:   There were no vitals taken for this visit.    Physical Exam  Constitutional:       Appearance: She is normal weight.   HENT:      Ears:      Comments: Hard of hearing  Neck:      Comments: Kyphosis of neck and spine  Cardiovascular:      Pulses: Normal pulses.      Comments:  +2 left DP palpated.  Unable to palpate PT secondary to wound location  Left DP and PT pulses brisk and multiphasic by Doppler  Pulmonary:      Effort: Pulmonary effort is normal.   Musculoskeletal:      Cervical back: Rigidity present.      Right lower leg: Edema present.      Left lower leg: Edema present.      Comments: Brawny edema of bilateral lower extremities  Severe valgus deformity of left ankle   Skin:     Comments: Full-thickness wound to left medial ankle: Wound measures smaller.  Thick layer of slough to wound bed.  Moderate serosanguineous drainage, no odor.  No periwound erythema or induration.    Left medial lower leg wound: Wound measures smaller.  Thick layer of slough to wound bed.  Moderate serosanguineous drainage, no odor.  No periwound erythema or induration.      Brawny edema bilateral lower extremities, hemosiderin staining, varicose veins, scarring and skin changes-consistent with CVI    Neurological:      Mental Status: She is alert and oriented to person, place, and time.      Comments: Poor memory         WOUND ASSESSMENT  Wound 04/22/25 Venous Ulcer Leg Medial;Lower Left --Left Medial/proximal Lower Leg (Active)   Wound Image    07/01/25 1100   Site Assessment Red;Early/partial granulation 07/01/25 1100   Periwound Assessment Scar tissue 07/01/25 1100   Margins Attached edges 07/01/25 1100   Drainage Amount Moderate 07/01/25 1100   Drainage Description Serosanguineous 07/01/25 1100   Treatments Topical Lidocaine;Site care;Provider debridement 07/01/25 1100   Offloading/DME Other (comment) 05/20/25 1000   ** Retired** Wound Cleansing Hypochlorus Acid 05/13/25 1100   Wound Cleansing Hypochlorus Acid;Foam Cleanser/Washcloth 07/01/25 1100   Periwound Protectant No-sting Skin Prep;Moisture Barrier;Silicone barrier cream 07/01/25 1100   Dressing Changed New 05/27/25 1000   Dressing Cleansing/Solutions Not Applicable 07/01/25 1100   Dressing Options Other (Comments);Super Absorbent Pad;Compression Wrap Four Layer 07/01/25 1100   Dressing Change/Treatment Frequency Twice Weekly 07/01/25 1100   Wound Team Following Weekly 06/03/25 1012   Non-staged Wound Description Full thickness 07/01/25 1100   Wound Length (cm) 2 cm 07/01/25 1100   Wound Width (cm) 2 cm 07/01/25 1100   Wound Depth (cm) 0.1 cm 07/01/25 1100   Wound Surface Area (cm^2) 3.14 cm^2 07/01/25 1100   Wound Volume (cm^3) 0.209 cm^3 07/01/25 1100   Post-Procedure Length (cm) 1.5 cm 07/01/25 1100   Post-Procedure Width (cm) 2 cm 07/01/25 1100   Post-Procedure Depth (cm) 0.1 cm 07/01/25 1100   Post-Procedure Surface Area (cm^2) 2.36 cm^2 07/01/25 1100   Post-Procedure Volume (cm^3) 0.157 cm^3 07/01/25 1100   Wound Healing % 98 07/01/25 1100   Tunneling (cm) 0 cm 07/01/25 1100   Undermining (cm) 0 cm 07/01/25 1100   Wound Odor None 07/01/25 1100   Pulses Left;DP;PT;Doppler 06/24/25 0930   Exposed Structures None 07/01/25 1100   Number of days:  70       Wound 06/03/25 Atypical Full Thickness Pretibial Distal;Medial Left --Left Medial/Distal Lower Leg (Active)   Wound Image    07/01/25 1100   Site Assessment Red;Early/partial granulation 07/01/25 1100   Periwound Assessment Scar tissue 07/01/25 1100   Margins Attached edges 07/01/25 1100   Drainage Amount Moderate 07/01/25 1100   Drainage Description Serosanguineous 07/01/25 1100   Treatments Topical Lidocaine;Site care;Provider debridement 07/01/25 1100   Wound Cleansing Hypochlorus Acid;Foam Cleanser/Washcloth 07/01/25 1100   Periwound Protectant No-sting Skin Prep;Moisture Barrier;Silicone barrier cream 07/01/25 1100   Dressing Changed Changed 07/01/25 1100   Dressing Cleansing/Solutions Not Applicable 07/01/25 1100   Dressing Options Other (Comments);Super Absorbent Pad;Compression Wrap Four Layer 07/01/25 1100   Dressing Change/Treatment Frequency Twice Weekly 07/01/25 1100   Wound Team Following Weekly 07/01/25 1100   Non-staged Wound Description Full thickness 07/01/25 1100   Wound Length (cm) 2.5 cm 07/01/25 1100   Wound Width (cm) 3 cm 07/01/25 1100   Wound Depth (cm) 0.1 cm 07/01/25 1100   Wound Surface Area (cm^2) 5.89 cm^2 07/01/25 1100   Wound Volume (cm^3) 0.393 cm^3 07/01/25 1100   Post-Procedure Length (cm) 3 cm 07/01/25 1100   Post-Procedure Width (cm) 2.5 cm 07/01/25 1100   Post-Procedure Depth (cm) 0.1 cm 07/01/25 1100   Post-Procedure Surface Area (cm^2) 5.89 cm^2 07/01/25 1100   Post-Procedure Volume (cm^3) 0.393 cm^3 07/01/25 1100   Wound Healing % 55 07/01/25 1100   Tunneling (cm) 0 cm 07/01/25 1100   Undermining (cm) 0 cm 07/01/25 1100   Wound Odor None 07/01/25 1100   Pulses Left;DP;PT;Doppler 06/24/25 0930   Exposed Structures None 07/01/25 1100   Number of days: 28       PROCEDURE: Excisional debridement of left medial leg wound x 2  -2% viscous lidocaine applied topically to wound bed for approximately 5 minutes prior to debridement  -Curette used to debride wound bed.   Excisional debridement was performed to remove devitalized tissue until healthy, bleeding tissue was visualized.   Entire surface of wound, 8.25 cm² debrided.  Tissue debrided into the subcutaneous layer.   -Bleeding controlled with manual pressure.    -Wound care completed by wound RN, refer to flowsheet  -Patient tolerated the procedure well, without c/o pain or discomfort.         Pertinent Labs and Diagnostics:    Labs:     A1c:   Lab Results   Component Value Date/Time    HBA1C 5.6 06/04/2018 12:00 PM          IMAGING: X-ray of left ankle 5/27/2025  Osteopenia and significant deformity without a definite acute osseous abnormality.     X-ray left foot 5/27/2025  No definite fracture or dislocation.       VASCULAR STUDIES: None found in epic    LAST  WOUND CULTURE:  DATE :   Lab Results   Component Value Date/Time    CULTRSULT - (A) 04/29/2025 09:00 AM    CULTRSULT Proteus mirabilis  Rare growth   (A) 04/29/2025 09:00 AM         ASSESSMENT AND PLAN:     1. Skin ulcer of left ankle with fat layer exposed (HCC)  Large full-thickness ulcer to left medial lower leg and ankle, present for several years.  Patient was treated for wounds to this same area in 2019, stopped coming into the clinic due to hospitalizations for neck and hip fractures.  She states the wound went on to heal but did not know for how long.    7/1/2025: Both wounds are progressing, measuring smaller.    - Excisional debridement of wounds in clinic today, medically necessary to promote wound healing.  - Patient to return to clinic weekly for assessment and debridement  - Home health to follow-up with patient  weekly.  - Continue Urgoclean  - Tolerating 4-layer compression wrap.  Previously recommended obtaining cast protector to keep wrap dry during showering.  If unable to tolerate wrap, patient to remove and apply Tubigrip.   - Patient was seen during LPS rounds, surgical options were discussed with patient. She opted for conservative care with  wound care.    Wound care: Urgo clean Ag;Super Absorbent Pad;Compression Wrap Four Layer    2. Acquired varus deformity of ankle    7/1/2025: Severe valgus deformity of ankle.  Patient states she had several fractures of this ankle in the past, she did not undergo any surgery  - Seen by [facility] clinic 6/6/2025. Surgical options including BKA or reconstruction with external fixator. Patient declined surgery and would like to continue conservative care with wound care.  - Patient reports she had a boot that she is unable to wear because of location of where her wound is.  She does not have a brace for the ankle.  - Case discussed with orthotist at [facility]. May benefit from custom bracing. Will consider when wound closer to resolution.  - X-ray of foot and ankle completed 5/27/2025  -Negative for OM.  -Home health was supposed to  help patient obtain Prevalon boot.  Patient aware that Prevalon boot is to be used when sitting or laying in bed.  She is aware she is not supposed to ambulate in boot.  When patient sleeps she sleeps on her right side putting pressure to the left medial ankle.    3. Age-related physical debility    7/1/2025: Patient has moderately severe kyphosis of neck and spine, normally uses cane of walker for mobility  - Patient is established with [facility].    4.  Wound infection    7/1/2025:   - No overt evidence of wound infection today  - Patient is on suppressive doxycycline 100 mg p.o. twice daily ordered by Dr. Vazquez for chronic left hip infected hardware  - Monitor for signs and symptoms of infection each clinic visit    5. Callus of foot    7/1/2025  - Patient with thick pre-ulcerative callus left plantar 1st MTH. Undoubtedly associated with her significant ankle / foot deformity  - No need for debridement today  - No open wounds to this part of her foot        PATIENT EDUCATION  - Importance of adequate nutrition for wound healing  -Advised to go to ER for any increased redness,  swelling, drainage, or odor, or if patient develops fever, chills, nausea or vomiting.     Please note that this note may have been created using voice recognition software. I have worked with technical experts from [facility] to optimize the interface.  I have made every reasonable attempt to correct obvious errors, but there may be errors of grammar and possibly content that I did not discover before finalizing the note.    N

## 2025-07-01 NOTE — PATIENT INSTRUCTIONS
"The following information is a summary of the education provided in the clinic today. This is not an exhaustive list of the education provided during your appointment.       DRESSING CHANGES    Keep your wound dressing clean, dry, and intact. Change your dressing twice weekly AND/OR if the dressing becomes, soiled, leaks, gets wet, or falls off.      You may not shower with the dressing(s) on. Cover compression wrap if showering with it on. Please do not take baths, or swim in the ocean, lakes, rivers, pools, or hot tubs.      Wounds do not need to \"air out\" or \"breathe\". Gently dry your wound before placing a new dressing.     After you get out of the shower, wash the wound a second time (with soap and water, wound cleanser, or saline). Gently dry the wound before you place a new dressing.       If you need to change your dressings at home, you should wash your wound. Use normal saline, wound cleanser, hypochlorous acid, or unscented soap and water. Do not use hydrogen peroxide or rubbing alcohol to clean your wounds. Hydrogen peroxide and rubbing alcohol will damage new cells and tissue. Do not use betadine or iodine unless told to by your wound care team.    Do not soak your wounds in epsom salt baths. This can worsen your wound(s) or delay wound healing. It can also lead to infection or maceration (tissue is too wet).     If you do not have home health, the clinic will give you with leftover supplies from your appointment. We do not give out extra dressing supplies. We will order you supplies through a [employer] company. Your insurance may or may not pay for all these supplies. The company will reach out to you if insurance does not cover supplies. These supplies will be sent to your home within a few days. If you do have home health, they will provide wound care supplies.     The dressings we use may change as your wound changes.         COMPRESSION   -Compression helps reduce swelling and helps wounds heal quicker. " It is still important to elevate your feet several times daily to reduce swelling, even when you are wearing compression. It is important to wear compression every day, to help your wound heal, and to prevent new wounds from developing.      -Today, a 4 layer compression wrap was applied. Please take off the wrap if you have pain, extreme swelling, new numbness or tingling, a change in the color or temperature of your feet. Take off the wrap if the wrap slides down/bunches up Take off the wrap if it gets wet or leaks through.  If you have to take off the wrap, please do not cut it off with scissors or other sharp tools. Do not trim the wrap. Unravel the wrap one layer at a time. Place a clean and dry dressing over the wound. Do not leave wrap on for more than seven days at a time. You can wear a cast protector over your wrap so you can take a shower. Cast protectors can be found at most [employer] and [employer]. [facility] does not endorse any brand of cast protector.        GENERAL HEALTH ADVICE   -Nutrition is important for wound healing. Unless told otherwise by your doctor, eat more protein and consider supplementing with a multi-vitamin, zinc, and vitamin C.             CLINIC INFORMATION  The clinic's hours are Monday-Friday, 7:30 AM to 5:00 PM. We are closed most holidays and on weekends. If you leave us a message, please allow 24 hours for someone to return your call. If you have concerns or are having a medical emergency, call 911 or go to the hospital emergency room.     You might not see the same nurse or provider every visit.     If you notice any large changes in your wound(s), or signs of infection (redness, swelling, localized heat, increased pain, fever > 101 F, chills, nausea/vomiting) or have any questions about your home care instructions, please call the wound center at [phone]. If it's after hours, contact your primary care physician or go to the hospital emergency room. If you are admitted to  any hospital, you will need a new referral to come back to the wound clinic. Any wound care appointments that you already have may be cancelled.    If you are 5 or more minutes late for an appointment, we reserve the right to cancel and reschedule that appointment. For example, if your appointment is at 1:00 PM, and you arrive at 1:06 PM, you are more than five minutes late and might not be seen. If you are consistently late or not coming to your appointments (typically 3 late cancellations and/or no shows), we reserve the right to cancel your future appointments or discharge you from the clinic. It is then your responsibility to obtain a new referral if wound care is still needed.

## 2025-07-01 NOTE — PROGRESS NOTES
4 layer compression wrap applied. Pt tolerated well. Trialing half a heel mepilex to medial foot as pt expressed some discomfort in that area with previous wrap applications. Wound care orders updated for [facility] via [facility].

## 2025-07-04 ENCOUNTER — HOME CARE VISIT (OUTPATIENT)
Dept: HOME HEALTH SERVICES | Facility: HOME HEALTHCARE | Age: 80
End: 2025-07-04
Payer: MEDICARE

## 2025-07-04 PROCEDURE — G0299 HHS/HOSPICE OF RN EA 15 MIN: HCPCS

## 2025-07-05 VITALS
RESPIRATION RATE: 16 BRPM | DIASTOLIC BLOOD PRESSURE: 82 MMHG | OXYGEN SATURATION: 97 % | TEMPERATURE: 97.8 F | HEART RATE: 67 BPM | SYSTOLIC BLOOD PRESSURE: 124 MMHG

## 2025-07-05 ASSESSMENT — ENCOUNTER SYMPTOMS: DENIES PAIN: 1

## 2025-07-08 ENCOUNTER — OFFICE VISIT (OUTPATIENT)
Dept: WOUND CARE | Facility: MEDICAL CENTER | Age: 80
End: 2025-07-08
Attending: NURSE PRACTITIONER
Payer: MEDICARE

## 2025-07-08 DIAGNOSIS — T14.8XXA WOUND INFECTION: ICD-10-CM

## 2025-07-08 DIAGNOSIS — L97.322 SKIN ULCER OF LEFT ANKLE WITH FAT LAYER EXPOSED (HCC): Primary | ICD-10-CM

## 2025-07-08 DIAGNOSIS — L08.9 WOUND INFECTION: ICD-10-CM

## 2025-07-08 DIAGNOSIS — L84 CALLUS OF FOOT: ICD-10-CM

## 2025-07-08 DIAGNOSIS — M21.179: ICD-10-CM

## 2025-07-08 DIAGNOSIS — R54 AGE-RELATED PHYSICAL DEBILITY: ICD-10-CM

## 2025-07-08 PROCEDURE — 11042 DBRDMT SUBQ TIS 1ST 20SQCM/<: CPT

## 2025-07-08 PROCEDURE — 99214 OFFICE O/P EST MOD 30 MIN: CPT

## 2025-07-08 PROCEDURE — 11042 DBRDMT SUBQ TIS 1ST 20SQCM/<: CPT | Performed by: STUDENT IN AN ORGANIZED HEALTH CARE EDUCATION/TRAINING PROGRAM

## 2025-07-08 NOTE — PROGRESS NOTES
Applied four layer compression wrap to left lower leg. Updated home health orders send to [facility] via [facility]. Patient educated regarding compression differences (as she came in wearing two layer wrap), as well as when to remove. Cleansed periwound skin extensively and very carefully to remove thick build up layer of zinc cream using no sting skin prep wipe, moist soapy washcloth, and Puracyn spray soak on gauze. Patient tolerated well and appreciated the full visual of her decreasing wound size.

## 2025-07-08 NOTE — PROGRESS NOTES
Provider Encounter- Full Thickness wound    HISTORY OF PRESENT ILLNESS  Wound History:    START OF CARE IN CLINIC: 4/22/2025    REFERRING PROVIDER: Kolby Mejia      WOUND- Full Thickness Wound   LOCATION: Left medial ankle and lower leg     HISTORY: Patient with chronic ulcer to her left medial lower leg and ankle referred to [facility] for evaluation management.  She is well-known to this clinic from previous treatment of wounds to the same site.  She was last seen in our clinic in August 2019 at which time she had a small open wound over the medial malleolus.  She was hospitalized shortly thereafter due to a fall resulting in a C2 fracture, and hip pain.  She had several prolonged hospitalizations around that time.  She opted not to return to the clinic after she was discharged from the hospital.  She states that the wound went on to heal until a few years ago when she scratched herself while removing a sock.  Over the past few years the wound has gotten progressively larger and she has treated herself using various treatments including zinc paste and Betadine.  She does have significant deformity of this ankle, states she has had several fractures of her ankle in the past with no surgical repair.  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    Pertinent Medical History: History of alcohol dependence, in remission, atherosclerosis, history of hip fracture, osteoarthritis, memory deficit, PVD, valgus deformity of left foot    TOBACCO USE: Former smoker, quit more than 10 years ago    Patient's problem list, allergies, and current medications reviewed and updated in Epic    Interval History:  4/22/2025 : Clinic visit with IRON Vasquez, GAYLE-BC, CWSHEREENN, CFSHANNAN.   Patient states that she is feeling well, denies fevers, chills, nausea, vomiting, cough or shortness of breath.  She cannot tell me for sure how long this wound has been open, only  states that it was several years ago.  She denies having much pain from this wound.  Severe valgus deformity of left ankle, she does not wear any sort of orthotic or brace to her left lower extremity.  She states that normally she does use either a cane or walker, however did not bring one with her today.    4/29/25: Clinic visit with Letty PERDUE, LORETO, YAZ, ILDA.  Pt denies fevers, chills, nausea, vomiting.  Ulcer area slightly decreased.  Thick slough.  Patient on suppressive doxycycline 100 mg twice daily for her chronically infected hardware to left hip.  Increased drainage, green-tinged.  Wound culture collected today.  No odor.  Follow results.    5/6/2025 : Clinic visit with IRON Vasquez FNP-BC, CALIN, ILDA.   Patient states she is feeling well overall.  There is quite a bit of slough to her wound today.  Reviewed culture results, positive for rare growth of Proteus.  Given appearance of wound, will go ahead and prescribe Levaquin 500 mg x 7 days.  She is already suppressive doxycycline 100 mg twice daily for infected hardware.      5/13/2025 : Clinic visit with IRON Vasquez, LORETO, CALIN, ILDA.   Patient continues to feel well.  She is tolerating Levaquin without any difficulty, should be completing these in a few days.  Her wound measures about the same, thick slough to wound bed, no odor.    5/20/2025 : Clinic visit with IRON Vasquez FNP-BC, CALIN, ILDA.   Patient states she is feeling well.  Unfortunately, her wound remains stalled.  Slough to wound bed.  Healing complicated by severe deformity of ankle.  Recent issue of possible surgical consult.  Possibly plastics for STSG, Ortho for correction of deformity, or both.  She seems to be more open to the idea of surgery, but states she would like to talk it over with her son first.  I did offer to put her into LPS rounds, provided her with the dates through the month of June.  At her request, I wrote out my  recommendations on a piece of paper for her to give to her son.    5/27/2025: Clinic visit with Bryan Reza MD. Patient reports feeling in normal state of health. Denies any signs or symptoms of infection. Has Xrays scheduled later today, reminded patient. Patient is agreeable to discussing surgical options during LPS clinic, I recommend her son come to appointment. Patient wounds largely unchanged.    6/3/25: Clinic visit with LORETO Austin, YAZ, ILDA.  Pt denies fevers, chills, nausea, vomiting.  New epithelium to center of wound has formed skin bridge.  Ulcers  into 2.  Tolerating 2 layer compression wrap.  Increased to 4-layer compression wrap.  Initiate Urgo clean.  Returns this Friday 6/6/25 for LPS rounds. Home health orders updated.  Recommend patient cancel home health appointment this Friday as she will be at LPS rounds and dressing will be changed.    L great toenail thick and dystrophic. Separation at cuticle with dry scab.  Offered podiatry for nail care.  Patient declined at this time.  L 1st MTH plantar callus filed with [place] board    6/10/2025: Clinic visit with Bryan Reza MD. Patient reports doing well, denies any acute issues. Patient was seen during LPS rounds, options were given: Continue wound care, BKA, or extensive reconstruction with external fixator. Patient has opted for conservative care. Patient wounds are slowly improving.   Patient asked about specialty footwear to accommodate her deformity. Briefly discussed with orthotist. Will need evaluation. Will hold off on referral until wound closer to resolution.    6/17/25: Clinic visit with LORETO Austin, YAZ, ILDA.  Pt denies fevers, chills, nausea, vomiting.  Both left medial ankle and left medial lower leg ulcers are improved.  Thick slough.  Reports that home health noted purple discoloration to toes and removed 4-layer wrap and applied Tubigrip.  Patient reports that she had no  discomfort and is agreeable to return to 4-layer compression wrap.    6/24/2025: Clinic visit with Bryan Reza MD. Patient reports doing well, denies any signs or symptoms of infection. Wounds continues to improve. Patient with overgrown left 5th toenail which is cutting into her own skin and plantar 1st MTH callus that needs debridement.    7/1/2025 : Clinic visit with IRON Vasquez, FNP-BC, CWSHEREENN, CFSHANNAN.   Patient continues to feel well. Her wounds measure smaller. She did have a consultation with Ortho, decided not to proceed with any surgery to correct her deformities.    7/8/2025: Clinic visit with Bryan Reza MD. Patient reports doing well, denies any acute issues. Her wounds continue to slowly improve. She presents today in 2 layer compression, was placed in 4 layer and was tolerating last week. Will resume 4 layer compression.      REVIEW OF SYSTEMS:   Unchanged from previous wound clinic assessment on 7/1/2025, except as noted in interval history above      PHYSICAL EXAMINATION:   There were no vitals taken for this visit.    Physical Exam  Constitutional:       Appearance: She is normal weight.   HENT:      Ears:      Comments: Hard of hearing  Neck:      Comments: Kyphosis of neck and spine  Cardiovascular:      Pulses: Normal pulses.      Comments:  +2 left DP palpated.  Unable to palpate PT secondary to wound location  Left DP and PT pulses brisk and multiphasic by Doppler  Pulmonary:      Effort: Pulmonary effort is normal.   Musculoskeletal:      Cervical back: Rigidity present.      Right lower leg: Edema present.      Left lower leg: Edema present.      Comments: Brawny edema of bilateral lower extremities  Severe valgus deformity of left ankle   Skin:     Comments: Full-thickness wound to left medial ankle: Wound continues to improve. Thin layer of slough with granulation tissue forming.  Moderate serosanguineous drainage, no odor.  No periwound erythema or induration.    Left medial  lower leg wound: Wound smaller. Thin layer of slough with improved granulation tissue. Moderate serosanguineous drainage, no odor.  No periwound erythema or induration.    Brawny edema bilateral lower extremities, hemosiderin staining, varicose veins, scarring and skin changes-consistent with CVI   Neurological:      Mental Status: She is alert and oriented to person, place, and time.      Comments: Poor memory         WOUND ASSESSMENT  Wound 04/22/25 Venous Ulcer Leg Medial;Lower Left --Left Medial/proximal Lower Leg (Active)   Wound Image    07/08/25 1000   Site Assessment Red;Yellow;Slough;Early/partial granulation 07/08/25 1000   Periwound Assessment Scar tissue 07/08/25 1000   Margins Attached edges 07/08/25 1000   Drainage Amount Moderate 07/08/25 1000   Drainage Description Serosanguineous 07/08/25 1000   Treatments Topical Lidocaine;Cleansed;Provider debridement 07/08/25 1000   Offloading/DME Other (comment) 05/20/25 1000   ** Retired** Wound Cleansing Hypochlorus Acid 05/13/25 1100   Wound Cleansing Hypochlorus Acid 07/08/25 1000   Periwound Protectant No-sting Skin Prep;Silicone barrier cream;Skin Moisturizer 07/08/25 1000   Dressing Status Old drainage 07/08/25 1000   Dressing Changed Changed 07/08/25 1000   Dressing Cleansing/Solutions Not Applicable 07/08/25 1000   Dressing Options Other (Comments);Super Absorbent Pad;Compression Wrap Four Layer 07/08/25 1000   Dressing Change/Treatment Frequency Twice Weekly 07/08/25 1000   Wound Team Following Weekly 07/08/25 1000   Non-staged Wound Description Full thickness 07/08/25 1000   Wound Length (cm) 1 cm 07/08/25 1000   Wound Width (cm) 1 cm 07/08/25 1000   Wound Depth (cm) 0.1 cm 07/08/25 1000   Wound Surface Area (cm^2) 0.79 cm^2 07/08/25 1000   Wound Volume (cm^3) 0.052 cm^3 07/08/25 1000   Post-Procedure Length (cm) 1 cm 07/08/25 1000   Post-Procedure Width (cm) 1 cm 07/08/25 1000   Post-Procedure Depth (cm) 0.1 cm 07/08/25 1000   Post-Procedure Surface  Area (cm^2) 0.79 cm^2 07/08/25 1000   Post-Procedure Volume (cm^3) 0.052 cm^3 07/08/25 1000   Wound Healing % 99 07/08/25 1000   Tunneling (cm) 0 cm 07/08/25 1000   Undermining (cm) 0 cm 07/08/25 1000   Wound Odor None 07/08/25 1000   Pulses Left;1+;DP;PT 07/08/25 1000   Exposed Structures None 07/08/25 1000   Number of days: 77       Wound 06/03/25 Atypical Full Thickness Pretibial Distal;Medial Left --Left Medial/Distal Lower Leg (Active)   Wound Image    07/08/25 1000   Site Assessment Red;Yellow;Slough;Early/partial granulation 07/08/25 1000   Periwound Assessment Scar tissue 07/08/25 1000   Margins Attached edges 07/08/25 1000   Drainage Amount Moderate 07/08/25 1000   Drainage Description Serosanguineous 07/08/25 1000   Treatments Topical Lidocaine;Cleansed;Provider debridement 07/08/25 1000   Wound Cleansing Hypochlorus Acid 07/08/25 1000   Periwound Protectant No-sting Skin Prep;Silicone barrier cream;Skin Moisturizer 07/08/25 1000   Dressing Status Old drainage 07/08/25 1000   Dressing Changed Changed 07/08/25 1000   Dressing Cleansing/Solutions Not Applicable 07/08/25 1000   Dressing Options Other (Comments);Super Absorbent Pad;Compression Wrap Four Layer 07/08/25 1000   Dressing Change/Treatment Frequency Twice Weekly 07/08/25 1000   Wound Team Following Weekly 07/08/25 1000   Non-staged Wound Description Full thickness 07/08/25 1000   Wound Length (cm) 2.2 cm 07/08/25 1000   Wound Width (cm) 2.2 cm 07/08/25 1000   Wound Depth (cm) 0.1 cm 07/08/25 1000   Wound Surface Area (cm^2) 3.8 cm^2 07/08/25 1000   Wound Volume (cm^3) 0.253 cm^3 07/08/25 1000   Post-Procedure Length (cm) 2 cm 07/08/25 1000   Post-Procedure Width (cm) 1.9 cm 07/08/25 1000   Post-Procedure Depth (cm) 0.1 cm 07/08/25 1000   Post-Procedure Surface Area (cm^2) 2.98 cm^2 07/08/25 1000   Post-Procedure Volume (cm^3) 0.199 cm^3 07/08/25 1000   Wound Healing % 71 07/08/25 1000   Tunneling (cm) 0 cm 07/08/25 1000   Undermining (cm) 0 cm  07/08/25 1000   Wound Odor None 07/08/25 1000   Pulses Left;1+;DP;PT 07/08/25 1000   Exposed Structures None 07/08/25 1000   Number of days: 35       PROCEDURE: Excisional debridement of left medial leg wound x 2  -2% viscous lidocaine applied topically to wound bed for approximately 5 minutes prior to debridement  -Curette used to debride wound bed.  Excisional debridement was performed to remove devitalized tissue until healthy, bleeding tissue was visualized.   Entire surface of wound, 3.77 cm² debrided.  Tissue debrided into the subcutaneous layer.   -Bleeding controlled with manual pressure.    -Wound care completed by wound RN, refer to flowsheet  -Patient tolerated the procedure well, without c/o pain or discomfort.         Pertinent Labs and Diagnostics:    Labs:     A1c:   Lab Results   Component Value Date/Time    HBA1C 5.6 06/04/2018 12:00 PM          IMAGING: X-ray of left ankle 5/27/2025  Osteopenia and significant deformity without a definite acute osseous abnormality.     X-ray left foot 5/27/2025  No definite fracture or dislocation.       VASCULAR STUDIES: None found in epic    LAST  WOUND CULTURE:  DATE :   Lab Results   Component Value Date/Time    CULTRSULT - (A) 04/29/2025 09:00 AM    CULTRSULT Proteus mirabilis  Rare growth   (A) 04/29/2025 09:00 AM         ASSESSMENT AND PLAN:     1. Skin ulcer of left ankle with fat layer exposed (HCC)  Large full-thickness ulcer to left medial lower leg and ankle, present for several years.  Patient was treated for wounds to this same area in 2019, stopped coming into the clinic due to hospitalizations for neck and hip fractures.  She states the wound went on to heal but did not know for how long.    7/8/2025: Wounds measuring smaller.  - Excisional debridement of wounds in clinic today, medically necessary to promote wound healing.  - Patient to return to clinic weekly for assessment and debridement  - Home health to follow-up with patient  weekly.  -  Continue Urgoclean  - Tolerating 4-layer compression wrap, continue.  - Patient was seen during LPS rounds, surgical options were discussed with patient. She opted for conservative care with wound care.    Wound care: Urgo clean Ag;Super Absorbent Pad;Compression Wrap Four Layer    2. Acquired varus deformity of ankle    7/8/2025: Severe valgus deformity of ankle.  Patient states she had several fractures of this ankle in the past, she did not undergo any surgery  - Seen by LPS clinic 6/6/2025. Surgical options including BKA or reconstruction with external fixator. Patient declined surgery and would like to continue conservative care with wound care.  - Patient reports she had a boot that she is unable to wear because of location of where her wound is.  She does not have a brace for the ankle.  - Case discussed with orthotist at [facility]. May benefit from custom bracing. Will consider when wound closer to resolution.  - X-ray of foot and ankle completed 5/27/2025  - Negative for OM.  - Home health was supposed to help patient obtain Prevalon boot.  Patient aware that Prevalon boot is to be used when sitting or laying in bed.  She is aware she is not supposed to ambulate in boot.  When patient sleeps she sleeps on her right side putting pressure to the left medial ankle.    3. Age-related physical debility    7/8/2025: Patient has moderately severe kyphosis of neck and spine, normally uses cane of walker for mobility  - Patient is established with [facility].    4.  Wound infection    7/8/2025:   - No overt evidence of wound infection today  - Patient is on suppressive doxycycline 100 mg p.o. twice daily ordered by Dr. Vazquez for chronic left hip infected hardware  - Monitor for signs and symptoms of infection each clinic visit    5. Callus of foot    7/8/2025  - Patient with thick pre-ulcerative callus left plantar 1st MTH. Undoubtedly associated with her significant ankle / foot deformity  - No need for  debridement today  - No open wounds to this part of her foot    PATIENT EDUCATION  - Importance of adequate nutrition for wound healing  -Advised to go to ER for any increased redness, swelling, drainage, or odor, or if patient develops fever, chills, nausea or vomiting.     Please note that this note may have been created using voice recognition software. I have worked with technical experts from [facility] to optimize the interface.  I have made every reasonable attempt to correct obvious errors, but there may be errors of grammar and possibly content that I did not discover before finalizing the note.    N

## 2025-07-08 NOTE — PATIENT INSTRUCTIONS
Avoid prolonged standing or sitting without elevating your legs.  - Apply tubigrip to your legs ending 2 fingers below back of knee without wrinkles.   - Knee-high gradient compression to legs  - Multilayer compression wrap to left leg. Do not get wet and keep on for the week. Only remove if temperature or sensation changes.   If compression needs to be removed, un-wrap it and do not cut it off; then call your home health nurse.     Should you experience any significant changes in your wound(s), such as infection (redness, swelling, localized heat, increased pain, fever > 101 F, chills) or have any questions regarding your home care instructions, please contact the wound center at [phone]. If after hours, contact your primary care physician or go to the hospital emergency room.   Keep dressing clean, dry and covered while bathing. Only change dressing if it becomes over saturated, soiled or falls off or by your home health nurse.

## 2025-07-11 ENCOUNTER — HOME CARE VISIT (OUTPATIENT)
Dept: HOME HEALTH SERVICES | Facility: HOME HEALTHCARE | Age: 80
End: 2025-07-11
Payer: MEDICARE

## 2025-07-11 VITALS
SYSTOLIC BLOOD PRESSURE: 128 MMHG | OXYGEN SATURATION: 94 % | TEMPERATURE: 97.2 F | RESPIRATION RATE: 16 BRPM | DIASTOLIC BLOOD PRESSURE: 64 MMHG | HEART RATE: 84 BPM

## 2025-07-11 PROCEDURE — G0299 HHS/HOSPICE OF RN EA 15 MIN: HCPCS

## 2025-07-11 ASSESSMENT — ENCOUNTER SYMPTOMS
LIMITED RANGE OF MOTION: 1
VOMITING: DENIES
LAST BOWEL MOVEMENT: 67395
NAUSEA: DENIES
DENIES PAIN: 1
BOWEL PATTERN NORMAL: 1

## 2025-07-11 ASSESSMENT — ACTIVITIES OF DAILY LIVING (ADL)
AMBULATION ASSISTANCE: STAND BY ASSIST
CURRENT_FUNCTION: STAND BY ASSIST

## 2025-07-15 ENCOUNTER — OFFICE VISIT (OUTPATIENT)
Dept: WOUND CARE | Facility: MEDICAL CENTER | Age: 80
End: 2025-07-15
Attending: NURSE PRACTITIONER
Payer: MEDICARE

## 2025-07-15 DIAGNOSIS — T14.8XXA WOUND INFECTION: ICD-10-CM

## 2025-07-15 DIAGNOSIS — L97.322 SKIN ULCER OF LEFT ANKLE WITH FAT LAYER EXPOSED (HCC): Primary | ICD-10-CM

## 2025-07-15 DIAGNOSIS — R54 AGE-RELATED PHYSICAL DEBILITY: ICD-10-CM

## 2025-07-15 DIAGNOSIS — L08.9 WOUND INFECTION: ICD-10-CM

## 2025-07-15 DIAGNOSIS — M21.179: ICD-10-CM

## 2025-07-15 DIAGNOSIS — L84 CALLUS OF FOOT: ICD-10-CM

## 2025-07-15 PROCEDURE — 11042 DBRDMT SUBQ TIS 1ST 20SQCM/<: CPT

## 2025-07-15 PROCEDURE — 99213 OFFICE O/P EST LOW 20 MIN: CPT

## 2025-07-15 PROCEDURE — 11042 DBRDMT SUBQ TIS 1ST 20SQCM/<: CPT | Performed by: STUDENT IN AN ORGANIZED HEALTH CARE EDUCATION/TRAINING PROGRAM

## 2025-07-15 NOTE — PROGRESS NOTES
Provider Encounter- Full Thickness wound    HISTORY OF PRESENT ILLNESS  Wound History:    START OF CARE IN CLINIC: 4/22/2025    REFERRING PROVIDER: Kolby Mejia      WOUND- Full Thickness Wound   LOCATION: Left medial ankle and lower leg     HISTORY: Patient with chronic ulcer to her left medial lower leg and ankle referred to [facility] for evaluation management.  She is well-known to this clinic from previous treatment of wounds to the same site.  She was last seen in our clinic in August 2019 at which time she had a small open wound over the medial malleolus.  She was hospitalized shortly thereafter due to a fall resulting in a C2 fracture, and hip pain.  She had several prolonged hospitalizations around that time.  She opted not to return to the clinic after she was discharged from the hospital.  She states that the wound went on to heal until a few years ago when she scratched herself while removing a sock.  Over the past few years the wound has gotten progressively larger and she has treated herself using various treatments including zinc paste and Betadine.  She does have significant deformity of this ankle, states she has had several fractures of her ankle in the past with no surgical repair.  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    Pertinent Medical History: History of alcohol dependence, in remission, atherosclerosis, history of hip fracture, osteoarthritis, memory deficit, PVD, valgus deformity of left foot    TOBACCO USE: Former smoker, quit more than 10 years ago    Patient's problem list, allergies, and current medications reviewed and updated in Epic    Interval History:  4/22/2025 : Clinic visit with IRON Vasquez, GAYLE-BC, CWSHEREENN, CFSHANNAN.   Patient states that she is feeling well, denies fevers, chills, nausea, vomiting, cough or shortness of breath.  She cannot tell me for sure how long this wound has been open, only  states that it was several years ago.  She denies having much pain from this wound.  Severe valgus deformity of left ankle, she does not wear any sort of orthotic or brace to her left lower extremity.  She states that normally she does use either a cane or walker, however did not bring one with her today.    4/29/25: Clinic visit with Letty PERDUE, LORETO, YAZ, ILDA.  Pt denies fevers, chills, nausea, vomiting.  Ulcer area slightly decreased.  Thick slough.  Patient on suppressive doxycycline 100 mg twice daily for her chronically infected hardware to left hip.  Increased drainage, green-tinged.  Wound culture collected today.  No odor.  Follow results.    5/6/2025 : Clinic visit with IRON Vasquez FNP-BC, CALIN, ILDA.   Patient states she is feeling well overall.  There is quite a bit of slough to her wound today.  Reviewed culture results, positive for rare growth of Proteus.  Given appearance of wound, will go ahead and prescribe Levaquin 500 mg x 7 days.  She is already suppressive doxycycline 100 mg twice daily for infected hardware.      5/13/2025 : Clinic visit with IRON Vasquez, LORETO, CALIN, ILDA.   Patient continues to feel well.  She is tolerating Levaquin without any difficulty, should be completing these in a few days.  Her wound measures about the same, thick slough to wound bed, no odor.    5/20/2025 : Clinic visit with IRON Vasquez FNP-BC, CALIN, ILDA.   Patient states she is feeling well.  Unfortunately, her wound remains stalled.  Slough to wound bed.  Healing complicated by severe deformity of ankle.  Recent issue of possible surgical consult.  Possibly plastics for STSG, Ortho for correction of deformity, or both.  She seems to be more open to the idea of surgery, but states she would like to talk it over with her son first.  I did offer to put her into LPS rounds, provided her with the dates through the month of June.  At her request, I wrote out my  recommendations on a piece of paper for her to give to her son.    5/27/2025: Clinic visit with Bryan Reza MD. Patient reports feeling in normal state of health. Denies any signs or symptoms of infection. Has Xrays scheduled later today, reminded patient. Patient is agreeable to discussing surgical options during LPS clinic, I recommend her son come to appointment. Patient wounds largely unchanged.    6/3/25: Clinic visit with LORETO Austin, YAZ, ILDA.  Pt denies fevers, chills, nausea, vomiting.  New epithelium to center of wound has formed skin bridge.  Ulcers  into 2.  Tolerating 2 layer compression wrap.  Increased to 4-layer compression wrap.  Initiate Urgo clean.  Returns this Friday 6/6/25 for LPS rounds. Home health orders updated.  Recommend patient cancel home health appointment this Friday as she will be at LPS rounds and dressing will be changed.    L great toenail thick and dystrophic. Separation at cuticle with dry scab.  Offered podiatry for nail care.  Patient declined at this time.  L 1st MTH plantar callus filed with [facility] board    6/10/2025: Clinic visit with Bryan Reza MD. Patient reports doing well, denies any acute issues. Patient was seen during LPS rounds, options were given: Continue wound care, BKA, or extensive reconstruction with external fixator. Patient has opted for conservative care. Patient wounds are slowly improving.   Patient asked about specialty footwear to accommodate her deformity. Briefly discussed with orthotist. Will need evaluation. Will hold off on referral until wound closer to resolution.    6/17/25: Clinic visit with LORETO Austin, YAZ, ILDA.  Pt denies fevers, chills, nausea, vomiting.  Both left medial ankle and left medial lower leg ulcers are improved.  Thick slough.  Reports that home health noted purple discoloration to toes and removed 4-layer wrap and applied Tubigrip.  Patient reports that she had no  discomfort and is agreeable to return to 4-layer compression wrap.    6/24/2025: Clinic visit with Bryan Reza MD. Patient reports doing well, denies any signs or symptoms of infection. Wounds continues to improve. Patient with overgrown left 5th toenail which is cutting into her own skin and plantar 1st MTH callus that needs debridement.    7/1/2025 : Clinic visit with IRON Vasquez, FNP-BC, CWOCN, CFSHANNAN.   Patient continues to feel well. Her wounds measure smaller. She did have a consultation with Ortho, decided not to proceed with any surgery to correct her deformities.    7/8/2025: Clinic visit with Bryan Reza MD. Patient reports doing well, denies any acute issues. Her wounds continue to slowly improve. She presents today in 2 layer compression, was placed in 4 layer and was tolerating last week. Will resume 4 layer compression.    7/15/2025: Clinic visit with Bryan Reza MD. Patient reports doing well, reports no fevers or chills. Patient reports that her normal home health nurse called out, the nurse that filled in did not have 4 layer compression wrap, so they did the best they could. Wounds are slightly smaller.      REVIEW OF SYSTEMS:   Unchanged from previous wound clinic assessment on 7/8/2025, except as noted in interval history above      PHYSICAL EXAMINATION:   There were no vitals taken for this visit.    Physical Exam  Constitutional:       Appearance: She is normal weight.   HENT:      Ears:      Comments: Hard of hearing  Neck:      Comments: Kyphosis of neck and spine  Cardiovascular:      Pulses: Normal pulses.      Comments:  +2 left DP palpated.  Unable to palpate PT secondary to wound location  Left DP and PT pulses brisk and multiphasic by Doppler  Pulmonary:      Effort: Pulmonary effort is normal.   Musculoskeletal:      Cervical back: Rigidity present.      Right lower leg: Edema present.      Left lower leg: Edema present.      Comments: Brawny edema of bilateral lower  extremities  Severe valgus deformity of left ankle   Skin:     Comments: Full-thickness wound to left medial ankle: Wound measuring smaller. Thin layer of slough with granulation tissue forming.  Moderate serosanguineous drainage, no odor.  No periwound erythema or induration.    Left medial lower leg wound: Wound measuring smaller. Thin layer of slough with improved granulation tissue. Moderate serosanguineous drainage, no odor.  No periwound erythema or induration.    Brawny edema bilateral lower extremities, hemosiderin staining, varicose veins, scarring and skin changes-consistent with CVI   Neurological:      Mental Status: She is alert and oriented to person, place, and time.      Comments: Poor memory         WOUND ASSESSMENT  Wound 04/22/25 Venous Ulcer Leg Medial;Lower Left --Left Medial/proximal Lower Leg (Active)   Wound Image    07/15/25 1006   Site Assessment Pink;Yellow 07/15/25 1006   Periwound Assessment Dry;Flaky;Edema;Fragile 07/15/25 1006   Margins Attached edges 07/15/25 1006   Drainage Amount Moderate 07/15/25 1006   Drainage Description Serosanguineous 07/15/25 1006   Treatments Cleansed;Topical Lidocaine;Provider debridement;Site care;Compression 07/15/25 1006   Offloading/DME Other (comment) 05/20/25 1000   ** Retired** Wound Cleansing Hypochlorus Acid 05/13/25 1100   Wound Cleansing Hypochlorus Acid;Foam Cleanser/Washcloth 07/15/25 1006   Periwound Protectant Silicone barrier cream;Urea lotion 07/15/25 1006   Dressing Status Old drainage 07/08/25 1000   Dressing Changed Changed 07/08/25 1000   Dressing Cleansing/Solutions Not Applicable 07/15/25 1006   Dressing Options Fenestrated;Other (Comments);Super Absorbent Pad;Compression Wrap Four Layer 07/15/25 1006   Dressing Change/Treatment Frequency Twice Weekly 07/15/25 1006   Wound Team Following Weekly 07/08/25 1000   Non-staged Wound Description Full thickness 07/15/25 1006   Wound Length (cm) 0.6 cm 07/15/25 1006   Wound Width (cm) 0.4 cm  07/15/25 1006   Wound Depth (cm) 0.1 cm 07/15/25 1006   Wound Surface Area (cm^2) 0.19 cm^2 07/15/25 1006   Wound Volume (cm^3) 0.013 cm^3 07/15/25 1006   Post-Procedure Length (cm) 0.5 cm 07/15/25 1006   Post-Procedure Width (cm) 0.6 cm 07/15/25 1006   Post-Procedure Depth (cm) 0.1 cm 07/15/25 1006   Post-Procedure Surface Area (cm^2) 0.24 cm^2 07/15/25 1006   Post-Procedure Volume (cm^3) 0.016 cm^3 07/15/25 1006   Wound Healing % 100 07/15/25 1006   Tunneling (cm) 0 cm 07/15/25 1006   Undermining (cm) 0 cm 07/15/25 1006   Wound Odor None 07/15/25 1006   Pulses Left;1+;DP;PT 07/08/25 1000   Exposed Structures None 07/15/25 1006   Number of days: 84       Wound 06/03/25 Atypical Full Thickness Pretibial Distal;Medial Left --Left Medial/Distal Lower Leg (Active)   Wound Image    07/15/25 1006   Site Assessment Pink;Red;Yellow 07/15/25 1006   Periwound Assessment Dry;Flaky;Scar tissue;Edema 07/15/25 1006   Margins Attached edges 07/15/25 1006   Drainage Amount Moderate 07/15/25 1006   Drainage Description Serosanguineous 07/15/25 1006   Treatments Cleansed;Topical Lidocaine;Provider debridement;Site care;Compression 07/15/25 1006   Wound Cleansing Hypochlorus Acid;Foam Cleanser/Washcloth 07/15/25 1006   Periwound Protectant Silicone barrier cream;Urea lotion 07/15/25 1006   Dressing Status Old drainage 07/08/25 1000   Dressing Changed Changed 07/08/25 1000   Dressing Cleansing/Solutions Not Applicable 07/15/25 1006   Dressing Options Fenestrated;Other (Comments);Super Absorbent Pad;Compression Wrap Four Layer 07/15/25 1006   Dressing Change/Treatment Frequency Twice Weekly 07/15/25 1006   Wound Team Following Weekly 07/08/25 1000   Non-staged Wound Description Full thickness 07/15/25 1006   Wound Length (cm) 1.6 cm 07/15/25 1006   Wound Width (cm) 2 cm 07/15/25 1006   Wound Depth (cm) 0.1 cm 07/15/25 1006   Wound Surface Area (cm^2) 2.51 cm^2 07/15/25 1006   Wound Volume (cm^3) 0.168 cm^3 07/15/25 1006    Post-Procedure Length (cm) 1.6 cm 07/15/25 1006   Post-Procedure Width (cm) 1.8 cm 07/15/25 1006   Post-Procedure Depth (cm) 0.1 cm 07/15/25 1006   Post-Procedure Surface Area (cm^2) 2.26 cm^2 07/15/25 1006   Post-Procedure Volume (cm^3) 0.151 cm^3 07/15/25 1006   Wound Healing % 81 07/15/25 1006   Tunneling (cm) 0 cm 07/15/25 1006   Undermining (cm) 0 cm 07/15/25 1006   Wound Odor None 07/15/25 1006   Pulses Left;1+;DP;PT 07/08/25 1000   Exposed Structures None 07/15/25 1006   Number of days: 42       PROCEDURE: Excisional debridement of left medial leg wound x 2  -2% viscous lidocaine applied topically to wound bed for approximately 5 minutes prior to debridement  -Curette used to debride wound bed.  Excisional debridement was performed to remove devitalized tissue until healthy, bleeding tissue was visualized.   Entire surface of wound, 2.5 cm² debrided.  Tissue debrided into the subcutaneous layer.  -Bleeding controlled with manual pressure.    -Wound care completed by wound RN, refer to flowsheet  -Patient tolerated the procedure well, without c/o pain or discomfort.         Pertinent Labs and Diagnostics:    Labs:     A1c:   Lab Results   Component Value Date/Time    HBA1C 5.6 06/04/2018 12:00 PM          IMAGING: X-ray of left ankle 5/27/2025  Osteopenia and significant deformity without a definite acute osseous abnormality.     X-ray left foot 5/27/2025  No definite fracture or dislocation.       VASCULAR STUDIES: None found in epic    LAST  WOUND CULTURE:  DATE :   Lab Results   Component Value Date/Time    CULTRSULT - (A) 04/29/2025 09:00 AM    CULTRSULT Proteus mirabilis  Rare growth   (A) 04/29/2025 09:00 AM         ASSESSMENT AND PLAN:     1. Skin ulcer of left ankle with fat layer exposed (HCC)  Large full-thickness ulcer to left medial lower leg and ankle, present for several years.  Patient was treated for wounds to this same area in 2019, stopped coming into the clinic due to hospitalizations for  neck and hip fractures.  She states the wound went on to heal but did not know for how long.    7/15/2025: Wounds measuring smaller.  - Excisional debridement of wounds in clinic today, medically necessary to promote wound healing.  - Patient to return to clinic weekly for assessment and debridement  - Home health to follow-up with patient  weekly.  - Continue Urgoclean  - Tolerating 4-layer compression wrap, continue.  - Patient was seen during LPS rounds, surgical options were discussed with patient. She opted for conservative care with wound care.    Wound care: Urgo clean Ag;Super Absorbent Pad;Compression Wrap Four Layer    2. Acquired varus deformity of ankle    7/15/2025: Severe valgus deformity of ankle.  Patient states she had several fractures of this ankle in the past, she did not undergo any surgery  - Seen by LPS clinic 6/6/2025. Surgical options including BKA or reconstruction with external fixator. Patient declined surgery and would like to continue conservative care with wound care.  - Patient reports she had a boot that she is unable to wear because of location of where her wound is.  She does not have a brace for the ankle.  - Case discussed with orthotist at [facility]. May benefit from custom bracing. Will place referral when wound closer to resolution.  - X-ray of foot and ankle completed 5/27/2025  - Negative for OM.  - Home health was supposed to help patient obtain Prevalon boot.  Patient aware that Prevalon boot is to be used when sitting or laying in bed.  She is aware she is not supposed to ambulate in boot.  When patient sleeps she sleeps on her right side putting pressure to the left medial ankle.    3. Age-related physical debility    7/15/2025: Patient has moderately severe kyphosis of neck and spine, normally uses cane of walker for mobility  - [facility].    4.  Wound infection    7/15/2025:   - No overt evidence of wound infection today  -  Patient is on suppressive doxycycline 100 mg p.o. twice daily ordered by Dr. Vazquez for chronic left hip infected hardware  - Monitor for signs and symptoms of infection each clinic visit    5. Callus of foot    7/15/2025  - Patient with thick pre-ulcerative callus left plantar 1st MTH. Undoubtedly associated with her significant ankle / foot deformity  - No need for debridement today  - No open wounds to this part of her foot    PATIENT EDUCATION  - Importance of adequate nutrition for wound healing  -Advised to go to ER for any increased redness, swelling, drainage, or odor, or if patient develops fever, chills, nausea or vomiting.     Please note that this note may have been created using voice recognition software. I have worked with technical experts from [employer] to optimize the interface.  I have made every reasonable attempt to correct obvious errors, but there may be errors of grammar and possibly content that I did not discover before finalizing the note.    N

## 2025-07-15 NOTE — PATIENT INSTRUCTIONS
"The following information is a summary of the education provided in the clinic today. This is not an exhaustive list of the education provided during your appointment.       DRESSING CHANGES    Keep your wound dressing clean, dry, and intact. Change your dressing if the dressing becomes soiled, leaks, gets wet, or falls off.      You may not shower with the dressing(s) off. Please do not take baths, or swim in the ocean, lakes, rivers, pools, or hot tubs.      Wounds do not need to \"air out\" or \"breathe\". Gently dry your wound before placing a new dressing.     After you get out of the shower, wash the wound a second time (with soap and water, wound cleanser, or saline). Gently dry the wound before you place a new dressing.       If you need to change your dressings at home, you should wash your wound. Use normal saline, wound cleanser, hypochlorous acid, or unscented soap and water. Do not use hydrogen peroxide or rubbing alcohol to clean your wounds. Hydrogen peroxide and rubbing alcohol will damage new cells and tissue. Do not use betadine or iodine unless told to by your wound care team.    Do not soak your wounds in epsom salt baths. This can worsen your wound(s) or delay wound healing. It can also lead to infection or maceration (tissue is too wet).     If you do not have home health, the clinic will give you with leftover supplies from your appointment. We do not give out extra dressing supplies. We will order you supplies through a [employer] company. Your insurance may or may not pay for all these supplies. The company will reach out to you if insurance does not cover supplies. These supplies will be sent to your home within a few days. If you do have home health, they will provide wound care supplies.     The dressings we use may change as your wound changes.     COMPRESSION   -Compression helps reduce swelling and helps wounds heal quicker. It is still important to elevate your feet several times daily to reduce " swelling, even when you are wearing compression. It is important to wear compression every day, to help your wound heal, and to prevent new wounds from developing.      -Today, a 4 layer compression wrap was applied. Please take off the wrap if you have pain, extreme swelling, new numbness or tingling, a change in the color or temperature of your feet. Take off the wrap if the wrap slides down/bunches up Take off the wrap if it gets wet or leaks through.  If you have to take off the wrap, please do not cut it off with scissors or other sharp tools. Do not trim the wrap. Unravel the wrap one layer at a time. Place a clean and dry dressing over the wound. Do not leave wrap on for more than seven days at a time. You can wear a cast protector over your wrap so you can take a shower. Cast protectors can be found at most [employer] and [employer]. [employer] does not endorse any brand of cast protector.      CLINIC INFORMATION  The clinic's hours are Monday-Friday, 7:30 AM to 5:00 PM. We are closed most holidays and on weekends. If you leave us a message, please allow 24 hours for someone to return your call. If you have concerns or are having a medical emergency, call 911 or go to the hospital emergency room.     You might not see the same nurse or provider every visit.     If you notice any large changes in your wound(s), or signs of infection (redness, swelling, localized heat, increased pain, fever > 101 F, chills, nausea/vomiting) or have any questions about your home care instructions, please call the wound center at [phone]. If it's after hours, contact your primary care physician or go to the hospital emergency room. If you are admitted to any hospital, you will need a new referral to come back to the wound clinic. Any wound care appointments that you already have may be cancelled.    If you are 5 or more minutes late for an appointment, we reserve the right to cancel and reschedule that appointment. For example,  if your appointment is at 1:00 PM, and you arrive at 1:06 PM, you are more than five minutes late and might not be seen. If you are consistently late or not coming to your appointments (typically 3 late cancellations and/or no shows), we reserve the right to cancel your future appointments or discharge you from the clinic. It is then your responsibility to obtain a new referral if wound care is still needed.

## 2025-07-18 ENCOUNTER — HOME CARE VISIT (OUTPATIENT)
Dept: HOME HEALTH SERVICES | Facility: HOME HEALTHCARE | Age: 80
End: 2025-07-18
Payer: MEDICARE

## 2025-07-18 VITALS
SYSTOLIC BLOOD PRESSURE: 128 MMHG | DIASTOLIC BLOOD PRESSURE: 68 MMHG | TEMPERATURE: 97.6 F | RESPIRATION RATE: 16 BRPM | OXYGEN SATURATION: 96 % | HEART RATE: 77 BPM

## 2025-07-18 PROCEDURE — G0299 HHS/HOSPICE OF RN EA 15 MIN: HCPCS

## 2025-07-20 ASSESSMENT — ACTIVITIES OF DAILY LIVING (ADL)
AMBULATION ASSISTANCE: STAND BY ASSIST
CURRENT_FUNCTION: STAND BY ASSIST

## 2025-07-20 ASSESSMENT — ENCOUNTER SYMPTOMS
VOMITING: DENIES
DENIES PAIN: 1
NAUSEA: DENIES

## 2025-07-22 ENCOUNTER — OFFICE VISIT (OUTPATIENT)
Dept: WOUND CARE | Facility: MEDICAL CENTER | Age: 80
End: 2025-07-22
Attending: NURSE PRACTITIONER
Payer: MEDICARE

## 2025-07-22 DIAGNOSIS — L97.322 SKIN ULCER OF LEFT ANKLE WITH FAT LAYER EXPOSED (HCC): Primary | ICD-10-CM

## 2025-07-22 DIAGNOSIS — T14.8XXA WOUND INFECTION: ICD-10-CM

## 2025-07-22 DIAGNOSIS — L84 CALLUS OF FOOT: ICD-10-CM

## 2025-07-22 DIAGNOSIS — M21.179: ICD-10-CM

## 2025-07-22 DIAGNOSIS — R54 AGE-RELATED PHYSICAL DEBILITY: ICD-10-CM

## 2025-07-22 DIAGNOSIS — L08.9 WOUND INFECTION: ICD-10-CM

## 2025-07-22 PROCEDURE — 11042 DBRDMT SUBQ TIS 1ST 20SQCM/<: CPT | Performed by: NURSE PRACTITIONER

## 2025-07-22 PROCEDURE — 11042 DBRDMT SUBQ TIS 1ST 20SQCM/<: CPT

## 2025-07-22 PROCEDURE — 99213 OFFICE O/P EST LOW 20 MIN: CPT

## 2025-07-22 NOTE — PATIENT INSTRUCTIONS
"The following information is a summary of the education provided in the clinic today. This is not an exhaustive list of the education provided during your appointment.       DRESSING CHANGES    Keep your wound dressing clean, dry, and intact. Change your dressing every 3 days AND/OR if the dressing becomes soiled, leaks, gets wet, or falls off.      You may not shower with the dressing(s) off. Please do not take baths, or swim in the ocean, lakes, rivers, pools, or hot tubs.      Wounds do not need to \"air out\" or \"breathe\". Gently dry your wound before placing a new dressing.     After you get out of the shower, wash the wound a second time (with soap and water, wound cleanser, or saline). Gently dry the wound before you place a new dressing.       If you need to change your dressings at home, you should wash your wound. Use normal saline, wound cleanser, hypochlorous acid, or unscented soap and water. Do not use hydrogen peroxide or rubbing alcohol to clean your wounds. Hydrogen peroxide and rubbing alcohol will damage new cells and tissue. Do not use betadine or iodine unless told to by your wound care team.    Do not soak your wounds in epsom salt baths. This can worsen your wound(s) or delay wound healing. It can also lead to infection or maceration (tissue is too wet).     If you do not have home health, the clinic will give you with leftover supplies from your appointment. We do not give out extra dressing supplies. We will order you supplies through a [employer] company. Your insurance may or may not pay for all these supplies. The company will reach out to you if insurance does not cover supplies. These supplies will be sent to your home within a few days. If you do have home health, they will provide wound care supplies.     The dressings we use may change as your wound changes.     COMPRESSION   -Compression helps reduce swelling and helps wounds heal quicker. It is still important to elevate your feet several " times daily to reduce swelling, even when you are wearing compression. It is important to wear compression every day, to help your wound heal, and to prevent new wounds from developing.      -Today, a 4 layer compression wrap was applied. Please take off the wrap if you have pain, extreme swelling, new numbness or tingling, a change in the color or temperature of your feet. Take off the wrap if the wrap slides down/bunches up Take off the wrap if it gets wet or leaks through.  If you have to take off the wrap, please do not cut it off with scissors or other sharp tools. Do not trim the wrap. Unravel the wrap one layer at a time. Place a clean and dry dressing over the wound. Do not leave wrap on for more than seven days at a time. You can wear a cast protector over your wrap so you can take a shower. Cast protectors can be found at most [employer] and [employer]. [employer] does not endorse any brand of cast protector.        GENERAL HEALTH ADVICE   -Nutrition is important for wound healing. Unless told otherwise by your doctor, eat more protein and consider supplementing with a multi-vitamin, zinc, and vitamin C.           CLINIC INFORMATION  The clinic's hours are Monday-Friday, 7:30 AM to 5:00 PM. We are closed most holidays and on weekends. If you leave us a message, please allow 24 hours for someone to return your call. If you have concerns or are having a medical emergency, call 911 or go to the hospital emergency room.     You might not see the same nurse or provider every visit.     If you notice any large changes in your wound(s), or signs of infection (redness, swelling, localized heat, increased pain, fever > 101 F, chills, nausea/vomiting) or have any questions about your home care instructions, please call the wound center at [phone]. If it's after hours, contact your primary care physician or go to the hospital emergency room. If you are admitted to any hospital, you will need a new referral to come  back to the wound clinic. Any wound care appointments that you already have may be cancelled.    If you are 5 or more minutes late for an appointment, we reserve the right to cancel and reschedule that appointment. For example, if your appointment is at 1:00 PM, and you arrive at 1:06 PM, you are more than five minutes late and might not be seen. If you are consistently late or not coming to your appointments (typically 3 late cancellations and/or no shows), we reserve the right to cancel your future appointments or discharge you from the clinic. It is then your responsibility to obtain a new referral if wound care is still needed.

## 2025-07-22 NOTE — PROGRESS NOTES
Provider Encounter- Full Thickness wound    HISTORY OF PRESENT ILLNESS  Wound History:    START OF CARE IN CLINIC: 4/22/2025    REFERRING PROVIDER: Kolby Mejia      WOUND- Full Thickness Wound   LOCATION: Left medial ankle and lower leg     HISTORY: Patient with chronic ulcer to her left medial lower leg and ankle referred to [facility] for evaluation management.  She is well-known to this clinic from previous treatment of wounds to the same site.  She was last seen in our clinic in August 2019 at which time she had a small open wound over the medial malleolus.  She was hospitalized shortly thereafter due to a fall resulting in a C2 fracture, and hip pain.  She had several prolonged hospitalizations around that time.  She opted not to return to the clinic after she was discharged from the hospital.  She states that the wound went on to heal until a few years ago when she scratched herself while removing a sock.  Over the past few years the wound has gotten progressively larger and she has treated herself using various treatments including zinc paste and Betadine.  She does have significant deformity of this ankle, states she has had several fractures of her ankle in the past with no surgical repair.  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    Pertinent Medical History: History of alcohol dependence, in remission, atherosclerosis, history of hip fracture, osteoarthritis, memory deficit, PVD, valgus deformity of left foot    TOBACCO USE: Former smoker, quit more than 10 years ago    Patient's problem list, allergies, and current medications reviewed and updated in Epic    Interval History:  4/22/2025 : Clinic visit with IRON Vasquez, GAYLE-BC, CWSHEREENN, CFSHANNAN.   Patient states that she is feeling well, denies fevers, chills, nausea, vomiting, cough or shortness of breath.  She cannot tell me for sure how long this wound has been open, only  states that it was several years ago.  She denies having much pain from this wound.  Severe valgus deformity of left ankle, she does not wear any sort of orthotic or brace to her left lower extremity.  She states that normally she does use either a cane or walker, however did not bring one with her today.    4/29/25: Clinic visit with Letty PERDUE, LORETO, YAZ, ILDA.  Pt denies fevers, chills, nausea, vomiting.  Ulcer area slightly decreased.  Thick slough.  Patient on suppressive doxycycline 100 mg twice daily for her chronically infected hardware to left hip.  Increased drainage, green-tinged.  Wound culture collected today.  No odor.  Follow results.    5/6/2025 : Clinic visit with IRON Vasquez FNP-BC, CALIN, ILDA.   Patient states she is feeling well overall.  There is quite a bit of slough to her wound today.  Reviewed culture results, positive for rare growth of Proteus.  Given appearance of wound, will go ahead and prescribe Levaquin 500 mg x 7 days.  She is already suppressive doxycycline 100 mg twice daily for infected hardware.      5/13/2025 : Clinic visit with IRON Vasquez, LORETO, CALIN, ILDA.   Patient continues to feel well.  She is tolerating Levaquin without any difficulty, should be completing these in a few days.  Her wound measures about the same, thick slough to wound bed, no odor.    5/20/2025 : Clinic visit with IRON Vasquez FNP-BC, CALIN, ILDA.   Patient states she is feeling well.  Unfortunately, her wound remains stalled.  Slough to wound bed.  Healing complicated by severe deformity of ankle.  Recent issue of possible surgical consult.  Possibly plastics for STSG, Ortho for correction of deformity, or both.  She seems to be more open to the idea of surgery, but states she would like to talk it over with her son first.  I did offer to put her into LPS rounds, provided her with the dates through the month of June.  At her request, I wrote out my  recommendations on a piece of paper for her to give to her son.    5/27/2025: Clinic visit with Bryan Reza MD. Patient reports feeling in normal state of health. Denies any signs or symptoms of infection. Has Xrays scheduled later today, reminded patient. Patient is agreeable to discussing surgical options during LPS clinic, I recommend her son come to appointment. Patient wounds largely unchanged.    6/3/25: Clinic visit with LORETO Austin, YAZ, ILDA.  Pt denies fevers, chills, nausea, vomiting.  New epithelium to center of wound has formed skin bridge.  Ulcers  into 2.  Tolerating 2 layer compression wrap.  Increased to 4-layer compression wrap.  Initiate Urgo clean.  Returns this Friday 6/6/25 for LPS rounds. Home health orders updated.  Recommend patient cancel home health appointment this Friday as she will be at LPS rounds and dressing will be changed.    L great toenail thick and dystrophic. Separation at cuticle with dry scab.  Offered podiatry for nail care.  Patient declined at this time.  L 1st MTH plantar callus filed with [employer] board    6/10/2025: Clinic visit with Bryan Reza MD. Patient reports doing well, denies any acute issues. Patient was seen during LPS rounds, options were given: Continue wound care, BKA, or extensive reconstruction with external fixator. Patient has opted for conservative care. Patient wounds are slowly improving.   Patient asked about specialty footwear to accommodate her deformity. Briefly discussed with orthotist. Will need evaluation. Will hold off on referral until wound closer to resolution.    6/17/25: Clinic visit with LORETO Austin, YAZ, ILDA.  Pt denies fevers, chills, nausea, vomiting.  Both left medial ankle and left medial lower leg ulcers are improved.  Thick slough.  Reports that home health noted purple discoloration to toes and removed 4-layer wrap and applied Tubigrip.  Patient reports that she had no  discomfort and is agreeable to return to 4-layer compression wrap.    6/24/2025: Clinic visit with Bryan Reza MD. Patient reports doing well, denies any signs or symptoms of infection. Wounds continues to improve. Patient with overgrown left 5th toenail which is cutting into her own skin and plantar 1st MTH callus that needs debridement.    7/1/2025 : Clinic visit with IRON Vasquez, LORETO, ANDREAN, CFSHANNAN.   Patient continues to feel well. Her wounds measure smaller. She did have a consultation with Ortho, decided not to proceed with any surgery to correct her deformities.    7/8/2025: Clinic visit with Bryan Reza MD. Patient reports doing well, denies any acute issues. Her wounds continue to slowly improve. She presents today in 2 layer compression, was placed in 4 layer and was tolerating last week. Will resume 4 layer compression.    7/15/2025: Clinic visit with Bryan Reza MD. Patient reports doing well, reports no fevers or chills. Patient reports that her normal home health nurse called out, the nurse that filled in did not have 4 layer compression wrap, so they did the best they could. Wounds are slightly smaller.    7/22/25: Clinic visit with Letty PERDUE, LORETO, YAZ, CFSHANNAN.  Pt denies fevers, chills, nausea, vomiting. Both areas decreased.   Continue Urgo clean and 4-layer compression wrap.  Continues to form thick crust/callus to ankle wound. Concern from patient that shoe may be irritating ankle wound.  Discussed modifying shoe so it does not rub or come into contact with the ankle.  Patient will assess shoe at home and follow-up with us next week if she would like us to modify her shoe.          REVIEW OF SYSTEMS:   Unchanged from previous wound clinic assessment on 7/15/2025, except as noted in interval history above      PHYSICAL EXAMINATION:   There were no vitals taken for this visit.    Physical Exam  Constitutional:       Appearance: She is normal weight.   HENT:       Ears:      Comments: Hard of hearing  Neck:      Comments: Kyphosis of neck and spine  Cardiovascular:      Pulses: Normal pulses.      Comments:  +2 left DP palpated.  Unable to palpate PT secondary to wound location  Left DP and PT pulses brisk and multiphasic by Doppler  Pulmonary:      Effort: Pulmonary effort is normal.   Musculoskeletal:      Cervical back: Rigidity present.      Right lower leg: Edema present.      Left lower leg: Edema present.      Comments: Brawny edema of bilateral lower extremities  Severe valgus deformity of left ankle   Skin:     Comments: Full-thickness wound to left medial ankle: Wound area decreased.  Thick layer of slough easily removed, granular tissue underneath.  Thick callus/crust to periwound  Moderate serous drainage, no odor.  No periwound erythema or induration.    Left medial lower leg wound: Wound area decreased, close to resolution. Thin layer of slough with improved granulation tissue.  Minimal serosanguineous drainage, no odor.  No periwound erythema or induration.    Brawny edema bilateral lower extremities, hemosiderin staining, varicose veins, scarring and skin changes-consistent with CVI   Neurological:      Mental Status: She is alert and oriented to person, place, and time.      Comments: Poor memory         WOUND ASSESSMENT  Wound 04/22/25 Venous Ulcer Leg Medial;Lower Left --Left Medial/proximal Lower Leg (Active)   Wound Image    07/22/25 1000   Site Assessment Red;Yellow 07/22/25 1000   Periwound Assessment Dry;Flaky;Edema 07/22/25 1000   Margins Attached edges 07/22/25 1000   Drainage Amount Moderate 07/22/25 1000   Drainage Description Serosanguineous 07/22/25 1000   Treatments Topical Lidocaine;Cleansed;Site care;Provider debridement 07/22/25 1000   Offloading/DME Other (comment) 07/22/25 1000   ** Retired** Wound Cleansing Hypochlorus Acid 05/13/25 1100   Wound Cleansing Hypochlorus Acid;Foam Cleanser/Washcloth 07/22/25 1000   Periwound Protectant  Silicone barrier cream 07/22/25 1000   Dressing Status Old drainage 07/08/25 1000   Dressing Changed Changed 07/08/25 1000   Dressing Cleansing/Solutions Not Applicable 07/22/25 1000   Dressing Options Other (Comments);Super Absorbent Pad;Compression Wrap Four Layer 07/22/25 1000   Dressing Change/Treatment Frequency Twice Weekly 07/22/25 1000   Wound Team Following Weekly 07/08/25 1000   Non-staged Wound Description Full thickness 07/22/25 1000   Wound Length (cm) 0.6 cm 07/22/25 1000   Wound Width (cm) 0.9 cm 07/22/25 1000   Wound Depth (cm) 0.1 cm 07/22/25 1000   Wound Surface Area (cm^2) 0.42 cm^2 07/22/25 1000   Wound Volume (cm^3) 0.028 cm^3 07/22/25 1000   Post-Procedure Length (cm) 0.4 cm 07/22/25 1000   Post-Procedure Width (cm) 0.5 cm 07/22/25 1000   Post-Procedure Depth (cm) 0.1 cm 07/22/25 1000   Post-Procedure Surface Area (cm^2) 0.16 cm^2 07/22/25 1000   Post-Procedure Volume (cm^3) 0.01 cm^3 07/22/25 1000   Wound Healing % 100 07/22/25 1000   Tunneling (cm) 0 cm 07/22/25 1000   Undermining (cm) 0 cm 07/22/25 1000   Wound Odor None 07/22/25 1000   Pulses Left;1+;DP;PT 07/08/25 1000   Exposed Structures None 07/22/25 1000   Number of days: 91       Wound 06/03/25 Atypical Full Thickness Pretibial Distal;Medial Left --Left Medial/Distal Lower Leg (Active)   Wound Image    07/22/25 1000   Site Assessment Red;Pink;Yellow 07/22/25 1000   Periwound Assessment Dry;Flaky;Scar tissue 07/22/25 1000   Margins Attached edges 07/22/25 1000   Drainage Amount Moderate 07/22/25 1000   Drainage Description Serosanguineous 07/22/25 1000   Treatments Topical Lidocaine;Cleansed;Site care;Provider debridement 07/22/25 1000   Offloading/DME Other (comment) 07/22/25 1000   Wound Cleansing Hypochlorus Acid;Foam Cleanser/Washcloth 07/22/25 1000   Periwound Protectant Silicone barrier cream 07/22/25 1000   Dressing Status Old drainage 07/08/25 1000   Dressing Changed Changed 07/08/25 1000   Dressing Cleansing/Solutions Not  Applicable 07/22/25 1000   Dressing Options Other (Comments);Super Absorbent Pad;Compression Wrap Four Layer 07/22/25 1000   Dressing Change/Treatment Frequency Twice Weekly 07/22/25 1000   Wound Team Following Weekly 07/08/25 1000   Non-staged Wound Description Full thickness 07/22/25 1000   Wound Length (cm) 1.6 cm 07/22/25 1000   Wound Width (cm) 2.3 cm 07/22/25 1000   Wound Depth (cm) 0.1 cm 07/22/25 1000   Wound Surface Area (cm^2) 2.89 cm^2 07/22/25 1000   Wound Volume (cm^3) 0.193 cm^3 07/22/25 1000   Post-Procedure Length (cm) 1.3 cm 07/22/25 1000   Post-Procedure Width (cm) 1.3 cm 07/22/25 1000   Post-Procedure Depth (cm) 0.2 cm 07/22/25 1000   Post-Procedure Surface Area (cm^2) 1.33 cm^2 07/22/25 1000   Post-Procedure Volume (cm^3) 0.177 cm^3 07/22/25 1000   Wound Healing % 78 07/22/25 1000   Tunneling (cm) 0 cm 07/22/25 1000   Undermining (cm) 0 cm 07/22/25 1000   Wound Odor None 07/22/25 1000   Pulses Left;1+;DP;PT 07/08/25 1000   Exposed Structures None 07/22/25 1000   Number of days: 49       PROCEDURE: Excisional debridement of left medial leg wound x 2  -2% viscous lidocaine applied topically to wound bed for approximately 5 minutes prior to debridement  -Curette used to debride wound bed.  Excisional debridement was performed to remove devitalized tissue until healthy, bleeding tissue was visualized.   Entire surface of wound, 1.49cm² debrided.  Tissue debrided into the subcutaneous layer.  -Bleeding controlled with manual pressure.    -Wound care completed by wound RN, refer to flowsheet  -Patient tolerated the procedure well, without c/o pain or discomfort.         Pertinent Labs and Diagnostics:    Labs:     A1c:   Lab Results   Component Value Date/Time    HBA1C 5.6 06/04/2018 12:00 PM          IMAGING: X-ray of left ankle 5/27/2025  Osteopenia and significant deformity without a definite acute osseous abnormality.     X-ray left foot 5/27/2025  No definite fracture or dislocation.        VASCULAR STUDIES: None found in epic    LAST  WOUND CULTURE:  DATE :   Lab Results   Component Value Date/Time    CULTRSULT - (A) 04/29/2025 09:00 AM    CULTRSULT Proteus mirabilis  Rare growth   (A) 04/29/2025 09:00 AM         ASSESSMENT AND PLAN:     1. Skin ulcer of left ankle with fat layer exposed (HCC)  Large full-thickness ulcer to left medial lower leg and ankle, present for several years.  Patient was treated for wounds to this same area in 2019, stopped coming into the clinic due to hospitalizations for neck and hip fractures.  She states the wound went on to heal but did not know for how long.    7/22/2025: Both wounds improved  - Excisional debridement of wounds in clinic today, medically necessary to promote wound healing.  - Patient to return to clinic weekly for assessment and debridement  - Home health to follow-up with patient  weekly.  - Continue Urgoclean  - Tolerating 4-layer compression wrap, continue.  - Patient was seen during LPS rounds, surgical options were discussed with patient. She opted for conservative care with wound care.  See below for further detail.    Wound care: Urgo clean Ag;Super Absorbent Pad;Compression Wrap Four Layer    2. Acquired varus deformity of ankle    7/22/2025: Severe valgus deformity of ankle.  Patient states she had several fractures of this ankle in the past, she did not undergo any surgery  - Seen by LPS clinic 6/6/2025. Surgical options including BKA or reconstruction with external fixator. Patient declined surgery and would like to continue conservative care with wound care.  - Patient reports she had a boot that she is unable to wear because of location of where her wound is.  She does not have a brace for the ankle.  - Case discussed with orthotist at [facility]. May benefit from custom bracing. Will place referral when wound closer to resolution.  - X-ray of foot and ankle completed 5/27/2025  - Negative for OM.  - Home health was supposed to  help patient obtain Prevalon boot.  Patient aware that Prevalon boot is to be used when sitting or laying in bed.  She is aware she is not supposed to ambulate in boot.  When patient sleeps she sleeps on her right side putting pressure to the left medial ankle.    3. Age-related physical debility    7/22/2025: Patient has moderately severe kyphosis of neck and spine, normally uses cane of walker for mobility  - Patient is established with [facility].    4.  Wound infection    7/22/2025:   - No overt evidence of wound infection today  - Patient is on suppressive doxycycline 100 mg p.o. twice daily ordered by Dr. Vazquez for chronic left hip infected hardware  - Monitor for signs and symptoms of infection each clinic visit    5. Callus of foot    7/22/2025  - Patient with  pre-ulcerative callus left plantar 1st MTH. Undoubtedly associated with her significant ankle / foot deformity  - No need for debridement today  - No open wounds to this part of her foot    PATIENT EDUCATION  - Importance of adequate nutrition for wound healing  -Advised to go to ER for any increased redness, swelling, drainage, or odor, or if patient develops fever, chills, nausea or vomiting.     Please note that this note may have been created using voice recognition software. I have worked with technical experts from [facility] to optimize the interface.  I have made every reasonable attempt to correct obvious errors, but there may be errors of grammar and possibly content that I did not discover before finalizing the note.    N

## 2025-07-22 NOTE — PROGRESS NOTES
Four layer wrap applied to LLE. Patient tolerated well. Updated home health orders sent to [facility] via [facility].

## 2025-07-25 ENCOUNTER — HOME CARE VISIT (OUTPATIENT)
Dept: HOME HEALTH SERVICES | Facility: HOME HEALTHCARE | Age: 80
End: 2025-07-25
Payer: MEDICARE

## 2025-07-25 VITALS
OXYGEN SATURATION: 96 % | HEART RATE: 75 BPM | BODY MASS INDEX: 25.51 KG/M2 | HEIGHT: 61 IN | SYSTOLIC BLOOD PRESSURE: 108 MMHG | RESPIRATION RATE: 16 BRPM | TEMPERATURE: 97.8 F | DIASTOLIC BLOOD PRESSURE: 70 MMHG

## 2025-07-25 ASSESSMENT — ENCOUNTER SYMPTOMS
NAUSEA: NO
DENIES PAIN: 1
LIMITED RANGE OF MOTION: 1
VOMITING: NO
LAST BOWEL MOVEMENT: 67411
LOWER EXTREMITY EDEMA: 1

## 2025-07-29 ENCOUNTER — OFFICE VISIT (OUTPATIENT)
Dept: WOUND CARE | Facility: MEDICAL CENTER | Age: 80
End: 2025-07-29
Attending: NURSE PRACTITIONER
Payer: MEDICARE

## 2025-07-29 DIAGNOSIS — T14.8XXA WOUND INFECTION: ICD-10-CM

## 2025-07-29 DIAGNOSIS — R54 AGE-RELATED PHYSICAL DEBILITY: ICD-10-CM

## 2025-07-29 DIAGNOSIS — L84 CALLUS OF FOOT: ICD-10-CM

## 2025-07-29 DIAGNOSIS — L08.9 WOUND INFECTION: ICD-10-CM

## 2025-07-29 DIAGNOSIS — L97.322 SKIN ULCER OF LEFT ANKLE WITH FAT LAYER EXPOSED (HCC): Primary | ICD-10-CM

## 2025-07-29 DIAGNOSIS — M21.179: ICD-10-CM

## 2025-07-29 PROCEDURE — 99214 OFFICE O/P EST MOD 30 MIN: CPT

## 2025-07-29 PROCEDURE — 11042 DBRDMT SUBQ TIS 1ST 20SQCM/<: CPT

## 2025-07-29 NOTE — PATIENT INSTRUCTIONS
Avoid prolonged standing or sitting without elevating your legs.  - Apply tubigrip to your legs ending 2 fingers below back of knee without wrinkles.   - Knee-high gradient compression to legs  - Multilayer compression wrap to left leg. Do not get wet and keep on for the week. Only remove if temperature or sensation changes.   If compression needs to be removed, un-wrap it do not cut it off.     Should you experience any significant changes in your wound(s), such as infection (redness, swelling, localized heat, increased pain, fever > 101 F, chills) or have any questions regarding your home care instructions, please contact the wound center at [phone]. If after hours, contact your primary care physician or go to the hospital emergency room.   Keep dressing clean, dry and covered while bathing. Only change dressing if it becomes over saturated, soiled or falls off or by your home health nurse.

## 2025-07-29 NOTE — PROGRESS NOTES
Provider Encounter- Full Thickness wound    HISTORY OF PRESENT ILLNESS  Wound History:    START OF CARE IN CLINIC: 4/22/2025    REFERRING PROVIDER: Kolby Mejia      WOUND- Full Thickness Wound   LOCATION: Left medial ankle and lower leg     HISTORY: Patient with chronic ulcer to her left medial lower leg and ankle referred to [facility] for evaluation management.  She is well-known to this clinic from previous treatment of wounds to the same site.  She was last seen in our clinic in August 2019 at which time she had a small open wound over the medial malleolus.  She was hospitalized shortly thereafter due to a fall resulting in a C2 fracture, and hip pain.  She had several prolonged hospitalizations around that time.  She opted not to return to the clinic after she was discharged from the hospital.  She states that the wound went on to heal until a few years ago when she scratched herself while removing a sock.  Over the past few years the wound has gotten progressively larger and she has treated herself using various treatments including zinc paste and Betadine.  She does have significant deformity of this ankle, states she has had several fractures of her ankle in the past with no surgical repair.  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    Pertinent Medical History: History of alcohol dependence, in remission, atherosclerosis, history of hip fracture, osteoarthritis, memory deficit, PVD, valgus deformity of left foot    TOBACCO USE: Former smoker, quit more than 10 years ago    Patient's problem list, allergies, and current medications reviewed and updated in Epic    Interval History:  4/22/2025 : Clinic visit with IRON Vasquez, GAYLE-BC, CWSHEREENN, CFSHANNAN.   Patient states that she is feeling well, denies fevers, chills, nausea, vomiting, cough or shortness of breath.  She cannot tell me for sure how long this wound has been open, only  states that it was several years ago.  She denies having much pain from this wound.  Severe valgus deformity of left ankle, she does not wear any sort of orthotic or brace to her left lower extremity.  She states that normally she does use either a cane or walker, however did not bring one with her today.    4/29/25: Clinic visit with Letty PERDUE, LORETO, YAZ, ILDA.  Pt denies fevers, chills, nausea, vomiting.  Ulcer area slightly decreased.  Thick slough.  Patient on suppressive doxycycline 100 mg twice daily for her chronically infected hardware to left hip.  Increased drainage, green-tinged.  Wound culture collected today.  No odor.  Follow results.    5/6/2025 : Clinic visit with IRON Vasquez FNP-BC, CALIN, ILDA.   Patient states she is feeling well overall.  There is quite a bit of slough to her wound today.  Reviewed culture results, positive for rare growth of Proteus.  Given appearance of wound, will go ahead and prescribe Levaquin 500 mg x 7 days.  She is already suppressive doxycycline 100 mg twice daily for infected hardware.      5/13/2025 : Clinic visit with IRON Vasquez, LORETO, CALIN, ILDA.   Patient continues to feel well.  She is tolerating Levaquin without any difficulty, should be completing these in a few days.  Her wound measures about the same, thick slough to wound bed, no odor.    5/20/2025 : Clinic visit with IRON Vasquez FNP-BC, CALIN, ILDA.   Patient states she is feeling well.  Unfortunately, her wound remains stalled.  Slough to wound bed.  Healing complicated by severe deformity of ankle.  Recent issue of possible surgical consult.  Possibly plastics for STSG, Ortho for correction of deformity, or both.  She seems to be more open to the idea of surgery, but states she would like to talk it over with her son first.  I did offer to put her into LPS rounds, provided her with the dates through the month of June.  At her request, I wrote out my  recommendations on a piece of paper for her to give to her son.    5/27/2025: Clinic visit with Bryan Reza MD. Patient reports feeling in normal state of health. Denies any signs or symptoms of infection. Has Xrays scheduled later today, reminded patient. Patient is agreeable to discussing surgical options during LPS clinic, I recommend her son come to appointment. Patient wounds largely unchanged.    6/3/25: Clinic visit with LORETO Austin, YAZ, ILDA.  Pt denies fevers, chills, nausea, vomiting.  New epithelium to center of wound has formed skin bridge.  Ulcers  into 2.  Tolerating 2 layer compression wrap.  Increased to 4-layer compression wrap.  Initiate Urgo clean.  Returns this Friday 6/6/25 for LPS rounds. Home health orders updated.  Recommend patient cancel home health appointment this Friday as she will be at LPS rounds and dressing will be changed.    L great toenail thick and dystrophic. Separation at cuticle with dry scab.  Offered podiatry for nail care.  Patient declined at this time.  L 1st MTH plantar callus filed with [facility] board    6/10/2025: Clinic visit with Bryan Reza MD. Patient reports doing well, denies any acute issues. Patient was seen during LPS rounds, options were given: Continue wound care, BKA, or extensive reconstruction with external fixator. Patient has opted for conservative care. Patient wounds are slowly improving.   Patient asked about specialty footwear to accommodate her deformity. Briefly discussed with orthotist. Will need evaluation. Will hold off on referral until wound closer to resolution.    6/17/25: Clinic visit with LORETO Austin, YAZ, ILDA.  Pt denies fevers, chills, nausea, vomiting.  Both left medial ankle and left medial lower leg ulcers are improved.  Thick slough.  Reports that home health noted purple discoloration to toes and removed 4-layer wrap and applied Tubigrip.  Patient reports that she had no  discomfort and is agreeable to return to 4-layer compression wrap.    6/24/2025: Clinic visit with Bryan Reza MD. Patient reports doing well, denies any signs or symptoms of infection. Wounds continues to improve. Patient with overgrown left 5th toenail which is cutting into her own skin and plantar 1st MTH callus that needs debridement.    7/1/2025 : Clinic visit with IRON Vasquez, LORETO, ANDREAN, CFSHANNAN.   Patient continues to feel well. Her wounds measure smaller. She did have a consultation with Ortho, decided not to proceed with any surgery to correct her deformities.    7/8/2025: Clinic visit with Bryan Reza MD. Patient reports doing well, denies any acute issues. Her wounds continue to slowly improve. She presents today in 2 layer compression, was placed in 4 layer and was tolerating last week. Will resume 4 layer compression.    7/15/2025: Clinic visit with Bryan Reza MD. Patient reports doing well, reports no fevers or chills. Patient reports that her normal home health nurse called out, the nurse that filled in did not have 4 layer compression wrap, so they did the best they could. Wounds are slightly smaller.    7/22/25: Clinic visit with Letty PERDUE, LORETO, YAZ, CFSHANNAN.  Pt denies fevers, chills, nausea, vomiting. Both areas decreased.   Continue Urgo clean and 4-layer compression wrap.  Continues to form thick crust/callus to ankle wound. Concern from patient that shoe may be irritating ankle wound.  Discussed modifying shoe so it does not rub or come into contact with the ankle.  Patient will assess shoe at home and follow-up with us next week if she would like us to modify her shoe.    7/29/2025: Clinic visit with Bryan Reza MD. Patient reports doing well. Proximal wound has resolved, distal wound measuring smaller. No evidence of infection.    REVIEW OF SYSTEMS:   Unchanged from previous wound clinic assessment on 7/22/2025, except as noted in interval history  above      PHYSICAL EXAMINATION:   There were no vitals taken for this visit.    Physical Exam  Constitutional:       Appearance: She is normal weight.   HENT:      Ears:      Comments: Hard of hearing  Neck:      Comments: Kyphosis of neck and spine  Cardiovascular:      Pulses: Normal pulses.      Comments:  +2 left DP palpated.  Unable to palpate PT secondary to wound location  Left DP and PT pulses brisk and multiphasic by Doppler  Pulmonary:      Effort: Pulmonary effort is normal.   Musculoskeletal:      Cervical back: Rigidity present.      Right lower leg: Edema present.      Left lower leg: Edema present.      Comments: Brawny edema of bilateral lower extremities  Severe valgus deformity of left ankle   Skin:     Comments: Full-thickness wound to left medial ankle: Wound smaller.  Thick layer of slough easily removed, granular tissue underneath.  Thick callus/crust to periwound  Moderate serous drainage, no odor.  No periwound erythema or induration.    Left medial lower leg wound: Wound resolved    Brawny edema bilateral lower extremities, hemosiderin staining, varicose veins, scarring and skin changes-consistent with CVI   Neurological:      Mental Status: She is alert and oriented to person, place, and time.      Comments: Poor memory         WOUND ASSESSMENT  Wound 06/03/25 Atypical Full Thickness Pretibial Distal;Medial Left --Left Medial/Distal Lower Leg (Active)   Wound Image    07/29/25 0942   Site Assessment Red;Yellow;Slough;Pink;Early/partial granulation 07/29/25 0942   Periwound Assessment Dry;Flaky;Scar tissue 07/29/25 0942   Margins Attached edges 07/29/25 0942   Drainage Amount Small 07/29/25 0942   Drainage Description Serous 07/29/25 0942   Treatments Topical Lidocaine;Cleansed;Provider debridement 07/29/25 0942   Offloading/DME Other (comment) 07/29/25 0942   Wound Cleansing Hypochlorus Acid 07/29/25 0942   Periwound Protectant Silicone barrier cream;Skin Moisturizer 07/29/25 0942    Dressing Status Old drainage 07/08/25 1000   Dressing Changed Changed 07/08/25 1000   Dressing Cleansing/Solutions Not Applicable 07/29/25 0942   Dressing Options Other (Comments);Compression Wrap Four Layer 07/29/25 0942   Dressing Change/Treatment Frequency Twice Weekly 07/29/25 0942   Wound Team Following Weekly 07/08/25 1000   Non-staged Wound Description Full thickness 07/29/25 0942   Wound Length (cm) 1 cm 07/29/25 0942   Wound Width (cm) 1.1 cm 07/29/25 0942   Wound Depth (cm) 0.1 cm 07/29/25 0942   Wound Surface Area (cm^2) 0.86 cm^2 07/29/25 0942   Wound Volume (cm^3) 0.058 cm^3 07/29/25 0942   Post-Procedure Length (cm) 1.2 cm 07/29/25 0942   Post-Procedure Width (cm) 1.1 cm 07/29/25 0942   Post-Procedure Depth (cm) 0.1 cm 07/29/25 0942   Post-Procedure Surface Area (cm^2) 1.04 cm^2 07/29/25 0942   Post-Procedure Volume (cm^3) 0.069 cm^3 07/29/25 0942   Wound Healing % 93 07/29/25 0942   Tunneling (cm) 0 cm 07/29/25 0942   Undermining (cm) 0 cm 07/29/25 0942   Wound Odor None 07/29/25 0942   Pulses 1+;Left;DP;PT 07/29/25 0942   Exposed Structures None 07/29/25 0942   Number of days: 56       PROCEDURE: Excisional debridement of left medial ankle wound  -2% viscous lidocaine applied topically to wound bed for approximately 5 minutes prior to debridement  -Curette used to debride wound bed.  Excisional debridement was performed to remove devitalized tissue until healthy, bleeding tissue was visualized.   Entire surface of wound, 1.04 cm² debrided.  Tissue debrided into the subcutaneous layer.  -Bleeding controlled with manual pressure.    -Wound care completed by wound RN, refer to flowsheet  -Patient tolerated the procedure well, without c/o pain or discomfort.         Pertinent Labs and Diagnostics:    Labs:     A1c:   Lab Results   Component Value Date/Time    HBA1C 5.6 06/04/2018 12:00 PM          IMAGING: X-ray of left ankle 5/27/2025  Osteopenia and significant deformity without a definite acute  osseous abnormality.     X-ray left foot 5/27/2025  No definite fracture or dislocation.       VASCULAR STUDIES: None found in epic    LAST  WOUND CULTURE:  DATE :   Lab Results   Component Value Date/Time    CULTRSULT - (A) 04/29/2025 09:00 AM    CULTRSULT Proteus mirabilis  Rare growth   (A) 04/29/2025 09:00 AM         ASSESSMENT AND PLAN:     1. Skin ulcer of left ankle with fat layer exposed (HCC)  Large full-thickness ulcer to left medial lower leg and ankle, present for several years.  Patient was treated for wounds to this same area in 2019, stopped coming into the clinic due to hospitalizations for neck and hip fractures.  She states the wound went on to heal but did not know for how long.    7/29/2025: Proximal wound resolved, distal wound measuring smaller  - Excisional debridement of wounds in clinic today, medically necessary to promote wound healing.  - Patient to return to clinic weekly for assessment and debridement  - Home health to follow-up with patient  weekly.  - Continue Urgoclean  - Tolerating 4-layer compression wrap, continue.  - Patient was seen during LPS rounds, surgical options were discussed with patient. She opted for conservative care with wound care.  See below for further detail.    Wound care: Urgo clean Ag; Compression Wrap Four Layer    2. Acquired varus deformity of ankle    7/29/2025: Severe valgus deformity of ankle.  Patient states she had several fractures of this ankle in the past, she did not undergo any surgery  - Seen by LPS clinic 6/6/2025. Surgical options including BKA or reconstruction with external fixator. Patient declined surgery and would like to continue conservative care with wound care.  - Patient reports she had a boot that she is unable to wear because of location of where her wound is.  She does not have a brace for the ankle.  - Case discussed with orthotist at [facility]. May benefit from custom bracing. Will place referral when wound closer to  resolution.  - X-ray of foot and ankle completed 5/27/2025  - Negative for OM.  - Home health was supposed to help patient obtain Prevalon boot.  Patient aware that Prevalon boot is to be used when sitting or laying in bed.  She is aware she is not supposed to ambulate in boot.  When patient sleeps she sleeps on her right side putting pressure to the left medial ankle.    3. Age-related physical debility    7/29/2025: Patient has moderately severe kyphosis of neck and spine, normally uses cane of walker for mobility  - [facility]    4.  Wound infection    7/29/2025:   - No overt evidence of wound infection today  - Patient is on suppressive doxycycline 100 mg p.o. twice daily ordered by Dr. Vazqeuz for chronic left hip infected hardware  - Monitor for signs and symptoms of infection each clinic visit    5. Callus of foot    7/29/2025  - Patient with  pre-ulcerative callus left plantar 1st MTH. Undoubtedly associated with her significant ankle / foot deformity  - No need for debridement today  - No open wounds to this part of her foot    PATIENT EDUCATION  - Importance of adequate nutrition for wound healing  -Advised to go to ER for any increased redness, swelling, drainage, or odor, or if patient develops fever, chills, nausea or vomiting.     Please note that this note may have been created using voice recognition software. I have worked with technical experts from [employer] to optimize the interface.  I have made every reasonable attempt to correct obvious errors, but there may be errors of grammar and possibly content that I did not discover before finalizing the note.    N

## 2025-08-01 ENCOUNTER — HOME CARE VISIT (OUTPATIENT)
Dept: HOME HEALTH SERVICES | Facility: HOME HEALTHCARE | Age: 80
End: 2025-08-01
Payer: MEDICARE

## 2025-08-01 VITALS
SYSTOLIC BLOOD PRESSURE: 118 MMHG | RESPIRATION RATE: 16 BRPM | DIASTOLIC BLOOD PRESSURE: 72 MMHG | OXYGEN SATURATION: 96 % | HEART RATE: 79 BPM | TEMPERATURE: 97.4 F

## 2025-08-01 PROCEDURE — G0299 HHS/HOSPICE OF RN EA 15 MIN: HCPCS

## 2025-08-01 ASSESSMENT — ENCOUNTER SYMPTOMS
VOMITING: DENIES
DENIES PAIN: 1
LIMITED RANGE OF MOTION: 1
LAST BOWEL MOVEMENT: 67417
NAUSEA: DENIES

## 2025-08-01 ASSESSMENT — ACTIVITIES OF DAILY LIVING (ADL)
AMBULATION ASSISTANCE: STAND BY ASSIST
CURRENT_FUNCTION: STAND BY ASSIST

## 2025-08-05 ENCOUNTER — OFFICE VISIT (OUTPATIENT)
Dept: WOUND CARE | Facility: MEDICAL CENTER | Age: 80
End: 2025-08-05
Attending: NURSE PRACTITIONER
Payer: MEDICARE

## 2025-08-05 DIAGNOSIS — R54 AGE-RELATED PHYSICAL DEBILITY: ICD-10-CM

## 2025-08-05 DIAGNOSIS — L97.322 SKIN ULCER OF LEFT ANKLE WITH FAT LAYER EXPOSED (HCC): Primary | ICD-10-CM

## 2025-08-05 DIAGNOSIS — L08.9 WOUND INFECTION: ICD-10-CM

## 2025-08-05 DIAGNOSIS — M21.179: ICD-10-CM

## 2025-08-05 DIAGNOSIS — T14.8XXA WOUND INFECTION: ICD-10-CM

## 2025-08-05 DIAGNOSIS — L84 CALLUS OF FOOT: ICD-10-CM

## 2025-08-05 PROCEDURE — 11042 DBRDMT SUBQ TIS 1ST 20SQCM/<: CPT

## 2025-08-05 PROCEDURE — 11042 DBRDMT SUBQ TIS 1ST 20SQCM/<: CPT | Performed by: NURSE PRACTITIONER

## 2025-08-08 ENCOUNTER — HOME CARE VISIT (OUTPATIENT)
Dept: HOME HEALTH SERVICES | Facility: HOME HEALTHCARE | Age: 80
End: 2025-08-08
Payer: MEDICARE

## 2025-08-08 VITALS
OXYGEN SATURATION: 94 % | SYSTOLIC BLOOD PRESSURE: 124 MMHG | HEART RATE: 79 BPM | RESPIRATION RATE: 16 BRPM | DIASTOLIC BLOOD PRESSURE: 68 MMHG | TEMPERATURE: 97.4 F

## 2025-08-08 PROCEDURE — G0299 HHS/HOSPICE OF RN EA 15 MIN: HCPCS

## 2025-08-08 ASSESSMENT — ENCOUNTER SYMPTOMS
LAST BOWEL MOVEMENT: 67424
PAIN LOCATION - PAIN SEVERITY: 1/10
SUBJECTIVE PAIN PROGRESSION: UNCHANGED
PAIN LOCATION: LEFT ANKLE
VOMITING: DENIES
PAIN LOCATION - RELIEVING FACTORS: UNSURE
PAIN LOCATION - EXACERBATING FACTORS: UNSURE
LIMITED RANGE OF MOTION: 1
HIGHEST PAIN SEVERITY IN PAST 24 HOURS: 1/10
PAIN LOCATION - PAIN QUALITY: ACHY
LOWEST PAIN SEVERITY IN PAST 24 HOURS: 0/10
PAIN LOCATION - PAIN DURATION: MINUTES
PAIN SEVERITY GOAL: 0/10
NAUSEA: DENIES
PAIN LOCATION - PAIN FREQUENCY: INFREQUENT
PAIN: 1

## 2025-08-08 ASSESSMENT — ACTIVITIES OF DAILY LIVING (ADL)
AMBULATION ASSISTANCE: STAND BY ASSIST
CURRENT_FUNCTION: STAND BY ASSIST

## 2025-08-12 ENCOUNTER — OFFICE VISIT (OUTPATIENT)
Dept: WOUND CARE | Facility: MEDICAL CENTER | Age: 80
End: 2025-08-12
Attending: NURSE PRACTITIONER
Payer: MEDICARE

## 2025-08-12 DIAGNOSIS — R54 AGE-RELATED PHYSICAL DEBILITY: ICD-10-CM

## 2025-08-12 DIAGNOSIS — I89.0 LYMPHEDEMA OF LEFT LEG: ICD-10-CM

## 2025-08-12 DIAGNOSIS — T14.8XXA WOUND INFECTION: ICD-10-CM

## 2025-08-12 DIAGNOSIS — L08.9 WOUND INFECTION: ICD-10-CM

## 2025-08-12 DIAGNOSIS — I87.2 VENOUS INSUFFICIENCY OF BOTH LOWER EXTREMITIES: ICD-10-CM

## 2025-08-12 DIAGNOSIS — L84 CALLUS OF FOOT: ICD-10-CM

## 2025-08-12 DIAGNOSIS — M21.179: ICD-10-CM

## 2025-08-12 DIAGNOSIS — L97.322 SKIN ULCER OF LEFT ANKLE WITH FAT LAYER EXPOSED (HCC): Primary | ICD-10-CM

## 2025-08-12 PROCEDURE — 11042 DBRDMT SUBQ TIS 1ST 20SQCM/<: CPT | Performed by: NURSE PRACTITIONER

## 2025-08-12 PROCEDURE — 11042 DBRDMT SUBQ TIS 1ST 20SQCM/<: CPT

## 2025-08-12 PROCEDURE — 99214 OFFICE O/P EST MOD 30 MIN: CPT | Mod: 25 | Performed by: NURSE PRACTITIONER

## 2025-08-12 PROCEDURE — 99214 OFFICE O/P EST MOD 30 MIN: CPT

## 2025-08-15 ENCOUNTER — HOME CARE VISIT (OUTPATIENT)
Dept: HOME HEALTH SERVICES | Facility: HOME HEALTHCARE | Age: 80
End: 2025-08-15
Payer: MEDICARE

## 2025-08-15 VITALS
HEART RATE: 71 BPM | RESPIRATION RATE: 16 BRPM | SYSTOLIC BLOOD PRESSURE: 124 MMHG | TEMPERATURE: 97.8 F | DIASTOLIC BLOOD PRESSURE: 66 MMHG | OXYGEN SATURATION: 94 %

## 2025-08-15 PROCEDURE — G0299 HHS/HOSPICE OF RN EA 15 MIN: HCPCS

## 2025-08-15 ASSESSMENT — ENCOUNTER SYMPTOMS
DENIES PAIN: 1
LAST BOWEL MOVEMENT: 67432
BOWEL PATTERN NORMAL: 1
VOMITING: DENIES
NAUSEA: DENIES
LIMITED RANGE OF MOTION: 1

## 2025-08-15 ASSESSMENT — ACTIVITIES OF DAILY LIVING (ADL)
AMBULATION ASSISTANCE: STAND BY ASSIST
CURRENT_FUNCTION: STAND BY ASSIST

## 2025-08-19 ENCOUNTER — OFFICE VISIT (OUTPATIENT)
Dept: WOUND CARE | Facility: MEDICAL CENTER | Age: 80
End: 2025-08-19
Attending: NURSE PRACTITIONER
Payer: MEDICARE

## 2025-08-19 DIAGNOSIS — L84 CALLUS OF FOOT: ICD-10-CM

## 2025-08-19 DIAGNOSIS — M21.179: ICD-10-CM

## 2025-08-19 DIAGNOSIS — I89.0 LYMPHEDEMA OF LEFT LEG: ICD-10-CM

## 2025-08-19 DIAGNOSIS — L97.322 SKIN ULCER OF LEFT ANKLE WITH FAT LAYER EXPOSED (HCC): Primary | ICD-10-CM

## 2025-08-19 DIAGNOSIS — I87.2 VENOUS INSUFFICIENCY OF BOTH LOWER EXTREMITIES: ICD-10-CM

## 2025-08-19 DIAGNOSIS — R54 AGE-RELATED PHYSICAL DEBILITY: ICD-10-CM

## 2025-08-19 DIAGNOSIS — L08.9 WOUND INFECTION: ICD-10-CM

## 2025-08-19 DIAGNOSIS — T14.8XXA WOUND INFECTION: ICD-10-CM

## 2025-08-19 PROCEDURE — 11042 DBRDMT SUBQ TIS 1ST 20SQCM/<: CPT | Performed by: NURSE PRACTITIONER

## 2025-08-19 PROCEDURE — 11042 DBRDMT SUBQ TIS 1ST 20SQCM/<: CPT

## 2025-08-20 ENCOUNTER — HOME CARE VISIT (OUTPATIENT)
Dept: HOME HEALTH SERVICES | Facility: HOME HEALTHCARE | Age: 80
End: 2025-08-20
Payer: MEDICARE

## 2025-08-20 VITALS
TEMPERATURE: 97.5 F | RESPIRATION RATE: 16 BRPM | DIASTOLIC BLOOD PRESSURE: 60 MMHG | SYSTOLIC BLOOD PRESSURE: 116 MMHG | HEART RATE: 78 BPM | OXYGEN SATURATION: 96 %

## 2025-08-20 PROCEDURE — G0493 RN CARE EA 15 MIN HH/HOSPICE: HCPCS

## 2025-08-20 PROCEDURE — G0179 MD RECERTIFICATION HHA PT: HCPCS | Performed by: NURSE PRACTITIONER

## 2025-08-20 ASSESSMENT — ACTIVITIES OF DAILY LIVING (ADL): OASIS_M1830: 04

## 2025-08-20 ASSESSMENT — ENCOUNTER SYMPTOMS
NAUSEA: DENIES
DENIES PAIN: 1
VOMITING: DENIES

## 2025-08-20 ASSESSMENT — PATIENT HEALTH QUESTIONNAIRE - PHQ9: CLINICAL INTERPRETATION OF PHQ2 SCORE: 0

## 2025-08-22 ENCOUNTER — HOME CARE VISIT (OUTPATIENT)
Dept: HOME HEALTH SERVICES | Facility: HOME HEALTHCARE | Age: 80
End: 2025-08-22
Payer: MEDICARE

## 2025-08-22 VITALS
RESPIRATION RATE: 14 BRPM | OXYGEN SATURATION: 95 % | HEART RATE: 74 BPM | DIASTOLIC BLOOD PRESSURE: 62 MMHG | SYSTOLIC BLOOD PRESSURE: 122 MMHG | TEMPERATURE: 97.9 F

## 2025-08-22 PROCEDURE — 665998 HH PPS REVENUE CREDIT

## 2025-08-22 PROCEDURE — 665999 HH PPS REVENUE DEBIT

## 2025-08-22 PROCEDURE — G0299 HHS/HOSPICE OF RN EA 15 MIN: HCPCS

## 2025-08-22 PROCEDURE — 665003 FOLLOW UP-HOME HEALTH

## 2025-08-23 PROCEDURE — 665998 HH PPS REVENUE CREDIT

## 2025-08-23 PROCEDURE — 665999 HH PPS REVENUE DEBIT

## 2025-08-24 PROCEDURE — 665998 HH PPS REVENUE CREDIT

## 2025-08-24 PROCEDURE — 665999 HH PPS REVENUE DEBIT

## 2025-08-24 ASSESSMENT — ENCOUNTER SYMPTOMS
LAST BOWEL MOVEMENT: 67439
DENIES PAIN: 1
NAUSEA: DENIES
VOMITING: DENIES
LIMITED RANGE OF MOTION: 1

## 2025-08-24 ASSESSMENT — ACTIVITIES OF DAILY LIVING (ADL)
CURRENT_FUNCTION: STAND BY ASSIST
AMBULATION ASSISTANCE: STAND BY ASSIST

## 2025-08-25 ENCOUNTER — DOCUMENTATION (OUTPATIENT)
Dept: MEDICAL GROUP | Facility: PHYSICIAN GROUP | Age: 80
End: 2025-08-25
Payer: MEDICARE

## 2025-08-25 PROCEDURE — 665999 HH PPS REVENUE DEBIT

## 2025-08-25 PROCEDURE — 665998 HH PPS REVENUE CREDIT

## 2025-08-26 ENCOUNTER — OFFICE VISIT (OUTPATIENT)
Dept: WOUND CARE | Facility: MEDICAL CENTER | Age: 80
End: 2025-08-26
Attending: NURSE PRACTITIONER
Payer: MEDICARE

## 2025-08-26 DIAGNOSIS — M21.179: Primary | ICD-10-CM

## 2025-08-26 DIAGNOSIS — T14.8XXA WOUND INFECTION: ICD-10-CM

## 2025-08-26 DIAGNOSIS — I89.0 LYMPHEDEMA OF LEFT LEG: ICD-10-CM

## 2025-08-26 DIAGNOSIS — R54 AGE-RELATED PHYSICAL DEBILITY: ICD-10-CM

## 2025-08-26 DIAGNOSIS — I87.2 VENOUS INSUFFICIENCY OF BOTH LOWER EXTREMITIES: ICD-10-CM

## 2025-08-26 DIAGNOSIS — L08.9 WOUND INFECTION: ICD-10-CM

## 2025-08-26 DIAGNOSIS — L84 CALLUS OF FOOT: ICD-10-CM

## 2025-08-26 DIAGNOSIS — L97.322 SKIN ULCER OF LEFT ANKLE WITH FAT LAYER EXPOSED (HCC): ICD-10-CM

## 2025-08-26 PROCEDURE — 665999 HH PPS REVENUE DEBIT

## 2025-08-26 PROCEDURE — 665998 HH PPS REVENUE CREDIT

## 2025-08-26 PROCEDURE — 99213 OFFICE O/P EST LOW 20 MIN: CPT

## 2025-08-26 PROCEDURE — 99213 OFFICE O/P EST LOW 20 MIN: CPT | Performed by: NURSE PRACTITIONER

## 2025-08-27 PROCEDURE — 665999 HH PPS REVENUE DEBIT

## 2025-08-27 PROCEDURE — 665998 HH PPS REVENUE CREDIT

## 2025-08-28 PROCEDURE — 665999 HH PPS REVENUE DEBIT

## 2025-08-28 PROCEDURE — 665998 HH PPS REVENUE CREDIT

## 2025-08-29 ENCOUNTER — HOME CARE VISIT (OUTPATIENT)
Dept: HOME HEALTH SERVICES | Facility: HOME HEALTHCARE | Age: 80
End: 2025-08-29
Payer: MEDICARE

## 2025-08-29 PROCEDURE — 665998 HH PPS REVENUE CREDIT

## 2025-08-29 PROCEDURE — 665999 HH PPS REVENUE DEBIT

## 2025-08-30 PROCEDURE — 665998 HH PPS REVENUE CREDIT

## 2025-08-30 PROCEDURE — 665999 HH PPS REVENUE DEBIT

## (undated) DEVICE — SPONGE XRAY 8X4 STERL. 12PL - (10EA/TY 80TY/CA)

## (undated) DEVICE — TUBING CLEARLINK DUO-VENT - C-FLO (48EA/CA)

## (undated) DEVICE — GOWN SURGEONS X-LARGE - DISP. (30/CA)

## (undated) DEVICE — LACTATED RINGERS INJ 1000 ML - (14EA/CA 60CA/PF)

## (undated) DEVICE — SUTURE 6-0 PROLENE BV-1 D/A 24 (36PK/BX)"

## (undated) DEVICE — SLEEVE, VASO, THIGH, MED

## (undated) DEVICE — WIRE GUIDE R-T TRIGEN 3.2MM (1EA)

## (undated) DEVICE — SUCTION INSTRUMENT YANKAUER BULBOUS TIP W/O VENT (50EA/CA)

## (undated) DEVICE — SUTURE GENERAL

## (undated) DEVICE — ELECTRODE DUAL RETURN W/ CORD - (50/PK)

## (undated) DEVICE — GLOVE BIOGEL SZ 8 SURGICAL PF LTX - (50PR/BX 4BX/CA)

## (undated) DEVICE — ROD GUIDE R-T TRIGEN 3 X 1000 (1EA)

## (undated) DEVICE — KIT ROOM DECONTAMINATION

## (undated) DEVICE — SUTURE QUILL #2 POLYPROPYLENE - (12/BX)

## (undated) DEVICE — GLOVE BIOGEL PI INDICATOR SZ 8.0 SURGICAL PF LF -(50/BX 4BX/CA)

## (undated) DEVICE — CHLORAPREP 26 ML APPLICATOR - ORANGE TINT(25/CA)

## (undated) DEVICE — SENSOR SPO2 NEO LNCS ADHESIVE (20/BX) SEE USER NOTES

## (undated) DEVICE — ELECTRODE 850 FOAM ADHESIVE - HYDROGEL RADIOTRNSPRNT (50/PK)

## (undated) DEVICE — KIT EVACUATER 3 SPRING PVC LF 1/8 DRAIN SIZE (10EA/CA)"

## (undated) DEVICE — DERMABOND ADVANCED - (12EA/BX)

## (undated) DEVICE — BRUSH FMCNL TIP IRR STRL - (12/PKG) FOR SURGILAV

## (undated) DEVICE — VESSELOOP MAXI BLUE STERILE- SURG-I-LOOP (10EA/BX)

## (undated) DEVICE — CONTAINER SPECIMEN BAG OR - STERILE 4 OZ W/LID (100EA/CA)

## (undated) DEVICE — SPONGE GAUZE STER 4X4 8-PL - (2/PK 50PK/BX 12BX/CS)

## (undated) DEVICE — NEPTUNE 4 PORT MANIFOLD - (20/PK)

## (undated) DEVICE — BAG DECANTER (50EA/CS)

## (undated) DEVICE — GLOVE BIOGEL SZ 6.5 SURGICAL PF LTX (50PR/BX 4BX/CA)

## (undated) DEVICE — SOD. CHL. INJ. 0.9% 1000 ML - (14EA/CA 60CA/PF)

## (undated) DEVICE — SYRINGE SAFETY 10 ML 18 GA X 1 1/2 BLUNT LL (100/BX 4BX/CA)

## (undated) DEVICE — HANDPIECE 10FT INTPLS SCT PLS IRRIGATION HAND CONTROL SET (6/PK)

## (undated) DEVICE — GLOVE SZ 7.5 BIOGEL PI MICRO - PF LF (50PR/BX)

## (undated) DEVICE — SUTURE 2-0 SILK 12 X 18" (36PK/BX)"

## (undated) DEVICE — MASK ANESTHESIA ADULT  - (100/CA)

## (undated) DEVICE — DRAPE SURGICAL U 77X120 - (10/CA)

## (undated) DEVICE — LENS/HOOD FOR SPACESUIT - (32/PK) PEEL AWAY FACE

## (undated) DEVICE — KIT SURGIFLO W/OUT THROMBIN - (6EA/CA)

## (undated) DEVICE — GLOVE BIOGEL PI ORTHO SZ 8 PF LF (40PR/BX)

## (undated) DEVICE — GOWN WARMING STANDARD FLEX - (30/CA)

## (undated) DEVICE — GLOVE BIOGEL PI ORTHO SZ 6 1/2 SURGICAL PF LF (40PR/BX)

## (undated) DEVICE — DRAPE STRLE REG TOWEL 18X24 - (10/BX 4BX/CA)"

## (undated) DEVICE — SET EXTENSION WITH 2 PORTS (48EA/CA) ***PART #2C8610 IS A SUBSTITUTE*****

## (undated) DEVICE — SODIUM CHL IRRIGATION 0.9% 1000ML (12EA/CA)

## (undated) DEVICE — SUTURE 1 VICRYL PLUS CTX - 8 X 18 INCH (12/BX)

## (undated) DEVICE — DRAPE IOBAN II INCISE 23X17 - (10EA/BX 4BX/CA)

## (undated) DEVICE — CANISTER SUCTION 3000ML MECHANICAL FILTER AUTO SHUTOFF MEDI-VAC NONSTERILE LF DISP  (40EA/CA)

## (undated) DEVICE — SUTURE ETHILON 2-0 FSLX 30 (36PK/BX)"

## (undated) DEVICE — SUTURE 2-0 MONOCRYL PLUS UNDYED CT-1 1 X 36 (36EA/BX)"

## (undated) DEVICE — TRAY CATHETER FOLEY URINE METER W/STATLOCK 350ML (10EA/CA)

## (undated) DEVICE — PROTECTOR ULNA NERVE - (36PR/CA)

## (undated) DEVICE — SODIUM CHL. INJ. 0.9% 500ML (24EA/CA 50CA/PF)

## (undated) DEVICE — CLIP MED INTNL HRZN TI ESCP - (25/BX)

## (undated) DEVICE — GLOVE BIOGEL PI ORTHO SZ 7.5 PF LF (40PR/BX)

## (undated) DEVICE — SUTURE 3-0 MONOCRYL PLUS PS-1 - 27 INCH (36/BX)

## (undated) DEVICE — BANDAGE ELASTIC 4 HONEYCOMB - 4"X5YD LF (20/CA)"

## (undated) DEVICE — TIP INTPLS HFLO ML ORFC BTRY - (12/CS)  FOR SURGILAV

## (undated) DEVICE — SYSTEM PREVENA INCISION MNGM - (1/EA)

## (undated) DEVICE — SUTURE 2-0 MONOCRYL CT-1

## (undated) DEVICE — SYRINGE SAFETY 3 ML 18 GA X 1 1/2 BLUNT LL (100/BX 8BX/CA)

## (undated) DEVICE — SUTURE 5 TI-CRON HOS-14 - (36/BX)

## (undated) DEVICE — STAPLER SKIN DISP - (6/BX 10BX/CA) VISISTAT

## (undated) DEVICE — BIT DRILL INTERTAN 4.0 LONG

## (undated) DEVICE — HEAD HOLDER JUNIOR/ADULT

## (undated) DEVICE — SYRINGE 30 ML LL (56/BX)

## (undated) DEVICE — KIT RADIAL ARTERY 20GA W/MAX BARRIER AND BIOPATCH  (5EA/CA) #10740 IS FOR THE SET RADIAL ARTERIAL

## (undated) DEVICE — NEEDLE SAFETY 18 GA X 1 1/2 IN (100EA/BX)

## (undated) DEVICE — SET LEADWIRE 5 LEAD BEDSIDE DISPOSABLE ECG (1SET OF 5/EA)

## (undated) DEVICE — VALVULOTOME LE MAITRE---MUST ORDER MINIMUM OF 10----

## (undated) DEVICE — SUTURE 5-0 PROLENE C-1 D/A 24 (36PK/BX)"

## (undated) DEVICE — BLOCK

## (undated) DEVICE — SUTURE 0 VICRYL PLUS CT-1 - 8 X 18 INCH (12/BX)

## (undated) DEVICE — SYRINGE DISP. 60 CC LL - (30/BX, 12BX/CA)**WHEN THESE ARE GONE ORDER #500206**

## (undated) DEVICE — PENCIL ELECTSURG 10FT BTN SWH - (50/CA)

## (undated) DEVICE — GLOVE BIOGEL SZ 7.5 SURGICAL PF LTX - (50PR/BX 4BX/CA)

## (undated) DEVICE — SEALER BIPOLAR AQUAMANTYS 6.0

## (undated) DEVICE — SET EXTENSION 31IN APPX 3.2ML WITH CLAMP (50/CA)WAS 4610-03

## (undated) DEVICE — GLOVE BIOGEL INDICATOR SZ 7.5 SURGICAL PF LTX - (50PR/BX 4BX/CA)

## (undated) DEVICE — TOWELS CLOTH SURGICAL - (4/PK 20PK/CA)

## (undated) DEVICE — BLADE RECIP 77.5 X 11.2 X .76MM (1/EA)

## (undated) DEVICE — WIPE INSTRUMENT MICROWIPE (20EA/SP)

## (undated) DEVICE — SUTURE 1 PDS-2 PLUS CTX - (24/BX)

## (undated) DEVICE — WATER IRRIG. STER. 1500 ML - (9/CA)

## (undated) DEVICE — GLOVE BIOGEL ECLIPSE  PF LATEX SIZE 6.5 (50PR/BX)

## (undated) DEVICE — DRAPE MAYO STAND - (30/CA)

## (undated) DEVICE — KIT ANESTHESIA W/CIRCUIT & 3/LT BAG W/FILTER (20EA/CA)

## (undated) DEVICE — BLADE SAGITTAL SAW DUAL CUT 75.0 X 25.0MM (1/EA)

## (undated) DEVICE — SUTURE 4-0 SILK 12 X 18 INCH - (36/BX)

## (undated) DEVICE — GOWN SURGEONS LARGE - (32/CA)

## (undated) DEVICE — GLOVE BIOGEL INDICATOR SZ 7SURGICAL PF LTX - (50/BX 4BX/CA)

## (undated) DEVICE — PEN SKIN MARKER W/RULER - (50EA/BX)

## (undated) DEVICE — GLOVE BIOGEL PI INDICATOR SZ 7.0 SURGICAL PF LF - (50/BX 4BX/CA)

## (undated) DEVICE — SUTURE CV

## (undated) DEVICE — GLOVE BIOGEL PI ORTHO SZ 6 SURGICAL PF LF (40PR/BX)

## (undated) DEVICE — CLEANER ELECTRO-SURGICAL TIP - (25/BX 4BX/CA)

## (undated) DEVICE — VESSELOOP MINI BLUE STERILE - SURG-I-LOOP (10EA/BX)

## (undated) DEVICE — SYRINGE DISP. 12 CC LL - (100/BX)

## (undated) DEVICE — KIT CENTRAL VEINCATHERIZATIO - 16 GA X 8 IN (5EA/CA) **WAS PART #AK-04300**

## (undated) DEVICE — GELAQUASONIC 100 ULTRASOUND - 48/BX 20GM STERILE FOIL POUCH

## (undated) DEVICE — SUTURE 2-0 VICRYL PLUS CT-1 - 8 X 18 INCH(12/BX)

## (undated) DEVICE — PACK TOTAL HIP - (1/CA)

## (undated) DEVICE — DRAPE U ORTHOPEDIC - (10/BX)

## (undated) DEVICE — TUBE E-T HI-LO CUFF 7.0MM (10EA/PK)

## (undated) DEVICE — DRESSING KIT V.A.C. SENSA T.R.A.C. SMALL (10EA/CA)

## (undated) DEVICE — SODIUM CHL. IRRIGATION 0.9% 3000ML (4EA/CA 65CA/PF)

## (undated) DEVICE — BAG ISOLATION 20X20 INVISI - SHEILD (10EA/BX)

## (undated) DEVICE — GLOVE BIOGEL INDICATOR SZ 8 SURGICAL PF LTX - (50/BX 4BX/CA)

## (undated) DEVICE — PAD PREP 24 X 48 CUFFED - (100/CA)

## (undated) DEVICE — GLOVE BIOGEL ECLIPSE PF LATEX SIZE 7.5

## (undated) DEVICE — SYRINGE 20 ML LL (50EA/BX 4BX/CA)

## (undated) DEVICE — TUBE E-T HI-LO CUFF 7.5MM (10EA/PK)

## (undated) DEVICE — SUTURE 4-0 MONOCRYL PLUS PS-1 - 27 INCH (36/BX)

## (undated) DEVICE — BLADE SURGICAL #11 - (50/BX)

## (undated) DEVICE — SUTURE 3-0 VICRYL PLUS SH - 8X 18 INCH (12/BX)

## (undated) DEVICE — NEEDLE SPINAL NON-SAFETY 18 GA X 3 IN (25EA/BX)

## (undated) DEVICE — VAC CANISTER W/GEL 500ML - FITS NEW MACHINES (10EA/CA)

## (undated) DEVICE — SUTURE 3-0 SILK 12 X 18 IN - (36/BX)

## (undated) DEVICE — SUTURE QUILL 2 PDO - (12/BX)

## (undated) DEVICE — DRAPE C-ARM LARGE 41IN X 74 IN - (10/BX 2BX/CA)

## (undated) DEVICE — PACK MAJOR ORTHO - (2EA/CA)

## (undated) DEVICE — DRAPE LARGE 3 QUARTER - (20/CA)

## (undated) DEVICE — BLADE SURGICAL CLIPPER - (50EA/CA)

## (undated) DEVICE — STOPCOCK MALE 4-WAY - (50/CA)

## (undated) DEVICE — DRESSING POST OP BORDER 4 X 10 (5EA/BX)

## (undated) DEVICE — SYRINGE SAFETY TB 1 ML 27 GA X 1/2 IN (100/BX 4BX/CA)

## (undated) DEVICE — SYSTEM PREVENA INCISION MNGM

## (undated) DEVICE — GLOVE BIOGEL PI INDICATOR SZ 6.5 SURGICAL PF LF - (50/BX 4BX/CA)

## (undated) DEVICE — PAD LAP STERILE 18 X 18 - (5/PK 40PK/CA)

## (undated) DEVICE — GLOVE BIOGEL PI INDICATOR SZ 7.5 SURGICAL PF LF -(50/BX 4BX/CA)

## (undated) DEVICE — CLIP SM INTNL HRZN TI ESCP LGT - (24EA/PK 25PK/BX)

## (undated) DEVICE — PACK MAJOR BASIN - (2EA/CA)

## (undated) DEVICE — BLADE SURGICAL #15 - (50/BX 3BX/CA)

## (undated) DEVICE — TRANSDUCER ADULT DISP. SINGLE BONDED STERILE - (20EA/CA)

## (undated) DEVICE — CONTAINER, SPECIMEN, STERILE

## (undated) DEVICE — CATHETER IV 20 GA X 1-1/4 ---SURG.& SDS ONLY--- (50EA/BX)

## (undated) DEVICE — SUTURE 3-0 VICRYL PLUS SH - 27 INCH (36/BX)